# Patient Record
Sex: FEMALE | Race: BLACK OR AFRICAN AMERICAN | NOT HISPANIC OR LATINO | Employment: OTHER | URBAN - METROPOLITAN AREA
[De-identification: names, ages, dates, MRNs, and addresses within clinical notes are randomized per-mention and may not be internally consistent; named-entity substitution may affect disease eponyms.]

---

## 2017-01-11 ENCOUNTER — PATIENT OUTREACH (OUTPATIENT)
Dept: ADMINISTRATIVE | Facility: HOSPITAL | Age: 82
End: 2017-01-11

## 2017-01-11 NOTE — LETTER
January 11, 2017    Glo Dumont  [address] [facility] [address]             [facility]  [address]  [facility] [address]  Phone: [phone] Dear Ms. Dumont:    [facility] is committed to your overall health.  To help you get the most out of each of your visits, we will review your information to make sure you are up to date on all of your recommended tests and/or procedures.      Christina Recio MD has found that you may be due for    Zoster Vaccine     Colonoscopy     Influenza Vaccine         If you have had any of the above done at another facility, please bring the records or information with you so that your record at [facility] will be complete.    If you are currently taking medication, please bring it with you to your appointment for review.    We will be happy to assist you with scheduling any necessary appointments or you may contact the [facility] appointment desk at [phone] to schedule at your convenience.     Thank you for choosing [facility] for your healthcare needs,      JULEE Bueno  Care Coordination Department  [facility]

## 2017-01-16 RX ORDER — PRASUGREL HYDROCHLORIDE 10 MG/1
TABLET, COATED ORAL
Qty: 30 TABLET | Refills: 0 | Status: SHIPPED | OUTPATIENT
Start: 2017-01-16 | End: 2017-02-16 | Stop reason: SDUPTHER

## 2017-01-16 RX ORDER — METOPROLOL TARTRATE 50 MG/1
TABLET ORAL
Qty: 90 TABLET | Refills: 0 | Status: SHIPPED | OUTPATIENT
Start: 2017-01-16 | End: 2017-03-07 | Stop reason: SDUPTHER

## 2017-01-23 ENCOUNTER — OFFICE VISIT (OUTPATIENT)
Dept: INTERNAL MEDICINE | Facility: CLINIC | Age: 82
End: 2017-01-23
Payer: MEDICARE

## 2017-01-23 ENCOUNTER — HOSPITAL ENCOUNTER (OUTPATIENT)
Dept: RADIOLOGY | Facility: HOSPITAL | Age: 82
Discharge: HOME | End: 2017-01-23
Attending: INTERNAL MEDICINE
Payer: MEDICARE

## 2017-01-23 VITALS
WEIGHT: 124.56 LBS | DIASTOLIC BLOOD PRESSURE: 70 MMHG | BODY MASS INDEX: 23.52 KG/M2 | HEIGHT: 61 IN | HEART RATE: 64 BPM | OXYGEN SATURATION: 99 % | TEMPERATURE: 95 F | SYSTOLIC BLOOD PRESSURE: 158 MMHG

## 2017-01-23 DIAGNOSIS — E78.00 PURE HYPERCHOLESTEROLEMIA: ICD-10-CM

## 2017-01-23 DIAGNOSIS — I10 ESSENTIAL HYPERTENSION: Chronic | ICD-10-CM

## 2017-01-23 DIAGNOSIS — E55.9 VITAMIN D DEFICIENCY: ICD-10-CM

## 2017-01-23 DIAGNOSIS — F32.A ANXIETY AND DEPRESSION: ICD-10-CM

## 2017-01-23 DIAGNOSIS — I11.0 HYPERTENSIVE HEART DISEASE WITH HEART FAILURE: Primary | ICD-10-CM

## 2017-01-23 DIAGNOSIS — D57.3 HEMOGLOBIN A-S GENOTYPE: ICD-10-CM

## 2017-01-23 DIAGNOSIS — Z12.31 ENCOUNTER FOR SCREENING MAMMOGRAM FOR MALIGNANT NEOPLASM OF BREAST: ICD-10-CM

## 2017-01-23 DIAGNOSIS — D86.0 SARCOIDOSIS OF LUNG: ICD-10-CM

## 2017-01-23 DIAGNOSIS — Z00.00 ENCOUNTER FOR PREVENTIVE HEALTH EXAMINATION: ICD-10-CM

## 2017-01-23 DIAGNOSIS — F41.9 ANXIETY AND DEPRESSION: ICD-10-CM

## 2017-01-23 DIAGNOSIS — Z29.9 PREVENTIVE MEASURE: ICD-10-CM

## 2017-01-23 PROCEDURE — 3077F SYST BP >= 140 MM HG: CPT | Mod: S$GLB,,, | Performed by: INTERNAL MEDICINE

## 2017-01-23 PROCEDURE — G0008 ADMIN INFLUENZA VIRUS VAC: HCPCS | Mod: S$GLB,,, | Performed by: INTERNAL MEDICINE

## 2017-01-23 PROCEDURE — 77063 BREAST TOMOSYNTHESIS BI: CPT | Mod: 26,,, | Performed by: RADIOLOGY

## 2017-01-23 PROCEDURE — 77067 SCR MAMMO BI INCL CAD: CPT | Mod: 26,,, | Performed by: RADIOLOGY

## 2017-01-23 PROCEDURE — 99499 UNLISTED E&M SERVICE: CPT | Mod: S$GLB,,, | Performed by: INTERNAL MEDICINE

## 2017-01-23 PROCEDURE — 99397 PER PM REEVAL EST PAT 65+ YR: CPT | Mod: 25,S$GLB,, | Performed by: INTERNAL MEDICINE

## 2017-01-23 PROCEDURE — 90662 IIV NO PRSV INCREASED AG IM: CPT | Mod: S$GLB,,, | Performed by: INTERNAL MEDICINE

## 2017-01-23 PROCEDURE — 3078F DIAST BP <80 MM HG: CPT | Mod: S$GLB,,, | Performed by: INTERNAL MEDICINE

## 2017-01-23 PROCEDURE — 99999 PR PBB SHADOW E&M-EST. PATIENT-LVL III: CPT | Mod: PBBFAC,,, | Performed by: INTERNAL MEDICINE

## 2017-01-23 RX ORDER — AMLODIPINE BESYLATE 10 MG/1
10 TABLET ORAL DAILY
Qty: 30 TABLET | Refills: 11 | Status: SHIPPED | OUTPATIENT
Start: 2017-01-23 | End: 2018-02-12 | Stop reason: SDUPTHER

## 2017-01-23 RX ORDER — FLUOXETINE 10 MG/1
10 CAPSULE ORAL DAILY
Qty: 30 CAPSULE | Refills: 6 | Status: SHIPPED | OUTPATIENT
Start: 2017-01-23 | End: 2018-02-12 | Stop reason: SDUPTHER

## 2017-01-23 NOTE — MR AVS SNAPSHOT
[facility]  [address]  [place] [address]  Phone: [phone]  Fax: [phone]                  Glo Dumont   2017 9:20 AM   Office Visit    Description:  Female : 1935   Provider:  Christina Cardona MD   Department:  [facility] - Internal Medicine           Reason for Visit     Annual Exam           Diagnoses this Visit        Comments    Hypertensive heart disease with heart failure    -  Primary     Pure hypercholesterolemia         Sarcoidosis of lung         Vitamin D deficiency         Essential hypertension         Encounter for preventive health examination         Hemoglobin A-S genotype         Anxiety and depression                To Do List           Future Appointments        Provider Department Dept Phone    2017 8:00 AM Nik Bregman MD [facility] [phone]    3/7/2017 10:00 AM Christina Cardona MD [facility] [phone]    3/13/2017 9:10 AM PULMONARY LAB, [facility] [phone]    3/13/2017 10:00 AM Gordo Muir MD [facility] [phone]      Goals (5 Years of Data)     None      Follow-Up and Disposition     Return in about 6 weeks (around 3/6/2017).    Follow-up and Disposition History       These Medications        Disp Refills Start End    amlodipine (NORVASC) 10 MG tablet 30 tablet 11 2017     Take 1 tablet (10 mg total) by mouth once daily. - Oral    [facility] [place] [facility] [address] Ph #: [phone]       fluoxetine (PROZAC) 10 MG capsule 30 capsule 6 2017    Take 1 capsule (10 mg total) by mouth once daily. - Oral    [facility] Ph #: [phone]         [facility]     [facility] Nurse Care Line -  Assistance  Registered nurses in the [facility] Center provide clinical advisement, health education, appointment  booking, and other advisory services.  Call for this free service at [phone].             Medications           Message regarding Medications     Verify the changes and/or additions to your medication regime listed below are the same as discussed with your clinician today.  If any of these changes or additions are incorrect, please notify your healthcare provider.        START taking these NEW medications        Refills    fluoxetine (PROZAC) 10 MG capsule 6    Sig: Take 1 capsule (10 mg total) by mouth once daily.    Class: Normal    Route: Oral           Verify that the below list of medications is an accurate representation of the medications you are currently taking.  If none reported, the list may be blank. If incorrect, please contact your healthcare provider. Carry this list with you in case of emergency.           Current Medications     amlodipine (NORVASC) 10 MG tablet Take 1 tablet (10 mg total) by mouth once daily.    aspirin (ECOTRIN) 81 MG EC tablet Take 81 mg by mouth once daily.    CALCIUM CARBONATE (CALCIUM 600 ORAL) Take 1 tablet by mouth Daily.    cholecalciferol, vitamin D3, 1,000 unit capsule Take 2 capsules (2,000 Units total) by mouth once daily.    cloNIDine (CATAPRES) 0.1 MG tablet Take 1 tablet (0.1 mg total) by mouth every 12 (twelve) hours as needed (BP above 160/100). May cause drowsiness    dorzolamide-timolol 2-0.5% (COSOPT) 2-0.5 % ophthalmic solution Place 1 drop into both eyes 2 (two) times daily.    DUREZOL 0.05 % Drop ophthalmic solution     EFFIENT 10 mg Tab TAKE ONE TABLET BY MOUTH EVERY DAY    FOLIC ACID/MULTIVIT-MIN/LUTEIN (CENTRUM SILVER ORAL) Take by mouth.    hydrochlorothiazide (MICROZIDE) 12.5 mg capsule TAKE ONE CAPSULE BY MOUTH ONCE DAILY    iron-vitamin C 100-250 mg, ICAR-C, (ICAR-C) 100-250 mg Tab Take 1 tablet by mouth once daily.    ketorolac 0.5% (ACULAR) 0.5 % Drop     lisinopril (PRINIVIL,ZESTRIL) 40 MG tablet Take 1 tablet (40 mg total) by mouth once  "daily.    metoprolol tartrate (LOPRESSOR) 50 MG tablet TAKE ONE TABLET BY MOUTH IN THE MORNING AND 2 TABLETS BY MOUTH IN THEEVENING    simvastatin (ZOCOR) 40 MG tablet TAKE ONE TABLET BY MOUTH EVERY EVENING    fluoxetine (PROZAC) 10 MG capsule Take 1 capsule (10 mg total) by mouth once daily.           Clinical Reference Information           Vital Signs - Last Recorded  Most recent update: 1/23/2017  9:48 AM by Nicole Mendez MA    BP Pulse Temp Ht Wt SpO2    (!) 158/70 (BP Location: Right arm) 64 (!) 95.4 °F (35.2 °C) (Tympanic) 5' 1" (1.549 m) 56.5 kg (124 lb 9 oz) 99%    BMI                23.54 kg/m2          Blood Pressure          Most Recent Value    BP  (!)  158/70      Allergies as of 1/23/2017     Codeine      Immunizations Administered on Date of Encounter - 1/23/2017     Name Date Dose VIS Date Route    Influenza - High Dose 1/23/2017 0.5 mL 8/7/2015 Intramuscular      Orders Placed During Today's Visit      Normal Orders This Visit    Influenza - High Dose (65+) (PF) (IM)       [facility] Sign-Up     Activating your [facility] account is as easy as 1-2-3!     1) Visit [email or web address], select Sign Up Now, enter this activation code and your date of birth, then select Next.  [email or web address]  Expires: 3/9/2017 10:04 AM      2) Create a username and password to use when you visit [facility] in the future and select a security question in case you lose your password and select Next.    3) Enter your e-mail address and click Sign Up!    Additional Information  If you have questions, please e-mail [email or web address] or call [phone] to talk to our [facility] staff. Remember, [facility] is NOT to be used for urgent needs. For medical emergencies, dial 911.         "

## 2017-01-23 NOTE — PROGRESS NOTES
"Subjective:       Patient ID: Glo Dumont is a 81 y.o. female.    Chief Complaint: Annual Exam    HPI Comments: Here for f/u medical problems and preventive exam.  Labs pending.  No f/c/sw/cough.  Breathing well.  No cp/sob/palp.  BMs and urine normal.  Taking vit D.  Checks BP at home:  130-140s/ 70s mostly.  Doesn't sleep well, lots of worry at bedtime.  Lots of stressors.  Thinks BP high due to stress.    HM: 1/17 fluvax, 3/15 [id number], 2/14 [id number], 2/15 TDaP, 12/15 BMD rep 2y, 2/11 Cscope no repeat will be done, 1/17 MMG, Eye doc monthly.        Review of Systems   Constitutional: Negative for appetite change, chills, diaphoresis and fever.   HENT: Negative for congestion, ear pain, rhinorrhea, sinus pressure and sore throat.    Respiratory: Negative for cough, chest tightness and shortness of breath.    Cardiovascular: Negative for chest pain and palpitations.   Gastrointestinal: Negative for blood in stool, constipation, diarrhea, nausea and vomiting.   Genitourinary: Negative for dysuria, frequency, hematuria, menstrual problem, urgency and vaginal discharge.   Musculoskeletal: Negative for arthralgias.   Skin: Negative for rash.   Neurological: Negative for dizziness and headaches.   Psychiatric/Behavioral: Negative for sleep disturbance. The patient is not nervous/anxious.        Objective:     Visit Vitals    BP (!) 158/70 (BP Location: Right arm)    Pulse 64    Temp (!) 95.4 °F (35.2 °C) (Tympanic)    Ht 5' 1" (1.549 m)    Wt 56.5 kg (124 lb 9 oz)    SpO2 99%    BMI 23.54 kg/m2       Physical Exam   Constitutional: She is oriented to person, place, and time. She appears well-developed and well-nourished.   HENT:   Right Ear: External ear normal. Tympanic membrane is not injected.   Left Ear: External ear normal. Tympanic membrane is not injected.   Mouth/Throat: Oropharynx is clear and moist.   Eyes: Conjunctivae are normal.   Neck: Normal range of motion. Neck supple. No thyromegaly present. "   Cardiovascular: Normal rate, regular rhythm and intact distal pulses.  Exam reveals no gallop and no friction rub.    No murmur heard.  Pulmonary/Chest: Effort normal and breath sounds normal. She has no wheezes. She has no rales. Right breast exhibits no mass, no nipple discharge, no skin change and no tenderness. Left breast exhibits no mass, no nipple discharge, no skin change and no tenderness.   Abdominal: Soft. Bowel sounds are normal. She exhibits no mass. There is no tenderness.   Musculoskeletal: She exhibits no edema.   Lymphadenopathy:     She has no cervical adenopathy.        Right axillary: No lateral adenopathy present.        Left axillary: No lateral adenopathy present.  Neurological: She is alert and oriented to person, place, and time.   Skin: Skin is warm. No rash noted.   Psychiatric: She has a normal mood and affect.       Assessment:       1. Hypertensive heart disease with heart failure    2. Pure hypercholesterolemia    3. Sarcoidosis of lung    4. Vitamin D deficiency    5. Essential hypertension    6. Encounter for preventive health examination    7. Hemoglobin A-S genotype    8. Anxiety and depression        Plan:       Glo was seen today for annual exam.    Diagnoses and all orders for this visit:    Hypertensive heart disease with heart failure- clin stable.    Pure hypercholesterolemia- labs pending.    Sarcoidosis of lung- stable.    Vitamin D deficiency- lab pending.    Essential hypertension- monitor BPs for Card appt.    Encounter for preventive health examination- fluvax, then MA labs.      anx and depression- start prozac, recheck 6wk.    Hemoglobin A-S genotype- lab pending.

## 2017-01-26 ENCOUNTER — TELEPHONE (OUTPATIENT)
Dept: PEDIATRICS | Facility: CLINIC | Age: 82
End: 2017-01-26

## 2017-01-26 DIAGNOSIS — E83.52 HYPERCALCEMIA: Primary | ICD-10-CM

## 2017-01-26 NOTE — TELEPHONE ENCOUNTER
Please tell pt labs ok except calcium level is too high.  If taking calcium supplement needs to stop.  Schedule lab to recheck before appt 6wk.  SM

## 2017-01-30 NOTE — TELEPHONE ENCOUNTER
Informed pt labs ok except calcium is too high.  Pt stated that she does take a calcium supplement.  Instructed pt to stop and that her levels will be rechecked prior to her 6wk f/u.  Pt verbalized understanding./rpr

## 2017-02-15 DIAGNOSIS — I25.83 CORONARY ARTERY DISEASE DUE TO LIPID RICH PLAQUE: ICD-10-CM

## 2017-02-15 DIAGNOSIS — I25.10 CORONARY ARTERY DISEASE DUE TO LIPID RICH PLAQUE: ICD-10-CM

## 2017-02-15 DIAGNOSIS — I10 BENIGN ESSENTIAL HTN: Primary | ICD-10-CM

## 2017-02-16 ENCOUNTER — OFFICE VISIT (OUTPATIENT)
Dept: CARDIOLOGY | Facility: CLINIC | Age: 82
End: 2017-02-16
Payer: MEDICARE

## 2017-02-16 ENCOUNTER — CLINICAL SUPPORT (OUTPATIENT)
Dept: CARDIOLOGY | Facility: CLINIC | Age: 82
End: 2017-02-16
Payer: MEDICARE

## 2017-02-16 VITALS
HEIGHT: 61 IN | SYSTOLIC BLOOD PRESSURE: 136 MMHG | DIASTOLIC BLOOD PRESSURE: 60 MMHG | HEART RATE: 46 BPM | WEIGHT: 116 LBS | BODY MASS INDEX: 21.9 KG/M2

## 2017-02-16 DIAGNOSIS — I10 ESSENTIAL HYPERTENSION: Chronic | ICD-10-CM

## 2017-02-16 DIAGNOSIS — I25.10 CORONARY ARTERY DISEASE, OCCLUSIVE: Primary | Chronic | ICD-10-CM

## 2017-02-16 DIAGNOSIS — I50.32 CHRONIC DIASTOLIC HF (HEART FAILURE), NYHA CLASS 2: Chronic | ICD-10-CM

## 2017-02-16 DIAGNOSIS — I20.9 ANGINA, CLASS II: Chronic | ICD-10-CM

## 2017-02-16 DIAGNOSIS — I10 BENIGN ESSENTIAL HTN: ICD-10-CM

## 2017-02-16 DIAGNOSIS — I25.83 CORONARY ARTERY DISEASE DUE TO LIPID RICH PLAQUE: ICD-10-CM

## 2017-02-16 DIAGNOSIS — I25.10 CORONARY ARTERY DISEASE DUE TO LIPID RICH PLAQUE: ICD-10-CM

## 2017-02-16 DIAGNOSIS — Z98.61 HISTORY OF CORONARY ANGIOPLASTY: Chronic | ICD-10-CM

## 2017-02-16 PROCEDURE — 3075F SYST BP GE 130 - 139MM HG: CPT | Mod: S$GLB,,, | Performed by: NUCLEAR MEDICINE

## 2017-02-16 PROCEDURE — 1157F ADVNC CARE PLAN IN RCRD: CPT | Mod: S$GLB,,, | Performed by: NUCLEAR MEDICINE

## 2017-02-16 PROCEDURE — 93000 ELECTROCARDIOGRAM COMPLETE: CPT | Mod: S$GLB,,, | Performed by: INTERNAL MEDICINE

## 2017-02-16 PROCEDURE — 1160F RVW MEDS BY RX/DR IN RCRD: CPT | Mod: S$GLB,,, | Performed by: NUCLEAR MEDICINE

## 2017-02-16 PROCEDURE — 3078F DIAST BP <80 MM HG: CPT | Mod: S$GLB,,, | Performed by: NUCLEAR MEDICINE

## 2017-02-16 PROCEDURE — 99214 OFFICE O/P EST MOD 30 MIN: CPT | Mod: S$GLB,,, | Performed by: NUCLEAR MEDICINE

## 2017-02-16 PROCEDURE — 99499 UNLISTED E&M SERVICE: CPT | Mod: S$GLB,,, | Performed by: NUCLEAR MEDICINE

## 2017-02-16 PROCEDURE — 99999 PR PBB SHADOW E&M-EST. PATIENT-LVL III: CPT | Mod: PBBFAC,,, | Performed by: NUCLEAR MEDICINE

## 2017-02-16 PROCEDURE — 1159F MED LIST DOCD IN RCRD: CPT | Mod: S$GLB,,, | Performed by: NUCLEAR MEDICINE

## 2017-02-16 PROCEDURE — 1126F AMNT PAIN NOTED NONE PRSNT: CPT | Mod: S$GLB,,, | Performed by: NUCLEAR MEDICINE

## 2017-02-16 RX ORDER — PRASUGREL 10 MG/1
10 TABLET, FILM COATED ORAL DAILY
Qty: 30 TABLET | Refills: 6 | Status: SHIPPED | OUTPATIENT
Start: 2017-02-16 | End: 2017-11-01 | Stop reason: SDUPTHER

## 2017-02-16 NOTE — MR AVS SNAPSHOT
[facility]  [address] [place] [address]  Phone: [phone]  Fax: [phone]                  Glo Dumont   2017 8:00 AM   Office Visit    Description:  Female : 1935   Provider:  Nik Bergman MD   Department:  [facility] - Cardiology           Reason for Visit     Coronary Artery Disease     Hypertension     Congestive Heart Failure           Diagnoses this Visit        Comments    Coronary artery disease, occlusive    -  Primary     Angina, class II         History of coronary angioplasty         Essential hypertension         Chronic diastolic HF (heart failure), NYHA class 2                To Do List           Future Appointments        Provider Department Dept Phone    2017 9:55 AM LABORATORY, [facility] [phone]    3/7/2017 10:00 AM Christina Cardona MD [facility] [phone]    3/13/2017 9:10 AM PULMONARY LAB, [facility] [phone]    3/13/2017 10:00 AM Gordo Muir MD [facility] [phone]      Goals (5 Years of Data)     None      Follow-Up and Disposition     Return in about 6 months (around 2017).      [facility]     [facility] Nurse Care Line -  Assistance  Registered nurses in the [facility] Center provide clinical advisement, health education, appointment booking, and other advisory services.  Call for this free service at [phone].             Medications           Message regarding Medications     Verify the changes and/or additions to your medication regime listed below are the same as discussed with your clinician today.  If any of these changes or additions are incorrect, please notify your healthcare provider.             Verify that the below list of medications is an accurate representation of the medications you are currently taking.  If none reported, the list may be blank. If incorrect, please contact your healthcare  "provider. Carry this list with you in case of emergency.           Current Medications     amlodipine (NORVASC) 10 MG tablet Take 1 tablet (10 mg total) by mouth once daily.    aspirin (ECOTRIN) 81 MG EC tablet Take 81 mg by mouth once daily.    CALCIUM CARBONATE (CALCIUM 600 ORAL) Take 1 tablet by mouth Daily.    cholecalciferol, vitamin D3, 1,000 unit capsule Take 2 capsules (2,000 Units total) by mouth once daily.    cloNIDine (CATAPRES) 0.1 MG tablet Take 1 tablet (0.1 mg total) by mouth every 12 (twelve) hours as needed (BP above 160/100). May cause drowsiness    dorzolamide-timolol 2-0.5% (COSOPT) 2-0.5 % ophthalmic solution Place 1 drop into both eyes 2 (two) times daily.    DUREZOL 0.05 % Drop ophthalmic solution     fluoxetine (PROZAC) 10 MG capsule Take 1 capsule (10 mg total) by mouth once daily.    FOLIC ACID/MULTIVIT-MIN/LUTEIN (CENTRUM SILVER ORAL) Take by mouth.    hydrochlorothiazide (MICROZIDE) 12.5 mg capsule TAKE ONE CAPSULE BY MOUTH ONCE DAILY    iron-vitamin C 100-250 mg, ICAR-C, (ICAR-C) 100-250 mg Tab Take 1 tablet by mouth once daily.    ketorolac 0.5% (ACULAR) 0.5 % Drop     lisinopril (PRINIVIL,ZESTRIL) 40 MG tablet Take 1 tablet (40 mg total) by mouth once daily.    metoprolol tartrate (LOPRESSOR) 50 MG tablet TAKE ONE TABLET BY MOUTH IN THE MORNING AND 2 TABLETS BY MOUTH IN THEEVENING    prasugrel (EFFIENT) 10 mg Tab Take 1 tablet (10 mg total) by mouth once daily.    simvastatin (ZOCOR) 40 MG tablet TAKE ONE TABLET BY MOUTH EVERY EVENING           Clinical Reference Information           Your Vitals Were     BP Pulse Height Weight BMI    136/60 (BP Location: Left arm, Patient Position: Sitting, BP Method: Manual) 46 5' 1" (1.549 m) 52.6 kg (116 lb) 21.92 kg/m2      Blood Pressure          Most Recent Value    Right Arm BP - Sitting  140/70    Right Arm BP - Standing  140/70    BP  136/60      Allergies as of 2/16/2017     Codeine      Immunizations Administered on Date of Encounter - " 2/16/2017     None      [facility] Sign-Up     Activating your [facility] account is as easy as 1-2-3!     1) Visit [email or web address], select Sign Up Now, enter this activation code and your date of birth, then select Next.  [email or web address]  Expires: 3/9/2017 10:04 AM      2) Create a username and password to use when you visit [facility] in the future and select a security question in case you lose your password and select Next.    3) Enter your e-mail address and click Sign Up!    Additional Information  If you have questions, please e-mail [email or web address] or call [phone] to talk to our [facility] staff. Remember, [facility] is NOT to be used for urgent needs. For medical emergencies, dial 911.         Language Assistance Services     ATTENTION: Language assistance services are available, free of charge. Please call [phone].      ATENCIÓN: Si habla gigidaniel, tiene a paez disposición servicios gratuitos de asistencia lingüística. Llame al [phone].     [facility] Ý: N?u b?n nói Ti?ng Vi?t, có các d?ch v? h? tr? ngôn ng? mi?n phí dành cho b?n. G?i s? [phone].         [facility] complies with applicable Federal civil rights laws and does not discriminate on the basis of race, color, national origin, age, disability, or sex.

## 2017-02-16 NOTE — PROGRESS NOTES
Subjective:   Patient ID:  Glo Dumont is a 81 y.o. female who presents for follow-up of Coronary Artery Disease (6 month followup); Hypertension; and Congestive Heart Failure (HFPEF DD)      HPI 1- CHRONIC CAD- ANGINA FC 2- POST PCI- ON DAPT- EFFIENT    2- ESSENTIAL HTN WITH HFPEF, STAGE C, FC 2- DD  DOING WELL. NO RECURRENT ANGINA. NO RECENT HOSPITALIZATIONS OR ED VISITS FOR ACS OR ADHF  NO UNUSUAL SOOD. NO ORTHOPNEA OR PND  NO PALPITATIONS. NO EXERTIONAL DIZZINESS OR SYNCOPE  NO EDEMA. NO INTERMITTENT CLAUDICATION  NO FOCAL CNS SYMPTOMS OR SIGNS TO SUGGEST TIA OR STROKE  NO ABNORMAL BLEEDING - ON DAPT- EFFIENT  CARD MED WELL TOLERATED  ECG - TODAY- SB, OTHERWISE UNREMARKABLE  Review of Systems   Constitution: Negative for chills, fever, weakness, night sweats, weight gain and weight loss.   HENT: Negative for headaches and nosebleeds.    Eyes: Negative for blurred vision, double vision and visual disturbance.   Cardiovascular: Negative for chest pain, dyspnea on exertion, irregular heartbeat, leg swelling, orthopnea, palpitations, paroxysmal nocturnal dyspnea and syncope.   Respiratory: Negative for cough, hemoptysis and wheezing.    Endocrine: Negative for polydipsia and polyuria.   Hematologic/Lymphatic: Does not bruise/bleed easily.   Skin: Negative for rash.   Musculoskeletal: Negative for joint pain, joint swelling, muscle weakness and myalgias.   Gastrointestinal: Negative for abdominal pain, hematemesis, jaundice and melena.   Genitourinary: Negative for dysuria, hematuria and nocturia.   Neurological: Negative for dizziness, focal weakness and sensory change.   Psychiatric/Behavioral: Negative for depression. The patient does not have insomnia and is not nervous/anxious.      Family History   Problem Relation Age of Onset    Hypertension Mother     Heart failure Mother     Cancer Father      prostate    Cirrhosis Brother     Heart failure Brother     Cancer Daughter      Carcinoid     Past Medical  History   Diagnosis Date    Anemia     Angina pectoris     Anxiety     Arthritis      hip    Carotid artery occlusion     Carpal tunnel syndrome 06/23/2008     emg    Chronic diarrhea      work up in 2011 with EGD, CS and VCE    Coronary artery disease     Coronary artery disease     Diastolic dysfunction     Diverticulosis     Greater trochanteric bursitis 2/10/2015    Grief at loss of child 1/26/2016    H/O carotid endarterectomy 12/2/2013    Heart failure     Hypercholesteremia     Hypertension     Liver cyst 02/08/2013     ct abd    Macular degeneration     Primary open-angle glaucoma(365.11) 9/3/2013    Renal cyst 02/08/2013     ct abd    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 30 tablet 11    aspirin (ECOTRIN) 81 MG EC tablet Take 81 mg by mouth once daily.      CALCIUM CARBONATE (CALCIUM 600 ORAL) Take 1 tablet by mouth Daily.      cholecalciferol, vitamin D3, 1,000 unit capsule Take 2 capsules (2,000 Units total) by mouth once daily. 100 capsule 0    dorzolamide-timolol 2-0.5% (COSOPT) 2-0.5 % ophthalmic solution Place 1 drop into both eyes 2 (two) times daily. 10 mL 5    DUREZOL 0.05 % Drop ophthalmic solution       EFFIENT 10 mg Tab TAKE ONE TABLET BY MOUTH EVERY DAY 30 tablet 0    fluoxetine (PROZAC) 10 MG capsule Take 1 capsule (10 mg total) by mouth once daily. 30 capsule 6    FOLIC ACID/MULTIVIT-MIN/LUTEIN (CENTRUM SILVER ORAL) Take by mouth.      hydrochlorothiazide (MICROZIDE) 12.5 mg capsule TAKE ONE CAPSULE BY MOUTH ONCE DAILY 30 capsule 11    iron-vitamin C 100-250 mg, ICAR-C, (ICAR-C) 100-250 mg Tab Take 1 tablet by mouth once daily. 100 tablet 6    ketorolac 0.5% (ACULAR) 0.5 % Drop        lisinopril (PRINIVIL,ZESTRIL) 40 MG tablet Take 1 tablet (40 mg total) by mouth once daily. 30 tablet 11    metoprolol tartrate (LOPRESSOR) 50 MG tablet TAKE ONE TABLET BY MOUTH IN THE MORNING AND 2 TABLETS BY MOUTH IN THEEVENING 90 tablet 0    simvastatin (ZOCOR) 40 MG tablet TAKE ONE TABLET BY MOUTH EVERY EVENING 30 tablet 11    cloNIDine (CATAPRES) 0.1 MG tablet Take 1 tablet (0.1 mg total) by mouth every 12 (twelve) hours as needed (BP above 160/100). May cause drowsiness 10 tablet 0     Current Facility-Administered Medications on File Prior to Visit   Medication Dose Route Frequency Provider Last Rate Last Dose    aflibercept Soln 2 mg  2 mg Intravitreal 1 time in Clinic/HOD NAHUM Tamez MD         Review of patient's allergies indicates:   Allergen Reactions    Codeine Nausea And Vomiting       Objective:     Physical Exam   Constitutional: She is oriented to person, place, and time. She appears well-developed. No distress.   HENT:   Head: Normocephalic.   Eyes: Conjunctivae are normal. Pupils are equal, round, and reactive to light. No scleral icterus.   Neck: Normal range of motion. Neck supple. Normal carotid pulses, no hepatojugular reflux and no JVD present. Carotid bruit is not present. No edema present. No thyroid mass and no thyromegaly present.   Cardiovascular: Regular rhythm, S1 normal, S2 normal, normal heart sounds and intact distal pulses.  Bradycardia present.  PMI is not displaced.  Exam reveals no gallop and no friction rub.    No murmur heard.  Pulses:       Carotid pulses are 2+ on the right side, and 2+ on the left side.       Radial pulses are 2+ on the right side, and 2+ on the left side.        Femoral pulses are 2+ on the right side, and 2+ on the left side.       Popliteal pulses are 2+ on the right side, and 2+ on the left side.        Dorsalis pedis pulses are 2+ on the right side, and 2+ on the left side.        Posterior tibial pulses are 2+ on the right side, and 2+ on  the left side.   Pulmonary/Chest: Effort normal and breath sounds normal. She has no wheezes. She has no rales. She exhibits no tenderness.   Abdominal: Soft. Bowel sounds are normal. She exhibits no pulsatile midline mass and no mass. There is no hepatosplenomegaly. There is no tenderness.   Musculoskeletal: Normal range of motion. She exhibits no edema or tenderness.        Cervical back: Normal.        Thoracic back: Normal.        Lumbar back: Normal.   Lymphadenopathy:     She has no cervical adenopathy.     She has no axillary adenopathy.        Right: No supraclavicular adenopathy present.        Left: No supraclavicular adenopathy present.   Neurological: She is alert and oriented to person, place, and time. She has normal strength and normal reflexes. No sensory deficit. Gait normal.   Skin: Skin is warm. No rash noted. No cyanosis. No pallor. Nails show no clubbing.   Psychiatric: She has a normal mood and affect. Her speech is normal and behavior is normal. Cognition and memory are normal.       Assessment:     1. Coronary artery disease, occlusive    2. Angina, class II    3. History of coronary angioplasty    4. Essential hypertension    5. Chronic diastolic HF (heart failure), NYHA class 2      STABLE CAD/ ANGINA PATTERN  NO CLINICAL EVIDENCE OF ADHF. NO ARRHYTHMIAS  BP WELL CONTROLLED  CNS STATUS STABLE  CARD MED WELL TOLERATED  Plan:     Coronary artery disease, occlusive    Angina, class II    History of coronary angioplasty    Essential hypertension    Chronic diastolic HF (heart failure), NYHA class 2    1- CONTINUE PRESENT CARD MANAGEMENT    2- RETURN IN 6 MONTHS.

## 2017-02-21 ENCOUNTER — PATIENT OUTREACH (OUTPATIENT)
Dept: ADMINISTRATIVE | Facility: HOSPITAL | Age: 82
End: 2017-02-21

## 2017-02-21 NOTE — LETTER
February 21, 2017    Glo Dumont  [address] [facility] [address]             [facility]  [address]  [facility] [address]  Phone: [phone] Dear Mrs. Dumont:    [facility] is committed to your overall health.  To help you get the most out of each of your visits, we will review your information to make sure you are up to date on all of your recommended tests and/or procedures.      Christina Recio MD has found that you may be due for    Zoster Vaccine     Colonoscopy         If you have had any of the above done at another facility, please bring the records or information with you so that your record at [facility] will be complete.    If you are currently taking medication, please bring it with you to your appointment for review.    We will be happy to assist you with scheduling any necessary appointments or you may contact the [facility] appointment desk at [phone] to schedule at your convenience.     Thank you for choosing [facility] for your healthcare needs,        Bobbye, LPN  Care Coordination Department  [facility]

## 2017-02-28 ENCOUNTER — LAB VISIT (OUTPATIENT)
Dept: LAB | Facility: HOSPITAL | Age: 82
End: 2017-02-28
Attending: INTERNAL MEDICINE
Payer: MEDICARE

## 2017-02-28 DIAGNOSIS — E83.52 HYPERCALCEMIA: ICD-10-CM

## 2017-02-28 LAB
ANION GAP SERPL CALC-SCNC: 5 MMOL/L
BUN SERPL-MCNC: 20 MG/DL
CALCIUM SERPL-MCNC: 9.3 MG/DL
CHLORIDE SERPL-SCNC: 109 MMOL/L
CO2 SERPL-SCNC: 24 MMOL/L
CREAT SERPL-MCNC: 1.1 MG/DL
EST. GFR  (AFRICAN AMERICAN): 55.2 ML/MIN/1.73 M^2
EST. GFR  (NON AFRICAN AMERICAN): 47.9 ML/MIN/1.73 M^2
GLUCOSE SERPL-MCNC: 91 MG/DL
POTASSIUM SERPL-SCNC: 3.8 MMOL/L
PTH-INTACT SERPL-MCNC: 29 PG/ML
SODIUM SERPL-SCNC: 138 MMOL/L

## 2017-02-28 PROCEDURE — 83970 ASSAY OF PARATHORMONE: CPT

## 2017-02-28 PROCEDURE — 80048 BASIC METABOLIC PNL TOTAL CA: CPT

## 2017-02-28 PROCEDURE — 36415 COLL VENOUS BLD VENIPUNCTURE: CPT | Mod: PO

## 2017-03-07 ENCOUNTER — OFFICE VISIT (OUTPATIENT)
Dept: INTERNAL MEDICINE | Facility: CLINIC | Age: 82
End: 2017-03-07
Payer: MEDICARE

## 2017-03-07 VITALS
TEMPERATURE: 97 F | OXYGEN SATURATION: 98 % | HEART RATE: 64 BPM | DIASTOLIC BLOOD PRESSURE: 68 MMHG | SYSTOLIC BLOOD PRESSURE: 142 MMHG | BODY MASS INDEX: 24.31 KG/M2 | WEIGHT: 128.75 LBS | HEIGHT: 61 IN

## 2017-03-07 DIAGNOSIS — I10 ESSENTIAL HYPERTENSION: Primary | Chronic | ICD-10-CM

## 2017-03-07 DIAGNOSIS — F32.A ANXIETY AND DEPRESSION: ICD-10-CM

## 2017-03-07 DIAGNOSIS — F41.9 ANXIETY AND DEPRESSION: ICD-10-CM

## 2017-03-07 DIAGNOSIS — I25.10 CORONARY ARTERY DISEASE, OCCLUSIVE: Chronic | ICD-10-CM

## 2017-03-07 DIAGNOSIS — I70.0 ATHEROSCLEROSIS OF AORTA: ICD-10-CM

## 2017-03-07 PROCEDURE — 1157F ADVNC CARE PLAN IN RCRD: CPT | Mod: S$GLB,,, | Performed by: INTERNAL MEDICINE

## 2017-03-07 PROCEDURE — 99499 UNLISTED E&M SERVICE: CPT | Mod: S$GLB,,, | Performed by: INTERNAL MEDICINE

## 2017-03-07 PROCEDURE — 3077F SYST BP >= 140 MM HG: CPT | Mod: S$GLB,,, | Performed by: INTERNAL MEDICINE

## 2017-03-07 PROCEDURE — 1159F MED LIST DOCD IN RCRD: CPT | Mod: S$GLB,,, | Performed by: INTERNAL MEDICINE

## 2017-03-07 PROCEDURE — 1160F RVW MEDS BY RX/DR IN RCRD: CPT | Mod: S$GLB,,, | Performed by: INTERNAL MEDICINE

## 2017-03-07 PROCEDURE — 99213 OFFICE O/P EST LOW 20 MIN: CPT | Mod: S$GLB,,, | Performed by: INTERNAL MEDICINE

## 2017-03-07 PROCEDURE — 3078F DIAST BP <80 MM HG: CPT | Mod: S$GLB,,, | Performed by: INTERNAL MEDICINE

## 2017-03-07 PROCEDURE — 99999 PR PBB SHADOW E&M-EST. PATIENT-LVL III: CPT | Mod: PBBFAC,,, | Performed by: INTERNAL MEDICINE

## 2017-03-07 RX ORDER — BUDESONIDE 3 MG/1
9 CAPSULE, COATED PELLETS ORAL DAILY
COMMUNITY
End: 2022-10-31

## 2017-03-07 RX ORDER — DICYCLOMINE HYDROCHLORIDE 10 MG/1
10 CAPSULE ORAL
COMMUNITY
End: 2020-09-26

## 2017-03-07 RX ORDER — METOPROLOL TARTRATE 50 MG/1
TABLET ORAL
Qty: 90 TABLET | Refills: 0 | Status: SHIPPED | OUTPATIENT
Start: 2017-03-07 | End: 2017-04-25 | Stop reason: SDUPTHER

## 2017-03-07 NOTE — MR AVS SNAPSHOT
[facility]  [address] [facility] [facility] [place] [address]  Phone: [phone]  Fax: [phone]                  Glo Dumont   3/7/2017 10:00 AM   Office Visit    Description:  Female : 1935   Provider:  Christina Cardona MD   Department:  [facility]           Reason for Visit     Follow-up           Diagnoses this Visit        Comments    Essential hypertension    -  Primary     Coronary artery disease, occlusive         Atherosclerosis of aorta         Anxiety and depression                To Do List           Future Appointments        Provider Department Dept Phone    3/13/2017 9:10 AM PULMONARY LAB, [address] - Pulm Function [facility] [phone]    3/13/2017 10:00 AM MD Seamus Marcial - Pulmonary Services [phone]    2017 9:00 AM Christina Cardona MD [facility] [phone]      Goals (5 Years of Data)     None      Follow-Up and Disposition     Return in about 3 months (around 2017).    Follow-up and Disposition History      [facility] On [facility]     [facility] On Call Nurse Care Line -  Assistance  Registered nurses in the [facility] On Call Center provide clinical advisement, health education, appointment booking, and other advisory services.  Call for this free service at [phone].             Medications           Message regarding Medications     Verify the changes and/or additions to your medication regime listed below are the same as discussed with your clinician today.  If any of these changes or additions are incorrect, please notify your healthcare provider.             Verify that the below list of medications is an accurate representation of the medications you are currently taking.  If none reported, the list may be blank. If incorrect, please contact your healthcare provider. Carry this list with you in case of emergency.           Current Medications     amlodipine (NORVASC) 10 MG tablet Take 1 tablet (10 mg total)  "by mouth once daily.    aspirin (ECOTRIN) 81 MG EC tablet Take 81 mg by mouth once daily.    budesonide (ENTOCORT EC) 3 mg capsule Take 9 mg by mouth once daily.    CALCIUM CARBONATE (CALCIUM 600 ORAL) Take 1 tablet by mouth Daily.    cholecalciferol, vitamin D3, 1,000 unit capsule Take 2 capsules (2,000 Units total) by mouth once daily.    dicyclomine (BENTYL) 10 MG capsule Take 10 mg by mouth 4 (four) times daily before meals and nightly.    dorzolamide-timolol 2-0.5% (COSOPT) 2-0.5 % ophthalmic solution Place 1 drop into both eyes 2 (two) times daily.    DUREZOL 0.05 % Drop ophthalmic solution     FOLIC ACID/MULTIVIT-MIN/LUTEIN (CENTRUM SILVER ORAL) Take by mouth.    hydrochlorothiazide (MICROZIDE) 12.5 mg capsule TAKE ONE CAPSULE BY MOUTH ONCE DAILY    iron-vitamin C 100-250 mg, ICAR-C, (ICAR-C) 100-250 mg Tab Take 1 tablet by mouth once daily.    ketorolac 0.5% (ACULAR) 0.5 % Drop     lisinopril (PRINIVIL,ZESTRIL) 40 MG tablet Take 1 tablet (40 mg total) by mouth once daily.    metoprolol tartrate (LOPRESSOR) 50 MG tablet TAKE ONE TABLET BY MOUTH IN THE MORNING AND 2 TABLETS BY MOUTH IN THEEVENING    prasugrel (EFFIENT) 10 mg Tab Take 1 tablet (10 mg total) by mouth once daily.    simvastatin (ZOCOR) 40 MG tablet TAKE ONE TABLET BY MOUTH EVERY EVENING    cloNIDine (CATAPRES) 0.1 MG tablet Take 1 tablet (0.1 mg total) by mouth every 12 (twelve) hours as needed (BP above 160/100). May cause drowsiness    fluoxetine (PROZAC) 10 MG capsule Take 1 capsule (10 mg total) by mouth once daily.           Clinical Reference Information           Your Vitals Were     BP Pulse Temp Height Weight SpO2    142/68 (BP Location: Right arm) 64 96.8 °F (36 °C) (Tympanic) 5' 1" (1.549 m) 58.4 kg (128 lb 12 oz) 98%    BMI                24.33 kg/m2          Blood Pressure          Most Recent Value    BP  (!)  142/68      Allergies as of 3/7/2017     Codeine      Immunizations Administered on Date of Encounter - 3/7/2017     None    "   [facility] Sign-Up     Activating your [facility] account is as easy as 1-2-3!     1) Visit [email or web address], select Sign Up Now, enter this activation code and your date of birth, then select Next.  [email or web address]  Expires: 3/9/2017 10:04 AM      2) Create a username and password to use when you visit [facility] in the future and select a security question in case you lose your password and select Next.    3) Enter your e-mail address and click Sign Up!    Additional Information  If you have questions, please e-mail [email or web address] or call [phone] to talk to our [facility] staff. Remember, [facility] is NOT to be used for urgent needs. For medical emergencies, dial 911.         Language Assistance Services     ATTENTION: Language assistance services are available, free of charge. Please call [phone].      ATENCIÓN: Si habla jace, tiene a paez disposición servicios gratuitos de asistencia lingüística. Llame al [phone].     CHÚ Ý: N?u b?n nói Ti?ng Vi?t, có các d?ch v? h? tr? ngôn ng? mi?n phí dành cho b?n. G?i s? [phone].         [facility] complies with applicable Federal civil rights laws and does not discriminate on the basis of race, color, national origin, age, disability, or sex.

## 2017-03-07 NOTE — PROGRESS NOTES
"Subjective:       Patient ID: Glo Blank is a 81 y.o. female.    Chief Complaint: Follow-up    HPI Comments: Here for follow up of medical problems.  Much better on prozac.  Sleeping well and thinks dose is right.  Stopped Ca supplement.    Updated/ annual due 1/18:  HM: 1/17 fluvax, 3/15 [id number], 2/14 [id number], 2/15 TDaP, 12/15 BMD rep 2y, 2/11 Cscope no repeat will be done, 1/17 MMG, Eye doc monthly.        Review of Systems   Constitutional: Negative for chills, diaphoresis and fever.   Respiratory: Negative for cough and shortness of breath.    Cardiovascular: Negative for chest pain, palpitations and leg swelling.   Gastrointestinal: Negative for blood in stool, constipation, diarrhea, nausea and vomiting.   Genitourinary: Negative for dysuria, frequency and hematuria.   Psychiatric/Behavioral: The patient is not nervous/anxious.        Objective:   BP (!) 142/68 (BP Location: Right arm)  Pulse 64  Temp 96.8 °F (36 °C) (Tympanic)   Ht 5' 1" (1.549 m)  Wt 58.4 kg (128 lb 12 oz)  SpO2 98%  BMI 24.33 kg/m2    Physical Exam   Constitutional: She is oriented to person, place, and time. She appears well-developed.   HENT:   Mouth/Throat: Oropharynx is clear and moist.   Neck: Neck supple. Carotid bruit is not present. No thyroid mass present.   Cardiovascular: Normal rate, regular rhythm and intact distal pulses.  Exam reveals no gallop and no friction rub.    No murmur heard.  Pulmonary/Chest: Effort normal and breath sounds normal. She has no wheezes. She has no rales.   Abdominal: Soft. Bowel sounds are normal. She exhibits no mass. There is no hepatosplenomegaly. There is no tenderness.   Musculoskeletal: She exhibits no edema.   Lymphadenopathy:     She has no cervical adenopathy.   Neurological: She is alert and oriented to person, place, and time.   Psychiatric: She has a normal mood and affect.     Results for GLO BLANK (MRN [id number]) as of 3/7/2017 10:39   Ref. Range 1/23/2017 09:25 2/16/2017 " 08:04 2/28/2017 08:36   BUN, Bld Latest Ref Range: 8 - 23 mg/dL 36 (H)  20   Creatinine Latest Ref Range: 0.5 - 1.4 mg/dL 1.4  1.1   eGFR if non African American Latest Ref Range: >60 mL/min/1.73 m^2 35.8 (A)  47.9 (A)   eGFR if African American Latest Ref Range: >60 mL/min/1.73 m^2 41.2 (A)  55.2 (A)   Glucose Latest Ref Range: 70 - 110 mg/dL 96  91   Calcium Latest Ref Range: 8.7 - 10.5 mg/dL 10.6 (H)  9.3   Alkaline Phosphatase Latest Ref Range: 55 - 135 U/L 60     Total Protein Latest Ref Range: 6.0 - 8.4 g/dL 7.3     Albumin Latest Ref Range: 3.5 - 5.2 g/dL 3.8     Total Bilirubin Latest Ref Range: 0.1 - 1.0 mg/dL 0.3     AST Latest Ref Range: 10 - 40 U/L 16     ALT Latest Ref Range: 10 - 44 U/L 14     Triglycerides Latest Ref Range: 30 - 150 mg/dL 99     Cholesterol Latest Ref Range: 120 - 199 mg/dL 208 (H)     HDL Latest Ref Range: 40 - 75 mg/dL 56     LDL Cholesterol Latest Ref Range: 63.0 - 159.0 mg/dL 132.2     Total Cholesterol/HDL Ratio Latest Ref Range: 2.0 - 5.0  3.7     Vit D, 25-Hydroxy Latest Ref Range: 30 - 96 ng/mL 26 (L)     TSH Latest Ref Range: 0.400 - 4.000 uIU/mL 0.728     PTH Latest Ref Range: 9.0 - 77.0 pg/mL   29.0     Assessment:       1. Essential hypertension    2. Coronary artery disease, occlusive    3. Atherosclerosis of aorta    4. Anxiety and depression        Plan:       Glo was seen today for follow-up.    Diagnoses and all orders for this visit:    Essential hypertension- adeq control.    Coronary artery disease, occlusive- clin stable.    Atherosclerosis of aorta- on asa and zocor.    Anxiety and depression- doing well on rx, recheck 3 mo.    No hyperPTH, stay off Ca.

## 2017-03-14 ENCOUNTER — TELEPHONE (OUTPATIENT)
Dept: PULMONOLOGY | Facility: CLINIC | Age: 82
End: 2017-03-14

## 2017-03-14 NOTE — TELEPHONE ENCOUNTER
----- Message from Alyas Robison sent at 3/14/2017 11:28 AM CDT -----  Patient would like to reschedule her appointments that she missed on March 13, but they have to be morning appointments because she does not drive and her  goes to work in the afternoon.   Call her at [phone].                                            verma

## 2017-04-12 ENCOUNTER — TELEPHONE (OUTPATIENT)
Dept: PULMONOLOGY | Facility: CLINIC | Age: 82
End: 2017-04-12

## 2017-04-12 NOTE — TELEPHONE ENCOUNTER
----- Message from Jessica Bradley sent at 4/12/2017 10:09 AM CDT -----  Contact: pt  Pt needs to reschedule her appt on 4/13 because she already has an appt scheduled elsewhere for that day and she had somehow thought she was told her appt with Dr Muir was on 3/14 but we are closed on that day and she can only come for morning appts so there is nothing available to the appt desk within a reasonable time and she only want to see Sakina Muir.  Please call pt with possible appt options at [phone] or [phone]

## 2017-04-25 RX ORDER — METOPROLOL TARTRATE 50 MG/1
TABLET ORAL
Qty: 90 TABLET | Refills: 0 | Status: SHIPPED | OUTPATIENT
Start: 2017-04-25 | End: 2017-06-23 | Stop reason: SDUPTHER

## 2017-04-27 ENCOUNTER — OFFICE VISIT (OUTPATIENT)
Dept: PULMONOLOGY | Facility: CLINIC | Age: 82
End: 2017-04-27
Payer: MEDICARE

## 2017-04-27 ENCOUNTER — PROCEDURE VISIT (OUTPATIENT)
Dept: PULMONOLOGY | Facility: CLINIC | Age: 82
End: 2017-04-27
Payer: MEDICARE

## 2017-04-27 ENCOUNTER — LAB VISIT (OUTPATIENT)
Dept: LAB | Facility: HOSPITAL | Age: 82
End: 2017-04-27
Attending: INTERNAL MEDICINE
Payer: MEDICARE

## 2017-04-27 VITALS
WEIGHT: 129 LBS | HEIGHT: 61 IN | RESPIRATION RATE: 18 BRPM | SYSTOLIC BLOOD PRESSURE: 120 MMHG | HEART RATE: 57 BPM | BODY MASS INDEX: 24.35 KG/M2 | OXYGEN SATURATION: 96 % | DIASTOLIC BLOOD PRESSURE: 60 MMHG

## 2017-04-27 DIAGNOSIS — D86.0 SARCOIDOSIS OF LUNG: ICD-10-CM

## 2017-04-27 DIAGNOSIS — D86.0 SARCOIDOSIS OF LUNG: Primary | ICD-10-CM

## 2017-04-27 PROCEDURE — 99214 OFFICE O/P EST MOD 30 MIN: CPT | Mod: 25,S$GLB,, | Performed by: INTERNAL MEDICINE

## 2017-04-27 PROCEDURE — 94729 DIFFUSING CAPACITY: CPT | Mod: S$GLB,,, | Performed by: INTERNAL MEDICINE

## 2017-04-27 PROCEDURE — 36415 COLL VENOUS BLD VENIPUNCTURE: CPT

## 2017-04-27 PROCEDURE — 94726 PLETHYSMOGRAPHY LUNG VOLUMES: CPT | Mod: S$GLB,,, | Performed by: INTERNAL MEDICINE

## 2017-04-27 PROCEDURE — 3074F SYST BP LT 130 MM HG: CPT | Mod: S$GLB,,, | Performed by: INTERNAL MEDICINE

## 2017-04-27 PROCEDURE — 1159F MED LIST DOCD IN RCRD: CPT | Mod: S$GLB,,, | Performed by: INTERNAL MEDICINE

## 2017-04-27 PROCEDURE — 3078F DIAST BP <80 MM HG: CPT | Mod: S$GLB,,, | Performed by: INTERNAL MEDICINE

## 2017-04-27 PROCEDURE — 1126F AMNT PAIN NOTED NONE PRSNT: CPT | Mod: S$GLB,,, | Performed by: INTERNAL MEDICINE

## 2017-04-27 PROCEDURE — 99499 UNLISTED E&M SERVICE: CPT | Mod: S$GLB,,, | Performed by: INTERNAL MEDICINE

## 2017-04-27 PROCEDURE — 94060 EVALUATION OF WHEEZING: CPT | Mod: S$GLB,,, | Performed by: INTERNAL MEDICINE

## 2017-04-27 PROCEDURE — 1160F RVW MEDS BY RX/DR IN RCRD: CPT | Mod: S$GLB,,, | Performed by: INTERNAL MEDICINE

## 2017-04-27 PROCEDURE — 99999 PR PBB SHADOW E&M-EST. PATIENT-LVL III: CPT | Mod: PBBFAC,,, | Performed by: INTERNAL MEDICINE

## 2017-04-27 PROCEDURE — 82164 ANGIOTENSIN I ENZYME TEST: CPT

## 2017-04-27 RX ORDER — METHOTREXATE 2.5 MG/1
2.5 TABLET ORAL
COMMUNITY
Start: 2017-04-24 | End: 2018-04-10 | Stop reason: ALTCHOICE

## 2017-04-27 NOTE — PROGRESS NOTES
Subjective:      Patient ID: Glo Dumont is a 81 y.o. female.    Patient Active Problem List   Diagnosis    Sarcoidosis of lung    Thyroid nodule    Hypertensive heart disease with heart failure    Pure hypercholesterolemia    Retinal edema    Hemoglobin A-S genotype    Angina, class II    History of coronary angioplasty    Ptosis    Coronary artery disease, occlusive    Central vein occlusion of retina    Iron deficiency anemia    Vitamin D deficiency    Osteopenia    Essential hypertension    Chronic diastolic HF (heart failure), NYHA class 2    Atherosclerosis of aorta    Anxiety and depression     Problem list has been reviewed.    Chief Complaint: Sarcoidosis of lung    HPI: She is here today for follow up review of sarcoidosis.PFT reviewed with pateint who voiced understanding.  She denies any new onset pulmonary complaints. She reports a mild intermittent productive cough. She denies fevers, chills, rigors, hemoptysis, pain with breathing, wheezing.  A full  review of systems, past , family  and social histories was performed except as mentioned in the note above, these are non contributory to the main issues discussed today.    Previous Report Reviewed: office notes     The following portions of the patient's history were reviewed and updated as appropriate: She  has a past medical history of Anemia; Angina pectoris; Anxiety; Arthritis; Carotid artery occlusion; Carpal tunnel syndrome (06/23/2008); Chronic diarrhea; Coronary artery disease; Coronary artery disease; Diastolic dysfunction; Diverticulosis; Greater trochanteric bursitis (2/10/2015); Grief at loss of child (1/26/2016); H/O carotid endarterectomy (12/2/2013); Heart failure; Hypercholesteremia; Hypertension; Liver cyst (02/08/2013); Macular degeneration; Primary open-angle glaucoma (9/3/2013); Renal cyst (02/08/2013); 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  She  has a past surgical history that includes Total abdominal hysterectomy w/ bilateral salpingoophorectomy (1972); Cataract extraction (Bilateral); Coronary angioplasty with stent (11/19/2010); Carotid endarterectomy (Right, 2000s); and Joint replacement (Left, 11/03/2014).  Her family history includes Cancer in her daughter and father; Cirrhosis in her brother; Heart failure in her brother and mother; Hypertension in her mother.  She  reports that she has never smoked. She has never used smokeless tobacco. She reports that she does not drink alcohol or use illicit drugs.  She has a current medication list which includes the following prescription(s): amlodipine, aspirin, budesonide, calcium carbonate, cholecalciferol (vitamin d3), dicyclomine, dorzolamide-timolol 2-0.5%, durezol, folic acid/multivit-min/lutein, hydrochlorothiazide, iron-vitamin c 100-250 mg (icar-c), ketorolac 0.5%, lisinopril, methotrexate, metoprolol tartrate, prasugrel, simvastatin, clonidine, and fluoxetine, and the following Facility-Administered Medications: aflibercept.  She is allergic to codeine..    Review of Systems   Constitutional: Positive for fatigue. Negative for chills and appetite change.   HENT: Negative for postnasal drip, rhinorrhea and sinus pressure.    Eyes: Negative for itching.   Respiratory: Positive for cough. Negative for shortness of breath and dyspnea on extertion.    Cardiovascular: Negative for chest pain, palpitations and leg swelling.   Genitourinary: Negative for difficulty urinating.   Musculoskeletal: Negative for arthralgias and back pain.   Skin: Positive for rash.   Gastrointestinal: Negative for nausea, vomiting and acid reflux.   Neurological: Positive for weakness. Negative for dizziness and headaches.   Hematological: Negative for adenopathy. Does not bruise/bleed easily.   Psychiatric/Behavioral: The patient is not nervous/anxious.       Objective:     Vitals:    04/27/17 0850   BP:  "120/60   Pulse: (!) 57   Resp: 18   SpO2: 96%   Weight: 58.5 kg (129 lb)   Height: 5' 1" (1.549 m)   PainSc: 0-No pain     Body mass index is 24.37 kg/(m^2).    Physical Exam   Constitutional: She is oriented to person, place, and time. She appears well-developed and well-nourished.   HENT:   Head: Normocephalic and atraumatic.   Eyes: Conjunctivae are normal. Pupils are equal, round, and reactive to light.   Neck: Normal range of motion. Neck supple.   Cardiovascular: Normal rate and regular rhythm.    Pulmonary/Chest: Effort normal and breath sounds normal.   Abdominal: Soft. Bowel sounds are normal.   Musculoskeletal: Normal range of motion.   Neurological: She is alert and oriented to person, place, and time.   Skin: Skin is warm and dry.   Psychiatric: She has a normal mood and affect.   Nursing note and vitals reviewed.      Personal Diagnostic Review:     Pulmonary function tests: FEV1: 1.64  (123 % predicted), FVC:  2.30 (133 % predicted), FEV1/FVC:  71, TLC: 3.04 (73 % predicted), RV/TLVC: 24 (56 % predicted), DLCO: 7.7 (50 % predicted):  PFT: Normal airflow. Lung volumes mildly reduced. Diffusion capacity moderately reduced but corrects for alveolar volume. STABLE.    Assessment:     1. Sarcoidosis of lung      Outpatient Encounter Prescriptions as of 4/27/2017   Medication Sig Dispense Refill    amlodipine (NORVASC) 10 MG tablet Take 1 tablet (10 mg total) by mouth once daily. 30 tablet 11    aspirin (ECOTRIN) 81 MG EC tablet Take 81 mg by mouth once daily.      budesonide (ENTOCORT EC) 3 mg capsule Take 9 mg by mouth once daily.      CALCIUM CARBONATE (CALCIUM 600 ORAL) Take 1 tablet by mouth Daily.      cholecalciferol, vitamin D3, 1,000 unit capsule Take 2 capsules (2,000 Units total) by mouth once daily. 100 capsule 0    dicyclomine (BENTYL) 10 MG capsule Take 10 mg by mouth 4 (four) times daily before meals and nightly.      dorzolamide-timolol 2-0.5% (COSOPT) 2-0.5 % ophthalmic solution Place " 1 drop into both eyes 2 (two) times daily. 10 mL 5    DUREZOL 0.05 % Drop ophthalmic solution       FOLIC ACID/MULTIVIT-MIN/LUTEIN (CENTRUM SILVER ORAL) Take by mouth.      hydrochlorothiazide (MICROZIDE) 12.5 mg capsule TAKE ONE CAPSULE BY MOUTH ONCE DAILY 30 capsule 11    iron-vitamin C 100-250 mg, ICAR-C, (ICAR-C) 100-250 mg Tab Take 1 tablet by mouth once daily. 100 tablet 6    ketorolac 0.5% (ACULAR) 0.5 % Drop       lisinopril (PRINIVIL,ZESTRIL) 40 MG tablet Take 1 tablet (40 mg total) by mouth once daily. 30 tablet 11    methotrexate 2.5 MG Tab       metoprolol tartrate (LOPRESSOR) 50 MG tablet TAKE ONE TABLET BY MOUTH IN THE MORNING AND 2 TABLETS BY MOUTH IN THEEVENING 90 tablet 0    prasugrel (EFFIENT) 10 mg Tab Take 1 tablet (10 mg total) by mouth once daily. 30 tablet 6    simvastatin (ZOCOR) 40 MG tablet TAKE ONE TABLET BY MOUTH EVERY EVENING 30 tablet 11    cloNIDine (CATAPRES) 0.1 MG tablet Take 1 tablet (0.1 mg total) by mouth every 12 (twelve) hours as needed (BP above 160/100). May cause drowsiness 10 tablet 0    fluoxetine (PROZAC) 10 MG capsule Take 1 capsule (10 mg total) by mouth once daily. 30 capsule 6    [DISCONTINUED] metoprolol tartrate (LOPRESSOR) 50 MG tablet TAKE ONE TABLET BY MOUTH IN THE MORNING AND 2 TABLETS BY MOUTH IN THEEVENING 90 tablet 0     Facility-Administered Encounter Medications as of 4/27/2017   Medication Dose Route Frequency Provider Last Rate Last Dose    aflibercept Soln 2 mg  2 mg Intravitreal 1 time in Clinic/HOD NAHUM Tamez MD         Orders Placed This Encounter   Procedures    X-Ray Chest PA And Lateral     Standing Status:   Future     Standing Expiration Date:   4/27/2018     Order Specific Question:   May the Radiologist modify the order per protocol to meet the clinical needs of the patient?     Answer:   Yes    Angiotensin converting enzyme     Standing Status:   Future     Standing Expiration Date:   6/26/2018     Plan:     Discussed  diagnosis, its evaluation, treatment and usual course. All questions    Sarcoidosis of lung  PULMONARY SARCOIDOSIS ROS: no significant ongoing wheezing or shortness of breath, using bronchodilator MDI less than twice a week.   New concerns: NONE.   Exam: appears well, vitals normal, no respiratory distress, acyanotic, normal RR, chest clear, no wheezing, crepitations, rhonchi, normal symmetric air entry.   Assessment:  PULMONARY SARCOIDOSIS stable.   Plan: ACE LEVELS: Orders as documented in EMR. CXR in 6 months. Follow up in 6 months.           TIME SPENT WITH PATIENT: Time spent: 25 minutes in face to face  discussion concerning diagnosis, prognosis, review of lab and test results, benefits of treatment as well as management of disease, counseling of patient and coordination of care between various health  care providers . Greater than half the time spent was used for coordination of care and counseling of patient.     Return in about 6 months (around 10/27/2017) for Sarcoidosis.

## 2017-04-27 NOTE — PATIENT INSTRUCTIONS
Angiotensin Converting Enzyme (Blood)  Does this test have other names?  Serum angiotensin converting enzyme, SACE  What is this test?  This test measures how much angiotensin converting enzyme (ACE) is in your blood.  Your ACE levels may be higher if you have a condition called sarcoidosis. In sarcoidosis, small abnormal knots of immune cells called granulomas form in various parts of the body. The most common place is in the lungs. These knots of cells may cause health problems. Granulomas can also form in other conditions, so you may need other tests to figure out the exact cause.  It is normal to have some ACE in your body. Certain medicines called ACE inhibitors act on ACE to control blood pressure.  Why do I need this test?  You may need this test if you have symptoms of sarcoidosis. Symptoms may include:  · Cough, wheezing, or shortness of breath  · Fever  · Extreme tiredness  · Chest pain  · Unexplained weight loss and loss of appetite  · Sore or stiff joints  · Eyesight problems  · Dry mouth  · Headache  · Unusual skin changes, including sores, rashes, or hardened spots  What other tests might I have along with this test?  Your health care provider may also order these tests:  · Imaging tests such a CT scan to see if you have granulomas on your organs  · Chest X-ray  · Sputum culture, or sample of fluid from your lungs  · Biopsy of tissue samples  What do my test results mean?  Many things may affect your lab test results. These include the method each lab uses to do the test. Even if your test results are different from the normal value, you may not have a problem. To learn what the results mean for you, talk with your health care provider.  Results are given in nanomoles per milliliter per minute (nmol/mL/min). The normal range for ACE is less than 40 nmol/mL/min. Higher levels of ACE may mean that you have sarcoidosis. But you can have normal ACE levels and still have sarcoidosis.  Other conditions  that may cause higher levels of ACE include:  · Primary biliary cirrhosis  · Alcoholic liver disease  · Hyperparathyroidism  · Hyperthyroidism  · Diabetes  · Multiple myeloma  · Lung disease  · Amyloidosis  · Gaucher disease  · Leprosy  How is this test done?  The test requires a blood sample, which is drawn through a needle from a vein in your arm.  Does this test pose any risks?  Taking a blood sample with a needle carries risks that include bleeding, infection, bruising, or feeling dizzy. When the needle pricks your arm, you may feel a slight stinging sensation or pain. Afterward the site may be slightly sore.  What might affect my test results?  Being exposed to tuberculosis, using IV drugs, and working in a place with airborne particulates or chemicals such as beryllium can affect your results. Certain drugs can also affect your results.  How do I get ready for this test?  You don't need to prepare for this test. But be sure your health care provider knows about all medicines, herbs, vitamins, and supplements you are taking. This includes medicines that don't need a prescription and any illicit drugs you may use.         Date Last Reviewed: 5/24/2015 © 2000-2016 [employer]. [address] [place] [id number]. All rights reserved. This information is not intended as a substitute for professional medical care. Always follow your healthcare professional's instructions.

## 2017-04-28 LAB — ACE SERPL-CCNC: 5 U/L

## 2017-05-01 LAB
POST FEF 25 75: 0.93 L/S (ref 0.52–1.77)
POST FET 100: 12.5 S
POST FEV1 FVC: 70 %
POST FEV1: 1.63 L (ref 1.05–1.61)
POST FIF 50: 1.93 L/S
POST FVC: 2.33 L (ref 1.41–2.06)
POST PEF: 3.14 L/S (ref 2.64–4.46)
PRE DLCO: 7.74 ML/MMHG/MIN (ref 11.43–19.72)
PRE ERV: 0.99 L
PRE FEF 25 75: 0.98 L/S (ref 0.52–1.77)
PRE FET 100: 10.86 S
PRE FEV1 FVC: 71 %
PRE FEV1: 1.63 L (ref 1.05–1.61)
PRE FIF 50: 2.18 L/S
PRE FRC PL: 1.71 L (ref 2.03–2.98)
PRE FVC: 2.3 L (ref 1.41–2.06)
PRE KROGHS K: 2.37 1/MIN
PRE PEF: 3.75 L/S (ref 2.64–4.46)
PRE RV: 0.72 L (ref 1.42–2.12)
PRE SVC: 2.32 L
PRE TLC: 3.04 L (ref 3.76–4.52)
PREDICTED DLCO: 15.58 ML/MMHG/MIN (ref 11.43–19.72)
PREDICTED FEV1 FVC: 74.02 % (ref 69.12–78.92)
PREDICTED FEV1: 1.33 L (ref 1.05–1.61)
PREDICTED FRC N2: 2.5 L (ref 2.03–2.98)
PREDICTED FRC PL: 2.5 L (ref 2.03–2.98)
PREDICTED FVC: 1.73 L (ref 1.41–2.06)
PREDICTED RV: 1.77 L (ref 1.42–2.12)
PREDICTED SVC: 2.32 L
PREDICTED TLC: 4.14 L (ref 3.76–4.52)
PROVOCATION PROTOCOL: ABNORMAL

## 2017-05-11 ENCOUNTER — OFFICE VISIT (OUTPATIENT)
Dept: INTERNAL MEDICINE | Facility: CLINIC | Age: 82
End: 2017-05-11
Payer: MEDICARE

## 2017-05-11 VITALS
DIASTOLIC BLOOD PRESSURE: 78 MMHG | OXYGEN SATURATION: 99 % | HEIGHT: 61 IN | WEIGHT: 128 LBS | SYSTOLIC BLOOD PRESSURE: 126 MMHG | BODY MASS INDEX: 24.17 KG/M2 | HEART RATE: 63 BPM

## 2017-05-11 DIAGNOSIS — I20.9 ANGINA, CLASS II: Chronic | ICD-10-CM

## 2017-05-11 DIAGNOSIS — H20.9 UVEITIS: ICD-10-CM

## 2017-05-11 DIAGNOSIS — E55.9 VITAMIN D DEFICIENCY: ICD-10-CM

## 2017-05-11 DIAGNOSIS — M85.80 OSTEOPENIA, UNSPECIFIED LOCATION: ICD-10-CM

## 2017-05-11 DIAGNOSIS — I25.10 CORONARY ARTERY DISEASE, OCCLUSIVE: Chronic | ICD-10-CM

## 2017-05-11 DIAGNOSIS — N18.30 CKD (CHRONIC KIDNEY DISEASE) STAGE 3, GFR 30-59 ML/MIN: ICD-10-CM

## 2017-05-11 DIAGNOSIS — D50.9 IRON DEFICIENCY ANEMIA, UNSPECIFIED IRON DEFICIENCY ANEMIA TYPE: ICD-10-CM

## 2017-05-11 DIAGNOSIS — E04.1 THYROID NODULE: ICD-10-CM

## 2017-05-11 DIAGNOSIS — I10 ESSENTIAL HYPERTENSION: Chronic | ICD-10-CM

## 2017-05-11 DIAGNOSIS — D86.0 SARCOIDOSIS OF LUNG: ICD-10-CM

## 2017-05-11 DIAGNOSIS — K52.9 COLITIS: ICD-10-CM

## 2017-05-11 DIAGNOSIS — I70.0 ATHEROSCLEROSIS OF AORTA: ICD-10-CM

## 2017-05-11 DIAGNOSIS — I11.0 HYPERTENSIVE HEART DISEASE WITH HEART FAILURE: ICD-10-CM

## 2017-05-11 DIAGNOSIS — Z00.00 ENCOUNTER FOR PREVENTIVE HEALTH EXAMINATION: Primary | ICD-10-CM

## 2017-05-11 DIAGNOSIS — I50.32 CHRONIC DIASTOLIC HF (HEART FAILURE), NYHA CLASS 2: Chronic | ICD-10-CM

## 2017-05-11 PROCEDURE — 3078F DIAST BP <80 MM HG: CPT | Mod: S$GLB,,, | Performed by: PHYSICIAN ASSISTANT

## 2017-05-11 PROCEDURE — 99499 UNLISTED E&M SERVICE: CPT | Mod: S$GLB,,, | Performed by: PHYSICIAN ASSISTANT

## 2017-05-11 PROCEDURE — 99999 PR PBB SHADOW E&M-EST. PATIENT-LVL III: CPT | Mod: PBBFAC,,, | Performed by: PHYSICIAN ASSISTANT

## 2017-05-11 PROCEDURE — G0439 PPPS, SUBSEQ VISIT: HCPCS | Mod: S$GLB,,, | Performed by: PHYSICIAN ASSISTANT

## 2017-05-11 PROCEDURE — 3074F SYST BP LT 130 MM HG: CPT | Mod: S$GLB,,, | Performed by: PHYSICIAN ASSISTANT

## 2017-05-11 NOTE — PATIENT INSTRUCTIONS
Counseling and Referral of Other Preventative  (Italic type indicates deductible and co-insurance are waived)    Patient Name: Glo Dumont  Today's Date: 5/11/2017      SERVICE LIMITATIONS RECOMMENDATION    Vaccines    · Pneumococcal (once after 65)    · Influenza (annually)    · Hepatitis B (if medium/high risk)    · Prevnar 13      Hepatitis B medium/high risk factors:       - End-stage renal disease       - Hemophiliacs who received Factor VII or         IX concentrates       - Clients of institutions for the mentally             retarded       - Persons who live in the same house as          a HepB carrier       - Homosexual men       - Illicit injectable drug abusers     Pneumococcal:done 2/2014, 3/2015     Influenza:1/2017 Done, repeat in one year     Hepatitis B: n/a     Prevnar 13: follow PCP    Mammogram (biennial age 50-74)  Annually (age 40 or over)  Last done 1/23/2017, recommend to repeat every 1  years    Pap (up to age 70 and after 70 if unknown history or abnormal study last 10 years)    N/A     The USPSTF recommends against screening for cervical cancer in women older than age 65 years who have had adequate prior screening and are not otherwise at high risk for cervical cancer.      Colorectal cancer screening (to age 75)    · Fecal occult blood test (annual)  · Flexible sigmoidoscopy (5y)  · Screening colonoscopy (10y)  · Barium enema   Last done 2/2011, recommend to repeat every 5  years. Discuss with PCP and GI if further screening recommended.    Diabetes self-management training (no USPSTF recommendations)  Requires referral by treating physician for patient with diabetes or renal disease. 10 hours of initial DSMT sessions of no less than 30 minutes each in a continuous 12-month period. 2 hours of follow-up DSMT in subsequent years.  N/A    Bone mass measurements (age 65 & older, biennial)  Requires diagnosis related to osteoporosis or estrogen deficiency. Biennial benefit unless patient has  history of long-term glucocorticoid  Last done 12/2015, recommend to repeat every 2  years    Glaucoma screening (no USPSTF recommendation)  Diabetes mellitus, family history   , age 50 or over    American, age 65 or over Follow Dr. AGUSTÍN Hall    Medical nutrition therapy for diabetes or renal disease (no recommended schedule)  Requires referral by treating physician for patient with diabetes or renal disease or kidney transplant within the past 3 years.  Can be provided in same year as diabetes self-management training (DSMT), and CMS recommends medical nutrition therapy take place after DSMT. Up to 3 hours for initial year and 2 hours in subsequent years.  follow PCP    Cardiovascular screening blood tests (every 5 years)  · Fasting lipid panel  Order as a panel if possible   follow PCP and cardiology    Diabetes screening tests (at least every 3 years, Medicare covers annually or at 6-month intervals for prediabetic patients)  · Fasting blood sugar (FBS) or glucose tolerance test (GTT)  Patient must be diagnosed with one of the following:       - Hypertension       - Dyslipidemia       - Obesity (BMI 30kg/m2)       - Previous elevated impaired FBS or GTT       ... or any two of the following:       - Overweight (BMI 25 but <30)       - Family history of diabetes       - Age 65 or older       - History of gestational diabetes or birth of baby weighing more than 9 pounds   follow PCP    Abdominal aortic aneurysm screening (once)  · Sonogram   Limited to patients who meet one of the following criteria:       - Men who are 65-75 years old and have smoked more than 100 cigarette in their lifetime       - Anyone with a family history of abdominal aortic aneurysm       - Anyone recommended for screening by the USPSTF   follow PCP    HIV screening (annually for increased risk patients)  · HIV-1 and HIV-2 by EIA, or ISHAAN, rapid antibody test or oral mucosa transudate  Patients must be at  increased risk for HIV infection per USPSTF guidelines or pregnant. Tests covered annually for patient at increased risk or as requested by the patient. Pregnant patients may receive up to 3 tests during pregnancy.  Risks discussed, screening is not recommended    Smoking cessation counseling (up to 8 sessions per year)  Patients must be asymptomatic of tobacco-related conditions to receive as a preventative service.  Non-smoker    Subsequent annual wellness visit  At least 12 months since last AWV  Return in one year     The following information is provided to all patients.  This information is to help you find resources for any of the problems found today that may be affecting your health:                Living healthy guide: www.[facility].[place].[facility]      Understanding Diabetes: www.diabetes.org      Eating healthy: www.cdc.gov/healthyweight      CDC home safety checklist: www.cdc.gov/steadi/patient.html      Agency on Aging: www.goea.[place].[facility]      Alcoholics anonymous (AA): www.aa.org      Physical Activity: www.jcaob.nih.gov/yu2wrod      Tobacco use: www.quitwithusla.org

## 2017-05-11 NOTE — PROGRESS NOTES
"Glo Dumont presented for a  Medicare AWV and comprehensive Health Risk Assessment today. The following components were reviewed and updated:    · Medical history  · Family History  · Social history  · Allergies and Current Medications  · Health Risk Assessment  · Health Maintenance  · Care Team     ** See Completed Assessments for Annual Wellness Visit within the encounter summary.**       The following assessments were completed:  · Living Situation  · CAGE  · Depression Screening  · Timed Get Up and Go  · Whisper Test  · Cognitive Function Screening  · Nutrition Screening  · ADL Screening  · PAQ Screening    Vitals:    05/11/17 0817   BP: 126/78   Pulse: 63   SpO2: 99%   Weight: 58 kg (127 lb 15.6 oz)   Height: 5' 1" (1.549 m)     Body mass index is 24.18 kg/(m^2).  Physical Exam   Constitutional: She appears well-developed and well-nourished. No distress.   HENT:   Head: Normocephalic and atraumatic.   Eyes: Conjunctivae and EOM are normal. Right eye exhibits no discharge. Left eye exhibits no discharge.   Neck: Normal range of motion. Neck supple. No tracheal deviation present. No thyromegaly present.   Cardiovascular: Normal rate, regular rhythm and normal heart sounds.    No murmur heard.  Pulses:       Radial pulses are 2+ on the right side, and 2+ on the left side.   Pulmonary/Chest: Effort normal and breath sounds normal. No respiratory distress. She has no wheezes.   Abdominal: Soft. Bowel sounds are normal. She exhibits no distension. There is no tenderness. There is no rebound and no guarding.   Musculoskeletal: Normal range of motion. She exhibits no edema or tenderness.   Neurological: No cranial nerve deficit.   Grasp equal both hands, No tremors, or muscle fasciculations noted. Toes downgoing, Sensation intact to soft touch. Gait: No ataxia.    Skin: Skin is warm and dry. No rash noted. She is not diaphoretic. No erythema. No pallor.   Psychiatric: She has a normal mood and affect. Her behavior is " normal. Judgment and thought content normal.   Nursing note and vitals reviewed.        Diagnoses and health risks identified today and associated recommendations/orders:    1. Encounter for preventive health examination  Completed today    2. Sarcoidosis of lung  Stable. Following Dr. Muir. Pt denies any new onset pulmonary complaints.  Continue current treatment plan as previously prescribed with your pulmonologist. Appt 10/30/16    3. Essential hypertension  Stable and controlled. On Norvasc, Hctz, Lisinopril, Metoprolol. Continue current treatment plan as previously prescribed with your PCP    4. CKD (chronic kidney disease) stage 3, GFR 30-59 ml/min  This is a new problem that has been identified during today's visit. Please follow up with your PCP to discuss the next steps.    5. Hypertensive heart disease with heart failure  Stable. Pt denies chest pains, sob or palpations. Continue current treatment plan as previously prescribed with your PCP and cardiologist, Dr. Roblero. Appt 6/6/17    6. Coronary artery disease, occlusive  Stable. Continue current treatment plan as previously prescribed with your PCP and cardiologist, Dr. Roblero.    7. Angina, class II  Stable. Continue current treatment plan as previously prescribed with your PCP and cardiologist, Dr. Roblero.    8. Atherosclerosis of aorta  Chest x-ray 11/10/14. Documented atherosclerosis and tortuosity of the aorta.   Discussed need to continue control BP, lipids, glucose, diet/ exercise, avoid smoking to avoid further arterial wall buildup.    9. Chronic diastolic HF (heart failure), NYHA class 2  Stable. Continue current treatment plan as previously prescribed with cardiology.    10. Colitis  Stable. Follows Dr. Julio Galindo. Continue current treatment plan as previously prescribed with your outside GI.    11. Osteopenia, unspecified location  Dexa 12/21/15  Stable. On Vit D. Continue current treatment plan as previously prescribed with your  PCP.    12. Thyroid nodule  Followed Dr. Mcfadden in the past. Has not seen ENT recently for follow up. Last US thyroid 4/9/15.  Please follow with Dr. Mcfadden. Appt 6/6/17    13. Iron deficiency anemia, unspecified iron deficiency anemia type  Stable. Continue current treatment plan as previously prescribed with your PCP.    14. Vitamin D deficiency  This problem is currently not controlled.  On Vit D. Please continue follow PCP.    15. Uveitis  Stable. Continue current treatment plan as previously prescribed with your outside ophthalmologist, Dr. AGUSTÍN Ruiz.      Provided Glo with a 5-10 year written screening schedule and personal prevention plan. Recommendations were developed using the USPSTF age appropriate recommendations. Education, counseling, and referrals were provided as needed. After Visit Summary printed and given to patient which includes a list of additional screenings\tests needed.  Continue to follow with your PCP as scheduled or sooner if necessary.    Alexys Mckeon PA-C

## 2017-05-11 NOTE — MR AVS SNAPSHOT
[facility]  [address] [facility] [address] [place] [place] [address]  Phone: [phone]  Fax: [phone]                  Glo Dumont   2017 8:00 AM   Office Visit    Description:  Female : 1935   Provider:  Alexys Mckeon PA-C   Department:  [facility]           Reason for Visit     Health Risk Assessment           Diagnoses this Visit        Comments    Encounter for preventive health examination    -  Primary     Sarcoidosis of lung         Essential hypertension         CKD (chronic kidney disease) stage 3, GFR 30-59 ml/min         Hypertensive heart disease with heart failure         Coronary artery disease, occlusive         Angina, class II         Atherosclerosis of aorta         Chronic diastolic HF (heart failure), NYHA class 2         Colitis         Osteopenia, unspecified location         Thyroid nodule         Iron deficiency anemia, unspecified iron deficiency anemia type         Vitamin D deficiency         Uveitis                To Do List           Future Appointments        Provider Department Dept Phone    2017 9:00 AM Christina Cardona MD [facility] Internal Medicine [phone]    10/30/2017 8:00 AM [facility] XR1-DR [facility] [phone]    10/30/2017 8:20 AM Gordo Muir MD [facility] [phone]      Goals (5 Years of Data)     None      [facility]     [facility] On [facility] Nurse Care Line - 24/ Assistance  Unless otherwise directed by your provider, please contact [facility], our nurse care line that is available for 24/7 assistance.     Registered nurses in the [facility] Center provide: appointment scheduling, clinical advisement, health education, and other advisory services.  Call: [phone] (toll free)               Medications           Message regarding Medications     Verify the changes and/or additions to your medication regime listed below are the same as discussed with your clinician today.   If any of these changes or additions are incorrect, please notify your healthcare provider.        STOP taking these medications     CALCIUM CARBONATE (CALCIUM 600 ORAL) Take 1 tablet by mouth Daily.           Verify that the below list of medications is an accurate representation of the medications you are currently taking.  If none reported, the list may be blank. If incorrect, please contact your healthcare provider. Carry this list with you in case of emergency.           Current Medications     amlodipine (NORVASC) 10 MG tablet Take 1 tablet (10 mg total) by mouth once daily.    aspirin (ECOTRIN) 81 MG EC tablet Take 81 mg by mouth once daily.    budesonide (ENTOCORT EC) 3 mg capsule Take 9 mg by mouth once daily.    dorzolamide-timolol 2-0.5% (COSOPT) 2-0.5 % ophthalmic solution Place 1 drop into both eyes 2 (two) times daily.    fluoxetine (PROZAC) 10 MG capsule Take 1 capsule (10 mg total) by mouth once daily.    FOLIC ACID/MULTIVIT-MIN/LUTEIN (CENTRUM SILVER ORAL) Take by mouth.    hydrochlorothiazide (MICROZIDE) 12.5 mg capsule TAKE ONE CAPSULE BY MOUTH ONCE DAILY    iron-vitamin C 100-250 mg, ICAR-C, (ICAR-C) 100-250 mg Tab Take 1 tablet by mouth once daily.    lisinopril (PRINIVIL,ZESTRIL) 40 MG tablet Take 1 tablet (40 mg total) by mouth once daily.    methotrexate 2.5 MG Tab     metoprolol tartrate (LOPRESSOR) 50 MG tablet TAKE ONE TABLET BY MOUTH IN THE MORNING AND 2 TABLETS BY MOUTH IN THEEVENING    prasugrel (EFFIENT) 10 mg Tab Take 1 tablet (10 mg total) by mouth once daily.    simvastatin (ZOCOR) 40 MG tablet TAKE ONE TABLET BY MOUTH EVERY EVENING    cholecalciferol, vitamin D3, 1,000 unit capsule Take 2 capsules (2,000 Units total) by mouth once daily.    cloNIDine (CATAPRES) 0.1 MG tablet Take 1 tablet (0.1 mg total) by mouth every 12 (twelve) hours as needed (BP above 160/100). May cause drowsiness    dicyclomine (BENTYL) 10 MG capsule Take 10 mg by mouth 4 (four) times daily before meals and  "nightly.    DUREZOL 0.05 % Drop ophthalmic solution     ketorolac 0.5% (ACULAR) 0.5 % Drop            Clinical Reference Information           Your Vitals Were     BP Pulse Height Weight SpO2 BMI    126/78 63 5' 1" (1.549 m) 58 kg (127 lb 15.6 oz) 99% 24.18 kg/m2      Blood Pressure          Most Recent Value    BP  126/78      Allergies as of 5/11/2017     Codeine      Immunizations Administered on Date of Encounter - 5/11/2017     None      [facility] Sign-Up     Activating your [facility] account is as easy as 1-2-3!     1) Visit [email or web address], select Sign Up Now, enter this activation code and your date of birth, then select Next.  [email or web address]  Expires: 6/25/2017  8:52 AM      2) Create a username and password to use when you visit [facility] in the future and select a security question in case you lose your password and select Next.    3) Enter your e-mail address and click Sign Up!    Additional Information  If you have questions, please e-mail [email or web address] or call [phone] to talk to our [facility] staff. Remember, [facility] is NOT to be used for urgent needs. For medical emergencies, dial 911.         Instructions      Counseling and Referral of Other Preventative  (Italic type indicates deductible and co-insurance are waived)    Patient Name: Glo Dumont  Today's Date: 5/11/2017      SERVICE LIMITATIONS RECOMMENDATION    Vaccines    · Pneumococcal (once after 65)    · Influenza (annually)    · Hepatitis B (if medium/high risk)    · Prevnar 13      Hepatitis B medium/high risk factors:       - End-stage renal disease       - Hemophiliacs who received Factor VII or         IX concentrates       - Clients of institutions for the mentally             retarded       - Persons who live in the same house as          a HepB carrier       - Homosexual men       - Illicit injectable drug abusers     Pneumococcal:done 2/2014, 3/2015     Influenza:1/2017 Done, repeat in one year     Hepatitis B: " n/a     Prevnar 13: follow PCP    Mammogram (biennial age 50-74)  Annually (age 40 or over)  Last done 1/23/2017, recommend to repeat every 1  years    Pap (up to age 70 and after 70 if unknown history or abnormal study last 10 years)    N/A     The USPSTF recommends against screening for cervical cancer in women older than age 65 years who have had adequate prior screening and are not otherwise at high risk for cervical cancer.      Colorectal cancer screening (to age 75)    · Fecal occult blood test (annual)  · Flexible sigmoidoscopy (5y)  · Screening colonoscopy (10y)  · Barium enema   Last done 2/2011, recommend to repeat every 5  years. Discuss with PCP and GI if further screening recommended.    Diabetes self-management training (no USPSTF recommendations)  Requires referral by treating physician for patient with diabetes or renal disease. 10 hours of initial DSMT sessions of no less than 30 minutes each in a continuous 12-month period. 2 hours of follow-up DSMT in subsequent years.  N/A    Bone mass measurements (age 65 & older, biennial)  Requires diagnosis related to osteoporosis or estrogen deficiency. Biennial benefit unless patient has history of long-term glucocorticoid  Last done 12/2015, recommend to repeat every 2  years    Glaucoma screening (no USPSTF recommendation)  Diabetes mellitus, family history   , age 50 or over    American, age 65 or over Follow Dr. AGUSTÍN Hall    Medical nutrition therapy for diabetes or renal disease (no recommended schedule)  Requires referral by treating physician for patient with diabetes or renal disease or kidney transplant within the past 3 years.  Can be provided in same year as diabetes self-management training (DSMT), and CMS recommends medical nutrition therapy take place after DSMT. Up to 3 hours for initial year and 2 hours in subsequent years.  follow PCP    Cardiovascular screening blood tests (every 5 years)  · Fasting lipid  panel  Order as a panel if possible   follow PCP and cardiology    Diabetes screening tests (at least every 3 years, Medicare covers annually or at 6-month intervals for prediabetic patients)  · Fasting blood sugar (FBS) or glucose tolerance test (GTT)  Patient must be diagnosed with one of the following:       - Hypertension       - Dyslipidemia       - Obesity (BMI 30kg/m2)       - Previous elevated impaired FBS or GTT       ... or any two of the following:       - Overweight (BMI 25 but <30)       - Family history of diabetes       - Age 65 or older       - History of gestational diabetes or birth of baby weighing more than 9 pounds   follow PCP    Abdominal aortic aneurysm screening (once)  · Sonogram   Limited to patients who meet one of the following criteria:       - Men who are 65-75 years old and have smoked more than 100 cigarette in their lifetime       - Anyone with a family history of abdominal aortic aneurysm       - Anyone recommended for screening by the USPSTF   follow PCP    HIV screening (annually for increased risk patients)  · HIV-1 and HIV-2 by EIA, or ISHAAN, rapid antibody test or oral mucosa transudate  Patients must be at increased risk for HIV infection per USPSTF guidelines or pregnant. Tests covered annually for patient at increased risk or as requested by the patient. Pregnant patients may receive up to 3 tests during pregnancy.  Risks discussed, screening is not recommended    Smoking cessation counseling (up to 8 sessions per year)  Patients must be asymptomatic of tobacco-related conditions to receive as a preventative service.  Non-smoker    Subsequent annual wellness visit  At least 12 months since last AWV  Return in one year     The following information is provided to all patients.  This information is to help you find resources for any of the problems found today that may be affecting your health:                Living healthy guide: www.[facility].[place].gov      Understanding  Diabetes: www.diabetes.org      Eating healthy: www.cdc.gov/healthyweight      CDC home safety checklist: www.cdc.gov/steadi/patient.html      Agency on Aging: www.goea.[place].gov      Alcoholics anonymous (AA): www.aa.org      Physical Activity: www.jacob.nih.gov/eq4hbbb      Tobacco use: www.quitwithusla.org          Language Assistance Services     ATTENTION: Language assistance services are available, free of charge. Please call [phone].      ATENCIÓN: Si jackyla jace, tiene a paez disposición servicios gratuitos de asistencia lingüística. Llame al [phone].     CHÚ Ý: N?u b?n nói Ti?ng Vi?t, có các d?ch v? h? tr? ngôn ng? mi?n phí dành cho b?n. G?i s? [phone].         [facility] complies with applicable Federal civil rights laws and does not discriminate on the basis of race, color, national origin, age, disability, or sex.

## 2017-05-15 ENCOUNTER — TELEPHONE (OUTPATIENT)
Dept: INTERNAL MEDICINE | Facility: CLINIC | Age: 82
End: 2017-05-15

## 2017-05-15 NOTE — TELEPHONE ENCOUNTER
----- Message from Cramencita Junior sent at 5/15/2017  9:41 AM CDT -----  Contact: pt  Pt would like the nurse to give call concerning an assessment she had done so the nurse can talk to the Dr about,the write up...[phone]

## 2017-06-07 ENCOUNTER — PATIENT OUTREACH (OUTPATIENT)
Dept: ADMINISTRATIVE | Facility: HOSPITAL | Age: 82
End: 2017-06-07

## 2017-06-20 ENCOUNTER — TELEPHONE (OUTPATIENT)
Dept: OTOLARYNGOLOGY | Facility: CLINIC | Age: 82
End: 2017-06-20

## 2017-06-20 NOTE — TELEPHONE ENCOUNTER
----- Message from Maximiliano Bower sent at 6/20/2017  8:52 AM CDT -----  Contact: [phone]  Pt would like to consult with nurse to be fit in on 6/23 for ear wax build up. Please call back at [phone]. Thx. LJ

## 2017-06-20 NOTE — TELEPHONE ENCOUNTER
Phoned and spoke with patients daughter whom left the message for an appointment for the wax build up in patients ear. I scheduled the appointment for patient and patients daughter was happy with the call and the call ended well.

## 2017-06-23 ENCOUNTER — OFFICE VISIT (OUTPATIENT)
Dept: OTOLARYNGOLOGY | Facility: CLINIC | Age: 82
End: 2017-06-23
Payer: MEDICARE

## 2017-06-23 ENCOUNTER — OFFICE VISIT (OUTPATIENT)
Dept: CARDIOLOGY | Facility: CLINIC | Age: 82
End: 2017-06-23
Payer: MEDICARE

## 2017-06-23 VITALS
WEIGHT: 133 LBS | HEIGHT: 61 IN | HEART RATE: 60 BPM | DIASTOLIC BLOOD PRESSURE: 70 MMHG | BODY MASS INDEX: 25.11 KG/M2 | SYSTOLIC BLOOD PRESSURE: 166 MMHG

## 2017-06-23 VITALS
BODY MASS INDEX: 25.16 KG/M2 | TEMPERATURE: 98 F | DIASTOLIC BLOOD PRESSURE: 78 MMHG | SYSTOLIC BLOOD PRESSURE: 184 MMHG | HEART RATE: 59 BPM | WEIGHT: 133.19 LBS

## 2017-06-23 DIAGNOSIS — H66.91 ACUTE OTITIS MEDIA, RIGHT: Primary | ICD-10-CM

## 2017-06-23 DIAGNOSIS — Z98.61 HISTORY OF CORONARY ANGIOPLASTY: ICD-10-CM

## 2017-06-23 DIAGNOSIS — I11.0 HYPERTENSIVE HEART DISEASE WITH HEART FAILURE: ICD-10-CM

## 2017-06-23 DIAGNOSIS — I20.9 ANGINA, CLASS II: Chronic | ICD-10-CM

## 2017-06-23 DIAGNOSIS — I50.32 CHRONIC DIASTOLIC HF (HEART FAILURE), NYHA CLASS 2: Chronic | ICD-10-CM

## 2017-06-23 DIAGNOSIS — I25.10 CORONARY ARTERY DISEASE, OCCLUSIVE: Primary | Chronic | ICD-10-CM

## 2017-06-23 PROCEDURE — 99999 PR PBB SHADOW E&M-EST. PATIENT-LVL III: CPT | Mod: PBBFAC,,, | Performed by: OTOLARYNGOLOGY

## 2017-06-23 PROCEDURE — 1159F MED LIST DOCD IN RCRD: CPT | Mod: S$GLB,,, | Performed by: OTOLARYNGOLOGY

## 2017-06-23 PROCEDURE — 99499 UNLISTED E&M SERVICE: CPT | Mod: S$GLB,,, | Performed by: NUCLEAR MEDICINE

## 2017-06-23 PROCEDURE — 1126F AMNT PAIN NOTED NONE PRSNT: CPT | Mod: S$GLB,,, | Performed by: OTOLARYNGOLOGY

## 2017-06-23 PROCEDURE — 99213 OFFICE O/P EST LOW 20 MIN: CPT | Mod: S$GLB,,, | Performed by: OTOLARYNGOLOGY

## 2017-06-23 PROCEDURE — 99214 OFFICE O/P EST MOD 30 MIN: CPT | Mod: S$GLB,,, | Performed by: NUCLEAR MEDICINE

## 2017-06-23 PROCEDURE — 1126F AMNT PAIN NOTED NONE PRSNT: CPT | Mod: S$GLB,,, | Performed by: NUCLEAR MEDICINE

## 2017-06-23 PROCEDURE — 1159F MED LIST DOCD IN RCRD: CPT | Mod: S$GLB,,, | Performed by: NUCLEAR MEDICINE

## 2017-06-23 PROCEDURE — 99999 PR PBB SHADOW E&M-EST. PATIENT-LVL III: CPT | Mod: PBBFAC,,, | Performed by: NUCLEAR MEDICINE

## 2017-06-23 RX ORDER — FLUTICASONE PROPIONATE 50 MCG
2 SPRAY, SUSPENSION (ML) NASAL DAILY
Qty: 1 BOTTLE | Refills: 12 | Status: SHIPPED | OUTPATIENT
Start: 2017-06-23 | End: 2020-08-25

## 2017-06-23 RX ORDER — AMOXICILLIN 875 MG/1
875 TABLET, FILM COATED ORAL 2 TIMES DAILY
Qty: 20 TABLET | Refills: 0 | Status: SHIPPED | OUTPATIENT
Start: 2017-06-23 | End: 2017-07-03

## 2017-06-23 RX ORDER — METOPROLOL TARTRATE 50 MG/1
TABLET ORAL
Qty: 270 TABLET | Refills: 3 | Status: SHIPPED | OUTPATIENT
Start: 2017-06-23 | End: 2018-09-07 | Stop reason: SDUPTHER

## 2017-06-23 NOTE — PROGRESS NOTES
Subjective:   Patient: Glo Dumont [id number], :1935   Visit date:2017 12:41 PM    Chief Complaint:  Otalgia (ER at [facility] placed on oral abx unknown name )    HPI:  Glo is a 81 y.o. female who is here for right ear pain and hearing loss.  This started a few days ago.  She was in the hospital at [facility] for a kidney stone.  She was discharged on Cipro.  Pain has resolved but she still cannot hear out of the ear well.        Review of Systems:  -     Allergic/Immunologic: is allergic to codeine..  -     Constitutional: Current temp: 97.5 °F (36.4 °C) (Tympanic)    Her meds, allergies, medical, surgical, social & family histories were reviewed & updated:  -     She has a current medication list which includes the following prescription(s): amlodipine, aspirin, budesonide, cholecalciferol (vitamin d3), clonidine, dicyclomine, dorzolamide-timolol 2-0.5%, durezol, fluoxetine, folic acid/multivit-min/lutein, hydrochlorothiazide, iron-vitamin c 100-250 mg (icar-c), ketorolac 0.5%, lisinopril, methotrexate, metoprolol tartrate, prasugrel, simvastatin, amoxicillin, and fluticasone, and the following Facility-Administered Medications: aflibercept.  -     She  has a past medical history of Anemia; Angina pectoris; Anxiety; Arthritis; Carotid artery occlusion; Carpal tunnel syndrome (2008); Chronic diarrhea; CKD (chronic kidney disease) stage 3, GFR 30-59 ml/min (2017); Coronary artery disease; Coronary artery disease; Diastolic dysfunction; Diverticulosis; Greater trochanteric bursitis (2/10/2015); Grief at loss of child (2016); H/O carotid endarterectomy (2013); Heart failure; History of coronary angioplasty (3/11/2014); Hypercholesteremia; Hypertension; Liver cyst (2013); Macular degeneration; Primary open-angle glaucoma (9/3/2013); Renal cyst (2013); S/P prosthetic total arthroplasty of the hip (11/3/2014); Sarcoidosis; Sarcoidosis of lung; Sickle cell trait; and Uveitis.   -      She  does not have any pertinent problems on file.   -     She  has a past surgical history that includes Total abdominal hysterectomy w/ bilateral salpingoophorectomy (1972); Cataract extraction (Bilateral); Coronary angioplasty with stent (11/19/2010); Carotid endarterectomy (Right, 2000s); and Joint replacement (Left, 11/03/2014).  -     She  reports that she has never smoked. She has never used smokeless tobacco. She reports that she does not drink alcohol or use drugs.  -     Her family history includes Cancer in her daughter and father; Cirrhosis in her brother; Heart failure in her brother and mother; Hypertension in her mother.  -     She is allergic to codeine.    Objective:     Physical Exam:  Vitals:  BP (!) 184/78   Pulse (!) 59   Temp 97.5 °F (36.4 °C) (Tympanic)   Wt 60.4 kg (133 lb 2.5 oz)   BMI 25.16 kg/m²   Communication:  Able to communicate, no hoarseness.  Head & Face:  Normocephalic, atraumatic, no sinus tenderness.  Eyes:  Extraocular motions intact.  Ears:  Left EAC and TM wnl.  Right TM dull.  Tuning for lateralizes to the right.  Nose:  No masses/lesions of external nose, nasal mucosa, septum, and turbinates were within normal limits.  Mouth:  No mass/lesion of lips, teeth, gums, hard/soft palate, tongue, tonsils, or oropharynx.  Neck & Lymphatics:  No cervical lymphadenopathy, no neck mass/crepitus/ asymmetry, trachea is midline, no thyroid enlargement/tenderness/mass.  Neuro/Psych: Alert with normal mood and affect.   Respiration/Chest:  Symmetric expansion during respiration, normal respiratory effort.  Skin:  Warm and intact.    Assessment & Plan:   Glo was seen today for otalgia.    Diagnoses and all orders for this visit:    Acute otitis media, right    Other orders  -     fluticasone (FLONASE) 50 mcg/actuation nasal spray; 2 sprays by Each Nare route once daily.  -     amoxicillin (AMOXIL) 875 MG tablet; Take 1 tablet (875 mg total) by mouth 2 (two) times daily.      AOME-  flonase, amoxil.  F/u 1 month with audio.

## 2017-06-23 NOTE — PROGRESS NOTES
Subjective:   Patient ID:  Glo Dumont is a 81 y.o. female who presents for follow-up of Coronary Artery Disease and Congestive Heart Failure (HFPEF-DD)      HPI 1-CHRONIC CAD- ANGINA FC 2- POST PCI   2- HTN CVD WITH HFPEF, LVH AND DD.  RECENTLY HOSPITALIZATION AT [facility], BECAUSE OF KIDNEY STONE  NO CHANGES IN THE PATTERN OF ANGINA. NO UNUSUAL SOOD. NO ORTHOPNEA OR PND  NO PALPITATIONS. NO NEAR SYNCOPE OR SYNCOPE  NO EDEMA. NO INTERMITTENT CLAUDICATION  NO FOCAL CNS SYMPTOMS OR SIGNS TO SUGGEST TIA OR STROKE  CARD MED WELL TOLERATED    Review of Systems   Constitution: Negative for chills, fever, weakness, night sweats, weight gain and weight loss.   HENT: Negative for headaches and nosebleeds.    Eyes: Negative for blurred vision, double vision and visual disturbance.   Cardiovascular: Negative for chest pain, dyspnea on exertion, irregular heartbeat, leg swelling, orthopnea, palpitations, paroxysmal nocturnal dyspnea and syncope.   Respiratory: Negative for cough, hemoptysis and wheezing.    Endocrine: Negative for polydipsia and polyuria.   Hematologic/Lymphatic: Does not bruise/bleed easily.   Skin: Negative for rash.   Musculoskeletal: Negative for joint pain, joint swelling, muscle weakness and myalgias.   Gastrointestinal: Negative for abdominal pain, hematemesis, jaundice and melena.   Genitourinary: Negative for dysuria, hematuria and nocturia.   Neurological: Negative for dizziness, focal weakness and sensory change.   Psychiatric/Behavioral: Negative for depression. The patient does not have insomnia and is not nervous/anxious.      Family History   Problem Relation Age of Onset    Hypertension Mother     Heart failure Mother     Cancer Father      prostate    Cirrhosis Brother     Heart failure Brother     Cancer Daughter      Carcinoid     Past Medical History:   Diagnosis Date    Anemia     Angina pectoris     Anxiety     Arthritis     hip    Carotid artery occlusion     Carpal tunnel  syndrome 06/23/2008    emg    Chronic diarrhea     work up in 2011 with EGD, CS and VCE    CKD (chronic kidney disease) stage 3, GFR 30-59 ml/min 5/11/2017    Coronary artery disease     Coronary artery disease     Diastolic dysfunction     Diverticulosis     Greater trochanteric bursitis 2/10/2015    Grief at loss of child 1/26/2016    H/O carotid endarterectomy 12/2/2013    Heart failure     History of coronary angioplasty 3/11/2014    Hypercholesteremia     Hypertension     Liver cyst 02/08/2013    ct abd    Macular degeneration     Primary open-angle glaucoma 9/3/2013    Renal cyst 02/08/2013    ct abd    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 30 tablet 11    aspirin (ECOTRIN) 81 MG EC tablet Take 81 mg by mouth once daily.      budesonide (ENTOCORT EC) 3 mg capsule Take 9 mg by mouth once daily.      cholecalciferol, vitamin D3, 1,000 unit capsule Take 2 capsules (2,000 Units total) by mouth once daily. 100 capsule 0    cloNIDine (CATAPRES) 0.1 MG tablet Take 1 tablet (0.1 mg total) by mouth every 12 (twelve) hours as needed (BP above 160/100). May cause drowsiness 10 tablet 0    dicyclomine (BENTYL) 10 MG capsule Take 10 mg by mouth 4 (four) times daily before meals and nightly.      dorzolamide-timolol 2-0.5% (COSOPT) 2-0.5 % ophthalmic solution Place 1 drop into both eyes 2 (two) times daily. 10 mL 5    DUREZOL 0.05 % Drop ophthalmic solution       fluoxetine (PROZAC) 10 MG capsule Take 1 capsule (10 mg total) by mouth once daily. 30 capsule 6    FOLIC ACID/MULTIVIT-MIN/LUTEIN (CENTRUM SILVER ORAL) Take by mouth.      hydrochlorothiazide (MICROZIDE) 12.5 mg capsule TAKE ONE CAPSULE BY MOUTH ONCE DAILY 30 capsule  11    iron-vitamin C 100-250 mg, ICAR-C, (ICAR-C) 100-250 mg Tab Take 1 tablet by mouth once daily. 100 tablet 6    ketorolac 0.5% (ACULAR) 0.5 % Drop       lisinopril (PRINIVIL,ZESTRIL) 40 MG tablet Take 1 tablet (40 mg total) by mouth once daily. 30 tablet 11    methotrexate 2.5 MG Tab       prasugrel (EFFIENT) 10 mg Tab Take 1 tablet (10 mg total) by mouth once daily. 30 tablet 6    simvastatin (ZOCOR) 40 MG tablet TAKE ONE TABLET BY MOUTH EVERY EVENING 30 tablet 11    [DISCONTINUED] metoprolol tartrate (LOPRESSOR) 50 MG tablet TAKE ONE TABLET BY MOUTH IN THE MORNING AND 2 TABLETS BY MOUTH IN THEEVENING 90 tablet 0     Current Facility-Administered Medications on File Prior to Visit   Medication Dose Route Frequency Provider Last Rate Last Dose    aflibercept Soln 2 mg  2 mg Intravitreal 1 time in Clinic/HOD NAHUM Tamez MD         Review of patient's allergies indicates:   Allergen Reactions    Codeine Nausea And Vomiting       Objective:     Physical Exam   Constitutional: She is oriented to person, place, and time. She appears well-developed. No distress.   HENT:   Head: Normocephalic.   Eyes: Conjunctivae are normal. Pupils are equal, round, and reactive to light. No scleral icterus.   Neck: Normal range of motion. Neck supple. Normal carotid pulses, no hepatojugular reflux and no JVD present. Carotid bruit is not present. No edema present. No thyroid mass and no thyromegaly present.   Cardiovascular: Normal rate, regular rhythm, S1 normal, S2 normal, normal heart sounds and intact distal pulses.  PMI is not displaced.  Exam reveals no gallop and no friction rub.    No murmur heard.  Pulses:       Carotid pulses are 2+ on the right side, and 2+ on the left side.       Radial pulses are 2+ on the right side, and 2+ on the left side.        Femoral pulses are 2+ on the right side, and 2+ on the left side.       Popliteal pulses are 2+ on the right side, and 2+ on the left side.        Dorsalis  pedis pulses are 2+ on the right side, and 2+ on the left side.        Posterior tibial pulses are 2+ on the right side, and 2+ on the left side.   Pulmonary/Chest: Effort normal and breath sounds normal. She has no wheezes. She has no rales. She exhibits no tenderness.   Abdominal: Soft. Bowel sounds are normal. She exhibits no pulsatile midline mass and no mass. There is no hepatosplenomegaly. There is no tenderness.   Musculoskeletal: Normal range of motion. She exhibits no edema or tenderness.        Cervical back: Normal.        Thoracic back: Normal.        Lumbar back: Normal.   Lymphadenopathy:     She has no cervical adenopathy.     She has no axillary adenopathy.        Right: No supraclavicular adenopathy present.        Left: No supraclavicular adenopathy present.   Neurological: She is alert and oriented to person, place, and time. She has normal strength and normal reflexes. No sensory deficit. Gait normal.   Skin: Skin is warm. No rash noted. No cyanosis. No pallor. Nails show no clubbing.   Psychiatric: She has a normal mood and affect. Her speech is normal and behavior is normal. Cognition and memory are normal.       Assessment:     1. Coronary artery disease, occlusive    2. Angina, class II    3. History of coronary angioplasty    4. Hypertensive heart disease with heart failure    5. Chronic diastolic HF (heart failure), NYHA class 2      STABLE CV STATUS  STABLE CNS STATUS   WELL CONTROLLED BP  CARD MED WELL TOLERATED  Plan:     Coronary artery disease, occlusive    Angina, class II    History of coronary angioplasty    Hypertensive heart disease with heart failure    Chronic diastolic HF (heart failure), NYHA class 2    1- CONTINUE PRESENT CARD MANAGEMENT    2- RETURN IN 6 MONTHS.

## 2017-06-27 ENCOUNTER — LAB VISIT (OUTPATIENT)
Dept: LAB | Facility: HOSPITAL | Age: 82
End: 2017-06-27
Attending: INTERNAL MEDICINE
Payer: MEDICARE

## 2017-06-27 ENCOUNTER — OFFICE VISIT (OUTPATIENT)
Dept: INTERNAL MEDICINE | Facility: CLINIC | Age: 82
End: 2017-06-27
Payer: MEDICARE

## 2017-06-27 ENCOUNTER — TELEPHONE (OUTPATIENT)
Dept: INTERNAL MEDICINE | Facility: CLINIC | Age: 82
End: 2017-06-27

## 2017-06-27 VITALS
HEART RATE: 81 BPM | BODY MASS INDEX: 24.85 KG/M2 | HEIGHT: 61 IN | OXYGEN SATURATION: 98 % | TEMPERATURE: 96 F | SYSTOLIC BLOOD PRESSURE: 158 MMHG | WEIGHT: 131.63 LBS | DIASTOLIC BLOOD PRESSURE: 72 MMHG

## 2017-06-27 DIAGNOSIS — N39.0 URINARY TRACT INFECTION WITHOUT HEMATURIA, SITE UNSPECIFIED: Primary | ICD-10-CM

## 2017-06-27 DIAGNOSIS — I10 ESSENTIAL HYPERTENSION: Chronic | ICD-10-CM

## 2017-06-27 DIAGNOSIS — D50.9 IRON DEFICIENCY ANEMIA, UNSPECIFIED IRON DEFICIENCY ANEMIA TYPE: Primary | ICD-10-CM

## 2017-06-27 DIAGNOSIS — R19.7 DIARRHEA, UNSPECIFIED TYPE: ICD-10-CM

## 2017-06-27 DIAGNOSIS — A41.9 SEPSIS, DUE TO UNSPECIFIED ORGANISM: ICD-10-CM

## 2017-06-27 DIAGNOSIS — K52.9 COLITIS: ICD-10-CM

## 2017-06-27 DIAGNOSIS — N18.30 CKD (CHRONIC KIDNEY DISEASE) STAGE 3, GFR 30-59 ML/MIN: ICD-10-CM

## 2017-06-27 DIAGNOSIS — N39.0 URINARY TRACT INFECTION WITHOUT HEMATURIA, SITE UNSPECIFIED: ICD-10-CM

## 2017-06-27 DIAGNOSIS — I25.10 CORONARY ARTERY DISEASE, OCCLUSIVE: Chronic | ICD-10-CM

## 2017-06-27 LAB
ALBUMIN SERPL BCP-MCNC: 3.7 G/DL
ALP SERPL-CCNC: 68 U/L
ALT SERPL W/O P-5'-P-CCNC: 19 U/L
ANION GAP SERPL CALC-SCNC: 8 MMOL/L
AST SERPL-CCNC: 16 U/L
BASOPHILS # BLD AUTO: 0.05 K/UL
BASOPHILS NFR BLD: 0.7 %
BILIRUB SERPL-MCNC: 0.3 MG/DL
BUN SERPL-MCNC: 22 MG/DL
CALCIUM SERPL-MCNC: 9.6 MG/DL
CHLORIDE SERPL-SCNC: 115 MMOL/L
CO2 SERPL-SCNC: 16 MMOL/L
CREAT SERPL-MCNC: 1 MG/DL
DIFFERENTIAL METHOD: ABNORMAL
EOSINOPHIL # BLD AUTO: 0.1 K/UL
EOSINOPHIL NFR BLD: 0.8 %
ERYTHROCYTE [DISTWIDTH] IN BLOOD BY AUTOMATED COUNT: 18.1 %
EST. GFR  (AFRICAN AMERICAN): >60 ML/MIN/1.73 M^2
EST. GFR  (NON AFRICAN AMERICAN): 54 ML/MIN/1.73 M^2
GLUCOSE SERPL-MCNC: 97 MG/DL
HCT VFR BLD AUTO: 29.7 %
HGB BLD-MCNC: 9.8 G/DL
LYMPHOCYTES # BLD AUTO: 1 K/UL
LYMPHOCYTES NFR BLD: 12.8 %
MCH RBC QN AUTO: 29 PG
MCHC RBC AUTO-ENTMCNC: 33 %
MCV RBC AUTO: 88 FL
MONOCYTES # BLD AUTO: 0.6 K/UL
MONOCYTES NFR BLD: 7.5 %
NEUTROPHILS # BLD AUTO: 6 K/UL
NEUTROPHILS NFR BLD: 78.2 %
PLATELET # BLD AUTO: 414 K/UL
PMV BLD AUTO: 9 FL
POTASSIUM SERPL-SCNC: 4.1 MMOL/L
PROT SERPL-MCNC: 7.5 G/DL
RBC # BLD AUTO: 3.38 M/UL
SODIUM SERPL-SCNC: 139 MMOL/L
WBC # BLD AUTO: 7.64 K/UL

## 2017-06-27 PROCEDURE — 36415 COLL VENOUS BLD VENIPUNCTURE: CPT | Mod: PO

## 2017-06-27 PROCEDURE — 99214 OFFICE O/P EST MOD 30 MIN: CPT | Mod: S$GLB,,, | Performed by: PHYSICIAN ASSISTANT

## 2017-06-27 PROCEDURE — 80053 COMPREHEN METABOLIC PANEL: CPT | Mod: PO

## 2017-06-27 PROCEDURE — 99999 PR PBB SHADOW E&M-EST. PATIENT-LVL V: CPT | Mod: PBBFAC,,, | Performed by: PHYSICIAN ASSISTANT

## 2017-06-27 PROCEDURE — 1126F AMNT PAIN NOTED NONE PRSNT: CPT | Mod: S$GLB,,, | Performed by: PHYSICIAN ASSISTANT

## 2017-06-27 PROCEDURE — 1159F MED LIST DOCD IN RCRD: CPT | Mod: S$GLB,,, | Performed by: PHYSICIAN ASSISTANT

## 2017-06-27 PROCEDURE — 85025 COMPLETE CBC W/AUTO DIFF WBC: CPT | Mod: PO

## 2017-06-27 PROCEDURE — 99499 UNLISTED E&M SERVICE: CPT | Mod: S$GLB,,, | Performed by: PHYSICIAN ASSISTANT

## 2017-06-27 RX ORDER — CIPROFLOXACIN 500 MG/1
500 TABLET ORAL 2 TIMES DAILY
COMMUNITY
End: 2017-10-24

## 2017-06-27 RX ORDER — METRONIDAZOLE 500 MG/1
500 TABLET ORAL 3 TIMES DAILY
Status: CANCELLED | OUTPATIENT
Start: 2017-06-27

## 2017-06-27 NOTE — PROGRESS NOTES
Subjective:       Patient ID: Glo Dumont is a 81 y.o. female.    Chief Complaint: Hospital Follow Up    81 year old female presents to clinic with daughter for hospital f/u. PCP is Dr. Cardona. She reports going to [facility] ER 6/18/17 due to flank pain and was discharged after 2-3 days. She reports having blood work, abd CT, and urine studies and was treated with IV fluids, IV antibiotics, and pain medication for UTI with sepsis and ?kidney stones. She reports being discharged on Cipro and has completed that (did not take the last 2 tablets yesterday). She reports having intermittent R flank pain since hospital discharge but denies any current pain. She reports taking Advil with relief. She reports having a urology appt scheduled outside of [facility] 7/11/17. She reports onset of diarrhea 5-6 days ago with 5-6 episodes of diarrhea daily since onset. She reports no N/V, fever, chills, CP, SOB, exertional sxs, blood in stool, or other medical complaints. She reports having a GI doctor (Dr. Galindo at [facility]) and last saw one of his colleagues earlier this year. She reports having chronic diarrhea and was prescribed budesonide without improvement. She has a hx of colitis. Pt saw her cardiologist 4 days ago. Pt reports her BP is well-controlled at home.    Patient Active Problem List    History of coronary angioplasty    CKD (chronic kidney disease) stage 3, GFR 30-59 ml/min    Uveitis    Colitis    Anxiety and depression    Atherosclerosis of aorta    Essential hypertension    Chronic diastolic HF (heart failure), NYHA class 2    Osteopenia    Vitamin D deficiency    Iron deficiency anemia    Central vein occlusion of retina    Coronary artery disease, occlusive    Ptosis    Angina, class II    Hemoglobin A-S genotype    Hypertensive heart disease with heart failure    Pure hypercholesterolemia    Thyroid nodule    Sarcoidosis of lung            Review of Systems   Constitutional: Negative for chills and fever.    Respiratory: Negative for cough and shortness of breath.    Cardiovascular: Negative for chest pain, palpitations and leg swelling.   Gastrointestinal: Negative for nausea and vomiting.   Genitourinary: Positive for flank pain. Negative for difficulty urinating, dysuria, hematuria and urgency.   Skin: Negative for rash.   Neurological: Negative for dizziness, weakness, numbness and headaches.   Psychiatric/Behavioral: Negative for confusion.       Objective:      Physical Exam   Constitutional: She is oriented to person, place, and time. She appears well-developed and well-nourished. No distress.   HENT:   Head: Normocephalic and atraumatic.   Eyes: EOM are normal. No scleral icterus.   Neck: Neck supple.   Cardiovascular: Normal rate and regular rhythm.    Pulmonary/Chest: Effort normal and breath sounds normal. No respiratory distress. She has no decreased breath sounds. She has no wheezes. She has no rhonchi. She has no rales.   Abdominal: Soft. She exhibits no distension and no mass. There is no tenderness. There is CVA tenderness (minimal, R). There is no rigidity, no rebound and no guarding.   Musculoskeletal: Normal range of motion. She exhibits no edema.   Neurological: She is alert and oriented to person, place, and time. No cranial nerve deficit.   Skin: Skin is warm and dry. No rash noted.   Psychiatric: She has a normal mood and affect. Her speech is normal and behavior is normal. Thought content normal.       Assessment:       1. Urinary tract infection without hematuria, site unspecified    2. Sepsis, due to unspecified organism    3. Essential hypertension    4. CKD (chronic kidney disease) stage 3, GFR 30-59 ml/min    5. Diarrhea, unspecified type    6. Colitis    7. Coronary artery disease, occlusive        Plan:         Glo was seen today for hospital follow up.    Diagnoses and all orders for this visit:    Urinary tract infection, possible kidney stones  -     CBC auto differential; Future  -      Cancel: Urinalysis; Future  -     Urinalysis; Future  -     Urine culture; Future  F/u with urologist as recommended. Urology here at [facility] unable to see pt until August 2017. [facility] and [facility] contact info given to pt today if she desires to move urology appt sooner.    Sepsis, due to unspecified organism  -     CBC auto differential; Future  -     Urinalysis; Future  -     Urine culture; Future    Essential hypertension  -     Comprehensive metabolic panel; Future    CKD (chronic kidney disease) stage 3, GFR 30-59 ml/min  -     Comprehensive metabolic panel; Future    Diarrhea, unspecified type  -     CBC auto differential; Future  -     Comprehensive metabolic panel; Future  -     CULTURE, STOOL; Future  -     Giardia / Cryptosporidum, EIA; Future  -     Occult blood x 1, stool; Future  -     Stool Exam-Ova,Cysts,Parasites; Future  -     Clostridium difficile EIA; Future    Colitis  Recommend pt inform her GI physician (Dr. Galindo) of current GI sxs asap.    Coronary artery disease, occlusive  F/u with cardiologist as recommended.    Monitor sxs. Further recommendations to follow review of pending results. F/u with specialists as recommended. F/u with PCP for further management. ER if severe sxs occur.

## 2017-06-27 NOTE — TELEPHONE ENCOUNTER
----- Message from LYDIA Palacios sent at 6/27/2017  2:54 PM CDT -----  Blood results okay but more anemic that usual - likely from recent hospital stay. Recommend repeat CBC prior to upcoming PCP appt. Order placed.

## 2017-06-28 ENCOUNTER — TELEPHONE (OUTPATIENT)
Dept: INTERNAL MEDICINE | Facility: CLINIC | Age: 82
End: 2017-06-28

## 2017-06-28 ENCOUNTER — LAB VISIT (OUTPATIENT)
Dept: LAB | Facility: HOSPITAL | Age: 82
End: 2017-06-28
Attending: INTERNAL MEDICINE
Payer: MEDICARE

## 2017-06-28 DIAGNOSIS — R19.7 DIARRHEA, UNSPECIFIED TYPE: ICD-10-CM

## 2017-06-28 PROCEDURE — 82272 OCCULT BLD FECES 1-3 TESTS: CPT

## 2017-06-28 PROCEDURE — 87329 GIARDIA AG IA: CPT

## 2017-06-28 PROCEDURE — 87427 SHIGA-LIKE TOXIN AG IA: CPT | Mod: 59

## 2017-06-28 PROCEDURE — 87045 FECES CULTURE AEROBIC BACT: CPT

## 2017-06-28 PROCEDURE — 87209 SMEAR COMPLEX STAIN: CPT

## 2017-06-28 PROCEDURE — 87046 STOOL CULTR AEROBIC BACT EA: CPT

## 2017-06-28 NOTE — TELEPHONE ENCOUNTER
----- Message from LYDIA Palacios sent at 6/28/2017 10:27 AM CDT -----  Urine still shows infection. Was she able to schedule appt with urology sooner than July 11th?

## 2017-06-28 NOTE — TELEPHONE ENCOUNTER
Patient notified and states she her urology appointment  Is on 7/11/17 and that was the soonest they could see her

## 2017-06-28 NOTE — TELEPHONE ENCOUNTER
Please call urology here and see if they can work her in asap. She was recently discharged from the hospital with sepsis likely due to UTI, and urine still shows infection.

## 2017-06-29 LAB
CRYPTOSP AG STL QL IA: NEGATIVE
G LAMBLIA AG STL QL IA: NEGATIVE
OB PNL STL: POSITIVE

## 2017-06-30 LAB — O+P STL TRI STN: NORMAL

## 2017-07-03 LAB
BACTERIA STL CULT: NORMAL
BACTERIA STL CULT: NORMAL

## 2017-07-05 ENCOUNTER — TELEPHONE (OUTPATIENT)
Dept: INTERNAL MEDICINE | Facility: CLINIC | Age: 82
End: 2017-07-05

## 2017-07-05 NOTE — TELEPHONE ENCOUNTER
----- Message from LYDIA Palacios sent at 7/5/2017  8:05 AM CDT -----  Stool studies show blood in stool. Otherwise they are normal. Lab cancelled her C. diff test because it was a formed stool. If she is still having diarrhea, I would like for her to see her GI provider.

## 2017-07-05 NOTE — TELEPHONE ENCOUNTER
Patient notified and states ok not having diarrhea anymore. Patient also states she still has not been able to get in with the urologist, appt scheduled for 7/11/17,  Patient states her daughter called and was unable to get a sooner appt. Patient also states she will call herself to see if she could be seen sooner

## 2017-07-12 ENCOUNTER — LAB VISIT (OUTPATIENT)
Dept: LAB | Facility: HOSPITAL | Age: 82
End: 2017-07-12
Attending: INTERNAL MEDICINE
Payer: MEDICARE

## 2017-07-12 ENCOUNTER — OFFICE VISIT (OUTPATIENT)
Dept: INTERNAL MEDICINE | Facility: CLINIC | Age: 82
End: 2017-07-12
Payer: MEDICARE

## 2017-07-12 VITALS
WEIGHT: 131.81 LBS | OXYGEN SATURATION: 98 % | BODY MASS INDEX: 24.89 KG/M2 | TEMPERATURE: 97 F | HEART RATE: 60 BPM | SYSTOLIC BLOOD PRESSURE: 136 MMHG | DIASTOLIC BLOOD PRESSURE: 64 MMHG | HEIGHT: 61 IN

## 2017-07-12 DIAGNOSIS — D57.3 HEMOGLOBIN A-S GENOTYPE: ICD-10-CM

## 2017-07-12 DIAGNOSIS — I10 ESSENTIAL HYPERTENSION: Chronic | ICD-10-CM

## 2017-07-12 DIAGNOSIS — D50.9 IRON DEFICIENCY ANEMIA, UNSPECIFIED IRON DEFICIENCY ANEMIA TYPE: ICD-10-CM

## 2017-07-12 DIAGNOSIS — F32.A ANXIETY AND DEPRESSION: ICD-10-CM

## 2017-07-12 DIAGNOSIS — D86.0 SARCOIDOSIS OF LUNG: ICD-10-CM

## 2017-07-12 DIAGNOSIS — D64.9 ANEMIA, UNSPECIFIED: Primary | ICD-10-CM

## 2017-07-12 DIAGNOSIS — I25.10 CORONARY ARTERY DISEASE, OCCLUSIVE: Chronic | ICD-10-CM

## 2017-07-12 DIAGNOSIS — F41.9 ANXIETY AND DEPRESSION: ICD-10-CM

## 2017-07-12 LAB
BASOPHILS # BLD AUTO: 0.07 K/UL
BASOPHILS NFR BLD: 1 %
DIFFERENTIAL METHOD: ABNORMAL
EOSINOPHIL # BLD AUTO: 0.2 K/UL
EOSINOPHIL NFR BLD: 2.5 %
ERYTHROCYTE [DISTWIDTH] IN BLOOD BY AUTOMATED COUNT: 16.9 %
HCT VFR BLD AUTO: 28.4 %
HGB BLD-MCNC: 9.5 G/DL
LYMPHOCYTES # BLD AUTO: 1 K/UL
LYMPHOCYTES NFR BLD: 14 %
MCH RBC QN AUTO: 29.1 PG
MCHC RBC AUTO-ENTMCNC: 33.5 %
MCV RBC AUTO: 87 FL
MONOCYTES # BLD AUTO: 0.9 K/UL
MONOCYTES NFR BLD: 11.9 %
NEUTROPHILS # BLD AUTO: 5.1 K/UL
NEUTROPHILS NFR BLD: 70.9 %
PLATELET # BLD AUTO: 336 K/UL
PMV BLD AUTO: 9.9 FL
RBC # BLD AUTO: 3.26 M/UL
WBC # BLD AUTO: 7.2 K/UL

## 2017-07-12 PROCEDURE — 1159F MED LIST DOCD IN RCRD: CPT | Mod: S$GLB,,, | Performed by: INTERNAL MEDICINE

## 2017-07-12 PROCEDURE — 99213 OFFICE O/P EST LOW 20 MIN: CPT | Mod: S$GLB,,, | Performed by: INTERNAL MEDICINE

## 2017-07-12 PROCEDURE — 99999 PR PBB SHADOW E&M-EST. PATIENT-LVL III: CPT | Mod: PBBFAC,,, | Performed by: INTERNAL MEDICINE

## 2017-07-12 PROCEDURE — 1126F AMNT PAIN NOTED NONE PRSNT: CPT | Mod: S$GLB,,, | Performed by: INTERNAL MEDICINE

## 2017-07-12 PROCEDURE — 99499 UNLISTED E&M SERVICE: CPT | Mod: S$GLB,,, | Performed by: INTERNAL MEDICINE

## 2017-07-12 NOTE — PROGRESS NOTES
"Subjective:       Patient ID: Glo Dumont is a 81 y.o. female.    Chief Complaint: Follow-up    Here for follow up of medical problems.  Saw Urol Dr. Lou yesterday and all has resolved, has a couple of very small kidney stones, no more infx.  BMs normal now.  No cp/sob/palp.  No f/c/sw/n/v.  Breathing ok.  Stress doing ok, better with prozac and support group.    Updated/ annual due 1/18:  HM: 1/17 fluvax, 3/15 [id number], 2/14 [id number], 2/15 TDaP, 12/15 BMD rep 2y, 2/11 Cscope no repeat will be done, 1/17 MMG, Eye doc monthly.          Review of Systems   Constitutional: Negative for chills, diaphoresis and fever.   Respiratory: Negative for cough and shortness of breath.    Cardiovascular: Negative for chest pain, palpitations and leg swelling.   Gastrointestinal: Negative for blood in stool, constipation, diarrhea, nausea and vomiting.   Genitourinary: Negative for dysuria, frequency and hematuria.   Psychiatric/Behavioral: The patient is not nervous/anxious.        Objective:   /64 (BP Location: Right arm, Patient Position: Sitting, BP Method: Manual)   Pulse 60   Temp 96.5 °F (35.8 °C) (Tympanic)   Ht 5' 1" (1.549 m)   Wt 59.8 kg (131 lb 13.4 oz)   SpO2 98%   BMI 24.91 kg/m²     Physical Exam   Constitutional: She is oriented to person, place, and time. She appears well-developed.   HENT:   Mouth/Throat: Oropharynx is clear and moist.   Neck: Neck supple. Carotid bruit is not present. No thyroid mass present.   Cardiovascular: Normal rate, regular rhythm and intact distal pulses.  Exam reveals no gallop and no friction rub.    No murmur heard.  Pulmonary/Chest: Effort normal and breath sounds normal. She has no wheezes. She has no rales.   Abdominal: Soft. Bowel sounds are normal. She exhibits no mass. There is no hepatosplenomegaly. There is no tenderness.   Musculoskeletal: She exhibits no edema.   Lymphadenopathy:     She has no cervical adenopathy.   Neurological: She is alert and oriented " to person, place, and time.   Psychiatric: She has a normal mood and affect.     Results for GLO BLANK (MRN [id number]) as of 7/12/2017 13:37   Ref. Range 1/23/2015 15:51 1/29/2015 10:15 11/23/2015 07:30 1/22/2016 08:14 3/29/2016 11:38 1/23/2017 09:25 6/27/2017 10:44 7/12/2017 12:40   Hemoglobin Latest Ref Range: 12.0 - 16.0 g/dL 11.2 (L) 10.4 (L) 10.7 (L) 11.1 (L) 11.2 (L) 10.4 (L) 9.8 (L) 9.5 (L)   Hematocrit Latest Ref Range: 37.0 - 48.5 % 33.5 (L) 31.2 (L) 32.0 (L) 33.4 (L) 33.2 (L) 30.4 (L) 29.7 (L) 28.4 (L)     Assessment:       1. Anemia, unspecified    2. Essential hypertension    3. Sarcoidosis of lung    4. Hemoglobin A-S genotype    5. Anxiety and depression    6. Coronary artery disease, occlusive        Plan:       Glo was seen today for follow-up.    Diagnoses and all orders for this visit:    Anemia, unspecified, Hemoglobin A-S genotype- recheck Hct 3 mo, and r/o iron def.  -     CBC auto differential; Future  -     Ferritin; Future  -     Iron and TIBC; Future    Essential hypertension- stable on rx, cont.    Sarcoidosis of lung- stable.    Anxiety and depression- doing well on rx.    Coronary artery disease, occlusive- clin stable, watch Hct more closely.    RTC 3 mo.

## 2017-07-24 ENCOUNTER — OFFICE VISIT (OUTPATIENT)
Dept: OTOLARYNGOLOGY | Facility: CLINIC | Age: 82
End: 2017-07-24
Payer: MEDICARE

## 2017-07-24 ENCOUNTER — CLINICAL SUPPORT (OUTPATIENT)
Dept: AUDIOLOGY | Facility: CLINIC | Age: 82
End: 2017-07-24
Payer: MEDICARE

## 2017-07-24 VITALS
DIASTOLIC BLOOD PRESSURE: 74 MMHG | BODY MASS INDEX: 24.73 KG/M2 | WEIGHT: 131 LBS | TEMPERATURE: 97 F | SYSTOLIC BLOOD PRESSURE: 176 MMHG | HEART RATE: 52 BPM | HEIGHT: 61 IN | RESPIRATION RATE: 18 BRPM

## 2017-07-24 DIAGNOSIS — H66.91 ACUTE OTITIS MEDIA, RIGHT: Primary | ICD-10-CM

## 2017-07-24 DIAGNOSIS — H90.3 HEARING LOSS SENSORY, BILATERAL: Primary | ICD-10-CM

## 2017-07-24 PROCEDURE — 1126F AMNT PAIN NOTED NONE PRSNT: CPT | Mod: S$GLB,,, | Performed by: OTOLARYNGOLOGY

## 2017-07-24 PROCEDURE — 99999 PR PBB SHADOW E&M-EST. PATIENT-LVL IV: CPT | Mod: PBBFAC,,, | Performed by: OTOLARYNGOLOGY

## 2017-07-24 PROCEDURE — 92557 COMPREHENSIVE HEARING TEST: CPT | Mod: S$GLB,,, | Performed by: AUDIOLOGIST

## 2017-07-24 PROCEDURE — 92567 TYMPANOMETRY: CPT | Mod: S$GLB,,, | Performed by: AUDIOLOGIST

## 2017-07-24 PROCEDURE — 1159F MED LIST DOCD IN RCRD: CPT | Mod: S$GLB,,, | Performed by: OTOLARYNGOLOGY

## 2017-07-24 PROCEDURE — 99213 OFFICE O/P EST LOW 20 MIN: CPT | Mod: S$GLB,,, | Performed by: OTOLARYNGOLOGY

## 2017-07-24 NOTE — PROGRESS NOTES
Subjective:   Patient: Glo Dumont [id number], :1935   Visit date:2017 11:11 AM    Chief Complaint:  Follow-up (review audio)    HPI:  Glo is a 81 y.o. female who is here for follow-up. She was last here about 1 month ago with acute otitis media on the left.  I placed her on amoxicillin and fluticasone.  She reports that the pain and hearing loss of resolved.  She has no significant dizziness or vertigo.  Overall she is feeling very well.        Review of Systems:  -     Allergic/Immunologic: is allergic to codeine..  -     Constitutional: Current temp: 97 °F (36.1 °C) (Tympanic)    Her meds, allergies, medical, surgical, social & family histories were reviewed & updated:  -     She has a current medication list which includes the following prescription(s): amlodipine, aspirin, budesonide, cholecalciferol (vitamin d3), ciprofloxacin hcl, dicyclomine, dorzolamide-timolol 2-0.5%, durezol, fluticasone, folic acid/multivit-min/lutein, hydrochlorothiazide, iron-vitamin c 100-250 mg (icar-c), ketorolac 0.5%, lisinopril, methotrexate, metoprolol tartrate, prasugrel, simvastatin, clonidine, and fluoxetine, and the following Facility-Administered Medications: aflibercept.  -     She  has a past medical history of Anemia; Angina pectoris; Anxiety; Arthritis; Carotid artery occlusion; Carpal tunnel syndrome (2008); Chronic diarrhea; CKD (chronic kidney disease) stage 3, GFR 30-59 ml/min (2017); Coronary artery disease; Coronary artery disease; Diastolic dysfunction; Diverticulosis; Greater trochanteric bursitis (2/10/2015); Grief at loss of child (2016); H/O carotid endarterectomy (2013); Heart failure; History of coronary angioplasty (3/11/2014); Hypercholesteremia; Hypertension; Liver cyst (2013); Macular degeneration; Prim open angle glaucoma (9/3/2013); Renal cyst (2013); S/P prosthetic total arthroplasty of the hip (11/3/2014); Sarcoidosis; Sarcoidosis of lung; Sickle cell  "trait; and Uveitis.   -     She  does not have any pertinent problems on file.   -     She  has a past surgical history that includes Total abdominal hysterectomy w/ bilateral salpingoophorectomy (1972); Cataract extraction (Bilateral); Coronary angioplasty with stent (11/19/2010); Carotid endarterectomy (Right, 2000s); and Joint replacement (Left, 11/03/2014).  -     She  reports that she has never smoked. She has never used smokeless tobacco. She reports that she does not drink alcohol or use drugs.  -     Her family history includes Cancer in her daughter and father; Cirrhosis in her brother; Heart failure in her brother and mother; Hypertension in her mother.  -     She is allergic to codeine.    Objective:     Physical Exam:  Vitals:  BP (!) 176/74   Pulse (!) 52   Temp 97 °F (36.1 °C) (Tympanic)   Resp 18   Ht 5' 1" (1.549 m)   Wt 59.4 kg (131 lb)   BMI 24.75 kg/m²   Communication:  Able to communicate, no hoarseness.  Head & Face:  Normocephalic, atraumatic, no sinus tenderness.  Eyes:  Extraocular motions intact.  Ears:  Otoscopy of external auditory canals and tympanic membranes was normal, clinical speech reception thresholds grossly intact, no mass/lesion of auricle.  Nose:  No masses/lesions of external nose, nasal mucosa, septum, and turbinates were within normal limits.  Mouth:  No mass/lesion of lips, teeth, gums, hard/soft palate, tongue, tonsils, or oropharynx.  Neck & Lymphatics:  No cervical lymphadenopathy, no neck mass/crepitus/ asymmetry, trachea is midline, no thyroid enlargement/tenderness/mass.  Neuro/Psych: Alert with normal mood and affect.   Respiration/Chest:  Symmetric expansion during respiration, normal respiratory effort.  Skin:  Warm and intact.    Assessment & Plan:   Glo was seen today for follow-up.    Diagnoses and all orders for this visit:    Acute otitis media, right  Comments:  resolved        Audiogram demonstrates no air bone gap and normal tympanogram.  She has " symmetric mild sensorineural hearing loss bilaterally.  She can follow-up as needed.

## 2017-07-24 NOTE — PROGRESS NOTES
Glo Dumont was seen 07/24/2017 for a baseline audiological evaluation as part of her one month follow up with Dr. Krueger.  Patient reports slight hearing loss, but does not feel that her hearing interferes with her everyday life.  She denies tinnitus and dizziness.    Results reveal a mild-to-moderate sensorineural hearing loss 250-8000 Hz for the right ear, and  mild-to-moderate sensorineural hearing loss 250-8000 Hz for the left ear.   Speech Reception Thresholds were  10 dBHL for the right ear and 10 dBHL for the left ear.   Word recognition scores were good for the right ear and good for the left ear.   Tympanograms were Type A for the right ear and Type A for the left ear.    Patient was counseled on the above findings.    REC:  Annual audiogram

## 2017-08-25 ENCOUNTER — TELEPHONE (OUTPATIENT)
Dept: INTERNAL MEDICINE | Facility: CLINIC | Age: 82
End: 2017-08-25

## 2017-08-25 NOTE — TELEPHONE ENCOUNTER
----- Message from Niki Narvaez sent at 8/25/2017  1:23 PM CDT -----  Contact: [facility]  Called to check the status of request sent 8/24. Please call back at  [phone]. thanks

## 2017-09-27 RX ORDER — LISINOPRIL 40 MG/1
TABLET ORAL
Qty: 30 TABLET | Refills: 11 | Status: SHIPPED | OUTPATIENT
Start: 2017-09-27 | End: 2019-03-22 | Stop reason: SDUPTHER

## 2017-10-10 ENCOUNTER — LAB VISIT (OUTPATIENT)
Dept: LAB | Facility: HOSPITAL | Age: 82
End: 2017-10-10
Attending: INTERNAL MEDICINE
Payer: MEDICARE

## 2017-10-10 DIAGNOSIS — D64.9 ANEMIA: ICD-10-CM

## 2017-10-10 LAB
BASOPHILS # BLD AUTO: 0.06 K/UL
BASOPHILS NFR BLD: 1.2 %
DIFFERENTIAL METHOD: ABNORMAL
EOSINOPHIL # BLD AUTO: 0.2 K/UL
EOSINOPHIL NFR BLD: 3 %
ERYTHROCYTE [DISTWIDTH] IN BLOOD BY AUTOMATED COUNT: 16.9 %
FERRITIN SERPL-MCNC: 359 NG/ML
HCT VFR BLD AUTO: 32 %
HGB BLD-MCNC: 10.6 G/DL
IRON SERPL-MCNC: 85 UG/DL
LYMPHOCYTES # BLD AUTO: 1.1 K/UL
LYMPHOCYTES NFR BLD: 21.2 %
MCH RBC QN AUTO: 28.1 PG
MCHC RBC AUTO-ENTMCNC: 33.1 G/DL
MCV RBC AUTO: 85 FL
MONOCYTES # BLD AUTO: 0.3 K/UL
MONOCYTES NFR BLD: 6.1 %
NEUTROPHILS # BLD AUTO: 3.4 K/UL
NEUTROPHILS NFR BLD: 68.3 %
PLATELET # BLD AUTO: 294 K/UL
PMV BLD AUTO: 10.3 FL
RBC # BLD AUTO: 3.77 M/UL
SATURATED IRON: 26 %
TOTAL IRON BINDING CAPACITY: 327 UG/DL
TRANSFERRIN SERPL-MCNC: 221 MG/DL
WBC # BLD AUTO: 4.95 K/UL

## 2017-10-10 PROCEDURE — 85025 COMPLETE CBC W/AUTO DIFF WBC: CPT

## 2017-10-10 PROCEDURE — 36415 COLL VENOUS BLD VENIPUNCTURE: CPT | Mod: PO

## 2017-10-10 PROCEDURE — 83540 ASSAY OF IRON: CPT

## 2017-10-10 PROCEDURE — 82728 ASSAY OF FERRITIN: CPT

## 2017-10-24 ENCOUNTER — TELEPHONE (OUTPATIENT)
Dept: INTERNAL MEDICINE | Facility: CLINIC | Age: 82
End: 2017-10-24

## 2017-10-24 ENCOUNTER — OFFICE VISIT (OUTPATIENT)
Dept: INTERNAL MEDICINE | Facility: CLINIC | Age: 82
End: 2017-10-24
Payer: MEDICARE

## 2017-10-24 VITALS
BODY MASS INDEX: 24.97 KG/M2 | SYSTOLIC BLOOD PRESSURE: 140 MMHG | TEMPERATURE: 96 F | HEART RATE: 74 BPM | WEIGHT: 132.25 LBS | OXYGEN SATURATION: 97 % | DIASTOLIC BLOOD PRESSURE: 78 MMHG | HEIGHT: 61 IN

## 2017-10-24 DIAGNOSIS — D57.3 HEMOGLOBIN A-S GENOTYPE: ICD-10-CM

## 2017-10-24 DIAGNOSIS — I25.10 CORONARY ARTERY DISEASE, OCCLUSIVE: Chronic | ICD-10-CM

## 2017-10-24 DIAGNOSIS — I10 ESSENTIAL HYPERTENSION: Chronic | ICD-10-CM

## 2017-10-24 DIAGNOSIS — N18.30 CKD (CHRONIC KIDNEY DISEASE) STAGE 3, GFR 30-59 ML/MIN: ICD-10-CM

## 2017-10-24 DIAGNOSIS — D50.9 IRON DEFICIENCY ANEMIA, UNSPECIFIED IRON DEFICIENCY ANEMIA TYPE: Primary | ICD-10-CM

## 2017-10-24 PROCEDURE — 99213 OFFICE O/P EST LOW 20 MIN: CPT | Mod: S$GLB,,, | Performed by: PHYSICIAN ASSISTANT

## 2017-10-24 PROCEDURE — 99999 PR PBB SHADOW E&M-EST. PATIENT-LVL IV: CPT | Mod: PBBFAC,,, | Performed by: PHYSICIAN ASSISTANT

## 2017-10-24 PROCEDURE — 99499 UNLISTED E&M SERVICE: CPT | Mod: S$GLB,,, | Performed by: PHYSICIAN ASSISTANT

## 2017-10-24 NOTE — PROGRESS NOTES
"Subjective:       Patient ID: Glo Dumont is a 81 y.o. female.    Chief Complaint: Follow-up    81 year old female presents to clinic for 3 mo f/u from last PCP Dr. Cardona visit for anemia with prior labs. Lab results (as below) discussed with pt and her daughter, who is present with pt today. She reports feeling well and BP has been 120s-130s/60s-70s. She reports no abnormal bleeding, fever, chills, CP, SOB, rash, or other medical complaints. She reports she is taking daily iron supplement.    Patient Active Problem List:     Sarcoidosis of lung     Thyroid nodule     Hypertensive heart disease with heart failure     Pure hypercholesterolemia     Hemoglobin A-S genotype     Angina, class II     Ptosis     Coronary artery disease, occlusive     Central vein occlusion of retina     Iron deficiency anemia     Vitamin D deficiency     Osteopenia     Essential hypertension     Chronic diastolic HF (heart failure), NYHA class 2     Atherosclerosis of aorta     Anxiety and depression     CKD (chronic kidney disease) stage 3, GFR 30-59 ml/min     Uveitis     Colitis     History of coronary angioplasty      Review of Systems   Constitutional: Negative for chills and fever.   Respiratory: Negative for cough and shortness of breath.    Cardiovascular: Negative for chest pain, palpitations and leg swelling.   Gastrointestinal: Negative for blood in stool, nausea and vomiting.   Genitourinary: Negative for hematuria.   Skin: Negative for rash.   Neurological: Negative for dizziness, weakness, numbness and headaches.   Psychiatric/Behavioral: Negative for confusion.       Objective:       Vitals:    10/24/17 0855   BP: (!) 140/78   BP Location: Right arm   Patient Position: Sitting   BP Method: Small (Manual)   Pulse: 74   Temp: 96 °F (35.6 °C)   TempSrc: Tympanic   SpO2: 97%   Weight: 60 kg (132 lb 4.4 oz)   Height: 5' 1" (1.549 m)     Physical Exam   Constitutional: She is oriented to person, place, and time. She appears " well-developed and well-nourished. No distress.   HENT:   Head: Normocephalic and atraumatic.   Eyes: EOM are normal. No scleral icterus.   Neck: Neck supple.   Cardiovascular: Normal rate and regular rhythm.    Pulmonary/Chest: Effort normal and breath sounds normal. No respiratory distress. She has no decreased breath sounds. She has no wheezes. She has no rhonchi. She has no rales.   Musculoskeletal: Normal range of motion. She exhibits no edema.   Neurological: She is alert and oriented to person, place, and time. No cranial nerve deficit.   Skin: Skin is warm and dry. No rash noted.   Psychiatric: She has a normal mood and affect. Her speech is normal and behavior is normal. Thought content normal.       Component      Latest Ref Rng & Units 10/10/2017 7/12/2017 6/27/2017   WBC      3.90 - 12.70 K/uL 4.95 7.20 7.64   RBC      4.00 - 5.40 M/uL 3.77 (L) 3.26 (L) 3.38 (L)   Hemoglobin      12.0 - 16.0 g/dL 10.6 (L) 9.5 (L) 9.8 (L)   Hematocrit      37.0 - 48.5 % 32.0 (L) 28.4 (L) 29.7 (L)   MCV      82 - 98 fL 85 87 88   MCH      27.0 - 31.0 pg 28.1 29.1 29.0   MCHC      32.0 - 36.0 g/dL 33.1 33.5 33.0   RDW      11.5 - 14.5 % 16.9 (H) 16.9 (H) 18.1 (H)   Platelets      150 - 350 K/uL 294 336 414 (H)   MPV      9.2 - 12.9 fL 10.3 9.9 9.0 (L)   Gran #      1.8 - 7.7 K/uL 3.4 5.1 6.0   Lymph #      1.0 - 4.8 K/uL 1.1 1.0 1.0   Mono #      0.3 - 1.0 K/uL 0.3 0.9 0.6   Eos #      0.0 - 0.5 K/uL 0.2 0.2 0.1   Baso #      0.00 - 0.20 K/uL 0.06 0.07 0.05   Gran%      38.0 - 73.0 % 68.3 70.9 78.2 (H)   Lymph%      18.0 - 48.0 % 21.2 14.0 (L) 12.8 (L)   Mono%      4.0 - 15.0 % 6.1 11.9 7.5   Eosinophil%      0.0 - 8.0 % 3.0 2.5 0.8   Basophil%      0.0 - 1.9 % 1.2 1.0 0.7   Differential Method       Automated Automated Automated   Iron      30 - 160 ug/dL 85     Transferrin      200 - 375 mg/dL 221     TIBC      250 - 450 ug/dL 327     Saturated Iron      20 - 50 % 26     Ferritin      20.0 - 300.0 ng/mL 359 (H)        Assessment:       1. Iron deficiency anemia, unspecified iron deficiency anemia type    2. Hemoglobin A-S genotype    3. Essential hypertension    4. Coronary artery disease, occlusive    5. CKD (chronic kidney disease) stage 3, GFR 30-59 ml/min        Plan:         Glo was seen today for follow-up.    Diagnoses and all orders for this visit:    Iron deficiency anemia, Hemoglobin A-S genotype  Anemia improved. Continue iron supplement.    Essential hypertension  Controlled. Monitor BP and f/u with PCP and cardiology as recommended.    Coronary artery disease, occlusive  Pt currently asymptomatic. F/u with cardiology as recommended.    CKD (chronic kidney disease) stage 3, GFR 30-59 ml/min  F/u with PCP and nephrology as recommended. Avoid NSAIDs.    F/u with PCP in 3 months for health management. RTC sooner if needed.

## 2017-10-24 NOTE — TELEPHONE ENCOUNTER
Spoke to pharmacy.  They do not need a PA.  They need an authorization to refill.  Per med card dated 9/27/17, phoned in w/11 refills./rpr

## 2017-10-24 NOTE — TELEPHONE ENCOUNTER
----- Message from Tiffany Griffith sent at 10/24/2017  1:25 PM CDT -----  Contact: [facility]  Please call Pharmacy @ [phone] regarding prior authorization for lisinopril.

## 2017-11-01 RX ORDER — PRASUGREL HYDROCHLORIDE 10 MG/1
TABLET, COATED ORAL
Qty: 30 TABLET | Refills: 0 | Status: SHIPPED | OUTPATIENT
Start: 2017-11-01 | End: 2017-12-27 | Stop reason: SDUPTHER

## 2017-11-06 ENCOUNTER — OFFICE VISIT (OUTPATIENT)
Dept: PULMONOLOGY | Facility: CLINIC | Age: 82
End: 2017-11-06
Payer: MEDICARE

## 2017-11-06 ENCOUNTER — HOSPITAL ENCOUNTER (OUTPATIENT)
Dept: RADIOLOGY | Facility: HOSPITAL | Age: 82
Discharge: HOME | End: 2017-11-06
Attending: INTERNAL MEDICINE
Payer: MEDICARE

## 2017-11-06 VITALS
DIASTOLIC BLOOD PRESSURE: 82 MMHG | WEIGHT: 133.81 LBS | RESPIRATION RATE: 18 BRPM | SYSTOLIC BLOOD PRESSURE: 120 MMHG | HEART RATE: 64 BPM | OXYGEN SATURATION: 93 % | BODY MASS INDEX: 25.27 KG/M2 | HEIGHT: 61 IN

## 2017-11-06 DIAGNOSIS — D86.0 SARCOIDOSIS OF LUNG: ICD-10-CM

## 2017-11-06 DIAGNOSIS — D86.0 SARCOIDOSIS OF LUNG: Primary | Chronic | ICD-10-CM

## 2017-11-06 PROCEDURE — 99214 OFFICE O/P EST MOD 30 MIN: CPT | Mod: S$GLB,,, | Performed by: INTERNAL MEDICINE

## 2017-11-06 PROCEDURE — 71020 XR CHEST PA AND LATERAL: CPT | Mod: 26,,, | Performed by: RADIOLOGY

## 2017-11-06 PROCEDURE — 99999 PR PBB SHADOW E&M-EST. PATIENT-LVL III: CPT | Mod: PBBFAC,,, | Performed by: INTERNAL MEDICINE

## 2017-11-06 PROCEDURE — G0008 ADMIN INFLUENZA VIRUS VAC: HCPCS | Mod: S$GLB,,, | Performed by: INTERNAL MEDICINE

## 2017-11-06 PROCEDURE — 90662 IIV NO PRSV INCREASED AG IM: CPT | Mod: S$GLB,,, | Performed by: INTERNAL MEDICINE

## 2017-11-06 PROCEDURE — 71020 XR CHEST PA AND LATERAL: CPT | Mod: TC

## 2017-11-06 PROCEDURE — 99499 UNLISTED E&M SERVICE: CPT | Mod: S$GLB,,, | Performed by: INTERNAL MEDICINE

## 2017-11-06 NOTE — PATIENT INSTRUCTIONS
Pulmonary Sarcoidosis  Sarcoidosis is a disease that causes inflammation of the body tissues. This leads to small lumps called granulomas. The disease can affect any organ in the body. But it often starts in the lungs and lymph nodes.  What causes sarcoidosis?  It is not known what causes sarcoidosis. It results from a problem with the bodys immune system. The main job of the immune system is to help the body fight infection. People of any age, race, or gender can have it. But it happens most often in people between the ages of 20 and 40. It is also more common in women than in men and in people exposed to laron or moldy conditions.  How it affects the lungs  When you breathe in, you inhale air that is full of oxygen. The oxygen travels from the lungs to the blood. Then it is carried to the rest of the body. In some cases of pulmonary sarcoidosis, the lungs become scarred. This is called pulmonary fibrosis. This scarring can make it hard to take a full breath. The damage can also make it hard for oxygen to pass from the lungs into the blood.  Symptoms of pulmonary sarcoidosis  Most people have no symptoms at all. If symptoms do occur, they can include dry cough, tightness in the chest, and tiredness. Wheezing, shortness of breath, skin rashes, joint pain, kidney stones, vision problems, and loss of appetite can also occur. In some cases, pulmonary sarcoidosis stops getting worse. It may even go away. In other cases, the disease is long-lasting (chronic).  Treatment of pulmonary sarcoidosis  Many people dont need treatment. The disease may not cause symptoms. And it may go away on its own. But treatment can be done to help relieve symptoms. It can reduce inflammation and help prevent damage. Medicines are used to treat the disease. More than one may be used. They may be taken in pill form or by injection. Some common ones are listed below.  · Prednisone. This is an anti-inflammatory steroid (corticosteroid). It  helps prevent or reduce inflammation that can harm lungs.  · Methotrexate. This medicine is commonly the first one used so that you can cut back on the dose of prednisone. This helps lower the long-term side effects of the steroid.  · Azathioprine. This medicine suppresses the immune system. It may be used alone or with prednisone. It helps reduce lung inflammation.  · Cyclophosphamide. This is another type of medicine that suppresses the immune system. It may be used with prednisone. You may be given it as a single dose if you have problems with prednisone.  Other medicines that may be used include anti-TNF medicines such as inflizimab and etanercept. Antimalaria medicines such as hydroxychloroquine may help when skin, joints or brain are affected.  Note: These medicines can have serious side effects. Talk with your healthcare provider about them. Don't stop taking a medicine unless your provider says its OK. And tell your healthcare provider or pharmacist about all medicines you use. This includes over-the-counter medicines. It also includes herbs or supplements.  Date Last Reviewed: 9/1/2016 © 2000-2017 [employer]. [address] [place] [address], [place] [address]. All rights reserved. This information is not intended as a substitute for professional medical care. Always follow your healthcare professional's instructions.

## 2017-11-06 NOTE — PROGRESS NOTES
Subjective:      Patient ID: Glo Dumnot is a 82 y.o. female.    Patient Active Problem List   Diagnosis    Sarcoidosis of lung    Thyroid nodule    Hypertensive heart disease with heart failure    Pure hypercholesterolemia    Hemoglobin A-S genotype    Angina, class II    Ptosis    Coronary artery disease, occlusive    Central vein occlusion of retina    Iron deficiency anemia    Vitamin D deficiency    Osteopenia    Essential hypertension    Chronic diastolic HF (heart failure), NYHA class 2    Atherosclerosis of aorta    Anxiety and depression    CKD (chronic kidney disease) stage 3, GFR 30-59 ml/min    Uveitis    Colitis    History of coronary angioplasty     Problem list has been reviewed.    Chief Complaint: Sarcoidosis of lung    HPI: She is here today for follow up review of sarcoidosis.   CXR reviewed with pateint who voiced understanding.  She denies any new onset pulmonary complaints. She reports a mild intermittent productive cough. She denies fevers, chills, rigors, hemoptysis, pain with breathing, wheezing.  A full  review of systems, past , family  and social histories was performed except as mentioned in the note above, these are non contributory to the main issues discussed today.    Immunization status reviewed and updated.    Previous Report Reviewed: office notes     The following portions of the patient's history were reviewed and updated as appropriate: She  has a past medical history of Anemia; Angina pectoris; Anxiety; Arthritis; Carotid artery occlusion; Carpal tunnel syndrome (06/23/2008); Chronic diarrhea; CKD (chronic kidney disease) stage 3, GFR 30-59 ml/min (5/11/2017); Coronary artery disease; Coronary artery disease; Diastolic dysfunction; Diverticulosis; Greater trochanteric bursitis (2/10/2015); Grief at loss of child (1/26/2016); H/O carotid endarterectomy (12/2/2013); Heart failure; History of coronary angioplasty (3/11/2014); Hypercholesteremia;  Hypertension; Liver cyst (02/08/2013); Macular degeneration; Primary open-angle glaucoma(365.11) (9/3/2013); Renal cyst (02/08/2013); S/P prosthetic total arthroplasty of the hip (11/3/2014); Sarcoidosis; Sarcoidosis of lung; Sickle cell trait; and Uveitis.  She  has a past surgical history that includes Total abdominal hysterectomy w/ bilateral salpingoophorectomy (1972); Cataract extraction (Bilateral); Coronary angioplasty with stent (11/19/2010); Carotid endarterectomy (Right, 2000s); and Joint replacement (Left, 11/03/2014).  Her family history includes Cancer in her daughter and father; Cirrhosis in her brother; Heart failure in her brother and mother; Hypertension in her mother.  She  reports that she has never smoked. She has never used smokeless tobacco. She reports that she does not drink alcohol or use drugs.  She has a current medication list which includes the following prescription(s): amlodipine, aspirin, budesonide, cholecalciferol (vitamin d3), dicyclomine, durezol, effient, fluoxetine, fluticasone, folic acid/multivit-min/lutein, hydrochlorothiazide, iron-vitamin c 100-250 mg (icar-c), ketorolac 0.5%, lisinopril, methotrexate, metoprolol tartrate, and simvastatin, and the following Facility-Administered Medications: aflibercept.  She is allergic to codeine..    Review of Systems   Constitutional: Positive for fatigue. Negative for chills and appetite change.   HENT: Negative for postnasal drip, rhinorrhea and sinus pressure.    Eyes: Negative for itching.   Respiratory: Positive for cough. Negative for shortness of breath and dyspnea on extertion.    Cardiovascular: Negative for chest pain, palpitations and leg swelling.   Genitourinary: Negative for difficulty urinating.   Musculoskeletal: Negative for arthralgias and back pain.   Skin: Positive for rash.   Gastrointestinal: Negative for nausea, vomiting and acid reflux.   Neurological: Positive for weakness. Negative for dizziness and headaches.  "  Hematological: Negative for adenopathy. Does not bruise/bleed easily.   Psychiatric/Behavioral: The patient is not nervous/anxious.       Objective:     Vitals:    11/06/17 0834   BP: 120/82   Pulse: 64   Resp: 18   SpO2: (!) 93%   Weight: 60.7 kg (133 lb 13.1 oz)   Height: 5' 1" (1.549 m)     Body mass index is 25.28 kg/m².    Physical Exam   Constitutional: She is oriented to person, place, and time. She appears well-developed and well-nourished.   HENT:   Head: Normocephalic and atraumatic.   Eyes: Conjunctivae are normal. Pupils are equal, round, and reactive to light.   Neck: Normal range of motion. Neck supple.   Cardiovascular: Normal rate and regular rhythm.    Pulmonary/Chest: Effort normal and breath sounds normal.   Abdominal: Soft. Bowel sounds are normal.   Musculoskeletal: Normal range of motion.   Neurological: She is alert and oriented to person, place, and time.   Skin: Skin is warm and dry.   Psychiatric: She has a normal mood and affect.   Nursing note and vitals reviewed.      Personal Diagnostic Review:   CXR: 11/06/17: There are mild chronic interstitial opacities in the periphery of both lungs.  No acute consolidation, edema, or effusion.  Borderline cardiomegaly.  Mild tortuosity and atherosclerosis of the aorta.  Thoracic spondylosis.  Assessment:     1. Sarcoidosis of lung Stable     Outpatient Encounter Prescriptions as of 11/6/2017   Medication Sig Dispense Refill    amlodipine (NORVASC) 10 MG tablet Take 1 tablet (10 mg total) by mouth once daily. 30 tablet 11    aspirin (ECOTRIN) 81 MG EC tablet Take 81 mg by mouth once daily.      budesonide (ENTOCORT EC) 3 mg capsule Take 9 mg by mouth once daily.      cholecalciferol, vitamin D3, 1,000 unit capsule Take 2 capsules (2,000 Units total) by mouth once daily. 100 capsule 0    dicyclomine (BENTYL) 10 MG capsule Take 10 mg by mouth 4 (four) times daily before meals and nightly.      DUREZOL 0.05 % Drop ophthalmic solution       " EFFIENT 10 mg Tab TAKE ONE TABLET BY MOUTH ONCE DAILY 30 tablet 0    fluoxetine (PROZAC) 10 MG capsule Take 1 capsule (10 mg total) by mouth once daily. 30 capsule 6    fluticasone (FLONASE) 50 mcg/actuation nasal spray 2 sprays by Each Nare route once daily. 1 Bottle 12    FOLIC ACID/MULTIVIT-MIN/LUTEIN (CENTRUM SILVER ORAL) Take by mouth.      hydrochlorothiazide (MICROZIDE) 12.5 mg capsule TAKE ONE CAPSULE BY MOUTH ONCE DAILY 30 capsule 11    iron-vitamin C 100-250 mg, ICAR-C, (ICAR-C) 100-250 mg Tab Take 1 tablet by mouth once daily. 100 tablet 6    ketorolac 0.5% (ACULAR) 0.5 % Drop       lisinopril (PRINIVIL,ZESTRIL) 40 MG tablet TAKE ONE TABLET BY MOUTH ONCE DAILY 30 tablet 11    methotrexate 2.5 MG Tab Take 2.5 mg by mouth every 7 days.       metoprolol tartrate (LOPRESSOR) 50 MG tablet TAKE ONE TABLET BY MOUTH IN THE MORNING AND 2 TABLETS BY MOUTH IN THE EVENING 270 tablet 3    simvastatin (ZOCOR) 40 MG tablet TAKE ONE TABLET BY MOUTH EVERY EVENING 30 tablet 11    [DISCONTINUED] cloNIDine (CATAPRES) 0.1 MG tablet Take 1 tablet (0.1 mg total) by mouth every 12 (twelve) hours as needed (BP above 160/100). May cause drowsiness 10 tablet 0    [DISCONTINUED] dorzolamide-timolol 2-0.5% (COSOPT) 2-0.5 % ophthalmic solution Place 1 drop into both eyes 2 (two) times daily. 10 mL 5     Facility-Administered Encounter Medications as of 11/6/2017   Medication Dose Route Frequency Provider Last Rate Last Dose    aflibercept Soln 2 mg  2 mg Intravitreal 1 time in Clinic/HOD NAHUM Tamez MD         Orders Placed This Encounter   Procedures    Angiotensin converting enzyme     Standing Status:   Future     Standing Expiration Date:   1/5/2019    Complete PFT with bronchodilator     Standing Status:   Future     Standing Expiration Date:   12/17/2018     Plan:     Discussed diagnosis, its evaluation, treatment and usual course. All questions    Sarcoidosis of lung  PULMONARY SARCOIDOSIS ROS: no  significant ongoing wheezing or shortness of breath, using bronchodilator MDI less than twice a week.   New concerns: NONE.   Exam: appears well, vitals normal, no respiratory distress, acyanotic, normal RR, chest clear, no wheezing, crepitations, rhonchi, normal symmetric air entry.   Assessment:  PULMONARY SARCOIDOSIS stable.   Plan:  Orders as documented in EMR. ACE level, PFT  in 6 months. Follow up in 6 months.           TIME SPENT WITH PATIENT: Time spent: 25 minutes in face to face  discussion concerning diagnosis, prognosis, review of lab and test results, benefits of treatment as well as management of disease, counseling of patient and coordination of care between various health  care providers . Greater than half the time spent was used for coordination of care and counseling of patient.     Return in about 6 months (around 5/6/2018) for Sarcoidosis.

## 2017-11-06 NOTE — ASSESSMENT & PLAN NOTE
PULMONARY SARCOIDOSIS ROS: no significant ongoing wheezing or shortness of breath, using bronchodilator MDI less than twice a week.   New concerns: NONE.   Exam: appears well, vitals normal, no respiratory distress, acyanotic, normal RR, chest clear, no wheezing, crepitations, rhonchi, normal symmetric air entry.   Assessment:  PULMONARY SARCOIDOSIS stable.   Plan:  Orders as documented in EMR. ACE level, PFT  in 6 months. Follow up in 6 months.

## 2017-11-28 ENCOUNTER — OFFICE VISIT (OUTPATIENT)
Dept: INTERNAL MEDICINE | Facility: CLINIC | Age: 82
End: 2017-11-28
Payer: MEDICARE

## 2017-11-28 VITALS
BODY MASS INDEX: 24.55 KG/M2 | OXYGEN SATURATION: 97 % | HEIGHT: 61 IN | TEMPERATURE: 96 F | SYSTOLIC BLOOD PRESSURE: 142 MMHG | WEIGHT: 130.06 LBS | HEART RATE: 68 BPM | DIASTOLIC BLOOD PRESSURE: 62 MMHG

## 2017-11-28 DIAGNOSIS — I25.10 CORONARY ARTERY DISEASE, OCCLUSIVE: Chronic | ICD-10-CM

## 2017-11-28 DIAGNOSIS — I10 ESSENTIAL HYPERTENSION: Primary | Chronic | ICD-10-CM

## 2017-11-28 DIAGNOSIS — E78.00 PURE HYPERCHOLESTEROLEMIA: Chronic | ICD-10-CM

## 2017-11-28 PROCEDURE — 99999 PR PBB SHADOW E&M-EST. PATIENT-LVL IV: CPT | Mod: PBBFAC,,, | Performed by: PHYSICIAN ASSISTANT

## 2017-11-28 PROCEDURE — 99499 UNLISTED E&M SERVICE: CPT | Mod: S$GLB,,, | Performed by: PHYSICIAN ASSISTANT

## 2017-11-28 PROCEDURE — 99213 OFFICE O/P EST LOW 20 MIN: CPT | Mod: S$GLB,,, | Performed by: PHYSICIAN ASSISTANT

## 2017-11-28 RX ORDER — DORZOLAMIDE HYDROCHLORIDE AND TIMOLOL MALEATE 20; 5 MG/ML; MG/ML
SOLUTION/ DROPS OPHTHALMIC
COMMUNITY
Start: 2017-11-16 | End: 2020-10-28 | Stop reason: SDUPTHER

## 2017-11-28 NOTE — PROGRESS NOTES
Subjective:       Patient ID: Glo Dumont is a 82 y.o. female.    Chief Complaint: Hospital Follow Up    82 year old female presents to clinic with  for [facility] hospital f/u. She experienced presyncopal episode with bradycardia, chest pain, and hypertension one week ago - ambulance came and took her to [facility]. She reports episode lasted about 15 minutes, then sxs resolved. She reports overnight stay at [facility] and was told everything look fine. CXR was clear. Only medication change was metoprolol was changed from one tab QAM and 2 tabs QPM to half tab QAM and half tab QPM. Pt reports BP since hospital stay has been 122-140/60-70. She has not been able to monitor her heart rate but reports feeling fine since hosp discharge. She reports no recurrence of chest pain. She reports no fever, chills, known injury, palpitations, syncope, exertional sxs, or other medical complaints.    PCP: Dr. Cardona    Patient Active Problem List:     Sarcoidosis of lung     Thyroid nodule     Hypertensive heart disease with heart failure     Pure hypercholesterolemia     Hemoglobin A-S genotype     Angina, class II     Ptosis     Coronary artery disease, occlusive     Central vein occlusion of retina     Iron deficiency anemia     Vitamin D deficiency     Osteopenia     Essential hypertension     Chronic diastolic HF (heart failure), NYHA class 2     Atherosclerosis of aorta     Anxiety and depression     CKD (chronic kidney disease) stage 3, GFR 30-59 ml/min     Uveitis     Colitis     History of coronary angioplasty      Review of Systems   Constitutional: Negative for chills and fever.   Respiratory: Negative for cough and shortness of breath.    Cardiovascular: Negative for chest pain, palpitations and leg swelling.   Gastrointestinal: Negative for nausea and vomiting.   Skin: Negative for rash.   Neurological: Negative for syncope and headaches.       Objective:       Vitals:    11/28/17 0826   BP: (!) 142/62   BP Location: Right arm  "  Patient Position: Sitting   BP Method: Small (Manual)   Pulse: 68   Temp: (!) 95.6 °F (35.3 °C)   TempSrc: Tympanic   SpO2: 97%   Weight: 59 kg (130 lb 1.1 oz)   Height: 5' 1" (1.549 m)     Physical Exam   Constitutional: She is oriented to person, place, and time. She appears well-developed and well-nourished. No distress.   HENT:   Head: Normocephalic and atraumatic.   Eyes: EOM are normal. No scleral icterus.   Neck: Neck supple.   Cardiovascular: Normal rate and regular rhythm.    Pulmonary/Chest: Effort normal and breath sounds normal. No respiratory distress. She has no decreased breath sounds. She has no wheezes. She has no rhonchi. She has no rales.   Musculoskeletal: Normal range of motion. She exhibits no edema.   Neurological: She is alert and oriented to person, place, and time. No cranial nerve deficit.   Skin: Skin is warm and dry. No rash noted.   Psychiatric: She has a normal mood and affect. Her speech is normal and behavior is normal. Thought content normal.       Assessment:       1. Essential hypertension    2. Coronary artery disease, occlusive    3. Pure hypercholesterolemia        Plan:         Glo was seen today for hospital follow up.    Diagnoses and all orders for this visit:    Essential hypertension  Currently controlled. Continue current medication. Monitor BP closely.    Coronary artery disease, occlusive; Pure hypercholesterolemia  Pt taking statin, B-blocker, and aspirin. F/u with cardiologist for hospital f/u.    F/u with PCP as scheduled in 2 months for health management. RTC sooner if needed.    "

## 2017-12-01 DIAGNOSIS — I10 ESSENTIAL HYPERTENSION: ICD-10-CM

## 2017-12-02 RX ORDER — HYDROCHLOROTHIAZIDE 12.5 MG/1
CAPSULE ORAL
Qty: 30 CAPSULE | Refills: 11 | Status: SHIPPED | OUTPATIENT
Start: 2017-12-02 | End: 2019-04-08 | Stop reason: SDUPTHER

## 2017-12-05 ENCOUNTER — TELEPHONE (OUTPATIENT)
Dept: INTERNAL MEDICINE | Facility: CLINIC | Age: 82
End: 2017-12-05

## 2017-12-05 ENCOUNTER — HOSPITAL ENCOUNTER (OUTPATIENT)
Dept: RADIOLOGY | Facility: HOSPITAL | Age: 82
Discharge: HOME | End: 2017-12-05
Attending: PHYSICIAN ASSISTANT
Payer: MEDICARE

## 2017-12-05 ENCOUNTER — OFFICE VISIT (OUTPATIENT)
Dept: INTERNAL MEDICINE | Facility: CLINIC | Age: 82
End: 2017-12-05
Payer: MEDICARE

## 2017-12-05 VITALS
OXYGEN SATURATION: 95 % | HEIGHT: 61 IN | BODY MASS INDEX: 24.44 KG/M2 | TEMPERATURE: 96 F | SYSTOLIC BLOOD PRESSURE: 132 MMHG | WEIGHT: 129.44 LBS | HEART RATE: 62 BPM | DIASTOLIC BLOOD PRESSURE: 58 MMHG

## 2017-12-05 DIAGNOSIS — R10.9 ABDOMINAL PAIN, UNSPECIFIED ABDOMINAL LOCATION: Primary | ICD-10-CM

## 2017-12-05 DIAGNOSIS — N28.1 RENAL CYST: ICD-10-CM

## 2017-12-05 DIAGNOSIS — R10.9 ABDOMINAL PAIN, UNSPECIFIED ABDOMINAL LOCATION: ICD-10-CM

## 2017-12-05 DIAGNOSIS — N18.30 CKD (CHRONIC KIDNEY DISEASE) STAGE 3, GFR 30-59 ML/MIN: ICD-10-CM

## 2017-12-05 DIAGNOSIS — R13.10 DYSPHAGIA, UNSPECIFIED TYPE: ICD-10-CM

## 2017-12-05 DIAGNOSIS — K52.9 COLITIS: ICD-10-CM

## 2017-12-05 DIAGNOSIS — I10 ESSENTIAL HYPERTENSION: Chronic | ICD-10-CM

## 2017-12-05 DIAGNOSIS — I25.10 CORONARY ARTERY DISEASE, OCCLUSIVE: Chronic | ICD-10-CM

## 2017-12-05 DIAGNOSIS — R93.5 ABNORMAL ABDOMINAL X-RAY: Primary | ICD-10-CM

## 2017-12-05 PROCEDURE — 99999 PR PBB SHADOW E&M-EST. PATIENT-LVL V: CPT | Mod: PBBFAC,,, | Performed by: PHYSICIAN ASSISTANT

## 2017-12-05 PROCEDURE — 99214 OFFICE O/P EST MOD 30 MIN: CPT | Mod: S$GLB,,, | Performed by: PHYSICIAN ASSISTANT

## 2017-12-05 PROCEDURE — 99499 UNLISTED E&M SERVICE: CPT | Mod: S$GLB,,, | Performed by: PHYSICIAN ASSISTANT

## 2017-12-05 PROCEDURE — 74000 XR ABDOMEN AP 1 VIEW: CPT | Mod: TC,PO

## 2017-12-05 PROCEDURE — 74000 XR ABDOMEN AP 1 VIEW: CPT | Mod: 26,,, | Performed by: RADIOLOGY

## 2017-12-05 NOTE — PROGRESS NOTES
"Subjective:       Patient ID: Glo Dumont is a 82 y.o. female.    Chief Complaint: Abdominal Pain    82 year old female c/o constant epigastric tenderness X one week. She reports having these sxs in the past. She reports associated excess burping and flatulence. She feels as though food and liquid get stuck at epigastric region. She reports sxs worsen after eating and Tums has helped, but it is no longer helping as much now. She reports possible radiation of discomfort into back at times. She reports slight constipation recently with last BM yesterday. She had upper GI scope in 2011, which showed small hiatal hernia. She reports no N/V, CP, SOB, exertional sxs, diaphoresis, blood in stool, fever, chills, urinary sxs, known injury, diarrhea, or other medical complaints. She reports seeing her GI physician Dr. Galindo last week prior to onset of sxs and reports visit was "good" with a recommended f/u in 6 months.    PCP: Dr. Cardona    Patient Active Problem List:     Sarcoidosis of lung     Thyroid nodule     Hypertensive heart disease with heart failure     Pure hypercholesterolemia     Hemoglobin A-S genotype     Angina, class II     Ptosis     Coronary artery disease, occlusive     Central vein occlusion of retina     Iron deficiency anemia     Vitamin D deficiency     Osteopenia     Essential hypertension     Chronic diastolic HF (heart failure), NYHA class 2     Atherosclerosis of aorta     Anxiety and depression     CKD (chronic kidney disease) stage 3, GFR 30-59 ml/min     Uveitis     Colitis     History of coronary angioplasty      Review of Systems   Constitutional: Negative for chills and fever.   Respiratory: Negative for cough and shortness of breath.    Cardiovascular: Negative for chest pain, palpitations and leg swelling.   Gastrointestinal: Positive for abdominal pain and constipation. Negative for blood in stool, diarrhea, nausea and vomiting.   Genitourinary: Negative for difficulty urinating, " "dysuria, flank pain, frequency, hematuria and urgency.   Skin: Negative for rash.   Neurological: Negative for dizziness, weakness, numbness and headaches.   Psychiatric/Behavioral: Negative for confusion.       Objective:       Vitals:    12/05/17 0847   BP: (!) 132/58   BP Location: Right arm   Patient Position: Sitting   BP Method: Small (Manual)   Pulse: 62   Temp: (!) 95.8 °F (35.4 °C)   TempSrc: Tympanic   SpO2: 95%   Weight: 58.7 kg (129 lb 6.6 oz)   Height: 5' 1" (1.549 m)     Physical Exam   Constitutional: She is oriented to person, place, and time. She appears well-developed and well-nourished. No distress.   HENT:   Head: Normocephalic and atraumatic.   Mouth/Throat: Oropharynx is clear and moist.   Eyes: EOM are normal. No scleral icterus.   Neck: Neck supple.   Cardiovascular: Normal rate and regular rhythm.    Pulmonary/Chest: Effort normal and breath sounds normal. No respiratory distress. She has no decreased breath sounds. She has no wheezes. She has no rhonchi. She has no rales.   Abdominal: Soft. Bowel sounds are normal. She exhibits no distension and no mass. There is tenderness in the epigastric area. There is no rigidity, no rebound, no guarding and no CVA tenderness.   Musculoskeletal: Normal range of motion. She exhibits no edema.   Neurological: She is alert and oriented to person, place, and time. No cranial nerve deficit.   Skin: Skin is warm and dry. No rash noted.   Psychiatric: She has a normal mood and affect. Her speech is normal and behavior is normal. Thought content normal.       Assessment:       1. Abdominal pain, unspecified abdominal location    2. Dysphagia, unspecified type    3. Colitis    4. Essential hypertension    5. Coronary artery disease, occlusive    6. CKD (chronic kidney disease) stage 3, GFR 30-59 ml/min        Plan:         Glo was seen today for abdominal pain.    Diagnoses and all orders for this visit:    Abdominal pain, Dysphagia, Colitis  -     " Comprehensive metabolic panel; Future  -     Lipase; Future  -     X-Ray Abdomen AP 1 View; Future  -     CBC auto differential; Future  -     Ambulatory referral to Gastroenterology  Avoid GI irritants and monitor sxs. Recommend GI consult for sxs. ER if severe sxs occur.    Essential hypertension  Controlled. Monitor BP and f/u with PCP and cardiology as recommended.    Coronary artery disease, occlusive  F/u with cardiology as recommended.    CKD (chronic kidney disease) stage 3, GFR 30-59 ml/min  Avoid NSAIDs and f/u with PCP as recommended.    F/u with PCP as scheduled next month for health management. RTC sooner if needed.

## 2017-12-05 NOTE — TELEPHONE ENCOUNTER
----- Message from LYDIA Palacios sent at 12/5/2017 11:20 AM CST -----  Blood work stable. Pancreas enzyme still pending. Abdominal xrays show possible kidney stones/cysts. I recommend she see urology for evaluation, as further imaging may be needed. She saw urology in 2013 and it appears she did not f/u with them for U/S as was recommended at that time. F/u with GI as discussed concerning feeling as though food getting stuck.

## 2017-12-06 ENCOUNTER — OFFICE VISIT (OUTPATIENT)
Dept: GASTROENTEROLOGY | Facility: CLINIC | Age: 82
End: 2017-12-06
Payer: MEDICARE

## 2017-12-06 ENCOUNTER — TELEPHONE (OUTPATIENT)
Dept: INTERNAL MEDICINE | Facility: CLINIC | Age: 82
End: 2017-12-06

## 2017-12-06 ENCOUNTER — TELEPHONE (OUTPATIENT)
Dept: GASTROENTEROLOGY | Facility: CLINIC | Age: 82
End: 2017-12-06

## 2017-12-06 VITALS
WEIGHT: 128.75 LBS | HEIGHT: 61 IN | SYSTOLIC BLOOD PRESSURE: 140 MMHG | HEART RATE: 52 BPM | DIASTOLIC BLOOD PRESSURE: 60 MMHG | BODY MASS INDEX: 24.31 KG/M2

## 2017-12-06 DIAGNOSIS — Z79.01 CURRENT USE OF LONG TERM ANTICOAGULATION: ICD-10-CM

## 2017-12-06 DIAGNOSIS — R13.10 DYSPHAGIA, UNSPECIFIED TYPE: Primary | ICD-10-CM

## 2017-12-06 PROCEDURE — 99214 OFFICE O/P EST MOD 30 MIN: CPT | Mod: S$GLB,,, | Performed by: NURSE PRACTITIONER

## 2017-12-06 PROCEDURE — 99999 PR PBB SHADOW E&M-EST. PATIENT-LVL III: CPT | Mod: PBBFAC,,, | Performed by: NURSE PRACTITIONER

## 2017-12-06 NOTE — LETTER
December 7, 2017      LYDIA Palacios  [address] [place] [address]           [facility]  [address] [place] [address]  Phone: [phone]  Fax: [phone]          Patient: Glo Dumont   MR Number: [id number]   YOB: 1935   Date of Visit: 12/6/2017       Dear Manasa Cespedes:    Thank you for referring Glo Dumont to me for evaluation. Attached you will find relevant portions of my assessment and plan of care.    If you have questions, please do not hesitate to call me. I look forward to following Glo Dumont along with you.    Sincerely,    Alyson Galindo, [facility]    Enclosure  CC:  No Recipients    If you would like to receive this communication electronically, please contact [email or web address] or [phone] to request more information on [facility] [employer] access.    For providers and/or their staff who would like to refer a patient to [facility], please contact us through our one-stop-shop provider referral line, [facility] , at [phone].    If you feel you have received this communication in error or would no longer like to receive these types of communications, please e-mail [email or web address]

## 2017-12-06 NOTE — TELEPHONE ENCOUNTER
Informed pt, per Dr. Bergman, she can stop Effient 7 days prior to EGD on 12/22/17. Pt verbalized understanding.

## 2017-12-07 NOTE — PROGRESS NOTES
Clinic Follow Up:  [facility] Follow Up Note    Reason for Follow Up:  The primary encounter diagnosis was Dysphagia, unspecified type. A diagnosis of Current use of long term anticoagulation was also pertinent to this visit.    PCP: Christina Recio   [address] / [place] [address]    HPI:  This is a 82 y.o. female here for follow up of the above  She states that over the last few weeks, she has had progressively worsening difficulty swallowing.  She states that is it to solids only.  No issues with liquids or her medications.  She denies any known exacerbating or reliving factors for the dysphagia.  She denies any indigestion or heartburn associated.  No abdominal pain.  No melena or hematochezia.  No recent change in medications. No NSAIDs or ETOH   No previous episodes of dysphagia, although chart review shows a previous EGD in 2008 that required an esophageal dilation.   PMH includes cardiac stents in 2010 for which she is on daily anticoagulants.       Review of Systems   Constitutional: Negative for chills, fever, malaise/fatigue and weight loss.   Respiratory: Negative for cough.    Cardiovascular: Negative for chest pain.   Gastrointestinal:        Per HPI   Musculoskeletal: Negative for myalgias.   Skin: Negative for itching and rash.   Neurological: Negative for headaches.   Psychiatric/Behavioral: The patient is not nervous/anxious.        Medical History:  Past Medical History:   Diagnosis Date    Anemia     Angina pectoris     Anxiety     Arthritis     hip    Carotid artery occlusion     Carpal tunnel syndrome 06/23/2008    emg    Chronic diarrhea     work up in 2011 with EGD, CS and VCE    CKD (chronic kidney disease) stage 3, GFR 30-59 ml/min 5/11/2017    Coronary artery disease     Coronary artery disease     Diastolic dysfunction     Diverticulosis     Greater trochanteric bursitis 2/10/2015    Grief at loss of child 1/26/2016    H/O carotid endarterectomy  12/2/2013    Heart failure     History of coronary angioplasty 3/11/2014    Hypercholesteremia     Hypertension     Liver cyst 02/08/2013    ct abd    Macular degeneration     Primary open-angle glaucoma(365.11) 9/3/2013    Renal cyst 02/08/2013    ct abd    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    Sarcoidosis     Sarcoidosis of lung     Sickle cell trait     Uveitis        Surgical History:   Past Surgical History:   Procedure Laterality Date    CAROTID ENDARTERECTOMY Right 2000s    CATARACT EXTRACTION Bilateral     Dr. Liang Dennis    CORONARY ANGIOPLASTY WITH STENT PLACEMENT  11/19/2010    RCA-HALIE 2010    Lathia    JOINT REPLACEMENT Left 11/03/2014    Dr. Braun    TOTAL ABDOMINAL HYSTERECTOMY W/ BILATERAL SALPINGOOPHORECTOMY  1972       Family History:   Family History   Problem Relation Age of Onset    Hypertension Mother     Heart failure Mother     Cancer Father      prostate    Cirrhosis Brother     Heart failure Brother     Cancer Daughter      Carcinoid       Social History:   Social History   Substance Use Topics    Smoking status: Never Smoker    Smokeless tobacco: Never Used    Alcohol use No       Allergies: Reviewed    Home Medications:  Current Outpatient Prescriptions on File Prior to Visit   Medication Sig Dispense Refill    amlodipine (NORVASC) 10 MG tablet Take 1 tablet (10 mg total) by mouth once daily. 30 tablet 11    aspirin (ECOTRIN) 81 MG EC tablet Take 81 mg by mouth once daily.      budesonide (ENTOCORT EC) 3 mg capsule Take 9 mg by mouth once daily.      cholecalciferol, vitamin D3, 1,000 unit capsule Take 2 capsules (2,000 Units total) by mouth once daily. 100 capsule 0    dicyclomine (BENTYL) 10 MG capsule Take 10 mg by mouth 4 (four) times daily before meals and nightly.      dorzolamide-timolol 2-0.5% (COSOPT) 22.3-6.8 mg/mL ophthalmic solution       DUREZOL 0.05 % Drop ophthalmic solution       EFFIENT 10 mg Tab TAKE ONE TABLET BY  "MOUTH ONCE DAILY 30 tablet 0    fluticasone (FLONASE) 50 mcg/actuation nasal spray 2 sprays by Each Nare route once daily. 1 Bottle 12    FOLIC ACID/MULTIVIT-MIN/LUTEIN (CENTRUM SILVER ORAL) Take by mouth.      hydroCHLOROthiazide (MICROZIDE) 12.5 mg capsule TAKE ONE CAPSULE BY MOUTH ONCE DAILY 30 capsule 11    iron-vitamin C 100-250 mg, ICAR-C, (ICAR-C) 100-250 mg Tab Take 1 tablet by mouth once daily. 100 tablet 6    ketorolac 0.5% (ACULAR) 0.5 % Drop       lisinopril (PRINIVIL,ZESTRIL) 40 MG tablet TAKE ONE TABLET BY MOUTH ONCE DAILY 30 tablet 11    methotrexate 2.5 MG Tab Take 2.5 mg by mouth every 7 days.       metoprolol tartrate (LOPRESSOR) 50 MG tablet TAKE ONE TABLET BY MOUTH IN THE MORNING AND 2 TABLETS BY MOUTH IN THE EVENING (Patient taking differently: Take 25 mg by mouth 2 (two) times daily. TAKE ONE TABLET BY MOUTH IN THE MORNING AND 2 TABLETS BY MOUTH IN THE EVENING) 270 tablet 3    simvastatin (ZOCOR) 40 MG tablet TAKE ONE TABLET BY MOUTH EVERY EVENING 30 tablet 11    fluoxetine (PROZAC) 10 MG capsule Take 1 capsule (10 mg total) by mouth once daily. 30 capsule 6     Current Facility-Administered Medications on File Prior to Visit   Medication Dose Route Frequency Provider Last Rate Last Dose    aflibercept Soln 2 mg  2 mg Intravitreal 1 time in Clinic/HOD NAHUM Tamez MD           Physical Exam:  Vital Signs:  BP (!) 140/60   Pulse (!) 52   Ht 5' 1" (1.549 m)   Wt 58.4 kg (128 lb 12 oz)   BMI 24.33 kg/m²   Body mass index is 24.33 kg/m².  Physical Exam   Constitutional: She is oriented to person, place, and time. She appears well-developed and well-nourished.   HENT:   Head: Normocephalic.   Eyes: No scleral icterus.   Neck: Normal range of motion.   Cardiovascular: Normal rate and regular rhythm.    Pulmonary/Chest: Effort normal and breath sounds normal.   Abdominal: Soft. Bowel sounds are normal. She exhibits no distension. There is no tenderness.   Musculoskeletal: Normal " range of motion.   Neurological: She is alert and oriented to person, place, and time.   Skin: Skin is warm and dry.   Psychiatric: She has a normal mood and affect.   Vitals reviewed.      Labs: Pertinent labs reviewed.      Assessment:  1. Dysphagia, unspecified type    2. Current use of long term anticoagulation        Recommendations:  Dysphagia, unspecified type  - Plan for EGD to R/O esophageal stricute vs other etiologies  -     Case request GI: ESOPHAGOGASTRODUODENOSCOPY (EGD)    Current use of long term anticoagulation  - Will obtain OK from cardiology to hold Effient prior to procedure.         Return to Clinic:  6 weeks after procedure

## 2017-12-18 ENCOUNTER — TELEPHONE (OUTPATIENT)
Dept: RESEARCH | Facility: HOSPITAL | Age: 82
End: 2017-12-18

## 2017-12-18 ENCOUNTER — OFFICE VISIT (OUTPATIENT)
Dept: CARDIOLOGY | Facility: CLINIC | Age: 82
End: 2017-12-18
Payer: MEDICARE

## 2017-12-18 VITALS
DIASTOLIC BLOOD PRESSURE: 60 MMHG | HEIGHT: 61 IN | BODY MASS INDEX: 24.55 KG/M2 | SYSTOLIC BLOOD PRESSURE: 120 MMHG | WEIGHT: 130 LBS | HEART RATE: 60 BPM

## 2017-12-18 DIAGNOSIS — I25.10 CORONARY ARTERY DISEASE, OCCLUSIVE: Primary | Chronic | ICD-10-CM

## 2017-12-18 DIAGNOSIS — I20.9 ANGINA, CLASS II: Chronic | ICD-10-CM

## 2017-12-18 DIAGNOSIS — E78.00 PURE HYPERCHOLESTEROLEMIA: Chronic | ICD-10-CM

## 2017-12-18 DIAGNOSIS — I10 ESSENTIAL HYPERTENSION: Chronic | ICD-10-CM

## 2017-12-18 DIAGNOSIS — I50.32 CHRONIC DIASTOLIC HF (HEART FAILURE), NYHA CLASS 2: Chronic | ICD-10-CM

## 2017-12-18 PROCEDURE — 99214 OFFICE O/P EST MOD 30 MIN: CPT | Mod: S$GLB,,, | Performed by: NUCLEAR MEDICINE

## 2017-12-18 PROCEDURE — 99999 PR PBB SHADOW E&M-EST. PATIENT-LVL III: CPT | Mod: PBBFAC,,, | Performed by: NUCLEAR MEDICINE

## 2017-12-18 PROCEDURE — 99499 UNLISTED E&M SERVICE: CPT | Mod: S$GLB,,, | Performed by: NUCLEAR MEDICINE

## 2017-12-18 NOTE — PROGRESS NOTES
Subjective:   Patient ID:  Glo Dumont is a 82 y.o. female who presents for follow-up of Hospital Follow Up (chest pain, sob, bradycardia); Coronary Artery Disease; and Hypertension      HPI1- CHRONIC CAD- ANGINA FC 2- POST PCI- STENT- 2010   2- PAD- CAROTID ART DISEASE- POST RIGHT CEA -2000   3- ESSENTIAL HTN WITH LVH, HFPEF, STAGE B  ON NOV SHE WENT TO [facility] , FOR ATYPICAL CHEST PAIN,  RULE OUT FOR CAD- ACS  SINCE DC , NO RECURRENT CHEST DISCOMFORT OR ANGINA EQUIVALENT  NO PALPITATIONS. NO SYNCOPE  NO UNUSUAL SOOD. NO ORTHOPNEA OR PND  NO EDEMA. NO INTERMITTENT CLAUDICATION  NO FOCAL CNS SYMPTOMS OR SIGNS TO SUGGEST TIA OR STROKE  CARD MED - GOOD COMPLIANCE    Review of Systems   Constitution: Negative for chills, decreased appetite, fever, weakness, malaise/fatigue, weight gain and weight loss.   HENT: Negative for nosebleeds.    Eyes: Negative for blurred vision, double vision and visual disturbance.   Cardiovascular: Negative for chest pain, claudication, cyanosis, dyspnea on exertion, irregular heartbeat, leg swelling, near-syncope, orthopnea, palpitations, paroxysmal nocturnal dyspnea and syncope.   Respiratory: Negative for cough, hemoptysis, shortness of breath, sleep disturbances due to breathing, snoring and wheezing.    Endocrine: Negative for cold intolerance, heat intolerance, polydipsia and polyuria.   Hematologic/Lymphatic: Does not bruise/bleed easily.   Skin: Negative for flushing and rash.   Musculoskeletal: Negative for gout, joint pain, joint swelling, muscle weakness and myalgias.   Gastrointestinal: Negative for abdominal pain, anorexia, change in bowel habit, constipation, diarrhea, dysphagia, heartburn, hematemesis, hematochezia, jaundice, melena, nausea and vomiting.   Genitourinary: Negative for decreased libido, frequency, hematuria, nocturia and urgency.   Neurological: Negative for difficulty with concentration, excessive daytime sleepiness, dizziness, focal weakness, headaches,  light-headedness, numbness, seizures, tremors and vertigo.   Psychiatric/Behavioral: Negative for depression and memory loss. The patient is not nervous/anxious.    Allergic/Immunologic: Negative for environmental allergies and hives.     Family History   Problem Relation Age of Onset    Hypertension Mother     Heart failure Mother     Cancer Father      prostate    Cirrhosis Brother     Heart failure Brother     Cancer Daughter      Carcinoid     Past Medical History:   Diagnosis Date    Anemia     Angina pectoris     Anxiety     Arthritis     hip    Carotid artery occlusion     Carpal tunnel syndrome 06/23/2008    emg    Chronic diarrhea     work up in 2011 with EGD, CS and VCE    CKD (chronic kidney disease) stage 3, GFR 30-59 ml/min 5/11/2017    Coronary artery disease     Coronary artery disease     Diastolic dysfunction     Diverticulosis     Greater trochanteric bursitis 2/10/2015    Grief at loss of child 1/26/2016    H/O carotid endarterectomy 12/2/2013    Heart failure     History of coronary angioplasty 3/11/2014    Hypercholesteremia     Hypertension     Liver cyst 02/08/2013    ct abd    Macular degeneration     Primary open-angle glaucoma(365.11) 9/3/2013    Renal cyst 02/08/2013    ct abd    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 30 tablet 11    aspirin (ECOTRIN) 81 MG EC tablet Take 81 mg by mouth once daily.      budesonide (ENTOCORT EC) 3 mg capsule Take 9 mg by mouth once daily.      cholecalciferol, vitamin D3, 1,000 unit capsule Take 2 capsules (2,000 Units total) by mouth once daily. 100 capsule 0    dicyclomine (BENTYL) 10 MG capsule Take 10 mg by mouth 4 (four) times daily before meals and  nightly.      dorzolamide-timolol 2-0.5% (COSOPT) 22.3-6.8 mg/mL ophthalmic solution       DUREZOL 0.05 % Drop ophthalmic solution       EFFIENT 10 mg Tab TAKE ONE TABLET BY MOUTH ONCE DAILY 30 tablet 0    fluoxetine (PROZAC) 10 MG capsule Take 1 capsule (10 mg total) by mouth once daily. 30 capsule 6    fluticasone (FLONASE) 50 mcg/actuation nasal spray 2 sprays by Each Nare route once daily. 1 Bottle 12    FOLIC ACID/MULTIVIT-MIN/LUTEIN (CENTRUM SILVER ORAL) Take by mouth.      hydroCHLOROthiazide (MICROZIDE) 12.5 mg capsule TAKE ONE CAPSULE BY MOUTH ONCE DAILY 30 capsule 11    iron-vitamin C 100-250 mg, ICAR-C, (ICAR-C) 100-250 mg Tab Take 1 tablet by mouth once daily. 100 tablet 6    ketorolac 0.5% (ACULAR) 0.5 % Drop       lisinopril (PRINIVIL,ZESTRIL) 40 MG tablet TAKE ONE TABLET BY MOUTH ONCE DAILY 30 tablet 11    methotrexate 2.5 MG Tab Take 2.5 mg by mouth every 7 days.       metoprolol tartrate (LOPRESSOR) 50 MG tablet TAKE ONE TABLET BY MOUTH IN THE MORNING AND 2 TABLETS BY MOUTH IN THE EVENING (Patient taking differently: Take 25 mg by mouth 2 (two) times daily. TAKE ONE TABLET BY MOUTH IN THE MORNING AND 2 TABLETS BY MOUTH IN THE EVENING) 270 tablet 3    simvastatin (ZOCOR) 40 MG tablet TAKE ONE TABLET BY MOUTH EVERY EVENING 30 tablet 11     Current Facility-Administered Medications on File Prior to Visit   Medication Dose Route Frequency Provider Last Rate Last Dose    aflibercept Soln 2 mg  2 mg Intravitreal 1 time in Clinic/HOD NAHUM Tamez MD         Review of patient's allergies indicates:   Allergen Reactions    Codeine Nausea And Vomiting       Objective:     Physical Exam   Constitutional: She is oriented to person, place, and time. She appears well-developed. No distress.   HENT:   Head: Normocephalic.   Eyes: Conjunctivae are normal. Pupils are equal, round, and reactive to light. No scleral icterus.   Neck: Normal range of motion. Neck supple. Normal carotid pulses, no  hepatojugular reflux and no JVD present. Carotid bruit is not present. No edema present. No thyroid mass and no thyromegaly present.   Cardiovascular: Normal rate, regular rhythm, S1 normal, S2 normal, normal heart sounds and intact distal pulses.  PMI is not displaced.  Exam reveals no gallop and no friction rub.    No murmur heard.  Pulses:       Carotid pulses are 2+ on the right side, and 2+ on the left side.       Radial pulses are 2+ on the right side, and 2+ on the left side.        Femoral pulses are 2+ on the right side, and 2+ on the left side.       Popliteal pulses are 2+ on the right side, and 2+ on the left side.        Dorsalis pedis pulses are 2+ on the right side, and 2+ on the left side.        Posterior tibial pulses are 2+ on the right side, and 2+ on the left side.   Pulmonary/Chest: Effort normal and breath sounds normal. She has no wheezes. She has no rales. She exhibits no tenderness.   Abdominal: Soft. Bowel sounds are normal. She exhibits no pulsatile midline mass and no mass. There is no hepatosplenomegaly. There is no tenderness.   Musculoskeletal: Normal range of motion. She exhibits no edema or tenderness.        Cervical back: Normal.        Thoracic back: Normal.        Lumbar back: Normal.   Lymphadenopathy:     She has no cervical adenopathy.     She has no axillary adenopathy.        Right: No supraclavicular adenopathy present.        Left: No supraclavicular adenopathy present.   Neurological: She is alert and oriented to person, place, and time. She has normal strength and normal reflexes. No sensory deficit. Gait normal.   Skin: Skin is warm. No rash noted. No cyanosis. No pallor. Nails show no clubbing.   Psychiatric: She has a normal mood and affect. Her speech is normal and behavior is normal. Cognition and memory are normal.       Assessment:     1. Coronary artery disease, occlusive    2. Angina, class II    3. Essential hypertension    4. Chronic diastolic HF (heart  failure), NYHA class 2    5. Pure hypercholesterolemia      STABLE CAD= ANGINA PATTERN  NO EVIDENCE OF ACTIVE MYOCARDIAL ISCHEMIA. NO ARRHYTHMIAS- NO ACUTE HF  CNS STATUS STABLE  BP CONTROLLED  CARD MED WELL TOLERATED  Plan:     Coronary artery disease, occlusive    Angina, class II    Essential hypertension    Chronic diastolic HF (heart failure), NYHA class 2    Pure hypercholesterolemia    1- CONTINUE PRESENT CARD MANAGEMENT    2- RETURN IN 6 MONTHS.

## 2017-12-22 ENCOUNTER — HOSPITAL ENCOUNTER (OUTPATIENT)
Facility: HOSPITAL | Age: 82
Discharge: HOME | End: 2017-12-22
Attending: INTERNAL MEDICINE | Admitting: INTERNAL MEDICINE
Payer: MEDICARE

## 2017-12-22 ENCOUNTER — SURGERY (OUTPATIENT)
Age: 82
End: 2017-12-22

## 2017-12-22 ENCOUNTER — ANESTHESIA (OUTPATIENT)
Dept: ENDOSCOPY | Facility: HOSPITAL | Age: 82
End: 2017-12-22
Payer: MEDICARE

## 2017-12-22 ENCOUNTER — ANESTHESIA EVENT (OUTPATIENT)
Dept: ENDOSCOPY | Facility: HOSPITAL | Age: 82
End: 2017-12-22
Payer: MEDICARE

## 2017-12-22 VITALS
RESPIRATION RATE: 18 BRPM | DIASTOLIC BLOOD PRESSURE: 66 MMHG | HEART RATE: 52 BPM | HEIGHT: 61 IN | WEIGHT: 127 LBS | BODY MASS INDEX: 23.98 KG/M2 | TEMPERATURE: 98 F | OXYGEN SATURATION: 100 % | SYSTOLIC BLOOD PRESSURE: 153 MMHG

## 2017-12-22 DIAGNOSIS — R13.19 ESOPHAGEAL DYSPHAGIA: Primary | ICD-10-CM

## 2017-12-22 DIAGNOSIS — K44.9 HIATAL HERNIA: ICD-10-CM

## 2017-12-22 PROCEDURE — 43239 EGD BIOPSY SINGLE/MULTIPLE: CPT | Mod: ,,, | Performed by: INTERNAL MEDICINE

## 2017-12-22 PROCEDURE — 37000008 HC ANESTHESIA 1ST 15 MINUTES: Performed by: INTERNAL MEDICINE

## 2017-12-22 PROCEDURE — 43239 EGD BIOPSY SINGLE/MULTIPLE: CPT | Performed by: INTERNAL MEDICINE

## 2017-12-22 PROCEDURE — 63600175 PHARM REV CODE 636 W HCPCS: Performed by: NURSE ANESTHETIST, CERTIFIED REGISTERED

## 2017-12-22 PROCEDURE — 25000003 PHARM REV CODE 250: Performed by: NURSE ANESTHETIST, CERTIFIED REGISTERED

## 2017-12-22 PROCEDURE — 25000003 PHARM REV CODE 250: Performed by: INTERNAL MEDICINE

## 2017-12-22 PROCEDURE — 88305 TISSUE EXAM BY PATHOLOGIST: CPT | Performed by: PATHOLOGY

## 2017-12-22 PROCEDURE — 27201012 HC FORCEPS, HOT/COLD, DISP: Performed by: INTERNAL MEDICINE

## 2017-12-22 PROCEDURE — 88305 TISSUE EXAM BY PATHOLOGIST: CPT | Mod: 26,,, | Performed by: PATHOLOGY

## 2017-12-22 RX ORDER — PANTOPRAZOLE SODIUM 40 MG/1
40 TABLET, DELAYED RELEASE ORAL DAILY
Qty: 30 TABLET | Refills: 11 | Status: SHIPPED | OUTPATIENT
Start: 2017-12-22 | End: 2019-04-29

## 2017-12-22 RX ORDER — SODIUM CHLORIDE, SODIUM LACTATE, POTASSIUM CHLORIDE, CALCIUM CHLORIDE 600; 310; 30; 20 MG/100ML; MG/100ML; MG/100ML; MG/100ML
INJECTION, SOLUTION INTRAVENOUS CONTINUOUS
Status: DISCONTINUED | OUTPATIENT
Start: 2017-12-22 | End: 2017-12-22 | Stop reason: HOSPADM

## 2017-12-22 RX ORDER — PROPOFOL 10 MG/ML
VIAL (ML) INTRAVENOUS
Status: DISCONTINUED | OUTPATIENT
Start: 2017-12-22 | End: 2017-12-22

## 2017-12-22 RX ORDER — LIDOCAINE HYDROCHLORIDE 10 MG/ML
INJECTION INFILTRATION; PERINEURAL
Status: DISCONTINUED | OUTPATIENT
Start: 2017-12-22 | End: 2017-12-22

## 2017-12-22 RX ADMIN — PROPOFOL 50 MG: 10 INJECTION, EMULSION INTRAVENOUS at 10:12

## 2017-12-22 RX ADMIN — SODIUM CHLORIDE, POTASSIUM CHLORIDE, SODIUM LACTATE AND CALCIUM CHLORIDE: 600; 310; 30; 20 INJECTION, SOLUTION INTRAVENOUS at 10:12

## 2017-12-22 RX ADMIN — LIDOCAINE HYDROCHLORIDE 50 MG: 10 INJECTION, SOLUTION INFILTRATION; PERINEURAL at 10:12

## 2017-12-22 NOTE — TRANSFER OF CARE
"Anesthesia Transfer of Care Note    Patient: Glo Dumont    Procedure(s) Performed: Procedure(s) (LRB):  ESOPHAGOGASTRODUODENOSCOPY (EGD) (N/A)    Patient location: Other: GI PACU    Anesthesia Type: MAC    Transport from OR: Transported from OR on room air with adequate spontaneous ventilation    Post pain: adequate analgesia    Post assessment: no apparent anesthetic complications    Post vital signs: stable    Level of consciousness: awake    Nausea/Vomiting: no nausea/vomiting    Complications: none    Transfer of care protocol was followed      Last vitals:   Visit Vitals  BP (!) 151/71   Pulse (!) 58   Temp 36.8 °C (98.2 °F) (Oral)   Resp 18   Ht 5' 1" (1.549 m)   Wt 57.6 kg (127 lb)   SpO2 100%   Breastfeeding? No   BMI 24.00 kg/m²     "

## 2017-12-22 NOTE — ANESTHESIA PREPROCEDURE EVALUATION
12/22/2017  Glo Dumont is a 82 y.o., female.    Anesthesia Evaluation    I have reviewed the Patient Summary Reports.    I have reviewed the Nursing Notes.      Review of Systems  Anesthesia Hx:  No problems with previous Anesthesia Denies Hx of Anesthetic complications  History of prior surgery of interest to airway management or planning: Previous anesthesia: General, MAC Denies Family Hx of Anesthesia complications.   Denies Personal Hx of Anesthesia complications.   Social:  Non-Smoker, No Alcohol Use    Hematology/Oncology:     Oncology Normal    -- Anemia:   EENT/Dental:EENT/Dental Normal   Cardiovascular:   Hypertension CAD asymptomatic   Denies Angina. CHF    Pulmonary:  Pulmonary Normal    Renal/:   Chronic Renal Disease, CRI    Hepatic/GI:   Liver Disease,    Musculoskeletal:  Musculoskeletal Normal    Neurological:   Neuromuscular Disease,    Endocrine:  Endocrine Normal    Dermatological:  Skin Normal    Psych:   anxiety depression          Physical Exam  General:  Well nourished    Airway/Jaw/Neck:  Airway Findings: Mouth Opening: Normal Tongue: Normal  General Airway Assessment: Adult  Mallampati: II  TM Distance: Normal, at least 6 cm      Dental:  Dental Findings:    Chest/Lungs:  Chest/Lungs Findings: Clear to auscultation, Normal Respiratory Rate     Heart/Vascular:  Heart Findings: Rate: Normal  Rhythm: Regular Rhythm  Sounds: Normal        Mental Status:  Mental Status Findings:  Cooperative, Alert and Oriented         Anesthesia Plan  Type of Anesthesia, risks & benefits discussed:  Anesthesia Type:  MAC  Patient's Preference:   Intra-op Monitoring Plan: standard ASA monitors  Intra-op Monitoring Plan Comments:   Post Op Pain Control Plan:   Post Op Pain Control Plan Comments:   Induction:   IV  Beta Blocker:  Patient is on a Beta-Blocker and has received one dose within the past 24  hours (No further documentation required).       Informed Consent: Patient understands risks and agrees with Anesthesia plan.  Questions answered. Anesthesia consent signed with patient.  ASA Score: 3     Day of Surgery Review of History & Physical:            Ready For Surgery From Anesthesia Perspective.

## 2017-12-22 NOTE — DISCHARGE INSTRUCTIONS
If you develop fevers, severe abdominal pain, significant bleeding, nausea or vomiting, please contact us or come to our Emergency Department at [facility].  Our  contact number is [phone] available for emergencies or any question that you may have.      You may return to normal activities tomorrow.  Written discharge instructions were provided to you today and have them handy since you may not remember our conversation today.       I also recommend that you follow a high fiber diet and take calcium supplements daily as well as to continue your present medications.      If you take Aspirin, please resume taking it at your prior dose today. If we obtained biopsies or removed polyps during your procedure, we will contact you within 5 to 6 days with the results.      Thanks for trusting us with your healthcare needs.      Sincerely,     Bradly Ruiz M.D.        To rate your experience with Dr. Ruiz, please click on the link below     [email or web address]

## 2017-12-22 NOTE — DISCHARGE SUMMARY
Endoscopy Discharge Summary      Admit Date: 12/22/2017    Discharge Date and Time:  12/22/2017 10:55 AM    Attending Physician: Bradly Ruiz MD     Discharge Physician: Bradly Ruiz MD     Principal Admitting Diagnoses: Esophageal dysphagia         Discharge Diagnosis: The primary encounter diagnosis was Esophageal dysphagia. A diagnosis of Hiatal hernia was also pertinent to this visit.     Discharged Condition: Good    Indication for Admission: Esophageal dysphagia     Hospital Course: Patient was admitted for an inpatient procedure and tolerated the procedure well with no complications.    Significant Diagnostic Studies: EGD    Pathology (if any):  Specimen (12h ago through future)    Start     Ordered    12/22/17 1043  Specimen to Pathology - Surgery  Once     Comments:  1. Esophageal biopsy r/o eosinophilic esophagitis      12/22/17 1044          Disposition: Home.    Bleeding: None    No Implants         Follow Up/Patient Instructions:   Current Discharge Medication List      START taking these medications    Details   pantoprazole (PROTONIX) 40 MG tablet Take 1 tablet (40 mg total) by mouth once daily.  Qty: 30 tablet, Refills: 11         CONTINUE these medications which have NOT CHANGED    Details   amlodipine (NORVASC) 10 MG tablet Take 1 tablet (10 mg total) by mouth once daily.  Qty: 30 tablet, Refills: 11    Associated Diagnoses: Essential hypertension      aspirin (ECOTRIN) 81 MG EC tablet Take 81 mg by mouth once daily.      cholecalciferol, vitamin D3, 1,000 unit capsule Take 2 capsules (2,000 Units total) by mouth once daily.  Qty: 100 capsule, Refills: 0    Associated Diagnoses: Vitamin D deficiency      dicyclomine (BENTYL) 10 MG capsule Take 10 mg by mouth 4 (four) times daily before meals and nightly.      dorzolamide-timolol 2-0.5% (COSOPT) 22.3-6.8 mg/mL ophthalmic solution       DUREZOL 0.05 % Drop ophthalmic solution       FOLIC ACID/MULTIVIT-MIN/LUTEIN (CENTRUM SILVER ORAL) Take by  mouth.      hydroCHLOROthiazide (MICROZIDE) 12.5 mg capsule TAKE ONE CAPSULE BY MOUTH ONCE DAILY  Qty: 30 capsule, Refills: 11    Associated Diagnoses: Essential hypertension      iron-vitamin C 100-250 mg, ICAR-C, (ICAR-C) 100-250 mg Tab Take 1 tablet by mouth once daily.  Qty: 100 tablet, Refills: 6    Associated Diagnoses: Other iron deficiency anemia      ketorolac 0.5% (ACULAR) 0.5 % Drop       lisinopril (PRINIVIL,ZESTRIL) 40 MG tablet TAKE ONE TABLET BY MOUTH ONCE DAILY  Qty: 30 tablet, Refills: 11      methotrexate 2.5 MG Tab Take 2.5 mg by mouth every 7 days.       metoprolol tartrate (LOPRESSOR) 50 MG tablet TAKE ONE TABLET BY MOUTH IN THE MORNING AND 2 TABLETS BY MOUTH IN THE EVENING  Qty: 270 tablet, Refills: 3      simvastatin (ZOCOR) 40 MG tablet TAKE ONE TABLET BY MOUTH EVERY EVENING  Qty: 30 tablet, Refills: 11    Associated Diagnoses: Hyperlipidemia      budesonide (ENTOCORT EC) 3 mg capsule Take 9 mg by mouth once daily.      EFFIENT 10 mg Tab TAKE ONE TABLET BY MOUTH ONCE DAILY  Qty: 30 tablet, Refills: 0      fluoxetine (PROZAC) 10 MG capsule Take 1 capsule (10 mg total) by mouth once daily.  Qty: 30 capsule, Refills: 6    Associated Diagnoses: Anxiety and depression      fluticasone (FLONASE) 50 mcg/actuation nasal spray 2 sprays by Each Nare route once daily.  Qty: 1 Bottle, Refills: 12               Discharge Procedure Orders  Diet general     Activity as tolerated     Call MD for:  temperature >100.4     Call MD for:  persistent nausea and vomiting     Call MD for:  severe uncontrolled pain     Call MD for:  difficulty breathing, headache or visual disturbances     Call MD for:  redness, tenderness, or signs of infection (pain, swelling, redness, odor or green/yellow discharge around incision site)     Call MD for:  hives     Call MD for:  persistent dizziness or light-headedness     No dressing needed         Follow-up Information     Christina Recio MD.    Specialty:  Internal  Medicine  Why:  As needed  Contact information:  [address]  [place] [id number] [phone]

## 2017-12-22 NOTE — H&P (VIEW-ONLY)
Clinic Follow Up:  [facility] Follow Up Note    Reason for Follow Up:  The primary encounter diagnosis was Dysphagia, unspecified type. A diagnosis of Current use of long term anticoagulation was also pertinent to this visit.    PCP: Christina Recio   [address] / [place] [id number]    HPI:  This is a 82 y.o. female here for follow up of the above  She states that over the last few weeks, she has had progressively worsening difficulty swallowing.  She states that is it to solids only.  No issues with liquids or her medications.  She denies any known exacerbating or reliving factors for the dysphagia.  She denies any indigestion or heartburn associated.  No abdominal pain.  No melena or hematochezia.  No recent change in medications. No NSAIDs or ETOH   No previous episodes of dysphagia, although chart review shows a previous EGD in 2008 that required an esophageal dilation.   PMH includes cardiac stents in 2010 for which she is on daily anticoagulants.       Review of Systems   Constitutional: Negative for chills, fever, malaise/fatigue and weight loss.   Respiratory: Negative for cough.    Cardiovascular: Negative for chest pain.   Gastrointestinal:        Per HPI   Musculoskeletal: Negative for myalgias.   Skin: Negative for itching and rash.   Neurological: Negative for headaches.   Psychiatric/Behavioral: The patient is not nervous/anxious.        Medical History:  Past Medical History:   Diagnosis Date    Anemia     Angina pectoris     Anxiety     Arthritis     hip    Carotid artery occlusion     Carpal tunnel syndrome 06/23/2008    emg    Chronic diarrhea     work up in 2011 with EGD, CS and VCE    CKD (chronic kidney disease) stage 3, GFR 30-59 ml/min 5/11/2017    Coronary artery disease     Coronary artery disease     Diastolic dysfunction     Diverticulosis     Greater trochanteric bursitis 2/10/2015    Grief at loss of child 1/26/2016    H/O carotid endarterectomy  12/2/2013    Heart failure     History of coronary angioplasty 3/11/2014    Hypercholesteremia     Hypertension     Liver cyst 02/08/2013    ct abd    Macular degeneration     Primary open-angle glaucoma(365.11) 9/3/2013    Renal cyst 02/08/2013    ct abd    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    Sarcoidosis     Sarcoidosis of lung     Sickle cell trait     Uveitis        Surgical History:   Past Surgical History:   Procedure Laterality Date    CAROTID ENDARTERECTOMY Right 2000s    CATARACT EXTRACTION Bilateral     Dr. Liang Dennis    CORONARY ANGIOPLASTY WITH STENT PLACEMENT  11/19/2010    RCA-HALIE 2010    Lathia    JOINT REPLACEMENT Left 11/03/2014    Dr. Braun    TOTAL ABDOMINAL HYSTERECTOMY W/ BILATERAL SALPINGOOPHORECTOMY  1972       Family History:   Family History   Problem Relation Age of Onset    Hypertension Mother     Heart failure Mother     Cancer Father      prostate    Cirrhosis Brother     Heart failure Brother     Cancer Daughter      Carcinoid       Social History:   Social History   Substance Use Topics    Smoking status: Never Smoker    Smokeless tobacco: Never Used    Alcohol use No       Allergies: Reviewed    Home Medications:  Current Outpatient Prescriptions on File Prior to Visit   Medication Sig Dispense Refill    amlodipine (NORVASC) 10 MG tablet Take 1 tablet (10 mg total) by mouth once daily. 30 tablet 11    aspirin (ECOTRIN) 81 MG EC tablet Take 81 mg by mouth once daily.      budesonide (ENTOCORT EC) 3 mg capsule Take 9 mg by mouth once daily.      cholecalciferol, vitamin D3, 1,000 unit capsule Take 2 capsules (2,000 Units total) by mouth once daily. 100 capsule 0    dicyclomine (BENTYL) 10 MG capsule Take 10 mg by mouth 4 (four) times daily before meals and nightly.      dorzolamide-timolol 2-0.5% (COSOPT) 22.3-6.8 mg/mL ophthalmic solution       DUREZOL 0.05 % Drop ophthalmic solution       EFFIENT 10 mg Tab TAKE ONE TABLET BY  "MOUTH ONCE DAILY 30 tablet 0    fluticasone (FLONASE) 50 mcg/actuation nasal spray 2 sprays by Each Nare route once daily. 1 Bottle 12    FOLIC ACID/MULTIVIT-MIN/LUTEIN (CENTRUM SILVER ORAL) Take by mouth.      hydroCHLOROthiazide (MICROZIDE) 12.5 mg capsule TAKE ONE CAPSULE BY MOUTH ONCE DAILY 30 capsule 11    iron-vitamin C 100-250 mg, ICAR-C, (ICAR-C) 100-250 mg Tab Take 1 tablet by mouth once daily. 100 tablet 6    ketorolac 0.5% (ACULAR) 0.5 % Drop       lisinopril (PRINIVIL,ZESTRIL) 40 MG tablet TAKE ONE TABLET BY MOUTH ONCE DAILY 30 tablet 11    methotrexate 2.5 MG Tab Take 2.5 mg by mouth every 7 days.       metoprolol tartrate (LOPRESSOR) 50 MG tablet TAKE ONE TABLET BY MOUTH IN THE MORNING AND 2 TABLETS BY MOUTH IN THE EVENING (Patient taking differently: Take 25 mg by mouth 2 (two) times daily. TAKE ONE TABLET BY MOUTH IN THE MORNING AND 2 TABLETS BY MOUTH IN THE EVENING) 270 tablet 3    simvastatin (ZOCOR) 40 MG tablet TAKE ONE TABLET BY MOUTH EVERY EVENING 30 tablet 11    fluoxetine (PROZAC) 10 MG capsule Take 1 capsule (10 mg total) by mouth once daily. 30 capsule 6     Current Facility-Administered Medications on File Prior to Visit   Medication Dose Route Frequency Provider Last Rate Last Dose    aflibercept Soln 2 mg  2 mg Intravitreal 1 time in Clinic/HOD NAHUM Tamez MD           Physical Exam:  Vital Signs:  BP (!) 140/60   Pulse (!) 52   Ht 5' 1" (1.549 m)   Wt 58.4 kg (128 lb 12 oz)   BMI 24.33 kg/m²   Body mass index is 24.33 kg/m².  Physical Exam   Constitutional: She is oriented to person, place, and time. She appears well-developed and well-nourished.   HENT:   Head: Normocephalic.   Eyes: No scleral icterus.   Neck: Normal range of motion.   Cardiovascular: Normal rate and regular rhythm.    Pulmonary/Chest: Effort normal and breath sounds normal.   Abdominal: Soft. Bowel sounds are normal. She exhibits no distension. There is no tenderness.   Musculoskeletal: Normal " range of motion.   Neurological: She is alert and oriented to person, place, and time.   Skin: Skin is warm and dry.   Psychiatric: She has a normal mood and affect.   Vitals reviewed.      Labs: Pertinent labs reviewed.      Assessment:  1. Dysphagia, unspecified type    2. Current use of long term anticoagulation        Recommendations:  Dysphagia, unspecified type  - Plan for EGD to R/O esophageal stricute vs other etiologies  -     Case request GI: ESOPHAGOGASTRODUODENOSCOPY (EGD)    Current use of long term anticoagulation  - Will obtain OK from cardiology to hold Effient prior to procedure.         Return to Clinic:  6 weeks after procedure

## 2017-12-22 NOTE — INTERVAL H&P NOTE
The patient has been examined and the H&P has been reviewed:    I concur with the findings and no changes have occurred since H&P was written.    Anesthesia/Surgery risks, benefits and alternative options discussed and understood by patient/family.          Active Hospital Problems    Diagnosis  POA    *Esophageal dysphagia [R13.10]  Yes      Resolved Hospital Problems    Diagnosis Date Resolved POA   No resolved problems to display.

## 2017-12-22 NOTE — ANESTHESIA POSTPROCEDURE EVALUATION
"Anesthesia Post Evaluation    Patient: Glo Dumont    Procedure(s) Performed: Procedure(s) (LRB):  ESOPHAGOGASTRODUODENOSCOPY (EGD) (N/A)    Final Anesthesia Type: MAC  Patient location during evaluation: GI PACU  Patient participation: Yes- Able to Participate  Level of consciousness: awake and alert  Post-procedure vital signs: reviewed and stable  Pain management: adequate  Airway patency: patent  PONV status at discharge: No PONV  Anesthetic complications: no      Cardiovascular status: hemodynamically stable and blood pressure returned to baseline  Respiratory status: unassisted, spontaneous ventilation and room air  Hydration status: euvolemic  Follow-up not needed.        Visit Vitals  BP (!) 151/71   Pulse (!) 58   Temp 36.8 °C (98.2 °F) (Oral)   Resp 18   Ht 5' 1" (1.549 m)   Wt 57.6 kg (127 lb)   SpO2 100%   Breastfeeding? No   BMI 24.00 kg/m²       Pain/Rufus Score: No Data Recorded      "

## 2017-12-27 DIAGNOSIS — I10 ESSENTIAL HYPERTENSION: ICD-10-CM

## 2017-12-27 RX ORDER — HYDROCHLOROTHIAZIDE 12.5 MG/1
CAPSULE ORAL
Qty: 30 CAPSULE | Refills: 3 | Status: SHIPPED | OUTPATIENT
Start: 2017-12-27 | End: 2018-02-01 | Stop reason: SDUPTHER

## 2018-01-03 RX ORDER — PRASUGREL 10 MG/1
TABLET, FILM COATED ORAL
Qty: 30 TABLET | Refills: 0 | Status: SHIPPED | OUTPATIENT
Start: 2018-01-03 | End: 2018-02-07 | Stop reason: SDUPTHER

## 2018-01-31 NOTE — PROGRESS NOTES
"Subjective:       Patient ID: Glo Dumont is a 82 y.o. female.    Chief Complaint: Annual Exam    Here for f/u medical problems and preventive exam.  Checks BP at home and good range.  Active and breathing well with inhaler.  No f/c/sw/cough.  No cp/sob/palp.  BMs and urine normal.  Taking vit D.    HM: 10/17 fluvax, 3/15 [id number], 2/14 [id number], 2/15 TDaP, 12/15 BMD rep 2y, 2/11 Cscope no repeat will be done, 1/17 MMG no more, Eye doc monthly.          Review of Systems   Constitutional: Negative for appetite change, chills, diaphoresis and fever.   HENT: Negative for congestion, ear pain, rhinorrhea, sinus pressure and sore throat.    Respiratory: Negative for cough, chest tightness and shortness of breath.    Cardiovascular: Negative for chest pain and palpitations.   Gastrointestinal: Negative for blood in stool, constipation, diarrhea, nausea and vomiting.   Genitourinary: Negative for dysuria, frequency, hematuria, menstrual problem, urgency and vaginal discharge.   Musculoskeletal: Negative for arthralgias.   Skin: Negative for rash.   Neurological: Negative for dizziness and headaches.   Psychiatric/Behavioral: Negative for sleep disturbance. The patient is not nervous/anxious.        Objective:   /72   Pulse (!) 58   Temp 96.1 °F (35.6 °C) (Tympanic)   Ht 5' 1" (1.549 m)   Wt 59.2 kg (130 lb 8.2 oz)   SpO2 97%   BMI 24.66 kg/m²     Physical Exam   Constitutional: She is oriented to person, place, and time. She appears well-developed and well-nourished.   HENT:   Right Ear: External ear normal. Tympanic membrane is not injected.   Left Ear: External ear normal. Tympanic membrane is not injected.   Mouth/Throat: Oropharynx is clear and moist.   Eyes: Conjunctivae are normal.   Neck: Normal range of motion. Neck supple. No thyromegaly present.   Cardiovascular: Normal rate, regular rhythm and intact distal pulses.  Exam reveals no gallop and no friction rub.    No murmur heard.  Pulmonary/Chest: " Effort normal and breath sounds normal. She has no wheezes. She has no rales.   Abdominal: Soft. Bowel sounds are normal. She exhibits no mass. There is no tenderness.   Musculoskeletal: She exhibits no edema.   Lymphadenopathy:     She has no cervical adenopathy.   Neurological: She is alert and oriented to person, place, and time.   Skin: Skin is warm. No rash noted.   Psychiatric: She has a normal mood and affect.       Assessment:       1. Encounter for preventive health examination    2. Anxiety and depression    3. Atherosclerosis of aorta    4. CKD (chronic kidney disease) stage 3, GFR 30-59 ml/min    5. Coronary artery disease, occlusive    6. Essential hypertension    7. Osteopenia, unspecified location    8. Pure hypercholesterolemia    9. Sarcoidosis of lung    10. Vitamin D deficiency    11. Asymptomatic postmenopausal state        Plan:       Glo was seen today for annual exam.    Diagnoses and all orders for this visit:    Encounter for preventive health examination- lab with [facility].  -     DXA Bone Density Spine And Hip; Future  -     Lipid panel; Future  -     TSH; Future  -     Vitamin D; Future    Anxiety and depression- denies need for rx.    Atherosclerosis of aorta- on statin and asa/effient.    CKD (chronic kidney disease) stage 3, GFR 30-59 ml/min- cont to monitor.    Coronary artery disease, occlusive- clin stable.    Essential hypertension- adeq control at home.    Osteopenia, unspecified location- due for recheck.  -     DXA Bone Density Spine And Hip; Future    Pure hypercholesterolemia- check lab.  -     Lipid panel; Future  -     TSH; Future  -     Vitamin D; Future    Sarcoidosis of lung- doing well on meds.    Vitamin D deficiency- check lab.  -     Vitamin D; Future    RTC 6mo with BMD.

## 2018-02-01 ENCOUNTER — LAB VISIT (OUTPATIENT)
Dept: LAB | Facility: HOSPITAL | Age: 83
End: 2018-02-01
Attending: INTERNAL MEDICINE
Payer: MEDICARE

## 2018-02-01 ENCOUNTER — OFFICE VISIT (OUTPATIENT)
Dept: INTERNAL MEDICINE | Facility: CLINIC | Age: 83
End: 2018-02-01
Payer: MEDICARE

## 2018-02-01 VITALS
HEIGHT: 61 IN | OXYGEN SATURATION: 97 % | DIASTOLIC BLOOD PRESSURE: 72 MMHG | BODY MASS INDEX: 24.64 KG/M2 | WEIGHT: 130.5 LBS | SYSTOLIC BLOOD PRESSURE: 132 MMHG | TEMPERATURE: 96 F | HEART RATE: 58 BPM

## 2018-02-01 DIAGNOSIS — E55.9 VITAMIN D DEFICIENCY: ICD-10-CM

## 2018-02-01 DIAGNOSIS — F32.A ANXIETY AND DEPRESSION: ICD-10-CM

## 2018-02-01 DIAGNOSIS — D86.0 SARCOIDOSIS OF LUNG: ICD-10-CM

## 2018-02-01 DIAGNOSIS — I25.10 CORONARY ARTERY DISEASE, OCCLUSIVE: Chronic | ICD-10-CM

## 2018-02-01 DIAGNOSIS — I10 ESSENTIAL HYPERTENSION: Chronic | ICD-10-CM

## 2018-02-01 DIAGNOSIS — M85.80 OSTEOPENIA, UNSPECIFIED LOCATION: ICD-10-CM

## 2018-02-01 DIAGNOSIS — E78.00 PURE HYPERCHOLESTEROLEMIA: Chronic | ICD-10-CM

## 2018-02-01 DIAGNOSIS — I70.0 ATHEROSCLEROSIS OF AORTA: ICD-10-CM

## 2018-02-01 DIAGNOSIS — Z00.00 ENCOUNTER FOR PREVENTIVE HEALTH EXAMINATION: ICD-10-CM

## 2018-02-01 DIAGNOSIS — F41.9 ANXIETY AND DEPRESSION: ICD-10-CM

## 2018-02-01 DIAGNOSIS — N18.30 CKD (CHRONIC KIDNEY DISEASE) STAGE 3, GFR 30-59 ML/MIN: ICD-10-CM

## 2018-02-01 DIAGNOSIS — Z78.0 ASYMPTOMATIC POSTMENOPAUSAL STATE: ICD-10-CM

## 2018-02-01 DIAGNOSIS — Z00.00 ENCOUNTER FOR PREVENTIVE HEALTH EXAMINATION: Primary | ICD-10-CM

## 2018-02-01 LAB
25(OH)D3+25(OH)D2 SERPL-MCNC: 23 NG/ML
CHOLEST SERPL-MCNC: 211 MG/DL
CHOLEST/HDLC SERPL: 3.7 {RATIO}
HDLC SERPL-MCNC: 57 MG/DL
HDLC SERPL: 27 %
LDLC SERPL CALC-MCNC: 135.4 MG/DL
NONHDLC SERPL-MCNC: 154 MG/DL
TRIGL SERPL-MCNC: 93 MG/DL
TSH SERPL DL<=0.005 MIU/L-ACNC: 0.56 UIU/ML

## 2018-02-01 PROCEDURE — 80061 LIPID PANEL: CPT

## 2018-02-01 PROCEDURE — 99397 PER PM REEVAL EST PAT 65+ YR: CPT | Mod: S$GLB,,, | Performed by: INTERNAL MEDICINE

## 2018-02-01 PROCEDURE — 36415 COLL VENOUS BLD VENIPUNCTURE: CPT | Mod: PO

## 2018-02-01 PROCEDURE — 99499 UNLISTED E&M SERVICE: CPT | Mod: S$GLB,,, | Performed by: INTERNAL MEDICINE

## 2018-02-01 PROCEDURE — 99999 PR PBB SHADOW E&M-EST. PATIENT-LVL III: CPT | Mod: PBBFAC,,, | Performed by: INTERNAL MEDICINE

## 2018-02-01 PROCEDURE — 84443 ASSAY THYROID STIM HORMONE: CPT

## 2018-02-01 PROCEDURE — 82306 VITAMIN D 25 HYDROXY: CPT

## 2018-02-02 ENCOUNTER — TELEPHONE (OUTPATIENT)
Dept: INTERNAL MEDICINE | Facility: CLINIC | Age: 83
End: 2018-02-02

## 2018-02-02 NOTE — TELEPHONE ENCOUNTER
Informed pt labs ok except Vit D level is low.  Asked pt how many units of D she is presently taking.  Pt reports 1000 units daily.  Instructed pt to double that and take 2000 units of Vit D daily.  Pt verbalized understanding./rpr

## 2018-02-02 NOTE — TELEPHONE ENCOUNTER
----- Message from Christina Cardona MD sent at 2/2/2018  8:36 AM CST -----  Please inform pt labs are ok except vitamin D level is low- please ask what she is taking, if 1K of D3- please increase to 2K daily.  SM

## 2018-02-07 RX ORDER — PRASUGREL 10 MG/1
TABLET, FILM COATED ORAL
Qty: 30 TABLET | Refills: 5 | Status: SHIPPED | OUTPATIENT
Start: 2018-02-07 | End: 2018-10-11 | Stop reason: SDUPTHER

## 2018-02-12 DIAGNOSIS — I10 ESSENTIAL HYPERTENSION: Chronic | ICD-10-CM

## 2018-02-12 DIAGNOSIS — F32.A ANXIETY AND DEPRESSION: ICD-10-CM

## 2018-02-12 DIAGNOSIS — F41.9 ANXIETY AND DEPRESSION: ICD-10-CM

## 2018-02-12 RX ORDER — FLUOXETINE 10 MG/1
CAPSULE ORAL
Qty: 30 CAPSULE | Refills: 11 | Status: SHIPPED | OUTPATIENT
Start: 2018-02-12 | End: 2019-06-10 | Stop reason: SDUPTHER

## 2018-02-12 RX ORDER — AMLODIPINE BESYLATE 10 MG/1
TABLET ORAL
Qty: 30 TABLET | Refills: 11 | Status: SHIPPED | OUTPATIENT
Start: 2018-02-12 | End: 2018-05-07

## 2018-03-04 NOTE — ASSESSMENT & PLAN NOTE
PULMONARY SARCOIDOSIS ROS: no significant ongoing wheezing or shortness of breath, using bronchodilator MDI less than twice a week.   New concerns: NONE.   Exam: appears well, vitals normal, no respiratory distress, acyanotic, normal RR, chest clear, no wheezing, crepitations, rhonchi, normal symmetric air entry.   Assessment:  PULMONARY SARCOIDOSIS stable.   Plan: ACE LEVELS: Orders as documented in EMR. CXR in 6 months. Follow up in 6 months.     Normal rate, regular rhythm.  Heart sounds S1, S2.  No murmurs, rubs or gallops.

## 2018-03-29 ENCOUNTER — TELEPHONE (OUTPATIENT)
Dept: INTERNAL MEDICINE | Facility: CLINIC | Age: 83
End: 2018-03-29

## 2018-03-29 NOTE — TELEPHONE ENCOUNTER
----- Message from Jaye Gallego sent at 3/29/2018  9:51 AM CDT -----  [facility]   States she's calling rg wanting to have pt worked in to see Dr within a week for a hospital follow up and pt can be contacted for an appt and can be reached at [phone]/ was in the hospital for a lapcoly/ ( surgery)

## 2018-04-02 ENCOUNTER — TELEPHONE (OUTPATIENT)
Dept: INTERNAL MEDICINE | Facility: CLINIC | Age: 83
End: 2018-04-02

## 2018-04-03 NOTE — PROGRESS NOTES
"Subjective:      Patient ID: Glo Dumont is a 82 y.o. female.    Chief Complaint: Hospital Follow Up      HPI  Here for hospital f/u, discharged 3/30/18 from [facility], s/p laparoscopic cholecystectomy.  CT showed gallstones and thickened GB wall AND multiple splenic masses more numerous than on previous study.,"malignant process cannot be excluded".  Feeling well now.  No f/c/sw/n/v/d.  No abdominal pain.  No significant wt loss, up and down 5# in past few years.  No sweats.  BMs normal.  Appetite normal.      Updated/ annual due 2/19:  HM: 10/17 fluvax, 3/15 [id number], 2/14 [id number], 2/15 TDaP, 12/15 BMD rep 2y, 2/11 Cscope no repeat will be done, 1/17 MMG no more, Eye doc monthly.      Review of Systems   Constitutional: Negative for chills, diaphoresis and fever.   Respiratory: Negative for cough and shortness of breath.    Cardiovascular: Negative for chest pain, palpitations and leg swelling.   Gastrointestinal: Negative for blood in stool, constipation, diarrhea, nausea and vomiting.   Genitourinary: Negative for dysuria, frequency and hematuria.   Psychiatric/Behavioral: The patient is not nervous/anxious.          Objective:   /62   Pulse 104   Temp (!) 95.5 °F (35.3 °C) (Tympanic)   Ht 5' 1" (1.549 m)   Wt 58.4 kg (128 lb 12 oz)   SpO2 98%   BMI 24.33 kg/m²     Physical Exam   Constitutional: She is oriented to person, place, and time. She appears well-developed.   HENT:   Mouth/Throat: Oropharynx is clear and moist.   Neck: Neck supple. Carotid bruit is not present. No thyroid mass present.   Cardiovascular: Normal rate, regular rhythm and intact distal pulses.  Exam reveals no gallop and no friction rub.    No murmur heard.  Pulmonary/Chest: Effort normal and breath sounds normal. She has no wheezes. She has no rales.   Abdominal: Soft. Bowel sounds are normal. She exhibits no mass. There is no hepatosplenomegaly. There is no tenderness.   Musculoskeletal: She exhibits no edema. "   Lymphadenopathy:     She has no cervical adenopathy.   Neurological: She is alert and oriented to person, place, and time.   Psychiatric: She has a normal mood and affect.           Assessment:       1. Abdominal mass, unspecified abdominal location    2. Abnormal findings on diagnostic imaging of other abdominal regions, including retroperitoneum     3. Anxiety and depression    4. CKD (chronic kidney disease) stage 3, GFR 30-59 ml/min    5. Essential hypertension    6. Sarcoidosis of lung    7. History of laparoscopic cholecystectomy          Plan:     S/p lap bolivar- doing well.    Abnormal findings on diagnostic imaging of spleen and kidney-   -     NM PET CT Routine Skull to Mid Thigh; Future; Expected date: 04/04/2018  -     Ambulatory consult to Hematology / Oncology    Anxiety and depression- recently restarted prozac.    CKD (chronic kidney disease) stage 3, GFR 30-59 ml/min    Essential hypertension- adeq control.    Sarcoidosis of lung- breathing well.  Poss spleen masses?    RTC 2 mo.

## 2018-04-04 ENCOUNTER — TELEPHONE (OUTPATIENT)
Dept: HEMATOLOGY/ONCOLOGY | Facility: CLINIC | Age: 83
End: 2018-04-04

## 2018-04-04 ENCOUNTER — OFFICE VISIT (OUTPATIENT)
Dept: INTERNAL MEDICINE | Facility: CLINIC | Age: 83
End: 2018-04-04
Payer: MEDICARE

## 2018-04-04 VITALS
SYSTOLIC BLOOD PRESSURE: 128 MMHG | HEART RATE: 104 BPM | OXYGEN SATURATION: 98 % | DIASTOLIC BLOOD PRESSURE: 62 MMHG | WEIGHT: 128.75 LBS | TEMPERATURE: 96 F | HEIGHT: 61 IN | BODY MASS INDEX: 24.31 KG/M2

## 2018-04-04 DIAGNOSIS — R19.00 ABDOMINAL MASS, UNSPECIFIED ABDOMINAL LOCATION: Primary | ICD-10-CM

## 2018-04-04 DIAGNOSIS — D86.0 SARCOIDOSIS OF LUNG: ICD-10-CM

## 2018-04-04 DIAGNOSIS — R93.5 ABNORMAL FINDINGS ON DIAGNOSTIC IMAGING OF OTHER ABDOMINAL REGIONS, INCLUDING RETROPERITONEUM: ICD-10-CM

## 2018-04-04 DIAGNOSIS — I10 ESSENTIAL HYPERTENSION: Chronic | ICD-10-CM

## 2018-04-04 DIAGNOSIS — N18.30 CKD (CHRONIC KIDNEY DISEASE) STAGE 3, GFR 30-59 ML/MIN: ICD-10-CM

## 2018-04-04 DIAGNOSIS — F41.9 ANXIETY AND DEPRESSION: ICD-10-CM

## 2018-04-04 DIAGNOSIS — Z90.49 HISTORY OF LAPAROSCOPIC CHOLECYSTECTOMY: ICD-10-CM

## 2018-04-04 DIAGNOSIS — F32.A ANXIETY AND DEPRESSION: ICD-10-CM

## 2018-04-04 PROCEDURE — 99214 OFFICE O/P EST MOD 30 MIN: CPT | Mod: S$GLB,,, | Performed by: INTERNAL MEDICINE

## 2018-04-04 PROCEDURE — 99499 UNLISTED E&M SERVICE: CPT | Mod: S$PBB,,, | Performed by: INTERNAL MEDICINE

## 2018-04-04 PROCEDURE — 99999 PR PBB SHADOW E&M-EST. PATIENT-LVL V: CPT | Mod: PBBFAC,,, | Performed by: INTERNAL MEDICINE

## 2018-04-04 NOTE — TELEPHONE ENCOUNTER
----- Message from Antelmo Vernon sent at 4/4/2018  3:55 PM CDT -----  Contact: pt  Call pt at [phone] (home)   Regarding upcoming appt

## 2018-04-04 NOTE — PROGRESS NOTES
Transitional Care Note  Subjective:       Patient ID: Glo Dumont is a 82 y.o. female.  Chief Complaint: Hospital Follow Up    Family and/or Caretaker present at visit?  Yes.  Diagnostic tests reviewed/disposition: I have reviewed all completed as well as pending diagnostic tests at the time of discharge.  Disease/illness education:   Home health/community services discussion/referrals: Patient does not have home health established from hospital visit.  They do not need home health.  If needed, we will set up home health for the patient.   Establishment or re-establishment of referral orders for community resources: No other necessary community resources.   Discussion with other health care providers: Will obtain past CT abd reports to compare to ordered PET scan..   HPI  Review of Systems    Objective:      Physical Exam    Assessment:       1. Abdominal mass, unspecified abdominal location    2. Abnormal findings on diagnostic imaging of other abdominal regions, including retroperitoneum     3. Anxiety and depression    4. CKD (chronic kidney disease) stage 3, GFR 30-59 ml/min    5. Essential hypertension    6. Sarcoidosis of lung    7. History of laparoscopic cholecystectomy        Plan:         per other note.

## 2018-04-06 ENCOUNTER — TELEPHONE (OUTPATIENT)
Dept: RADIOLOGY | Facility: HOSPITAL | Age: 83
End: 2018-04-06

## 2018-04-09 ENCOUNTER — HOSPITAL ENCOUNTER (OUTPATIENT)
Dept: RADIOLOGY | Facility: HOSPITAL | Age: 83
Discharge: HOME | End: 2018-04-09
Attending: INTERNAL MEDICINE
Payer: MEDICARE

## 2018-04-09 DIAGNOSIS — R19.00 ABDOMINAL MASS, UNSPECIFIED ABDOMINAL LOCATION: ICD-10-CM

## 2018-04-09 DIAGNOSIS — R93.5 ABNORMAL FINDINGS ON DIAGNOSTIC IMAGING OF OTHER ABDOMINAL REGIONS, INCLUDING RETROPERITONEUM: ICD-10-CM

## 2018-04-09 PROCEDURE — 78815 PET IMAGE W/CT SKULL-THIGH: CPT | Mod: 26,PI,, | Performed by: RADIOLOGY

## 2018-04-09 PROCEDURE — A9552 F18 FDG: HCPCS | Mod: PO

## 2018-04-09 PROCEDURE — 78815 PET IMAGE W/CT SKULL-THIGH: CPT | Mod: TC,PO

## 2018-04-10 ENCOUNTER — INITIAL CONSULT (OUTPATIENT)
Dept: HEMATOLOGY/ONCOLOGY | Facility: CLINIC | Age: 83
End: 2018-04-10
Payer: MEDICARE

## 2018-04-10 VITALS
HEIGHT: 61 IN | SYSTOLIC BLOOD PRESSURE: 150 MMHG | DIASTOLIC BLOOD PRESSURE: 65 MMHG | HEART RATE: 76 BPM | OXYGEN SATURATION: 97 % | WEIGHT: 128.75 LBS | TEMPERATURE: 98 F | BODY MASS INDEX: 24.31 KG/M2

## 2018-04-10 DIAGNOSIS — R94.8 ABNORMAL PET SCAN OF MEDIASTINUM: ICD-10-CM

## 2018-04-10 DIAGNOSIS — D86.0 SARCOIDOSIS OF LUNG: Primary | ICD-10-CM

## 2018-04-10 PROCEDURE — 3077F SYST BP >= 140 MM HG: CPT | Mod: CPTII,S$GLB,, | Performed by: INTERNAL MEDICINE

## 2018-04-10 PROCEDURE — 99499 UNLISTED E&M SERVICE: CPT | Mod: S$PBB,,, | Performed by: INTERNAL MEDICINE

## 2018-04-10 PROCEDURE — 99999 PR PBB SHADOW E&M-EST. PATIENT-LVL III: CPT | Mod: PBBFAC,,, | Performed by: INTERNAL MEDICINE

## 2018-04-10 PROCEDURE — 99205 OFFICE O/P NEW HI 60 MIN: CPT | Mod: S$GLB,,, | Performed by: INTERNAL MEDICINE

## 2018-04-10 PROCEDURE — 3078F DIAST BP <80 MM HG: CPT | Mod: CPTII,S$GLB,, | Performed by: INTERNAL MEDICINE

## 2018-04-10 RX ORDER — CALCIUM CARBONATE 600 MG
TABLET ORAL
COMMUNITY
Start: 2012-05-08 | End: 2023-02-01

## 2018-04-10 NOTE — LETTER
April 10, 2018      Christina Cardona MD  [address]  [facility] [address]           [place] [facility]  [address] [place] [address]  Phone: [phone]  Fax: [phone]          Patient: Glo Dumont   MR Number: [id number]   YOB: 1935   Date of Visit: 4/10/2018       Dear Dr. Christina Cardona:    Thank you for referring Glo Dumont to me for evaluation. Attached you will find relevant portions of my assessment and plan of care.    If you have questions, please do not hesitate to call me. I look forward to following Glo Dumont along with you.    Sincerely,    Meir Luo MD    Enclosure  CC:  No Recipients    If you would like to receive this communication electronically, please contact [email or web address] or [phone] to request more information on [employer] access.    For providers and/or their staff who would like to refer a patient to [facility], please contact us through our one-stop-shop provider referral line, [facility], at [phone].    If you feel you have received this communication in error or would no longer like to receive these types of communications, please e-mail [email or web address]

## 2018-04-10 NOTE — PROGRESS NOTES
Subjective:       Patient ID: Glo Dumont is a 82 y.o. female.    Chief Complaint: Results    HPI 82-year-old female was found to have cholelithiasis ultrasound demonstrated enlarging nodules in spleen from previously noted.  I was asked see the patient after PET scan was done which showed abnormalities in her mediastinum as well as her spleen    Past Medical History:   Diagnosis Date    Anemia     Angina pectoris     Anxiety     Arthritis     hip    Carotid artery occlusion     Carpal tunnel syndrome 06/23/2008    emg    Chronic diarrhea     work up in 2011 with EGD, CS and VCE    CKD (chronic kidney disease) stage 3, GFR 30-59 ml/min 5/11/2017    Coronary artery disease     Coronary artery disease     Diastolic dysfunction     Diverticulosis     Greater trochanteric bursitis 2/10/2015    Grief at loss of child 1/26/2016    H/O carotid endarterectomy 12/2/2013    Heart failure     History of coronary angioplasty 3/11/2014    Hypercholesteremia     Hypertension     Liver cyst 02/08/2013    ct abd    Macular degeneration     Primary open-angle glaucoma(365.11) 9/3/2013    Renal cyst 02/08/2013    ct abd    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    Sarcoidosis     Sarcoidosis of lung     Sickle cell trait     Uveitis      Family History   Problem Relation Age of Onset    Hypertension Mother     Heart failure Mother     Cancer Father      prostate    Cirrhosis Brother     Heart failure Brother     Cancer Daughter      Carcinoid     Social History     Social History    Marital status:      Spouse name: N/A    Number of children: 5    Years of education: N/A     Occupational History    retired      Social History Main Topics    Smoking status: Never Smoker    Smokeless tobacco: Never Used    Alcohol use No    Drug use: No    Sexual activity: Yes     Partners: Male     Other Topics Concern    Not on file     Social History Narrative         Past  Surgical History:   Procedure Laterality Date    CAROTID ENDARTERECTOMY Right 2000s    CATARACT EXTRACTION Bilateral     Dr. Liang Dennis    CHOLECYSTECTOMY      laparoscopic, 3/18.    CORONARY ANGIOPLASTY WITH STENT PLACEMENT  11/19/2010    RCA-HALIE 2010    Lathia    JOINT REPLACEMENT Left 11/03/2014    Dr. Braun    TOTAL ABDOMINAL HYSTERECTOMY W/ BILATERAL SALPINGOOPHORECTOMY  1972       Labs:  Lab Results   Component Value Date    WBC 5.97 12/05/2017    HGB 10.4 (L) 12/05/2017    HCT 31.3 (L) 12/05/2017    MCV 87 12/05/2017     12/05/2017     BMP  Lab Results   Component Value Date     12/05/2017    K 4.8 12/05/2017     12/05/2017    CO2 24 12/05/2017    BUN 32 (H) 12/05/2017    CREATININE 1.4 12/05/2017    CALCIUM 10.3 12/05/2017    ANIONGAP 10 12/05/2017    ESTGFRAFRICA 41 (A) 12/05/2017    EGFRNONAA 36 (A) 12/05/2017     Lab Results   Component Value Date    ALT 12 12/05/2017    AST 17 12/05/2017    ALKPHOS 63 12/05/2017    BILITOT 0.4 12/05/2017       Lab Results   Component Value Date    IRON 85 10/10/2017    TIBC 327 10/10/2017    FERRITIN 359 (H) 10/10/2017     Lab Results   Component Value Date    CWVBPBXB31 936 03/12/2013     Lab Results   Component Value Date    FOLATE 10.3 03/12/2013     Lab Results   Component Value Date    TSH 0.557 02/01/2018         Review of Systems   Constitutional: Negative for appetite change, chills, diaphoresis, fatigue, fever and unexpected weight change.   HENT: Negative for congestion, dental problem, drooling, ear discharge, ear pain, facial swelling, hearing loss, mouth sores, nosebleeds, postnasal drip, rhinorrhea, sinus pressure, sneezing, sore throat, tinnitus, trouble swallowing and voice change.    Eyes: Negative for photophobia, pain, discharge, redness, itching and visual disturbance.   Respiratory: Negative for cough, choking, chest tightness, shortness of breath, wheezing and stridor.    Cardiovascular: Negative for chest pain,  palpitations and leg swelling.   Gastrointestinal: Negative for abdominal distention, abdominal pain, anal bleeding, blood in stool, constipation, diarrhea, nausea, rectal pain and vomiting.   Endocrine: Negative for cold intolerance, heat intolerance, polydipsia, polyphagia and polyuria.   Genitourinary: Negative for decreased urine volume, difficulty urinating, dyspareunia, dysuria, enuresis, flank pain, frequency, genital sores, hematuria, menstrual problem, pelvic pain, urgency, vaginal bleeding, vaginal discharge and vaginal pain.   Musculoskeletal: Negative for arthralgias, back pain, gait problem, joint swelling, myalgias, neck pain and neck stiffness.   Skin: Negative for color change, pallor and rash.   Allergic/Immunologic: Negative for environmental allergies, food allergies and immunocompromised state.   Neurological: Negative for dizziness, tremors, seizures, syncope, facial asymmetry, speech difficulty, weakness, light-headedness, numbness and headaches.   Hematological: Negative for adenopathy. Does not bruise/bleed easily.   Psychiatric/Behavioral: Negative for agitation, behavioral problems, confusion, decreased concentration, dysphoric mood, hallucinations, self-injury, sleep disturbance and suicidal ideas. The patient is not nervous/anxious and is not hyperactive.        Objective:      Physical Exam   Constitutional: She is oriented to person, place, and time. She appears well-developed and well-nourished. She appears distressed.   HENT:   Head: Normocephalic and atraumatic.   Right Ear: Tympanic membrane and external ear normal.   Left Ear: Tympanic membrane and external ear normal.   Nose: Nose normal. Right sinus exhibits no maxillary sinus tenderness and no frontal sinus tenderness. Left sinus exhibits no maxillary sinus tenderness and no frontal sinus tenderness.   Mouth/Throat: Oropharynx is clear and moist. No oropharyngeal exudate.   Eyes: Conjunctivae, EOM and lids are normal. Pupils are  equal, round, and reactive to light. Right eye exhibits no discharge. Left eye exhibits no discharge. Right conjunctiva is not injected. Right conjunctiva has no hemorrhage. Left conjunctiva is not injected. Left conjunctiva has no hemorrhage. No scleral icterus.   Neck: Normal range of motion. Neck supple. No JVD present. No tracheal deviation present. No thyromegaly present.   Cardiovascular: Normal rate, regular rhythm, normal heart sounds and intact distal pulses.    Pulmonary/Chest: Effort normal and breath sounds normal. No stridor. No respiratory distress. She exhibits no tenderness.   Abdominal: Soft. Bowel sounds are normal. She exhibits no distension and no mass. There is no splenomegaly or hepatomegaly. There is no tenderness. There is no rebound.   Musculoskeletal: Normal range of motion. She exhibits no edema or tenderness.   Lymphadenopathy:     She has no cervical adenopathy.     She has no axillary adenopathy.        Right: No supraclavicular adenopathy present.        Left: No supraclavicular adenopathy present.   Neurological: She is alert and oriented to person, place, and time. No cranial nerve deficit. Coordination normal.   Skin: Skin is dry. No rash noted. She is not diaphoretic. No erythema.   Psychiatric: She has a normal mood and affect. Her behavior is normal. Judgment and thought content normal.   Vitals reviewed.          Assessment:      1. Sarcoidosis of lung    2. Abnormal PET scan of mediastinum           Plan:   Reviewed PET scan demonstrated abnormal findings consistent with probable sarcoidosis she has never been biopsied or the diagnosis sarcoidosis she is scheduled to see pulmonary medicine next week I feel the pattern is consistent with but obviously the only way to make a diagnosis would be to do tissue confirmation I will ask her pulmonologist Dr. Muir see whether or not this is amenable by bronchoscopic evaluation or by EBUS to confirm

## 2018-05-07 ENCOUNTER — OFFICE VISIT (OUTPATIENT)
Dept: PULMONOLOGY | Facility: CLINIC | Age: 83
End: 2018-05-07
Payer: MEDICARE

## 2018-05-07 ENCOUNTER — PROCEDURE VISIT (OUTPATIENT)
Dept: PULMONOLOGY | Facility: CLINIC | Age: 83
End: 2018-05-07
Payer: MEDICARE

## 2018-05-07 ENCOUNTER — LAB VISIT (OUTPATIENT)
Dept: LAB | Facility: HOSPITAL | Age: 83
End: 2018-05-07
Attending: INTERNAL MEDICINE
Payer: MEDICARE

## 2018-05-07 VITALS
WEIGHT: 126 LBS | HEART RATE: 60 BPM | BODY MASS INDEX: 23.79 KG/M2 | SYSTOLIC BLOOD PRESSURE: 138 MMHG | DIASTOLIC BLOOD PRESSURE: 60 MMHG | OXYGEN SATURATION: 99 % | HEIGHT: 61 IN | RESPIRATION RATE: 18 BRPM

## 2018-05-07 DIAGNOSIS — D86.0 SARCOIDOSIS OF LUNG: Primary | Chronic | ICD-10-CM

## 2018-05-07 DIAGNOSIS — D86.0 SARCOIDOSIS OF LUNG: Chronic | ICD-10-CM

## 2018-05-07 PROCEDURE — 99999 PR PBB SHADOW E&M-EST. PATIENT-LVL III: CPT | Mod: PBBFAC,,, | Performed by: INTERNAL MEDICINE

## 2018-05-07 PROCEDURE — 94726 PLETHYSMOGRAPHY LUNG VOLUMES: CPT | Mod: S$GLB,,, | Performed by: INTERNAL MEDICINE

## 2018-05-07 PROCEDURE — 3078F DIAST BP <80 MM HG: CPT | Mod: CPTII,S$GLB,, | Performed by: INTERNAL MEDICINE

## 2018-05-07 PROCEDURE — 82164 ANGIOTENSIN I ENZYME TEST: CPT

## 2018-05-07 PROCEDURE — 99214 OFFICE O/P EST MOD 30 MIN: CPT | Mod: 25,S$GLB,, | Performed by: INTERNAL MEDICINE

## 2018-05-07 PROCEDURE — 99499 UNLISTED E&M SERVICE: CPT | Mod: S$PBB,,, | Performed by: INTERNAL MEDICINE

## 2018-05-07 PROCEDURE — 3075F SYST BP GE 130 - 139MM HG: CPT | Mod: CPTII,S$GLB,, | Performed by: INTERNAL MEDICINE

## 2018-05-07 PROCEDURE — 36415 COLL VENOUS BLD VENIPUNCTURE: CPT

## 2018-05-07 PROCEDURE — 94060 EVALUATION OF WHEEZING: CPT | Mod: S$GLB,,, | Performed by: INTERNAL MEDICINE

## 2018-05-07 PROCEDURE — 94729 DIFFUSING CAPACITY: CPT | Mod: S$GLB,,, | Performed by: INTERNAL MEDICINE

## 2018-05-07 NOTE — PROGRESS NOTES
Subjective:      Patient ID: Glo Dumont is a 82 y.o. female.    Patient Active Problem List   Diagnosis    Sarcoidosis of lung    Thyroid nodule    Hypertensive heart disease with heart failure    Pure hypercholesterolemia    Hemoglobin A-S genotype    Angina, class II    Ptosis    Coronary artery disease, occlusive    Central vein occlusion of retina    Iron deficiency anemia    Vitamin D deficiency    Osteopenia    Essential hypertension    Chronic diastolic HF (heart failure), NYHA class 2    Atherosclerosis of aorta    Anxiety and depression    CKD (chronic kidney disease) stage 3, GFR 30-59 ml/min    Uveitis    Colitis    History of coronary angioplasty    Esophageal dysphagia    Hiatal hernia     Problem list has been reviewed.    Chief Complaint: Sarcoidosis    HPI: She is here today for follow up review of PFT and sarcoidosis.   PFT reviewed with pateint who voiced understanding. She denies any new onset pulmonary complaints. She reports a mild intermittent productive cough. She denies fevers, chills, rigors, hemoptysis, pain with breathing, wheezing.  A full  review of systems, past , family  and social histories was performed except as mentioned in the note above, these are non contributory to the main issues discussed today.    Immunization status reviewed and updated.    Previous Report Reviewed: office notes     The following portions of the patient's history were reviewed and updated as appropriate: She  has a past medical history of Anemia; Angina pectoris; Anxiety; Arthritis; Carotid artery occlusion; Carpal tunnel syndrome (06/23/2008); Chronic diarrhea; CKD (chronic kidney disease) stage 3, GFR 30-59 ml/min (5/11/2017); Coronary artery disease; Coronary artery disease; Diastolic dysfunction; Diverticulosis; Greater trochanteric bursitis (2/10/2015); Grief at loss of child (1/26/2016); H/O carotid endarterectomy (12/2/2013); Heart failure; History of coronary angioplasty  (3/11/2014); Hypercholesteremia; Hypertension; Liver cyst (02/08/2013); Macular degeneration; Primary open-angle glaucoma(365.11) (9/3/2013); Renal cyst (02/08/2013); S/P prosthetic total arthroplasty of the hip (11/3/2014); Sarcoidosis; Sarcoidosis of lung; Sickle cell trait; and Uveitis.  She  has a past surgical history that includes Total abdominal hysterectomy w/ bilateral salpingoophorectomy (1972); Cataract extraction (Bilateral); Coronary angioplasty with stent (11/19/2010); Carotid endarterectomy (Right, 2000s); Joint replacement (Left, 11/03/2014); and Cholecystectomy.  Her family history includes Cancer in her daughter and father; Cirrhosis in her brother; Heart failure in her brother and mother; Hypertension in her mother.  She  reports that she has never smoked. She has never used smokeless tobacco. She reports that she does not drink alcohol or use drugs.  She has a current medication list which includes the following prescription(s): aspirin, budesonide, calcium carbonate, cholecalciferol (vitamin d3), dicyclomine, dorzolamide-timolol 2-0.5%, durezol, fluoxetine, fluticasone, folic acid/multivit-min/lutein, hydrochlorothiazide, iron-vitamin c 100-250 mg (icar-c), lisinopril, metoprolol tartrate, pantoprazole, prasugrel, and simvastatin.  She is allergic to codeine..    Review of Systems   Constitutional: Positive for fatigue. Negative for chills and appetite change.   HENT: Negative for postnasal drip, rhinorrhea and sinus pressure.    Eyes: Negative for itching.   Respiratory: Positive for cough. Negative for shortness of breath and dyspnea on extertion.    Cardiovascular: Negative for chest pain, palpitations and leg swelling.   Genitourinary: Negative for difficulty urinating.   Musculoskeletal: Negative for arthralgias and back pain.   Skin: Positive for rash.   Gastrointestinal: Negative for nausea, vomiting and acid reflux.   Neurological: Positive for weakness. Negative for dizziness and  "headaches.   Hematological: Negative for adenopathy. Does not bruise/bleed easily.   Psychiatric/Behavioral: The patient is not nervous/anxious.       Objective:     Vitals:    05/07/18 1004   BP: 138/60   Pulse: 60   Resp: 18   SpO2: 99%   Weight: 57.2 kg (126 lb)   Height: 5' 1" (1.549 m)     Body mass index is 23.81 kg/m².    Physical Exam   Constitutional: She is oriented to person, place, and time. She appears well-developed and well-nourished.   HENT:   Head: Normocephalic and atraumatic.   Eyes: Conjunctivae are normal. Pupils are equal, round, and reactive to light.   Neck: Normal range of motion. Neck supple.   Cardiovascular: Normal rate and regular rhythm.    Pulmonary/Chest: Effort normal and breath sounds normal.   Abdominal: Soft. Bowel sounds are normal.   Musculoskeletal: Normal range of motion.   Neurological: She is alert and oriented to person, place, and time.   Skin: Skin is warm and dry.   Psychiatric: She has a normal mood and affect.   Nursing note and vitals reviewed.      Personal Diagnostic Review:     Pulmonary function tests: FEV1: 1.76  (135 % predicted), FVC:  2.56 (150 % predicted), FEV1/FVC:  69, TLC: 3.76 (91 % predicted), RV/TLVC: 32 (74 % predicted), DLCO: 8.6 (56 % predicted)  Mild obstruction. Normal lung volume. Diffusion capacity is moderately reduced.     CXR: 11/06/17: There are mild chronic interstitial opacities in the periphery of both lungs.  No acute consolidation, edema, or effusion.  Borderline cardiomegaly.  Mild tortuosity and atherosclerosis of the aorta.  Thoracic spondylosis.      Assessment / plan       Discussed diagnosis, its evaluation, treatment and usual course. All questions    Problem List Items Addressed This Visit        Immunology/Multi System    Sarcoidosis of lung - Primary    Current Assessment & Plan     PULMONARY SARCOIDOSIS ROS: no significant ongoing wheezing or shortness of breath, using bronchodilator MDI less than twice a week.   New " concerns: NONE.   Exam: appears well, vitals normal, no respiratory distress, acyanotic, normal RR, chest clear, no wheezing, crepitations, rhonchi, normal symmetric air entry.   Assessment:  PULMONARY SARCOIDOSIS stable.   Plan:  Orders as documented in EMR. ACE level, PFT, CXR in 1 year.            Relevant Orders    Complete PFT with bronchodilator    X-Ray Chest PA And Lateral    Angiotensin converting enzyme          TIME SPENT WITH PATIENT: Time spent: 25 minutes in face to face  discussion concerning diagnosis, prognosis, review of lab and test results, benefits of treatment as well as management of disease, counseling of patient and coordination of care between various health  care providers . Greater than half the time spent was used for coordination of care and counseling of patient.     Follow-up in about 1 year (around 5/7/2019) for Sarcoidosis.

## 2018-05-07 NOTE — ASSESSMENT & PLAN NOTE
PULMONARY SARCOIDOSIS ROS: no significant ongoing wheezing or shortness of breath, using bronchodilator MDI less than twice a week.   New concerns: NONE.   Exam: appears well, vitals normal, no respiratory distress, acyanotic, normal RR, chest clear, no wheezing, crepitations, rhonchi, normal symmetric air entry.   Assessment:  PULMONARY SARCOIDOSIS stable.   Plan:  Orders as documented in EMR. ACE level, PFT, CXR in 1 year.

## 2018-05-07 NOTE — PATIENT INSTRUCTIONS
Lung Anatomy  Your lungs take air in to give your body oxygen, which the body needs to work. Your lungs, like all the tissues in your body, are made up of billions of tiny specialized cells. Old lung cells die and are replaced by new, identical lung cells. This natural process helps ensure healthy lungs.    Date Last Reviewed: 11/1/2016  © 0274-4231 [employer]. [address] [place] [address], [place] [address]. All rights reserved. This information is not intended as a substitute for professional medical care. Always follow your healthcare professional's instructions.

## 2018-05-08 LAB
ACE SERPL-CCNC: <5 U/L
POST FEF 25 75: 1.18 L/S (ref 0.48–1.73)
POST FET 100: 12.19 S
POST FEV1 FVC: 72 %
POST FEV1: 1.81 L (ref 1.03–1.58)
POST FIF 50: 1.2 L/S
POST FVC: 2.53 L (ref 1.37–2.02)
POST PEF: 3.94 L/S (ref 2.54–4.36)
PRE DLCO: 8.58 ML/MMHG/MIN (ref 11.1–19.39)
PRE ERV: 0.77 L
PRE FEF 25 75: 0.97 L/S (ref 0.48–1.73)
PRE FET 100: 11.78 S
PRE FEV1 FVC: 69 %
PRE FEV1: 1.76 L (ref 1.03–1.58)
PRE FIF 50: 2.04 L/S
PRE FRC PL: 1.86 L (ref 2.05–3)
PRE FVC: 2.56 L (ref 1.37–2.02)
PRE KROGHS K: 2.33 1/MIN
PRE PEF: 4.02 L/S (ref 2.54–4.36)
PRE RV: 1.19 L (ref 1.43–2.13)
PRE SVC: 2.57 L
PRE TLC: 3.76 L (ref 3.75–4.51)
PREDICTED DLCO: 15.24 ML/MMHG/MIN (ref 11.1–19.39)
PREDICTED FEV1 FVC: 73.81 % (ref 68.91–78.71)
PREDICTED FEV1: 1.3 L (ref 1.03–1.58)
PREDICTED FRC N2: 2.52 L (ref 2.05–3)
PREDICTED FRC PL: 2.52 L (ref 2.05–3)
PREDICTED FVC: 1.7 L (ref 1.37–2.02)
PREDICTED RV: 1.78 L (ref 1.43–2.13)
PREDICTED SVC: 2.29 L
PREDICTED TLC: 4.13 L (ref 3.75–4.51)
PROVOCATION PROTOCOL: ABNORMAL

## 2018-05-28 ENCOUNTER — TELEPHONE (OUTPATIENT)
Dept: INTERNAL MEDICINE | Facility: CLINIC | Age: 83
End: 2018-05-28

## 2018-05-28 DIAGNOSIS — N18.9 CHRONIC KIDNEY DISEASE, UNSPECIFIED CKD STAGE: ICD-10-CM

## 2018-05-28 NOTE — TELEPHONE ENCOUNTER
----- Message from Mario Ordonez sent at 5/28/2018  3:26 PM CDT -----  Contact: pt daughter  Caller is returning the nurse call.                                                                     [phone]

## 2018-06-04 ENCOUNTER — PATIENT OUTREACH (OUTPATIENT)
Dept: ADMINISTRATIVE | Facility: HOSPITAL | Age: 83
End: 2018-06-04

## 2018-06-06 NOTE — PROGRESS NOTES
"Subjective:      Patient ID: Glo Dumont is a 82 y.o. female.    Chief Complaint: Follow-up      HPI  Here for follow up of medical problems.  Taking D3 1K daily.  Energy good.  Splenic lesions assoc with sarcoid.  Breathing well, prn albuterol about 3x per week.BMs normal.  Had flare of colitis, now on slow taper of oral budesonide per Dr. Galindo.  No f/c/n/v.  BMs normal now, no abd pain.  Anxiety doing well on rx.    Updated/ annual due 2/19:  HM: 10/17 fluvax, 3/15 [id number], 2/14 [id number], 2/15 TDaP, 12/15 BMD rep 2y, 2/11 Cscope no repeat will be done, 1/17 MMG no more, Eye doc monthly, GI Dr. Galindo.     Review of Systems   Constitutional: Negative for chills, diaphoresis and fever.   Respiratory: Negative for cough and shortness of breath.    Cardiovascular: Negative for chest pain, palpitations and leg swelling.   Gastrointestinal: Negative for blood in stool, constipation, diarrhea, nausea and vomiting.   Genitourinary: Negative for dysuria, frequency and hematuria.   Psychiatric/Behavioral: The patient is not nervous/anxious.          Objective:   /60 (BP Location: Right arm, Patient Position: Sitting, BP Method: Medium (Manual))   Pulse 66   Temp 98.3 °F (36.8 °C) (Tympanic)   Ht 5' 1" (1.549 m)   Wt 58.7 kg (129 lb 6.6 oz)   SpO2 98%   BMI 24.45 kg/m²     Physical Exam   Constitutional: She is oriented to person, place, and time. She appears well-developed.   HENT:   Mouth/Throat: Oropharynx is clear and moist.   Neck: Neck supple. Carotid bruit is not present. No thyroid mass present.   Cardiovascular: Normal rate, regular rhythm and intact distal pulses.  Exam reveals no gallop and no friction rub.    No murmur heard.  Pulmonary/Chest: Effort normal and breath sounds normal. She has no wheezes. She has no rales.   Abdominal: Soft. Bowel sounds are normal. She exhibits no mass. There is no hepatosplenomegaly. There is no tenderness.   Musculoskeletal: She exhibits no edema. "   Lymphadenopathy:     She has no cervical adenopathy.   Neurological: She is alert and oriented to person, place, and time.   Psychiatric: She has a normal mood and affect.           Assessment:       1. Essential hypertension    2. Sarcoidosis of lung    3. Vitamin D deficiency    4. Colitis    5. Current chronic use of systemic steroids    6. Postsurgical malabsorption, not elsewhere classified     7. Anxiety and depression          Plan:     Essential hypertension- stable, cont rx.    Chronic steroid- r/o DM next visit 4mo.    Colitis- improving on steroid orally.    Anxiety doing well on rx.    Sarcoidosis of lung- doing well.    Vitamin D deficiency- increase to 2K daily, recheck 4mo.  -     cholecalciferol, vitamin D3, 2,000 unit Cap; Take 1 capsule (2,000 Units total) by mouth once daily.  Dispense: 100 capsule; Refill: 6  -     Vitamin D; Future

## 2018-06-08 ENCOUNTER — OFFICE VISIT (OUTPATIENT)
Dept: INTERNAL MEDICINE | Facility: CLINIC | Age: 83
End: 2018-06-08
Payer: MEDICARE

## 2018-06-08 VITALS
HEART RATE: 66 BPM | HEIGHT: 61 IN | TEMPERATURE: 98 F | OXYGEN SATURATION: 98 % | WEIGHT: 129.44 LBS | DIASTOLIC BLOOD PRESSURE: 60 MMHG | SYSTOLIC BLOOD PRESSURE: 132 MMHG | BODY MASS INDEX: 24.44 KG/M2

## 2018-06-08 DIAGNOSIS — F32.A ANXIETY AND DEPRESSION: ICD-10-CM

## 2018-06-08 DIAGNOSIS — F41.9 ANXIETY AND DEPRESSION: ICD-10-CM

## 2018-06-08 DIAGNOSIS — K91.2 POSTSURGICAL MALABSORPTION, NOT ELSEWHERE CLASSIFIED: ICD-10-CM

## 2018-06-08 DIAGNOSIS — E55.9 VITAMIN D DEFICIENCY: ICD-10-CM

## 2018-06-08 DIAGNOSIS — Z79.52 CURRENT CHRONIC USE OF SYSTEMIC STEROIDS: ICD-10-CM

## 2018-06-08 DIAGNOSIS — I10 ESSENTIAL HYPERTENSION: Primary | Chronic | ICD-10-CM

## 2018-06-08 DIAGNOSIS — K52.9 COLITIS: ICD-10-CM

## 2018-06-08 DIAGNOSIS — D86.0 SARCOIDOSIS OF LUNG: ICD-10-CM

## 2018-06-08 PROCEDURE — 99499 UNLISTED E&M SERVICE: CPT | Mod: S$PBB,,, | Performed by: INTERNAL MEDICINE

## 2018-06-08 PROCEDURE — 3078F DIAST BP <80 MM HG: CPT | Mod: CPTII,S$GLB,, | Performed by: INTERNAL MEDICINE

## 2018-06-08 PROCEDURE — 3075F SYST BP GE 130 - 139MM HG: CPT | Mod: CPTII,S$GLB,, | Performed by: INTERNAL MEDICINE

## 2018-06-08 PROCEDURE — 99999 PR PBB SHADOW E&M-EST. PATIENT-LVL III: CPT | Mod: PBBFAC,,, | Performed by: INTERNAL MEDICINE

## 2018-06-08 PROCEDURE — 99214 OFFICE O/P EST MOD 30 MIN: CPT | Mod: S$GLB,,, | Performed by: INTERNAL MEDICINE

## 2018-06-08 RX ORDER — ACETAMINOPHEN 500 MG
1 TABLET ORAL DAILY
Qty: 100 CAPSULE | Refills: 6 | Status: SHIPPED | OUTPATIENT
Start: 2018-06-08 | End: 2022-02-09

## 2018-06-18 ENCOUNTER — OFFICE VISIT (OUTPATIENT)
Dept: CARDIOLOGY | Facility: CLINIC | Age: 83
End: 2018-06-18
Payer: MEDICARE

## 2018-06-18 VITALS
HEART RATE: 48 BPM | DIASTOLIC BLOOD PRESSURE: 72 MMHG | WEIGHT: 184 LBS | BODY MASS INDEX: 25.76 KG/M2 | SYSTOLIC BLOOD PRESSURE: 138 MMHG | HEIGHT: 71 IN

## 2018-06-18 DIAGNOSIS — I10 ESSENTIAL HYPERTENSION: Chronic | ICD-10-CM

## 2018-06-18 DIAGNOSIS — I20.9 ANGINA, CLASS II: Chronic | ICD-10-CM

## 2018-06-18 DIAGNOSIS — I25.10 CORONARY ARTERY DISEASE, OCCLUSIVE: Primary | Chronic | ICD-10-CM

## 2018-06-18 DIAGNOSIS — Z98.61 HISTORY OF CORONARY ANGIOPLASTY: ICD-10-CM

## 2018-06-18 DIAGNOSIS — I50.32 CHRONIC DIASTOLIC HF (HEART FAILURE), NYHA CLASS 2: Chronic | ICD-10-CM

## 2018-06-18 PROCEDURE — 99999 PR PBB SHADOW E&M-EST. PATIENT-LVL III: CPT | Mod: PBBFAC,,, | Performed by: NUCLEAR MEDICINE

## 2018-06-18 PROCEDURE — 93000 ELECTROCARDIOGRAM COMPLETE: CPT | Mod: S$GLB,,, | Performed by: NUCLEAR MEDICINE

## 2018-06-18 PROCEDURE — 3075F SYST BP GE 130 - 139MM HG: CPT | Mod: CPTII,S$GLB,, | Performed by: NUCLEAR MEDICINE

## 2018-06-18 PROCEDURE — 99214 OFFICE O/P EST MOD 30 MIN: CPT | Mod: S$GLB,,, | Performed by: NUCLEAR MEDICINE

## 2018-06-18 PROCEDURE — 3078F DIAST BP <80 MM HG: CPT | Mod: CPTII,S$GLB,, | Performed by: NUCLEAR MEDICINE

## 2018-06-18 PROCEDURE — 99499 UNLISTED E&M SERVICE: CPT | Mod: S$PBB,,, | Performed by: NUCLEAR MEDICINE

## 2018-06-18 NOTE — PROGRESS NOTES
Subjective:   Patient ID:  Glo Dumont is a 82 y.o. female who presents for follow-up of Coronary Artery Disease (6 month followup) and Hypertension      HPI 1- CHRONIC CAD- ANGINA FC 2- POST PCI/HALIE- DAPT.  ASA AND EFFIENT   2- ESSENTIAL HTN WITH HFPEF, DD, STAGE B.  3- PAD- CAROTID ARTERY DISEASE- RCEA  DOING WELL. NO RECURRENT ANGINA. NO RECENT ED VISITS OR HOSPITALIZATIONS FOR ACS OR ADHF OR ARRHYTHMIAS  NO UNUSUAL SOOD. NO ORTHOPNEA OR PND.  NO EDEMA. NO CALVE TENDERNESS  NO NEAR SYNCOPE OR SYNCOPE  NO PALPITATIONS.   NO FOCAL CNS SYMPTOMS OR SIGNS TO SUGGEST TIA OR STROKE  ECG TODAY- SB 48 BMP. OTHERWISE UNREMARKABLE    Review of Systems   Constitution: Negative for chills, fever, weakness, night sweats, weight gain and weight loss.   HENT: Negative for nosebleeds.    Eyes: Negative for blurred vision, double vision and visual disturbance.   Cardiovascular: Negative for chest pain, dyspnea on exertion, irregular heartbeat, leg swelling, orthopnea, palpitations, paroxysmal nocturnal dyspnea and syncope.   Respiratory: Negative for cough, hemoptysis and wheezing.    Endocrine: Negative for polydipsia and polyuria.   Hematologic/Lymphatic: Does not bruise/bleed easily.   Skin: Negative for rash.   Musculoskeletal: Negative for joint pain, joint swelling, muscle weakness and myalgias.   Gastrointestinal: Negative for abdominal pain, hematemesis, jaundice and melena.   Genitourinary: Negative for dysuria, hematuria and nocturia.   Neurological: Negative for dizziness, focal weakness, headaches and sensory change.   Psychiatric/Behavioral: Negative for depression. The patient does not have insomnia and is not nervous/anxious.      Family History   Problem Relation Age of Onset    Hypertension Mother     Heart failure Mother     Cancer Father         prostate    Cirrhosis Brother     Heart failure Brother     Cancer Daughter         Carcinoid     Past Medical History:   Diagnosis Date    Anemia     Angina  pectoris     Anxiety     Arthritis     hip    Carotid artery occlusion     Carpal tunnel syndrome 06/23/2008    emg    Chronic diarrhea     work up in 2011 with EGD, CS and VCE    CKD (chronic kidney disease) stage 3, GFR 30-59 ml/min 5/11/2017    Coronary artery disease     Coronary artery disease     Diastolic dysfunction     Diverticulosis     Greater trochanteric bursitis 2/10/2015    Grief at loss of child 1/26/2016    H/O carotid endarterectomy 12/2/2013    Heart failure     History of coronary angioplasty 3/11/2014    Hypercholesteremia     Hypertension     Liver cyst 02/08/2013    ct abd    Macular degeneration     Primary open-angle glaucoma(365.11) 9/3/2013    Renal cyst 02/08/2013    ct abd    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    Sarcoidosis     Sarcoidosis of lung     Sickle cell trait     Uveitis      Current Outpatient Prescriptions on File Prior to Visit   Medication Sig Dispense Refill    aspirin (ECOTRIN) 81 MG EC tablet Take 81 mg by mouth once daily.      budesonide (ENTOCORT EC) 3 mg capsule Take 9 mg by mouth once daily.      calcium carbonate (OS-LYNN) 600 mg calcium (1,500 mg) Tab Take by mouth.      cholecalciferol, vitamin D3, 2,000 unit Cap Take 1 capsule (2,000 Units total) by mouth once daily. 100 capsule 6    dicyclomine (BENTYL) 10 MG capsule Take 10 mg by mouth 4 (four) times daily before meals and nightly.      dorzolamide-timolol 2-0.5% (COSOPT) 22.3-6.8 mg/mL ophthalmic solution       DUREZOL 0.05 % Drop ophthalmic solution       FLUoxetine (PROZAC) 10 MG capsule TAKE ONE CAPSULE BY MOUTH ONCE DAILY 30 capsule 11    fluticasone (FLONASE) 50 mcg/actuation nasal spray 2 sprays by Each Nare route once daily. 1 Bottle 12    FOLIC ACID/MULTIVIT-MIN/LUTEIN (CENTRUM SILVER ORAL) Take by mouth.      hydroCHLOROthiazide (MICROZIDE) 12.5 mg capsule TAKE ONE CAPSULE BY MOUTH ONCE DAILY 30 capsule 11    iron-vitamin C 100-250 mg, ICAR-C,  (ICAR-C) 100-250 mg Tab Take 1 tablet by mouth once daily. 100 tablet 6    lisinopril (PRINIVIL,ZESTRIL) 40 MG tablet TAKE ONE TABLET BY MOUTH ONCE DAILY 30 tablet 11    metoprolol tartrate (LOPRESSOR) 50 MG tablet TAKE ONE TABLET BY MOUTH IN THE MORNING AND 2 TABLETS BY MOUTH IN THE EVENING (Patient taking differently: Take 25 mg by mouth 2 (two) times daily. TAKE ONE TABLET BY MOUTH IN THE MORNING AND 2 TABLETS BY MOUTH IN THE EVENING) 270 tablet 3    pantoprazole (PROTONIX) 40 MG tablet Take 1 tablet (40 mg total) by mouth once daily. 30 tablet 11    prasugrel (EFFIENT) 10 mg Tab TAKE ONE TABLET BY MOUTH ONCE DAILY 30 tablet 5    simvastatin (ZOCOR) 40 MG tablet TAKE ONE TABLET BY MOUTH EVERY EVENING 30 tablet 11     No current facility-administered medications on file prior to visit.      Review of patient's allergies indicates:   Allergen Reactions    Codeine Nausea And Vomiting       Objective:     Physical Exam   Constitutional: She is oriented to person, place, and time. She appears well-developed. No distress.   HENT:   Head: Normocephalic.   Eyes: Conjunctivae are normal. Pupils are equal, round, and reactive to light. No scleral icterus.   Neck: Normal range of motion. Neck supple. Normal carotid pulses, no hepatojugular reflux and no JVD present. Carotid bruit is not present. No edema present. No thyroid mass and no thyromegaly present.   Cardiovascular: Normal rate, regular rhythm, S1 normal, S2 normal, normal heart sounds and intact distal pulses.  PMI is not displaced.  Exam reveals no gallop and no friction rub.    No murmur heard.  Pulses:       Carotid pulses are 2+ on the right side, and 2+ on the left side.       Radial pulses are 2+ on the right side, and 2+ on the left side.        Femoral pulses are 2+ on the right side, and 2+ on the left side.       Popliteal pulses are 2+ on the right side, and 2+ on the left side.        Dorsalis pedis pulses are 2+ on the right side, and 2+ on the  left side.        Posterior tibial pulses are 2+ on the right side, and 2+ on the left side.   Pulmonary/Chest: Effort normal and breath sounds normal. She has no wheezes. She has no rales. She exhibits no tenderness.   Abdominal: Soft. Bowel sounds are normal. She exhibits no pulsatile midline mass and no mass. There is no hepatosplenomegaly. There is no tenderness.   Musculoskeletal: Normal range of motion. She exhibits no edema or tenderness.        Cervical back: Normal.        Thoracic back: Normal.        Lumbar back: Normal.   Lymphadenopathy:     She has no cervical adenopathy.     She has no axillary adenopathy.        Right: No supraclavicular adenopathy present.        Left: No supraclavicular adenopathy present.   Neurological: She is alert and oriented to person, place, and time. She has normal strength and normal reflexes. No sensory deficit. Gait normal.   Skin: Skin is warm. No rash noted. No cyanosis. No pallor. Nails show no clubbing.   Psychiatric: She has a normal mood and affect. Her speech is normal and behavior is normal. Cognition and memory are normal.       Assessment:     1. Coronary artery disease, occlusive    2. Angina, class II    3. History of coronary angioplasty    4. Chronic diastolic HF (heart failure), NYHA class 2    5. Essential hypertension      STABLE CAD- NO ACTIVE MYOCARDIAL ISCHEMIA  NO ARRHYTHMIA. NO ADHF  BP WELL CONTROLLED  CNS STATUS STABLE  CARD MED WELL TOLERATED  Plan:     Coronary artery disease, occlusive    Angina, class II    History of coronary angioplasty    Chronic diastolic HF (heart failure), NYHA class 2    Essential hypertension    1- CONTINUE PRESENT CARD MANAGEMENT    2- RETURN IN 6 MONTHS.

## 2018-07-03 ENCOUNTER — OFFICE VISIT (OUTPATIENT)
Dept: OPHTHALMOLOGY | Facility: CLINIC | Age: 83
End: 2018-07-03
Payer: MEDICARE

## 2018-07-03 DIAGNOSIS — H40.049 STEROID RESPONDERS TO GLAUCOMA, UNSPECIFIED LATERALITY: ICD-10-CM

## 2018-07-03 DIAGNOSIS — Z98.41 CATARACT EXTRACTION STATUS, RIGHT: ICD-10-CM

## 2018-07-03 DIAGNOSIS — H35.353 CME (CYSTOID MACULAR EDEMA), BILATERAL: Primary | ICD-10-CM

## 2018-07-03 DIAGNOSIS — Z98.42 CATARACT EXTRACTION STATUS, LEFT: ICD-10-CM

## 2018-07-03 DIAGNOSIS — H20.13 CHRONIC IRITIS OF BOTH EYES: ICD-10-CM

## 2018-07-03 PROCEDURE — 99999 PR PBB SHADOW E&M-EST. PATIENT-LVL I: CPT | Mod: PBBFAC,,, | Performed by: OPHTHALMOLOGY

## 2018-07-03 PROCEDURE — 92004 COMPRE OPH EXAM NEW PT 1/>: CPT | Mod: S$GLB,,, | Performed by: OPHTHALMOLOGY

## 2018-07-03 NOTE — PROGRESS NOTES
HPI     Patient returns for an eye exam, patient is now seeing Dr. Markham for   retina, Dr. Green put patient on Cosopt bid ou approx. 2 years ago along   with Durezol once daily ou, patient states Dr. Green stated she does not   have Glaucoma he just wants her on both  Drops, Dr. Markham wants her off   Durezol completely patient sees her on Thursday 07/05/18.      Last seen 08/27/14 [facility]    NO DM  PCIOL OU MGM  SARCOIDOSIS  H\O UVEITIS  AVASTIN OS 11/29/13  EYLEA OS 7/31/14 7/2/14 3/6/14 2/6/14    Last edited by POLLO Tovar on 7/3/2018  1:06 PM. (History)            Assessment /Plan     For exam results, see Encounter Report.      ICD-10-CM ICD-9-CM    1. CME (cystoid macular edema), bilateral H35.353 362.53 OS>OD- followed BY Dr Snyder- continue treatment with her office    2. Cataract extraction status, left Z98.42 V45.61 Well   3. Cataract extraction status, right Z98.41 V45.61 Well   4. Chronic iritis of both eyes H20.13 364.10 Sarcoid-   Controlled and followed by Dr Snyder    5. 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

## 2018-07-27 ENCOUNTER — PATIENT OUTREACH (OUTPATIENT)
Dept: ADMINISTRATIVE | Facility: HOSPITAL | Age: 83
End: 2018-07-27

## 2018-08-07 DIAGNOSIS — E78.5 HYPERLIPIDEMIA: ICD-10-CM

## 2018-08-07 RX ORDER — SIMVASTATIN 40 MG/1
TABLET, FILM COATED ORAL
Qty: 30 TABLET | Refills: 0 | Status: SHIPPED | OUTPATIENT
Start: 2018-08-07 | End: 2020-05-13 | Stop reason: SDUPTHER

## 2018-09-07 DIAGNOSIS — N18.9 CHRONIC KIDNEY DISEASE, UNSPECIFIED CKD STAGE: ICD-10-CM

## 2018-09-07 RX ORDER — METOPROLOL TARTRATE 50 MG/1
TABLET ORAL
Qty: 270 TABLET | Refills: 0 | Status: SHIPPED | OUTPATIENT
Start: 2018-09-07 | End: 2019-03-19 | Stop reason: SDUPTHER

## 2018-10-11 RX ORDER — PRASUGREL 10 MG/1
10 TABLET, FILM COATED ORAL DAILY
Qty: 30 TABLET | Refills: 5 | Status: SHIPPED | OUTPATIENT
Start: 2018-10-11 | End: 2019-05-02 | Stop reason: SDUPTHER

## 2018-11-16 ENCOUNTER — IMMUNIZATION (OUTPATIENT)
Dept: PHARMACY | Facility: CLINIC | Age: 83
End: 2018-11-16
Payer: MEDICARE

## 2018-12-10 ENCOUNTER — TELEPHONE (OUTPATIENT)
Dept: CARDIOLOGY | Facility: CLINIC | Age: 83
End: 2018-12-10

## 2018-12-10 NOTE — TELEPHONE ENCOUNTER
----- Message from Colten Carrillo sent at 12/10/2018 10:35 AM CST -----  Contact: Glo [phone]  Pt wants to know if she can be fitted into the schedule for 12/12.

## 2018-12-10 NOTE — TELEPHONE ENCOUNTER
Returned call. Spoke with patient. Scheduled Patient and spouse for 01/02/2018 at 9 and 930, [facility] location.

## 2018-12-13 DIAGNOSIS — N18.9 CHRONIC KIDNEY DISEASE, UNSPECIFIED CKD STAGE: ICD-10-CM

## 2019-01-04 RX ORDER — AMLODIPINE BESYLATE 10 MG/1
TABLET ORAL
COMMUNITY
Start: 2018-12-26 | End: 2019-09-19 | Stop reason: SDUPTHER

## 2019-02-05 ENCOUNTER — HOSPITAL ENCOUNTER (OUTPATIENT)
Dept: RADIOLOGY | Facility: HOSPITAL | Age: 84
Discharge: HOME | End: 2019-02-05
Attending: PHYSICIAN ASSISTANT
Payer: MEDICARE

## 2019-02-05 ENCOUNTER — OFFICE VISIT (OUTPATIENT)
Dept: CARDIOLOGY | Facility: CLINIC | Age: 84
End: 2019-02-05
Payer: MEDICARE

## 2019-02-05 ENCOUNTER — CLINICAL SUPPORT (OUTPATIENT)
Dept: CARDIOLOGY | Facility: CLINIC | Age: 84
End: 2019-02-05
Payer: MEDICARE

## 2019-02-05 ENCOUNTER — TELEPHONE (OUTPATIENT)
Dept: INTERNAL MEDICINE | Facility: CLINIC | Age: 84
End: 2019-02-05

## 2019-02-05 ENCOUNTER — OFFICE VISIT (OUTPATIENT)
Dept: INTERNAL MEDICINE | Facility: CLINIC | Age: 84
End: 2019-02-05
Payer: MEDICARE

## 2019-02-05 VITALS
SYSTOLIC BLOOD PRESSURE: 138 MMHG | HEART RATE: 53 BPM | DIASTOLIC BLOOD PRESSURE: 74 MMHG | SYSTOLIC BLOOD PRESSURE: 162 MMHG | WEIGHT: 132 LBS | HEIGHT: 61 IN | HEART RATE: 50 BPM | HEIGHT: 61 IN | OXYGEN SATURATION: 97 % | DIASTOLIC BLOOD PRESSURE: 80 MMHG | TEMPERATURE: 97 F | WEIGHT: 132.69 LBS | BODY MASS INDEX: 24.92 KG/M2 | BODY MASS INDEX: 25.05 KG/M2

## 2019-02-05 DIAGNOSIS — I70.0 ATHEROSCLEROSIS OF AORTA: ICD-10-CM

## 2019-02-05 DIAGNOSIS — I10 ESSENTIAL HYPERTENSION: Chronic | ICD-10-CM

## 2019-02-05 DIAGNOSIS — I20.9 ANGINA, CLASS II: Chronic | ICD-10-CM

## 2019-02-05 DIAGNOSIS — D86.0 SARCOIDOSIS OF LUNG: ICD-10-CM

## 2019-02-05 DIAGNOSIS — R07.9 CHEST PAIN, UNSPECIFIED TYPE: Primary | ICD-10-CM

## 2019-02-05 DIAGNOSIS — R07.9 CHEST PAIN: ICD-10-CM

## 2019-02-05 DIAGNOSIS — D50.9 IRON DEFICIENCY ANEMIA, UNSPECIFIED IRON DEFICIENCY ANEMIA TYPE: ICD-10-CM

## 2019-02-05 DIAGNOSIS — R07.9 CHEST PAIN, UNSPECIFIED TYPE: ICD-10-CM

## 2019-02-05 DIAGNOSIS — I25.10 CORONARY ARTERY DISEASE, OCCLUSIVE: Primary | Chronic | ICD-10-CM

## 2019-02-05 DIAGNOSIS — I11.0 HYPERTENSIVE HEART DISEASE WITH HEART FAILURE: ICD-10-CM

## 2019-02-05 DIAGNOSIS — N18.30 CKD (CHRONIC KIDNEY DISEASE) STAGE 3, GFR 30-59 ML/MIN: ICD-10-CM

## 2019-02-05 DIAGNOSIS — M85.80 OSTEOPENIA, UNSPECIFIED LOCATION: ICD-10-CM

## 2019-02-05 DIAGNOSIS — K52.9 COLITIS: ICD-10-CM

## 2019-02-05 DIAGNOSIS — I50.32 CHRONIC DIASTOLIC HF (HEART FAILURE), NYHA CLASS 2: Chronic | ICD-10-CM

## 2019-02-05 DIAGNOSIS — I25.10 CORONARY ARTERY DISEASE, OCCLUSIVE: Chronic | ICD-10-CM

## 2019-02-05 PROCEDURE — 99214 PR OFFICE/OUTPT VISIT, EST, LEVL IV, 30-39 MIN: ICD-10-PCS | Mod: HCNC,S$GLB,, | Performed by: NUCLEAR MEDICINE

## 2019-02-05 PROCEDURE — 99499 RISK ADDL DX/OHS AUDIT: ICD-10-PCS | Mod: HCNC,S$GLB,, | Performed by: PHYSICIAN ASSISTANT

## 2019-02-05 PROCEDURE — 99999 PR PBB SHADOW E&M-EST. PATIENT-LVL IV: ICD-10-PCS | Mod: PBBFAC,HCNC,, | Performed by: PHYSICIAN ASSISTANT

## 2019-02-05 PROCEDURE — 1101F PR PT FALLS ASSESS DOC 0-1 FALLS W/OUT INJ PAST YR: ICD-10-PCS | Mod: HCNC,CPTII,S$GLB, | Performed by: PHYSICIAN ASSISTANT

## 2019-02-05 PROCEDURE — 99214 OFFICE O/P EST MOD 30 MIN: CPT | Mod: HCNC,S$GLB,, | Performed by: NUCLEAR MEDICINE

## 2019-02-05 PROCEDURE — 71046 X-RAY EXAM CHEST 2 VIEWS: CPT | Mod: 26,HCNC,, | Performed by: RADIOLOGY

## 2019-02-05 PROCEDURE — 71046 X-RAY EXAM CHEST 2 VIEWS: CPT | Mod: TC,HCNC

## 2019-02-05 PROCEDURE — 93000 EKG 12-LEAD: ICD-10-PCS | Mod: HCNC,S$GLB,, | Performed by: INTERNAL MEDICINE

## 2019-02-05 PROCEDURE — 1101F PR PT FALLS ASSESS DOC 0-1 FALLS W/OUT INJ PAST YR: ICD-10-PCS | Mod: HCNC,CPTII,S$GLB, | Performed by: NUCLEAR MEDICINE

## 2019-02-05 PROCEDURE — 3074F PR MOST RECENT SYSTOLIC BLOOD PRESSURE < 130 MM HG: ICD-10-PCS | Mod: HCNC,CPTII,S$GLB, | Performed by: NUCLEAR MEDICINE

## 2019-02-05 PROCEDURE — 99499 RISK ADDL DX/OHS AUDIT: ICD-10-PCS | Mod: HCNC,S$GLB,, | Performed by: NUCLEAR MEDICINE

## 2019-02-05 PROCEDURE — 99999 PR PBB SHADOW E&M-EST. PATIENT-LVL III: ICD-10-PCS | Mod: PBBFAC,HCNC,, | Performed by: NUCLEAR MEDICINE

## 2019-02-05 PROCEDURE — 93000 ELECTROCARDIOGRAM COMPLETE: CPT | Mod: HCNC,S$GLB,, | Performed by: INTERNAL MEDICINE

## 2019-02-05 PROCEDURE — 99499 UNLISTED E&M SERVICE: CPT | Mod: HCNC,S$GLB,, | Performed by: PHYSICIAN ASSISTANT

## 2019-02-05 PROCEDURE — 99999 PR PBB SHADOW E&M-EST. PATIENT-LVL III: CPT | Mod: PBBFAC,HCNC,, | Performed by: NUCLEAR MEDICINE

## 2019-02-05 PROCEDURE — 71046 XR CHEST PA AND LATERAL: ICD-10-PCS | Mod: 26,HCNC,, | Performed by: RADIOLOGY

## 2019-02-05 PROCEDURE — 1101F PT FALLS ASSESS-DOCD LE1/YR: CPT | Mod: HCNC,CPTII,S$GLB, | Performed by: NUCLEAR MEDICINE

## 2019-02-05 PROCEDURE — 3079F DIAST BP 80-89 MM HG: CPT | Mod: HCNC,CPTII,S$GLB, | Performed by: NUCLEAR MEDICINE

## 2019-02-05 PROCEDURE — 3075F SYST BP GE 130 - 139MM HG: CPT | Mod: HCNC,CPTII,S$GLB, | Performed by: PHYSICIAN ASSISTANT

## 2019-02-05 PROCEDURE — 99214 PR OFFICE/OUTPT VISIT, EST, LEVL IV, 30-39 MIN: ICD-10-PCS | Mod: HCNC,S$GLB,, | Performed by: PHYSICIAN ASSISTANT

## 2019-02-05 PROCEDURE — 99214 OFFICE O/P EST MOD 30 MIN: CPT | Mod: HCNC,S$GLB,, | Performed by: PHYSICIAN ASSISTANT

## 2019-02-05 PROCEDURE — 3075F PR MOST RECENT SYSTOLIC BLOOD PRESS GE 130-139MM HG: ICD-10-PCS | Mod: HCNC,CPTII,S$GLB, | Performed by: PHYSICIAN ASSISTANT

## 2019-02-05 PROCEDURE — 1101F PT FALLS ASSESS-DOCD LE1/YR: CPT | Mod: HCNC,CPTII,S$GLB, | Performed by: PHYSICIAN ASSISTANT

## 2019-02-05 PROCEDURE — 3078F DIAST BP <80 MM HG: CPT | Mod: HCNC,CPTII,S$GLB, | Performed by: PHYSICIAN ASSISTANT

## 2019-02-05 PROCEDURE — 3079F PR MOST RECENT DIASTOLIC BLOOD PRESSURE 80-89 MM HG: ICD-10-PCS | Mod: HCNC,CPTII,S$GLB, | Performed by: NUCLEAR MEDICINE

## 2019-02-05 PROCEDURE — 3074F SYST BP LT 130 MM HG: CPT | Mod: HCNC,CPTII,S$GLB, | Performed by: NUCLEAR MEDICINE

## 2019-02-05 PROCEDURE — 99999 PR PBB SHADOW E&M-EST. PATIENT-LVL IV: CPT | Mod: PBBFAC,HCNC,, | Performed by: PHYSICIAN ASSISTANT

## 2019-02-05 PROCEDURE — 3078F PR MOST RECENT DIASTOLIC BLOOD PRESSURE < 80 MM HG: ICD-10-PCS | Mod: HCNC,CPTII,S$GLB, | Performed by: PHYSICIAN ASSISTANT

## 2019-02-05 PROCEDURE — 99499 UNLISTED E&M SERVICE: CPT | Mod: HCNC,S$GLB,, | Performed by: NUCLEAR MEDICINE

## 2019-02-05 RX ORDER — NITROGLYCERIN 0.4 MG/1
0.4 TABLET SUBLINGUAL EVERY 5 MIN PRN
Qty: 25 TABLET | Refills: 4 | Status: SHIPPED | OUTPATIENT
Start: 2019-02-05 | End: 2020-05-22 | Stop reason: SDUPTHER

## 2019-02-05 NOTE — TELEPHONE ENCOUNTER
----- Message from Yunior Gage sent at 2/5/2019  2:33 PM CST -----  Contact: pt   Pt returning call, message left with daughter in reference to visit today.         [phone]

## 2019-02-05 NOTE — PROGRESS NOTES
"Subjective:       Patient ID: Glo Dumont is a 83 y.o. female.    Chief Complaint: Chest Pain    83 year old female c/o one episode of anterior midline "4/10 pressure" chest pain 2 night ago - radiated into L arm. She reports sxs began when she was lying in bed resting. She reports taking Tums and sxs resolved in about 15 minutes. She denies any current CP. She admits she missed her last cardiology f/u appt. She sees GI Dr. Galindo for colitis - says those sxs are stable and denies any current flare. She reports no exertional sxs, SOB, fever, chills, known injury, numbness/tingling, muscular weakness, diaphoresis, palpitations, edema, N/V, dizziness, or other medical complaints. Pt sees hem/onc and pulm for sarcoidosis.    PCP: Dr. Cardona    Patient Active Problem List:     Sarcoidosis of lung     Thyroid nodule     Hypertensive heart disease with heart failure     Pure hypercholesterolemia     Hemoglobin A-S genotype     Angina, class II     Ptosis     Coronary artery disease, occlusive     Central vein occlusion of retina     Iron deficiency anemia     Vitamin D deficiency     Osteopenia     Essential hypertension     Chronic diastolic HF (heart failure), NYHA class 2     Atherosclerosis of aorta     Anxiety and depression     CKD (chronic kidney disease) stage 3, GFR 30-59 ml/min     Uveitis     Colitis     History of coronary angioplasty     Esophageal dysphagia     Hiatal hernia      Review of Systems   Constitutional: Negative for chills and fever.   HENT: Negative for congestion and sore throat.    Respiratory: Negative for cough and shortness of breath.    Cardiovascular: Positive for chest pain (resolved). Negative for palpitations and leg swelling.   Gastrointestinal: Negative for abdominal pain, blood in stool, constipation, diarrhea, nausea and vomiting.   Genitourinary: Negative for difficulty urinating, dysuria, flank pain, frequency, hematuria and urgency.   Skin: Negative for rash.   Neurological: " "Negative for dizziness, weakness, numbness and headaches.   Psychiatric/Behavioral: Negative for confusion.       Objective:       Vitals:    02/05/19 0842   BP: 138/74  Comment: avg BP at home   BP Location: Right arm   Patient Position: Sitting   BP Method: Small (Manual)   Pulse: (!) 50   Temp: 96.5 °F (35.8 °C)   TempSrc: Tympanic   SpO2: 97%   Weight: 60.2 kg (132 lb 11.5 oz)   Height: 5' 1" (1.549 m)     Physical Exam   Constitutional: She is oriented to person, place, and time. She appears well-developed and well-nourished. No distress.   HENT:   Head: Normocephalic and atraumatic.   Eyes: EOM are normal. No scleral icterus.   Neck: Neck supple.   Cardiovascular: Normal rate and regular rhythm.   Pulmonary/Chest: Effort normal and breath sounds normal. No stridor. No respiratory distress. She has no decreased breath sounds. She has no wheezes. She has no rhonchi. She has no rales.   Abdominal: Soft. Bowel sounds are normal. She exhibits no distension and no mass. There is no tenderness. There is no rigidity, no rebound, no guarding and no CVA tenderness.   Musculoskeletal: Normal range of motion. She exhibits no edema.   Neurological: She is alert and oriented to person, place, and time. No cranial nerve deficit.   Skin: Skin is warm and dry. No rash noted.   Psychiatric: She has a normal mood and affect. Her speech is normal and behavior is normal. Thought content normal.       Component      Latest Ref Rng & Units 2/1/2018 12/5/2017   WBC      3.90 - 12.70 K/uL  5.97   RBC      4.00 - 5.40 M/uL  3.60 (L)   Hemoglobin      12.0 - 16.0 g/dL  10.4 (L)   Hematocrit      37.0 - 48.5 %  31.3 (L)   MCV      82 - 98 fL  87   MCH      27.0 - 31.0 pg  28.9   MCHC      32.0 - 36.0 g/dL  33.2   RDW      11.5 - 14.5 %  15.9 (H)   Platelets      150 - 350 K/uL  287   MPV      9.2 - 12.9 fL  9.8   Gran # (ANC)      1.8 - 7.7 K/uL  4.2   Lymph #      1.0 - 4.8 K/uL  1.3   Mono #      0.3 - 1.0 K/uL  0.3   Eos #      0.0 - " 0.5 K/uL  0.2   Baso #      0.00 - 0.20 K/uL  0.02   Gran%      38.0 - 73.0 %  69.9   Lymph%      18.0 - 48.0 %  20.9   Mono%      4.0 - 15.0 %  5.5   Eosinophil%      0.0 - 8.0 %  3.4   Basophil%      0.0 - 1.9 %  0.3   Differential Method        Automated   Sodium      136 - 145 mmol/L  143   Potassium      3.5 - 5.1 mmol/L  4.8   Chloride      95 - 110 mmol/L  109   CO2      23 - 29 mmol/L  24   Glucose      70 - 110 mg/dL  99   BUN, Bld      8 - 23 mg/dL  32 (H)   Creatinine      0.5 - 1.4 mg/dL  1.4   Calcium      8.7 - 10.5 mg/dL  10.3   Total Protein      6.0 - 8.4 g/dL  7.4   Albumin      3.5 - 5.2 g/dL  4.0   Total Bilirubin      0.1 - 1.0 mg/dL  0.4   Alkaline Phosphatase      55 - 135 U/L  63   AST      10 - 40 U/L  17   ALT      10 - 44 U/L  12   Anion Gap      8 - 16 mmol/L  10   eGFR if African American      >60 mL/min/1.73 m:2  41 (A)   eGFR if non African American      >60 mL/min/1.73 m:2  36 (A)   Cholesterol      120 - 199 mg/dL 211 (H)    Triglycerides      30 - 150 mg/dL 93    HDL      40 - 75 mg/dL 57    LDL Cholesterol      63.0 - 159.0 mg/dL 135.4    HDL/Chol Ratio      20.0 - 50.0 % 27.0    Total Cholesterol/HDL Ratio      2.0 - 5.0 3.7    Non-HDL Cholesterol      mg/dL 154    TSH      0.400 - 4.000 uIU/mL 0.557    Vit D, 25-Hydroxy      30 - 96 ng/mL 23 (L)      Assessment:       1. Chest pain, unspecified type    2. Coronary artery disease, occlusive    3. Essential hypertension    4. Hypertensive heart disease with heart failure    5. Atherosclerosis of aorta    6. CKD (chronic kidney disease) stage 3, GFR 30-59 ml/min    7. Sarcoidosis of lung    8. Colitis    9. Iron deficiency anemia, unspecified iron deficiency anemia type    10. Osteopenia, unspecified location    11. Chest pain        Plan:         Glo was seen today for chest pain.    Diagnoses and all orders for this visit:    Chest pain, unspecified type  -     EKG 12-lead; Future  -     X-Ray Chest PA And Lateral; Future  CXR  and EKG today with result review following. Pt denies any current CP. ER if sxs return.    Coronary artery disease, occlusive; Essential hypertension; Hypertensive heart disease with heart failure  -     EKG 12-lead; Future  -     X-Ray Chest PA And Lateral; Future  F/u with cardiology as recommended.    Atherosclerosis of aorta    CKD (chronic kidney disease) stage 3, GFR 30-59 ml/min    Sarcoidosis of lung  -     X-Ray Chest PA And Lateral; Future  F/u with pulm as recommended.    Colitis  Per GI.    Iron deficiency anemia, unspecified iron deficiency anemia type  Per hem/onc.    Osteopenia, unspecified location    Follow-up with your PCP and specialists as recommended for health management.

## 2019-02-05 NOTE — PROGRESS NOTES
Subjective:   Patient ID:  Glo Dumont is a 83 y.o. female who presents for follow-up of Coronary Artery Disease and Hypertension      HPI TWO DAYS AGO, WHILE RESTING , SHE EXPERIENCED AN EPISODE OF MILD CHEST TIGHTNESS, MID STERNAL , RADIATED TO LEFT ARM.  LASTING ABOUT 15 MINUTES, RELIEVED SPONTANEOUSLY. NO ASSOCIATED SYMPTOMS- SOB, PERSPIRATION, WEAKNESS. NAUSEA  NO RECURRENCE,  FIRST EPISODE DURING LAST 6 MONTHS.   SHE DID NOT USE SL NTG- WERE OLD  PRESENTLY  DOING WELL.  AND FOR LAST MONTH SHE HAS BEEN DOING WELL. NO CV SYMPTOMS  CARD MED GOOD COMPLIANCE  NO PALPITATIONS. NO NEAR SYNCOPE. NO SYNCOPE  NO UNUSUAL SOOD WITH ORDINARY DAILY ACTIVITIES. NO ORTHOPNEA OR PND  NO ABDOMINAL DISCOMFORT  NO EDEMA. NO CALVE TENDERNESS.   NO FOCAL CNS SYMPTOMS OR SIGNS TO SUGGEST TIA OR STROKE    Review of Systems   Constitution: Negative for chills, fever, weakness, night sweats, weight gain and weight loss.   HENT: Negative for nosebleeds.    Eyes: Negative for blurred vision, double vision and visual disturbance.   Cardiovascular: Negative for chest pain, dyspnea on exertion, irregular heartbeat, leg swelling, orthopnea, palpitations, paroxysmal nocturnal dyspnea and syncope.   Respiratory: Negative for cough, hemoptysis and wheezing.    Endocrine: Negative for polydipsia and polyuria.   Hematologic/Lymphatic: Does not bruise/bleed easily.   Skin: Negative for rash.   Musculoskeletal: Negative for joint pain, joint swelling, muscle weakness and myalgias.   Gastrointestinal: Negative for abdominal pain, hematemesis, jaundice and melena.   Genitourinary: Negative for dysuria, hematuria and nocturia.   Neurological: Negative for dizziness, focal weakness, headaches and sensory change.   Psychiatric/Behavioral: Negative for depression. The patient does not have insomnia and is not nervous/anxious.      Family History   Problem Relation Age of Onset    Hypertension Mother     Heart failure Mother     Cancer Father          prostate    Cirrhosis Brother     Heart failure Brother     Cancer Daughter         Carcinoid     Past Medical History:   Diagnosis Date    Anemia     Angina pectoris     Anxiety     Arthritis     hip    Carotid artery occlusion     Carpal tunnel syndrome 06/23/2008    emg    Chronic diarrhea     work up in 2011 with EGD, CS and VCE    CKD (chronic kidney disease) stage 3, GFR 30-59 ml/min 5/11/2017    Coronary artery disease     Coronary artery disease     Diastolic dysfunction     Diverticulosis     Greater trochanteric bursitis 2/10/2015    Grief at loss of child 1/26/2016    H/O carotid endarterectomy 12/2/2013    Heart failure     History of coronary angioplasty 3/11/2014    Hypercholesteremia     Hypertension     Liver cyst 02/08/2013    ct abd    Macular degeneration     Primary open-angle glaucoma(365.11) 9/3/2013    Renal cyst 02/08/2013    ct abd    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    Sarcoidosis     Sarcoidosis of lung     Sickle cell trait     Uveitis      Current Outpatient Medications on File Prior to Visit   Medication Sig Dispense Refill    amLODIPine (NORVASC) 10 MG tablet       aspirin (ECOTRIN) 81 MG EC tablet Take 81 mg by mouth once daily.      budesonide (ENTOCORT EC) 3 mg capsule Take 9 mg by mouth once daily.      calcium carbonate (OS-LYNN) 600 mg calcium (1,500 mg) Tab Take by mouth.      cholecalciferol, vitamin D3, 2,000 unit Cap Take 1 capsule (2,000 Units total) by mouth once daily. 100 capsule 6    dorzolamide-timolol 2-0.5% (COSOPT) 22.3-6.8 mg/mL ophthalmic solution       DUREZOL 0.05 % Drop ophthalmic solution       FLUoxetine (PROZAC) 10 MG capsule TAKE ONE CAPSULE BY MOUTH ONCE DAILY 30 capsule 11    fluticasone (FLONASE) 50 mcg/actuation nasal spray 2 sprays by Each Nare route once daily. 1 Bottle 12    FOLIC ACID/MULTIVIT-MIN/LUTEIN (CENTRUM SILVER ORAL) Take by mouth.      hydroCHLOROthiazide (MICROZIDE) 12.5 mg  capsule TAKE ONE CAPSULE BY MOUTH ONCE DAILY 30 capsule 11    iron-vitamin C 100-250 mg, ICAR-C, (ICAR-C) 100-250 mg Tab Take 1 tablet by mouth once daily. 100 tablet 6    lisinopril (PRINIVIL,ZESTRIL) 40 MG tablet TAKE ONE TABLET BY MOUTH ONCE DAILY 30 tablet 11    metoprolol tartrate (LOPRESSOR) 50 MG tablet TAKE ONE TABLET BY MOUTH IN THE MORNING AND 2 TABLETS BY MOUTH IN THEEVENING 270 tablet 0    prasugrel (EFFIENT) 10 mg Tab Take 1 tablet (10 mg total) by mouth once daily. 30 tablet 5    simvastatin (ZOCOR) 40 MG tablet TAKE ONE TABLET BY MOUTH EVERY EVENING 30 tablet 0    dicyclomine (BENTYL) 10 MG capsule Take 10 mg by mouth 4 (four) times daily before meals and nightly.      pantoprazole (PROTONIX) 40 MG tablet Take 1 tablet (40 mg total) by mouth once daily. 30 tablet 11     No current facility-administered medications on file prior to visit.      Review of patient's allergies indicates:   Allergen Reactions    Codeine Nausea And Vomiting       Objective:     Physical Exam   Constitutional: She is oriented to person, place, and time. She appears well-developed. No distress.   HENT:   Head: Normocephalic.   Eyes: Conjunctivae are normal. Pupils are equal, round, and reactive to light. No scleral icterus.   Neck: Normal range of motion. Neck supple. Normal carotid pulses, no hepatojugular reflux and no JVD present. Carotid bruit is not present. No edema present. No thyroid mass and no thyromegaly present.   Cardiovascular: Normal rate, regular rhythm, S1 normal, S2 normal, normal heart sounds and intact distal pulses. PMI is not displaced. Exam reveals no gallop and no friction rub.   No murmur heard.  Pulses:       Carotid pulses are 2+ on the right side, and 2+ on the left side.       Radial pulses are 2+ on the right side, and 2+ on the left side.        Femoral pulses are 2+ on the right side, and 2+ on the left side.       Popliteal pulses are 2+ on the right side, and 2+ on the left side.         Dorsalis pedis pulses are 2+ on the right side, and 2+ on the left side.        Posterior tibial pulses are 2+ on the right side, and 2+ on the left side.   Pulmonary/Chest: Effort normal and breath sounds normal. She has no wheezes. She has no rales. She exhibits no tenderness.   Abdominal: Soft. Bowel sounds are normal. She exhibits no pulsatile midline mass and no mass. There is no hepatosplenomegaly. There is no tenderness.   Musculoskeletal: Normal range of motion. She exhibits no edema or tenderness.        Cervical back: Normal.        Thoracic back: Normal.        Lumbar back: Normal.   Lymphadenopathy:     She has no cervical adenopathy.     She has no axillary adenopathy.        Right: No supraclavicular adenopathy present.        Left: No supraclavicular adenopathy present.   Neurological: She is alert and oriented to person, place, and time. She has normal strength and normal reflexes. No sensory deficit. Gait normal.   Skin: Skin is warm. No rash noted. No cyanosis. No pallor. Nails show no clubbing.   Psychiatric: She has a normal mood and affect. Her speech is normal and behavior is normal. Cognition and memory are normal.       Assessment:     1. Coronary artery disease, occlusive    2. Angina, class II    3. Chronic diastolic HF (heart failure), NYHA class 2    4. Essential hypertension      RECURRENT ANGINA, STABLE PATTERN.    ECG TODAY- MILD SB, 53,  NO ACUTE ST T CHANGES  BP WELL CONTROLLED  NO CLINICAL EVIDENCE OF ADHF. NO ARRHYTHMIAS  CNS STATUS STABLE  Plan:     Coronary artery disease, occlusive  -     nitroGLYCERIN (NITROSTAT) 0.4 MG SL tablet; Place 1 tablet (0.4 mg total) under the tongue every 5 (five) minutes as needed for Chest pain.  Dispense: 25 tablet; Refill: 4    Angina, class II  -     nitroGLYCERIN (NITROSTAT) 0.4 MG SL tablet; Place 1 tablet (0.4 mg total) under the tongue every 5 (five) minutes as needed for Chest pain.  Dispense: 25 tablet; Refill: 4    Chronic diastolic HF  (heart failure), NYHA class 2    Essential hypertension      1- REFILL NTG SL.  AND COUNSELING ABOUT USE.  AND CALL BACK IF SYMPTOMS REOCCUR    2- CONTINUE OTHER CARD MED    3- RETURN IN 4 MONTHS.

## 2019-03-01 NOTE — PROGRESS NOTES
REFERRING PROVIDER  Aaareferral Self  No address on file  Subjective:   Patient: Glo Dumont [id number], :1935   Visit date:3/4/2019 12:08 PM    Chief Complaint:  Cerumen Impaction and Follow-up (audio)    HPI:     Glo Dumont is a 83 y.o. female whom I am asked to see for evaluation of diminished hearing in the left ear for the past several weeks. There is not a prior history of cerumen impaction.    Her meds, allergies, medical, surgical, social & family histories were reviewed & updated:  -     She has a current medication list which includes the following prescription(s): amlodipine, aspirin, budesonide, calcium carbonate, cholecalciferol (vitamin d3), dicyclomine, dorzolamide-timolol 2-0.5%, durezol, fluoxetine, fluticasone, folic acid/multivit-min/lutein, hydrochlorothiazide, iron-vitamin c 100-250 mg (icar-c), lisinopril, metoprolol tartrate, nitroglycerin, prasugrel, simvastatin, and pantoprazole.  -     She  has a past medical history of Anemia, Angina pectoris, Anxiety, Arthritis, Carotid artery occlusion, Carpal tunnel syndrome (2008), Chronic diarrhea, CKD (chronic kidney disease) stage 3, GFR 30-59 ml/min (2017), Coronary artery disease, Coronary artery disease, Diastolic dysfunction, Diverticulosis, Greater trochanteric bursitis (2/10/2015), Grief at loss of child (2016), H/O carotid endarterectomy (2013), Heart failure, History of coronary angioplasty (3/11/2014), Hypercholesteremia, Hypertension, Liver cyst (2013), Macular degeneration, Primary open-angle glaucoma(365.11) (9/3/2013), Renal cyst (2013), 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   -     She does not have any pertinent problems on file.   -     She  has a past surgical history that includes Total abdominal hysterectomy w/ bilateral salpingoophorectomy (); Cataract extraction (Bilateral); Coronary angioplasty  with stent (11/19/2010); Carotid endarterectomy (Right, 2000s); Joint replacement (Left, 11/03/2014); and Cholecystectomy.  -     She  reports that  has never smoked. she has never used smokeless tobacco. She reports that she does not drink alcohol or use drugs.  -     Her family history includes Cancer in her daughter and father; Cirrhosis in her brother; Heart failure in her brother and mother; Hypertension in her mother.  -     She is allergic to codeine.      Review of Systems:  -     Allergic/Immunologic: is allergic to codeine..  -     Constitutional: Current temp: 97.6 °F (36.4 °C) (Tympanic)        Objective:     Physical Exam:  Vitals:  BP (!) 159/67   Pulse (!) 51   Temp 97.6 °F (36.4 °C) (Tympanic)   Wt 58.8 kg (129 lb 10.1 oz)   BMI 24.49 kg/m²   Communication:  Able to communicate, no hoarseness.  Head & Face:  Normocephalic, atraumatic, no sinus tenderness.  Eyes:  Extraocular motions intact.  Ears:  Otoscopy of external auditory canals reveals impaction of bilateral ear canals.  With the patient in the supine position, we used the operating microscope to examine both ears with the appropriate sized ear speculum.  A variety of sterile, micro-instruments were utilized to remove the cerumen atraumatically from the impacted ear(s).   After removal, the ears were reexamined-  Right Ear:  No mass/lesion of auricle. The external auditory canals is without erythema or discharge. Pneumatic otoscopy of the tympanic membrane revealed no perforation and good mobility, with no fluid in middle ear. Clinical speech reception thresholds grossly normal.  Left Ear:  No mass/lesion of auricle. The external auditory canals is without erythema or discharge. Pneumatic otoscopy of the tympanic membrane revealed no perforation and good mobility, with no fluid in middle ear. Clinical speech reception thresholds grossly normal  Nose:  No masses/lesions of external nose, nasal mucosa, septum, and turbinates were within  normal limits.  Mouth:  No mass/lesion of lips, teeth, gums, hard/soft palate, tongue, tonsils, or oropharynx.  Neck & Lymphatics:  No cervical lymphadenopathy, no neck mass/crepitus/ asymmetry, trachea is midline, no thyroid enlargement/tenderness/mass.  Neuro/Psych: Alert with normal mood and affect.   Respiration/Chest:  Symmetric expansion during respiration, normal respiratory effort.  Skin:  Warm and intact.    Assessment & Plan:       -     Cerumen Impaction - Glo has cerumen impaction.  Impaction was removed without difficulty and the patient tolerated this well. We discussed preventative measures and treatment options.  Q-tips must be avoided, instead the ears can be cleaned with OTC ear rinses (or a mixture of alcohol & vinegar in equal parts).   For hard wax, Glo may place mineral oil/baby oil in the ear with a cotton ball at night and remove in the shower.  This will assist in softening the wax and allow it to drain out on its own. If the cerumen impacts the ear canal and causes hearing loss or infection she needs to follow-up in the clinic for treatment and cleaning.    Nataly Meraz PA-C

## 2019-03-04 ENCOUNTER — CLINICAL SUPPORT (OUTPATIENT)
Dept: AUDIOLOGY | Facility: CLINIC | Age: 84
End: 2019-03-04
Payer: MEDICARE

## 2019-03-04 ENCOUNTER — OFFICE VISIT (OUTPATIENT)
Dept: OTOLARYNGOLOGY | Facility: CLINIC | Age: 84
End: 2019-03-04
Payer: MEDICARE

## 2019-03-04 VITALS
BODY MASS INDEX: 24.49 KG/M2 | DIASTOLIC BLOOD PRESSURE: 67 MMHG | WEIGHT: 129.63 LBS | HEART RATE: 51 BPM | SYSTOLIC BLOOD PRESSURE: 159 MMHG | TEMPERATURE: 98 F

## 2019-03-04 DIAGNOSIS — H90.3 SENSORINEURAL HEARING LOSS, BILATERAL: Primary | ICD-10-CM

## 2019-03-04 DIAGNOSIS — H93.13 TINNITUS, BILATERAL: ICD-10-CM

## 2019-03-04 DIAGNOSIS — H61.23 BILATERAL IMPACTED CERUMEN: ICD-10-CM

## 2019-03-04 DIAGNOSIS — H61.23 BILATERAL IMPACTED CERUMEN: Primary | ICD-10-CM

## 2019-03-04 PROCEDURE — 3078F PR MOST RECENT DIASTOLIC BLOOD PRESSURE < 80 MM HG: ICD-10-PCS | Mod: HCNC,CPTII,S$GLB, | Performed by: PHYSICIAN ASSISTANT

## 2019-03-04 PROCEDURE — 3077F PR MOST RECENT SYSTOLIC BLOOD PRESSURE >= 140 MM HG: ICD-10-PCS | Mod: HCNC,CPTII,S$GLB, | Performed by: PHYSICIAN ASSISTANT

## 2019-03-04 PROCEDURE — 99999 PR PBB SHADOW E&M-EST. PATIENT-LVL III: ICD-10-PCS | Mod: PBBFAC,HCNC,, | Performed by: PHYSICIAN ASSISTANT

## 2019-03-04 PROCEDURE — 99213 PR OFFICE/OUTPT VISIT, EST, LEVL III, 20-29 MIN: ICD-10-PCS | Mod: 25,HCNC,S$GLB, | Performed by: PHYSICIAN ASSISTANT

## 2019-03-04 PROCEDURE — G0268 PR REMOVAL OF IMPACTED WAX MD: ICD-10-PCS | Mod: HCNC,S$GLB,, | Performed by: PHYSICIAN ASSISTANT

## 2019-03-04 PROCEDURE — 92567 TYMPANOMETRY: CPT | Mod: HCNC,S$GLB,, | Performed by: AUDIOLOGIST-HEARING AID FITTER

## 2019-03-04 PROCEDURE — 92567 PR TYMPA2METRY: ICD-10-PCS | Mod: HCNC,S$GLB,, | Performed by: AUDIOLOGIST-HEARING AID FITTER

## 2019-03-04 PROCEDURE — 3077F SYST BP >= 140 MM HG: CPT | Mod: HCNC,CPTII,S$GLB, | Performed by: PHYSICIAN ASSISTANT

## 2019-03-04 PROCEDURE — 99999 PR PBB SHADOW E&M-EST. PATIENT-LVL III: CPT | Mod: PBBFAC,HCNC,, | Performed by: PHYSICIAN ASSISTANT

## 2019-03-04 PROCEDURE — 1101F PT FALLS ASSESS-DOCD LE1/YR: CPT | Mod: HCNC,CPTII,S$GLB, | Performed by: PHYSICIAN ASSISTANT

## 2019-03-04 PROCEDURE — 92557 COMPREHENSIVE HEARING TEST: CPT | Mod: HCNC,S$GLB,, | Performed by: AUDIOLOGIST-HEARING AID FITTER

## 2019-03-04 PROCEDURE — 99213 OFFICE O/P EST LOW 20 MIN: CPT | Mod: 25,HCNC,S$GLB, | Performed by: PHYSICIAN ASSISTANT

## 2019-03-04 PROCEDURE — 3078F DIAST BP <80 MM HG: CPT | Mod: HCNC,CPTII,S$GLB, | Performed by: PHYSICIAN ASSISTANT

## 2019-03-04 PROCEDURE — 92557 PR COMPREHENSIVE HEARING TEST: ICD-10-PCS | Mod: HCNC,S$GLB,, | Performed by: AUDIOLOGIST-HEARING AID FITTER

## 2019-03-04 PROCEDURE — 1101F PR PT FALLS ASSESS DOC 0-1 FALLS W/OUT INJ PAST YR: ICD-10-PCS | Mod: HCNC,CPTII,S$GLB, | Performed by: PHYSICIAN ASSISTANT

## 2019-03-04 PROCEDURE — G0268 REMOVAL OF IMPACTED WAX MD: HCPCS | Mod: HCNC,S$GLB,, | Performed by: PHYSICIAN ASSISTANT

## 2019-03-04 NOTE — PROGRESS NOTES
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  Patient has a long standing hx of normal sloping to moderate SNHL bilaterally. She was seen today for cerumen removal bilaterally. She reports tinnitus bilaterally that seemed worse in her left ear, until cerumen removal was completed.     Results reveal a normal to moderate sensorineural hearing loss 250-8000 Hz for the right ear, and a mild-to-severe sensorineural hearing loss 250-8000 Hz for the left ear.   Speech Reception Thresholds were  20 dBHL for the right ear and 25 dBHL for the left ear.   Word recognition scores were excellent for the right ear and excellent for the left ear.   Tympanograms were Type A, normal for the right ear and Type As, shallow for the left ear.    Patient was counseled on the above findings.    Recommendations:  1. Trial with a hearing aid for at least the left ear at this time. Patient was counseled regarding this and was provided a copy of her audiogram. She was also encouraged to contact [facility] regarding benefits.  2. Audiograms as needed.

## 2019-03-19 DIAGNOSIS — I48.21 PERMANENT ATRIAL FIBRILLATION: Primary | ICD-10-CM

## 2019-03-19 RX ORDER — METOPROLOL TARTRATE 50 MG/1
TABLET ORAL
Qty: 270 TABLET | Refills: 3 | Status: SHIPPED | OUTPATIENT
Start: 2019-03-19 | End: 2020-05-13

## 2019-03-19 NOTE — TELEPHONE ENCOUNTER
----- Message from Bella Dejesus sent at 3/19/2019 10:15 AM CDT -----  Contact: [phone]  Would like to consult with the nurse, patients would like to get a refill on her rx medication, please call back at [phone], thanks sj  .Type:  RX Refill Request    Who Called:  Ms alanis  Refill or New Rx:refill  RX Name and Strength:metoprolol  How is the patient currently taking it? (ex. 1XDay):3 time a day  Is this a 30 day or 90 day RX:30  Preferred Pharmacy with phone number:.  [facility] - [place], [place] - [address]  [address]  [address]  [facility] [address]  Phone: [phone] Fax: [phone]      Local or Mail Order: local  Ordering Provider:lindy pierre  Would the patient rather a call back or a response via [facility]? Call back  Best Call Back Number:[phone]  Additional Information: patients would like to get a refill as soon as possible

## 2019-03-22 RX ORDER — LISINOPRIL 40 MG/1
40 TABLET ORAL DAILY
Qty: 30 TABLET | Refills: 11 | Status: SHIPPED | OUTPATIENT
Start: 2019-03-22 | End: 2019-04-29

## 2019-04-08 DIAGNOSIS — I10 ESSENTIAL HYPERTENSION: ICD-10-CM

## 2019-04-08 RX ORDER — HYDROCHLOROTHIAZIDE 12.5 MG/1
12.5 CAPSULE ORAL DAILY
Qty: 90 CAPSULE | Refills: 3 | Status: SHIPPED | OUTPATIENT
Start: 2019-04-08 | End: 2019-04-29

## 2019-04-08 NOTE — TELEPHONE ENCOUNTER
----- Message from Anna Hoffman sent at 4/8/2019 11:03 AM CDT -----  Contact: pt  1. What is the name of the medication you are requesting? Hydrochlorothizide  2. What is the dose? 12.5  3. How do you take the medication? Orally, topically, etc? n/a  4. How often do you take this medication? n/a  5. Do you need a 30 day or 90 day supply? n/a  6. How many refills are you requesting? n/a  7. What is your preferred pharmacy and location of the pharmacy? [facility]  8. Who can we contact with further questions? The pt at [phone]

## 2019-04-29 ENCOUNTER — OFFICE VISIT (OUTPATIENT)
Dept: INTERNAL MEDICINE | Facility: CLINIC | Age: 84
End: 2019-04-29
Payer: MEDICARE

## 2019-04-29 VITALS
WEIGHT: 129.44 LBS | TEMPERATURE: 98 F | HEART RATE: 62 BPM | HEIGHT: 61 IN | SYSTOLIC BLOOD PRESSURE: 186 MMHG | OXYGEN SATURATION: 97 % | DIASTOLIC BLOOD PRESSURE: 78 MMHG | BODY MASS INDEX: 24.44 KG/M2

## 2019-04-29 DIAGNOSIS — T78.3XXA ANGIOEDEMA, INITIAL ENCOUNTER: Primary | ICD-10-CM

## 2019-04-29 DIAGNOSIS — K04.7 TOOTH INFECTION: ICD-10-CM

## 2019-04-29 DIAGNOSIS — I10 ESSENTIAL HYPERTENSION: ICD-10-CM

## 2019-04-29 PROCEDURE — 99214 PR OFFICE/OUTPT VISIT, EST, LEVL IV, 30-39 MIN: ICD-10-PCS | Mod: HCNC,25,S$GLB, | Performed by: PHYSICIAN ASSISTANT

## 2019-04-29 PROCEDURE — 96372 THER/PROPH/DIAG INJ SC/IM: CPT | Mod: HCNC,S$GLB,, | Performed by: PHYSICIAN ASSISTANT

## 2019-04-29 PROCEDURE — 99499 RISK ADDL DX/OHS AUDIT: ICD-10-PCS | Mod: HCNC,S$GLB,, | Performed by: PHYSICIAN ASSISTANT

## 2019-04-29 PROCEDURE — 99999 PR PBB SHADOW E&M-EST. PATIENT-LVL V: CPT | Mod: PBBFAC,HCNC,, | Performed by: PHYSICIAN ASSISTANT

## 2019-04-29 PROCEDURE — 3077F SYST BP >= 140 MM HG: CPT | Mod: HCNC,CPTII,S$GLB, | Performed by: PHYSICIAN ASSISTANT

## 2019-04-29 PROCEDURE — 99999 PR PBB SHADOW E&M-EST. PATIENT-LVL V: ICD-10-PCS | Mod: PBBFAC,HCNC,, | Performed by: PHYSICIAN ASSISTANT

## 2019-04-29 PROCEDURE — 1101F PT FALLS ASSESS-DOCD LE1/YR: CPT | Mod: HCNC,CPTII,S$GLB, | Performed by: PHYSICIAN ASSISTANT

## 2019-04-29 PROCEDURE — 3078F PR MOST RECENT DIASTOLIC BLOOD PRESSURE < 80 MM HG: ICD-10-PCS | Mod: HCNC,CPTII,S$GLB, | Performed by: PHYSICIAN ASSISTANT

## 2019-04-29 PROCEDURE — 1101F PR PT FALLS ASSESS DOC 0-1 FALLS W/OUT INJ PAST YR: ICD-10-PCS | Mod: HCNC,CPTII,S$GLB, | Performed by: PHYSICIAN ASSISTANT

## 2019-04-29 PROCEDURE — 96372 PR INJECTION,THERAP/PROPH/DIAG2ST, IM OR SUBCUT: ICD-10-PCS | Mod: HCNC,S$GLB,, | Performed by: PHYSICIAN ASSISTANT

## 2019-04-29 PROCEDURE — 3078F DIAST BP <80 MM HG: CPT | Mod: HCNC,CPTII,S$GLB, | Performed by: PHYSICIAN ASSISTANT

## 2019-04-29 PROCEDURE — 3077F PR MOST RECENT SYSTOLIC BLOOD PRESSURE >= 140 MM HG: ICD-10-PCS | Mod: HCNC,CPTII,S$GLB, | Performed by: PHYSICIAN ASSISTANT

## 2019-04-29 PROCEDURE — 99214 OFFICE O/P EST MOD 30 MIN: CPT | Mod: HCNC,25,S$GLB, | Performed by: PHYSICIAN ASSISTANT

## 2019-04-29 PROCEDURE — 99499 UNLISTED E&M SERVICE: CPT | Mod: HCNC,S$GLB,, | Performed by: PHYSICIAN ASSISTANT

## 2019-04-29 RX ORDER — HYDROCHLOROTHIAZIDE 12.5 MG/1
25 CAPSULE ORAL DAILY
Qty: 90 CAPSULE | Refills: 3
Start: 2019-04-29 | End: 2019-12-27

## 2019-04-29 RX ORDER — AMOXICILLIN AND CLAVULANATE POTASSIUM 875; 125 MG/1; MG/1
1 TABLET, FILM COATED ORAL 2 TIMES DAILY
Qty: 20 TABLET | Refills: 0 | Status: SHIPPED | OUTPATIENT
Start: 2019-04-29 | End: 2019-05-09

## 2019-04-29 RX ORDER — METHYLPREDNISOLONE ACETATE 40 MG/ML
60 INJECTION, SUSPENSION INTRA-ARTICULAR; INTRALESIONAL; INTRAMUSCULAR; SOFT TISSUE ONCE
Status: COMPLETED | OUTPATIENT
Start: 2019-04-29 | End: 2019-04-29

## 2019-04-29 RX ADMIN — METHYLPREDNISOLONE ACETATE 60 MG: 40 INJECTION, SUSPENSION INTRA-ARTICULAR; INTRALESIONAL; INTRAMUSCULAR; SOFT TISSUE at 09:04

## 2019-04-29 NOTE — PATIENT INSTRUCTIONS
Discharge Instructions: Taking Your Blood Pressure  Blood pressure is the force of blood as it moves from the heart through the blood vessels. You can take your own blood pressure reading using a digital monitor. Take readings as often as your healthcare provider instructs. Take your readings each time in the same way, using the same arm. Here are guidelines for taking your blood pressure.  The American Heart Association (AHA) recommends purchasing a blood pressure monitor that is validated and approved by the Association for the Advancement of Medical Instrumentation, the [place] Hypertension Society, and the International Protocol for the Validation of Automated BP Measuring Devices. If the blood pressure monitor is for a senior adult, a pregnant woman, or a child, make certain it is validated for use with such a population. For the most reliable readings, the AHA recommends an automatic, cuff-style, upper arm (bicep) monitor. The readings from finger and wrist monitors are not as reliable as the upper arm monitor.        Step 1. Relax    · Wait at least a half hour after smoking, eating, or exercising. Do not drink coffee, tea, soda, or other caffeinated beverages before checking your blood pressure.   · Sit comfortably at a table. Place the monitor near you.  · Rest for a few minutes before you begin.        Step 2. Wrap the cuff    · Place your arm on the table, palm up. Put your arm in a position that is level with your heart. Wrap the cuff around your upper arm, about an inch above your elbow. Its best to wrap the cuff on bare skin, not over clothing.  · Make sure your cuff fits. If it doesnt wrap around your upper arm, order a larger cuff. A cuff that is too large or too small can result in an inaccurate blood pressure reading.           Step 3. Inflate the cuff    · Pump the cuff until the scale reads 200. If you have a self-inflating cuff, push the button that starts the pump.  · The cuff will  tighten, then loosen.  · The numbers will change. When they stop changing, your blood pressure reading will appear.  · If you get a reading that is too high or too low for you, relax for a few minutes. Then do the test again.    Step 4. Write down the results  · Write down your blood pressure numbers. [place] the date and time. Keep your results in one place, such as a notebook.  · Remove the cuff from your arm. Turn off the machine.  · Take the readings with you to your medical appointments.  · If you start a new blood pressure medicine, or change a blood pressure medicine dose, note the day you started the new drug or dosage on your blood pressure recording sheet. This will help your healthcare provider monitor the effect of medication changes.     Date Last Reviewed: 4/27/2016  © 3363-6408 [employer]. [address] [place] [address]. All rights reserved. This information is not intended as a substitute for professional medical care. Always follow your healthcare professional's instructions.        Taking Your Blood Pressure  Blood pressure is the force of blood against the artery wall as it moves from the heart through the blood vessels. You can take your own blood pressure reading using a digital monitor. Take your readings the same each time, using the same arm. Take readings as often as your healthcare provider instructs.  About blood pressure monitors  Blood pressure monitors are designed for certain ages and cases. You can find monitors for older adults, for pregnant women, and for children. Make sure the one you choose is the right one for your age and situation.  The American Heart Association recommends an automatic cuff monitor that fits on your upper arm (bicep). The cuff should fit your arm size. A cuff thats too large or too small will not give an accurate reading. Measure around your upper arm to find your size.  Monitors that attach to your finger or wrist are not as accurate  as monitors for your upper arm.  Ask your healthcare provider for help in choosing a monitor. Bring your monitor to your next provider visit if you need help in using it the correct way.  The steps below are general instructions for using an automatic digital monitor.  Step 1. Relax    · Take your blood pressure at the same time every day, such as in the morning or evening, or at the time your healthcare provider recommends.  · Wait at least a half-hour after smoking, eating, or exercising. Don't drink coffee, tea, soda, or other caffeinated beverages before checking your blood pressure.  · Sit comfortably at a table with both feet on the floor. Do not cross your legs or feet. Place the monitor near you.  · Rest for a few minutes before you begin.  Step 2. Wrap the cuff    · Place your arm on the table, palm up. Your arm should be at the level of your heart. Wrap the cuff around your upper arm, just above your elbow. Its best done on bare skin, not over clothing. Most cuffs will indicate where the brachial artery (the blood vessel in the middle of the arm at the inner side of the elbow) should line up with the cuff. Look in your monitor's instruction booklet for an illustration. You can also bring your cuff to your healthcare provider and have them show you how to correctly place the cuff.  Step 3. Inflate the cuff    · Push the button that starts the pump.  · The cuff will tighten, then loosen.  · The numbers will change. When they stop changing, your blood pressure reading will appear.  · Take 2 or 3 readings one minute apart.  Step 4. Write down the results of each reading    · Write down your blood pressure numbers for each reading. Note the date and time. Keep your results in one place, such as a notebook. Even if your monitor has a built-in memory, keep a hard copy of the readings.  · Remove the cuff from your arm. Turn off the machine.  · Bring your blood pressure records with your healthcare providers at  each visit.  · If you start a new blood pressure medicine, note the day you started the new medicine. Also note the day if you change the dose of your medicine. This information goes on your blood pressure recording sheet. This will help your healthcare provider monitor how well the medicine changes are working.  · Ask your healthcare provider what numbers should prompt you to call him or her. Also ask what numbers should prompt you to get help right away.  Date Last Reviewed: 11/1/2016 © 2000-2017 [employer]. [address] [place] [address]. All rights reserved. This information is not intended as a substitute for professional medical care. Always follow your healthcare professional's instructions.        Angioedema  Angioedema (DR-[id number]-ad-eh-RUPA-muh) is a sudden appearance of swollen patches (edema) on the skin or mucous membranes. It most often involves the face, lips, mouth, tongue, back of throat, or vocal cords. It may also occur in other places, such as the arms or legs. A rash may also appear during the first 4 days of this illness.  There are different types of angioedema. Your symptoms will depend on what type of angioedema you have. Swelling and redness may be the main symptoms. Like allergic reactions, angioedema may include:  · Rash, hives, redness, welts, blisters  · Itching, burning, stinging, pain  · Dry, flaky, cracking, or scaly skin  · Swelling of the face, lips, tongue, or other parts of the body  More severe symptoms may include:  · Trouble swallowing, or feeling like your throat is closing  · Trouble breathing or wheezing  · Hoarse voice or trouble speaking  · Nausea, vomiting, diarrhea, or stomach cramps  · Feeling faint or lightheaded, rapid heart rate, or low blood pressure  Angioedema can be triggered by exposure to certain substances. Medical conditions involving the immune systems and certain infections may cause it. In rare cases, angioedema can be hereditary.  Sometimes the cause may be very clear. However, it is often hard to find a cause. The most common causes include:  · Foods, such as shrimp, shellfish, peanuts, milk products, gluten, and eggs; also colorings, flavorings, and additives  · Insect bites or stings, from bees, mosquitos, fleas, or ticks  · Medicines, such as ACE inhibitors, penicillin, sulfa drugs, amoxicillin, aspirin, and ibuprofen  · Latex, which may be in gloves, clothes, toys, balloons, and some kinds of tape. People who are allergic to latex may have problems with foods such as bananas, avocados, kiwi, papaya, or chestnuts.  · Stress  · Heat, cold, or sunlight  The most common cause of angioedema is a reaction to a class of medicines called ACE inhibitors. These are used to treat high blood pressure. ACE inhibitors include captopril, enalapril, and lisinopril. Angiodema can happen even after you have been taking the medicine for some time. Tell your doctor if you have angioedema symptoms and are taking any of these medicines. Angioedema may recur. It is important to watch for the earliest signs of this condition (see the list below). Contact your healthcare provider right away if swelling involves the face, mouth, or throat.  Home care  Rest quietly today. Avoid vigorous physical activity.  Medicines: The healthcare provider may prescribe medicines for itching, swelling, or pain. Follow the healthcare providers instructions when taking these medicines.  · Oral diphenhydramine is an antihistamine available without a prescription. Unless a prescription antihistamine was given, diphenhydramine may be used to reduce widespread itching. It may make you sleepy, so be careful using it when going to school, working, or driving. (Note: Do not use diphenhydramine if you have glaucoma or if you are a man who has trouble urinating due to an enlarged prostate.) Loratadine is an antihistamine that may cause less drowsiness.  · Do not use diphenhydramine cream  on your skin. Some people can have an allergic reaction to this.  · Calamine lotion or oatmeal baths sometimes help with itching.  · You may use acetaminophen or ibuprofen for pain, unless another pain medicine was prescribed.  · If you were told that your angioedema was caused by a medicine you are taking, you must stop taking it. Ask your healthcare provider for a different one. In the future, advise medical staff that you are allergic to this medicine.  · If medicine was prescribed, such as steroids or antihistamines, be sure you understand what the medicine is and how to take it.   General care  · Make sure you do not scratch areas of the body that had a reaction. This will help prevent infection.   · Stay away from air pollution, tobacco, and wood smoke. Also stay away from cold temperatures. These things can make allergy symptoms worse.  · Try to find out what cause your reaction. Make sure to remove the allergen. Future reactions may be worse.   · If you have a serious allergy, wear a medical alert bracelet that notes this allergy.  · If the healthcare provider prescribed an epinephrine auto injector kit, keep it with you at all times.   · Tell all care providers about your allergy. Ask them h ow to use any prescribed medicines.  · Keep a record of allergies and symptoms, and when they occurred. This will help your provider treat you over time.   Follow-up care  Follow up with your healthcare provider, or as advised. You may need to see an allergist. An allergist can help find the cause of an allergic reaction and give recommendations on how to prevent future reactions.  Call 911  Contact emergency services right away if any of these occur:  · Trouble breathing or swallowing, or wheezing  · Hoarse voice or trouble speaking, or drooling  · Chest pain or tightness  · Confusion, lightheadedness, or dizziness  · Extreme drowsiness or trouble awakening  · Fainting or loss of consciousness  · Rapid heart  rate  · Vomiting blood, or large amounts of blood in stool  · Seizure  · Nausea, vomiting, diarrhea, abdominal pain, or stomach cramps  When to seek medical attention  Call your healthcare provider right away if any of the following occur:  · Symptoms don't go away  · Symptoms come back  · Symptoms get worse or new symptoms develop  · Hives feel uncomfortable  · Fever of 100.4°F (38°C), or as directed by your healthcare professional  Date Last Reviewed: 5/1/2017 © 2000-2017 [employer]. [address] [place] [address], [place] [address]. All rights reserved. This information is not intended as a substitute for professional medical care. Always follow your healthcare professional's instructions.        Understanding Gingivitis  Gingivitis is a type of gum disease. It is an inflammation of the gums that causes redness and swelling. Its most often caused by infection from bacteria on the teeth. A severe infection can cause small painful sores on the gums, bad breath, and bleeding gums. If untreated, gingivitis can lead to periodontal disease. Over time, this can cause tooth loss.  What causes gingivitis?  Gingivitis is most often caused when plaque builds up on your teeth. Plaque is a sticky film made of bacteria and other substances that coats your teeth. Brushing and flossing helps remove plaque. If you dont brush and floss regularly, plaque can build up and lead to gingivitis.  You are more likely to have gingivitis if you:  · Dont brush or floss regularly  · Smoke or chew tobacco  · Are pregnant  · Have diabetes  · Use medicines such as birth control pills, steroids, drugs used to treat epilepsy, or cancer drugs  · Have family members who tend to get gingivitis  Signs and symptoms  You may have gingivitis if your gums have areas that:  · Are swollen  · Are bright red or dusky red  · Bleed easily  · Are sore  Treating gingivitis  · See your dentist. He or she may clean your teeth and remove buildup on your  teeth of plaque and tartar. Tartar is a mixture of plaque and minerals that forms into a hard substance.  · Use an antiseptic mouth rinse if your dentist tells you to.  · Take antibiotics and other medicines exactly as directed. Dont stop taking them when your symptoms go away.  · Make sure you brush at least twice a day, and use dental floss at least once a day. This will help remove plaque from your teeth.  · Eat a soft diet, if needed, to ease discomfort. Avoid food and beverages that may cause more discomfort in your gums. These include citrus juices such as orange juice and lemonade, and salty or spicy foods.  · Use acetaminophen or ibuprofen for pain or fever. If you have liver or kidney disease or ever had a stomach ulcer or gastrointestinaI bleeding, talk with your healthcare provider before using these medicines.  When to call the healthcare provider  Call your healthcare provider if you have:  · Pain that doesnt go away or gets worse  · Gums that have pulled away from your teeth  · A bad taste in your mouth that wont go away  · Teeth that become loose  · Inability to eat or drink because of mouth pain      Date Last Reviewed: 9/19/2015  © 6129-7543 [employer]. [address] [place] [address], [place] [address]. All rights reserved. This information is not intended as a substitute for professional medical care. Always follow your healthcare professional's instructions.        Dental Abscess    An abscess is a pocket of pus at the tip of a tooth root in your jaw bone. It is caused by an infection at the root of the tooth. It can cause pain and swelling of the gum, cheek, or jaw. Pain may spread from the tooth to your ear or the area of your jaw on the same side. If the abscess isnt treated, it appears as a bubble or swelling on the gum near the tooth. The pressure that builds in this swelling is the source of the pain. More serious infections cause your face to swell.  An abscess can be caused by a  "crack in the tooth, a cavity, a gum infection, or a combination of these. Once the pulp of the tooth is exposed, bacteria can spread down the roots to the tip. If the bacteria are not stopped, they can damage the bone and soft tissue, and an abscess can form.  Home care  Follow these guidelines when caring for yourself at home:  · Avoid hot and cold foods and drinks. Your tooth may be sensitive to changes in temperature. Dont chew on the side of the infected tooth.  · If your tooth is chipped or cracked, or if there is a large open cavity, put oil of cloves directly on the tooth to relieve pain. You can buy oil of cloves at [employer]. Some pharmacies carry an over-the-counter "toothache kit." This contains a paste that you can put on the exposed tooth to make it less sensitive.  · Put a cold pack on your jaw over the sore area to help reduce pain.  · You may use over-the-counter medicine to ease pain, unless another medicine was prescribed. If you have chronic liver or kidney disease, talk with your healthcare provider before using acetaminophen or ibuprofen. Also talk with your provider if youve had a stomach ulcer or GI bleeding.  · An antibiotic will be prescribed. Take it until finished, even if you are feeling better after a few days.  Follow-up care  Follow up with your dentist or an oral surgeon, or as advised. Once an infection occurs in a tooth, it will continue to be a problem until the infection is drained. This is done through surgery or a root canal. Or you may need to have your tooth pulled.  Call 911  Call 911 if any of these occur:  · Unusual drowsiness  · Headache or stiff neck  · Weakness or fainting  · Difficulty swallowing, breathing, or opening your mouth  · Swollen eyelids  When to seek medical advice  Call your healthcare provider right away if any of these occur:  · Your face becomes more swollen or red  · Pain gets worse or spreads to your neck  · Fever of 100.4º F (38.0º C) or higher, or " as directed by your healthcare provider  · Pus drains from the tooth  Date Last Reviewed: 10/1/2016  © 4776-7309 [employer]. [address] [place] [address], [place] [address]. All rights reserved. This information is not intended as a substitute for professional medical care. Always follow your healthcare professional's instructions.

## 2019-04-29 NOTE — PROGRESS NOTES
Subjective:      Patient ID: Glo Dmuont is a 83 y.o. female.    Chief Complaint: Facial Swelling    Woke up this morning with upper lip swelling. Denies any difficulty breathing. Reports a tooth ache that started this weekend as well. Upper left tooth. No drainage.   Is currently on lisinopril for HTN.   PCP is Dr. Cardenas.     Edema   This is a new problem. The current episode started today. The problem has been gradually worsening. Pertinent negatives include no abdominal pain, anorexia, arthralgias, change in bowel habit, chest pain, chills, congestion, coughing, diaphoresis, fatigue, fever, headaches, joint swelling, myalgias, nausea, neck pain, numbness, rash, sore throat, swollen glands, urinary symptoms, vertigo, visual change, vomiting or weakness. Associated symptoms comments: Tooth ache. She has tried nothing for the symptoms. The treatment provided no relief.       Review of Systems   Constitutional: Negative for activity change, appetite change, chills, diaphoresis, fatigue, fever and unexpected weight change.   HENT: Positive for dental problem and facial swelling. Negative for congestion, drooling, ear discharge, ear pain, hearing loss, mouth sores, nosebleeds, postnasal drip, rhinorrhea, sinus pressure, sinus pain, sneezing, sore throat, tinnitus, trouble swallowing and voice change.    Eyes: Negative.  Negative for visual disturbance.   Respiratory: Negative.  Negative for apnea, cough, choking, chest tightness, shortness of breath, wheezing and stridor.    Cardiovascular: Negative for chest pain, palpitations and leg swelling.   Gastrointestinal: Negative for abdominal distention, abdominal pain, anorexia, blood in stool, change in bowel habit, constipation, diarrhea, nausea and vomiting.   Endocrine: Negative for cold intolerance, heat intolerance, polydipsia and polyuria.   Genitourinary: Negative.  Negative for difficulty urinating and frequency.   Musculoskeletal: Negative for  "arthralgias, back pain, gait problem, joint swelling, myalgias and neck pain.   Skin: Negative for color change, pallor, rash and wound.   Neurological: Negative for dizziness, vertigo, tremors, weakness, light-headedness, numbness and headaches.   Hematological: Negative for adenopathy.   Psychiatric/Behavioral: Negative for behavioral problems, confusion, self-injury, sleep disturbance and suicidal ideas. The patient is not nervous/anxious.      Objective:   BP (!) 186/78   Pulse 62   Temp 97.6 °F (36.4 °C) (Tympanic)   Ht 5' 1" (1.549 m)   Wt 58.7 kg (129 lb 6.6 oz)   SpO2 97%   BMI 24.45 kg/m²     Physical Exam   Constitutional: She is oriented to person, place, and time. She appears well-developed and well-nourished. No distress.   HENT:   Head: Normocephalic and atraumatic.       Right Ear: Tympanic membrane, external ear and ear canal normal.   Left Ear: Tympanic membrane, external ear and ear canal normal.   Nose: Nose normal.   Mouth/Throat: Oropharynx is clear and moist and mucous membranes are normal. She does not have dentures. No oral lesions. No trismus in the jaw. Abnormal dentition. Dental abscesses and dental caries present. No uvula swelling or lacerations.       Eyes: Pupils are equal, round, and reactive to light. Conjunctivae and EOM are normal.   Neck: Normal range of motion. Neck supple.   Cardiovascular: Normal rate, regular rhythm and normal heart sounds. Exam reveals no gallop and no friction rub.   No murmur heard.  Pulmonary/Chest: Effort normal and breath sounds normal. No respiratory distress. She has no wheezes. She has no rales. She exhibits no tenderness.   Musculoskeletal: Normal range of motion.   Lymphadenopathy:     She has no cervical adenopathy.   Neurological: She is alert and oriented to person, place, and time.   Skin: Skin is warm and dry. No rash noted. She is not diaphoretic.   Psychiatric: She has a normal mood and affect. Her behavior is normal. Judgment and " thought content normal.   Vitals reviewed.      Assessment:     1. Angioedema, initial encounter    2. Essential hypertension    3. Tooth infection      Plan:   Angioedema, initial encounter  -     methylPREDNISolone acetate injection 60 mg    Essential hypertension  -     hydroCHLOROthiazide (MICROZIDE) 12.5 mg capsule; Take 2 capsules (25 mg total) by mouth once daily.  Dispense: 90 capsule; Refill: 3    Tooth infection  -     amoxicillin-clavulanate 875-125mg (AUGMENTIN) 875-125 mg per tablet; Take 1 tablet by mouth 2 (two) times daily. for 10 days  Dispense: 20 tablet; Refill: 0    -stop lisinopril  -increase hctz to 25mg daily. Advised to take a second pill today right when she gets home  -follow up with PCP this week    Follow up if symptoms worsen or fail to improve.

## 2019-05-03 RX ORDER — PRASUGREL 10 MG/1
10 TABLET, FILM COATED ORAL DAILY
Qty: 30 TABLET | Refills: 11 | Status: SHIPPED | OUTPATIENT
Start: 2019-05-03 | End: 2020-09-17 | Stop reason: SDUPTHER

## 2019-06-10 DIAGNOSIS — F41.9 ANXIETY AND DEPRESSION: ICD-10-CM

## 2019-06-10 DIAGNOSIS — F32.A ANXIETY AND DEPRESSION: ICD-10-CM

## 2019-06-10 RX ORDER — FLUOXETINE 10 MG/1
10 CAPSULE ORAL DAILY
Qty: 90 CAPSULE | Refills: 3 | Status: SHIPPED | OUTPATIENT
Start: 2019-06-10 | End: 2019-07-19

## 2019-06-21 ENCOUNTER — CLINICAL SUPPORT (OUTPATIENT)
Dept: PULMONOLOGY | Facility: CLINIC | Age: 84
End: 2019-06-21
Payer: MEDICARE

## 2019-06-21 ENCOUNTER — OFFICE VISIT (OUTPATIENT)
Dept: PULMONOLOGY | Facility: CLINIC | Age: 84
End: 2019-06-21
Payer: MEDICARE

## 2019-06-21 ENCOUNTER — HOSPITAL ENCOUNTER (OUTPATIENT)
Dept: RADIOLOGY | Facility: HOSPITAL | Age: 84
Discharge: HOME | End: 2019-06-21
Attending: INTERNAL MEDICINE
Payer: MEDICARE

## 2019-06-21 VITALS
RESPIRATION RATE: 18 BRPM | HEART RATE: 60 BPM | HEIGHT: 61 IN | DIASTOLIC BLOOD PRESSURE: 72 MMHG | OXYGEN SATURATION: 98 % | WEIGHT: 129 LBS | BODY MASS INDEX: 24.35 KG/M2 | SYSTOLIC BLOOD PRESSURE: 150 MMHG

## 2019-06-21 DIAGNOSIS — D86.0 SARCOIDOSIS OF LUNG: Chronic | ICD-10-CM

## 2019-06-21 DIAGNOSIS — D86.0 SARCOIDOSIS OF LUNG: Primary | Chronic | ICD-10-CM

## 2019-06-21 DIAGNOSIS — D86.0 SARCOIDOSIS OF LUNG: ICD-10-CM

## 2019-06-21 LAB
BRPFT: ABNORMAL
DLCO ADJ PRE: 7.8 ML/(MIN*MMHG) (ref 12.09–23.56)
DLCO SINGLE BREATH LLN: 12.09
DLCO SINGLE BREATH PRE REF: 43.7 %
DLCO SINGLE BREATH REF: 17.83
DLCOC SBVA LLN: 2.44
DLCOC SBVA PRE REF: 73.4 %
DLCOC SBVA REF: 4.01
DLCOC SINGLE BREATH LLN: 12.09
DLCOC SINGLE BREATH PRE REF: 43.7 %
DLCOC SINGLE BREATH REF: 17.83
DLCOVA LLN: 2.44
DLCOVA PRE REF: 73.4 %
DLCOVA PRE: 2.95 ML/(MIN*MMHG*L) (ref 2.44–5.59)
DLCOVA REF: 4.01
DLVAADJ PRE: 2.95 ML/(MIN*MMHG*L) (ref 2.44–5.59)
ERV LLN: 0.45
ERV PRE REF: 150.6 %
ERV REF: 0.45
FEF 25 75 CHG: 3.5 %
FEF 25 75 LLN: 0.4
FEF 25 75 POST REF: 71.3 %
FEF 25 75 PRE REF: 68.9 %
FEF 25 75 REF: 1.25
FET100 CHG: -18.4 %
FEV1 CHG: -9.1 %
FEV1 FVC CHG: 3.2 %
FEV1 FVC LLN: 63
FEV1 FVC POST REF: 90.8 %
FEV1 FVC PRE REF: 87.9 %
FEV1 FVC REF: 78
FEV1 LLN: 0.98
FEV1 POST REF: 105.6 %
FEV1 PRE REF: 116.1 %
FEV1 REF: 1.47
FRCPLETH LLN: 1.73
FRCPLETH PREREF: 54.3 %
FRCPLETH REF: 2.55
FVC CHG: -11.9 %
FVC LLN: 1.29
FVC POST REF: 115.1 %
FVC PRE REF: 130.6 %
FVC REF: 1.92
IVC PRE: 1.92 L (ref 1.29–2.55)
IVC SINGLE BREATH LLN: 1.29
IVC SINGLE BREATH PRE REF: 100.2 %
IVC SINGLE BREATH REF: 1.92
MVV LLN: 51
MVV PRE REF: 120.2 %
MVV REF: 60
PEF CHG: -1 %
PEF LLN: 1.52
PEF POST REF: 109.4 %
PEF PRE REF: 110.5 %
PEF REF: 3.35
POST FEF 25 75: 0.89 L/S (ref 0.4–2.09)
POST FET 100: 11.06 SEC
POST FEV1 FVC: 70.47 % (ref 63.12–92.19)
POST FEV1: 1.56 L (ref 0.98–1.97)
POST FVC: 2.21 L (ref 1.29–2.55)
POST PEF: 3.66 L/S (ref 1.52–5.17)
PRE DLCO: 7.8 ML/(MIN*MMHG) (ref 12.09–23.56)
PRE ERV: 0.68 L (ref 0.45–0.45)
PRE FEF 25 75: 0.86 L/S (ref 0.4–2.09)
PRE FET 100: 13.55 SEC
PRE FEV1 FVC: 68.29 % (ref 63.12–92.19)
PRE FEV1: 1.71 L (ref 0.98–1.97)
PRE FRC PL: 1.39 L
PRE FVC: 2.51 L (ref 1.29–2.55)
PRE MVV: 72 L/MIN (ref 50.91–68.89)
PRE PEF: 3.7 L/S (ref 1.52–5.17)
PRE RV: 0.58 L (ref 1.53–2.68)
PRE TLC: 3.09 L (ref 3.45–5.43)
RAW LLN: 3.06
RAW PRE REF: 95.7 %
RAW PRE: 2.93 CMH2O*S/L (ref 3.06–3.06)
RAW REF: 3.06
RV LLN: 1.53
RV PRE REF: 27.6 %
RV REF: 2.1
RVTLC LLN: 37
RVTLC PRE REF: 40.4 %
RVTLC PRE: 18.79 % (ref 36.91–56.09)
RVTLC REF: 47
TLC LLN: 3.45
TLC PRE REF: 69.5 %
TLC REF: 4.44
VA PRE: 2.65 L (ref 4.29–4.29)
VA SINGLE BREATH LLN: 4.29
VA SINGLE BREATH PRE REF: 61.7 %
VA SINGLE BREATH REF: 4.29
VC LLN: 1.29
VC PRE REF: 130.6 %
VC PRE: 2.51 L (ref 1.29–2.55)
VC REF: 1.92
VTGRAWPRE: 2.25 L

## 2019-06-21 PROCEDURE — 94729 PR C02/MEMBANE DIFFUSE CAPACITY: ICD-10-PCS | Mod: HCNC,S$GLB,, | Performed by: INTERNAL MEDICINE

## 2019-06-21 PROCEDURE — 94729 DIFFUSING CAPACITY: CPT | Mod: HCNC,S$GLB,, | Performed by: INTERNAL MEDICINE

## 2019-06-21 PROCEDURE — 94726 PULM FUNCT TST PLETHYSMOGRAP: ICD-10-PCS | Mod: HCNC,S$GLB,, | Performed by: INTERNAL MEDICINE

## 2019-06-21 PROCEDURE — 71046 X-RAY EXAM CHEST 2 VIEWS: CPT | Mod: 26,HCNC,, | Performed by: RADIOLOGY

## 2019-06-21 PROCEDURE — 3077F SYST BP >= 140 MM HG: CPT | Mod: HCNC,CPTII,S$GLB, | Performed by: INTERNAL MEDICINE

## 2019-06-21 PROCEDURE — 1101F PR PT FALLS ASSESS DOC 0-1 FALLS W/OUT INJ PAST YR: ICD-10-PCS | Mod: HCNC,CPTII,S$GLB, | Performed by: INTERNAL MEDICINE

## 2019-06-21 PROCEDURE — 94726 PLETHYSMOGRAPHY LUNG VOLUMES: CPT | Mod: HCNC,S$GLB,, | Performed by: INTERNAL MEDICINE

## 2019-06-21 PROCEDURE — 99999 PR PBB SHADOW E&M-EST. PATIENT-LVL III: CPT | Mod: PBBFAC,HCNC,, | Performed by: INTERNAL MEDICINE

## 2019-06-21 PROCEDURE — 71046 X-RAY EXAM CHEST 2 VIEWS: CPT | Mod: TC,HCNC

## 2019-06-21 PROCEDURE — 3078F PR MOST RECENT DIASTOLIC BLOOD PRESSURE < 80 MM HG: ICD-10-PCS | Mod: HCNC,CPTII,S$GLB, | Performed by: INTERNAL MEDICINE

## 2019-06-21 PROCEDURE — 1101F PT FALLS ASSESS-DOCD LE1/YR: CPT | Mod: HCNC,CPTII,S$GLB, | Performed by: INTERNAL MEDICINE

## 2019-06-21 PROCEDURE — 3078F DIAST BP <80 MM HG: CPT | Mod: HCNC,CPTII,S$GLB, | Performed by: INTERNAL MEDICINE

## 2019-06-21 PROCEDURE — 99999 PR PBB SHADOW E&M-EST. PATIENT-LVL III: ICD-10-PCS | Mod: PBBFAC,HCNC,, | Performed by: INTERNAL MEDICINE

## 2019-06-21 PROCEDURE — 94060 PR EVAL OF BRONCHOSPASM: ICD-10-PCS | Mod: HCNC,S$GLB,, | Performed by: INTERNAL MEDICINE

## 2019-06-21 PROCEDURE — 99214 OFFICE O/P EST MOD 30 MIN: CPT | Mod: 25,HCNC,S$GLB, | Performed by: INTERNAL MEDICINE

## 2019-06-21 PROCEDURE — 94060 EVALUATION OF WHEEZING: CPT | Mod: HCNC,S$GLB,, | Performed by: INTERNAL MEDICINE

## 2019-06-21 PROCEDURE — 3077F PR MOST RECENT SYSTOLIC BLOOD PRESSURE >= 140 MM HG: ICD-10-PCS | Mod: HCNC,CPTII,S$GLB, | Performed by: INTERNAL MEDICINE

## 2019-06-21 PROCEDURE — 71046 XR CHEST PA AND LATERAL: ICD-10-PCS | Mod: 26,HCNC,, | Performed by: RADIOLOGY

## 2019-06-21 PROCEDURE — 99214 PR OFFICE/OUTPT VISIT, EST, LEVL IV, 30-39 MIN: ICD-10-PCS | Mod: 25,HCNC,S$GLB, | Performed by: INTERNAL MEDICINE

## 2019-06-21 NOTE — PROGRESS NOTES
Subjective:      Patient ID: Glo Dumont is a 83 y.o. female.    Patient Active Problem List   Diagnosis    Sarcoidosis of lung    Thyroid nodule    Hypertensive heart disease with heart failure    Pure hypercholesterolemia    Hemoglobin A-S genotype    Angina, class II    Ptosis    Coronary artery disease, occlusive    Central vein occlusion of retina    Iron deficiency anemia    Vitamin D deficiency    Osteopenia    Essential hypertension    Chronic diastolic HF (heart failure), NYHA class 2    Atherosclerosis of aorta    Anxiety and depression    CKD (chronic kidney disease) stage 3, GFR 30-59 ml/min    Uveitis    Colitis    History of coronary angioplasty    Esophageal dysphagia    Hiatal hernia     Problem list has been reviewed.    Chief Complaint: Sarcoidosis of lung    HPI: She is here today for follow up review of PFT and CXR and sarcoidosis.     PFT and CXR reviewed with pateint who voiced understanding.   She denies any new onset pulmonary complaints.   She reports a mild intermittent productive cough.   She denies fevers, chills, rigors, hemoptysis, pain with breathing, wheezing.    A full  review of systems, past , family  and social histories was performed except as mentioned in the note above, these are non contributory to the main issues discussed today.    Immunization status reviewed and updated.      Previous Report Reviewed: office notes     The following portions of the patient's history were reviewed and updated as appropriate: She  has a past medical history of Anemia, Angina pectoris, Anxiety, Arthritis, Carotid artery occlusion, Carpal tunnel syndrome (06/23/2008), Chronic diarrhea, CKD (chronic kidney disease) stage 3, GFR 30-59 ml/min (5/11/2017), Coronary artery disease, Coronary artery disease, Diastolic dysfunction, Diverticulosis, Greater trochanteric bursitis (2/10/2015), Grief at loss of child (1/26/2016), H/O carotid endarterectomy (12/2/2013), Heart  failure, History of coronary angioplasty (3/11/2014), Hypercholesteremia, Hypertension, Liver cyst (02/08/2013), Macular degeneration, Primary open-angle glaucoma(365.11) (9/3/2013), Renal cyst (02/08/2013), 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  She  has a past surgical history that includes Total abdominal hysterectomy w/ bilateral salpingoophorectomy (1972); Cataract extraction (Bilateral); Coronary angioplasty with stent (11/19/2010); Carotid endarterectomy (Right, 2000s); Joint replacement (Left, 11/03/2014); and Cholecystectomy.  Her family history includes Cancer in her daughter and father; Cirrhosis in her brother; Heart failure in her brother and mother; Hypertension in her mother.  She  reports that she has never smoked. She has never used smokeless tobacco. She reports that she does not drink alcohol or use drugs.  She has a current medication list which includes the following prescription(s): amlodipine, aspirin, budesonide, calcium carbonate, cholecalciferol (vitamin d3), dicyclomine, dorzolamide-timolol 2-0.5%, durezol, fluoxetine, fluticasone propionate, folic acid/multivit-min/lutein, hydrochlorothiazide, iron-vitamin c 100-250 mg (icar-c), metoprolol tartrate, nitroglycerin, prasugrel, and simvastatin.  She is allergic to lisinopril and codeine..    Review of Systems   Constitutional: Positive for fatigue. Negative for chills and appetite change.   HENT: Negative for postnasal drip, rhinorrhea and sinus pressure.    Eyes: Negative for itching.   Respiratory: Positive for cough. Negative for shortness of breath and dyspnea on extertion.    Cardiovascular: Negative for chest pain, palpitations and leg swelling.   Genitourinary: Negative for difficulty urinating.   Musculoskeletal: Negative for arthralgias and back pain.   Skin: Positive for rash.   Gastrointestinal: Negative for nausea, vomiting and acid reflux.  "  Neurological: Positive for weakness. Negative for dizziness and headaches.   Hematological: Negative for adenopathy. Does not bruise/bleed easily.   Psychiatric/Behavioral: The patient is not nervous/anxious.       Objective:     Vitals:    06/21/19 0908   BP: (!) 150/72   Pulse: 60   Resp: 18   SpO2: 98%   Weight: 58.5 kg (128 lb 15.5 oz)   Height: 5' 1" (1.549 m)     Body mass index is 24.37 kg/m².    Physical Exam   Constitutional: She is oriented to person, place, and time. She appears well-developed and well-nourished.   HENT:   Head: Normocephalic and atraumatic.   Eyes: Pupils are equal, round, and reactive to light. Conjunctivae are normal.   Neck: Normal range of motion. Neck supple.   Cardiovascular: Normal rate and regular rhythm.   Pulmonary/Chest: Effort normal and breath sounds normal.   Abdominal: Soft. Bowel sounds are normal.   Musculoskeletal: Normal range of motion.   Neurological: She is alert and oriented to person, place, and time.   Skin: Skin is warm and dry.   Psychiatric: She has a normal mood and affect.   Nursing note and vitals reviewed.      Personal Diagnostic Review:     Pulmonary function tests: FEV1: 1.71  (116.1 % predicted), FVC:  2.51 (150 % predicted), FEV1/FVC:  68.29, TLC: 3.09 (69.5% predicted), RV/TLVC: 18.79 (40.4 % predicted), DLCO: 2.61 (43.7 % predicted)  Normal  spirometry. Moderate restriction. Diffusion capacity is moderately reduced.     CXR: 06/21/19: Slightly improved inspiratory result otherwise stable exam.  Heart size within upper limits normal in the aorta is tortuous with underlying atherosclerotic calcification.  Pulmonary vasculature normal in appearance allowing for slight rotation.  Redemonstration of a few scattered faintly visualized nodular opacities in the upper lobes and apices.  No consolidation or effusion.  Osteopenia and spondylosis.  Status post cholecystectomy.  Stable smooth prominence right paratracheal soft tissues and surgical clips noted " in the medial margin right lower neck.  Mild bilateral irregular apical pleural thickening.    Assessment / plan       Discussed diagnosis, its evaluation, treatment and usual course. All questions    Problem List Items Addressed This Visit        Immunology/Multi System    Sarcoidosis of lung - Primary    Current Assessment & Plan     PULMONARY SARCOIDOSIS ROS: no significant ongoing wheezing or shortness of breath, using bronchodilator MDI less than twice a week.   New concerns: NONE.   Exam: appears well, vitals normal, no respiratory distress, acyanotic, normal RR, chest clear, no wheezing, crepitations, rhonchi, normal symmetric air entry.   Assessment:  PULMONARY SARCOIDOSIS stable.   Plan:  Orders as documented in EMR. PFT, CXR in 1 year.            Relevant Orders    X-Ray Chest PA And Lateral    Complete PFT without bronchodilator - Clinic          TIME SPENT WITH PATIENT: Time spent: 25 minutes in face to face  discussion concerning diagnosis, prognosis, review of lab and test results, benefits of treatment as well as management of disease, counseling of patient and coordination of care between various health  care providers . Greater than half the time spent was used for coordination of care and counseling of patient.     Follow up in about 1 year (around 6/21/2020) for Sarcoidosis.

## 2019-06-21 NOTE — ASSESSMENT & PLAN NOTE
PULMONARY SARCOIDOSIS ROS: no significant ongoing wheezing or shortness of breath, using bronchodilator MDI less than twice a week.   New concerns: NONE.   Exam: appears well, vitals normal, no respiratory distress, acyanotic, normal RR, chest clear, no wheezing, crepitations, rhonchi, normal symmetric air entry.   Assessment:  PULMONARY SARCOIDOSIS stable.   Plan:  Orders as documented in EMR. PFT, CXR in 1 year.

## 2019-07-15 ENCOUNTER — TELEPHONE (OUTPATIENT)
Dept: FAMILY MEDICINE | Facility: CLINIC | Age: 84
End: 2019-07-15

## 2019-07-15 RX ORDER — LISINOPRIL 40 MG/1
TABLET ORAL
COMMUNITY
Start: 2019-06-27 | End: 2020-08-05

## 2019-07-15 NOTE — TELEPHONE ENCOUNTER
----- Message from Anna Hoffman sent at 7/15/2019 12:18 PM CDT -----  Contact: pt  Type:  Sooner Apoointment Request    Caller is requesting a sooner appointment.  Caller declined first available appointment listed below.  Caller will not accept being placed on the waitlist and is requesting a message be sent to doctor.  Name of Caller: The pt   When is the first available appointment? 08/13/2019  Symptoms: Follow up/refills  Would the patient rather a call back or a response via [facility]? Call back  Best Call Back Number: [phone]  Additional Information: n/a

## 2019-07-19 ENCOUNTER — LAB VISIT (OUTPATIENT)
Dept: LAB | Facility: HOSPITAL | Age: 84
End: 2019-07-19
Attending: INTERNAL MEDICINE
Payer: MEDICARE

## 2019-07-19 ENCOUNTER — OFFICE VISIT (OUTPATIENT)
Dept: FAMILY MEDICINE | Facility: CLINIC | Age: 84
End: 2019-07-19
Payer: MEDICARE

## 2019-07-19 VITALS
TEMPERATURE: 98 F | BODY MASS INDEX: 24.1 KG/M2 | WEIGHT: 127.63 LBS | HEIGHT: 61 IN | HEART RATE: 56 BPM | SYSTOLIC BLOOD PRESSURE: 138 MMHG | DIASTOLIC BLOOD PRESSURE: 60 MMHG | OXYGEN SATURATION: 98 %

## 2019-07-19 DIAGNOSIS — Z00.00 ENCOUNTER FOR PREVENTIVE HEALTH EXAMINATION: ICD-10-CM

## 2019-07-19 DIAGNOSIS — E78.00 PURE HYPERCHOLESTEROLEMIA: Chronic | ICD-10-CM

## 2019-07-19 DIAGNOSIS — N18.30 CKD (CHRONIC KIDNEY DISEASE) STAGE 3, GFR 30-59 ML/MIN: ICD-10-CM

## 2019-07-19 DIAGNOSIS — H34.8192 CENTRAL RETINAL VEIN OCCLUSION, UNSPECIFIED COMPLICATION STATUS, UNSPECIFIED LATERALITY: ICD-10-CM

## 2019-07-19 DIAGNOSIS — I10 ESSENTIAL HYPERTENSION: Chronic | ICD-10-CM

## 2019-07-19 DIAGNOSIS — F32.A DEPRESSION, UNSPECIFIED DEPRESSION TYPE: ICD-10-CM

## 2019-07-19 DIAGNOSIS — E55.9 VITAMIN D DEFICIENCY: ICD-10-CM

## 2019-07-19 DIAGNOSIS — Z78.0 ASYMPTOMATIC POSTMENOPAUSAL STATE: ICD-10-CM

## 2019-07-19 DIAGNOSIS — Z00.00 ENCOUNTER FOR PREVENTIVE HEALTH EXAMINATION: Primary | ICD-10-CM

## 2019-07-19 DIAGNOSIS — M85.80 OSTEOPENIA, UNSPECIFIED LOCATION: ICD-10-CM

## 2019-07-19 DIAGNOSIS — D86.0 SARCOIDOSIS OF LUNG: ICD-10-CM

## 2019-07-19 DIAGNOSIS — I25.10 CORONARY ARTERY DISEASE, OCCLUSIVE: Chronic | ICD-10-CM

## 2019-07-19 LAB
25(OH)D3+25(OH)D2 SERPL-MCNC: 26 NG/ML (ref 30–96)
ALBUMIN SERPL BCP-MCNC: 3.7 G/DL (ref 3.5–5.2)
ALP SERPL-CCNC: 72 U/L (ref 55–135)
ALT SERPL W/O P-5'-P-CCNC: 17 U/L (ref 10–44)
ANION GAP SERPL CALC-SCNC: 10 MMOL/L (ref 8–16)
AST SERPL-CCNC: 16 U/L (ref 10–40)
BASOPHILS # BLD AUTO: 0.02 K/UL (ref 0–0.2)
BASOPHILS NFR BLD: 0.3 % (ref 0–1.9)
BILIRUB SERPL-MCNC: 0.3 MG/DL (ref 0.1–1)
BUN SERPL-MCNC: 38 MG/DL (ref 8–23)
CALCIUM SERPL-MCNC: 10.1 MG/DL (ref 8.7–10.5)
CHLORIDE SERPL-SCNC: 108 MMOL/L (ref 95–110)
CHOLEST SERPL-MCNC: 185 MG/DL (ref 120–199)
CHOLEST/HDLC SERPL: 3 {RATIO} (ref 2–5)
CO2 SERPL-SCNC: 20 MMOL/L (ref 23–29)
CREAT SERPL-MCNC: 1.5 MG/DL (ref 0.5–1.4)
DIFFERENTIAL METHOD: ABNORMAL
EOSINOPHIL # BLD AUTO: 0 K/UL (ref 0–0.5)
EOSINOPHIL NFR BLD: 0.2 % (ref 0–8)
ERYTHROCYTE [DISTWIDTH] IN BLOOD BY AUTOMATED COUNT: 15.4 % (ref 11.5–14.5)
EST. GFR  (AFRICAN AMERICAN): 37.4 ML/MIN/1.73 M^2
EST. GFR  (NON AFRICAN AMERICAN): 32.4 ML/MIN/1.73 M^2
GLUCOSE SERPL-MCNC: 112 MG/DL (ref 70–110)
HCT VFR BLD AUTO: 33.1 % (ref 37–48.5)
HDLC SERPL-MCNC: 61 MG/DL (ref 40–75)
HDLC SERPL: 33 % (ref 20–50)
HGB BLD-MCNC: 10.9 G/DL (ref 12–16)
IMM GRANULOCYTES # BLD AUTO: 0.04 K/UL (ref 0–0.04)
IMM GRANULOCYTES NFR BLD AUTO: 0.6 % (ref 0–0.5)
LDLC SERPL CALC-MCNC: 104.8 MG/DL (ref 63–159)
LYMPHOCYTES # BLD AUTO: 0.7 K/UL (ref 1–4.8)
LYMPHOCYTES NFR BLD: 10.3 % (ref 18–48)
MCH RBC QN AUTO: 27.8 PG (ref 27–31)
MCHC RBC AUTO-ENTMCNC: 32.9 G/DL (ref 32–36)
MCV RBC AUTO: 84 FL (ref 82–98)
MONOCYTES # BLD AUTO: 0.4 K/UL (ref 0.3–1)
MONOCYTES NFR BLD: 5.7 % (ref 4–15)
NEUTROPHILS # BLD AUTO: 5.2 K/UL (ref 1.8–7.7)
NEUTROPHILS NFR BLD: 82.9 % (ref 38–73)
NONHDLC SERPL-MCNC: 124 MG/DL
NRBC BLD-RTO: 0 /100 WBC
PLATELET # BLD AUTO: 298 K/UL (ref 150–350)
PMV BLD AUTO: 10.7 FL (ref 9.2–12.9)
POTASSIUM SERPL-SCNC: 4.5 MMOL/L (ref 3.5–5.1)
PROT SERPL-MCNC: 7.3 G/DL (ref 6–8.4)
RBC # BLD AUTO: 3.92 M/UL (ref 4–5.4)
SODIUM SERPL-SCNC: 138 MMOL/L (ref 136–145)
T4 FREE SERPL-MCNC: 0.93 NG/DL (ref 0.71–1.51)
TRIGL SERPL-MCNC: 96 MG/DL (ref 30–150)
TSH SERPL DL<=0.005 MIU/L-ACNC: 0.25 UIU/ML (ref 0.4–4)
WBC # BLD AUTO: 6.29 K/UL (ref 3.9–12.7)

## 2019-07-19 PROCEDURE — 84439 ASSAY OF FREE THYROXINE: CPT | Mod: HCNC

## 2019-07-19 PROCEDURE — 82306 VITAMIN D 25 HYDROXY: CPT | Mod: HCNC

## 2019-07-19 PROCEDURE — 3075F PR MOST RECENT SYSTOLIC BLOOD PRESS GE 130-139MM HG: ICD-10-PCS | Mod: HCNC,CPTII,S$GLB, | Performed by: INTERNAL MEDICINE

## 2019-07-19 PROCEDURE — 80053 COMPREHEN METABOLIC PANEL: CPT | Mod: HCNC

## 2019-07-19 PROCEDURE — 84443 ASSAY THYROID STIM HORMONE: CPT | Mod: HCNC

## 2019-07-19 PROCEDURE — 3078F PR MOST RECENT DIASTOLIC BLOOD PRESSURE < 80 MM HG: ICD-10-PCS | Mod: HCNC,CPTII,S$GLB, | Performed by: INTERNAL MEDICINE

## 2019-07-19 PROCEDURE — 85025 COMPLETE CBC W/AUTO DIFF WBC: CPT | Mod: HCNC

## 2019-07-19 PROCEDURE — 99397 PER PM REEVAL EST PAT 65+ YR: CPT | Mod: HCNC,S$GLB,, | Performed by: INTERNAL MEDICINE

## 2019-07-19 PROCEDURE — 99999 PR PBB SHADOW E&M-EST. PATIENT-LVL III: CPT | Mod: PBBFAC,HCNC,, | Performed by: INTERNAL MEDICINE

## 2019-07-19 PROCEDURE — 3078F DIAST BP <80 MM HG: CPT | Mod: HCNC,CPTII,S$GLB, | Performed by: INTERNAL MEDICINE

## 2019-07-19 PROCEDURE — 36415 COLL VENOUS BLD VENIPUNCTURE: CPT | Mod: HCNC,PO

## 2019-07-19 PROCEDURE — 80061 LIPID PANEL: CPT | Mod: HCNC

## 2019-07-19 PROCEDURE — 99999 PR PBB SHADOW E&M-EST. PATIENT-LVL III: ICD-10-PCS | Mod: PBBFAC,HCNC,, | Performed by: INTERNAL MEDICINE

## 2019-07-19 PROCEDURE — 3075F SYST BP GE 130 - 139MM HG: CPT | Mod: HCNC,CPTII,S$GLB, | Performed by: INTERNAL MEDICINE

## 2019-07-19 PROCEDURE — 99397 PR PREVENTIVE VISIT,EST,65 & OVER: ICD-10-PCS | Mod: HCNC,S$GLB,, | Performed by: INTERNAL MEDICINE

## 2019-07-19 RX ORDER — CITALOPRAM 10 MG/1
10 TABLET ORAL NIGHTLY
Qty: 30 TABLET | Refills: 6 | Status: SHIPPED | OUTPATIENT
Start: 2019-07-19 | End: 2019-08-30

## 2019-07-19 NOTE — PROGRESS NOTES
"Subjective:      Patient ID: Glo Dumont is a 83 y.o. female.    Chief Complaint: Follow-up (meds refill) and Extremity Weakness (no energy, no apetite )      HPI  Here for f/u medical problems and preventive exam.  Breathing ok.  Colitis sx doing ok, f/w Dr. Galindo.  Low energy, feels sad and depressed.  Cares for ill .  Denies anxiety.  Sleeps ok.  Says no appetite, but only 2# wt loss in past year.  No f/c/sw/cough.  No cp/sob/palp.  No black or blood in stool.  Urine normal.  Denies any other problems.    HM: 10/17 fluvax, 3/15 [id number], 2/14 [id number], 2/15 TDaP, 12/15 BMD rep 2y, 2/11 Cscope no repeat will be done, 1/17 [facility] no more, Eye doc monthly, GI Dr. Galindo.     Review of Systems   Constitutional: Negative for appetite change, chills, diaphoresis and fever.   HENT: Negative for congestion, ear pain, rhinorrhea, sinus pressure and sore throat.    Respiratory: Negative for cough, chest tightness and shortness of breath.    Cardiovascular: Negative for chest pain and palpitations.   Gastrointestinal: Negative for blood in stool, constipation, diarrhea, nausea and vomiting.   Genitourinary: Negative for dysuria, frequency, hematuria, menstrual problem, urgency and vaginal discharge.   Musculoskeletal: Negative for arthralgias.   Skin: Negative for rash.   Neurological: Negative for dizziness and headaches.   Psychiatric/Behavioral: Negative for sleep disturbance. The patient is not nervous/anxious.          Objective:   /60 (BP Location: Left arm, Patient Position: Sitting, BP Method: Medium (Manual))   Pulse (!) 56   Temp 97.9 °F (36.6 °C) (Temporal)   Ht 5' 1" (1.549 m)   Wt 57.9 kg (127 lb 10.3 oz)   SpO2 98%   BMI 24.12 kg/m²     Physical Exam   Constitutional: She is oriented to person, place, and time. She appears well-developed and well-nourished.   HENT:   Right Ear: External ear normal. Tympanic membrane is not injected.   Left Ear: External ear normal. Tympanic membrane is not " injected.   Mouth/Throat: Oropharynx is clear and moist.   Eyes: Conjunctivae are normal.   Neck: Normal range of motion. Neck supple. No thyromegaly present.   Cardiovascular: Normal rate, regular rhythm and intact distal pulses. Exam reveals no gallop and no friction rub.   No murmur heard.  Pulmonary/Chest: Effort normal and breath sounds normal. She has no wheezes. She has no rales.   Abdominal: Soft. Bowel sounds are normal. She exhibits no mass. There is no tenderness.   Musculoskeletal: She exhibits no edema.   Lymphadenopathy:     She has no cervical adenopathy.   Neurological: She is alert and oriented to person, place, and time.   Skin: Skin is warm. No rash noted.   Psychiatric: She exhibits a depressed mood.           Assessment:       1. Encounter for preventive health examination    2. Essential hypertension    3. Depression, unspecified depression type    4. Central retinal vein occlusion, unspecified complication status, unspecified laterality    5. CKD (chronic kidney disease) stage 3, GFR 30-59 ml/min    6. Coronary artery disease, occlusive    7. Osteopenia, unspecified location    8. Pure hypercholesterolemia    9. Sarcoidosis of lung    10. Vitamin D deficiency    11. Asymptomatic postmenopausal state          Plan:     Encounter for preventive health examination- lab now with [place].  -     CBC auto differential; Future; Expected date: 07/19/2019  -     Comprehensive metabolic panel; Future; Expected date: 07/19/2019  -     Lipid panel; Future; Expected date: 07/19/2019  -     TSH; Future; Expected date: 07/19/2019  -     Vitamin D; Future    Essential hypertension- stable, cont rx.    Depression, unspecified depression type- start rx, RTC 6wk.  -     citalopram (CELEXA) 10 MG tablet; Take 1 tablet (10 mg total) by mouth every evening.  Dispense: 30 tablet; Refill: 6    Central retinal vein occlusion, unspecified complication status, unspecified laterality    CKD (chronic kidney disease) stage 3,  GFR 30-59 ml/min  -     CBC auto differential; Future; Expected date: 07/19/2019    Coronary artery disease, occlusive- clinically stable.    Osteopenia, unspecified location- recheck BMD.    Pure hypercholesterolemia- check lab.    Sarcoidosis of lung- doing well on meds.    Vitamin D deficiency  -     Vitamin D; Future

## 2019-07-22 ENCOUNTER — PATIENT MESSAGE (OUTPATIENT)
Dept: FAMILY MEDICINE | Facility: CLINIC | Age: 84
End: 2019-07-22

## 2019-07-22 DIAGNOSIS — R53.83 MALAISE AND FATIGUE: ICD-10-CM

## 2019-07-22 DIAGNOSIS — R79.9 ABNORMAL BLOOD CHEMISTRY: Primary | ICD-10-CM

## 2019-07-22 DIAGNOSIS — R53.81 MALAISE AND FATIGUE: ICD-10-CM

## 2019-08-14 ENCOUNTER — OFFICE VISIT (OUTPATIENT)
Dept: OPHTHALMOLOGY | Facility: CLINIC | Age: 84
End: 2019-08-14
Payer: MEDICARE

## 2019-08-14 DIAGNOSIS — H34.8120 CENTRAL RETINAL VEIN OCCLUSION WITH MACULAR EDEMA OF LEFT EYE: ICD-10-CM

## 2019-08-14 DIAGNOSIS — Z96.1 PSEUDOPHAKIA OF BOTH EYES: ICD-10-CM

## 2019-08-14 DIAGNOSIS — H35.353 CME (CYSTOID MACULAR EDEMA), BILATERAL: ICD-10-CM

## 2019-08-14 DIAGNOSIS — H40.049 STEROID RESPONDERS TO GLAUCOMA, UNSPECIFIED LATERALITY: Primary | ICD-10-CM

## 2019-08-14 DIAGNOSIS — H52.7 REFRACTION DISORDER: ICD-10-CM

## 2019-08-14 PROCEDURE — 92014 COMPRE OPH EXAM EST PT 1/>: CPT | Mod: HCNC,S$GLB,, | Performed by: OPHTHALMOLOGY

## 2019-08-14 PROCEDURE — 92015 DETERMINE REFRACTIVE STATE: CPT | Mod: HCNC,S$GLB,, | Performed by: OPHTHALMOLOGY

## 2019-08-14 PROCEDURE — 76514 ECHO EXAM OF EYE THICKNESS: CPT | Mod: HCNC,S$GLB,, | Performed by: OPHTHALMOLOGY

## 2019-08-14 PROCEDURE — 99999 PR PBB SHADOW E&M-EST. PATIENT-LVL I: CPT | Mod: PBBFAC,HCNC,, | Performed by: OPHTHALMOLOGY

## 2019-08-14 PROCEDURE — 92015 PR REFRACTION: ICD-10-PCS | Mod: HCNC,S$GLB,, | Performed by: OPHTHALMOLOGY

## 2019-08-14 PROCEDURE — 76514 PR  US, EYE, FOR CORNEAL THICKNESS: ICD-10-PCS | Mod: HCNC,S$GLB,, | Performed by: OPHTHALMOLOGY

## 2019-08-14 PROCEDURE — 92020 GONIOSCOPY: CPT | Mod: HCNC,S$GLB,, | Performed by: OPHTHALMOLOGY

## 2019-08-14 PROCEDURE — 92020 PR SPECIAL EYE EVAL,GONIOSCOPY: ICD-10-PCS | Mod: HCNC,S$GLB,, | Performed by: OPHTHALMOLOGY

## 2019-08-14 PROCEDURE — 92014 PR EYE EXAM, EST PATIENT,COMPREHESV: ICD-10-PCS | Mod: HCNC,S$GLB,, | Performed by: OPHTHALMOLOGY

## 2019-08-14 PROCEDURE — 99999 PR PBB SHADOW E&M-EST. PATIENT-LVL I: ICD-10-PCS | Mod: PBBFAC,HCNC,, | Performed by: OPHTHALMOLOGY

## 2019-08-14 NOTE — PROGRESS NOTES
HPI     The patient states  saw her and gave her an injection about a   month ago and told her to come see [facility] for a glasses RX. The patient   states her eyes are just as blurry as they always have been.    Now sees Dr Snyder in [place]   Last visit 2/17/18   Chronic Iritis OU  Ozurdex injection OD 7/14/2019 with Dr Snyder   Chronic CME OU   Steroid resp.Glaucoma       Last seen 08/27/14 [facility]  NO DM  PCIOL OU [facility]  SARCOIDOSIS  H\O UVEITIS  AVASTIN OS 11/29/13  EYLEA OS 7/31/14 7/2/14 3/6/14 2/6/14  Central vein occlusion of retina, left   Retinal edema   Uveitis    Cosopt BID OU    Last edited by Shaina Coffey on 8/14/2019  8:19 AM. (History)            Assessment /Plan     For exam results, see Encounter Report.      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 of rubeosis noted on GONIO inferiorly with PAS and ectropion uvea in that inferior area. Discussed with Dr Snyder who will see patient next week for additional testing. IOP elevation could be steroid response.   Will not start additional meds unitl after Dr Snyder  see's her    2. CME (cystoid macular edema), bilateral H35.353 362.53 S/o OSudex OU - followed with Claudio Brown    3. Central retinal vein occlusion with macular edema of left eye H34.8120 362.35      362.83    4. Pseudophakia of both eyes Z96.1 V43.1    5. Refraction disorder H52.7 367.9        RETURN TO CLINIC to resume care with Dr Snyder and can see me in 3-4 week with [facility] for IOP     Continue Cosopt BID OU

## 2019-08-14 NOTE — LETTER
[facility]  [address]  [place] [address]  Phone: [phone]  Fax: [phone]   August 14, 2019    Delfina Markham MD  [facility]  [place] [id number]    Patient: Glo Dumont   MR Number: [id number]   YOB: 1935   Date of Visit: 8/14/2019       Dear Dr. Markham:    Thank you for referring Glo Dumont to me for evaluation. Here is my assessment and plan of care:    Assessment:   /    Plan:       For exam results, see Encounter Report.      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 of rubeosis noted on GONIO inferiorly with PAS and ectropion uvea in that inferior area. Discussed with Dr Snyder who will see patient next week for additional testing. IOP elevation could be steroid response.   Will not start additional meds unitl after Dr Snyder  see's her    2. CME (cystoid macular edema), bilateral H35.353 362.53 S/o OSudex OU - followed with Dr. Snyder    3. Central retinal vein occlusion with macular edema of left eye H34.8120 362.35      362.83    4. Pseudophakia of both eyes Z96.1 V43.1    5. Refraction disorder H52.7 367.9        RETURN TO CLINIC to resume care with Dr Snyder and can see me in 3-4 week with [facility] for IOP     Continue Cosopt BID OU               Below you will find my full exam findings. If you have questions, please do not hesitate to call me. I look forward to following Ms. Glo Dumont along with you.    Sincerely,        Sergio Lozada MD       CC  No Recipients             Base Eye Exam     Visual Acuity (Snellen - Linear)       Right Left    Dist cc 20/100 -1 20/300    Correction:  Glasses          Tonometry (Applanation, 8:44 AM)       Right Left    Pressure 27 19          Pachymetry (8/14/2019)       Right Left    Thickness 443 407          Gonioscopy       Right Left    Temporal 4 Multiple PAS PAS    Nasal 4 multiple PAS PAS     Superior 4 4    Inferior multiple PAS PAS   rubiosis in angle OD inferiorly            Pupils       Pupils Dark Light Shape React APD    Right PERRL 4 3 Irregular 1 None    Left PERRL 4 3 Round 1 +2          Visual Fields       Right Left     Full Full          Extraocular Movement       Right Left     Full Full          Neuro/Psych     Oriented x3:  Yes    Mood/Affect:  Normal          Dilation     Both eyes:  2.5% Phenylephrine, 1% Mydriacyl @ 8:53 AM            Slit Lamp and Fundus Exam     External Exam       Right Left    External Normal Normal          Slit Lamp Exam       Right Left    Lids/Lashes Normal Normal    Conjunctiva/Sclera White and quiet White and quiet    Cornea 1+spk 1+spk    Anterior Chamber no cell no cell    Iris Inferior-temporal peak, ectropion uvea Round and reactive    Lens Posterior chamber intraocular lens Posterior chamber intraocular lens    Vitreous Ozurdex implant  Ozurdex implant           Fundus Exam       Right Left    Disc Question of slight Inferior-temporal thinning, Pink rim throughout Normal    C/D Ratio 0.2 0.25    Macula Fovea irregularity  2+ erm, Question of Cystoid macular edema    Vessels Normal Tortuous    Periphery Normal Normal    Poor view in left eye            Refraction     Wearing Rx       Sphere Cylinder Axis    Right -2.50 +0.50 063    Left -2.50 +0.50 005          Manifest Refraction       Sphere Cylinder Pinson Dist VA    Right -4.50 +0.50 015 20/60 +1    Left -4.25 +0.50 010 20/300          Final Rx       Sphere Cylinder Pinson Dist VA    Right -4.50 +0.50 015 20/60 +1    Left -4.25 +0.50 010 20/300

## 2019-08-23 ENCOUNTER — LAB VISIT (OUTPATIENT)
Dept: LAB | Facility: HOSPITAL | Age: 84
End: 2019-08-23
Attending: INTERNAL MEDICINE
Payer: MEDICARE

## 2019-08-23 DIAGNOSIS — R53.83 MALAISE AND FATIGUE: ICD-10-CM

## 2019-08-23 DIAGNOSIS — R79.9 ABNORMAL BLOOD CHEMISTRY: ICD-10-CM

## 2019-08-23 DIAGNOSIS — R53.81 MALAISE AND FATIGUE: ICD-10-CM

## 2019-08-23 LAB
ANION GAP SERPL CALC-SCNC: 9 MMOL/L (ref 8–16)
BUN SERPL-MCNC: 36 MG/DL (ref 8–23)
CALCIUM SERPL-MCNC: 9.6 MG/DL (ref 8.7–10.5)
CHLORIDE SERPL-SCNC: 110 MMOL/L (ref 95–110)
CO2 SERPL-SCNC: 20 MMOL/L (ref 23–29)
CREAT SERPL-MCNC: 1.4 MG/DL (ref 0.5–1.4)
EST. GFR  (AFRICAN AMERICAN): 40.6 ML/MIN/1.73 M^2
EST. GFR  (NON AFRICAN AMERICAN): 35.3 ML/MIN/1.73 M^2
GLUCOSE SERPL-MCNC: 121 MG/DL (ref 70–110)
POTASSIUM SERPL-SCNC: 4.4 MMOL/L (ref 3.5–5.1)
SODIUM SERPL-SCNC: 139 MMOL/L (ref 136–145)
T4 FREE SERPL-MCNC: 0.76 NG/DL (ref 0.71–1.51)
TSH SERPL DL<=0.005 MIU/L-ACNC: 0.32 UIU/ML (ref 0.4–4)

## 2019-08-23 PROCEDURE — 80048 BASIC METABOLIC PNL TOTAL CA: CPT | Mod: HCNC

## 2019-08-23 PROCEDURE — 84443 ASSAY THYROID STIM HORMONE: CPT | Mod: HCNC

## 2019-08-23 PROCEDURE — 84439 ASSAY OF FREE THYROXINE: CPT | Mod: HCNC

## 2019-08-23 PROCEDURE — 36415 COLL VENOUS BLD VENIPUNCTURE: CPT | Mod: HCNC,PO

## 2019-08-27 ENCOUNTER — PES CALL (OUTPATIENT)
Dept: ADMINISTRATIVE | Facility: CLINIC | Age: 84
End: 2019-08-27

## 2019-08-30 RX ORDER — CITALOPRAM 10 MG/1
TABLET ORAL
COMMUNITY
Start: 2019-08-23 | End: 2020-08-05 | Stop reason: SDUPTHER

## 2019-09-04 ENCOUNTER — OFFICE VISIT (OUTPATIENT)
Dept: OPHTHALMOLOGY | Facility: CLINIC | Age: 84
End: 2019-09-04
Payer: MEDICARE

## 2019-09-04 DIAGNOSIS — H20.13 CHRONIC IRITIS OF BOTH EYES: Primary | ICD-10-CM

## 2019-09-04 DIAGNOSIS — Z96.1 PSEUDOPHAKIA OF BOTH EYES: ICD-10-CM

## 2019-09-04 DIAGNOSIS — H40.049 STEROID RESPONDERS TO GLAUCOMA, UNSPECIFIED LATERALITY: ICD-10-CM

## 2019-09-04 DIAGNOSIS — H35.353 CME (CYSTOID MACULAR EDEMA), BILATERAL: ICD-10-CM

## 2019-09-04 PROCEDURE — 92012 INTRM OPH EXAM EST PATIENT: CPT | Mod: HCNC,S$GLB,, | Performed by: OPHTHALMOLOGY

## 2019-09-04 PROCEDURE — 99999 PR PBB SHADOW E&M-EST. PATIENT-LVL I: ICD-10-PCS | Mod: PBBFAC,HCNC,, | Performed by: OPHTHALMOLOGY

## 2019-09-04 PROCEDURE — 92012 PR EYE EXAM, EST PATIENT,INTERMED: ICD-10-PCS | Mod: HCNC,S$GLB,, | Performed by: OPHTHALMOLOGY

## 2019-09-04 PROCEDURE — 99999 PR PBB SHADOW E&M-EST. PATIENT-LVL I: CPT | Mod: PBBFAC,HCNC,, | Performed by: OPHTHALMOLOGY

## 2019-09-04 NOTE — PROGRESS NOTES
HPI     Glaucoma      Additional comments: IOP check              Comments     The patient states her eyes unchanged and she denies any ocular pain at   this time.  100% drop compliance- pt saw Dr Snyder since last visit     Now sees Dr Snyder in [place]       1. Steroid responder glaucoma  2. PCIOL OU [facility]  3. SARCOIDOSIS  H\O chronic UVEITIS OU  4. Central vein occlusion of retina, left   5. Retinal edema  6. CME OU   AVASTIN OS 11/29/13  EYLEA OS 7/31/14 7/2/14 3/6/14 2/6/14  Ozurdex injection OD 7/14/19 with Dr Snyder       Cosopt BID OU          Last edited by Sergio Lozada MD on 9/4/2019 11:00 AM. (History)            Assessment /Plan     For exam results, see Encounter Report.      ICD-10-CM ICD-9-CM    1. 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 CME (cystoid macular edema), bilateral H35.353 362.53 Followed by Dr Snyder    3. 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 Pseudophakia of both eyes Z96.1 V43.1          Cosopt BID OU   RETURN TO CLINIC 4 month IOP with me and RETURN TO CLINIC with Dr. Snyder       Disp MR and pt defers PAL- she takes glasses off for nva

## 2019-09-17 ENCOUNTER — PES CALL (OUTPATIENT)
Dept: ADMINISTRATIVE | Facility: CLINIC | Age: 84
End: 2019-09-17

## 2019-09-19 DIAGNOSIS — I10 ESSENTIAL HYPERTENSION: Primary | ICD-10-CM

## 2019-09-19 RX ORDER — AMLODIPINE BESYLATE 10 MG/1
10 TABLET ORAL DAILY
Qty: 90 TABLET | Refills: 3 | Status: SHIPPED | OUTPATIENT
Start: 2019-09-19 | End: 2020-09-17 | Stop reason: SDUPTHER

## 2019-10-03 NOTE — PLAN OF CARE
VSS. No GI bleeding noted. Discharge instructions provided to pt/spouse w/understanding verbalized.     Patient Name: Otilio Tse  Procedure Date: 7/4/2018 3:43 PM  MRN: [id number]  Account Number: [id number]  YOB: 1956  Admit Type: Inpatient  Age: 62  Gender: Male  Race: Patient Declined  Attending MD: Jake Garcia MD  Grafts or Implants: Grafts or Implants Not Applicable  Procedure:            ERCP  Indications:          Bile duct stone(s), Common bile duct stone(s),                        Suspected ascending cholangitis  Providers:            Jake Garcia MD  Referring MD:         Dilip Ambrose Do  Sedation:             Monitored Anesthesia Care  Procedure:       Pre-Anesthesia Assessment:       - Prior to the procedure, a History and Physical was performed, and       patient medications and allergies were reviewed. The patient is unable       to give consent secondary to the patient's altered mental status. The       risks and benefits of the procedure and the sedation options and risks       were discussed with the patient's spouse and daughter. All questions       were answered and informed consent was obtained. Patient identification       and proposed procedure were verified by the physician and the nurse in       the pre-procedure area in the procedure room. Mental Status Examination:       alert and oriented. Airway Examination: normal oropharyngeal airway and       neck mobility. Respiratory Examination: clear to auscultation. CV       Examination: normal. Prophylactic Antibiotics: The patient does not       require prophylactic antibiotics. Prior Anticoagulants: The patient has       taken no previous anticoagulant or antiplatelet agents. ASA Grade       Assessment: IV - A patient with severe systemic disease that is a       constant threat to life. After reviewing the risks and benefits, the       patient was deemed in satisfactory condition to undergo the procedure.       The anesthesia plan was to use monitored anesthesia care (MAC).       Immediately prior to  administration of medications, the patient was       re-assessed for adequacy to receive sedatives. The heart rate,       respiratory rate, oxygen saturations, blood pressure, adequacy of       pulmonary ventilation, and response to care were monitored throughout       the procedure. The physical status of the patient was re-assessed after       the procedure.       A History and Physical was performed prior to the procedure. Patient       medications and allergies were reviewed. The risks and benefits of the       procedure and sedation options were discussed. Questions were answered       and informed consent was obtained. Patient identification and proposed       procedure were verified. The patient was deemed in satisfactory       condition to undergo the procedure. The heart rate, respiratory rate,       oxygen saturations, blood pressure and response to sedation were       monitored throughout the procedure.       The endoscope was passed under direct vision. The Duodenoscope was       introduced through the mouth, and advanced to the duodenum without       successful cannulation. The physical status of the patient was       re-assessed after the procedure. The ERCP was technically difficult and       complex due to challenging cannulation because of abnormal anatomy. The       patient tolerated the procedure well.  Findings:       The major papilla was located entirely within a diverticulum. The major       papilla was congested. The major papilla was bulging.  Impression:       - The major papilla was located entirely within a diverticulum.       - The major papilla appeared congested.       - The major papilla appeared to be bulging.  Recommendation:       - Admit the patient to ICU for ongoing care.       - Refer to an interventional radiologist today.  Complications:        No immediate complications.  Estimated Blood Loss: Estimated blood loss: none.  Jake Garcia M.D.  Jake Garcia MD  7/4/2018  5:23:47 PM  This report has been signed electronically by the above.  Number of Addenda: 0       No Specimens removed during this procedure unless otherwise noted.

## 2019-11-05 ENCOUNTER — TELEPHONE (OUTPATIENT)
Dept: FAMILY MEDICINE | Facility: CLINIC | Age: 84
End: 2019-11-05

## 2019-11-05 NOTE — TELEPHONE ENCOUNTER
----- Message from Glenis Mejia sent at 11/5/2019  1:19 PM CST -----  Contact: self [phone]  States that she is calling to check status on the transportation forms that was dropped off about 3 weeks ago. Please call back at [phone]

## 2019-11-11 NOTE — PROGRESS NOTES
"Subjective:      Patient ID: Glo Dumont is a 84 y.o. female.    Chief Complaint: Follow-up      HPI  Here for follow up of medical problems.  BMs good lately, on entocort.  Bentyl once a day.  Depression much better on citalopram, sleeping well.  BPs 135/60 avg at home.  No cp/sob/palp.    No palp.  No wt loss, no sweats, no anxiety.    Updated/ annual due 7/20:  HM: 11/19 fluvax, 3/15 [id number], 2/14 [id number], 2/15 TDaP, 12/15 BMD rep 2y, 2/11 Cscope no repeat will be done, 1/17 MMG no more, Eye doc monthly, GI Dr. Galindo.     Review of Systems   Constitutional: Negative for chills, diaphoresis and fever.   Respiratory: Negative for cough and shortness of breath.    Cardiovascular: Negative for chest pain, palpitations and leg swelling.   Gastrointestinal: Negative for blood in stool, constipation, diarrhea, nausea and vomiting.   Genitourinary: Negative for dysuria, frequency and hematuria.   Psychiatric/Behavioral: The patient is not nervous/anxious.          Objective:   /60   Pulse 74   Temp 97.7 °F (36.5 °C) (Oral)   Ht 5' 1" (1.549 m)   Wt 62 kg (136 lb 11 oz)   SpO2 98%   BMI 25.83 kg/m²     Physical Exam   Constitutional: She is oriented to person, place, and time. She appears well-developed.   HENT:   Mouth/Throat: Oropharynx is clear and moist.   Neck: Neck supple. Carotid bruit is not present. No thyroid mass present.   Cardiovascular: Normal rate, regular rhythm and intact distal pulses. Exam reveals no gallop and no friction rub.   No murmur heard.  Pulmonary/Chest: Effort normal and breath sounds normal. She has no wheezes. She has no rales.   Abdominal: Soft. Bowel sounds are normal. She exhibits no mass. There is no hepatosplenomegaly. There is no tenderness.   Musculoskeletal: She exhibits no edema.   Lymphadenopathy:     She has no cervical adenopathy.   Neurological: She is alert and oriented to person, place, and time.   Psychiatric: She has a normal mood and affect. "           Assessment:       1. Essential hypertension    2. Coronary artery disease, occlusive    3. CKD (chronic kidney disease) stage 3, GFR 30-59 ml/min    4. Anxiety and depression    5. Other hyperlipidemia    6. Sarcoidosis of lung    7. Vitamin D deficiency    8. Thyroid nodule    9. Preventive measure    10. Asymptomatic postmenopausal state    11. Colitis          Plan:     Essential hypertension- adeq control at home, cont rx.    Coronary artery disease, occlusive- clinically stable.    CKD (chronic kidney disease) stage 3, GFR 30-59 ml/min- stable, cont to monitor.    Anxiety and depression- dong well, cont rx.    Other hyperlipidemia- recheck 6mo.    Sarcoidosis of lung    Vitamin D deficiency  -     Vitamin D; Future    Thyroid nodule- recheck u/s soon.  -     US Soft Tissue Head Neck Thyroid; Future; Expected date: 11/12/2019    Preventive measure- due in 6mo.  -     CBC auto differential; Future; Expected date: 11/12/2019  -     Comprehensive metabolic panel; Future; Expected date: 11/12/2019  -     Lipid panel; Future; Expected date: 11/12/2019  -     TSH; Future; Expected date: 11/12/2019  -     Vitamin D; Future  -     DXA Bone Density Spine And Hip; Future; Expected date: 11/12/2019    Asymptomatic postmenopausal state  -     DXA Bone Density Spine And Hip; Future; Expected date: 11/12/2019    CATS form completed.  Fluvax.  RTC 6mo.  Colitis under control on on meds.

## 2019-11-12 ENCOUNTER — OFFICE VISIT (OUTPATIENT)
Dept: FAMILY MEDICINE | Facility: CLINIC | Age: 84
End: 2019-11-12
Payer: MEDICARE

## 2019-11-12 VITALS
HEIGHT: 61 IN | TEMPERATURE: 98 F | DIASTOLIC BLOOD PRESSURE: 60 MMHG | WEIGHT: 136.69 LBS | OXYGEN SATURATION: 98 % | HEART RATE: 74 BPM | BODY MASS INDEX: 25.81 KG/M2 | SYSTOLIC BLOOD PRESSURE: 135 MMHG

## 2019-11-12 DIAGNOSIS — F41.9 ANXIETY AND DEPRESSION: ICD-10-CM

## 2019-11-12 DIAGNOSIS — I10 ESSENTIAL HYPERTENSION: Primary | Chronic | ICD-10-CM

## 2019-11-12 DIAGNOSIS — E78.49 OTHER HYPERLIPIDEMIA: ICD-10-CM

## 2019-11-12 DIAGNOSIS — Z29.9 PREVENTIVE MEASURE: ICD-10-CM

## 2019-11-12 DIAGNOSIS — I25.10 CORONARY ARTERY DISEASE, OCCLUSIVE: Chronic | ICD-10-CM

## 2019-11-12 DIAGNOSIS — E55.9 VITAMIN D DEFICIENCY: ICD-10-CM

## 2019-11-12 DIAGNOSIS — D86.0 SARCOIDOSIS OF LUNG: ICD-10-CM

## 2019-11-12 DIAGNOSIS — Z78.0 ASYMPTOMATIC POSTMENOPAUSAL STATE: ICD-10-CM

## 2019-11-12 DIAGNOSIS — K52.9 COLITIS: ICD-10-CM

## 2019-11-12 DIAGNOSIS — N18.30 CKD (CHRONIC KIDNEY DISEASE) STAGE 3, GFR 30-59 ML/MIN: ICD-10-CM

## 2019-11-12 DIAGNOSIS — F32.A ANXIETY AND DEPRESSION: ICD-10-CM

## 2019-11-12 DIAGNOSIS — E04.1 THYROID NODULE: ICD-10-CM

## 2019-11-12 PROCEDURE — G0008 ADMIN INFLUENZA VIRUS VAC: HCPCS | Mod: HCNC,S$GLB,, | Performed by: INTERNAL MEDICINE

## 2019-11-12 PROCEDURE — 99999 PR PBB SHADOW E&M-EST. PATIENT-LVL V: ICD-10-PCS | Mod: PBBFAC,HCNC,, | Performed by: INTERNAL MEDICINE

## 2019-11-12 PROCEDURE — 3075F PR MOST RECENT SYSTOLIC BLOOD PRESS GE 130-139MM HG: ICD-10-PCS | Mod: HCNC,CPTII,S$GLB, | Performed by: INTERNAL MEDICINE

## 2019-11-12 PROCEDURE — 99999 PR PBB SHADOW E&M-EST. PATIENT-LVL V: CPT | Mod: PBBFAC,HCNC,, | Performed by: INTERNAL MEDICINE

## 2019-11-12 PROCEDURE — 99214 PR OFFICE/OUTPT VISIT, EST, LEVL IV, 30-39 MIN: ICD-10-PCS | Mod: 25,HCNC,S$GLB, | Performed by: INTERNAL MEDICINE

## 2019-11-12 PROCEDURE — 3075F SYST BP GE 130 - 139MM HG: CPT | Mod: HCNC,CPTII,S$GLB, | Performed by: INTERNAL MEDICINE

## 2019-11-12 PROCEDURE — 90662 IIV NO PRSV INCREASED AG IM: CPT | Mod: HCNC,S$GLB,, | Performed by: INTERNAL MEDICINE

## 2019-11-12 PROCEDURE — 1101F PT FALLS ASSESS-DOCD LE1/YR: CPT | Mod: HCNC,CPTII,S$GLB, | Performed by: INTERNAL MEDICINE

## 2019-11-12 PROCEDURE — G0008 FLU VACCINE - HIGH DOSE (65+) PRESERVATIVE FREE IM: ICD-10-PCS | Mod: HCNC,S$GLB,, | Performed by: INTERNAL MEDICINE

## 2019-11-12 PROCEDURE — 1101F PR PT FALLS ASSESS DOC 0-1 FALLS W/OUT INJ PAST YR: ICD-10-PCS | Mod: HCNC,CPTII,S$GLB, | Performed by: INTERNAL MEDICINE

## 2019-11-12 PROCEDURE — 99214 OFFICE O/P EST MOD 30 MIN: CPT | Mod: 25,HCNC,S$GLB, | Performed by: INTERNAL MEDICINE

## 2019-11-12 PROCEDURE — 90662 FLU VACCINE - HIGH DOSE (65+) PRESERVATIVE FREE IM: ICD-10-PCS | Mod: HCNC,S$GLB,, | Performed by: INTERNAL MEDICINE

## 2019-11-12 PROCEDURE — 3078F DIAST BP <80 MM HG: CPT | Mod: HCNC,CPTII,S$GLB, | Performed by: INTERNAL MEDICINE

## 2019-11-12 PROCEDURE — 3078F PR MOST RECENT DIASTOLIC BLOOD PRESSURE < 80 MM HG: ICD-10-PCS | Mod: HCNC,CPTII,S$GLB, | Performed by: INTERNAL MEDICINE

## 2019-11-12 RX ORDER — FOLIC ACID 1 MG/1
TABLET ORAL
COMMUNITY
End: 2020-08-25

## 2019-11-12 RX ORDER — KETOROLAC TROMETHAMINE 5 MG/ML
SOLUTION OPHTHALMIC
Status: ON HOLD | COMMUNITY
Start: 2016-07-19 | End: 2022-03-20 | Stop reason: HOSPADM

## 2019-11-12 RX ORDER — FLUOXETINE 10 MG/1
CAPSULE ORAL
COMMUNITY
Start: 2019-09-12 | End: 2019-11-12

## 2019-12-09 ENCOUNTER — TELEPHONE (OUTPATIENT)
Dept: RADIOLOGY | Facility: HOSPITAL | Age: 84
End: 2019-12-09

## 2019-12-27 ENCOUNTER — NURSE TRIAGE (OUTPATIENT)
Dept: ADMINISTRATIVE | Facility: CLINIC | Age: 84
End: 2019-12-27

## 2019-12-27 ENCOUNTER — OFFICE VISIT (OUTPATIENT)
Dept: FAMILY MEDICINE | Facility: CLINIC | Age: 84
End: 2019-12-27
Payer: MEDICARE

## 2019-12-27 VITALS
BODY MASS INDEX: 25.48 KG/M2 | TEMPERATURE: 98 F | OXYGEN SATURATION: 98 % | HEIGHT: 61 IN | WEIGHT: 134.94 LBS | HEART RATE: 67 BPM

## 2019-12-27 DIAGNOSIS — M25.551 PAIN OF RIGHT HIP JOINT: Primary | ICD-10-CM

## 2019-12-27 DIAGNOSIS — R51.9 NONINTRACTABLE HEADACHE, UNSPECIFIED CHRONICITY PATTERN, UNSPECIFIED HEADACHE TYPE: ICD-10-CM

## 2019-12-27 DIAGNOSIS — I10 ESSENTIAL HYPERTENSION: ICD-10-CM

## 2019-12-27 PROCEDURE — 1125F PR PAIN SEVERITY QUANTIFIED, PAIN PRESENT: ICD-10-PCS | Mod: HCNC,S$GLB,, | Performed by: FAMILY MEDICINE

## 2019-12-27 PROCEDURE — 1125F AMNT PAIN NOTED PAIN PRSNT: CPT | Mod: HCNC,S$GLB,, | Performed by: FAMILY MEDICINE

## 2019-12-27 PROCEDURE — 1101F PT FALLS ASSESS-DOCD LE1/YR: CPT | Mod: HCNC,CPTII,S$GLB, | Performed by: FAMILY MEDICINE

## 2019-12-27 PROCEDURE — 1159F PR MEDICATION LIST DOCUMENTED IN MEDICAL RECORD: ICD-10-PCS | Mod: HCNC,S$GLB,, | Performed by: FAMILY MEDICINE

## 2019-12-27 PROCEDURE — 99214 OFFICE O/P EST MOD 30 MIN: CPT | Mod: HCNC,S$GLB,, | Performed by: FAMILY MEDICINE

## 2019-12-27 PROCEDURE — 99999 PR PBB SHADOW E&M-EST. PATIENT-LVL III: CPT | Mod: PBBFAC,HCNC,, | Performed by: FAMILY MEDICINE

## 2019-12-27 PROCEDURE — 99214 PR OFFICE/OUTPT VISIT, EST, LEVL IV, 30-39 MIN: ICD-10-PCS | Mod: HCNC,S$GLB,, | Performed by: FAMILY MEDICINE

## 2019-12-27 PROCEDURE — 99499 RISK ADDL DX/OHS AUDIT: ICD-10-PCS | Mod: HCNC,S$GLB,, | Performed by: FAMILY MEDICINE

## 2019-12-27 PROCEDURE — 1159F MED LIST DOCD IN RCRD: CPT | Mod: HCNC,S$GLB,, | Performed by: FAMILY MEDICINE

## 2019-12-27 PROCEDURE — 99999 PR PBB SHADOW E&M-EST. PATIENT-LVL III: ICD-10-PCS | Mod: PBBFAC,HCNC,, | Performed by: FAMILY MEDICINE

## 2019-12-27 PROCEDURE — 99499 UNLISTED E&M SERVICE: CPT | Mod: HCNC,S$GLB,, | Performed by: FAMILY MEDICINE

## 2019-12-27 PROCEDURE — 1101F PR PT FALLS ASSESS DOC 0-1 FALLS W/OUT INJ PAST YR: ICD-10-PCS | Mod: HCNC,CPTII,S$GLB, | Performed by: FAMILY MEDICINE

## 2019-12-27 RX ORDER — HYDROCHLOROTHIAZIDE 25 MG/1
25 TABLET ORAL DAILY
Qty: 30 TABLET | Refills: 2 | Status: SHIPPED | OUTPATIENT
Start: 2019-12-27 | End: 2020-09-17 | Stop reason: SDUPTHER

## 2019-12-27 NOTE — TELEPHONE ENCOUNTER
Glo has been having elevated /83 Monday, and 150/82 today. Mild HAYES Monday.  Taking all meds as prescribed.  Is caring for  with Parkinson's and states extra stress during the holidays.  Also some dietary changes during holiday eating (more sodium).  Discussed diet / sodium relationship to BP, and encouraged her to ask for dietary information when seeing provider.  Unable to book appt with Dr Cardona today, but appt made with Dr Cordoba at [facility] this afternoon.  Encouraged her to bring home cuff with her to each appt.  Message to PCP and Dr Cordoba.  Please contact caller directly with any additional care advice.     Reason for Disposition   [1] Systolic BP  >= 130 OR Diastolic >= 80 AND [2] taking BP medications    Additional Information   Negative: Difficult to awaken or acting confused (e.g., disoriented, slurred speech)   Negative: Severe difficulty breathing (e.g., struggling for each breath, speaks in single words)   Negative: [1] Weakness of the face, arm or leg on one side of the body AND [2] new onset   Negative: [1] Numbness (i.e., loss of sensation) of the face, arm or leg on one side of the body AND [2] new onset   Negative: [1] Chest pain lasts > 5 minutes AND [2] history of heart disease  (i.e., heart attack, bypass surgery, angina, angioplasty, CHF)   Negative: [1] Chest pain AND [2] took nitrogylcerin AND [3] pain was not relieved   Negative: Sounds like a life-threatening emergency to the triager   Negative: Symptom is main concern  (e.g., headache, chest pain)   Negative: Low blood pressure is main concern   Negative: [1] Systolic BP  >= 160 OR Diastolic >= 100 AND [2] cardiac or neurologic symptoms (e.g., chest pain, difficulty breathing, unsteady gait, blurred vision)   Negative: [1] Pregnant > 20 weeks AND [2] new hand or face swelling   Negative: [1] Pregnant > 20 weeks AND [2] BP Systolic BP  >= 140 OR Diastolic >= 90   Negative: [1] Systolic BP  >= 200 OR Diastolic  >= 120  AND [2] having NO cardiac or neurologic symptoms   Negative: [1] Postpartum < 6 weeks AND [2] BP Systolic BP  >= 140 OR Diastolic >= 90   Negative: [1] Systolic BP  >= 180 OR Diastolic >= 110 AND [2] missed most recent dose of blood pressure medication   Negative: Systolic BP  >= 180 OR Diastolic >= 110   Negative: Ran out of BP medications   Negative: Systolic BP  >= 160 OR Diastolic >= 100   Negative: [1] Taking BP medications AND [2] feels is having side effects (e.g., impotence, cough, dizzy upon standing)   Negative: [1] Systolic BP  >= 130 OR Diastolic >= 80 AND [2] pregnant    Protocols used: HIGH BLOOD PRESSURE-A-AH

## 2019-12-27 NOTE — PROGRESS NOTES
Subjective:      Patient ID: Glo Dumont is a 84 y.o. female.    Chief Complaint: Headache (steady headaches); Hip Pain (right hip); and Hypertension    HPI    patietn here today for HTN and headaches  Notes some elevated blood pressure readings the last few days  Worsened since she has been eating more sodium for the holidays  Brought in blood pressure machine today - accurate   Started having elevated readings 1 week ago - started with the headahces at the same time   Using tylenol for the headache and it helps   Norvasc 10, hydrochlorothiazide 12.5 mg   Was supposed to be on lisinopril but stopped because of swelling in throat/lip - was supposed to start hctz 25 but only take 12.5 - forgets to take the afternoon 12.5 so only taking 12.5 in the morning     Hip pain - started 1-2 weeks ago   Right hip   Much better today   Improved after taking tylenol for headache  Hx of hip replacement for OA in left hip   No hx of fall or injury to hip   No swelling, rash or erythema of hip   No numbness, tingling or radiation of pain to back or down leg   Sore type pain  Comes and goes     Past Medical History:   Diagnosis Date    Anemia     Angina pectoris     Anxiety     Anxiety and depression     Arthritis     hip    Carotid artery occlusion     Carpal tunnel syndrome 06/23/2008    emg    Chronic diarrhea     work up in 2011 with EGD, CS and VCE    CKD (chronic kidney disease) stage 3, GFR 30-59 ml/min 5/11/2017    Colitis     Coronary artery disease     Coronary artery disease     Diastolic dysfunction     Diverticulosis     Greater trochanteric bursitis 2/10/2015    Grief at loss of child 1/26/2016    H/O carotid endarterectomy 12/2/2013    Heart failure     History of coronary angioplasty 3/11/2014    Hypercholesteremia     Hypertension     Liver cyst 02/08/2013    ct abd    Macular degeneration     Primary open-angle glaucoma(365.11) 9/3/2013    Renal cyst 02/08/2013    ct abd    S/P  prosthetic total arthroplasty of the hip 11/3/2014    Sarcoidosis     Sarcoidosis of lung     Sickle cell trait     Uveitis        Past Surgical History:   Procedure Laterality Date    CAROTID ENDARTERECTOMY Right 2000s    CATARACT EXTRACTION Bilateral     Dr. Liang Dennis    CHOLECYSTECTOMY      laparoscopic, 3/18.    CORONARY ANGIOPLASTY WITH STENT PLACEMENT  11/19/2010    RCA-HALIE 2010    Lathia    JOINT REPLACEMENT Left 11/03/2014    Dr. Braun    TOTAL ABDOMINAL HYSTERECTOMY W/ BILATERAL SALPINGOOPHORECTOMY  1972       Family History   Problem Relation Age of Onset    Hypertension Mother     Heart failure Mother     Cancer Father         prostate    Cirrhosis Brother     Heart failure Brother     Cancer Daughter         Carcinoid       Social History     Socioeconomic History    Marital status:      Spouse name: Not on file    Number of children: 5    Years of education: Not on file    Highest education level: Not on file   Occupational History    Occupation: retired   Social Needs    Financial resource strain: Not on file    Food insecurity:     Worry: Not on file     Inability: Not on file    Transportation needs:     Medical: Not on file     Non-medical: Not on file   Tobacco Use    Smoking status: Never Smoker    Smokeless tobacco: Never Used   Substance and Sexual Activity    Alcohol use: No     Alcohol/week: 0.0 standard drinks    Drug use: No    Sexual activity: Yes     Partners: Male   Lifestyle    Physical activity:     Days per week: Not on file     Minutes per session: Not on file    Stress: Not on file   Relationships    Social connections:     Talks on phone: Not on file     Gets together: Not on file     Attends Jainism service: Not on file     Active member of club or organization: Not on file     Attends meetings of clubs or organizations: Not on file     Relationship status: Not on file   Other Topics Concern    Not on file   Social History Narrative            Health Maintenance Topics with due status: Not Due       Topic Last Completion Date    TETANUS VACCINE 02/03/2015    DEXA SCAN 12/21/2015    Lipid Panel 07/19/2019    Aspirin/Antiplatelet Therapy 12/27/2019       Medication List with Changes/Refills   New Medications    HYDROCHLOROTHIAZIDE (HYDRODIURIL) 25 MG TABLET    Take 1 tablet (25 mg total) by mouth once daily.   Current Medications    AMLODIPINE (NORVASC) 10 MG TABLET    Take 1 tablet (10 mg total) by mouth once daily.    ASPIRIN (ECOTRIN) 81 MG EC TABLET    Take 81 mg by mouth once daily.    BUDESONIDE (ENTOCORT EC) 3 MG CAPSULE    Take 9 mg by mouth once daily.    CALCIUM CARBONATE (OS-LYNN) 600 MG CALCIUM (1,500 MG) TAB    Take by mouth.    CHOLECALCIFEROL, VITAMIN D3, 2,000 UNIT CAP    Take 1 capsule (2,000 Units total) by mouth once daily.    CITALOPRAM (CELEXA) 10 MG TABLET        DICYCLOMINE (BENTYL) 10 MG CAPSULE    Take 10 mg by mouth 4 (four) times daily before meals and nightly.    DORZOLAMIDE-TIMOLOL 2-0.5% (COSOPT) 22.3-6.8 MG/ML OPHTHALMIC SOLUTION        DUREZOL 0.05 % DROP OPHTHALMIC SOLUTION        FLUTICASONE (FLONASE) 50 MCG/ACTUATION NASAL SPRAY    2 sprays by Each Nare route once daily.    FOLIC ACID (FOLVITE) 1 MG TABLET    1 tablet    FOLIC ACID/MULTIVIT-MIN/LUTEIN (CENTRUM SILVER ORAL)    Take by mouth.    IRON-VITAMIN C 100-250 MG, ICAR-C, (ICAR-C) 100-250 MG TAB    Take 1 tablet by mouth once daily.    KETOROLAC 0.5% (ACULAR) 0.5 % DROP        LISINOPRIL (PRINIVIL,ZESTRIL) 40 MG TABLET        METOPROLOL TARTRATE (LOPRESSOR) 50 MG TABLET    TAKE ONE TABLET BY MOUTH IN THE MORNING AND 2 TABLETS BY MOUTH IN THE EVENING    NITROGLYCERIN (NITROSTAT) 0.4 MG SL TABLET    Place 1 tablet (0.4 mg total) under the tongue every 5 (five) minutes as needed for Chest pain.    PRASUGREL (EFFIENT) 10 MG TAB    Take 1 tablet (10 mg total) by mouth once daily.    SIMVASTATIN (ZOCOR) 40 MG TABLET    TAKE ONE TABLET BY MOUTH EVERY EVENING    Discontinued Medications    HYDROCHLOROTHIAZIDE (MICROZIDE) 12.5 MG CAPSULE    Take 2 capsules (25 mg total) by mouth once daily.       Review of patient's allergies indicates:   Allergen Reactions    Lisinopril      angioedema    Codeine Nausea And Vomiting       Review of Systems   Constitutional: Negative for chills and fever.   HENT: Negative for congestion, ear pain and sore throat.    Eyes: Negative for blurred vision and pain.   Respiratory: Negative for cough and shortness of breath.    Cardiovascular: Negative for chest pain and leg swelling.   Gastrointestinal: Negative for abdominal pain, constipation, diarrhea and nausea.   Genitourinary: Negative for dysuria.   Musculoskeletal: Positive for joint pain. Negative for falls.   Skin: Negative for rash.   Neurological: Positive for headaches. Negative for dizziness.       Objective:     Vitals:    12/27/19 1414   Pulse: 67   Temp: 98 °F (36.7 °C)     Body mass index is 25.49 kg/m².    Physical Exam   Constitutional: She is oriented to person, place, and time. She appears well-developed and well-nourished. No distress.   HENT:   Head: Normocephalic and atraumatic.   Right Ear: External ear normal.   Left Ear: External ear normal.   Nose: Nose normal.   Mouth/Throat: Oropharynx is clear and moist.   Eyes: Pupils are equal, round, and reactive to light. Conjunctivae and EOM are normal.   Neck: No thyromegaly present.   Cardiovascular: Normal rate, regular rhythm, normal heart sounds and intact distal pulses.   No murmur heard.  Pulmonary/Chest: Effort normal and breath sounds normal. No respiratory distress. She has no wheezes. She has no rales. She exhibits no tenderness.   Abdominal: Soft. Bowel sounds are normal. She exhibits no distension. There is no tenderness.   Musculoskeletal: She exhibits no edema.        Right hip: She exhibits tenderness and crepitus. She exhibits normal range of motion, normal strength, no swelling, no deformity and no  laceration.        Left hip: Normal.        Legs:  Lymphadenopathy:     She has no cervical adenopathy.   Neurological: She is alert and oriented to person, place, and time. She displays normal reflexes. No cranial nerve deficit. Coordination normal.   Skin: Skin is warm and dry.   Psychiatric: She has a normal mood and affect.   Nursing note and vitals reviewed.      Assessment and Plan:     Pain of right hip joint  Advised tylenol arthritis, warm compresses and topical therapy like biofreeze/icey hot     Essential hypertension  Will increase hctz to 25 mg - will send new prescription to pharmacy  Will keep bp log at home  Decrease sodium in diet   Will monitor for headache resolution one bp decreases    Headache  Likely related to elevated blood pressure  Tylenol as needed, get bp under control     Other orders  -     hydroCHLOROthiazide (HYDRODIURIL) 25 MG tablet; Take 1 tablet (25 mg total) by mouth once daily.  Dispense: 30 tablet; Refill: 2        Follow up in about 17 days (around 1/13/2020).    @@

## 2019-12-27 NOTE — TELEPHONE ENCOUNTER
Glo has been having elevated /83 Monday, and 150/82 today. Mild HAYES Monday.  Taking all meds as prescribed.  Is caring for  with Parkinson's and states extra stress during the holidays.  Also some dietary changes during holiday eating (more sodium).  Discussed diet / sodium relationship to BP, and encouraged her to ask for dietary information when seeing provider.  Unable to book appt with Dr Cardona today, but appt made with Dr Cordoba at [facility] this afternoon.  Encouraged her to bring home cuff with her to each appt.  Message to PCP and Dr Cordoba.  Please contact caller directly with any additional care advice.

## 2020-01-06 ENCOUNTER — TELEPHONE (OUTPATIENT)
Dept: FAMILY MEDICINE | Facility: CLINIC | Age: 85
End: 2020-01-06

## 2020-01-06 NOTE — TELEPHONE ENCOUNTER
----- Message from Glenis Mejia sent at 1/6/2020  3:49 PM CST -----  Contact: self [phone]  Type:  Sooner Apoointment Request    Caller is requesting a sooner appointment.  Caller declined first available appointment listed below.  Caller will not accept being placed on the waitlist and is requesting a message be sent to doctor.  Name of Caller:Glo Dumont  When is the first available appointment?01/08/19 @ 3:40pm  Symptoms:ER fu/vomiting, headache, dehydration, high blood pressure  Would the patient rather a call back or a response via [facility]? Call back   Best Call Back Number:[phone]  Additional Information:

## 2020-01-07 ENCOUNTER — TELEPHONE (OUTPATIENT)
Dept: FAMILY MEDICINE | Facility: CLINIC | Age: 85
End: 2020-01-07

## 2020-01-07 NOTE — PROGRESS NOTES
"Subjective:      Patient ID: Glo Dumont is a 84 y.o. female.    Chief Complaint: Follow-up      HPI  Here for follow up of medical problems and f/u of 1/4/20 [facility] ER visit for HA, n/v, BP to 200/110 by EMS.  She was asymptomatic when arrived at ER.  BP in ER:  187/81, 182/82.  Hct 37, creat 1.42, liver enz normal, lipase 42, trop negative.  Now mild HA, relieved by tylenol.  BP at home 140/70.    Updated/ annual due 7/20:  HM: 11/19 fluvax, 3/15 [id number], 2/14 [id number], 2/15 TDaP, 12/15 BMD rep 2y, 2/11 Cscope no repeat will be done, 1/17 MMG no more, Eye doc monthly, GI Dr. Galindo.     Review of Systems   Constitutional: Negative for chills, diaphoresis and fever.   Respiratory: Negative for cough and shortness of breath.    Cardiovascular: Negative for chest pain, palpitations and leg swelling.   Gastrointestinal: Negative for blood in stool, constipation, diarrhea, nausea and vomiting.   Genitourinary: Negative for dysuria, frequency and hematuria.   Psychiatric/Behavioral: The patient is not nervous/anxious.          Objective:   /72 (BP Location: Left arm, Patient Position: Sitting, BP Method: Medium (Manual))   Pulse 82   Temp 98.4 °F (36.9 °C) (Temporal)   Ht 5' 1" (1.549 m)   Wt 60.3 kg (132 lb 15 oz)   SpO2 98%   BMI 25.12 kg/m²     Physical Exam   Constitutional: She is oriented to person, place, and time. She appears well-developed.   HENT:   Right Ear: External ear normal. Tympanic membrane is not injected.   Left Ear: External ear normal. Tympanic membrane is not injected.   Mouth/Throat: Oropharynx is clear and moist.   Eyes: Conjunctivae are normal.   Neck: Neck supple. Carotid bruit is not present. No thyroid mass and no thyromegaly present.   Cardiovascular: Normal rate, regular rhythm and intact distal pulses. Exam reveals no gallop and no friction rub.   No murmur heard.  Pulmonary/Chest: Effort normal and breath sounds normal. She has no wheezes. She has no rales.   Abdominal: Soft. " Bowel sounds are normal. She exhibits no mass. There is no hepatosplenomegaly. There is no tenderness.   Musculoskeletal: She exhibits no edema.   Lymphadenopathy:     She has no cervical adenopathy.   Neurological: She is alert and oriented to person, place, and time.   Psychiatric: She has a normal mood and affect.           Assessment:       1. Essential hypertension    2. CKD (chronic kidney disease) stage 3, GFR 30-59 ml/min    3. Sarcoidosis of lung    4. Coronary artery disease, occlusive    5. Colitis          Plan:     Essential hypertension, now stable and back to baseline.  Likely GI virus relate.  Make sure stays hydrated.  Monitor BPs and send to me in 1 week.    CKD (chronic kidney disease) stage 3, GFR 30-59 ml/min- stable.    Sarcoidosis of lung

## 2020-01-10 ENCOUNTER — OFFICE VISIT (OUTPATIENT)
Dept: FAMILY MEDICINE | Facility: CLINIC | Age: 85
End: 2020-01-10
Payer: MEDICARE

## 2020-01-10 VITALS
HEART RATE: 82 BPM | OXYGEN SATURATION: 98 % | WEIGHT: 132.94 LBS | BODY MASS INDEX: 25.1 KG/M2 | TEMPERATURE: 98 F | SYSTOLIC BLOOD PRESSURE: 136 MMHG | DIASTOLIC BLOOD PRESSURE: 72 MMHG | HEIGHT: 61 IN

## 2020-01-10 DIAGNOSIS — K52.9 COLITIS: ICD-10-CM

## 2020-01-10 DIAGNOSIS — N18.30 CKD (CHRONIC KIDNEY DISEASE) STAGE 3, GFR 30-59 ML/MIN: ICD-10-CM

## 2020-01-10 DIAGNOSIS — I10 ESSENTIAL HYPERTENSION: Primary | Chronic | ICD-10-CM

## 2020-01-10 DIAGNOSIS — I25.10 CORONARY ARTERY DISEASE, OCCLUSIVE: Chronic | ICD-10-CM

## 2020-01-10 DIAGNOSIS — D86.0 SARCOIDOSIS OF LUNG: ICD-10-CM

## 2020-01-10 PROCEDURE — 99499 UNLISTED E&M SERVICE: CPT | Mod: HCNC,S$GLB,, | Performed by: INTERNAL MEDICINE

## 2020-01-10 PROCEDURE — 99999 PR PBB SHADOW E&M-EST. PATIENT-LVL III: CPT | Mod: PBBFAC,HCNC,, | Performed by: INTERNAL MEDICINE

## 2020-01-10 PROCEDURE — 1159F MED LIST DOCD IN RCRD: CPT | Mod: HCNC,S$GLB,, | Performed by: INTERNAL MEDICINE

## 2020-01-10 PROCEDURE — 99214 OFFICE O/P EST MOD 30 MIN: CPT | Mod: HCNC,S$GLB,, | Performed by: INTERNAL MEDICINE

## 2020-01-10 PROCEDURE — 1101F PR PT FALLS ASSESS DOC 0-1 FALLS W/OUT INJ PAST YR: ICD-10-PCS | Mod: HCNC,CPTII,S$GLB, | Performed by: INTERNAL MEDICINE

## 2020-01-10 PROCEDURE — 1101F PT FALLS ASSESS-DOCD LE1/YR: CPT | Mod: HCNC,CPTII,S$GLB, | Performed by: INTERNAL MEDICINE

## 2020-01-10 PROCEDURE — 3078F PR MOST RECENT DIASTOLIC BLOOD PRESSURE < 80 MM HG: ICD-10-PCS | Mod: HCNC,CPTII,S$GLB, | Performed by: INTERNAL MEDICINE

## 2020-01-10 PROCEDURE — 99214 PR OFFICE/OUTPT VISIT, EST, LEVL IV, 30-39 MIN: ICD-10-PCS | Mod: HCNC,S$GLB,, | Performed by: INTERNAL MEDICINE

## 2020-01-10 PROCEDURE — 3075F SYST BP GE 130 - 139MM HG: CPT | Mod: HCNC,CPTII,S$GLB, | Performed by: INTERNAL MEDICINE

## 2020-01-10 PROCEDURE — 99999 PR PBB SHADOW E&M-EST. PATIENT-LVL III: ICD-10-PCS | Mod: PBBFAC,HCNC,, | Performed by: INTERNAL MEDICINE

## 2020-01-10 PROCEDURE — 99499 RISK ADDL DX/OHS AUDIT: ICD-10-PCS | Mod: HCNC,S$GLB,, | Performed by: INTERNAL MEDICINE

## 2020-01-10 PROCEDURE — 3075F PR MOST RECENT SYSTOLIC BLOOD PRESS GE 130-139MM HG: ICD-10-PCS | Mod: HCNC,CPTII,S$GLB, | Performed by: INTERNAL MEDICINE

## 2020-01-10 PROCEDURE — 1125F AMNT PAIN NOTED PAIN PRSNT: CPT | Mod: HCNC,S$GLB,, | Performed by: INTERNAL MEDICINE

## 2020-01-10 PROCEDURE — 3078F DIAST BP <80 MM HG: CPT | Mod: HCNC,CPTII,S$GLB, | Performed by: INTERNAL MEDICINE

## 2020-01-10 PROCEDURE — 1159F PR MEDICATION LIST DOCUMENTED IN MEDICAL RECORD: ICD-10-PCS | Mod: HCNC,S$GLB,, | Performed by: INTERNAL MEDICINE

## 2020-01-10 PROCEDURE — 1125F PR PAIN SEVERITY QUANTIFIED, PAIN PRESENT: ICD-10-PCS | Mod: HCNC,S$GLB,, | Performed by: INTERNAL MEDICINE

## 2020-01-10 RX ORDER — ONDANSETRON 4 MG/1
TABLET, FILM COATED ORAL
COMMUNITY
Start: 2020-01-06 | End: 2021-05-27

## 2020-01-10 RX ORDER — BRIMONIDINE TARTRATE 2 MG/ML
SOLUTION/ DROPS OPHTHALMIC
COMMUNITY
Start: 2019-12-05 | End: 2020-10-28 | Stop reason: SDUPTHER

## 2020-03-02 ENCOUNTER — TELEPHONE (OUTPATIENT)
Dept: RADIOLOGY | Facility: HOSPITAL | Age: 85
End: 2020-03-02

## 2020-04-29 ENCOUNTER — TELEPHONE (OUTPATIENT)
Dept: RADIOLOGY | Facility: HOSPITAL | Age: 85
End: 2020-04-29

## 2020-05-04 ENCOUNTER — TELEPHONE (OUTPATIENT)
Dept: RADIOLOGY | Facility: HOSPITAL | Age: 85
End: 2020-05-04

## 2020-05-07 ENCOUNTER — TELEPHONE (OUTPATIENT)
Dept: CARDIOLOGY | Facility: CLINIC | Age: 85
End: 2020-05-07

## 2020-05-07 DIAGNOSIS — R07.9 CHEST PAIN, UNSPECIFIED TYPE: Primary | ICD-10-CM

## 2020-05-07 NOTE — TELEPHONE ENCOUNTER
"I spoke with pts daughter. She is having intermittent cp. She says "not bad" but wanted to f/u with the dr to have it checked out.   I advised ER of cp worsens or continues.   "

## 2020-05-07 NOTE — TELEPHONE ENCOUNTER
----- Message from Yunior Gage sent at 5/7/2020 10:55 AM CDT -----  Contact: pt   Pt having off and on chest pain very slight over past week and is looking to get an appt for next week anyday but 5/13 preferably in morning.       ..[phone]

## 2020-05-13 ENCOUNTER — OFFICE VISIT (OUTPATIENT)
Dept: CARDIOLOGY | Facility: CLINIC | Age: 85
End: 2020-05-13
Payer: MEDICARE

## 2020-05-13 ENCOUNTER — HOSPITAL ENCOUNTER (OUTPATIENT)
Dept: CARDIOLOGY | Facility: HOSPITAL | Age: 85
Discharge: HOME | End: 2020-05-13
Attending: NUCLEAR MEDICINE
Payer: MEDICARE

## 2020-05-13 VITALS
DIASTOLIC BLOOD PRESSURE: 82 MMHG | WEIGHT: 135.13 LBS | OXYGEN SATURATION: 99 % | HEIGHT: 61 IN | SYSTOLIC BLOOD PRESSURE: 172 MMHG | HEART RATE: 67 BPM | BODY MASS INDEX: 25.51 KG/M2

## 2020-05-13 DIAGNOSIS — I50.32 CHRONIC DIASTOLIC HF (HEART FAILURE), NYHA CLASS 2: Chronic | ICD-10-CM

## 2020-05-13 DIAGNOSIS — Z98.61 HISTORY OF CORONARY ANGIOPLASTY: ICD-10-CM

## 2020-05-13 DIAGNOSIS — I20.9 ANGINA, CLASS II: Primary | Chronic | ICD-10-CM

## 2020-05-13 DIAGNOSIS — R07.9 CHEST PAIN, UNSPECIFIED TYPE: ICD-10-CM

## 2020-05-13 DIAGNOSIS — I25.10 CORONARY ARTERY DISEASE, OCCLUSIVE: Chronic | ICD-10-CM

## 2020-05-13 DIAGNOSIS — E78.00 PURE HYPERCHOLESTEROLEMIA: ICD-10-CM

## 2020-05-13 DIAGNOSIS — I11.0 HYPERTENSIVE HEART DISEASE WITH HEART FAILURE: ICD-10-CM

## 2020-05-13 DIAGNOSIS — R13.19 ESOPHAGEAL DYSPHAGIA: ICD-10-CM

## 2020-05-13 PROCEDURE — 99499 UNLISTED E&M SERVICE: CPT | Mod: HCNC,S$GLB,, | Performed by: NUCLEAR MEDICINE

## 2020-05-13 PROCEDURE — 99214 OFFICE O/P EST MOD 30 MIN: CPT | Mod: HCNC,S$GLB,, | Performed by: NUCLEAR MEDICINE

## 2020-05-13 PROCEDURE — 3077F SYST BP >= 140 MM HG: CPT | Mod: HCNC,CPTII,S$GLB, | Performed by: NUCLEAR MEDICINE

## 2020-05-13 PROCEDURE — 99999 PR PBB SHADOW E&M-EST. PATIENT-LVL III: ICD-10-PCS | Mod: PBBFAC,HCNC,, | Performed by: NUCLEAR MEDICINE

## 2020-05-13 PROCEDURE — 99214 PR OFFICE/OUTPT VISIT, EST, LEVL IV, 30-39 MIN: ICD-10-PCS | Mod: HCNC,S$GLB,, | Performed by: NUCLEAR MEDICINE

## 2020-05-13 PROCEDURE — 3079F DIAST BP 80-89 MM HG: CPT | Mod: HCNC,CPTII,S$GLB, | Performed by: NUCLEAR MEDICINE

## 2020-05-13 PROCEDURE — 99499 RISK ADDL DX/OHS AUDIT: ICD-10-PCS | Mod: HCNC,S$GLB,, | Performed by: NUCLEAR MEDICINE

## 2020-05-13 PROCEDURE — 93010 ELECTROCARDIOGRAM REPORT: CPT | Mod: HCNC,,, | Performed by: INTERNAL MEDICINE

## 2020-05-13 PROCEDURE — 1159F MED LIST DOCD IN RCRD: CPT | Mod: HCNC,S$GLB,, | Performed by: NUCLEAR MEDICINE

## 2020-05-13 PROCEDURE — 99999 PR PBB SHADOW E&M-EST. PATIENT-LVL III: CPT | Mod: PBBFAC,HCNC,, | Performed by: NUCLEAR MEDICINE

## 2020-05-13 PROCEDURE — 1101F PR PT FALLS ASSESS DOC 0-1 FALLS W/OUT INJ PAST YR: ICD-10-PCS | Mod: HCNC,CPTII,S$GLB, | Performed by: NUCLEAR MEDICINE

## 2020-05-13 PROCEDURE — 1101F PT FALLS ASSESS-DOCD LE1/YR: CPT | Mod: HCNC,CPTII,S$GLB, | Performed by: NUCLEAR MEDICINE

## 2020-05-13 PROCEDURE — 93005 ELECTROCARDIOGRAM TRACING: CPT | Mod: HCNC

## 2020-05-13 PROCEDURE — 3077F PR MOST RECENT SYSTOLIC BLOOD PRESSURE >= 140 MM HG: ICD-10-PCS | Mod: HCNC,CPTII,S$GLB, | Performed by: NUCLEAR MEDICINE

## 2020-05-13 PROCEDURE — 1159F PR MEDICATION LIST DOCUMENTED IN MEDICAL RECORD: ICD-10-PCS | Mod: HCNC,S$GLB,, | Performed by: NUCLEAR MEDICINE

## 2020-05-13 PROCEDURE — 3079F PR MOST RECENT DIASTOLIC BLOOD PRESSURE 80-89 MM HG: ICD-10-PCS | Mod: HCNC,CPTII,S$GLB, | Performed by: NUCLEAR MEDICINE

## 2020-05-13 PROCEDURE — 93010 EKG 12-LEAD: ICD-10-PCS | Mod: HCNC,,, | Performed by: INTERNAL MEDICINE

## 2020-05-13 RX ORDER — SIMVASTATIN 40 MG/1
40 TABLET, FILM COATED ORAL NIGHTLY
Qty: 90 TABLET | Refills: 3 | Status: SHIPPED | OUTPATIENT
Start: 2020-05-13 | End: 2020-10-01

## 2020-05-13 RX ORDER — METOPROLOL TARTRATE 50 MG/1
TABLET ORAL
Qty: 270 TABLET | Refills: 3
Start: 2020-05-13 | End: 2020-09-14 | Stop reason: SDUPTHER

## 2020-05-13 NOTE — PROGRESS NOTES
Subjective:   Patient ID:  Glo Dumont is a 84 y.o. female who presents for follow-up of Chest Pain; Coronary Artery Disease; Hypertension; and Congestive Heart Failure      HPI CARD PROBLEMS-  CHRONIC CAD- ANGINA FC 2, POST OLD PCI  2- ESSENTIAL HTN WITH HFpEF,    INCREASE EMOTIONAL STRESS AT HOME-  VERY ILL-  PARKINSON AND RECENT STROKE-  FOR LAST 2 WEEKS HAS NOTICED RECURRENT, INTERMITTENT, UPPER ANTERIOR CHEST DISCOMFORT- PRESSURE,  NO RADIATION, RELATED TO EXERTION. LASTING LESS THAN 5 MINUTES  NO ASSOCIATED SOB, WEAKNESS OR PALPITATIONS OR PERSPIRATION  RELIEVED BY REST.  NO USING NTG  NO REST DISCOMFORT OR NOCTURNAL DISCOMFORT  NO INCREASE SOOD. NO ORTHOPNEA OR PND  NO PALPITATIONS  NO NEAR SYNCOPE OR SYNCOPE  NO FOCAL CNS SYMPTOMS OR SIGNS TO SUGGEST TIA OR STROKE  HOME BP MONITORING -140/70-80  GOOD COMPLIANCE WITH CARD MED  ECG TODAY- SR, 62 BMP, NO ACUTE ST T CHANGES OR ARRHYTHMIAS    Review of Systems   Constitution: Negative for chills, fever, night sweats, weight gain and weight loss.   HENT: Negative for nosebleeds.    Eyes: Negative for blurred vision, double vision and visual disturbance.   Cardiovascular: Positive for chest pain. Negative for dyspnea on exertion, irregular heartbeat, leg swelling, orthopnea, palpitations, paroxysmal nocturnal dyspnea and syncope.   Respiratory: Negative for cough, hemoptysis and wheezing.    Endocrine: Negative for polydipsia and polyuria.   Hematologic/Lymphatic: Does not bruise/bleed easily.   Skin: Negative for rash.   Musculoskeletal: Negative for joint pain, joint swelling, muscle weakness and myalgias.   Gastrointestinal: Negative for abdominal pain, hematemesis, jaundice and melena.   Genitourinary: Negative for dysuria, hematuria and nocturia.   Neurological: Negative for dizziness, focal weakness, headaches, sensory change and weakness.   Psychiatric/Behavioral: Negative for depression. The patient is nervous/anxious. The patient does not  have insomnia.      Family History   Problem Relation Age of Onset    Hypertension Mother     Heart failure Mother     Cancer Father         prostate    Cirrhosis Brother     Heart failure Brother     Cancer Daughter         Carcinoid     Past Medical History:   Diagnosis Date    Anemia     Angina pectoris     Anxiety     Anxiety and depression     Arthritis     hip    Carotid artery occlusion     Carpal tunnel syndrome 06/23/2008    emg    Chronic diarrhea     work up in 2011 with EGD, CS and VCE    CKD (chronic kidney disease) stage 3, GFR 30-59 ml/min 5/11/2017    Colitis     Coronary artery disease     Coronary artery disease     Diastolic dysfunction     Diverticulosis     Greater trochanteric bursitis 2/10/2015    Grief at loss of child 1/26/2016    H/O carotid endarterectomy 12/2/2013    Heart failure     History of coronary angioplasty 3/11/2014    Hypercholesteremia     Hypertension     Liver cyst 02/08/2013    ct abd    Macular degeneration     Primary open-angle glaucoma(365.11) 9/3/2013    Renal cyst 02/08/2013    ct abd    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    Sarcoidosis     Sarcoidosis of lung     Sickle cell trait     Uveitis      Social History     Socioeconomic History    Marital status:      Spouse name: Not on file    Number of children: 5    Years of education: Not on file    Highest education level: Not on file   Occupational History    Occupation: retired   Social Needs    Financial resource strain: Not on file    Food insecurity:     Worry: Not on file     Inability: Not on file    Transportation needs:     Medical: Not on file     Non-medical: Not on file   Tobacco Use    Smoking status: Never Smoker    Smokeless tobacco: Never Used   Substance and Sexual Activity    Alcohol use: No     Alcohol/week: 0.0 standard drinks    Drug use: No    Sexual activity: Yes     Partners: Male   Lifestyle    Physical activity:      Days per week: Not on file     Minutes per session: Not on file    Stress: Not on file   Relationships    Social connections:     Talks on phone: Not on file     Gets together: Not on file     Attends Yazidi service: Not on file     Active member of club or organization: Not on file     Attends meetings of clubs or organizations: Not on file     Relationship status: Not on file   Other Topics Concern    Not on file   Social History Narrative         Current Outpatient Medications on File Prior to Visit   Medication Sig Dispense Refill    amLODIPine (NORVASC) 10 MG tablet Take 1 tablet (10 mg total) by mouth once daily. 90 tablet 3    aspirin (ECOTRIN) 81 MG EC tablet Take 81 mg by mouth once daily.      brimonidine 0.2% (ALPHAGAN) 0.2 % Drop       budesonide (ENTOCORT EC) 3 mg capsule Take 9 mg by mouth once daily.      calcium carbonate (OS-LYNN) 600 mg calcium (1,500 mg) Tab Take by mouth.      cholecalciferol, vitamin D3, 2,000 unit Cap Take 1 capsule (2,000 Units total) by mouth once daily. 100 capsule 6    citalopram (CELEXA) 10 MG tablet       dicyclomine (BENTYL) 10 MG capsule Take 10 mg by mouth 4 (four) times daily before meals and nightly.      dorzolamide-timolol 2-0.5% (COSOPT) 22.3-6.8 mg/mL ophthalmic solution       DUREZOL 0.05 % Drop ophthalmic solution       fluticasone (FLONASE) 50 mcg/actuation nasal spray 2 sprays by Each Nare route once daily. 1 Bottle 12    folic acid (FOLVITE) 1 MG tablet 1 tablet      FOLIC ACID/MULTIVIT-MIN/LUTEIN (CENTRUM SILVER ORAL) Take by mouth.      hydroCHLOROthiazide (HYDRODIURIL) 25 MG tablet Take 1 tablet (25 mg total) by mouth once daily. 30 tablet 2    iron-vitamin C 100-250 mg, ICAR-C, (ICAR-C) 100-250 mg Tab Take 1 tablet by mouth once daily. 100 tablet 6    ketorolac 0.5% (ACULAR) 0.5 % Drop       lisinopril (PRINIVIL,ZESTRIL) 40 MG tablet       nitroGLYCERIN (NITROSTAT) 0.4 MG SL tablet Place 1 tablet (0.4 mg total) under the  tongue every 5 (five) minutes as needed for Chest pain. 25 tablet 4    ondansetron (ZOFRAN) 4 MG tablet       prasugrel (EFFIENT) 10 mg Tab Take 1 tablet (10 mg total) by mouth once daily. 30 tablet 11    [DISCONTINUED] metoprolol tartrate (LOPRESSOR) 50 MG tablet TAKE ONE TABLET BY MOUTH IN THE MORNING AND 2 TABLETS BY MOUTH IN THE EVENING 270 tablet 3    [DISCONTINUED] simvastatin (ZOCOR) 40 MG tablet TAKE ONE TABLET BY MOUTH EVERY EVENING 30 tablet 0     No current facility-administered medications on file prior to visit.      Review of patient's allergies indicates:   Allergen Reactions    Lisinopril      angioedema    Codeine Nausea And Vomiting       Objective:     Physical Exam   Constitutional: She is oriented to person, place, and time. She appears well-developed. No distress.   HENT:   Head: Normocephalic.   Eyes: Pupils are equal, round, and reactive to light. Conjunctivae are normal. No scleral icterus.   Neck: Normal range of motion. Neck supple. Normal carotid pulses, no hepatojugular reflux and no JVD present. Carotid bruit is not present. No edema present. No thyroid mass and no thyromegaly present.   Cardiovascular: Normal rate, regular rhythm, S1 normal, S2 normal, normal heart sounds and intact distal pulses. PMI is not displaced. Exam reveals no gallop and no friction rub.   No murmur heard.  Pulses:       Carotid pulses are 2+ on the right side, and 2+ on the left side.       Radial pulses are 2+ on the right side, and 2+ on the left side.        Femoral pulses are 2+ on the right side, and 2+ on the left side.       Popliteal pulses are 2+ on the right side, and 2+ on the left side.        Dorsalis pedis pulses are 2+ on the right side, and 2+ on the left side.        Posterior tibial pulses are 2+ on the right side, and 2+ on the left side.   Pulmonary/Chest: Effort normal and breath sounds normal. She has no wheezes. She has no rales. She exhibits no tenderness.   Abdominal: Soft. Bowel  sounds are normal. She exhibits no pulsatile midline mass and no mass. There is no hepatosplenomegaly. There is no tenderness.   Musculoskeletal: Normal range of motion. She exhibits no edema or tenderness.        Cervical back: Normal.        Thoracic back: Normal.        Lumbar back: Normal.   Lymphadenopathy:     She has no cervical adenopathy.     She has no axillary adenopathy.        Right: No supraclavicular adenopathy present.        Left: No supraclavicular adenopathy present.   Neurological: She is alert and oriented to person, place, and time. She has normal strength and normal reflexes. No sensory deficit. Gait normal.   Skin: Skin is warm. No rash noted. No cyanosis. No pallor. Nails show no clubbing.   Psychiatric: She has a normal mood and affect. Her speech is normal and behavior is normal. Cognition and memory are normal.       Assessment:     1. Angina, class II    2. Coronary artery disease, occlusive    3. History of coronary angioplasty    4. Hypertensive heart disease with heart failure    5. Esophageal dysphagia    6. Chronic diastolic HF (heart failure), NYHA class 2    7. Hyperlipidemia        Plan:     Angina, class II  INCREASE FREQUENCY- EMOTIONAL STRESS-  NO ACUTE ST T CHANGES  OMT BBS.    Coronary artery disease, occlusive  NO CLINICAL EVIDENCE OF ACS    History of coronary angioplasty  ON DAPT- NO ABNORMAL BLEEDING      Hypertensive heart disease with heart failure  WELL CONTROLLED HOME BP  CNS STATUS STA    Chronic diastolic HF (heart failure), NYHA class 2  CLINICALLY WELL COMPENSATED    Hyperlipidemia  STATIN WELL TOLERATED  -     simvastatin (ZOCOR) 40 MG tablet; Take 1 tablet (40 mg total) by mouth every evening.  Dispense: 90 tablet; Refill: 3    Other orders  -     metoprolol tartrate (LOPRESSOR) 50 MG tablet; TAKE TWO TABLETS BY MOUTH IN THE MORNING AND 2 TABLETS BY MOUTH IN THE EVENING  Dispense: 270 tablet; Refill: 3    PHONE CALL IN ONE WEEK TO CHECK PROGRESS AFTER INCREASE  BBS.  RETURN IN 3 MONTHS

## 2020-05-20 ENCOUNTER — TELEPHONE (OUTPATIENT)
Dept: CARDIOLOGY | Facility: CLINIC | Age: 85
End: 2020-05-20

## 2020-05-20 NOTE — TELEPHONE ENCOUNTER
----- Message from Gina Rodriguez MA sent at 5/13/2020 10:03 AM CDT -----  Call the patient to follow up with dose change to Metoprolol.

## 2020-05-20 NOTE — TELEPHONE ENCOUNTER
The patient stated that she was doing well, but has not been checking her blood pressure. I notified the patient to contact us if she needs any help.   I notified Dr. Bergman.

## 2020-05-22 DIAGNOSIS — I25.10 CORONARY ARTERY DISEASE, OCCLUSIVE: Chronic | ICD-10-CM

## 2020-05-22 DIAGNOSIS — I20.9 ANGINA, CLASS II: Chronic | ICD-10-CM

## 2020-05-22 RX ORDER — NITROGLYCERIN 0.4 MG/1
0.4 TABLET SUBLINGUAL EVERY 5 MIN PRN
Qty: 25 TABLET | Refills: 4 | Status: SHIPPED | OUTPATIENT
Start: 2020-05-22 | End: 2021-02-10 | Stop reason: SDUPTHER

## 2020-05-22 NOTE — TELEPHONE ENCOUNTER
----- Message from Duke Moser sent at 5/22/2020  7:21 AM CDT -----  ..Type:  RX Refill Request    Who Called: Dali ( Pt daughter )  Refill or New Rx: refill   RX Name and Strength: Knightroglisurin  ( chest pain   How is the patient currently taking it? (ex. 1XDay): daily   Is this a 30 day or 90 day RX 30  Preferred Pharmacy with phone number: .  [facility] - [address] [address]  Phone: [phone] Fax: [phone]    Local or Mail Order: local   Ordering Provider:   Would the patient rather a call back or a response via [facility]?  Call back   Best Call Back Number:[phone]  Additional Information:  Refill

## 2020-05-27 ENCOUNTER — TELEPHONE (OUTPATIENT)
Dept: FAMILY MEDICINE | Facility: CLINIC | Age: 85
End: 2020-05-27

## 2020-05-27 NOTE — TELEPHONE ENCOUNTER
----- Message from Deepthi Chandra sent at 5/27/2020  9:04 AM CDT -----  Contact: Daughter  Type:  Sooner Apoointment Request    Caller is requesting a sooner appointment.  Caller declined first available appointment listed below.  Caller will not accept being placed on the waitlist and is requesting a message be sent to doctor.  Name of Caller: Jessica  When is the first available appointment?06/25/2020  Symptoms:wellness; spouse just passed  Would the patient rather a call back or a response via [facility]? Call back  Best Call Back Number:[phone]  Additional Information: Please call back. Thanks.

## 2020-06-03 DIAGNOSIS — D86.0 SARCOIDOSIS OF LUNG: Primary | ICD-10-CM

## 2020-06-08 ENCOUNTER — TELEPHONE (OUTPATIENT)
Dept: PULMONOLOGY | Facility: CLINIC | Age: 85
End: 2020-06-08

## 2020-06-12 ENCOUNTER — LAB VISIT (OUTPATIENT)
Dept: OTOLARYNGOLOGY | Facility: CLINIC | Age: 85
End: 2020-06-12
Payer: MEDICARE

## 2020-06-12 DIAGNOSIS — D86.0 SARCOIDOSIS OF LUNG: ICD-10-CM

## 2020-06-12 PROCEDURE — U0003 INFECTIOUS AGENT DETECTION BY NUCLEIC ACID (DNA OR RNA); SEVERE ACUTE RESPIRATORY SYNDROME CORONAVIRUS 2 (SARS-COV-2) (CORONAVIRUS DISEASE [COVID-19]), AMPLIFIED PROBE TECHNIQUE, MAKING USE OF HIGH THROUGHPUT TECHNOLOGIES AS DESCRIBED BY CMS-2020-01-R: HCPCS | Mod: HCNC

## 2020-06-13 LAB — SARS-COV-2 RNA RESP QL NAA+PROBE: NOT DETECTED

## 2020-06-15 ENCOUNTER — OFFICE VISIT (OUTPATIENT)
Dept: PULMONOLOGY | Facility: CLINIC | Age: 85
End: 2020-06-15
Payer: MEDICARE

## 2020-06-15 ENCOUNTER — CLINICAL SUPPORT (OUTPATIENT)
Dept: PULMONOLOGY | Facility: CLINIC | Age: 85
End: 2020-06-15
Payer: MEDICARE

## 2020-06-15 ENCOUNTER — HOSPITAL ENCOUNTER (OUTPATIENT)
Dept: RADIOLOGY | Facility: HOSPITAL | Age: 85
Discharge: HOME | End: 2020-06-15
Attending: INTERNAL MEDICINE
Payer: MEDICARE

## 2020-06-15 VITALS
WEIGHT: 139 LBS | HEIGHT: 61 IN | RESPIRATION RATE: 18 BRPM | TEMPERATURE: 99 F | SYSTOLIC BLOOD PRESSURE: 142 MMHG | BODY MASS INDEX: 26.24 KG/M2 | OXYGEN SATURATION: 99 % | DIASTOLIC BLOOD PRESSURE: 84 MMHG | HEART RATE: 52 BPM

## 2020-06-15 DIAGNOSIS — D86.0 SARCOIDOSIS OF LUNG: ICD-10-CM

## 2020-06-15 DIAGNOSIS — I70.0 ATHEROSCLEROSIS OF AORTA: ICD-10-CM

## 2020-06-15 DIAGNOSIS — D86.0 SARCOIDOSIS OF LUNG: Chronic | ICD-10-CM

## 2020-06-15 DIAGNOSIS — D57.3 HEMOGLOBIN A-S GENOTYPE: ICD-10-CM

## 2020-06-15 DIAGNOSIS — D86.0 SARCOIDOSIS OF LUNG: Primary | ICD-10-CM

## 2020-06-15 PROCEDURE — 1101F PR PT FALLS ASSESS DOC 0-1 FALLS W/OUT INJ PAST YR: ICD-10-PCS | Mod: HCNC,CPTII,S$GLB, | Performed by: INTERNAL MEDICINE

## 2020-06-15 PROCEDURE — 94729 DIFFUSING CAPACITY: CPT | Mod: HCNC,S$GLB,, | Performed by: INTERNAL MEDICINE

## 2020-06-15 PROCEDURE — 3077F PR MOST RECENT SYSTOLIC BLOOD PRESSURE >= 140 MM HG: ICD-10-PCS | Mod: HCNC,CPTII,S$GLB, | Performed by: INTERNAL MEDICINE

## 2020-06-15 PROCEDURE — 3079F DIAST BP 80-89 MM HG: CPT | Mod: HCNC,CPTII,S$GLB, | Performed by: INTERNAL MEDICINE

## 2020-06-15 PROCEDURE — 71046 XR CHEST PA AND LATERAL: ICD-10-PCS | Mod: 26,HCNC,, | Performed by: RADIOLOGY

## 2020-06-15 PROCEDURE — 3079F PR MOST RECENT DIASTOLIC BLOOD PRESSURE 80-89 MM HG: ICD-10-PCS | Mod: HCNC,CPTII,S$GLB, | Performed by: INTERNAL MEDICINE

## 2020-06-15 PROCEDURE — 1126F PR PAIN SEVERITY QUANTIFIED, NO PAIN PRESENT: ICD-10-PCS | Mod: HCNC,S$GLB,, | Performed by: INTERNAL MEDICINE

## 2020-06-15 PROCEDURE — 1101F PT FALLS ASSESS-DOCD LE1/YR: CPT | Mod: HCNC,CPTII,S$GLB, | Performed by: INTERNAL MEDICINE

## 2020-06-15 PROCEDURE — 1126F AMNT PAIN NOTED NONE PRSNT: CPT | Mod: HCNC,S$GLB,, | Performed by: INTERNAL MEDICINE

## 2020-06-15 PROCEDURE — 99213 OFFICE O/P EST LOW 20 MIN: CPT | Mod: 25,HCNC,S$GLB, | Performed by: INTERNAL MEDICINE

## 2020-06-15 PROCEDURE — 1159F PR MEDICATION LIST DOCUMENTED IN MEDICAL RECORD: ICD-10-PCS | Mod: HCNC,S$GLB,, | Performed by: INTERNAL MEDICINE

## 2020-06-15 PROCEDURE — 94726 PULM FUNCT TST PLETHYSMOGRAP: ICD-10-PCS | Mod: HCNC,S$GLB,, | Performed by: INTERNAL MEDICINE

## 2020-06-15 PROCEDURE — 99999 PR PBB SHADOW E&M-EST. PATIENT-LVL V: CPT | Mod: PBBFAC,HCNC,, | Performed by: INTERNAL MEDICINE

## 2020-06-15 PROCEDURE — 94010 BREATHING CAPACITY TEST: CPT | Mod: HCNC,S$GLB,, | Performed by: INTERNAL MEDICINE

## 2020-06-15 PROCEDURE — 99213 PR OFFICE/OUTPT VISIT, EST, LEVL III, 20-29 MIN: ICD-10-PCS | Mod: 25,HCNC,S$GLB, | Performed by: INTERNAL MEDICINE

## 2020-06-15 PROCEDURE — 99499 RISK ADDL DX/OHS AUDIT: ICD-10-PCS | Mod: HCNC,S$GLB,, | Performed by: INTERNAL MEDICINE

## 2020-06-15 PROCEDURE — 1159F MED LIST DOCD IN RCRD: CPT | Mod: HCNC,S$GLB,, | Performed by: INTERNAL MEDICINE

## 2020-06-15 PROCEDURE — 71046 X-RAY EXAM CHEST 2 VIEWS: CPT | Mod: 26,HCNC,, | Performed by: RADIOLOGY

## 2020-06-15 PROCEDURE — 94729 PR C02/MEMBANE DIFFUSE CAPACITY: ICD-10-PCS | Mod: HCNC,S$GLB,, | Performed by: INTERNAL MEDICINE

## 2020-06-15 PROCEDURE — 99499 UNLISTED E&M SERVICE: CPT | Mod: HCNC,S$GLB,, | Performed by: INTERNAL MEDICINE

## 2020-06-15 PROCEDURE — 94726 PLETHYSMOGRAPHY LUNG VOLUMES: CPT | Mod: HCNC,S$GLB,, | Performed by: INTERNAL MEDICINE

## 2020-06-15 PROCEDURE — 3077F SYST BP >= 140 MM HG: CPT | Mod: HCNC,CPTII,S$GLB, | Performed by: INTERNAL MEDICINE

## 2020-06-15 PROCEDURE — 99999 PR PBB SHADOW E&M-EST. PATIENT-LVL V: ICD-10-PCS | Mod: PBBFAC,HCNC,, | Performed by: INTERNAL MEDICINE

## 2020-06-15 PROCEDURE — 71046 X-RAY EXAM CHEST 2 VIEWS: CPT | Mod: TC,HCNC

## 2020-06-15 PROCEDURE — 94010 BREATHING CAPACITY TEST: ICD-10-PCS | Mod: HCNC,S$GLB,, | Performed by: INTERNAL MEDICINE

## 2020-06-15 RX ORDER — CLINDAMYCIN HYDROCHLORIDE 150 MG/1
CAPSULE ORAL
COMMUNITY
Start: 2020-05-20 | End: 2020-08-17

## 2020-06-15 RX ORDER — AMOXICILLIN 500 MG/1
CAPSULE ORAL
COMMUNITY
Start: 2020-05-14 | End: 2020-08-12

## 2020-06-15 RX ORDER — TRAMADOL HYDROCHLORIDE 50 MG/1
TABLET ORAL
COMMUNITY
Start: 2020-05-20 | End: 2020-08-17

## 2020-06-15 NOTE — PROGRESS NOTES
Subjective:      Patient ID: Glo Dumont is a 84 y.o. female.    Patient Active Problem List   Diagnosis    Sarcoidosis of lung    Thyroid nodule    Hypertensive heart disease with heart failure    Other hyperlipidemia    Hemoglobin A-S genotype    Angina, class II    Ptosis    Coronary artery disease, occlusive    Central vein occlusion of retina    Iron deficiency anemia    Vitamin D deficiency    Osteopenia    Essential hypertension    Chronic diastolic HF (heart failure), NYHA class 2    Atherosclerosis of aorta    Anxiety and depression    CKD (chronic kidney disease) stage 3, GFR 30-59 ml/min    Uveitis    Colitis    History of coronary angioplasty    Esophageal dysphagia    Hiatal hernia     Problem list has been reviewed.    Chief Complaint: Sarcoidosis    HPI: She is here today for follow up review of PFT and CXR and sarcoidosis.     PFT and CXR reviewed with pateint who voiced understanding.   She denies any new onset pulmonary complaints.   She denies coughing or wheezing.    She denies fevers, chills, rigors, hemoptysis, pain with breathing, wheezing.      A full  review of systems, past , family  and social histories was performed except as mentioned in the note above, these are non contributory to the main issues discussed today.    Immunization status reviewed and up to date.      Previous Report Reviewed: office notes     The following portions of the patient's history were reviewed and updated as appropriate: She  has a past medical history of Anemia, Angina pectoris, Anxiety, Anxiety and depression, Arthritis, Carotid artery occlusion, Carpal tunnel syndrome (06/23/2008), Chronic diarrhea, CKD (chronic kidney disease) stage 3, GFR 30-59 ml/min (5/11/2017), Colitis, Coronary artery disease, Coronary artery disease, Diastolic dysfunction, Diverticulosis, Greater trochanteric bursitis (2/10/2015), Grief at loss of child (1/26/2016), H/O carotid endarterectomy (12/2/2013),  Heart failure, History of coronary angioplasty (3/11/2014), Hypercholesteremia, Hypertension, Liver cyst (02/08/2013), Macular degeneration, Primary open-angle glaucoma(365.11) (9/3/2013), Renal cyst (02/08/2013), 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  She  has a past surgical history that includes Total abdominal hysterectomy w/ bilateral salpingoophorectomy (1972); Cataract extraction (Bilateral); Coronary angioplasty with stent (11/19/2010); Carotid endarterectomy (Right, 2000s); Joint replacement (Left, 11/03/2014); and Cholecystectomy.  Her family history includes Cancer in her daughter and father; Cirrhosis in her brother; Heart failure in her brother and mother; Hypertension in her mother.  She  reports that she has never smoked. She has never used smokeless tobacco. She reports that she does not drink alcohol or use drugs.  She has a current medication list which includes the following prescription(s): amlodipine, amoxicillin, aspirin, brimonidine 0.2%, budesonide, calcium carbonate, cholecalciferol (vitamin d3), citalopram, clindamycin, dicyclomine, dorzolamide-timolol 2-0.5%, durezol, fluticasone propionate, folic acid, folic acid/multivit-min/lutein, hydrochlorothiazide, iron-vitamin c 100-250 mg (icar-c), ketorolac 0.5%, lisinopril, metoprolol tartrate, nitroglycerin, ondansetron, prasugrel, simvastatin, and tramadol.  She is allergic to lisinopril and codeine..    Review of Systems   Constitutional: Positive for fatigue. Negative for chills and appetite change.   HENT: Negative for postnasal drip, rhinorrhea and sinus pressure.    Eyes: Negative for itching.   Respiratory: Positive for cough. Negative for shortness of breath and dyspnea on extertion.    Cardiovascular: Negative for chest pain, palpitations and leg swelling.   Genitourinary: Negative for difficulty urinating.   Musculoskeletal: Negative for arthralgias  "and back pain.   Skin: Positive for rash.   Gastrointestinal: Negative for nausea, vomiting and acid reflux.   Neurological: Positive for weakness. Negative for dizziness and headaches.   Hematological: Negative for adenopathy. Does not bruise/bleed easily.   Psychiatric/Behavioral: The patient is not nervous/anxious.       Objective:     Vitals:    06/15/20 1024   BP: (!) 142/84   Pulse: (!) 52   Resp: 18   Temp: 98.6 °F (37 °C)   TempSrc: Oral   SpO2: 99%   Weight: 63 kg (139 lb)   Height: 5' 1" (1.549 m)   PainSc: 0-No pain     Body mass index is 26.26 kg/m².    Physical Exam   Constitutional: She is oriented to person, place, and time. She appears well-developed and well-nourished.   HENT:   Head: Normocephalic and atraumatic.   Eyes: Pupils are equal, round, and reactive to light. Conjunctivae are normal.   Neck: Normal range of motion. Neck supple.   Cardiovascular: Normal rate and regular rhythm.   Pulmonary/Chest: Effort normal and breath sounds normal.   Abdominal: Soft. Bowel sounds are normal.   Musculoskeletal: Normal range of motion.   Neurological: She is alert and oriented to person, place, and time.   Skin: Skin is warm and dry.   Psychiatric: She has a normal mood and affect.   Nursing note and vitals reviewed.      Personal Diagnostic Review:     Pulmonary function tests:06/15/20:  FEV1: 1.59  (109.5 % predicted), FVC:  2.26 (119.0 % predicted), FEV1/FVC:70.52, T.67 (60.2% predicted), RV/TLVC: 2.59 (16.2 % predicted), DLCO: 2.66 (44.9 % predicted)  Normal  spirometry. Moderate restriction. Diffusion capacity is moderately reduced but corrects for alveolar volume.    CXR: 06/15/20: Heart size within upper limits normal in the aorta tortuous with underlying atherosclerotic calcification.  Stable pulmonary vasculature and mild smooth prominence paratracheal soft tissues as before.  Surgical clips soft tissues right neck base.  Status post cholecystectomy.  Coarsened reticular nodular infiltrates " within the lungs bilaterally slightly more pronounced on the upper lobes right greater than left.  No significant change from prior exam.  Generalized osteopenia and spondylosis.  Multilevel marginal spurring.  Mild accentuated kyphosis.    Assessment / plan       Discussed diagnosis, its evaluation, treatment and usual course. All questions    Problem List Items Addressed This Visit        Cardiac/Vascular    Atherosclerosis of aorta    Overview     Chest x-ray 11/10/14         Current Assessment & Plan     Stable and controlled.             Immunology/Multi System    Sarcoidosis of lung - Primary    Current Assessment & Plan     PULMONARY SARCOIDOSIS ROS: no significant ongoing wheezing or shortness of breath.  New concerns: NONE.   Exam: appears well, vitals normal, no respiratory distress, acyanotic, normal RR, chest clear, no wheezing, crepitations, rhonchi, normal symmetric air entry.   Assessment:  PULMONARY SARCOIDOSIS stable.   Plan:  PFT, CXR in 1 year.               Oncology    Hemoglobin A-S genotype    Current Assessment & Plan     Stable and controlled. Plan per PCP.               TIME SPENT WITH PATIENT: Time spent: 25 minutes in face to face  discussion concerning diagnosis, prognosis, review of lab and test results, benefits of treatment as well as management of disease, counseling of patient and coordination of care between various health  care providers . Greater than half the time spent was used for coordination of care and counseling of patient.     Return in 1 year. PFT and CXR in 1 year.

## 2020-06-15 NOTE — ASSESSMENT & PLAN NOTE
PULMONARY SARCOIDOSIS ROS: no significant ongoing wheezing or shortness of breath.  New concerns: NONE.   Exam: appears well, vitals normal, no respiratory distress, acyanotic, normal RR, chest clear, no wheezing, crepitations, rhonchi, normal symmetric air entry.   Assessment:  PULMONARY SARCOIDOSIS stable.   Plan:  PFT, CXR in 1 year.

## 2020-06-15 NOTE — PATIENT INSTRUCTIONS
Lung Anatomy  Your lungs take air in to give your body oxygen, which the body needs to work. Your lungs, like all the tissues in your body, are made up of billions of tiny specialized cells. Old lung cells die and are replaced by new, identical lung cells. This natural process helps ensure healthy lungs.    Date Last Reviewed: 11/1/2016  © 4596-8552 [employer] [address] [place] [address], [place] [address]. All rights reserved. This information is not intended as a substitute for professional medical care. Always follow your healthcare professional's instructions.

## 2020-06-18 ENCOUNTER — TELEPHONE (OUTPATIENT)
Dept: FAMILY MEDICINE | Facility: CLINIC | Age: 85
End: 2020-06-18

## 2020-06-18 NOTE — TELEPHONE ENCOUNTER
----- Message from Anna Hoffman sent at 6/18/2020  1:57 PM CDT -----  Regarding: appt  Contact: pt  Type:  Sooner Apoointment Request    Caller is requesting a sooner appointment.  Caller declined first available appointment listed below.  Caller will not accept being placed on the waitlist and is requesting a message be sent to doctor.  Name of Caller: pt  When is the first available appointment? 08/25/2020  Symptoms: follow up  Would the patient rather a call back or [facility]? Call back  Best Call Back Number: [phone]  Additional Information: n/a

## 2020-06-21 LAB
BRPFT: ABNORMAL
DLCO ADJ PRE: 7.94 ML/(MIN*MMHG) (ref 11.95–23.41)
DLCO SINGLE BREATH LLN: 11.95
DLCO SINGLE BREATH PRE REF: 44.9 %
DLCO SINGLE BREATH REF: 17.68
DLCOC SBVA LLN: 2.41
DLCOC SBVA PRE REF: 103.2 %
DLCOC SBVA REF: 3.98
DLCOC SINGLE BREATH LLN: 11.95
DLCOC SINGLE BREATH PRE REF: 44.9 %
DLCOC SINGLE BREATH REF: 17.68
DLCOVA LLN: 2.41
DLCOVA PRE REF: 103.2 %
DLCOVA PRE: 4.11 ML/(MIN*MMHG*L) (ref 2.41–5.55)
DLCOVA REF: 3.98
DLVAADJ PRE: 4.11 ML/(MIN*MMHG*L) (ref 2.41–5.55)
ERV LLN: -16449.56
ERV PRE REF: 93.9 %
ERV REF: 0.44
FEF 25 75 LLN: 0.39
FEF 25 75 PRE REF: 73.2 %
FEF 25 75 REF: 1.23
FEV1 FVC LLN: 63
FEV1 FVC PRE REF: 90.9 %
FEV1 FVC REF: 78
FEV1 LLN: 0.96
FEV1 PRE REF: 109.5 %
FEV1 REF: 1.46
FRCPLETH LLN: 1.73
FRCPLETH PREREF: 24.8 %
FRCPLETH REF: 2.55
FVC LLN: 1.27
FVC PRE REF: 119 %
FVC REF: 1.9
IVC PRE: 1.4 L (ref 1.27–2.53)
IVC SINGLE BREATH LLN: 1.27
IVC SINGLE BREATH PRE REF: 73.9 %
IVC SINGLE BREATH REF: 1.9
MVV LLN: 50
MVV PRE REF: 100.2 %
MVV REF: 59
PEF LLN: 1.42
PEF PRE REF: 121.1 %
PEF REF: 3.24
PRE DLCO: 7.94 ML/(MIN*MMHG) (ref 11.95–23.41)
PRE ERV: 0.41 L (ref -16449.56–16450.44)
PRE FEF 25 75: 0.9 L/S (ref 0.39–2.06)
PRE FET 100: 10.82 SEC
PRE FEV1 FVC: 70.52 % (ref 62.88–92.22)
PRE FEV1: 1.59 L (ref 0.96–1.95)
PRE FRC PL: 0.63 L
PRE FVC: 2.26 L (ref 1.27–2.53)
PRE MVV: 59 L/MIN (ref 50.06–67.73)
PRE PEF: 3.93 L/S (ref 1.42–5.07)
PRE RV: 0.2 L (ref 1.54–2.69)
PRE TLC: 2.67 L (ref 3.45–5.43)
RAW LLN: 3.06
RAW PRE REF: 119 %
RAW PRE: 3.64 CMH2O*S/L (ref 3.06–3.06)
RAW REF: 3.06
RV LLN: 1.54
RV PRE REF: 9.6 %
RV REF: 2.12
RVTLC LLN: 37
RVTLC PRE REF: 16.2 %
RVTLC PRE: 7.59 % (ref 37.25–56.43)
RVTLC REF: 47
TLC LLN: 3.45
TLC PRE REF: 60.2 %
TLC REF: 4.44
VA PRE: 1.93 L (ref 4.29–4.29)
VA SINGLE BREATH LLN: 4.29
VA SINGLE BREATH PRE REF: 45.1 %
VA SINGLE BREATH REF: 4.29
VC LLN: 1.27
VC PRE REF: 130.1 %
VC PRE: 2.47 L (ref 1.27–2.53)
VC REF: 1.9
VTGRAWPRE: 1.91 L

## 2020-08-04 NOTE — PROGRESS NOTES
"    Subjective:      Patient ID: Glo Dumont is a 84 y.o. female.    Chief Complaint: Annual Exam      HPI  Here for f/u medical problems and preventive exam, but pt went to [facility] ER 5d ago for presyncope and high BP.  Says all normal but told to f/u with Pulm for nodules.  No meds were changed or added.  Covid test negative.   passed away 2-3mo ago.  Lots worrying.  Taking citalopram for anxiety and has been working well.  Prior to his passing, was up with him "night and day."  Parkinson's.    Saw Pulm 3 weeks ago, to return 1y.  Has had no more weak spells.  No f/c/n/v.  No diarrhea.  No cp, short of breath, or palp.  No dysuria.      Annual due, but will delay:  HM: 11/19 fluvax, 3/15 [id number], 2/14 [id number], 2/15 TDaP, 12/15 BMD rep 2y, 2/11 Cscope no repeat will be done, 1/17 MMG no more, Eye doc monthly, GI Dr. Galindo.     Review of Systems   Constitutional: Negative for appetite change, chills, diaphoresis and fever.   HENT: Negative for congestion, ear pain, rhinorrhea, sinus pressure and sore throat.    Respiratory: Negative for cough, chest tightness and shortness of breath.    Cardiovascular: Negative for chest pain and palpitations.   Gastrointestinal: Negative for blood in stool, constipation, diarrhea, nausea and vomiting.   Genitourinary: Negative for dysuria, frequency, hematuria, menstrual problem, urgency and vaginal discharge.   Musculoskeletal: Negative for arthralgias.   Skin: Negative for rash.   Neurological: Negative for dizziness and headaches.   Psychiatric/Behavioral: Negative for sleep disturbance. The patient is not nervous/anxious.          Objective:   /78   Pulse 86   Temp 98.1 °F (36.7 °C)   Ht 5' 1" (1.549 m)   Wt 64.6 kg (142 lb 6.7 oz)   SpO2 98%   BMI 26.91 kg/m²     Physical Exam  Constitutional:       Appearance: She is well-developed.   HENT:      Right Ear: External ear normal. Tympanic membrane is not injected.      Left Ear: External ear normal. Tympanic " membrane is not injected.   Eyes:      Conjunctiva/sclera: Conjunctivae normal.   Neck:      Musculoskeletal: Normal range of motion and neck supple.      Thyroid: No thyromegaly.   Cardiovascular:      Rate and Rhythm: Normal rate and regular rhythm.      Heart sounds: No murmur. No friction rub. No gallop.    Pulmonary:      Effort: Pulmonary effort is normal.      Breath sounds: Normal breath sounds. No wheezing or rales.   Abdominal:      General: Bowel sounds are normal.      Palpations: Abdomen is soft. There is no mass.      Tenderness: There is no abdominal tenderness.   Lymphadenopathy:      Cervical: No cervical adenopathy.   Skin:     General: Skin is warm.      Findings: No rash.   Neurological:      Mental Status: She is alert and oriented to person, place, and time.             Assessment:       1. Postural dizziness with presyncope    2. Essential hypertension    3. CKD (chronic kidney disease) stage 3, GFR 30-59 ml/min    4. Anxiety and depression    5. Sarcoidosis of lung          Plan:     Postural dizziness with presyncope- vitals stable now, requested [facility] ER records for review.    Essential hypertension- good now.    CKD (chronic kidney disease) stage 3, GFR 30-59 ml/min    Anxiety and depression, uncontrolled- increase citalopram, xanax trial, RTC 2 weeks.  Call if any new sx.  -     citalopram (CELEXA) 20 MG tablet; Take 1 tablet (20 mg total) by mouth once daily.  Dispense: 30 tablet; Refill: 6  -     ALPRAZolam (XANAX) 0.25 MG tablet; Take 0.5-1 tablets (0.125-0.25 mg total) by mouth 3 (three) times daily as needed for Anxiety.  Dispense: 30 tablet; Refill: 0    Sarcoidosis of lung, most likely the pulm nodules from ER report are her baseline but will review.    Addendum 8/6/20:  Records incomplete but reviewed from [facility]:  UA neg, , , alk phos 135, creat 1.07, , trop neg, Hct 32, EKG normal.  CTA chest- neg for PE.  TNTC small nodules throughout lungs measuring up to 3mm.   Multiple enlarged hilar mediatinal lymph nodes up to 2.1cm.  Infectious and neoplastic processes must be considered.  Small pericardial effusion.    Will order PET scan for further evaluation of multiple lung lesions and elevated liver enzymes.

## 2020-08-05 ENCOUNTER — OFFICE VISIT (OUTPATIENT)
Dept: FAMILY MEDICINE | Facility: CLINIC | Age: 85
End: 2020-08-05
Payer: MEDICARE

## 2020-08-05 VITALS
HEART RATE: 86 BPM | TEMPERATURE: 98 F | WEIGHT: 142.44 LBS | SYSTOLIC BLOOD PRESSURE: 136 MMHG | BODY MASS INDEX: 26.89 KG/M2 | HEIGHT: 61 IN | OXYGEN SATURATION: 98 % | DIASTOLIC BLOOD PRESSURE: 78 MMHG

## 2020-08-05 DIAGNOSIS — R91.8 LUNG NODULES: ICD-10-CM

## 2020-08-05 DIAGNOSIS — R55 POSTURAL DIZZINESS WITH PRESYNCOPE: Primary | ICD-10-CM

## 2020-08-05 DIAGNOSIS — R74.8 ELEVATED LIVER ENZYMES: ICD-10-CM

## 2020-08-05 DIAGNOSIS — I10 ESSENTIAL HYPERTENSION: Chronic | ICD-10-CM

## 2020-08-05 DIAGNOSIS — R42 POSTURAL DIZZINESS WITH PRESYNCOPE: Primary | ICD-10-CM

## 2020-08-05 DIAGNOSIS — E04.1 THYROID NODULE: ICD-10-CM

## 2020-08-05 DIAGNOSIS — D86.0 SARCOIDOSIS OF LUNG: ICD-10-CM

## 2020-08-05 DIAGNOSIS — F41.9 ANXIETY AND DEPRESSION: ICD-10-CM

## 2020-08-05 DIAGNOSIS — N18.30 CKD (CHRONIC KIDNEY DISEASE) STAGE 3, GFR 30-59 ML/MIN: ICD-10-CM

## 2020-08-05 DIAGNOSIS — F32.A ANXIETY AND DEPRESSION: ICD-10-CM

## 2020-08-05 PROCEDURE — 3078F DIAST BP <80 MM HG: CPT | Mod: HCNC,CPTII,S$GLB, | Performed by: INTERNAL MEDICINE

## 2020-08-05 PROCEDURE — 1126F PR PAIN SEVERITY QUANTIFIED, NO PAIN PRESENT: ICD-10-PCS | Mod: HCNC,S$GLB,, | Performed by: INTERNAL MEDICINE

## 2020-08-05 PROCEDURE — 1126F AMNT PAIN NOTED NONE PRSNT: CPT | Mod: HCNC,S$GLB,, | Performed by: INTERNAL MEDICINE

## 2020-08-05 PROCEDURE — 99499 UNLISTED E&M SERVICE: CPT | Mod: HCNC,S$GLB,, | Performed by: INTERNAL MEDICINE

## 2020-08-05 PROCEDURE — 1101F PR PT FALLS ASSESS DOC 0-1 FALLS W/OUT INJ PAST YR: ICD-10-PCS | Mod: HCNC,CPTII,S$GLB, | Performed by: INTERNAL MEDICINE

## 2020-08-05 PROCEDURE — 1159F MED LIST DOCD IN RCRD: CPT | Mod: HCNC,S$GLB,, | Performed by: INTERNAL MEDICINE

## 2020-08-05 PROCEDURE — 3075F PR MOST RECENT SYSTOLIC BLOOD PRESS GE 130-139MM HG: ICD-10-PCS | Mod: HCNC,CPTII,S$GLB, | Performed by: INTERNAL MEDICINE

## 2020-08-05 PROCEDURE — 99999 PR PBB SHADOW E&M-EST. PATIENT-LVL IV: ICD-10-PCS | Mod: PBBFAC,HCNC,, | Performed by: INTERNAL MEDICINE

## 2020-08-05 PROCEDURE — 3075F SYST BP GE 130 - 139MM HG: CPT | Mod: HCNC,CPTII,S$GLB, | Performed by: INTERNAL MEDICINE

## 2020-08-05 PROCEDURE — 99214 PR OFFICE/OUTPT VISIT, EST, LEVL IV, 30-39 MIN: ICD-10-PCS | Mod: HCNC,S$GLB,, | Performed by: INTERNAL MEDICINE

## 2020-08-05 PROCEDURE — 99499 RISK ADDL DX/OHS AUDIT: ICD-10-PCS | Mod: HCNC,S$GLB,, | Performed by: INTERNAL MEDICINE

## 2020-08-05 PROCEDURE — 3078F PR MOST RECENT DIASTOLIC BLOOD PRESSURE < 80 MM HG: ICD-10-PCS | Mod: HCNC,CPTII,S$GLB, | Performed by: INTERNAL MEDICINE

## 2020-08-05 PROCEDURE — 99214 OFFICE O/P EST MOD 30 MIN: CPT | Mod: HCNC,S$GLB,, | Performed by: INTERNAL MEDICINE

## 2020-08-05 PROCEDURE — 1101F PT FALLS ASSESS-DOCD LE1/YR: CPT | Mod: HCNC,CPTII,S$GLB, | Performed by: INTERNAL MEDICINE

## 2020-08-05 PROCEDURE — 1159F PR MEDICATION LIST DOCUMENTED IN MEDICAL RECORD: ICD-10-PCS | Mod: HCNC,S$GLB,, | Performed by: INTERNAL MEDICINE

## 2020-08-05 PROCEDURE — 99999 PR PBB SHADOW E&M-EST. PATIENT-LVL IV: CPT | Mod: PBBFAC,HCNC,, | Performed by: INTERNAL MEDICINE

## 2020-08-05 RX ORDER — CITALOPRAM 20 MG/1
20 TABLET, FILM COATED ORAL DAILY
Qty: 30 TABLET | Refills: 6 | Status: SHIPPED | OUTPATIENT
Start: 2020-08-05 | End: 2020-09-26

## 2020-08-05 RX ORDER — ALPRAZOLAM 0.25 MG/1
.125-.25 TABLET ORAL 3 TIMES DAILY PRN
Qty: 30 TABLET | Refills: 0 | Status: SHIPPED | OUTPATIENT
Start: 2020-08-05 | End: 2022-06-28

## 2020-08-06 ENCOUNTER — TELEPHONE (OUTPATIENT)
Dept: FAMILY MEDICINE | Facility: CLINIC | Age: 85
End: 2020-08-06

## 2020-08-06 NOTE — TELEPHONE ENCOUNTER
Called pt and let her know results.  Made appt for recheck labs.  She will call appt desk to schedule pet scan.

## 2020-08-06 NOTE — TELEPHONE ENCOUNTER
Please inform pt I received records from [facility].  Spots on lungs and elevated liver enzymes.  Would like repeat lab work and PET scan scheduled please.  SM

## 2020-08-07 ENCOUNTER — TELEPHONE (OUTPATIENT)
Dept: INTERNAL MEDICINE | Facility: CLINIC | Age: 85
End: 2020-08-07

## 2020-08-07 NOTE — TELEPHONE ENCOUNTER
S/w pt's daughter.    Explained that pt has PET scan scheduled d/t findings of spots on her lungs and elevated liver enzymes.  Verbalized understanding.    Jessica asked that we update chart with her contact information d/t pt's spouse has passed away.  Chart updated./rpr

## 2020-08-07 NOTE — TELEPHONE ENCOUNTER
----- Message from Mario Ordonez sent at 8/7/2020  7:42 AM CDT -----  .Type:  Needs Medical Advice    Who Called: pt daughter   Symptoms (please be specific):   How long has patient had these symptoms:  Pharmacy name and phone #:  Would the patient rather a call back or a response via [facility]? Call   Best Call Back Number:  [phone]  Additional Information  Caller is requesting a call back from the nurse in regards to the pt PET scan please

## 2020-08-09 NOTE — PROGRESS NOTES
"Subjective:      Patient ID: Glo Dumont is a 84 y.o. female.    Chief Complaint: Follow-up      HPI  Pt here for follow up of medical problems.  To get PET today.  CTA at [facility] on 7/31/20 showed TNTC lung nodules, multiple enlarged hilar nodes.  Pt with sarcoid.  Also , , other labs pretty normal for her.  Citalopram and xanax made her very sleepy so she stopped both.    Annual due, but will delay:  HM: 11/19 fluvax, 3/15 [id number], 2/14 [id number], 2/15 TDaP, 12/15 BMD rep 2y, 2/11 Cscope no repeat will be done, 1/17 MMG no more, Eye doc monthly, GI Dr. Galindo.     Review of Systems   Constitutional: Negative for chills, diaphoresis and fever.   Respiratory: Negative for cough and shortness of breath.    Cardiovascular: Negative for chest pain, palpitations and leg swelling.   Gastrointestinal: Negative for blood in stool, constipation, diarrhea, nausea and vomiting.   Genitourinary: Negative for dysuria, frequency and hematuria.   Psychiatric/Behavioral: The patient is not nervous/anxious.          Objective:   BP (!) 152/78   Pulse 76   Temp 98.6 °F (37 °C) (Temporal)   Ht 5' 1" (1.549 m)   Wt 62.8 kg (138 lb 7.2 oz)   SpO2 95%   BMI 26.16 kg/m²     Physical Exam  Constitutional:       Appearance: She is well-developed.   Neck:      Musculoskeletal: Neck supple.      Thyroid: No thyroid mass.      Vascular: No carotid bruit.   Cardiovascular:      Rate and Rhythm: Normal rate and regular rhythm.      Heart sounds: No murmur. No friction rub. No gallop.    Pulmonary:      Effort: Pulmonary effort is normal.      Breath sounds: Normal breath sounds. No wheezing or rales.   Abdominal:      General: Bowel sounds are normal.      Palpations: Abdomen is soft. There is no mass.      Tenderness: There is no abdominal tenderness.   Lymphadenopathy:      Cervical: No cervical adenopathy.   Neurological:      Mental Status: She is alert and oriented to person, place, and time.             Assessment: "       1. Multiple lung nodules on CT    2. Elevated liver enzymes    3. Sarcoidosis of lung    4. Essential hypertension    5. Anxiety and depression          Plan:     Multiple lung nodules on CT, Elevated liver enzymes-PET scan today.    Sarcoidosis of lung    Essential hypertension- will monitor.    Anxiety and depression- restart 10mg citalopram.        Call Marti wu,  [phone].

## 2020-08-10 ENCOUNTER — TELEPHONE (OUTPATIENT)
Dept: RADIOLOGY | Facility: HOSPITAL | Age: 85
End: 2020-08-10

## 2020-08-11 ENCOUNTER — TELEPHONE (OUTPATIENT)
Dept: RADIOLOGY | Facility: HOSPITAL | Age: 85
End: 2020-08-11

## 2020-08-12 ENCOUNTER — OFFICE VISIT (OUTPATIENT)
Dept: FAMILY MEDICINE | Facility: CLINIC | Age: 85
End: 2020-08-12
Payer: MEDICARE

## 2020-08-12 ENCOUNTER — HOSPITAL ENCOUNTER (OUTPATIENT)
Dept: RADIOLOGY | Facility: HOSPITAL | Age: 85
Discharge: HOME | End: 2020-08-12
Attending: INTERNAL MEDICINE
Payer: MEDICARE

## 2020-08-12 VITALS
OXYGEN SATURATION: 95 % | BODY MASS INDEX: 26.14 KG/M2 | HEART RATE: 76 BPM | HEIGHT: 61 IN | DIASTOLIC BLOOD PRESSURE: 78 MMHG | TEMPERATURE: 99 F | WEIGHT: 138.44 LBS | SYSTOLIC BLOOD PRESSURE: 152 MMHG

## 2020-08-12 DIAGNOSIS — F41.9 ANXIETY AND DEPRESSION: ICD-10-CM

## 2020-08-12 DIAGNOSIS — D86.0 SARCOIDOSIS OF LUNG: ICD-10-CM

## 2020-08-12 DIAGNOSIS — R91.8 LUNG NODULES: ICD-10-CM

## 2020-08-12 DIAGNOSIS — R74.8 ELEVATED LIVER ENZYMES: ICD-10-CM

## 2020-08-12 DIAGNOSIS — I10 ESSENTIAL HYPERTENSION: Chronic | ICD-10-CM

## 2020-08-12 DIAGNOSIS — F32.A ANXIETY AND DEPRESSION: ICD-10-CM

## 2020-08-12 DIAGNOSIS — R91.8 MULTIPLE LUNG NODULES ON CT: Primary | ICD-10-CM

## 2020-08-12 PROCEDURE — 78815 PET IMAGE W/CT SKULL-THIGH: CPT | Mod: TC,HCNC

## 2020-08-12 PROCEDURE — 3077F PR MOST RECENT SYSTOLIC BLOOD PRESSURE >= 140 MM HG: ICD-10-PCS | Mod: HCNC,CPTII,S$GLB, | Performed by: INTERNAL MEDICINE

## 2020-08-12 PROCEDURE — A9552 F18 FDG: HCPCS | Mod: HCNC

## 2020-08-12 PROCEDURE — 1101F PT FALLS ASSESS-DOCD LE1/YR: CPT | Mod: HCNC,CPTII,S$GLB, | Performed by: INTERNAL MEDICINE

## 2020-08-12 PROCEDURE — 1159F PR MEDICATION LIST DOCUMENTED IN MEDICAL RECORD: ICD-10-PCS | Mod: HCNC,S$GLB,, | Performed by: INTERNAL MEDICINE

## 2020-08-12 PROCEDURE — 99214 OFFICE O/P EST MOD 30 MIN: CPT | Mod: HCNC,S$GLB,, | Performed by: INTERNAL MEDICINE

## 2020-08-12 PROCEDURE — 99999 PR PBB SHADOW E&M-EST. PATIENT-LVL V: ICD-10-PCS | Mod: PBBFAC,HCNC,, | Performed by: INTERNAL MEDICINE

## 2020-08-12 PROCEDURE — 78815 PET IMAGE W/CT SKULL-THIGH: CPT | Mod: 26,PS,HCNC, | Performed by: RADIOLOGY

## 2020-08-12 PROCEDURE — 99999 PR PBB SHADOW E&M-EST. PATIENT-LVL V: CPT | Mod: PBBFAC,HCNC,, | Performed by: INTERNAL MEDICINE

## 2020-08-12 PROCEDURE — 3078F PR MOST RECENT DIASTOLIC BLOOD PRESSURE < 80 MM HG: ICD-10-PCS | Mod: HCNC,CPTII,S$GLB, | Performed by: INTERNAL MEDICINE

## 2020-08-12 PROCEDURE — 1125F AMNT PAIN NOTED PAIN PRSNT: CPT | Mod: HCNC,S$GLB,, | Performed by: INTERNAL MEDICINE

## 2020-08-12 PROCEDURE — 3077F SYST BP >= 140 MM HG: CPT | Mod: HCNC,CPTII,S$GLB, | Performed by: INTERNAL MEDICINE

## 2020-08-12 PROCEDURE — 3078F DIAST BP <80 MM HG: CPT | Mod: HCNC,CPTII,S$GLB, | Performed by: INTERNAL MEDICINE

## 2020-08-12 PROCEDURE — 1101F PR PT FALLS ASSESS DOC 0-1 FALLS W/OUT INJ PAST YR: ICD-10-PCS | Mod: HCNC,CPTII,S$GLB, | Performed by: INTERNAL MEDICINE

## 2020-08-12 PROCEDURE — 99214 PR OFFICE/OUTPT VISIT, EST, LEVL IV, 30-39 MIN: ICD-10-PCS | Mod: HCNC,S$GLB,, | Performed by: INTERNAL MEDICINE

## 2020-08-12 PROCEDURE — 1125F PR PAIN SEVERITY QUANTIFIED, PAIN PRESENT: ICD-10-PCS | Mod: HCNC,S$GLB,, | Performed by: INTERNAL MEDICINE

## 2020-08-12 PROCEDURE — 1159F MED LIST DOCD IN RCRD: CPT | Mod: HCNC,S$GLB,, | Performed by: INTERNAL MEDICINE

## 2020-08-12 PROCEDURE — 78815 NM PET CT ROUTINE: ICD-10-PCS | Mod: 26,PS,HCNC, | Performed by: RADIOLOGY

## 2020-08-12 RX ORDER — AZITHROMYCIN 250 MG/1
TABLET, FILM COATED ORAL
COMMUNITY
Start: 2020-08-05 | End: 2020-08-12

## 2020-08-12 RX ORDER — HYDROCODONE BITARTRATE AND ACETAMINOPHEN 5; 325 MG/1; MG/1
TABLET ORAL
COMMUNITY
Start: 2020-08-05 | End: 2020-08-17

## 2020-08-13 ENCOUNTER — PATIENT MESSAGE (OUTPATIENT)
Dept: FAMILY MEDICINE | Facility: CLINIC | Age: 85
End: 2020-08-13

## 2020-08-13 ENCOUNTER — TELEPHONE (OUTPATIENT)
Dept: HEMATOLOGY/ONCOLOGY | Facility: CLINIC | Age: 85
End: 2020-08-13

## 2020-08-13 ENCOUNTER — TELEPHONE (OUTPATIENT)
Dept: FAMILY MEDICINE | Facility: CLINIC | Age: 85
End: 2020-08-13

## 2020-08-13 ENCOUNTER — OFFICE VISIT (OUTPATIENT)
Dept: CARDIOLOGY | Facility: CLINIC | Age: 85
End: 2020-08-13
Payer: MEDICARE

## 2020-08-13 VITALS
BODY MASS INDEX: 26.19 KG/M2 | WEIGHT: 138.69 LBS | DIASTOLIC BLOOD PRESSURE: 68 MMHG | HEART RATE: 86 BPM | SYSTOLIC BLOOD PRESSURE: 128 MMHG | OXYGEN SATURATION: 97 % | HEIGHT: 61 IN

## 2020-08-13 DIAGNOSIS — R94.2 ABNORMAL PET SCAN, LUNG: ICD-10-CM

## 2020-08-13 DIAGNOSIS — Z98.61 HISTORY OF CORONARY ANGIOPLASTY: ICD-10-CM

## 2020-08-13 DIAGNOSIS — I25.118 CORONARY ARTERY DISEASE OF NATIVE HEART WITH STABLE ANGINA PECTORIS, UNSPECIFIED VESSEL OR LESION TYPE: ICD-10-CM

## 2020-08-13 DIAGNOSIS — I10 ESSENTIAL HYPERTENSION: Chronic | ICD-10-CM

## 2020-08-13 DIAGNOSIS — I50.32 CHRONIC DIASTOLIC HF (HEART FAILURE), NYHA CLASS 2: Chronic | ICD-10-CM

## 2020-08-13 DIAGNOSIS — I20.9 ANGINA, CLASS II: Primary | Chronic | ICD-10-CM

## 2020-08-13 DIAGNOSIS — R91.8 MULTIPLE LUNG NODULES ON CT: Primary | ICD-10-CM

## 2020-08-13 PROCEDURE — 99999 PR PBB SHADOW E&M-EST. PATIENT-LVL V: CPT | Mod: PBBFAC,HCNC,, | Performed by: NUCLEAR MEDICINE

## 2020-08-13 PROCEDURE — 1159F PR MEDICATION LIST DOCUMENTED IN MEDICAL RECORD: ICD-10-PCS | Mod: HCNC,S$GLB,, | Performed by: NUCLEAR MEDICINE

## 2020-08-13 PROCEDURE — 1101F PT FALLS ASSESS-DOCD LE1/YR: CPT | Mod: HCNC,CPTII,S$GLB, | Performed by: NUCLEAR MEDICINE

## 2020-08-13 PROCEDURE — 3074F PR MOST RECENT SYSTOLIC BLOOD PRESSURE < 130 MM HG: ICD-10-PCS | Mod: HCNC,CPTII,S$GLB, | Performed by: NUCLEAR MEDICINE

## 2020-08-13 PROCEDURE — 3074F SYST BP LT 130 MM HG: CPT | Mod: HCNC,CPTII,S$GLB, | Performed by: NUCLEAR MEDICINE

## 2020-08-13 PROCEDURE — 3078F DIAST BP <80 MM HG: CPT | Mod: HCNC,CPTII,S$GLB, | Performed by: NUCLEAR MEDICINE

## 2020-08-13 PROCEDURE — 93010 EKG 12-LEAD: ICD-10-PCS | Mod: HCNC,S$GLB,, | Performed by: INTERNAL MEDICINE

## 2020-08-13 PROCEDURE — 93010 ELECTROCARDIOGRAM REPORT: CPT | Mod: HCNC,S$GLB,, | Performed by: INTERNAL MEDICINE

## 2020-08-13 PROCEDURE — 3078F PR MOST RECENT DIASTOLIC BLOOD PRESSURE < 80 MM HG: ICD-10-PCS | Mod: HCNC,CPTII,S$GLB, | Performed by: NUCLEAR MEDICINE

## 2020-08-13 PROCEDURE — 1159F MED LIST DOCD IN RCRD: CPT | Mod: HCNC,S$GLB,, | Performed by: NUCLEAR MEDICINE

## 2020-08-13 PROCEDURE — 99214 OFFICE O/P EST MOD 30 MIN: CPT | Mod: HCNC,S$GLB,, | Performed by: NUCLEAR MEDICINE

## 2020-08-13 PROCEDURE — 1101F PR PT FALLS ASSESS DOC 0-1 FALLS W/OUT INJ PAST YR: ICD-10-PCS | Mod: HCNC,CPTII,S$GLB, | Performed by: NUCLEAR MEDICINE

## 2020-08-13 PROCEDURE — 99999 PR PBB SHADOW E&M-EST. PATIENT-LVL V: ICD-10-PCS | Mod: PBBFAC,HCNC,, | Performed by: NUCLEAR MEDICINE

## 2020-08-13 PROCEDURE — 93005 ELECTROCARDIOGRAM TRACING: CPT | Mod: PBBFAC

## 2020-08-13 PROCEDURE — 99214 PR OFFICE/OUTPT VISIT, EST, LEVL IV, 30-39 MIN: ICD-10-PCS | Mod: HCNC,S$GLB,, | Performed by: NUCLEAR MEDICINE

## 2020-08-13 NOTE — TELEPHONE ENCOUNTER
PC with daughter Marti-  One lung spot larger, need to see specialist for advise.      Please schedule with [facility].  Call [phone] to tell daughter.  SM

## 2020-08-13 NOTE — TELEPHONE ENCOUNTER
Spoke with Jessica, pt dtr, over the phone about referral to medical oncology. Together we scheduled pt to see Dr. Lara 8/17/20 @ 4pm at the [place] location. Direct contact information given to call with any questions/concerns. Jessica agreeable to all information.

## 2020-08-13 NOTE — TELEPHONE ENCOUNTER
----- Message from Anna Hoffman sent at 8/13/2020  1:55 PM CDT -----  Contact: Jessica  Type:  Patient Returning Call    Who Called: pt  Who Left Message for Patient: unknown  Does the patient know what this is regarding?: no  Would the patient rather a call back or a response via [facility]? Call back  Best Call Back Number: [phone] or [phone]  Additional Information: n/a

## 2020-08-13 NOTE — TELEPHONE ENCOUNTER
----- Message from Rachael Bower sent at 8/13/2020  8:18 AM CDT -----  Contact: daughter/marti [phone]  Type:  Test Results    Who Called: aMrti Toribio Daughter  Name of Test (Lab/Mammo/Etc): PET Scan  Date of Test: 08-12-20  Ordering Provider: Truong  Where the test was performed: [place]  Would the patient rather a call back or a response via [facility]? Call back  Best Call Back Number: [phone]  Additional Information:    Thanks/[facility]

## 2020-08-13 NOTE — PROGRESS NOTES
Subjective:   Patient ID:  Glo Dumont is a 84 y.o. female who presents for follow-up of Coronary Artery Disease (PCI- ANGINA FC 2), Congestive Heart Failure (HFPEF,STAGE C), and Hypertension (ESSENTIAL)      HPI ABOUT ONE WEEK AGO SHE WENT TO ED , [facility] FOR NEAR SYNCOPE AND ELEVATED BP-  ON STANDING AT HOME, SHE FELT WEAK, NAUSEATED AND PROFUSE PERSPIRATION, NO VOMITING, NO ABDOMINAL PAIN. NO URINARY OR RECTAL INCONTENENCE  SHE RULE OUT FOR ACS AND CARD ARRHYTHMIAS  SINCE THEN SHE HAS BEEN DOING WELL. AND NO RECURRENCE OF SYMPTOMS  NO ANGINA OR EQUIVALENT  NO UNUSUAL SOOD. NO ORTHOPNEA OR PND  NO PALPITATIONS.   NO EDEMA, NO CALVE TENDERNESS  NO FOCAL CNS SYMPTOMS OR SIGNS TO SUGGEST TIA OR STROKE  CARD MED GOOD COMPLIANCE  ECG TODAY- SRM 67 BMP, WNL    Review of Systems   Constitution: Negative for chills, fever, night sweats, weight gain and weight loss.   HENT: Negative for nosebleeds.    Eyes: Negative for blurred vision, double vision and visual disturbance.   Cardiovascular: Negative for chest pain, dyspnea on exertion, irregular heartbeat, leg swelling, orthopnea, palpitations, paroxysmal nocturnal dyspnea and syncope.   Respiratory: Negative for cough, hemoptysis and wheezing.    Endocrine: Negative for polydipsia and polyuria.   Hematologic/Lymphatic: Does not bruise/bleed easily.   Skin: Negative for rash.   Musculoskeletal: Negative for joint pain, joint swelling, muscle weakness and myalgias.   Gastrointestinal: Negative for abdominal pain, hematemesis, jaundice and melena.   Genitourinary: Negative for dysuria, hematuria and nocturia.   Neurological: Negative for dizziness, focal weakness, headaches, sensory change and weakness.   Psychiatric/Behavioral: Negative for depression. The patient does not have insomnia and is not nervous/anxious.      Family History   Problem Relation Age of Onset    Hypertension Mother     Heart failure Mother     Cancer Father         prostate    Cirrhosis Brother      Heart failure Brother     Cancer Daughter         Carcinoid     Past Medical History:   Diagnosis Date    Anemia     Angina pectoris     Anxiety     Anxiety and depression     Arthritis     hip    Carotid artery occlusion     Carpal tunnel syndrome 06/23/2008    emg    Chronic diarrhea     work up in 2011 with EGD, CS and VCE    CKD (chronic kidney disease) stage 3, GFR 30-59 ml/min 5/11/2017    Colitis     Coronary artery disease     Coronary artery disease     Diastolic dysfunction     Diverticulosis     Greater trochanteric bursitis 2/10/2015    Grief at loss of child 1/26/2016    H/O carotid endarterectomy 12/2/2013    Heart failure     History of coronary angioplasty 3/11/2014    Hypercholesteremia     Hypertension     Liver cyst 02/08/2013    ct abd    Macular degeneration     Primary open-angle glaucoma(365.11) 9/3/2013    Renal cyst 02/08/2013    ct abd    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    Sarcoidosis     Sarcoidosis of lung     Sickle cell trait     Uveitis      Social History     Socioeconomic History    Marital status:      Spouse name: Not on file    Number of children: 5    Years of education: Not on file    Highest education level: Not on file   Occupational History    Occupation: retired   Social Needs    Financial resource strain: Not on file    Food insecurity     Worry: Not on file     Inability: Not on file    Transportation needs     Medical: Not on file     Non-medical: Not on file   Tobacco Use    Smoking status: Never Smoker    Smokeless tobacco: Never Used   Substance and Sexual Activity    Alcohol use: No     Alcohol/week: 0.0 standard drinks    Drug use: No    Sexual activity: Yes     Partners: Male   Lifestyle    Physical activity     Days per week: Not on file     Minutes per session: Not on file    Stress: Not on file   Relationships    Social connections     Talks on phone: Not on file     Gets together:  Not on file     Attends [employer] service: Not on file     Active member of club or organization: Not on file     Attends meetings of clubs or organizations: Not on file     Relationship status: Not on file   Other Topics Concern    Not on file   Social History Narrative         Current Outpatient Medications on File Prior to Visit   Medication Sig Dispense Refill    ALPRAZolam (XANAX) 0.25 MG tablet Take 0.5-1 tablets (0.125-0.25 mg total) by mouth 3 (three) times daily as needed for Anxiety. 30 tablet 0    amLODIPine (NORVASC) 10 MG tablet Take 1 tablet (10 mg total) by mouth once daily. 90 tablet 3    aspirin (ECOTRIN) 81 MG EC tablet Take 81 mg by mouth once daily.      budesonide (ENTOCORT EC) 3 mg capsule Take 9 mg by mouth once daily.      calcium carbonate (OS-LYNN) 600 mg calcium (1,500 mg) Tab Take by mouth.      cholecalciferol, vitamin D3, 2,000 unit Cap Take 1 capsule (2,000 Units total) by mouth once daily. 100 capsule 6    dicyclomine (BENTYL) 10 MG capsule Take 10 mg by mouth 4 (four) times daily before meals and nightly.      folic acid (FOLVITE) 1 MG tablet 1 tablet      hydroCHLOROthiazide (HYDRODIURIL) 25 MG tablet Take 1 tablet (25 mg total) by mouth once daily. 30 tablet 2    iron-vitamin C 100-250 mg, ICAR-C, (ICAR-C) 100-250 mg Tab Take 1 tablet by mouth once daily. 100 tablet 6    nitroGLYCERIN (NITROSTAT) 0.4 MG SL tablet Place 1 tablet (0.4 mg total) under the tongue every 5 (five) minutes as needed for Chest pain. 25 tablet 4    ondansetron (ZOFRAN) 4 MG tablet       prasugrel (EFFIENT) 10 mg Tab Take 1 tablet (10 mg total) by mouth once daily. 30 tablet 11    simvastatin (ZOCOR) 40 MG tablet Take 1 tablet (40 mg total) by mouth every evening. 90 tablet 3    brimonidine 0.2% (ALPHAGAN) 0.2 % Drop       citalopram (CELEXA) 20 MG tablet Take 1 tablet (20 mg total) by mouth once daily. (Patient not taking: Reported on 8/13/2020) 30 tablet 6    clindamycin (CLEOCIN)  150 MG capsule       dorzolamide-timolol 2-0.5% (COSOPT) 22.3-6.8 mg/mL ophthalmic solution       DUREZOL 0.05 % Drop ophthalmic solution       fluticasone (FLONASE) 50 mcg/actuation nasal spray 2 sprays by Each Nare route once daily. 1 Bottle 12    FOLIC ACID/MULTIVIT-MIN/LUTEIN (CENTRUM SILVER ORAL) Take by mouth.      HYDROcodone-acetaminophen (NORCO) 5-325 mg per tablet       ketorolac 0.5% (ACULAR) 0.5 % Drop       metoprolol tartrate (LOPRESSOR) 50 MG tablet TAKE TWO TABLETS BY MOUTH IN THE MORNING AND 2 TABLETS BY MOUTH IN THE EVENING 270 tablet 3    traMADoL (ULTRAM) 50 mg tablet        No current facility-administered medications on file prior to visit.      Review of patient's allergies indicates:   Allergen Reactions    Lisinopril      angioedema    Codeine Nausea And Vomiting       Objective:     Physical Exam   Constitutional: She is oriented to person, place, and time. She appears well-developed. No distress.   HENT:   Head: Normocephalic.   Eyes: Pupils are equal, round, and reactive to light. Conjunctivae are normal. No scleral icterus.   Neck: Normal range of motion. Neck supple. Normal carotid pulses, no hepatojugular reflux and no JVD present. Carotid bruit is not present. No edema present. No thyroid mass and no thyromegaly present.   Cardiovascular: Normal rate, regular rhythm, S1 normal, S2 normal, normal heart sounds and intact distal pulses. PMI is not displaced. Exam reveals no gallop and no friction rub.   No murmur heard.  Pulses:       Carotid pulses are 2+ on the right side and 2+ on the left side.       Radial pulses are 2+ on the right side and 2+ on the left side.        Femoral pulses are 2+ on the right side and 2+ on the left side.       Popliteal pulses are 2+ on the right side and 2+ on the left side.        Dorsalis pedis pulses are 2+ on the right side and 2+ on the left side.        Posterior tibial pulses are 2+ on the right side and 2+ on the left side.    Pulmonary/Chest: Effort normal and breath sounds normal. She has no wheezes. She has no rales. She exhibits no tenderness.   Abdominal: Soft. Bowel sounds are normal. She exhibits no pulsatile midline mass and no mass. There is no hepatosplenomegaly. There is no abdominal tenderness.   Musculoskeletal: Normal range of motion.         General: No tenderness or edema.      Cervical back: Normal.      Thoracic back: Normal.      Lumbar back: Normal.   Lymphadenopathy:     She has no cervical adenopathy.     She has no axillary adenopathy.        Right: No supraclavicular adenopathy present.        Left: No supraclavicular adenopathy present.   Neurological: She is alert and oriented to person, place, and time. She has normal strength and normal reflexes. No sensory deficit. Gait normal.   Skin: Skin is warm. No rash noted. No cyanosis. No pallor. Nails show no clubbing.   Psychiatric: She has a normal mood and affect. Her speech is normal and behavior is normal. Cognition and memory are normal.       Assessment:     1. Angina, class II    2. CAD (coronary artery disease)    3. History of coronary angioplasty    4. Chronic diastolic HF (heart failure), NYHA class 2    5. Essential hypertension        Plan:     Angina, class II  STABLE PATTERN  -     SCHEDULED EKG 12-LEAD (to Muse); Future    CAD (coronary artery disease)  NO CLINICAL EVIDENCE OF ACTIVE MYOCARDIAL ISCHEMIA  -     SCHEDULED EKG 12-LEAD (to Muse); Future    History of coronary angioplasty  ON DAPT- NO ABNORMAL BLEEDING    Chronic diastolic HF (heart failure), NYHA class 2  CLINICALLY WELL COMPENSATED  -     SCHEDULED EKG 12-LEAD (to Muse); Future    Essential hypertension  WELL CONTROLLED  CNS STATUS STABLE  -     SCHEDULED EKG 12-LEAD (to Muse); Future    CONTINUE PRESENT CARD MANAGEMENT  RETURN IN 4 MONTHS

## 2020-08-14 NOTE — TELEPHONE ENCOUNTER
----- Message from Lyle Jc sent at 8/13/2020  3:09 PM CDT -----  .Type:  Patient Returning Call    Who Called Jessica Dumont  Who Left Message for Patient:Amirah  Does the patient know what this is regarding?:no  Would the patient rather a call back or a response via [facility]? Call back  Best Call Back Number:[phone]  Additional Information:

## 2020-08-16 NOTE — PROGRESS NOTES
Subjective:       Patient ID: Glo Dumont is a 84 y.o. female.    Chief Complaint: Hemoglobin A-S genotype [D57.3]  HPI: We have an opportunity to see Ms. Glo Dumont in Hematology Oncology clinic at [facility] on 08/16/2020.  Ms. Glo Dumont is a 84 y.o. woman with pulmonary sarcoidosis, sickle cell trait presents for evaluation of lung lesions and anemia.    Oncology History    No history exists.     Past Medical History:   Diagnosis Date    Anemia     Angina pectoris     Anxiety     Anxiety and depression     Arthritis     hip    Carotid artery occlusion     Carpal tunnel syndrome 06/23/2008    emg    Chronic diarrhea     work up in 2011 with EGD, CS and VCE    CKD (chronic kidney disease) stage 3, GFR 30-59 ml/min 5/11/2017    Colitis     Coronary artery disease     Coronary artery disease     Diastolic dysfunction     Diverticulosis     Greater trochanteric bursitis 2/10/2015    Grief at loss of child 1/26/2016    H/O carotid endarterectomy 12/2/2013    Heart failure     History of coronary angioplasty 3/11/2014    Hypercholesteremia     Hypertension     Liver cyst 02/08/2013    ct abd    Macular degeneration     Primary open-angle glaucoma(365.11) 9/3/2013    Renal cyst 02/08/2013    ct abd    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    Sarcoidosis     Sarcoidosis of lung     Sickle cell trait     Uveitis      Family History   Problem Relation Age of Onset    Hypertension Mother     Heart failure Mother     Cancer Father         prostate    Cirrhosis Brother     Heart failure Brother     Cancer Daughter         Carcinoid     Social History     Socioeconomic History    Marital status:      Spouse name: Not on file    Number of children: 5    Years of education: Not on file    Highest education level: Not on file   Occupational History    Occupation: retired   Social Needs    Financial resource strain: Not on file    Food  insecurity     Worry: Not on file     Inability: Not on file    Transportation needs     Medical: Not on file     Non-medical: Not on file   Tobacco Use    Smoking status: Never Smoker    Smokeless tobacco: Never Used   Substance and Sexual Activity    Alcohol use: No     Alcohol/week: 0.0 standard drinks    Drug use: No    Sexual activity: Yes     Partners: Male   Lifestyle    Physical activity     Days per week: Not on file     Minutes per session: Not on file    Stress: Not on file   Relationships    Social connections     Talks on phone: Not on file     Gets together: Not on file     Attends Christian service: Not on file     Active member of club or organization: Not on file     Attends meetings of clubs or organizations: Not on file     Relationship status: Not on file   Other Topics Concern    Not on file   Social History Narrative         Past Surgical History:   Procedure Laterality Date    CAROTID ENDARTERECTOMY Right 2000s    CATARACT EXTRACTION Bilateral     Dr. Liang Dennis    CHOLECYSTECTOMY      laparoscopic, 3/18.    CORONARY ANGIOPLASTY WITH STENT PLACEMENT  11/19/2010    RCA-HALIE 2010    Lathia    JOINT REPLACEMENT Left 11/03/2014    Dr. Braun    TOTAL ABDOMINAL HYSTERECTOMY W/ BILATERAL SALPINGOOPHORECTOMY  1972     Current Outpatient Medications   Medication Sig Dispense Refill    ALPRAZolam (XANAX) 0.25 MG tablet Take 0.5-1 tablets (0.125-0.25 mg total) by mouth 3 (three) times daily as needed for Anxiety. 30 tablet 0    amLODIPine (NORVASC) 10 MG tablet Take 1 tablet (10 mg total) by mouth once daily. 90 tablet 3    aspirin (ECOTRIN) 81 MG EC tablet Take 81 mg by mouth once daily.      brimonidine 0.2% (ALPHAGAN) 0.2 % Drop       budesonide (ENTOCORT EC) 3 mg capsule Take 9 mg by mouth once daily.      calcium carbonate (OS-LYNN) 600 mg calcium (1,500 mg) Tab Take by mouth.      cholecalciferol, vitamin D3, 2,000 unit Cap Take 1 capsule (2,000 Units total) by mouth  once daily. 100 capsule 6    citalopram (CELEXA) 20 MG tablet Take 1 tablet (20 mg total) by mouth once daily. (Patient not taking: Reported on 8/13/2020) 30 tablet 6    clindamycin (CLEOCIN) 150 MG capsule       dicyclomine (BENTYL) 10 MG capsule Take 10 mg by mouth 4 (four) times daily before meals and nightly.      dorzolamide-timolol 2-0.5% (COSOPT) 22.3-6.8 mg/mL ophthalmic solution       DUREZOL 0.05 % Drop ophthalmic solution       fluticasone (FLONASE) 50 mcg/actuation nasal spray 2 sprays by Each Nare route once daily. 1 Bottle 12    folic acid (FOLVITE) 1 MG tablet 1 tablet      FOLIC ACID/MULTIVIT-MIN/LUTEIN (CENTRUM SILVER ORAL) Take by mouth.      hydroCHLOROthiazide (HYDRODIURIL) 25 MG tablet Take 1 tablet (25 mg total) by mouth once daily. 30 tablet 2    HYDROcodone-acetaminophen (NORCO) 5-325 mg per tablet       iron-vitamin C 100-250 mg, ICAR-C, (ICAR-C) 100-250 mg Tab Take 1 tablet by mouth once daily. 100 tablet 6    ketorolac 0.5% (ACULAR) 0.5 % Drop       metoprolol tartrate (LOPRESSOR) 50 MG tablet TAKE TWO TABLETS BY MOUTH IN THE MORNING AND 2 TABLETS BY MOUTH IN THE EVENING 270 tablet 3    nitroGLYCERIN (NITROSTAT) 0.4 MG SL tablet Place 1 tablet (0.4 mg total) under the tongue every 5 (five) minutes as needed for Chest pain. 25 tablet 4    ondansetron (ZOFRAN) 4 MG tablet       prasugrel (EFFIENT) 10 mg Tab Take 1 tablet (10 mg total) by mouth once daily. 30 tablet 11    simvastatin (ZOCOR) 40 MG tablet Take 1 tablet (40 mg total) by mouth every evening. 90 tablet 3    traMADoL (ULTRAM) 50 mg tablet        No current facility-administered medications for this visit.        Labs:  Lab Results   Component Value Date    WBC 6.29 07/19/2019    HGB 10.9 (L) 07/19/2019    HCT 33.1 (L) 07/19/2019    MCV 84 07/19/2019     07/19/2019     BMP  Lab Results   Component Value Date     08/23/2019    K 4.4 08/23/2019     08/23/2019    CO2 20 (L) 08/23/2019    BUN 36 (H)  08/23/2019    CREATININE 1.4 08/23/2019    CALCIUM 9.6 08/23/2019    ANIONGAP 9 08/23/2019    ESTGFRAFRICA 40.6 (A) 08/23/2019    EGFRNONAA 35.3 (A) 08/23/2019     Lab Results   Component Value Date    ALT 17 07/19/2019    AST 16 07/19/2019    ALKPHOS 72 07/19/2019    BILITOT 0.3 07/19/2019       Lab Results   Component Value Date    IRON 85 10/10/2017    TIBC 327 10/10/2017    FERRITIN 359 (H) 10/10/2017     Lab Results   Component Value Date    EJTBIUGT01 936 03/12/2013     Lab Results   Component Value Date    FOLATE 10.3 03/12/2013     Lab Results   Component Value Date    TSH 0.322 (L) 08/23/2019       I have reviewed the radiology reports and examined the scan/xray images.    Review of Systems   Constitutional: Negative.    HENT: Negative.    Eyes: Negative.    Respiratory: Positive for shortness of breath.    Cardiovascular: Negative.    Gastrointestinal: Positive for nausea.   Endocrine: Negative.    Genitourinary: Negative.    Musculoskeletal: Negative.    Skin: Negative.    Allergic/Immunologic: Negative.    Neurological: Negative.    Hematological: Negative.    Psychiatric/Behavioral: Negative.      ECOG SCORE    0 - Fully active-able to carry on all pre-disease performance without restriction            Objective:     Vitals:    08/17/20 1417   BP: (!) 140/65   Pulse: 61   Resp: 16   Temp: 98.2 °F (36.8 °C)   Body mass index is 26.24 kg/m².  Physical Exam  Vitals signs and nursing note reviewed.   Constitutional:       Appearance: She is well-developed.   HENT:      Head: Normocephalic and atraumatic.   Eyes:      Conjunctiva/sclera: Conjunctivae normal.   Neck:      Musculoskeletal: Normal range of motion and neck supple.   Cardiovascular:      Rate and Rhythm: Normal rate and regular rhythm.   Pulmonary:      Effort: Pulmonary effort is normal.      Breath sounds: Normal breath sounds.   Abdominal:      General: Bowel sounds are normal.      Palpations: Abdomen is soft.   Musculoskeletal: Normal range  of motion.   Skin:     General: Skin is warm and dry.   Neurological:      Mental Status: She is alert and oriented to person, place, and time.   Psychiatric:         Behavior: Behavior normal.         Thought Content: Thought content normal.         Judgment: Judgment normal.       PET CT Impression:     1. Biapical hypermetabolic pleuroparenchymal lung opacities with increase in size of nodular density in the right apex.  Malignancy is not excluded and tissue sampling should be considered.  2. Mediastinal, bilateral hilar, and right supraclavicular lymphadenopathy mostly stable or slightly improved compared to prior.  Lymphadenopathy in left hilum and subcarinal space conversely appears slightly enlarged and/or more FDG avid.  Patient with reported history of sarcoidosis.  3. Resolution of prior FDG avid lobular soft tissue mass in the anterior abdominal wall.  4. New FDG uptake in the left erector spinae musculature without discrete mass visualized.  Suspect muscle strain or other nonspecific infectious/inflammatory process.    Assessment:      1. Hemoglobin A-S genotype    2. Other iron deficiency anemia    3. Sarcoidosis of lung    4. Abnormal PET scan, lung    5. Nutritional anemia           Plan:     Hemoglobin A-S genotype    Other iron deficiency anemia    Sarcoidosis of lung  Refer to see Pulmonology for EBUS, and if sarcoid, start treatment  -     Ambulatory referral/consult to Pulmonology; Future; Expected date: 08/24/2020    Abnormal PET scan, lung    -     Ambulatory referral/consult to Pulmonology; Future; Expected date: 08/24/2020    Nutritional anemia  -     CBC auto differential; Future; Expected date: 08/17/2020  -     Comprehensive metabolic panel; Future; Expected date: 08/17/2020  -     Iron and TIBC; Future; Expected date: 08/17/2020  -     Ferritin; Future; Expected date: 08/17/2020  -     Folate; Future; Expected date: 08/17/2020  -     Vitamin B12; Future; Expected date: 08/17/2020  -      TSH; Future; Expected date: 08/17/2020  -     C-Reactive Protein; Future; Expected date: 08/17/2020  -     Lactate Dehydrogenase; Future; Expected date: 08/17/2020  -     Protein electrophoresis, serum; Future; Expected date: 08/17/2020  -     Immunofixation Electrophoresis; Future; Expected date: 08/17/2020

## 2020-08-17 ENCOUNTER — TELEPHONE (OUTPATIENT)
Dept: PULMONOLOGY | Facility: CLINIC | Age: 85
End: 2020-08-17

## 2020-08-17 ENCOUNTER — OFFICE VISIT (OUTPATIENT)
Dept: HEMATOLOGY/ONCOLOGY | Facility: CLINIC | Age: 85
End: 2020-08-17
Payer: MEDICARE

## 2020-08-17 ENCOUNTER — TELEPHONE (OUTPATIENT)
Dept: HEMATOLOGY/ONCOLOGY | Facility: CLINIC | Age: 85
End: 2020-08-17

## 2020-08-17 ENCOUNTER — OFFICE VISIT (OUTPATIENT)
Dept: HOME HEALTH SERVICES | Facility: CLINIC | Age: 85
End: 2020-08-17
Payer: MEDICARE

## 2020-08-17 VITALS
HEART RATE: 50 BPM | TEMPERATURE: 98 F | HEIGHT: 61 IN | SYSTOLIC BLOOD PRESSURE: 129 MMHG | WEIGHT: 142 LBS | DIASTOLIC BLOOD PRESSURE: 72 MMHG | BODY MASS INDEX: 26.81 KG/M2 | OXYGEN SATURATION: 98 %

## 2020-08-17 VITALS
OXYGEN SATURATION: 98 % | RESPIRATION RATE: 16 BRPM | TEMPERATURE: 98 F | HEART RATE: 61 BPM | BODY MASS INDEX: 26.22 KG/M2 | HEIGHT: 61 IN | DIASTOLIC BLOOD PRESSURE: 65 MMHG | WEIGHT: 138.88 LBS | SYSTOLIC BLOOD PRESSURE: 140 MMHG

## 2020-08-17 DIAGNOSIS — M85.80 OSTEOPENIA, UNSPECIFIED LOCATION: ICD-10-CM

## 2020-08-17 DIAGNOSIS — D86.0 SARCOIDOSIS OF LUNG: ICD-10-CM

## 2020-08-17 DIAGNOSIS — D53.9 NUTRITIONAL ANEMIA: ICD-10-CM

## 2020-08-17 DIAGNOSIS — D57.3 HEMOGLOBIN A-S GENOTYPE: ICD-10-CM

## 2020-08-17 DIAGNOSIS — R94.2 ABNORMAL PET SCAN, LUNG: ICD-10-CM

## 2020-08-17 DIAGNOSIS — I25.10 CORONARY ARTERY DISEASE, OCCLUSIVE: Chronic | ICD-10-CM

## 2020-08-17 DIAGNOSIS — Z86.69 HISTORY OF CENTRAL RETINAL ARTERY OCCLUSION: ICD-10-CM

## 2020-08-17 DIAGNOSIS — E04.1 THYROID NODULE: ICD-10-CM

## 2020-08-17 DIAGNOSIS — E78.49 OTHER HYPERLIPIDEMIA: ICD-10-CM

## 2020-08-17 DIAGNOSIS — F41.9 ANXIETY AND DEPRESSION: ICD-10-CM

## 2020-08-17 DIAGNOSIS — I70.0 ATHEROSCLEROSIS OF AORTA: ICD-10-CM

## 2020-08-17 DIAGNOSIS — Z00.00 ENCOUNTER FOR PREVENTIVE HEALTH EXAMINATION: Primary | ICD-10-CM

## 2020-08-17 DIAGNOSIS — N18.30 CKD (CHRONIC KIDNEY DISEASE) STAGE 3, GFR 30-59 ML/MIN: ICD-10-CM

## 2020-08-17 DIAGNOSIS — Z74.09 OTHER REDUCED MOBILITY: ICD-10-CM

## 2020-08-17 DIAGNOSIS — R00.1 BRADYCARDIA: ICD-10-CM

## 2020-08-17 DIAGNOSIS — K44.9 HIATAL HERNIA: ICD-10-CM

## 2020-08-17 DIAGNOSIS — D50.8 OTHER IRON DEFICIENCY ANEMIA: ICD-10-CM

## 2020-08-17 DIAGNOSIS — Z98.61 HISTORY OF CORONARY ANGIOPLASTY: ICD-10-CM

## 2020-08-17 DIAGNOSIS — F32.A ANXIETY AND DEPRESSION: ICD-10-CM

## 2020-08-17 DIAGNOSIS — I11.0 HYPERTENSIVE HEART DISEASE WITH HEART FAILURE: ICD-10-CM

## 2020-08-17 DIAGNOSIS — D57.3 HEMOGLOBIN A-S GENOTYPE: Primary | ICD-10-CM

## 2020-08-17 DIAGNOSIS — E55.9 VITAMIN D DEFICIENCY: ICD-10-CM

## 2020-08-17 DIAGNOSIS — R26.9 ABNORMALITY OF GAIT AND MOBILITY: ICD-10-CM

## 2020-08-17 DIAGNOSIS — I50.32 CHRONIC DIASTOLIC HF (HEART FAILURE), NYHA CLASS 2: Chronic | ICD-10-CM

## 2020-08-17 DIAGNOSIS — I10 ESSENTIAL HYPERTENSION: Chronic | ICD-10-CM

## 2020-08-17 PROBLEM — K52.9 COLITIS: Status: RESOLVED | Noted: 2017-05-11 | Resolved: 2020-08-17

## 2020-08-17 PROBLEM — R13.19 ESOPHAGEAL DYSPHAGIA: Status: RESOLVED | Noted: 2017-12-22 | Resolved: 2020-08-17

## 2020-08-17 PROCEDURE — 99214 PR OFFICE/OUTPT VISIT, EST, LEVL IV, 30-39 MIN: ICD-10-PCS | Mod: HCNC,S$GLB,, | Performed by: INTERNAL MEDICINE

## 2020-08-17 PROCEDURE — 99999 PR PBB SHADOW E&M-EST. PATIENT-LVL V: ICD-10-PCS | Mod: PBBFAC,HCNC,, | Performed by: INTERNAL MEDICINE

## 2020-08-17 PROCEDURE — 1126F AMNT PAIN NOTED NONE PRSNT: CPT | Mod: HCNC,S$GLB,, | Performed by: INTERNAL MEDICINE

## 2020-08-17 PROCEDURE — 3078F PR MOST RECENT DIASTOLIC BLOOD PRESSURE < 80 MM HG: ICD-10-PCS | Mod: CPTII,S$GLB,, | Performed by: NURSE PRACTITIONER

## 2020-08-17 PROCEDURE — 3078F PR MOST RECENT DIASTOLIC BLOOD PRESSURE < 80 MM HG: ICD-10-PCS | Mod: HCNC,CPTII,S$GLB, | Performed by: INTERNAL MEDICINE

## 2020-08-17 PROCEDURE — 3074F PR MOST RECENT SYSTOLIC BLOOD PRESSURE < 130 MM HG: ICD-10-PCS | Mod: HCNC,CPTII,S$GLB, | Performed by: INTERNAL MEDICINE

## 2020-08-17 PROCEDURE — 99999 PR PBB SHADOW E&M-EST. PATIENT-LVL V: CPT | Mod: PBBFAC,HCNC,, | Performed by: INTERNAL MEDICINE

## 2020-08-17 PROCEDURE — 3074F SYST BP LT 130 MM HG: CPT | Mod: CPTII,S$GLB,, | Performed by: NURSE PRACTITIONER

## 2020-08-17 PROCEDURE — 3078F DIAST BP <80 MM HG: CPT | Mod: HCNC,CPTII,S$GLB, | Performed by: INTERNAL MEDICINE

## 2020-08-17 PROCEDURE — 99499 RISK ADDL DX/OHS AUDIT: ICD-10-PCS | Mod: HCNC,S$GLB,, | Performed by: INTERNAL MEDICINE

## 2020-08-17 PROCEDURE — 1101F PT FALLS ASSESS-DOCD LE1/YR: CPT | Mod: HCNC,CPTII,S$GLB, | Performed by: INTERNAL MEDICINE

## 2020-08-17 PROCEDURE — 3074F SYST BP LT 130 MM HG: CPT | Mod: HCNC,CPTII,S$GLB, | Performed by: INTERNAL MEDICINE

## 2020-08-17 PROCEDURE — 1159F MED LIST DOCD IN RCRD: CPT | Mod: HCNC,S$GLB,, | Performed by: INTERNAL MEDICINE

## 2020-08-17 PROCEDURE — 3078F DIAST BP <80 MM HG: CPT | Mod: CPTII,S$GLB,, | Performed by: NURSE PRACTITIONER

## 2020-08-17 PROCEDURE — 1159F PR MEDICATION LIST DOCUMENTED IN MEDICAL RECORD: ICD-10-PCS | Mod: HCNC,S$GLB,, | Performed by: INTERNAL MEDICINE

## 2020-08-17 PROCEDURE — 99499 UNLISTED E&M SERVICE: CPT | Mod: HCNC,S$GLB,, | Performed by: INTERNAL MEDICINE

## 2020-08-17 PROCEDURE — G0439 PPPS, SUBSEQ VISIT: HCPCS | Mod: S$GLB,,, | Performed by: NURSE PRACTITIONER

## 2020-08-17 PROCEDURE — G0439 PR MEDICARE ANNUAL WELLNESS SUBSEQUENT VISIT: ICD-10-PCS | Mod: S$GLB,,, | Performed by: NURSE PRACTITIONER

## 2020-08-17 PROCEDURE — 1101F PR PT FALLS ASSESS DOC 0-1 FALLS W/OUT INJ PAST YR: ICD-10-PCS | Mod: HCNC,CPTII,S$GLB, | Performed by: INTERNAL MEDICINE

## 2020-08-17 PROCEDURE — 1126F PR PAIN SEVERITY QUANTIFIED, NO PAIN PRESENT: ICD-10-PCS | Mod: HCNC,S$GLB,, | Performed by: INTERNAL MEDICINE

## 2020-08-17 PROCEDURE — 99214 OFFICE O/P EST MOD 30 MIN: CPT | Mod: HCNC,S$GLB,, | Performed by: INTERNAL MEDICINE

## 2020-08-17 PROCEDURE — 3074F PR MOST RECENT SYSTOLIC BLOOD PRESSURE < 130 MM HG: ICD-10-PCS | Mod: CPTII,S$GLB,, | Performed by: NURSE PRACTITIONER

## 2020-08-17 NOTE — TELEPHONE ENCOUNTER
Spoke with pt's daughter, Jessica, over the phone regarding her new pt appt today 8/17 with Dr. Lara. Daughter states someone called her to come in earlier than 4pm. I spoke with Amber STRANGE LPN who states that the patient can come earlier, even now if they'd like. I relayed the information to pt's daughter, Jessica. Jessica states she is on her way from work to pick pt up and bring her to see Dr. Lara @ the [facility]. Jessica asked if she could accompany her mother for the appt, I told her yes she can have one support person over 19y/o.  Jessica voiced understanding of all information. She knows to call my direct number with any questions/concerns.

## 2020-08-17 NOTE — PATIENT INSTRUCTIONS
Counseling and Referral of Other Preventative  (Italic type indicates deductible and co-insurance are waived)    Patient Name: Glo Dumont  Today's Date: 8/17/2020    Health Maintenance       Date Due Completion Date    Shingles Vaccine (1 of 2) 10/25/1987 ---    Lipid Panel 07/19/2020 7/19/2019    Influenza Vaccine (1) 09/01/2020 11/12/2019    DEXA SCAN 12/21/2020 12/21/2015    Override on 12/14/2010: Done (Recheck 5 years)    Aspirin/Antiplatelet Therapy 08/17/2021 8/17/2020    TETANUS VACCINE 02/03/2025 2/3/2015        No orders of the defined types were placed in this encounter.    The following information is provided to all patients.  This information is to help you find resources for any of the problems found today that may be affecting your health:                Living healthy guide: www.[facility].[place].gov      Understanding Diabetes: www.diabetes.org      Eating healthy: www.cdc.gov/healthyweight      CDC home safety checklist: www.cdc.gov/steadi/patient.html      Agency on Aging: www.goea.[place].[facility]      Alcoholics anonymous (AA): www.aa.org      Physical Activity: www.jacob.nih.gov/hd0gosv      Tobacco use: www.quitwithusla.org

## 2020-08-17 NOTE — Clinical Note
Medicare AWV completed. Health maintenance: Shingles vaccine due. Patient to get this at the pharmacy. Lipid panel and DEXA scan ordered previously. Patient instructed to call to schedule these tests and follow up with PCP to review results. Instructed to see PCP annually for wellness visit.     2. Bradycardia  This is a new problem that has been identified during today's visit. HR 48-50. Sent her cardiologist Dr. Bergman a message regarding heart rate and also about near syncopal episode she had 2 weeks ago. May be related to low heart rate vs. Vasovagal maneuver. Patient is on lopressor. Consider decreasing dose if needed. Follow up with cardiologist.

## 2020-08-17 NOTE — LETTER
August 17, 2020      Christina Cardona MD  [address] [place] [address]           [facility] Hematology Oncology  [address] [facility]  [place] [address]  Phone: [phone]  Fax: [phone]          Patient: Glo Dumont   MR Number: [id number]   YOB: 1935   Date of Visit: 8/17/2020       Dear Dr. Christina Cardona:    Thank you for referring Glo Dumont to me for evaluation. Attached you will find relevant portions of my assessment and plan of care.    If you have questions, please do not hesitate to call me. I look forward to following Glo Dumont along with you.    Sincerely,    Coy Lara MD    Enclosure  CC:  No Recipients    If you would like to receive this communication electronically, please contact [email or web address] or [phone] to request more information on [employer] Link access.    For providers and/or their staff who would like to refer a patient to [facility], please contact us through our one-stop-shop provider referral line, [facility], at [phone].    If you feel you have received this communication in error or would no longer like to receive these types of communications, please e-mail [email or web address]

## 2020-08-17 NOTE — TELEPHONE ENCOUNTER
----- Message from Laurie Frye MA sent at 8/17/2020  2:51 PM CDT -----  Per Dr. Lara, pt needs to be scheduled ASAP for sarcordosis of lung. Please let me know when you can get this pt in.

## 2020-08-18 ENCOUNTER — TELEPHONE (OUTPATIENT)
Dept: HEMATOLOGY/ONCOLOGY | Facility: CLINIC | Age: 85
End: 2020-08-18

## 2020-08-18 ENCOUNTER — DOCUMENTATION ONLY (OUTPATIENT)
Dept: HEMATOLOGY/ONCOLOGY | Facility: CLINIC | Age: 85
End: 2020-08-18

## 2020-08-18 NOTE — TELEPHONE ENCOUNTER
Pt's dtr, Jessica called to ask if a pulmonary appt had been made for her mother. I told her that an appt to see Dr. Muir had been made for next Tuesday, August 25 @ 0940am at the [facility] location. She is agreeable to that and plans to have pt attend appt. She has my contact information to call.

## 2020-08-25 ENCOUNTER — OFFICE VISIT (OUTPATIENT)
Dept: PULMONOLOGY | Facility: CLINIC | Age: 85
End: 2020-08-25
Payer: MEDICARE

## 2020-08-25 ENCOUNTER — LAB VISIT (OUTPATIENT)
Dept: LAB | Facility: HOSPITAL | Age: 85
End: 2020-08-25
Attending: INTERNAL MEDICINE
Payer: MEDICARE

## 2020-08-25 VITALS
OXYGEN SATURATION: 97 % | SYSTOLIC BLOOD PRESSURE: 148 MMHG | WEIGHT: 137.88 LBS | DIASTOLIC BLOOD PRESSURE: 72 MMHG | HEIGHT: 61 IN | BODY MASS INDEX: 26.03 KG/M2 | RESPIRATION RATE: 24 BRPM | HEART RATE: 54 BPM

## 2020-08-25 DIAGNOSIS — D86.0 SARCOIDOSIS OF LUNG: Chronic | ICD-10-CM

## 2020-08-25 DIAGNOSIS — R91.1 LUNG NODULE: Primary | ICD-10-CM

## 2020-08-25 DIAGNOSIS — D53.9 NUTRITIONAL ANEMIA: ICD-10-CM

## 2020-08-25 LAB
ALBUMIN SERPL BCP-MCNC: 3.4 G/DL (ref 3.5–5.2)
ALP SERPL-CCNC: 75 U/L (ref 55–135)
ALT SERPL W/O P-5'-P-CCNC: 12 U/L (ref 10–44)
ANION GAP SERPL CALC-SCNC: 8 MMOL/L (ref 8–16)
AST SERPL-CCNC: 18 U/L (ref 10–40)
BASOPHILS # BLD AUTO: 0.03 K/UL (ref 0–0.2)
BASOPHILS NFR BLD: 0.7 % (ref 0–1.9)
BILIRUB SERPL-MCNC: 0.4 MG/DL (ref 0.1–1)
BUN SERPL-MCNC: 22 MG/DL (ref 8–23)
CALCIUM SERPL-MCNC: 10.4 MG/DL (ref 8.7–10.5)
CHLORIDE SERPL-SCNC: 111 MMOL/L (ref 95–110)
CO2 SERPL-SCNC: 18 MMOL/L (ref 23–29)
CREAT SERPL-MCNC: 1.3 MG/DL (ref 0.5–1.4)
CRP SERPL-MCNC: 3.5 MG/L (ref 0–8.2)
DIFFERENTIAL METHOD: ABNORMAL
EOSINOPHIL # BLD AUTO: 0.1 K/UL (ref 0–0.5)
EOSINOPHIL NFR BLD: 2.3 % (ref 0–8)
ERYTHROCYTE [DISTWIDTH] IN BLOOD BY AUTOMATED COUNT: 14.6 % (ref 11.5–14.5)
EST. GFR  (AFRICAN AMERICAN): 44.1 ML/MIN/1.73 M^2
EST. GFR  (NON AFRICAN AMERICAN): 38.3 ML/MIN/1.73 M^2
FERRITIN SERPL-MCNC: 150 NG/ML (ref 20–300)
FOLATE SERPL-MCNC: 18.5 NG/ML (ref 4–24)
GLUCOSE SERPL-MCNC: 121 MG/DL (ref 70–110)
HCT VFR BLD AUTO: 29.6 % (ref 37–48.5)
HGB BLD-MCNC: 9.8 G/DL (ref 12–16)
IMM GRANULOCYTES # BLD AUTO: 0.03 K/UL (ref 0–0.04)
IMM GRANULOCYTES NFR BLD AUTO: 0.7 % (ref 0–0.5)
IRON SERPL-MCNC: 65 UG/DL (ref 30–160)
LDH SERPL L TO P-CCNC: 110 U/L (ref 110–260)
LYMPHOCYTES # BLD AUTO: 0.7 K/UL (ref 1–4.8)
LYMPHOCYTES NFR BLD: 15 % (ref 18–48)
MCH RBC QN AUTO: 27.3 PG (ref 27–31)
MCHC RBC AUTO-ENTMCNC: 33.1 G/DL (ref 32–36)
MCV RBC AUTO: 83 FL (ref 82–98)
MONOCYTES # BLD AUTO: 0.5 K/UL (ref 0.3–1)
MONOCYTES NFR BLD: 12 % (ref 4–15)
NEUTROPHILS # BLD AUTO: 3.1 K/UL (ref 1.8–7.7)
NEUTROPHILS NFR BLD: 69.3 % (ref 38–73)
NRBC BLD-RTO: 0 /100 WBC
PLATELET # BLD AUTO: 274 K/UL (ref 150–350)
PMV BLD AUTO: 10 FL (ref 9.2–12.9)
POTASSIUM SERPL-SCNC: 3.6 MMOL/L (ref 3.5–5.1)
PROT SERPL-MCNC: 6.6 G/DL (ref 6–8.4)
RBC # BLD AUTO: 3.59 M/UL (ref 4–5.4)
SATURATED IRON: 22 % (ref 20–50)
SODIUM SERPL-SCNC: 137 MMOL/L (ref 136–145)
TOTAL IRON BINDING CAPACITY: 295 UG/DL (ref 250–450)
TRANSFERRIN SERPL-MCNC: 199 MG/DL (ref 200–375)
TSH SERPL DL<=0.005 MIU/L-ACNC: 0.96 UIU/ML (ref 0.4–4)
VIT B12 SERPL-MCNC: 1567 PG/ML (ref 210–950)
WBC # BLD AUTO: 4.4 K/UL (ref 3.9–12.7)

## 2020-08-25 PROCEDURE — 84165 PROTEIN E-PHORESIS SERUM: CPT | Mod: 26,HCNC,, | Performed by: PATHOLOGY

## 2020-08-25 PROCEDURE — 82728 ASSAY OF FERRITIN: CPT | Mod: HCNC

## 2020-08-25 PROCEDURE — 84165 PROTEIN E-PHORESIS SERUM: CPT | Mod: HCNC

## 2020-08-25 PROCEDURE — 99499 RISK ADDL DX/OHS AUDIT: ICD-10-PCS | Mod: HCNC,S$GLB,, | Performed by: INTERNAL MEDICINE

## 2020-08-25 PROCEDURE — 1159F PR MEDICATION LIST DOCUMENTED IN MEDICAL RECORD: ICD-10-PCS | Mod: HCNC,S$GLB,, | Performed by: INTERNAL MEDICINE

## 2020-08-25 PROCEDURE — 36415 COLL VENOUS BLD VENIPUNCTURE: CPT | Mod: HCNC

## 2020-08-25 PROCEDURE — 85025 COMPLETE CBC W/AUTO DIFF WBC: CPT | Mod: HCNC

## 2020-08-25 PROCEDURE — 99213 OFFICE O/P EST LOW 20 MIN: CPT | Mod: HCNC,S$GLB,, | Performed by: INTERNAL MEDICINE

## 2020-08-25 PROCEDURE — 1101F PT FALLS ASSESS-DOCD LE1/YR: CPT | Mod: HCNC,CPTII,S$GLB, | Performed by: INTERNAL MEDICINE

## 2020-08-25 PROCEDURE — 99999 PR PBB SHADOW E&M-EST. PATIENT-LVL IV: ICD-10-PCS | Mod: PBBFAC,HCNC,, | Performed by: INTERNAL MEDICINE

## 2020-08-25 PROCEDURE — 86334 IMMUNOFIX E-PHORESIS SERUM: CPT | Mod: HCNC

## 2020-08-25 PROCEDURE — 83540 ASSAY OF IRON: CPT | Mod: HCNC

## 2020-08-25 PROCEDURE — 3078F PR MOST RECENT DIASTOLIC BLOOD PRESSURE < 80 MM HG: ICD-10-PCS | Mod: HCNC,CPTII,S$GLB, | Performed by: INTERNAL MEDICINE

## 2020-08-25 PROCEDURE — 3078F DIAST BP <80 MM HG: CPT | Mod: HCNC,CPTII,S$GLB, | Performed by: INTERNAL MEDICINE

## 2020-08-25 PROCEDURE — 82607 VITAMIN B-12: CPT | Mod: HCNC

## 2020-08-25 PROCEDURE — 80053 COMPREHEN METABOLIC PANEL: CPT | Mod: HCNC

## 2020-08-25 PROCEDURE — 99213 PR OFFICE/OUTPT VISIT, EST, LEVL III, 20-29 MIN: ICD-10-PCS | Mod: HCNC,S$GLB,, | Performed by: INTERNAL MEDICINE

## 2020-08-25 PROCEDURE — 84165 PATHOLOGIST INTERPRETATION SPE: ICD-10-PCS | Mod: 26,HCNC,, | Performed by: PATHOLOGY

## 2020-08-25 PROCEDURE — 86334 PATHOLOGIST INTERPRETATION IFE: ICD-10-PCS | Mod: 26,HCNC,, | Performed by: PATHOLOGY

## 2020-08-25 PROCEDURE — 3077F SYST BP >= 140 MM HG: CPT | Mod: HCNC,CPTII,S$GLB, | Performed by: INTERNAL MEDICINE

## 2020-08-25 PROCEDURE — 1101F PR PT FALLS ASSESS DOC 0-1 FALLS W/OUT INJ PAST YR: ICD-10-PCS | Mod: HCNC,CPTII,S$GLB, | Performed by: INTERNAL MEDICINE

## 2020-08-25 PROCEDURE — 1159F MED LIST DOCD IN RCRD: CPT | Mod: HCNC,S$GLB,, | Performed by: INTERNAL MEDICINE

## 2020-08-25 PROCEDURE — 86140 C-REACTIVE PROTEIN: CPT | Mod: HCNC

## 2020-08-25 PROCEDURE — 83615 LACTATE (LD) (LDH) ENZYME: CPT | Mod: HCNC

## 2020-08-25 PROCEDURE — 84443 ASSAY THYROID STIM HORMONE: CPT | Mod: HCNC

## 2020-08-25 PROCEDURE — 86334 IMMUNOFIX E-PHORESIS SERUM: CPT | Mod: 26,HCNC,, | Performed by: PATHOLOGY

## 2020-08-25 PROCEDURE — 3077F PR MOST RECENT SYSTOLIC BLOOD PRESSURE >= 140 MM HG: ICD-10-PCS | Mod: HCNC,CPTII,S$GLB, | Performed by: INTERNAL MEDICINE

## 2020-08-25 PROCEDURE — 99499 UNLISTED E&M SERVICE: CPT | Mod: HCNC,S$GLB,, | Performed by: INTERNAL MEDICINE

## 2020-08-25 PROCEDURE — 99999 PR PBB SHADOW E&M-EST. PATIENT-LVL IV: CPT | Mod: PBBFAC,HCNC,, | Performed by: INTERNAL MEDICINE

## 2020-08-25 PROCEDURE — 82746 ASSAY OF FOLIC ACID SERUM: CPT | Mod: HCNC

## 2020-08-25 NOTE — ASSESSMENT & PLAN NOTE
PULMONARY SARCOIDOSIS ROS: no significant ongoing wheezing or shortness of breath.  New concerns: NONE.   Exam: appears well, vitals normal, no respiratory distress, acyanotic, normal RR, chest clear, no wheezing, crepitations, rhonchi, normal symmetric air entry.   Assessment:  PULMONARY SARCOIDOSIS stable.   Plan:  PFT, CXR AS SCHEDULED. ACE LEVEL TODAY.

## 2020-08-25 NOTE — PATIENT INSTRUCTIONS
Lung Anatomy  Your lungs take air in to give your body oxygen, which the body needs to work. Your lungs, like all the tissues in your body, are made up of billions of tiny specialized cells. Old lung cells die and are replaced by new, identical lung cells. This natural process helps ensure healthy lungs.    Date Last Reviewed: 11/1/2016  © 8875-6017 [employer]. [address] [place] [address], [place] [address]. All rights reserved. This information is not intended as a substitute for professional medical care. Always follow your healthcare professional's instructions.

## 2020-08-25 NOTE — H&P (VIEW-ONLY)
Subjective:      Patient ID: Glo Dumont is a 84 y.o. female.    Patient Active Problem List   Diagnosis    Sarcoidosis of lung    Thyroid nodule    Hypertensive heart disease with heart failure    Other hyperlipidemia    Hemoglobin A-S genotype    Ptosis    Coronary artery disease, occlusive    History of central retinal artery occlusion    Iron deficiency anemia    Vitamin D deficiency    Osteopenia    Essential hypertension    Chronic diastolic HF (heart failure), NYHA class 2    Atherosclerosis of aorta    Anxiety and depression    CKD (chronic kidney disease) stage 3, GFR 30-59 ml/min    Uveitis    History of coronary angioplasty    Hiatal hernia    Bradycardia    Lung nodule     Problem list has been reviewed.    Chief Complaint: Sarcoidosis    HPI: She is here today for follow up review of PET scan     She has biopsy proven SARCOIDOSIS.  Treated with predniSONE in the past. Currently off prednisone.    PET scan reviewed with pateint who voiced understanding.     FDG AVID RUL  nodule needs further investigation - CT guided biopsy.    She reports a mild non productive cough, back pains, fatigue, loss of appetite.  She denies wheezing, fevers, chills, rigors, hemoptysis, pain with breathing.      Daughter: Jessica: ( [phone]      A full  review of systems, past , family  and social histories was performed except as mentioned in the note above, these are non contributory to the main issues discussed today.    Immunization status reviewed and up to date.      Previous Report Reviewed: office notes     The following portions of the patient's history were reviewed and updated as appropriate: She  has a past medical history of Anemia, Angina pectoris, Anxiety, Anxiety and depression, Arthritis, Carotid artery occlusion, Carpal tunnel syndrome (06/23/2008), Chronic diarrhea, CKD (chronic kidney disease) stage 3, GFR 30-59 ml/min (5/11/2017), Colitis, Coronary artery disease, Coronary  artery disease, Diastolic dysfunction, Diverticulosis, Greater trochanteric bursitis (2/10/2015), Grief at loss of child (1/26/2016), H/O carotid endarterectomy (12/2/2013), Heart failure, History of coronary angioplasty (3/11/2014), Hypercholesteremia, Hypertension, Liver cyst (02/08/2013), Macular degeneration, Primary open-angle glaucoma(365.11) (9/3/2013), Renal cyst (02/08/2013), 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  She  has a past surgical history that includes Total abdominal hysterectomy w/ bilateral salpingoophorectomy (1972); Cataract extraction (Bilateral); Coronary angioplasty with stent (11/19/2010); Carotid endarterectomy (Right, 2000s); Joint replacement (Left, 11/03/2014); and Cholecystectomy.  Her family history includes Cancer in her daughter and father; Cirrhosis in her brother; Heart failure in her brother and mother; Hypertension in her mother.  She  reports that she has never smoked. She has never used smokeless tobacco. She reports that she does not drink alcohol or use drugs.  She has a current medication list which includes the following prescription(s): alprazolam, amlodipine, aspirin, brimonidine 0.2%, budesonide, calcium carbonate, cholecalciferol (vitamin d3), citalopram, dicyclomine, dorzolamide-timolol 2-0.5%, durezol, folic acid/multivit-min/lutein, hydrochlorothiazide, iron-vitamin c 100-250 mg (icar-c), ketorolac 0.5%, metoprolol tartrate, nitroglycerin, ondansetron, prasugrel, and simvastatin.  She is allergic to lisinopril and codeine..    Review of Systems   Constitutional: Positive for fatigue. Negative for chills and appetite change.   HENT: Negative for postnasal drip, rhinorrhea and sinus pressure.    Eyes: Negative for itching.   Respiratory: Positive for cough. Negative for shortness of breath and dyspnea on extertion.    Cardiovascular: Negative for chest pain, palpitations and leg  "swelling.   Genitourinary: Negative for difficulty urinating.   Musculoskeletal: Negative for arthralgias and back pain.   Skin: Positive for rash.   Gastrointestinal: Negative for nausea, vomiting and acid reflux.   Neurological: Positive for weakness. Negative for dizziness and headaches.   Hematological: Negative for adenopathy. Does not bruise/bleed easily.   Psychiatric/Behavioral: The patient is not nervous/anxious.       Objective:     Vitals:    08/25/20 0952   BP: (!) 148/72   Pulse: (!) 54   Resp: (!) 24   SpO2: 97%   Weight: 62.5 kg (137 lb 14.4 oz)   Height: 5' 1" (1.549 m)     Body mass index is 26.06 kg/m².    Physical Exam   Constitutional: She is oriented to person, place, and time. She appears well-developed and well-nourished.   HENT:   Head: Normocephalic and atraumatic.   Eyes: Pupils are equal, round, and reactive to light. Conjunctivae are normal.   Neck: Normal range of motion. Neck supple.   Cardiovascular: Normal rate and regular rhythm.   Pulmonary/Chest: Effort normal and breath sounds normal.   Abdominal: Soft. Bowel sounds are normal.   Musculoskeletal: Normal range of motion.   Neurological: She is alert and oriented to person, place, and time.   Skin: Skin is warm and dry.   Psychiatric: She has a normal mood and affect.   Nursing note and vitals reviewed.      Personal Diagnostic Review:     NM PET CT ROUTINE:  08/12/20:   1. Biapical hypermetabolic pleuroparenchymal lung opacities with increase in size of nodular density in the right apex.  Malignancy is not excluded and tissue sampling should be considered.  2. Mediastinal, bilateral hilar, and right supraclavicular lymphadenopathy mostly stable or slightly improved compared to prior.  Lymphadenopathy in left hilum and subcarinal space conversely appears slightly enlarged and/or more FDG avid.  Patient with reported history of sarcoidosis.  3. Resolution of prior FDG avid lobular soft tissue mass in the anterior abdominal wall.  4. " New FDG uptake in the left erector spinae musculature without discrete mass visualized.  Suspect muscle strain or other nonspecific infectious/inflammatory process.    Pulmonary function tests:06/15/20:  FEV1: 1.59  (109.5 % predicted), FVC:  2.26 (119.0 % predicted), FEV1/FVC:70.52, T.67 (60.2% predicted), RV/TLVC: 2.59 (16.2 % predicted), DLCO: 2.66 (44.9 % predicted)  Normal  spirometry. Moderate restriction. Diffusion capacity is moderately reduced but corrects for alveolar volume.    CXR: 06/15/20: Heart size within upper limits normal in the aorta tortuous with underlying atherosclerotic calcification.  Stable pulmonary vasculature and mild smooth prominence paratracheal soft tissues as before.  Surgical clips soft tissues right neck base.  Status post cholecystectomy.  Coarsened reticular nodular infiltrates within the lungs bilaterally slightly more pronounced on the upper lobes right greater than left.  No significant change from prior exam.  Generalized osteopenia and spondylosis.  Multilevel marginal spurring.  Mild accentuated kyphosis.    Assessment / plan       Discussed diagnosis, its evaluation, treatment and usual course. All questions    Problem List Items Addressed This Visit        Pulmonary    Lung nodule - Primary    Current Assessment & Plan     SUSPECT SARCOIDOSIS: RULE OUT NEOPLASIA    Biapical hypermetabolic pleuroparenchymal lung opacities with increase in size of nodular density in the right apex.    CT GUIDED LUNG BIOPSY             Relevant Orders    CT Biopsy Lung       Immunology/Multi System    Sarcoidosis of lung    Current Assessment & Plan     PULMONARY SARCOIDOSIS ROS: no significant ongoing wheezing or shortness of breath.  New concerns: NONE.   Exam: appears well, vitals normal, no respiratory distress, acyanotic, normal RR, chest clear, no wheezing, crepitations, rhonchi, normal symmetric air entry.   Assessment:  PULMONARY SARCOIDOSIS stable.   Plan:  PFT, CXR AS  SCHEDULED. ACE LEVEL TODAY.             Relevant Orders    Angiotensin Converting Enzyme          TIME SPENT WITH PATIENT: Time spent: 25 minutes in face to face  discussion concerning diagnosis, prognosis, review of lab and test results, benefits of treatment as well as management of disease, counseling of patient and coordination of care between various health  care providers . Greater than half the time spent was used for coordination of care and counseling of patient.     Return in 1 month

## 2020-08-25 NOTE — ASSESSMENT & PLAN NOTE
SUSPECT SARCOIDOSIS: RULE OUT NEOPLASIA    Biapical hypermetabolic pleuroparenchymal lung opacities with increase in size of nodular density in the right apex.    CT GUIDED LUNG BIOPSY

## 2020-08-25 NOTE — H&P (VIEW-ONLY)
Subjective:      Patient ID: Glo Dumont is a 84 y.o. female.    Patient Active Problem List   Diagnosis    Sarcoidosis of lung    Thyroid nodule    Hypertensive heart disease with heart failure    Other hyperlipidemia    Hemoglobin A-S genotype    Ptosis    Coronary artery disease, occlusive    History of central retinal artery occlusion    Iron deficiency anemia    Vitamin D deficiency    Osteopenia    Essential hypertension    Chronic diastolic HF (heart failure), NYHA class 2    Atherosclerosis of aorta    Anxiety and depression    CKD (chronic kidney disease) stage 3, GFR 30-59 ml/min    Uveitis    History of coronary angioplasty    Hiatal hernia    Bradycardia    Lung nodule     Problem list has been reviewed.    Chief Complaint: Sarcoidosis    HPI: She is here today for follow up review of PET scan     She has biopsy proven SARCOIDOSIS.  Treated with predniSONE in the past. Currently off prednisone.    PET scan reviewed with pateint who voiced understanding.     FDG AVID RUL  nodule needs further investigation - CT guided biopsy.    She reports a mild non productive cough, back pains, fatigue, loss of appetite.  She denies wheezing, fevers, chills, rigors, hemoptysis, pain with breathing.      Daughter: Jessica: ( [phone]      A full  review of systems, past , family  and social histories was performed except as mentioned in the note above, these are non contributory to the main issues discussed today.    Immunization status reviewed and up to date.      Previous Report Reviewed: office notes     The following portions of the patient's history were reviewed and updated as appropriate: She  has a past medical history of Anemia, Angina pectoris, Anxiety, Anxiety and depression, Arthritis, Carotid artery occlusion, Carpal tunnel syndrome (06/23/2008), Chronic diarrhea, CKD (chronic kidney disease) stage 3, GFR 30-59 ml/min (5/11/2017), Colitis, Coronary artery disease, Coronary  artery disease, Diastolic dysfunction, Diverticulosis, Greater trochanteric bursitis (2/10/2015), Grief at loss of child (1/26/2016), H/O carotid endarterectomy (12/2/2013), Heart failure, History of coronary angioplasty (3/11/2014), Hypercholesteremia, Hypertension, Liver cyst (02/08/2013), Macular degeneration, Primary open-angle glaucoma(365.11) (9/3/2013), Renal cyst (02/08/2013), 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  She  has a past surgical history that includes Total abdominal hysterectomy w/ bilateral salpingoophorectomy (1972); Cataract extraction (Bilateral); Coronary angioplasty with stent (11/19/2010); Carotid endarterectomy (Right, 2000s); Joint replacement (Left, 11/03/2014); and Cholecystectomy.  Her family history includes Cancer in her daughter and father; Cirrhosis in her brother; Heart failure in her brother and mother; Hypertension in her mother.  She  reports that she has never smoked. She has never used smokeless tobacco. She reports that she does not drink alcohol or use drugs.  She has a current medication list which includes the following prescription(s): alprazolam, amlodipine, aspirin, brimonidine 0.2%, budesonide, calcium carbonate, cholecalciferol (vitamin d3), citalopram, dicyclomine, dorzolamide-timolol 2-0.5%, durezol, folic acid/multivit-min/lutein, hydrochlorothiazide, iron-vitamin c 100-250 mg (icar-c), ketorolac 0.5%, metoprolol tartrate, nitroglycerin, ondansetron, prasugrel, and simvastatin.  She is allergic to lisinopril and codeine..    Review of Systems   Constitutional: Positive for fatigue. Negative for chills and appetite change.   HENT: Negative for postnasal drip, rhinorrhea and sinus pressure.    Eyes: Negative for itching.   Respiratory: Positive for cough. Negative for shortness of breath and dyspnea on extertion.    Cardiovascular: Negative for chest pain, palpitations and leg  "swelling.   Genitourinary: Negative for difficulty urinating.   Musculoskeletal: Negative for arthralgias and back pain.   Skin: Positive for rash.   Gastrointestinal: Negative for nausea, vomiting and acid reflux.   Neurological: Positive for weakness. Negative for dizziness and headaches.   Hematological: Negative for adenopathy. Does not bruise/bleed easily.   Psychiatric/Behavioral: The patient is not nervous/anxious.       Objective:     Vitals:    08/25/20 0952   BP: (!) 148/72   Pulse: (!) 54   Resp: (!) 24   SpO2: 97%   Weight: 62.5 kg (137 lb 14.4 oz)   Height: 5' 1" (1.549 m)     Body mass index is 26.06 kg/m².    Physical Exam   Constitutional: She is oriented to person, place, and time. She appears well-developed and well-nourished.   HENT:   Head: Normocephalic and atraumatic.   Eyes: Pupils are equal, round, and reactive to light. Conjunctivae are normal.   Neck: Normal range of motion. Neck supple.   Cardiovascular: Normal rate and regular rhythm.   Pulmonary/Chest: Effort normal and breath sounds normal.   Abdominal: Soft. Bowel sounds are normal.   Musculoskeletal: Normal range of motion.   Neurological: She is alert and oriented to person, place, and time.   Skin: Skin is warm and dry.   Psychiatric: She has a normal mood and affect.   Nursing note and vitals reviewed.      Personal Diagnostic Review:     NM PET CT ROUTINE:  08/12/20:   1. Biapical hypermetabolic pleuroparenchymal lung opacities with increase in size of nodular density in the right apex.  Malignancy is not excluded and tissue sampling should be considered.  2. Mediastinal, bilateral hilar, and right supraclavicular lymphadenopathy mostly stable or slightly improved compared to prior.  Lymphadenopathy in left hilum and subcarinal space conversely appears slightly enlarged and/or more FDG avid.  Patient with reported history of sarcoidosis.  3. Resolution of prior FDG avid lobular soft tissue mass in the anterior abdominal wall.  4. " New FDG uptake in the left erector spinae musculature without discrete mass visualized.  Suspect muscle strain or other nonspecific infectious/inflammatory process.    Pulmonary function tests:06/15/20:  FEV1: 1.59  (109.5 % predicted), FVC:  2.26 (119.0 % predicted), FEV1/FVC:70.52, T.67 (60.2% predicted), RV/TLVC: 2.59 (16.2 % predicted), DLCO: 2.66 (44.9 % predicted)  Normal  spirometry. Moderate restriction. Diffusion capacity is moderately reduced but corrects for alveolar volume.    CXR: 06/15/20: Heart size within upper limits normal in the aorta tortuous with underlying atherosclerotic calcification.  Stable pulmonary vasculature and mild smooth prominence paratracheal soft tissues as before.  Surgical clips soft tissues right neck base.  Status post cholecystectomy.  Coarsened reticular nodular infiltrates within the lungs bilaterally slightly more pronounced on the upper lobes right greater than left.  No significant change from prior exam.  Generalized osteopenia and spondylosis.  Multilevel marginal spurring.  Mild accentuated kyphosis.    Assessment / plan       Discussed diagnosis, its evaluation, treatment and usual course. All questions    Problem List Items Addressed This Visit        Pulmonary    Lung nodule - Primary    Current Assessment & Plan     SUSPECT SARCOIDOSIS: RULE OUT NEOPLASIA    Biapical hypermetabolic pleuroparenchymal lung opacities with increase in size of nodular density in the right apex.    CT GUIDED LUNG BIOPSY             Relevant Orders    CT Biopsy Lung       Immunology/Multi System    Sarcoidosis of lung    Current Assessment & Plan     PULMONARY SARCOIDOSIS ROS: no significant ongoing wheezing or shortness of breath.  New concerns: NONE.   Exam: appears well, vitals normal, no respiratory distress, acyanotic, normal RR, chest clear, no wheezing, crepitations, rhonchi, normal symmetric air entry.   Assessment:  PULMONARY SARCOIDOSIS stable.   Plan:  PFT, CXR AS  SCHEDULED. ACE LEVEL TODAY.             Relevant Orders    Angiotensin Converting Enzyme          TIME SPENT WITH PATIENT: Time spent: 25 minutes in face to face  discussion concerning diagnosis, prognosis, review of lab and test results, benefits of treatment as well as management of disease, counseling of patient and coordination of care between various health  care providers . Greater than half the time spent was used for coordination of care and counseling of patient.     Return in 1 month

## 2020-08-25 NOTE — PROGRESS NOTES
Subjective:      Patient ID: Glo Dumont is a 84 y.o. female.    Patient Active Problem List   Diagnosis    Sarcoidosis of lung    Thyroid nodule    Hypertensive heart disease with heart failure    Other hyperlipidemia    Hemoglobin A-S genotype    Ptosis    Coronary artery disease, occlusive    History of central retinal artery occlusion    Iron deficiency anemia    Vitamin D deficiency    Osteopenia    Essential hypertension    Chronic diastolic HF (heart failure), NYHA class 2    Atherosclerosis of aorta    Anxiety and depression    CKD (chronic kidney disease) stage 3, GFR 30-59 ml/min    Uveitis    History of coronary angioplasty    Hiatal hernia    Bradycardia    Lung nodule     Problem list has been reviewed.    Chief Complaint: Sarcoidosis    HPI: She is here today for follow up review of PET scan     She has biopsy proven SARCOIDOSIS.  Treated with predniSONE in the past. Currently off prednisone.    PET scan reviewed with pateint who voiced understanding.     FDG AVID RUL  nodule needs further investigation - CT guided biopsy.    She reports a mild non productive cough, back pains, fatigue, loss of appetite.  She denies wheezing, fevers, chills, rigors, hemoptysis, pain with breathing.      Daughter: Jessica: ( [phone]      A full  review of systems, past , family  and social histories was performed except as mentioned in the note above, these are non contributory to the main issues discussed today.    Immunization status reviewed and up to date.      Previous Report Reviewed: office notes     The following portions of the patient's history were reviewed and updated as appropriate: She  has a past medical history of Anemia, Angina pectoris, Anxiety, Anxiety and depression, Arthritis, Carotid artery occlusion, Carpal tunnel syndrome (06/23/2008), Chronic diarrhea, CKD (chronic kidney disease) stage 3, GFR 30-59 ml/min (5/11/2017), Colitis, Coronary artery disease, Coronary  artery disease, Diastolic dysfunction, Diverticulosis, Greater trochanteric bursitis (2/10/2015), Grief at loss of child (1/26/2016), H/O carotid endarterectomy (12/2/2013), Heart failure, History of coronary angioplasty (3/11/2014), Hypercholesteremia, Hypertension, Liver cyst (02/08/2013), Macular degeneration, Primary open-angle glaucoma(365.11) (9/3/2013), Renal cyst (02/08/2013), 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  She  has a past surgical history that includes Total abdominal hysterectomy w/ bilateral salpingoophorectomy (1972); Cataract extraction (Bilateral); Coronary angioplasty with stent (11/19/2010); Carotid endarterectomy (Right, 2000s); Joint replacement (Left, 11/03/2014); and Cholecystectomy.  Her family history includes Cancer in her daughter and father; Cirrhosis in her brother; Heart failure in her brother and mother; Hypertension in her mother.  She  reports that she has never smoked. She has never used smokeless tobacco. She reports that she does not drink alcohol or use drugs.  She has a current medication list which includes the following prescription(s): alprazolam, amlodipine, aspirin, brimonidine 0.2%, budesonide, calcium carbonate, cholecalciferol (vitamin d3), citalopram, dicyclomine, dorzolamide-timolol 2-0.5%, durezol, folic acid/multivit-min/lutein, hydrochlorothiazide, iron-vitamin c 100-250 mg (icar-c), ketorolac 0.5%, metoprolol tartrate, nitroglycerin, ondansetron, prasugrel, and simvastatin.  She is allergic to lisinopril and codeine..    Review of Systems   Constitutional: Positive for fatigue. Negative for chills and appetite change.   HENT: Negative for postnasal drip, rhinorrhea and sinus pressure.    Eyes: Negative for itching.   Respiratory: Positive for cough. Negative for shortness of breath and dyspnea on extertion.    Cardiovascular: Negative for chest pain, palpitations and leg  "swelling.   Genitourinary: Negative for difficulty urinating.   Musculoskeletal: Negative for arthralgias and back pain.   Skin: Positive for rash.   Gastrointestinal: Negative for nausea, vomiting and acid reflux.   Neurological: Positive for weakness. Negative for dizziness and headaches.   Hematological: Negative for adenopathy. Does not bruise/bleed easily.   Psychiatric/Behavioral: The patient is not nervous/anxious.       Objective:     Vitals:    08/25/20 0952   BP: (!) 148/72   Pulse: (!) 54   Resp: (!) 24   SpO2: 97%   Weight: 62.5 kg (137 lb 14.4 oz)   Height: 5' 1" (1.549 m)     Body mass index is 26.06 kg/m².    Physical Exam   Constitutional: She is oriented to person, place, and time. She appears well-developed and well-nourished.   HENT:   Head: Normocephalic and atraumatic.   Eyes: Pupils are equal, round, and reactive to light. Conjunctivae are normal.   Neck: Normal range of motion. Neck supple.   Cardiovascular: Normal rate and regular rhythm.   Pulmonary/Chest: Effort normal and breath sounds normal.   Abdominal: Soft. Bowel sounds are normal.   Musculoskeletal: Normal range of motion.   Neurological: She is alert and oriented to person, place, and time.   Skin: Skin is warm and dry.   Psychiatric: She has a normal mood and affect.   Nursing note and vitals reviewed.      Personal Diagnostic Review:     NM PET CT ROUTINE:  08/12/20:   1. Biapical hypermetabolic pleuroparenchymal lung opacities with increase in size of nodular density in the right apex.  Malignancy is not excluded and tissue sampling should be considered.  2. Mediastinal, bilateral hilar, and right supraclavicular lymphadenopathy mostly stable or slightly improved compared to prior.  Lymphadenopathy in left hilum and subcarinal space conversely appears slightly enlarged and/or more FDG avid.  Patient with reported history of sarcoidosis.  3. Resolution of prior FDG avid lobular soft tissue mass in the anterior abdominal wall.  4. " New FDG uptake in the left erector spinae musculature without discrete mass visualized.  Suspect muscle strain or other nonspecific infectious/inflammatory process.    Pulmonary function tests:06/15/20:  FEV1: 1.59  (109.5 % predicted), FVC:  2.26 (119.0 % predicted), FEV1/FVC:70.52, T.67 (60.2% predicted), RV/TLVC: 2.59 (16.2 % predicted), DLCO: 2.66 (44.9 % predicted)  Normal  spirometry. Moderate restriction. Diffusion capacity is moderately reduced but corrects for alveolar volume.    CXR: 06/15/20: Heart size within upper limits normal in the aorta tortuous with underlying atherosclerotic calcification.  Stable pulmonary vasculature and mild smooth prominence paratracheal soft tissues as before.  Surgical clips soft tissues right neck base.  Status post cholecystectomy.  Coarsened reticular nodular infiltrates within the lungs bilaterally slightly more pronounced on the upper lobes right greater than left.  No significant change from prior exam.  Generalized osteopenia and spondylosis.  Multilevel marginal spurring.  Mild accentuated kyphosis.    Assessment / plan       Discussed diagnosis, its evaluation, treatment and usual course. All questions    Problem List Items Addressed This Visit        Pulmonary    Lung nodule - Primary    Current Assessment & Plan     SUSPECT SARCOIDOSIS: RULE OUT NEOPLASIA    Biapical hypermetabolic pleuroparenchymal lung opacities with increase in size of nodular density in the right apex.    CT GUIDED LUNG BIOPSY             Relevant Orders    CT Biopsy Lung       Immunology/Multi System    Sarcoidosis of lung    Current Assessment & Plan     PULMONARY SARCOIDOSIS ROS: no significant ongoing wheezing or shortness of breath.  New concerns: NONE.   Exam: appears well, vitals normal, no respiratory distress, acyanotic, normal RR, chest clear, no wheezing, crepitations, rhonchi, normal symmetric air entry.   Assessment:  PULMONARY SARCOIDOSIS stable.   Plan:  PFT, CXR AS  SCHEDULED. ACE LEVEL TODAY.             Relevant Orders    Angiotensin Converting Enzyme          TIME SPENT WITH PATIENT: Time spent: 25 minutes in face to face  discussion concerning diagnosis, prognosis, review of lab and test results, benefits of treatment as well as management of disease, counseling of patient and coordination of care between various health  care providers . Greater than half the time spent was used for coordination of care and counseling of patient.     Return in 1 month

## 2020-08-26 LAB
ALBUMIN SERPL ELPH-MCNC: 3.52 G/DL (ref 3.35–5.55)
ALPHA1 GLOB SERPL ELPH-MCNC: 0.28 G/DL (ref 0.17–0.41)
ALPHA2 GLOB SERPL ELPH-MCNC: 0.92 G/DL (ref 0.43–0.99)
B-GLOBULIN SERPL ELPH-MCNC: 0.66 G/DL (ref 0.5–1.1)
GAMMA GLOB SERPL ELPH-MCNC: 1.02 G/DL (ref 0.67–1.58)
INTERPRETATION SERPL IFE-IMP: NORMAL
PATHOLOGIST INTERPRETATION IFE: NORMAL
PATHOLOGIST INTERPRETATION SPE: NORMAL
PROT SERPL-MCNC: 6.4 G/DL (ref 6–8.4)

## 2020-08-28 ENCOUNTER — TELEPHONE (OUTPATIENT)
Dept: FAMILY MEDICINE | Facility: CLINIC | Age: 85
End: 2020-08-28

## 2020-08-28 NOTE — TELEPHONE ENCOUNTER
Pt daughter states pt got papers served stating pt needed to go to court for an issue court is saying they need a letter excusing patient from physicically appearing in court due to medical issues. Also, daughter is stating pt needs to be tested for dementia

## 2020-08-28 NOTE — LETTER
August 31, 2020    Glo Dumont  [address] [place] [address]             [facility]  [address] [address]  Phone: [phone]  Fax: [phone] To whom it may concern:    Ms. Dumont is unable to be in court physically, due to multiple medical problems and concerns for coronavirus exposure.    Sincerely,      Christina Cardona MD

## 2020-08-31 ENCOUNTER — TELEPHONE (OUTPATIENT)
Dept: CARDIOLOGY | Facility: CLINIC | Age: 85
End: 2020-08-31

## 2020-08-31 ENCOUNTER — TELEPHONE (OUTPATIENT)
Dept: PULMONOLOGY | Facility: CLINIC | Age: 85
End: 2020-08-31

## 2020-08-31 NOTE — TELEPHONE ENCOUNTER
I SAW HER RECENTLY 8/13/20,  HER CV STATUS IS STABLE  THERE ARE NO CV CONTRAINDICATIONS OF DENTAL INTERVENTION AND LOCAL ANESTHESIA  SHE CAN HOLD EFFIENT AND ASA,  7 DAYS PRIOR TO PROCEDURE AND RESTART AS SOON HEMOSTASIS PERMIT

## 2020-08-31 NOTE — TELEPHONE ENCOUNTER
Daughter informed of normal results and CT biopsy date and time  Instructed to fast after midnight, be prepared to stay 4 hour after procedure

## 2020-08-31 NOTE — TELEPHONE ENCOUNTER
----- Message from Alyson Finney sent at 8/31/2020  4:27 PM CDT -----  Patient daughter ( Marti ) called in regards to speak to an nurse about her blood work , please call back at [phone].        Thanks,  Alyson Finney

## 2020-08-31 NOTE — TELEPHONE ENCOUNTER
----- Message from Tiffany Griffith sent at 8/31/2020  2:21 PM CDT -----  Please call pt @ [phone] regarding a letter for dentist work

## 2020-08-31 NOTE — TELEPHONE ENCOUNTER
The patient called because she needs pre-op clearance for her tooth extraction on 9-24.    Dr. Packer at [facility]  P: [phone]  F: [phone]

## 2020-09-01 ENCOUNTER — TELEPHONE (OUTPATIENT)
Dept: PULMONOLOGY | Facility: CLINIC | Age: 85
End: 2020-09-01

## 2020-09-02 ENCOUNTER — OFFICE VISIT (OUTPATIENT)
Dept: FAMILY MEDICINE | Facility: CLINIC | Age: 85
End: 2020-09-02
Payer: MEDICARE

## 2020-09-02 VITALS
SYSTOLIC BLOOD PRESSURE: 158 MMHG | HEART RATE: 75 BPM | OXYGEN SATURATION: 97 % | WEIGHT: 133.63 LBS | DIASTOLIC BLOOD PRESSURE: 70 MMHG | BODY MASS INDEX: 25.23 KG/M2 | HEIGHT: 61 IN | TEMPERATURE: 99 F

## 2020-09-02 DIAGNOSIS — D86.0 SARCOIDOSIS OF LUNG: ICD-10-CM

## 2020-09-02 DIAGNOSIS — F32.A ANXIETY AND DEPRESSION: ICD-10-CM

## 2020-09-02 DIAGNOSIS — R94.2 ABNORMAL PET SCAN, LUNG: ICD-10-CM

## 2020-09-02 DIAGNOSIS — R41.3 MEMORY LOSS: Primary | ICD-10-CM

## 2020-09-02 DIAGNOSIS — F41.9 ANXIETY AND DEPRESSION: ICD-10-CM

## 2020-09-02 DIAGNOSIS — I10 ESSENTIAL HYPERTENSION: Chronic | ICD-10-CM

## 2020-09-02 PROCEDURE — 99999 PR PBB SHADOW E&M-EST. PATIENT-LVL IV: ICD-10-PCS | Mod: PBBFAC,HCNC,, | Performed by: INTERNAL MEDICINE

## 2020-09-02 PROCEDURE — 3078F DIAST BP <80 MM HG: CPT | Mod: HCNC,CPTII,S$GLB, | Performed by: INTERNAL MEDICINE

## 2020-09-02 PROCEDURE — 3077F PR MOST RECENT SYSTOLIC BLOOD PRESSURE >= 140 MM HG: ICD-10-PCS | Mod: HCNC,CPTII,S$GLB, | Performed by: INTERNAL MEDICINE

## 2020-09-02 PROCEDURE — 99214 OFFICE O/P EST MOD 30 MIN: CPT | Mod: HCNC,S$GLB,, | Performed by: INTERNAL MEDICINE

## 2020-09-02 PROCEDURE — 1126F AMNT PAIN NOTED NONE PRSNT: CPT | Mod: HCNC,S$GLB,, | Performed by: INTERNAL MEDICINE

## 2020-09-02 PROCEDURE — 1159F MED LIST DOCD IN RCRD: CPT | Mod: HCNC,S$GLB,, | Performed by: INTERNAL MEDICINE

## 2020-09-02 PROCEDURE — 3077F SYST BP >= 140 MM HG: CPT | Mod: HCNC,CPTII,S$GLB, | Performed by: INTERNAL MEDICINE

## 2020-09-02 PROCEDURE — 1159F PR MEDICATION LIST DOCUMENTED IN MEDICAL RECORD: ICD-10-PCS | Mod: HCNC,S$GLB,, | Performed by: INTERNAL MEDICINE

## 2020-09-02 PROCEDURE — 1101F PR PT FALLS ASSESS DOC 0-1 FALLS W/OUT INJ PAST YR: ICD-10-PCS | Mod: HCNC,CPTII,S$GLB, | Performed by: INTERNAL MEDICINE

## 2020-09-02 PROCEDURE — 99999 PR PBB SHADOW E&M-EST. PATIENT-LVL IV: CPT | Mod: PBBFAC,HCNC,, | Performed by: INTERNAL MEDICINE

## 2020-09-02 PROCEDURE — 99214 PR OFFICE/OUTPT VISIT, EST, LEVL IV, 30-39 MIN: ICD-10-PCS | Mod: HCNC,S$GLB,, | Performed by: INTERNAL MEDICINE

## 2020-09-02 PROCEDURE — 1126F PR PAIN SEVERITY QUANTIFIED, NO PAIN PRESENT: ICD-10-PCS | Mod: HCNC,S$GLB,, | Performed by: INTERNAL MEDICINE

## 2020-09-02 PROCEDURE — 1101F PT FALLS ASSESS-DOCD LE1/YR: CPT | Mod: HCNC,CPTII,S$GLB, | Performed by: INTERNAL MEDICINE

## 2020-09-02 PROCEDURE — 3078F PR MOST RECENT DIASTOLIC BLOOD PRESSURE < 80 MM HG: ICD-10-PCS | Mod: HCNC,CPTII,S$GLB, | Performed by: INTERNAL MEDICINE

## 2020-09-02 NOTE — PROGRESS NOTES
"Subjective:      Patient ID: Glo Dumont is a 84 y.o. female.    Chief Complaint: Memory Loss      HPI  Pt here for follow up of medical problems and saw Dr. Galindo for diarrhea, and was told kidney fn was down and high Ca.  Changed diet and no more diarrhea.  Forgets things, like what she's gone to fridge for.  No dangerous situations, or getting lost.  Family sent me private note, to discreetly check her memory.  No f/c/sw/cough.  To have lung nodule bx soon.    Annual due, but will delay:  HM: 11/19 fluvax, 3/15 [id number], 2/14 [id number], 2/15 TDaP, 12/15 BMD rep 2y, 2/11 Cscope no repeat will be done, 1/17 MMG no more, Eye doc monthly, GI Dr. Galindo.     Review of Systems   Constitutional: Negative for chills, diaphoresis and fever.   Respiratory: Negative for cough and shortness of breath.    Cardiovascular: Negative for chest pain, palpitations and leg swelling.   Gastrointestinal: Negative for blood in stool, constipation, diarrhea, nausea and vomiting.   Genitourinary: Negative for dysuria, frequency and hematuria.   Psychiatric/Behavioral: The patient is not nervous/anxious.          Objective:   BP (!) 158/70   Pulse 75   Temp 98.6 °F (37 °C) (Temporal)   Ht 5' 1" (1.549 m)   Wt 60.6 kg (133 lb 9.6 oz)   SpO2 97%   BMI 25.24 kg/m²     Physical Exam  Constitutional:       Appearance: She is well-developed.   Neck:      Musculoskeletal: Neck supple.      Thyroid: No thyroid mass.      Vascular: No carotid bruit.   Cardiovascular:      Rate and Rhythm: Normal rate and regular rhythm.      Heart sounds: No murmur. No friction rub. No gallop.    Pulmonary:      Effort: Pulmonary effort is normal.      Breath sounds: Normal breath sounds. No wheezing or rales.   Abdominal:      General: Bowel sounds are normal.      Palpations: Abdomen is soft. There is no mass.      Tenderness: There is no abdominal tenderness.   Lymphadenopathy:      Cervical: No cervical adenopathy.   Neurological:      Mental Status: " She is alert and oriented to person, place, and time.     MMSE 22.5/30.  Missed attn and calculation, drawing clock, [facility].        Assessment:       1. Memory loss    2. Essential hypertension    3. Sarcoidosis of lung    4. Abnormal PET scan, lung    5. Anxiety and depression          Plan:     Memory loss- will not start aricept due to recent diarrheal illness.  Await fax from [facility] Dr. Galindo, regarding abnormal labs.    Essential hypertension- will monitor, high today, but usually better controlled.    Sarcoidosis of lung, Abnormal PET scan, lung- bx soon.    Anxiety and depression- cont rx.    RTC as scheduled.

## 2020-09-08 ENCOUNTER — TELEPHONE (OUTPATIENT)
Dept: RADIOLOGY | Facility: HOSPITAL | Age: 85
End: 2020-09-08

## 2020-09-08 DIAGNOSIS — R91.1 LUNG NODULE: Primary | ICD-10-CM

## 2020-09-08 DIAGNOSIS — D50.8 OTHER IRON DEFICIENCY ANEMIA: ICD-10-CM

## 2020-09-08 DIAGNOSIS — I10 ESSENTIAL HYPERTENSION: Chronic | ICD-10-CM

## 2020-09-08 DIAGNOSIS — I25.10 CORONARY ARTERY DISEASE, OCCLUSIVE: Chronic | ICD-10-CM

## 2020-09-08 DIAGNOSIS — D86.0 SARCOIDOSIS OF LUNG: ICD-10-CM

## 2020-09-08 NOTE — TELEPHONE ENCOUNTER
Pre-procedure phone call made at this time. Informed pt to be NPO after midnight, show up to [facility] on [address] at 8:00am, either have a ride with them or have a phone number for the person driving them home so that we can get in contact with them to keep them updated. Answered all questions that the pt had and pt verbalized understanding of all discussed.

## 2020-09-09 ENCOUNTER — HOSPITAL ENCOUNTER (OUTPATIENT)
Dept: RADIOLOGY | Facility: HOSPITAL | Age: 85
Discharge: HOME | End: 2020-09-09
Attending: INTERNAL MEDICINE
Payer: MEDICARE

## 2020-09-09 DIAGNOSIS — R91.1 LUNG NODULE: ICD-10-CM

## 2020-09-09 LAB — SARS-COV-2 RDRP RESP QL NAA+PROBE: NEGATIVE

## 2020-09-09 PROCEDURE — U0002 COVID-19 LAB TEST NON-CDC: HCPCS | Mod: HCNC

## 2020-09-09 NOTE — PLAN OF CARE
Pt r/s per Dr. Bledsoe due to continuing to take Effient.  Pre Procedure phone call yesterday, pt confirmed that she had stopped taking Effient. However today upon further assessment pt stated that she has, in fact, been taking effient.  She stated that she took it yesterday.  Pt educated on risks of taking blood thinners and doing lung biopsy.  Pt and friend at bedside verbalized understanding of instructions.  All instructions written down for pt, including npo p mn x sips of water with medications,, must have ride.  Also wrote down which medications were bp meds and to stop taking effient and asprin 7 days prior to procedure.

## 2020-09-14 DIAGNOSIS — I50.32 CHRONIC DIASTOLIC HF (HEART FAILURE), NYHA CLASS 2: Primary | Chronic | ICD-10-CM

## 2020-09-14 DIAGNOSIS — I10 ESSENTIAL HYPERTENSION: Chronic | ICD-10-CM

## 2020-09-14 DIAGNOSIS — I25.10 CORONARY ARTERY DISEASE, OCCLUSIVE: Chronic | ICD-10-CM

## 2020-09-14 RX ORDER — METOPROLOL TARTRATE 50 MG/1
TABLET ORAL
Qty: 270 TABLET | Refills: 3
Start: 2020-09-14 | End: 2020-09-17 | Stop reason: SDUPTHER

## 2020-09-14 NOTE — TELEPHONE ENCOUNTER
----- Message from Adolph Al sent at 9/14/2020 10:48 AM CDT -----  Regarding: Refill  Type:  RX Refill Request    Who Called: Glo  Refill or New Rx:Refill  RX Name and Strength:metoprolol tartrate (LOPRESSOR) 50 MG tablet  How is the patient currently taking it? (ex. 1XDay):3xday  Is this a 30 day or 90 day RX:90  Preferred Pharmacy with phone number:  [facility] - [address] [address]  Phone: [phone] Fax: [phone]  Local or Mail Order:Local  Ordering Provider:Dr. Bergman  Would the patient rather a call back or a response via [facility]? Call back  Best Call Back Number:[phone] (home)   Additional Information: na

## 2020-09-15 ENCOUNTER — OFFICE VISIT (OUTPATIENT)
Dept: OPHTHALMOLOGY | Facility: CLINIC | Age: 85
End: 2020-09-15
Payer: MEDICARE

## 2020-09-15 DIAGNOSIS — H35.033 HYPERTENSIVE RETINOPATHY OF BOTH EYES: Primary | ICD-10-CM

## 2020-09-15 DIAGNOSIS — H40.1131 PRIMARY OPEN ANGLE GLAUCOMA (POAG) OF BOTH EYES, MILD STAGE: ICD-10-CM

## 2020-09-15 DIAGNOSIS — H34.8130 CENTRAL RETINAL VEIN OCCLUSION WITH MACULAR EDEMA OF BOTH EYES: ICD-10-CM

## 2020-09-15 PROCEDURE — 92134 POSTERIOR SEGMENT OCT RETINA (OCULAR COHERENCE TOMOGRAPHY)-BOTH EYES: ICD-10-PCS | Mod: HCNC,S$GLB,, | Performed by: OPHTHALMOLOGY

## 2020-09-15 PROCEDURE — 92014 COMPRE OPH EXAM EST PT 1/>: CPT | Mod: HCNC,S$GLB,, | Performed by: OPHTHALMOLOGY

## 2020-09-15 PROCEDURE — 99999 PR PBB SHADOW E&M-EST. PATIENT-LVL I: CPT | Mod: PBBFAC,HCNC,, | Performed by: OPHTHALMOLOGY

## 2020-09-15 PROCEDURE — 92014 PR EYE EXAM, EST PATIENT,COMPREHESV: ICD-10-PCS | Mod: HCNC,S$GLB,, | Performed by: OPHTHALMOLOGY

## 2020-09-15 PROCEDURE — 99999 PR PBB SHADOW E&M-EST. PATIENT-LVL I: ICD-10-PCS | Mod: PBBFAC,HCNC,, | Performed by: OPHTHALMOLOGY

## 2020-09-15 PROCEDURE — 92134 CPTRZ OPH DX IMG PST SGM RTA: CPT | Mod: HCNC,S$GLB,, | Performed by: OPHTHALMOLOGY

## 2020-09-15 NOTE — PROGRESS NOTES
===============================  Glo Dumont,  9/15/2020 today   84 y.o. female   Last visit [facility]: :8/27/2014   Last visit eye dept. Visit date not found  [facility]:  Corrected distance visual acuity was 20/100 -1 in the right eye and 20/400 in the left eye.  Tonometry     Tonometry (Applanation, 9:14 AM)       Right Left    Pressure 14 13               Not recorded         Not recorded         Not recorded        Chief Complaint   Patient presents with    Blurred Vision     Ophthalmic Medications     Ophthalmic - Anti-inflammatory, Glucocorticoids Start End     DUREZOL 0.05 % Drop ophthalmic solution    7/18/2016     Class: Historical Med    Ophthalmic-Intraocular Press. Reducing, Alina. Alpha Adrenergic Agonists Start End     brimonidine 0.2% (ALPHAGAN) 0.2 % Drop    12/5/2019     Class: Historical Med    Ophthalmic - Anti-inflammatory, NSAIDs Start End     ketorolac 0.5% (ACULAR) 0.5 % Drop    7/19/2016     Class: Historical Med        ________________  9/15/2020 today  HPI     She has been following with Dr. Markham who was treating with ozurdex OU.    Last one was OS 3-4 weeks ago.  States OU va blurry   And no improvement with injections.  She is lost to follow up with Dr. Lozada and states she was not aware she had glaucoma.  She has been using drops however.    1. Steroid responder glaucoma  2. PCIOL OU MGM  3. SARCOIDOSIS  H\O chronic UVEITIS OU  4. Central vein occlusion of retina, left   5. Retinal edema  6. CME OU   H/o Avastin and Eylea OS  Has been getting Ozurdex OU with Dr. Markham    Cosopt BID OU  Brimonidine BID OU  Durezol BID OU      Last edited by NAHUM Tamez MD on 9/15/2020  9:20 AM. (History)      Problem List Items Addressed This Visit     None      Visit Diagnoses     Hypertensive retinopathy of both eyes    -  Primary    Relevant Orders    Posterior Segment OCT Retina-Both eyes (Completed)    Central retinal vein occlusion with macular edema of both eyes        Relevant Orders     Prior authorization Order    Posterior Segment OCT Retina-Both eyes (Completed)    Prior authorization Order    Primary open angle glaucoma (POAG) of both eyes, mild stage        Relevant Orders    Posterior Segment OCT Optic Nerve- Both eyes (Completed)        Last visit w me 20 40  40   Then treating os crvo w avgf    since then injections ou   inlc ozurdex   bvca od 20 50  ozurdex ou  alternating 6 weeks ou  +htn NOdiabets  Seemingly plateau of benefit from ozurdex  OU central ME  Iritis vmtx erm rvo cme ou  Recommend eylea OS next available  Then eylea od   Recommend repeat ozurdex at 12 weeks ou  She last had OD ozurdex May 14  OS ozurdex 4 weeks ago  Recommend rtc for eylea OU next available  Will continue to alternate Eylea and ozurdex, ozurdex q 12 weeks  Discontinue durezol  Continue bimatoprost and dorzolamide-timolol bid ou  Lost to follow up with Dr. Lozada for coag and iritis, will schedule that in 2 months  Today iop ok  rnfl nl, gcl normal    .      ===========================

## 2020-09-17 DIAGNOSIS — I10 ESSENTIAL HYPERTENSION: ICD-10-CM

## 2020-09-17 DIAGNOSIS — I25.10 CORONARY ARTERY DISEASE, OCCLUSIVE: Chronic | ICD-10-CM

## 2020-09-17 RX ORDER — PRASUGREL 10 MG/1
10 TABLET, FILM COATED ORAL DAILY
Qty: 30 TABLET | Refills: 11 | Status: SHIPPED | OUTPATIENT
Start: 2020-09-17 | End: 2022-12-09 | Stop reason: SDUPTHER

## 2020-09-17 RX ORDER — METOPROLOL TARTRATE 50 MG/1
TABLET ORAL
Qty: 270 TABLET | Refills: 3 | Status: SHIPPED | OUTPATIENT
Start: 2020-09-17 | End: 2021-06-01

## 2020-09-17 RX ORDER — AMLODIPINE BESYLATE 10 MG/1
10 TABLET ORAL DAILY
Qty: 90 TABLET | Refills: 3 | Status: SHIPPED | OUTPATIENT
Start: 2020-09-17 | End: 2020-09-26

## 2020-09-17 RX ORDER — HYDROCHLOROTHIAZIDE 25 MG/1
25 TABLET ORAL DAILY
Qty: 90 TABLET | Refills: 3 | Status: SHIPPED | OUTPATIENT
Start: 2020-09-17 | End: 2020-09-26

## 2020-09-18 NOTE — PROGRESS NOTES
"Subjective:      Patient ID: Glo Dumont is a 84 y.o. female.    Chief Complaint: Diabetes      HPI  Here for f/u medical problems and preventive exam.  Diarrhea lately, another flare, taking Entocort 9mg qAM.  Anxiety doing well on citalopram.  Memory loss the same.  No complaints.  No f/c/sw/cough.  Energy ok.  No cp/sob/palp.  BMs loose, brown, 3 per day.  Urine normal.  Legs seem weak in general, in past couple of weeka.    HM: 9/20 today fluvax, 3/15 [id number], 2/14 [id number], 2/15 TDaP, 12/15 BMD rep 2y, 2/11 Cscope no repeat will be done, 1/17 MMG no more, Eye doc monthly, GI Dr. Galindo.     Review of Systems   Constitutional: Negative for appetite change, chills, diaphoresis and fever.   HENT: Negative for congestion, ear pain, rhinorrhea, sinus pressure and sore throat.    Respiratory: Negative for cough, chest tightness and shortness of breath.    Cardiovascular: Negative for chest pain and palpitations.   Gastrointestinal: Negative for blood in stool, constipation, diarrhea, nausea and vomiting.   Genitourinary: Negative for dysuria, frequency, hematuria, menstrual problem, urgency and vaginal discharge.   Musculoskeletal: Negative for arthralgias.   Skin: Negative for rash.   Neurological: Negative for dizziness and headaches.   Psychiatric/Behavioral: Negative for sleep disturbance. The patient is not nervous/anxious.          Objective:   BP (!) 140/66   Pulse 81   Temp 98.2 °F (36.8 °C) (Temporal)   Ht 5' 1" (1.549 m)   Wt 63.8 kg (140 lb 10.5 oz)   SpO2 98%   BMI 26.58 kg/m²     Physical Exam  Constitutional:       Appearance: She is well-developed.   HENT:      Right Ear: External ear normal. Tympanic membrane is not injected.      Left Ear: External ear normal. Tympanic membrane is not injected.   Eyes:      Conjunctiva/sclera: Conjunctivae normal.   Neck:      Musculoskeletal: Normal range of motion and neck supple.      Thyroid: No thyromegaly.   Cardiovascular:      Rate and Rhythm: Normal " rate and regular rhythm.      Heart sounds: No murmur. No friction rub. No gallop.    Pulmonary:      Effort: Pulmonary effort is normal.      Breath sounds: Normal breath sounds. No wheezing or rales.   Abdominal:      General: Bowel sounds are normal.      Palpations: Abdomen is soft. There is no mass.      Tenderness: There is no abdominal tenderness.   Musculoskeletal:         General: Swelling (trace bilat pedal.) present.   Lymphadenopathy:      Cervical: No cervical adenopathy.   Skin:     General: Skin is warm.      Findings: No rash.   Neurological:      Mental Status: She is alert and oriented to person, place, and time.      Motor: Motor function is intact.      Deep Tendon Reflexes:      Reflex Scores:       Patellar reflexes are 2+ on the right side and 2+ on the left side.     Comments: Bilat LE 5/5 strength.       Results for BALDEMAR BLANK (MRN [id number]) as of 9/26/2020 09:43   Ref. Range 7/19/2019 14:50 8/23/2019 15:35   WBC Latest Ref Range: 3.90 - 12.70 K/uL 6.29    RBC Latest Ref Range: 4.00 - 5.40 M/uL 3.92 (L)    Hemoglobin Latest Ref Range: 12.0 - 16.0 g/dL 10.9 (L)    Hematocrit Latest Ref Range: 37.0 - 48.5 % 33.1 (L)    MCV Latest Ref Range: 82 - 98 fL 84    MCH Latest Ref Range: 27.0 - 31.0 pg 27.8    MCHC Latest Ref Range: 32.0 - 36.0 g/dL 32.9    RDW Latest Ref Range: 11.5 - 14.5 % 15.4 (H)    Platelets Latest Ref Range: 150 - 350 K/uL 298    MPV Latest Ref Range: 9.2 - 12.9 fL 10.7    Gran% Latest Ref Range: 38.0 - 73.0 % 82.9 (H)    Gran # (ANC) Latest Ref Range: 1.8 - 7.7 K/uL 5.2    Lymph% Latest Ref Range: 18.0 - 48.0 % 10.3 (L)    Lymph # Latest Ref Range: 1.0 - 4.8 K/uL 0.7 (L)    Mono% Latest Ref Range: 4.0 - 15.0 % 5.7    Mono # Latest Ref Range: 0.3 - 1.0 K/uL 0.4    Eosinophil% Latest Ref Range: 0.0 - 8.0 % 0.2    Eos # Latest Ref Range: 0.0 - 0.5 K/uL 0.0    Basophil% Latest Ref Range: 0.0 - 1.9 % 0.3    Baso # Latest Ref Range: 0.00 - 0.20 K/uL 0.02    nRBC Latest Ref Range:  0 /100 WBC 0    Differential Method Unknown Automated    Immature Grans (Abs) Latest Ref Range: 0.00 - 0.04 K/uL 0.04    Immature Granulocytes Latest Ref Range: 0.0 - 0.5 % 0.6 (H)    Sodium Latest Ref Range: 136 - 145 mmol/L 138 139   Potassium Latest Ref Range: 3.5 - 5.1 mmol/L 4.5 4.4   Chloride Latest Ref Range: 95 - 110 mmol/L 108 110   CO2 Latest Ref Range: 23 - 29 mmol/L 20 (L) 20 (L)   Anion Gap Latest Ref Range: 8 - 16 mmol/L 10 9   BUN, Bld Latest Ref Range: 8 - 23 mg/dL 38 (H) 36 (H)   Creatinine Latest Ref Range: 0.5 - 1.4 mg/dL 1.5 (H) 1.4   eGFR if non African American Latest Ref Range: >60 mL/min/1.73 m^2 32.4 (A) 35.3 (A)   eGFR if African American Latest Ref Range: >60 mL/min/1.73 m^2 37.4 (A) 40.6 (A)   Glucose Latest Ref Range: 70 - 110 mg/dL 112 (H) 121 (H)   Calcium Latest Ref Range: 8.7 - 10.5 mg/dL 10.1 9.6   Alkaline Phosphatase Latest Ref Range: 55 - 135 U/L 72    PROTEIN TOTAL Latest Ref Range: 6.0 - 8.4 g/dL 7.3    Albumin Latest Ref Range: 3.5 - 5.2 g/dL 3.7    BILIRUBIN TOTAL Latest Ref Range: 0.1 - 1.0 mg/dL 0.3    AST Latest Ref Range: 10 - 40 U/L 16    ALT Latest Ref Range: 10 - 44 U/L 17    Triglycerides Latest Ref Range: 30 - 150 mg/dL 96    Cholesterol Latest Ref Range: 120 - 199 mg/dL 185    HDL Latest Ref Range: 40 - 75 mg/dL 61    Hdl/Cholesterol Ratio Latest Ref Range: 20.0 - 50.0 % 33.0    LDL Cholesterol External Latest Ref Range: 63.0 - 159.0 mg/dL 104.8    Non-HDL Cholesterol Latest Units: mg/dL 124    Total Cholesterol/HDL Ratio Latest Ref Range: 2.0 - 5.0  3.0    Vit D, 25-Hydroxy Latest Ref Range: 30 - 96 ng/mL 26 (L)    TSH Latest Ref Range: 0.400 - 4.000 uIU/mL 0.251 (L) 0.322 (L)   Free T4 Latest Ref Range: 0.71 - 1.51 ng/dL 0.93 0.76         Assessment:       1. Essential hypertension    2. CKD (chronic kidney disease) stage 3, GFR 30-59 ml/min    3. Coronary artery disease, occlusive    4. Anxiety and depression    5. Other hyperlipidemia    6. Osteopenia,  unspecified location    7. Sarcoidosis of lung    8. Vitamin D deficiency    9. Encounter for preventive health examination    10. Lung nodule    11. Memory loss    12. Weakness of lower extremity, unspecified laterality          Plan:     Essential hypertension- adeq control, cont meds.  -     hydroCHLOROthiazide (HYDRODIURIL) 25 MG tablet; Take 1 tablet (25 mg total) by mouth once daily.  Dispense: 90 tablet; Refill: 3  -     amLODIPine (NORVASC) 10 MG tablet; Take 1 tablet (10 mg total) by mouth once daily.  Dispense: 90 tablet; Refill: 3    CKD (chronic kidney disease) stage 3, GFR 30-59 ml/min- stable.    Coronary artery disease, occlusive- clin stable, cont meds.    Anxiety and depression- doing well, cont rx.  -     citalopram (CELEXA) 20 MG tablet; Take 1 tablet (20 mg total) by mouth once daily.  Dispense: 30 tablet; Refill: 6    Other hyperlipidemia    Osteopenia, unspecified location    Sarcoidosis of lung    Vitamin D deficiency    Encounter for preventive health examination- fluvax.    Lung nodule- Bx result pending.    Memory loss- no rx for now, due to diarrhea.    Weakness of lower extremity, unspecified laterality  -     Ambulatory referral/consult to Home Health; Future; Expected date: 10/03/2020    Other orders  -     dicyclomine (BENTYL) 10 MG capsule; Take 1 capsule (10 mg total) by mouth 4 (four) times daily before meals and nightly.  Dispense:    -     Influenza - Quadrivalent (Adjuvanted)

## 2020-09-22 ENCOUNTER — TELEPHONE (OUTPATIENT)
Dept: RADIOLOGY | Facility: HOSPITAL | Age: 85
End: 2020-09-22

## 2020-09-22 NOTE — TELEPHONE ENCOUNTER
Interventional Radiology:  Called patient and left voicemail with instructions and our call back number for procedure tomorrow.  I called patient daughter, she will be patient transportation for tomorrow, I instructed to arrive at [facility] for 0730, patient will need a Covid screen.  Nothing to eat/drink after midnight tonight.  Patient has been off ASA since the 15th, denies any other blood thinner use.  Answered all questions

## 2020-09-23 ENCOUNTER — HOSPITAL ENCOUNTER (OUTPATIENT)
Dept: RADIOLOGY | Facility: HOSPITAL | Age: 85
Discharge: HOME | End: 2020-09-23
Attending: INTERNAL MEDICINE
Payer: MEDICARE

## 2020-09-23 ENCOUNTER — HOSPITAL ENCOUNTER (OUTPATIENT)
Dept: RADIOLOGY | Facility: HOSPITAL | Age: 85
Discharge: HOME | End: 2020-09-23
Attending: PHYSICIAN ASSISTANT
Payer: MEDICARE

## 2020-09-23 VITALS
RESPIRATION RATE: 16 BRPM | HEIGHT: 61 IN | SYSTOLIC BLOOD PRESSURE: 151 MMHG | BODY MASS INDEX: 26.24 KG/M2 | DIASTOLIC BLOOD PRESSURE: 68 MMHG | OXYGEN SATURATION: 94 % | WEIGHT: 139 LBS | HEART RATE: 61 BPM

## 2020-09-23 LAB — SARS-COV-2 RDRP RESP QL NAA+PROBE: NEGATIVE

## 2020-09-23 PROCEDURE — 71045 X-RAY EXAM CHEST 1 VIEW: CPT | Mod: TC,HCNC

## 2020-09-23 PROCEDURE — 88305 TISSUE EXAM BY PATHOLOGIST: CPT | Mod: HCNC | Performed by: PATHOLOGY

## 2020-09-23 PROCEDURE — U0002 COVID-19 LAB TEST NON-CDC: HCPCS | Mod: HCNC

## 2020-09-23 PROCEDURE — 88312 SPECIAL STAINS GROUP 1: CPT | Mod: 26,HCNC,, | Performed by: PATHOLOGY

## 2020-09-23 PROCEDURE — 88305 TISSUE EXAM BY PATHOLOGIST: CPT | Mod: 26,HCNC,, | Performed by: PATHOLOGY

## 2020-09-23 PROCEDURE — 88312 PR  SPECIAL STAINS,GROUP I: ICD-10-PCS | Mod: 26,HCNC,, | Performed by: PATHOLOGY

## 2020-09-23 PROCEDURE — 88305 TISSUE EXAM BY PATHOLOGIST: ICD-10-PCS | Mod: 26,HCNC,, | Performed by: PATHOLOGY

## 2020-09-23 PROCEDURE — 88312 SPECIAL STAINS GROUP 1: CPT | Mod: HCNC | Performed by: PATHOLOGY

## 2020-09-23 PROCEDURE — 63600175 PHARM REV CODE 636 W HCPCS: Mod: HCNC | Performed by: RADIOLOGY

## 2020-09-23 PROCEDURE — 77012 CT SCAN FOR NEEDLE BIOPSY: CPT | Mod: TC,HCNC

## 2020-09-23 RX ORDER — FENTANYL CITRATE 50 UG/ML
INJECTION, SOLUTION INTRAMUSCULAR; INTRAVENOUS CODE/TRAUMA/SEDATION MEDICATION
Status: COMPLETED | OUTPATIENT
Start: 2020-09-23 | End: 2020-09-23

## 2020-09-23 RX ORDER — MIDAZOLAM HYDROCHLORIDE 1 MG/ML
INJECTION INTRAMUSCULAR; INTRAVENOUS CODE/TRAUMA/SEDATION MEDICATION
Status: COMPLETED | OUTPATIENT
Start: 2020-09-23 | End: 2020-09-23

## 2020-09-23 RX ADMIN — FENTANYL CITRATE 25 MCG: 50 INJECTION, SOLUTION INTRAMUSCULAR; INTRAVENOUS at 09:09

## 2020-09-23 RX ADMIN — MIDAZOLAM HYDROCHLORIDE 0.5 MG: 1 INJECTION, SOLUTION INTRAMUSCULAR; INTRAVENOUS at 09:09

## 2020-09-23 NOTE — SEDATION DOCUMENTATION
Procedure complete, pt tolerated well.  Vss.  Band aid placed to R upper back procedure site, site WDL.  Pt transferred to stretcher supine with hob elevated and transported to radiology recovery.  Will continue to monitor.  Pt awake, alert and denied sob or pain at this time.

## 2020-09-23 NOTE — DISCHARGE SUMMARY
Pre Op Diagnosis: lung nodule     Post Op Diagnosis: same     Procedure:  Lung biopsy     Procedure performed by: Dee PUCKETT, Fernando ALVAREZ     Written Informed Consent Obtained: Yes     Specimen Removed:  yes     Estimated Blood Loss:  minimal     Findings: Local anesthesia and moderate sedation were used.     The patient tolerated the procedure well and there were no complications.      Sterile technique was performed in the R posterior upper thorax, lidocaine was used as a local anesthetic.  Multiple samples taken from RUL.  Pt tolerated the procedure well without immediate complications.  Please see radiologist report for details. F/u with PCP and/or ordering physician.

## 2020-09-23 NOTE — PLAN OF CARE
Ok to discharge to home per Dr. Price.  Pt d/c home in stable condition via wheelchair with ride.  Verbalized understanding of d/c instructions, handout given.  Pt voiced no complaints at this time, denied sob or pain.

## 2020-09-23 NOTE — SEDATION DOCUMENTATION
Pt in ct on table prone with bilateral arms above head, cm in place, vss.  Pt and daughter verbalized understanding of procedure.

## 2020-09-23 NOTE — DISCHARGE INSTRUCTIONS
CT-Guided Lung Biopsy  CT-guided lung biopsy is a procedure to collect small tissue samples from an abnormal area in your lung. During the procedure, an imaging method called CT (computed tomography) is used to show live pictures of your lung. Then a thin needle is used to remove the tissue samples. The samples are tested in a lab for cancer and other problems.     For the biopsy, a thin needle is used to remove samples of tissue from an abnormal area in the lung.   Getting ready for your procedure  Follow any instructions from your healthcare provider.  Tell your provider about any medicines you are taking. It is important for your provider to know if you are taking any blood-thinning medicines or have a bleeding disorder.  You may need to stop taking all or some medicine before the procedure. This includes:  · All prescription medicines  · Blood-thinning medicines (anticoagulants)  · Over-the-counter medicines such as aspirin or ibuprofen  · Street drugs  · Herbs, vitamins, and other supplements  Also tell your provider if you:  · Are pregnant or think you may be pregnant  · Are breastfeeding  · Are allergic to or have intolerances to any medicines  · Have a chronic cough, new cough, or other illness  · Use oxygen therapy at home  · Smoke or drink alcohol on a regular basis  Follow any directions youre given for not eating or drinking before the procedure.  The day of your procedure  The procedure takes about 60 minutes. The entire procedure (including time to prepare and recover) takes several hours. Youll likely go home the same day.  Before the procedure begins:  · An IV (intravenous) line may be put into a vein in your hand or arm. This line supplies fluids and medicines.  · To keep you free of pain during the procedure, you may be given anesthesia. Depending on the type of anesthesia used, you may be awake, drowsy, or in a deep sleep for the procedure.  During the procedure:  · Youll lie on a CT scan  table. You may be on your back, side, or stomach. Pictures of your lung are then taken using the CT scanner. This helps your provider find the best place to position the needle in your lungs.  · A estela is made on your skin where the needle will be inserted (biopsy site). The site is injected with numbing medicine.  · Using the CT pictures as a guide, the needle is passed through the numbed skin between your ribs and into your lung. Samples of tissue are then removed from the abnormal area in your lung. The samples are sent to a lab to be checked for problems.  · When the procedure is complete, the needle is removed. Pressure is applied to the biopsy site to help stop any bleeding. The site is then bandaged.  After the procedure:  · Youll be taken to a room to rest until the anesthesia wears off.  · A chest X-ray may be done. This is to make sure there was no damage to your lungs or the area where the needle was placed.  · When its time for you to go home, have an adult family member or friend ready to drive you.  Recovering at home  You may cough up a small amount of blood shortly after the procedure. Later, you may also have some soreness around the biopsy site. Once at home, follow any instructions youre given. Be sure to:  · Take all medicines as directed.  · Care for the biopsy site as instructed.  · Check for signs of infection at the biopsy site (see below).  · Do not bathe or shower until your provider says it's OK. If you wish, you may wash with a sponge or washcloth.  · Avoid heavy lifting and other strenuous activities as directed.  · If you plan any air travel, ask your provider when you can do so. You may be told not to fly for a few weeks. This is because pressure changes may affect your lungs.  When to call your provider  Call 911 or your local emergency number if you have sudden, severe difficulty breathing. This could be a life-threatening emergency.  Call your healthcare provider for less severe  symptoms that are still concerning. If you are unable to speak with your provider, go to the emergency room if you have any of the following symptoms:  · Fever of 100.4°F (38°C) or higher, or as directed by your provider  · Sudden chest pain, shortness of breath, or fainting  · Coughing up increasing amounts of blood  · Signs of infection at the biopsy site, such as increased redness or swelling, warmth, more pain, bleeding, or bad-smelling drainage   Follow-up  The provider who ordered your test will discuss the biopsy results with you during a follow-up visit. Results are usually ready within 1 to 2 weeks. If more tests or treatments are needed, your provider will discuss these with you.  Risks and possible complications  All procedures have some risk. Possible risks of this procedure include:  · An air leak in your lung (pneumothorax). This may require a stay in the hospital and treatment to re-inflate the lung.  · Bleeding into or around the lung  · Infection in the skin or lung  · Injury to other structures in the chest  · Risks of anesthesia. This will be discussed with you before the procedure.   Date Last Reviewed: 6/11/2015  © 5861-4651 [facility]. [address] [place] [address]. All rights reserved. This information is not intended as a substitute for professional medical care. Always follow your healthcare professional's instructions.        Recovery After Procedural Sedation (Adult)   You have been given medicine by vein to make you sleep during your surgery. This may have included both a pain medicine and sleeping medicine. Most of the effects have worn off. But you may still have some drowsiness for the next 6 to 8 hours.  Home care  Follow these guidelines when you get home:  · For the next 8 hours, you should be watched by a responsible adult. This person should make sure your condition is not getting worse.  · Don't drink any alcohol for the next 24 hours.  · Don't drive,  operate dangerous machinery, or make important business or personal decisions during the next 24 hours.  · To prevent injury or falls, use caution when standing and walking for at least 24 hours after your procedure.  Note: Your healthcare provider may tell you not to take any medicine by mouth for pain or sleep in the next 4 hours. These medicines may react with the medicines you were given in the hospital. This could cause a much stronger response than usual.  Follow-up care  Follow up with your healthcare provider if you are not alert and back to your usual level of activity within 12 hours.  When to seek medical advice  Call your healthcare provider right away if any of these occur:  · Drowsiness gets worse  · Weakness or dizziness gets worse  · Repeated vomiting  · You can't be awakened  · Fever  · New rash  [employer] last reviewed this educational content on 9/1/2019  © 5319-1914 [employer]. [address] [place] [address], [place] [address]. All rights reserved. This information is not intended as a substitute for professional medical care. Always follow your healthcare professional's instructions.

## 2020-09-26 ENCOUNTER — OFFICE VISIT (OUTPATIENT)
Dept: FAMILY MEDICINE | Facility: CLINIC | Age: 85
End: 2020-09-26
Payer: MEDICARE

## 2020-09-26 VITALS
HEIGHT: 61 IN | OXYGEN SATURATION: 98 % | DIASTOLIC BLOOD PRESSURE: 66 MMHG | HEART RATE: 81 BPM | BODY MASS INDEX: 26.55 KG/M2 | WEIGHT: 140.63 LBS | TEMPERATURE: 98 F | SYSTOLIC BLOOD PRESSURE: 140 MMHG

## 2020-09-26 DIAGNOSIS — E55.9 VITAMIN D DEFICIENCY: ICD-10-CM

## 2020-09-26 DIAGNOSIS — E78.49 OTHER HYPERLIPIDEMIA: ICD-10-CM

## 2020-09-26 DIAGNOSIS — I25.10 CORONARY ARTERY DISEASE, OCCLUSIVE: Chronic | ICD-10-CM

## 2020-09-26 DIAGNOSIS — D86.0 SARCOIDOSIS OF LUNG: ICD-10-CM

## 2020-09-26 DIAGNOSIS — M85.80 OSTEOPENIA, UNSPECIFIED LOCATION: ICD-10-CM

## 2020-09-26 DIAGNOSIS — I10 ESSENTIAL HYPERTENSION: Primary | Chronic | ICD-10-CM

## 2020-09-26 DIAGNOSIS — Z00.00 ENCOUNTER FOR PREVENTIVE HEALTH EXAMINATION: ICD-10-CM

## 2020-09-26 DIAGNOSIS — F32.A ANXIETY AND DEPRESSION: ICD-10-CM

## 2020-09-26 DIAGNOSIS — R29.898 WEAKNESS OF LOWER EXTREMITY, UNSPECIFIED LATERALITY: ICD-10-CM

## 2020-09-26 DIAGNOSIS — R41.3 MEMORY LOSS: ICD-10-CM

## 2020-09-26 DIAGNOSIS — R91.1 LUNG NODULE: ICD-10-CM

## 2020-09-26 DIAGNOSIS — N18.30 CKD (CHRONIC KIDNEY DISEASE) STAGE 3, GFR 30-59 ML/MIN: ICD-10-CM

## 2020-09-26 DIAGNOSIS — F41.9 ANXIETY AND DEPRESSION: ICD-10-CM

## 2020-09-26 PROCEDURE — G0008 ADMIN INFLUENZA VIRUS VAC: HCPCS | Mod: HCNC,S$GLB,, | Performed by: INTERNAL MEDICINE

## 2020-09-26 PROCEDURE — 99999 PR PBB SHADOW E&M-EST. PATIENT-LVL V: CPT | Mod: PBBFAC,HCNC,, | Performed by: INTERNAL MEDICINE

## 2020-09-26 PROCEDURE — 99999 PR PBB SHADOW E&M-EST. PATIENT-LVL V: ICD-10-PCS | Mod: PBBFAC,HCNC,, | Performed by: INTERNAL MEDICINE

## 2020-09-26 PROCEDURE — 99499 RISK ADDL DX/OHS AUDIT: ICD-10-PCS | Mod: HCNC,S$GLB,, | Performed by: INTERNAL MEDICINE

## 2020-09-26 PROCEDURE — 1159F MED LIST DOCD IN RCRD: CPT | Mod: HCNC,S$GLB,, | Performed by: INTERNAL MEDICINE

## 2020-09-26 PROCEDURE — 99214 OFFICE O/P EST MOD 30 MIN: CPT | Mod: 25,HCNC,S$GLB, | Performed by: INTERNAL MEDICINE

## 2020-09-26 PROCEDURE — 99499 UNLISTED E&M SERVICE: CPT | Mod: HCNC,S$GLB,, | Performed by: INTERNAL MEDICINE

## 2020-09-26 PROCEDURE — 3077F PR MOST RECENT SYSTOLIC BLOOD PRESSURE >= 140 MM HG: ICD-10-PCS | Mod: HCNC,CPTII,S$GLB, | Performed by: INTERNAL MEDICINE

## 2020-09-26 PROCEDURE — 90694 VACC AIIV4 NO PRSRV 0.5ML IM: CPT | Mod: HCNC,S$GLB,, | Performed by: INTERNAL MEDICINE

## 2020-09-26 PROCEDURE — 3078F DIAST BP <80 MM HG: CPT | Mod: HCNC,CPTII,S$GLB, | Performed by: INTERNAL MEDICINE

## 2020-09-26 PROCEDURE — 99214 PR OFFICE/OUTPT VISIT, EST, LEVL IV, 30-39 MIN: ICD-10-PCS | Mod: 25,HCNC,S$GLB, | Performed by: INTERNAL MEDICINE

## 2020-09-26 PROCEDURE — 1126F AMNT PAIN NOTED NONE PRSNT: CPT | Mod: HCNC,S$GLB,, | Performed by: INTERNAL MEDICINE

## 2020-09-26 PROCEDURE — 1101F PR PT FALLS ASSESS DOC 0-1 FALLS W/OUT INJ PAST YR: ICD-10-PCS | Mod: HCNC,CPTII,S$GLB, | Performed by: INTERNAL MEDICINE

## 2020-09-26 PROCEDURE — 3077F SYST BP >= 140 MM HG: CPT | Mod: HCNC,CPTII,S$GLB, | Performed by: INTERNAL MEDICINE

## 2020-09-26 PROCEDURE — 3078F PR MOST RECENT DIASTOLIC BLOOD PRESSURE < 80 MM HG: ICD-10-PCS | Mod: HCNC,CPTII,S$GLB, | Performed by: INTERNAL MEDICINE

## 2020-09-26 PROCEDURE — 1126F PR PAIN SEVERITY QUANTIFIED, NO PAIN PRESENT: ICD-10-PCS | Mod: HCNC,S$GLB,, | Performed by: INTERNAL MEDICINE

## 2020-09-26 PROCEDURE — 1101F PT FALLS ASSESS-DOCD LE1/YR: CPT | Mod: HCNC,CPTII,S$GLB, | Performed by: INTERNAL MEDICINE

## 2020-09-26 PROCEDURE — 1159F PR MEDICATION LIST DOCUMENTED IN MEDICAL RECORD: ICD-10-PCS | Mod: HCNC,S$GLB,, | Performed by: INTERNAL MEDICINE

## 2020-09-26 PROCEDURE — G0008 PR ADMIN INFLUENZA VIRUS VAC: ICD-10-PCS | Mod: HCNC,S$GLB,, | Performed by: INTERNAL MEDICINE

## 2020-09-26 PROCEDURE — 90694 FLU VACCINE - QUADRIVALENT - ADJUVANTED: ICD-10-PCS | Mod: HCNC,S$GLB,, | Performed by: INTERNAL MEDICINE

## 2020-09-26 RX ORDER — AMLODIPINE BESYLATE 10 MG/1
10 TABLET ORAL DAILY
Qty: 90 TABLET | Refills: 3
Start: 2020-09-26 | End: 2021-10-04

## 2020-09-26 RX ORDER — HYDROCODONE BITARTRATE AND ACETAMINOPHEN 5; 325 MG/1; MG/1
TABLET ORAL
COMMUNITY
Start: 2020-09-24 | End: 2021-05-27

## 2020-09-26 RX ORDER — HYDROCHLOROTHIAZIDE 25 MG/1
25 TABLET ORAL DAILY
Qty: 90 TABLET | Refills: 3
Start: 2020-09-26 | End: 2021-01-08 | Stop reason: DRUGHIGH

## 2020-09-26 RX ORDER — DICYCLOMINE HYDROCHLORIDE 10 MG/1
10 CAPSULE ORAL
Start: 2020-09-26 | End: 2021-05-27

## 2020-09-26 RX ORDER — CITALOPRAM 20 MG/1
20 TABLET, FILM COATED ORAL DAILY
Qty: 30 TABLET | Refills: 6
Start: 2020-09-26 | End: 2023-05-15

## 2020-09-29 ENCOUNTER — PATIENT MESSAGE (OUTPATIENT)
Dept: OTHER | Facility: OTHER | Age: 85
End: 2020-09-29

## 2020-09-29 ENCOUNTER — TELEPHONE (OUTPATIENT)
Dept: FAMILY MEDICINE | Facility: CLINIC | Age: 85
End: 2020-09-29

## 2020-09-29 NOTE — TELEPHONE ENCOUNTER
----- Message from Humaira Llanes sent at 9/29/2020  2:38 PM CDT -----  Ran carreno calling calling regarding home health she stated it was supposed to be set up but it was not please give her a call to discuss. [phone]        Thanks   Humaira Llanes

## 2020-09-29 NOTE — TELEPHONE ENCOUNTER
pts daughter stated that she has not heard anything from home health about setting her mom up.      Please advise

## 2020-09-29 NOTE — TELEPHONE ENCOUNTER
Called krystian from  and krystian stated they did not get the referral until today for some reason and they will call pt and set up and date.     Pts daughter notified and understood.       Dr Cardona notified verbally.

## 2020-09-30 ENCOUNTER — PROCEDURE VISIT (OUTPATIENT)
Dept: OPHTHALMOLOGY | Facility: CLINIC | Age: 85
End: 2020-09-30
Payer: MEDICARE

## 2020-09-30 DIAGNOSIS — H34.8130 CENTRAL RETINAL VEIN OCCLUSION WITH MACULAR EDEMA OF BOTH EYES: Primary | ICD-10-CM

## 2020-09-30 LAB
COMMENT: NORMAL
FINAL PATHOLOGIC DIAGNOSIS: NORMAL
GROSS: NORMAL

## 2020-09-30 PROCEDURE — G0180 MD CERTIFICATION HHA PATIENT: HCPCS | Mod: ,,, | Performed by: INTERNAL MEDICINE

## 2020-09-30 PROCEDURE — 99499 NO LOS: ICD-10-PCS | Mod: HCNC,S$GLB,, | Performed by: OPHTHALMOLOGY

## 2020-09-30 PROCEDURE — 67028 PR INJECT INTRAVITREAL PHARMCOLOGIC: ICD-10-PCS | Mod: 50,HCNC,S$GLB, | Performed by: OPHTHALMOLOGY

## 2020-09-30 PROCEDURE — G0180 PR HOME HEALTH MD CERTIFICATION: ICD-10-PCS | Mod: ,,, | Performed by: INTERNAL MEDICINE

## 2020-09-30 PROCEDURE — 67028 INJECTION EYE DRUG: CPT | Mod: 50,HCNC,S$GLB, | Performed by: OPHTHALMOLOGY

## 2020-09-30 PROCEDURE — 99499 UNLISTED E&M SERVICE: CPT | Mod: HCNC,S$GLB,, | Performed by: OPHTHALMOLOGY

## 2020-09-30 NOTE — PROGRESS NOTES
===============================  Glo Dumont,  9/30/2020 today   84 y.o. female   Last visit [facility]: :9/15/2020   Last visit eye dept. 9/15/2020  [facility]:  Corrected distance visual acuity was 20/200 in the right eye and 20/400 in the left eye.   Not recorded         Not recorded         Not recorded         Not recorded        Chief Complaint   Patient presents with    hypertensive retinopathy of both eyes     eylea ou     Ophthalmic Medications     Ophthalmic - Anti-inflammatory, Glucocorticoids Start End     DUREZOL 0.05 % Drop ophthalmic solution    7/18/2016     Class: Historical Med    Ophthalmic-Intraocular Press. Reducing, Alina. Alpha Adrenergic Agonists Start End     brimonidine 0.2% (ALPHAGAN) 0.2 % Drop    12/5/2019     Class: Historical Med    Ophthalmic - Anti-inflammatory, NSAIDs Start End     ketorolac 0.5% (ACULAR) 0.5 % Drop    7/19/2016     Class: Historical Med        ________________  9/30/2020 today  HPI     hypertensive retinopathy of both eyes      Additional comments: eylea ou              Comments     1. Steroid responder glaucoma  2. PCIOL OU MGM  3. SARCOIDOSIS  H\O chronic UVEITIS OU  4. Central vein occlusion of retina, left   5. Retinal edema  6. CME OU   H/o Avastin and Eylea OS  Has been getting Ozurdex OU with Dr. Markham    Cosopt BID OU  Brimonidine BID OU  Durezol BID OU            Last edited by Jessica Jensen on 9/30/2020  1:43 PM. 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 - Primary    Relevant Medications    aflibercept Soln 2 mg    aflibercept Soln 2 mg    Other Relevant Orders    Prior authorization Order    Posterior Segment OCT Retina-Both eyes    Prior authorization Order        eylea ou  Today and 1 mo then add ozurdex ou    prev ouxdex tx ou   Sarcoid assoc iritis and rvo   Iritis vmtx erm rvo cme ou  eylea ou  ..    Injection Procedure Note:    9/30/2020  Diagnosis  :  Me ou  Today:   Eylea (afibercept) 2 mg/0.05 ml Intravitreal Injection , OU   Follow up: 1 m eylea ou    Instructed to call 24/7 for any worsening of vision. Check Both eyes daily. Gave patient my home phone number.  Risks, benefits, and alternatives to treatment discussed in detail with the patient.  The patient voiced understanding and wished to proceed with the procedure.     Patient Identified and Time Out complete  Subconjunctival bleb - xylocaine with epi 2%   and Betadine.  Inject at Eylea (afibercept) 2 mg/0.05 ml Intravitreal Injection , OU 6:00 @ 3.5-4mm posterior to limbus  1 stop: na   Post Operative Dx: Same  Complications: None  Follow up as above.    .      ===========================

## 2020-10-01 DIAGNOSIS — E78.49 OTHER HYPERLIPIDEMIA: Primary | ICD-10-CM

## 2020-10-01 RX ORDER — PRAVASTATIN SODIUM 40 MG/1
40 TABLET ORAL DAILY
Qty: 90 TABLET | Refills: 3 | Status: SHIPPED | OUTPATIENT
Start: 2020-10-01 | End: 2021-10-02

## 2020-10-05 ENCOUNTER — TELEPHONE (OUTPATIENT)
Dept: PULMONOLOGY | Facility: CLINIC | Age: 85
End: 2020-10-05

## 2020-10-05 NOTE — TELEPHONE ENCOUNTER
CT guided lung biopsy ( Right lung mass) results:    Results reviewed with patient who voiced understanding.    Results: No fungal organism; GMS stain with appropriate control is negative. No mycobacteria; AFB stain with  appropriate control is negative. Findings may suggestive of sarcoidosis or previous chronic inflammation ,  please correlate with clinical findings (normal ACE level cannot rule out sarcoidosis).    Patient reports no current respiratory symptoms. She reports  she is feeling fine.     ACE level: 49     PLAN:  Follow up as previously scheduled for sarcoidosis.

## 2020-10-08 ENCOUNTER — EXTERNAL HOME HEALTH (OUTPATIENT)
Dept: HOME HEALTH SERVICES | Facility: HOSPITAL | Age: 85
End: 2020-10-08
Payer: MEDICARE

## 2020-10-28 ENCOUNTER — PROCEDURE VISIT (OUTPATIENT)
Dept: OPHTHALMOLOGY | Facility: CLINIC | Age: 85
End: 2020-10-28
Payer: MEDICARE

## 2020-10-28 DIAGNOSIS — H34.8130 CENTRAL RETINAL VEIN OCCLUSION WITH MACULAR EDEMA OF BOTH EYES: Primary | ICD-10-CM

## 2020-10-28 PROCEDURE — 99499 UNLISTED E&M SERVICE: CPT | Mod: HCNC,S$GLB,, | Performed by: OPHTHALMOLOGY

## 2020-10-28 PROCEDURE — 92134 CPTRZ OPH DX IMG PST SGM RTA: CPT | Mod: HCNC,S$GLB,, | Performed by: OPHTHALMOLOGY

## 2020-10-28 PROCEDURE — 92134 POSTERIOR SEGMENT OCT RETINA (OCULAR COHERENCE TOMOGRAPHY)-BOTH EYES: ICD-10-PCS | Mod: HCNC,S$GLB,, | Performed by: OPHTHALMOLOGY

## 2020-10-28 PROCEDURE — 99499 NO LOS: ICD-10-PCS | Mod: HCNC,S$GLB,, | Performed by: OPHTHALMOLOGY

## 2020-10-28 RX ORDER — BRIMONIDINE TARTRATE 2 MG/ML
1 SOLUTION/ DROPS OPHTHALMIC 2 TIMES DAILY
Qty: 10 ML | Refills: 4 | Status: SHIPPED | OUTPATIENT
Start: 2020-10-28 | End: 2021-11-09 | Stop reason: ALTCHOICE

## 2020-10-28 RX ORDER — DORZOLAMIDE HYDROCHLORIDE AND TIMOLOL MALEATE 20; 5 MG/ML; MG/ML
1 SOLUTION/ DROPS OPHTHALMIC 2 TIMES DAILY
Qty: 10 ML | Refills: 4 | Status: SHIPPED | OUTPATIENT
Start: 2020-10-28 | End: 2020-12-03 | Stop reason: SDUPTHER

## 2020-10-28 NOTE — PROGRESS NOTES
===============================  Glo Dumont,  10/28/2020 today   85 y.o. female   Last visit [facility]: :9/30/2020   Last visit eye dept. 9/30/2020  [facility]:  Corrected distance visual acuity was 20/60 in the right eye and 20/400 in the left eye.  Tonometry     Tonometry (Applanation, 3:11 PM)       Right Left    Pressure 12 11               Not recorded         Not recorded         Not recorded        Chief Complaint   Patient presents with    CRVO w/ME     pt here for eylea OU. pt states her eyes have been itchy, no pain.      Ophthalmic Medications     Ophthalmic - Anti-inflammatory, Glucocorticoids Start End     DUREZOL 0.05 % Drop ophthalmic solution    7/18/2016     Class: Historical Med    Ophthalmic-Intraocular Press. Reducing, Alina. Alpha Adrenergic Agonists Start End     brimonidine 0.2% (ALPHAGAN) 0.2 % Drop    10/28/2020     Sig: Place 1 drop into both eyes 2 (two) times a day.    Route: Both Eyes     brimonidine 0.2% (ALPHAGAN) 0.2 % Drop (Discontinued)    12/5/2019 10/28/2020    Class: Historical Med    Reason for Discontinue: Reorder    Ophthalmic - Anti-inflammatory, NSAIDs Start End     ketorolac 0.5% (ACULAR) 0.5 % Drop    7/19/2016     Class: Historical Med        ________________  10/28/2020 today  HPI     CRVO w/ME      Additional comments: pt here for eylea OU. pt states her eyes have been   itchy, no pain.               Comments       1. Steroid responder glaucoma  2. PCIOL OU MGM  3. SARCOIDOSIS  H\O chronic UVEITIS OU  4. Central vein occlusion of retina, left   5. Retinal edema  6. CME OU   H/o Avastin and Eylea OS  Has been getting Ozurdex OU with Dr. Shahrzad Mo OU 9/30/20    Cosopt BID OU  Brimonidine BID OU  Durezol BID OU          Last edited by Wang Cho on 10/28/2020  3:11 PM. 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 - Primary    Relevant Medications    aflibercept Soln 2  mg (Start on 10/28/2020  4:45 PM)    aflibercept Soln 2 mg (Start on 10/28/2020  4:45 PM)    Other Relevant Orders    Prior authorization Order    Posterior Segment OCT Retina-Both eyes (Completed)        Worse me    dec salt!   ..    Injection Procedure Note:    10/28/2020  Diagnosis :  Ou dme- worse  Today:   Eylea (afibercept) 2 mg/0.05 ml Intravitreal Injection , OU   Follow up: rtc  1mo - do      Instructed to call 24/7 for any worsening of vision. Check Both eyes daily. Gave patient my home phone number.  Risks, benefits, and alternatives to treatment discussed in detail with the patient.  The patient voiced understanding and wished to proceed with the procedure.     Patient Identified and Time Out complete  Subconjunctival bleb - xylocaine with epi 2%   and Betadine.  Inject at Eylea (afibercept) 2 mg/0.05 ml Intravitreal Injection , OU 6:00 @ 3.5-4mm posterior to limbus  1 stop: na   Post Operative Dx: Same  Complications: None  Follow up as above.    .      ===========================

## 2020-11-11 ENCOUNTER — OFFICE VISIT (OUTPATIENT)
Dept: OPHTHALMOLOGY | Facility: CLINIC | Age: 85
End: 2020-11-11
Payer: MEDICARE

## 2020-11-11 DIAGNOSIS — Z96.1 PSEUDOPHAKIA OF BOTH EYES: ICD-10-CM

## 2020-11-11 DIAGNOSIS — H35.033 HYPERTENSIVE RETINOPATHY OF BOTH EYES: ICD-10-CM

## 2020-11-11 DIAGNOSIS — H34.8130 CENTRAL RETINAL VEIN OCCLUSION WITH MACULAR EDEMA OF BOTH EYES: ICD-10-CM

## 2020-11-11 DIAGNOSIS — H02.403 PTOSIS OF BOTH EYELIDS: ICD-10-CM

## 2020-11-11 DIAGNOSIS — H20.13 CHRONIC IRITIS OF BOTH EYES: ICD-10-CM

## 2020-11-11 DIAGNOSIS — H35.353 CME (CYSTOID MACULAR EDEMA), BILATERAL: ICD-10-CM

## 2020-11-11 DIAGNOSIS — H40.1131 PRIMARY OPEN ANGLE GLAUCOMA (POAG) OF BOTH EYES, MILD STAGE: Primary | ICD-10-CM

## 2020-11-11 PROCEDURE — 92014 PR EYE EXAM, EST PATIENT,COMPREHESV: ICD-10-PCS | Mod: HCNC,S$GLB,, | Performed by: OPHTHALMOLOGY

## 2020-11-11 PROCEDURE — 99999 PR PBB SHADOW E&M-EST. PATIENT-LVL III: CPT | Mod: PBBFAC,HCNC,, | Performed by: OPHTHALMOLOGY

## 2020-11-11 PROCEDURE — 92014 COMPRE OPH EXAM EST PT 1/>: CPT | Mod: HCNC,S$GLB,, | Performed by: OPHTHALMOLOGY

## 2020-11-11 PROCEDURE — 99999 PR PBB SHADOW E&M-EST. PATIENT-LVL III: ICD-10-PCS | Mod: PBBFAC,HCNC,, | Performed by: OPHTHALMOLOGY

## 2020-11-11 NOTE — PROGRESS NOTES
HPI     Glaucoma     Comments: IOP check              Comments     1. Steroid responder glaucoma  2. PCIOL OU MGM  3. SARCOIDOSIS  H\O chronic UVEITIS OU  4. Central vein occlusion of retina, left   5. Retinal edema  6. CME OU   H/o Avastin and Eylea OS  Has been getting Ozurdex OU with Dr. Shahrzad Mo OU 9/30/20      Cosopt BID OU  Brimonidine BID OU          Last edited by Celsa Spencer, Patient Care Assistant on 11/11/2020  1:36   PM. (History)            Assessment /Plan     For exam results, see Encounter Report.      ICD-10-CM ICD-9-CM    1. Primary open angle glaucoma (POAG) of both eyes, mild stage  H40.1131 365.11 iop controlled - however thin CCT - would not use latano due to CME: follow for now     365.71    2. Chronic iritis of both eyes  H20.13 364.10 Not active   3. Central retinal vein occlusion with macular edema of both eyes  H34.8130 362.35 Followed by [facility]     362.83    4. Hypertensive retinopathy of both eyes  H35.033 362.11 as above   5. CME (cystoid macular edema), bilateral  H35.353 362.53 Followed by [facility] - improved   6. Pseudophakia of both eyes  Z96.1 V43.1    7. Ptosis of both eyelids  H02.403 374.30 does not want surgery         Cosopt BID OU  Brimonidine BID OU   Return to clinic 2 months, consider Slt OD if iop still elevated

## 2020-12-03 ENCOUNTER — PROCEDURE VISIT (OUTPATIENT)
Dept: OPHTHALMOLOGY | Facility: CLINIC | Age: 85
End: 2020-12-03
Payer: MEDICARE

## 2020-12-03 DIAGNOSIS — H35.353 CME (CYSTOID MACULAR EDEMA), BILATERAL: Primary | ICD-10-CM

## 2020-12-03 DIAGNOSIS — H34.8130 CENTRAL RETINAL VEIN OCCLUSION WITH MACULAR EDEMA OF BOTH EYES: ICD-10-CM

## 2020-12-03 PROCEDURE — 99499 NO LOS: ICD-10-PCS | Mod: HCNC,S$GLB,, | Performed by: OPHTHALMOLOGY

## 2020-12-03 PROCEDURE — 92134 POSTERIOR SEGMENT OCT RETINA (OCULAR COHERENCE TOMOGRAPHY)-BOTH EYES: ICD-10-PCS | Mod: HCNC,S$GLB,, | Performed by: OPHTHALMOLOGY

## 2020-12-03 PROCEDURE — 67028 INJECTION EYE DRUG: CPT | Mod: 50,HCNC,S$GLB, | Performed by: OPHTHALMOLOGY

## 2020-12-03 PROCEDURE — 67028 PR INJECT INTRAVITREAL PHARMCOLOGIC: ICD-10-PCS | Mod: 50,HCNC,S$GLB, | Performed by: OPHTHALMOLOGY

## 2020-12-03 PROCEDURE — 99499 UNLISTED E&M SERVICE: CPT | Mod: HCNC,S$GLB,, | Performed by: OPHTHALMOLOGY

## 2020-12-03 PROCEDURE — 92134 CPTRZ OPH DX IMG PST SGM RTA: CPT | Mod: HCNC,S$GLB,, | Performed by: OPHTHALMOLOGY

## 2020-12-03 RX ORDER — DORZOLAMIDE HYDROCHLORIDE AND TIMOLOL MALEATE 20; 5 MG/ML; MG/ML
1 SOLUTION/ DROPS OPHTHALMIC 2 TIMES DAILY
Qty: 10 ML | Refills: 4 | Status: SHIPPED | OUTPATIENT
Start: 2020-12-03 | End: 2021-11-09 | Stop reason: SDUPTHER

## 2020-12-03 NOTE — PROGRESS NOTES
===============================  Glo Dumont,  12/3/2020 today   85 y.o. female   Last visit [facility]: :10/28/2020   Last visit eye dept. 11/11/2020  [facility]  Corrected distance visual acuity was 20/50 in the right eye and 20/100 in the left eye.  Tonometry     Tonometry (Applanation, 8:13 AM)       Right Left    Pressure 14 13               Not recorded         Not recorded         Not recorded        Chief Complaint   Patient presents with    cme     eylea ou pt states va is no better     Ophthalmic Medications     Ophthalmic - Anti-inflammatory, Glucocorticoids Start End     DUREZOL 0.05 % Drop ophthalmic solution    7/18/2016     Class: Historical Med    Ophthalmic-Intraocular Press. Reducing, Alina. Alpha Adrenergic Agonists Start End     brimonidine 0.2% (ALPHAGAN) 0.2 % Drop    10/28/2020     Sig: Place 1 drop into both eyes 2 (two) times a day.    Route: Both Eyes    Ophthalmic - Anti-inflammatory, NSAIDs Start End     ketorolac 0.5% (ACULAR) 0.5 % Drop    7/19/2016     Class: Historical Med        ________________  12/3/2020 today  HPI     cme      Additional comments: eylea ou pt states va is no better              Comments     1. Steroid responder glaucoma  2. PCIOL OU MGM  3. SARCOIDOSIS  H\O chronic UVEITIS OU  4. Central vein occlusion of retina, left   5. Retinal edema  6. CME OU   H/o Avastin and Eylea OS  Had been getting Ozurdex OU with Dr. Shahrzad Mo OU 9-30-20 // 10- 28- 2020    Cosopt BID OU  Brimonidine BID OU          Last edited by STEVEN Greenberg on 12/3/2020  8:11 AM. 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      Other Visit Diagnoses     CME (cystoid macular edema), bilateral    -  Primary    Relevant Medications    aflibercept Soln 2 mg (Completed) (Start on 12/3/2020  8:45 AM)    aflibercept Soln 2 mg (Completed) (Start on 12/3/2020  8:45 AM)    Other Relevant  Orders    Posterior Segment OCT Retina-Both eyes (Completed)    Prior authorization Order        Ou non diabetic cme   worse last visti better today  Oct stabel od  Os much better   eval dr martinez last vist iop ok at 20  And 15  Avoid pga  Post ozurdex may 14  w dr sheridan       cosnder ozurdex  od stable os better   Prev ozuredx w dr arvin martinez following coaag       ..    Injection Procedure Note:    12/3/2020  Diagnosis :  Ou nondiabetic cme  Today:   Eylea (afibercept) 2 mg/0.05 ml Intravitreal Injection , OU   Follow up: rtc 1 mo      Instructed to call 24/7 for any worsening of vision. Check Both eyes daily. Gave patient my home phone number.  Risks, benefits, and alternatives to treatment discussed in detail with the patient.  The patient voiced understanding and wished to proceed with the procedure.     Patient Identified and Time Out complete  Subconjunctival bleb - xylocaine with epi 2%   and Betadine.  Inject at Eylea (afibercept) 2 mg/0.05 ml Intravitreal Injection , OU 6:00 @ 3.5-4mm posterior to limbus  1 stop: no   Post Operative Dx: Same  Complications: None  Follow up as above.    .      ===========================

## 2020-12-11 ENCOUNTER — PATIENT MESSAGE (OUTPATIENT)
Dept: OTHER | Facility: OTHER | Age: 85
End: 2020-12-11

## 2020-12-15 NOTE — PROGRESS NOTES
"Subjective:      Patient ID: Glo Dumont is a 85 y.o. female.    Chief Complaint: No chief complaint on file.      HPI  Here for follow up of medical problems and hospital follow up for partial SBO.  Had diarrhea and dehydration 12/11/20- stayed in hospital 2 nights, Small bowel follow through negative.  Treated with IVF, and IV abics for UTI also.  BMs got back to normal until today 3 loose stool, attributes to eating grapes.  Uses imodium prn, took 2 doses today.  Initial creat was 2.03, d/c was back to baseline at 1.3.      Updated/ annual due 9/21:  HM: 9/20 fluvax, 3/15 [id number], 2/14 asezlf59, 2/15 TDaP, 12/15 BMD rep 2y, 2/11 Cscope no repeat will be done, 1/17 MMG no more, Eye doc monthly, GI Dr. Galindo.     Review of Systems   Constitutional: Negative for chills, diaphoresis and fever.   Respiratory: Negative for cough and shortness of breath.    Cardiovascular: Negative for chest pain, palpitations and leg swelling.   Gastrointestinal: Negative for blood in stool, constipation, diarrhea, nausea and vomiting.   Genitourinary: Negative for dysuria, frequency and hematuria.   Psychiatric/Behavioral: The patient is not nervous/anxious.          Objective:   BP (!) 142/72   Pulse 73   Temp 99 °F (37.2 °C) (Temporal)   Ht 5' 1" (1.549 m)   Wt 63.5 kg (139 lb 15.9 oz)   SpO2 98%   BMI 26.45 kg/m²     Physical Exam  Constitutional:       Appearance: She is well-developed.   Neck:      Musculoskeletal: Neck supple.      Thyroid: No thyroid mass.      Vascular: No carotid bruit.   Cardiovascular:      Rate and Rhythm: Normal rate and regular rhythm.      Heart sounds: No murmur. No friction rub. No gallop.    Pulmonary:      Effort: Pulmonary effort is normal.      Breath sounds: Normal breath sounds. No wheezing or rales.   Abdominal:      General: Bowel sounds are normal.      Palpations: Abdomen is soft. There is no mass.      Tenderness: There is no abdominal tenderness.   Lymphadenopathy:      Cervical: " No cervical adenopathy.   Neurological:      Mental Status: She is alert and oriented to person, place, and time.             Assessment:       1. Diarrhea, unspecified type    2. Partial small bowel obstruction    3. Essential hypertension    4. Stage 3a chronic kidney disease    5. Chronic diastolic HF (heart failure), NYHA class 2    6. Sarcoidosis of lung    7. Coronary artery disease, occlusive    8. Anxiety and depression          Plan:     Diarrhea, unspecified type- cont imodium prn.    Partial small bowel obstruction, resolved, and pt back to baseline renal function.    Essential hypertension- stable, cont rx.    Stage 3a chronic kidney disease    Chronic diastolic HF (heart failure), NYHA class 2, Coronary artery disease, occlusive- clin stable on meds.    Sarcoidosis of lung- stable on meds.    Anxiety and depression- doing well, cont citalopram.    RTC 3mo.

## 2020-12-21 ENCOUNTER — OFFICE VISIT (OUTPATIENT)
Dept: FAMILY MEDICINE | Facility: CLINIC | Age: 85
End: 2020-12-21
Payer: MEDICARE

## 2020-12-21 VITALS
BODY MASS INDEX: 26.43 KG/M2 | SYSTOLIC BLOOD PRESSURE: 142 MMHG | DIASTOLIC BLOOD PRESSURE: 72 MMHG | TEMPERATURE: 99 F | OXYGEN SATURATION: 98 % | HEART RATE: 73 BPM | HEIGHT: 61 IN | WEIGHT: 140 LBS

## 2020-12-21 DIAGNOSIS — R19.7 DIARRHEA, UNSPECIFIED TYPE: Primary | ICD-10-CM

## 2020-12-21 DIAGNOSIS — N18.31 STAGE 3A CHRONIC KIDNEY DISEASE: ICD-10-CM

## 2020-12-21 DIAGNOSIS — I10 ESSENTIAL HYPERTENSION: Chronic | ICD-10-CM

## 2020-12-21 DIAGNOSIS — F32.A ANXIETY AND DEPRESSION: ICD-10-CM

## 2020-12-21 DIAGNOSIS — I25.10 CORONARY ARTERY DISEASE, OCCLUSIVE: Chronic | ICD-10-CM

## 2020-12-21 DIAGNOSIS — D86.0 SARCOIDOSIS OF LUNG: ICD-10-CM

## 2020-12-21 DIAGNOSIS — K56.600 PARTIAL SMALL BOWEL OBSTRUCTION: ICD-10-CM

## 2020-12-21 DIAGNOSIS — F41.9 ANXIETY AND DEPRESSION: ICD-10-CM

## 2020-12-21 DIAGNOSIS — I50.32 CHRONIC DIASTOLIC HF (HEART FAILURE), NYHA CLASS 2: Chronic | ICD-10-CM

## 2020-12-21 PROCEDURE — 1126F AMNT PAIN NOTED NONE PRSNT: CPT | Mod: HCNC,S$GLB,, | Performed by: INTERNAL MEDICINE

## 2020-12-21 PROCEDURE — 99214 OFFICE O/P EST MOD 30 MIN: CPT | Mod: HCNC,S$GLB,, | Performed by: INTERNAL MEDICINE

## 2020-12-21 PROCEDURE — 3078F PR MOST RECENT DIASTOLIC BLOOD PRESSURE < 80 MM HG: ICD-10-PCS | Mod: HCNC,CPTII,S$GLB, | Performed by: INTERNAL MEDICINE

## 2020-12-21 PROCEDURE — 3288F PR FALLS RISK ASSESSMENT DOCUMENTED: ICD-10-PCS | Mod: HCNC,CPTII,S$GLB, | Performed by: INTERNAL MEDICINE

## 2020-12-21 PROCEDURE — 99999 PR PBB SHADOW E&M-EST. PATIENT-LVL V: CPT | Mod: PBBFAC,HCNC,, | Performed by: INTERNAL MEDICINE

## 2020-12-21 PROCEDURE — 99499 UNLISTED E&M SERVICE: CPT | Mod: S$GLB,,, | Performed by: INTERNAL MEDICINE

## 2020-12-21 PROCEDURE — 1101F PT FALLS ASSESS-DOCD LE1/YR: CPT | Mod: HCNC,CPTII,S$GLB, | Performed by: INTERNAL MEDICINE

## 2020-12-21 PROCEDURE — 99499 RISK ADDL DX/OHS AUDIT: ICD-10-PCS | Mod: S$GLB,,, | Performed by: INTERNAL MEDICINE

## 2020-12-21 PROCEDURE — 99214 PR OFFICE/OUTPT VISIT, EST, LEVL IV, 30-39 MIN: ICD-10-PCS | Mod: HCNC,S$GLB,, | Performed by: INTERNAL MEDICINE

## 2020-12-21 PROCEDURE — 1159F MED LIST DOCD IN RCRD: CPT | Mod: HCNC,S$GLB,, | Performed by: INTERNAL MEDICINE

## 2020-12-21 PROCEDURE — 1159F PR MEDICATION LIST DOCUMENTED IN MEDICAL RECORD: ICD-10-PCS | Mod: HCNC,S$GLB,, | Performed by: INTERNAL MEDICINE

## 2020-12-21 PROCEDURE — 3077F PR MOST RECENT SYSTOLIC BLOOD PRESSURE >= 140 MM HG: ICD-10-PCS | Mod: HCNC,CPTII,S$GLB, | Performed by: INTERNAL MEDICINE

## 2020-12-21 PROCEDURE — 3077F SYST BP >= 140 MM HG: CPT | Mod: HCNC,CPTII,S$GLB, | Performed by: INTERNAL MEDICINE

## 2020-12-21 PROCEDURE — 1101F PR PT FALLS ASSESS DOC 0-1 FALLS W/OUT INJ PAST YR: ICD-10-PCS | Mod: HCNC,CPTII,S$GLB, | Performed by: INTERNAL MEDICINE

## 2020-12-21 PROCEDURE — 1126F PR PAIN SEVERITY QUANTIFIED, NO PAIN PRESENT: ICD-10-PCS | Mod: HCNC,S$GLB,, | Performed by: INTERNAL MEDICINE

## 2020-12-21 PROCEDURE — 3288F FALL RISK ASSESSMENT DOCD: CPT | Mod: HCNC,CPTII,S$GLB, | Performed by: INTERNAL MEDICINE

## 2020-12-21 PROCEDURE — 3078F DIAST BP <80 MM HG: CPT | Mod: HCNC,CPTII,S$GLB, | Performed by: INTERNAL MEDICINE

## 2020-12-21 PROCEDURE — 99999 PR PBB SHADOW E&M-EST. PATIENT-LVL V: ICD-10-PCS | Mod: PBBFAC,HCNC,, | Performed by: INTERNAL MEDICINE

## 2020-12-21 RX ORDER — LOPERAMIDE HYDROCHLORIDE 2 MG/1
2 CAPSULE ORAL 4 TIMES DAILY PRN
COMMUNITY
End: 2021-05-27

## 2020-12-31 ENCOUNTER — PROCEDURE VISIT (OUTPATIENT)
Dept: OPHTHALMOLOGY | Facility: CLINIC | Age: 85
End: 2020-12-31
Payer: MEDICARE

## 2020-12-31 ENCOUNTER — TELEPHONE (OUTPATIENT)
Dept: FAMILY MEDICINE | Facility: CLINIC | Age: 85
End: 2020-12-31

## 2020-12-31 DIAGNOSIS — H34.8130 CENTRAL RETINAL VEIN OCCLUSION WITH MACULAR EDEMA OF BOTH EYES: Primary | ICD-10-CM

## 2020-12-31 PROCEDURE — 99499 UNLISTED E&M SERVICE: CPT | Mod: HCNC,S$GLB,, | Performed by: OPHTHALMOLOGY

## 2020-12-31 PROCEDURE — 92134 POSTERIOR SEGMENT OCT RETINA (OCULAR COHERENCE TOMOGRAPHY)-BOTH EYES: ICD-10-PCS | Mod: HCNC,S$GLB,, | Performed by: OPHTHALMOLOGY

## 2020-12-31 PROCEDURE — 67028 PR INJECT INTRAVITREAL PHARMCOLOGIC: ICD-10-PCS | Mod: 50,HCNC,S$GLB, | Performed by: OPHTHALMOLOGY

## 2020-12-31 PROCEDURE — 92134 CPTRZ OPH DX IMG PST SGM RTA: CPT | Mod: HCNC,S$GLB,, | Performed by: OPHTHALMOLOGY

## 2020-12-31 PROCEDURE — 99499 NO LOS: ICD-10-PCS | Mod: HCNC,S$GLB,, | Performed by: OPHTHALMOLOGY

## 2020-12-31 PROCEDURE — 67028 INJECTION EYE DRUG: CPT | Mod: 50,HCNC,S$GLB, | Performed by: OPHTHALMOLOGY

## 2020-12-31 NOTE — TELEPHONE ENCOUNTER
No answer at # listed.  Unable to s/w [place] re pt d/t not being listed as having access to medical info./rpr

## 2020-12-31 NOTE — PROGRESS NOTES
===============================  Glo Dumont,  12/31/2020 today   85 y.o. female   Last visit [facility]: :12/3/2020   Last visit eye dept. 12/3/2020  [facility]:  Visual acuity was not recorded.  Tonometry     Tonometry (Applanation, 9:19 AM)       Right Left    Pressure 14 12               Not recorded         Not recorded         Not recorded        Chief Complaint   Patient presents with    CME     EYLEA OU     Ophthalmic Medications     Ophthalmic - Anti-inflammatory, Glucocorticoids Start End     DUREZOL 0.05 % Drop ophthalmic solution    7/18/2016     Class: Historical Med    Ophthalmic-Intraocular Press. Reducing, Alina. Alpha Adrenergic Agonists Start End     brimonidine 0.2% (ALPHAGAN) 0.2 % Drop    10/28/2020     Sig: Place 1 drop into both eyes 2 (two) times a day.    Route: Both Eyes    Ophthalmic - Anti-inflammatory, NSAIDs Start End     ketorolac 0.5% (ACULAR) 0.5 % Drop    7/19/2016     Class: Historical Med        ________________  12/31/2020 today  HPI     CME      Additional comments: EYLEA OU              Comments     1. Steroid responder glaucoma  2. PCIOL OU MGM  3. SARCOIDOSIS  H\O chronic UVEITIS OU  4. Central vein occlusion of retina, left   5. Retinal edema  6. CME OU   H/o Avastin and Eylea OS  Had been getting Ozurdex OU with Dr. Shahrzad Kenneya OU 9-30-20 // 10- 28- 2020./ 12/3/20    Cosopt BID OU  Brimonidine BID OU          Last edited by Jessica Jensen on 12/31/2020  9:06 AM. 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 - Primary    Relevant Medications    aflibercept Soln 2 mg (Completed)    aflibercept Soln 2 mg (Completed)    Other Relevant Orders    Prior authorization Order    Posterior Segment OCT Retina-Both eyes (Completed)        Ou cme  recnt hopistalication for dehrdartion    Has mild [retibial edema today   od pct worse os beter    rec eyel ou  nad medcial eval    ..    Injection Procedure Note:    12/31/2020  Diagnosis :  Ou cme   Today:   Eylea (afibercept) 2 mg/0.05 ml Intravitreal Injection , OU   Follow up: rtc  1 mo  - med jourdan barajas      Instructed to call 24/7 for any worsening of vision. Check Both eyes daily. Gave patient my home phone number.  Risks, benefits, and alternatives to treatment discussed in detail with the patient.  The patient voiced understanding and wished to proceed with the procedure.     Patient Identified and Time Out complete  Subconjunctival bleb - xylocaine with epi 2%   and Betadine.  Inject at Eylea (afibercept) 2 mg/0.05 ml Intravitreal Injection , OU 6:00 @ 3.5-4mm posterior to limbus  1 stop: no   Post Operative Dx: Same  Complications: None  Follow up as above.      .      ===============================

## 2020-12-31 NOTE — TELEPHONE ENCOUNTER
----- Message from Melissa Verdin sent at 12/31/2020 11:14 AM CST -----  Regarding: Patient Call Back  Contact: Friend of the pt- [place]/ Patient  Type: Patient Call Back    Who called: Friend of the abhinav Blair/ Patient     What is the request in detail: Pt is requesting a call back in regards to her medication.     Can the clinic reply by [facility]?    Would the patient rather a call back or a response via [facility]? Call back     Best call back number: [phone]

## 2021-01-08 ENCOUNTER — TELEPHONE (OUTPATIENT)
Dept: FAMILY MEDICINE | Facility: CLINIC | Age: 86
End: 2021-01-08

## 2021-01-20 ENCOUNTER — TELEPHONE (OUTPATIENT)
Dept: FAMILY MEDICINE | Facility: CLINIC | Age: 86
End: 2021-01-20

## 2021-02-01 ENCOUNTER — LAB VISIT (OUTPATIENT)
Dept: LAB | Facility: HOSPITAL | Age: 86
End: 2021-02-01
Payer: MEDICARE

## 2021-02-01 ENCOUNTER — OFFICE VISIT (OUTPATIENT)
Dept: FAMILY MEDICINE | Facility: CLINIC | Age: 86
End: 2021-02-01
Payer: MEDICARE

## 2021-02-01 VITALS
SYSTOLIC BLOOD PRESSURE: 122 MMHG | BODY MASS INDEX: 25.23 KG/M2 | HEIGHT: 61 IN | TEMPERATURE: 97 F | DIASTOLIC BLOOD PRESSURE: 58 MMHG | HEART RATE: 68 BPM | WEIGHT: 133.63 LBS

## 2021-02-01 DIAGNOSIS — R51.9 NONINTRACTABLE HEADACHE, UNSPECIFIED CHRONICITY PATTERN, UNSPECIFIED HEADACHE TYPE: ICD-10-CM

## 2021-02-01 DIAGNOSIS — R11.2 NAUSEA AND VOMITING, INTRACTABILITY OF VOMITING NOT SPECIFIED, UNSPECIFIED VOMITING TYPE: ICD-10-CM

## 2021-02-01 DIAGNOSIS — R11.2 NAUSEA AND VOMITING, INTRACTABILITY OF VOMITING NOT SPECIFIED, UNSPECIFIED VOMITING TYPE: Primary | ICD-10-CM

## 2021-02-01 LAB
ERYTHROCYTE [DISTWIDTH] IN BLOOD BY AUTOMATED COUNT: 15.2 % (ref 11.5–14.5)
HCT VFR BLD AUTO: 29.9 % (ref 37–48.5)
HGB BLD-MCNC: 9.9 G/DL (ref 12–16)
MCH RBC QN AUTO: 28.1 PG (ref 27–31)
MCHC RBC AUTO-ENTMCNC: 33.1 G/DL (ref 32–36)
MCV RBC AUTO: 85 FL (ref 82–98)
PLATELET # BLD AUTO: 320 K/UL (ref 150–350)
PMV BLD AUTO: 10.3 FL (ref 9.2–12.9)
RBC # BLD AUTO: 3.52 M/UL (ref 4–5.4)
WBC # BLD AUTO: 6.35 K/UL (ref 3.9–12.7)

## 2021-02-01 PROCEDURE — 83690 ASSAY OF LIPASE: CPT

## 2021-02-01 PROCEDURE — 99214 PR OFFICE/OUTPT VISIT, EST, LEVL IV, 30-39 MIN: ICD-10-PCS | Mod: S$GLB,,, | Performed by: FAMILY MEDICINE

## 2021-02-01 PROCEDURE — 1101F PR PT FALLS ASSESS DOC 0-1 FALLS W/OUT INJ PAST YR: ICD-10-PCS | Mod: CPTII,S$GLB,, | Performed by: FAMILY MEDICINE

## 2021-02-01 PROCEDURE — 3288F PR FALLS RISK ASSESSMENT DOCUMENTED: ICD-10-PCS | Mod: CPTII,S$GLB,, | Performed by: FAMILY MEDICINE

## 2021-02-01 PROCEDURE — 3074F PR MOST RECENT SYSTOLIC BLOOD PRESSURE < 130 MM HG: ICD-10-PCS | Mod: CPTII,S$GLB,, | Performed by: FAMILY MEDICINE

## 2021-02-01 PROCEDURE — 1159F PR MEDICATION LIST DOCUMENTED IN MEDICAL RECORD: ICD-10-PCS | Mod: S$GLB,,, | Performed by: FAMILY MEDICINE

## 2021-02-01 PROCEDURE — 1126F PR PAIN SEVERITY QUANTIFIED, NO PAIN PRESENT: ICD-10-PCS | Mod: S$GLB,,, | Performed by: FAMILY MEDICINE

## 2021-02-01 PROCEDURE — 80053 COMPREHEN METABOLIC PANEL: CPT

## 2021-02-01 PROCEDURE — 3078F DIAST BP <80 MM HG: CPT | Mod: CPTII,S$GLB,, | Performed by: FAMILY MEDICINE

## 2021-02-01 PROCEDURE — 1101F PT FALLS ASSESS-DOCD LE1/YR: CPT | Mod: CPTII,S$GLB,, | Performed by: FAMILY MEDICINE

## 2021-02-01 PROCEDURE — 99214 OFFICE O/P EST MOD 30 MIN: CPT | Mod: S$GLB,,, | Performed by: FAMILY MEDICINE

## 2021-02-01 PROCEDURE — 85027 COMPLETE CBC AUTOMATED: CPT

## 2021-02-01 PROCEDURE — 1159F MED LIST DOCD IN RCRD: CPT | Mod: S$GLB,,, | Performed by: FAMILY MEDICINE

## 2021-02-01 PROCEDURE — U0003 INFECTIOUS AGENT DETECTION BY NUCLEIC ACID (DNA OR RNA); SEVERE ACUTE RESPIRATORY SYNDROME CORONAVIRUS 2 (SARS-COV-2) (CORONAVIRUS DISEASE [COVID-19]), AMPLIFIED PROBE TECHNIQUE, MAKING USE OF HIGH THROUGHPUT TECHNOLOGIES AS DESCRIBED BY CMS-2020-01-R: HCPCS

## 2021-02-01 PROCEDURE — 1126F AMNT PAIN NOTED NONE PRSNT: CPT | Mod: S$GLB,,, | Performed by: FAMILY MEDICINE

## 2021-02-01 PROCEDURE — 3288F FALL RISK ASSESSMENT DOCD: CPT | Mod: CPTII,S$GLB,, | Performed by: FAMILY MEDICINE

## 2021-02-01 PROCEDURE — 3074F SYST BP LT 130 MM HG: CPT | Mod: CPTII,S$GLB,, | Performed by: FAMILY MEDICINE

## 2021-02-01 PROCEDURE — 36415 COLL VENOUS BLD VENIPUNCTURE: CPT | Mod: PO

## 2021-02-01 PROCEDURE — 3078F PR MOST RECENT DIASTOLIC BLOOD PRESSURE < 80 MM HG: ICD-10-PCS | Mod: CPTII,S$GLB,, | Performed by: FAMILY MEDICINE

## 2021-02-01 PROCEDURE — 99999 PR PBB SHADOW E&M-EST. PATIENT-LVL IV: CPT | Mod: PBBFAC,,, | Performed by: FAMILY MEDICINE

## 2021-02-01 PROCEDURE — 99999 PR PBB SHADOW E&M-EST. PATIENT-LVL IV: ICD-10-PCS | Mod: PBBFAC,,, | Performed by: FAMILY MEDICINE

## 2021-02-01 RX ORDER — ONDANSETRON 4 MG/1
4 TABLET, ORALLY DISINTEGRATING ORAL EVERY 6 HOURS PRN
Qty: 30 TABLET | Refills: 0 | Status: SHIPPED | OUTPATIENT
Start: 2021-02-01 | End: 2021-05-27

## 2021-02-01 RX ORDER — PANTOPRAZOLE SODIUM 40 MG/1
40 TABLET, DELAYED RELEASE ORAL DAILY
Qty: 30 TABLET | Refills: 0 | Status: SHIPPED | OUTPATIENT
Start: 2021-02-01 | End: 2023-04-05

## 2021-02-02 LAB
ALBUMIN SERPL BCP-MCNC: 3.8 G/DL (ref 3.5–5.2)
ALP SERPL-CCNC: 61 U/L (ref 55–135)
ALT SERPL W/O P-5'-P-CCNC: 18 U/L (ref 10–44)
ANION GAP SERPL CALC-SCNC: 8 MMOL/L (ref 8–16)
AST SERPL-CCNC: 16 U/L (ref 10–40)
BILIRUB SERPL-MCNC: 0.3 MG/DL (ref 0.1–1)
BUN SERPL-MCNC: 37 MG/DL (ref 8–23)
CALCIUM SERPL-MCNC: 10.2 MG/DL (ref 8.7–10.5)
CHLORIDE SERPL-SCNC: 107 MMOL/L (ref 95–110)
CO2 SERPL-SCNC: 21 MMOL/L (ref 23–29)
CREAT SERPL-MCNC: 1.7 MG/DL (ref 0.5–1.4)
EST. GFR  (AFRICAN AMERICAN): 31.7 ML/MIN/1.73 M^2
EST. GFR  (NON AFRICAN AMERICAN): 27.5 ML/MIN/1.73 M^2
GLUCOSE SERPL-MCNC: 112 MG/DL (ref 70–110)
LIPASE SERPL-CCNC: 28 U/L (ref 4–60)
POTASSIUM SERPL-SCNC: 3.9 MMOL/L (ref 3.5–5.1)
PROT SERPL-MCNC: 7.2 G/DL (ref 6–8.4)
SARS-COV-2 RNA RESP QL NAA+PROBE: NOT DETECTED
SODIUM SERPL-SCNC: 136 MMOL/L (ref 136–145)

## 2021-02-02 RX ORDER — SULFAMETHOXAZOLE AND TRIMETHOPRIM 800; 160 MG/1; MG/1
1 TABLET ORAL DAILY
Qty: 10 TABLET | Refills: 0 | Status: SHIPPED | OUTPATIENT
Start: 2021-02-02 | End: 2021-02-12

## 2021-02-03 ENCOUNTER — TELEPHONE (OUTPATIENT)
Dept: FAMILY MEDICINE | Facility: CLINIC | Age: 86
End: 2021-02-03

## 2021-02-05 ENCOUNTER — TELEPHONE (OUTPATIENT)
Dept: FAMILY MEDICINE | Facility: CLINIC | Age: 86
End: 2021-02-05

## 2021-02-10 ENCOUNTER — OFFICE VISIT (OUTPATIENT)
Dept: TRANSPLANT | Facility: CLINIC | Age: 86
End: 2021-02-10
Payer: MEDICARE

## 2021-02-10 VITALS
WEIGHT: 133.19 LBS | BODY MASS INDEX: 25.16 KG/M2 | OXYGEN SATURATION: 98 % | DIASTOLIC BLOOD PRESSURE: 55 MMHG | SYSTOLIC BLOOD PRESSURE: 120 MMHG | HEART RATE: 67 BPM

## 2021-02-10 DIAGNOSIS — I25.10 CORONARY ARTERY DISEASE, OCCLUSIVE: Chronic | ICD-10-CM

## 2021-02-10 DIAGNOSIS — I11.0 HYPERTENSIVE HEART DISEASE WITH HEART FAILURE: ICD-10-CM

## 2021-02-10 DIAGNOSIS — I50.32 CHRONIC DIASTOLIC HF (HEART FAILURE), NYHA CLASS 2: Primary | Chronic | ICD-10-CM

## 2021-02-10 DIAGNOSIS — I10 ESSENTIAL HYPERTENSION: Chronic | ICD-10-CM

## 2021-02-10 DIAGNOSIS — Z98.61 HISTORY OF CORONARY ANGIOPLASTY: ICD-10-CM

## 2021-02-10 DIAGNOSIS — I20.9 ANGINA, CLASS II: Chronic | ICD-10-CM

## 2021-02-10 PROCEDURE — 99214 OFFICE O/P EST MOD 30 MIN: CPT | Mod: S$GLB,,, | Performed by: NUCLEAR MEDICINE

## 2021-02-10 PROCEDURE — 1159F PR MEDICATION LIST DOCUMENTED IN MEDICAL RECORD: ICD-10-PCS | Mod: S$GLB,,, | Performed by: NUCLEAR MEDICINE

## 2021-02-10 PROCEDURE — 99999 PR PBB SHADOW E&M-EST. PATIENT-LVL V: ICD-10-PCS | Mod: PBBFAC,,, | Performed by: NUCLEAR MEDICINE

## 2021-02-10 PROCEDURE — 3078F DIAST BP <80 MM HG: CPT | Mod: CPTII,S$GLB,, | Performed by: NUCLEAR MEDICINE

## 2021-02-10 PROCEDURE — 99999 PR PBB SHADOW E&M-EST. PATIENT-LVL V: CPT | Mod: PBBFAC,,, | Performed by: NUCLEAR MEDICINE

## 2021-02-10 PROCEDURE — 3074F SYST BP LT 130 MM HG: CPT | Mod: CPTII,S$GLB,, | Performed by: NUCLEAR MEDICINE

## 2021-02-10 PROCEDURE — 3078F PR MOST RECENT DIASTOLIC BLOOD PRESSURE < 80 MM HG: ICD-10-PCS | Mod: CPTII,S$GLB,, | Performed by: NUCLEAR MEDICINE

## 2021-02-10 PROCEDURE — 1159F MED LIST DOCD IN RCRD: CPT | Mod: S$GLB,,, | Performed by: NUCLEAR MEDICINE

## 2021-02-10 PROCEDURE — 99214 PR OFFICE/OUTPT VISIT, EST, LEVL IV, 30-39 MIN: ICD-10-PCS | Mod: S$GLB,,, | Performed by: NUCLEAR MEDICINE

## 2021-02-10 PROCEDURE — 3074F PR MOST RECENT SYSTOLIC BLOOD PRESSURE < 130 MM HG: ICD-10-PCS | Mod: CPTII,S$GLB,, | Performed by: NUCLEAR MEDICINE

## 2021-02-10 RX ORDER — NITROGLYCERIN 0.4 MG/1
0.4 TABLET SUBLINGUAL EVERY 5 MIN PRN
Qty: 25 TABLET | Refills: 4 | Status: SHIPPED | OUTPATIENT
Start: 2021-02-10 | End: 2022-03-08 | Stop reason: SDUPTHER

## 2021-02-18 ENCOUNTER — TELEPHONE (OUTPATIENT)
Dept: FAMILY MEDICINE | Facility: CLINIC | Age: 86
End: 2021-02-18

## 2021-03-02 ENCOUNTER — PROCEDURE VISIT (OUTPATIENT)
Dept: OPHTHALMOLOGY | Facility: CLINIC | Age: 86
End: 2021-03-02
Payer: MEDICARE

## 2021-03-02 DIAGNOSIS — H35.353 CME (CYSTOID MACULAR EDEMA), BILATERAL: Primary | ICD-10-CM

## 2021-03-02 PROCEDURE — 67028 PR INJECT INTRAVITREAL PHARMCOLOGIC: ICD-10-PCS | Mod: 50,S$GLB,, | Performed by: OPHTHALMOLOGY

## 2021-03-02 PROCEDURE — 92134 CPTRZ OPH DX IMG PST SGM RTA: CPT | Mod: S$GLB,,, | Performed by: OPHTHALMOLOGY

## 2021-03-02 PROCEDURE — 99499 UNLISTED E&M SERVICE: CPT | Mod: S$GLB,,, | Performed by: OPHTHALMOLOGY

## 2021-03-02 PROCEDURE — 92134 POSTERIOR SEGMENT OCT RETINA (OCULAR COHERENCE TOMOGRAPHY)-BOTH EYES: ICD-10-PCS | Mod: S$GLB,,, | Performed by: OPHTHALMOLOGY

## 2021-03-02 PROCEDURE — 67028 INJECTION EYE DRUG: CPT | Mod: 50,S$GLB,, | Performed by: OPHTHALMOLOGY

## 2021-03-02 PROCEDURE — 99499 NO LOS: ICD-10-PCS | Mod: S$GLB,,, | Performed by: OPHTHALMOLOGY

## 2021-03-02 RX ORDER — PREDNISOLONE ACETATE 10 MG/ML
1 SUSPENSION/ DROPS OPHTHALMIC 4 TIMES DAILY
Qty: 10 ML | Refills: 1 | Status: SHIPPED | OUTPATIENT
Start: 2021-03-02 | End: 2021-05-27

## 2021-03-02 RX ORDER — KETOROLAC TROMETHAMINE 5 MG/ML
1 SOLUTION OPHTHALMIC 4 TIMES DAILY
Qty: 10 ML | Refills: 1 | Status: SHIPPED | OUTPATIENT
Start: 2021-03-02 | End: 2021-05-27

## 2021-04-14 ENCOUNTER — PROCEDURE VISIT (OUTPATIENT)
Dept: OPHTHALMOLOGY | Facility: CLINIC | Age: 86
End: 2021-04-14
Payer: MEDICARE

## 2021-04-14 DIAGNOSIS — H35.353 CME (CYSTOID MACULAR EDEMA), BILATERAL: Primary | ICD-10-CM

## 2021-04-14 DIAGNOSIS — H34.8130 CENTRAL RETINAL VEIN OCCLUSION WITH MACULAR EDEMA OF BOTH EYES: ICD-10-CM

## 2021-04-14 PROCEDURE — 92134 POSTERIOR SEGMENT OCT RETINA (OCULAR COHERENCE TOMOGRAPHY)-BOTH EYES: ICD-10-PCS | Mod: S$GLB,,, | Performed by: OPHTHALMOLOGY

## 2021-04-14 PROCEDURE — 67028 PR INJECT INTRAVITREAL PHARMCOLOGIC: ICD-10-PCS | Mod: 50,S$GLB,, | Performed by: OPHTHALMOLOGY

## 2021-04-14 PROCEDURE — 99499 NO LOS: ICD-10-PCS | Mod: S$GLB,,, | Performed by: OPHTHALMOLOGY

## 2021-04-14 PROCEDURE — 92134 CPTRZ OPH DX IMG PST SGM RTA: CPT | Mod: S$GLB,,, | Performed by: OPHTHALMOLOGY

## 2021-04-14 PROCEDURE — 67028 INJECTION EYE DRUG: CPT | Mod: 50,S$GLB,, | Performed by: OPHTHALMOLOGY

## 2021-04-14 PROCEDURE — 99499 UNLISTED E&M SERVICE: CPT | Mod: S$GLB,,, | Performed by: OPHTHALMOLOGY

## 2021-04-14 RX ORDER — IBUPROFEN 800 MG/1
TABLET ORAL
COMMUNITY
Start: 2021-03-05 | End: 2021-06-22

## 2021-04-20 ENCOUNTER — OFFICE VISIT (OUTPATIENT)
Dept: FAMILY MEDICINE | Facility: CLINIC | Age: 86
End: 2021-04-20
Payer: MEDICARE

## 2021-04-20 VITALS
HEIGHT: 61 IN | BODY MASS INDEX: 24.1 KG/M2 | TEMPERATURE: 99 F | WEIGHT: 127.63 LBS | OXYGEN SATURATION: 97 % | DIASTOLIC BLOOD PRESSURE: 65 MMHG | HEART RATE: 90 BPM | SYSTOLIC BLOOD PRESSURE: 135 MMHG

## 2021-04-20 DIAGNOSIS — F41.9 ANXIETY AND DEPRESSION: ICD-10-CM

## 2021-04-20 DIAGNOSIS — N18.31 STAGE 3A CHRONIC KIDNEY DISEASE: ICD-10-CM

## 2021-04-20 DIAGNOSIS — I50.32 CHRONIC DIASTOLIC HF (HEART FAILURE), NYHA CLASS 2: Chronic | ICD-10-CM

## 2021-04-20 DIAGNOSIS — F32.A ANXIETY AND DEPRESSION: ICD-10-CM

## 2021-04-20 DIAGNOSIS — Z29.9 PREVENTIVE MEASURE: ICD-10-CM

## 2021-04-20 DIAGNOSIS — I10 ESSENTIAL HYPERTENSION: Primary | Chronic | ICD-10-CM

## 2021-04-20 DIAGNOSIS — E55.9 VITAMIN D DEFICIENCY: ICD-10-CM

## 2021-04-20 DIAGNOSIS — E78.49 OTHER HYPERLIPIDEMIA: ICD-10-CM

## 2021-04-20 PROCEDURE — 1159F MED LIST DOCD IN RCRD: CPT | Mod: S$GLB,,, | Performed by: INTERNAL MEDICINE

## 2021-04-20 PROCEDURE — 99999 PR PBB SHADOW E&M-EST. PATIENT-LVL V: ICD-10-PCS | Mod: PBBFAC,,, | Performed by: INTERNAL MEDICINE

## 2021-04-20 PROCEDURE — 99999 PR PBB SHADOW E&M-EST. PATIENT-LVL V: CPT | Mod: PBBFAC,,, | Performed by: INTERNAL MEDICINE

## 2021-04-20 PROCEDURE — 1126F PR PAIN SEVERITY QUANTIFIED, NO PAIN PRESENT: ICD-10-PCS | Mod: S$GLB,,, | Performed by: INTERNAL MEDICINE

## 2021-04-20 PROCEDURE — 3078F PR MOST RECENT DIASTOLIC BLOOD PRESSURE < 80 MM HG: ICD-10-PCS | Mod: CPTII,S$GLB,, | Performed by: INTERNAL MEDICINE

## 2021-04-20 PROCEDURE — 99214 PR OFFICE/OUTPT VISIT, EST, LEVL IV, 30-39 MIN: ICD-10-PCS | Mod: S$GLB,,, | Performed by: INTERNAL MEDICINE

## 2021-04-20 PROCEDURE — 1126F AMNT PAIN NOTED NONE PRSNT: CPT | Mod: S$GLB,,, | Performed by: INTERNAL MEDICINE

## 2021-04-20 PROCEDURE — 1159F PR MEDICATION LIST DOCUMENTED IN MEDICAL RECORD: ICD-10-PCS | Mod: S$GLB,,, | Performed by: INTERNAL MEDICINE

## 2021-04-20 PROCEDURE — 3075F PR MOST RECENT SYSTOLIC BLOOD PRESS GE 130-139MM HG: ICD-10-PCS | Mod: CPTII,S$GLB,, | Performed by: INTERNAL MEDICINE

## 2021-04-20 PROCEDURE — 1101F PR PT FALLS ASSESS DOC 0-1 FALLS W/OUT INJ PAST YR: ICD-10-PCS | Mod: CPTII,S$GLB,, | Performed by: INTERNAL MEDICINE

## 2021-04-20 PROCEDURE — 99499 RISK ADDL DX/OHS AUDIT: ICD-10-PCS | Mod: S$GLB,,, | Performed by: INTERNAL MEDICINE

## 2021-04-20 PROCEDURE — 99499 UNLISTED E&M SERVICE: CPT | Mod: S$GLB,,, | Performed by: INTERNAL MEDICINE

## 2021-04-20 PROCEDURE — 99214 OFFICE O/P EST MOD 30 MIN: CPT | Mod: S$GLB,,, | Performed by: INTERNAL MEDICINE

## 2021-04-20 PROCEDURE — 3288F PR FALLS RISK ASSESSMENT DOCUMENTED: ICD-10-PCS | Mod: CPTII,S$GLB,, | Performed by: INTERNAL MEDICINE

## 2021-04-20 PROCEDURE — 1101F PT FALLS ASSESS-DOCD LE1/YR: CPT | Mod: CPTII,S$GLB,, | Performed by: INTERNAL MEDICINE

## 2021-04-20 PROCEDURE — 3075F SYST BP GE 130 - 139MM HG: CPT | Mod: CPTII,S$GLB,, | Performed by: INTERNAL MEDICINE

## 2021-04-20 PROCEDURE — 3078F DIAST BP <80 MM HG: CPT | Mod: CPTII,S$GLB,, | Performed by: INTERNAL MEDICINE

## 2021-04-20 PROCEDURE — 3288F FALL RISK ASSESSMENT DOCD: CPT | Mod: CPTII,S$GLB,, | Performed by: INTERNAL MEDICINE

## 2021-05-05 ENCOUNTER — PES CALL (OUTPATIENT)
Dept: ADMINISTRATIVE | Facility: CLINIC | Age: 86
End: 2021-05-05

## 2021-05-24 DIAGNOSIS — I25.10 CORONARY ARTERY DISEASE, OCCLUSIVE: Primary | ICD-10-CM

## 2021-05-24 DIAGNOSIS — I50.32 CHRONIC DIASTOLIC HF (HEART FAILURE), NYHA CLASS 2: ICD-10-CM

## 2021-05-27 ENCOUNTER — TELEPHONE (OUTPATIENT)
Dept: PRIMARY CARE CLINIC | Facility: CLINIC | Age: 86
End: 2021-05-27

## 2021-05-27 ENCOUNTER — TELEPHONE (OUTPATIENT)
Dept: CARDIOLOGY | Facility: CLINIC | Age: 86
End: 2021-05-27

## 2021-05-27 ENCOUNTER — HOSPITAL ENCOUNTER (EMERGENCY)
Facility: HOSPITAL | Age: 86
Discharge: HOME | End: 2021-05-27
Attending: EMERGENCY MEDICINE
Payer: MEDICARE

## 2021-05-27 ENCOUNTER — PROCEDURE VISIT (OUTPATIENT)
Dept: OPHTHALMOLOGY | Facility: CLINIC | Age: 86
End: 2021-05-27
Payer: MEDICARE

## 2021-05-27 ENCOUNTER — HOSPITAL ENCOUNTER (OUTPATIENT)
Dept: CARDIOLOGY | Facility: HOSPITAL | Age: 86
Discharge: HOME | End: 2021-05-27
Attending: NUCLEAR MEDICINE
Payer: MEDICARE

## 2021-05-27 VITALS
BODY MASS INDEX: 25.23 KG/M2 | TEMPERATURE: 99 F | DIASTOLIC BLOOD PRESSURE: 98 MMHG | OXYGEN SATURATION: 98 % | SYSTOLIC BLOOD PRESSURE: 168 MMHG | HEIGHT: 61 IN | HEART RATE: 60 BPM | RESPIRATION RATE: 14 BRPM | WEIGHT: 133.63 LBS

## 2021-05-27 VITALS
BODY MASS INDEX: 23.98 KG/M2 | HEIGHT: 61 IN | DIASTOLIC BLOOD PRESSURE: 65 MMHG | SYSTOLIC BLOOD PRESSURE: 135 MMHG | HEART RATE: 67 BPM | WEIGHT: 127 LBS

## 2021-05-27 DIAGNOSIS — R06.02 SOB (SHORTNESS OF BREATH): Primary | ICD-10-CM

## 2021-05-27 DIAGNOSIS — H34.8130 CENTRAL RETINAL VEIN OCCLUSION WITH MACULAR EDEMA OF BOTH EYES: ICD-10-CM

## 2021-05-27 DIAGNOSIS — I50.32 CHRONIC DIASTOLIC HF (HEART FAILURE), NYHA CLASS 2: Primary | ICD-10-CM

## 2021-05-27 DIAGNOSIS — I50.32 CHRONIC DIASTOLIC HF (HEART FAILURE), NYHA CLASS 2: ICD-10-CM

## 2021-05-27 DIAGNOSIS — E87.6 HYPOKALEMIA: ICD-10-CM

## 2021-05-27 DIAGNOSIS — I10 ESSENTIAL HYPERTENSION: Primary | ICD-10-CM

## 2021-05-27 LAB
ALBUMIN SERPL BCP-MCNC: 3.4 G/DL (ref 3.5–5.2)
ALP SERPL-CCNC: 53 U/L (ref 55–135)
ALT SERPL W/O P-5'-P-CCNC: 13 U/L (ref 10–44)
ANION GAP SERPL CALC-SCNC: 11 MMOL/L (ref 8–16)
ANION GAP SERPL CALC-SCNC: 12 MMOL/L (ref 8–16)
AST SERPL-CCNC: 14 U/L (ref 10–40)
BASOPHILS # BLD AUTO: 0.03 K/UL (ref 0–0.2)
BASOPHILS NFR BLD: 0.3 % (ref 0–1.9)
BILIRUB SERPL-MCNC: 0.6 MG/DL (ref 0.1–1)
BUN SERPL-MCNC: 25 MG/DL (ref 8–23)
BUN SERPL-MCNC: 26 MG/DL (ref 8–23)
CALCIUM SERPL-MCNC: 8.6 MG/DL (ref 8.7–10.5)
CALCIUM SERPL-MCNC: 8.9 MG/DL (ref 8.7–10.5)
CHLORIDE SERPL-SCNC: 103 MMOL/L (ref 95–110)
CHLORIDE SERPL-SCNC: 106 MMOL/L (ref 95–110)
CO2 SERPL-SCNC: 22 MMOL/L (ref 23–29)
CO2 SERPL-SCNC: 24 MMOL/L (ref 23–29)
CREAT SERPL-MCNC: 1.2 MG/DL (ref 0.5–1.4)
CREAT SERPL-MCNC: 1.3 MG/DL (ref 0.5–1.4)
DIFFERENTIAL METHOD: ABNORMAL
EOSINOPHIL # BLD AUTO: 0 K/UL (ref 0–0.5)
EOSINOPHIL NFR BLD: 0.4 % (ref 0–8)
ERYTHROCYTE [DISTWIDTH] IN BLOOD BY AUTOMATED COUNT: 14.6 % (ref 11.5–14.5)
EST. GFR  (AFRICAN AMERICAN): 44 ML/MIN/1.73 M^2
EST. GFR  (AFRICAN AMERICAN): 48 ML/MIN/1.73 M^2
EST. GFR  (NON AFRICAN AMERICAN): 38 ML/MIN/1.73 M^2
EST. GFR  (NON AFRICAN AMERICAN): 42 ML/MIN/1.73 M^2
GLUCOSE SERPL-MCNC: 117 MG/DL (ref 70–110)
GLUCOSE SERPL-MCNC: 98 MG/DL (ref 70–110)
HCT VFR BLD AUTO: 25.8 % (ref 37–48.5)
HGB BLD-MCNC: 8.7 G/DL (ref 12–16)
IMM GRANULOCYTES # BLD AUTO: 0.04 K/UL (ref 0–0.04)
IMM GRANULOCYTES NFR BLD AUTO: 0.4 % (ref 0–0.5)
LYMPHOCYTES # BLD AUTO: 0.8 K/UL (ref 1–4.8)
LYMPHOCYTES NFR BLD: 8.1 % (ref 18–48)
MCH RBC QN AUTO: 28 PG (ref 27–31)
MCHC RBC AUTO-ENTMCNC: 33.7 G/DL (ref 32–36)
MCV RBC AUTO: 83 FL (ref 82–98)
MONOCYTES # BLD AUTO: 0.9 K/UL (ref 0.3–1)
MONOCYTES NFR BLD: 9.1 % (ref 4–15)
NEUTROPHILS # BLD AUTO: 7.9 K/UL (ref 1.8–7.7)
NEUTROPHILS NFR BLD: 81.7 % (ref 38–73)
NRBC BLD-RTO: 0 /100 WBC
PLATELET # BLD AUTO: 260 K/UL (ref 150–450)
PMV BLD AUTO: 10.3 FL (ref 9.2–12.9)
POTASSIUM SERPL-SCNC: 2.6 MMOL/L (ref 3.5–5.1)
POTASSIUM SERPL-SCNC: 2.9 MMOL/L (ref 3.5–5.1)
PROT SERPL-MCNC: 6.6 G/DL (ref 6–8.4)
RBC # BLD AUTO: 3.11 M/UL (ref 4–5.4)
SODIUM SERPL-SCNC: 138 MMOL/L (ref 136–145)
SODIUM SERPL-SCNC: 140 MMOL/L (ref 136–145)
WBC # BLD AUTO: 9.72 K/UL (ref 3.9–12.7)

## 2021-05-27 PROCEDURE — 85025 COMPLETE CBC W/AUTO DIFF WBC: CPT | Performed by: EMERGENCY MEDICINE

## 2021-05-27 PROCEDURE — 92134 CPTRZ OPH DX IMG PST SGM RTA: CPT | Mod: S$GLB,,, | Performed by: OPHTHALMOLOGY

## 2021-05-27 PROCEDURE — 93010 EKG 12-LEAD: ICD-10-PCS | Mod: ,,, | Performed by: INTERNAL MEDICINE

## 2021-05-27 PROCEDURE — 63600175 PHARM REV CODE 636 W HCPCS: Performed by: EMERGENCY MEDICINE

## 2021-05-27 PROCEDURE — 25000003 PHARM REV CODE 250: Performed by: EMERGENCY MEDICINE

## 2021-05-27 PROCEDURE — 93010 ELECTROCARDIOGRAM REPORT: CPT | Mod: ,,, | Performed by: INTERNAL MEDICINE

## 2021-05-27 PROCEDURE — 99284 EMERGENCY DEPT VISIT MOD MDM: CPT | Mod: 25

## 2021-05-27 PROCEDURE — 80048 BASIC METABOLIC PNL TOTAL CA: CPT | Mod: 91 | Performed by: EMERGENCY MEDICINE

## 2021-05-27 PROCEDURE — 92012 INTRM OPH EXAM EST PATIENT: CPT | Mod: S$GLB,,, | Performed by: OPHTHALMOLOGY

## 2021-05-27 PROCEDURE — 80053 COMPREHEN METABOLIC PANEL: CPT | Performed by: EMERGENCY MEDICINE

## 2021-05-27 PROCEDURE — 36415 COLL VENOUS BLD VENIPUNCTURE: CPT | Performed by: EMERGENCY MEDICINE

## 2021-05-27 PROCEDURE — 93306 TTE W/DOPPLER COMPLETE: CPT

## 2021-05-27 PROCEDURE — 96365 THER/PROPH/DIAG IV INF INIT: CPT | Mod: 59

## 2021-05-27 PROCEDURE — 93306 ECHO (CUPID ONLY): ICD-10-PCS | Mod: 26,,, | Performed by: INTERNAL MEDICINE

## 2021-05-27 PROCEDURE — 92012 PR EYE EXAM, EST PATIENT,INTERMED: ICD-10-PCS | Mod: S$GLB,,, | Performed by: OPHTHALMOLOGY

## 2021-05-27 PROCEDURE — 92134 POSTERIOR SEGMENT OCT RETINA (OCULAR COHERENCE TOMOGRAPHY)-BOTH EYES: ICD-10-PCS | Mod: S$GLB,,, | Performed by: OPHTHALMOLOGY

## 2021-05-27 PROCEDURE — 93306 TTE W/DOPPLER COMPLETE: CPT | Mod: 26,,, | Performed by: INTERNAL MEDICINE

## 2021-05-27 PROCEDURE — 93005 ELECTROCARDIOGRAM TRACING: CPT

## 2021-05-27 RX ORDER — POTASSIUM CHLORIDE 7.45 MG/ML
10 INJECTION INTRAVENOUS
Status: COMPLETED | OUTPATIENT
Start: 2021-05-27 | End: 2021-05-27

## 2021-05-27 RX ORDER — POTASSIUM CHLORIDE 20 MEQ/1
40 TABLET, EXTENDED RELEASE ORAL
Status: COMPLETED | OUTPATIENT
Start: 2021-05-27 | End: 2021-05-27

## 2021-05-27 RX ORDER — CLONIDINE HYDROCHLORIDE 0.2 MG/1
0.2 TABLET ORAL
Status: COMPLETED | OUTPATIENT
Start: 2021-05-27 | End: 2021-05-27

## 2021-05-27 RX ORDER — POTASSIUM CHLORIDE 20 MEQ/1
20 TABLET, EXTENDED RELEASE ORAL DAILY
Qty: 10 TABLET | Refills: 0 | Status: SHIPPED | OUTPATIENT
Start: 2021-05-27 | End: 2021-06-01

## 2021-05-27 RX ORDER — SODIUM CHLORIDE AND POTASSIUM CHLORIDE 150; 900 MG/100ML; MG/100ML
INJECTION, SOLUTION INTRAVENOUS
Status: DISCONTINUED | OUTPATIENT
Start: 2021-05-27 | End: 2021-05-27

## 2021-05-27 RX ADMIN — CLONIDINE HYDROCHLORIDE 0.2 MG: 0.2 TABLET ORAL at 09:05

## 2021-05-27 RX ADMIN — POTASSIUM CHLORIDE 10 MEQ: 7.46 INJECTION, SOLUTION INTRAVENOUS at 08:05

## 2021-05-27 RX ADMIN — POTASSIUM CHLORIDE 40 MEQ: 1500 TABLET, EXTENDED RELEASE ORAL at 07:05

## 2021-05-28 LAB
ASCENDING AORTA: 3.21 CM
AV INDEX (PROSTH): 0.6
AV MEAN GRADIENT: 9 MMHG
AV PEAK GRADIENT: 19 MMHG
AV VALVE AREA: 1.69 CM2
AV VELOCITY RATIO: 0.61
BSA FOR ECHO PROCEDURE: 1.57 M2
CV ECHO LV RWT: 0.62 CM
DOP CALC AO PEAK VEL: 2.16 M/S
DOP CALC AO VTI: 41.59 CM
DOP CALC LVOT AREA: 2.8 CM2
DOP CALC LVOT DIAMETER: 1.89 CM
DOP CALC LVOT PEAK VEL: 1.31 M/S
DOP CALC LVOT STROKE VOLUME: 70.44 CM3
DOP CALC RVOT PEAK VEL: 0.59 M/S
DOP CALC RVOT VTI: 11.77 CM
DOP CALCLVOT PEAK VEL VTI: 25.12 CM
E WAVE DECELERATION TIME: 317.82 MSEC
E/A RATIO: 0.66
E/E' RATIO: 15 M/S
ECHO LV POSTERIOR WALL: 1.16 CM (ref 0.6–1.1)
EJECTION FRACTION: 60 %
FRACTIONAL SHORTENING: 39 % (ref 28–44)
INTERVENTRICULAR SEPTUM: 1.32 CM (ref 0.6–1.1)
IVRT: 88.49 MSEC
LA MAJOR: 5.03 CM
LA MINOR: 4.98 CM
LA WIDTH: 3.31 CM
LEFT ATRIUM SIZE: 3.88 CM
LEFT ATRIUM VOLUME INDEX: 35 ML/M2
LEFT ATRIUM VOLUME: 54.64 CM3
LEFT INTERNAL DIMENSION IN SYSTOLE: 2.27 CM (ref 2.1–4)
LEFT VENTRICLE DIASTOLIC VOLUME INDEX: 38.46 ML/M2
LEFT VENTRICLE DIASTOLIC VOLUME: 59.99 ML
LEFT VENTRICLE MASS INDEX: 101 G/M2
LEFT VENTRICLE SYSTOLIC VOLUME INDEX: 11.2 ML/M2
LEFT VENTRICLE SYSTOLIC VOLUME: 17.52 ML
LEFT VENTRICULAR INTERNAL DIMENSION IN DIASTOLE: 3.75 CM (ref 3.5–6)
LEFT VENTRICULAR MASS: 157.91 G
LV LATERAL E/E' RATIO: 15 M/S
LV SEPTAL E/E' RATIO: 15 M/S
MV A" WAVE DURATION": 10.28 MSEC
MV MEAN GRADIENT: 1 MMHG
MV PEAK A VEL: 1.37 M/S
MV PEAK E VEL: 0.9 M/S
MV PEAK GRADIENT: 11 MMHG
MV STENOSIS PRESSURE HALF TIME: 92.17 MS
MV VALVE AREA P 1/2 METHOD: 2.39 CM2
PISA TR MAX VEL: 2.93 M/S
PULM VEIN S/D RATIO: 2.03
PV MEAN GRADIENT: 1 MMHG
PV PEAK D VEL: 0.36 M/S
PV PEAK S VEL: 0.73 M/S
PV PEAK VELOCITY: 0.86 CM/S
RA MAJOR: 4.76 CM
RA PRESSURE: 3 MMHG
RA WIDTH: 3.4 CM
RIGHT VENTRICULAR END-DIASTOLIC DIMENSION: 2.93 CM
SINUS: 2.51 CM
STJ: 2.15 CM
TDI LATERAL: 0.06 M/S
TDI SEPTAL: 0.06 M/S
TDI: 0.06 M/S
TR MAX PG: 34 MMHG
TV REST PULMONARY ARTERY PRESSURE: 37 MMHG

## 2021-06-01 ENCOUNTER — OFFICE VISIT (OUTPATIENT)
Dept: TRANSPLANT | Facility: CLINIC | Age: 86
End: 2021-06-01
Payer: MEDICARE

## 2021-06-01 ENCOUNTER — HOSPITAL ENCOUNTER (OUTPATIENT)
Dept: CARDIOLOGY | Facility: HOSPITAL | Age: 86
Discharge: HOME | End: 2021-06-01
Attending: NUCLEAR MEDICINE
Payer: MEDICARE

## 2021-06-01 ENCOUNTER — TELEPHONE (OUTPATIENT)
Dept: TRANSPLANT | Facility: CLINIC | Age: 86
End: 2021-06-01

## 2021-06-01 VITALS
HEART RATE: 57 BPM | WEIGHT: 116.38 LBS | SYSTOLIC BLOOD PRESSURE: 195 MMHG | DIASTOLIC BLOOD PRESSURE: 85 MMHG | HEIGHT: 61 IN | BODY MASS INDEX: 21.97 KG/M2 | OXYGEN SATURATION: 98 % | RESPIRATION RATE: 19 BRPM

## 2021-06-01 DIAGNOSIS — I10 ESSENTIAL HYPERTENSION: Chronic | ICD-10-CM

## 2021-06-01 DIAGNOSIS — I25.10 CORONARY ARTERY DISEASE, OCCLUSIVE: ICD-10-CM

## 2021-06-01 DIAGNOSIS — I50.30 DIASTOLIC HEART FAILURE, NYHA CLASS 3: Primary | ICD-10-CM

## 2021-06-01 DIAGNOSIS — I25.10 CORONARY ARTERY DISEASE, OCCLUSIVE: Chronic | ICD-10-CM

## 2021-06-01 PROCEDURE — 3077F SYST BP >= 140 MM HG: CPT | Mod: CPTII,S$GLB,, | Performed by: NUCLEAR MEDICINE

## 2021-06-01 PROCEDURE — 3077F PR MOST RECENT SYSTOLIC BLOOD PRESSURE >= 140 MM HG: ICD-10-PCS | Mod: CPTII,S$GLB,, | Performed by: NUCLEAR MEDICINE

## 2021-06-01 PROCEDURE — 93010 ELECTROCARDIOGRAM REPORT: CPT | Mod: ,,, | Performed by: INTERNAL MEDICINE

## 2021-06-01 PROCEDURE — 99499 RISK ADDL DX/OHS AUDIT: ICD-10-PCS | Mod: S$GLB,,, | Performed by: NUCLEAR MEDICINE

## 2021-06-01 PROCEDURE — 3079F DIAST BP 80-89 MM HG: CPT | Mod: CPTII,S$GLB,, | Performed by: NUCLEAR MEDICINE

## 2021-06-01 PROCEDURE — 1159F MED LIST DOCD IN RCRD: CPT | Mod: S$GLB,,, | Performed by: NUCLEAR MEDICINE

## 2021-06-01 PROCEDURE — 93005 ELECTROCARDIOGRAM TRACING: CPT

## 2021-06-01 PROCEDURE — 99999 PR PBB SHADOW E&M-EST. PATIENT-LVL V: ICD-10-PCS | Mod: PBBFAC,,, | Performed by: NUCLEAR MEDICINE

## 2021-06-01 PROCEDURE — 3079F PR MOST RECENT DIASTOLIC BLOOD PRESSURE 80-89 MM HG: ICD-10-PCS | Mod: CPTII,S$GLB,, | Performed by: NUCLEAR MEDICINE

## 2021-06-01 PROCEDURE — 99215 OFFICE O/P EST HI 40 MIN: CPT | Mod: S$GLB,,, | Performed by: NUCLEAR MEDICINE

## 2021-06-01 PROCEDURE — 93010 EKG 12-LEAD: ICD-10-PCS | Mod: ,,, | Performed by: INTERNAL MEDICINE

## 2021-06-01 PROCEDURE — 99215 PR OFFICE/OUTPT VISIT, EST, LEVL V, 40-54 MIN: ICD-10-PCS | Mod: S$GLB,,, | Performed by: NUCLEAR MEDICINE

## 2021-06-01 PROCEDURE — 1159F PR MEDICATION LIST DOCUMENTED IN MEDICAL RECORD: ICD-10-PCS | Mod: S$GLB,,, | Performed by: NUCLEAR MEDICINE

## 2021-06-01 PROCEDURE — 99999 PR PBB SHADOW E&M-EST. PATIENT-LVL V: CPT | Mod: PBBFAC,,, | Performed by: NUCLEAR MEDICINE

## 2021-06-01 PROCEDURE — 99499 UNLISTED E&M SERVICE: CPT | Mod: S$GLB,,, | Performed by: NUCLEAR MEDICINE

## 2021-06-01 RX ORDER — SPIRONOLACTONE 25 MG/1
25 TABLET ORAL DAILY
Qty: 30 TABLET | Refills: 11 | Status: SHIPPED | OUTPATIENT
Start: 2021-06-01 | End: 2021-06-01 | Stop reason: SDUPTHER

## 2021-06-01 RX ORDER — METOPROLOL TARTRATE 50 MG/1
TABLET ORAL
Qty: 270 TABLET | Refills: 3
Start: 2021-06-01 | End: 2021-06-22

## 2021-06-01 RX ORDER — SPIRONOLACTONE 25 MG/1
25 TABLET ORAL DAILY
Qty: 30 TABLET | Refills: 11 | Status: SHIPPED | OUTPATIENT
Start: 2021-06-01 | End: 2021-06-08 | Stop reason: DRUGHIGH

## 2021-06-08 ENCOUNTER — TELEPHONE (OUTPATIENT)
Dept: TRANSPLANT | Facility: CLINIC | Age: 86
End: 2021-06-08

## 2021-06-08 ENCOUNTER — LAB VISIT (OUTPATIENT)
Dept: LAB | Facility: HOSPITAL | Age: 86
End: 2021-06-08
Attending: NUCLEAR MEDICINE
Payer: MEDICARE

## 2021-06-08 DIAGNOSIS — I50.30 DIASTOLIC HEART FAILURE, NYHA CLASS 3: ICD-10-CM

## 2021-06-08 DIAGNOSIS — I50.32 CHRONIC DIASTOLIC HF (HEART FAILURE), NYHA CLASS 2: Primary | ICD-10-CM

## 2021-06-08 LAB
ANION GAP SERPL CALC-SCNC: 11 MMOL/L (ref 8–16)
BUN SERPL-MCNC: 18 MG/DL (ref 8–23)
CALCIUM SERPL-MCNC: 9.7 MG/DL (ref 8.7–10.5)
CHLORIDE SERPL-SCNC: 109 MMOL/L (ref 95–110)
CO2 SERPL-SCNC: 24 MMOL/L (ref 23–29)
CREAT SERPL-MCNC: 1.3 MG/DL (ref 0.5–1.4)
EST. GFR  (AFRICAN AMERICAN): 44 ML/MIN/1.73 M^2
EST. GFR  (NON AFRICAN AMERICAN): 38 ML/MIN/1.73 M^2
GLUCOSE SERPL-MCNC: 95 MG/DL (ref 70–110)
POTASSIUM SERPL-SCNC: 3.3 MMOL/L (ref 3.5–5.1)
SODIUM SERPL-SCNC: 144 MMOL/L (ref 136–145)

## 2021-06-08 PROCEDURE — 36415 COLL VENOUS BLD VENIPUNCTURE: CPT | Performed by: NUCLEAR MEDICINE

## 2021-06-08 PROCEDURE — 80048 BASIC METABOLIC PNL TOTAL CA: CPT | Performed by: NUCLEAR MEDICINE

## 2021-06-08 RX ORDER — SPIRONOLACTONE 50 MG/1
50 TABLET, FILM COATED ORAL DAILY
COMMUNITY
End: 2021-06-15

## 2021-06-15 ENCOUNTER — LAB VISIT (OUTPATIENT)
Dept: LAB | Facility: HOSPITAL | Age: 86
End: 2021-06-15
Attending: NUCLEAR MEDICINE
Payer: MEDICARE

## 2021-06-15 ENCOUNTER — TELEPHONE (OUTPATIENT)
Dept: TRANSPLANT | Facility: CLINIC | Age: 86
End: 2021-06-15

## 2021-06-15 ENCOUNTER — OFFICE VISIT (OUTPATIENT)
Dept: TRANSPLANT | Facility: CLINIC | Age: 86
End: 2021-06-15
Payer: MEDICARE

## 2021-06-15 VITALS
OXYGEN SATURATION: 100 % | BODY MASS INDEX: 23.68 KG/M2 | WEIGHT: 125.44 LBS | HEIGHT: 61 IN | SYSTOLIC BLOOD PRESSURE: 180 MMHG | DIASTOLIC BLOOD PRESSURE: 70 MMHG | HEART RATE: 64 BPM

## 2021-06-15 DIAGNOSIS — I25.10 CORONARY ARTERY DISEASE, OCCLUSIVE: Chronic | ICD-10-CM

## 2021-06-15 DIAGNOSIS — I10 ESSENTIAL HYPERTENSION: Primary | Chronic | ICD-10-CM

## 2021-06-15 DIAGNOSIS — I50.30 DIASTOLIC HEART FAILURE, NYHA CLASS 3: ICD-10-CM

## 2021-06-15 DIAGNOSIS — I50.32 CHRONIC DIASTOLIC HF (HEART FAILURE), NYHA CLASS 2: Primary | ICD-10-CM

## 2021-06-15 DIAGNOSIS — I50.32 CHRONIC DIASTOLIC HF (HEART FAILURE), NYHA CLASS 2: ICD-10-CM

## 2021-06-15 LAB
ANION GAP SERPL CALC-SCNC: 11 MMOL/L (ref 8–16)
BUN SERPL-MCNC: 17 MG/DL (ref 8–23)
CALCIUM SERPL-MCNC: 8.9 MG/DL (ref 8.7–10.5)
CHLORIDE SERPL-SCNC: 109 MMOL/L (ref 95–110)
CO2 SERPL-SCNC: 23 MMOL/L (ref 23–29)
CREAT SERPL-MCNC: 1.1 MG/DL (ref 0.5–1.4)
EST. GFR  (AFRICAN AMERICAN): 54 ML/MIN/1.73 M^2
EST. GFR  (NON AFRICAN AMERICAN): 47 ML/MIN/1.73 M^2
GLUCOSE SERPL-MCNC: 94 MG/DL (ref 70–110)
POTASSIUM SERPL-SCNC: 3.7 MMOL/L (ref 3.5–5.1)
SODIUM SERPL-SCNC: 143 MMOL/L (ref 136–145)

## 2021-06-15 PROCEDURE — 99214 PR OFFICE/OUTPT VISIT, EST, LEVL IV, 30-39 MIN: ICD-10-PCS | Mod: S$GLB,,, | Performed by: NUCLEAR MEDICINE

## 2021-06-15 PROCEDURE — 36415 COLL VENOUS BLD VENIPUNCTURE: CPT | Performed by: NUCLEAR MEDICINE

## 2021-06-15 PROCEDURE — 3078F DIAST BP <80 MM HG: CPT | Mod: CPTII,S$GLB,, | Performed by: NUCLEAR MEDICINE

## 2021-06-15 PROCEDURE — 80048 BASIC METABOLIC PNL TOTAL CA: CPT | Performed by: NUCLEAR MEDICINE

## 2021-06-15 PROCEDURE — 99214 OFFICE O/P EST MOD 30 MIN: CPT | Mod: S$GLB,,, | Performed by: NUCLEAR MEDICINE

## 2021-06-15 PROCEDURE — 3077F SYST BP >= 140 MM HG: CPT | Mod: CPTII,S$GLB,, | Performed by: NUCLEAR MEDICINE

## 2021-06-15 PROCEDURE — 1159F MED LIST DOCD IN RCRD: CPT | Mod: S$GLB,,, | Performed by: NUCLEAR MEDICINE

## 2021-06-15 PROCEDURE — 99999 PR PBB SHADOW E&M-EST. PATIENT-LVL V: CPT | Mod: PBBFAC,,, | Performed by: NUCLEAR MEDICINE

## 2021-06-15 PROCEDURE — 1159F PR MEDICATION LIST DOCUMENTED IN MEDICAL RECORD: ICD-10-PCS | Mod: S$GLB,,, | Performed by: NUCLEAR MEDICINE

## 2021-06-15 PROCEDURE — 3078F PR MOST RECENT DIASTOLIC BLOOD PRESSURE < 80 MM HG: ICD-10-PCS | Mod: CPTII,S$GLB,, | Performed by: NUCLEAR MEDICINE

## 2021-06-15 PROCEDURE — 99999 PR PBB SHADOW E&M-EST. PATIENT-LVL V: ICD-10-PCS | Mod: PBBFAC,,, | Performed by: NUCLEAR MEDICINE

## 2021-06-15 PROCEDURE — 3077F PR MOST RECENT SYSTOLIC BLOOD PRESSURE >= 140 MM HG: ICD-10-PCS | Mod: CPTII,S$GLB,, | Performed by: NUCLEAR MEDICINE

## 2021-06-15 RX ORDER — SPIRONOLACTONE 25 MG/1
25 TABLET ORAL 2 TIMES DAILY
Qty: 270 TABLET | Refills: 3 | Status: SHIPPED | OUTPATIENT
Start: 2021-06-15 | End: 2021-06-25

## 2021-06-15 RX ORDER — SPIRONOLACTONE 25 MG/1
25 TABLET ORAL 2 TIMES DAILY
COMMUNITY
End: 2021-06-15 | Stop reason: SDUPTHER

## 2021-06-22 ENCOUNTER — TELEPHONE (OUTPATIENT)
Dept: TRANSPLANT | Facility: CLINIC | Age: 86
End: 2021-06-22

## 2021-06-22 ENCOUNTER — LAB VISIT (OUTPATIENT)
Dept: LAB | Facility: HOSPITAL | Age: 86
End: 2021-06-22
Attending: NUCLEAR MEDICINE
Payer: MEDICARE

## 2021-06-22 DIAGNOSIS — I50.32 CHRONIC DIASTOLIC HF (HEART FAILURE), NYHA CLASS 2: ICD-10-CM

## 2021-06-22 DIAGNOSIS — I50.32 CHRONIC DIASTOLIC HF (HEART FAILURE), NYHA CLASS 2: Primary | ICD-10-CM

## 2021-06-22 LAB
ANION GAP SERPL CALC-SCNC: 12 MMOL/L (ref 8–16)
BUN SERPL-MCNC: 16 MG/DL (ref 8–23)
CALCIUM SERPL-MCNC: 8.7 MG/DL (ref 8.7–10.5)
CHLORIDE SERPL-SCNC: 112 MMOL/L (ref 95–110)
CO2 SERPL-SCNC: 20 MMOL/L (ref 23–29)
CREAT SERPL-MCNC: 1.4 MG/DL (ref 0.5–1.4)
EST. GFR  (AFRICAN AMERICAN): 40 ML/MIN/1.73 M^2
EST. GFR  (NON AFRICAN AMERICAN): 35 ML/MIN/1.73 M^2
GLUCOSE SERPL-MCNC: 87 MG/DL (ref 70–110)
POTASSIUM SERPL-SCNC: 3 MMOL/L (ref 3.5–5.1)
SODIUM SERPL-SCNC: 144 MMOL/L (ref 136–145)

## 2021-06-22 PROCEDURE — 36415 COLL VENOUS BLD VENIPUNCTURE: CPT | Performed by: NUCLEAR MEDICINE

## 2021-06-22 PROCEDURE — 80048 BASIC METABOLIC PNL TOTAL CA: CPT | Performed by: NUCLEAR MEDICINE

## 2021-06-22 RX ORDER — METOPROLOL TARTRATE 50 MG/1
50 TABLET ORAL 2 TIMES DAILY
COMMUNITY
End: 2021-08-16

## 2021-06-25 ENCOUNTER — LAB VISIT (OUTPATIENT)
Dept: LAB | Facility: HOSPITAL | Age: 86
End: 2021-06-25
Attending: NUCLEAR MEDICINE
Payer: MEDICARE

## 2021-06-25 ENCOUNTER — TELEPHONE (OUTPATIENT)
Dept: TRANSPLANT | Facility: CLINIC | Age: 86
End: 2021-06-25

## 2021-06-25 DIAGNOSIS — I50.32 CHRONIC DIASTOLIC HF (HEART FAILURE), NYHA CLASS 2: ICD-10-CM

## 2021-06-25 DIAGNOSIS — I50.32 CHRONIC DIASTOLIC HF (HEART FAILURE), NYHA CLASS 2: Primary | ICD-10-CM

## 2021-06-25 LAB
ANION GAP SERPL CALC-SCNC: 12 MMOL/L (ref 8–16)
BUN SERPL-MCNC: 19 MG/DL (ref 8–23)
CALCIUM SERPL-MCNC: 9.2 MG/DL (ref 8.7–10.5)
CHLORIDE SERPL-SCNC: 107 MMOL/L (ref 95–110)
CO2 SERPL-SCNC: 22 MMOL/L (ref 23–29)
CREAT SERPL-MCNC: 1.4 MG/DL (ref 0.5–1.4)
EST. GFR  (AFRICAN AMERICAN): 40 ML/MIN/1.73 M^2
EST. GFR  (NON AFRICAN AMERICAN): 35 ML/MIN/1.73 M^2
GLUCOSE SERPL-MCNC: 92 MG/DL (ref 70–110)
POTASSIUM SERPL-SCNC: 2.8 MMOL/L (ref 3.5–5.1)
SODIUM SERPL-SCNC: 141 MMOL/L (ref 136–145)

## 2021-06-25 PROCEDURE — 36415 COLL VENOUS BLD VENIPUNCTURE: CPT | Performed by: NUCLEAR MEDICINE

## 2021-06-25 PROCEDURE — 80048 BASIC METABOLIC PNL TOTAL CA: CPT | Performed by: NUCLEAR MEDICINE

## 2021-06-25 RX ORDER — SPIRONOLACTONE 25 MG/1
25 TABLET ORAL 2 TIMES DAILY
COMMUNITY
End: 2021-07-13

## 2021-06-29 ENCOUNTER — TELEPHONE (OUTPATIENT)
Dept: TRANSPLANT | Facility: CLINIC | Age: 86
End: 2021-06-29

## 2021-06-29 ENCOUNTER — LAB VISIT (OUTPATIENT)
Dept: LAB | Facility: HOSPITAL | Age: 86
End: 2021-06-29
Attending: NUCLEAR MEDICINE
Payer: MEDICARE

## 2021-06-29 DIAGNOSIS — I50.32 CHRONIC DIASTOLIC HF (HEART FAILURE), NYHA CLASS 2: Primary | ICD-10-CM

## 2021-06-29 DIAGNOSIS — I50.32 CHRONIC DIASTOLIC HF (HEART FAILURE), NYHA CLASS 2: ICD-10-CM

## 2021-06-29 LAB
ANION GAP SERPL CALC-SCNC: 12 MMOL/L (ref 8–16)
BUN SERPL-MCNC: 21 MG/DL (ref 8–23)
CALCIUM SERPL-MCNC: 9.7 MG/DL (ref 8.7–10.5)
CHLORIDE SERPL-SCNC: 108 MMOL/L (ref 95–110)
CO2 SERPL-SCNC: 21 MMOL/L (ref 23–29)
CREAT SERPL-MCNC: 1.5 MG/DL (ref 0.5–1.4)
EST. GFR  (AFRICAN AMERICAN): 37 ML/MIN/1.73 M^2
EST. GFR  (NON AFRICAN AMERICAN): 32 ML/MIN/1.73 M^2
GLUCOSE SERPL-MCNC: 95 MG/DL (ref 70–110)
POTASSIUM SERPL-SCNC: 3.2 MMOL/L (ref 3.5–5.1)
SODIUM SERPL-SCNC: 141 MMOL/L (ref 136–145)

## 2021-06-29 PROCEDURE — 80048 BASIC METABOLIC PNL TOTAL CA: CPT | Performed by: NUCLEAR MEDICINE

## 2021-06-29 PROCEDURE — 36415 COLL VENOUS BLD VENIPUNCTURE: CPT | Performed by: NUCLEAR MEDICINE

## 2021-07-01 ENCOUNTER — PATIENT MESSAGE (OUTPATIENT)
Dept: ADMINISTRATIVE | Facility: OTHER | Age: 86
End: 2021-07-01

## 2021-07-07 ENCOUNTER — PROCEDURE VISIT (OUTPATIENT)
Dept: OPHTHALMOLOGY | Facility: CLINIC | Age: 86
End: 2021-07-07
Payer: MEDICARE

## 2021-07-07 DIAGNOSIS — H35.353 CME (CYSTOID MACULAR EDEMA), BILATERAL: ICD-10-CM

## 2021-07-07 DIAGNOSIS — H34.8130 CENTRAL RETINAL VEIN OCCLUSION WITH MACULAR EDEMA OF BOTH EYES: Primary | ICD-10-CM

## 2021-07-07 PROCEDURE — 92134 POSTERIOR SEGMENT OCT RETINA (OCULAR COHERENCE TOMOGRAPHY)-BOTH EYES: ICD-10-PCS | Mod: S$GLB,,, | Performed by: OPHTHALMOLOGY

## 2021-07-07 PROCEDURE — 99499 NO LOS: ICD-10-PCS | Mod: S$GLB,,, | Performed by: OPHTHALMOLOGY

## 2021-07-07 PROCEDURE — 67028 PR INJECT INTRAVITREAL PHARMCOLOGIC: ICD-10-PCS | Mod: 50,S$GLB,, | Performed by: OPHTHALMOLOGY

## 2021-07-07 PROCEDURE — 92134 CPTRZ OPH DX IMG PST SGM RTA: CPT | Mod: S$GLB,,, | Performed by: OPHTHALMOLOGY

## 2021-07-07 PROCEDURE — 99499 UNLISTED E&M SERVICE: CPT | Mod: S$GLB,,, | Performed by: OPHTHALMOLOGY

## 2021-07-07 PROCEDURE — 67028 INJECTION EYE DRUG: CPT | Mod: 50,S$GLB,, | Performed by: OPHTHALMOLOGY

## 2021-07-13 ENCOUNTER — LAB VISIT (OUTPATIENT)
Dept: LAB | Facility: HOSPITAL | Age: 86
End: 2021-07-13
Attending: NUCLEAR MEDICINE
Payer: MEDICARE

## 2021-07-13 ENCOUNTER — TELEPHONE (OUTPATIENT)
Dept: CARDIOLOGY | Facility: CLINIC | Age: 86
End: 2021-07-13

## 2021-07-13 DIAGNOSIS — I50.30 DIASTOLIC HEART FAILURE, NYHA CLASS 3: Primary | ICD-10-CM

## 2021-07-13 DIAGNOSIS — I50.32 CHRONIC DIASTOLIC HF (HEART FAILURE), NYHA CLASS 2: ICD-10-CM

## 2021-07-13 LAB
ANION GAP SERPL CALC-SCNC: 10 MMOL/L (ref 8–16)
BUN SERPL-MCNC: 15 MG/DL (ref 8–23)
CALCIUM SERPL-MCNC: 9.1 MG/DL (ref 8.7–10.5)
CHLORIDE SERPL-SCNC: 110 MMOL/L (ref 95–110)
CO2 SERPL-SCNC: 22 MMOL/L (ref 23–29)
CREAT SERPL-MCNC: 1.4 MG/DL (ref 0.5–1.4)
EST. GFR  (AFRICAN AMERICAN): 40 ML/MIN/1.73 M^2
EST. GFR  (NON AFRICAN AMERICAN): 35 ML/MIN/1.73 M^2
GLUCOSE SERPL-MCNC: 96 MG/DL (ref 70–110)
POTASSIUM SERPL-SCNC: 3 MMOL/L (ref 3.5–5.1)
SODIUM SERPL-SCNC: 142 MMOL/L (ref 136–145)

## 2021-07-13 PROCEDURE — 80048 BASIC METABOLIC PNL TOTAL CA: CPT | Performed by: NUCLEAR MEDICINE

## 2021-07-13 PROCEDURE — 36415 COLL VENOUS BLD VENIPUNCTURE: CPT | Performed by: NUCLEAR MEDICINE

## 2021-07-13 RX ORDER — SPIRONOLACTONE 25 MG/1
75 TABLET ORAL 2 TIMES DAILY
COMMUNITY
End: 2021-11-01

## 2021-07-13 RX ORDER — SPIRONOLACTONE 50 MG/1
75 TABLET, FILM COATED ORAL 2 TIMES DAILY
COMMUNITY
End: 2021-07-13

## 2021-07-22 ENCOUNTER — LAB VISIT (OUTPATIENT)
Dept: LAB | Facility: HOSPITAL | Age: 86
End: 2021-07-22
Payer: MEDICARE

## 2021-07-22 DIAGNOSIS — I50.30 DIASTOLIC HEART FAILURE, NYHA CLASS 3: ICD-10-CM

## 2021-07-22 LAB
ANION GAP SERPL CALC-SCNC: 11 MMOL/L (ref 8–16)
BUN SERPL-MCNC: 18 MG/DL (ref 8–23)
CALCIUM SERPL-MCNC: 10.2 MG/DL (ref 8.7–10.5)
CHLORIDE SERPL-SCNC: 107 MMOL/L (ref 95–110)
CO2 SERPL-SCNC: 23 MMOL/L (ref 23–29)
CREAT SERPL-MCNC: 1.5 MG/DL (ref 0.5–1.4)
EST. GFR  (AFRICAN AMERICAN): 36.9 ML/MIN/1.73 M^2
EST. GFR  (NON AFRICAN AMERICAN): 32 ML/MIN/1.73 M^2
GLUCOSE SERPL-MCNC: 95 MG/DL (ref 70–110)
POTASSIUM SERPL-SCNC: 3.1 MMOL/L (ref 3.5–5.1)
SODIUM SERPL-SCNC: 141 MMOL/L (ref 136–145)

## 2021-07-22 PROCEDURE — 36415 COLL VENOUS BLD VENIPUNCTURE: CPT | Performed by: NURSE PRACTITIONER

## 2021-07-22 PROCEDURE — 80048 BASIC METABOLIC PNL TOTAL CA: CPT | Performed by: NURSE PRACTITIONER

## 2021-07-23 ENCOUNTER — TELEPHONE (OUTPATIENT)
Dept: TRANSPLANT | Facility: CLINIC | Age: 86
End: 2021-07-23

## 2021-07-23 DIAGNOSIS — I50.32 CHRONIC DIASTOLIC HF (HEART FAILURE), NYHA CLASS 2: Primary | ICD-10-CM

## 2021-07-27 ENCOUNTER — OFFICE VISIT (OUTPATIENT)
Dept: TRANSPLANT | Facility: CLINIC | Age: 86
End: 2021-07-27
Payer: MEDICARE

## 2021-07-27 VITALS
DIASTOLIC BLOOD PRESSURE: 70 MMHG | OXYGEN SATURATION: 99 % | HEIGHT: 61 IN | WEIGHT: 118.63 LBS | SYSTOLIC BLOOD PRESSURE: 165 MMHG | HEART RATE: 60 BPM | BODY MASS INDEX: 22.4 KG/M2

## 2021-07-27 DIAGNOSIS — Z98.61 HISTORY OF CORONARY ANGIOPLASTY: ICD-10-CM

## 2021-07-27 DIAGNOSIS — I25.10 CORONARY ARTERY DISEASE, OCCLUSIVE: Chronic | ICD-10-CM

## 2021-07-27 DIAGNOSIS — I10 ESSENTIAL HYPERTENSION: Primary | Chronic | ICD-10-CM

## 2021-07-27 DIAGNOSIS — I50.30 DIASTOLIC HEART FAILURE, NYHA CLASS 3: ICD-10-CM

## 2021-07-27 DIAGNOSIS — I11.0 HYPERTENSIVE HEART DISEASE WITH HEART FAILURE: ICD-10-CM

## 2021-07-27 PROCEDURE — 3078F PR MOST RECENT DIASTOLIC BLOOD PRESSURE < 80 MM HG: ICD-10-PCS | Mod: CPTII,S$GLB,, | Performed by: NUCLEAR MEDICINE

## 2021-07-27 PROCEDURE — 3077F PR MOST RECENT SYSTOLIC BLOOD PRESSURE >= 140 MM HG: ICD-10-PCS | Mod: CPTII,S$GLB,, | Performed by: NUCLEAR MEDICINE

## 2021-07-27 PROCEDURE — 1159F PR MEDICATION LIST DOCUMENTED IN MEDICAL RECORD: ICD-10-PCS | Mod: CPTII,S$GLB,, | Performed by: NUCLEAR MEDICINE

## 2021-07-27 PROCEDURE — 99214 PR OFFICE/OUTPT VISIT, EST, LEVL IV, 30-39 MIN: ICD-10-PCS | Mod: S$GLB,,, | Performed by: NUCLEAR MEDICINE

## 2021-07-27 PROCEDURE — 99999 PR PBB SHADOW E&M-EST. PATIENT-LVL V: CPT | Mod: PBBFAC,,, | Performed by: NUCLEAR MEDICINE

## 2021-07-27 PROCEDURE — 3077F SYST BP >= 140 MM HG: CPT | Mod: CPTII,S$GLB,, | Performed by: NUCLEAR MEDICINE

## 2021-07-27 PROCEDURE — 1160F PR REVIEW ALL MEDS BY PRESCRIBER/CLIN PHARMACIST DOCUMENTED: ICD-10-PCS | Mod: CPTII,S$GLB,, | Performed by: NUCLEAR MEDICINE

## 2021-07-27 PROCEDURE — 3078F DIAST BP <80 MM HG: CPT | Mod: CPTII,S$GLB,, | Performed by: NUCLEAR MEDICINE

## 2021-07-27 PROCEDURE — 99999 PR PBB SHADOW E&M-EST. PATIENT-LVL V: ICD-10-PCS | Mod: PBBFAC,,, | Performed by: NUCLEAR MEDICINE

## 2021-07-27 PROCEDURE — 1159F MED LIST DOCD IN RCRD: CPT | Mod: CPTII,S$GLB,, | Performed by: NUCLEAR MEDICINE

## 2021-07-27 PROCEDURE — 99214 OFFICE O/P EST MOD 30 MIN: CPT | Mod: S$GLB,,, | Performed by: NUCLEAR MEDICINE

## 2021-07-27 PROCEDURE — 1160F RVW MEDS BY RX/DR IN RCRD: CPT | Mod: CPTII,S$GLB,, | Performed by: NUCLEAR MEDICINE

## 2021-08-04 ENCOUNTER — PROCEDURE VISIT (OUTPATIENT)
Dept: OPHTHALMOLOGY | Facility: CLINIC | Age: 86
End: 2021-08-04
Payer: MEDICARE

## 2021-08-04 DIAGNOSIS — H34.8130 CENTRAL RETINAL VEIN OCCLUSION WITH MACULAR EDEMA OF BOTH EYES: Primary | ICD-10-CM

## 2021-08-04 PROCEDURE — 67028 INJECTION EYE DRUG: CPT | Mod: 50,S$GLB,, | Performed by: OPHTHALMOLOGY

## 2021-08-04 PROCEDURE — 67028 PR INJECT INTRAVITREAL PHARMCOLOGIC: ICD-10-PCS | Mod: 50,S$GLB,, | Performed by: OPHTHALMOLOGY

## 2021-08-04 PROCEDURE — 99499 NO LOS: ICD-10-PCS | Mod: S$GLB,,, | Performed by: OPHTHALMOLOGY

## 2021-08-04 PROCEDURE — 99499 UNLISTED E&M SERVICE: CPT | Mod: S$GLB,,, | Performed by: OPHTHALMOLOGY

## 2021-08-04 PROCEDURE — 92134 POSTERIOR SEGMENT OCT RETINA (OCULAR COHERENCE TOMOGRAPHY)-BOTH EYES: ICD-10-PCS | Mod: S$GLB,,, | Performed by: OPHTHALMOLOGY

## 2021-08-04 PROCEDURE — 92134 CPTRZ OPH DX IMG PST SGM RTA: CPT | Mod: S$GLB,,, | Performed by: OPHTHALMOLOGY

## 2021-08-04 RX ORDER — HYDROCHLOROTHIAZIDE 12.5 MG/1
CAPSULE ORAL
COMMUNITY
End: 2021-10-04

## 2021-08-04 RX ORDER — AFLIBERCEPT 40 MG/ML
INJECTION, SOLUTION INTRAVITREAL
COMMUNITY
Start: 2021-03-02 | End: 2021-08-04

## 2021-08-04 RX ORDER — FLUOXETINE 10 MG/1
CAPSULE ORAL
COMMUNITY
End: 2022-10-31

## 2021-08-04 RX ORDER — DEXAMETHASONE 0.7 MG/1
IMPLANT INTRAVITREAL
COMMUNITY
Start: 2021-04-14 | End: 2021-08-04

## 2021-08-05 ENCOUNTER — TELEPHONE (OUTPATIENT)
Dept: FAMILY MEDICINE | Facility: CLINIC | Age: 86
End: 2021-08-05

## 2021-08-12 ENCOUNTER — OFFICE VISIT (OUTPATIENT)
Dept: PODIATRY | Facility: CLINIC | Age: 86
End: 2021-08-12
Payer: MEDICARE

## 2021-08-12 ENCOUNTER — PES CALL (OUTPATIENT)
Dept: ADMINISTRATIVE | Facility: CLINIC | Age: 86
End: 2021-08-12

## 2021-08-12 ENCOUNTER — HOSPITAL ENCOUNTER (OUTPATIENT)
Dept: RADIOLOGY | Facility: HOSPITAL | Age: 86
Discharge: HOME | End: 2021-08-12
Attending: PODIATRIST
Payer: MEDICARE

## 2021-08-12 VITALS — WEIGHT: 113.75 LBS | HEIGHT: 61 IN | BODY MASS INDEX: 21.48 KG/M2

## 2021-08-12 DIAGNOSIS — M79.672 PAIN IN LEFT FOOT: ICD-10-CM

## 2021-08-12 DIAGNOSIS — G57.92 PERIPHERAL NEURITIS OF LEFT FOOT: ICD-10-CM

## 2021-08-12 DIAGNOSIS — M79.672 PAIN IN LEFT FOOT: Primary | ICD-10-CM

## 2021-08-12 PROCEDURE — 73630 X-RAY EXAM OF FOOT: CPT | Mod: 26,LT,, | Performed by: RADIOLOGY

## 2021-08-12 PROCEDURE — 99204 OFFICE O/P NEW MOD 45 MIN: CPT | Mod: S$GLB,,, | Performed by: PODIATRIST

## 2021-08-12 PROCEDURE — 1125F AMNT PAIN NOTED PAIN PRSNT: CPT | Mod: CPTII,S$GLB,, | Performed by: PODIATRIST

## 2021-08-12 PROCEDURE — 99204 PR OFFICE/OUTPT VISIT, NEW, LEVL IV, 45-59 MIN: ICD-10-PCS | Mod: S$GLB,,, | Performed by: PODIATRIST

## 2021-08-12 PROCEDURE — 1159F MED LIST DOCD IN RCRD: CPT | Mod: CPTII,S$GLB,, | Performed by: PODIATRIST

## 2021-08-12 PROCEDURE — 99999 PR PBB SHADOW E&M-EST. PATIENT-LVL III: ICD-10-PCS | Mod: PBBFAC,,, | Performed by: PODIATRIST

## 2021-08-12 PROCEDURE — 3288F FALL RISK ASSESSMENT DOCD: CPT | Mod: CPTII,S$GLB,, | Performed by: PODIATRIST

## 2021-08-12 PROCEDURE — 1159F PR MEDICATION LIST DOCUMENTED IN MEDICAL RECORD: ICD-10-PCS | Mod: CPTII,S$GLB,, | Performed by: PODIATRIST

## 2021-08-12 PROCEDURE — 3288F PR FALLS RISK ASSESSMENT DOCUMENTED: ICD-10-PCS | Mod: CPTII,S$GLB,, | Performed by: PODIATRIST

## 2021-08-12 PROCEDURE — 73630 XR FOOT COMPLETE 3 VIEW LEFT: ICD-10-PCS | Mod: 26,LT,, | Performed by: RADIOLOGY

## 2021-08-12 PROCEDURE — 1101F PR PT FALLS ASSESS DOC 0-1 FALLS W/OUT INJ PAST YR: ICD-10-PCS | Mod: CPTII,S$GLB,, | Performed by: PODIATRIST

## 2021-08-12 PROCEDURE — 99999 PR PBB SHADOW E&M-EST. PATIENT-LVL III: CPT | Mod: PBBFAC,,, | Performed by: PODIATRIST

## 2021-08-12 PROCEDURE — 1101F PT FALLS ASSESS-DOCD LE1/YR: CPT | Mod: CPTII,S$GLB,, | Performed by: PODIATRIST

## 2021-08-12 PROCEDURE — 1160F PR REVIEW ALL MEDS BY PRESCRIBER/CLIN PHARMACIST DOCUMENTED: ICD-10-PCS | Mod: CPTII,S$GLB,, | Performed by: PODIATRIST

## 2021-08-12 PROCEDURE — 1125F PR PAIN SEVERITY QUANTIFIED, PAIN PRESENT: ICD-10-PCS | Mod: CPTII,S$GLB,, | Performed by: PODIATRIST

## 2021-08-12 PROCEDURE — 73630 X-RAY EXAM OF FOOT: CPT | Mod: TC,LT

## 2021-08-12 PROCEDURE — 1160F RVW MEDS BY RX/DR IN RCRD: CPT | Mod: CPTII,S$GLB,, | Performed by: PODIATRIST

## 2021-08-12 RX ORDER — GABAPENTIN 100 MG/1
200 CAPSULE ORAL NIGHTLY
Qty: 60 CAPSULE | Refills: 0 | Status: SHIPPED | OUTPATIENT
Start: 2021-08-12 | End: 2021-09-20

## 2021-08-27 ENCOUNTER — LAB VISIT (OUTPATIENT)
Dept: LAB | Facility: HOSPITAL | Age: 86
End: 2021-08-27
Attending: NUCLEAR MEDICINE
Payer: MEDICARE

## 2021-08-27 ENCOUNTER — TELEPHONE (OUTPATIENT)
Dept: TRANSPLANT | Facility: CLINIC | Age: 86
End: 2021-08-27

## 2021-08-27 DIAGNOSIS — I10 ESSENTIAL HYPERTENSION: Chronic | ICD-10-CM

## 2021-08-27 DIAGNOSIS — I50.30 DIASTOLIC HEART FAILURE, NYHA CLASS 3: ICD-10-CM

## 2021-08-27 LAB
ANION GAP SERPL CALC-SCNC: 10 MMOL/L (ref 8–16)
BUN SERPL-MCNC: 25 MG/DL (ref 8–23)
CALCIUM SERPL-MCNC: 9.9 MG/DL (ref 8.7–10.5)
CHLORIDE SERPL-SCNC: 108 MMOL/L (ref 95–110)
CO2 SERPL-SCNC: 21 MMOL/L (ref 23–29)
CREAT SERPL-MCNC: 1.5 MG/DL (ref 0.5–1.4)
EST. GFR  (AFRICAN AMERICAN): 36.9 ML/MIN/1.73 M^2
EST. GFR  (NON AFRICAN AMERICAN): 32 ML/MIN/1.73 M^2
GLUCOSE SERPL-MCNC: 110 MG/DL (ref 70–110)
POTASSIUM SERPL-SCNC: 3.6 MMOL/L (ref 3.5–5.1)
SODIUM SERPL-SCNC: 139 MMOL/L (ref 136–145)

## 2021-08-27 PROCEDURE — 36415 COLL VENOUS BLD VENIPUNCTURE: CPT | Performed by: NUCLEAR MEDICINE

## 2021-08-27 PROCEDURE — 80048 BASIC METABOLIC PNL TOTAL CA: CPT | Performed by: NUCLEAR MEDICINE

## 2021-09-01 ENCOUNTER — TELEPHONE (OUTPATIENT)
Dept: CARDIOLOGY | Facility: CLINIC | Age: 86
End: 2021-09-01

## 2021-09-09 ENCOUNTER — OFFICE VISIT (OUTPATIENT)
Dept: OPHTHALMOLOGY | Facility: CLINIC | Age: 86
End: 2021-09-09
Payer: MEDICARE

## 2021-09-09 DIAGNOSIS — H34.8130 CENTRAL RETINAL VEIN OCCLUSION WITH MACULAR EDEMA OF BOTH EYES: Primary | ICD-10-CM

## 2021-09-09 DIAGNOSIS — H35.353 CME (CYSTOID MACULAR EDEMA), BILATERAL: ICD-10-CM

## 2021-09-09 PROCEDURE — 99999 PR PBB SHADOW E&M-EST. PATIENT-LVL III: ICD-10-PCS | Mod: PBBFAC,,, | Performed by: OPHTHALMOLOGY

## 2021-09-09 PROCEDURE — 99499 UNLISTED E&M SERVICE: CPT | Mod: S$GLB,,, | Performed by: OPHTHALMOLOGY

## 2021-09-09 PROCEDURE — 92134 CPTRZ OPH DX IMG PST SGM RTA: CPT | Mod: S$GLB,,, | Performed by: OPHTHALMOLOGY

## 2021-09-09 PROCEDURE — 67028 PR INJECT INTRAVITREAL PHARMCOLOGIC: ICD-10-PCS | Mod: 50,S$GLB,, | Performed by: OPHTHALMOLOGY

## 2021-09-09 PROCEDURE — 1160F PR REVIEW ALL MEDS BY PRESCRIBER/CLIN PHARMACIST DOCUMENTED: ICD-10-PCS | Mod: CPTII,S$GLB,, | Performed by: OPHTHALMOLOGY

## 2021-09-09 PROCEDURE — 1159F MED LIST DOCD IN RCRD: CPT | Mod: CPTII,S$GLB,, | Performed by: OPHTHALMOLOGY

## 2021-09-09 PROCEDURE — 99499 NO LOS: ICD-10-PCS | Mod: S$GLB,,, | Performed by: OPHTHALMOLOGY

## 2021-09-09 PROCEDURE — 1126F PR PAIN SEVERITY QUANTIFIED, NO PAIN PRESENT: ICD-10-PCS | Mod: CPTII,S$GLB,, | Performed by: OPHTHALMOLOGY

## 2021-09-09 PROCEDURE — 92134 POSTERIOR SEGMENT OCT RETINA (OCULAR COHERENCE TOMOGRAPHY)-BOTH EYES: ICD-10-PCS | Mod: S$GLB,,, | Performed by: OPHTHALMOLOGY

## 2021-09-09 PROCEDURE — 1159F PR MEDICATION LIST DOCUMENTED IN MEDICAL RECORD: ICD-10-PCS | Mod: CPTII,S$GLB,, | Performed by: OPHTHALMOLOGY

## 2021-09-09 PROCEDURE — 1126F AMNT PAIN NOTED NONE PRSNT: CPT | Mod: CPTII,S$GLB,, | Performed by: OPHTHALMOLOGY

## 2021-09-09 PROCEDURE — 1160F RVW MEDS BY RX/DR IN RCRD: CPT | Mod: CPTII,S$GLB,, | Performed by: OPHTHALMOLOGY

## 2021-09-09 PROCEDURE — 99999 PR PBB SHADOW E&M-EST. PATIENT-LVL III: CPT | Mod: PBBFAC,,, | Performed by: OPHTHALMOLOGY

## 2021-09-09 PROCEDURE — 67028 INJECTION EYE DRUG: CPT | Mod: 50,S$GLB,, | Performed by: OPHTHALMOLOGY

## 2021-09-15 ENCOUNTER — LAB VISIT (OUTPATIENT)
Dept: LAB | Facility: HOSPITAL | Age: 86
End: 2021-09-15
Attending: INTERNAL MEDICINE
Payer: MEDICARE

## 2021-09-15 DIAGNOSIS — E55.9 VITAMIN D DEFICIENCY: ICD-10-CM

## 2021-09-15 DIAGNOSIS — E78.49 OTHER HYPERLIPIDEMIA: ICD-10-CM

## 2021-09-15 DIAGNOSIS — Z29.9 PREVENTIVE MEASURE: ICD-10-CM

## 2021-09-15 LAB
25(OH)D3+25(OH)D2 SERPL-MCNC: 32 NG/ML (ref 30–96)
ALBUMIN SERPL BCP-MCNC: 3.6 G/DL (ref 3.5–5.2)
ALP SERPL-CCNC: 46 U/L (ref 55–135)
ALT SERPL W/O P-5'-P-CCNC: 11 U/L (ref 10–44)
ANION GAP SERPL CALC-SCNC: 8 MMOL/L (ref 8–16)
AST SERPL-CCNC: 14 U/L (ref 10–40)
BASOPHILS # BLD AUTO: 0.04 K/UL (ref 0–0.2)
BASOPHILS NFR BLD: 0.9 % (ref 0–1.9)
BILIRUB SERPL-MCNC: 0.4 MG/DL (ref 0.1–1)
BUN SERPL-MCNC: 24 MG/DL (ref 8–23)
CALCIUM SERPL-MCNC: 10.5 MG/DL (ref 8.7–10.5)
CHLORIDE SERPL-SCNC: 106 MMOL/L (ref 95–110)
CHOLEST SERPL-MCNC: 193 MG/DL (ref 120–199)
CHOLEST/HDLC SERPL: 3.4 {RATIO} (ref 2–5)
CO2 SERPL-SCNC: 24 MMOL/L (ref 23–29)
CREAT SERPL-MCNC: 1.7 MG/DL (ref 0.5–1.4)
DIFFERENTIAL METHOD: ABNORMAL
EOSINOPHIL # BLD AUTO: 0.1 K/UL (ref 0–0.5)
EOSINOPHIL NFR BLD: 3.1 % (ref 0–8)
ERYTHROCYTE [DISTWIDTH] IN BLOOD BY AUTOMATED COUNT: 16.2 % (ref 11.5–14.5)
EST. GFR  (AFRICAN AMERICAN): 31.7 ML/MIN/1.73 M^2
EST. GFR  (NON AFRICAN AMERICAN): 27.5 ML/MIN/1.73 M^2
GLUCOSE SERPL-MCNC: 85 MG/DL (ref 70–110)
HCT VFR BLD AUTO: 30.3 % (ref 37–48.5)
HDLC SERPL-MCNC: 56 MG/DL (ref 40–75)
HDLC SERPL: 29 % (ref 20–50)
HGB BLD-MCNC: 9.9 G/DL (ref 12–16)
IMM GRANULOCYTES # BLD AUTO: 0.04 K/UL (ref 0–0.04)
IMM GRANULOCYTES NFR BLD AUTO: 0.9 % (ref 0–0.5)
LDLC SERPL CALC-MCNC: 113.2 MG/DL (ref 63–159)
LYMPHOCYTES # BLD AUTO: 1 K/UL (ref 1–4.8)
LYMPHOCYTES NFR BLD: 21.5 % (ref 18–48)
MCH RBC QN AUTO: 27.7 PG (ref 27–31)
MCHC RBC AUTO-ENTMCNC: 32.7 G/DL (ref 32–36)
MCV RBC AUTO: 85 FL (ref 82–98)
MONOCYTES # BLD AUTO: 0.6 K/UL (ref 0.3–1)
MONOCYTES NFR BLD: 13.6 % (ref 4–15)
NEUTROPHILS # BLD AUTO: 2.7 K/UL (ref 1.8–7.7)
NEUTROPHILS NFR BLD: 60 % (ref 38–73)
NONHDLC SERPL-MCNC: 137 MG/DL
NRBC BLD-RTO: 0 /100 WBC
PLATELET # BLD AUTO: 276 K/UL (ref 150–450)
PMV BLD AUTO: 10.5 FL (ref 9.2–12.9)
POTASSIUM SERPL-SCNC: 3.6 MMOL/L (ref 3.5–5.1)
PROT SERPL-MCNC: 7 G/DL (ref 6–8.4)
RBC # BLD AUTO: 3.57 M/UL (ref 4–5.4)
SODIUM SERPL-SCNC: 138 MMOL/L (ref 136–145)
TRIGL SERPL-MCNC: 119 MG/DL (ref 30–150)
TSH SERPL DL<=0.005 MIU/L-ACNC: 1.92 UIU/ML (ref 0.4–4)
WBC # BLD AUTO: 4.56 K/UL (ref 3.9–12.7)

## 2021-09-15 PROCEDURE — 36415 COLL VENOUS BLD VENIPUNCTURE: CPT | Mod: HCNC,PO | Performed by: INTERNAL MEDICINE

## 2021-09-15 PROCEDURE — 84443 ASSAY THYROID STIM HORMONE: CPT | Mod: HCNC | Performed by: INTERNAL MEDICINE

## 2021-09-15 PROCEDURE — 80053 COMPREHEN METABOLIC PANEL: CPT | Mod: HCNC | Performed by: INTERNAL MEDICINE

## 2021-09-15 PROCEDURE — 85025 COMPLETE CBC W/AUTO DIFF WBC: CPT | Mod: HCNC | Performed by: INTERNAL MEDICINE

## 2021-09-15 PROCEDURE — 80061 LIPID PANEL: CPT | Mod: HCNC | Performed by: INTERNAL MEDICINE

## 2021-09-15 PROCEDURE — 82306 VITAMIN D 25 HYDROXY: CPT | Mod: HCNC | Performed by: INTERNAL MEDICINE

## 2021-09-20 DIAGNOSIS — G57.92 PERIPHERAL NEURITIS OF LEFT FOOT: Primary | ICD-10-CM

## 2021-09-20 DIAGNOSIS — M79.672 PAIN IN LEFT FOOT: ICD-10-CM

## 2021-09-20 RX ORDER — GABAPENTIN 300 MG/1
300 CAPSULE ORAL DAILY
Qty: 30 CAPSULE | Refills: 2 | Status: SHIPPED | OUTPATIENT
Start: 2021-09-20 | End: 2021-12-29 | Stop reason: SDUPTHER

## 2021-09-21 ENCOUNTER — OFFICE VISIT (OUTPATIENT)
Dept: FAMILY MEDICINE | Facility: CLINIC | Age: 86
End: 2021-09-21
Payer: MEDICARE

## 2021-09-21 VITALS
SYSTOLIC BLOOD PRESSURE: 135 MMHG | BODY MASS INDEX: 20.85 KG/M2 | DIASTOLIC BLOOD PRESSURE: 65 MMHG | TEMPERATURE: 97 F | HEART RATE: 60 BPM | HEIGHT: 61 IN | WEIGHT: 110.44 LBS | OXYGEN SATURATION: 97 %

## 2021-09-21 DIAGNOSIS — D86.0 SARCOIDOSIS OF LUNG: ICD-10-CM

## 2021-09-21 DIAGNOSIS — Z00.00 ENCOUNTER FOR PREVENTIVE HEALTH EXAMINATION: ICD-10-CM

## 2021-09-21 DIAGNOSIS — F32.A ANXIETY AND DEPRESSION: ICD-10-CM

## 2021-09-21 DIAGNOSIS — F41.9 ANXIETY AND DEPRESSION: ICD-10-CM

## 2021-09-21 DIAGNOSIS — R63.4 WEIGHT LOSS: ICD-10-CM

## 2021-09-21 DIAGNOSIS — Z78.0 ASYMPTOMATIC POSTMENOPAUSAL STATE: ICD-10-CM

## 2021-09-21 DIAGNOSIS — N18.32 STAGE 3B CHRONIC KIDNEY DISEASE: ICD-10-CM

## 2021-09-21 DIAGNOSIS — E55.9 VITAMIN D DEFICIENCY: ICD-10-CM

## 2021-09-21 DIAGNOSIS — I10 ESSENTIAL HYPERTENSION: Primary | Chronic | ICD-10-CM

## 2021-09-21 DIAGNOSIS — M85.80 OSTEOPENIA, UNSPECIFIED LOCATION: ICD-10-CM

## 2021-09-21 DIAGNOSIS — I50.32 CHRONIC DIASTOLIC HF (HEART FAILURE), NYHA CLASS 2: ICD-10-CM

## 2021-09-21 DIAGNOSIS — E78.49 OTHER HYPERLIPIDEMIA: ICD-10-CM

## 2021-09-21 PROCEDURE — 1101F PT FALLS ASSESS-DOCD LE1/YR: CPT | Mod: HCNC,CPTII,S$GLB, | Performed by: INTERNAL MEDICINE

## 2021-09-21 PROCEDURE — 1159F MED LIST DOCD IN RCRD: CPT | Mod: HCNC,CPTII,S$GLB, | Performed by: INTERNAL MEDICINE

## 2021-09-21 PROCEDURE — 99999 PR PBB SHADOW E&M-EST. PATIENT-LVL V: ICD-10-PCS | Mod: PBBFAC,HCNC,, | Performed by: INTERNAL MEDICINE

## 2021-09-21 PROCEDURE — 3078F DIAST BP <80 MM HG: CPT | Mod: HCNC,CPTII,S$GLB, | Performed by: INTERNAL MEDICINE

## 2021-09-21 PROCEDURE — 3288F FALL RISK ASSESSMENT DOCD: CPT | Mod: HCNC,CPTII,S$GLB, | Performed by: INTERNAL MEDICINE

## 2021-09-21 PROCEDURE — 99999 PR PBB SHADOW E&M-EST. PATIENT-LVL V: CPT | Mod: PBBFAC,HCNC,, | Performed by: INTERNAL MEDICINE

## 2021-09-21 PROCEDURE — 3075F PR MOST RECENT SYSTOLIC BLOOD PRESS GE 130-139MM HG: ICD-10-PCS | Mod: HCNC,CPTII,S$GLB, | Performed by: INTERNAL MEDICINE

## 2021-09-21 PROCEDURE — 1159F PR MEDICATION LIST DOCUMENTED IN MEDICAL RECORD: ICD-10-PCS | Mod: HCNC,CPTII,S$GLB, | Performed by: INTERNAL MEDICINE

## 2021-09-21 PROCEDURE — 1101F PR PT FALLS ASSESS DOC 0-1 FALLS W/OUT INJ PAST YR: ICD-10-PCS | Mod: HCNC,CPTII,S$GLB, | Performed by: INTERNAL MEDICINE

## 2021-09-21 PROCEDURE — 1160F RVW MEDS BY RX/DR IN RCRD: CPT | Mod: HCNC,CPTII,S$GLB, | Performed by: INTERNAL MEDICINE

## 2021-09-21 PROCEDURE — 3288F PR FALLS RISK ASSESSMENT DOCUMENTED: ICD-10-PCS | Mod: HCNC,CPTII,S$GLB, | Performed by: INTERNAL MEDICINE

## 2021-09-21 PROCEDURE — 99214 OFFICE O/P EST MOD 30 MIN: CPT | Mod: HCNC,S$GLB,, | Performed by: INTERNAL MEDICINE

## 2021-09-21 PROCEDURE — 3075F SYST BP GE 130 - 139MM HG: CPT | Mod: HCNC,CPTII,S$GLB, | Performed by: INTERNAL MEDICINE

## 2021-09-21 PROCEDURE — 1126F AMNT PAIN NOTED NONE PRSNT: CPT | Mod: HCNC,CPTII,S$GLB, | Performed by: INTERNAL MEDICINE

## 2021-09-21 PROCEDURE — 3078F PR MOST RECENT DIASTOLIC BLOOD PRESSURE < 80 MM HG: ICD-10-PCS | Mod: HCNC,CPTII,S$GLB, | Performed by: INTERNAL MEDICINE

## 2021-09-21 PROCEDURE — 1160F PR REVIEW ALL MEDS BY PRESCRIBER/CLIN PHARMACIST DOCUMENTED: ICD-10-PCS | Mod: HCNC,CPTII,S$GLB, | Performed by: INTERNAL MEDICINE

## 2021-09-21 PROCEDURE — 1126F PR PAIN SEVERITY QUANTIFIED, NO PAIN PRESENT: ICD-10-PCS | Mod: HCNC,CPTII,S$GLB, | Performed by: INTERNAL MEDICINE

## 2021-09-21 PROCEDURE — 99214 PR OFFICE/OUTPT VISIT, EST, LEVL IV, 30-39 MIN: ICD-10-PCS | Mod: HCNC,S$GLB,, | Performed by: INTERNAL MEDICINE

## 2021-09-21 RX ORDER — CYPROHEPTADINE HYDROCHLORIDE 4 MG/1
4 TABLET ORAL 3 TIMES DAILY PRN
Qty: 90 TABLET | Refills: 4 | Status: SHIPPED | OUTPATIENT
Start: 2021-09-21 | End: 2021-12-29 | Stop reason: SDUPTHER

## 2021-09-27 ENCOUNTER — TELEPHONE (OUTPATIENT)
Dept: TRANSPLANT | Facility: CLINIC | Age: 86
End: 2021-09-27

## 2021-09-27 DIAGNOSIS — I50.30 DIASTOLIC HEART FAILURE, NYHA CLASS 3: Primary | ICD-10-CM

## 2021-10-04 ENCOUNTER — TELEPHONE (OUTPATIENT)
Dept: TRANSPLANT | Facility: CLINIC | Age: 86
End: 2021-10-04

## 2021-10-04 DIAGNOSIS — I10 ESSENTIAL HYPERTENSION: Chronic | ICD-10-CM

## 2021-10-04 DIAGNOSIS — I50.30 DIASTOLIC HEART FAILURE, NYHA CLASS 3: Primary | ICD-10-CM

## 2021-10-04 RX ORDER — HYDROCHLOROTHIAZIDE 12.5 MG/1
12.5 CAPSULE ORAL 2 TIMES DAILY
Qty: 180 CAPSULE | Refills: 3 | Status: SHIPPED | OUTPATIENT
Start: 2021-10-04 | End: 2022-02-08

## 2021-10-04 RX ORDER — AMLODIPINE BESYLATE 10 MG/1
5 TABLET ORAL NIGHTLY
COMMUNITY
End: 2021-10-05

## 2021-10-04 RX ORDER — HYDROCHLOROTHIAZIDE 12.5 MG/1
12.5 CAPSULE ORAL 2 TIMES DAILY
COMMUNITY
End: 2021-10-04 | Stop reason: SDUPTHER

## 2021-10-05 DIAGNOSIS — I50.30 DIASTOLIC HEART FAILURE, NYHA CLASS 3: Primary | ICD-10-CM

## 2021-10-05 RX ORDER — AMLODIPINE BESYLATE 5 MG/1
5 TABLET ORAL DAILY
Qty: 90 TABLET | Refills: 3 | Status: ON HOLD | OUTPATIENT
Start: 2021-10-05 | End: 2022-03-04 | Stop reason: HOSPADM

## 2021-10-05 RX ORDER — AMLODIPINE BESYLATE 5 MG/1
5 TABLET ORAL DAILY
COMMUNITY
End: 2021-10-05 | Stop reason: SDUPTHER

## 2021-10-06 ENCOUNTER — PROCEDURE VISIT (OUTPATIENT)
Dept: OPHTHALMOLOGY | Facility: CLINIC | Age: 86
End: 2021-10-06
Payer: MEDICARE

## 2021-10-06 DIAGNOSIS — R06.02 SOB (SHORTNESS OF BREATH): ICD-10-CM

## 2021-10-06 DIAGNOSIS — H40.1131 PRIMARY OPEN ANGLE GLAUCOMA (POAG) OF BOTH EYES, MILD STAGE: ICD-10-CM

## 2021-10-06 DIAGNOSIS — H34.8130 CENTRAL RETINAL VEIN OCCLUSION WITH MACULAR EDEMA OF BOTH EYES: Primary | ICD-10-CM

## 2021-10-06 PROCEDURE — 92134 POSTERIOR SEGMENT OCT RETINA (OCULAR COHERENCE TOMOGRAPHY)-BOTH EYES: ICD-10-PCS | Mod: HCNC,S$GLB,, | Performed by: OPHTHALMOLOGY

## 2021-10-06 PROCEDURE — 99499 NO LOS: ICD-10-PCS | Mod: HCNC,S$GLB,, | Performed by: OPHTHALMOLOGY

## 2021-10-06 PROCEDURE — 99499 UNLISTED E&M SERVICE: CPT | Mod: HCNC,S$GLB,, | Performed by: OPHTHALMOLOGY

## 2021-10-06 PROCEDURE — 92134 CPTRZ OPH DX IMG PST SGM RTA: CPT | Mod: HCNC,S$GLB,, | Performed by: OPHTHALMOLOGY

## 2021-10-08 ENCOUNTER — TELEPHONE (OUTPATIENT)
Dept: TRANSPLANT | Facility: CLINIC | Age: 86
End: 2021-10-08

## 2021-10-15 ENCOUNTER — TELEPHONE (OUTPATIENT)
Dept: FAMILY MEDICINE | Facility: CLINIC | Age: 86
End: 2021-10-15

## 2021-10-25 ENCOUNTER — PES CALL (OUTPATIENT)
Dept: ADMINISTRATIVE | Facility: CLINIC | Age: 86
End: 2021-10-25
Payer: MEDICARE

## 2021-10-27 ENCOUNTER — PATIENT MESSAGE (OUTPATIENT)
Dept: FAMILY MEDICINE | Facility: CLINIC | Age: 86
End: 2021-10-27
Payer: MEDICARE

## 2021-10-27 ENCOUNTER — OFFICE VISIT (OUTPATIENT)
Dept: TRANSPLANT | Facility: CLINIC | Age: 86
End: 2021-10-27
Payer: MEDICARE

## 2021-10-27 ENCOUNTER — LAB VISIT (OUTPATIENT)
Dept: LAB | Facility: HOSPITAL | Age: 86
End: 2021-10-27
Attending: NUCLEAR MEDICINE
Payer: MEDICARE

## 2021-10-27 VITALS
BODY MASS INDEX: 22.81 KG/M2 | SYSTOLIC BLOOD PRESSURE: 160 MMHG | OXYGEN SATURATION: 97 % | WEIGHT: 120.81 LBS | HEIGHT: 61 IN | HEART RATE: 69 BPM | DIASTOLIC BLOOD PRESSURE: 75 MMHG

## 2021-10-27 DIAGNOSIS — Z98.61 HISTORY OF CORONARY ANGIOPLASTY: ICD-10-CM

## 2021-10-27 DIAGNOSIS — I50.30 DIASTOLIC HEART FAILURE, NYHA CLASS 3: Primary | ICD-10-CM

## 2021-10-27 DIAGNOSIS — I50.30 DIASTOLIC HEART FAILURE, NYHA CLASS 3: ICD-10-CM

## 2021-10-27 DIAGNOSIS — I25.10 CORONARY ARTERY DISEASE, OCCLUSIVE: Chronic | ICD-10-CM

## 2021-10-27 DIAGNOSIS — I10 ESSENTIAL HYPERTENSION: Chronic | ICD-10-CM

## 2021-10-27 LAB
ANION GAP SERPL CALC-SCNC: 7 MMOL/L (ref 8–16)
BUN SERPL-MCNC: 45 MG/DL (ref 8–23)
CALCIUM SERPL-MCNC: 10.5 MG/DL (ref 8.7–10.5)
CHLORIDE SERPL-SCNC: 111 MMOL/L (ref 95–110)
CO2 SERPL-SCNC: 18 MMOL/L (ref 23–29)
CREAT SERPL-MCNC: 2.6 MG/DL (ref 0.5–1.4)
EST. GFR  (AFRICAN AMERICAN): 18.8 ML/MIN/1.73 M^2
EST. GFR  (NON AFRICAN AMERICAN): 16.3 ML/MIN/1.73 M^2
GLUCOSE SERPL-MCNC: 89 MG/DL (ref 70–110)
POTASSIUM SERPL-SCNC: 4.7 MMOL/L (ref 3.5–5.1)
SODIUM SERPL-SCNC: 136 MMOL/L (ref 136–145)

## 2021-10-27 PROCEDURE — 1160F PR REVIEW ALL MEDS BY PRESCRIBER/CLIN PHARMACIST DOCUMENTED: ICD-10-PCS | Mod: HCNC,CPTII,S$GLB, | Performed by: NUCLEAR MEDICINE

## 2021-10-27 PROCEDURE — 99214 OFFICE O/P EST MOD 30 MIN: CPT | Mod: HCNC,S$GLB,, | Performed by: NUCLEAR MEDICINE

## 2021-10-27 PROCEDURE — 3078F DIAST BP <80 MM HG: CPT | Mod: HCNC,CPTII,S$GLB, | Performed by: NUCLEAR MEDICINE

## 2021-10-27 PROCEDURE — 1159F PR MEDICATION LIST DOCUMENTED IN MEDICAL RECORD: ICD-10-PCS | Mod: HCNC,CPTII,S$GLB, | Performed by: NUCLEAR MEDICINE

## 2021-10-27 PROCEDURE — 99999 PR PBB SHADOW E&M-EST. PATIENT-LVL V: ICD-10-PCS | Mod: PBBFAC,HCNC,, | Performed by: NUCLEAR MEDICINE

## 2021-10-27 PROCEDURE — 80048 BASIC METABOLIC PNL TOTAL CA: CPT | Mod: HCNC | Performed by: NUCLEAR MEDICINE

## 2021-10-27 PROCEDURE — 1159F MED LIST DOCD IN RCRD: CPT | Mod: HCNC,CPTII,S$GLB, | Performed by: NUCLEAR MEDICINE

## 2021-10-27 PROCEDURE — 3078F PR MOST RECENT DIASTOLIC BLOOD PRESSURE < 80 MM HG: ICD-10-PCS | Mod: HCNC,CPTII,S$GLB, | Performed by: NUCLEAR MEDICINE

## 2021-10-27 PROCEDURE — 36415 COLL VENOUS BLD VENIPUNCTURE: CPT | Mod: HCNC | Performed by: NUCLEAR MEDICINE

## 2021-10-27 PROCEDURE — 99999 PR PBB SHADOW E&M-EST. PATIENT-LVL V: CPT | Mod: PBBFAC,HCNC,, | Performed by: NUCLEAR MEDICINE

## 2021-10-27 PROCEDURE — 3077F PR MOST RECENT SYSTOLIC BLOOD PRESSURE >= 140 MM HG: ICD-10-PCS | Mod: HCNC,CPTII,S$GLB, | Performed by: NUCLEAR MEDICINE

## 2021-10-27 PROCEDURE — 1160F RVW MEDS BY RX/DR IN RCRD: CPT | Mod: HCNC,CPTII,S$GLB, | Performed by: NUCLEAR MEDICINE

## 2021-10-27 PROCEDURE — 3077F SYST BP >= 140 MM HG: CPT | Mod: HCNC,CPTII,S$GLB, | Performed by: NUCLEAR MEDICINE

## 2021-10-27 PROCEDURE — 99214 PR OFFICE/OUTPT VISIT, EST, LEVL IV, 30-39 MIN: ICD-10-PCS | Mod: HCNC,S$GLB,, | Performed by: NUCLEAR MEDICINE

## 2021-10-28 ENCOUNTER — TELEPHONE (OUTPATIENT)
Dept: TRANSPLANT | Facility: CLINIC | Age: 86
End: 2021-10-28
Payer: MEDICARE

## 2021-10-28 DIAGNOSIS — N18.9 CHRONIC KIDNEY DISEASE, UNSPECIFIED CKD STAGE: ICD-10-CM

## 2021-10-28 DIAGNOSIS — I11.0 HYPERTENSIVE HEART DISEASE WITH HEART FAILURE: Primary | ICD-10-CM

## 2021-11-01 ENCOUNTER — LAB VISIT (OUTPATIENT)
Dept: LAB | Facility: HOSPITAL | Age: 86
End: 2021-11-01
Attending: NUCLEAR MEDICINE
Payer: MEDICARE

## 2021-11-01 ENCOUNTER — TELEPHONE (OUTPATIENT)
Dept: TRANSPLANT | Facility: CLINIC | Age: 86
End: 2021-11-01
Payer: MEDICAID

## 2021-11-01 DIAGNOSIS — I11.0 HYPERTENSIVE HEART DISEASE WITH HEART FAILURE: ICD-10-CM

## 2021-11-01 DIAGNOSIS — I50.30 DIASTOLIC HEART FAILURE, NYHA CLASS 3: Primary | ICD-10-CM

## 2021-11-01 LAB
ANION GAP SERPL CALC-SCNC: 10 MMOL/L (ref 8–16)
BUN SERPL-MCNC: 41 MG/DL (ref 8–23)
CALCIUM SERPL-MCNC: 10.6 MG/DL (ref 8.7–10.5)
CHLORIDE SERPL-SCNC: 112 MMOL/L (ref 95–110)
CO2 SERPL-SCNC: 17 MMOL/L (ref 23–29)
CREAT SERPL-MCNC: 2.2 MG/DL (ref 0.5–1.4)
EST. GFR  (AFRICAN AMERICAN): 23 ML/MIN/1.73 M^2
EST. GFR  (NON AFRICAN AMERICAN): 20 ML/MIN/1.73 M^2
GLUCOSE SERPL-MCNC: 102 MG/DL (ref 70–110)
POTASSIUM SERPL-SCNC: 4.4 MMOL/L (ref 3.5–5.1)
SODIUM SERPL-SCNC: 139 MMOL/L (ref 136–145)

## 2021-11-01 PROCEDURE — 80048 BASIC METABOLIC PNL TOTAL CA: CPT | Mod: HCNC | Performed by: NUCLEAR MEDICINE

## 2021-11-01 PROCEDURE — 36415 COLL VENOUS BLD VENIPUNCTURE: CPT | Mod: HCNC | Performed by: NUCLEAR MEDICINE

## 2021-11-01 RX ORDER — SPIRONOLACTONE 25 MG/1
12.5 TABLET ORAL DAILY
COMMUNITY
End: 2021-12-14

## 2021-11-08 ENCOUNTER — TELEPHONE (OUTPATIENT)
Dept: CARDIOLOGY | Facility: CLINIC | Age: 86
End: 2021-11-08
Payer: MEDICAID

## 2021-11-08 ENCOUNTER — LAB VISIT (OUTPATIENT)
Dept: LAB | Facility: HOSPITAL | Age: 86
End: 2021-11-08
Attending: NUCLEAR MEDICINE
Payer: MEDICARE

## 2021-11-08 ENCOUNTER — PATIENT OUTREACH (OUTPATIENT)
Dept: ADMINISTRATIVE | Facility: OTHER | Age: 86
End: 2021-11-08
Payer: MEDICAID

## 2021-11-08 DIAGNOSIS — I50.30 DIASTOLIC HEART FAILURE, NYHA CLASS 3: Primary | ICD-10-CM

## 2021-11-08 DIAGNOSIS — I50.30 DIASTOLIC HEART FAILURE, NYHA CLASS 3: ICD-10-CM

## 2021-11-08 PROBLEM — I35.0 AORTIC STENOSIS: Status: ACTIVE | Noted: 2021-11-08

## 2021-11-08 LAB
ANION GAP SERPL CALC-SCNC: 10 MMOL/L (ref 8–16)
BUN SERPL-MCNC: 34 MG/DL (ref 8–23)
CALCIUM SERPL-MCNC: 10.3 MG/DL (ref 8.7–10.5)
CHLORIDE SERPL-SCNC: 111 MMOL/L (ref 95–110)
CO2 SERPL-SCNC: 17 MMOL/L (ref 23–29)
CREAT SERPL-MCNC: 1.8 MG/DL (ref 0.5–1.4)
EST. GFR  (AFRICAN AMERICAN): 29 ML/MIN/1.73 M^2
EST. GFR  (NON AFRICAN AMERICAN): 25 ML/MIN/1.73 M^2
GLUCOSE SERPL-MCNC: 145 MG/DL (ref 70–110)
POTASSIUM SERPL-SCNC: 3.9 MMOL/L (ref 3.5–5.1)
SODIUM SERPL-SCNC: 138 MMOL/L (ref 136–145)

## 2021-11-08 PROCEDURE — 80048 BASIC METABOLIC PNL TOTAL CA: CPT | Mod: HCNC | Performed by: NUCLEAR MEDICINE

## 2021-11-08 PROCEDURE — 36415 COLL VENOUS BLD VENIPUNCTURE: CPT | Mod: HCNC | Performed by: NUCLEAR MEDICINE

## 2021-11-09 ENCOUNTER — PROCEDURE VISIT (OUTPATIENT)
Dept: OPHTHALMOLOGY | Facility: CLINIC | Age: 86
End: 2021-11-09
Payer: MEDICARE

## 2021-11-09 ENCOUNTER — OFFICE VISIT (OUTPATIENT)
Dept: OPHTHALMOLOGY | Facility: CLINIC | Age: 86
End: 2021-11-09
Payer: MEDICARE

## 2021-11-09 DIAGNOSIS — R06.02 SOB (SHORTNESS OF BREATH): ICD-10-CM

## 2021-11-09 DIAGNOSIS — Z96.1 PSEUDOPHAKIA OF BOTH EYES: ICD-10-CM

## 2021-11-09 DIAGNOSIS — H40.1131 PRIMARY OPEN ANGLE GLAUCOMA (POAG) OF BOTH EYES, MILD STAGE: ICD-10-CM

## 2021-11-09 DIAGNOSIS — H40.1131 PRIMARY OPEN ANGLE GLAUCOMA (POAG) OF BOTH EYES, MILD STAGE: Primary | ICD-10-CM

## 2021-11-09 DIAGNOSIS — H34.8130 CENTRAL RETINAL VEIN OCCLUSION WITH MACULAR EDEMA OF BOTH EYES: Primary | ICD-10-CM

## 2021-11-09 DIAGNOSIS — H20.13 CHRONIC IRITIS OF BOTH EYES: ICD-10-CM

## 2021-11-09 DIAGNOSIS — H35.033 HYPERTENSIVE RETINOPATHY OF BOTH EYES: ICD-10-CM

## 2021-11-09 DIAGNOSIS — H34.8130 CENTRAL RETINAL VEIN OCCLUSION WITH MACULAR EDEMA OF BOTH EYES: ICD-10-CM

## 2021-11-09 PROCEDURE — 99213 PR OFFICE/OUTPT VISIT, EST, LEVL III, 20-29 MIN: ICD-10-PCS | Mod: 24,S$PBB,, | Performed by: OPHTHALMOLOGY

## 2021-11-09 PROCEDURE — 99999 PR PBB SHADOW E&M-EST. PATIENT-LVL III: CPT | Mod: PBBFAC,,, | Performed by: OPHTHALMOLOGY

## 2021-11-09 PROCEDURE — 99213 OFFICE O/P EST LOW 20 MIN: CPT | Mod: 24,S$PBB,, | Performed by: OPHTHALMOLOGY

## 2021-11-09 PROCEDURE — 99213 OFFICE O/P EST LOW 20 MIN: CPT | Mod: PBBFAC | Performed by: OPHTHALMOLOGY

## 2021-11-09 PROCEDURE — 92134 CPTRZ OPH DX IMG PST SGM RTA: CPT | Mod: PBBFAC | Performed by: OPHTHALMOLOGY

## 2021-11-09 PROCEDURE — 99214 OFFICE O/P EST MOD 30 MIN: CPT | Mod: S$PBB,,, | Performed by: OPHTHALMOLOGY

## 2021-11-09 PROCEDURE — 99214 PR OFFICE/OUTPT VISIT, EST, LEVL IV, 30-39 MIN: ICD-10-PCS | Mod: S$PBB,,, | Performed by: OPHTHALMOLOGY

## 2021-11-09 PROCEDURE — 99999 PR PBB SHADOW E&M-EST. PATIENT-LVL III: ICD-10-PCS | Mod: PBBFAC,,, | Performed by: OPHTHALMOLOGY

## 2021-11-09 PROCEDURE — 92134 POSTERIOR SEGMENT OCT RETINA (OCULAR COHERENCE TOMOGRAPHY)-BOTH EYES: ICD-10-PCS | Mod: 26,S$PBB,, | Performed by: OPHTHALMOLOGY

## 2021-11-09 RX ORDER — DORZOLAMIDE HYDROCHLORIDE AND TIMOLOL MALEATE 20; 5 MG/ML; MG/ML
1 SOLUTION/ DROPS OPHTHALMIC 2 TIMES DAILY
Qty: 10 ML | Refills: 6 | Status: SHIPPED | OUTPATIENT
Start: 2021-11-09 | End: 2022-10-04 | Stop reason: SDUPTHER

## 2021-11-09 RX ORDER — DORZOLAMIDE HYDROCHLORIDE AND TIMOLOL MALEATE 20; 5 MG/ML; MG/ML
1 SOLUTION/ DROPS OPHTHALMIC 2 TIMES DAILY
Qty: 10 ML | Refills: 4 | Status: SHIPPED | OUTPATIENT
Start: 2021-11-09 | End: 2021-11-09 | Stop reason: SDUPTHER

## 2021-11-11 ENCOUNTER — TELEPHONE (OUTPATIENT)
Dept: FAMILY MEDICINE | Facility: CLINIC | Age: 86
End: 2021-11-11
Payer: MEDICAID

## 2021-11-12 ENCOUNTER — TELEPHONE (OUTPATIENT)
Dept: OPHTHALMOLOGY | Facility: CLINIC | Age: 86
End: 2021-11-12
Payer: MEDICAID

## 2021-12-13 ENCOUNTER — HOSPITAL ENCOUNTER (OUTPATIENT)
Dept: RADIOLOGY | Facility: HOSPITAL | Age: 86
Discharge: HOME | End: 2021-12-13
Attending: INTERNAL MEDICINE
Payer: MEDICARE

## 2021-12-13 ENCOUNTER — OFFICE VISIT (OUTPATIENT)
Dept: FAMILY MEDICINE | Facility: CLINIC | Age: 86
End: 2021-12-13
Payer: MEDICARE

## 2021-12-13 ENCOUNTER — TELEPHONE (OUTPATIENT)
Dept: FAMILY MEDICINE | Facility: CLINIC | Age: 86
End: 2021-12-13

## 2021-12-13 VITALS
HEART RATE: 80 BPM | BODY MASS INDEX: 22.07 KG/M2 | TEMPERATURE: 98 F | HEIGHT: 61 IN | WEIGHT: 116.88 LBS | SYSTOLIC BLOOD PRESSURE: 138 MMHG | RESPIRATION RATE: 18 BRPM | DIASTOLIC BLOOD PRESSURE: 66 MMHG | OXYGEN SATURATION: 98 %

## 2021-12-13 DIAGNOSIS — D86.0 SARCOIDOSIS OF LUNG: ICD-10-CM

## 2021-12-13 DIAGNOSIS — R29.898 WEAKNESS OF BOTH ARMS: ICD-10-CM

## 2021-12-13 DIAGNOSIS — R53.1 WEAKNESS: Primary | ICD-10-CM

## 2021-12-13 DIAGNOSIS — I10 ESSENTIAL HYPERTENSION: ICD-10-CM

## 2021-12-13 DIAGNOSIS — R63.4 WEIGHT LOSS: ICD-10-CM

## 2021-12-13 DIAGNOSIS — N18.4 CKD (CHRONIC KIDNEY DISEASE) STAGE 4, GFR 15-29 ML/MIN: ICD-10-CM

## 2021-12-13 DIAGNOSIS — R53.1 WEAKNESS: ICD-10-CM

## 2021-12-13 DIAGNOSIS — E43 UNSPECIFIED SEVERE PROTEIN-CALORIE MALNUTRITION: ICD-10-CM

## 2021-12-13 PROCEDURE — 99499 RISK ADDL DX/OHS AUDIT: ICD-10-PCS | Mod: HCNC,S$GLB,, | Performed by: INTERNAL MEDICINE

## 2021-12-13 PROCEDURE — 99999 PR PBB SHADOW E&M-EST. PATIENT-LVL V: CPT | Mod: PBBFAC,,, | Performed by: INTERNAL MEDICINE

## 2021-12-13 PROCEDURE — 99215 OFFICE O/P EST HI 40 MIN: CPT | Mod: PBBFAC,PO,25 | Performed by: INTERNAL MEDICINE

## 2021-12-13 PROCEDURE — 99214 OFFICE O/P EST MOD 30 MIN: CPT | Mod: S$GLB,,, | Performed by: INTERNAL MEDICINE

## 2021-12-13 PROCEDURE — 99214 PR OFFICE/OUTPT VISIT, EST, LEVL IV, 30-39 MIN: ICD-10-PCS | Mod: S$GLB,,, | Performed by: INTERNAL MEDICINE

## 2021-12-13 PROCEDURE — 99499 UNLISTED E&M SERVICE: CPT | Mod: HCNC,S$GLB,, | Performed by: INTERNAL MEDICINE

## 2021-12-13 PROCEDURE — G0008 ADMIN INFLUENZA VIRUS VAC: HCPCS | Mod: PBBFAC,PO

## 2021-12-13 PROCEDURE — 71046 X-RAY EXAM CHEST 2 VIEWS: CPT | Mod: 26,,, | Performed by: RADIOLOGY

## 2021-12-13 PROCEDURE — 71046 X-RAY EXAM CHEST 2 VIEWS: CPT | Mod: TC,FY,PO

## 2021-12-13 PROCEDURE — 90694 VACC AIIV4 NO PRSRV 0.5ML IM: CPT | Mod: PBBFAC,PO

## 2021-12-13 PROCEDURE — 99999 PR PBB SHADOW E&M-EST. PATIENT-LVL V: ICD-10-PCS | Mod: PBBFAC,,, | Performed by: INTERNAL MEDICINE

## 2021-12-13 PROCEDURE — 71046 XR CHEST PA AND LATERAL: ICD-10-PCS | Mod: 26,,, | Performed by: RADIOLOGY

## 2021-12-14 ENCOUNTER — PATIENT MESSAGE (OUTPATIENT)
Dept: FAMILY MEDICINE | Facility: CLINIC | Age: 86
End: 2021-12-14
Payer: MEDICARE

## 2021-12-14 ENCOUNTER — OFFICE VISIT (OUTPATIENT)
Dept: TRANSPLANT | Facility: CLINIC | Age: 86
End: 2021-12-14
Payer: MEDICARE

## 2021-12-14 VITALS
HEART RATE: 66 BPM | DIASTOLIC BLOOD PRESSURE: 65 MMHG | OXYGEN SATURATION: 96 % | WEIGHT: 117.75 LBS | HEIGHT: 61 IN | BODY MASS INDEX: 22.23 KG/M2 | SYSTOLIC BLOOD PRESSURE: 135 MMHG

## 2021-12-14 DIAGNOSIS — I25.10 CORONARY ARTERY DISEASE, OCCLUSIVE: Chronic | ICD-10-CM

## 2021-12-14 DIAGNOSIS — I50.30 DIASTOLIC HEART FAILURE, NYHA CLASS 3: Primary | ICD-10-CM

## 2021-12-14 DIAGNOSIS — E83.52 HYPERCALCEMIA: Primary | ICD-10-CM

## 2021-12-14 DIAGNOSIS — I10 ESSENTIAL HYPERTENSION: Chronic | ICD-10-CM

## 2021-12-14 PROCEDURE — 99999 PR PBB SHADOW E&M-EST. PATIENT-LVL V: ICD-10-PCS | Mod: PBBFAC,HCNC,, | Performed by: NUCLEAR MEDICINE

## 2021-12-14 PROCEDURE — 99214 OFFICE O/P EST MOD 30 MIN: CPT | Mod: HCNC,S$GLB,, | Performed by: NUCLEAR MEDICINE

## 2021-12-14 PROCEDURE — 99215 OFFICE O/P EST HI 40 MIN: CPT | Mod: PBBFAC | Performed by: NUCLEAR MEDICINE

## 2021-12-14 PROCEDURE — 99999 PR PBB SHADOW E&M-EST. PATIENT-LVL V: CPT | Mod: PBBFAC,HCNC,, | Performed by: NUCLEAR MEDICINE

## 2021-12-14 PROCEDURE — 99214 PR OFFICE/OUTPT VISIT, EST, LEVL IV, 30-39 MIN: ICD-10-PCS | Mod: HCNC,S$GLB,, | Performed by: NUCLEAR MEDICINE

## 2021-12-14 RX ORDER — CEPHALEXIN 250 MG/1
250 CAPSULE ORAL EVERY 12 HOURS
Qty: 14 CAPSULE | Refills: 0 | Status: SHIPPED | OUTPATIENT
Start: 2021-12-14 | End: 2022-01-12

## 2021-12-15 ENCOUNTER — TELEPHONE (OUTPATIENT)
Dept: FAMILY MEDICINE | Facility: CLINIC | Age: 86
End: 2021-12-15
Payer: MEDICARE

## 2021-12-15 ENCOUNTER — PATIENT MESSAGE (OUTPATIENT)
Dept: FAMILY MEDICINE | Facility: CLINIC | Age: 86
End: 2021-12-15
Payer: MEDICARE

## 2021-12-15 DIAGNOSIS — B37.0 ORAL CANDIDA: Primary | ICD-10-CM

## 2021-12-15 DIAGNOSIS — R29.818 OTHER SYMPTOMS AND SIGNS INVOLVING THE NERVOUS SYSTEM: ICD-10-CM

## 2021-12-15 RX ORDER — NYSTATIN 100000 [USP'U]/ML
4 SUSPENSION ORAL 4 TIMES DAILY
Qty: 160 ML | Refills: 1 | Status: SHIPPED | OUTPATIENT
Start: 2021-12-15 | End: 2021-12-25

## 2021-12-28 ENCOUNTER — TELEPHONE (OUTPATIENT)
Dept: FAMILY MEDICINE | Facility: CLINIC | Age: 86
End: 2021-12-28
Payer: MEDICARE

## 2021-12-28 ENCOUNTER — HOSPITAL ENCOUNTER (OUTPATIENT)
Dept: RADIOLOGY | Facility: HOSPITAL | Age: 86
Discharge: HOME | End: 2021-12-28
Attending: INTERNAL MEDICINE
Payer: MEDICARE

## 2021-12-28 ENCOUNTER — PATIENT MESSAGE (OUTPATIENT)
Dept: FAMILY MEDICINE | Facility: CLINIC | Age: 86
End: 2021-12-28
Payer: MEDICARE

## 2021-12-28 DIAGNOSIS — R29.818 OTHER SYMPTOMS AND SIGNS INVOLVING THE NERVOUS SYSTEM: ICD-10-CM

## 2021-12-28 PROCEDURE — 70450 CT HEAD/BRAIN W/O DYE: CPT | Mod: TC

## 2021-12-29 ENCOUNTER — HOSPITAL ENCOUNTER (OUTPATIENT)
Dept: RADIOLOGY | Facility: HOSPITAL | Age: 86
Discharge: HOME | End: 2021-12-29
Attending: INTERNAL MEDICINE
Payer: MEDICARE

## 2021-12-29 ENCOUNTER — PATIENT MESSAGE (OUTPATIENT)
Dept: FAMILY MEDICINE | Facility: CLINIC | Age: 86
End: 2021-12-29

## 2021-12-29 ENCOUNTER — OFFICE VISIT (OUTPATIENT)
Dept: FAMILY MEDICINE | Facility: CLINIC | Age: 86
End: 2021-12-29
Payer: MEDICARE

## 2021-12-29 VITALS
WEIGHT: 115 LBS | HEIGHT: 61 IN | BODY MASS INDEX: 21.71 KG/M2 | SYSTOLIC BLOOD PRESSURE: 142 MMHG | OXYGEN SATURATION: 95 % | HEART RATE: 74 BPM | TEMPERATURE: 98 F | DIASTOLIC BLOOD PRESSURE: 72 MMHG

## 2021-12-29 DIAGNOSIS — R19.7 DIARRHEA, UNSPECIFIED TYPE: ICD-10-CM

## 2021-12-29 DIAGNOSIS — R63.4 WEIGHT LOSS: ICD-10-CM

## 2021-12-29 DIAGNOSIS — R41.0 CONFUSION: Primary | ICD-10-CM

## 2021-12-29 DIAGNOSIS — M79.672 PAIN IN LEFT FOOT: ICD-10-CM

## 2021-12-29 DIAGNOSIS — D86.0 SARCOIDOSIS OF LUNG: ICD-10-CM

## 2021-12-29 DIAGNOSIS — R41.0 CONFUSION: ICD-10-CM

## 2021-12-29 DIAGNOSIS — I10 ESSENTIAL HYPERTENSION: ICD-10-CM

## 2021-12-29 DIAGNOSIS — N18.4 CKD (CHRONIC KIDNEY DISEASE) STAGE 4, GFR 15-29 ML/MIN: ICD-10-CM

## 2021-12-29 DIAGNOSIS — R53.1 WEAKNESS: ICD-10-CM

## 2021-12-29 DIAGNOSIS — G57.92 PERIPHERAL NEURITIS OF LEFT FOOT: ICD-10-CM

## 2021-12-29 PROCEDURE — 99499 RISK ADDL DX/OHS AUDIT: ICD-10-PCS | Mod: HCNC,S$GLB,, | Performed by: INTERNAL MEDICINE

## 2021-12-29 PROCEDURE — 99214 OFFICE O/P EST MOD 30 MIN: CPT | Mod: S$GLB,,, | Performed by: INTERNAL MEDICINE

## 2021-12-29 PROCEDURE — 99214 PR OFFICE/OUTPT VISIT, EST, LEVL IV, 30-39 MIN: ICD-10-PCS | Mod: S$GLB,,, | Performed by: INTERNAL MEDICINE

## 2021-12-29 PROCEDURE — 71046 X-RAY EXAM CHEST 2 VIEWS: CPT | Mod: 26,,, | Performed by: RADIOLOGY

## 2021-12-29 PROCEDURE — 99499 UNLISTED E&M SERVICE: CPT | Mod: HCNC,S$GLB,, | Performed by: INTERNAL MEDICINE

## 2021-12-29 PROCEDURE — 74018 RADEX ABDOMEN 1 VIEW: CPT | Mod: 26,,, | Performed by: RADIOLOGY

## 2021-12-29 PROCEDURE — 74018 RADEX ABDOMEN 1 VIEW: CPT | Mod: TC

## 2021-12-29 PROCEDURE — 99999 PR PBB SHADOW E&M-EST. PATIENT-LVL IV: ICD-10-PCS | Mod: PBBFAC,,, | Performed by: INTERNAL MEDICINE

## 2021-12-29 PROCEDURE — 71046 XR CHEST PA AND LATERAL: ICD-10-PCS | Mod: 26,,, | Performed by: RADIOLOGY

## 2021-12-29 PROCEDURE — 74018 XR ABDOMEN AP 1 VIEW: ICD-10-PCS | Mod: 26,,, | Performed by: RADIOLOGY

## 2021-12-29 PROCEDURE — 99999 PR PBB SHADOW E&M-EST. PATIENT-LVL IV: CPT | Mod: PBBFAC,,, | Performed by: INTERNAL MEDICINE

## 2021-12-29 PROCEDURE — 71046 X-RAY EXAM CHEST 2 VIEWS: CPT | Mod: TC

## 2021-12-29 PROCEDURE — 99214 OFFICE O/P EST MOD 30 MIN: CPT | Mod: PBBFAC,PO,25 | Performed by: INTERNAL MEDICINE

## 2021-12-29 RX ORDER — CYPROHEPTADINE HYDROCHLORIDE 4 MG/1
4 TABLET ORAL 3 TIMES DAILY PRN
Qty: 90 TABLET | Refills: 11 | Status: SHIPPED | OUTPATIENT
Start: 2021-12-29 | End: 2022-03-30 | Stop reason: SDUPTHER

## 2021-12-29 RX ORDER — GABAPENTIN 300 MG/1
300 CAPSULE ORAL DAILY
Qty: 30 CAPSULE | Refills: 11 | Status: SHIPPED | OUTPATIENT
Start: 2021-12-29 | End: 2022-12-30 | Stop reason: SDUPTHER

## 2021-12-30 ENCOUNTER — PATIENT MESSAGE (OUTPATIENT)
Dept: FAMILY MEDICINE | Facility: CLINIC | Age: 86
End: 2021-12-30
Payer: MEDICARE

## 2022-01-03 ENCOUNTER — TELEPHONE (OUTPATIENT)
Dept: FAMILY MEDICINE | Facility: CLINIC | Age: 87
End: 2022-01-03
Payer: MEDICARE

## 2022-01-03 NOTE — TELEPHONE ENCOUNTER
Pt. Daughter was checking on results of urinalysis. I checked and no results were available yet. Daughter notified me pt. Had fall on new years. They called ems and they checked her out. Vitals were good but they suspected it orthostatic hypotension since she was mildly dehydrated. They wanted to make sure you were aware of that. They are encouraging fluids.       ----- Message from Jennifer Somers sent at 1/3/2022  1:36 PM CST -----  Contact: [phone]  Patient is returning a phone call.  Who left a message for the patient: Dr Cardona  Does patient know what this is regarding:  yes  Would you like a call back, or a response through your [facility] portal?:   Phone  comments:

## 2022-01-03 NOTE — TELEPHONE ENCOUNTER
Attempted to call, no answer unable to leave vm.       ----- Message from Kellie Mota sent at 1/3/2022  8:00 AM CST -----  Contact: daughter(Jessica)-[phone]  Daughter is calling regarding mother passing out on New Year Day and EMS had to be call out. Please her back at [phone]. Thanks/ar

## 2022-01-05 ENCOUNTER — TELEPHONE (OUTPATIENT)
Dept: INTERNAL MEDICINE | Facility: CLINIC | Age: 87
End: 2022-01-05
Payer: MEDICARE

## 2022-01-05 NOTE — TELEPHONE ENCOUNTER
----- Message from Margaret Mosqueda sent at 1/5/2022 12:31 PM CST -----  Contact: Daughter, Jessica, [phone]  Calling again to inquire about the urine that was dropped on 12/29/2021. Please call her. Thanks.

## 2022-01-06 ENCOUNTER — TELEPHONE (OUTPATIENT)
Dept: INTERNAL MEDICINE | Facility: CLINIC | Age: 87
End: 2022-01-06
Payer: MEDICARE

## 2022-01-06 DIAGNOSIS — R41.0 CONFUSION: Primary | ICD-10-CM

## 2022-01-06 NOTE — TELEPHONE ENCOUNTER
----- Message from Elizabeth Munoz sent at 1/6/2022  7:10 AM CST -----  Pt's daughter is returning nurse call. Pt can be reached at [phone]

## 2022-01-06 NOTE — TELEPHONE ENCOUNTER
----- Message from Rosemary Grullon sent at 1/6/2022  9:55 AM CST -----  Contact: [phone] Daughter  of Patient  Patient is returning a phone call.  Who left a message for the patient: Paula Wiggins MA   Does patient know what this is regarding:    Would you like a call back, or a response through your [facility] portal?: call   Comments:

## 2022-01-06 NOTE — TELEPHONE ENCOUNTER
"Dr condon you re order the pt urine lab they dropped it off at the [place] but it did not get processed. Also pt daughter wanted to make sure you got the message about the pt fall      " Pt. Daughter was checking on results of urinalysis. I checked and no results were available yet. Daughter notified me pt. Had fall on new years. They called ems and they checked her out. Vitals were good but they suspected it orthostatic hypotension since she was mildly dehydrated. They wanted to make sure you were aware of that. They are encouraging fluids.     Please advise.   "

## 2022-01-10 ENCOUNTER — LAB VISIT (OUTPATIENT)
Dept: LAB | Facility: HOSPITAL | Age: 87
End: 2022-01-10
Payer: MEDICARE

## 2022-01-10 DIAGNOSIS — E83.52 HYPERCALCEMIA: ICD-10-CM

## 2022-01-10 DIAGNOSIS — R41.0 CONFUSION: ICD-10-CM

## 2022-01-10 LAB
BACTERIA #/AREA URNS HPF: ABNORMAL /HPF
BILIRUB UR QL STRIP: NEGATIVE
CLARITY UR: ABNORMAL
COLOR UR: YELLOW
GLUCOSE UR QL STRIP: NEGATIVE
HGB UR QL STRIP: NEGATIVE
KETONES UR QL STRIP: NEGATIVE
LEUKOCYTE ESTERASE UR QL STRIP: ABNORMAL
MICROSCOPIC COMMENT: ABNORMAL
NITRITE UR QL STRIP: NEGATIVE
PH UR STRIP: 6 [PH] (ref 5–8)
PROT UR QL STRIP: ABNORMAL
PTH-INTACT SERPL-MCNC: 34.9 PG/ML (ref 9–77)
SP GR UR STRIP: 1.01 (ref 1–1.03)
URN SPEC COLLECT METH UR: ABNORMAL
WBC #/AREA URNS HPF: 10 /HPF (ref 0–5)

## 2022-01-10 PROCEDURE — 87086 URINE CULTURE/COLONY COUNT: CPT | Performed by: INTERNAL MEDICINE

## 2022-01-10 PROCEDURE — 81000 URINALYSIS NONAUTO W/SCOPE: CPT | Performed by: INTERNAL MEDICINE

## 2022-01-10 PROCEDURE — 83970 ASSAY OF PARATHORMONE: CPT | Performed by: INTERNAL MEDICINE

## 2022-01-10 PROCEDURE — 36415 COLL VENOUS BLD VENIPUNCTURE: CPT | Performed by: INTERNAL MEDICINE

## 2022-01-12 ENCOUNTER — PATIENT MESSAGE (OUTPATIENT)
Dept: FAMILY MEDICINE | Facility: CLINIC | Age: 87
End: 2022-01-12
Payer: MEDICARE

## 2022-01-12 ENCOUNTER — TELEPHONE (OUTPATIENT)
Dept: FAMILY MEDICINE | Facility: CLINIC | Age: 87
End: 2022-01-12
Payer: MEDICARE

## 2022-01-12 DIAGNOSIS — N30.90 CYSTITIS: Primary | ICD-10-CM

## 2022-01-12 LAB
BACTERIA UR CULT: NORMAL
BACTERIA UR CULT: NORMAL

## 2022-01-12 RX ORDER — DOXYCYCLINE 100 MG/1
100 CAPSULE ORAL 2 TIMES DAILY
Qty: 14 CAPSULE | Refills: 0 | Status: SHIPPED | OUTPATIENT
Start: 2022-01-12 | End: 2022-01-19

## 2022-01-12 NOTE — TELEPHONE ENCOUNTER
Returned pt call no answer lm for a return call    Calcium level is better now.  Urine shows mild infection, but no organism grew out of the culture.  Is she having any burning?  How is she doing overall right now?  SM

## 2022-01-12 NOTE — TELEPHONE ENCOUNTER
----- Message from Tifafny Griffith sent at 1/12/2022  8:07 AM CST -----  Type:  Test Results    Who Called: daughter Jessica  Name of Test (Lab/Mammo/Etc): Labs  Date of Test: 1/10  Ordering Provider: Dr Recio  Where the test was performed: [facility]  Would the patient rather a call back or a response via [facility]? Call back  Best Call Back Number: [phone]  Additional Information: na

## 2022-01-12 NOTE — TELEPHONE ENCOUNTER
Calcium level is better now.  Urine shows mild infection, but no organism grew out of the culture.  Is she having any burning?  How is she doing overall right now?  SM

## 2022-01-12 NOTE — TELEPHONE ENCOUNTER
S/w pt daughter she states pt is not having any burning but the pt states it is hard for her to urinate like it does not want to come out. She is feeling a little better but still not well. She is drinking water and Gatorade but pt still has some confusion and daughter wants to know if you think it could be dehydration or from the fall on new years debbie,     Please advise on what the next step would be.

## 2022-01-19 ENCOUNTER — PATIENT MESSAGE (OUTPATIENT)
Dept: FAMILY MEDICINE | Facility: CLINIC | Age: 87
End: 2022-01-19
Payer: MEDICARE

## 2022-01-30 ENCOUNTER — PATIENT MESSAGE (OUTPATIENT)
Dept: FAMILY MEDICINE | Facility: CLINIC | Age: 87
End: 2022-01-30
Payer: MEDICARE

## 2022-01-31 NOTE — TELEPHONE ENCOUNTER
That's ok.  Please tell the family that should go to ER if so weak she feels like she's going to pass out.  SM

## 2022-02-01 ENCOUNTER — TELEPHONE (OUTPATIENT)
Dept: FAMILY MEDICINE | Facility: CLINIC | Age: 87
End: 2022-02-01
Payer: MEDICARE

## 2022-02-01 ENCOUNTER — TELEPHONE (OUTPATIENT)
Dept: TRANSPLANT | Facility: CLINIC | Age: 87
End: 2022-02-01
Payer: MEDICARE

## 2022-02-01 ENCOUNTER — HOSPITAL ENCOUNTER (EMERGENCY)
Facility: HOSPITAL | Age: 87
Discharge: HOME | End: 2022-02-01
Attending: EMERGENCY MEDICINE
Payer: MEDICARE

## 2022-02-01 ENCOUNTER — PATIENT MESSAGE (OUTPATIENT)
Dept: FAMILY MEDICINE | Facility: CLINIC | Age: 87
End: 2022-02-01
Payer: MEDICARE

## 2022-02-01 VITALS
OXYGEN SATURATION: 98 % | HEART RATE: 70 BPM | SYSTOLIC BLOOD PRESSURE: 152 MMHG | RESPIRATION RATE: 19 BRPM | HEIGHT: 61 IN | WEIGHT: 109.81 LBS | BODY MASS INDEX: 20.73 KG/M2 | DIASTOLIC BLOOD PRESSURE: 72 MMHG | TEMPERATURE: 99 F

## 2022-02-01 DIAGNOSIS — I50.30 DIASTOLIC HEART FAILURE, NYHA CLASS 3: ICD-10-CM

## 2022-02-01 DIAGNOSIS — E87.6 DIURETIC-INDUCED HYPOKALEMIA: Primary | ICD-10-CM

## 2022-02-01 DIAGNOSIS — M85.80 OSTEOPENIA, UNSPECIFIED LOCATION: ICD-10-CM

## 2022-02-01 DIAGNOSIS — E87.6 HYPOKALEMIA: Primary | ICD-10-CM

## 2022-02-01 DIAGNOSIS — I10 CHRONIC HYPERTENSION: ICD-10-CM

## 2022-02-01 DIAGNOSIS — R55 SYNCOPE: ICD-10-CM

## 2022-02-01 DIAGNOSIS — N18.4 CKD (CHRONIC KIDNEY DISEASE) STAGE 4, GFR 15-29 ML/MIN: ICD-10-CM

## 2022-02-01 DIAGNOSIS — T50.2X5A DIURETIC-INDUCED HYPOKALEMIA: Primary | ICD-10-CM

## 2022-02-01 LAB
ALBUMIN SERPL BCP-MCNC: 3.5 G/DL (ref 3.5–5.2)
ALP SERPL-CCNC: 194 U/L (ref 55–135)
ALT SERPL W/O P-5'-P-CCNC: 35 U/L (ref 10–44)
ANION GAP SERPL CALC-SCNC: 12 MMOL/L (ref 8–16)
ANION GAP SERPL CALC-SCNC: 12 MMOL/L (ref 8–16)
ANION GAP SERPL CALC-SCNC: 15 MMOL/L (ref 8–16)
AST SERPL-CCNC: 25 U/L (ref 10–40)
BASOPHILS # BLD AUTO: 0.03 K/UL (ref 0–0.2)
BASOPHILS NFR BLD: 0.6 % (ref 0–1.9)
BILIRUB SERPL-MCNC: 0.4 MG/DL (ref 0.1–1)
BILIRUB UR QL STRIP: NEGATIVE
BUN SERPL-MCNC: 29 MG/DL (ref 8–23)
BUN SERPL-MCNC: 30 MG/DL (ref 8–23)
BUN SERPL-MCNC: 30 MG/DL (ref 8–23)
CALCIUM SERPL-MCNC: 9.1 MG/DL (ref 8.7–10.5)
CALCIUM SERPL-MCNC: 9.2 MG/DL (ref 8.7–10.5)
CALCIUM SERPL-MCNC: 9.5 MG/DL (ref 8.7–10.5)
CHLORIDE SERPL-SCNC: 106 MMOL/L (ref 95–110)
CHLORIDE SERPL-SCNC: 106 MMOL/L (ref 95–110)
CHLORIDE SERPL-SCNC: 107 MMOL/L (ref 95–110)
CK SERPL-CCNC: 83 U/L (ref 20–180)
CLARITY UR: CLEAR
CO2 SERPL-SCNC: 21 MMOL/L (ref 23–29)
CO2 SERPL-SCNC: 24 MMOL/L (ref 23–29)
CO2 SERPL-SCNC: 25 MMOL/L (ref 23–29)
COLOR UR: YELLOW
CREAT SERPL-MCNC: 1.3 MG/DL (ref 0.5–1.4)
CREAT SERPL-MCNC: 1.4 MG/DL (ref 0.5–1.4)
CREAT SERPL-MCNC: 1.4 MG/DL (ref 0.5–1.4)
CTP QC/QA: YES
CTP QC/QA: YES
DIFFERENTIAL METHOD: ABNORMAL
EOSINOPHIL # BLD AUTO: 0.3 K/UL (ref 0–0.5)
EOSINOPHIL NFR BLD: 5.8 % (ref 0–8)
ERYTHROCYTE [DISTWIDTH] IN BLOOD BY AUTOMATED COUNT: 14.5 % (ref 11.5–14.5)
EST. GFR  (AFRICAN AMERICAN): 40 ML/MIN/1.73 M^2
EST. GFR  (AFRICAN AMERICAN): 40 ML/MIN/1.73 M^2
EST. GFR  (AFRICAN AMERICAN): 44 ML/MIN/1.73 M^2
EST. GFR  (NON AFRICAN AMERICAN): 35 ML/MIN/1.73 M^2
EST. GFR  (NON AFRICAN AMERICAN): 35 ML/MIN/1.73 M^2
EST. GFR  (NON AFRICAN AMERICAN): 38 ML/MIN/1.73 M^2
GLUCOSE SERPL-MCNC: 100 MG/DL (ref 70–110)
GLUCOSE SERPL-MCNC: 102 MG/DL (ref 70–110)
GLUCOSE SERPL-MCNC: 96 MG/DL (ref 70–110)
GLUCOSE UR QL STRIP: NEGATIVE
HCT VFR BLD AUTO: 25.9 % (ref 37–48.5)
HGB BLD-MCNC: 8.5 G/DL (ref 12–16)
HGB UR QL STRIP: NEGATIVE
IMM GRANULOCYTES # BLD AUTO: 0.02 K/UL (ref 0–0.04)
IMM GRANULOCYTES NFR BLD AUTO: 0.4 % (ref 0–0.5)
KETONES UR QL STRIP: NEGATIVE
LEUKOCYTE ESTERASE UR QL STRIP: ABNORMAL
LYMPHOCYTES # BLD AUTO: 0.8 K/UL (ref 1–4.8)
LYMPHOCYTES NFR BLD: 14.6 % (ref 18–48)
MAGNESIUM SERPL-MCNC: 1.7 MG/DL (ref 1.6–2.6)
MCH RBC QN AUTO: 27.6 PG (ref 27–31)
MCHC RBC AUTO-ENTMCNC: 32.8 G/DL (ref 32–36)
MCV RBC AUTO: 84 FL (ref 82–98)
MICROSCOPIC COMMENT: NORMAL
MONOCYTES # BLD AUTO: 0.6 K/UL (ref 0.3–1)
MONOCYTES NFR BLD: 10.8 % (ref 4–15)
NEUTROPHILS # BLD AUTO: 3.6 K/UL (ref 1.8–7.7)
NEUTROPHILS NFR BLD: 67.8 % (ref 38–73)
NITRITE UR QL STRIP: NEGATIVE
NRBC BLD-RTO: 0 /100 WBC
PH UR STRIP: 6 [PH] (ref 5–8)
PLATELET # BLD AUTO: 308 K/UL (ref 150–450)
PMV BLD AUTO: 10.3 FL (ref 9.2–12.9)
POC MOLECULAR INFLUENZA A AGN: NEGATIVE
POC MOLECULAR INFLUENZA B AGN: NEGATIVE
POTASSIUM SERPL-SCNC: 2.1 MMOL/L (ref 3.5–5.1)
POTASSIUM SERPL-SCNC: 2.6 MMOL/L (ref 3.5–5.1)
POTASSIUM SERPL-SCNC: 2.7 MMOL/L (ref 3.5–5.1)
PROT SERPL-MCNC: 7.1 G/DL (ref 6–8.4)
PROT UR QL STRIP: NEGATIVE
RBC # BLD AUTO: 3.08 M/UL (ref 4–5.4)
SARS-COV-2 RDRP RESP QL NAA+PROBE: NEGATIVE
SODIUM SERPL-SCNC: 142 MMOL/L (ref 136–145)
SODIUM SERPL-SCNC: 142 MMOL/L (ref 136–145)
SODIUM SERPL-SCNC: 144 MMOL/L (ref 136–145)
SP GR UR STRIP: 1.01 (ref 1–1.03)
TROPONIN I SERPL DL<=0.01 NG/ML-MCNC: 0.05 NG/ML (ref 0–0.03)
TROPONIN I SERPL DL<=0.01 NG/ML-MCNC: 0.06 NG/ML (ref 0–0.03)
URN SPEC COLLECT METH UR: ABNORMAL
UROBILINOGEN UR STRIP-ACNC: NEGATIVE EU/DL
WBC # BLD AUTO: 5.36 K/UL (ref 3.9–12.7)
WBC #/AREA URNS HPF: 3 /HPF (ref 0–5)

## 2022-02-01 PROCEDURE — 93010 EKG 12-LEAD: ICD-10-PCS | Mod: HCNC,,, | Performed by: INTERNAL MEDICINE

## 2022-02-01 PROCEDURE — 82550 ASSAY OF CK (CPK): CPT | Mod: HCNC | Performed by: NURSE PRACTITIONER

## 2022-02-01 PROCEDURE — 84484 ASSAY OF TROPONIN QUANT: CPT | Mod: HCNC | Performed by: EMERGENCY MEDICINE

## 2022-02-01 PROCEDURE — 83735 ASSAY OF MAGNESIUM: CPT | Mod: HCNC | Performed by: NURSE PRACTITIONER

## 2022-02-01 PROCEDURE — 93010 ELECTROCARDIOGRAM REPORT: CPT | Mod: HCNC,,, | Performed by: INTERNAL MEDICINE

## 2022-02-01 PROCEDURE — 93005 ELECTROCARDIOGRAM TRACING: CPT | Mod: HCNC

## 2022-02-01 PROCEDURE — 85025 COMPLETE CBC W/AUTO DIFF WBC: CPT | Mod: HCNC | Performed by: NURSE PRACTITIONER

## 2022-02-01 PROCEDURE — 80048 BASIC METABOLIC PNL TOTAL CA: CPT | Mod: 91,HCNC | Performed by: EMERGENCY MEDICINE

## 2022-02-01 PROCEDURE — 84484 ASSAY OF TROPONIN QUANT: CPT | Mod: 91,HCNC | Performed by: NURSE PRACTITIONER

## 2022-02-01 PROCEDURE — 80053 COMPREHEN METABOLIC PANEL: CPT | Mod: HCNC | Performed by: NURSE PRACTITIONER

## 2022-02-01 PROCEDURE — 99285 EMERGENCY DEPT VISIT HI MDM: CPT | Mod: 25,HCNC

## 2022-02-01 PROCEDURE — 25000003 PHARM REV CODE 250: Mod: HCNC | Performed by: EMERGENCY MEDICINE

## 2022-02-01 PROCEDURE — U0002 COVID-19 LAB TEST NON-CDC: HCPCS | Mod: HCNC | Performed by: EMERGENCY MEDICINE

## 2022-02-01 PROCEDURE — 81000 URINALYSIS NONAUTO W/SCOPE: CPT | Mod: HCNC | Performed by: NURSE PRACTITIONER

## 2022-02-01 RX ORDER — POTASSIUM CHLORIDE 20 MEQ/1
40 TABLET, EXTENDED RELEASE ORAL
Status: COMPLETED | OUTPATIENT
Start: 2022-02-01 | End: 2022-02-01

## 2022-02-01 RX ORDER — POTASSIUM CHLORIDE 20 MEQ/1
20 TABLET, EXTENDED RELEASE ORAL DAILY
Qty: 7 TABLET | Refills: 0 | Status: SHIPPED | OUTPATIENT
Start: 2022-02-01 | End: 2022-02-09 | Stop reason: SDUPTHER

## 2022-02-01 RX ADMIN — POTASSIUM CHLORIDE 40 MEQ: 1500 TABLET, EXTENDED RELEASE ORAL at 02:02

## 2022-02-01 NOTE — ED NOTES
Blood collected and sent to lab. IV attempt failed x 2. Will wait for further orders before attempting another time. Family at bedside. Provided pt with extra blanket per request. Call bell in reach.

## 2022-02-01 NOTE — TELEPHONE ENCOUNTER
"I have never seen this patient in clinic therefore I am not very familiar with her clinical status (was previously seeing Dr. Bergman).  I agree with her PCP that if she is "blacking out" at home she should go to the ED for further workup and evaluation of what is causing this.   "

## 2022-02-01 NOTE — ED NOTES
PO medication tolerated well. Pt on cardiac monitor. Call bell in reach. HOB adjusted for comfort. Family at bedside.

## 2022-02-01 NOTE — ED NOTES
Pt assisted to restroom via w/c for urine sample. Returned to room and bed without incident. Call bell in reach. Daughter at bedside.

## 2022-02-01 NOTE — TELEPHONE ENCOUNTER
I spoke with Jessica.   Says pt had a black out on NYE and again lat Thursday.   PCP recommended taking pt to Er for eval but she says before she did this she wanted to find out from us first.     says a long time ago this happened and pt had to get a stent. She was wondering if this was the same thing.   Also says is nauseated in the mornings. Unrelated to when she takes her medications and can linger throughout the day.     Please advise.

## 2022-02-01 NOTE — FIRST PROVIDER EVALUATION
Medical screening exam completed.  I have conducted a focused provider triage encounter, findings are as follows:    86 year old female here with sig other. Reports weakness, not eating, and occasional syncopal episodes over the past week. Denies fever. Denies chest pain or SOB.

## 2022-02-01 NOTE — TELEPHONE ENCOUNTER
----- Message from Kellie Mota sent at 2/1/2022  6:38 AM CST -----  Contact: DAUGHTER(Darrius)-[phone]  Daughter is calling regarding patient passing out and being nausea when she get up in the morning. Please call her back at [phone]. Thanks/ar

## 2022-02-01 NOTE — ED PROVIDER NOTES
Encounter Date: 2/1/2022       History     Chief Complaint   Patient presents with    Fatigue     Pt daughter reports x 1 month weakness, decreased appetite, with previous syncopal episode.      HPI     2/1/2022, 12:13 PM.    History is provided by: daughter and patient.      Glo Dumont is a 86 y.o. female presenting to the Emergency Department for generalized weakness and loss of appetite x 1 month.  Reports recently treated for UTI by PCP at beginning of January and appeared to be improving but now feeling worse again.    Sxs have been worsening and are moderate in severity. Pt describes the sx as weakness. No mitigating or exacerbating factors reported. Associated sx include syncope, loss of appetite. Pt denies fever, chills, headache, slurred speech, unilateral weakness, abdominal pain, chest pain or shortness of breath. No further complaints at this time.       PCP: Christina Recio MD    Review of patient's allergies indicates:   Allergen Reactions    Lisinopril      angioedema    Codeine Nausea And Vomiting     Past Medical History:   Diagnosis Date    Anemia     Angina pectoris     Anxiety     Anxiety and depression     Arthritis     hip    Carotid artery occlusion     Carpal tunnel syndrome 06/23/2008    emg    Chronic diarrhea     work up in 2011 with EGD, CS and VCE    CKD (chronic kidney disease) stage 3, GFR 30-59 ml/min 5/11/2017    Colitis     Coronary artery disease     Coronary artery disease     Diastolic dysfunction     Diverticulosis     Glaucoma     Greater trochanteric bursitis 2/10/2015    Grief at loss of child 1/26/2016    H/O carotid endarterectomy 12/2/2013    Heart failure     History of coronary angioplasty 3/11/2014    Hypercholesteremia     Hypertension     Liver cyst 02/08/2013    ct abd    Macular degeneration     Primary open-angle glaucoma(365.11) 9/3/2013    Renal cyst 02/08/2013    ct abd    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     Sarcoidosis     Sarcoidosis of lung     Sickle cell trait     Uveitis      Past Surgical History:   Procedure Laterality Date    CAROTID ENDARTERECTOMY Right 2000s    CATARACT EXTRACTION Bilateral     Dr. Liang Dennis    CHOLECYSTECTOMY      laparoscopic, 3/18.    CORONARY ANGIOPLASTY WITH STENT PLACEMENT  11/19/2010    RCA-HALIE 2010    Lathia    JOINT REPLACEMENT Left 11/03/2014    Dr. Barun    TOTAL ABDOMINAL HYSTERECTOMY W/ BILATERAL SALPINGOOPHORECTOMY  1972     Family History   Problem Relation Age of Onset    Hypertension Mother     Heart failure Mother     Cancer Father         prostate    Cirrhosis Brother     Heart failure Brother     Cancer Daughter         Carcinoid     Social History     Tobacco Use    Smoking status: Never Smoker    Smokeless tobacco: Never Used   Substance Use Topics    Alcohol use: No     Alcohol/week: 0.0 standard drinks    Drug use: No     Review of Systems   Constitutional: Positive for activity change and appetite change.   HENT: Negative.    Respiratory: Negative.    Cardiovascular: Negative.    Gastrointestinal: Negative.    Endocrine: Negative.    Genitourinary: Negative.    Musculoskeletal: Negative.    Skin: Negative.    Neurological: Positive for weakness.   Hematological: Negative.    Psychiatric/Behavioral: Negative.        Physical Exam     Initial Vitals [02/01/22 1137]   BP Pulse Resp Temp SpO2   (!) 160/75 68 18 99.1 °F (37.3 °C) 98 %      MAP       --         Physical Exam    Nursing Notes and Vital Signs Reviewed.  Constitutional:  Well developed, well nourished.  Awake & alert.  She is in no apparent distress  Head:  Atraumatic.  Normocephalic.    Eyes:  PERRL.  EOMI.  Conjunctivae are not pale. No scleral icterus.  ENT:  Mucous membranes are moist and intact.  Oropharynx is clear and symmetric.    Neck:  Supple.  No JVD.  No lymphadenopathy.  Cardiovascular:  Regular rate.  Regular rhythm.  No murmurs, rubs, or gallops.  Distal pulses are 2+  and symmetric.  Pulmonary/Chest:  No evidence of respiratory distress.  Clear to auscultation bilaterally.  No wheezing, rales or rhonchi.  Abdominal:  Soft and non-distended.  There is no tenderness.  No rebound, guarding, or rigidity.  No organomegaly.  Good bowel sounds.    Musculoskeletal:  No edema.   No cyanosis.  No clubbing.  Moves all four extremities.   No calf tenderness.  Skin:  Skin is warm and dry.      Neurological:  Alert, awake, and appropriate.  Normal speech.  No acute focal neurological deficits are appreciated.  Psychiatric:  Good eye contact.  Appropriate in content/context. Normal affect.    ED Course   Procedures  Labs Reviewed   CBC W/ AUTO DIFFERENTIAL - Abnormal; Notable for the following components:       Result Value    RBC 3.08 (*)     Hemoglobin 8.5 (*)     Hematocrit 25.9 (*)     Lymph # 0.8 (*)     Lymph % 14.6 (*)     All other components within normal limits   COMPREHENSIVE METABOLIC PANEL - Abnormal; Notable for the following components:    Potassium 2.6 (*)     BUN 30 (*)     Alkaline Phosphatase 194 (*)     eGFR if  40 (*)     eGFR if non  35 (*)     All other components within normal limits    Narrative:      K result(s) called and verbal readback obtained from RN ED BEN   TRAMONTE by JD8 02/01/2022 14:08   TROPONIN I - Abnormal; Notable for the following components:    Troponin I 0.059 (*)     All other components within normal limits   URINALYSIS, REFLEX TO URINE CULTURE - Abnormal; Notable for the following components:    Leukocytes, UA 1+ (*)     All other components within normal limits    Narrative:     Specimen Source->Urine   BASIC METABOLIC PANEL - Abnormal; Notable for the following components:    Potassium 2.1 (*)     CO2 21 (*)     BUN 30 (*)     eGFR if  40 (*)     eGFR if non  35 (*)     All other components within normal limits    Narrative:     K   result(s) called and verbal readback obtained  from RN MARIO ELIZONDO by JD8 02/01/2022 16:43   TROPONIN I - Abnormal; Notable for the following components:    Troponin I 0.052 (*)     All other components within normal limits   BASIC METABOLIC PANEL - Abnormal; Notable for the following components:    Potassium 2.7 (*)     BUN 29 (*)     eGFR if  44 (*)     eGFR if non  38 (*)     All other components within normal limits    Narrative:      POTASSIUM  critical result(s) called and verbal readback obtained   from DR CONTI by JD8 02/01/2022 17:46   CK   URINALYSIS MICROSCOPIC    Narrative:     Specimen Source->Urine   MAGNESIUM   MAGNESIUM   SARS-COV-2 RDRP GENE    Narrative:     This test utilizes isothermal nucleic acid amplification   technology to detect the SARS-CoV-2 RdRp nucleic acid segment.   The analytical sensitivity (limit of detection) is 125 genome   equivalents/mL.   A POSITIVE result implies infection with the SARS-CoV-2 virus;   the patient is presumed to be contagious.     A NEGATIVE result means that SARS-CoV-2 nucleic acids are not   present above the limit of detection. A NEGATIVE result should be   treated as presumptive. It does not rule out the possibility of   COVID-19 and should not be the sole basis for treatment decisions.   If COVID-19 is strongly suspected based on clinical and exposure   history, re-testing using an alternate molecular assay should be   considered.   This test is only for use under the Food and Drug   Administration s Emergency Use Authorization (EUA).   Commercial kits are provided by [employer].   Performance characteristics of the EUA have been independently   verified by [facility] Department of   Pathology and Laboratory Medicine.   _________________________________________________________________   The authorized Fact Sheet for Healthcare Providers and the authorized Fact   Sheet for Patients of the ID NOW COVID-19 are available on the FDA   website:      https://www.fda.gov/media/498985/download  https://www.fda.gov/media/411872/download         POCT INFLUENZA A/B MOLECULAR     Results for orders placed or performed during the hospital encounter of 02/01/22   CBC auto differential   Result Value Ref Range    WBC 5.36 3.90 - 12.70 K/uL    RBC 3.08 (L) 4.00 - 5.40 M/uL    Hemoglobin 8.5 (L) 12.0 - 16.0 g/dL    Hematocrit 25.9 (L) 37.0 - 48.5 %    MCV 84 82 - 98 fL    MCH 27.6 27.0 - 31.0 pg    MCHC 32.8 32.0 - 36.0 g/dL    RDW 14.5 11.5 - 14.5 %    Platelets 308 150 - 450 K/uL    MPV 10.3 9.2 - 12.9 fL    Immature Granulocytes 0.4 0.0 - 0.5 %    Gran # (ANC) 3.6 1.8 - 7.7 K/uL    Immature Grans (Abs) 0.02 0.00 - 0.04 K/uL    Lymph # 0.8 (L) 1.0 - 4.8 K/uL    Mono # 0.6 0.3 - 1.0 K/uL    Eos # 0.3 0.0 - 0.5 K/uL    Baso # 0.03 0.00 - 0.20 K/uL    nRBC 0 0 /100 WBC    Gran % 67.8 38.0 - 73.0 %    Lymph % 14.6 (L) 18.0 - 48.0 %    Mono % 10.8 4.0 - 15.0 %    Eosinophil % 5.8 0.0 - 8.0 %    Basophil % 0.6 0.0 - 1.9 %    Differential Method Automated    Comprehensive metabolic panel   Result Value Ref Range    Sodium 142 136 - 145 mmol/L    Potassium 2.6 (LL) 3.5 - 5.1 mmol/L    Chloride 106 95 - 110 mmol/L    CO2 24 23 - 29 mmol/L    Glucose 102 70 - 110 mg/dL    BUN 30 (H) 8 - 23 mg/dL    Creatinine 1.4 0.5 - 1.4 mg/dL    Calcium 9.2 8.7 - 10.5 mg/dL    Total Protein 7.1 6.0 - 8.4 g/dL    Albumin 3.5 3.5 - 5.2 g/dL    Total Bilirubin 0.4 0.1 - 1.0 mg/dL    Alkaline Phosphatase 194 (H) 55 - 135 U/L    AST 25 10 - 40 U/L    ALT 35 10 - 44 U/L    Anion Gap 12 8 - 16 mmol/L    eGFR if African American 40 (A) >60 mL/min/1.73 m^2    eGFR if non African American 35 (A) >60 mL/min/1.73 m^2   CPK   Result Value Ref Range    CPK 83 20 - 180 U/L   Troponin I   Result Value Ref Range    Troponin I 0.059 (H) 0.000 - 0.026 ng/mL   Urinalysis, Reflex to Urine Culture Urine, Clean Catch    Specimen: Urine   Result Value Ref Range    Specimen UA Urine, Clean Catch     Color, UA  Yellow Yellow, Straw, Terrie    Appearance, UA Clear Clear    pH, UA 6.0 5.0 - 8.0    Specific Gravity, UA 1.015 1.005 - 1.030    Protein, UA Negative Negative    Glucose, UA Negative Negative    Ketones, UA Negative Negative    Bilirubin (UA) Negative Negative    Occult Blood UA Negative Negative    Nitrite, UA Negative Negative    Urobilinogen, UA Negative <2.0 EU/dL    Leukocytes, UA 1+ (A) Negative   Urinalysis Microscopic   Result Value Ref Range    WBC, UA 3 0 - 5 /hpf    Microscopic Comment SEE COMMENT    Magnesium   Result Value Ref Range    Magnesium 1.7 1.6 - 2.6 mg/dL   Basic metabolic panel   Result Value Ref Range    Sodium 142 136 - 145 mmol/L    Potassium 2.1 (LL) 3.5 - 5.1 mmol/L    Chloride 106 95 - 110 mmol/L    CO2 21 (L) 23 - 29 mmol/L    Glucose 96 70 - 110 mg/dL    BUN 30 (H) 8 - 23 mg/dL    Creatinine 1.4 0.5 - 1.4 mg/dL    Calcium 9.5 8.7 - 10.5 mg/dL    Anion Gap 15 8 - 16 mmol/L    eGFR if African American 40 (A) >60 mL/min/1.73 m^2    eGFR if non African American 35 (A) >60 mL/min/1.73 m^2   Troponin I   Result Value Ref Range    Troponin I 0.052 (H) 0.000 - 0.026 ng/mL   Basic metabolic panel   Result Value Ref Range    Sodium 144 136 - 145 mmol/L    Potassium 2.7 (LL) 3.5 - 5.1 mmol/L    Chloride 107 95 - 110 mmol/L    CO2 25 23 - 29 mmol/L    Glucose 100 70 - 110 mg/dL    BUN 29 (H) 8 - 23 mg/dL    Creatinine 1.3 0.5 - 1.4 mg/dL    Calcium 9.1 8.7 - 10.5 mg/dL    Anion Gap 12 8 - 16 mmol/L    eGFR if African American 44 (A) >60 mL/min/1.73 m^2    eGFR if non African American 38 (A) >60 mL/min/1.73 m^2   POCT COVID-19 Rapid Screening   Result Value Ref Range    POC Rapid COVID Negative Negative     Acceptable Yes    POCT Influenza A/B Molecular   Result Value Ref Range    POC Molecular Influenza A Ag Negative Negative, Not Reported    POC Molecular Influenza B Ag Negative Negative, Not Reported     Acceptable Yes      *Note: Due to a large number of results  and/or encounters for the requested time period, some results have not been displayed. A complete set of results can be found in Results Review.       EKG Readings: (Independently Interpreted)   Initial Reading: No STEMI. Rhythm: Normal Sinus Rhythm. Heart Rate: 70. Ectopy: No Ectopy. Conduction: Normal. ST Segments: Normal ST Segments. T Waves: Normal. Axis: Normal. Other Impression: LVH   11:58     ECG Results          EKG 12-lead (Final result)  Result time 02/01/22 13:35:28    Final result by Interface, Lab In [facility] (02/01/22 13:35:28)                 Narrative:    Test Reason : R55,    Vent. Rate : 069 BPM     Atrial Rate : 069 BPM     P-R Int : 184 ms          QRS Dur : 096 ms      QT Int : 398 ms       P-R-T Axes : 094 -23 002 degrees     QTc Int : 426 ms    Normal sinus rhythm  Moderate voltage criteria for LVH, may be normal variant ( R in aVL ,   [place] product )  Nonspecific ST and T wave abnormality  Abnormal ECG  When compared with ECG of 01-JUN-2021 11:00,  Nonspecific T wave abnormality, worse in Inferior leads  Nonspecific T wave abnormality, worse in Anterior-lateral leads  Confirmed by LUNA MARROQUIN MD ([id number]) on 2/1/2022 1:35:21 PM    Referred By: DURGA JONES           Confirmed By:LUNA MARROQUIN MD                            Imaging Results          CT Head Without Contrast (Final result)  Result time 02/01/22 14:34:22    Final result by Jessa Garcia MD (02/01/22 14:34:22)                 Impression:      No acute intracranial abnormality.    Stable small right frontal encephalomalacia.  Stable bilateral basal ganglia lacunar type infarction.    Left maxillary and sphenoid sinus disease, likely acute sphenoid sinusitis.      Electronically signed by: Jessa Garcia  Date:    02/01/2022  Time:    14:34             Narrative:    EXAMINATION:  CT HEAD WITHOUT CONTRAST    CLINICAL HISTORY:  Syncope, recurrent;    TECHNIQUE:  Low dose axial CT images obtained throughout the head without  intravenous contrast. Sagittal and coronal reconstructions were performed.  All CT scans at this facility are performed using dose optimization techniques including the following: automated exposure control; adjustment of the mA and/or kV; use of iterative reconstruction technique.    COMPARISON:  CT head without contrast 12/28/2021    FINDINGS:  Intracranial compartment:    Ventricles and sulci are normal in size for age without evidence of hydrocephalus. No extra-axial blood or fluid collections.    There is stable encephalomalacia within a small region of the right frontal lobe.  There is hypoattenuation involving the bilateral basal ganglia, compatible with lacunar type infarction, stable.  The brain parenchyma appears normal. No parenchymal mass, hemorrhage, edema or major vascular distribution infarct.    Skull/extracranial contents (limited evaluation): No fracture. There is near complete opacification of the left maxillary sinus with opacities involving the sphenoid sinuses suggesting acute sinusitis.  Remaining paranasal sinuses are essentially clear.  Mastoid air cells are clear.                               X-Ray Chest 1 View (Final result)  Result time 02/01/22 12:15:18    Final result by Conrad Harrison MD (02/01/22 12:15:18)                 Impression:      Coarsening of bronchovascular markings/COPD.  No acute cardiopulmonary disease.  Chest x-ray similar to 12/29/2021.      Electronically signed by: Conrad Harrison MD  Date:    02/01/2022  Time:    12:15             Narrative:    EXAMINATION:  XR CHEST 1 VIEW    TECHNIQUE:  Single frontal view of the chest was performed.    COMPARISON:  12/29/2021    FINDINGS:  Coarsening of bronchovascular markings/COPD.  Heart size normal.  Atherosclerotic tortuous thoracic aorta.                                 Medications   potassium chloride SA CR tablet 40 mEq (40 mEq Oral Given 2/1/22 1457)          6:02 PM - Re-evaluation: The patient is resting comfortably and  is in no acute distress, family at bedside, potassium low but asymptomatic and no ECG changes, daughter will hold hctz and will start on po supplementation, troponin has remained flat, no ECG changes and chest pain, will need repeat potassium level in 2 days. She states that her symptoms have improved after treatment within ER. Discussed test results, shared treatment plan, specific conditions for return, and importance of follow up with patient and family.  She understands and agrees with the plan as discussed. Answered  her questions at this time. She has remained hemodynamically stable throughout the ED course and is appropriate for discharge home.                    Clinical Impression:   Final diagnoses:  [R55] Syncope  [E87.6, T50.2X5A] Diuretic-induced hypokalemia (Primary)  [I10] Chronic hypertension          ED Disposition Condition    Discharge Stable        ED Prescriptions     Medication Sig Dispense Start Date End Date Auth. Provider    potassium chloride SA (K-DUR,KLOR-CON) 20 MEQ tablet Take 1 tablet (20 mEq total) by mouth once daily. for 7 days 7 tablet 2/1/2022 2/8/2022 Genesis Ovalle MD        Follow-up Information     Follow up With Specialties Details Why Contact Info    Christina Cardona MD Internal Medicine Schedule an appointment as soon as possible for a visit in 2 days Return to the Emergency Room, If symptoms worsen [address] [place] [address]  [phone]             Genesis Ovalle MD  02/01/22 2121

## 2022-02-02 ENCOUNTER — PATIENT MESSAGE (OUTPATIENT)
Dept: FAMILY MEDICINE | Facility: CLINIC | Age: 87
End: 2022-02-02
Payer: MEDICARE

## 2022-02-02 RX ORDER — AMOXICILLIN 875 MG/1
875 TABLET, FILM COATED ORAL 2 TIMES DAILY
Qty: 20 TABLET | Refills: 0 | Status: SHIPPED | OUTPATIENT
Start: 2022-02-02 | End: 2022-02-12

## 2022-02-02 RX ORDER — PREDNISONE 20 MG/1
TABLET ORAL
Qty: 6 TABLET | Refills: 0 | Status: SHIPPED | OUTPATIENT
Start: 2022-02-02 | End: 2022-02-09

## 2022-02-02 NOTE — PROGRESS NOTES
Subjective:      Patient ID: Glo Dumont is a 86 y.o. female.    Chief Complaint: Follow-up      HPI  Here for follow up of medical problems and f/u of 2/1/22 ER visit for:      6:02 PM - Re-evaluation: The patient is resting comfortably and is in no acute distress, family at bedside, potassium low but asymptomatic and no ECG changes, daughter will hold hctz and will start on po supplementation, troponin has remained flat, no ECG changes and chest pain, will need repeat potassium level in 2 days. She states that her symptoms have improved after treatment within ER. Discussed test results, shared treatment plan, specific conditions for return, and importance of follow up with patient and family.  She understands and agrees with the plan as discussed. Answered  her questions at this time. She has remained hemodynamically stable throughout the ED course and is appropriate for discharge home.                   Clinical Impression:   Final diagnoses:  [R55] Syncope  [E87.6, T50.2X5A] Diuretic-induced hypokalemia (Primary)  [I10] Chronic hypertension     --------------------------------------------------------------------------------------------------------  CXR stable, EKG stable, CT head no acute changes, sphenoid sinusitis.    Feeling much better after steroid course/amoxil still taking.  Appetite is much better.  Energy is much better.  Has gained 8# in 5wk.  Taking KCl daily.  No f/c/sw/cough.  No cp/sob/palp.  BMs normal.        Updated/ annual due 9/22:  HM: 12/21 fluvax, 2/21 covid vaccines/booster, 3/15 [id number], 2/14 [id number], 2/15 TDaP, 10/21 BMD rep 2y, 2/11 Cscope no repeat will be done, 1/17 MMG no more, Eye doc monthly, GI Dr. Galindo.     Review of Systems   Constitutional: Negative for chills, diaphoresis and fever.   Respiratory: Negative for cough and shortness of breath.    Cardiovascular: Negative for chest pain, palpitations and leg swelling.   Gastrointestinal: Negative for blood in stool,  "constipation, diarrhea, nausea and vomiting.   Genitourinary: Negative for dysuria, frequency and hematuria.   Psychiatric/Behavioral: The patient is not nervous/anxious.          Objective:   BP (!) 167/76   Pulse 67   Temp 97 °F (36.1 °C)   Ht 5' 1" (1.549 m)   Wt 55.4 kg (122 lb 2.2 oz)   SpO2 98%   BMI 23.08 kg/m²     Physical Exam  Constitutional:       Appearance: She is well-developed.   HENT:      Mouth/Throat:      Mouth: Oropharynx is clear and moist.   Neck:      Thyroid: No thyroid mass.      Vascular: No carotid bruit.   Cardiovascular:      Rate and Rhythm: Normal rate and regular rhythm.      Pulses: Intact distal pulses.      Heart sounds: No murmur heard.  No friction rub. No gallop.    Pulmonary:      Effort: Pulmonary effort is normal.      Breath sounds: Normal breath sounds. No wheezing or rales.   Abdominal:      General: Bowel sounds are normal.      Palpations: Abdomen is soft. There is no hepatosplenomegaly or mass.      Tenderness: There is no abdominal tenderness.   Musculoskeletal:         General: No edema.      Cervical back: Neck supple.   Lymphadenopathy:      Cervical: No cervical adenopathy.   Neurological:      Mental Status: She is alert and oriented to person, place, and time.   Psychiatric:         Mood and Affect: Mood and affect normal.             Assessment:       1. Hypokalemia    2. Acute sphenoidal sinusitis, recurrence not specified    3. Weakness    4. CKD (chronic kidney disease) stage 4, GFR 15-29 ml/min    5. Essential hypertension    6. Coronary artery disease, occlusive    7. Sarcoidosis of lung          Plan:     Hypokalemia- cont 20mEq daily, check lab now.  -     Basic Metabolic Panel; Future; Expected date: 02/09/2022  -     potassium chloride SA (K-DUR,KLOR-CON) 20 MEQ tablet; Take 1 tablet (20 mEq total) by mouth once daily.  Dispense: 90 tablet; Refill: 3    Acute sphenoidal sinusitis, recurrence not specified- complete amoxil course.    Weakness, " improving.    CKD (chronic kidney disease) stage 4, GFR 15-29 ml/min    Essential hypertension- cards just changed BB, monitor and send BPs.    Coronary artery disease, occlusive    Sarcoidosis of lung    RTC  3mo.

## 2022-02-03 ENCOUNTER — LAB VISIT (OUTPATIENT)
Dept: LAB | Facility: HOSPITAL | Age: 87
End: 2022-02-03
Attending: INTERNAL MEDICINE
Payer: MEDICARE

## 2022-02-03 DIAGNOSIS — E87.6 HYPOKALEMIA: ICD-10-CM

## 2022-02-03 LAB
ANION GAP SERPL CALC-SCNC: 13 MMOL/L (ref 8–16)
BUN SERPL-MCNC: 29 MG/DL (ref 8–23)
CALCIUM SERPL-MCNC: 9.8 MG/DL (ref 8.7–10.5)
CHLORIDE SERPL-SCNC: 103 MMOL/L (ref 95–110)
CO2 SERPL-SCNC: 21 MMOL/L (ref 23–29)
CREAT SERPL-MCNC: 1.7 MG/DL (ref 0.5–1.4)
EST. GFR  (AFRICAN AMERICAN): 31.5 ML/MIN/1.73 M^2
EST. GFR  (NON AFRICAN AMERICAN): 27.3 ML/MIN/1.73 M^2
GLUCOSE SERPL-MCNC: 207 MG/DL (ref 70–110)
POTASSIUM SERPL-SCNC: 3.3 MMOL/L (ref 3.5–5.1)
SODIUM SERPL-SCNC: 137 MMOL/L (ref 136–145)

## 2022-02-03 PROCEDURE — 80048 BASIC METABOLIC PNL TOTAL CA: CPT | Mod: HCNC | Performed by: INTERNAL MEDICINE

## 2022-02-03 PROCEDURE — 36415 COLL VENOUS BLD VENIPUNCTURE: CPT | Mod: HCNC,PO | Performed by: INTERNAL MEDICINE

## 2022-02-07 ENCOUNTER — PATIENT OUTREACH (OUTPATIENT)
Dept: ADMINISTRATIVE | Facility: OTHER | Age: 87
End: 2022-02-07
Payer: MEDICARE

## 2022-02-07 NOTE — PROGRESS NOTES
Health Maintenance Due   Topic Date Due    Shingles Vaccine (1 of 2) Never done     Updates were requested from care everywhere.  Chart was reviewed for overdue Proactive [facility] Encounters (RAFY) topics (CRS, Breast Cancer Screening, Eye exam)  Health Maintenance has been updated.  LINKS immunization registry triggered.  Immunizations were reconciled.

## 2022-02-08 ENCOUNTER — OFFICE VISIT (OUTPATIENT)
Dept: OPHTHALMOLOGY | Facility: CLINIC | Age: 87
End: 2022-02-08
Payer: MEDICARE

## 2022-02-08 ENCOUNTER — OFFICE VISIT (OUTPATIENT)
Dept: CARDIOLOGY | Facility: CLINIC | Age: 87
End: 2022-02-08
Payer: MEDICARE

## 2022-02-08 VITALS
OXYGEN SATURATION: 98 % | HEART RATE: 63 BPM | BODY MASS INDEX: 23.33 KG/M2 | DIASTOLIC BLOOD PRESSURE: 68 MMHG | SYSTOLIC BLOOD PRESSURE: 142 MMHG | WEIGHT: 123.44 LBS

## 2022-02-08 DIAGNOSIS — Z96.1 PSEUDOPHAKIA OF BOTH EYES: ICD-10-CM

## 2022-02-08 DIAGNOSIS — H40.1131 PRIMARY OPEN ANGLE GLAUCOMA (POAG) OF BOTH EYES, MILD STAGE: Primary | ICD-10-CM

## 2022-02-08 DIAGNOSIS — Z98.61 HISTORY OF CORONARY ANGIOPLASTY: ICD-10-CM

## 2022-02-08 DIAGNOSIS — H34.8130 CENTRAL RETINAL VEIN OCCLUSION WITH MACULAR EDEMA OF BOTH EYES: Primary | ICD-10-CM

## 2022-02-08 DIAGNOSIS — I35.0 AORTIC VALVE STENOSIS, ETIOLOGY OF CARDIAC VALVE DISEASE UNSPECIFIED: ICD-10-CM

## 2022-02-08 DIAGNOSIS — H40.1131 PRIMARY OPEN ANGLE GLAUCOMA (POAG) OF BOTH EYES, MILD STAGE: ICD-10-CM

## 2022-02-08 DIAGNOSIS — H35.033 HYPERTENSIVE RETINOPATHY OF BOTH EYES: ICD-10-CM

## 2022-02-08 DIAGNOSIS — H20.13 CHRONIC IRITIS OF BOTH EYES: ICD-10-CM

## 2022-02-08 DIAGNOSIS — D50.8 OTHER IRON DEFICIENCY ANEMIA: ICD-10-CM

## 2022-02-08 DIAGNOSIS — I25.10 CORONARY ARTERY DISEASE, UNSPECIFIED VESSEL OR LESION TYPE, UNSPECIFIED WHETHER ANGINA PRESENT, UNSPECIFIED WHETHER NATIVE OR TRANSPLANTED HEART: Primary | ICD-10-CM

## 2022-02-08 DIAGNOSIS — H35.353 CME (CYSTOID MACULAR EDEMA), BILATERAL: ICD-10-CM

## 2022-02-08 DIAGNOSIS — H34.8130 CENTRAL RETINAL VEIN OCCLUSION WITH MACULAR EDEMA OF BOTH EYES: ICD-10-CM

## 2022-02-08 DIAGNOSIS — N18.4 CKD (CHRONIC KIDNEY DISEASE) STAGE 4, GFR 15-29 ML/MIN: ICD-10-CM

## 2022-02-08 DIAGNOSIS — H02.403 PTOSIS OF BOTH EYELIDS: ICD-10-CM

## 2022-02-08 DIAGNOSIS — E78.49 OTHER HYPERLIPIDEMIA: ICD-10-CM

## 2022-02-08 DIAGNOSIS — I50.30 DIASTOLIC HEART FAILURE, NYHA CLASS 3: ICD-10-CM

## 2022-02-08 DIAGNOSIS — I10 ESSENTIAL HYPERTENSION: ICD-10-CM

## 2022-02-08 DIAGNOSIS — R06.02 SOB (SHORTNESS OF BREATH): ICD-10-CM

## 2022-02-08 PROCEDURE — 99214 PR OFFICE/OUTPT VISIT, EST, LEVL IV, 30-39 MIN: ICD-10-PCS | Mod: HCNC,S$GLB,, | Performed by: STUDENT IN AN ORGANIZED HEALTH CARE EDUCATION/TRAINING PROGRAM

## 2022-02-08 PROCEDURE — 1159F MED LIST DOCD IN RCRD: CPT | Mod: HCNC,CPTII,S$GLB, | Performed by: STUDENT IN AN ORGANIZED HEALTH CARE EDUCATION/TRAINING PROGRAM

## 2022-02-08 PROCEDURE — 1159F PR MEDICATION LIST DOCUMENTED IN MEDICAL RECORD: ICD-10-PCS | Mod: HCNC,CPTII,S$GLB, | Performed by: STUDENT IN AN ORGANIZED HEALTH CARE EDUCATION/TRAINING PROGRAM

## 2022-02-08 PROCEDURE — 1126F PR PAIN SEVERITY QUANTIFIED, NO PAIN PRESENT: ICD-10-PCS | Mod: CPTII,S$GLB,, | Performed by: OPHTHALMOLOGY

## 2022-02-08 PROCEDURE — 3078F DIAST BP <80 MM HG: CPT | Mod: HCNC,CPTII,S$GLB, | Performed by: STUDENT IN AN ORGANIZED HEALTH CARE EDUCATION/TRAINING PROGRAM

## 2022-02-08 PROCEDURE — 99214 OFFICE O/P EST MOD 30 MIN: CPT | Mod: HCNC,S$GLB,, | Performed by: STUDENT IN AN ORGANIZED HEALTH CARE EDUCATION/TRAINING PROGRAM

## 2022-02-08 PROCEDURE — 3077F SYST BP >= 140 MM HG: CPT | Mod: HCNC,CPTII,S$GLB, | Performed by: STUDENT IN AN ORGANIZED HEALTH CARE EDUCATION/TRAINING PROGRAM

## 2022-02-08 PROCEDURE — 1126F AMNT PAIN NOTED NONE PRSNT: CPT | Mod: CPTII,S$GLB,, | Performed by: OPHTHALMOLOGY

## 2022-02-08 PROCEDURE — 99999 PR PBB SHADOW E&M-EST. PATIENT-LVL III: CPT | Mod: PBBFAC,,, | Performed by: OPHTHALMOLOGY

## 2022-02-08 PROCEDURE — 99999 PR PBB SHADOW E&M-EST. PATIENT-LVL IV: CPT | Mod: PBBFAC,HCNC,, | Performed by: STUDENT IN AN ORGANIZED HEALTH CARE EDUCATION/TRAINING PROGRAM

## 2022-02-08 PROCEDURE — 3078F PR MOST RECENT DIASTOLIC BLOOD PRESSURE < 80 MM HG: ICD-10-PCS | Mod: HCNC,CPTII,S$GLB, | Performed by: STUDENT IN AN ORGANIZED HEALTH CARE EDUCATION/TRAINING PROGRAM

## 2022-02-08 PROCEDURE — 99999 PR PBB SHADOW E&M-EST. PATIENT-LVL III: ICD-10-PCS | Mod: PBBFAC,,, | Performed by: OPHTHALMOLOGY

## 2022-02-08 PROCEDURE — 3288F PR FALLS RISK ASSESSMENT DOCUMENTED: ICD-10-PCS | Mod: HCNC,CPTII,S$GLB, | Performed by: STUDENT IN AN ORGANIZED HEALTH CARE EDUCATION/TRAINING PROGRAM

## 2022-02-08 PROCEDURE — 2023F PR DILATED RETINAL EXAM W/O EVID OF RETINOPATHY: ICD-10-PCS | Mod: CPTII,S$GLB,, | Performed by: OPHTHALMOLOGY

## 2022-02-08 PROCEDURE — 2023F DILAT RTA XM W/O RTNOPTHY: CPT | Mod: CPTII,S$GLB,, | Performed by: OPHTHALMOLOGY

## 2022-02-08 PROCEDURE — 99499 RISK ADDL DX/OHS AUDIT: ICD-10-PCS | Mod: S$GLB,,, | Performed by: STUDENT IN AN ORGANIZED HEALTH CARE EDUCATION/TRAINING PROGRAM

## 2022-02-08 PROCEDURE — 92134 POSTERIOR SEGMENT OCT RETINA (OCULAR COHERENCE TOMOGRAPHY)-BOTH EYES: ICD-10-PCS | Mod: S$GLB,,, | Performed by: OPHTHALMOLOGY

## 2022-02-08 PROCEDURE — 1101F PT FALLS ASSESS-DOCD LE1/YR: CPT | Mod: HCNC,CPTII,S$GLB, | Performed by: STUDENT IN AN ORGANIZED HEALTH CARE EDUCATION/TRAINING PROGRAM

## 2022-02-08 PROCEDURE — 99499 UNLISTED E&M SERVICE: CPT | Mod: S$GLB,,, | Performed by: STUDENT IN AN ORGANIZED HEALTH CARE EDUCATION/TRAINING PROGRAM

## 2022-02-08 PROCEDURE — 1160F RVW MEDS BY RX/DR IN RCRD: CPT | Mod: CPTII,S$GLB,, | Performed by: OPHTHALMOLOGY

## 2022-02-08 PROCEDURE — 1159F MED LIST DOCD IN RCRD: CPT | Mod: CPTII,S$GLB,, | Performed by: OPHTHALMOLOGY

## 2022-02-08 PROCEDURE — 99213 PR OFFICE/OUTPT VISIT, EST, LEVL III, 20-29 MIN: ICD-10-PCS | Mod: S$GLB,,, | Performed by: OPHTHALMOLOGY

## 2022-02-08 PROCEDURE — 99213 OFFICE O/P EST LOW 20 MIN: CPT | Mod: S$GLB,,, | Performed by: OPHTHALMOLOGY

## 2022-02-08 PROCEDURE — 3288F FALL RISK ASSESSMENT DOCD: CPT | Mod: HCNC,CPTII,S$GLB, | Performed by: STUDENT IN AN ORGANIZED HEALTH CARE EDUCATION/TRAINING PROGRAM

## 2022-02-08 PROCEDURE — 92134 CPTRZ OPH DX IMG PST SGM RTA: CPT | Mod: S$GLB,,, | Performed by: OPHTHALMOLOGY

## 2022-02-08 PROCEDURE — 3077F PR MOST RECENT SYSTOLIC BLOOD PRESSURE >= 140 MM HG: ICD-10-PCS | Mod: HCNC,CPTII,S$GLB, | Performed by: STUDENT IN AN ORGANIZED HEALTH CARE EDUCATION/TRAINING PROGRAM

## 2022-02-08 PROCEDURE — 99999 PR PBB SHADOW E&M-EST. PATIENT-LVL IV: ICD-10-PCS | Mod: PBBFAC,HCNC,, | Performed by: STUDENT IN AN ORGANIZED HEALTH CARE EDUCATION/TRAINING PROGRAM

## 2022-02-08 PROCEDURE — 1159F PR MEDICATION LIST DOCUMENTED IN MEDICAL RECORD: ICD-10-PCS | Mod: CPTII,S$GLB,, | Performed by: OPHTHALMOLOGY

## 2022-02-08 PROCEDURE — 99213 OFFICE O/P EST LOW 20 MIN: CPT | Mod: PBBFAC | Performed by: OPHTHALMOLOGY

## 2022-02-08 PROCEDURE — 1101F PR PT FALLS ASSESS DOC 0-1 FALLS W/OUT INJ PAST YR: ICD-10-PCS | Mod: HCNC,CPTII,S$GLB, | Performed by: STUDENT IN AN ORGANIZED HEALTH CARE EDUCATION/TRAINING PROGRAM

## 2022-02-08 PROCEDURE — 1160F PR REVIEW ALL MEDS BY PRESCRIBER/CLIN PHARMACIST DOCUMENTED: ICD-10-PCS | Mod: CPTII,S$GLB,, | Performed by: OPHTHALMOLOGY

## 2022-02-08 RX ORDER — NYSTATIN 100000 [USP'U]/ML
SUSPENSION ORAL
COMMUNITY
Start: 2022-01-29 | End: 2023-03-18 | Stop reason: ALTCHOICE

## 2022-02-08 RX ORDER — CARVEDILOL 12.5 MG/1
12.5 TABLET ORAL 2 TIMES DAILY WITH MEALS
Qty: 60 TABLET | Refills: 11 | Status: ON HOLD | OUTPATIENT
Start: 2022-02-08 | End: 2022-03-04 | Stop reason: HOSPADM

## 2022-02-08 NOTE — PROGRESS NOTES
Section of Cardiology                  Cardiac Clinic Note    Chief Complaint/Reason for consultation:  Establish care      HPI:   Glo Dumont is a 86 y.o. female with h/o TIA, heart failure preserved EF, CAD s/p PCI, hypertension, HLD, CKD who comes in for follow-up.    12/14/21  Seen Dr. Bergman in Heart failure Clinic.   FEELING BETTER, MORE ENERGY- LESS FATIGUE OR TIREDNESS  PATTERN OF SOOD IMPROVED- ORDINARY DAILY ACTIVITIES WELL TOLERATED  NO DYSPNEA AT REST. NO ORTHOPNEA OR PND  NO CHEST DISCOMFORT- ANGINAL OR PLEURITIC  NO EXACERBATION OF PALPITATIONS  NO FOCAL CNS SYMPTOMS OR SIGNS TO SUGGEST TIA OR STROKE  NO ABDOMINAL DISCOMFORT  NO EDEMA. NO CALVE TENDERNESS  RECENT LAB RESULTS - BMP AND BNP WERE REVIEWED AND DISCUSSED WITH PT.- RENAL FUNCTION AND ELECTROLYTES ARE STABLE.  BNP 73  CARD MED GOOD COMPLIANCE, NO SIDE EFFECTS    2/8/22  Recently seen in the ED to 2/1/22 with loss of appetite, weakness.  HCTZ was held.  Potassium found to be low at 2.1, given supplementation.  Has not been taking HCTZ. Will recheck labs tomorrow with Dr. Cardona.   CTH showed possible sinus infection, has been on steroids and antibiotics. Appetite improved, no weakness.  Ambulates intermittently with walker, but needed most days.  Denies h/o CVA or DM.   Has not been able to tolerate medications that may cause dehydration like hydrochlorothiazide, spironolactone, lisinopril.    Denies chest pain, orthopnea, SOB, dizziness, LH.      EKG 2/1/2022 normal sinus rhythm, nonspecific ST T-wave changes, minimal LVH,  ms, 69 bpm,  ms    ECHO  5/27/21  · Concentric hypertrophy and normal systolic function.  · Mild left atrial enlargement.  · With normal right ventricular systolic function.  · The estimated ejection fraction is 60%.  · Grade I left ventricular diastolic dysfunction.  · Normal central venous pressure (3 mmHg).  · The estimated PA systolic pressure is 37 mmHg.  · Trivial posterior  pericardial effusion.  · There is mild aortic valve stenosis.  · Aortic valve area is 1.69 cm2; peak velocity is 2.16 m/s; mean gradient is 9 mmHg.  · Mild to moderate tricuspid regurgitation.        STRESS TEST    [facility]      ROS: All 10 systems reviewed. Please refer to the HPI for pertinent positives. All other systems negative.     Past Medical History  Past Medical History:   Diagnosis Date    Anemia     Angina pectoris     Anxiety     Anxiety and depression     Arthritis     hip    Carotid artery occlusion     Carpal tunnel syndrome 06/23/2008    emg    Chronic diarrhea     work up in 2011 with EGD, CS and VCE    CKD (chronic kidney disease) stage 3, GFR 30-59 ml/min 5/11/2017    Colitis     Coronary artery disease     Coronary artery disease     Diastolic dysfunction     Diverticulosis     Glaucoma     Greater trochanteric bursitis 2/10/2015    Grief at loss of child 1/26/2016    H/O carotid endarterectomy 12/2/2013    Heart failure     History of coronary angioplasty 3/11/2014    Hypercholesteremia     Hypertension     Liver cyst 02/08/2013    ct abd    Macular degeneration     Primary open-angle glaucoma(365.11) 9/3/2013    Renal cyst 02/08/2013    ct abd    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    Sarcoidosis     Sarcoidosis of lung     Sickle cell trait     Uveitis        Surgical History  Past Surgical History:   Procedure Laterality Date    CAROTID ENDARTERECTOMY Right 2000s    CATARACT EXTRACTION Bilateral     Dr. Liang Dennis    CHOLECYSTECTOMY      laparoscopic, 3/18.    CORONARY ANGIOPLASTY WITH STENT PLACEMENT  11/19/2010    RCA-HALIE 2010    Lathia    JOINT REPLACEMENT Left 11/03/2014    Dr. Braun    TOTAL ABDOMINAL HYSTERECTOMY W/ BILATERAL SALPINGOOPHORECTOMY  1972          Allergies:   Review of patient's allergies indicates:   Allergen Reactions    Lisinopril      angioedema    Codeine Nausea And Vomiting       Social History:  Social History      Socioeconomic History    Marital status:     Number of children: 5   Occupational History    Occupation: retired   Tobacco Use    Smoking status: Never Smoker    Smokeless tobacco: Never Used   Substance and Sexual Activity    Alcohol use: No     Alcohol/week: 0.0 standard drinks    Drug use: No    Sexual activity: Yes     Partners: Male   Social History Narrative           Family History:  family history includes Cancer in her daughter and father; Cirrhosis in her brother; Heart failure in her brother and mother; Hypertension in her mother.    Home Medications:  Current Outpatient Medications on File Prior to Visit   Medication Sig Dispense Refill    ALPRAZolam (XANAX) 0.25 MG tablet Take 0.5-1 tablets (0.125-0.25 mg total) by mouth 3 (three) times daily as needed for Anxiety. 30 tablet 0    amLODIPine (NORVASC) 5 MG tablet Take 1 tablet (5 mg total) by mouth once daily. 90 tablet 3    amoxicillin (AMOXIL) 875 MG tablet Take 1 tablet (875 mg total) by mouth 2 (two) times daily. for 10 days 20 tablet 0    aspirin (ECOTRIN) 81 MG EC tablet Take 81 mg by mouth once daily.      budesonide (ENTOCORT EC) 3 mg capsule Take 9 mg by mouth once daily.      calcium carbonate (OS-LYNN) 600 mg calcium (1,500 mg) Tab Take by mouth.      cholecalciferol, vitamin D3, 2,000 unit Cap Take 1 capsule (2,000 Units total) by mouth once daily. 100 capsule 6    citalopram (CELEXA) 20 MG tablet Take 1 tablet (20 mg total) by mouth once daily. 30 tablet 6    cyproheptadine (PERIACTIN) 4 mg tablet Take 1 tablet (4 mg total) by mouth 3 (three) times daily as needed. 90 tablet 11    dorzolamide-timolol 2-0.5% (COSOPT) 22.3-6.8 mg/mL ophthalmic solution Place 1 drop into both eyes 2 (two) times daily. 10 mL 6    EYLEA 2 mg/0.05 mL Soln       FLUoxetine 10 MG capsule 1 capsule in the morning      FOLIC ACID/MULTIVIT-MIN/LUTEIN (CENTRUM SILVER ORAL) Take by mouth.      iron-vitamin C 100-250 mg, ICAR-C,  (ICAR-C) 100-250 mg Tab Take 1 tablet by mouth once daily. 100 tablet 6    ketorolac 0.5% (ACULAR) 0.5 % Drop       nitroGLYCERIN (NITROSTAT) 0.4 MG SL tablet Place 1 tablet (0.4 mg total) under the tongue every 5 (five) minutes as needed for Chest pain. 25 tablet 4    OZURDEX 0.7 mg Impl intravitreal implant       potassium chloride SA (K-DUR,KLOR-CON) 20 MEQ tablet Take 1 tablet (20 mEq total) by mouth once daily. for 7 days 7 tablet 0    prasugreL (EFFIENT) 10 mg Tab Take 1 tablet (10 mg total) by mouth once daily. 30 tablet 11    pravastatin (PRAVACHOL) 40 MG tablet TAKE 1 TABLET BY MOUTH DAILY 90 tablet 2    predniSONE (DELTASONE) 20 MG tablet 1 po daily x 4d, then half po daily. 6 tablet 0    [DISCONTINUED] metoprolol tartrate (LOPRESSOR) 50 MG tablet TAKE 2 TABLETS BY MOUTH EVERY MORNING AND TAKE 2 TABLETS BY MOUTH EVERY EVENING 270 tablet 2    gabapentin (NEURONTIN) 300 MG capsule Take 1 capsule (300 mg total) by mouth once daily. 30 capsule 11    nystatin (MYCOSTATIN) 100,000 unit/mL suspension Take by mouth.      pantoprazole (PROTONIX) 40 MG tablet Take 1 tablet (40 mg total) by mouth once daily. 30 tablet 0    [DISCONTINUED] hydroCHLOROthiazide (MICROZIDE) 12.5 mg capsule Take 1 capsule (12.5 mg total) by mouth 2 (two) times daily. Take afternoon dose between 3 and 4pm. (Patient not taking: Reported on 2/8/2022) 180 capsule 3     No current facility-administered medications on file prior to visit.       Physical exam:  BP (!) 142/68 (BP Location: Right arm, Patient Position: Sitting)   Pulse 63   Wt 56 kg (123 lb 7.3 oz)   SpO2 98%   BMI 23.33 kg/m²         General: Pt is a 86 y.o. year old female who is AAOx3, in NAD, is pleasant, well nourished, looks stated age  HEENT: PERRL, EOMI, Oral mucosa pink & moist  CVS  No abnormal cardiac pulsations noted on inspection. JVP not raised. The apical impulse is normal on palpation, and is located in the left 5th intercostal space in the mid -  clavicular line. No palpable thrills or abnormal pulsations noted. RR, S1 - S2 heard, no murmurs, rubs or gallops appreciated.   PUL : CTA B/L. No wheezes/crackles heard   ABD : BS +, soft. No tenderness elicited   LE : No C/C/E. Distal Pulses palpable B/L         LABS:    Chemistry:   Lab Results   Component Value Date     02/03/2022    K 3.3 (L) 02/03/2022     02/03/2022    CO2 21 (L) 02/03/2022    BUN 29 (H) 02/03/2022    CREATININE 1.7 (H) 02/03/2022    CALCIUM 9.8 02/03/2022     Cardiac Markers:   Lab Results   Component Value Date    CKMB Unable to Calculate 11/19/2010    TROPONINI 0.052 (H) 02/01/2022    TROPONINI <0.04 11/19/2010     Cardiac Markers (Last 3):   Lab Results   Component Value Date    CKMB Unable to Calculate 11/19/2010    CKMB 3.3 11/19/2010    CKMB Unable to Calculate 11/19/2010    TROPONINI 0.052 (H) 02/01/2022    TROPONINI 0.059 (H) 02/01/2022    TROPONINI <0.006 11/07/2014    TROPONINI <0.04 11/19/2010    TROPONINI <0.04 11/19/2010    TROPONINI <0.04 11/19/2010     CBC:   Lab Results   Component Value Date    WBC 5.36 02/01/2022    HGB 8.5 (L) 02/01/2022    HCT 25.9 (L) 02/01/2022    MCV 84 02/01/2022     02/01/2022     Lipids:   Lab Results   Component Value Date    CHOL 193 09/15/2021    TRIG 119 09/15/2021    HDL 56 09/15/2021     Coagulation:   Lab Results   Component Value Date    INR 1.0 09/09/2020    APTT 23.7 09/09/2020           Assessment        1. Coronary artery disease, unspecified vessel or lesion type, unspecified whether angina present, unspecified whether native or transplanted heart    2. Other hyperlipidemia    3. Diastolic heart failure, NYHA class 3    4. History of coronary angioplasty    5. Essential hypertension    6. Aortic valve stenosis, etiology of cardiac valve disease unspecified    7. CKD (chronic kidney disease) stage 4, GFR 15-29 ml/min    8. Other iron deficiency anemia         Plan:    Diastolic heart failure, NYHA class  3  Compensated  Continue beta-blocker, amlodipine     Essential hypertension  Elevated  Stopped hydrochlorothiazide  Switch metoprolol to Coreg 12.5 mg b.i.d.     Coronary artery disease, occlusive  Stable, no chest pain  Continue aspirin and Effient    HLD   as of September 2021  Lipid panel in 3 months  Continue pravastatin    CKD  Stable    Aortic Stenosis  Mild  Repeat echocardiogram in 1-2 years or symptoms change     Iron deficiency anemia  Stable  Currently not on therapy      This note was prepared using voice recognition system and is likely to have sound alike errors that may have been overlooked even after proofreading.     I have reviewed all pertinent chart information.  Plans and recommendations have been formulated under my direct supervision. All questions answered and patient voiced understanding.   If symptoms persist go to the ED.    RTC in 3 months        Kristel Tim MD  Cardiology

## 2022-02-08 NOTE — PROGRESS NOTES
"===============================  Date today is 2/8/2022  Glo Dumont is a 86 y.o. female  Last visit [facility]: :11/9/2021   Last visit eye dept. 11/9/2021    Corrected distance visual acuity was 20/400 in the right eye and CF at 3' in the left eye.  Tonometry     Tonometry (Applanation, 9:23 AM)       Right Left    Pressure 15 10          Tonometry #2 (Applanation, 12:43 PM)       Right Left    Pressure 11 11              Not recorded       Not recorded       Not recorded       Chief Complaint   Patient presents with    CRVO     3 MONTH CHECK UP       Problem List Items Addressed This Visit        Eye/Vision problems    Central retinal vein occlusion with macular edema of both eyes - Primary    Relevant Orders    Posterior Segment OCT Retina-Both eyes (Completed)    Prior authorization Order       Other    Ptosis      Other Visit Diagnoses     Hypertensive retinopathy of both eyes        CME (cystoid macular edema), bilateral        Relevant Orders    Posterior Segment OCT Retina-Both eyes (Completed)    Prior authorization Order    Chronic iritis of both eyes        Primary open angle glaucoma (POAG) of both eyes, mild stage        SOB (shortness of breath)        Pseudophakia of both eyes              ________________  2/8/2022 today    " I hope I get shots today"  seeing  worse  Retinal edema OU  MOCT ok OD, much worse OS    Will need to resume Ozurdex  PCIOL OU  Iris traction at 7 o'clock and atrophy  Post YAG OS  Hazy vitreous  C/d 0.4 OD  C/d 0.3 OS  IOP 11 OU    RTC 2 weeks for Ozurdex OU  Instructed to call 24/7 for any worsening of vision or symptoms. Check OU daily.   Gave my office and cell phone number.    "

## 2022-02-08 NOTE — PROGRESS NOTES
HPI     Glaucoma     Comments: Patient reports for 3 month IOP check. Using gtts as advised.   Denies pain or irritation. Va stable. Saw JCC this morning for CRVO,   seeing him soon for Ozurdex injections.               Comments     1. Steroid responder glaucoma   2. PCIOL OU MGM   3. SARCOIDOSIS   H\O chronic UVEITIS OU   4. Central vein occlusion of retina, left   5. Retinal edema     H/o Avastin and Eylea OS   Had been getting Ozurdex OU with Dr. Shahrzad Tamez Eylea OU 9-30-20 // 10- 28- 2020   Ozurdex ou 4/14/2021      Cosopt BID OU           Last edited by Sergio Lozada MD on 2/8/2022 10:01 AM. (History)            Assessment /Plan     For exam results, see Encounter Report.      ICD-10-CM ICD-9-CM    1. Primary open angle glaucoma (POAG) of both eyes, mild stage  H40.1131 365.11 Doing well - intraocular pressure is within acceptable range relative to target pressure with no evidence of progression.   Continue current treatment.  Reviewed importance of continued compliance with treatment and follow up.      365.71    2. Central retinal vein occlusion with macular edema of both eyes  H34.8130 362.35 Continue monitoring with [facility], scheduled for Ozurdex OU in 2 weeks.     362.83    3. Chronic iritis of both eyes  H20.13 364.10    4. Pseudophakia of both eyes  Z96.1 V43.1        Return to clinic as scheduled with [facility] for Ozurdex  Return to clinic with me in 2 months for IOP check        Cosopt BID OU

## 2022-02-09 ENCOUNTER — OFFICE VISIT (OUTPATIENT)
Dept: FAMILY MEDICINE | Facility: CLINIC | Age: 87
End: 2022-02-09
Payer: MEDICARE

## 2022-02-09 VITALS
BODY MASS INDEX: 23.06 KG/M2 | DIASTOLIC BLOOD PRESSURE: 76 MMHG | HEART RATE: 67 BPM | SYSTOLIC BLOOD PRESSURE: 167 MMHG | HEIGHT: 61 IN | OXYGEN SATURATION: 98 % | WEIGHT: 122.13 LBS | TEMPERATURE: 97 F

## 2022-02-09 DIAGNOSIS — I10 ESSENTIAL HYPERTENSION: Chronic | ICD-10-CM

## 2022-02-09 DIAGNOSIS — N18.4 CKD (CHRONIC KIDNEY DISEASE) STAGE 4, GFR 15-29 ML/MIN: ICD-10-CM

## 2022-02-09 DIAGNOSIS — I25.10 CORONARY ARTERY DISEASE, OCCLUSIVE: Chronic | ICD-10-CM

## 2022-02-09 DIAGNOSIS — J01.30 ACUTE SPHENOIDAL SINUSITIS, RECURRENCE NOT SPECIFIED: ICD-10-CM

## 2022-02-09 DIAGNOSIS — D86.0 SARCOIDOSIS OF LUNG: ICD-10-CM

## 2022-02-09 DIAGNOSIS — R53.1 WEAKNESS: ICD-10-CM

## 2022-02-09 DIAGNOSIS — E87.6 HYPOKALEMIA: Primary | ICD-10-CM

## 2022-02-09 PROCEDURE — 3078F PR MOST RECENT DIASTOLIC BLOOD PRESSURE < 80 MM HG: ICD-10-PCS | Mod: HCNC,CPTII,S$GLB, | Performed by: INTERNAL MEDICINE

## 2022-02-09 PROCEDURE — 99499 UNLISTED E&M SERVICE: CPT | Mod: HCNC,S$GLB,, | Performed by: INTERNAL MEDICINE

## 2022-02-09 PROCEDURE — 3077F PR MOST RECENT SYSTOLIC BLOOD PRESSURE >= 140 MM HG: ICD-10-PCS | Mod: HCNC,CPTII,S$GLB, | Performed by: INTERNAL MEDICINE

## 2022-02-09 PROCEDURE — 99214 OFFICE O/P EST MOD 30 MIN: CPT | Mod: HCNC,S$GLB,, | Performed by: INTERNAL MEDICINE

## 2022-02-09 PROCEDURE — 99999 PR PBB SHADOW E&M-EST. PATIENT-LVL IV: ICD-10-PCS | Mod: PBBFAC,HCNC,, | Performed by: INTERNAL MEDICINE

## 2022-02-09 PROCEDURE — 1159F PR MEDICATION LIST DOCUMENTED IN MEDICAL RECORD: ICD-10-PCS | Mod: HCNC,CPTII,S$GLB, | Performed by: INTERNAL MEDICINE

## 2022-02-09 PROCEDURE — 1126F AMNT PAIN NOTED NONE PRSNT: CPT | Mod: HCNC,CPTII,S$GLB, | Performed by: INTERNAL MEDICINE

## 2022-02-09 PROCEDURE — 1100F PTFALLS ASSESS-DOCD GE2>/YR: CPT | Mod: HCNC,CPTII,S$GLB, | Performed by: INTERNAL MEDICINE

## 2022-02-09 PROCEDURE — 1159F MED LIST DOCD IN RCRD: CPT | Mod: HCNC,CPTII,S$GLB, | Performed by: INTERNAL MEDICINE

## 2022-02-09 PROCEDURE — 99214 PR OFFICE/OUTPT VISIT, EST, LEVL IV, 30-39 MIN: ICD-10-PCS | Mod: HCNC,S$GLB,, | Performed by: INTERNAL MEDICINE

## 2022-02-09 PROCEDURE — 1126F PR PAIN SEVERITY QUANTIFIED, NO PAIN PRESENT: ICD-10-PCS | Mod: HCNC,CPTII,S$GLB, | Performed by: INTERNAL MEDICINE

## 2022-02-09 PROCEDURE — 3288F PR FALLS RISK ASSESSMENT DOCUMENTED: ICD-10-PCS | Mod: HCNC,CPTII,S$GLB, | Performed by: INTERNAL MEDICINE

## 2022-02-09 PROCEDURE — 3077F SYST BP >= 140 MM HG: CPT | Mod: HCNC,CPTII,S$GLB, | Performed by: INTERNAL MEDICINE

## 2022-02-09 PROCEDURE — 99999 PR PBB SHADOW E&M-EST. PATIENT-LVL IV: CPT | Mod: PBBFAC,HCNC,, | Performed by: INTERNAL MEDICINE

## 2022-02-09 PROCEDURE — 99499 RISK ADDL DX/OHS AUDIT: ICD-10-PCS | Mod: HCNC,S$GLB,, | Performed by: INTERNAL MEDICINE

## 2022-02-09 PROCEDURE — 3078F DIAST BP <80 MM HG: CPT | Mod: HCNC,CPTII,S$GLB, | Performed by: INTERNAL MEDICINE

## 2022-02-09 PROCEDURE — 1100F PR PT FALLS ASSESS DOC 2+ FALLS/FALL W/INJURY/YR: ICD-10-PCS | Mod: HCNC,CPTII,S$GLB, | Performed by: INTERNAL MEDICINE

## 2022-02-09 PROCEDURE — 3288F FALL RISK ASSESSMENT DOCD: CPT | Mod: HCNC,CPTII,S$GLB, | Performed by: INTERNAL MEDICINE

## 2022-02-09 RX ORDER — POTASSIUM CHLORIDE 20 MEQ/1
20 TABLET, EXTENDED RELEASE ORAL DAILY
Qty: 90 TABLET | Refills: 3 | Status: ON HOLD | OUTPATIENT
Start: 2022-02-09 | End: 2022-03-04 | Stop reason: HOSPADM

## 2022-02-10 ENCOUNTER — PATIENT MESSAGE (OUTPATIENT)
Dept: OPHTHALMOLOGY | Facility: CLINIC | Age: 87
End: 2022-02-10
Payer: MEDICARE

## 2022-02-11 ENCOUNTER — PATIENT MESSAGE (OUTPATIENT)
Dept: FAMILY MEDICINE | Facility: CLINIC | Age: 87
End: 2022-02-11
Payer: MEDICARE

## 2022-02-11 RX ORDER — PREDNISONE 20 MG/1
TABLET ORAL
Qty: 8 TABLET | Refills: 0 | Status: ON HOLD | OUTPATIENT
Start: 2022-02-11 | End: 2022-03-04

## 2022-02-14 ENCOUNTER — LAB VISIT (OUTPATIENT)
Dept: LAB | Facility: HOSPITAL | Age: 87
End: 2022-02-14
Attending: INTERNAL MEDICINE
Payer: MEDICARE

## 2022-02-14 DIAGNOSIS — E87.6 HYPOKALEMIA: ICD-10-CM

## 2022-02-14 LAB
ANION GAP SERPL CALC-SCNC: 9 MMOL/L (ref 8–16)
BUN SERPL-MCNC: 18 MG/DL (ref 8–23)
CALCIUM SERPL-MCNC: 8.5 MG/DL (ref 8.7–10.5)
CHLORIDE SERPL-SCNC: 109 MMOL/L (ref 95–110)
CO2 SERPL-SCNC: 19 MMOL/L (ref 23–29)
CREAT SERPL-MCNC: 1.4 MG/DL (ref 0.5–1.4)
EST. GFR  (AFRICAN AMERICAN): 39.8 ML/MIN/1.73 M^2
EST. GFR  (NON AFRICAN AMERICAN): 34.5 ML/MIN/1.73 M^2
GLUCOSE SERPL-MCNC: 118 MG/DL (ref 70–110)
POTASSIUM SERPL-SCNC: 5.2 MMOL/L (ref 3.5–5.1)
SODIUM SERPL-SCNC: 137 MMOL/L (ref 136–145)

## 2022-02-14 PROCEDURE — 36415 COLL VENOUS BLD VENIPUNCTURE: CPT | Mod: HCNC,PO | Performed by: INTERNAL MEDICINE

## 2022-02-14 PROCEDURE — 80048 BASIC METABOLIC PNL TOTAL CA: CPT | Mod: HCNC | Performed by: INTERNAL MEDICINE

## 2022-02-15 ENCOUNTER — PATIENT MESSAGE (OUTPATIENT)
Dept: FAMILY MEDICINE | Facility: CLINIC | Age: 87
End: 2022-02-15
Payer: MEDICARE

## 2022-02-15 NOTE — PROGRESS NOTES
===============================  Date today is 2/16/2022  Glo Dumont is a 86 y.o. female  Last visit [facility]: :2/8/2022   Last visit eye dept. 2/8/2022    Corrected distance visual acuity was 20/400 in the right eye and CF at 3' in the left eye.  Tonometry     Tonometry (Applanation, 8:00 AM)       Right Left    Pressure 14 12              Not recorded       Not recorded       Not recorded       Chief Complaint   Patient presents with    crvo me     Ozurdex ou       Problem List Items Addressed This Visit        Eye/Vision problems    Central retinal vein occlusion with macular edema of both eyes - Primary    Relevant Medications    dexAMETHasone (OZURDEX) intravitreal implant (Completed) (Start on 2/16/2022 10:15 AM)    dexAMETHasone (OZURDEX) intravitreal implant (Completed) (Start on 2/16/2022 10:15 AM)    Other Relevant Orders    Posterior Segment OCT Retina-Both eyes (Completed)    Prior authorization Order      Other Visit Diagnoses     Hypertensive retinopathy of both eyes        SOB (shortness of breath)        Pseudophakia of both eyes              ________________  2/16/2022 today    OU CME  Ozurdex OU today  Return in a month to eval for possible Eylea over Ozurdex    .  2/16/2022  Diagnosis :  OU DME  Today:   Ozurdex 0.7 mg , OU   Follow up: eval for Eylea OU in 4 weeks  Instructed to call 24/7 for any worsening of vision. Check Both eyes daily. Gave patient my home phone number.  Risks, benefits, and alternatives to treatment discussed in detail with the patient.  The patient voiced understanding and wished to proceed with the procedure    Ozurdex intravitreal implant Procedure Note:     Patient Identified and Time Out complete  Subconjunctival bleb - xylocaine with epi 2%   and Betadine.  Inject OU at 1mm parallel to limbus  Post Operative Dx: Same  Complications: None  Follow up as above.      ==============================='

## 2022-02-16 ENCOUNTER — PROCEDURE VISIT (OUTPATIENT)
Dept: OPHTHALMOLOGY | Facility: CLINIC | Age: 87
End: 2022-02-16
Payer: MEDICARE

## 2022-02-16 ENCOUNTER — PATIENT MESSAGE (OUTPATIENT)
Dept: CARDIOLOGY | Facility: CLINIC | Age: 87
End: 2022-02-16
Payer: MEDICARE

## 2022-02-16 DIAGNOSIS — H34.8130 CENTRAL RETINAL VEIN OCCLUSION WITH MACULAR EDEMA OF BOTH EYES: Primary | ICD-10-CM

## 2022-02-16 DIAGNOSIS — Z96.1 PSEUDOPHAKIA OF BOTH EYES: ICD-10-CM

## 2022-02-16 DIAGNOSIS — R06.02 SOB (SHORTNESS OF BREATH): ICD-10-CM

## 2022-02-16 DIAGNOSIS — H35.033 HYPERTENSIVE RETINOPATHY OF BOTH EYES: ICD-10-CM

## 2022-02-16 PROCEDURE — 92134 CPTRZ OPH DX IMG PST SGM RTA: CPT | Mod: HCNC,S$GLB,, | Performed by: OPHTHALMOLOGY

## 2022-02-16 PROCEDURE — 99499 NO LOS: ICD-10-PCS | Mod: HCNC,S$GLB,, | Performed by: OPHTHALMOLOGY

## 2022-02-16 PROCEDURE — 67028 INJECTION EYE DRUG: CPT | Mod: 50,HCNC,S$GLB, | Performed by: OPHTHALMOLOGY

## 2022-02-16 PROCEDURE — 92134 POSTERIOR SEGMENT OCT RETINA (OCULAR COHERENCE TOMOGRAPHY)-BOTH EYES: ICD-10-PCS | Mod: HCNC,S$GLB,, | Performed by: OPHTHALMOLOGY

## 2022-02-16 PROCEDURE — 99499 UNLISTED E&M SERVICE: CPT | Mod: HCNC,S$GLB,, | Performed by: OPHTHALMOLOGY

## 2022-02-16 PROCEDURE — 67028 PR INJECT INTRAVITREAL PHARMCOLOGIC: ICD-10-PCS | Mod: 50,HCNC,S$GLB, | Performed by: OPHTHALMOLOGY

## 2022-02-22 ENCOUNTER — TELEPHONE (OUTPATIENT)
Dept: CARDIOLOGY | Facility: CLINIC | Age: 87
End: 2022-02-22
Payer: MEDICARE

## 2022-02-22 NOTE — TELEPHONE ENCOUNTER
I attempted to contact the patient multiple times. I have rescheduled the patient's appointment to be at the same time and location, but with a different Doctor. Nothing further at this time.

## 2022-03-02 ENCOUNTER — HOSPITAL ENCOUNTER (OUTPATIENT)
Facility: HOSPITAL | Age: 87
Discharge: HOME HEALTH | End: 2022-03-04
Attending: SPECIALIST | Admitting: FAMILY MEDICINE
Payer: MEDICARE

## 2022-03-02 ENCOUNTER — PATIENT MESSAGE (OUTPATIENT)
Dept: FAMILY MEDICINE | Facility: CLINIC | Age: 87
End: 2022-03-02
Payer: MEDICARE
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
## 2022-03-02 DIAGNOSIS — E87.6 HYPOKALEMIA: ICD-10-CM

## 2022-03-02 DIAGNOSIS — E87.5 HYPERKALEMIA: ICD-10-CM

## 2022-03-02 DIAGNOSIS — R55 SYNCOPE: ICD-10-CM

## 2022-03-02 DIAGNOSIS — R00.1 BRADYCARDIA: ICD-10-CM

## 2022-03-02 DIAGNOSIS — N18.4 CKD (CHRONIC KIDNEY DISEASE) STAGE 4, GFR 15-29 ML/MIN: Primary | ICD-10-CM

## 2022-03-02 DIAGNOSIS — R41.3 MEMORY LOSS: ICD-10-CM

## 2022-03-02 PROBLEM — E87.20 METABOLIC ACIDOSIS: Status: ACTIVE | Noted: 2022-03-02

## 2022-03-02 LAB
ALBUMIN SERPL BCP-MCNC: 3.5 G/DL (ref 3.5–5.2)
ALLENS TEST: ABNORMAL
ALP SERPL-CCNC: 59 U/L (ref 55–135)
ALT SERPL W/O P-5'-P-CCNC: 17 U/L (ref 10–44)
ANION GAP SERPL CALC-SCNC: 13 MMOL/L (ref 8–16)
ANION GAP SERPL CALC-SCNC: 8 MMOL/L (ref 8–16)
AST SERPL-CCNC: 22 U/L (ref 10–40)
BASOPHILS # BLD AUTO: 0.06 K/UL (ref 0–0.2)
BASOPHILS NFR BLD: 0.8 % (ref 0–1.9)
BILIRUB SERPL-MCNC: 0.3 MG/DL (ref 0.1–1)
BUN SERPL-MCNC: 32 MG/DL (ref 8–23)
BUN SERPL-MCNC: 34 MG/DL (ref 8–23)
CALCIUM SERPL-MCNC: 8.9 MG/DL (ref 8.7–10.5)
CALCIUM SERPL-MCNC: 9.5 MG/DL (ref 8.7–10.5)
CHLORIDE SERPL-SCNC: 110 MMOL/L (ref 95–110)
CHLORIDE SERPL-SCNC: 110 MMOL/L (ref 95–110)
CK SERPL-CCNC: 17 U/L (ref 20–180)
CO2 SERPL-SCNC: 10 MMOL/L (ref 23–29)
CO2 SERPL-SCNC: 15 MMOL/L (ref 23–29)
CREAT SERPL-MCNC: 1.4 MG/DL (ref 0.5–1.4)
CREAT SERPL-MCNC: 1.6 MG/DL (ref 0.5–1.4)
DIFFERENTIAL METHOD: ABNORMAL
EOSINOPHIL # BLD AUTO: 0.2 K/UL (ref 0–0.5)
EOSINOPHIL NFR BLD: 3.1 % (ref 0–8)
ERYTHROCYTE [DISTWIDTH] IN BLOOD BY AUTOMATED COUNT: 17.2 % (ref 11.5–14.5)
EST. GFR  (AFRICAN AMERICAN): 34 ML/MIN/1.73 M^2
EST. GFR  (AFRICAN AMERICAN): 40 ML/MIN/1.73 M^2
EST. GFR  (NON AFRICAN AMERICAN): 29 ML/MIN/1.73 M^2
EST. GFR  (NON AFRICAN AMERICAN): 35 ML/MIN/1.73 M^2
GLUCOSE SERPL-MCNC: 71 MG/DL (ref 70–110)
GLUCOSE SERPL-MCNC: 88 MG/DL (ref 70–110)
HCO3 UR-SCNC: 17.5 MMOL/L (ref 24–28)
HCT VFR BLD AUTO: 31.2 % (ref 37–48.5)
HGB BLD-MCNC: 9.7 G/DL (ref 12–16)
IMM GRANULOCYTES # BLD AUTO: 0.13 K/UL (ref 0–0.04)
IMM GRANULOCYTES NFR BLD AUTO: 1.8 % (ref 0–0.5)
LYMPHOCYTES # BLD AUTO: 1.3 K/UL (ref 1–4.8)
LYMPHOCYTES NFR BLD: 18.1 % (ref 18–48)
MAGNESIUM SERPL-MCNC: 1.9 MG/DL (ref 1.6–2.6)
MCH RBC QN AUTO: 27.8 PG (ref 27–31)
MCHC RBC AUTO-ENTMCNC: 31.1 G/DL (ref 32–36)
MCV RBC AUTO: 89 FL (ref 82–98)
MONOCYTES # BLD AUTO: 0.7 K/UL (ref 0.3–1)
MONOCYTES NFR BLD: 9.2 % (ref 4–15)
NEUTROPHILS # BLD AUTO: 4.8 K/UL (ref 1.8–7.7)
NEUTROPHILS NFR BLD: 67 % (ref 38–73)
NRBC BLD-RTO: 0 /100 WBC
PCO2 BLDA: 20.8 MMHG (ref 35–45)
PH SMN: 7.53 [PH] (ref 7.35–7.45)
PLATELET # BLD AUTO: 278 K/UL (ref 150–450)
PMV BLD AUTO: 11.1 FL (ref 9.2–12.9)
PO2 BLDA: 133 MMHG (ref 40–60)
POC BE: -5 MMOL/L
POC SATURATED O2: 99 % (ref 95–100)
POCT GLUCOSE: 174 MG/DL (ref 70–110)
POCT GLUCOSE: 89 MG/DL (ref 70–110)
POTASSIUM SERPL-SCNC: 6.6 MMOL/L (ref 3.5–5.1)
POTASSIUM SERPL-SCNC: 7.5 MMOL/L (ref 3.5–5.1)
PROT SERPL-MCNC: 6.7 G/DL (ref 6–8.4)
RBC # BLD AUTO: 3.49 M/UL (ref 4–5.4)
SAMPLE: ABNORMAL
SARS-COV-2 RDRP RESP QL NAA+PROBE: NEGATIVE
SITE: ABNORMAL
SODIUM SERPL-SCNC: 133 MMOL/L (ref 136–145)
SODIUM SERPL-SCNC: 133 MMOL/L (ref 136–145)
TROPONIN I SERPL DL<=0.01 NG/ML-MCNC: 0.01 NG/ML (ref 0–0.03)
TROPONIN I SERPL DL<=0.01 NG/ML-MCNC: <0.006 NG/ML (ref 0–0.03)
WBC # BLD AUTO: 7.2 K/UL (ref 3.9–12.7)

## 2022-03-02 PROCEDURE — 84132 ASSAY OF SERUM POTASSIUM: CPT | Mod: HCNC

## 2022-03-02 PROCEDURE — 93005 ELECTROCARDIOGRAM TRACING: CPT | Mod: HCNC

## 2022-03-02 PROCEDURE — 25000003 PHARM REV CODE 250: Mod: HCNC | Performed by: NURSE PRACTITIONER

## 2022-03-02 PROCEDURE — 36415 COLL VENOUS BLD VENIPUNCTURE: CPT | Mod: HCNC | Performed by: NURSE PRACTITIONER

## 2022-03-02 PROCEDURE — 84295 ASSAY OF SERUM SODIUM: CPT | Mod: HCNC,91

## 2022-03-02 PROCEDURE — 84484 ASSAY OF TROPONIN QUANT: CPT | Mod: 91,HCNC | Performed by: NURSE PRACTITIONER

## 2022-03-02 PROCEDURE — 96366 THER/PROPH/DIAG IV INF ADDON: CPT

## 2022-03-02 PROCEDURE — 96375 TX/PRO/DX INJ NEW DRUG ADDON: CPT | Mod: HCNC

## 2022-03-02 PROCEDURE — 96365 THER/PROPH/DIAG IV INF INIT: CPT

## 2022-03-02 PROCEDURE — 85014 HEMATOCRIT: CPT | Mod: HCNC

## 2022-03-02 PROCEDURE — 93010 ELECTROCARDIOGRAM REPORT: CPT | Mod: HCNC,,, | Performed by: INTERNAL MEDICINE

## 2022-03-02 PROCEDURE — 99900035 HC TECH TIME PER 15 MIN (STAT): Mod: HCNC

## 2022-03-02 PROCEDURE — 85025 COMPLETE CBC W/AUTO DIFF WBC: CPT | Mod: HCNC | Performed by: SPECIALIST

## 2022-03-02 PROCEDURE — 82550 ASSAY OF CK (CPK): CPT | Mod: HCNC | Performed by: SPECIALIST

## 2022-03-02 PROCEDURE — 63600175 PHARM REV CODE 636 W HCPCS: Mod: HCNC | Performed by: SPECIALIST

## 2022-03-02 PROCEDURE — 80053 COMPREHEN METABOLIC PANEL: CPT | Mod: HCNC | Performed by: SPECIALIST

## 2022-03-02 PROCEDURE — 80048 BASIC METABOLIC PNL TOTAL CA: CPT | Mod: HCNC | Performed by: NURSE PRACTITIONER

## 2022-03-02 PROCEDURE — 93010 EKG 12-LEAD: ICD-10-PCS | Mod: HCNC,,, | Performed by: INTERNAL MEDICINE

## 2022-03-02 PROCEDURE — 82330 ASSAY OF CALCIUM: CPT | Mod: HCNC

## 2022-03-02 PROCEDURE — 25000003 PHARM REV CODE 250: Mod: HCNC | Performed by: SPECIALIST

## 2022-03-02 PROCEDURE — 84484 ASSAY OF TROPONIN QUANT: CPT | Mod: HCNC | Performed by: SPECIALIST

## 2022-03-02 PROCEDURE — G0378 HOSPITAL OBSERVATION PER HR: HCPCS | Mod: HCNC

## 2022-03-02 PROCEDURE — 83735 ASSAY OF MAGNESIUM: CPT | Mod: HCNC | Performed by: SPECIALIST

## 2022-03-02 PROCEDURE — 99285 EMERGENCY DEPT VISIT HI MDM: CPT | Mod: 25,HCNC

## 2022-03-02 PROCEDURE — 82803 BLOOD GASES ANY COMBINATION: CPT | Mod: HCNC

## 2022-03-02 PROCEDURE — 82962 GLUCOSE BLOOD TEST: CPT | Mod: HCNC

## 2022-03-02 PROCEDURE — U0002 COVID-19 LAB TEST NON-CDC: HCPCS | Mod: HCNC | Performed by: SPECIALIST

## 2022-03-02 RX ORDER — HYDROCODONE BITARTRATE AND ACETAMINOPHEN 5; 325 MG/1; MG/1
1 TABLET ORAL EVERY 6 HOURS PRN
Status: DISCONTINUED | OUTPATIENT
Start: 2022-03-02 | End: 2022-03-04 | Stop reason: HOSPADM

## 2022-03-02 RX ORDER — PRAVASTATIN SODIUM 20 MG/1
40 TABLET ORAL NIGHTLY
Status: DISCONTINUED | OUTPATIENT
Start: 2022-03-02 | End: 2022-03-04 | Stop reason: HOSPADM

## 2022-03-02 RX ORDER — SODIUM CHLORIDE 0.9 % (FLUSH) 0.9 %
10 SYRINGE (ML) INJECTION
Status: DISCONTINUED | OUTPATIENT
Start: 2022-03-02 | End: 2022-03-04 | Stop reason: HOSPADM

## 2022-03-02 RX ORDER — SPIRONOLACTONE 25 MG/1
25 TABLET ORAL 3 TIMES DAILY
Status: ON HOLD | COMMUNITY
Start: 2022-02-19 | End: 2022-03-04 | Stop reason: HOSPADM

## 2022-03-02 RX ORDER — HYDRALAZINE HYDROCHLORIDE 20 MG/ML
10 INJECTION INTRAMUSCULAR; INTRAVENOUS EVERY 8 HOURS PRN
Status: DISCONTINUED | OUTPATIENT
Start: 2022-03-02 | End: 2022-03-04 | Stop reason: HOSPADM

## 2022-03-02 RX ORDER — ACETAMINOPHEN 325 MG/1
650 TABLET ORAL EVERY 4 HOURS PRN
Status: DISCONTINUED | OUTPATIENT
Start: 2022-03-02 | End: 2022-03-04 | Stop reason: HOSPADM

## 2022-03-02 RX ORDER — ASPIRIN 81 MG/1
81 TABLET ORAL DAILY
Status: DISCONTINUED | OUTPATIENT
Start: 2022-03-03 | End: 2022-03-04 | Stop reason: HOSPADM

## 2022-03-02 RX ORDER — CARVEDILOL 12.5 MG/1
12.5 TABLET ORAL 2 TIMES DAILY
Status: DISCONTINUED | OUTPATIENT
Start: 2022-03-02 | End: 2022-03-03

## 2022-03-02 RX ORDER — HYDRALAZINE HYDROCHLORIDE 20 MG/ML
10 INJECTION INTRAMUSCULAR; INTRAVENOUS
Status: COMPLETED | OUTPATIENT
Start: 2022-03-02 | End: 2022-03-02

## 2022-03-02 RX ORDER — ONDANSETRON 2 MG/ML
4 INJECTION INTRAMUSCULAR; INTRAVENOUS EVERY 8 HOURS PRN
Status: DISCONTINUED | OUTPATIENT
Start: 2022-03-02 | End: 2022-03-04 | Stop reason: HOSPADM

## 2022-03-02 RX ORDER — ALPRAZOLAM 0.25 MG/1
0.25 TABLET ORAL 2 TIMES DAILY PRN
Status: DISCONTINUED | OUTPATIENT
Start: 2022-03-02 | End: 2022-03-04 | Stop reason: HOSPADM

## 2022-03-02 RX ORDER — CITALOPRAM 20 MG/1
20 TABLET, FILM COATED ORAL DAILY
Status: DISCONTINUED | OUTPATIENT
Start: 2022-03-03 | End: 2022-03-04 | Stop reason: HOSPADM

## 2022-03-02 RX ORDER — PANTOPRAZOLE SODIUM 40 MG/1
40 TABLET, DELAYED RELEASE ORAL DAILY
Status: DISCONTINUED | OUTPATIENT
Start: 2022-03-03 | End: 2022-03-04 | Stop reason: HOSPADM

## 2022-03-02 RX ADMIN — HYDRALAZINE HYDROCHLORIDE 10 MG: 20 INJECTION, SOLUTION INTRAMUSCULAR; INTRAVENOUS at 01:03

## 2022-03-02 RX ADMIN — CARVEDILOL 12.5 MG: 12.5 TABLET, FILM COATED ORAL at 08:03

## 2022-03-02 RX ADMIN — CALCIUM GLUCONATE 1 G: 98 INJECTION, SOLUTION INTRAVENOUS at 01:03

## 2022-03-02 RX ADMIN — SODIUM ZIRCONIUM CYCLOSILICATE 10 G: 5 POWDER, FOR SUSPENSION ORAL at 08:03

## 2022-03-02 RX ADMIN — HYDROCODONE BITARTRATE AND ACETAMINOPHEN 1 TABLET: 5; 325 TABLET ORAL at 08:03

## 2022-03-02 RX ADMIN — ALPRAZOLAM 0.25 MG: 0.25 TABLET ORAL at 08:03

## 2022-03-02 RX ADMIN — DEXTROSE 250 ML: 10 SOLUTION INTRAVENOUS at 01:03

## 2022-03-02 RX ADMIN — PRAVASTATIN SODIUM 40 MG: 20 TABLET ORAL at 08:03

## 2022-03-02 RX ADMIN — INSULIN HUMAN 5 UNITS: 100 INJECTION, SOLUTION PARENTERAL at 01:03

## 2022-03-02 RX ADMIN — SODIUM BICARBONATE: 84 INJECTION, SOLUTION INTRAVENOUS at 06:03

## 2022-03-02 NOTE — HPI
Glo Dumont is a 86 y.o. female patient with a PMHx of CKD, CAD, Diastolic CHF, Anxiety, Anemia, Hypercholesteremia, and HTN who presents to the Emergency Department for evaluation of syncope which onset gradually this morning. Associated symptoms include dizziness and weakness. Patient denies head injury but states that she fell onto her right arm and elbow this morning. Symptoms are constant and moderate in severity. No mitigating or exacerbating factors reported. Associated sxs include dizziness and weakness. Patient denies any fever, SOB, chills, CP, diarrhea, nausea, and all other symptoms at this time. No further complaints or concerns at this time. Pt denies smoking and use of ETOH.  Pt reports medication compliance but did not take medications today.  Pt is a DNR and Dawna Toribio (daughter) at [phone] is the surrogate decision maker. ER work up showed: ER work up showed: H/H 9.7/31.2, Na 133, K 7.5, CO2 15, BUN 34, Creatinine 1.6, and CPK 17. Xray of elbow and R shoulder unremarkable. Chest xray showed no acute abnormality.  CT of head showed age-related atrophy.  Small chronic right frontal lobe encephalomalacia.  Chronic left maxillary sinusitis.  No acute findings. Hospital Medicine contacted for admission with patient placed in Observation for further evaluation.

## 2022-03-02 NOTE — ASSESSMENT & PLAN NOTE
K 7.5-   -Insulin, Calcium, and D50 given for K shift   -repeat BMP every 4 hours per protocol

## 2022-03-02 NOTE — ED PROVIDER NOTES
SCRIBE #1 NOTE: I, Karime Spence, am scribing for, and in the presence of, Caitlin Castillo MD. I have scribed the entire note.       History     Chief Complaint   Patient presents with    Loss of Consciousness     Pt had syncopal episode this AM. Denies hitting head, + blood thinners. Pain to bilateral arms.     Review of patient's allergies indicates:   Allergen Reactions    Lisinopril      angioedema    Codeine Nausea And Vomiting         History of Present Illness     HPI    3/2/2022, 2:51 PM  History obtained from the patient      History of Present Illness: Glo Dumont is a 86 y.o. female patient with a PMHx of CKD, CAD, and HTN who presents to the Emergency Department for evaluation of syncope which onset gradually this morning. Patient denies head injury but states that she fell onto her right arm and elbow this morning. Symptoms are constant and moderate in severity. No mitigating or exacerbating factors reported. Associated sxs include dizziness and weakness. Patient denies any fever, SOB, chills, CP, D/V and all other sxs at this time. No further complaints or concerns at this time.       Arrival mode: Personal vehicle      PCP: Christina Recio MD        Past Medical History:  Past Medical History:   Diagnosis Date    Anemia     Angina pectoris     Anxiety     Anxiety and depression     Arthritis     hip    Carotid artery occlusion     Carpal tunnel syndrome 06/23/2008    emg    Chronic diarrhea     work up in 2011 with EGD, CS and VCE    CKD (chronic kidney disease) stage 3, GFR 30-59 ml/min 5/11/2017    Colitis     Coronary artery disease     Coronary artery disease     Diastolic dysfunction     Diverticulosis     Glaucoma     Greater trochanteric bursitis 2/10/2015    Grief at loss of child 1/26/2016    H/O carotid endarterectomy 12/2/2013    Heart failure     History of coronary angioplasty 3/11/2014    Hypercholesteremia     Hypertension     Liver cyst 02/08/2013     ct abd    Macular degeneration     Primary open-angle glaucoma(365.11) 9/3/2013    Renal cyst 02/08/2013    ct abd    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    Sarcoidosis     Sarcoidosis of lung     Sickle cell trait     Uveitis        Past Surgical History:  Past Surgical History:   Procedure Laterality Date    CAROTID ENDARTERECTOMY Right 2000s    CATARACT EXTRACTION Bilateral     Dr. Liang Dennis    CHOLECYSTECTOMY      laparoscopic, 3/18.    CORONARY ANGIOPLASTY WITH STENT PLACEMENT  11/19/2010    RCA-HALIE 2010    Lathia    JOINT REPLACEMENT Left 11/03/2014    Dr. Braun    TOTAL ABDOMINAL HYSTERECTOMY W/ BILATERAL SALPINGOOPHORECTOMY  1972         Family History:  Family History   Problem Relation Age of Onset    Hypertension Mother     Heart failure Mother     Cancer Father         prostate    Cirrhosis Brother     Heart failure Brother     Cancer Daughter         Carcinoid       Social History:  Social History     Tobacco Use    Smoking status: Never Smoker    Smokeless tobacco: Never Used   Substance and Sexual Activity    Alcohol use: No     Alcohol/week: 0.0 standard drinks    Drug use: No    Sexual activity: Yes     Partners: Male        Review of Systems     Review of Systems   Constitutional: Negative for chills and fever.   HENT: Negative for congestion and rhinorrhea.    Respiratory: Negative for cough, chest tightness and shortness of breath.    Gastrointestinal: Negative for diarrhea and vomiting.   Musculoskeletal: Positive for myalgias (R shoulder and elbow).   Neurological: Positive for dizziness, syncope and weakness.        Physical Exam     Initial Vitals [03/02/22 1044]   BP Pulse Resp Temp SpO2   (!) 147/73 (!) 48 16 98.6 °F (37 °C) 100 %      MAP       --          Physical Exam  Nursing Notes and Vital Signs Reviewed.  Constitutional: Patient is in mild distress. Well-developed and well-nourished.  Head: Atraumatic. Normocephalic.  Eyes: PERRL. EOM  intact. Conjunctivae are not pale. No scleral icterus.  ENT: Mucous membranes are moist. Oropharynx is clear and symmetric.    Neck: Supple. Full ROM. No lymphadenopathy.  Cardiovascular: Bradycardic. No murmurs, rubs, or gallops. Distal pulses are 2+ and symmetric.  Pulmonary/Chest: No respiratory distress. Clear to auscultation bilaterally. No wheezing or rales.  Abdominal: Soft and non-distended.  There is no tenderness.  No rebound, guarding, or rigidity. Good bowel sounds.  Musculoskeletal: Moves all extremities. No obvious deformities. No edema. Tenderness to right elbow.  Skin: Warm and dry.  Neurological: NVI distally. Alert, awake, and appropriate. Normal speech.  No acute focal neurological deficits are appreciated.       ED Course   Procedures  ED Vital Signs:  Vitals:    03/02/22 1044 03/02/22 1301 03/02/22 1304 03/02/22 1311   BP: (!) 147/73 (!) 191/83 (!) 191/83    Pulse: (!) 48  (!) 53 (!) 53   Resp: 16  (!) 25 (!) 29   Temp: 98.6 °F (37 °C)      TempSrc: Oral      SpO2: 100%  100% 100%   Weight:        03/02/22 1320 03/02/22 1330 03/02/22 1430   BP:  137/66 138/71   Pulse:  (!) 56 62   Resp:  20 (!) 21   Temp:      TempSrc:      SpO2:  100% 100%   Weight: 54.4 kg (120 lb)         Abnormal Lab Results:  Labs Reviewed   CBC W/ AUTO DIFFERENTIAL - Abnormal; Notable for the following components:       Result Value    RBC 3.49 (*)     Hemoglobin 9.7 (*)     Hematocrit 31.2 (*)     MCHC 31.1 (*)     RDW 17.2 (*)     Immature Granulocytes 1.8 (*)     Immature Grans (Abs) 0.13 (*)     All other components within normal limits   COMPREHENSIVE METABOLIC PANEL - Abnormal; Notable for the following components:    Sodium 133 (*)     Potassium 7.5 (*)     CO2 15 (*)     BUN 34 (*)     Creatinine 1.6 (*)     eGFR if  34 (*)     eGFR if non  29 (*)     All other components within normal limits    Narrative:     K  result(s) called and verbal readback obtained from Brenda Sen RN  ED    Verified result with VBG K+. Release results per ANIBAL Sen   ED by JD8 03/02/2022 13:28   CK - Abnormal; Notable for the following components:    CPK 17 (*)     All other components within normal limits   ISTAT PROCEDURE - Abnormal; Notable for the following components:    POC PH 7.533 (*)     POC PCO2 20.8 (*)     POC PO2 133 (*)     POC HCO3 17.5 (*)     All other components within normal limits   POCT GLUCOSE - Abnormal; Notable for the following components:    POCT Glucose 174 (*)     All other components within normal limits   MAGNESIUM   TROPONIN I   SARS-COV-2 RNA AMPLIFICATION, QUAL   POCT GLUCOSE   POCT GLUCOSE MONITORING CONTINUOUS        All Lab Results:  Results for orders placed or performed during the hospital encounter of 03/02/22   CBC Auto Differential   Result Value Ref Range    WBC 7.20 3.90 - 12.70 K/uL    RBC 3.49 (L) 4.00 - 5.40 M/uL    Hemoglobin 9.7 (L) 12.0 - 16.0 g/dL    Hematocrit 31.2 (L) 37.0 - 48.5 %    MCV 89 82 - 98 fL    MCH 27.8 27.0 - 31.0 pg    MCHC 31.1 (L) 32.0 - 36.0 g/dL    RDW 17.2 (H) 11.5 - 14.5 %    Platelets 278 150 - 450 K/uL    MPV 11.1 9.2 - 12.9 fL    Immature Granulocytes 1.8 (H) 0.0 - 0.5 %    Gran # (ANC) 4.8 1.8 - 7.7 K/uL    Immature Grans (Abs) 0.13 (H) 0.00 - 0.04 K/uL    Lymph # 1.3 1.0 - 4.8 K/uL    Mono # 0.7 0.3 - 1.0 K/uL    Eos # 0.2 0.0 - 0.5 K/uL    Baso # 0.06 0.00 - 0.20 K/uL    nRBC 0 0 /100 WBC    Gran % 67.0 38.0 - 73.0 %    Lymph % 18.1 18.0 - 48.0 %    Mono % 9.2 4.0 - 15.0 %    Eosinophil % 3.1 0.0 - 8.0 %    Basophil % 0.8 0.0 - 1.9 %    Differential Method Automated    Comprehensive Metabolic Panel   Result Value Ref Range    Sodium 133 (L) 136 - 145 mmol/L    Potassium 7.5 (HH) 3.5 - 5.1 mmol/L    Chloride 110 95 - 110 mmol/L    CO2 15 (L) 23 - 29 mmol/L    Glucose 88 70 - 110 mg/dL    BUN 34 (H) 8 - 23 mg/dL    Creatinine 1.6 (H) 0.5 - 1.4 mg/dL    Calcium 8.9 8.7 - 10.5 mg/dL    Total Protein 6.7 6.0 - 8.4 g/dL    Albumin 3.5  3.5 - 5.2 g/dL    Total Bilirubin 0.3 0.1 - 1.0 mg/dL    Alkaline Phosphatase 59 55 - 135 U/L    AST 22 10 - 40 U/L    ALT 17 10 - 44 U/L    Anion Gap 8 8 - 16 mmol/L    eGFR if African American 34 (A) >60 mL/min/1.73 m^2    eGFR if non African American 29 (A) >60 mL/min/1.73 m^2   Magnesium   Result Value Ref Range    Magnesium 1.9 1.6 - 2.6 mg/dL   Troponin I   Result Value Ref Range    Troponin I 0.006 0.000 - 0.026 ng/mL   CK   Result Value Ref Range    CPK 17 (L) 20 - 180 U/L   POCT glucose   Result Value Ref Range    POCT Glucose 89 70 - 110 mg/dL   ISTAT PROCEDURE   Result Value Ref Range    POC PH 7.533 (H) 7.35 - 7.45    POC PCO2 20.8 (L) 35 - 45 mmHg    POC PO2 133 (HH) 40 - 60 mmHg    POC HCO3 17.5 (L) 24 - 28 mmol/L    POC BE -5 -2 to 2 mmol/L    POC SATURATED O2 99 95 - 100 %    Sample VENOUS     Site Other     Allens Test N/A    POCT glucose   Result Value Ref Range    POCT Glucose 174 (H) 70 - 110 mg/dL     *Note: Due to a large number of results and/or encounters for the requested time period, some results have not been displayed. A complete set of results can be found in Results Review.       Imaging Results:  Imaging Results          CT Head Without Contrast (Final result)  Result time 03/02/22 12:39:53    Final result by Miguel Marlow MD (03/02/22 12:39:53)                 Impression:      Age-related atrophy.  Small chronic right frontal lobe encephalomalacia.  Chronic left maxillary sinusitis.  No acute findings.      Electronically signed by: Miguel Marlow MD  Date:    03/02/2022  Time:    12:39             Narrative:    EXAMINATION:  CT HEAD WITHOUT CONTRAST    CLINICAL HISTORY:  Syncope with subsequent fall and head trauma.  Headache.  Syncope, recurrent;    TECHNIQUE:  Standard noncontrast CT of the brain.    All CT scans at this facility are performed  using dose modulation techniques as appropriate to performed exam including the following:  automated exposure control;  adjustment of mA and/or kV according to the patients size (this includes techniques or standardized protocols for targeted exams where dose is matched to indication/reason for exam: i.e. extremities or head);  iterative reconstruction technique.    COMPARISON:  02/01/2022 CT scan of the brain.    FINDINGS:  The ventricles are mild-to-moderately enlarged consistent with volume loss.  No acute edema, hemorrhage or mass effect is present.    Small chronic area of right frontal lobe encephalomalacia, similar to the previous study.    No new findings.    Chronic left maxillary sinusitis.                               X-Ray Chest 1 View (Final result)  Result time 03/02/22 12:35:13    Final result by Jessa Garcia MD (03/02/22 12:35:13)                 Impression:      Stable radiographic appearance of the chest without acute cardiopulmonary abnormality.      Electronically signed by: Jessa Garcia  Date:    03/02/2022  Time:    12:35             Narrative:    EXAMINATION:  XR CHEST 1 VIEW    CLINICAL HISTORY:  syncope;    TECHNIQUE:  Single frontal view of the chest was performed.    COMPARISON:  Chest radiograph 02/01/2022    FINDINGS:  ECG monitoring leads overlie the chest.  There is interstitial coarsening, similar to the prior exam, suggestive of COPD.No significant pleural fluid or pneumothorax.    The cardiac silhouette is normal in size.  There is atherosclerotic calcification of the aorta.  The hilar and mediastinal contours are unremarkable.    Bones are intact.                               X-Ray Shoulder Complete 2 View Right (Final result)  Result time 03/02/22 12:41:54    Final result by Jessa Garcia MD (03/02/22 12:41:54)                 Impression:      No acute osseous abnormality.  Degenerative change of the acromioclavicular joint.      Electronically signed by: Jessa Garcia  Date:    03/02/2022  Time:    12:41             Narrative:    EXAMINATION:  XR SHOULDER COMPLETE 2 OR  MORE VIEWS RIGHT    CLINICAL HISTORY:  Unspecified fall, initial encounter    TECHNIQUE:  Two or three views of the right shoulder were performed.    COMPARISON:  Right shoulder radiograph 03/31/2009    FINDINGS:  No evidence of fracture or dislocation.  There is joint space narrowing of the acromioclavicular joint.  No significant degenerative change of the glenohumeral joint.                               X-Ray Elbow Complete Left (Final result)  Result time 03/02/22 12:45:20    Final result by Jessa Garcia MD (03/02/22 12:45:20)                 Impression:      No acute osseous abnormality seen.      Electronically signed by: Jessa Garcia  Date:    03/02/2022  Time:    12:45             Narrative:    EXAMINATION:  XR ELBOW COMPLETE 3 VIEW LEFT    CLINICAL HISTORY:  . Unspecified fall, initial encounter    TECHNIQUE:  AP, lateral, and oblique views of the left elbow were performed.    COMPARISON:  None    FINDINGS:  No acute fracture or dislocation seen.  No elbow joint effusion.  No soft tissue edema or radiopaque retained foreign body.  Vascular calcification is noted.                                 The EKG was ordered, reviewed, and independently interpreted by the ED provider.  Interpretation time: 12:46:54  Rate: 53 BPM  Rhythm: sinus bradycardia  Interpretation:  Non-specific ST chnages. No STEMI.             The Emergency Provider reviewed the vital signs and test results, which are outlined above.     ED Discussion     2:31 PM: Discussed case with Melissa Somers NP (Hospital Medicine). Dr. Velasco agrees with current care and management of pt and accepts admission.   Admitting Service: Hospital Medicine  Admitting Physician: Dr. Velasco  Admit to: Obs Tele    3:04 PM: Re-evaluated pt. I have discussed test results, shared treatment plan, and the need for admission with patient and family at bedside. Pt and family express understanding at this time and agree with all information. All questions  answered. Pt and family have no further questions or concerns at this time. Pt is ready for admit.         Medical Decision Making:   Clinical Tests:   Lab Tests: Ordered and Reviewed  Radiological Study: Ordered and Reviewed  Medical Tests: Ordered and Reviewed           ED Medication(s):  Medications   hydrALAZINE injection 10 mg (10 mg Intravenous Given 3/2/22 1301)   calcium gluconate 1g in dextrose 5% 100mL (ready to mix system) (0 g Intravenous Stopped 3/2/22 1350)   dextrose 10% bolus 250 mL (0 g Intravenous Stopped 3/2/22 1418)   insulin regular injection 5 Units (5 Units Intravenous Given 3/2/22 1346)       New Prescriptions    No medications on file               Scribe Attestation:   Scribe #1: I performed the above scribed service and the documentation accurately describes the services I performed. I attest to the accuracy of the note.     Attending:   Physician Attestation Statement for Scribe #1: I, Caitlin Castillo MD, personally performed the services described in this documentation, as scribed by Karime Spence, in my presence, and it is both accurate and complete.           Clinical Impression       ICD-10-CM ICD-9-CM   1. Syncope  R55 780.2   2. Fall  W19.XXXA E888.9   3. Fall  W19.XXXA E888.9       Disposition:   Disposition: Placed in Observation  Condition: Fair       Caitlin Castillo MD  03/04/22 [id number]

## 2022-03-02 NOTE — SUBJECTIVE & OBJECTIVE
Past Medical History:   Diagnosis Date    Anemia     Angina pectoris     Anxiety     Anxiety and depression     Arthritis     hip    Carotid artery occlusion     Carpal tunnel syndrome 06/23/2008    emg    Chronic diarrhea     work up in 2011 with EGD, CS and VCE    CKD (chronic kidney disease) stage 3, GFR 30-59 ml/min 5/11/2017    Colitis     Coronary artery disease     Coronary artery disease     Diastolic dysfunction     Diverticulosis     Glaucoma     Greater trochanteric bursitis 2/10/2015    Grief at loss of child 1/26/2016    H/O carotid endarterectomy 12/2/2013    Heart failure     History of coronary angioplasty 3/11/2014    Hypercholesteremia     Hypertension     Liver cyst 02/08/2013    ct abd    Macular degeneration     Primary open-angle glaucoma(365.11) 9/3/2013    Renal cyst 02/08/2013    ct abd    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    Sarcoidosis     Sarcoidosis of lung     Sickle cell trait     Uveitis        Past Surgical History:   Procedure Laterality Date    CAROTID ENDARTERECTOMY Right 2000s    CATARACT EXTRACTION Bilateral     Dr. Liang Dennis    CHOLECYSTECTOMY      laparoscopic, 3/18.    CORONARY ANGIOPLASTY WITH STENT PLACEMENT  11/19/2010    RCA-HALIE 2010    Lathia    JOINT REPLACEMENT Left 11/03/2014    Dr. Braun    TOTAL ABDOMINAL HYSTERECTOMY W/ BILATERAL SALPINGOOPHORECTOMY  1972       Review of patient's allergies indicates:   Allergen Reactions    Lisinopril      angioedema    Codeine Nausea And Vomiting       No current facility-administered medications on file prior to encounter.     Current Outpatient Medications on File Prior to Encounter   Medication Sig    ALPRAZolam (XANAX) 0.25 MG tablet Take 0.5-1 tablets (0.125-0.25 mg total) by mouth 3 (three) times daily as needed for Anxiety.    amLODIPine (NORVASC) 5 MG tablet Take 1 tablet (5 mg total) by mouth once daily.    aspirin (ECOTRIN) 81 MG EC tablet Take 81 mg by mouth once daily.    budesonide (ENTOCORT  EC) 3 mg capsule Take 9 mg by mouth once daily.    calcium carbonate (OS-LYNN) 600 mg calcium (1,500 mg) Tab Take by mouth.    carvediloL (COREG) 12.5 MG tablet Take 1 tablet (12.5 mg total) by mouth 2 (two) times daily with meals.    citalopram (CELEXA) 20 MG tablet Take 1 tablet (20 mg total) by mouth once daily.    cyproheptadine (PERIACTIN) 4 mg tablet Take 1 tablet (4 mg total) by mouth 3 (three) times daily as needed.    dorzolamide-timolol 2-0.5% (COSOPT) 22.3-6.8 mg/mL ophthalmic solution Place 1 drop into both eyes 2 (two) times daily.    EYLEA 2 mg/0.05 mL Soln     FLUoxetine 10 MG capsule 1 capsule in the morning    FOLIC ACID/MULTIVIT-MIN/LUTEIN (CENTRUM SILVER ORAL) Take by mouth.    gabapentin (NEURONTIN) 300 MG capsule Take 1 capsule (300 mg total) by mouth once daily.    iron-vitamin C 100-250 mg, ICAR-C, (ICAR-C) 100-250 mg Tab Take 1 tablet by mouth once daily.    ketorolac 0.5% (ACULAR) 0.5 % Drop     nitroGLYCERIN (NITROSTAT) 0.4 MG SL tablet Place 1 tablet (0.4 mg total) under the tongue every 5 (five) minutes as needed for Chest pain.    nystatin (MYCOSTATIN) 100,000 unit/mL suspension Take by mouth.    OZURDEX 0.7 mg Impl intravitreal implant     pantoprazole (PROTONIX) 40 MG tablet Take 1 tablet (40 mg total) by mouth once daily.    potassium chloride SA (K-DUR,KLOR-CON) 20 MEQ tablet Take 1 tablet (20 mEq total) by mouth once daily.    prasugreL (EFFIENT) 10 mg Tab Take 1 tablet (10 mg total) by mouth once daily.    pravastatin (PRAVACHOL) 40 MG tablet TAKE 1 TABLET BY MOUTH DAILY    predniSONE (DELTASONE) 20 MG tablet 1 po daily x 5d, then half tab po daily.    spironolactone (ALDACTONE) 25 MG tablet Take 25 mg by mouth 3 (three) times daily.     Family History       Problem Relation (Age of Onset)    Cancer Father, Daughter    Cirrhosis Brother    Heart failure Mother, Brother    Hypertension Mother          Tobacco Use    Smoking status: Never Smoker    Smokeless tobacco: Never Used    Substance and Sexual Activity    Alcohol use: No     Alcohol/week: 0.0 standard drinks    Drug use: No    Sexual activity: Yes     Partners: Male     Review of Systems   Constitutional:  Positive for activity change. Negative for chills, fatigue and fever.   HENT:  Negative for postnasal drip, sore throat and trouble swallowing.    Respiratory:  Negative for cough, chest tightness and shortness of breath.    Cardiovascular:  Negative for chest pain, palpitations and leg swelling.   Gastrointestinal:  Negative for abdominal distention, abdominal pain, nausea and vomiting.   Genitourinary:  Negative for difficulty urinating, dysuria, frequency and urgency.   Musculoskeletal:  Positive for arthralgias. Negative for myalgias.   Skin:  Negative for color change and wound.   Neurological:  Positive for dizziness, syncope and weakness. Negative for headaches.   Psychiatric/Behavioral:  Negative for agitation and confusion. The patient is not nervous/anxious.    Objective:     Vital Signs (Most Recent):  Temp: 97.5 °F (36.4 °C) (03/02/22 1547)  Pulse: (!) 48 (03/02/22 1547)  Resp: (!) 22 (03/02/22 1547)  BP: (!) 125/58 (03/02/22 1547)  SpO2: 100 % (03/02/22 1547)   Vital Signs (24h Range):  Temp:  [97.5 °F (36.4 °C)-98.6 °F (37 °C)] 97.5 °F (36.4 °C)  Pulse:  [48-62] 48  Resp:  [16-29] 22  SpO2:  [100 %] 100 %  BP: (125-191)/(58-83) 125/58     Weight: 54.4 kg (120 lb)  Body mass index is 22.67 kg/m².    Physical Exam  HENT:      Head: Normocephalic.      Nose: Nose normal.   Cardiovascular:      Rate and Rhythm: Bradycardia present.      Pulses: Normal pulses.      Heart sounds: Normal heart sounds.   Pulmonary:      Effort: Tachypnea and accessory muscle usage present.   Abdominal:      General: Bowel sounds are normal. There is distension (mild).      Palpations: Abdomen is soft.   Genitourinary:     Comments: Deferred   Musculoskeletal:         General: Tenderness (R elbow in R shoulder) present.      Cervical back:  Normal range of motion and neck supple.   Skin:     General: Skin is warm and dry.   Neurological:      Mental Status: She is alert and oriented to person, place, and time.   Psychiatric:         Mood and Affect: Mood normal.         Behavior: Behavior normal.           Significant Labs: All pertinent labs within the past 24 hours have been reviewed.  CBC:   Recent Labs   Lab 03/02/22  1242   WBC 7.20   HGB 9.7*   HCT 31.2*        CMP:   Recent Labs   Lab 03/02/22  1242   *   K 7.5*      CO2 15*   GLU 88   BUN 34*   CREATININE 1.6*   CALCIUM 8.9   PROT 6.7   ALBUMIN 3.5   BILITOT 0.3   ALKPHOS 59   AST 22   ALT 17   ANIONGAP 8   EGFRNONAA 29*     Troponin:   Recent Labs   Lab 03/02/22  1242   TROPONINI 0.006       Significant Imaging: I have reviewed all pertinent imaging results/findings within the past 24 hours.    Imaging Results              CT Head Without Contrast (Final result)  Result time 03/02/22 12:39:53      Final result by Miguel Marlow MD (03/02/22 12:39:53)                   Impression:      Age-related atrophy.  Small chronic right frontal lobe encephalomalacia.  Chronic left maxillary sinusitis.  No acute findings.      Electronically signed by: Miguel Marlow MD  Date:    03/02/2022  Time:    12:39               Narrative:    EXAMINATION:  CT HEAD WITHOUT CONTRAST    CLINICAL HISTORY:  Syncope with subsequent fall and head trauma.  Headache.  Syncope, recurrent;    TECHNIQUE:  Standard noncontrast CT of the brain.    All CT scans at this facility are performed  using dose modulation techniques as appropriate to performed exam including the following:  automated exposure control; adjustment of mA and/or kV according to the patients size (this includes techniques or standardized protocols for targeted exams where dose is matched to indication/reason for exam: i.e. extremities or head);  iterative reconstruction technique.    COMPARISON:  02/01/2022 CT scan of the  brain.    FINDINGS:  The ventricles are mild-to-moderately enlarged consistent with volume loss.  No acute edema, hemorrhage or mass effect is present.    Small chronic area of right frontal lobe encephalomalacia, similar to the previous study.    No new findings.    Chronic left maxillary sinusitis.                                       X-Ray Chest 1 View (Final result)  Result time 03/02/22 12:35:13      Final result by Jessa Garcia MD (03/02/22 12:35:13)                   Impression:      Stable radiographic appearance of the chest without acute cardiopulmonary abnormality.      Electronically signed by: Jessa Garcia  Date:    03/02/2022  Time:    12:35               Narrative:    EXAMINATION:  XR CHEST 1 VIEW    CLINICAL HISTORY:  syncope;    TECHNIQUE:  Single frontal view of the chest was performed.    COMPARISON:  Chest radiograph 02/01/2022    FINDINGS:  ECG monitoring leads overlie the chest.  There is interstitial coarsening, similar to the prior exam, suggestive of COPD.No significant pleural fluid or pneumothorax.    The cardiac silhouette is normal in size.  There is atherosclerotic calcification of the aorta.  The hilar and mediastinal contours are unremarkable.    Bones are intact.                                       X-Ray Shoulder Complete 2 View Right (Final result)  Result time 03/02/22 12:41:54      Final result by Jessa Garcia MD (03/02/22 12:41:54)                   Impression:      No acute osseous abnormality.  Degenerative change of the acromioclavicular joint.      Electronically signed by: Jessa Garcia  Date:    03/02/2022  Time:    12:41               Narrative:    EXAMINATION:  XR SHOULDER COMPLETE 2 OR MORE VIEWS RIGHT    CLINICAL HISTORY:  Unspecified fall, initial encounter    TECHNIQUE:  Two or three views of the right shoulder were performed.    COMPARISON:  Right shoulder radiograph 03/31/2009    FINDINGS:  No evidence of fracture or dislocation.  There  is joint space narrowing of the acromioclavicular joint.  No significant degenerative change of the glenohumeral joint.                                       X-Ray Elbow Complete Left (Final result)  Result time 03/02/22 12:45:20      Final result by Jessa Garcia MD (03/02/22 12:45:20)                   Impression:      No acute osseous abnormality seen.      Electronically signed by: Jessa Garcia  Date:    03/02/2022  Time:    12:45               Narrative:    EXAMINATION:  XR ELBOW COMPLETE 3 VIEW LEFT    CLINICAL HISTORY:  . Unspecified fall, initial encounter    TECHNIQUE:  AP, lateral, and oblique views of the left elbow were performed.    COMPARISON:  None    FINDINGS:  No acute fracture or dislocation seen.  No elbow joint effusion.  No soft tissue edema or radiopaque retained foreign body.  Vascular calcification is noted.

## 2022-03-02 NOTE — ASSESSMENT & PLAN NOTE
-BP stable and soft   -Coreg continued   -Norvasc held   -will resume home medications when appropriate

## 2022-03-02 NOTE — ASSESSMENT & PLAN NOTE
-appears controlled  - previous Echo in 5/2021-Concentric hypertrophy and normal systolic function.  · Mild left atrial enlargement.  · With normal right ventricular systolic function.  · The estimated ejection fraction is 60%.  Grade I left ventricular diastolic dysfunction.  -repeat Echo pending   -initial troponin negative

## 2022-03-02 NOTE — H&P
[facility] [place] [facility]  History & Physical    Patient Name: Glo Dumont  MRN: [id number]  Patient Class: OP- Observation  Admission Date: 3/2/2022  Attending Physician: David Velasco MD   Primary Care Provider: Christina Recio MD         Patient information was obtained from patient and ER records.     Subjective:     Principal Problem: Hyperkalemia     Chief Complaint:   Chief Complaint   Patient presents with    Loss of Consciousness     Pt had syncopal episode this AM. Denies hitting head, + blood thinners. Pain to bilateral arms.        HPI: Glo Dumont is a 86 y.o. female patient with a PMHx of CKD, CAD, Diastolic CHF, Anxiety, Anemia, Hypercholesteremia, and HTN who presents to the Emergency Department for evaluation of syncope which onset gradually this morning. Associated symptoms include dizziness and weakness. Patient denies head injury but states that she fell onto her right arm and elbow this morning. Symptoms are constant and moderate in severity. No mitigating or exacerbating factors reported. Associated sxs include dizziness and weakness. Patient denies any fever, SOB, chills, CP, diarrhea, nausea, and all other symptoms at this time. No further complaints or concerns at this time. Pt denies smoking and use of ETOH.  Pt reports medication compliance but did not take medications today.  Pt is a DNR and Dawna Toribio (daughter) at [phone] is the surrogate decision maker. ER work up showed: ER work up showed: H/H 9.7/31.2, Na 133, K 7.5, CO2 15, BUN 34, Creatinine 1.6, and CPK 17. Xray of elbow and R shoulder unremarkable. Chest xray showed no acute abnormality.  CT of head showed age-related atrophy.  Small chronic right frontal lobe encephalomalacia.  Chronic left maxillary sinusitis.  No acute findings. Hospital Medicine contacted for admission with patient placed in Observation for further evaluation.       Past Medical History:   Diagnosis Date    Anemia     Angina  pectoris     Anxiety     Anxiety and depression     Arthritis     hip    Carotid artery occlusion     Carpal tunnel syndrome 06/23/2008    emg    Chronic diarrhea     work up in 2011 with EGD, CS and VCE    CKD (chronic kidney disease) stage 3, GFR 30-59 ml/min 5/11/2017    Colitis     Coronary artery disease     Coronary artery disease     Diastolic dysfunction     Diverticulosis     Glaucoma     Greater trochanteric bursitis 2/10/2015    Grief at loss of child 1/26/2016    H/O carotid endarterectomy 12/2/2013    Heart failure     History of coronary angioplasty 3/11/2014    Hypercholesteremia     Hypertension     Liver cyst 02/08/2013    ct abd    Macular degeneration     Primary open-angle glaucoma(365.11) 9/3/2013    Renal cyst 02/08/2013    ct abd    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    Sarcoidosis     Sarcoidosis of lung     Sickle cell trait     Uveitis        Past Surgical History:   Procedure Laterality Date    CAROTID ENDARTERECTOMY Right 2000s    CATARACT EXTRACTION Bilateral     Dr. Liang Dennis    CHOLECYSTECTOMY      laparoscopic, 3/18.    CORONARY ANGIOPLASTY WITH STENT PLACEMENT  11/19/2010    RCA-HALIE 2010    Lathia    JOINT REPLACEMENT Left 11/03/2014    Dr. Braun    TOTAL ABDOMINAL HYSTERECTOMY W/ BILATERAL SALPINGOOPHORECTOMY  1972       Review of patient's allergies indicates:   Allergen Reactions    Lisinopril      angioedema    Codeine Nausea And Vomiting       No current facility-administered medications on file prior to encounter.     Current Outpatient Medications on File Prior to Encounter   Medication Sig    ALPRAZolam (XANAX) 0.25 MG tablet Take 0.5-1 tablets (0.125-0.25 mg total) by mouth 3 (three) times daily as needed for Anxiety.    amLODIPine (NORVASC) 5 MG tablet Take 1 tablet (5 mg total) by mouth once daily.    aspirin (ECOTRIN) 81 MG EC tablet Take 81 mg by mouth once daily.    budesonide (ENTOCORT EC) 3 mg capsule  Take 9 mg by mouth once daily.    calcium carbonate (OS-LYNN) 600 mg calcium (1,500 mg) Tab Take by mouth.    carvediloL (COREG) 12.5 MG tablet Take 1 tablet (12.5 mg total) by mouth 2 (two) times daily with meals.    citalopram (CELEXA) 20 MG tablet Take 1 tablet (20 mg total) by mouth once daily.    cyproheptadine (PERIACTIN) 4 mg tablet Take 1 tablet (4 mg total) by mouth 3 (three) times daily as needed.    dorzolamide-timolol 2-0.5% (COSOPT) 22.3-6.8 mg/mL ophthalmic solution Place 1 drop into both eyes 2 (two) times daily.    EYLEA 2 mg/0.05 mL Soln     FLUoxetine 10 MG capsule 1 capsule in the morning    FOLIC ACID/MULTIVIT-MIN/LUTEIN (CENTRUM SILVER ORAL) Take by mouth.    gabapentin (NEURONTIN) 300 MG capsule Take 1 capsule (300 mg total) by mouth once daily.    iron-vitamin C 100-250 mg, ICAR-C, (ICAR-C) 100-250 mg Tab Take 1 tablet by mouth once daily.    ketorolac 0.5% (ACULAR) 0.5 % Drop     nitroGLYCERIN (NITROSTAT) 0.4 MG SL tablet Place 1 tablet (0.4 mg total) under the tongue every 5 (five) minutes as needed for Chest pain.    nystatin (MYCOSTATIN) 100,000 unit/mL suspension Take by mouth.    OZURDEX 0.7 mg Impl intravitreal implant     pantoprazole (PROTONIX) 40 MG tablet Take 1 tablet (40 mg total) by mouth once daily.    potassium chloride SA (K-DUR,KLOR-CON) 20 MEQ tablet Take 1 tablet (20 mEq total) by mouth once daily.    prasugreL (EFFIENT) 10 mg Tab Take 1 tablet (10 mg total) by mouth once daily.    pravastatin (PRAVACHOL) 40 MG tablet TAKE 1 TABLET BY MOUTH DAILY    predniSONE (DELTASONE) 20 MG tablet 1 po daily x 5d, then half tab po daily.    spironolactone (ALDACTONE) 25 MG tablet Take 25 mg by mouth 3 (three) times daily.     Family History       Problem Relation (Age of Onset)    Cancer Father, Daughter    Cirrhosis Brother    Heart failure Mother, Brother    Hypertension Mother          Tobacco Use    Smoking status: Never Smoker    Smokeless tobacco: Never Used    Substance and Sexual Activity    Alcohol use: No     Alcohol/week: 0.0 standard drinks    Drug use: No    Sexual activity: Yes     Partners: Male     Review of Systems   Constitutional:  Positive for activity change. Negative for chills, fatigue and fever.   HENT:  Negative for postnasal drip, sore throat and trouble swallowing.    Respiratory:  Negative for cough, chest tightness and shortness of breath.    Cardiovascular:  Negative for chest pain, palpitations and leg swelling.   Gastrointestinal:  Negative for abdominal distention, abdominal pain, nausea and vomiting.   Genitourinary:  Negative for difficulty urinating, dysuria, frequency and urgency.   Musculoskeletal:  Positive for arthralgias. Negative for myalgias.   Skin:  Negative for color change and wound.   Neurological:  Positive for dizziness, syncope and weakness. Negative for headaches.   Psychiatric/Behavioral:  Negative for agitation and confusion. The patient is not nervous/anxious.    Objective:     Vital Signs (Most Recent):  Temp: 97.5 °F (36.4 °C) (03/02/22 1547)  Pulse: (!) 48 (03/02/22 1547)  Resp: (!) 22 (03/02/22 1547)  BP: (!) 125/58 (03/02/22 1547)  SpO2: 100 % (03/02/22 1547)   Vital Signs (24h Range):  Temp:  [97.5 °F (36.4 °C)-98.6 °F (37 °C)] 97.5 °F (36.4 °C)  Pulse:  [48-62] 48  Resp:  [16-29] 22  SpO2:  [100 %] 100 %  BP: (125-191)/(58-83) 125/58     Weight: 54.4 kg (120 lb)  Body mass index is 22.67 kg/m².    Physical Exam  HENT:      Head: Normocephalic.      Nose: Nose normal.   Cardiovascular:      Rate and Rhythm: Bradycardia present.      Pulses: Normal pulses.      Heart sounds: Normal heart sounds.   Pulmonary:      Effort: Tachypnea and accessory muscle usage present.   Abdominal:      General: Bowel sounds are normal. There is distension (mild).      Palpations: Abdomen is soft.   Genitourinary:     Comments: Deferred   Musculoskeletal:         General: Tenderness (R elbow in R shoulder) present.      Cervical back:  Normal range of motion and neck supple.   Skin:     General: Skin is warm and dry.   Neurological:      Mental Status: She is alert and oriented to person, place, and time.   Psychiatric:         Mood and Affect: Mood normal.         Behavior: Behavior normal.           Significant Labs: All pertinent labs within the past 24 hours have been reviewed.  CBC:   Recent Labs   Lab 03/02/22  1242   WBC 7.20   HGB 9.7*   HCT 31.2*        CMP:   Recent Labs   Lab 03/02/22  1242   *   K 7.5*      CO2 15*   GLU 88   BUN 34*   CREATININE 1.6*   CALCIUM 8.9   PROT 6.7   ALBUMIN 3.5   BILITOT 0.3   ALKPHOS 59   AST 22   ALT 17   ANIONGAP 8   EGFRNONAA 29*     Troponin:   Recent Labs   Lab 03/02/22  1242   TROPONINI 0.006       Significant Imaging: I have reviewed all pertinent imaging results/findings within the past 24 hours.    Imaging Results              CT Head Without Contrast (Final result)  Result time 03/02/22 12:39:53      Final result by Miguel Marlow MD (03/02/22 12:39:53)                   Impression:      Age-related atrophy.  Small chronic right frontal lobe encephalomalacia.  Chronic left maxillary sinusitis.  No acute findings.      Electronically signed by: Miguel Marlow MD  Date:    03/02/2022  Time:    12:39               Narrative:    EXAMINATION:  CT HEAD WITHOUT CONTRAST    CLINICAL HISTORY:  Syncope with subsequent fall and head trauma.  Headache.  Syncope, recurrent;    TECHNIQUE:  Standard noncontrast CT of the brain.    All CT scans at this facility are performed  using dose modulation techniques as appropriate to performed exam including the following:  automated exposure control; adjustment of mA and/or kV according to the patients size (this includes techniques or standardized protocols for targeted exams where dose is matched to indication/reason for exam: i.e. extremities or head);  iterative reconstruction technique.    COMPARISON:  02/01/2022 CT scan of the  brain.    FINDINGS:  The ventricles are mild-to-moderately enlarged consistent with volume loss.  No acute edema, hemorrhage or mass effect is present.    Small chronic area of right frontal lobe encephalomalacia, similar to the previous study.    No new findings.    Chronic left maxillary sinusitis.                                       X-Ray Chest 1 View (Final result)  Result time 03/02/22 12:35:13      Final result by Jessa Garcia MD (03/02/22 12:35:13)                   Impression:      Stable radiographic appearance of the chest without acute cardiopulmonary abnormality.      Electronically signed by: Jessa Garcia  Date:    03/02/2022  Time:    12:35               Narrative:    EXAMINATION:  XR CHEST 1 VIEW    CLINICAL HISTORY:  syncope;    TECHNIQUE:  Single frontal view of the chest was performed.    COMPARISON:  Chest radiograph 02/01/2022    FINDINGS:  ECG monitoring leads overlie the chest.  There is interstitial coarsening, similar to the prior exam, suggestive of COPD.No significant pleural fluid or pneumothorax.    The cardiac silhouette is normal in size.  There is atherosclerotic calcification of the aorta.  The hilar and mediastinal contours are unremarkable.    Bones are intact.                                       X-Ray Shoulder Complete 2 View Right (Final result)  Result time 03/02/22 12:41:54      Final result by Jessa Garcia MD (03/02/22 12:41:54)                   Impression:      No acute osseous abnormality.  Degenerative change of the acromioclavicular joint.      Electronically signed by: Jessa Garcia  Date:    03/02/2022  Time:    12:41               Narrative:    EXAMINATION:  XR SHOULDER COMPLETE 2 OR MORE VIEWS RIGHT    CLINICAL HISTORY:  Unspecified fall, initial encounter    TECHNIQUE:  Two or three views of the right shoulder were performed.    COMPARISON:  Right shoulder radiograph 03/31/2009    FINDINGS:  No evidence of fracture or dislocation.  There  is joint space narrowing of the acromioclavicular joint.  No significant degenerative change of the glenohumeral joint.                                       X-Ray Elbow Complete Left (Final result)  Result time 03/02/22 12:45:20      Final result by Jessa Garcia MD (03/02/22 12:45:20)                   Impression:      No acute osseous abnormality seen.      Electronically signed by: Jessa Garcia  Date:    03/02/2022  Time:    12:45               Narrative:    EXAMINATION:  XR ELBOW COMPLETE 3 VIEW LEFT    CLINICAL HISTORY:  . Unspecified fall, initial encounter    TECHNIQUE:  AP, lateral, and oblique views of the left elbow were performed.    COMPARISON:  None    FINDINGS:  No acute fracture or dislocation seen.  No elbow joint effusion.  No soft tissue edema or radiopaque retained foreign body.  Vascular calcification is noted.                                       Assessment/Plan:     Metabolic acidosis  -CO2-15   -Sodium Bicarb gtt   -repeat CMP per K protocol       Hyperkalemia  K 7.5-   -Insulin, Calcium, and D50 given for K shift   -repeat BMP every 4 hours per protocol        Bradycardia  -Lopressor held   -Cardiology consulted   -will monitor       CKD (chronic kidney disease) stage 4, GFR 15-29 ml/min  -Cr 1.6  -will monitor   -repeat BMP in am     Anxiety and depression  Celexa  -Xanax      Diastolic heart failure, NYHA class 3  -appears controlled  - previous Echo in 5/2021-Concentric hypertrophy and normal systolic function.  · Mild left atrial enlargement.  · With normal right ventricular systolic function.  · The estimated ejection fraction is 60%.  Grade I left ventricular diastolic dysfunction.  -repeat Echo pending   -initial troponin negative       Essential hypertension  -BP stable and soft   -Coreg continued   -Norvasc held   -will resume home medications when appropriate      Syncope  -Syncope with concerns for decompensation  - Rule out vasovagal event,orthostasis or  cardiac/neuro event    -CT of head showed age-related atrophy.  Small chronic right frontal lobe encephalomalacia.  Chronic left maxillary sinusitis.  No acute findings.   Telemetry monitoring  -othostatic VS every shift  - neuro checks every 4 hours  -EKG in AM  - serial troponin levels- initial troponin negative   - 2 D ECHO- repeat pending             VTE Risk Mitigation (From admission, onward)    Sequential Compression Device             Melissa Somers NP  Department of Hospital Medicine   [facility] - Med Surg

## 2022-03-03 LAB
ALLENS TEST: ABNORMAL
ANION GAP SERPL CALC-SCNC: 10 MMOL/L (ref 8–16)
ANION GAP SERPL CALC-SCNC: 11 MMOL/L (ref 8–16)
ANION GAP SERPL CALC-SCNC: 7 MMOL/L (ref 8–16)
AORTIC ROOT ANNULUS: 2.78 CM
ASCENDING AORTA: 3 CM
AV INDEX (PROSTH): 0.73
AV MEAN GRADIENT: 6 MMHG
AV PEAK GRADIENT: 11 MMHG
AV REGURGITATION PRESSURE HALF TIME: 913 MS
AV VALVE AREA: 1.97 CM2
AV VELOCITY RATIO: 0.71
BASOPHILS # BLD AUTO: 0.05 K/UL (ref 0–0.2)
BASOPHILS NFR BLD: 0.8 % (ref 0–1.9)
BNP SERPL-MCNC: 102 PG/ML (ref 0–99)
BSA FOR ECHO PROCEDURE: 1.49 M2
BUN SERPL-MCNC: 28 MG/DL (ref 8–23)
BUN SERPL-MCNC: 31 MG/DL (ref 8–23)
BUN SERPL-MCNC: 31 MG/DL (ref 8–23)
CALCIUM SERPL-MCNC: 8.3 MG/DL (ref 8.7–10.5)
CALCIUM SERPL-MCNC: 8.7 MG/DL (ref 8.7–10.5)
CALCIUM SERPL-MCNC: 9 MG/DL (ref 8.7–10.5)
CHLORIDE SERPL-SCNC: 100 MMOL/L (ref 95–110)
CHLORIDE SERPL-SCNC: 103 MMOL/L (ref 95–110)
CHLORIDE SERPL-SCNC: 104 MMOL/L (ref 95–110)
CO2 SERPL-SCNC: 20 MMOL/L (ref 23–29)
CO2 SERPL-SCNC: 22 MMOL/L (ref 23–29)
CO2 SERPL-SCNC: 22 MMOL/L (ref 23–29)
CREAT SERPL-MCNC: 1.3 MG/DL (ref 0.5–1.4)
CV ECHO LV RWT: 0.66 CM
DIFFERENTIAL METHOD: ABNORMAL
DOP CALC AO PEAK VEL: 1.67 M/S
DOP CALC AO VTI: 35.9 CM
DOP CALC LVOT AREA: 2.7 CM2
DOP CALC LVOT DIAMETER: 1.86 CM
DOP CALC LVOT PEAK VEL: 1.19 M/S
DOP CALC LVOT STROKE VOLUME: 70.88 CM3
DOP CALC RVOT PEAK VEL: 0.74 M/S
DOP CALC RVOT VTI: 16.3 CM
DOP CALCLVOT PEAK VEL VTI: 26.1 CM
E WAVE DECELERATION TIME: 361.56 MSEC
E/A RATIO: 0.52
E/E' RATIO: 14.89 M/S
ECHO EF ESTIMATED: 68 %
ECHO LV POSTERIOR WALL: 1.25 CM (ref 0.6–1.1)
EJECTION FRACTION: 75 %
EOSINOPHIL # BLD AUTO: 0.3 K/UL (ref 0–0.5)
EOSINOPHIL NFR BLD: 5.1 % (ref 0–8)
ERYTHROCYTE [DISTWIDTH] IN BLOOD BY AUTOMATED COUNT: 16.3 % (ref 11.5–14.5)
EST. GFR  (AFRICAN AMERICAN): 44 ML/MIN/1.73 M^2
EST. GFR  (NON AFRICAN AMERICAN): 38 ML/MIN/1.73 M^2
FRACTIONAL SHORTENING: 37 % (ref 28–44)
GLUCOSE SERPL-MCNC: 114 MG/DL (ref 70–110)
GLUCOSE SERPL-MCNC: 309 MG/DL (ref 70–110)
GLUCOSE SERPL-MCNC: 57 MG/DL (ref 70–110)
GLUCOSE SERPL-MCNC: 95 MG/DL (ref 70–110)
HCO3 UR-SCNC: 13.7 MMOL/L (ref 24–28)
HCT VFR BLD AUTO: 29.6 % (ref 37–48.5)
HCT VFR BLD CALC: <15 %PCV (ref 36–54)
HGB BLD-MCNC: 9.8 G/DL (ref 12–16)
IMM GRANULOCYTES # BLD AUTO: 0.11 K/UL (ref 0–0.04)
IMM GRANULOCYTES NFR BLD AUTO: 1.8 % (ref 0–0.5)
INTERVENTRICULAR SEPTUM: 1.55 CM (ref 0.6–1.1)
IVC DIAMETER: 1.2 CM
IVRT: 124.57 MSEC
LA MAJOR: 5.66 CM
LA MINOR: 5.13 CM
LA WIDTH: 3.7 CM
LEFT ATRIUM SIZE: 3.31 CM
LEFT ATRIUM VOLUME INDEX: 37.9 ML/M2
LEFT ATRIUM VOLUME: 56.03 CM3
LEFT INTERNAL DIMENSION IN SYSTOLE: 2.37 CM (ref 2.1–4)
LEFT VENTRICLE DIASTOLIC VOLUME INDEX: 41.68 ML/M2
LEFT VENTRICLE DIASTOLIC VOLUME: 61.68 ML
LEFT VENTRICLE MASS INDEX: 131 G/M2
LEFT VENTRICLE SYSTOLIC VOLUME INDEX: 13.2 ML/M2
LEFT VENTRICLE SYSTOLIC VOLUME: 19.5 ML
LEFT VENTRICULAR INTERNAL DIMENSION IN DIASTOLE: 3.79 CM (ref 3.5–6)
LEFT VENTRICULAR MASS: 193.42 G
LV LATERAL E/E' RATIO: 11.17 M/S
LV SEPTAL E/E' RATIO: 22.33 M/S
LVOT MG: 3.46 MMHG
LVOT MV: 0.88 CM/S
LYMPHOCYTES # BLD AUTO: 1.1 K/UL (ref 1–4.8)
LYMPHOCYTES NFR BLD: 17.5 % (ref 18–48)
MCH RBC QN AUTO: 28.5 PG (ref 27–31)
MCHC RBC AUTO-ENTMCNC: 33.1 G/DL (ref 32–36)
MCV RBC AUTO: 86 FL (ref 82–98)
MONOCYTES # BLD AUTO: 0.7 K/UL (ref 0.3–1)
MONOCYTES NFR BLD: 10.9 % (ref 4–15)
MV PEAK A VEL: 1.29 M/S
MV PEAK E VEL: 0.67 M/S
MV STENOSIS PRESSURE HALF TIME: 104.85 MS
MV VALVE AREA P 1/2 METHOD: 2.1 CM2
NEUTROPHILS # BLD AUTO: 3.9 K/UL (ref 1.8–7.7)
NEUTROPHILS NFR BLD: 63.9 % (ref 38–73)
NRBC BLD-RTO: 0 /100 WBC
PCO2 BLDA: 22.9 MMHG (ref 35–45)
PH SMN: 7.38 [PH] (ref 7.35–7.45)
PISA AR MAX VEL: 2.3 M/S
PISA MRMAX VEL: 4.43 M/S
PISA TR MAX VEL: 2.81 M/S
PLATELET # BLD AUTO: 273 K/UL (ref 150–450)
PMV BLD AUTO: 10 FL (ref 9.2–12.9)
PO2 BLDA: 30 MMHG (ref 40–60)
POC BE: -11 MMOL/L
POC IONIZED CALCIUM: 0.97 MMOL/L (ref 1.06–1.42)
POC SATURATED O2: 59 % (ref 95–100)
POTASSIUM BLD-SCNC: 6.6 MMOL/L (ref 3.5–5.1)
POTASSIUM SERPL-SCNC: 4.8 MMOL/L (ref 3.5–5.1)
POTASSIUM SERPL-SCNC: 5 MMOL/L (ref 3.5–5.1)
POTASSIUM SERPL-SCNC: 6.4 MMOL/L (ref 3.5–5.1)
PV MEAN GRADIENT: 1.44 MMHG
RA MAJOR: 3.43 CM
RA PRESSURE: 3 MMHG
RBC # BLD AUTO: 3.44 M/UL (ref 4–5.4)
SAMPLE: ABNORMAL
SITE: ABNORMAL
SODIUM BLD-SCNC: 143 MMOL/L (ref 136–145)
SODIUM SERPL-SCNC: 132 MMOL/L (ref 136–145)
SODIUM SERPL-SCNC: 133 MMOL/L (ref 136–145)
SODIUM SERPL-SCNC: 134 MMOL/L (ref 136–145)
TDI LATERAL: 0.06 M/S
TDI SEPTAL: 0.03 M/S
TDI: 0.05 M/S
TR MAX PG: 32 MMHG
TR MEAN GRADIENT: 26 MMHG
TRICUSPID ANNULAR PLANE SYSTOLIC EXCURSION: 1.6 CM
TROPONIN I SERPL DL<=0.01 NG/ML-MCNC: 0.01 NG/ML (ref 0–0.03)
TV REST PULMONARY ARTERY PRESSURE: 35 MMHG
WBC # BLD AUTO: 6.05 K/UL (ref 3.9–12.7)

## 2022-03-03 PROCEDURE — 96376 TX/PRO/DX INJ SAME DRUG ADON: CPT

## 2022-03-03 PROCEDURE — 94761 N-INVAS EAR/PLS OXIMETRY MLT: CPT | Mod: HCNC

## 2022-03-03 PROCEDURE — 83880 ASSAY OF NATRIURETIC PEPTIDE: CPT | Mod: HCNC | Performed by: INTERNAL MEDICINE

## 2022-03-03 PROCEDURE — G0378 HOSPITAL OBSERVATION PER HR: HCPCS | Mod: HCNC

## 2022-03-03 PROCEDURE — 36415 COLL VENOUS BLD VENIPUNCTURE: CPT | Mod: HCNC | Performed by: NURSE PRACTITIONER

## 2022-03-03 PROCEDURE — 99215 PR OFFICE/OUTPT VISIT, EST, LEVL V, 40-54 MIN: ICD-10-PCS | Mod: HCNC,25,, | Performed by: INTERNAL MEDICINE

## 2022-03-03 PROCEDURE — 93010 ELECTROCARDIOGRAM REPORT: CPT | Mod: HCNC,,, | Performed by: INTERNAL MEDICINE

## 2022-03-03 PROCEDURE — 97162 PT EVAL MOD COMPLEX 30 MIN: CPT | Mod: HCNC

## 2022-03-03 PROCEDURE — 96366 THER/PROPH/DIAG IV INF ADDON: CPT

## 2022-03-03 PROCEDURE — 93005 ELECTROCARDIOGRAM TRACING: CPT | Mod: HCNC

## 2022-03-03 PROCEDURE — 99215 OFFICE O/P EST HI 40 MIN: CPT | Mod: HCNC,25,, | Performed by: INTERNAL MEDICINE

## 2022-03-03 PROCEDURE — 97530 THERAPEUTIC ACTIVITIES: CPT | Mod: HCNC

## 2022-03-03 PROCEDURE — 84484 ASSAY OF TROPONIN QUANT: CPT | Mod: HCNC | Performed by: NURSE PRACTITIONER

## 2022-03-03 PROCEDURE — 93010 EKG 12-LEAD: ICD-10-PCS | Mod: HCNC,,, | Performed by: INTERNAL MEDICINE

## 2022-03-03 PROCEDURE — 25000003 PHARM REV CODE 250: Mod: HCNC | Performed by: INTERNAL MEDICINE

## 2022-03-03 PROCEDURE — 97165 OT EVAL LOW COMPLEX 30 MIN: CPT | Mod: HCNC

## 2022-03-03 PROCEDURE — 63600175 PHARM REV CODE 636 W HCPCS: Mod: HCNC | Performed by: NURSE PRACTITIONER

## 2022-03-03 PROCEDURE — 80048 BASIC METABOLIC PNL TOTAL CA: CPT | Mod: 91,HCNC | Performed by: NURSE PRACTITIONER

## 2022-03-03 PROCEDURE — 25000003 PHARM REV CODE 250: Mod: HCNC | Performed by: NURSE PRACTITIONER

## 2022-03-03 PROCEDURE — 85025 COMPLETE CBC W/AUTO DIFF WBC: CPT | Mod: HCNC | Performed by: NURSE PRACTITIONER

## 2022-03-03 RX ORDER — PRASUGREL 10 MG/1
10 TABLET, FILM COATED ORAL DAILY
Status: DISCONTINUED | OUTPATIENT
Start: 2022-03-03 | End: 2022-03-03

## 2022-03-03 RX ORDER — HYDRALAZINE HYDROCHLORIDE 25 MG/1
25 TABLET, FILM COATED ORAL EVERY 8 HOURS
Status: DISCONTINUED | OUTPATIENT
Start: 2022-03-03 | End: 2022-03-04 | Stop reason: HOSPADM

## 2022-03-03 RX ORDER — AMLODIPINE BESYLATE 5 MG/1
5 TABLET ORAL DAILY
Status: DISCONTINUED | OUTPATIENT
Start: 2022-03-03 | End: 2022-03-03

## 2022-03-03 RX ADMIN — CITALOPRAM HYDROBROMIDE 20 MG: 20 TABLET ORAL at 09:03

## 2022-03-03 RX ADMIN — SODIUM BICARBONATE: 84 INJECTION, SOLUTION INTRAVENOUS at 11:03

## 2022-03-03 RX ADMIN — PRAVASTATIN SODIUM 40 MG: 20 TABLET ORAL at 09:03

## 2022-03-03 RX ADMIN — HYDRALAZINE HYDROCHLORIDE 25 MG: 25 TABLET, FILM COATED ORAL at 02:03

## 2022-03-03 RX ADMIN — PANTOPRAZOLE SODIUM 40 MG: 40 TABLET, DELAYED RELEASE ORAL at 09:03

## 2022-03-03 RX ADMIN — SODIUM ZIRCONIUM CYCLOSILICATE 10 G: 5 POWDER, FOR SUSPENSION ORAL at 09:03

## 2022-03-03 RX ADMIN — HYDRALAZINE HYDROCHLORIDE 25 MG: 25 TABLET, FILM COATED ORAL at 09:03

## 2022-03-03 RX ADMIN — HYDRALAZINE HYDROCHLORIDE 10 MG: 20 INJECTION INTRAMUSCULAR; INTRAVENOUS at 09:03

## 2022-03-03 RX ADMIN — ASPIRIN 81 MG: 81 TABLET, COATED ORAL at 09:03

## 2022-03-03 NOTE — PT/OT/SLP PROGRESS
Occupational Therapy      Patient Name:  Glo Dumont   MRN:  [id number]    EVAL INITIATED VIA CHART REVIEW X 2 ATTEMPT . PT RECEIVING NURSING CARE BOTH TIME. WILL COMPLETE EVAL AT LATER TIME  Imani Lanza OT  3/3/2022   [phone]

## 2022-03-03 NOTE — ASSESSMENT & PLAN NOTE
-Lopressor held   -Cardiology consulted   -will monitor   3/3/22 HR improved off of BB. Continue to monitor. Cardiology following

## 2022-03-03 NOTE — PLAN OF CARE
SEE EVAL FOR DETAILS. PT DISPLAYED  SLIGHT DEFICITS WITH FUNCTIONAL MOBILITY/ TRANSFER, SLIGHT DEFICITS WITH ADL'S SKILLS AND DECREASE R UE ROM AND B UE STRENGTH/ENDURANCE . RECOMMEND: O.T. HOME HEALTH

## 2022-03-03 NOTE — ASSESSMENT & PLAN NOTE
-Likely in setting of IVVD/hyperkalemia  -No complaints of chest pain or SOB  -Troponin x 3 negative  -Echo pending

## 2022-03-03 NOTE — CONSULTS
"[facility]  Cardiology  Consult Note    Patient Name: Glo Dumont  MRN: [id number]  Admission Date: 3/2/2022  Hospital Length of Stay: 0 days  Code Status: DNR   Attending Provider: David Velasco MD   Consulting Provider: Justyna Roman PA-C  Primary Care Physician: Christina Recio MD  Principal Problem:<principal problem not specified>    Patient information was obtained from patient, past medical records and ER records.     Inpatient consult to Cardiology  Consult performed by: Justyna Roman PA-C  Consult ordered by: Melissa Somers NP        Subjective:     Chief Complaint:  Syncope    HPI:   Ms. Dumont is an 86 year old female patient whose current medical conditions include CAD, CKD, chronic diastolic CHF, anemia, HTN, and hypercholesterolemia who presented to [facility] ED yesterday s/p syncopal event. Daughter reported her mother walked into the kitchen and then complained of feeling weak. Shortly thereafter, she passed out. Her daughter reported her eyes were open during the episode but patient remained unresponsive for a "few minutes". Associated symptoms included dizziness and weakness. Patient denied any associated fever, chills, gerardo chest pain, SOB, or palpitations. No head injury but patient did complain of right arm and right elbow pain. Initial workup revealed anemia (H/H 9.7/31.2), hyperkalemia (K 7.5), and creatinine of 1.6 and patient was subsequently admitted for further evaluation and treatment. Cardiology consulted to assist with management. Patient seen and examined today, resting in bed. States she feels well, nearly back to baseline. Remains chest pain free. No SOB. She admits she does not drink very much at home. She has been intolerant of diuretic therapy in the past due to dehydration. Chart reviewed. HR remains in the 50's, BB discontinued. Will assess response. K 5.0 this AM. Echo pending. Troponin x 3 negative.          Past Medical History:   Diagnosis Date    Anemia     " Angina pectoris     Anxiety     Anxiety and depression     Arthritis     hip    Carotid artery occlusion     Carpal tunnel syndrome 06/23/2008    emg    Chronic diarrhea     work up in 2011 with EGD, CS and VCE    CKD (chronic kidney disease) stage 3, GFR 30-59 ml/min 5/11/2017    Colitis     Coronary artery disease     Coronary artery disease     Diastolic dysfunction     Diverticulosis     Glaucoma     Greater trochanteric bursitis 2/10/2015    Grief at loss of child 1/26/2016    H/O carotid endarterectomy 12/2/2013    Heart failure     History of coronary angioplasty 3/11/2014    Hypercholesteremia     Hypertension     Liver cyst 02/08/2013    ct abd    Macular degeneration     Primary open-angle glaucoma(365.11) 9/3/2013    Renal cyst 02/08/2013    ct abd    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    Sarcoidosis     Sarcoidosis of lung     Sickle cell trait     Uveitis        Past Surgical History:   Procedure Laterality Date    CAROTID ENDARTERECTOMY Right 2000s    CATARACT EXTRACTION Bilateral     Dr. Liang Dennis    CHOLECYSTECTOMY      laparoscopic, 3/18.    CORONARY ANGIOPLASTY WITH STENT PLACEMENT  11/19/2010    RCA-HALIE 2010    Lathia    JOINT REPLACEMENT Left 11/03/2014    Dr. Braun    TOTAL ABDOMINAL HYSTERECTOMY W/ BILATERAL SALPINGOOPHORECTOMY  1972       Review of patient's allergies indicates:   Allergen Reactions    Lisinopril      angioedema    Codeine Nausea And Vomiting       No current facility-administered medications on file prior to encounter.     Current Outpatient Medications on File Prior to Encounter   Medication Sig    ALPRAZolam (XANAX) 0.25 MG tablet Take 0.5-1 tablets (0.125-0.25 mg total) by mouth 3 (three) times daily as needed for Anxiety.    amLODIPine (NORVASC) 5 MG tablet Take 1 tablet (5 mg total) by mouth once daily.    aspirin (ECOTRIN) 81 MG EC tablet Take 81 mg by mouth once daily.    budesonide (ENTOCORT EC) 3 mg  capsule Take 9 mg by mouth once daily.    calcium carbonate (OS-LYNN) 600 mg calcium (1,500 mg) Tab Take by mouth.    carvediloL (COREG) 12.5 MG tablet Take 1 tablet (12.5 mg total) by mouth 2 (two) times daily with meals.    citalopram (CELEXA) 20 MG tablet Take 1 tablet (20 mg total) by mouth once daily.    cyproheptadine (PERIACTIN) 4 mg tablet Take 1 tablet (4 mg total) by mouth 3 (three) times daily as needed.    dorzolamide-timolol 2-0.5% (COSOPT) 22.3-6.8 mg/mL ophthalmic solution Place 1 drop into both eyes 2 (two) times daily.    EYLEA 2 mg/0.05 mL Soln     FLUoxetine 10 MG capsule 1 capsule in the morning    FOLIC ACID/MULTIVIT-MIN/LUTEIN (CENTRUM SILVER ORAL) Take by mouth.    gabapentin (NEURONTIN) 300 MG capsule Take 1 capsule (300 mg total) by mouth once daily.    iron-vitamin C 100-250 mg, ICAR-C, (ICAR-C) 100-250 mg Tab Take 1 tablet by mouth once daily.    ketorolac 0.5% (ACULAR) 0.5 % Drop     nitroGLYCERIN (NITROSTAT) 0.4 MG SL tablet Place 1 tablet (0.4 mg total) under the tongue every 5 (five) minutes as needed for Chest pain.    nystatin (MYCOSTATIN) 100,000 unit/mL suspension Take by mouth.    OZURDEX 0.7 mg Impl intravitreal implant     pantoprazole (PROTONIX) 40 MG tablet Take 1 tablet (40 mg total) by mouth once daily.    potassium chloride SA (K-DUR,KLOR-CON) 20 MEQ tablet Take 1 tablet (20 mEq total) by mouth once daily.    prasugreL (EFFIENT) 10 mg Tab Take 1 tablet (10 mg total) by mouth once daily.    pravastatin (PRAVACHOL) 40 MG tablet TAKE 1 TABLET BY MOUTH DAILY    predniSONE (DELTASONE) 20 MG tablet 1 po daily x 5d, then half tab po daily.    spironolactone (ALDACTONE) 25 MG tablet Take 25 mg by mouth 3 (three) times daily.     Family History       Problem Relation (Age of Onset)    Cancer Father, Daughter    Cirrhosis Brother    Heart failure Mother, Brother    Hypertension Mother          Tobacco Use    Smoking status: Never Smoker    Smokeless tobacco:  Never Used   Substance and Sexual Activity    Alcohol use: No     Alcohol/week: 0.0 standard drinks    Drug use: No    Sexual activity: Yes     Partners: Male     Review of Systems   Constitutional: Positive for malaise/fatigue.   HENT: Negative.     Eyes: Negative.    Cardiovascular:  Positive for syncope.   Respiratory: Negative.     Endocrine: Negative.    Hematologic/Lymphatic: Negative.    Skin: Negative.    Musculoskeletal:  Positive for arthritis and joint pain.   Gastrointestinal: Negative.    Neurological:  Positive for dizziness, light-headedness and weakness.   Psychiatric/Behavioral: Negative.     Allergic/Immunologic: Negative.    Objective:     Vital Signs (Most Recent):  Temp: 97.9 °F (36.6 °C) (03/03/22 0720)  Pulse: (!) 54 (03/03/22 0720)  Resp: 17 (03/03/22 0720)  BP: (!) 185/75 (03/03/22 0720)  SpO2: 97 % (03/03/22 0720)   Vital Signs (24h Range):  Temp:  [97.4 °F (36.3 °C)-98.6 °F (37 °C)] 97.9 °F (36.6 °C)  Pulse:  [48-62] 54  Resp:  [16-29] 17  SpO2:  [97 %-100 %] 97 %  BP: (125-191)/(58-83) 185/75     Weight: 51.5 kg (113 lb 8.6 oz)  Body mass index is 21.45 kg/m².    SpO2: 97 %  O2 Device (Oxygen Therapy): room air      Intake/Output Summary (Last 24 hours) at 3/3/2022 0900  Last data filed at 3/3/2022 0800  Gross per 24 hour   Intake 180 ml   Output --   Net 180 ml       Lines/Drains/Airways       Peripheral Intravenous Line  Duration                  Peripheral IV - Single Lumen 03/02/22 1131 20 G Right Hand <1 day                    Physical Exam  Vitals and nursing note reviewed.   Constitutional:       General: She is not in acute distress.     Appearance: Normal appearance. She is well-developed. She is not diaphoretic.   HENT:      Head: Normocephalic and atraumatic.   Eyes:      General:         Right eye: No discharge.         Left eye: No discharge.      Pupils: Pupils are equal, round, and reactive to light.   Neck:      Thyroid: No thyromegaly.      Vascular: No JVD.       Trachea: No tracheal deviation.   Cardiovascular:      Rate and Rhythm: Regular rhythm. Bradycardia present.      Heart sounds: Normal heart sounds, S1 normal and S2 normal. No murmur heard.  Pulmonary:      Effort: Pulmonary effort is normal. No respiratory distress.      Breath sounds: Normal breath sounds. No wheezing or rales.   Abdominal:      General: There is no distension.      Palpations: Abdomen is soft.      Tenderness: There is no rebound.   Musculoskeletal:      Cervical back: Neck supple.      Right lower leg: No edema.      Left lower leg: No edema.   Skin:     General: Skin is warm and dry.      Findings: No erythema.   Neurological:      General: No focal deficit present.      Mental Status: She is alert and oriented to person, place, and time.   Psychiatric:         Mood and Affect: Mood normal.         Behavior: Behavior normal.         Thought Content: Thought content normal.       Significant Labs: CMP   Recent Labs   Lab 03/02/22  1242 03/02/22  1759 03/03/22  0610   * 133* 133*   K 7.5* 6.6* 6.4*    110 104   CO2 15* 10* 22*   GLU 88 71 95   BUN 34* 32* 31*   CREATININE 1.6* 1.4 1.3   CALCIUM 8.9 9.5 8.7   PROT 6.7  --   --    ALBUMIN 3.5  --   --    BILITOT 0.3  --   --    ALKPHOS 59  --   --    AST 22  --   --    ALT 17  --   --    ANIONGAP 8 13 7*   ESTGFRAFRICA 34* 40* 44*   EGFRNONAA 29* 35* 38*   , CBC   Recent Labs   Lab 03/02/22  1242 03/02/22  1311 03/03/22  0610   WBC 7.20  --  6.05   HGB 9.7*  --  9.8*   HCT 31.2*   < > 29.6*     --  273    < > = values in this interval not displayed.   , Troponin   Recent Labs   Lab 03/02/22  1242 03/02/22  1756 03/03/22  0610   TROPONINI 0.006 <0.006 0.012   , and All pertinent lab results from the last 24 hours have been reviewed.    Significant Imaging: Echocardiogram: Transthoracic echo (TTE) complete (Cupid Only):   Results for orders placed or performed during the hospital encounter of 05/27/21   Echo   Result Value Ref  "Range    Ascending aorta 3.21 cm    STJ 2.15 cm    AV mean gradient 9 mmHg    Ao peak matthew 2.16 m/s    Ao VTI 41.59 cm    IVRT 88.49 msec    IVS 1.32 (A) 0.6 - 1.1 cm    LA size 3.88 cm    Left Atrium Major Axis 5.03 cm    Left Atrium Minor Axis 4.98 cm    LVIDd 3.75 3.5 - 6.0 cm    LVIDs 2.27 2.1 - 4.0 cm    LVOT diameter 1.89 cm    LVOT peak VTI 25.12 cm    Posterior Wall 1.16 (A) 0.6 - 1.1 cm    MV Peak A Matthew 1.37 m/s    E wave deceleration time 317.82 msec    MV Peak E Matthew 0.90 m/s    PV Peak D Matthew 0.36 m/s    PV Peak S Matthew 0.73 m/s    RA Major Axis 4.76 cm    RA Width 3.40 cm    RVDD 2.93 cm    Sinus 2.51 cm    TR Max Matthew 2.93 m/s    TDI LATERAL 0.06 m/s    TDI SEPTAL 0.06 m/s    LA WIDTH 3.31 cm    PV PEAK VELOCITY 0.86 cm/s    MV stenosis pressure 1/2 time 92.17 ms    LV Diastolic Volume 59.99 mL    LV Systolic Volume 17.52 mL    LVOT peak matthew 1.31 m/s    RVOT peak VTI 11.77 cm    RVOT peak matthew 0.59 m/s    MV "A" wave duration 10.28 msec    MV mean gradient 1 mmHg    MV peak gradient 11 mmHg    PV mean gradient 1 mmHg    LV LATERAL E/E' RATIO 15.00 m/s    LV SEPTAL E/E' RATIO 15.00 m/s    FS 39 %    LA volume 54.64 cm3    LV mass 157.91 g    Left Ventricle Relative Wall Thickness 0.62 cm    AV valve area 1.69 cm2    AV Velocity Ratio 0.61     AV index (prosthetic) 0.60     MV valve area p 1/2 method 2.39 cm2    E/A ratio 0.66     Mean e' 0.06 m/s    Pulm vein S/D ratio 2.03     LVOT area 2.8 cm2    LVOT stroke volume 70.44 cm3    AV peak gradient 19 mmHg    E/E' ratio 15.00 m/s    Triscuspid Valve Regurgitation Peak Gradient 34 mmHg    BSA 1.57 m2    LV Systolic Volume Index 11.2 mL/m2    LV Diastolic Volume Index 38.46 mL/m2    LA Volume Index 35.0 mL/m2    LV Mass Index 101 g/m2    Right Atrial Pressure (from IVC) 3 mmHg    EF 60 %    TV rest pulmonary artery pressure 37 mmHg    Narrative    · Concentric hypertrophy and normal systolic function.  · Mild left atrial enlargement.  · With normal right " ventricular systolic function.  · The estimated ejection fraction is 60%.  · Grade I left ventricular diastolic dysfunction.  · Normal central venous pressure (3 mmHg).  · The estimated PA systolic pressure is 37 mmHg.  · Trivial posterior pericardial effusion.  · There is mild aortic valve stenosis.  · Aortic valve area is 1.69 cm2; peak velocity is 2.16 m/s; mean gradient   is 9 mmHg.  · Mild to moderate tricuspid regurgitation.      , EKG: Reviewed, and X-Ray: CXR: X-Ray Chest 1 View (CXR):   Results for orders placed or performed during the hospital encounter of 03/02/22   X-Ray Chest 1 View    Narrative    EXAMINATION:  XR CHEST 1 VIEW    CLINICAL HISTORY:  syncope;    TECHNIQUE:  Single frontal view of the chest was performed.    COMPARISON:  Chest radiograph 02/01/2022    FINDINGS:  ECG monitoring leads overlie the chest.  There is interstitial coarsening, similar to the prior exam, suggestive of COPD.No significant pleural fluid or pneumothorax.    The cardiac silhouette is normal in size.  There is atherosclerotic calcification of the aorta.  The hilar and mediastinal contours are unremarkable.    Bones are intact.      Impression    Stable radiographic appearance of the chest without acute cardiopulmonary abnormality.      Electronically signed by: Jessa Garcia  Date:    03/02/2022  Time:    12:35    and X-Ray Chest PA and Lateral (CXR): No results found for this visit on 03/02/22.    Assessment and Plan:   Patient who presents s/p syncope, likely in setting of hyperkalemia and IVVD. Clinically improved. HR on low side, BB held. Assess response. Check echo. Troponin x 3 negative.     Hyperkalemia  -Mgmt as per primary team  -Contributing factor to bradycardia    Bradycardia  -Likely in part due to electrolyte derangement/hyperkalemia, K > 7 upon admission  -BB held  -Monitor HR trend      CKD (chronic kidney disease) stage 4, GFR 15-29 ml/min  -Stable, monitor  -Mgmt as per primary team      Diastolic  heart failure, NYHA class 3  -Clinically compensated  -Echo pending      Essential hypertension  -Continue home meds as tolerated  -BB on hold given bradycardia    Syncope  -Likely in setting of IVVD/hyperkalemia  -No complaints of chest pain or SOB  -Troponin x 3 negative  -Echo pending        VTE Risk Mitigation (From admission, onward)    None          Thank you for your consult. I will follow-up with patient. Please contact us if you have any additional questions.    Justyna Roman PA-C  Cardiology   [facility]

## 2022-03-03 NOTE — PT/OT/SLP EVAL
Physical Therapy Evaluation    Patient Name:  Glo Dumont   MRN:  [id number]    Recommendations:     Discharge Recommendations:  home health PT   Discharge Equipment Recommendations: none   Barriers to discharge: None    Assessment:     Glo Dumont is a 86 y.o. female admitted with a medical diagnosis of Syncope.  She presents with the following impairments/functional limitations:  weakness, impaired endurance, decreased lower extremity function, gait instability, impaired balance, decreased safety awareness.    Rehab Prognosis: Good; patient would benefit from acute skilled PT services to address these deficits and reach maximum level of function.    Recent Surgery: * No surgery found *     Plan:     During this hospitalization, patient to be seen 3 x/week to address the identified rehab impairments via gait training, therapeutic activities, therapeutic exercises and progress toward the following goals:    · Plan of Care Expires:  03/17/22    Subjective     Chief Complaint:   Patient/Family Comments/goals:   Pain/Comfort:  · Pain Rating 1: 0/10    Patients cultural, spiritual, Oriental orthodox conflicts given the current situation:      Living Environment:  PT LIVES WITH DAUGHTER 1 STORY HOUSE WITH RAMP, AMB INDEP HOUSEHOLD DISTANCES, DOES NOT DRIVE, INDEP WITH ADL'S  Prior to admission, patients level of function was ROBERT.  Equipment used at home: bath bench, rollator, grab bar.  DME owned (not currently used): none.  Upon discharge, patient will have assistance from FAMILY.    Objective:     Communicated with NURSE TENORIO prior to session.  Patient found supine with telemetry, peripheral IV, bed alarm  upon PT entry to room.    General Precautions: Standard, fall, MILD VISION IMPAIRMENT  Orthopedic Precautions:N/A   Braces: N/A  Respiratory Status: Room air    Exams:  · Cognitive Exam:  Patient is oriented to Person, Place, Time and Situation  · Postural Exam:  Patient presented with the following abnormalities:     · -       Rounded shoulders  · Sensation:    · -       Intact  · RLE ROM: WFL  · RLE Strength: GROSSLY 3+/5  · LLE ROM: WFL  · LLE Strength: GROSSLY 3+/5    Functional Mobility:  · Bed Mobility:     · Rolling Left:  stand by assistance  · Scooting: stand by assistance  · Supine to Sit: stand by assistance  · Transfers:     · Sit to Stand:  contact guard assistance with rolling walker  · Bed to Chair: contact guard assistance with  rolling walker  using  Step Transfer  · Toilet Transfer: contact guard assistance with  rolling walker  using  Step Transfer  · Gait: PT ' WITH RW AND CGA, VERY SLOW PACE, CUES FOR UPRIGHT POSTIURE, QUICK TO FATIGUE  · Balance: FAIR-    Therapeutic Activities and Exercises:   PT EDUCATED IN ROLE OF P.T. AND POC, PT PERFORMED TOILETING IN BATHROOM WITH SBA, NO ASSIST FOR CLEANING, PT EDUCATED IN RW USE AND SAFETY DURING TF'S AND GAIT, PT ENCOURAGED TO INCREASE TIME OOB IN CHAIR    AM-PAC 6 CLICK MOBILITY  Total Score:18     Patient left up in chair with all lines intact, call button in reach and NURSE notified.    GOALS:   Multidisciplinary Problems     Physical Therapy Goals        Problem: Physical Therapy Goal    Goal Priority Disciplines Outcome Goal Variances Interventions   Physical Therapy Goal     PT, PT/OT      Description: LTG'S TO BE MET IN 14 DAYS (3-17-22)  1. PT WILL BE ROBERT WITH BED MOBILITY  2. PT WILL REQUIRE SPV FOR TF'S  3. PT WILL ' WITH RW AND SPV                   History:     Past Medical History:   Diagnosis Date    Anemia     Angina pectoris     Anxiety     Anxiety and depression     Arthritis     hip    Carotid artery occlusion     Carpal tunnel syndrome 06/23/2008    emg    Chronic diarrhea     work up in 2011 with EGD, CS and VCE    CKD (chronic kidney disease) stage 3, GFR 30-59 ml/min 5/11/2017    Colitis     Coronary artery disease     Coronary artery disease     Diastolic dysfunction     Diverticulosis     Glaucoma     Greater  trochanteric bursitis 2/10/2015    Grief at loss of child 1/26/2016    H/O carotid endarterectomy 12/2/2013    Heart failure     History of coronary angioplasty 3/11/2014    Hypercholesteremia     Hypertension     Liver cyst 02/08/2013    ct abd    Macular degeneration     Primary open-angle glaucoma(365.11) 9/3/2013    Renal cyst 02/08/2013    ct abd    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    Sarcoidosis     Sarcoidosis of lung     Sickle cell trait     Uveitis        Past Surgical History:   Procedure Laterality Date    CAROTID ENDARTERECTOMY Right 2000s    CATARACT EXTRACTION Bilateral     Dr. Liang Dennis    CHOLECYSTECTOMY      laparoscopic, 3/18.    CORONARY ANGIOPLASTY WITH STENT PLACEMENT  11/19/2010    RCA-HALIE 2010    Lathia    JOINT REPLACEMENT Left 11/03/2014    Dr. Braun    TOTAL ABDOMINAL HYSTERECTOMY W/ BILATERAL SALPINGOOPHORECTOMY  1972       Time Tracking:     PT Received On: 03/03/22  PT Start Time: 0730     PT Stop Time: 0753  PT Total Time (min): 23 min     Billable Minutes: Evaluation 15 and Therapeutic Activity 8    03/03/2022

## 2022-03-03 NOTE — PLAN OF CARE
P.T. EVAL COMPLETE, PT CURRENTLY REQUIRES SBA FOR BED MOBILITY, CGA FOR TF'S AND GAIT, P.T. RECOMMENDS HHPT

## 2022-03-03 NOTE — PLAN OF CARE
[facility]  Initial Discharge Assessment       Primary Care Provider: Christina Recio MD    Admission Diagnosis: Syncope [R55]  Fall [W19.XXXA]    Admission Date: 3/2/2022  Expected Discharge Date:     Discharge Barriers Identified: None    Payor: [employer] MANAGED MEDICARE / Plan: [employer] MEDICARE HMO / Product Type: Capitation /     Extended Emergency Contact Information  Primary Emergency Contact: Jessica Dumont  Mobile Phone: [phone]  Relation: Daughter   needed? No    Discharge Plan A: [facility] - [place], [place] - [address]  [address]  [place] [address]  Phone: [phone] Fax: [phone]      Initial Assessment (most recent)     Adult Discharge Assessment - 03/03/22 1523        Discharge Assessment    Assessment Type Discharge Planning Assessment     Confirmed/corrected address, phone number and insurance Yes     Confirmed Demographics Correct on Facesheet     Source of Information patient     When was your last doctors appointment? 03/09/22     Communicated CANDACE with patient/caregiver Date not available/Unable to determine     Reason For Admission syncope     Lives With child(ally), adult     Facility Arrived From: home     Do you expect to return to your current living situation? Yes     Do you have help at home or someone to help you manage your care at home? Yes     Who are your caregiver(s) and their phone number(s)? Jessica Dumont, daughter     Prior to hospitilization cognitive status: Alert/Oriented     Current cognitive status: Alert/Oriented     Walking or Climbing Stairs Difficulty none     Dressing/Bathing Difficulty none     Home Accessibility wheelchair accessible     Home Layout Able to live on 1st floor     Equipment Currently Used at Home bath bench;rollator;grab bar     Readmission within 30 days? No     Patient currently being followed by outpatient case management? No     Do you currently have  service(s) that help you manage your care at home? No     Do you take prescription medications? Yes     Do you have prescription coverage? Yes     Do you have any problems affording any of your prescribed medications? No     Is the patient taking medications as prescribed? yes     Who is going to help you get home at discharge? daughter, Jessica     How do you get to doctors appointments? family or friend will provide     Are you on dialysis? No     Do you take coumadin? No     Discharge Plan A Home Health     DME Needed Upon Discharge  none     Discharge Plan discussed with: Patient     Discharge Barriers Identified None        Relationship/Environment    Name(s) of Who Lives With Patient Jessica Dumont, jazmin               Anticipated DC dispo: home health   Prior Level of Function: Independent with ADLs, lives with daughter   PCP: Christina Cardona MD    Comments:  CM met with patient at bedside to introduce role and discuss discharge planning. Patient lives with her daughter who will also be help at home and can provide transport. Discussed discharging with home health per PT recs. Patient agrees to [facility] home health. Referral sent. CM will continue following to assist with other needs.

## 2022-03-03 NOTE — NURSING
Patient awake in bed. AAO. VSS. NAD. No complaints of pain. Bed in low, locked position with call light within reach.

## 2022-03-03 NOTE — ASSESSMENT & PLAN NOTE
-Likely in part due to electrolyte derangement/hyperkalemia, K > 7 upon admission  -BB held  -Monitor HR trend

## 2022-03-03 NOTE — SUBJECTIVE & OBJECTIVE
Past Medical History:   Diagnosis Date    Anemia     Angina pectoris     Anxiety     Anxiety and depression     Arthritis     hip    Carotid artery occlusion     Carpal tunnel syndrome 06/23/2008    emg    Chronic diarrhea     work up in 2011 with EGD, CS and VCE    CKD (chronic kidney disease) stage 3, GFR 30-59 ml/min 5/11/2017    Colitis     Coronary artery disease     Coronary artery disease     Diastolic dysfunction     Diverticulosis     Glaucoma     Greater trochanteric bursitis 2/10/2015    Grief at loss of child 1/26/2016    H/O carotid endarterectomy 12/2/2013    Heart failure     History of coronary angioplasty 3/11/2014    Hypercholesteremia     Hypertension     Liver cyst 02/08/2013    ct abd    Macular degeneration     Primary open-angle glaucoma(365.11) 9/3/2013    Renal cyst 02/08/2013    ct abd    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    Sarcoidosis     Sarcoidosis of lung     Sickle cell trait     Uveitis        Past Surgical History:   Procedure Laterality Date    CAROTID ENDARTERECTOMY Right 2000s    CATARACT EXTRACTION Bilateral     Dr. Liang Dennis    CHOLECYSTECTOMY      laparoscopic, 3/18.    CORONARY ANGIOPLASTY WITH STENT PLACEMENT  11/19/2010    RCA-HALIE 2010    Lathia    JOINT REPLACEMENT Left 11/03/2014    Dr. Braun    TOTAL ABDOMINAL HYSTERECTOMY W/ BILATERAL SALPINGOOPHORECTOMY  1972       Review of patient's allergies indicates:   Allergen Reactions    Lisinopril      angioedema    Codeine Nausea And Vomiting       No current facility-administered medications on file prior to encounter.     Current Outpatient Medications on File Prior to Encounter   Medication Sig    ALPRAZolam (XANAX) 0.25 MG tablet Take 0.5-1 tablets (0.125-0.25 mg total) by mouth 3 (three) times daily as needed for Anxiety.    amLODIPine (NORVASC) 5 MG tablet Take 1 tablet (5 mg total) by mouth once daily.    aspirin (ECOTRIN) 81 MG EC tablet Take 81 mg by mouth once daily.    budesonide (ENTOCORT  EC) 3 mg capsule Take 9 mg by mouth once daily.    calcium carbonate (OS-LYNN) 600 mg calcium (1,500 mg) Tab Take by mouth.    carvediloL (COREG) 12.5 MG tablet Take 1 tablet (12.5 mg total) by mouth 2 (two) times daily with meals.    citalopram (CELEXA) 20 MG tablet Take 1 tablet (20 mg total) by mouth once daily.    cyproheptadine (PERIACTIN) 4 mg tablet Take 1 tablet (4 mg total) by mouth 3 (three) times daily as needed.    dorzolamide-timolol 2-0.5% (COSOPT) 22.3-6.8 mg/mL ophthalmic solution Place 1 drop into both eyes 2 (two) times daily.    EYLEA 2 mg/0.05 mL Soln     FLUoxetine 10 MG capsule 1 capsule in the morning    FOLIC ACID/MULTIVIT-MIN/LUTEIN (CENTRUM SILVER ORAL) Take by mouth.    gabapentin (NEURONTIN) 300 MG capsule Take 1 capsule (300 mg total) by mouth once daily.    iron-vitamin C 100-250 mg, ICAR-C, (ICAR-C) 100-250 mg Tab Take 1 tablet by mouth once daily.    ketorolac 0.5% (ACULAR) 0.5 % Drop     nitroGLYCERIN (NITROSTAT) 0.4 MG SL tablet Place 1 tablet (0.4 mg total) under the tongue every 5 (five) minutes as needed for Chest pain.    nystatin (MYCOSTATIN) 100,000 unit/mL suspension Take by mouth.    OZURDEX 0.7 mg Impl intravitreal implant     pantoprazole (PROTONIX) 40 MG tablet Take 1 tablet (40 mg total) by mouth once daily.    potassium chloride SA (K-DUR,KLOR-CON) 20 MEQ tablet Take 1 tablet (20 mEq total) by mouth once daily.    prasugreL (EFFIENT) 10 mg Tab Take 1 tablet (10 mg total) by mouth once daily.    pravastatin (PRAVACHOL) 40 MG tablet TAKE 1 TABLET BY MOUTH DAILY    predniSONE (DELTASONE) 20 MG tablet 1 po daily x 5d, then half tab po daily.    spironolactone (ALDACTONE) 25 MG tablet Take 25 mg by mouth 3 (three) times daily.     Family History       Problem Relation (Age of Onset)    Cancer Father, Daughter    Cirrhosis Brother    Heart failure Mother, Brother    Hypertension Mother          Tobacco Use    Smoking status: Never Smoker    Smokeless tobacco: Never Used    Substance and Sexual Activity    Alcohol use: No     Alcohol/week: 0.0 standard drinks    Drug use: No    Sexual activity: Yes     Partners: Male     Review of Systems   Constitutional: Positive for malaise/fatigue.   HENT: Negative.     Eyes: Negative.    Cardiovascular:  Positive for syncope.   Respiratory: Negative.     Endocrine: Negative.    Hematologic/Lymphatic: Negative.    Skin: Negative.    Musculoskeletal:  Positive for arthritis and joint pain.   Gastrointestinal: Negative.    Neurological:  Positive for dizziness, light-headedness and weakness.   Psychiatric/Behavioral: Negative.     Allergic/Immunologic: Negative.    Objective:     Vital Signs (Most Recent):  Temp: 97.9 °F (36.6 °C) (03/03/22 0720)  Pulse: (!) 54 (03/03/22 0720)  Resp: 17 (03/03/22 0720)  BP: (!) 185/75 (03/03/22 0720)  SpO2: 97 % (03/03/22 0720)   Vital Signs (24h Range):  Temp:  [97.4 °F (36.3 °C)-98.6 °F (37 °C)] 97.9 °F (36.6 °C)  Pulse:  [48-62] 54  Resp:  [16-29] 17  SpO2:  [97 %-100 %] 97 %  BP: (125-191)/(58-83) 185/75     Weight: 51.5 kg (113 lb 8.6 oz)  Body mass index is 21.45 kg/m².    SpO2: 97 %  O2 Device (Oxygen Therapy): room air      Intake/Output Summary (Last 24 hours) at 3/3/2022 0900  Last data filed at 3/3/2022 0800  Gross per 24 hour   Intake 180 ml   Output --   Net 180 ml       Lines/Drains/Airways       Peripheral Intravenous Line  Duration                  Peripheral IV - Single Lumen 03/02/22 1131 20 G Right Hand <1 day                    Physical Exam  Vitals and nursing note reviewed.   Constitutional:       General: She is not in acute distress.     Appearance: Normal appearance. She is well-developed. She is not diaphoretic.   HENT:      Head: Normocephalic and atraumatic.   Eyes:      General:         Right eye: No discharge.         Left eye: No discharge.      Pupils: Pupils are equal, round, and reactive to light.   Neck:      Thyroid: No thyromegaly.      Vascular: No JVD.      Trachea: No  tracheal deviation.   Cardiovascular:      Rate and Rhythm: Regular rhythm. Bradycardia present.      Heart sounds: Normal heart sounds, S1 normal and S2 normal. No murmur heard.  Pulmonary:      Effort: Pulmonary effort is normal. No respiratory distress.      Breath sounds: Normal breath sounds. No wheezing or rales.   Abdominal:      General: There is no distension.      Palpations: Abdomen is soft.      Tenderness: There is no rebound.   Musculoskeletal:      Cervical back: Neck supple.      Right lower leg: No edema.      Left lower leg: No edema.   Skin:     General: Skin is warm and dry.      Findings: No erythema.   Neurological:      General: No focal deficit present.      Mental Status: She is alert and oriented to person, place, and time.   Psychiatric:         Mood and Affect: Mood normal.         Behavior: Behavior normal.         Thought Content: Thought content normal.       Significant Labs: CMP   Recent Labs   Lab 03/02/22  1242 03/02/22  1759 03/03/22  0610   * 133* 133*   K 7.5* 6.6* 6.4*    110 104   CO2 15* 10* 22*   GLU 88 71 95   BUN 34* 32* 31*   CREATININE 1.6* 1.4 1.3   CALCIUM 8.9 9.5 8.7   PROT 6.7  --   --    ALBUMIN 3.5  --   --    BILITOT 0.3  --   --    ALKPHOS 59  --   --    AST 22  --   --    ALT 17  --   --    ANIONGAP 8 13 7*   ESTGFRAFRICA 34* 40* 44*   EGFRNONAA 29* 35* 38*   , CBC   Recent Labs   Lab 03/02/22  1242 03/02/22  1311 03/03/22  0610   WBC 7.20  --  6.05   HGB 9.7*  --  9.8*   HCT 31.2*   < > 29.6*     --  273    < > = values in this interval not displayed.   , Troponin   Recent Labs   Lab 03/02/22  1242 03/02/22  1756 03/03/22  0610   TROPONINI 0.006 <0.006 0.012   , and All pertinent lab results from the last 24 hours have been reviewed.    Significant Imaging: Echocardiogram: Transthoracic echo (TTE) complete (Cupid Only):   Results for orders placed or performed during the hospital encounter of 05/27/21   Echo   Result Value Ref Range     "Ascending aorta 3.21 cm    STJ 2.15 cm    AV mean gradient 9 mmHg    Ao peak matthew 2.16 m/s    Ao VTI 41.59 cm    IVRT 88.49 msec    IVS 1.32 (A) 0.6 - 1.1 cm    LA size 3.88 cm    Left Atrium Major Axis 5.03 cm    Left Atrium Minor Axis 4.98 cm    LVIDd 3.75 3.5 - 6.0 cm    LVIDs 2.27 2.1 - 4.0 cm    LVOT diameter 1.89 cm    LVOT peak VTI 25.12 cm    Posterior Wall 1.16 (A) 0.6 - 1.1 cm    MV Peak A Matthew 1.37 m/s    E wave deceleration time 317.82 msec    MV Peak E Matthew 0.90 m/s    PV Peak D Matthew 0.36 m/s    PV Peak S Matthew 0.73 m/s    RA Major Axis 4.76 cm    RA Width 3.40 cm    RVDD 2.93 cm    Sinus 2.51 cm    TR Max Matthew 2.93 m/s    TDI LATERAL 0.06 m/s    TDI SEPTAL 0.06 m/s    LA WIDTH 3.31 cm    PV PEAK VELOCITY 0.86 cm/s    MV stenosis pressure 1/2 time 92.17 ms    LV Diastolic Volume 59.99 mL    LV Systolic Volume 17.52 mL    LVOT peak matthew 1.31 m/s    RVOT peak VTI 11.77 cm    RVOT peak matthew 0.59 m/s    MV "A" wave duration 10.28 msec    MV mean gradient 1 mmHg    MV peak gradient 11 mmHg    PV mean gradient 1 mmHg    LV LATERAL E/E' RATIO 15.00 m/s    LV SEPTAL E/E' RATIO 15.00 m/s    FS 39 %    LA volume 54.64 cm3    LV mass 157.91 g    Left Ventricle Relative Wall Thickness 0.62 cm    AV valve area 1.69 cm2    AV Velocity Ratio 0.61     AV index (prosthetic) 0.60     MV valve area p 1/2 method 2.39 cm2    E/A ratio 0.66     Mean e' 0.06 m/s    Pulm vein S/D ratio 2.03     LVOT area 2.8 cm2    LVOT stroke volume 70.44 cm3    AV peak gradient 19 mmHg    E/E' ratio 15.00 m/s    Triscuspid Valve Regurgitation Peak Gradient 34 mmHg    BSA 1.57 m2    LV Systolic Volume Index 11.2 mL/m2    LV Diastolic Volume Index 38.46 mL/m2    LA Volume Index 35.0 mL/m2    LV Mass Index 101 g/m2    Right Atrial Pressure (from IVC) 3 mmHg    EF 60 %    TV rest pulmonary artery pressure 37 mmHg    Narrative    · Concentric hypertrophy and normal systolic function.  · Mild left atrial enlargement.  · With normal right ventricular " systolic function.  · The estimated ejection fraction is 60%.  · Grade I left ventricular diastolic dysfunction.  · Normal central venous pressure (3 mmHg).  · The estimated PA systolic pressure is 37 mmHg.  · Trivial posterior pericardial effusion.  · There is mild aortic valve stenosis.  · Aortic valve area is 1.69 cm2; peak velocity is 2.16 m/s; mean gradient   is 9 mmHg.  · Mild to moderate tricuspid regurgitation.      , EKG: Reviewed, and X-Ray: CXR: X-Ray Chest 1 View (CXR):   Results for orders placed or performed during the hospital encounter of 03/02/22   X-Ray Chest 1 View    Narrative    EXAMINATION:  XR CHEST 1 VIEW    CLINICAL HISTORY:  syncope;    TECHNIQUE:  Single frontal view of the chest was performed.    COMPARISON:  Chest radiograph 02/01/2022    FINDINGS:  ECG monitoring leads overlie the chest.  There is interstitial coarsening, similar to the prior exam, suggestive of COPD.No significant pleural fluid or pneumothorax.    The cardiac silhouette is normal in size.  There is atherosclerotic calcification of the aorta.  The hilar and mediastinal contours are unremarkable.    Bones are intact.      Impression    Stable radiographic appearance of the chest without acute cardiopulmonary abnormality.      Electronically signed by: Jessa Garcia  Date:    03/02/2022  Time:    12:35    and X-Ray Chest PA and Lateral (CXR): No results found for this visit on 03/02/22.

## 2022-03-03 NOTE — ASSESSMENT & PLAN NOTE
K 7.5-   -Insulin, Calcium, and D50 given for K shift   -repeat BMP every 4 hours per protocol    3/3/22 K+ improved to 5.0 this morning after lokelma. The case was discussed with Cardiology who recommended continued monitoring today and if lytes remain good D/C home tomorrow.

## 2022-03-03 NOTE — PLAN OF CARE
Bicarb infusing @ 75mL/hr. AAO, neurochecks Q4hrs. Orientation improving. Heart monitor 8678. Requires assist x1 with ADL's. Remains free from incident/injury. Call light in reach.

## 2022-03-03 NOTE — ASSESSMENT & PLAN NOTE
-Syncope with concerns for decompensation  - Rule out vasovagal event,orthostasis or cardiac/neuro event    Telemetry monitoring  -othostatic VS every shift  - neuro checks every 4 hours  -EKG in AM  - serial troponin levels- initial troponin negative   - 2 D ECHO- repeat pending   3/3/22 Troponin negative times 3. Carotis US negative. ECHO is pending. Orthostatic BP negative. HR improved, BB D/C'd. The case was discussed with Cardiology who recommended continued monitoring today and if lytes remain good D/C home tomorrow.

## 2022-03-03 NOTE — HOSPITAL COURSE
3/3/22 No acute events overnight. K+ improved to 5.0 this morning after lokelma. Troponin negative times 3. Carotis US negative. ECHO is pending. Orthostatic BP negative. HR improved, BB D/C'd. The case was discussed with Cardiology who recommended continued monitoring today and if lytes remain good D/C home tomorrow.     03/04/2022    NAEON. Labs and Vitals reviewed.   Patient was placed into observation for the evaluation of syncope. Patient was noted to be bradycardic with KEEGAN, Creat 1.6, and metabolic acidotic on arrival. Betablocker and amlodipine were stopped-BP treated with hydralazine with good results. ECHO with EF 75%, mild AS , mild mitral stenosis, carotid ultrasound, and orthostatics were negative. Potassium was 7.5 on arrival and has trended down with lokelma to 4.9 this morning. Patient was taken off of spironolactone in December but has continued to take potassium supplementation which is likely cause of hyperkalemia. Patient was treated with bicarb drip with correction of met acidosis. Patient is doing well without complaints and is stable for discharge. Patient to follow up with [facility], cardiology, outpatient as scheduled and advised to schedule a f/u with PCP Dulce next week. Stopped amlodipine, BB, spironolactone, and potassium. Continue effient. Rx sent for hydralazine for BP management.

## 2022-03-03 NOTE — SUBJECTIVE & OBJECTIVE
Interval History: No acute events overnight. K+ improved to 5.0 this morning after lokelma. Troponin negative times 3. Carotis US negative. ECHO is pending. Orthostatic BP negative. HR improved, BB D/C'd. The case was discussed with Cardiology who recommended continued monitoring today and if lytes remain good D/C home tomorrow.     Review of Systems   Constitutional:  Positive for activity change. Negative for appetite change, chills, diaphoresis, fatigue, fever and unexpected weight change.   HENT:  Negative for congestion, drooling, facial swelling, postnasal drip, rhinorrhea, sinus pressure, sneezing, sore throat and trouble swallowing.    Eyes:  Negative for discharge, redness, itching and visual disturbance.   Respiratory:  Negative for apnea, cough, choking, chest tightness, shortness of breath, wheezing and stridor.    Cardiovascular:  Negative for chest pain, palpitations and leg swelling.   Gastrointestinal:  Negative for abdominal distention, abdominal pain, anal bleeding, blood in stool, constipation, diarrhea, nausea and vomiting.   Genitourinary:  Negative for decreased urine volume, difficulty urinating, dysuria, frequency, hematuria, pelvic pain, urgency, vaginal bleeding and vaginal discharge.   Musculoskeletal:  Positive for arthralgias. Negative for back pain, gait problem, joint swelling, myalgias, neck pain and neck stiffness.   Skin:  Negative for color change, pallor, rash and wound.   Neurological:  Positive for dizziness, syncope and weakness. Negative for seizures, facial asymmetry, speech difficulty, light-headedness, numbness and headaches.   Psychiatric/Behavioral:  Negative for agitation, confusion, hallucinations and suicidal ideas. The patient is not nervous/anxious.    All other systems reviewed and are negative.  Objective:     Vital Signs (Most Recent):  Temp: 98 °F (36.7 °C) (03/03/22 1128)  Pulse: 61 (03/03/22 1128)  Resp: 18 (03/03/22 1128)  BP: (!) 141/67 (03/03/22 1129)  SpO2:  99 % (03/03/22 1128)   Vital Signs (24h Range):  Temp:  [97.4 °F (36.3 °C)-98 °F (36.7 °C)] 98 °F (36.7 °C)  Pulse:  [48-62] 61  Resp:  [17-29] 18  SpO2:  [97 %-100 %] 99 %  BP: (125-191)/(58-83) 141/67     Weight: 51.3 kg (113 lb)  Body mass index is 21.35 kg/m².    Intake/Output Summary (Last 24 hours) at 3/3/2022 1243  Last data filed at 3/3/2022 1000  Gross per 24 hour   Intake 360 ml   Output --   Net 360 ml      Physical Exam  Vitals and nursing note reviewed.   Constitutional:       Appearance: She is well-developed.   HENT:      Head: Normocephalic and atraumatic.      Nose: Nose normal.   Eyes:      Conjunctiva/sclera: Conjunctivae normal.      Pupils: Pupils are equal, round, and reactive to light.   Cardiovascular:      Rate and Rhythm: Regular rhythm. Bradycardia present.      Pulses: Normal pulses.      Heart sounds: Normal heart sounds. No murmur heard.  Pulmonary:      Effort: Tachypnea and accessory muscle usage present. No respiratory distress.      Breath sounds: Normal breath sounds.   Abdominal:      General: Bowel sounds are normal. There is distension (mild).      Palpations: Abdomen is soft.      Tenderness: There is no abdominal tenderness.   Genitourinary:     Comments: Deferred   Musculoskeletal:         General: Tenderness (R elbow in R shoulder) present. No deformity. Normal range of motion.      Cervical back: Normal range of motion and neck supple.   Skin:     General: Skin is warm and dry.      Findings: No erythema.   Neurological:      Mental Status: She is alert and oriented to person, place, and time.      Deep Tendon Reflexes: Reflexes are normal and symmetric.   Psychiatric:         Mood and Affect: Mood normal.         Behavior: Behavior normal.       Significant Labs: All pertinent labs within the past 24 hours have been reviewed.    Significant Imaging:   Imaging Results              CT Head Without Contrast (Final result)  Result time 03/02/22 12:39:53      Final result by  Miguel Marlow MD (03/02/22 12:39:53)                   Impression:      Age-related atrophy.  Small chronic right frontal lobe encephalomalacia.  Chronic left maxillary sinusitis.  No acute findings.      Electronically signed by: Miguel Marlow MD  Date:    03/02/2022  Time:    12:39               Narrative:    EXAMINATION:  CT HEAD WITHOUT CONTRAST    CLINICAL HISTORY:  Syncope with subsequent fall and head trauma.  Headache.  Syncope, recurrent;    TECHNIQUE:  Standard noncontrast CT of the brain.    All CT scans at this facility are performed  using dose modulation techniques as appropriate to performed exam including the following:  automated exposure control; adjustment of mA and/or kV according to the patients size (this includes techniques or standardized protocols for targeted exams where dose is matched to indication/reason for exam: i.e. extremities or head);  iterative reconstruction technique.    COMPARISON:  02/01/2022 CT scan of the brain.    FINDINGS:  The ventricles are mild-to-moderately enlarged consistent with volume loss.  No acute edema, hemorrhage or mass effect is present.    Small chronic area of right frontal lobe encephalomalacia, similar to the previous study.    No new findings.    Chronic left maxillary sinusitis.                                       X-Ray Chest 1 View (Final result)  Result time 03/02/22 12:35:13      Final result by Jessa Garcia MD (03/02/22 12:35:13)                   Impression:      Stable radiographic appearance of the chest without acute cardiopulmonary abnormality.      Electronically signed by: Jessa Garcia  Date:    03/02/2022  Time:    12:35               Narrative:    EXAMINATION:  XR CHEST 1 VIEW    CLINICAL HISTORY:  syncope;    TECHNIQUE:  Single frontal view of the chest was performed.    COMPARISON:  Chest radiograph 02/01/2022    FINDINGS:  ECG monitoring leads overlie the chest.  There is interstitial coarsening, similar to  the prior exam, suggestive of COPD.No significant pleural fluid or pneumothorax.    The cardiac silhouette is normal in size.  There is atherosclerotic calcification of the aorta.  The hilar and mediastinal contours are unremarkable.    Bones are intact.                                       X-Ray Shoulder Complete 2 View Right (Final result)  Result time 03/02/22 12:41:54      Final result by Jessa Garcia MD (03/02/22 12:41:54)                   Impression:      No acute osseous abnormality.  Degenerative change of the acromioclavicular joint.      Electronically signed by: Jessa Garcia  Date:    03/02/2022  Time:    12:41               Narrative:    EXAMINATION:  XR SHOULDER COMPLETE 2 OR MORE VIEWS RIGHT    CLINICAL HISTORY:  Unspecified fall, initial encounter    TECHNIQUE:  Two or three views of the right shoulder were performed.    COMPARISON:  Right shoulder radiograph 03/31/2009    FINDINGS:  No evidence of fracture or dislocation.  There is joint space narrowing of the acromioclavicular joint.  No significant degenerative change of the glenohumeral joint.                                       X-Ray Elbow Complete Left (Final result)  Result time 03/02/22 12:45:20      Final result by Jessa Garcia MD (03/02/22 12:45:20)                   Impression:      No acute osseous abnormality seen.      Electronically signed by: Jessa Garcia  Date:    03/02/2022  Time:    12:45               Narrative:    EXAMINATION:  XR ELBOW COMPLETE 3 VIEW LEFT    CLINICAL HISTORY:  . Unspecified fall, initial encounter    TECHNIQUE:  AP, lateral, and oblique views of the left elbow were performed.    COMPARISON:  None    FINDINGS:  No acute fracture or dislocation seen.  No elbow joint effusion.  No soft tissue edema or radiopaque retained foreign body.  Vascular calcification is noted.

## 2022-03-03 NOTE — HPI
"Ms. Dumont is an 86 year old female patient whose current medical conditions include CAD, CKD, chronic diastolic CHF, anemia, HTN, and hypercholesterolemia who presented to [facility] ED yesterday s/p syncopal event. Daughter reported her mother walked into the kitchen and then complained of feeling weak. Shortly thereafter, she passed out. Her daughter reported her eyes were open during the episode but patient remained unresponsive for a "few minutes". Associated symptoms included dizziness and weakness. Patient denied any associated fever, chills, gerardo chest pain, SOB, or palpitations. No head injury but patient did complain of right arm and right elbow pain. Initial workup revealed anemia (H/H 9.7/31.2), hyperkalemia (K 7.5), and creatinine of 1.6 and patient was subsequently admitted for further evaluation and treatment. Cardiology consulted to assist with management. Patient seen and examined today, resting in bed. States she feels well, nearly back to baseline. Remains chest pain free. No SOB. She admits she does not drink very much at home. She has been intolerant of diuretic therapy in the past due to dehydration. Chart reviewed. HR remains in the 50's, BB discontinued. Will assess response. K 5.0 this AM. Echo pending. Troponin x 3 negative.      "

## 2022-03-03 NOTE — PROGRESS NOTES
[facility] Medicine  Progress Note    Patient Name: Glo Dumont  MRN: [id number]  Patient Class: OP- Observation   Admission Date: 3/2/2022  Length of Stay: 0 days  Attending Physician: David Velasco MD  Primary Care Provider: Christina Recio MD        Subjective:     Principal Problem:Syncope        HPI:  Glo Dumont is a 86 y.o. female patient with a PMHx of CKD, CAD, Diastolic CHF, Anxiety, Anemia, Hypercholesteremia, and HTN who presents to the Emergency Department for evaluation of syncope which onset gradually this morning. Associated symptoms include dizziness and weakness. Patient denies head injury but states that she fell onto her right arm and elbow this morning. Symptoms are constant and moderate in severity. No mitigating or exacerbating factors reported. Associated sxs include dizziness and weakness. Patient denies any fever, SOB, chills, CP, diarrhea, nausea, and all other symptoms at this time. No further complaints or concerns at this time. Pt denies smoking and use of ETOH.  Pt reports medication compliance but did not take medications today.  Pt is a DNR and Dawna Toribio (daughter) at [phone] is the surrogate decision maker. ER work up showed: ER work up showed: H/H 9.7/31.2, Na 133, K 7.5, CO2 15, BUN 34, Creatinine 1.6, and CPK 17. Xray of elbow and R shoulder unremarkable. Chest xray showed no acute abnormality.  CT of head showed age-related atrophy.  Small chronic right frontal lobe encephalomalacia.  Chronic left maxillary sinusitis.  No acute findings. Hospital Medicine contacted for admission with patient placed in Observation for further evaluation.       Overview/Hospital Course:  3/3/22 No acute events overnight. K+ improved to 5.0 this morning after lokelma. Troponin negative times 3. Carotis US negative. ECHO is pending. Orthostatic BP negative. HR improved, BB D/C'd. The case was discussed with Cardiology who recommended continued monitoring today and if  lytes remain good D/C home tomorrow.       Interval History: No acute events overnight. K+ improved to 5.0 this morning after lokelma. Troponin negative times 3. Carotis US negative. ECHO is pending. Orthostatic BP negative. HR improved, BB D/C'd. The case was discussed with Cardiology who recommended continued monitoring today and if lytes remain good D/C home tomorrow.     Review of Systems   Constitutional:  Positive for activity change. Negative for appetite change, chills, diaphoresis, fatigue, fever and unexpected weight change.   HENT:  Negative for congestion, drooling, facial swelling, postnasal drip, rhinorrhea, sinus pressure, sneezing, sore throat and trouble swallowing.    Eyes:  Negative for discharge, redness, itching and visual disturbance.   Respiratory:  Negative for apnea, cough, choking, chest tightness, shortness of breath, wheezing and stridor.    Cardiovascular:  Negative for chest pain, palpitations and leg swelling.   Gastrointestinal:  Negative for abdominal distention, abdominal pain, anal bleeding, blood in stool, constipation, diarrhea, nausea and vomiting.   Genitourinary:  Negative for decreased urine volume, difficulty urinating, dysuria, frequency, hematuria, pelvic pain, urgency, vaginal bleeding and vaginal discharge.   Musculoskeletal:  Positive for arthralgias. Negative for back pain, gait problem, joint swelling, myalgias, neck pain and neck stiffness.   Skin:  Negative for color change, pallor, rash and wound.   Neurological:  Positive for dizziness, syncope and weakness. Negative for seizures, facial asymmetry, speech difficulty, light-headedness, numbness and headaches.   Psychiatric/Behavioral:  Negative for agitation, confusion, hallucinations and suicidal ideas. The patient is not nervous/anxious.    All other systems reviewed and are negative.  Objective:     Vital Signs (Most Recent):  Temp: 98 °F (36.7 °C) (03/03/22 1128)  Pulse: 61 (03/03/22 1128)  Resp: 18 (03/03/22  1128)  BP: (!) 141/67 (03/03/22 1129)  SpO2: 99 % (03/03/22 1128)   Vital Signs (24h Range):  Temp:  [97.4 °F (36.3 °C)-98 °F (36.7 °C)] 98 °F (36.7 °C)  Pulse:  [48-62] 61  Resp:  [17-29] 18  SpO2:  [97 %-100 %] 99 %  BP: (125-191)/(58-83) 141/67     Weight: 51.3 kg (113 lb)  Body mass index is 21.35 kg/m².    Intake/Output Summary (Last 24 hours) at 3/3/2022 1243  Last data filed at 3/3/2022 1000  Gross per 24 hour   Intake 360 ml   Output --   Net 360 ml      Physical Exam  Vitals and nursing note reviewed.   Constitutional:       Appearance: She is well-developed.   HENT:      Head: Normocephalic and atraumatic.      Nose: Nose normal.   Eyes:      Conjunctiva/sclera: Conjunctivae normal.      Pupils: Pupils are equal, round, and reactive to light.   Cardiovascular:      Rate and Rhythm: Regular rhythm. Bradycardia present.      Pulses: Normal pulses.      Heart sounds: Normal heart sounds. No murmur heard.  Pulmonary:      Effort: Tachypnea and accessory muscle usage present. No respiratory distress.      Breath sounds: Normal breath sounds.   Abdominal:      General: Bowel sounds are normal. There is distension (mild).      Palpations: Abdomen is soft.      Tenderness: There is no abdominal tenderness.   Genitourinary:     Comments: Deferred   Musculoskeletal:         General: Tenderness (R elbow in R shoulder) present. No deformity. Normal range of motion.      Cervical back: Normal range of motion and neck supple.   Skin:     General: Skin is warm and dry.      Findings: No erythema.   Neurological:      Mental Status: She is alert and oriented to person, place, and time.      Deep Tendon Reflexes: Reflexes are normal and symmetric.   Psychiatric:         Mood and Affect: Mood normal.         Behavior: Behavior normal.       Significant Labs: All pertinent labs within the past 24 hours have been reviewed.    Significant Imaging:   Imaging Results              CT Head Without Contrast (Final result)  Result  time 03/02/22 12:39:53      Final result by Miguel Marlow MD (03/02/22 12:39:53)                   Impression:      Age-related atrophy.  Small chronic right frontal lobe encephalomalacia.  Chronic left maxillary sinusitis.  No acute findings.      Electronically signed by: Miguel Marlow MD  Date:    03/02/2022  Time:    12:39               Narrative:    EXAMINATION:  CT HEAD WITHOUT CONTRAST    CLINICAL HISTORY:  Syncope with subsequent fall and head trauma.  Headache.  Syncope, recurrent;    TECHNIQUE:  Standard noncontrast CT of the brain.    All CT scans at this facility are performed  using dose modulation techniques as appropriate to performed exam including the following:  automated exposure control; adjustment of mA and/or kV according to the patients size (this includes techniques or standardized protocols for targeted exams where dose is matched to indication/reason for exam: i.e. extremities or head);  iterative reconstruction technique.    COMPARISON:  02/01/2022 CT scan of the brain.    FINDINGS:  The ventricles are mild-to-moderately enlarged consistent with volume loss.  No acute edema, hemorrhage or mass effect is present.    Small chronic area of right frontal lobe encephalomalacia, similar to the previous study.    No new findings.    Chronic left maxillary sinusitis.                                       X-Ray Chest 1 View (Final result)  Result time 03/02/22 12:35:13      Final result by Jessa Garcia MD (03/02/22 12:35:13)                   Impression:      Stable radiographic appearance of the chest without acute cardiopulmonary abnormality.      Electronically signed by: Jessa Garcia  Date:    03/02/2022  Time:    12:35               Narrative:    EXAMINATION:  XR CHEST 1 VIEW    CLINICAL HISTORY:  syncope;    TECHNIQUE:  Single frontal view of the chest was performed.    COMPARISON:  Chest radiograph 02/01/2022    FINDINGS:  ECG monitoring leads overlie the chest.   There is interstitial coarsening, similar to the prior exam, suggestive of COPD.No significant pleural fluid or pneumothorax.    The cardiac silhouette is normal in size.  There is atherosclerotic calcification of the aorta.  The hilar and mediastinal contours are unremarkable.    Bones are intact.                                       X-Ray Shoulder Complete 2 View Right (Final result)  Result time 03/02/22 12:41:54      Final result by Jessa Garcia MD (03/02/22 12:41:54)                   Impression:      No acute osseous abnormality.  Degenerative change of the acromioclavicular joint.      Electronically signed by: Jessa Garcia  Date:    03/02/2022  Time:    12:41               Narrative:    EXAMINATION:  XR SHOULDER COMPLETE 2 OR MORE VIEWS RIGHT    CLINICAL HISTORY:  Unspecified fall, initial encounter    TECHNIQUE:  Two or three views of the right shoulder were performed.    COMPARISON:  Right shoulder radiograph 03/31/2009    FINDINGS:  No evidence of fracture or dislocation.  There is joint space narrowing of the acromioclavicular joint.  No significant degenerative change of the glenohumeral joint.                                       X-Ray Elbow Complete Left (Final result)  Result time 03/02/22 12:45:20      Final result by Jessa Garcia MD (03/02/22 12:45:20)                   Impression:      No acute osseous abnormality seen.      Electronically signed by: Jessa Garcia  Date:    03/02/2022  Time:    12:45               Narrative:    EXAMINATION:  XR ELBOW COMPLETE 3 VIEW LEFT    CLINICAL HISTORY:  . Unspecified fall, initial encounter    TECHNIQUE:  AP, lateral, and oblique views of the left elbow were performed.    COMPARISON:  None    FINDINGS:  No acute fracture or dislocation seen.  No elbow joint effusion.  No soft tissue edema or radiopaque retained foreign body.  Vascular calcification is noted.                                          Assessment/Plan:      *  Syncope  -Syncope with concerns for decompensation  - Rule out vasovagal event,orthostasis or cardiac/neuro event    Telemetry monitoring  -othostatic VS every shift  - neuro checks every 4 hours  -EKG in AM  - serial troponin levels- initial troponin negative   - 2 D ECHO- repeat pending   3/3/22 Troponin negative times 3. Carotis US negative. ECHO is pending. Orthostatic BP negative. HR improved, BB D/C'd. The case was discussed with Cardiology who recommended continued monitoring today and if lytes remain good D/C home tomorrow.           Metabolic acidosis  -CO2-15   -Sodium Bicarb gtt   -repeat CMP per K protocol   3/3/22 Improved       Hyperkalemia  K 7.5-   -Insulin, Calcium, and D50 given for K shift   -repeat BMP every 4 hours per protocol    3/3/22 K+ improved to 5.0 this morning after lokelma. The case was discussed with Cardiology who recommended continued monitoring today and if lytes remain good D/C home tomorrow.       Bradycardia  -Lopressor held   -Cardiology consulted   -will monitor   3/3/22 HR improved off of BB. Continue to monitor. Cardiology following     CKD (chronic kidney disease) stage 4, GFR 15-29 ml/min  -Cr 1.6  -will monitor   -repeat BMP in am     Anxiety and depression  Celexa  -Xanax      Diastolic heart failure, NYHA class 3  -appears controlled  - previous Echo in 5/2021-Concentric hypertrophy and normal systolic function.  · Mild left atrial enlargement.  · With normal right ventricular systolic function.  · The estimated ejection fraction is 60%.  Grade I left ventricular diastolic dysfunction.  -repeat Echo pending   -initial troponin negative       Essential hypertension  -BP stable and soft   -Coreg continued   -Norvasc held   -will resume home medications when appropriate        VTE Risk Mitigation (From admission, onward)    None          Discharge Planning   CANDACE:      Code Status: DNR   Is the patient medically ready for discharge?:     Reason for patient still in hospital  (select all that apply): Patient trending condition, Laboratory test, Treatment, Consult recommendations and Pending disposition                     Sergio Saavedra NP  Department of Hospital Medicine   '[facility] Surg

## 2022-03-04 ENCOUNTER — TELEPHONE (OUTPATIENT)
Dept: CARDIOLOGY | Facility: HOSPITAL | Age: 87
End: 2022-03-04

## 2022-03-04 ENCOUNTER — TELEPHONE (OUTPATIENT)
Dept: CARDIOLOGY | Facility: CLINIC | Age: 87
End: 2022-03-04
Payer: MEDICARE

## 2022-03-04 VITALS
HEART RATE: 67 BPM | OXYGEN SATURATION: 97 % | TEMPERATURE: 98 F | SYSTOLIC BLOOD PRESSURE: 157 MMHG | WEIGHT: 115.31 LBS | BODY MASS INDEX: 21.77 KG/M2 | HEIGHT: 61 IN | DIASTOLIC BLOOD PRESSURE: 71 MMHG | RESPIRATION RATE: 20 BRPM

## 2022-03-04 DIAGNOSIS — R00.1 BRADYCARDIA: Primary | ICD-10-CM

## 2022-03-04 PROBLEM — F41.9 ANXIETY AND DEPRESSION: Status: RESOLVED | Noted: 2017-03-07 | Resolved: 2022-03-04

## 2022-03-04 PROBLEM — F32.A ANXIETY AND DEPRESSION: Status: RESOLVED | Noted: 2017-03-07 | Resolved: 2022-03-04

## 2022-03-04 PROBLEM — E87.20 METABOLIC ACIDOSIS: Status: RESOLVED | Noted: 2022-03-02 | Resolved: 2022-03-04

## 2022-03-04 PROBLEM — E87.5 HYPERKALEMIA: Status: RESOLVED | Noted: 2022-03-02 | Resolved: 2022-03-04

## 2022-03-04 LAB
ALBUMIN SERPL BCP-MCNC: 3 G/DL (ref 3.5–5.2)
ALP SERPL-CCNC: 58 U/L (ref 55–135)
ALT SERPL W/O P-5'-P-CCNC: 16 U/L (ref 10–44)
ANION GAP SERPL CALC-SCNC: 8 MMOL/L (ref 8–16)
AST SERPL-CCNC: 12 U/L (ref 10–40)
BASOPHILS # BLD AUTO: 0.03 K/UL (ref 0–0.2)
BASOPHILS NFR BLD: 0.6 % (ref 0–1.9)
BILIRUB SERPL-MCNC: 0.4 MG/DL (ref 0.1–1)
BUN SERPL-MCNC: 27 MG/DL (ref 8–23)
CALCIUM SERPL-MCNC: 8.5 MG/DL (ref 8.7–10.5)
CHLORIDE SERPL-SCNC: 100 MMOL/L (ref 95–110)
CO2 SERPL-SCNC: 26 MMOL/L (ref 23–29)
CREAT SERPL-MCNC: 1.3 MG/DL (ref 0.5–1.4)
DIFFERENTIAL METHOD: ABNORMAL
EOSINOPHIL # BLD AUTO: 0.2 K/UL (ref 0–0.5)
EOSINOPHIL NFR BLD: 3 % (ref 0–8)
ERYTHROCYTE [DISTWIDTH] IN BLOOD BY AUTOMATED COUNT: 15.9 % (ref 11.5–14.5)
EST. GFR  (AFRICAN AMERICAN): 44 ML/MIN/1.73 M^2
EST. GFR  (NON AFRICAN AMERICAN): 38 ML/MIN/1.73 M^2
GLUCOSE SERPL-MCNC: 103 MG/DL (ref 70–110)
HCT VFR BLD AUTO: 28.1 % (ref 37–48.5)
HGB BLD-MCNC: 9.3 G/DL (ref 12–16)
IMM GRANULOCYTES # BLD AUTO: 0.07 K/UL (ref 0–0.04)
IMM GRANULOCYTES NFR BLD AUTO: 1.4 % (ref 0–0.5)
LYMPHOCYTES # BLD AUTO: 0.9 K/UL (ref 1–4.8)
LYMPHOCYTES NFR BLD: 17.2 % (ref 18–48)
MAGNESIUM SERPL-MCNC: 1.5 MG/DL (ref 1.6–2.6)
MCH RBC QN AUTO: 28.3 PG (ref 27–31)
MCHC RBC AUTO-ENTMCNC: 33.1 G/DL (ref 32–36)
MCV RBC AUTO: 85 FL (ref 82–98)
MONOCYTES # BLD AUTO: 0.6 K/UL (ref 0.3–1)
MONOCYTES NFR BLD: 12.5 % (ref 4–15)
NEUTROPHILS # BLD AUTO: 3.3 K/UL (ref 1.8–7.7)
NEUTROPHILS NFR BLD: 65.3 % (ref 38–73)
NRBC BLD-RTO: 0 /100 WBC
PHOSPHATE SERPL-MCNC: 4 MG/DL (ref 2.7–4.5)
PLATELET # BLD AUTO: 277 K/UL (ref 150–450)
PMV BLD AUTO: 10.8 FL (ref 9.2–12.9)
POTASSIUM SERPL-SCNC: 4.9 MMOL/L (ref 3.5–5.1)
PROT SERPL-MCNC: 5.6 G/DL (ref 6–8.4)
RBC # BLD AUTO: 3.29 M/UL (ref 4–5.4)
SODIUM SERPL-SCNC: 134 MMOL/L (ref 136–145)
WBC # BLD AUTO: 5.05 K/UL (ref 3.9–12.7)

## 2022-03-04 PROCEDURE — G0378 HOSPITAL OBSERVATION PER HR: HCPCS | Mod: HCNC

## 2022-03-04 PROCEDURE — 36415 COLL VENOUS BLD VENIPUNCTURE: CPT | Mod: HCNC | Performed by: NURSE PRACTITIONER

## 2022-03-04 PROCEDURE — 25000003 PHARM REV CODE 250: Mod: HCNC | Performed by: INTERNAL MEDICINE

## 2022-03-04 PROCEDURE — 99215 OFFICE O/P EST HI 40 MIN: CPT | Mod: HCNC,,, | Performed by: INTERNAL MEDICINE

## 2022-03-04 PROCEDURE — 25000003 PHARM REV CODE 250: Mod: HCNC | Performed by: NURSE PRACTITIONER

## 2022-03-04 PROCEDURE — 97110 THERAPEUTIC EXERCISES: CPT | Mod: HCNC

## 2022-03-04 PROCEDURE — 80053 COMPREHEN METABOLIC PANEL: CPT | Mod: HCNC | Performed by: NURSE PRACTITIONER

## 2022-03-04 PROCEDURE — 84100 ASSAY OF PHOSPHORUS: CPT | Mod: HCNC | Performed by: NURSE PRACTITIONER

## 2022-03-04 PROCEDURE — 97530 THERAPEUTIC ACTIVITIES: CPT | Mod: HCNC

## 2022-03-04 PROCEDURE — 85025 COMPLETE CBC W/AUTO DIFF WBC: CPT | Mod: HCNC | Performed by: NURSE PRACTITIONER

## 2022-03-04 PROCEDURE — 99215 PR OFFICE/OUTPT VISIT, EST, LEVL V, 40-54 MIN: ICD-10-PCS | Mod: HCNC,,, | Performed by: INTERNAL MEDICINE

## 2022-03-04 PROCEDURE — 96366 THER/PROPH/DIAG IV INF ADDON: CPT

## 2022-03-04 PROCEDURE — 83735 ASSAY OF MAGNESIUM: CPT | Mod: HCNC | Performed by: NURSE PRACTITIONER

## 2022-03-04 RX ORDER — LANOLIN ALCOHOL/MO/W.PET/CERES
400 CREAM (GRAM) TOPICAL ONCE
Status: COMPLETED | OUTPATIENT
Start: 2022-03-04 | End: 2022-03-04

## 2022-03-04 RX ORDER — HYDRALAZINE HYDROCHLORIDE 25 MG/1
25 TABLET, FILM COATED ORAL EVERY 8 HOURS
Qty: 90 TABLET | Refills: 0 | Status: SHIPPED | OUTPATIENT
Start: 2022-03-04 | End: 2022-03-29 | Stop reason: SDUPTHER

## 2022-03-04 RX ADMIN — MAGNESIUM OXIDE TAB 400 MG (241.3 MG ELEMENTAL MG) 400 MG: 400 (241.3 MG) TAB at 11:03

## 2022-03-04 RX ADMIN — CITALOPRAM HYDROBROMIDE 20 MG: 20 TABLET ORAL at 10:03

## 2022-03-04 RX ADMIN — PANTOPRAZOLE SODIUM 40 MG: 40 TABLET, DELAYED RELEASE ORAL at 10:03

## 2022-03-04 RX ADMIN — ASPIRIN 81 MG: 81 TABLET, COATED ORAL at 10:03

## 2022-03-04 RX ADMIN — HYDRALAZINE HYDROCHLORIDE 25 MG: 25 TABLET, FILM COATED ORAL at 05:03

## 2022-03-04 RX ADMIN — SODIUM BICARBONATE: 84 INJECTION, SOLUTION INTRAVENOUS at 05:03

## 2022-03-04 NOTE — NURSING
Pt being discharged Home in stable condition. IV removed, catheter intact, pt tolerated well. Tele monitor removed, given to US. Discharge instructions given to pt, pt verbalized understanding. Discharge prescriptions delivered to bedside by pharmacy.

## 2022-03-04 NOTE — ASSESSMENT & PLAN NOTE
-BP stable and soft   -Coreg continued   -Norvasc held   -will resume home medications when appropriate    03/04/2022  Hydralazine TID at home  Stop BB and norvasc for now- outpt f/u with PCP and cardiology as scheduled  Continue effient  03/04/2022  Continue hydralazine at home  Stop BB and norvasc  Discussed making sure aldactone and potassium are also discontinued from medications with daughter

## 2022-03-04 NOTE — SUBJECTIVE & OBJECTIVE
Review of Systems   Constitutional: Negative.   HENT: Negative.     Eyes: Negative.    Cardiovascular: Negative.    Respiratory: Negative.     Endocrine: Negative.    Hematologic/Lymphatic: Negative.    Skin: Negative.    Musculoskeletal: Negative.    Gastrointestinal: Negative.    Genitourinary: Negative.    Neurological: Negative.    Psychiatric/Behavioral: Negative.     Allergic/Immunologic: Negative.    Objective:     Vital Signs (Most Recent):  Temp: 98.1 °F (36.7 °C) (03/04/22 1118)  Pulse: 67 (03/04/22 1118)  Resp: 20 (03/04/22 1118)  BP: (!) 157/71 (03/04/22 1118)  SpO2: 97 % (03/04/22 1118)   Vital Signs (24h Range):  Temp:  [98.1 °F (36.7 °C)-99 °F (37.2 °C)] 98.1 °F (36.7 °C)  Pulse:  [62-79] 67  Resp:  [16-20] 20  SpO2:  [95 %-99 %] 97 %  BP: (111-157)/(58-71) 157/71     Weight: 52.3 kg (115 lb 4.8 oz)  Body mass index is 21.79 kg/m².     SpO2: 97 %  O2 Device (Oxygen Therapy): room air      Intake/Output Summary (Last 24 hours) at 3/4/2022 1440  Last data filed at 3/4/2022 1200  Gross per 24 hour   Intake 2971.28 ml   Output --   Net 2971.28 ml       Lines/Drains/Airways       None                   Physical Exam  Vitals and nursing note reviewed.   Constitutional:       General: She is not in acute distress.     Appearance: Normal appearance. She is well-developed. She is not diaphoretic.   HENT:      Head: Normocephalic and atraumatic.   Eyes:      General:         Right eye: No discharge.         Left eye: No discharge.      Pupils: Pupils are equal, round, and reactive to light.   Neck:      Thyroid: No thyromegaly.      Vascular: No JVD.      Trachea: No tracheal deviation.   Cardiovascular:      Rate and Rhythm: Normal rate and regular rhythm.      Heart sounds: Normal heart sounds, S1 normal and S2 normal. No murmur heard.  Pulmonary:      Effort: Pulmonary effort is normal. No respiratory distress.      Breath sounds: Normal breath sounds. No wheezing or rales.   Abdominal:      General: There  is no distension.      Tenderness: There is no rebound.   Musculoskeletal:      Cervical back: Neck supple.      Right lower leg: No edema.      Left lower leg: No edema.   Skin:     General: Skin is warm and dry.      Findings: No erythema.   Neurological:      Mental Status: She is alert and oriented to person, place, and time.   Psychiatric:         Mood and Affect: Mood normal.         Behavior: Behavior normal.         Thought Content: Thought content normal.       Significant Labs: CMP   Recent Labs   Lab 03/03/22  0854 03/03/22  1205 03/04/22  0547   * 134* 134*   K 5.0 4.8 4.9    103 100   CO2 22* 20* 26   * 114* 103   BUN 28* 31* 27*   CREATININE 1.3 1.3 1.3   CALCIUM 8.3* 9.0 8.5*   PROT  --   --  5.6*   ALBUMIN  --   --  3.0*   BILITOT  --   --  0.4   ALKPHOS  --   --  58   AST  --   --  12   ALT  --   --  16   ANIONGAP 10 11 8   ESTGFRAFRICA 44* 44* 44*   EGFRNONAA 38* 38* 38*   , CBC   Recent Labs   Lab 03/03/22  0610 03/04/22  0547   WBC 6.05 5.05   HGB 9.8* 9.3*   HCT 29.6* 28.1*    277   , Troponin   Recent Labs   Lab 03/02/22  1756 03/03/22  0610   TROPONINI <0.006 0.012   , and All pertinent lab results from the last 24 hours have been reviewed.    Significant Imaging: Echocardiogram: Transthoracic echo (TTE) complete (Cupid Only):   Results for orders placed or performed during the hospital encounter of 03/02/22   Echo   Result Value Ref Range    BSA 1.49 m2    TDI SEPTAL 0.03 m/s    LV LATERAL E/E' RATIO 11.17 m/s    LV SEPTAL E/E' RATIO 22.33 m/s    LA WIDTH 3.70 cm    IVC diameter 1.2 cm    Left Ventricular Outflow Tract Mean Velocity 0.1664271852 cm/s    Left Ventricular Outflow Tract Mean Gradient 3.46 mmHg    TV mean gradient 26 mmHg    TDI LATERAL 0.06 m/s    LVIDd 3.79 3.5 - 6.0 cm    IVS 1.55 (A) 0.6 - 1.1 cm    Posterior Wall 1.25 (A) 0.6 - 1.1 cm    Ao root annulus 2.78 cm    LVIDs 2.37 2.1 - 4.0 cm    FS 37 28 - 44 %    LA volume 56.03 cm3    Ascending aorta  3.00 cm    LV mass 193.42 g    LA size 3.31 cm    TAPSE 1.60 cm    Left Ventricle Relative Wall Thickness 0.66 cm    AV regurgitation pressure 1/2 time 913.17481819368592 ms    AV mean gradient 6 mmHg    AV valve area 1.97 cm2    AV Velocity Ratio 0.71     AV index (prosthetic) 0.73     MV valve area p 1/2 method 2.10 cm2    E/A ratio 0.52     Mean e' 0.05 m/s    E wave deceleration time [id number] msec    IVRT 124.305072524972216 msec    LVOT diameter 1.86 cm    LVOT area 2.7 cm2    LVOT peak matthew 1.19 m/s    LVOT peak VTI 26.10 cm    Ao peak matthew 1.67 m/s    Ao VTI 35.9 cm    RVOT peak matthew 0.74 m/s    RVOT peak VTI 16.3 cm    Mr max matthew 4.43 m/s    LVOT stroke volume 70.88 cm3    AV peak gradient 11 mmHg    PV mean gradient 1.44 mmHg    E/E' ratio 14.89 m/s    MV Peak E Matthew 0.67 m/s    AR Max Matthew 2.30 m/s    TR Max Matthew 2.81 m/s    MV stenosis pressure 1/2 time 104.278603270509230 ms    MV Peak A Matthew 1.29 m/s    LV Systolic Volume 19.50 mL    LV Systolic Volume Index 13.2 mL/m2    LV Diastolic Volume 61.68 mL    LV Diastolic Volume Index 41.68 mL/m2    LA Volume Index 37.9 mL/m2    LV Mass Index 131 g/m2    Echo EF Estimated 68 %    RA Major Axis 3.43 cm    Left Atrium Minor Axis 5.13 cm    Left Atrium Major Axis 5.66 cm    Triscuspid Valve Regurgitation Peak Gradient 32 mmHg    Right Atrial Pressure (from IVC) 3 mmHg    EF 75 %    TV rest pulmonary artery pressure 35 mmHg    Narrative    · The left ventricle is normal in size with moderate concentric   hypertrophy and hyperdynamic systolic function.  · Mild left atrial enlargement.  · The estimated ejection fraction is 75%.  · Normal right ventricular size with low normal right ventricular systolic   function.  · There is mild aortic valve stenosis.  · Aortic valve area is 1.97 cm2; peak velocity is 1.67 m/s; mean gradient   is 6 mmHg.  · Mild mitral regurgitation.  · There is mild mitral stenosis.  · Mild tricuspid regurgitation.  · Normal central venous  pressure (3 mmHg).  · The estimated PA systolic pressure is 35 mmHg.      , EKG: Reviewed, and X-Ray: CXR: X-Ray Chest 1 View (CXR):   Results for orders placed or performed during the hospital encounter of 03/02/22   X-Ray Chest 1 View    Narrative    EXAMINATION:  XR CHEST 1 VIEW    CLINICAL HISTORY:  syncope;    TECHNIQUE:  Single frontal view of the chest was performed.    COMPARISON:  Chest radiograph 02/01/2022    FINDINGS:  ECG monitoring leads overlie the chest.  There is interstitial coarsening, similar to the prior exam, suggestive of COPD.No significant pleural fluid or pneumothorax.    The cardiac silhouette is normal in size.  There is atherosclerotic calcification of the aorta.  The hilar and mediastinal contours are unremarkable.    Bones are intact.      Impression    Stable radiographic appearance of the chest without acute cardiopulmonary abnormality.      Electronically signed by: Jessa Garcia  Date:    03/02/2022  Time:    12:35    and X-Ray Chest PA and Lateral (CXR): No results found for this visit on 03/02/22.

## 2022-03-04 NOTE — PT/OT/SLP PROGRESS
"Physical Therapy      Patient Name:  Glo Dumont   MRN:  [id number]    10:30 a.m.  Patient refused PT session at this time stating she had "already got up and walked" and did not want to do so again.   Will follow up in next scheduled PT session.         "

## 2022-03-04 NOTE — ASSESSMENT & PLAN NOTE
-Likely in part due to electrolyte derangement/hyperkalemia, K > 7 upon admission  -BB held  -Monitor HR trend    3/4/22  -Resolved, HR stable   -Hold BB upon discharge  -OP Holter monitor

## 2022-03-04 NOTE — PT/OT/SLP EVAL
Occupational Therapy   Evaluation    Name: Glo Dumont  MRN: [id number]  Admitting Diagnosis:  Syncope  Recent Surgery: * No surgery found *      Recommendations:     Discharge Recommendations: home health OT  Discharge Equipment Recommendations:  none  Barriers to discharge:  None    Assessment:     Glo Dumont is a 86 y.o. female with a medical diagnosis of Syncope.  She presents with DEBILITY AND GENERALIZED WEAKNESS. Performance deficits affecting function: weakness, impaired balance, impaired endurance, impaired self care skills, impaired functional mobilty, gait instability, decreased upper extremity function.      Rehab Prognosis: Fair; patient would benefit from acute skilled OT services to address these deficits and reach maximum level of function.       Plan:     Patient to be seen   to address the above listed problems via self-care/home management, therapeutic activities, therapeutic exercises  · Plan of Care Expires: 03/17/22  · Plan of Care Reviewed with: patient    Subjective     Chief Complaint:   DEBILITY AND GENERALIZED WEAKMESS  Patient/Family Comments/goals:     Occupational Profile:  Living Environment: LIVES WITH DAUGHTER IN 1 STORY HOUSE WITH A FEW STEPS TO ENTER  Previous level of function: (I) WITH ADL'S AND FUNCTIONAL MOBILITY  Roles and Routines: OCCUPATIONAL MTHERAPY  Equipment Used at Home:   (PT HAVE BUT DO USE   , BSC SC)  Assistance upon Discharge:     Pain/Comfort:  · Pain Rating 1: 4/10 (DURING ROM OF L UE)  · Location - Side 1: Left  · Location - Orientation 1: upper  · Location 1: arm    Patients cultural, spiritual, Scientologist conflicts given the current situation:      Objective:     Communicated with: NURSE AND Epic CHART REVIEW  prior to session.  Patient found HOB elevated with telemetry, peripheral IV, bed alarm upon OT entry to room.    General Precautions: Standard, fall   Orthopedic Precautions:N/A   Braces: N/A  Respiratory Status: Room air    Occupational  Performance:    Bed Mobility:    · Patient completed Rolling/Turning to Left with  contact guard assistance  · Patient completed Scooting/Bridging with stand by assistance  · Patient completed Supine to Sit with contact guard assistance    Functional Mobility/Transfers:  · Patient completed Sit <> Stand Transfer with contact guard assistance  with  hand-held assist   · Patient completed Bed <> Chair Transfer using Step Transfer technique with contact guard assistance with hand-held assist  · Functional Mobility:     Activities of Daily Living:  · Upper Body Dressing: minimum assistance .  · Lower Body Dressing: contact guard assistance CARMINE/DOFF SOCKS    Cognitive/Visual Perceptual:  Cognitive/Psychosocial Skills:     -       Oriented to: Person, Place, Time and Situation   -       Follows Commands/attention:Follows two-step commands  -       Communication: clear/fluent  -       Memory: No Deficits noted  -       Safety awareness/insight to disability: impaired     Physical Exam:  Upper Extremity Range of Motion:     -       Right Upper Extremity: AAROM APPROX: 160 DEGRES SHOULDER FLEXION  -       Left Upper Extremity: WFL  Upper Extremity Strength:    -       Right Upper Extremity: MMT:  3-/5 GROSSLY  -       Left Upper Extremity: MMT: 4/5 GROSSLY   Strength:    -       Right Upper Extremity: MMT: 3/5 GROSSLY  -       Left Upper Extremity: MMT: 4/5 GROSSLY    AMPAC 6 Click ADL:  AMPAC Total Score:      Treatment & Education:  O.T. EVAL COMPLETED. PT EDUCATED ON O.T. POC. PT DISPLAYED DEFICITS WITH ADL'S SKILLS , DECREASE B UE STRENGTH/ ENDURANCE DECREASE FUNCTIONAL MOBILITY/ TRANSFERS. PT WILL CONTINUE TO BENEFIT FROM SKILLED O.T. RECOMMEND HOME HEALTH O.T.   Patient left supine with all lines intact and call button in reach    GOALS:   Multidisciplinary Problems     Occupational Therapy Goals        Problem: Occupational Therapy Goal    Goal Priority Disciplines Outcome Interventions   Occupational Therapy  Goal     OT, PT/OT     Description: OT GOALS TO BE MET BY 3-17-22  PT (I) WITH LE DRESSING  (I) WITH TOILET T/F'S  PT WILL TOLERATE 1 SET X 10 REPS  R UE A/AA AND L UE AROM EXERCISE   (I) WITH UE DRESSING                     History:     Past Medical History:   Diagnosis Date    Anemia     Angina pectoris     Anxiety     Anxiety and depression     Arthritis     hip    Carotid artery occlusion     Carpal tunnel syndrome 06/23/2008    emg    Chronic diarrhea     work up in 2011 with EGD, CS and VCE    CKD (chronic kidney disease) stage 3, GFR 30-59 ml/min 5/11/2017    Colitis     Coronary artery disease     Coronary artery disease     Diastolic dysfunction     Diverticulosis     Glaucoma     Greater trochanteric bursitis 2/10/2015    Grief at loss of child 1/26/2016    H/O carotid endarterectomy 12/2/2013    Heart failure     History of coronary angioplasty 3/11/2014    Hypercholesteremia     Hypertension     Liver cyst 02/08/2013    ct abd    Macular degeneration     Primary open-angle glaucoma(365.11) 9/3/2013    Renal cyst 02/08/2013    ct abd    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    Sarcoidosis     Sarcoidosis of lung     Sickle cell trait     Uveitis        Past Surgical History:   Procedure Laterality Date    CAROTID ENDARTERECTOMY Right 2000s    CATARACT EXTRACTION Bilateral     Dr. Liang Dennis    CHOLECYSTECTOMY      laparoscopic, 3/18.    CORONARY ANGIOPLASTY WITH STENT PLACEMENT  11/19/2010    RCA-HALIE 2010    Lathia    JOINT REPLACEMENT Left 11/03/2014    Dr. Braun    TOTAL ABDOMINAL HYSTERECTOMY W/ BILATERAL SALPINGOOPHORECTOMY  1972       Time Tracking:     OT Date of Treatment: 03/03/22  OT Start Time: 1635  OT Stop Time: 1650  OT Total Time (min): 15 min    Billable Minutes:Evaluation 15 MINUTES    3/3/2022

## 2022-03-04 NOTE — PLAN OF CARE
Pt remains free from falls/injuries this shift. Safety precautions maintained. Telemetry monitoring per orders. No s/s of acute distress noted. Will continue to monitor. Chart check completed.

## 2022-03-04 NOTE — PT/OT/SLP PROGRESS
Occupational Therapy      Patient Name:  Glo Dumont   MRN:  [id number]  S: PT AGREEABLE TO THERAPY SESSION  O: PT SEEN IN ROOM SITTING IN BED SIDE CHAIR AND NURSE RENATO PRESENT. PT REPORTED WANTED TO GO WALKING.  PT MOD(I)  SIT<>STAND BED SIDE CHAIR AND  WITH CARMINE ROBE WITH FAIR+ STANDING BALANCE . PT REFUSED TO AMBULATE WITH ROLLING WALKER. PT WITH NO C/O PAIN. PT AMBULATED 200 FEET WITH MOD (I)  AT SLOW PACE HOLDING ON SIDE RAILING INCONSISTENTLY. PT  PERFORMED 1 SET X 10 REPS B UE ROM EXERCISE MOVING RIGHT UE ONLY IN PAIN FREE RANGES. PT COMFORTABLE WITH R UE MOVEMENT.  A: PT CONTINUE IMPROVE WITH FUNCTIONAL MOBILITY AS WELL AS B UE STRENGTH/ENDURANCE AND R UE ROM.   P: CONTINUE WITH POC.  Imani Lanza, OT  3/4/2022   [id number]

## 2022-03-04 NOTE — PROGRESS NOTES
"[facility]  Cardiology  Progress Note    Patient Name: Glo Dumont  MRN: [id number]  Admission Date: 3/2/2022  Hospital Length of Stay: 0 days  Code Status: DNR   Attending Physician: No att. providers found   Primary Care Physician: Christina Recio MD  Expected Discharge Date: 3/4/2022  Principal Problem:Syncope    Subjective:   HPI:  Ms. Dumont is an 86 year old female patient whose current medical conditions include CAD, CKD, chronic diastolic CHF, anemia, HTN, and hypercholesterolemia who presented to [facility] ED yesterday s/p syncopal event. Daughter reported her mother walked into the kitchen and then complained of feeling weak. Shortly thereafter, she passed out. Her daughter reported her eyes were open during the episode but patient remained unresponsive for a "few minutes". Associated symptoms included dizziness and weakness. Patient denied any associated fever, chills, gerardo chest pain, SOB, or palpitations. No head injury but patient did complain of right arm and right elbow pain. Initial workup revealed anemia (H/H 9.7/31.2), hyperkalemia (K 7.5), and creatinine of 1.6 and patient was subsequently admitted for further evaluation and treatment. Cardiology consulted to assist with management. Patient seen and examined today, resting in bed. States she feels well, nearly back to baseline. Remains chest pain free. No SOB. She admits she does not drink very much at home. She has been intolerant of diuretic therapy in the past due to dehydration. Chart reviewed. HR remains in the 50's, BB discontinued. Will assess response. K 5.0 this AM. Echo pending. Troponin x 3 negative.    Hospital Course:   3/4/22-Patient seen and examined today, sitting up in bedside chair. Feeling much better. Denies weakness. HR stable, SR in 70's. Labs reviewed. K stable. Mg low, being repleted. Echo reviewed, EF 75%, mild AS , mild mitral stenosis.         Review of Systems   Constitutional: Negative.   HENT: Negative.   "   Eyes: Negative.    Cardiovascular: Negative.    Respiratory: Negative.     Endocrine: Negative.    Hematologic/Lymphatic: Negative.    Skin: Negative.    Musculoskeletal: Negative.    Gastrointestinal: Negative.    Genitourinary: Negative.    Neurological: Negative.    Psychiatric/Behavioral: Negative.     Allergic/Immunologic: Negative.    Objective:     Vital Signs (Most Recent):  Temp: 98.1 °F (36.7 °C) (03/04/22 1118)  Pulse: 67 (03/04/22 1118)  Resp: 20 (03/04/22 1118)  BP: (!) 157/71 (03/04/22 1118)  SpO2: 97 % (03/04/22 1118)   Vital Signs (24h Range):  Temp:  [98.1 °F (36.7 °C)-99 °F (37.2 °C)] 98.1 °F (36.7 °C)  Pulse:  [62-79] 67  Resp:  [16-20] 20  SpO2:  [95 %-99 %] 97 %  BP: (111-157)/(58-71) 157/71     Weight: 52.3 kg (115 lb 4.8 oz)  Body mass index is 21.79 kg/m².     SpO2: 97 %  O2 Device (Oxygen Therapy): room air      Intake/Output Summary (Last 24 hours) at 3/4/2022 1440  Last data filed at 3/4/2022 1200  Gross per 24 hour   Intake 2971.28 ml   Output --   Net 2971.28 ml       Lines/Drains/Airways       None                   Physical Exam  Vitals and nursing note reviewed.   Constitutional:       General: She is not in acute distress.     Appearance: Normal appearance. She is well-developed. She is not diaphoretic.   HENT:      Head: Normocephalic and atraumatic.   Eyes:      General:         Right eye: No discharge.         Left eye: No discharge.      Pupils: Pupils are equal, round, and reactive to light.   Neck:      Thyroid: No thyromegaly.      Vascular: No JVD.      Trachea: No tracheal deviation.   Cardiovascular:      Rate and Rhythm: Normal rate and regular rhythm.      Heart sounds: Normal heart sounds, S1 normal and S2 normal. No murmur heard.  Pulmonary:      Effort: Pulmonary effort is normal. No respiratory distress.      Breath sounds: Normal breath sounds. No wheezing or rales.   Abdominal:      General: There is no distension.      Tenderness: There is no rebound.    Musculoskeletal:      Cervical back: Neck supple.      Right lower leg: No edema.      Left lower leg: No edema.   Skin:     General: Skin is warm and dry.      Findings: No erythema.   Neurological:      Mental Status: She is alert and oriented to person, place, and time.   Psychiatric:         Mood and Affect: Mood normal.         Behavior: Behavior normal.         Thought Content: Thought content normal.       Significant Labs: CMP   Recent Labs   Lab 03/03/22  0854 03/03/22  1205 03/04/22  0547   * 134* 134*   K 5.0 4.8 4.9    103 100   CO2 22* 20* 26   * 114* 103   BUN 28* 31* 27*   CREATININE 1.3 1.3 1.3   CALCIUM 8.3* 9.0 8.5*   PROT  --   --  5.6*   ALBUMIN  --   --  3.0*   BILITOT  --   --  0.4   ALKPHOS  --   --  58   AST  --   --  12   ALT  --   --  16   ANIONGAP 10 11 8   ESTGFRAFRICA 44* 44* 44*   EGFRNONAA 38* 38* 38*   , CBC   Recent Labs   Lab 03/03/22  0610 03/04/22  0547   WBC 6.05 5.05   HGB 9.8* 9.3*   HCT 29.6* 28.1*    277   , Troponin   Recent Labs   Lab 03/02/22  1756 03/03/22  0610   TROPONINI <0.006 0.012   , and All pertinent lab results from the last 24 hours have been reviewed.    Significant Imaging: Echocardiogram: Transthoracic echo (TTE) complete (Cupid Only):   Results for orders placed or performed during the hospital encounter of 03/02/22   Echo   Result Value Ref Range    BSA 1.49 m2    TDI SEPTAL 0.03 m/s    LV LATERAL E/E' RATIO 11.17 m/s    LV SEPTAL E/E' RATIO 22.33 m/s    LA WIDTH 3.70 cm    IVC diameter 1.2 cm    Left Ventricular Outflow Tract Mean Velocity 0.1194933806 cm/s    Left Ventricular Outflow Tract Mean Gradient 3.46 mmHg    TV mean gradient 26 mmHg    TDI LATERAL 0.06 m/s    LVIDd 3.79 3.5 - 6.0 cm    IVS 1.55 (A) 0.6 - 1.1 cm    Posterior Wall 1.25 (A) 0.6 - 1.1 cm    Ao root annulus 2.78 cm    LVIDs 2.37 2.1 - 4.0 cm    FS 37 28 - 44 %    LA volume 56.03 cm3    Ascending aorta 3.00 cm    LV mass 193.42 g    LA size 3.31 cm    TAPSE  1.60 cm    Left Ventricle Relative Wall Thickness 0.66 cm    AV regurgitation pressure 1/2 time 913.99917052028590 ms    AV mean gradient 6 mmHg    AV valve area 1.97 cm2    AV Velocity Ratio 0.71     AV index (prosthetic) 0.73     MV valve area p 1/2 method 2.10 cm2    E/A ratio 0.52     Mean e' 0.05 m/s    E wave deceleration time 361.546735925923290 msec    IVRT 124.616424053548673 msec    LVOT diameter 1.86 cm    LVOT area 2.7 cm2    LVOT peak matthew 1.19 m/s    LVOT peak VTI 26.10 cm    Ao peak matthew 1.67 m/s    Ao VTI 35.9 cm    RVOT peak matthew 0.74 m/s    RVOT peak VTI 16.3 cm    Mr max matthew 4.43 m/s    LVOT stroke volume 70.88 cm3    AV peak gradient 11 mmHg    PV mean gradient 1.44 mmHg    E/E' ratio 14.89 m/s    MV Peak E Matthew 0.67 m/s    AR Max Matthew 2.30 m/s    TR Max Matthew 2.81 m/s    MV stenosis pressure 1/2 time 104.649206879040764 ms    MV Peak A Matthew 1.29 m/s    LV Systolic Volume 19.50 mL    LV Systolic Volume Index 13.2 mL/m2    LV Diastolic Volume 61.68 mL    LV Diastolic Volume Index 41.68 mL/m2    LA Volume Index 37.9 mL/m2    LV Mass Index 131 g/m2    Echo EF Estimated 68 %    RA Major Axis 3.43 cm    Left Atrium Minor Axis 5.13 cm    Left Atrium Major Axis 5.66 cm    Triscuspid Valve Regurgitation Peak Gradient 32 mmHg    Right Atrial Pressure (from IVC) 3 mmHg    EF 75 %    TV rest pulmonary artery pressure 35 mmHg    Narrative    · The left ventricle is normal in size with moderate concentric   hypertrophy and hyperdynamic systolic function.  · Mild left atrial enlargement.  · The estimated ejection fraction is 75%.  · Normal right ventricular size with low normal right ventricular systolic   function.  · There is mild aortic valve stenosis.  · Aortic valve area is 1.97 cm2; peak velocity is 1.67 m/s; mean gradient   is 6 mmHg.  · Mild mitral regurgitation.  · There is mild mitral stenosis.  · Mild tricuspid regurgitation.  · Normal central venous pressure (3 mmHg).  · The estimated PA systolic pressure  is 35 mmHg.      , EKG: Reviewed, and X-Ray: CXR: X-Ray Chest 1 View (CXR):   Results for orders placed or performed during the hospital encounter of 03/02/22   X-Ray Chest 1 View    Narrative    EXAMINATION:  XR CHEST 1 VIEW    CLINICAL HISTORY:  syncope;    TECHNIQUE:  Single frontal view of the chest was performed.    COMPARISON:  Chest radiograph 02/01/2022    FINDINGS:  ECG monitoring leads overlie the chest.  There is interstitial coarsening, similar to the prior exam, suggestive of COPD.No significant pleural fluid or pneumothorax.    The cardiac silhouette is normal in size.  There is atherosclerotic calcification of the aorta.  The hilar and mediastinal contours are unremarkable.    Bones are intact.      Impression    Stable radiographic appearance of the chest without acute cardiopulmonary abnormality.      Electronically signed by: Jessa Garcia  Date:    03/02/2022  Time:    12:35    and X-Ray Chest PA and Lateral (CXR): No results found for this visit on 03/02/22.    Assessment and Plan:   Patient who presents with bradycardia, in part due to hyperkalemia and home Coreg. K normalized. BB held and HR stable. Ok to d/c home with OP Holter.     Bradycardia  -Likely in part due to electrolyte derangement/hyperkalemia, K > 7 upon admission  -BB held  -Monitor HR trend    3/4/22  -Resolved, HR stable   -Hold BB upon discharge  -OP Holter monitor    CKD (chronic kidney disease) stage 4, GFR 15-29 ml/min  -Stable, monitor  -Mgmt as per primary team      Diastolic heart failure, NYHA class 3  -Clinically compensated  -Echo pending    3/4/22  -Echo showed normal EF, mild AS, mild mitral stenosis    Essential hypertension  -Continue home meds as tolerated  -BB on hold given bradycardia        VTE Risk Mitigation (From admission, onward)    None          JARET SalasC  Cardiology  [facility]

## 2022-03-04 NOTE — ASSESSMENT & PLAN NOTE
-Clinically compensated  -Echo pending    3/4/22  -Echo showed normal EF, mild AS, mild mitral stenosis

## 2022-03-04 NOTE — PLAN OF CARE
[facility]  Discharge Final Note    Primary Care Provider: Christina Recio MD    Expected Discharge Date: 3/4/2022    Final Discharge Note (most recent)     Final Note - 03/04/22 1226        Final Note    Assessment Type Final Discharge Note     Anticipated Discharge Disposition Home-Health Care [facility]     Hospital Resources/Appts/Education Provided Provided patient/caregiver with written discharge plan information;Appointments scheduled and added to AVS        Post-Acute Status    Discharge Delays None known at this time                 Important Message from Medicare             Contact Info     Christina Recio MD   Specialty: Internal Medicine   Relationship: PCP - General    [address] [facility] [address]   Phone: [phone]       Next Steps: Follow up in 1 week(s)    Stan Lui MD   Specialty: Interventional Cardiology, Cardiology    [address]  [place] [address]   Phone: [phone]       Next Steps: Follow up on 3/15/2022    Instructions: As Scheduled        Patient to dc home with [facility]. Fu appt scheduled and added to AVS. No other cm needs.

## 2022-03-04 NOTE — PROGRESS NOTES
Subjective:       Patient ID: Glo Dumont is a 86 y.o. female.    Chief Complaint   Patient presents with    Follow-up       HPI    Glo Dumont is here today for a hospital follow-up.  The patient is brought to the clinic by her family member.  I have reviewed the patient's medical history in detail and updated the computerized patient record.    Facility: [facility]  DOA:  3/2/2022  DOD:  3/4/2022    Hospital note as below:        HPI:   Glo Dumont is a 86 y.o. female patient with a PMHx of CKD, CAD, Diastolic CHF, Anxiety, Anemia, Hypercholesteremia, and HTN who presents to the Emergency Department for evaluation of syncope which onset gradually this morning. Associated symptoms include dizziness and weakness. Patient denies head injury but states that she fell onto her right arm and elbow this morning. Symptoms are constant and moderate in severity. No mitigating or exacerbating factors reported. Associated sxs include dizziness and weakness. Patient denies any fever, SOB, chills, CP, diarrhea, nausea, and all other symptoms at this time. No further complaints or concerns at this time. Pt denies smoking and use of ETOH.  Pt reports medication compliance but did not take medications today.  Pt is a DNR and Dawna Toribio (daughter) at [phone] is the surrogate decision maker. ER work up showed: ER work up showed: H/H 9.7/31.2, Na 133, K 7.5, CO2 15, BUN 34, Creatinine 1.6, and CPK 17. Xray of elbow and R shoulder unremarkable. Chest xray showed no acute abnormality.  CT of head showed age-related atrophy.  Small chronic right frontal lobe encephalomalacia.  Chronic left maxillary sinusitis.  No acute findings. Hospital Medicine contacted for admission with patient placed in Observation for further evaluation.       Hospital Course:   3/3/22 No acute events overnight. K+ improved to 5.0 this morning after lokelma. Troponin negative times 3. Carotis US negative. ECHO is pending. Orthostatic BP negative.  HR improved, BB D/C'd. The case was discussed with Cardiology who recommended continued monitoring today and if lytes remain good D/C home tomorrow.      03/04/2022   NAEON. Labs and Vitals reviewed.   Patient was placed into observation for the evaluation of syncope. Patient was noted to be bradycardic with KEEGAN, Creat 1.6, and metabolic acidotic on arrival. Betablocker and amlodipine were stopped-BP treated with hydralazine with good results. ECHO with EF 75%, mild AS , mild mitral stenosis, carotid ultrasound, and orthostatics were negative. Potassium was 7.5 on arrival and has trended down with lokelma to 4.9 this morning. Patient was taken off of spironolactone in December but has continued to take potassium supplementation which is likely cause of hyperkalemia. Patient was treated with bicarb drip with correction of met acidosis. Patient is doing well without complaints and is stable for discharge. Patient to follow up with Sania, cardiology, outpatient as scheduled and advised to schedule a f/u with PCP Dulce next week. Stopped amlodipine, BB, spironolactone, and potassium. Continue effient. Rx sent for hydralazine for BP management.          Review of Systems   Constitutional: Positive for activity change (decreased some) and fatigue. Negative for appetite change, chills, diaphoresis and fever.   HENT: Negative.    Eyes: Negative for photophobia, pain and redness.   Respiratory: Negative.    Cardiovascular: Negative.    Gastrointestinal: Negative.    Genitourinary: Negative for dysuria, flank pain, frequency and hematuria.   Musculoskeletal: Positive for arthralgias and gait problem. Negative for joint swelling.   Neurological: Positive for syncope and weakness. Negative for tremors, light-headedness and headaches.   Psychiatric/Behavioral: Negative for sleep disturbance.          Review of patient's allergies indicates:   Allergen Reactions    Lisinopril      angioedema    Codeine Nausea And Vomiting          Patient Active Problem List   Diagnosis    Sarcoidosis of lung    Thyroid nodule    Hypertensive heart disease with heart failure    Other hyperlipidemia    Hemoglobin A-S genotype    Ptosis    Coronary artery disease, occlusive    History of central retinal artery occlusion    Iron deficiency anemia    Vitamin D deficiency    Osteopenia    Essential hypertension    Diastolic heart failure, NYHA class 3    Atherosclerosis of aorta    CKD (chronic kidney disease) stage 4, GFR 15-29 ml/min    Uveitis    History of coronary angioplasty    Hiatal hernia    Lung nodule    Memory loss    Central retinal vein occlusion with macular edema of both eyes    Aortic stenosis         No current facility-administered medications for this visit.    Current Outpatient Medications:     hydrALAZINE (APRESOLINE) 25 MG tablet, Take 1 tablet (25 mg total) by mouth every 8 (eight) hours., Disp: 90 tablet, Rfl: 0    Facility-Administered Medications Ordered in Other Visits:     acetaminophen tablet 650 mg, 650 mg, Oral, Q4H PRN, Melissa Somers NP    ALPRAZolam tablet 0.25 mg, 0.25 mg, Oral, BID PRN, Melissa Somers NP, 0.25 mg at 03/02/22 2041    aspirin EC tablet 81 mg, 81 mg, Oral, Daily, Melissa Somers NP, 81 mg at 03/04/22 1010    citalopram tablet 20 mg, 20 mg, Oral, Daily, Melissa Somers NP, 20 mg at 03/04/22 1010    hydrALAZINE injection 10 mg, 10 mg, Intravenous, Q8H PRN, Melissa Somers NP, 10 mg at 03/03/22 0949    hydrALAZINE tablet 25 mg, 25 mg, Oral, Q8H, Kingston Hernandez MD, 25 mg at 03/04/22 0518    HYDROcodone-acetaminophen 5-325 mg per tablet 1 tablet, 1 tablet, Oral, Q6H PRN, Fabby Heck NP, 1 tablet at 03/02/22 2041    ondansetron injection 4 mg, 4 mg, Intravenous, Q8H PRN, Melissa Somers NP    pantoprazole EC tablet 40 mg, 40 mg, Oral, Daily, Melissa Somers NP, 40 mg at 03/04/22 1010    pravastatin tablet 40 mg, 40 mg, Oral, QHS, Melissa Somers NP, 40 mg at 03/03/22 2139    sodium chloride  0.9% flush 10 mL, 10 mL, Intravenous, PRN, Melissa Somers NP      Past Medical History:   Diagnosis Date    Anemia     Angina pectoris     Anxiety     Anxiety and depression     Arthritis     hip    Carotid artery occlusion     Carpal tunnel syndrome 06/23/2008    emg    Chronic diarrhea     work up in 2011 with EGD, CS and VCE    CKD (chronic kidney disease) stage 3, GFR 30-59 ml/min 5/11/2017    Colitis     Coronary artery disease     Coronary artery disease     Diastolic dysfunction     Diverticulosis     Glaucoma     Greater trochanteric bursitis 2/10/2015    Grief at loss of child 1/26/2016    H/O carotid endarterectomy 12/2/2013    Heart failure     History of coronary angioplasty 3/11/2014    Hypercholesteremia     Hypertension     Liver cyst 02/08/2013    ct abd    Macular degeneration     Primary open-angle glaucoma(365.11) 9/3/2013    Renal cyst 02/08/2013    ct abd    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    Sarcoidosis     Sarcoidosis of lung     Sickle cell trait     Uveitis          Past Surgical History:   Procedure Laterality Date    CAROTID ENDARTERECTOMY Right 2000s    CATARACT EXTRACTION Bilateral     Dr. Liang Dennis    CHOLECYSTECTOMY      laparoscopic, 3/18.    CORONARY ANGIOPLASTY WITH STENT PLACEMENT  11/19/2010    RCA-HALIE 2010    Lathia    JOINT REPLACEMENT Left 11/03/2014    Dr. Braun    TOTAL ABDOMINAL HYSTERECTOMY W/ BILATERAL SALPINGOOPHORECTOMY  1972         Family History   Problem Relation Age of Onset    Hypertension Mother     Heart failure Mother     Cancer Father         prostate    Cirrhosis Brother     Heart failure Brother     Cancer Daughter         Carcinoid         Social History     Tobacco Use    Smoking status: Never Smoker    Smokeless tobacco: Never Used   Substance Use Topics    Alcohol use: No     Alcohol/week: 0.0 standard drinks    Drug use: No         Objective:     Vitals:    03/08/22 1421   BP: (!)  "166/76   BP Location: Right arm   Patient Position: Sitting   BP Method: Medium (Manual)   Pulse: 75   Temp: 97.6 °F (36.4 °C)   SpO2: 98%   Weight: 49.3 kg (108 lb 11 oz)   Height: 5' 1" (1.549 m)         Physical Exam  Constitutional:       General: She is not in acute distress.  HENT:      Head: Normocephalic and atraumatic.   Cardiovascular:      Rate and Rhythm: Normal rate and regular rhythm.   Pulmonary:      Effort: Pulmonary effort is normal.      Breath sounds: Normal breath sounds.   Musculoskeletal:         General: Normal range of motion.      Right lower leg: No edema.      Left lower leg: No edema.   Neurological:      Mental Status: She is alert and oriented to person, place, and time.      Motor: Weakness present.      Gait: Gait abnormal.   Psychiatric:         Mood and Affect: Mood normal.         Behavior: Behavior normal.         Thought Content: Thought content normal.         Judgment: Judgment normal.           Diagnosis     1. Hospital discharge follow-up    2. Syncope, unspecified syncope type    3. Coronary artery disease, occlusive    4. Angina, class II    5. Hyperkalemia    6. KEEGAN (acute kidney injury)    7. Essential hypertension        Assessment/ Plan     1. Hospital discharge follow-up  - Comprehensive Metabolic Panel; Future    2. Syncope, unspecified syncope type    3. Coronary artery disease, occlusive --- stable, med ref.  - nitroGLYCERIN (NITROSTAT) 0.4 MG SL tablet; Place 1 tablet (0.4 mg total) under the tongue every 5 (five) minutes as needed for Chest pain.  Dispense: 25 tablet; Refill: 0    4. Angina, class II --- # 3  - nitroGLYCERIN (NITROSTAT) 0.4 MG SL tablet; Place 1 tablet (0.4 mg total) under the tongue every 5 (five) minutes as needed for Chest pain.  Dispense: 25 tablet; Refill: 0    5. Hyperkalemia  - Comprehensive Metabolic Panel; Future    6. KEEGAN (acute kidney injury)  - Comprehensive Metabolic Panel; Future    7. Essential hypertension --- uncontrolled today " in office, home monitoring.  Will see Card on 3/15 for f/u.       FOLLOW-UP:  Lab pending.  Return prn.      Patient Care Team:  Christina Cardona MD as PCP - General (Internal Medicine)  Gordo Muir MD as Consulting Physician (Pulmonary Disease)  Nik Bergman MD (Inactive) as Consulting Physician (Cardiology)  Franky Bell Jr., MD (Inactive) (Rheumatology)  Julio Galindo MD as Consulting Physician (Gastroenterology)  Glenis Mcfadden MD as Consulting Physician (Otolaryngology)  Ayaz Estrada MD as Consulting Physician (Hematology and Oncology)  Yomi Guy LPN as Care Coordinator      Sudeep Wick NICOLE  [facility] Future Appointments   Date Time Provider Department Center   3/15/2022  9:20 AM Stan Lui MD [facility] CARDIO [facility]   3/16/2022  7:45 AM NAHUM Tamez MD HG OPHTHAL [facility]   4/12/2022  9:15 AM Sergio Lozada MD HG OPHTHAL [facility]   5/4/2022  7:50 AM LABORATORY, H[facility] LAB [facility]   5/10/2022  9:00 AM Kristel Tim MD [facility] CARDIO [facility]   5/16/2022  2:20 PM Christina Cardona MD [facility]

## 2022-03-04 NOTE — ASSESSMENT & PLAN NOTE
-Cr 1.6  -will monitor   -repeat BMP in am     03/04/2022  Kidney function at baseline  Mag 1.5 will replete  Stop potassium supplements

## 2022-03-04 NOTE — ASSESSMENT & PLAN NOTE
-Lopressor held   -Cardiology consulted   -will monitor   3/3/22 HR improved off of BB. Continue to monitor. Cardiology following     03/04/2022  HR 76 and NSR today  ECHO good  Patient to f/u outpatient with Cards as scheduled  Continue hydralazine  Stopped norvasc and amlodipine

## 2022-03-04 NOTE — HOSPITAL COURSE
3/4/22-Patient seen and examined today, sitting up in bedside chair. Feeling much better. Denies weakness. HR stable, SR in 70's. Labs reviewed. K stable. Mg low, being repleted. Echo reviewed, EF 75%, mild AS , mild mitral stenosis.

## 2022-03-04 NOTE — DISCHARGE SUMMARY
[facility] Medicine  Discharge Summary      Patient Name: Glo Dumont  MRN: [id number]  Patient Class: OP- Observation  Admission Date: 3/2/2022  Hospital Length of Stay: 0 days  Discharge Date and Time:  03/04/2022 2:57 PM  Attending Physician: No att. providers found   Discharging Provider: Jia Galeano NP  Primary Care Provider: Christina Recio MD      HPI:   Glo Dumont is a 86 y.o. female patient with a PMHx of CKD, CAD, Diastolic CHF, Anxiety, Anemia, Hypercholesteremia, and HTN who presents to the Emergency Department for evaluation of syncope which onset gradually this morning. Associated symptoms include dizziness and weakness. Patient denies head injury but states that she fell onto her right arm and elbow this morning. Symptoms are constant and moderate in severity. No mitigating or exacerbating factors reported. Associated sxs include dizziness and weakness. Patient denies any fever, SOB, chills, CP, diarrhea, nausea, and all other symptoms at this time. No further complaints or concerns at this time. Pt denies smoking and use of ETOH.  Pt reports medication compliance but did not take medications today.  Pt is a DNR and Dawna Toribio (daughter) at [phone] is the surrogate decision maker. ER work up showed: ER work up showed: H/H 9.7/31.2, Na 133, K 7.5, CO2 15, BUN 34, Creatinine 1.6, and CPK 17. Xray of elbow and R shoulder unremarkable. Chest xray showed no acute abnormality.  CT of head showed age-related atrophy.  Small chronic right frontal lobe encephalomalacia.  Chronic left maxillary sinusitis.  No acute findings. Hospital Medicine contacted for admission with patient placed in Observation for further evaluation.       * No surgery found *      Hospital Course:   3/3/22 No acute events overnight. K+ improved to 5.0 this morning after lokelma. Troponin negative times 3. Carotis US negative. ECHO is pending. Orthostatic BP negative. HR improved, BB D/C'd. The case  was discussed with Cardiology who recommended continued monitoring today and if lytes remain good D/C home tomorrow.     03/04/2022    NAEON. Labs and Vitals reviewed.   Patient was placed into observation for the evaluation of syncope. Patient was noted to be bradycardic with KEEGAN, Creat 1.6, and metabolic acidotic on arrival. Betablocker and amlodipine were stopped-BP treated with hydralazine with good results. ECHO with EF 75%, mild AS , mild mitral stenosis, carotid ultrasound, and orthostatics were negative. Potassium was 7.5 on arrival and has trended down with lokelma to 4.9 this morning. Patient was taken off of spironolactone in December but has continued to take potassium supplementation which is likely cause of hyperkalemia. Patient was treated with bicarb drip with correction of met acidosis. Patient is doing well without complaints and is stable for discharge. Patient to follow up with Sania, cardiology, outpatient as scheduled and advised to schedule a f/u with PCP Dulce next week. Stopped amlodipine, BB, spironolactone, and potassium. Continue effient. Rx sent for hydralazine for BP management.       Goals of Care Treatment Preferences:  Code Status: DNR      Consults:   Consults (From admission, onward)        Status Ordering Provider     Inpatient consult to Cardiology  Once        Provider:  Kingston Hernandez MD    Completed ZAK FISHER          Bradycardia  -Lopressor held   -Cardiology consulted   -will monitor   3/3/22 HR improved off of BB. Continue to monitor. Cardiology following     03/04/2022  HR 76 and NSR today  ECHO good  Patient to f/u outpatient with Cards as scheduled  Continue hydralazine  Stopped norvasc and amlodipine    CKD (chronic kidney disease) stage 4, GFR 15-29 ml/min  -Cr 1.6  -will monitor   -repeat BMP in am     03/04/2022  Kidney function at baseline  Mag 1.5 will replete  Stop potassium supplements    Diastolic heart failure, NYHA class 3  -appears controlled  -  previous Echo in 5/2021-Concentric hypertrophy and normal systolic function.  · Mild left atrial enlargement.  · With normal right ventricular systolic function.  · The estimated ejection fraction is 60%.  Grade I left ventricular diastolic dysfunction.  -repeat Echo pending   -initial troponin negative   03/04/2022    Latest ECHO performed and demonstrates- Results for orders placed during the hospital encounter of 03/02/22    Echo    Interpretation Summary  · The left ventricle is normal in size with moderate concentric hypertrophy and hyperdynamic systolic function.  · Mild left atrial enlargement.  · The estimated ejection fraction is 75%.  · Normal right ventricular size with low normal right ventricular systolic function.  · There is mild aortic valve stenosis.  · Aortic valve area is 1.97 cm2; peak velocity is 1.67 m/s; mean gradient is 6 mmHg.  · Mild mitral regurgitation.  · There is mild mitral stenosis.  · Mild tricuspid regurgitation.  · Normal central venous pressure (3 mmHg).  · The estimated PA systolic pressure is 35 mmHg.    Troponin trended flat  Outpatient f/u with cards  Continue effient  Stop norvasc and BB  Hydralazine for HTN        Essential hypertension  -BP stable and soft   -Coreg continued   -Norvasc held   -will resume home medications when appropriate    03/04/2022  Hydralazine TID at home  Stop BB and norvasc for now- outpt f/u with PCP and cardiology as scheduled  Continue effient  03/04/2022  Continue hydralazine at home  Stop BB and norvasc  Discussed making sure aldactone and potassium are also discontinued from medications with daughter        Final Active Diagnoses:    Diagnosis Date Noted POA    Bradycardia [R00.1] 08/17/2020 Yes    CKD (chronic kidney disease) stage 4, GFR 15-29 ml/min [N18.4] 05/11/2017 Yes    Diastolic heart failure, NYHA class 3 [I50.30] 02/01/2016 Yes    Essential hypertension [I10] 02/01/2016 Yes     Chronic      Problems Resolved During this  Admission:    Diagnosis Date Noted Date Resolved POA    PRINCIPAL PROBLEM:  Syncope [R55] 12/30/2013 03/04/2022 Yes    Hyperkalemia [E87.5] 03/02/2022 03/04/2022 Yes    Metabolic acidosis [E87.2] 03/02/2022 03/04/2022 Yes    Anxiety and depression [F41.9, F32.A] 03/07/2017 03/04/2022 Yes       Discharged Condition: good    Disposition: Home or Self Care    Follow Up:   Follow-up Information     Christina Recio MD Follow up in 1 week(s).    Specialty: Internal Medicine  Contact information:  [address]  [place] [address]  [phone]             Stan Lui MD Follow up on 3/15/2022.    Specialties: Interventional Cardiology, Cardiology  Why: As Scheduled  Contact information:  [address]  [place] [address]  [phone]                       Patient Instructions:      Ambulatory referral/consult to [facility]   Standing Status: Future   Referral Priority: Routine Referral Type: Consultation   Referral Reason: Specialty Services Required   Number of Visits Requested: 1     Ambulatory referral/consult to Home Health   Standing Status: Future   Referral Priority: Routine Referral Type: Home Health   Referral Reason: Specialty Services Required   Requested Specialty: Home Health Services   Number of Visits Requested: 1       Significant Diagnostic Studies: Labs:   CMP   Recent Labs   Lab 03/03/22  0854 03/03/22  1205 03/04/22  0547   * 134* 134*   K 5.0 4.8 4.9    103 100   CO2 22* 20* 26   * 114* 103   BUN 28* 31* 27*   CREATININE 1.3 1.3 1.3   CALCIUM 8.3* 9.0 8.5*   PROT  --   --  5.6*   ALBUMIN  --   --  3.0*   BILITOT  --   --  0.4   ALKPHOS  --   --  58   AST  --   --  12   ALT  --   --  16   ANIONGAP 10 11 8   ESTGFRAFRICA 44* 44* 44*   EGFRNONAA 38* 38* 38*   , CBC   Recent Labs   Lab 03/03/22  0610 03/04/22  0547   WBC 6.05 5.05   HGB 9.8* 9.3*   HCT 29.6* 28.1*    277    and All labs within the past 24 hours have  been reviewed    Pending Diagnostic Studies:     None         Medications:  Reconciled Home Medications:      Medication List      START taking these medications    hydrALAZINE 25 MG tablet  Commonly known as: APRESOLINE  Take 1 tablet (25 mg total) by mouth every 8 (eight) hours.        CONTINUE taking these medications    ALPRAZolam 0.25 MG tablet  Commonly known as: XANAX  Take 0.5-1 tablets (0.125-0.25 mg total) by mouth 3 (three) times daily as needed for Anxiety.     aspirin 81 MG EC tablet  Commonly known as: ECOTRIN  Take 81 mg by mouth once daily.     budesonide 3 mg capsule  Commonly known as: ENTOCORT EC  Take 9 mg by mouth once daily.     calcium carbonate 600 mg calcium (1,500 mg) Tab  Commonly known as: OS-LYNN  Take by mouth.     CENTRUM SILVER ORAL  Take by mouth.     citalopram 20 MG tablet  Commonly known as: CeleXA  Take 1 tablet (20 mg total) by mouth once daily.     cyproheptadine 4 mg tablet  Commonly known as: PERIACTIN  Take 1 tablet (4 mg total) by mouth 3 (three) times daily as needed.     dorzolamide-timolol 2-0.5% 22.3-6.8 mg/mL ophthalmic solution  Commonly known as: COSOPT  Place 1 drop into both eyes 2 (two) times daily.     EYLEA 2 mg/0.05 mL Soln  Generic drug: aflibercept     FLUoxetine 10 MG capsule  1 capsule in the morning     gabapentin 300 MG capsule  Commonly known as: NEURONTIN  Take 1 capsule (300 mg total) by mouth once daily.     iron-vitamin C 100-250 mg (ICAR-C) 100-250 mg Tab  Commonly known as: ICAR-C  Take 1 tablet by mouth once daily.     ketorolac 0.5% 0.5 % Drop  Commonly known as: ACULAR     nitroGLYCERIN 0.4 MG SL tablet  Commonly known as: NITROSTAT  Place 1 tablet (0.4 mg total) under the tongue every 5 (five) minutes as needed for Chest pain.     nystatin 100,000 unit/mL suspension  Commonly known as: MYCOSTATIN  Take by mouth.     OZURDEX 0.7 mg Impl intravitreal implant  Generic drug: dexAMETHasone     pantoprazole 40 MG tablet  Commonly known as:  PROTONIX  Take 1 tablet (40 mg total) by mouth once daily.     prasugreL 10 mg Tab  Commonly known as: EFFIENT  Take 1 tablet (10 mg total) by mouth once daily.     pravastatin 40 MG tablet  Commonly known as: PRAVACHOL  TAKE 1 TABLET BY MOUTH DAILY        STOP taking these medications    amLODIPine 5 MG tablet  Commonly known as: NORVASC     carvediloL 12.5 MG tablet  Commonly known as: COREG     potassium chloride SA 20 MEQ tablet  Commonly known as: K-DUR,KLOR-CON     spironolactone 25 MG tablet  Commonly known as: ALDACTONE            Indwelling Lines/Drains at time of discharge:   Lines/Drains/Airways     None                 Time spent on the discharge of patient: >35 minutes         Jia Galeano NP  Department of Hospital Medicine  [facility]

## 2022-03-04 NOTE — ASSESSMENT & PLAN NOTE
-appears controlled  - previous Echo in 5/2021-Concentric hypertrophy and normal systolic function.  · Mild left atrial enlargement.  · With normal right ventricular systolic function.  · The estimated ejection fraction is 60%.  Grade I left ventricular diastolic dysfunction.  -repeat Echo pending   -initial troponin negative   03/04/2022    Latest ECHO performed and demonstrates- Results for orders placed during the hospital encounter of 03/02/22    Echo    Interpretation Summary  · The left ventricle is normal in size with moderate concentric hypertrophy and hyperdynamic systolic function.  · Mild left atrial enlargement.  · The estimated ejection fraction is 75%.  · Normal right ventricular size with low normal right ventricular systolic function.  · There is mild aortic valve stenosis.  · Aortic valve area is 1.97 cm2; peak velocity is 1.67 m/s; mean gradient is 6 mmHg.  · Mild mitral regurgitation.  · There is mild mitral stenosis.  · Mild tricuspid regurgitation.  · Normal central venous pressure (3 mmHg).  · The estimated PA systolic pressure is 35 mmHg.    Troponin trended flat  Outpatient f/u with cards  Continue effient  Stop norvasc and BB  Hydralazine for HTN

## 2022-03-04 NOTE — TELEPHONE ENCOUNTER
Left message with patient regarding scheduling appt.     Please arrange hospital f/u with Dr. Tim. Needs 48 hour holter applied same day.     Thanks

## 2022-03-08 ENCOUNTER — LAB VISIT (OUTPATIENT)
Dept: LAB | Facility: HOSPITAL | Age: 87
End: 2022-03-08
Attending: REGISTERED NURSE
Payer: MEDICARE

## 2022-03-08 ENCOUNTER — OFFICE VISIT (OUTPATIENT)
Dept: FAMILY MEDICINE | Facility: CLINIC | Age: 87
End: 2022-03-08
Payer: MEDICARE

## 2022-03-08 VITALS
SYSTOLIC BLOOD PRESSURE: 166 MMHG | HEART RATE: 75 BPM | BODY MASS INDEX: 20.52 KG/M2 | TEMPERATURE: 98 F | DIASTOLIC BLOOD PRESSURE: 76 MMHG | WEIGHT: 108.69 LBS | HEIGHT: 61 IN | OXYGEN SATURATION: 98 %

## 2022-03-08 DIAGNOSIS — I20.9 ANGINA, CLASS II: Chronic | ICD-10-CM

## 2022-03-08 DIAGNOSIS — N17.9 AKI (ACUTE KIDNEY INJURY): ICD-10-CM

## 2022-03-08 DIAGNOSIS — I25.10 CORONARY ARTERY DISEASE, OCCLUSIVE: Chronic | ICD-10-CM

## 2022-03-08 DIAGNOSIS — I10 ESSENTIAL HYPERTENSION: Chronic | ICD-10-CM

## 2022-03-08 DIAGNOSIS — R55 SYNCOPE, UNSPECIFIED SYNCOPE TYPE: ICD-10-CM

## 2022-03-08 DIAGNOSIS — E87.5 HYPERKALEMIA: ICD-10-CM

## 2022-03-08 DIAGNOSIS — Z09 HOSPITAL DISCHARGE FOLLOW-UP: Primary | ICD-10-CM

## 2022-03-08 DIAGNOSIS — Z09 HOSPITAL DISCHARGE FOLLOW-UP: ICD-10-CM

## 2022-03-08 LAB
ALBUMIN SERPL BCP-MCNC: 3.6 G/DL (ref 3.5–5.2)
ALP SERPL-CCNC: 64 U/L (ref 55–135)
ALT SERPL W/O P-5'-P-CCNC: 12 U/L (ref 10–44)
ANION GAP SERPL CALC-SCNC: 12 MMOL/L (ref 8–16)
AST SERPL-CCNC: 13 U/L (ref 10–40)
BILIRUB SERPL-MCNC: 0.4 MG/DL (ref 0.1–1)
BUN SERPL-MCNC: 24 MG/DL (ref 8–23)
CALCIUM SERPL-MCNC: 10.4 MG/DL (ref 8.7–10.5)
CHLORIDE SERPL-SCNC: 106 MMOL/L (ref 95–110)
CO2 SERPL-SCNC: 20 MMOL/L (ref 23–29)
CREAT SERPL-MCNC: 1.7 MG/DL (ref 0.5–1.4)
EST. GFR  (AFRICAN AMERICAN): 31.5 ML/MIN/1.73 M^2
EST. GFR  (NON AFRICAN AMERICAN): 27.3 ML/MIN/1.73 M^2
GLUCOSE SERPL-MCNC: 103 MG/DL (ref 70–110)
POTASSIUM SERPL-SCNC: 4.5 MMOL/L (ref 3.5–5.1)
PROT SERPL-MCNC: 6.8 G/DL (ref 6–8.4)
SODIUM SERPL-SCNC: 138 MMOL/L (ref 136–145)

## 2022-03-08 PROCEDURE — 3078F DIAST BP <80 MM HG: CPT | Mod: CPTII,S$GLB,, | Performed by: REGISTERED NURSE

## 2022-03-08 PROCEDURE — 99214 OFFICE O/P EST MOD 30 MIN: CPT | Mod: S$GLB,,, | Performed by: REGISTERED NURSE

## 2022-03-08 PROCEDURE — 99999 PR PBB SHADOW E&M-EST. PATIENT-LVL IV: ICD-10-PCS | Mod: PBBFAC,,, | Performed by: REGISTERED NURSE

## 2022-03-08 PROCEDURE — 99214 PR OFFICE/OUTPT VISIT, EST, LEVL IV, 30-39 MIN: ICD-10-PCS | Mod: S$GLB,,, | Performed by: REGISTERED NURSE

## 2022-03-08 PROCEDURE — 80053 COMPREHEN METABOLIC PANEL: CPT | Performed by: REGISTERED NURSE

## 2022-03-08 PROCEDURE — 3288F FALL RISK ASSESSMENT DOCD: CPT | Mod: CPTII,S$GLB,, | Performed by: REGISTERED NURSE

## 2022-03-08 PROCEDURE — 1126F AMNT PAIN NOTED NONE PRSNT: CPT | Mod: CPTII,S$GLB,, | Performed by: REGISTERED NURSE

## 2022-03-08 PROCEDURE — 3078F PR MOST RECENT DIASTOLIC BLOOD PRESSURE < 80 MM HG: ICD-10-PCS | Mod: CPTII,S$GLB,, | Performed by: REGISTERED NURSE

## 2022-03-08 PROCEDURE — 1126F PR PAIN SEVERITY QUANTIFIED, NO PAIN PRESENT: ICD-10-PCS | Mod: CPTII,S$GLB,, | Performed by: REGISTERED NURSE

## 2022-03-08 PROCEDURE — 3077F SYST BP >= 140 MM HG: CPT | Mod: CPTII,S$GLB,, | Performed by: REGISTERED NURSE

## 2022-03-08 PROCEDURE — 3077F PR MOST RECENT SYSTOLIC BLOOD PRESSURE >= 140 MM HG: ICD-10-PCS | Mod: CPTII,S$GLB,, | Performed by: REGISTERED NURSE

## 2022-03-08 PROCEDURE — 99499 RISK ADDL DX/OHS AUDIT: ICD-10-PCS | Mod: HCNC,S$GLB,, | Performed by: REGISTERED NURSE

## 2022-03-08 PROCEDURE — 3288F PR FALLS RISK ASSESSMENT DOCUMENTED: ICD-10-PCS | Mod: CPTII,S$GLB,, | Performed by: REGISTERED NURSE

## 2022-03-08 PROCEDURE — 99999 PR PBB SHADOW E&M-EST. PATIENT-LVL IV: CPT | Mod: PBBFAC,,, | Performed by: REGISTERED NURSE

## 2022-03-08 PROCEDURE — 36415 COLL VENOUS BLD VENIPUNCTURE: CPT | Mod: HCNC,PO | Performed by: REGISTERED NURSE

## 2022-03-08 PROCEDURE — 1157F ADVNC CARE PLAN IN RCRD: CPT | Mod: CPTII,S$GLB,, | Performed by: REGISTERED NURSE

## 2022-03-08 PROCEDURE — 1100F PTFALLS ASSESS-DOCD GE2>/YR: CPT | Mod: CPTII,S$GLB,, | Performed by: REGISTERED NURSE

## 2022-03-08 PROCEDURE — 1100F PR PT FALLS ASSESS DOC 2+ FALLS/FALL W/INJURY/YR: ICD-10-PCS | Mod: CPTII,S$GLB,, | Performed by: REGISTERED NURSE

## 2022-03-08 PROCEDURE — 99499 UNLISTED E&M SERVICE: CPT | Mod: HCNC,S$GLB,, | Performed by: REGISTERED NURSE

## 2022-03-08 PROCEDURE — 1157F PR ADVANCE CARE PLAN OR EQUIV PRESENT IN MEDICAL RECORD: ICD-10-PCS | Mod: CPTII,S$GLB,, | Performed by: REGISTERED NURSE

## 2022-03-08 RX ORDER — NITROGLYCERIN 0.4 MG/1
0.4 TABLET SUBLINGUAL EVERY 5 MIN PRN
Qty: 25 TABLET | Refills: 0 | Status: SHIPPED | OUTPATIENT
Start: 2022-03-08 | End: 2022-12-30 | Stop reason: SDUPTHER

## 2022-03-09 ENCOUNTER — PES CALL (OUTPATIENT)
Dept: ADMINISTRATIVE | Facility: CLINIC | Age: 87
End: 2022-03-09
Payer: MEDICARE

## 2022-03-10 ENCOUNTER — PES CALL (OUTPATIENT)
Dept: ADMINISTRATIVE | Facility: CLINIC | Age: 87
End: 2022-03-10
Payer: MEDICARE

## 2022-03-11 ENCOUNTER — PES CALL (OUTPATIENT)
Dept: ADMINISTRATIVE | Facility: CLINIC | Age: 87
End: 2022-03-11
Payer: MEDICARE

## 2022-03-15 ENCOUNTER — OFFICE VISIT (OUTPATIENT)
Dept: CARDIOLOGY | Facility: CLINIC | Age: 87
End: 2022-03-15
Payer: MEDICARE

## 2022-03-15 VITALS
OXYGEN SATURATION: 99 % | SYSTOLIC BLOOD PRESSURE: 138 MMHG | DIASTOLIC BLOOD PRESSURE: 74 MMHG | HEART RATE: 76 BPM | WEIGHT: 109.38 LBS | BODY MASS INDEX: 20.66 KG/M2

## 2022-03-15 DIAGNOSIS — I25.10 CORONARY ARTERY DISEASE, OCCLUSIVE: ICD-10-CM

## 2022-03-15 DIAGNOSIS — I50.30 DIASTOLIC HEART FAILURE, NYHA CLASS 3: ICD-10-CM

## 2022-03-15 DIAGNOSIS — I11.0 HYPERTENSIVE HEART DISEASE WITH HEART FAILURE: ICD-10-CM

## 2022-03-15 DIAGNOSIS — D57.3 HEMOGLOBIN A-S GENOTYPE: ICD-10-CM

## 2022-03-15 DIAGNOSIS — Z98.61 HISTORY OF CORONARY ANGIOPLASTY: ICD-10-CM

## 2022-03-15 DIAGNOSIS — Z95.5 H/O HEART ARTERY STENT: ICD-10-CM

## 2022-03-15 DIAGNOSIS — D50.8 OTHER IRON DEFICIENCY ANEMIA: ICD-10-CM

## 2022-03-15 DIAGNOSIS — R00.1 BRADYCARDIA: ICD-10-CM

## 2022-03-15 DIAGNOSIS — I70.0 ATHEROSCLEROSIS OF AORTA: ICD-10-CM

## 2022-03-15 DIAGNOSIS — I35.0 AORTIC VALVE STENOSIS, ETIOLOGY OF CARDIAC VALVE DISEASE UNSPECIFIED: ICD-10-CM

## 2022-03-15 DIAGNOSIS — I25.10 CORONARY ARTERY DISEASE INVOLVING NATIVE CORONARY ARTERY, UNSPECIFIED WHETHER ANGINA PRESENT, UNSPECIFIED WHETHER NATIVE OR TRANSPLANTED HEART: Primary | ICD-10-CM

## 2022-03-15 DIAGNOSIS — D86.0 SARCOIDOSIS OF LUNG: ICD-10-CM

## 2022-03-15 PROCEDURE — 3075F SYST BP GE 130 - 139MM HG: CPT | Mod: CPTII,S$GLB,, | Performed by: INTERNAL MEDICINE

## 2022-03-15 PROCEDURE — 99214 PR OFFICE/OUTPT VISIT, EST, LEVL IV, 30-39 MIN: ICD-10-PCS | Mod: S$GLB,,, | Performed by: INTERNAL MEDICINE

## 2022-03-15 PROCEDURE — 1101F PT FALLS ASSESS-DOCD LE1/YR: CPT | Mod: CPTII,S$GLB,, | Performed by: INTERNAL MEDICINE

## 2022-03-15 PROCEDURE — 1126F AMNT PAIN NOTED NONE PRSNT: CPT | Mod: CPTII,S$GLB,, | Performed by: INTERNAL MEDICINE

## 2022-03-15 PROCEDURE — 1159F MED LIST DOCD IN RCRD: CPT | Mod: CPTII,S$GLB,, | Performed by: INTERNAL MEDICINE

## 2022-03-15 PROCEDURE — 3078F PR MOST RECENT DIASTOLIC BLOOD PRESSURE < 80 MM HG: ICD-10-PCS | Mod: CPTII,S$GLB,, | Performed by: INTERNAL MEDICINE

## 2022-03-15 PROCEDURE — 1101F PR PT FALLS ASSESS DOC 0-1 FALLS W/OUT INJ PAST YR: ICD-10-PCS | Mod: CPTII,S$GLB,, | Performed by: INTERNAL MEDICINE

## 2022-03-15 PROCEDURE — 99214 OFFICE O/P EST MOD 30 MIN: CPT | Mod: S$GLB,,, | Performed by: INTERNAL MEDICINE

## 2022-03-15 PROCEDURE — 1159F PR MEDICATION LIST DOCUMENTED IN MEDICAL RECORD: ICD-10-PCS | Mod: CPTII,S$GLB,, | Performed by: INTERNAL MEDICINE

## 2022-03-15 PROCEDURE — 3288F FALL RISK ASSESSMENT DOCD: CPT | Mod: CPTII,S$GLB,, | Performed by: INTERNAL MEDICINE

## 2022-03-15 PROCEDURE — 3288F PR FALLS RISK ASSESSMENT DOCUMENTED: ICD-10-PCS | Mod: CPTII,S$GLB,, | Performed by: INTERNAL MEDICINE

## 2022-03-15 PROCEDURE — 99499 UNLISTED E&M SERVICE: CPT | Mod: HCNC,S$GLB,, | Performed by: INTERNAL MEDICINE

## 2022-03-15 PROCEDURE — 99499 RISK ADDL DX/OHS AUDIT: ICD-10-PCS | Mod: HCNC,S$GLB,, | Performed by: INTERNAL MEDICINE

## 2022-03-15 PROCEDURE — 99999 PR PBB SHADOW E&M-EST. PATIENT-LVL IV: CPT | Mod: PBBFAC,,, | Performed by: INTERNAL MEDICINE

## 2022-03-15 PROCEDURE — 1126F PR PAIN SEVERITY QUANTIFIED, NO PAIN PRESENT: ICD-10-PCS | Mod: CPTII,S$GLB,, | Performed by: INTERNAL MEDICINE

## 2022-03-15 PROCEDURE — 3075F PR MOST RECENT SYSTOLIC BLOOD PRESS GE 130-139MM HG: ICD-10-PCS | Mod: CPTII,S$GLB,, | Performed by: INTERNAL MEDICINE

## 2022-03-15 PROCEDURE — 3078F DIAST BP <80 MM HG: CPT | Mod: CPTII,S$GLB,, | Performed by: INTERNAL MEDICINE

## 2022-03-15 PROCEDURE — 99999 PR PBB SHADOW E&M-EST. PATIENT-LVL IV: ICD-10-PCS | Mod: PBBFAC,,, | Performed by: INTERNAL MEDICINE

## 2022-03-15 NOTE — PROGRESS NOTES
Subjective:   Patient ID:  Glo Dumont is a 86 y.o. female who presents for evaluation of No chief complaint on file.      HPI   85 yo male seen Dr STRANGE and Dr Tim before   exrtensive pmhx as below   Comes in for evaluation of chest pain, h/o CAD s/p PCI  , unknown   States she has been having more chest pains lately described as retrosternal tightness, 5/10, more with strenuous acitivity but can happen at rest as well , no nausea or vomiting   No syncope, no dizziness,   Compliant with meds     She had a syncope, two weeks ago, work up revealed hyperkalemia, patient was taking kcl at home, had k of 7 on admission   Reversed with lokelma   Tre on admission 55 bpm however, not very significant   Scheduled for HM 48 hours end of the month       Past Medical History:   Diagnosis Date    Anemia     Angina pectoris     Anxiety     Anxiety and depression     Arthritis     hip    Carotid artery occlusion     Carpal tunnel syndrome 06/23/2008    emg    Chronic diarrhea     work up in 2011 with EGD, CS and VCE    CKD (chronic kidney disease) stage 3, GFR 30-59 ml/min 5/11/2017    Colitis     Coronary artery disease     Coronary artery disease     Diastolic dysfunction     Diverticulosis     Glaucoma     Greater trochanteric bursitis 2/10/2015    Grief at loss of child 1/26/2016    H/O carotid endarterectomy 12/2/2013    Heart failure     History of coronary angioplasty 3/11/2014    Hypercholesteremia     Hypertension     Liver cyst 02/08/2013    ct abd    Macular degeneration     Primary open-angle glaucoma(365.11) 9/3/2013    Renal cyst 02/08/2013    ct abd    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    Sarcoidosis     Sarcoidosis of lung     Sickle cell trait     Uveitis        Past Surgical History:   Procedure Laterality Date    CAROTID ENDARTERECTOMY Right 2000s    CATARACT EXTRACTION Bilateral     Dr. Liang Dennis    CHOLECYSTECTOMY      laparoscopic, 3/18.     CORONARY ANGIOPLASTY WITH STENT PLACEMENT  11/19/2010    RCA-HALIE 2010    Lathia    JOINT REPLACEMENT Left 11/03/2014    Dr. Braun    TOTAL ABDOMINAL HYSTERECTOMY W/ BILATERAL SALPINGOOPHORECTOMY  1972       Social History     Tobacco Use    Smoking status: Never Smoker    Smokeless tobacco: Never Used   Substance Use Topics    Alcohol use: No     Alcohol/week: 0.0 standard drinks    Drug use: No       Family History   Problem Relation Age of Onset    Hypertension Mother     Heart failure Mother     Cancer Father         prostate    Cirrhosis Brother     Heart failure Brother     Cancer Daughter         Carcinoid       Review of Systems   Constitutional: Negative for fever and malaise/fatigue.   HENT: Negative for sore throat.    Eyes: Negative for blurred vision.   Cardiovascular: Positive for chest pain. Negative for claudication, cyanosis, dyspnea on exertion, irregular heartbeat, leg swelling, near-syncope, orthopnea, palpitations, paroxysmal nocturnal dyspnea and syncope.   Respiratory: Negative for cough and hemoptysis.    Hematologic/Lymphatic: Negative for bleeding problem.   Skin: Negative for rash.   Musculoskeletal: Negative for falls.   Gastrointestinal: Negative for abdominal pain.   Genitourinary: Negative.    Neurological: Negative.    Psychiatric/Behavioral: Negative for altered mental status and substance abuse.       Current Outpatient Medications on File Prior to Visit   Medication Sig    aspirin (ECOTRIN) 81 MG EC tablet Take 81 mg by mouth once daily.    cyproheptadine (PERIACTIN) 4 mg tablet Take 1 tablet (4 mg total) by mouth 3 (three) times daily as needed.    gabapentin (NEURONTIN) 300 MG capsule Take 1 capsule (300 mg total) by mouth once daily.    hydrALAZINE (APRESOLINE) 25 MG tablet Take 1 tablet (25 mg total) by mouth every 8 (eight) hours.    prasugreL (EFFIENT) 10 mg Tab Take 1 tablet (10 mg total) by mouth once daily.    pravastatin (PRAVACHOL) 40 MG tablet TAKE 1  TABLET BY MOUTH DAILY    ALPRAZolam (XANAX) 0.25 MG tablet Take 0.5-1 tablets (0.125-0.25 mg total) by mouth 3 (three) times daily as needed for Anxiety. (Patient not taking: Reported on 3/15/2022)    budesonide (ENTOCORT EC) 3 mg capsule Take 9 mg by mouth once daily.    calcium carbonate (OS-LYNN) 600 mg calcium (1,500 mg) Tab Take by mouth.    citalopram (CELEXA) 20 MG tablet Take 1 tablet (20 mg total) by mouth once daily.    dorzolamide-timolol 2-0.5% (COSOPT) 22.3-6.8 mg/mL ophthalmic solution Place 1 drop into both eyes 2 (two) times daily.    EYLEA 2 mg/0.05 mL Soln     FLUoxetine 10 MG capsule 1 capsule in the morning    FOLIC ACID/MULTIVIT-MIN/LUTEIN (CENTRUM SILVER ORAL) Take by mouth.    iron-vitamin C 100-250 mg, ICAR-C, (ICAR-C) 100-250 mg Tab Take 1 tablet by mouth once daily.    ketorolac 0.5% (ACULAR) 0.5 % Drop     nitroGLYCERIN (NITROSTAT) 0.4 MG SL tablet Place 1 tablet (0.4 mg total) under the tongue every 5 (five) minutes as needed for Chest pain.    nystatin (MYCOSTATIN) 100,000 unit/mL suspension Take by mouth.    OZURDEX 0.7 mg Impl intravitreal implant     pantoprazole (PROTONIX) 40 MG tablet Take 1 tablet (40 mg total) by mouth once daily.    [DISCONTINUED] amLODIPine (NORVASC) 5 MG tablet Take 1 tablet (5 mg total) by mouth once daily.    [DISCONTINUED] carvediloL (COREG) 12.5 MG tablet Take 1 tablet (12.5 mg total) by mouth 2 (two) times daily with meals.     No current facility-administered medications on file prior to visit.       Objective:   Objective:  Wt Readings from Last 3 Encounters:   03/15/22 49.6 kg (109 lb 5.6 oz)   03/08/22 49.3 kg (108 lb 11 oz)   03/04/22 52.3 kg (115 lb 4.8 oz)     Temp Readings from Last 3 Encounters:   03/08/22 97.6 °F (36.4 °C)   03/04/22 98.1 °F (36.7 °C) (Oral)   02/09/22 97 °F (36.1 °C)     BP Readings from Last 3 Encounters:   03/15/22 138/74   03/08/22 (!) 166/76   03/04/22 (!) 157/71     Pulse Readings from Last 3 Encounters:    03/15/22 76   03/08/22 75   03/04/22 67       Physical Exam  Vitals reviewed.   Constitutional:       Appearance: She is well-developed.   HENT:      Head: Normocephalic and atraumatic.   Eyes:      General: No scleral icterus.     Conjunctiva/sclera: Conjunctivae normal.   Cardiovascular:      Rate and Rhythm: Normal rate and regular rhythm.      Pulses: Intact distal pulses.      Heart sounds: Normal heart sounds. No murmur heard.     Comments: NO MURMUR TO SUGGEST AS  Pulmonary:      Effort: No respiratory distress.      Breath sounds: No wheezing or rales.   Chest:      Chest wall: No tenderness.   Abdominal:      General: Bowel sounds are normal. There is no distension.      Palpations: Abdomen is soft.      Tenderness: There is no guarding.   Musculoskeletal:         General: Normal range of motion.      Cervical back: Normal range of motion and neck supple.   Skin:     General: Skin is warm.   Neurological:      Mental Status: She is alert and oriented to person, place, and time.         Lab Results   Component Value Date    CHOL 193 09/15/2021    CHOL 153 05/27/2021    CHOL 185 07/19/2019     Lab Results   Component Value Date    HDL 56 09/15/2021    HDL 68 05/27/2021    HDL 61 07/19/2019     Lab Results   Component Value Date    LDLCALC 113.2 09/15/2021    LDLCALC 69.6 05/27/2021    LDLCALC 104.8 07/19/2019     Lab Results   Component Value Date    TRIG 119 09/15/2021    TRIG 77 05/27/2021    TRIG 96 07/19/2019     Lab Results   Component Value Date    CHOLHDL 29.0 09/15/2021    CHOLHDL 44.4 05/27/2021    CHOLHDL 33.0 07/19/2019       Chemistry        Component Value Date/Time     03/08/2022 1555    K 4.5 03/08/2022 1555     03/08/2022 1555    CO2 20 (L) 03/08/2022 1555    BUN 24 (H) 03/08/2022 1555    CREATININE 1.7 (H) 03/08/2022 1555     03/08/2022 1555        Component Value Date/Time    CALCIUM 10.4 03/08/2022 1555    ALKPHOS 64 03/08/2022 1555    AST 13 03/08/2022 1555    ALT 12  03/08/2022 1555    BILITOT 0.4 03/08/2022 1555    ESTGFRAFRICA 31.5 (A) 03/08/2022 1555    EGFRNONAA 27.3 (A) 03/08/2022 1555          Lab Results   Component Value Date    TSH 1.230 12/13/2021     Lab Results   Component Value Date    INR 1.0 09/09/2020    INR 1.0 12/22/2010    INR 1.0 11/19/2010     Lab Results   Component Value Date    WBC 5.05 03/04/2022    HGB 9.3 (L) 03/04/2022    HCT 28.1 (L) 03/04/2022    MCV 85 03/04/2022     03/04/2022     BNP  @LABRCNTIP(BNP,BNPTRIAGEBLO)@  CrCl cannot be calculated (Patient's most recent lab result is older than the maximum 7 days allowed.).     Imaging:  ======  Results for orders placed during the hospital encounter of 03/02/22    Echo    Interpretation Summary  · The left ventricle is normal in size with moderate concentric hypertrophy and hyperdynamic systolic function.  · Mild left atrial enlargement.  · The estimated ejection fraction is 75%.  · Normal right ventricular size with low normal right ventricular systolic function.  · There is mild aortic valve stenosis.  · Aortic valve area is 1.97 cm2; peak velocity is 1.67 m/s; mean gradient is 6 mmHg.  · Mild mitral regurgitation.  · There is mild mitral stenosis.  · Mild tricuspid regurgitation.  · Normal central venous pressure (3 mmHg).  · The estimated PA systolic pressure is 35 mmHg.    No results found for this or any previous visit.    Results for orders placed during the hospital encounter of 03/02/22    US Carotid Bilateral    Narrative  EXAMINATION:  US CAROTID BILATERAL    CLINICAL HISTORY:  syncope;    COMPARISON:  None    FINDINGS:  Sonographic evaluation of the carotid systems was performed.    There is some calcified plaque in the carotid bulbs bilaterally and in the proximal internal carotid arteries.    The peak systolic velocity in the right internal carotid artery was approximately 67 cm/sec.    The peak systolic velocity in the left internal carotid artery was [id number]  cm/sec.    Antegrade flow noted in both vertebral arteries.    Stenosis percentage validated velocity measurements with angiographic measurements, velocity criteria are extrapolated from diameter data as defined by the Society of Radiologists in Ultrasound Consensus Conference Radiology 2003; 229;340-346.    Impression  No sonographic evidence of a significant stenosis is identified in either carotid system.  Calcified plaque in both carotid bulbs and proximal internal carotid arteries.      Electronically signed by: Keith East  Date:    03/02/2022  Time:    19:19    Results for orders placed during the hospital encounter of 12/29/21    X-Ray Chest PA And Lateral    Narrative  EXAMINATION:  XR CHEST PA AND LATERAL    CLINICAL HISTORY:  Disorientation, unspecified    TECHNIQUE:  PA and lateral views of the chest were performed.    COMPARISON:  Chest radiographs dating back to September 23, 2020    FINDINGS:  Unchanged mild pleuroparenchymal scarring within the lung apices.  Otherwise, lungs are clear without acute opacity.  No pneumothorax or pleural effusion.  Heart size is normal.  Mild tortuosity of the thoracic aorta.  Atherosclerotic calcifications noted within the thoracic aorta.  No acute osseous abnormality.  Surgical clips present within the upper abdomen.    Impression  As above.      Electronically signed by: Candelario Walsh  Date:    12/29/2021  Time:    10:29    No results found for this or any previous visit.    No valid procedures specified.    Diagnostic Results:  ECG: Reviewed  EKG 2/1/2022 normal sinus rhythm, nonspecific ST T-wave changes, minimal LVH,  ms, 69 bpm,  ms  The ASCVD Risk score ([place] RILEY Jr., et al., 2013) failed to calculate for the following reasons:    The 2013 ASCVD risk score is only valid for ages 40 to 79    Assessment and Plan:   Coronary artery disease involving native coronary artery, unspecified whether angina present, unspecified whether native or transplanted  heart  -     Nuclear Stress - Cardiology Interpreted; Future    H/O heart artery stent  -     Nuclear Stress - Cardiology Interpreted; Future    Aortic valve stenosis, etiology of cardiac valve disease unspecified    Atherosclerosis of aorta    Coronary artery disease, occlusive    Diastolic heart failure, NYHA class 3    Hypertensive heart disease with heart failure    Bradycardia    History of coronary angioplasty    Sarcoidosis of lung    Hemoglobin A-S genotype    Other iron deficiency anemia      Reviewed all tests and above medical conditions with patient in detail and formulated treatment plan.  Risk factor modification discussed.   Cardiac low salt diet discussed.  Maintaining healthy weight and weight loss goals were discussed in clinic.  cw current meds   Discussed possible LHC should stress test evidence of coronary ischemia.  Scheduled for  48 hours end of the month   Follow up in 6 months

## 2022-03-16 ENCOUNTER — PROCEDURE VISIT (OUTPATIENT)
Dept: OPHTHALMOLOGY | Facility: CLINIC | Age: 87
End: 2022-03-16
Payer: MEDICARE

## 2022-03-16 DIAGNOSIS — H35.353 CME (CYSTOID MACULAR EDEMA), BILATERAL: ICD-10-CM

## 2022-03-16 DIAGNOSIS — Z96.1 PSEUDOPHAKIA OF BOTH EYES: ICD-10-CM

## 2022-03-16 DIAGNOSIS — H34.8130 CENTRAL RETINAL VEIN OCCLUSION WITH MACULAR EDEMA OF BOTH EYES: Primary | ICD-10-CM

## 2022-03-16 DIAGNOSIS — H40.1131 PRIMARY OPEN ANGLE GLAUCOMA (POAG) OF BOTH EYES, MILD STAGE: ICD-10-CM

## 2022-03-16 DIAGNOSIS — R06.02 SOB (SHORTNESS OF BREATH): ICD-10-CM

## 2022-03-16 DIAGNOSIS — H35.033 HYPERTENSIVE RETINOPATHY OF BOTH EYES: ICD-10-CM

## 2022-03-16 PROCEDURE — 67028 INJECTION EYE DRUG: CPT | Mod: 50,S$GLB,, | Performed by: OPHTHALMOLOGY

## 2022-03-16 PROCEDURE — 92134 POSTERIOR SEGMENT OCT RETINA (OCULAR COHERENCE TOMOGRAPHY)-BOTH EYES: ICD-10-PCS | Mod: S$GLB,,, | Performed by: OPHTHALMOLOGY

## 2022-03-16 PROCEDURE — 99499 UNLISTED E&M SERVICE: CPT | Mod: S$GLB,,, | Performed by: OPHTHALMOLOGY

## 2022-03-16 PROCEDURE — 67028 PR INJECT INTRAVITREAL PHARMCOLOGIC: ICD-10-PCS | Mod: 50,S$GLB,, | Performed by: OPHTHALMOLOGY

## 2022-03-16 PROCEDURE — 99499 NO LOS: ICD-10-PCS | Mod: S$GLB,,, | Performed by: OPHTHALMOLOGY

## 2022-03-16 PROCEDURE — 92134 CPTRZ OPH DX IMG PST SGM RTA: CPT | Mod: S$GLB,,, | Performed by: OPHTHALMOLOGY

## 2022-03-16 NOTE — PROGRESS NOTES
===============================  Date today is 3/16/2022  Glo Dumont is a 86 y.o. female  Last visit [facility]: :2/16/2022   Last visit eye dept. 2/16/2022    Corrected distance visual acuity was 20/400 in the right eye and CF at 3' in the left eye.  Tonometry     Tonometry (Applanation, 8:06 AM)       Right Left    Pressure 11 09              Not recorded       Not recorded       Not recorded       Chief Complaint   Patient presents with    crvo dme     Ozurdex ou at last visit / today iop ck and eval for eylea ou     HPI     crvo dme      Additional comments: Ozurdex ou at last visit / today iop ck and eval   for eylea ou              Comments     1. Steroid responder glaucoma   2. PCIOL OU MGM   3. SARCOIDOSIS   H\O chronic UVEITIS OU   4. Central vein occlusion of retina, left   5. Retinal edema     H/o Avastin and Eylea OS   Had been getting Ozurdex OU with Dr. Shahrzad Tamez Eylea OU last 9/9/2021  Ozurdex ou last 2/16/2021      Cosopt BID OU          Last edited by STEVEN Greenberg on 3/16/2022  7:45 AM. 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 - Primary    Relevant Medications    aflibercept Soln 2 mg (Completed)    aflibercept Soln 2 mg (Completed)    Other Relevant Orders    Posterior Segment OCT Retina-Both eyes (Completed)    Prior authorization Order      Other Visit Diagnoses     CME (cystoid macular edema), bilateral        Relevant Medications    aflibercept Soln 2 mg (Completed)    aflibercept Soln 2 mg (Completed)    Other Relevant Orders    Posterior Segment OCT Retina-Both eyes (Completed)    Prior authorization Order    SOB (shortness of breath)        Hypertensive retinopathy of both eyes        Pseudophakia of both eyes        Primary open angle glaucoma (POAG) of both eyes, mild stage              ________________  3/16/2022 today        .  ..worse dme ou    symptomatic        Injection Procedure Note:    3/16/2022  Diagnosis :  Ou dme  Today:   Eylea (afibercept) 2 mg/0.05 ml Intravitreal Injection , OU   Follow up: rct 1mo eval eylea OU      Instructed to call 24/7 for any worsening of vision. Check Both eyes daily. Gave patient my home phone number.  Risks, benefits, and alternatives to treatment discussed in detail with the patient.  The patient voiced understanding and wished to proceed with the procedure.     Patient Identified and Time Out complete  Subconjunctival bleb - xylocaine with epi 2%   and Betadine.  Inject at Eylea (afibercept) 2 mg/0.05 ml Intravitreal Injection , OU 6:00 @ 3.5-4mm posterior to limbus  1 stop: no   Post Operative Dx: Same  Complications: None  Follow up as above.      ===============================

## 2022-03-17 ENCOUNTER — OUTPATIENT CASE MANAGEMENT (OUTPATIENT)
Dept: ADMINISTRATIVE | Facility: OTHER | Age: 87
End: 2022-03-17
Payer: MEDICARE

## 2022-03-17 NOTE — PROGRESS NOTES
Outpatient Care Management  Initial Patient Assessment    Patient: Glo Dumont  MRN: [id number]  Date of Service: 03/17/2022  Completed by: Esperanza Bess RN  Referral Date: 02/01/2022  Program: OPCM High Risk    Reason for Visit   Patient presents with    OPCM Chart Review     3/17/22, 3/18/22    OPCM RN First Assessment Attempt     3/17/22    OPCM Enrollment Call     3/18/22    Initial Assessment     3/18/22    Nursing Assessment     3/18/22    Plan Of Care     3/18/22    Other Misc     3/18/22 Med Rec    OPCM Welcome Letter     3/18/22   .3/17/22  Attempt assessment with patient for outpatient case management. No answer at [phone]. Left voicemail message at [phone] requesting call back. RN OPCM 1st assessment attempt.  Esperanza Bess RN    3/18/22 Reported by Jessica Dumont (cargiver/daughter) with Mrs.Annie Dumont's permission.    Brief Summary:  Glo Dumont was referred by Dr. Shaina Ovalle at [facility] ED for Heart Failure and Osteopenia. Patient qualifies for program based on high risk score of 81.3. Active problem list, medical, surgical and social history reviewed. Active comorbidities include CKD st 3, greater trochanter bursitis, S/P total hip replacement, carpal tunnel syndrome, anemia, anxiety, chronic diarrhea, CAD, glauxoma, heart failure, HTN, & macular degeneration. Areas of need identified by patient include freaquent UTI's and fall prevention.     · Mrs. Dumont lives with her daughter (Jessica) and her son in law.  ·  is a retired pediatric nurse.  · Jessica reports 4 falls within the last 12 months with at least one with injury.  ·  has an cane and a walker, but is doesn't always use it when ambulating in the home. Jessica states that her mother will start out using the walker/cane to get from one room to the other, but will walk away from it once she is in the room that she is going to be in for a while.  · Jessica states that Mrs. Dumont is  "receiving home health service with physical therapy at this time per [facility].   · Jessica states that her mother gets UTI's frequently, but usually recovers quickly after a round of antibiotics.  ·  is not routinely followed by a urologist or nephrologist.  · NO ACP documents are on file in EMR.   · Jessica (daughter) requests assistance with insurance information review, ACP resources, obtaining DME, community resources (senior centers/activity centers) and assistance with SNAP application.    Complex care plan created with patient/caregiver input. By next encounter, patient agrees to review educational materials after they are received via [employer] and to participate in follow call with OPCM RN case manager.     CM ACTION PLAN:  · Follow up with patient/caregiver in about 12 days per the caregiver request  · Send printed education "Urinary Tract Infections in Adults", "Preventing Falls", "Preventing Falls in the Older Adult", and "Beginner Standing, Balance Exercises"   via [employer] to  for review  · Refer to  for assistance with insurance information review, ACP resources, obtaining DME, community resources (senior centers/activity centers) and assistance with SNAP application.  · Send message to SW regarding referral  · Send message to PCP regarding enrollment in the High Risk OPCM Program    Assessment Documentation     OPCM Initial Assessment    Involvement of Care  Do I have permission to speak with other family members about your care?: Yes (Comment: Jessica Dumont Daughter)  Assessment completed by: Children (Comment: Jessica Dumont Daughter)  Identified Areas of Need  Advanced Care Planning: Yes (Comment: refer to SW for further discussion;mailed educational information regarding ACP)  Housing: no  Medication Adherence: No  *Active medication list was reviewed and reconciled with patient and/or caregiver:   Nutrition: yes (Comment: requesting assistance with possible SNAP " benefits)  Lab Adherence: no  Depression: No (Comment: PHQ 2 score = 2)  Cognitive/Behavioral Health: no  Communication: no  Health Literacy: no  Fall risk?: Yes  Equipment/Supplies/Services: yes        Problem List and History     Problems Addressed This Visit    Depression: Not identified by patient as current problem  Heart Failure: Not identified by patient as current problem  Hypertension: Not identified by patient as current problem  Anemia: Not identified by patient as current problem  Chronic Kidney Disease: Identified as current problem  Hyperlipidemia: Not identified by patient as current problem  Heart Disease: Not identified by patient as current problem       Reviewed medical and social history with patient and/or caregiver. A complex care plan was discussed and completed today, with input from patient and/or caregiver.    Patient Instructions     Instructions were provided via the [employer] patient [employer] and are available for the patient to view on the patient portal, if active.    Next steps:   · Discuss educational material during next encounter with RN case manager    Follow up in about 12 days (around 3/29/2022).    Todays OPCM Self-Management Care Plan was developed with the patients/caregivers input and was based on identified barriers from todays assessment.  Goals were written today with the patient/caregiver and the patient has agreed to work towards these goals to improve his/her overall well-being. Patient verbalized understanding of the care plan, goals, and all of today's instructions. Encouraged patient/caregiver to communicate with his/her physician and health care team about health conditions and the treatment plan.  Provided my contact information today and encouraged patient/caregiver to call me with any questions as needed

## 2022-03-17 NOTE — LETTER
March 18, 2022           Dear ,     Welcome to [facility]s Complex Care Management Program.  It was a pleasure talking with you and your duagher today.  My name is Carolyn Bess, and I look forward to being your Care Manager.  My goal is to help you function at the healthiest and highest level possible.  You can contact me directly at [phone].    As an [facility] patient with [employer], some of the services we may be able to provide include:      Development of an individualized care plan with a Registered Nurse    Connection with a    Connection with available resources and services     Coordinate communication among your care team members    Provide coaching and education    Help you understand your doctors treatment plan   Help you obtain information about your insurance coverage.     All services provided by [facility]s Complex Care Managers and other care team members are coordinated with and communicated to your primary care team.    As part of your enrollment, you will be receiving education materials and more information about these services in your [facility] account, by phone or through the mail.  If you do not wish to participate or receive information, please contact our office at [phone].      Sincerely,        Carolyn Bess RN  [facility]   Out-patient RN Complex Care Manager

## 2022-03-19 ENCOUNTER — HOSPITAL ENCOUNTER (OUTPATIENT)
Facility: HOSPITAL | Age: 87
Discharge: HOME | End: 2022-03-20
Attending: EMERGENCY MEDICINE | Admitting: INTERNAL MEDICINE
Payer: MEDICARE

## 2022-03-19 DIAGNOSIS — R07.9 CHEST PAIN: Primary | ICD-10-CM

## 2022-03-19 DIAGNOSIS — I20.0 UNSTABLE ANGINA: ICD-10-CM

## 2022-03-19 DIAGNOSIS — I50.30 DIASTOLIC HEART FAILURE, NYHA CLASS 3: ICD-10-CM

## 2022-03-19 DIAGNOSIS — I10 HYPERTENSION, UNSPECIFIED TYPE: ICD-10-CM

## 2022-03-19 LAB
ALBUMIN SERPL BCP-MCNC: 3.4 G/DL (ref 3.5–5.2)
ALP SERPL-CCNC: 61 U/L (ref 55–135)
ALT SERPL W/O P-5'-P-CCNC: 11 U/L (ref 10–44)
ANION GAP SERPL CALC-SCNC: 7 MMOL/L (ref 8–16)
AST SERPL-CCNC: 15 U/L (ref 10–40)
BASOPHILS # BLD AUTO: 0.02 K/UL (ref 0–0.2)
BASOPHILS NFR BLD: 0.6 % (ref 0–1.9)
BILIRUB SERPL-MCNC: 0.3 MG/DL (ref 0.1–1)
BNP SERPL-MCNC: 306 PG/ML (ref 0–99)
BUN SERPL-MCNC: 23 MG/DL (ref 8–23)
CALCIUM SERPL-MCNC: 9.9 MG/DL (ref 8.7–10.5)
CHLORIDE SERPL-SCNC: 115 MMOL/L (ref 95–110)
CO2 SERPL-SCNC: 18 MMOL/L (ref 23–29)
CREAT SERPL-MCNC: 1.5 MG/DL (ref 0.5–1.4)
DIFFERENTIAL METHOD: ABNORMAL
EOSINOPHIL # BLD AUTO: 0.1 K/UL (ref 0–0.5)
EOSINOPHIL NFR BLD: 3.9 % (ref 0–8)
ERYTHROCYTE [DISTWIDTH] IN BLOOD BY AUTOMATED COUNT: 15.2 % (ref 11.5–14.5)
EST. GFR  (AFRICAN AMERICAN): 37 ML/MIN/1.73 M^2
EST. GFR  (NON AFRICAN AMERICAN): 32 ML/MIN/1.73 M^2
GLUCOSE SERPL-MCNC: 94 MG/DL (ref 70–110)
HCT VFR BLD AUTO: 27.5 % (ref 37–48.5)
HGB BLD-MCNC: 9.1 G/DL (ref 12–16)
IMM GRANULOCYTES # BLD AUTO: 0.02 K/UL (ref 0–0.04)
IMM GRANULOCYTES NFR BLD AUTO: 0.6 % (ref 0–0.5)
LIPASE SERPL-CCNC: 41 U/L (ref 4–60)
LYMPHOCYTES # BLD AUTO: 0.9 K/UL (ref 1–4.8)
LYMPHOCYTES NFR BLD: 29 % (ref 18–48)
MCH RBC QN AUTO: 28.3 PG (ref 27–31)
MCHC RBC AUTO-ENTMCNC: 33.1 G/DL (ref 32–36)
MCV RBC AUTO: 86 FL (ref 82–98)
MONOCYTES # BLD AUTO: 0.6 K/UL (ref 0.3–1)
MONOCYTES NFR BLD: 18.7 % (ref 4–15)
NEUTROPHILS # BLD AUTO: 1.5 K/UL (ref 1.8–7.7)
NEUTROPHILS NFR BLD: 47.2 % (ref 38–73)
NRBC BLD-RTO: 0 /100 WBC
PHOSPHATE SERPL-MCNC: 3.6 MG/DL (ref 2.7–4.5)
PLATELET # BLD AUTO: 224 K/UL (ref 150–450)
PMV BLD AUTO: 10.4 FL (ref 9.2–12.9)
POTASSIUM SERPL-SCNC: 3.6 MMOL/L (ref 3.5–5.1)
PROT SERPL-MCNC: 6.6 G/DL (ref 6–8.4)
RBC # BLD AUTO: 3.21 M/UL (ref 4–5.4)
SARS-COV-2 RDRP RESP QL NAA+PROBE: NEGATIVE
SODIUM SERPL-SCNC: 140 MMOL/L (ref 136–145)
TROPONIN I SERPL DL<=0.01 NG/ML-MCNC: 0.01 NG/ML (ref 0–0.03)
TROPONIN I SERPL DL<=0.01 NG/ML-MCNC: <0.006 NG/ML (ref 0–0.03)
TROPONIN I SERPL DL<=0.01 NG/ML-MCNC: <0.006 NG/ML (ref 0–0.03)
WBC # BLD AUTO: 3.1 K/UL (ref 3.9–12.7)

## 2022-03-19 PROCEDURE — 96376 TX/PRO/DX INJ SAME DRUG ADON: CPT

## 2022-03-19 PROCEDURE — 25000003 PHARM REV CODE 250: Performed by: EMERGENCY MEDICINE

## 2022-03-19 PROCEDURE — 96374 THER/PROPH/DIAG INJ IV PUSH: CPT

## 2022-03-19 PROCEDURE — U0002 COVID-19 LAB TEST NON-CDC: HCPCS | Performed by: FAMILY MEDICINE

## 2022-03-19 PROCEDURE — 84484 ASSAY OF TROPONIN QUANT: CPT | Performed by: EMERGENCY MEDICINE

## 2022-03-19 PROCEDURE — 83690 ASSAY OF LIPASE: CPT | Performed by: EMERGENCY MEDICINE

## 2022-03-19 PROCEDURE — 63600175 PHARM REV CODE 636 W HCPCS: Performed by: FAMILY MEDICINE

## 2022-03-19 PROCEDURE — 96375 TX/PRO/DX INJ NEW DRUG ADDON: CPT

## 2022-03-19 PROCEDURE — 93005 ELECTROCARDIOGRAM TRACING: CPT

## 2022-03-19 PROCEDURE — 94761 N-INVAS EAR/PLS OXIMETRY MLT: CPT

## 2022-03-19 PROCEDURE — G0378 HOSPITAL OBSERVATION PER HR: HCPCS

## 2022-03-19 PROCEDURE — 83880 ASSAY OF NATRIURETIC PEPTIDE: CPT | Performed by: EMERGENCY MEDICINE

## 2022-03-19 PROCEDURE — 93010 ELECTROCARDIOGRAM REPORT: CPT | Mod: 76,,, | Performed by: INTERNAL MEDICINE

## 2022-03-19 PROCEDURE — 25000003 PHARM REV CODE 250: Performed by: FAMILY MEDICINE

## 2022-03-19 PROCEDURE — 85025 COMPLETE CBC W/AUTO DIFF WBC: CPT | Performed by: EMERGENCY MEDICINE

## 2022-03-19 PROCEDURE — 80053 COMPREHEN METABOLIC PANEL: CPT | Performed by: EMERGENCY MEDICINE

## 2022-03-19 PROCEDURE — 99285 EMERGENCY DEPT VISIT HI MDM: CPT | Mod: 25,CS

## 2022-03-19 PROCEDURE — 93010 EKG 12-LEAD: ICD-10-PCS | Mod: ,,, | Performed by: INTERNAL MEDICINE

## 2022-03-19 PROCEDURE — 84484 ASSAY OF TROPONIN QUANT: CPT | Mod: 91 | Performed by: NURSE PRACTITIONER

## 2022-03-19 PROCEDURE — 63600175 PHARM REV CODE 636 W HCPCS: Performed by: NURSE PRACTITIONER

## 2022-03-19 PROCEDURE — 25000003 PHARM REV CODE 250: Performed by: NURSE PRACTITIONER

## 2022-03-19 PROCEDURE — 84100 ASSAY OF PHOSPHORUS: CPT | Performed by: EMERGENCY MEDICINE

## 2022-03-19 PROCEDURE — 36415 COLL VENOUS BLD VENIPUNCTURE: CPT | Performed by: NURSE PRACTITIONER

## 2022-03-19 RX ORDER — PRASUGREL 10 MG/1
10 TABLET, FILM COATED ORAL DAILY
Status: DISCONTINUED | OUTPATIENT
Start: 2022-03-20 | End: 2022-03-20 | Stop reason: HOSPADM

## 2022-03-19 RX ORDER — METOPROLOL TARTRATE 1 MG/ML
5 INJECTION, SOLUTION INTRAVENOUS
Status: COMPLETED | OUTPATIENT
Start: 2022-03-19 | End: 2022-03-19

## 2022-03-19 RX ORDER — SODIUM CHLORIDE 0.9 % (FLUSH) 0.9 %
10 SYRINGE (ML) INJECTION
Status: DISCONTINUED | OUTPATIENT
Start: 2022-03-19 | End: 2022-03-20 | Stop reason: HOSPADM

## 2022-03-19 RX ORDER — HYDRALAZINE HYDROCHLORIDE 25 MG/1
25 TABLET, FILM COATED ORAL EVERY 8 HOURS
Status: DISCONTINUED | OUTPATIENT
Start: 2022-03-19 | End: 2022-03-20

## 2022-03-19 RX ORDER — ASPIRIN 325 MG
325 TABLET ORAL
Status: COMPLETED | OUTPATIENT
Start: 2022-03-19 | End: 2022-03-19

## 2022-03-19 RX ORDER — ONDANSETRON 2 MG/ML
4 INJECTION INTRAMUSCULAR; INTRAVENOUS EVERY 8 HOURS PRN
Status: DISCONTINUED | OUTPATIENT
Start: 2022-03-19 | End: 2022-03-20 | Stop reason: HOSPADM

## 2022-03-19 RX ORDER — CARVEDILOL 6.25 MG/1
12.5 TABLET ORAL 2 TIMES DAILY WITH MEALS
Status: DISCONTINUED | OUTPATIENT
Start: 2022-03-19 | End: 2022-03-20

## 2022-03-19 RX ORDER — HYDRALAZINE HYDROCHLORIDE 20 MG/ML
10 INJECTION INTRAMUSCULAR; INTRAVENOUS
Status: COMPLETED | OUTPATIENT
Start: 2022-03-19 | End: 2022-03-19

## 2022-03-19 RX ORDER — HYDRALAZINE HYDROCHLORIDE 20 MG/ML
10 INJECTION INTRAMUSCULAR; INTRAVENOUS EVERY 8 HOURS PRN
Status: DISCONTINUED | OUTPATIENT
Start: 2022-03-19 | End: 2022-03-20 | Stop reason: HOSPADM

## 2022-03-19 RX ORDER — HYDROCODONE BITARTRATE AND ACETAMINOPHEN 5; 325 MG/1; MG/1
1 TABLET ORAL EVERY 6 HOURS PRN
Status: DISCONTINUED | OUTPATIENT
Start: 2022-03-19 | End: 2022-03-20 | Stop reason: HOSPADM

## 2022-03-19 RX ORDER — ALPRAZOLAM 0.25 MG/1
0.25 TABLET ORAL 2 TIMES DAILY PRN
Status: DISCONTINUED | OUTPATIENT
Start: 2022-03-19 | End: 2022-03-20 | Stop reason: HOSPADM

## 2022-03-19 RX ORDER — PANTOPRAZOLE SODIUM 40 MG/1
40 TABLET, DELAYED RELEASE ORAL DAILY
Status: DISCONTINUED | OUTPATIENT
Start: 2022-03-20 | End: 2022-03-20 | Stop reason: HOSPADM

## 2022-03-19 RX ORDER — PRAVASTATIN SODIUM 20 MG/1
40 TABLET ORAL NIGHTLY
Status: DISCONTINUED | OUTPATIENT
Start: 2022-03-19 | End: 2022-03-20 | Stop reason: HOSPADM

## 2022-03-19 RX ORDER — ACETAMINOPHEN 500 MG
1000 TABLET ORAL
Status: COMPLETED | OUTPATIENT
Start: 2022-03-19 | End: 2022-03-19

## 2022-03-19 RX ORDER — ASPIRIN 81 MG/1
81 TABLET ORAL DAILY
Status: DISCONTINUED | OUTPATIENT
Start: 2022-03-20 | End: 2022-03-20 | Stop reason: HOSPADM

## 2022-03-19 RX ORDER — CITALOPRAM 10 MG/1
20 TABLET ORAL DAILY
Status: DISCONTINUED | OUTPATIENT
Start: 2022-03-20 | End: 2022-03-20 | Stop reason: HOSPADM

## 2022-03-19 RX ORDER — ACETAMINOPHEN 325 MG/1
650 TABLET ORAL EVERY 4 HOURS PRN
Status: DISCONTINUED | OUTPATIENT
Start: 2022-03-19 | End: 2022-03-20 | Stop reason: HOSPADM

## 2022-03-19 RX ADMIN — ASPIRIN 325 MG ORAL TABLET 325 MG: 325 PILL ORAL at 03:03

## 2022-03-19 RX ADMIN — PRAVASTATIN SODIUM 40 MG: 20 TABLET ORAL at 08:03

## 2022-03-19 RX ADMIN — HYDRALAZINE HYDROCHLORIDE 10 MG: 20 INJECTION, SOLUTION INTRAMUSCULAR; INTRAVENOUS at 05:03

## 2022-03-19 RX ADMIN — CARVEDILOL 12.5 MG: 6.25 TABLET, FILM COATED ORAL at 08:03

## 2022-03-19 RX ADMIN — ACETAMINOPHEN 1000 MG: 500 TABLET ORAL at 05:03

## 2022-03-19 RX ADMIN — METOPROLOL TARTRATE 5 MG: 5 INJECTION, SOLUTION INTRAVENOUS at 03:03

## 2022-03-19 RX ADMIN — ACETAMINOPHEN 650 MG: 325 TABLET ORAL at 10:03

## 2022-03-19 RX ADMIN — HYDRALAZINE HYDROCHLORIDE 10 MG: 20 INJECTION, SOLUTION INTRAMUSCULAR; INTRAVENOUS at 11:03

## 2022-03-19 RX ADMIN — HYDRALAZINE HYDROCHLORIDE 25 MG: 25 TABLET, FILM COATED ORAL at 09:03

## 2022-03-19 NOTE — H&P
[facility]  [facility] Medicine  History & Physical    Patient Name: Glo Dumont  MRN: [id number]  Patient Class: OP- Observation  Admission Date: 3/19/2022  Attending Physician: Sharonda Huertas MD   Primary Care Provider: Christina Recio MD         Patient information was obtained from patient and ER records.     Subjective:     Principal Problem:Coronary artery disease, occlusive    Chief Complaint:   Chief Complaint   Patient presents with    Chest Pain     Chest wall pain started this morning, c/o headache, 2 nitro tablets taken PTA        HPI: Glo Dumont is a 86 y.o. female patient with a PMHx of CKD, CAD, Diastolic CHF, Anxiety, Anemia, Hypercholesteremia, and HTN who presents to the Emergency Department for evaluation of CP (raiates to L arm) which onset gradually today. The CP is more in the midsternum and eipghastric region.  Associated sxs include headache. Patient denies any fever, N/V/D, diaphoresis, abd pain, and all other sxs at this time. Pt took 2 nitro, with some relief. Pt had a mild headache which worsened after the nitro was given. Pt has 1 stent placed in her heart. Pt saw Dr. Joseph on the 15th and is scheduled to have her stress test done this Friday. Pt is a DNR and Dawna Toribio (daughter) at [phone] is the surrogate decision maker. Found in the ED to have bp of 201/101. H/H 9.1/27.5, CO2 18, Creatinine 1.5, . Chest xray showed no acute abnormality. Patient received 20 mg of IV hydralazine and 5 mg of IV metoprolol in the ED. Hospital Medicine contacted for admission with patient placed in Observation for chest pain.                   Past Medical History:   Diagnosis Date    Anemia     Angina pectoris     Anxiety     Anxiety and depression     Arthritis     hip    Carotid artery occlusion     Carpal tunnel syndrome 06/23/2008    emg    Chronic diarrhea     work up in 2011 with EGD, CS and VCE    CKD (chronic kidney disease) stage 3, GFR 30-59  ml/min 5/11/2017    Colitis     Coronary artery disease     Coronary artery disease     Diastolic dysfunction     Diverticulosis     Glaucoma     Greater trochanteric bursitis 2/10/2015    Grief at loss of child 1/26/2016    H/O carotid endarterectomy 12/2/2013    Heart failure     History of coronary angioplasty 3/11/2014    Hypercholesteremia     Hypertension     Liver cyst 02/08/2013    ct abd    Macular degeneration     Primary open-angle glaucoma(365.11) 9/3/2013    Renal cyst 02/08/2013    ct abd    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    Sarcoidosis     Sarcoidosis of lung     Sickle cell trait     Uveitis        Past Surgical History:   Procedure Laterality Date    CAROTID ENDARTERECTOMY Right 2000s    CATARACT EXTRACTION Bilateral     Dr. Liang Dennis    CHOLECYSTECTOMY      laparoscopic, 3/18.    CORONARY ANGIOPLASTY WITH STENT PLACEMENT  11/19/2010    RCA-HALIE 2010    Lathia    JOINT REPLACEMENT Left 11/03/2014    Dr. Braun    TOTAL ABDOMINAL HYSTERECTOMY W/ BILATERAL SALPINGOOPHORECTOMY  1972       Review of patient's allergies indicates:   Allergen Reactions    Lisinopril      angioedema    Codeine Nausea And Vomiting       No current facility-administered medications on file prior to encounter.     Current Outpatient Medications on File Prior to Encounter   Medication Sig    ALPRAZolam (XANAX) 0.25 MG tablet Take 0.5-1 tablets (0.125-0.25 mg total) by mouth 3 (three) times daily as needed for Anxiety. (Patient not taking: Reported on 3/18/2022)    aspirin (ECOTRIN) 81 MG EC tablet Take 81 mg by mouth once daily.    budesonide (ENTOCORT EC) 3 mg capsule Take 9 mg by mouth once daily.    calcium carbonate (OS-LYNN) 600 mg calcium (1,500 mg) Tab Take by mouth.    citalopram (CELEXA) 20 MG tablet Take 1 tablet (20 mg total) by mouth once daily. (Patient not taking: Reported on 3/18/2022)    cyproheptadine (PERIACTIN) 4 mg tablet Take 1 tablet (4 mg total)  by mouth 3 (three) times daily as needed.    dorzolamide-timolol 2-0.5% (COSOPT) 22.3-6.8 mg/mL ophthalmic solution Place 1 drop into both eyes 2 (two) times daily.    EYLEA 2 mg/0.05 mL Soln     FLUoxetine 10 MG capsule 1 capsule in the morning    FOLIC ACID/MULTIVIT-MIN/LUTEIN (CENTRUM SILVER ORAL) Take by mouth.    gabapentin (NEURONTIN) 300 MG capsule Take 1 capsule (300 mg total) by mouth once daily.    hydrALAZINE (APRESOLINE) 25 MG tablet Take 1 tablet (25 mg total) by mouth every 8 (eight) hours.    iron-vitamin C 100-250 mg, ICAR-C, (ICAR-C) 100-250 mg Tab Take 1 tablet by mouth once daily. (Patient not taking: Reported on 3/18/2022)    ketorolac 0.5% (ACULAR) 0.5 % Drop     nitroGLYCERIN (NITROSTAT) 0.4 MG SL tablet Place 1 tablet (0.4 mg total) under the tongue every 5 (five) minutes as needed for Chest pain. (Patient not taking: Reported on 3/18/2022)    nystatin (MYCOSTATIN) 100,000 unit/mL suspension Take by mouth.    OZURDEX 0.7 mg Impl intravitreal implant     pantoprazole (PROTONIX) 40 MG tablet Take 1 tablet (40 mg total) by mouth once daily.    prasugreL (EFFIENT) 10 mg Tab Take 1 tablet (10 mg total) by mouth once daily.    pravastatin (PRAVACHOL) 40 MG tablet TAKE 1 TABLET BY MOUTH DAILY    [DISCONTINUED] amLODIPine (NORVASC) 5 MG tablet Take 1 tablet (5 mg total) by mouth once daily.    [DISCONTINUED] carvediloL (COREG) 12.5 MG tablet Take 1 tablet (12.5 mg total) by mouth 2 (two) times daily with meals.     Family History       Problem Relation (Age of Onset)    Cancer Father, Daughter    Cirrhosis Brother    Heart failure Mother, Brother    Hypertension Mother          Tobacco Use    Smoking status: Never Smoker    Smokeless tobacco: Never Used   Substance and Sexual Activity    Alcohol use: No     Alcohol/week: 0.0 standard drinks    Drug use: No    Sexual activity: Yes     Partners: Male     Review of Systems   Constitutional: Negative.    HENT: Negative.     Eyes:  Negative.    Respiratory: Negative.     Cardiovascular:  Positive for chest pain.   Gastrointestinal: Negative.    Endocrine: Negative.    Genitourinary: Negative.    Musculoskeletal: Negative.    Skin: Negative.    Allergic/Immunologic: Negative.    Neurological:  Positive for headaches.   Hematological: Negative.    Psychiatric/Behavioral: Negative.     Objective:     Vital Signs (Most Recent):  Temp: 98 °F (36.7 °C) (03/19/22 1553)  Pulse: 79 (03/19/22 1752)  Resp: 20 (03/19/22 1553)  BP: (!) 187/97 (03/19/22 1736)  SpO2: 99 % (03/19/22 1553)   Vital Signs (24h Range):  Temp:  [98 °F (36.7 °C)] 98 °F (36.7 °C)  Pulse:  [70-97] 79  Resp:  [20-30] 20  SpO2:  [96 %-99 %] 99 %  BP: (169-214)/() 187/97     Weight:  (unable to stand)  There is no height or weight on file to calculate BMI.    Physical Exam  Vitals and nursing note reviewed.   Constitutional:       Appearance: She is well-developed.   HENT:      Head: Normocephalic and atraumatic.   Eyes:      Conjunctiva/sclera: Conjunctivae normal.      Pupils: Pupils are equal, round, and reactive to light.   Cardiovascular:      Rate and Rhythm: Normal rate and regular rhythm.      Heart sounds: Normal heart sounds.   Pulmonary:      Effort: Pulmonary effort is normal.      Breath sounds: Normal breath sounds.   Abdominal:      General: Bowel sounds are normal.      Palpations: Abdomen is soft.   Musculoskeletal:         General: Normal range of motion.      Cervical back: Normal range of motion and neck supple.   Skin:     General: Skin is warm and dry.   Neurological:      Mental Status: She is alert and oriented to person, place, and time.      Deep Tendon Reflexes: Reflexes are normal and symmetric.   Psychiatric:         Behavior: Behavior normal.         Thought Content: Thought content normal.         Judgment: Judgment normal.         CRANIAL NERVES     CN III, IV, VI   Pupils are equal, round, and reactive to light.     Significant Labs: All pertinent  labs within the past 24 hours have been reviewed.  BMP:   Recent Labs   Lab 03/19/22  1546   GLU 94      K 3.6   *   CO2 18*   BUN 23   CREATININE 1.5*   CALCIUM 9.9     CBC:   Recent Labs   Lab 03/19/22  1546   WBC 3.10*   HGB 9.1*   HCT 27.5*        CMP:   Recent Labs   Lab 03/19/22  1546      K 3.6   *   CO2 18*   GLU 94   BUN 23   CREATININE 1.5*   CALCIUM 9.9   PROT 6.6   ALBUMIN 3.4*   BILITOT 0.3   ALKPHOS 61   AST 15   ALT 11   ANIONGAP 7*   EGFRNONAA 32*     Cardiac Markers:   Recent Labs   Lab 03/19/22  1546   *     Troponin:   Recent Labs   Lab 03/19/22  1546   TROPONINI 0.015       Significant Imaging:   Imaging Results              X-Ray Chest AP Portable (Final result)  Result time 03/19/22 16:50:49      Final result by Theo Kelsey MD (03/19/22 16:50:49)                   Impression:      Tortuous aorta.  Senescent changes.  No definite acute process seen      Electronically signed by: Low Venegas  Date:    03/19/2022  Time:    16:50               Narrative:    EXAMINATION:  XR CHEST AP PORTABLE    CLINICAL HISTORY:  Chest Pain;    TECHNIQUE:  Single frontal view of the chest was performed.    COMPARISON:  None    FINDINGS:  Right upper quadrant surgical clips.  Tortuous aorta.  Postsurgical changes right cervical region.  Mild apical pleural reaction.  Lungs are clear, with normal appearance of pulmonary vasculature and no pleural effusion or pneumothorax.    The cardiac silhouette is normal in size. The hilar and mediastinal contours are unremarkable.    Bones are intact.                                       Assessment/Plan:     * Coronary artery disease, occlusive  Hx of CAD presenting with chest pain will r/o ACS   Troponin negative   Serial troponin's   Recent Echo reviewed, EF 75%, mild AS , mild mitral stenosis.   Cardiology consulted          CKD (chronic kidney disease) stage 4, GFR 15-29 ml/min  -Cr 1.5  -will monitor   -repeat BMP in am        Diastolic heart failure, NYHA class 3  Appears compensated         Essential hypertension  Bp uncontrolled   Resume hydralazine and BB   Monitor       Other hyperlipidemia  Continue statin         VTE Risk Mitigation (From admission, onward)         Ordered     IP VTE HIGH RISK PATIENT  Once         03/19/22 1802     Place sequential compression device  Until discontinued         03/19/22 1802                   Aye Bower NP  Department of Hospital Medicine   '[place] - Emergency Dept.

## 2022-03-19 NOTE — ED PROVIDER NOTES
SCRIBE #1 NOTE: I, Ana Garner, am scribing for, and in the presence of, Kwabena Hollis Do, MD. I have scribed the HPI, ROS, PEx.    SCRIBE #2 NOTE: I, Tucker Benjamin, am scribing for, and in the presence of,  Sharonda Huertas MD. I have scribed the remaining portions of the note not scribed by Scribe #1.      History     Chief Complaint   Patient presents with    Chest Pain     Chest wall pain started this morning, c/o headache, 2 nitro tablets taken PTA     Review of patient's allergies indicates:   Allergen Reactions    Lisinopril      angioedema    Codeine Nausea And Vomiting         History of Present Illness     HPI    3/19/2022, 3:53 PM  History obtained from the patient      History of Present Illness: Glo Dumont is a 86 y.o. female patient with a PMHx of CKD, colitis, HTN, who presents to the Emergency Department for evaluation of CP (raiates to L arm) which onset gradually today. The CP is more in the midsternum and eipghastric region. Symptoms are constant and moderate in severity. No mitigating or exacerbating factors reported. Associated sxs include headache. Patient denies any fever, N/V/D, diaphoresis, abd pain, and all other sxs at this time. Pt took 2 nitro, with some relief. Pt had a mild headache which worsened after the nitro was given. Pt has 1 stent placed in her heart. Pt saw Dr. Joseph on the 15th and is scheduled to have her stress test done this Friday because when he saw her she had some chest tightness prior to her routine visit. No further complaints or concerns at this time.       Arrival mode: Personal vehicle     PCP: Christina Recio MD        Past Medical History:  Past Medical History:   Diagnosis Date    Anemia     Angina pectoris     Anxiety     Anxiety and depression     Arthritis     hip    Carotid artery occlusion     Carpal tunnel syndrome 06/23/2008    emg    Chronic diarrhea     work up in 2011 with EGD, CS and VCE    CKD (chronic kidney disease) stage 3,  GFR 30-59 ml/min 5/11/2017    Colitis     Coronary artery disease     Coronary artery disease     Diastolic dysfunction     Diverticulosis     Glaucoma     Greater trochanteric bursitis 2/10/2015    Grief at loss of child 1/26/2016    H/O carotid endarterectomy 12/2/2013    Heart failure     History of coronary angioplasty 3/11/2014    Hypercholesteremia     Hypertension     Liver cyst 02/08/2013    ct abd    Macular degeneration     Primary open-angle glaucoma(365.11) 9/3/2013    Renal cyst 02/08/2013    ct abd    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    Sarcoidosis     Sarcoidosis of lung     Sickle cell trait     Uveitis        Past Surgical History:  Past Surgical History:   Procedure Laterality Date    CAROTID ENDARTERECTOMY Right 2000s    CATARACT EXTRACTION Bilateral     Dr. Liang Dennis    CHOLECYSTECTOMY      laparoscopic, 3/18.    CORONARY ANGIOPLASTY WITH STENT PLACEMENT  11/19/2010    RCA-HALIE 2010    Lathia    JOINT REPLACEMENT Left 11/03/2014    Dr. Braun    TOTAL ABDOMINAL HYSTERECTOMY W/ BILATERAL SALPINGOOPHORECTOMY  1972         Family History:  Family History   Problem Relation Age of Onset    Hypertension Mother     Heart failure Mother     Cancer Father         prostate    Cirrhosis Brother     Heart failure Brother     Cancer Daughter         Carcinoid       Social History:  Social History     Tobacco Use    Smoking status: Never Smoker    Smokeless tobacco: Never Used   Substance and Sexual Activity    Alcohol use: No     Alcohol/week: 0.0 standard drinks    Drug use: No    Sexual activity: Yes     Partners: Male        Review of Systems     Review of Systems   Constitutional: Negative for diaphoresis and fever.   HENT: Negative for sore throat.    Respiratory: Negative for shortness of breath.    Cardiovascular: Positive for chest pain.   Gastrointestinal: Negative for abdominal pain, diarrhea, nausea and vomiting.   Genitourinary:  Negative for dysuria.   Musculoskeletal: Negative for back pain.   Skin: Negative for rash.   Neurological: Positive for headaches. Negative for weakness.   Hematological: Does not bruise/bleed easily.   All other systems reviewed and are negative.       Physical Exam     Initial Vitals [03/19/22 1503]   BP Pulse Resp Temp SpO2   (!) 169/92 97 20 98 °F (36.7 °C) 96 %      MAP       --          Physical Exam  Nursing Notes and Vital Signs Reviewed.  Constitutional: Patient is in no acute distress. Well-developed and well-nourished.  Head: Atraumatic. Normocephalic.  Eyes: PERRL. EOM intact. Conjunctivae are not pale. No scleral icterus.  ENT: Mucous membranes are moist. Oropharynx is clear and symmetric.    Neck: Supple. Full ROM. No lymphadenopathy.  Cardiovascular: Regular rate. Regular rhythm. No murmurs, rubs, or gallops. Distal pulses are 2+ and symmetric.  Pulmonary/Chest: No respiratory distress. Clear to auscultation bilaterally. No wheezing or rales. No tenderness to chest wall.  Abdominal: Soft and non-distended.  There is no tenderness.  No rebound, guarding, or rigidity. Good bowel sounds.  Genitourinary: No CVA tenderness  Musculoskeletal: Moves all extremities. No obvious deformities. No edema. No calf tenderness.  Skin: Warm and dry. No Zoster noted.  Neurological:  Alert, awake, and appropriate.  Normal speech.  No acute focal neurological deficits are appreciated.  Psychiatric: Normal affect. Good eye contact. Appropriate in content.     ED Course   Procedures  ED Vital Signs:  Vitals:    03/19/22 1752 03/19/22 1855 03/19/22 1932 03/19/22 2049   BP:  (!) 173/86 (!) 193/102    Pulse: 79 89 88 87   Resp:  20 20 20   Temp:  98 °F (36.7 °C) 98.1 °F (36.7 °C)    TempSrc:  Oral     SpO2:  100% 98% 99%   Weight:  49.4 kg (109 lb) 51.8 kg (114 lb 3.2 oz)    Height:        03/19/22 2050 03/20/22 0032 03/20/22 0443 03/20/22 0744   BP:  (!) 145/75 (!) 171/87 (!) 196/93   Pulse:  81 74 79   Resp:  20 20 18  "  Temp:  98.2 °F (36.8 °C) 98.6 °F (37 °C) 98.4 °F (36.9 °C)   TempSrc:  Oral Oral Oral   SpO2: 95% 98% 100% 99%   Weight:  51.8 kg (114 lb 3.2 oz)     Height:        03/20/22 0900 03/20/22 1013 03/20/22 1044 03/20/22 1100   BP:   (!) 183/90    Pulse: 76 80 76 76   Resp:  18 18    Temp:   97 °F (36.1 °C)    TempSrc:   Oral    SpO2: 99% 99% 98%    Weight:   51.8 kg (114 lb 3.2 oz)    Height:   5' 1" (1.549 m)     03/20/22 1108 03/20/22 1300 03/20/22 1310   BP:   126/70   Pulse: 75 70 71   Resp: 18  18   Temp: 97.7 °F (36.5 °C)  97.1 °F (36.2 °C)   TempSrc: Oral  Oral   SpO2: 98%  98%   Weight:      Height:          Abnormal Lab Results:  Labs Reviewed   CBC W/ AUTO DIFFERENTIAL - Abnormal; Notable for the following components:       Result Value    WBC 3.10 (*)     RBC 3.21 (*)     Hemoglobin 9.1 (*)     Hematocrit 27.5 (*)     RDW 15.2 (*)     Immature Granulocytes 0.6 (*)     Gran # (ANC) 1.5 (*)     Lymph # 0.9 (*)     Mono % 18.7 (*)     All other components within normal limits   COMPREHENSIVE METABOLIC PANEL - Abnormal; Notable for the following components:    Chloride 115 (*)     CO2 18 (*)     Creatinine 1.5 (*)     Albumin 3.4 (*)     Anion Gap 7 (*)     eGFR if  37 (*)     eGFR if non  32 (*)     All other components within normal limits   B-TYPE NATRIURETIC PEPTIDE - Abnormal; Notable for the following components:     (*)     All other components within normal limits   TROPONIN I   LIPASE   TROPONIN I   PHOSPHORUS   SARS-COV-2 RNA AMPLIFICATION, QUAL   LIPASE   POCT GLUCOSE MONITORING CONTINUOUS        All Lab Results:  Results for orders placed or performed during the hospital encounter of 03/19/22   CBC auto differential   Result Value Ref Range    WBC 3.10 (L) 3.90 - 12.70 K/uL    RBC 3.21 (L) 4.00 - 5.40 M/uL    Hemoglobin 9.1 (L) 12.0 - 16.0 g/dL    Hematocrit 27.5 (L) 37.0 - 48.5 %    MCV 86 82 - 98 fL    MCH 28.3 27.0 - 31.0 pg    MCHC 33.1 32.0 - 36.0 g/dL    " RDW 15.2 (H) 11.5 - 14.5 %    Platelets 224 150 - 450 K/uL    MPV 10.4 9.2 - 12.9 fL    Immature Granulocytes 0.6 (H) 0.0 - 0.5 %    Gran # (ANC) 1.5 (L) 1.8 - 7.7 K/uL    Immature Grans (Abs) 0.02 0.00 - 0.04 K/uL    Lymph # 0.9 (L) 1.0 - 4.8 K/uL    Mono # 0.6 0.3 - 1.0 K/uL    Eos # 0.1 0.0 - 0.5 K/uL    Baso # 0.02 0.00 - 0.20 K/uL    nRBC 0 0 /100 WBC    Gran % 47.2 38.0 - 73.0 %    Lymph % 29.0 18.0 - 48.0 %    Mono % 18.7 (H) 4.0 - 15.0 %    Eosinophil % 3.9 0.0 - 8.0 %    Basophil % 0.6 0.0 - 1.9 %    Differential Method Automated    Comprehensive metabolic panel   Result Value Ref Range    Sodium 140 136 - 145 mmol/L    Potassium 3.6 3.5 - 5.1 mmol/L    Chloride 115 (H) 95 - 110 mmol/L    CO2 18 (L) 23 - 29 mmol/L    Glucose 94 70 - 110 mg/dL    BUN 23 8 - 23 mg/dL    Creatinine 1.5 (H) 0.5 - 1.4 mg/dL    Calcium 9.9 8.7 - 10.5 mg/dL    Total Protein 6.6 6.0 - 8.4 g/dL    Albumin 3.4 (L) 3.5 - 5.2 g/dL    Total Bilirubin 0.3 0.1 - 1.0 mg/dL    Alkaline Phosphatase 61 55 - 135 U/L    AST 15 10 - 40 U/L    ALT 11 10 - 44 U/L    Anion Gap 7 (L) 8 - 16 mmol/L    eGFR if African American 37 (A) >60 mL/min/1.73 m^2    eGFR if non African American 32 (A) >60 mL/min/1.73 m^2   Troponin I #1   Result Value Ref Range    Troponin I 0.015 0.000 - 0.026 ng/mL   BNP   Result Value Ref Range     (H) 0 - 99 pg/mL   Troponin I #2   Result Value Ref Range    Troponin I <0.006 0.000 - 0.026 ng/mL   Phosphorus   Result Value Ref Range    Phosphorus 3.6 2.7 - 4.5 mg/dL   COVID-19 Rapid Screening   Result Value Ref Range    SARS-CoV-2 RNA, Amplification, Qual Negative Negative   Troponin I   Result Value Ref Range    Troponin I <0.006 0.000 - 0.026 ng/mL   Lipase   Result Value Ref Range    Lipase 41 4 - 60 U/L   Troponin I   Result Value Ref Range    Troponin I <0.006 0.000 - 0.026 ng/mL     *Note: Due to a large number of results and/or encounters for the requested time period, some results have not been displayed. A  complete set of results can be found in Results Review.       Imaging Results:  Imaging Results          X-Ray Chest AP Portable (Final result)  Result time 03/19/22 16:50:49    Final result by Theo Kelsey MD (03/19/22 16:50:49)                 Impression:      Tortuous aorta.  Senescent changes.  No definite acute process seen      Electronically signed by: Low Venegas  Date:    03/19/2022  Time:    16:50             Narrative:    EXAMINATION:  XR CHEST AP PORTABLE    CLINICAL HISTORY:  Chest Pain;    TECHNIQUE:  Single frontal view of the chest was performed.    COMPARISON:  None    FINDINGS:  Right upper quadrant surgical clips.  Tortuous aorta.  Postsurgical changes right cervical region.  Mild apical pleural reaction.  Lungs are clear, with normal appearance of pulmonary vasculature and no pleural effusion or pneumothorax.    The cardiac silhouette is normal in size. The hilar and mediastinal contours are unremarkable.    Bones are intact.                                 The EKG was ordered, reviewed, and independently interpreted by the ED provider.  Interpretation time: 16:13  Rate: 73 BPM  Rhythm: normal sinus rhythm  Interpretation: No acute ST changes. No STEMI.             The Emergency Provider reviewed the vital signs and test results, which are outlined above.     ED Discussion     4:00 PM: Dr. Colon transfers care of patient to Dr. Huertas pending lab and imaging results.    5:19 PM: Discussed pt's case with Dr. Tong (Interventional [facility]) who recommends admit to .    5:53 PM: Discussed case with SRIKANTH Gomes (Hospital Medicine). Dr. Sparks agrees with current care and management of pt and accepts admission.   Admitting Service: Hospital Medicine  Admitting Physician: Dr. Sparks  Admit to: Obs tele    5:53 PM: Re-evaluated pt. I have discussed test results, shared treatment plan, and the need for admission with patient and family at bedside. Pt and family express understanding at  this time and agree with all information. All questions answered. Pt and family have no further questions or concerns at this time. Pt is ready for admit.                 Medical Decision Making:   Clinical Tests:   Lab Tests: Ordered and Reviewed  Radiological Study: Ordered and Reviewed  Medical Tests: Ordered and Reviewed           ED Medication(s):  Medications   aspirin EC tablet 81 mg (81 mg Oral Given 3/20/22 0815)   citalopram tablet 20 mg (20 mg Oral Given 3/20/22 0815)   pantoprazole EC tablet 40 mg (40 mg Oral Given 3/20/22 0815)   pravastatin tablet 40 mg (40 mg Oral Given 3/19/22 2043)   acetaminophen tablet 650 mg (650 mg Oral Given 3/19/22 2252)   ALPRAZolam tablet 0.25 mg (has no administration in time range)   hydrALAZINE injection 10 mg (10 mg Intravenous Not Given 3/20/22 0458)   HYDROcodone-acetaminophen 5-325 mg per tablet 1 tablet (has no administration in time range)   ondansetron injection 4 mg (has no administration in time range)   sodium chloride 0.9% flush 10 mL (has no administration in time range)   prasugreL tablet 10 mg (10 mg Oral Given 3/20/22 0816)   sodium chloride 0.9% flush 10 mL (has no administration in time range)   amLODIPine tablet 5 mg (5 mg Oral Given 3/20/22 0815)   carvediloL tablet 25 mg (25 mg Oral Given 3/20/22 0815)   hydrALAZINE tablet 25 mg (25 mg Oral Given 3/20/22 1426)   cloNIDine tablet 0.1 mg (0.1 mg Oral Given 3/20/22 1137)   loperamide capsule 2 mg (2 mg Oral Given 3/20/22 1138)   aspirin tablet 325 mg (325 mg Oral Given 3/19/22 1545)   metoprolol injection 5 mg (5 mg Intravenous Given 3/19/22 1546)   hydrALAZINE injection 10 mg (10 mg Intravenous Given 3/19/22 1713)   acetaminophen tablet 1,000 mg (1,000 mg Oral Given 3/19/22 1736)   hydrALAZINE injection 10 mg (10 mg Intravenous Given 3/19/22 1736)       Current Discharge Medication List      START taking these medications    Details   cloNIDine (CATAPRES) 0.1 MG tablet Take 1 tablet (0.1 mg total) by  mouth 2 (two) times daily.  Qty: 60 tablet, Refills: 11    Comments: .              Follow-up Information     Christina Recio MD Follow up in 3 day(s).    Specialty: Internal Medicine  Why: for hospital follow-up  Contact information:  [address] [place] [address]  [phone]             Stan Lui MD Follow up in 5 day(s).    Specialties: Interventional Cardiology, Cardiology  Why: for hospital follow-up of chest pain and uncontrolled Bp  Contact information:  [address]  [place] [address]  [phone]                             Scribe Attestation:   Scribe #1: I performed the above scribed service and the documentation accurately describes the services I performed. I attest to the accuracy of the note.     Attending:   Physician Attestation Statement for Scribe #1: I, Kwabena Hollis Do, MD, personally performed the services described in this documentation, as scribed by Ana Garner, in my presence, and it is both accurate and complete.       Scribe Attestation:   Scribe #2: I performed the above scribed service and the documentation accurately describes the services I performed. I attest to the accuracy of the note.    Attending Attestation:           Physician Attestation for Scribe:    Physician Attestation Statement for Scribe #2: I, Sharonda Huertas MD, reviewed documentation, as scribed by Tucker Benjamin in my presence, and it is both accurate and complete. I also acknowledge and confirm the content of the note done by Scribe #1.           Clinical Impression       ICD-10-CM ICD-9-CM   1. Chest pain  R07.9 786.50   2. Unstable angina  I20.0 411.1   3. Hypertension, unspecified type  I10 401.9   4. Diastolic heart failure, NYHA class 3  I50.30 428.30       Disposition:   Disposition: Placed in Observation  Condition: Fair       Sharonda Huertas MD  03/20/22 4450

## 2022-03-19 NOTE — SUBJECTIVE & OBJECTIVE
Past Medical History:   Diagnosis Date    Anemia     Angina pectoris     Anxiety     Anxiety and depression     Arthritis     hip    Carotid artery occlusion     Carpal tunnel syndrome 06/23/2008    emg    Chronic diarrhea     work up in 2011 with EGD, CS and VCE    CKD (chronic kidney disease) stage 3, GFR 30-59 ml/min 5/11/2017    Colitis     Coronary artery disease     Coronary artery disease     Diastolic dysfunction     Diverticulosis     Glaucoma     Greater trochanteric bursitis 2/10/2015    Grief at loss of child 1/26/2016    H/O carotid endarterectomy 12/2/2013    Heart failure     History of coronary angioplasty 3/11/2014    Hypercholesteremia     Hypertension     Liver cyst 02/08/2013    ct abd    Macular degeneration     Primary open-angle glaucoma(365.11) 9/3/2013    Renal cyst 02/08/2013    ct abd    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    Sarcoidosis     Sarcoidosis of lung     Sickle cell trait     Uveitis        Past Surgical History:   Procedure Laterality Date    CAROTID ENDARTERECTOMY Right 2000s    CATARACT EXTRACTION Bilateral     Dr. Liang Dennis    CHOLECYSTECTOMY      laparoscopic, 3/18.    CORONARY ANGIOPLASTY WITH STENT PLACEMENT  11/19/2010    RCA-HALIE 2010    Lathia    JOINT REPLACEMENT Left 11/03/2014    Dr. Braun    TOTAL ABDOMINAL HYSTERECTOMY W/ BILATERAL SALPINGOOPHORECTOMY  1972       Review of patient's allergies indicates:   Allergen Reactions    Lisinopril      angioedema    Codeine Nausea And Vomiting       No current facility-administered medications on file prior to encounter.     Current Outpatient Medications on File Prior to Encounter   Medication Sig    ALPRAZolam (XANAX) 0.25 MG tablet Take 0.5-1 tablets (0.125-0.25 mg total) by mouth 3 (three) times daily as needed for Anxiety. (Patient not taking: Reported on 3/18/2022)    aspirin (ECOTRIN) 81 MG EC tablet Take 81 mg by mouth once daily.    budesonide (ENTOCORT EC) 3 mg capsule Take 9 mg by mouth once  daily.    calcium carbonate (OS-LYNN) 600 mg calcium (1,500 mg) Tab Take by mouth.    citalopram (CELEXA) 20 MG tablet Take 1 tablet (20 mg total) by mouth once daily. (Patient not taking: Reported on 3/18/2022)    cyproheptadine (PERIACTIN) 4 mg tablet Take 1 tablet (4 mg total) by mouth 3 (three) times daily as needed.    dorzolamide-timolol 2-0.5% (COSOPT) 22.3-6.8 mg/mL ophthalmic solution Place 1 drop into both eyes 2 (two) times daily.    EYLEA 2 mg/0.05 mL Soln     FLUoxetine 10 MG capsule 1 capsule in the morning    FOLIC ACID/MULTIVIT-MIN/LUTEIN (CENTRUM SILVER ORAL) Take by mouth.    gabapentin (NEURONTIN) 300 MG capsule Take 1 capsule (300 mg total) by mouth once daily.    hydrALAZINE (APRESOLINE) 25 MG tablet Take 1 tablet (25 mg total) by mouth every 8 (eight) hours.    iron-vitamin C 100-250 mg, ICAR-C, (ICAR-C) 100-250 mg Tab Take 1 tablet by mouth once daily. (Patient not taking: Reported on 3/18/2022)    ketorolac 0.5% (ACULAR) 0.5 % Drop     nitroGLYCERIN (NITROSTAT) 0.4 MG SL tablet Place 1 tablet (0.4 mg total) under the tongue every 5 (five) minutes as needed for Chest pain. (Patient not taking: Reported on 3/18/2022)    nystatin (MYCOSTATIN) 100,000 unit/mL suspension Take by mouth.    OZURDEX 0.7 mg Impl intravitreal implant     pantoprazole (PROTONIX) 40 MG tablet Take 1 tablet (40 mg total) by mouth once daily.    prasugreL (EFFIENT) 10 mg Tab Take 1 tablet (10 mg total) by mouth once daily.    pravastatin (PRAVACHOL) 40 MG tablet TAKE 1 TABLET BY MOUTH DAILY    [DISCONTINUED] amLODIPine (NORVASC) 5 MG tablet Take 1 tablet (5 mg total) by mouth once daily.    [DISCONTINUED] carvediloL (COREG) 12.5 MG tablet Take 1 tablet (12.5 mg total) by mouth 2 (two) times daily with meals.     Family History       Problem Relation (Age of Onset)    Cancer Father, Daughter    Cirrhosis Brother    Heart failure Mother, Brother    Hypertension Mother          Tobacco Use    Smoking status: Never Smoker     Smokeless tobacco: Never Used   Substance and Sexual Activity    Alcohol use: No     Alcohol/week: 0.0 standard drinks    Drug use: No    Sexual activity: Yes     Partners: Male     Review of Systems   Constitutional: Negative.    HENT: Negative.     Eyes: Negative.    Respiratory: Negative.     Cardiovascular:  Positive for chest pain.   Gastrointestinal: Negative.    Endocrine: Negative.    Genitourinary: Negative.    Musculoskeletal: Negative.    Skin: Negative.    Allergic/Immunologic: Negative.    Neurological:  Positive for headaches.   Hematological: Negative.    Psychiatric/Behavioral: Negative.     Objective:     Vital Signs (Most Recent):  Temp: 98 °F (36.7 °C) (03/19/22 1553)  Pulse: 79 (03/19/22 1752)  Resp: 20 (03/19/22 1553)  BP: (!) 187/97 (03/19/22 1736)  SpO2: 99 % (03/19/22 1553)   Vital Signs (24h Range):  Temp:  [98 °F (36.7 °C)] 98 °F (36.7 °C)  Pulse:  [70-97] 79  Resp:  [20-30] 20  SpO2:  [96 %-99 %] 99 %  BP: (169-214)/() 187/97     Weight:  (unable to stand)  There is no height or weight on file to calculate BMI.    Physical Exam  Vitals and nursing note reviewed.   Constitutional:       Appearance: She is well-developed.   HENT:      Head: Normocephalic and atraumatic.   Eyes:      Conjunctiva/sclera: Conjunctivae normal.      Pupils: Pupils are equal, round, and reactive to light.   Cardiovascular:      Rate and Rhythm: Normal rate and regular rhythm.      Heart sounds: Normal heart sounds.   Pulmonary:      Effort: Pulmonary effort is normal.      Breath sounds: Normal breath sounds.   Abdominal:      General: Bowel sounds are normal.      Palpations: Abdomen is soft.   Musculoskeletal:         General: Normal range of motion.      Cervical back: Normal range of motion and neck supple.   Skin:     General: Skin is warm and dry.   Neurological:      Mental Status: She is alert and oriented to person, place, and time.      Deep Tendon Reflexes: Reflexes are normal and symmetric.    Psychiatric:         Behavior: Behavior normal.         Thought Content: Thought content normal.         Judgment: Judgment normal.         CRANIAL NERVES     CN III, IV, VI   Pupils are equal, round, and reactive to light.     Significant Labs: All pertinent labs within the past 24 hours have been reviewed.  BMP:   Recent Labs   Lab 03/19/22  1546   GLU 94      K 3.6   *   CO2 18*   BUN 23   CREATININE 1.5*   CALCIUM 9.9     CBC:   Recent Labs   Lab 03/19/22  1546   WBC 3.10*   HGB 9.1*   HCT 27.5*        CMP:   Recent Labs   Lab 03/19/22  1546      K 3.6   *   CO2 18*   GLU 94   BUN 23   CREATININE 1.5*   CALCIUM 9.9   PROT 6.6   ALBUMIN 3.4*   BILITOT 0.3   ALKPHOS 61   AST 15   ALT 11   ANIONGAP 7*   EGFRNONAA 32*     Cardiac Markers:   Recent Labs   Lab 03/19/22  1546   *     Troponin:   Recent Labs   Lab 03/19/22  1546   TROPONINI 0.015       Significant Imaging:   Imaging Results              X-Ray Chest AP Portable (Final result)  Result time 03/19/22 16:50:49      Final result by Theo Kelsey MD (03/19/22 16:50:49)                   Impression:      Tortuous aorta.  Senescent changes.  No definite acute process seen      Electronically signed by: Low Venegas  Date:    03/19/2022  Time:    16:50               Narrative:    EXAMINATION:  XR CHEST AP PORTABLE    CLINICAL HISTORY:  Chest Pain;    TECHNIQUE:  Single frontal view of the chest was performed.    COMPARISON:  None    FINDINGS:  Right upper quadrant surgical clips.  Tortuous aorta.  Postsurgical changes right cervical region.  Mild apical pleural reaction.  Lungs are clear, with normal appearance of pulmonary vasculature and no pleural effusion or pneumothorax.    The cardiac silhouette is normal in size. The hilar and mediastinal contours are unremarkable.    Bones are intact.

## 2022-03-19 NOTE — ASSESSMENT & PLAN NOTE
Hx of CAD presenting with chest pain will r/o ACS   Troponin negative   Serial troponin's   Recent Echo reviewed, EF 75%, mild AS , mild mitral stenosis.   Cardiology consulted

## 2022-03-19 NOTE — HPI
Glo Dumont is a 86 y.o. female patient with a PMHx of CKD, CAD, Diastolic CHF, Anxiety, Anemia, Hypercholesteremia, and HTN who presents to the Emergency Department for evaluation of CP (raiates to L arm) which onset gradually today. The [facility].  Associated sxs include headache. Patient denies any fever, N/V/D, diaphoresis, abd pain, and all other sxs at this time. Pt took 2 nitro, with some relief. Pt had a mild headache which worsened after the nitro was given. Pt has 1 stent placed in her heart. Pt saw Dr. Joseph on the 15th and is scheduled to have her stress test done this Friday. Pt is a DNR and Dawna Toribio (daughter) at [phone] is the surrogate decision maker. Found in the ED to have bp of 201/101. H/H 9.1/27.5, CO2 18, Creatinine 1.5, . Chest xray showed no acute abnormality. Patient received 20 mg of IV hydralazine and 5 mg of IV metoprolol in the ED. Hospital Medicine contacted for admission with patient placed in Observation for chest pain.

## 2022-03-20 VITALS
TEMPERATURE: 97 F | OXYGEN SATURATION: 98 % | DIASTOLIC BLOOD PRESSURE: 70 MMHG | RESPIRATION RATE: 18 BRPM | HEIGHT: 61 IN | HEART RATE: 71 BPM | WEIGHT: 114.19 LBS | BODY MASS INDEX: 21.56 KG/M2 | SYSTOLIC BLOOD PRESSURE: 126 MMHG

## 2022-03-20 LAB — TROPONIN I SERPL DL<=0.01 NG/ML-MCNC: <0.006 NG/ML (ref 0–0.03)

## 2022-03-20 PROCEDURE — 25000003 PHARM REV CODE 250: Performed by: NURSE PRACTITIONER

## 2022-03-20 PROCEDURE — 84484 ASSAY OF TROPONIN QUANT: CPT | Performed by: NURSE PRACTITIONER

## 2022-03-20 PROCEDURE — 94761 N-INVAS EAR/PLS OXIMETRY MLT: CPT

## 2022-03-20 PROCEDURE — 36415 COLL VENOUS BLD VENIPUNCTURE: CPT | Performed by: NURSE PRACTITIONER

## 2022-03-20 PROCEDURE — G0378 HOSPITAL OBSERVATION PER HR: HCPCS

## 2022-03-20 PROCEDURE — 25000003 PHARM REV CODE 250: Performed by: INTERNAL MEDICINE

## 2022-03-20 RX ORDER — HYDRALAZINE HYDROCHLORIDE 25 MG/1
25 TABLET, FILM COATED ORAL EVERY 8 HOURS
Status: DISCONTINUED | OUTPATIENT
Start: 2022-03-20 | End: 2022-03-20 | Stop reason: HOSPADM

## 2022-03-20 RX ORDER — CARVEDILOL 12.5 MG/1
25 TABLET ORAL 2 TIMES DAILY WITH MEALS
Qty: 60 TABLET | Refills: 0
Start: 2022-03-20 | End: 2022-05-12 | Stop reason: SDUPTHER

## 2022-03-20 RX ORDER — AMLODIPINE BESYLATE 5 MG/1
5 TABLET ORAL DAILY
Qty: 30 TABLET | Refills: 0 | Status: SHIPPED | OUTPATIENT
Start: 2022-03-20 | End: 2023-02-08

## 2022-03-20 RX ORDER — CARVEDILOL 6.25 MG/1
25 TABLET ORAL 2 TIMES DAILY WITH MEALS
Status: DISCONTINUED | OUTPATIENT
Start: 2022-03-20 | End: 2022-03-20 | Stop reason: HOSPADM

## 2022-03-20 RX ORDER — CARVEDILOL 12.5 MG/1
25 TABLET ORAL 2 TIMES DAILY WITH MEALS
Qty: 120 TABLET | Refills: 11
Start: 2022-03-20 | End: 2022-03-20 | Stop reason: SDUPTHER

## 2022-03-20 RX ORDER — HYDRALAZINE HYDROCHLORIDE 20 MG/ML
10 INJECTION INTRAMUSCULAR; INTRAVENOUS EVERY 6 HOURS PRN
Status: DISCONTINUED | OUTPATIENT
Start: 2022-03-20 | End: 2022-03-20

## 2022-03-20 RX ORDER — AMLODIPINE BESYLATE 5 MG/1
5 TABLET ORAL DAILY
Qty: 90 TABLET | Refills: 3 | Status: SHIPPED | OUTPATIENT
Start: 2022-03-20 | End: 2022-03-20 | Stop reason: SDUPTHER

## 2022-03-20 RX ORDER — LOPERAMIDE HYDROCHLORIDE 2 MG/1
2 CAPSULE ORAL 4 TIMES DAILY PRN
Status: DISCONTINUED | OUTPATIENT
Start: 2022-03-20 | End: 2022-03-20

## 2022-03-20 RX ORDER — AMLODIPINE BESYLATE 5 MG/1
5 TABLET ORAL DAILY
Status: DISCONTINUED | OUTPATIENT
Start: 2022-03-20 | End: 2022-03-20 | Stop reason: HOSPADM

## 2022-03-20 RX ORDER — CLONIDINE HYDROCHLORIDE 0.1 MG/1
0.1 TABLET ORAL 2 TIMES DAILY
Qty: 60 TABLET | Refills: 11 | Status: SHIPPED | OUTPATIENT
Start: 2022-03-20 | End: 2022-03-29 | Stop reason: SDUPTHER

## 2022-03-20 RX ORDER — CLONIDINE HYDROCHLORIDE 0.1 MG/1
0.1 TABLET ORAL 2 TIMES DAILY
Status: DISCONTINUED | OUTPATIENT
Start: 2022-03-20 | End: 2022-03-20 | Stop reason: HOSPADM

## 2022-03-20 RX ORDER — LOPERAMIDE HYDROCHLORIDE 2 MG/1
2 CAPSULE ORAL 4 TIMES DAILY PRN
Status: DISCONTINUED | OUTPATIENT
Start: 2022-03-20 | End: 2022-03-20 | Stop reason: HOSPADM

## 2022-03-20 RX ADMIN — HYDRALAZINE HYDROCHLORIDE 25 MG: 25 TABLET, FILM COATED ORAL at 02:03

## 2022-03-20 RX ADMIN — LOPERAMIDE HYDROCHLORIDE 2 MG: 2 CAPSULE ORAL at 11:03

## 2022-03-20 RX ADMIN — ASPIRIN 81 MG: 81 TABLET, COATED ORAL at 08:03

## 2022-03-20 RX ADMIN — CLONIDINE HYDROCHLORIDE 0.1 MG: 0.1 TABLET ORAL at 11:03

## 2022-03-20 RX ADMIN — PRASUGREL HYDROCHLORIDE 10 MG: 10 TABLET, COATED ORAL at 08:03

## 2022-03-20 RX ADMIN — PANTOPRAZOLE SODIUM 40 MG: 40 TABLET, DELAYED RELEASE ORAL at 08:03

## 2022-03-20 RX ADMIN — AMLODIPINE BESYLATE 5 MG: 5 TABLET ORAL at 08:03

## 2022-03-20 RX ADMIN — CARVEDILOL 25 MG: 6.25 TABLET, FILM COATED ORAL at 08:03

## 2022-03-20 RX ADMIN — HYDRALAZINE HYDROCHLORIDE 25 MG: 25 TABLET, FILM COATED ORAL at 05:03

## 2022-03-20 RX ADMIN — CITALOPRAM HYDROBROMIDE 20 MG: 10 TABLET ORAL at 08:03

## 2022-03-20 RX ADMIN — HYDRALAZINE HYDROCHLORIDE 25 MG: 25 TABLET, FILM COATED ORAL at 08:03

## 2022-03-20 NOTE — PLAN OF CARE
[facility]  Discharge Final Note    Primary Care Provider: Christina Recio MD    Expected Discharge Date: 3/20/2022    Final Discharge Note (most recent)       Final Note - 03/20/22 1023          Final Note    Assessment Type Final Discharge Note (P)      Anticipated Discharge Disposition Home or Self Care (P)         Post-Acute Status    Discharge Delays None known at this time (P)                      Important Message from Medicare             Contact Info       Christina Recio MD   Specialty: Internal Medicine   Relationship: PCP - General    [address] [facility]  [place] [address]   Phone: [phone]       Next Steps: Follow up in 3 day(s)    Instructions: for hospital follow-up    Stan Lui MD   Specialty: Interventional Cardiology, Cardiology    [address] [facility]  [place] [address]   Phone: [phone]       Next Steps: Follow up in 5 day(s)    Instructions: for hospital follow-up of chest pain and uncontrolled Bp            Sandy Castro LMSW 3/20/2022 10:24 AM

## 2022-03-20 NOTE — DISCHARGE SUMMARY
[facility]  Hospital Medicine  Discharge Summary      Patient Name: Glo Dumont  MRN: [id number]  Patient Class: OP- Observation  Admission Date: 3/19/2022  Hospital Length of Stay: 0 days  Discharge Date and Time:  03/20/2022 3:12 PM  Attending Physician: Ehsan Sparks MD   Discharging Provider: Aye Bower NP  Primary Care Provider: Christina Recio MD      HPI:   Glo Dumont is a 86 y.o. female patient with a PMHx of CKD, CAD, Diastolic CHF, Anxiety, Anemia, Hypercholesteremia, and HTN who presents to the Emergency Department for evaluation of CP (raiates to L arm) which onset gradually today. The CP is more in the midsternum and eihastric region.  Associated sxs include headache. Patient denies any fever, N/V/D, diaphoresis, abd pain, and all other sxs at this time. Pt took 2 nitro, with some relief. Pt had a mild headache which worsened after the nitro was given. Pt has 1 stent placed in her heart. Pt saw Dr. Joseph on the 15th and is scheduled to have her stress test done this Friday. Pt is a DNR and Dawna Toribio (daughter) at [phone] is the surrogate decision maker. Found in the ED to have bp of 201/101. H/H 9.1/27.5, CO2 18, Creatinine 1.5, . Chest xray showed no acute abnormality. Patient received 20 mg of IV hydralazine and 5 mg of IV metoprolol in the ED. Hospital Medicine contacted for admission with patient placed in Observation for chest pain.                   * No surgery found *      Hospital Course:   85 y/o admitted for chest pain and uncontrolled HTN. Serial troponin's negative. Recent Echo reviewed, EF 75%, mild AS , mild mitral stenosis. Home bp meds restarted and clonidine added as daughter reports pt has not been taking her antihypertensives. Blood pressure improved. Pt scheduled for stress test on Friday. Patient seen and examined on the date of discharge and found stable for discharge. Home meds reconciled. Patient to follow-up with PCP and  cardiology outpatient.        Goals of Care Treatment Preferences:  Code Status: Full Code      Consults:     No new Assessment & Plan notes have been filed under this hospital service since the last note was generated.  Service: Hospital Medicine    Final Active Diagnoses:    Diagnosis Date Noted POA    PRINCIPAL PROBLEM:  Coronary artery disease, occlusive [I25.10] 05/19/2014 Yes     Chronic    CKD (chronic kidney disease) stage 4, GFR 15-29 ml/min [N18.4] 05/11/2017 Yes    Essential hypertension [I10] 02/01/2016 Yes     Chronic    Diastolic heart failure, NYHA class 3 [I50.30] 02/01/2016 Yes    Other hyperlipidemia [E78.49] 07/08/2013 Yes      Problems Resolved During this Admission:       Discharged Condition: stable    Disposition: Home or Self Care    Follow Up:   Follow-up Information     Christina Recio MD Follow up in 3 day(s).    Specialty: Internal Medicine  Why: for hospital follow-up  Contact information:  [address] [place] [address]  [phone]             Stan Lui MD Follow up in 5 day(s).    Specialties: Interventional Cardiology, Cardiology  Why: for hospital follow-up of chest pain and uncontrolled Bp  Contact information:  [address] [facility]  [place] [address]  [phone]                       Patient Instructions:      Notify your health care provider if you experience any of the following:  severe uncontrolled pain     Notify your health care provider if you experience any of the following:  difficulty breathing or increased cough     Notify your health care provider if you experience any of the following:  severe persistent headache     Notify your health care provider if you experience any of the following:  persistent dizziness, light-headedness, or visual disturbances     Notify your health care provider if you experience any of the following:  increased confusion or weakness     Activity as tolerated       Significant Diagnostic Studies: Labs:   BMP:    Recent Labs   Lab 03/19/22  1546   GLU 94      K 3.6   *   CO2 18*   BUN 23   CREATININE 1.5*   CALCIUM 9.9   , CMP   Recent Labs   Lab 03/19/22  1546      K 3.6   *   CO2 18*   GLU 94   BUN 23   CREATININE 1.5*   CALCIUM 9.9   PROT 6.6   ALBUMIN 3.4*   BILITOT 0.3   ALKPHOS 61   AST 15   ALT 11   ANIONGAP 7*   ESTGFRAFRICA 37*   EGFRNONAA 32*   , CBC   Recent Labs   Lab 03/19/22  1546   WBC 3.10*   HGB 9.1*   HCT 27.5*       and Troponin   Recent Labs   Lab 03/19/22  1855 03/19/22  2020 03/20/22  0010   TROPONINI <0.006 <0.006 <0.006       Pending Diagnostic Studies:     None         Medications:  Reconciled Home Medications:      Medication List      START taking these medications    cloNIDine 0.1 MG tablet  Commonly known as: CATAPRES  Take 1 tablet (0.1 mg total) by mouth 2 (two) times daily.        CHANGE how you take these medications    carvediloL 12.5 MG tablet  Commonly known as: COREG  Take 2 tablets (25 mg total) by mouth 2 (two) times daily with meals.  What changed: how much to take        CONTINUE taking these medications    ALPRAZolam 0.25 MG tablet  Commonly known as: XANAX  Take 0.5-1 tablets (0.125-0.25 mg total) by mouth 3 (three) times daily as needed for Anxiety.     amLODIPine 5 MG tablet  Commonly known as: NORVASC  Take 1 tablet (5 mg total) by mouth once daily.     aspirin 81 MG EC tablet  Commonly known as: ECOTRIN  Take 81 mg by mouth once daily.     budesonide 3 mg capsule  Commonly known as: ENTOCORT EC  Take 9 mg by mouth once daily.     calcium carbonate 600 mg calcium (1,500 mg) Tab  Commonly known as: OS-LYNN  Take by mouth.     CENTRUM SILVER ORAL  Take by mouth.     cyproheptadine 4 mg tablet  Commonly known as: PERIACTIN  Take 1 tablet (4 mg total) by mouth 3 (three) times daily as needed.     dorzolamide-timolol 2-0.5% 22.3-6.8 mg/mL ophthalmic solution  Commonly known as: COSOPT  Place 1 drop into both eyes 2 (two) times daily.     EYLEA 2 mg/0.05  mL Soln  Generic drug: aflibercept     FLUoxetine 10 MG capsule  1 capsule in the morning     gabapentin 300 MG capsule  Commonly known as: NEURONTIN  Take 1 capsule (300 mg total) by mouth once daily.     hydrALAZINE 25 MG tablet  Commonly known as: APRESOLINE  Take 1 tablet (25 mg total) by mouth every 8 (eight) hours.     iron-vitamin C 100-250 mg (ICAR-C) 100-250 mg Tab  Commonly known as: ICAR-C  Take 1 tablet by mouth once daily.     nitroGLYCERIN 0.4 MG SL tablet  Commonly known as: NITROSTAT  Place 1 tablet (0.4 mg total) under the tongue every 5 (five) minutes as needed for Chest pain.     nystatin 100,000 unit/mL suspension  Commonly known as: MYCOSTATIN  Take by mouth.     OZURDEX 0.7 mg Impl intravitreal implant  Generic drug: dexAMETHasone     pantoprazole 40 MG tablet  Commonly known as: PROTONIX  Take 1 tablet (40 mg total) by mouth once daily.     prasugreL 10 mg Tab  Commonly known as: EFFIENT  Take 1 tablet (10 mg total) by mouth once daily.     pravastatin 40 MG tablet  Commonly known as: PRAVACHOL  TAKE 1 TABLET BY MOUTH DAILY        STOP taking these medications    citalopram 20 MG tablet  Commonly known as: CeleXA     ketorolac 0.5% 0.5 % Drop  Commonly known as: ACULAR            Indwelling Lines/Drains at time of discharge:   Lines/Drains/Airways     None                 Time spent on the discharge of patient: 45 minutes         Aye Bower NP  Department of Hospital Medicine  [facility]

## 2022-03-20 NOTE — PLAN OF CARE
Pt oriented x. 4 VSS. Speech clear . Nad noted  Pt remained afebrile throughout this shift.   All meds administered per order given   Hydralazine prn for  Elevated BP  Pt remained free of falls this shift.   Pt had no c/o chest pain this shift.  C/o headache. Tylenol given per order  Diarrhea x3 episodes.   Plan of care reviewed. Patient verbalizes understanding.   Pt moving/turning independently.   Patient NSR on monitor. #[id number]   Bed low, side rails up x 2, wheels locked, call light in reach.   Patient instructed to call for assistance.   Hourly rounding completed.

## 2022-03-20 NOTE — HOSPITAL COURSE
87 y/o admitted for chest pain and uncontrolled HTN. Serial troponin's negative. Recent Echo reviewed, EF 75%, mild AS , mild mitral stenosis. Home bp meds restarted and clonidine added as daughter reports pt has not been taking her antihypertensives. Blood pressure improved. Pt scheduled for stress test on Friday. Patient seen and examined on the date of discharge and found stable for discharge. Home meds reconciled. Patient to follow-up with PCP and cardiology outpatient.

## 2022-03-20 NOTE — PLAN OF CARE
Pt. Discharged home.  Left floor via wheelchair, nurse, and daughter.  No complaints voiced.  IV and tele monitor dcd.

## 2022-03-20 NOTE — NURSING
Recvd patint via stretcher from ER. ATHAD. Nad noted . Room air , Verbal able to make needs known, Denies CP at this time . Pt is hypertensive. P.o meds do will give and recheck BP,Luther.  Ambulatorty w/o assisstance,CB near instructed to call for assisstance. Tele monitor placed.      Pt c/o bad headache tylenol given BP Rechecked  contiue elevated will given prn hydralazine

## 2022-03-22 ENCOUNTER — PATIENT MESSAGE (OUTPATIENT)
Dept: FAMILY MEDICINE | Facility: CLINIC | Age: 87
End: 2022-03-22
Payer: MEDICARE

## 2022-03-22 ENCOUNTER — PATIENT MESSAGE (OUTPATIENT)
Dept: CARDIOLOGY | Facility: CLINIC | Age: 87
End: 2022-03-22
Payer: MEDICARE

## 2022-03-22 DIAGNOSIS — R53.1 WEAKNESS: Primary | ICD-10-CM

## 2022-03-23 ENCOUNTER — EXTERNAL HOME HEALTH (OUTPATIENT)
Dept: HOME HEALTH SERVICES | Facility: HOSPITAL | Age: 87
End: 2022-03-23
Payer: MEDICARE

## 2022-03-25 ENCOUNTER — HOSPITAL ENCOUNTER (OUTPATIENT)
Dept: CARDIOLOGY | Facility: HOSPITAL | Age: 87
Discharge: HOME | End: 2022-03-25
Attending: INTERNAL MEDICINE
Payer: MEDICARE

## 2022-03-25 ENCOUNTER — HOSPITAL ENCOUNTER (OUTPATIENT)
Dept: RADIOLOGY | Facility: HOSPITAL | Age: 87
Discharge: HOME | End: 2022-03-25
Attending: INTERNAL MEDICINE
Payer: MEDICARE

## 2022-03-25 ENCOUNTER — TELEPHONE (OUTPATIENT)
Dept: CARDIOLOGY | Facility: CLINIC | Age: 87
End: 2022-03-25
Payer: MEDICARE

## 2022-03-25 DIAGNOSIS — I25.10 CORONARY ARTERY DISEASE INVOLVING NATIVE CORONARY ARTERY, UNSPECIFIED WHETHER ANGINA PRESENT, UNSPECIFIED WHETHER NATIVE OR TRANSPLANTED HEART: ICD-10-CM

## 2022-03-25 DIAGNOSIS — Z95.5 H/O HEART ARTERY STENT: ICD-10-CM

## 2022-03-25 LAB
CV STRESS BASE HR: 59 BPM
DIASTOLIC BLOOD PRESSURE: 74 MMHG
NUC REST EJECTION FRACTION: 74
NUC STRESS EJECTION FRACTION: 82 %
OHS CV CPX 85 PERCENT MAX PREDICTED HEART RATE MALE: 112
OHS CV CPX MAX PREDICTED HEART RATE: 132
OHS CV CPX PATIENT IS FEMALE: 1
OHS CV CPX PATIENT IS MALE: 0
OHS CV CPX PEAK DIASTOLIC BLOOD PRESSURE: 70 MMHG
OHS CV CPX PEAK HEAR RATE: 85 BPM
OHS CV CPX PEAK RATE PRESSURE PRODUCT: NORMAL
OHS CV CPX PEAK SYSTOLIC BLOOD PRESSURE: 131 MMHG
OHS CV CPX PERCENT MAX PREDICTED HEART RATE ACHIEVED: 64
OHS CV CPX RATE PRESSURE PRODUCT PRESENTING: 8496
STRESS ECHO POST EXERCISE DUR MIN: 1 MINUTES
STRESS ECHO POST EXERCISE DUR SEC: 9 SECONDS
SYSTOLIC BLOOD PRESSURE: 144 MMHG

## 2022-03-25 PROCEDURE — 93018 STRESS TEST WITH MYOCARDIAL PERFUSION (CUPID ONLY): ICD-10-PCS | Mod: ,,, | Performed by: INTERNAL MEDICINE

## 2022-03-25 PROCEDURE — A9502 TC99M TETROFOSMIN: HCPCS

## 2022-03-25 PROCEDURE — 93016 STRESS TEST WITH MYOCARDIAL PERFUSION (CUPID ONLY): ICD-10-PCS | Mod: ,,, | Performed by: INTERNAL MEDICINE

## 2022-03-25 PROCEDURE — 93016 CV STRESS TEST SUPVJ ONLY: CPT | Mod: ,,, | Performed by: INTERNAL MEDICINE

## 2022-03-25 PROCEDURE — 93018 CV STRESS TEST I&R ONLY: CPT | Mod: ,,, | Performed by: INTERNAL MEDICINE

## 2022-03-25 PROCEDURE — 78452 HT MUSCLE IMAGE SPECT MULT: CPT | Mod: 26,,, | Performed by: INTERNAL MEDICINE

## 2022-03-25 PROCEDURE — 78452 STRESS TEST WITH MYOCARDIAL PERFUSION (CUPID ONLY): ICD-10-PCS | Mod: 26,,, | Performed by: INTERNAL MEDICINE

## 2022-03-25 PROCEDURE — 93017 CV STRESS TEST TRACING ONLY: CPT

## 2022-03-25 PROCEDURE — 63600175 PHARM REV CODE 636 W HCPCS: Performed by: INTERNAL MEDICINE

## 2022-03-25 RX ORDER — REGADENOSON 0.08 MG/ML
0.4 INJECTION, SOLUTION INTRAVENOUS ONCE
Status: COMPLETED | OUTPATIENT
Start: 2022-03-25 | End: 2022-03-25

## 2022-03-25 RX ORDER — AMINOPHYLLINE 25 MG/ML
75 INJECTION, SOLUTION INTRAVENOUS ONCE
Status: COMPLETED | OUTPATIENT
Start: 2022-03-25 | End: 2022-03-25

## 2022-03-25 RX ADMIN — REGADENOSON 0.4 MG: 0.08 INJECTION, SOLUTION INTRAVENOUS at 10:03

## 2022-03-25 RX ADMIN — AMINOPHYLLINE 75 MG: 25 INJECTION, SOLUTION INTRAVENOUS at 10:03

## 2022-03-25 NOTE — TELEPHONE ENCOUNTER
Patient contacted and LVM that she may have to go to  another location if she would like another visit.was advised that she can return the call on Monday to get another appointment scheduled.    ----- Message from Mami Bennett sent at 3/24/2022  3:27 PM CDT -----  Contact: Jessica-daughter  Calling in regards getting her apt rescheduled on 03/30/2022. Please call [phone]

## 2022-03-29 ENCOUNTER — OUTPATIENT CASE MANAGEMENT (OUTPATIENT)
Dept: ADMINISTRATIVE | Facility: OTHER | Age: 87
End: 2022-03-29
Payer: MEDICARE

## 2022-03-29 ENCOUNTER — OFFICE VISIT (OUTPATIENT)
Dept: CARDIOLOGY | Facility: CLINIC | Age: 87
End: 2022-03-29
Payer: MEDICARE

## 2022-03-29 ENCOUNTER — OUTPATIENT CASE MANAGEMENT (OUTPATIENT)
Dept: ADMINISTRATIVE | Facility: OTHER | Age: 87
End: 2022-03-29

## 2022-03-29 VITALS
DIASTOLIC BLOOD PRESSURE: 62 MMHG | BODY MASS INDEX: 19.99 KG/M2 | OXYGEN SATURATION: 97 % | HEART RATE: 80 BPM | SYSTOLIC BLOOD PRESSURE: 124 MMHG | WEIGHT: 105.81 LBS

## 2022-03-29 DIAGNOSIS — I10 ESSENTIAL HYPERTENSION: ICD-10-CM

## 2022-03-29 DIAGNOSIS — I25.10 CORONARY ARTERY DISEASE, OCCLUSIVE: ICD-10-CM

## 2022-03-29 DIAGNOSIS — Z95.5 H/O HEART ARTERY STENT: ICD-10-CM

## 2022-03-29 DIAGNOSIS — I35.0 AORTIC VALVE STENOSIS, ETIOLOGY OF CARDIAC VALVE DISEASE UNSPECIFIED: ICD-10-CM

## 2022-03-29 DIAGNOSIS — I11.0 HYPERTENSIVE HEART DISEASE WITH HEART FAILURE: Primary | ICD-10-CM

## 2022-03-29 DIAGNOSIS — N18.4 CKD (CHRONIC KIDNEY DISEASE) STAGE 4, GFR 15-29 ML/MIN: ICD-10-CM

## 2022-03-29 DIAGNOSIS — D86.0 SARCOIDOSIS OF LUNG: ICD-10-CM

## 2022-03-29 DIAGNOSIS — I50.30 DIASTOLIC HEART FAILURE, NYHA CLASS 3: ICD-10-CM

## 2022-03-29 DIAGNOSIS — R00.1 BRADYCARDIA: ICD-10-CM

## 2022-03-29 DIAGNOSIS — Z98.61 HISTORY OF CORONARY ANGIOPLASTY: ICD-10-CM

## 2022-03-29 PROCEDURE — 99499 RISK ADDL DX/OHS AUDIT: ICD-10-PCS | Mod: HCNC,S$GLB,, | Performed by: INTERNAL MEDICINE

## 2022-03-29 PROCEDURE — 3078F DIAST BP <80 MM HG: CPT | Mod: CPTII,S$GLB,, | Performed by: INTERNAL MEDICINE

## 2022-03-29 PROCEDURE — 1157F PR ADVANCE CARE PLAN OR EQUIV PRESENT IN MEDICAL RECORD: ICD-10-PCS | Mod: CPTII,S$GLB,, | Performed by: INTERNAL MEDICINE

## 2022-03-29 PROCEDURE — 1157F ADVNC CARE PLAN IN RCRD: CPT | Mod: CPTII,S$GLB,, | Performed by: INTERNAL MEDICINE

## 2022-03-29 PROCEDURE — 3074F PR MOST RECENT SYSTOLIC BLOOD PRESSURE < 130 MM HG: ICD-10-PCS | Mod: CPTII,S$GLB,, | Performed by: INTERNAL MEDICINE

## 2022-03-29 PROCEDURE — 3078F PR MOST RECENT DIASTOLIC BLOOD PRESSURE < 80 MM HG: ICD-10-PCS | Mod: CPTII,S$GLB,, | Performed by: INTERNAL MEDICINE

## 2022-03-29 PROCEDURE — 99214 PR OFFICE/OUTPT VISIT, EST, LEVL IV, 30-39 MIN: ICD-10-PCS | Mod: S$GLB,,, | Performed by: INTERNAL MEDICINE

## 2022-03-29 PROCEDURE — 99214 OFFICE O/P EST MOD 30 MIN: CPT | Mod: S$GLB,,, | Performed by: INTERNAL MEDICINE

## 2022-03-29 PROCEDURE — 99999 PR PBB SHADOW E&M-EST. PATIENT-LVL III: CPT | Mod: PBBFAC,,, | Performed by: INTERNAL MEDICINE

## 2022-03-29 PROCEDURE — 99499 UNLISTED E&M SERVICE: CPT | Mod: HCNC,S$GLB,, | Performed by: INTERNAL MEDICINE

## 2022-03-29 PROCEDURE — 99999 PR PBB SHADOW E&M-EST. PATIENT-LVL III: ICD-10-PCS | Mod: PBBFAC,,, | Performed by: INTERNAL MEDICINE

## 2022-03-29 PROCEDURE — 3074F SYST BP LT 130 MM HG: CPT | Mod: CPTII,S$GLB,, | Performed by: INTERNAL MEDICINE

## 2022-03-29 RX ORDER — HYDRALAZINE HYDROCHLORIDE 25 MG/1
25 TABLET, FILM COATED ORAL EVERY 8 HOURS
Qty: 270 TABLET | Refills: 3 | Status: ON HOLD | OUTPATIENT
Start: 2022-03-29 | End: 2023-03-22 | Stop reason: HOSPADM

## 2022-03-29 RX ORDER — CLONIDINE HYDROCHLORIDE 0.1 MG/1
0.1 TABLET ORAL 2 TIMES DAILY
Qty: 180 TABLET | Refills: 3 | Status: ON HOLD | OUTPATIENT
Start: 2022-03-29 | End: 2023-03-22 | Stop reason: HOSPADM

## 2022-03-29 NOTE — LETTER
April 11, 2022    Glo OSUNA Tim  [address] [facility] [address]             [facility] [address] [place] [address] Dear Mrs. Dumont:    My name is Carolyn Bess and I work with [facility] Outpatient Case Management Department. I have been unsuccessful at reaching you to follow-up to see how you have been doing. Please call me back at your earliest convenience to discuss your health care needs. I can be reached at [phone] from 8:00AM to 4:30 PM on Monday thru Friday.    [facility] is a program offered through [facility] where a nurse is available 24/7 to answer questions or provide medical advice, their number is [phone].     If you have any questions or concerns, please don't hesitate to call.    Sincerely,        Carolyn Bess RN

## 2022-03-29 NOTE — PROGRESS NOTES
Subjective:   Patient ID:  Glo Dumont is a 86 y.o. female who presents for evaluation of No chief complaint on file.      HPI   3.29.2022  Patient was recently discharged from the hospital with hypertensive emergency.  She is here for short post discharge follow-up.  Her hypertensive urgency was due to noncompliance.  Patient daughter states that her mother stop taking all of her medications all at once.  She had a normal nuclear stress test while in the hospital.  Her troponin was negative.  BNP was mildly elevated.  She is euvolemic today.  Denies any dyspnea, any leg swelling or orthopnea.  States she is taking all of her medications.  They brought the bottles with them to clinic today.  Denies any more chest pain or syncope or presyncope.    3.15.2022  85 yo male seen Dr STRANGE and Dr Tim before   exrtensive pmhx as below   Comes in for evaluation of chest pain, h/o CAD s/p PCI  , unknown   States she has been having more chest pains lately described as retrosternal tightness, 5/10, more with strenuous acitivity but can happen at rest as well , no nausea or vomiting   No syncope, no dizziness,   Compliant with meds     She had a syncope, two weeks ago, work up revealed hyperkalemia, patient was taking kcl at home, had k of 7 on admission   Reversed with lokelma   Tre on admission 55 bpm however, not very significant   Scheduled for HM 48 hours end of the month       Past Medical History:   Diagnosis Date    Anemia     Angina pectoris     Anxiety     Anxiety and depression     Arthritis     hip    Carotid artery occlusion     Carpal tunnel syndrome 06/23/2008    emg    Chronic diarrhea     work up in 2011 with EGD, CS and VCE    CKD (chronic kidney disease) stage 3, GFR 30-59 ml/min 5/11/2017    Colitis     Coronary artery disease     Coronary artery disease     Diastolic dysfunction     Diverticulosis     Glaucoma     Greater trochanteric bursitis 2/10/2015    Grief at loss of child  1/26/2016    H/O carotid endarterectomy 12/2/2013    Heart failure     History of coronary angioplasty 3/11/2014    Hypercholesteremia     Hypertension     Liver cyst 02/08/2013    ct abd    Macular degeneration     Primary open-angle glaucoma(365.11) 9/3/2013    Renal cyst 02/08/2013    ct abd    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    Sarcoidosis     Sarcoidosis of lung     Sickle cell trait     Uveitis        Past Surgical History:   Procedure Laterality Date    CAROTID ENDARTERECTOMY Right 2000s    CATARACT EXTRACTION Bilateral     Dr. Liang Dennis    CHOLECYSTECTOMY      laparoscopic, 3/18.    CORONARY ANGIOPLASTY WITH STENT PLACEMENT  11/19/2010    RCA-HALIE 2010    Lathia    JOINT REPLACEMENT Left 11/03/2014    Dr. Braun    TOTAL ABDOMINAL HYSTERECTOMY W/ BILATERAL SALPINGOOPHORECTOMY  1972       Social History     Tobacco Use    Smoking status: Never Smoker    Smokeless tobacco: Never Used   Substance Use Topics    Alcohol use: No     Alcohol/week: 0.0 standard drinks    Drug use: No       Family History   Problem Relation Age of Onset    Hypertension Mother     Heart failure Mother     Cancer Father         prostate    Cirrhosis Brother     Heart failure Brother     Cancer Daughter         Carcinoid       Review of Systems   Constitutional: Negative for fever and malaise/fatigue.   HENT: Negative for sore throat.    Eyes: Negative for blurred vision.   Cardiovascular: Negative for claudication, cyanosis, dyspnea on exertion, irregular heartbeat, leg swelling, near-syncope, orthopnea, palpitations, paroxysmal nocturnal dyspnea and syncope.   Respiratory: Negative for cough and hemoptysis.    Hematologic/Lymphatic: Negative for bleeding problem.   Skin: Negative for rash.   Musculoskeletal: Negative for falls.   Gastrointestinal: Negative for abdominal pain.   Genitourinary: Negative.    Neurological: Negative.    Psychiatric/Behavioral: Negative for altered  mental status and substance abuse.       Current Outpatient Medications on File Prior to Visit   Medication Sig    amLODIPine (NORVASC) 5 MG tablet Take 1 tablet (5 mg total) by mouth once daily.    aspirin (ECOTRIN) 81 MG EC tablet Take 81 mg by mouth once daily.    budesonide (ENTOCORT EC) 3 mg capsule Take 9 mg by mouth once daily.    calcium carbonate (OS-LYNN) 600 mg calcium (1,500 mg) Tab Take by mouth.    carvediloL (COREG) 12.5 MG tablet Take 2 tablets (25 mg total) by mouth 2 (two) times daily with meals.    cyproheptadine (PERIACTIN) 4 mg tablet Take 1 tablet (4 mg total) by mouth 3 (three) times daily as needed.    dorzolamide-timolol 2-0.5% (COSOPT) 22.3-6.8 mg/mL ophthalmic solution Place 1 drop into both eyes 2 (two) times daily.    EYLEA 2 mg/0.05 mL Soln     FLUoxetine 10 MG capsule 1 capsule in the morning    FOLIC ACID/MULTIVIT-MIN/LUTEIN (CENTRUM SILVER ORAL) Take by mouth.    nystatin (MYCOSTATIN) 100,000 unit/mL suspension Take by mouth.    OZURDEX 0.7 mg Impl intravitreal implant     prasugreL (EFFIENT) 10 mg Tab Take 1 tablet (10 mg total) by mouth once daily.    pravastatin (PRAVACHOL) 40 MG tablet TAKE 1 TABLET BY MOUTH DAILY    [DISCONTINUED] cloNIDine (CATAPRES) 0.1 MG tablet Take 1 tablet (0.1 mg total) by mouth 2 (two) times daily.    [DISCONTINUED] hydrALAZINE (APRESOLINE) 25 MG tablet Take 1 tablet (25 mg total) by mouth every 8 (eight) hours.    ALPRAZolam (XANAX) 0.25 MG tablet Take 0.5-1 tablets (0.125-0.25 mg total) by mouth 3 (three) times daily as needed for Anxiety. (Patient not taking: No sig reported)    gabapentin (NEURONTIN) 300 MG capsule Take 1 capsule (300 mg total) by mouth once daily.    iron-vitamin C 100-250 mg, ICAR-C, (ICAR-C) 100-250 mg Tab Take 1 tablet by mouth once daily. (Patient not taking: No sig reported)    nitroGLYCERIN (NITROSTAT) 0.4 MG SL tablet Place 1 tablet (0.4 mg total) under the tongue every 5 (five) minutes as needed for Chest  pain. (Patient not taking: No sig reported)    pantoprazole (PROTONIX) 40 MG tablet Take 1 tablet (40 mg total) by mouth once daily.    [DISCONTINUED] citalopram (CELEXA) 20 MG tablet Take 1 tablet (20 mg total) by mouth once daily. (Patient not taking: Reported on 3/18/2022)     No current facility-administered medications on file prior to visit.       Objective:   Objective:  Wt Readings from Last 3 Encounters:   03/29/22 48 kg (105 lb 13.1 oz)   03/20/22 51.8 kg (114 lb 3.2 oz)   03/15/22 49.6 kg (109 lb 5.6 oz)     Temp Readings from Last 3 Encounters:   03/20/22 97.1 °F (36.2 °C) (Oral)   03/08/22 97.6 °F (36.4 °C)   03/04/22 98.1 °F (36.7 °C) (Oral)     BP Readings from Last 3 Encounters:   03/29/22 124/62   03/20/22 126/70   03/15/22 138/74     Pulse Readings from Last 3 Encounters:   03/29/22 80   03/20/22 71   03/15/22 76       Physical Exam  Vitals reviewed.   Constitutional:       Appearance: She is well-developed.   HENT:      Head: Normocephalic and atraumatic.   Eyes:      General: No scleral icterus.     Conjunctiva/sclera: Conjunctivae normal.   Cardiovascular:      Rate and Rhythm: Normal rate and regular rhythm.      Pulses: Intact distal pulses.      Heart sounds: Normal heart sounds. No murmur heard.     Comments: NO MURMUR TO SUGGEST AS  Pulmonary:      Effort: No respiratory distress.      Breath sounds: No wheezing or rales.   Chest:      Chest wall: No tenderness.   Abdominal:      General: Bowel sounds are normal. There is no distension.      Palpations: Abdomen is soft.      Tenderness: There is no guarding.   Musculoskeletal:         General: Normal range of motion.      Cervical back: Normal range of motion and neck supple.   Skin:     General: Skin is warm.   Neurological:      Mental Status: She is alert and oriented to person, place, and time.         Lab Results   Component Value Date    CHOL 193 09/15/2021    CHOL 153 05/27/2021    CHOL 185 07/19/2019     Lab Results   Component  Value Date    HDL 56 09/15/2021    HDL 68 05/27/2021    HDL 61 07/19/2019     Lab Results   Component Value Date    LDLCALC 113.2 09/15/2021    LDLCALC 69.6 05/27/2021    LDLCALC 104.8 07/19/2019     Lab Results   Component Value Date    TRIG 119 09/15/2021    TRIG 77 05/27/2021    TRIG 96 07/19/2019     Lab Results   Component Value Date    CHOLHDL 29.0 09/15/2021    CHOLHDL 44.4 05/27/2021    CHOLHDL 33.0 07/19/2019       Chemistry        Component Value Date/Time     03/19/2022 1546    K 3.6 03/19/2022 1546     (H) 03/19/2022 1546    CO2 18 (L) 03/19/2022 1546    BUN 23 03/19/2022 1546    CREATININE 1.5 (H) 03/19/2022 1546    GLU 94 03/19/2022 1546        Component Value Date/Time    CALCIUM 9.9 03/19/2022 1546    ALKPHOS 61 03/19/2022 1546    AST 15 03/19/2022 1546    ALT 11 03/19/2022 1546    BILITOT 0.3 03/19/2022 1546    ESTGFRAFRICA 37 (A) 03/19/2022 1546    EGFRNONAA 32 (A) 03/19/2022 1546          Lab Results   Component Value Date    TSH 1.230 12/13/2021     Lab Results   Component Value Date    INR 1.0 09/09/2020    INR 1.0 12/22/2010    INR 1.0 11/19/2010     Lab Results   Component Value Date    WBC 3.10 (L) 03/19/2022    HGB 9.1 (L) 03/19/2022    HCT 27.5 (L) 03/19/2022    MCV 86 03/19/2022     03/19/2022     BNP  @LABRCNTIP(BNP,BNPTRIAGEBLO)@  CrCl cannot be calculated (Patient's most recent lab result is older than the maximum 7 days allowed.).     Imaging:  ======  Results for orders placed during the hospital encounter of 03/02/22    Echo    Interpretation Summary  · The left ventricle is normal in size with moderate concentric hypertrophy and hyperdynamic systolic function.  · Mild left atrial enlargement.  · The estimated ejection fraction is 75%.  · Normal right ventricular size with low normal right ventricular systolic function.  · There is mild aortic valve stenosis.  · Aortic valve area is 1.97 cm2; peak velocity is 1.67 m/s; mean gradient is 6 mmHg.  · Mild mitral  regurgitation.  · There is mild mitral stenosis.  · Mild tricuspid regurgitation.  · Normal central venous pressure (3 mmHg).  · The estimated PA systolic pressure is 35 mmHg.    No results found for this or any previous visit.    Results for orders placed during the hospital encounter of 03/02/22    US Carotid Bilateral    Narrative  EXAMINATION:  US CAROTID BILATERAL    CLINICAL HISTORY:  syncope;    COMPARISON:  None    FINDINGS:  Sonographic evaluation of the carotid systems was performed.    There is some calcified plaque in the carotid bulbs bilaterally and in the proximal internal carotid arteries.    The peak systolic velocity in the right internal carotid artery was approximately 67 cm/sec.    The peak systolic velocity in the left internal carotid artery was [id number] cm/sec.    Antegrade flow noted in both vertebral arteries.    Stenosis percentage validated velocity measurements with angiographic measurements, velocity criteria are extrapolated from diameter data as defined by the Society of Radiologists in Ultrasound Consensus Conference Radiology 2003; 229;340-346.    Impression  No sonographic evidence of a significant stenosis is identified in either carotid system.  Calcified plaque in both carotid bulbs and proximal internal carotid arteries.      Electronically signed by: Keith East  Date:    03/02/2022  Time:    19:19    Results for orders placed during the hospital encounter of 12/29/21    X-Ray Chest PA And Lateral    Narrative  EXAMINATION:  XR CHEST PA AND LATERAL    CLINICAL HISTORY:  Disorientation, unspecified    TECHNIQUE:  PA and lateral views of the chest were performed.    COMPARISON:  Chest radiographs dating back to September 23, 2020    FINDINGS:  Unchanged mild pleuroparenchymal scarring within the lung apices.  Otherwise, lungs are clear without acute opacity.  No pneumothorax or pleural effusion.  Heart size is normal.  Mild tortuosity of the thoracic aorta.   Atherosclerotic calcifications noted within the thoracic aorta.  No acute osseous abnormality.  Surgical clips present within the upper abdomen.    Impression  As above.      Electronically signed by: Candelario Walsh  Date:    12/29/2021  Time:    10:29    No results found for this or any previous visit.    No valid procedures specified.    Diagnostic Results:  ECG: Reviewed  EKG 2/1/2022 normal sinus rhythm, nonspecific ST T-wave changes, minimal LVH,  ms, 69 bpm,  ms  The ASCVD Risk score (Yonis ROSEMARY Jr., et al., 2013) failed to calculate for the following reasons:    The 2013 ASCVD risk score is only valid for ages 40 to 79    Assessment and Plan:   Hypertensive heart disease with heart failure  -     cloNIDine (CATAPRES) 0.1 MG tablet; Take 1 tablet (0.1 mg total) by mouth 2 (two) times daily.  Dispense: 180 tablet; Refill: 3  -     hydrALAZINE (APRESOLINE) 25 MG tablet; Take 1 tablet (25 mg total) by mouth every 8 (eight) hours.  Dispense: 270 tablet; Refill: 3    H/O heart artery stent    Aortic valve stenosis, etiology of cardiac valve disease unspecified    Diastolic heart failure, NYHA class 3    Coronary artery disease, occlusive    Bradycardia    History of coronary angioplasty    Sarcoidosis of lung    Essential hypertension    CKD (chronic kidney disease) stage 4, GFR 15-29 ml/min    Recent normal nuclear stress test.  Continue with clonidine, hydralazine, amlodipine, carvedilol.  Stressed importance of medication compliance.  Refilled some of her medications.  Reviewed all tests and above medical conditions with patient in detail and formulated treatment plan.  Risk factor modification discussed.   Cardiac low salt diet discussed.  Maintaining healthy weight and weight loss goals were discussed in clinic.  cw current meds   Follow up in 6 months

## 2022-03-29 NOTE — PROGRESS NOTES
"Subjective:      Patient ID: Glo Dumont is a 86 y.o. female.    Chief Complaint: Follow-up      HPI  Here for follow up of medical problems.  3/19/22 went to ER, observation for CP:  Hospital Course:   85 y/o admitted for chest pain and uncontrolled HTN. Serial troponin's negative. Recent Echo reviewed, EF 75%, mild AS , mild mitral stenosis. Home bp meds restarted and clonidine added as daughter reports pt has not been taking her antihypertensives. Blood pressure improved. Pt scheduled for stress test on Friday. Patient seen and examined on the date of discharge and found stable for discharge. Home meds reconciled. Patient to follow-up with PCP and cardiology outpatient.      -----------------------------------------------------------------------------------------  Down 19# in past 6 weeks.  Periactin had helped, but ran out.  Denies anxiety/depression.  No f/c/sw/cough.  Has PT via HH.  No cp/sob/palp.  BMs ok.        Updated/ annual due 9/22:  HM: 12/21 fluvax, 2/21 covid vaccines/booster, 3/15 [id number], 2/14 [id number], 2/15 TDaP, 10/21 BMD rep 2y, 2/11 Cscope no repeat will be done, 1/17 MMG no more, Eye doc monthly, GI Dr. Galindo.     Review of Systems   Constitutional: Negative for chills, diaphoresis and fever.   Respiratory: Negative for cough and shortness of breath.    Cardiovascular: Negative for chest pain, palpitations and leg swelling.   Gastrointestinal: Negative for blood in stool, constipation, diarrhea, nausea and vomiting.   Genitourinary: Negative for dysuria, frequency and hematuria.   Psychiatric/Behavioral: The patient is not nervous/anxious.          Objective:   /77   Pulse 73   Temp 97.5 °F (36.4 °C) (Temporal)   Ht 5' 1" (1.549 m)   Wt 46.8 kg (103 lb 2.8 oz)   BMI 19.49 kg/m²     Physical Exam       Latest Reference Range & Units 03/19/22 15:46 03/19/22 18:55 03/19/22 20:20 03/20/22 00:10   WBC 3.90 - 12.70 K/uL 3.10 (L)      RBC 4.00 - 5.40 M/uL 3.21 (L)      Hemoglobin 12.0 " - 16.0 g/dL 9.1 (L)      Hematocrit 37.0 - 48.5 % 27.5 (L)      MCV 82 - 98 fL 86      MCH 27.0 - 31.0 pg 28.3      MCHC 32.0 - 36.0 g/dL 33.1      RDW 11.5 - 14.5 % 15.2 (H)      Platelets 150 - 450 K/uL 224      MPV 9.2 - 12.9 fL 10.4      Gran % 38.0 - 73.0 % 47.2      Lymph % 18.0 - 48.0 % 29.0      Mono % 4.0 - 15.0 % 18.7 (H)      Eosinophil % 0.0 - 8.0 % 3.9      Basophil % 0.0 - 1.9 % 0.6      Immature Granulocytes 0.0 - 0.5 % 0.6 (H)      Gran # (ANC) 1.8 - 7.7 K/uL 1.5 (L)      Lymph # 1.0 - 4.8 K/uL 0.9 (L)      Mono # 0.3 - 1.0 K/uL 0.6      Eos # 0.0 - 0.5 K/uL 0.1      Baso # 0.00 - 0.20 K/uL 0.02      Immature Grans (Abs) 0.00 - 0.04 K/uL 0.02 [1]      NRBC 0 /100 WBC 0      Differential Method  Automated      Sodium 136 - 145 mmol/L 140      Potassium 3.5 - 5.1 mmol/L 3.6      Chloride 95 - 110 mmol/L 115 (H)      CO2 23 - 29 mmol/L 18 (L)      Anion Gap 8 - 16 mmol/L 7 (L)      BUN 8 - 23 mg/dL 23      Creatinine 0.5 - 1.4 mg/dL 1.5 (H)      EGFR if non African American >60 mL/min/1.73 m^2 32 ! [2]      EGFR if African American >60 mL/min/1.73 m^2 37 !      Glucose 70 - 110 mg/dL 94      Calcium 8.7 - 10.5 mg/dL 9.9      Phosphorus 2.7 - 4.5 mg/dL 3.6      Alkaline Phosphatase 55 - 135 U/L 61      PROTEIN TOTAL 6.0 - 8.4 g/dL 6.6      Albumin 3.5 - 5.2 g/dL 3.4 (L)      BILIRUBIN TOTAL 0.1 - 1.0 mg/dL 0.3 [3]      AST 10 - 40 U/L 15      ALT 10 - 44 U/L 11      Lipase 4 - 60 U/L 41      BNP 0 - 99 pg/mL 306 (H) [4]      Troponin I 0.000 - 0.026 ng/mL 0.015 [5] <0.006 [6] <0.006 [7] <0.006 [8]       Assessment:       1. Essential hypertension    2. Coronary artery disease, occlusive    3. CKD (chronic kidney disease) stage 4, GFR 15-29 ml/min    4. Weight loss    5. Sarcoidosis of lung          Plan:     Essential hypertension    Coronary artery disease, occlusive    CKD (chronic kidney disease) stage 4, GFR 15-29 ml/min    Weight loss  -     cyproheptadine (PERIACTIN) 4 mg tablet; Take 1 tablet (4  mg total) by mouth 3 (three) times daily as needed.  Dispense: 90 tablet; Refill: 11    Sarcoidosis of lung

## 2022-03-29 NOTE — PROGRESS NOTES
1st Attempt to complete Social Work Assessment for Outpatient Care Management; left message requesting a return call.  LCSW will reattempt at a later date.      Received call back from pts daughter Jessica. Informed Jessica, need pts permission to speak to her re: care/needs. Jessica states pt is sleeping currently and has a doctor appt this afternoon. LCSW suggests tomorrow may be a better time to talk. Jessica states she'll call this SW back when pt wakes up.

## 2022-03-30 ENCOUNTER — OFFICE VISIT (OUTPATIENT)
Dept: FAMILY MEDICINE | Facility: CLINIC | Age: 87
End: 2022-03-30
Payer: MEDICARE

## 2022-03-30 VITALS
WEIGHT: 103.19 LBS | DIASTOLIC BLOOD PRESSURE: 77 MMHG | SYSTOLIC BLOOD PRESSURE: 119 MMHG | HEIGHT: 61 IN | HEART RATE: 73 BPM | TEMPERATURE: 98 F | BODY MASS INDEX: 19.48 KG/M2

## 2022-03-30 DIAGNOSIS — R63.4 WEIGHT LOSS: ICD-10-CM

## 2022-03-30 DIAGNOSIS — I25.10 CORONARY ARTERY DISEASE, OCCLUSIVE: Chronic | ICD-10-CM

## 2022-03-30 DIAGNOSIS — I10 ESSENTIAL HYPERTENSION: Primary | Chronic | ICD-10-CM

## 2022-03-30 DIAGNOSIS — D86.0 SARCOIDOSIS OF LUNG: ICD-10-CM

## 2022-03-30 DIAGNOSIS — N18.4 CKD (CHRONIC KIDNEY DISEASE) STAGE 4, GFR 15-29 ML/MIN: ICD-10-CM

## 2022-03-30 PROCEDURE — 1159F MED LIST DOCD IN RCRD: CPT | Mod: CPTII,S$GLB,, | Performed by: INTERNAL MEDICINE

## 2022-03-30 PROCEDURE — 99999 PR PBB SHADOW E&M-EST. PATIENT-LVL IV: CPT | Mod: PBBFAC,,, | Performed by: INTERNAL MEDICINE

## 2022-03-30 PROCEDURE — 99499 RISK ADDL DX/OHS AUDIT: ICD-10-PCS | Mod: HCNC,S$GLB,, | Performed by: INTERNAL MEDICINE

## 2022-03-30 PROCEDURE — 1126F PR PAIN SEVERITY QUANTIFIED, NO PAIN PRESENT: ICD-10-PCS | Mod: CPTII,S$GLB,, | Performed by: INTERNAL MEDICINE

## 2022-03-30 PROCEDURE — 1100F PTFALLS ASSESS-DOCD GE2>/YR: CPT | Mod: CPTII,S$GLB,, | Performed by: INTERNAL MEDICINE

## 2022-03-30 PROCEDURE — 99214 OFFICE O/P EST MOD 30 MIN: CPT | Mod: S$GLB,,, | Performed by: INTERNAL MEDICINE

## 2022-03-30 PROCEDURE — 99214 PR OFFICE/OUTPT VISIT, EST, LEVL IV, 30-39 MIN: ICD-10-PCS | Mod: S$GLB,,, | Performed by: INTERNAL MEDICINE

## 2022-03-30 PROCEDURE — 3074F PR MOST RECENT SYSTOLIC BLOOD PRESSURE < 130 MM HG: ICD-10-PCS | Mod: CPTII,S$GLB,, | Performed by: INTERNAL MEDICINE

## 2022-03-30 PROCEDURE — 3078F DIAST BP <80 MM HG: CPT | Mod: CPTII,S$GLB,, | Performed by: INTERNAL MEDICINE

## 2022-03-30 PROCEDURE — 3074F SYST BP LT 130 MM HG: CPT | Mod: CPTII,S$GLB,, | Performed by: INTERNAL MEDICINE

## 2022-03-30 PROCEDURE — 1100F PR PT FALLS ASSESS DOC 2+ FALLS/FALL W/INJURY/YR: ICD-10-PCS | Mod: CPTII,S$GLB,, | Performed by: INTERNAL MEDICINE

## 2022-03-30 PROCEDURE — 1126F AMNT PAIN NOTED NONE PRSNT: CPT | Mod: CPTII,S$GLB,, | Performed by: INTERNAL MEDICINE

## 2022-03-30 PROCEDURE — 3288F PR FALLS RISK ASSESSMENT DOCUMENTED: ICD-10-PCS | Mod: CPTII,S$GLB,, | Performed by: INTERNAL MEDICINE

## 2022-03-30 PROCEDURE — 3288F FALL RISK ASSESSMENT DOCD: CPT | Mod: CPTII,S$GLB,, | Performed by: INTERNAL MEDICINE

## 2022-03-30 PROCEDURE — 3078F PR MOST RECENT DIASTOLIC BLOOD PRESSURE < 80 MM HG: ICD-10-PCS | Mod: CPTII,S$GLB,, | Performed by: INTERNAL MEDICINE

## 2022-03-30 PROCEDURE — 99499 UNLISTED E&M SERVICE: CPT | Mod: HCNC,S$GLB,, | Performed by: INTERNAL MEDICINE

## 2022-03-30 PROCEDURE — 1159F PR MEDICATION LIST DOCUMENTED IN MEDICAL RECORD: ICD-10-PCS | Mod: CPTII,S$GLB,, | Performed by: INTERNAL MEDICINE

## 2022-03-30 PROCEDURE — 99999 PR PBB SHADOW E&M-EST. PATIENT-LVL IV: ICD-10-PCS | Mod: PBBFAC,,, | Performed by: INTERNAL MEDICINE

## 2022-03-30 PROCEDURE — 1157F PR ADVANCE CARE PLAN OR EQUIV PRESENT IN MEDICAL RECORD: ICD-10-PCS | Mod: CPTII,S$GLB,, | Performed by: INTERNAL MEDICINE

## 2022-03-30 PROCEDURE — 1157F ADVNC CARE PLAN IN RCRD: CPT | Mod: CPTII,S$GLB,, | Performed by: INTERNAL MEDICINE

## 2022-03-30 RX ORDER — CYPROHEPTADINE HYDROCHLORIDE 4 MG/1
4 TABLET ORAL 3 TIMES DAILY PRN
Qty: 90 TABLET | Refills: 11 | Status: SHIPPED | OUTPATIENT
Start: 2022-03-30 | End: 2022-10-31

## 2022-04-04 ENCOUNTER — PATIENT MESSAGE (OUTPATIENT)
Dept: FAMILY MEDICINE | Facility: CLINIC | Age: 87
End: 2022-04-04
Payer: MEDICARE

## 2022-04-04 ENCOUNTER — PATIENT MESSAGE (OUTPATIENT)
Dept: HOME HEALTH SERVICES | Facility: HOSPITAL | Age: 87
End: 2022-04-04
Payer: MEDICARE

## 2022-04-04 ENCOUNTER — PATIENT MESSAGE (OUTPATIENT)
Dept: ADMINISTRATIVE | Facility: OTHER | Age: 87
End: 2022-04-04
Payer: MEDICARE

## 2022-04-04 ENCOUNTER — LAB VISIT (OUTPATIENT)
Dept: LAB | Facility: HOSPITAL | Age: 87
End: 2022-04-04
Payer: MEDICARE

## 2022-04-04 DIAGNOSIS — R19.7 DIARRHEA, UNSPECIFIED TYPE: Primary | ICD-10-CM

## 2022-04-04 DIAGNOSIS — R53.1 WEAKNESS: ICD-10-CM

## 2022-04-04 LAB
AMORPH CRY URNS QL MICRO: ABNORMAL
BACTERIA #/AREA URNS HPF: ABNORMAL /HPF
BILIRUB UR QL STRIP: NEGATIVE
CAOX CRY URNS QL MICRO: ABNORMAL
CLARITY UR: ABNORMAL
COLOR UR: YELLOW
GLUCOSE UR QL STRIP: NEGATIVE
HGB UR QL STRIP: NEGATIVE
KETONES UR QL STRIP: NEGATIVE
LEUKOCYTE ESTERASE UR QL STRIP: ABNORMAL
MICROSCOPIC COMMENT: ABNORMAL
NITRITE UR QL STRIP: POSITIVE
PH UR STRIP: 6 [PH] (ref 5–8)
PROT UR QL STRIP: NEGATIVE
RBC #/AREA URNS HPF: 8 /HPF (ref 0–4)
SP GR UR STRIP: 1.01 (ref 1–1.03)
SQUAMOUS #/AREA URNS HPF: 2 /HPF
URN SPEC COLLECT METH UR: ABNORMAL
WBC #/AREA URNS HPF: 2 /HPF (ref 0–5)

## 2022-04-04 PROCEDURE — 81000 URINALYSIS NONAUTO W/SCOPE: CPT | Performed by: INTERNAL MEDICINE

## 2022-04-04 RX ORDER — DOXYCYCLINE 100 MG/1
100 CAPSULE ORAL 2 TIMES DAILY
Qty: 14 CAPSULE | Refills: 0 | Status: SHIPPED | OUTPATIENT
Start: 2022-04-04 | End: 2022-04-11

## 2022-04-04 NOTE — TELEPHONE ENCOUNTER
Please tell daughter I think we need a Gastro consult if having ongoing diarrhea.  Please schedule.  SM

## 2022-04-04 NOTE — PROGRESS NOTES
2nd Attempt to complete SW follow-up for Outpatient Care Management. LCSW sent a message to the patient portal with contact information requesting a return call.  OPCM RN notified.

## 2022-04-05 NOTE — PROGRESS NOTES
Outpatient Care Management   - High Risk Patient Assessment    Patient: Glo Dumont  MRN:  [id number]  Date of Service:  4/5/2022  Completed by:  Amelia Frias LCSW  Referral Date: 02/01/2022  Program:     Reason for Visit   Patient presents with    OPCM Chart Review     3/29/22    OPCM SW First Assessment Attempt     3/29/22    OPCM SW Second Assessment Attempt     4/4/22 message to portal    Social Work Assessment - High Risk     4/5/22    Plan Of Care     4/5/22       Brief Summary:  received a referral from OPCM RN Carolyn Sheppard for the following patient identified psycho-social needs:SNAP, available resources (senior centers, waivers etc), [employer] benefits, Advance Care Planning (ACP), BSC and TTB. MPOA on file is not witnessed. Daughter wants to complete again. OPCM RN mailed ACP. Daughter is interested in respite care only; not senior centers or waiver. Care plan was created in collaboration with patient/caregiver input.     Patient Summary     [facility] Social Work Assessment (High Risk)    Involvement of Care  Do I have permission to speak with other family members about your care?: Yes (Comment: daughter-Jessica)  Assessment completed by: Children  Cognitive  Which of the following can you state?: Name, Date of birth  Cognitive barriers?: Yes (Comment: memory loss)  General  Marital status:   Current employment status: Retired and not working  Support  Level of Caregiver support: Member independent and does not need caregiver assistance (Comment: Pt lives in her home; daughter and son in-law live with her. Pt ambulates with a cane or walker as needed. She's indep with ADL's, daughter assists with IADL's.)  Support system: Children, Grandchildren, Friends  Is the caregiver reporting burnout?: No  Support Barriers?: Yes (Comment: daughter is interested in caregiver respite from [employer]; requests [facility]. Daughter requests BSC and TTB)  Advanced Care Planning  Do  you have any of the following?: Medical power of   If yes, do we have a copy?: Yes (Comment: current MPOA on file is not witnessed)  If no, would you like Advance Directive resources?: Yes  Advance Care Planning resources provided?: Yes  Is Advance Care Planning an area of need?: Yes  Financial  Current medical coverage: Medicare Advantage, Medicaid (Comment: [employer])  Have you applied for government assistance programs?: Yes (Comment: Medicaid [employer])  Are you unable to pay any of the following?: Food (Comment: daughter wants to apply for SNAP. She declines commodities and food bank resources)  Gross monthly income: 1268  Other assets: home  Financial Support Barriers?: Yes  Safety  Safety barriers?: No   History  Do you or your spouse currently or formerly serve in the ?: No  Disaster Plan  Established evacuation plan?: Yes  [place] residence: [facility]  Evacuation location: family would provide  Mental Health Status  Emotional status: Telephonic/Unable to assess  Have you recenetly lost a loved one?: Yes (Comment: spouse 2019, granddaughter 2021)  Psychiatric diagnosis: depression  Current mental health treatment: Yes (Comment: Prozaelin Xanax)  Would you like mental health resources?: No  Current symptoms: Sleep disturbances, Memory problems (Comment: decrease in appetite due to medical)  Mental/Behavioral/Environmental risk: None  Mental Health Barriers?: No  Addictive Behaviors  Current alcohol consumption?: No  Current substance abuse?: No  Gambling habits?: No  Was the PHQ depression screening completed?: No  Was the NANCY-7 completed?: No         Complex Care Plan     Care plan was discussed and completed today with input from patient and/or caregiver.    Patient Instructions     Follow up in about 2 weeks (around 4/12/2022) for call with JOSE.    [facility] OPCM Self-Management Care Plan was developed with the patients/caregivers input and was based on identified barriers from todays assessment.   Goals were written today with the patient/caregiver and the patient has agreed to work towards these goals to improve his/her overall well-being. Patient verbalized understanding of the care plan, goals, and all of today's instructions. Encouraged patient/caregiver to communicate with his/her physician and health care team about health conditions and the treatment plan.  Provided my contact information today and encouraged patient/caregiver to call me with any questions as needed.

## 2022-04-06 NOTE — PROGRESS NOTES
"5/3/22  SW closed case on 4/29/22 D/T inability to make contact with patient/caregiver. RN case manager closing case/dis enrolling from [facility] today D/T inability to make contact with patient/caregiver for follow up. Message sent to Dr. Cardona regarding case closure and dis enrollment from [facility] program D/T  inability to make contact with patient/caregiver for F/U.  Carolyn Bess RN    4/25/22 1238  Attempt to follow up with patient/caregiver for outpatient case management. Left voice mail message requesting call back. RN OPCM 4th follow up attempt.  Carolyn Bess RN    4/22/22 1450  Attempt to follow up with patient/caregiver for outpatient case management. Left voice mail message requesting call back. RN OPCM 3rd follow up attempt.  Carolyn Bess RN    4/11/22 1303  Attempt to follow up with patient/caregiver for outpatient case management. Voice mailbox full, unable to leave a message. Left voice mail message requesting call back. RN OPCM 2nd follow up attempt. " Unable to reach patient" letter sent.  Carolyn Bess RN    4/6/22  1407  Attempt to follow up with patient/caregiver for outpatient case management. Left voice mail message requesting call back. RN OPCM 1st follow up attempt.  Carolyn Bess RN  "

## 2022-04-11 ENCOUNTER — PATIENT OUTREACH (OUTPATIENT)
Dept: ADMINISTRATIVE | Facility: OTHER | Age: 87
End: 2022-04-11
Payer: MEDICARE

## 2022-04-11 NOTE — PROGRESS NOTES
Health Maintenance Due   Topic Date Due    Shingles Vaccine (1 of 2) Never done    Pneumococcal Vaccines (Age 65+) (3 - PPSV23 or PCV20) 02/21/2019     Updates were requested from care everywhere.  Chart was reviewed for overdue Proactive [facility] Encounters (RAFY) topics (CRS, Breast Cancer Screening, Eye exam)  Health Maintenance has been updated.  LINKS immunization registry triggered.  Immunizations were reconciled.

## 2022-04-12 ENCOUNTER — PATIENT MESSAGE (OUTPATIENT)
Dept: FAMILY MEDICINE | Facility: CLINIC | Age: 87
End: 2022-04-12
Payer: MEDICARE

## 2022-04-12 ENCOUNTER — OFFICE VISIT (OUTPATIENT)
Dept: OPHTHALMOLOGY | Facility: CLINIC | Age: 87
End: 2022-04-12
Payer: MEDICARE

## 2022-04-12 DIAGNOSIS — H35.353 CME (CYSTOID MACULAR EDEMA), BILATERAL: ICD-10-CM

## 2022-04-12 DIAGNOSIS — Z96.1 PSEUDOPHAKIA OF BOTH EYES: ICD-10-CM

## 2022-04-12 DIAGNOSIS — H34.8130 CENTRAL RETINAL VEIN OCCLUSION WITH MACULAR EDEMA OF BOTH EYES: ICD-10-CM

## 2022-04-12 DIAGNOSIS — H40.1131 PRIMARY OPEN ANGLE GLAUCOMA (POAG) OF BOTH EYES, MILD STAGE: Primary | ICD-10-CM

## 2022-04-12 PROCEDURE — 92133 CPTRZD OPH DX IMG PST SGM ON: CPT | Mod: S$GLB,,, | Performed by: OPHTHALMOLOGY

## 2022-04-12 PROCEDURE — 1159F PR MEDICATION LIST DOCUMENTED IN MEDICAL RECORD: ICD-10-PCS | Mod: CPTII,S$GLB,, | Performed by: OPHTHALMOLOGY

## 2022-04-12 PROCEDURE — 1157F PR ADVANCE CARE PLAN OR EQUIV PRESENT IN MEDICAL RECORD: ICD-10-PCS | Mod: CPTII,S$GLB,, | Performed by: OPHTHALMOLOGY

## 2022-04-12 PROCEDURE — 92133 POSTERIOR SEGMENT OCT OPTIC NERVE(OCULAR COHERENCE TOMOGRAPHY) - OU - BOTH EYES: ICD-10-PCS | Mod: S$GLB,,, | Performed by: OPHTHALMOLOGY

## 2022-04-12 PROCEDURE — 99999 PR PBB SHADOW E&M-EST. PATIENT-LVL III: ICD-10-PCS | Mod: PBBFAC,,, | Performed by: OPHTHALMOLOGY

## 2022-04-12 PROCEDURE — 1159F MED LIST DOCD IN RCRD: CPT | Mod: CPTII,S$GLB,, | Performed by: OPHTHALMOLOGY

## 2022-04-12 PROCEDURE — 99213 PR OFFICE/OUTPT VISIT, EST, LEVL III, 20-29 MIN: ICD-10-PCS | Mod: S$GLB,,, | Performed by: OPHTHALMOLOGY

## 2022-04-12 PROCEDURE — 99213 OFFICE O/P EST LOW 20 MIN: CPT | Mod: S$GLB,,, | Performed by: OPHTHALMOLOGY

## 2022-04-12 PROCEDURE — 1157F ADVNC CARE PLAN IN RCRD: CPT | Mod: CPTII,S$GLB,, | Performed by: OPHTHALMOLOGY

## 2022-04-12 PROCEDURE — 99999 PR PBB SHADOW E&M-EST. PATIENT-LVL III: CPT | Mod: PBBFAC,,, | Performed by: OPHTHALMOLOGY

## 2022-04-12 NOTE — PROGRESS NOTES
HPI     Glaucoma     Comments: IOP check               Comments     Patient states that she is using and tolerating Cosopt OU BID as directed   - denies pain/ discomfort oU - va seems a little more clear per pt      1. Steroid responder glaucoma   2. PCIOL OU MGM   3. SARCOIDOSIS   H\O chronic UVEITIS OU   4. Central vein occlusion of retina, left   5. Retinal edema     H/o Avastin and Eylea OS   Had been getting Ozurdex OU with Dr. Shahrzad Tamez Eylea OU last 9/9/2021, 3/16/22  Ozurdex ou last 2/16/2021      Cosopt BID OU            Last edited by Monserrat Gabriel MA on 4/12/2022  9:28 AM. (History)            Assessment /Plan     For exam results, see Encounter Report.      ICD-10-CM ICD-9-CM    1. Primary open angle glaucoma (POAG) of both eyes, mild stage  H40.1131 365.11 Posterior Segment OCT Optic Nerve- Both eyes- performed today, normal findings OU. gcls unreliable due to CME.     Doing well - intraocular pressure is within acceptable range relative to target pressure with no evidence of progression.   Continue current treatment.  Reviewed importance of continued compliance with treatment and follow up.        365.71    2. Pseudophakia of both eyes  Z96.1 V43.1 Stable, monitor   3. Central retinal vein occlusion with macular edema of both eyes  H34.8130 362.35 [facility]     362.83    4. CME (cystoid macular edema), bilateral  H35.353 362.53 See above       [facility] as scheduled   Return to clinic with me PRN        Cosopt BID OU

## 2022-04-14 ENCOUNTER — OUTPATIENT CASE MANAGEMENT (OUTPATIENT)
Dept: ADMINISTRATIVE | Facility: OTHER | Age: 87
End: 2022-04-14
Payer: MEDICARE

## 2022-04-18 ENCOUNTER — PATIENT MESSAGE (OUTPATIENT)
Dept: ADMINISTRATIVE | Facility: OTHER | Age: 87
End: 2022-04-18
Payer: MEDICARE

## 2022-04-21 ENCOUNTER — PATIENT MESSAGE (OUTPATIENT)
Dept: CARDIOLOGY | Facility: CLINIC | Age: 87
End: 2022-04-21
Payer: MEDICARE

## 2022-04-21 DIAGNOSIS — I50.30 DIASTOLIC HEART FAILURE, NYHA CLASS 3: Primary | ICD-10-CM

## 2022-04-21 NOTE — PROGRESS NOTES
===============================  Date today is 4/25/2022  Glo Dumont is a 86 y.o. female  Last visit [facility]: :3/16/2022   Last visit eye dept. 4/12/2022    Corrected distance visual acuity was 20/70 in the right eye and CF at 3' in the left eye.  Tonometry     Tonometry (Applanation, 1:24 PM)       Right Left    Pressure 14 12              Not recorded       Not recorded       Not recorded       Chief Complaint   Patient presents with    dme     Eylea ou       Problem List Items Addressed This Visit        Eye/Vision problems    Central retinal vein occlusion with macular edema of both eyes - Primary    Relevant Medications    aflibercept Soln 2 mg (Completed)    aflibercept Soln 2 mg (Completed)    Other Relevant Orders    Posterior Segment OCT Retina-Both eyes (Completed)    Prior authorization Order      Other Visit Diagnoses     CME (cystoid macular edema), bilateral        Relevant Orders    Posterior Segment OCT Retina-Both eyes (Completed)    SOB (shortness of breath)        Pseudophakia of both eyes        Primary open angle glaucoma (POAG) of both eyes, mild stage              ________________  4/25/2022 today    trial off injections- became Worse, now better again continue eylea     Trial off worse at 6 months    Had ozurdex iniotally worse now very good on ozurdex an eylea     last ozurdex ou 2/7/22   plan ozurdex again at 10 weeks    txx eylea over that   rtc 2 weeks for ozurdex OU  Then ok to go 6-8 weeks for Eylea OU  Plan 10-12 weeks Ozurdex OU, with Eylea OU at [facility]      Injection Procedure Note:    4/25/2022  Diagnosis :  Ou  crvo   Today:   Eylea (afibercept) 2 mg/0.05 ml Intravitreal Injection , OU   Follow up: rtc 2 weeks for Ozurdex OU    Instructed to call 24/7 for any worsening of vision. Check Both eyes daily. Gave patient my home phone number.  Risks, benefits, and alternatives to treatment discussed in detail with the patient.  The patient voiced understanding and wished to  proceed with the procedure.     Patient Identified and Time Out complete  Subconjunctival bleb - xylocaine with epi 2%   and Betadine.  Inject at Eylea (afibercept) 2 mg/0.05 ml Intravitreal Injection , OU 6:00 @ 3.5-4mm posterior to limbus  1 stop: no   Post Operative Dx: Same  Complications: None  Follow up as above.  .      ===============================

## 2022-04-22 ENCOUNTER — TELEPHONE (OUTPATIENT)
Dept: CARDIOLOGY | Facility: HOSPITAL | Age: 87
End: 2022-04-22
Payer: MEDICARE

## 2022-04-22 ENCOUNTER — PATIENT MESSAGE (OUTPATIENT)
Dept: FAMILY MEDICINE | Facility: CLINIC | Age: 87
End: 2022-04-22
Payer: MEDICARE

## 2022-04-22 RX ORDER — FUROSEMIDE 40 MG/1
40 TABLET ORAL DAILY
Qty: 30 TABLET | Refills: 11 | Status: SHIPPED | OUTPATIENT
Start: 2022-04-22 | End: 2022-10-10

## 2022-04-22 NOTE — TELEPHONE ENCOUNTER
Attempted to contact pt without success x 2 call goes directly to [employer]. LVM for pt to call back at my direct extension.    ----- Message from Jing Mojica sent at 4/22/2022 10:17 AM CDT -----  Regarding: rtn call  Contact: pt  Jessica Toribio returning call [phone]

## 2022-04-22 NOTE — TELEPHONE ENCOUNTER
Attempted to return pt call without success x 1. LVM for pt. To call back.      ----- Message from Tushar Guzman sent at 4/22/2022 10:00 AM CDT -----  Contact: Jessica/ daughter  Type:  Patient Returning Call    Who Called: Jessica   Who Left Message for Patient: nurse   Does the patient know what this is regarding?: pt   Would the patient rather a call back or a response via [facility]? Call back   Best Call Back Number: [phone]   Additional Information:

## 2022-04-25 ENCOUNTER — PROCEDURE VISIT (OUTPATIENT)
Dept: OPHTHALMOLOGY | Facility: CLINIC | Age: 87
End: 2022-04-25
Payer: MEDICARE

## 2022-04-25 DIAGNOSIS — H34.8130 CENTRAL RETINAL VEIN OCCLUSION WITH MACULAR EDEMA OF BOTH EYES: Primary | ICD-10-CM

## 2022-04-25 DIAGNOSIS — Z96.1 PSEUDOPHAKIA OF BOTH EYES: ICD-10-CM

## 2022-04-25 DIAGNOSIS — R06.02 SOB (SHORTNESS OF BREATH): ICD-10-CM

## 2022-04-25 DIAGNOSIS — H40.1131 PRIMARY OPEN ANGLE GLAUCOMA (POAG) OF BOTH EYES, MILD STAGE: ICD-10-CM

## 2022-04-25 DIAGNOSIS — H35.353 CME (CYSTOID MACULAR EDEMA), BILATERAL: ICD-10-CM

## 2022-04-25 PROCEDURE — 67028 INJECTION EYE DRUG: CPT | Mod: 50,S$GLB,, | Performed by: OPHTHALMOLOGY

## 2022-04-25 PROCEDURE — 99499 UNLISTED E&M SERVICE: CPT | Mod: S$GLB,,, | Performed by: OPHTHALMOLOGY

## 2022-04-25 PROCEDURE — 92134 CPTRZ OPH DX IMG PST SGM RTA: CPT | Mod: S$GLB,,, | Performed by: OPHTHALMOLOGY

## 2022-04-25 PROCEDURE — 99499 NO LOS: ICD-10-PCS | Mod: S$GLB,,, | Performed by: OPHTHALMOLOGY

## 2022-04-25 PROCEDURE — 92134 POSTERIOR SEGMENT OCT RETINA (OCULAR COHERENCE TOMOGRAPHY)-BOTH EYES: ICD-10-PCS | Mod: S$GLB,,, | Performed by: OPHTHALMOLOGY

## 2022-04-25 PROCEDURE — 67028 PR INJECT INTRAVITREAL PHARMCOLOGIC: ICD-10-PCS | Mod: 50,S$GLB,, | Performed by: OPHTHALMOLOGY

## 2022-04-26 ENCOUNTER — PATIENT MESSAGE (OUTPATIENT)
Dept: CARDIOLOGY | Facility: CLINIC | Age: 87
End: 2022-04-26
Payer: MEDICARE

## 2022-04-29 NOTE — PROGRESS NOTES
3rd Attempt to complete SW follow-up for Outpatient Care Management;  left message requesting a return call.  LCSW will close case and notified OPCM RN.

## 2022-04-29 NOTE — TELEPHONE ENCOUNTER
Yes, the labs show worsened kidney function.  Is she drinking?  Is she vomiting or diarrhea?    4/7/22 creat 2.21, BUN/creat 23, eGFR 36.    SM

## 2022-05-01 ENCOUNTER — PATIENT MESSAGE (OUTPATIENT)
Dept: FAMILY MEDICINE | Facility: CLINIC | Age: 87
End: 2022-05-01
Payer: MEDICARE

## 2022-05-01 DIAGNOSIS — N18.4 CKD (CHRONIC KIDNEY DISEASE) STAGE 4, GFR 15-29 ML/MIN: ICD-10-CM

## 2022-05-01 DIAGNOSIS — R19.7 DIARRHEA, UNSPECIFIED TYPE: Primary | ICD-10-CM

## 2022-05-03 DIAGNOSIS — I11.0 HYPERTENSIVE HEART DISEASE WITH HEART FAILURE: Primary | ICD-10-CM

## 2022-05-04 ENCOUNTER — LAB VISIT (OUTPATIENT)
Dept: LAB | Facility: HOSPITAL | Age: 87
End: 2022-05-04
Attending: STUDENT IN AN ORGANIZED HEALTH CARE EDUCATION/TRAINING PROGRAM
Payer: MEDICARE

## 2022-05-04 DIAGNOSIS — N18.4 CKD (CHRONIC KIDNEY DISEASE) STAGE 4, GFR 15-29 ML/MIN: ICD-10-CM

## 2022-05-04 DIAGNOSIS — E78.49 OTHER HYPERLIPIDEMIA: ICD-10-CM

## 2022-05-04 DIAGNOSIS — I25.10 CORONARY ARTERY DISEASE, UNSPECIFIED VESSEL OR LESION TYPE, UNSPECIFIED WHETHER ANGINA PRESENT, UNSPECIFIED WHETHER NATIVE OR TRANSPLANTED HEART: ICD-10-CM

## 2022-05-04 LAB
ANION GAP SERPL CALC-SCNC: 11 MMOL/L (ref 8–16)
BUN SERPL-MCNC: 38 MG/DL (ref 8–23)
CALCIUM SERPL-MCNC: 8 MG/DL (ref 8.7–10.5)
CHLORIDE SERPL-SCNC: 108 MMOL/L (ref 95–110)
CHOLEST SERPL-MCNC: 160 MG/DL (ref 120–199)
CHOLEST/HDLC SERPL: 2 {RATIO} (ref 2–5)
CO2 SERPL-SCNC: 21 MMOL/L (ref 23–29)
CREAT SERPL-MCNC: 1.7 MG/DL (ref 0.5–1.4)
EST. GFR  (AFRICAN AMERICAN): 31.5 ML/MIN/1.73 M^2
EST. GFR  (NON AFRICAN AMERICAN): 27.3 ML/MIN/1.73 M^2
GLUCOSE SERPL-MCNC: 87 MG/DL (ref 70–110)
HDLC SERPL-MCNC: 81 MG/DL (ref 40–75)
HDLC SERPL: 50.6 % (ref 20–50)
LDLC SERPL CALC-MCNC: 63 MG/DL (ref 63–159)
NONHDLC SERPL-MCNC: 79 MG/DL
POTASSIUM SERPL-SCNC: 2.8 MMOL/L (ref 3.5–5.1)
SODIUM SERPL-SCNC: 140 MMOL/L (ref 136–145)
TRIGL SERPL-MCNC: 80 MG/DL (ref 30–150)

## 2022-05-04 PROCEDURE — 36415 COLL VENOUS BLD VENIPUNCTURE: CPT | Performed by: STUDENT IN AN ORGANIZED HEALTH CARE EDUCATION/TRAINING PROGRAM

## 2022-05-04 PROCEDURE — 80061 LIPID PANEL: CPT | Performed by: STUDENT IN AN ORGANIZED HEALTH CARE EDUCATION/TRAINING PROGRAM

## 2022-05-04 PROCEDURE — 80048 BASIC METABOLIC PNL TOTAL CA: CPT | Performed by: INTERNAL MEDICINE

## 2022-05-05 ENCOUNTER — PATIENT MESSAGE (OUTPATIENT)
Dept: FAMILY MEDICINE | Facility: CLINIC | Age: 87
End: 2022-05-05
Payer: MEDICARE

## 2022-05-05 DIAGNOSIS — E87.6 HYPOKALEMIA: Primary | ICD-10-CM

## 2022-05-07 ENCOUNTER — LAB VISIT (OUTPATIENT)
Dept: LAB | Facility: HOSPITAL | Age: 87
End: 2022-05-07
Attending: INTERNAL MEDICINE
Payer: MEDICARE

## 2022-05-07 DIAGNOSIS — E87.6 HYPOKALEMIA: ICD-10-CM

## 2022-05-07 LAB
ANION GAP SERPL CALC-SCNC: 11 MMOL/L (ref 8–16)
BUN SERPL-MCNC: 33 MG/DL (ref 8–23)
CALCIUM SERPL-MCNC: 8.6 MG/DL (ref 8.7–10.5)
CHLORIDE SERPL-SCNC: 109 MMOL/L (ref 95–110)
CO2 SERPL-SCNC: 20 MMOL/L (ref 23–29)
CREAT SERPL-MCNC: 1.7 MG/DL (ref 0.5–1.4)
EST. GFR  (AFRICAN AMERICAN): 31.5 ML/MIN/1.73 M^2
EST. GFR  (NON AFRICAN AMERICAN): 27.3 ML/MIN/1.73 M^2
GLUCOSE SERPL-MCNC: 112 MG/DL (ref 70–110)
MAGNESIUM SERPL-MCNC: 1.3 MG/DL (ref 1.6–2.6)
POTASSIUM SERPL-SCNC: 4.2 MMOL/L (ref 3.5–5.1)
SODIUM SERPL-SCNC: 140 MMOL/L (ref 136–145)

## 2022-05-07 PROCEDURE — 80048 BASIC METABOLIC PNL TOTAL CA: CPT | Performed by: INTERNAL MEDICINE

## 2022-05-07 PROCEDURE — 83735 ASSAY OF MAGNESIUM: CPT | Performed by: INTERNAL MEDICINE

## 2022-05-07 PROCEDURE — 36415 COLL VENOUS BLD VENIPUNCTURE: CPT | Performed by: INTERNAL MEDICINE

## 2022-05-09 ENCOUNTER — TELEPHONE (OUTPATIENT)
Dept: FAMILY MEDICINE | Facility: CLINIC | Age: 87
End: 2022-05-09
Payer: MEDICARE

## 2022-05-09 ENCOUNTER — PATIENT MESSAGE (OUTPATIENT)
Dept: CARDIOLOGY | Facility: CLINIC | Age: 87
End: 2022-05-09
Payer: MEDICARE

## 2022-05-09 NOTE — TELEPHONE ENCOUNTER
----- Message from Alessandra Munoz sent at 5/9/2022  3:25 PM CDT -----  Contact: Naomy/daughter  Naomy would like a call back at [phone], Regards to a miss call about labs results.    Thanks  Td

## 2022-05-12 ENCOUNTER — LAB VISIT (OUTPATIENT)
Dept: LAB | Facility: HOSPITAL | Age: 87
End: 2022-05-12
Attending: INTERNAL MEDICINE
Payer: MEDICARE

## 2022-05-12 ENCOUNTER — PATIENT MESSAGE (OUTPATIENT)
Dept: CARDIOLOGY | Facility: CLINIC | Age: 87
End: 2022-05-12

## 2022-05-12 ENCOUNTER — TELEPHONE (OUTPATIENT)
Dept: OPHTHALMOLOGY | Facility: CLINIC | Age: 87
End: 2022-05-12
Payer: MEDICARE

## 2022-05-12 DIAGNOSIS — I11.0 HYPERTENSIVE HEART DISEASE WITH HEART FAILURE: Primary | ICD-10-CM

## 2022-05-12 DIAGNOSIS — E87.6 HYPOKALEMIA: ICD-10-CM

## 2022-05-12 LAB
ANION GAP SERPL CALC-SCNC: 12 MMOL/L (ref 8–16)
BUN SERPL-MCNC: 45 MG/DL (ref 8–23)
CALCIUM SERPL-MCNC: 9 MG/DL (ref 8.7–10.5)
CHLORIDE SERPL-SCNC: 107 MMOL/L (ref 95–110)
CO2 SERPL-SCNC: 20 MMOL/L (ref 23–29)
CREAT SERPL-MCNC: 2.1 MG/DL (ref 0.5–1.4)
EST. GFR  (AFRICAN AMERICAN): 24.4 ML/MIN/1.73 M^2
EST. GFR  (NON AFRICAN AMERICAN): 21.1 ML/MIN/1.73 M^2
GLUCOSE SERPL-MCNC: 191 MG/DL (ref 70–110)
POTASSIUM SERPL-SCNC: 4.4 MMOL/L (ref 3.5–5.1)
SODIUM SERPL-SCNC: 139 MMOL/L (ref 136–145)

## 2022-05-12 PROCEDURE — 36415 COLL VENOUS BLD VENIPUNCTURE: CPT | Mod: PO | Performed by: INTERNAL MEDICINE

## 2022-05-12 PROCEDURE — 80048 BASIC METABOLIC PNL TOTAL CA: CPT | Performed by: INTERNAL MEDICINE

## 2022-05-12 RX ORDER — CARVEDILOL 12.5 MG/1
25 TABLET ORAL 2 TIMES DAILY WITH MEALS
Qty: 60 TABLET | Refills: 11
Start: 2022-05-12 | End: 2022-05-30

## 2022-05-13 ENCOUNTER — PATIENT MESSAGE (OUTPATIENT)
Dept: FAMILY MEDICINE | Facility: CLINIC | Age: 87
End: 2022-05-13
Payer: MEDICARE

## 2022-05-23 ENCOUNTER — PATIENT MESSAGE (OUTPATIENT)
Dept: CARDIOLOGY | Facility: CLINIC | Age: 87
End: 2022-05-23
Payer: MEDICARE

## 2022-05-25 ENCOUNTER — PROCEDURE VISIT (OUTPATIENT)
Dept: OPHTHALMOLOGY | Facility: CLINIC | Age: 87
End: 2022-05-25
Payer: MEDICARE

## 2022-05-25 DIAGNOSIS — H35.353 CME (CYSTOID MACULAR EDEMA), BILATERAL: ICD-10-CM

## 2022-05-25 DIAGNOSIS — H34.8130 CENTRAL RETINAL VEIN OCCLUSION WITH MACULAR EDEMA OF BOTH EYES: Primary | ICD-10-CM

## 2022-05-25 PROCEDURE — 92134 POSTERIOR SEGMENT OCT RETINA (OCULAR COHERENCE TOMOGRAPHY)-BOTH EYES: ICD-10-PCS | Mod: S$GLB,,, | Performed by: OPHTHALMOLOGY

## 2022-05-25 PROCEDURE — 92134 CPTRZ OPH DX IMG PST SGM RTA: CPT | Mod: S$GLB,,, | Performed by: OPHTHALMOLOGY

## 2022-05-25 PROCEDURE — 67028 INJECTION EYE DRUG: CPT | Mod: 50,S$GLB,, | Performed by: OPHTHALMOLOGY

## 2022-05-25 PROCEDURE — 99499 NO LOS: ICD-10-PCS | Mod: S$GLB,,, | Performed by: OPHTHALMOLOGY

## 2022-05-25 PROCEDURE — 99499 UNLISTED E&M SERVICE: CPT | Mod: S$GLB,,, | Performed by: OPHTHALMOLOGY

## 2022-05-25 PROCEDURE — 67028 PR INJECT INTRAVITREAL PHARMCOLOGIC: ICD-10-PCS | Mod: 50,S$GLB,, | Performed by: OPHTHALMOLOGY

## 2022-05-25 NOTE — PROGRESS NOTES
===============================  Date today is 5/25/2022  Glo Dumont is a 86 y.o. female  Last visit [facility]: :4/25/2022   Last visit eye dept. 4/25/2022    Corrected distance visual acuity was 20/60 in the right eye and 20/400 in the left eye.  Tonometry     Tonometry (Applanation, 10:12 AM)       Right Left    Pressure 17 15              Not recorded       Not recorded       Not recorded       Chief Complaint   Patient presents with    crvo     Ozurdex ou     HPI     crvo      Additional comments: Ozurdex ou              Comments     1. Steroid responder glaucoma   2. PCIOL OU MGM   3. SARCOIDOSIS   H\O chronic UVEITIS OU   4. Central vein occlusion of retina, left   5. Retinal edema     H/o Avastin and Eylea OS   Had been getting Ozurdex OU with Dr. Shahrzad Tamez Eylea OU last 4/25/22  Ozurdex ou last 2/16/2022      Cosopt BID OU          Last edited by STEVEN Greenberg on 5/25/2022 10:11 AM. 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 - Primary    Relevant Medications    dexAMETHasone (OZURDEX) intravitreal implant (Completed) (Start on 5/25/2022  1:00 PM)    dexAMETHasone (OZURDEX) intravitreal implant (Completed) (Start on 5/25/2022  1:00 PM)    Other Relevant Orders    Prior authorization Order    Posterior Segment OCT Retina-Both eyes (Completed)      Other Visit Diagnoses     CME (cystoid macular edema), bilateral        Relevant Medications    dexAMETHasone (OZURDEX) intravitreal implant (Completed) (Start on 5/25/2022  1:00 PM)    dexAMETHasone (OZURDEX) intravitreal implant (Completed) (Start on 5/25/2022  1:00 PM)    Other Relevant Orders    Prior authorization Order    Posterior Segment OCT Retina-Both eyes (Completed)          ________________  5/25/2022 today    OU CRVO/ME  OCT positive for CME  Continue with Ozurdex OU today    .    5/25/2022  Diagnosis :  OU CRVO/ME  Today:   Ozurdex  0.7 mg , OU   Follow up: jourdan JONES in 6 weeks  Instructed to call 24/7 for any worsening of vision. Check Both eyes daily. Gave patient my home phone number.  Risks, benefits, and alternatives to treatment discussed in detail with the patient.  The patient voiced understanding and wished to proceed with the procedure    Ozurdex intravitreal implant Procedure Note:     Patient Identified and Time Out complete  Subconjunctival bleb - xylocaine with epi 2%   and Betadine.  Inject OU at 1mm parallel to limbus  Post Operative Dx: Same  Complications: None  Follow up as above.    ===============================

## 2022-05-30 ENCOUNTER — OFFICE VISIT (OUTPATIENT)
Dept: CARDIOLOGY | Facility: CLINIC | Age: 87
End: 2022-05-30
Payer: MEDICARE

## 2022-05-30 VITALS
WEIGHT: 108.94 LBS | OXYGEN SATURATION: 99 % | HEART RATE: 71 BPM | BODY MASS INDEX: 20.58 KG/M2 | SYSTOLIC BLOOD PRESSURE: 120 MMHG | DIASTOLIC BLOOD PRESSURE: 64 MMHG

## 2022-05-30 DIAGNOSIS — H02.409 PTOSIS OF EYELID, UNSPECIFIED LATERALITY: ICD-10-CM

## 2022-05-30 DIAGNOSIS — I50.30 DIASTOLIC HEART FAILURE, NYHA CLASS 3: ICD-10-CM

## 2022-05-30 DIAGNOSIS — I11.0 HYPERTENSIVE HEART DISEASE WITH HEART FAILURE: ICD-10-CM

## 2022-05-30 DIAGNOSIS — I10 ESSENTIAL HYPERTENSION: ICD-10-CM

## 2022-05-30 DIAGNOSIS — I25.10 CORONARY ARTERY DISEASE, OCCLUSIVE: Primary | ICD-10-CM

## 2022-05-30 DIAGNOSIS — I70.0 ATHEROSCLEROSIS OF AORTA: ICD-10-CM

## 2022-05-30 DIAGNOSIS — E78.49 OTHER HYPERLIPIDEMIA: ICD-10-CM

## 2022-05-30 PROCEDURE — 3078F DIAST BP <80 MM HG: CPT | Mod: CPTII,S$GLB,, | Performed by: INTERNAL MEDICINE

## 2022-05-30 PROCEDURE — 99214 PR OFFICE/OUTPT VISIT, EST, LEVL IV, 30-39 MIN: ICD-10-PCS | Mod: S$GLB,,, | Performed by: INTERNAL MEDICINE

## 2022-05-30 PROCEDURE — 99499 RISK ADDL DX/OHS AUDIT: ICD-10-PCS | Mod: S$GLB,,, | Performed by: INTERNAL MEDICINE

## 2022-05-30 PROCEDURE — 1157F ADVNC CARE PLAN IN RCRD: CPT | Mod: CPTII,S$GLB,, | Performed by: INTERNAL MEDICINE

## 2022-05-30 PROCEDURE — 3074F SYST BP LT 130 MM HG: CPT | Mod: CPTII,S$GLB,, | Performed by: INTERNAL MEDICINE

## 2022-05-30 PROCEDURE — 99499 UNLISTED E&M SERVICE: CPT | Mod: S$GLB,,, | Performed by: INTERNAL MEDICINE

## 2022-05-30 PROCEDURE — 99214 OFFICE O/P EST MOD 30 MIN: CPT | Mod: S$GLB,,, | Performed by: INTERNAL MEDICINE

## 2022-05-30 PROCEDURE — 99999 PR PBB SHADOW E&M-EST. PATIENT-LVL III: CPT | Mod: PBBFAC,,, | Performed by: INTERNAL MEDICINE

## 2022-05-30 PROCEDURE — 99999 PR PBB SHADOW E&M-EST. PATIENT-LVL III: ICD-10-PCS | Mod: PBBFAC,,, | Performed by: INTERNAL MEDICINE

## 2022-05-30 PROCEDURE — 1159F MED LIST DOCD IN RCRD: CPT | Mod: CPTII,S$GLB,, | Performed by: INTERNAL MEDICINE

## 2022-05-30 PROCEDURE — 3078F PR MOST RECENT DIASTOLIC BLOOD PRESSURE < 80 MM HG: ICD-10-PCS | Mod: CPTII,S$GLB,, | Performed by: INTERNAL MEDICINE

## 2022-05-30 PROCEDURE — 3074F PR MOST RECENT SYSTOLIC BLOOD PRESSURE < 130 MM HG: ICD-10-PCS | Mod: CPTII,S$GLB,, | Performed by: INTERNAL MEDICINE

## 2022-05-30 PROCEDURE — 1157F PR ADVANCE CARE PLAN OR EQUIV PRESENT IN MEDICAL RECORD: ICD-10-PCS | Mod: CPTII,S$GLB,, | Performed by: INTERNAL MEDICINE

## 2022-05-30 PROCEDURE — 1159F PR MEDICATION LIST DOCUMENTED IN MEDICAL RECORD: ICD-10-PCS | Mod: CPTII,S$GLB,, | Performed by: INTERNAL MEDICINE

## 2022-05-30 RX ORDER — CARVEDILOL 12.5 MG/1
12.5 TABLET ORAL 2 TIMES DAILY WITH MEALS
Qty: 60 TABLET | Refills: 11 | Status: ON HOLD | OUTPATIENT
Start: 2022-05-30 | End: 2023-03-22 | Stop reason: HOSPADM

## 2022-05-30 NOTE — PROGRESS NOTES
Subjective:   Patient ID:  Glo Dumont is a 86 y.o. female who presents for evaluation of No chief complaint on file.      HPI     5.30.2022  Doing well, no complaints   Questions about the carvedilol dose  No chest pain, no dyspnea, no palpitations, no leg swelling, orthopnea or pnd  No cns symptoms,   No syncope or presyncope  No palpitations       3.29.2022  Patient was recently discharged from the hospital with hypertensive emergency.  She is here for short post discharge follow-up.  Her hypertensive urgency was due to noncompliance.  Patient daughter states that her mother stop taking all of her medications all at once.  She had a normal nuclear stress test while in the hospital.  Her troponin was negative.  BNP was mildly elevated.  She is euvolemic today.  Denies any dyspnea, any leg swelling or orthopnea.  States she is taking all of her medications.  They brought the bottles with them to clinic today.  Denies any more chest pain or syncope or presyncope.    3.15.2022  85 yo male seen Dr STRANGE and Dr Tim before   exrtensive pmhx as below   Comes in for evaluation of chest pain, h/o CAD s/p PCI  , unknown   States she has been having more chest pains lately described as retrosternal tightness, 5/10, more with strenuous acitivity but can happen at rest as well , no nausea or vomiting   No syncope, no dizziness,   Compliant with meds     She had a syncope, two weeks ago, work up revealed hyperkalemia, patient was taking kcl at home, had k of 7 on admission   Reversed with lokelma   Tre on admission 55 bpm however, not very significant   Scheduled for HM 48 hours end of the month       Past Medical History:   Diagnosis Date    Anemia     Angina pectoris     Anxiety     Anxiety and depression     Arthritis     hip    Carotid artery occlusion     Carpal tunnel syndrome 06/23/2008    emg    Chronic diarrhea     work up in 2011 with EGD, CS and VCE    CKD (chronic kidney disease) stage 3, GFR  30-59 ml/min 5/11/2017    Colitis     Coronary artery disease     Coronary artery disease     Diastolic dysfunction     Diverticulosis     Glaucoma     Greater trochanteric bursitis 2/10/2015    Grief at loss of child 1/26/2016    H/O carotid endarterectomy 12/2/2013    Heart failure     History of coronary angioplasty 3/11/2014    Hypercholesteremia     Hypertension     Liver cyst 02/08/2013    ct abd    Macular degeneration     Primary open-angle glaucoma(365.11) 9/3/2013    Renal cyst 02/08/2013    ct abd    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    Sarcoidosis     Sarcoidosis of lung     Sickle cell trait     Uveitis        Past Surgical History:   Procedure Laterality Date    CAROTID ENDARTERECTOMY Right 2000s    CATARACT EXTRACTION Bilateral     Dr. Liang Dennis    CHOLECYSTECTOMY      laparoscopic, 3/18.    CORONARY ANGIOPLASTY WITH STENT PLACEMENT  11/19/2010    RCA-HALIE 2010    Lathia    JOINT REPLACEMENT Left 11/03/2014    Dr. Braun    TOTAL ABDOMINAL HYSTERECTOMY W/ BILATERAL SALPINGOOPHORECTOMY  1972       Social History     Tobacco Use    Smoking status: Never Smoker    Smokeless tobacco: Never Used   Substance Use Topics    Alcohol use: No     Alcohol/week: 0.0 standard drinks    Drug use: No       Family History   Problem Relation Age of Onset    Hypertension Mother     Heart failure Mother     Cancer Father         prostate    Cirrhosis Brother     Heart failure Brother     Cancer Daughter         Carcinoid       Review of Systems   Constitutional: Negative for fever and malaise/fatigue.   HENT: Negative for sore throat.    Eyes: Negative for blurred vision.   Cardiovascular: Negative for claudication, cyanosis, dyspnea on exertion, irregular heartbeat, leg swelling, near-syncope, orthopnea, palpitations, paroxysmal nocturnal dyspnea and syncope.   Respiratory: Negative for cough and hemoptysis.    Hematologic/Lymphatic: Negative for bleeding  problem.   Skin: Negative for rash.   Musculoskeletal: Negative for falls.   Gastrointestinal: Negative for abdominal pain.   Genitourinary: Negative.    Neurological: Negative.    Psychiatric/Behavioral: Negative for altered mental status and substance abuse.       Current Outpatient Medications on File Prior to Visit   Medication Sig    ALPRAZolam (XANAX) 0.25 MG tablet Take 0.5-1 tablets (0.125-0.25 mg total) by mouth 3 (three) times daily as needed for Anxiety.    amLODIPine (NORVASC) 5 MG tablet Take 1 tablet (5 mg total) by mouth once daily.    aspirin (ECOTRIN) 81 MG EC tablet Take 81 mg by mouth once daily.    budesonide (ENTOCORT EC) 3 mg capsule Take 9 mg by mouth once daily.    calcium carbonate (OS-LYNN) 600 mg calcium (1,500 mg) Tab Take by mouth.    FLUoxetine 10 MG capsule 1 capsule in the morning    furosemide (LASIX) 40 MG tablet Take 1 tablet (40 mg total) by mouth once daily.    hydrALAZINE (APRESOLINE) 25 MG tablet Take 1 tablet (25 mg total) by mouth every 8 (eight) hours.    iron-vitamin C 100-250 mg, ICAR-C, (ICAR-C) 100-250 mg Tab Take 1 tablet by mouth once daily.    nitroGLYCERIN (NITROSTAT) 0.4 MG SL tablet Place 1 tablet (0.4 mg total) under the tongue every 5 (five) minutes as needed for Chest pain.    prasugreL (EFFIENT) 10 mg Tab Take 1 tablet (10 mg total) by mouth once daily.    pravastatin (PRAVACHOL) 40 MG tablet TAKE 1 TABLET BY MOUTH DAILY    [DISCONTINUED] carvediloL (COREG) 12.5 MG tablet Take 2 tablets (25 mg total) by mouth 2 (two) times daily with meals.    cloNIDine (CATAPRES) 0.1 MG tablet Take 1 tablet (0.1 mg total) by mouth 2 (two) times daily.    cyproheptadine (PERIACTIN) 4 mg tablet Take 1 tablet (4 mg total) by mouth 3 (three) times daily as needed.    dorzolamide-timolol 2-0.5% (COSOPT) 22.3-6.8 mg/mL ophthalmic solution Place 1 drop into both eyes 2 (two) times daily.    EYLEA 2 mg/0.05 mL Soln     FOLIC ACID/MULTIVIT-MIN/LUTEIN (CENTRUM SILVER  ORAL) Take by mouth.    gabapentin (NEURONTIN) 300 MG capsule Take 1 capsule (300 mg total) by mouth once daily.    nystatin (MYCOSTATIN) 100,000 unit/mL suspension Take by mouth.    OZURDEX 0.7 mg Impl intravitreal implant     pantoprazole (PROTONIX) 40 MG tablet Take 1 tablet (40 mg total) by mouth once daily.    [DISCONTINUED] citalopram (CELEXA) 20 MG tablet Take 1 tablet (20 mg total) by mouth once daily. (Patient not taking: Reported on 3/18/2022)     No current facility-administered medications on file prior to visit.       Objective:   Objective:  Wt Readings from Last 3 Encounters:   05/30/22 49.4 kg (108 lb 14.5 oz)   03/29/22 48 kg (105 lb 13.1 oz)   03/20/22 51.8 kg (114 lb 3.2 oz)     Temp Readings from Last 3 Encounters:   03/20/22 97.1 °F (36.2 °C) (Oral)   03/08/22 97.6 °F (36.4 °C)   03/04/22 98.1 °F (36.7 °C) (Oral)     BP Readings from Last 3 Encounters:   05/30/22 120/64   03/29/22 124/62   03/20/22 126/70     Pulse Readings from Last 3 Encounters:   05/30/22 71   03/29/22 80   03/20/22 71       Physical Exam  Vitals reviewed.   Constitutional:       Appearance: She is well-developed.   HENT:      Head: Normocephalic and atraumatic.   Eyes:      General: No scleral icterus.     Conjunctiva/sclera: Conjunctivae normal.   Cardiovascular:      Rate and Rhythm: Normal rate and regular rhythm.      Pulses: Intact distal pulses.      Heart sounds: Normal heart sounds. No murmur heard.     Comments: NO MURMUR TO SUGGEST AS  Pulmonary:      Effort: No respiratory distress.      Breath sounds: No wheezing or rales.   Chest:      Chest wall: No tenderness.   Abdominal:      General: Bowel sounds are normal. There is no distension.      Palpations: Abdomen is soft.      Tenderness: There is no guarding.   Musculoskeletal:         General: Normal range of motion.      Cervical back: Normal range of motion and neck supple.   Skin:     General: Skin is warm.   Neurological:      Mental Status: She is  alert and oriented to person, place, and time.         Lab Results   Component Value Date    CHOL 160 05/04/2022    CHOL 193 09/15/2021    CHOL 153 05/27/2021     Lab Results   Component Value Date    HDL 81 (H) 05/04/2022    HDL 56 09/15/2021    HDL 68 05/27/2021     Lab Results   Component Value Date    LDLCALC 63.0 05/04/2022    LDLCALC 113.2 09/15/2021    LDLCALC 69.6 05/27/2021     Lab Results   Component Value Date    TRIG 80 05/04/2022    TRIG 119 09/15/2021    TRIG 77 05/27/2021     Lab Results   Component Value Date    CHOLHDL 50.6 (H) 05/04/2022    CHOLHDL 29.0 09/15/2021    CHOLHDL 44.4 05/27/2021       Chemistry        Component Value Date/Time     05/12/2022 0909    K 4.4 05/12/2022 0909     05/12/2022 0909    CO2 20 (L) 05/12/2022 0909    BUN 45 (H) 05/12/2022 0909    CREATININE 2.1 (H) 05/12/2022 0909     (H) 05/12/2022 0909        Component Value Date/Time    CALCIUM 9.0 05/12/2022 0909    ALKPHOS 61 03/19/2022 1546    AST 15 03/19/2022 1546    ALT 11 03/19/2022 1546    BILITOT 0.3 03/19/2022 1546    ESTGFRAFRICA 24.4 (A) 05/12/2022 0909    EGFRNONAA 21.1 (A) 05/12/2022 0909          Lab Results   Component Value Date    TSH 1.230 12/13/2021     Lab Results   Component Value Date    INR 1.0 09/09/2020    INR 1.0 12/22/2010    INR 1.0 11/19/2010     Lab Results   Component Value Date    WBC 3.10 (L) 03/19/2022    HGB 9.1 (L) 03/19/2022    HCT 27.5 (L) 03/19/2022    MCV 86 03/19/2022     03/19/2022     BNP  @LABRCNTIP(BNP,BNPTRIAGEBLO)@  CrCl cannot be calculated (Patient's most recent lab result is older than the maximum 7 days allowed.).     Imaging:  ======  Results for orders placed during the hospital encounter of 03/02/22    Echo    Interpretation Summary  · The left ventricle is normal in size with moderate concentric hypertrophy and hyperdynamic systolic function.  · Mild left atrial enlargement.  · The estimated ejection fraction is 75%.  · Normal right ventricular  size with low normal right ventricular systolic function.  · There is mild aortic valve stenosis.  · Aortic valve area is 1.97 cm2; peak velocity is 1.67 m/s; mean gradient is 6 mmHg.  · Mild mitral regurgitation.  · There is mild mitral stenosis.  · Mild tricuspid regurgitation.  · Normal central venous pressure (3 mmHg).  · The estimated PA systolic pressure is 35 mmHg.    No results found for this or any previous visit.    Results for orders placed during the hospital encounter of 03/02/22    US Carotid Bilateral    Narrative  EXAMINATION:  US CAROTID BILATERAL    CLINICAL HISTORY:  syncope;    COMPARISON:  None    FINDINGS:  Sonographic evaluation of the carotid systems was performed.    There is some calcified plaque in the carotid bulbs bilaterally and in the proximal internal carotid arteries.    The peak systolic velocity in the right internal carotid artery was approximately 67 cm/sec.    The peak systolic velocity in the left internal carotid artery was [id number] cm/sec.    Antegrade flow noted in both vertebral arteries.    Stenosis percentage validated velocity measurements with angiographic measurements, velocity criteria are extrapolated from diameter data as defined by the Society of Radiologists in Ultrasound Consensus Conference Radiology 2003; 229;340-346.    Impression  No sonographic evidence of a significant stenosis is identified in either carotid system.  Calcified plaque in both carotid bulbs and proximal internal carotid arteries.      Electronically signed by: Keith East  Date:    03/02/2022  Time:    19:19    Results for orders placed during the hospital encounter of 12/29/21    X-Ray Chest PA And Lateral    Narrative  EXAMINATION:  XR CHEST PA AND LATERAL    CLINICAL HISTORY:  Disorientation, unspecified    TECHNIQUE:  PA and lateral views of the chest were performed.    COMPARISON:  Chest radiographs dating back to September 23, 2020    FINDINGS:  Unchanged mild pleuroparenchymal  scarring within the lung apices.  Otherwise, lungs are clear without acute opacity.  No pneumothorax or pleural effusion.  Heart size is normal.  Mild tortuosity of the thoracic aorta.  Atherosclerotic calcifications noted within the thoracic aorta.  No acute osseous abnormality.  Surgical clips present within the upper abdomen.    Impression  As above.      Electronically signed by: Candelario Walsh  Date:    12/29/2021  Time:    10:29    No results found for this or any previous visit.    No valid procedures specified.    Diagnostic Results:  ECG: Reviewed  EKG 2/1/2022 normal sinus rhythm, nonspecific ST T-wave changes, minimal LVH,  ms, 69 bpm,  ms  The ASCVD Risk score (Yonis RILEY Jr., et al., 2013) failed to calculate for the following reasons:    The 2013 ASCVD risk score is only valid for ages 40 to 79    Assessment and Plan:   Coronary artery disease, occlusive    Hypertensive heart disease with heart failure  -     carvediloL (COREG) 12.5 MG tablet; Take 1 tablet (12.5 mg total) by mouth 2 (two) times daily with meals.  Dispense: 60 tablet; Refill: 11    Other hyperlipidemia    Diastolic heart failure, NYHA class 3    Essential hypertension    Atherosclerosis of aorta    Ptosis of eyelid, unspecified laterality           Recent normal nuclear stress test.  Continue with clonidine, hydralazine, amlodipine, carvedilol.  Dc clonidine in am , states feels sleepy with it   Stressed importance of medication compliance.  Refilled some of her medications.  Reviewed all tests and above medical conditions with patient in detail and formulated treatment plan.  Risk factor modification discussed.   Cardiac low salt diet discussed.  Maintaining healthy weight and weight loss goals were discussed in clinic.  cw current meds   Follow up in 6 months

## 2022-06-05 ENCOUNTER — PATIENT MESSAGE (OUTPATIENT)
Dept: FAMILY MEDICINE | Facility: CLINIC | Age: 87
End: 2022-06-05
Payer: MEDICARE

## 2022-06-05 DIAGNOSIS — R53.1 WEAKNESS: Primary | ICD-10-CM

## 2022-06-14 NOTE — PROGRESS NOTES
"Subjective:      Patient ID: Glo Dumont is a 86 y.o. female.    Chief Complaint: Follow-up (Rtc 3 months)      HPI  Here for follow up of medical problems.  Taking periactin, and "eating up a storm", and also taking creon TID with meals.  Up 19# in 3mo.  BMs well formed, no loose.  Drinks plenty fluids.  Urine normal.  Not taking any depression rx.  No f/c/sw/cough.  Breathing well.  Never saw Nephro.    Updated/ annual due 9/22:  HM: 12/21 fluvax, 2/21 covid vaccines/booster, 3/15 [id number], 2/14 [id number], 2/15 TDaP, 10/21 BMD rep 2y, 2/11 Cscope no repeat will be done, 1/17 MMG no more, Eye doc monthly, GI Dr. Galindo.     Review of Systems   Constitutional: Negative for chills, diaphoresis and fever.   Respiratory: Negative for cough and shortness of breath.    Cardiovascular: Negative for chest pain, palpitations and leg swelling.   Gastrointestinal: Negative for blood in stool, constipation, diarrhea, nausea and vomiting.   Genitourinary: Negative for dysuria, frequency and hematuria.   Psychiatric/Behavioral: The patient is not nervous/anxious.          Objective:   /86 (BP Location: Right arm, Patient Position: Sitting, BP Method: Small (Manual))   Pulse 75   Temp 96.1 °F (35.6 °C)   Ht 5' 1" (1.549 m)   Wt 55.7 kg (122 lb 14.5 oz)   BMI 23.22 kg/m²     Physical Exam  Constitutional:       Appearance: She is well-developed.   Neck:      Thyroid: No thyroid mass.      Vascular: No carotid bruit.   Cardiovascular:      Rate and Rhythm: Normal rate and regular rhythm.      Heart sounds: No murmur heard.    No friction rub. No gallop.   Pulmonary:      Effort: Pulmonary effort is normal. No respiratory distress (BASELINE BREATHING IS DEEP, almost seems distress/agonal but normal for her.).      Breath sounds: Normal breath sounds. No wheezing or rales.   Abdominal:      General: Bowel sounds are normal.      Palpations: Abdomen is soft. There is no mass.      Tenderness: There is no abdominal " tenderness.   Musculoskeletal:      Cervical back: Neck supple.   Lymphadenopathy:      Cervical: No cervical adenopathy.   Neurological:      Mental Status: She is alert and oriented to person, place, and time.        Latest Reference Range & Units 05/12/22 09:09   Sodium 136 - 145 mmol/L 139   Potassium 3.5 - 5.1 mmol/L 4.4   Chloride 95 - 110 mmol/L 107   CO2 23 - 29 mmol/L 20 (L)   Anion Gap 8 - 16 mmol/L 12   BUN 8 - 23 mg/dL 45 (H)   Creatinine 0.5 - 1.4 mg/dL 2.1 (H)   eGFR if non African American >60 mL/min/1.73 m^2 21.1 !   eGFR if African American >60 mL/min/1.73 m^2 24.4 !   Glucose 70 - 110 mg/dL 191 (H)   Calcium 8.7 - 10.5 mg/dL 9.0         Assessment:       1. Weight loss    2. Sarcoidosis of lung    3. Hypertensive heart disease with heart failure    4. Essential hypertension    5. CKD (chronic kidney disease) stage 4, GFR 15-29 ml/min    6. 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    Sarcoidosis of lung, not symptomatic now.    Hypertensive heart disease with heart failure- doing well, cont meds per Cards.    Essential hypertension- stable, cont rx.    CKD (chronic kidney disease) stage 4, GFR 15-29 ml/min-hasn't seen Nephro, will reconsult.  Recheck lab now as pt much better hydrated, may have improved.    UC on entocort, doing well, Gastro Dr. Galindo.

## 2022-06-20 ENCOUNTER — PATIENT MESSAGE (OUTPATIENT)
Dept: CARDIOLOGY | Facility: CLINIC | Age: 87
End: 2022-06-20
Payer: MEDICARE

## 2022-06-28 ENCOUNTER — LAB VISIT (OUTPATIENT)
Dept: LAB | Facility: HOSPITAL | Age: 87
End: 2022-06-28
Attending: INTERNAL MEDICINE
Payer: MEDICARE

## 2022-06-28 ENCOUNTER — OFFICE VISIT (OUTPATIENT)
Dept: FAMILY MEDICINE | Facility: CLINIC | Age: 87
End: 2022-06-28
Payer: MEDICARE

## 2022-06-28 VITALS
HEART RATE: 75 BPM | SYSTOLIC BLOOD PRESSURE: 130 MMHG | TEMPERATURE: 96 F | DIASTOLIC BLOOD PRESSURE: 86 MMHG | BODY MASS INDEX: 23.21 KG/M2 | HEIGHT: 61 IN | WEIGHT: 122.94 LBS

## 2022-06-28 DIAGNOSIS — D86.0 SARCOIDOSIS OF LUNG: ICD-10-CM

## 2022-06-28 DIAGNOSIS — K51.00 ULCERATIVE PANCOLITIS WITHOUT COMPLICATION: ICD-10-CM

## 2022-06-28 DIAGNOSIS — N18.4 CKD (CHRONIC KIDNEY DISEASE) STAGE 4, GFR 15-29 ML/MIN: ICD-10-CM

## 2022-06-28 DIAGNOSIS — I11.0 HYPERTENSIVE HEART DISEASE WITH HEART FAILURE: ICD-10-CM

## 2022-06-28 DIAGNOSIS — R63.4 WEIGHT LOSS: Primary | ICD-10-CM

## 2022-06-28 DIAGNOSIS — I10 ESSENTIAL HYPERTENSION: Chronic | ICD-10-CM

## 2022-06-28 PROBLEM — K51.90 ULCERATIVE COLITIS: Status: ACTIVE | Noted: 2017-05-11

## 2022-06-28 LAB
ANION GAP SERPL CALC-SCNC: 10 MMOL/L (ref 8–16)
BUN SERPL-MCNC: 40 MG/DL (ref 8–23)
CALCIUM SERPL-MCNC: 9.6 MG/DL (ref 8.7–10.5)
CHLORIDE SERPL-SCNC: 107 MMOL/L (ref 95–110)
CO2 SERPL-SCNC: 23 MMOL/L (ref 23–29)
CREAT SERPL-MCNC: 1.6 MG/DL (ref 0.5–1.4)
EST. GFR  (AFRICAN AMERICAN): 33.9 ML/MIN/1.73 M^2
EST. GFR  (NON AFRICAN AMERICAN): 29.4 ML/MIN/1.73 M^2
GLUCOSE SERPL-MCNC: 86 MG/DL (ref 70–110)
POTASSIUM SERPL-SCNC: 4.4 MMOL/L (ref 3.5–5.1)
SODIUM SERPL-SCNC: 140 MMOL/L (ref 136–145)

## 2022-06-28 PROCEDURE — 99214 OFFICE O/P EST MOD 30 MIN: CPT | Mod: S$GLB,,, | Performed by: INTERNAL MEDICINE

## 2022-06-28 PROCEDURE — 1157F ADVNC CARE PLAN IN RCRD: CPT | Mod: CPTII,S$GLB,, | Performed by: INTERNAL MEDICINE

## 2022-06-28 PROCEDURE — 3288F FALL RISK ASSESSMENT DOCD: CPT | Mod: CPTII,S$GLB,, | Performed by: INTERNAL MEDICINE

## 2022-06-28 PROCEDURE — 1101F PR PT FALLS ASSESS DOC 0-1 FALLS W/OUT INJ PAST YR: ICD-10-PCS | Mod: CPTII,S$GLB,, | Performed by: INTERNAL MEDICINE

## 2022-06-28 PROCEDURE — 3075F SYST BP GE 130 - 139MM HG: CPT | Mod: CPTII,S$GLB,, | Performed by: INTERNAL MEDICINE

## 2022-06-28 PROCEDURE — 99999 PR PBB SHADOW E&M-EST. PATIENT-LVL IV: ICD-10-PCS | Mod: PBBFAC,,, | Performed by: INTERNAL MEDICINE

## 2022-06-28 PROCEDURE — 3079F DIAST BP 80-89 MM HG: CPT | Mod: CPTII,S$GLB,, | Performed by: INTERNAL MEDICINE

## 2022-06-28 PROCEDURE — 1101F PT FALLS ASSESS-DOCD LE1/YR: CPT | Mod: CPTII,S$GLB,, | Performed by: INTERNAL MEDICINE

## 2022-06-28 PROCEDURE — 1159F MED LIST DOCD IN RCRD: CPT | Mod: CPTII,S$GLB,, | Performed by: INTERNAL MEDICINE

## 2022-06-28 PROCEDURE — 1157F PR ADVANCE CARE PLAN OR EQUIV PRESENT IN MEDICAL RECORD: ICD-10-PCS | Mod: CPTII,S$GLB,, | Performed by: INTERNAL MEDICINE

## 2022-06-28 PROCEDURE — 3075F PR MOST RECENT SYSTOLIC BLOOD PRESS GE 130-139MM HG: ICD-10-PCS | Mod: CPTII,S$GLB,, | Performed by: INTERNAL MEDICINE

## 2022-06-28 PROCEDURE — 36415 COLL VENOUS BLD VENIPUNCTURE: CPT | Mod: PO | Performed by: INTERNAL MEDICINE

## 2022-06-28 PROCEDURE — 99999 PR PBB SHADOW E&M-EST. PATIENT-LVL IV: CPT | Mod: PBBFAC,,, | Performed by: INTERNAL MEDICINE

## 2022-06-28 PROCEDURE — 3288F PR FALLS RISK ASSESSMENT DOCUMENTED: ICD-10-PCS | Mod: CPTII,S$GLB,, | Performed by: INTERNAL MEDICINE

## 2022-06-28 PROCEDURE — 80048 BASIC METABOLIC PNL TOTAL CA: CPT | Performed by: INTERNAL MEDICINE

## 2022-06-28 PROCEDURE — 3079F PR MOST RECENT DIASTOLIC BLOOD PRESSURE 80-89 MM HG: ICD-10-PCS | Mod: CPTII,S$GLB,, | Performed by: INTERNAL MEDICINE

## 2022-06-28 PROCEDURE — 99214 PR OFFICE/OUTPT VISIT, EST, LEVL IV, 30-39 MIN: ICD-10-PCS | Mod: S$GLB,,, | Performed by: INTERNAL MEDICINE

## 2022-06-28 PROCEDURE — 1159F PR MEDICATION LIST DOCUMENTED IN MEDICAL RECORD: ICD-10-PCS | Mod: CPTII,S$GLB,, | Performed by: INTERNAL MEDICINE

## 2022-06-29 ENCOUNTER — PATIENT MESSAGE (OUTPATIENT)
Dept: FAMILY MEDICINE | Facility: CLINIC | Age: 87
End: 2022-06-29
Payer: MEDICARE

## 2022-06-30 RX ORDER — PANCRELIPASE 24000; 76000; 120000 [USP'U]/1; [USP'U]/1; [USP'U]/1
1 CAPSULE, DELAYED RELEASE PELLETS ORAL
Qty: 90 CAPSULE | Refills: 11
Start: 2022-06-30 | End: 2023-07-04 | Stop reason: SDUPTHER

## 2022-07-12 ENCOUNTER — TELEPHONE (OUTPATIENT)
Dept: FAMILY MEDICINE | Facility: CLINIC | Age: 87
End: 2022-07-12
Payer: MEDICARE

## 2022-07-13 ENCOUNTER — PATIENT MESSAGE (OUTPATIENT)
Dept: FAMILY MEDICINE | Facility: CLINIC | Age: 87
End: 2022-07-13
Payer: MEDICARE

## 2022-07-18 ENCOUNTER — PATIENT MESSAGE (OUTPATIENT)
Dept: FAMILY MEDICINE | Facility: CLINIC | Age: 87
End: 2022-07-18
Payer: MEDICARE

## 2022-07-19 ENCOUNTER — LAB VISIT (OUTPATIENT)
Dept: LAB | Facility: HOSPITAL | Age: 87
End: 2022-07-19
Attending: INTERNAL MEDICINE
Payer: MEDICARE

## 2022-07-19 ENCOUNTER — PATIENT MESSAGE (OUTPATIENT)
Dept: NEPHROLOGY | Facility: CLINIC | Age: 87
End: 2022-07-19
Payer: MEDICARE

## 2022-07-19 DIAGNOSIS — I11.0 HYPERTENSIVE HEART DISEASE WITH HEART FAILURE: ICD-10-CM

## 2022-07-19 LAB
ALBUMIN SERPL BCP-MCNC: 3.5 G/DL (ref 3.5–5.2)
ALP SERPL-CCNC: 66 U/L (ref 55–135)
ALT SERPL W/O P-5'-P-CCNC: 22 U/L (ref 10–44)
ANION GAP SERPL CALC-SCNC: 10 MMOL/L (ref 8–16)
AST SERPL-CCNC: 18 U/L (ref 10–40)
BILIRUB SERPL-MCNC: 0.3 MG/DL (ref 0.1–1)
BUN SERPL-MCNC: 33 MG/DL (ref 8–23)
CALCIUM SERPL-MCNC: 9.4 MG/DL (ref 8.7–10.5)
CHLORIDE SERPL-SCNC: 109 MMOL/L (ref 95–110)
CO2 SERPL-SCNC: 21 MMOL/L (ref 23–29)
CREAT SERPL-MCNC: 1.6 MG/DL (ref 0.5–1.4)
EST. GFR  (AFRICAN AMERICAN): 33.9 ML/MIN/1.73 M^2
EST. GFR  (NON AFRICAN AMERICAN): 29.4 ML/MIN/1.73 M^2
GLUCOSE SERPL-MCNC: 84 MG/DL (ref 70–110)
POTASSIUM SERPL-SCNC: 4.1 MMOL/L (ref 3.5–5.1)
PROT SERPL-MCNC: 6.7 G/DL (ref 6–8.4)
SODIUM SERPL-SCNC: 140 MMOL/L (ref 136–145)

## 2022-07-19 PROCEDURE — 36415 COLL VENOUS BLD VENIPUNCTURE: CPT | Performed by: INTERNAL MEDICINE

## 2022-07-19 PROCEDURE — 80053 COMPREHEN METABOLIC PANEL: CPT | Performed by: INTERNAL MEDICINE

## 2022-08-02 ENCOUNTER — OFFICE VISIT (OUTPATIENT)
Dept: NEPHROLOGY | Facility: CLINIC | Age: 87
End: 2022-08-02
Payer: MEDICARE

## 2022-08-02 VITALS
SYSTOLIC BLOOD PRESSURE: 158 MMHG | HEART RATE: 82 BPM | WEIGHT: 129.44 LBS | BODY MASS INDEX: 24.44 KG/M2 | DIASTOLIC BLOOD PRESSURE: 80 MMHG | HEIGHT: 61 IN

## 2022-08-02 DIAGNOSIS — I12.9 PARENCHYMAL RENAL HYPERTENSION, STAGE 1 THROUGH STAGE 4 OR UNSPECIFIED CHRONIC KIDNEY DISEASE: ICD-10-CM

## 2022-08-02 DIAGNOSIS — N18.32 STAGE 3B CHRONIC KIDNEY DISEASE: Primary | ICD-10-CM

## 2022-08-02 PROCEDURE — 99499 RISK ADDL DX/OHS AUDIT: ICD-10-PCS | Mod: HCNC,S$GLB,, | Performed by: INTERNAL MEDICINE

## 2022-08-02 PROCEDURE — 1157F ADVNC CARE PLAN IN RCRD: CPT | Mod: CPTII,S$GLB,, | Performed by: INTERNAL MEDICINE

## 2022-08-02 PROCEDURE — 1157F PR ADVANCE CARE PLAN OR EQUIV PRESENT IN MEDICAL RECORD: ICD-10-PCS | Mod: CPTII,S$GLB,, | Performed by: INTERNAL MEDICINE

## 2022-08-02 PROCEDURE — 1160F PR REVIEW ALL MEDS BY PRESCRIBER/CLIN PHARMACIST DOCUMENTED: ICD-10-PCS | Mod: CPTII,S$GLB,, | Performed by: INTERNAL MEDICINE

## 2022-08-02 PROCEDURE — 1159F PR MEDICATION LIST DOCUMENTED IN MEDICAL RECORD: ICD-10-PCS | Mod: CPTII,S$GLB,, | Performed by: INTERNAL MEDICINE

## 2022-08-02 PROCEDURE — 3079F PR MOST RECENT DIASTOLIC BLOOD PRESSURE 80-89 MM HG: ICD-10-PCS | Mod: CPTII,S$GLB,, | Performed by: INTERNAL MEDICINE

## 2022-08-02 PROCEDURE — 1159F MED LIST DOCD IN RCRD: CPT | Mod: CPTII,S$GLB,, | Performed by: INTERNAL MEDICINE

## 2022-08-02 PROCEDURE — 3288F PR FALLS RISK ASSESSMENT DOCUMENTED: ICD-10-PCS | Mod: CPTII,S$GLB,, | Performed by: INTERNAL MEDICINE

## 2022-08-02 PROCEDURE — 1126F PR PAIN SEVERITY QUANTIFIED, NO PAIN PRESENT: ICD-10-PCS | Mod: CPTII,S$GLB,, | Performed by: INTERNAL MEDICINE

## 2022-08-02 PROCEDURE — 99204 OFFICE O/P NEW MOD 45 MIN: CPT | Mod: S$GLB,,, | Performed by: INTERNAL MEDICINE

## 2022-08-02 PROCEDURE — 3077F SYST BP >= 140 MM HG: CPT | Mod: CPTII,S$GLB,, | Performed by: INTERNAL MEDICINE

## 2022-08-02 PROCEDURE — 99999 PR PBB SHADOW E&M-EST. PATIENT-LVL IV: CPT | Mod: PBBFAC,,, | Performed by: INTERNAL MEDICINE

## 2022-08-02 PROCEDURE — 1126F AMNT PAIN NOTED NONE PRSNT: CPT | Mod: CPTII,S$GLB,, | Performed by: INTERNAL MEDICINE

## 2022-08-02 PROCEDURE — 1160F RVW MEDS BY RX/DR IN RCRD: CPT | Mod: CPTII,S$GLB,, | Performed by: INTERNAL MEDICINE

## 2022-08-02 PROCEDURE — 1101F PT FALLS ASSESS-DOCD LE1/YR: CPT | Mod: CPTII,S$GLB,, | Performed by: INTERNAL MEDICINE

## 2022-08-02 PROCEDURE — 3079F DIAST BP 80-89 MM HG: CPT | Mod: CPTII,S$GLB,, | Performed by: INTERNAL MEDICINE

## 2022-08-02 PROCEDURE — 99999 PR PBB SHADOW E&M-EST. PATIENT-LVL IV: ICD-10-PCS | Mod: PBBFAC,,, | Performed by: INTERNAL MEDICINE

## 2022-08-02 PROCEDURE — 99499 UNLISTED E&M SERVICE: CPT | Mod: HCNC,S$GLB,, | Performed by: INTERNAL MEDICINE

## 2022-08-02 PROCEDURE — 3077F PR MOST RECENT SYSTOLIC BLOOD PRESSURE >= 140 MM HG: ICD-10-PCS | Mod: CPTII,S$GLB,, | Performed by: INTERNAL MEDICINE

## 2022-08-02 PROCEDURE — 1101F PR PT FALLS ASSESS DOC 0-1 FALLS W/OUT INJ PAST YR: ICD-10-PCS | Mod: CPTII,S$GLB,, | Performed by: INTERNAL MEDICINE

## 2022-08-02 PROCEDURE — 99204 PR OFFICE/OUTPT VISIT, NEW, LEVL IV, 45-59 MIN: ICD-10-PCS | Mod: S$GLB,,, | Performed by: INTERNAL MEDICINE

## 2022-08-02 PROCEDURE — 3288F FALL RISK ASSESSMENT DOCD: CPT | Mod: CPTII,S$GLB,, | Performed by: INTERNAL MEDICINE

## 2022-08-02 NOTE — PROGRESS NOTES
Glo Dumont is a 86 y.o. female    HPI:    She was noted to have elevated creatinine on routine laboratory studies.  Nephrology has been consulted for evaluation.  In the clinic today she is accompanied by her daughter.  She has no complaints or issues.  Laboratory studies and medications were reviewed.  All Nephrology related questions were answered to their satisfaction.  She relates that she is unable to take ACE-inhibitor secondary to severe swelling.  She does not relate taking a lot of nonsteroidal anti-inflammatory agents.  She states her appetite is good.    PAST MEDICAL HISTORY:  She  has a past medical history of Anemia, Angina pectoris, Anxiety, Anxiety and depression, Arthritis, Carotid artery occlusion, Carpal tunnel syndrome (06/23/2008), Chronic diarrhea, CKD (chronic kidney disease) stage 3, GFR 30-59 ml/min (5/11/2017), Colitis, Coronary artery disease, Coronary artery disease, Diastolic dysfunction, Diverticulosis, Glaucoma, Greater trochanteric bursitis (2/10/2015), Grief at loss of child (1/26/2016), H/O carotid endarterectomy (12/2/2013), Heart failure, History of coronary angioplasty (3/11/2014), Hypercholesteremia, Hypertension, Liver cyst (02/08/2013), Macular degeneration, Primary open-angle glaucoma(365.11) (9/3/2013), Renal cyst (02/08/2013), 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    PAST SURGICAL HISTORY:  She  has a past surgical history that includes Total abdominal hysterectomy w/ bilateral salpingoophorectomy (1972); Cataract extraction (Bilateral); Coronary angioplasty with stent (11/19/2010); Carotid endarterectomy (Right, 2000s); Joint replacement (Left, 11/03/2014); and Cholecystectomy.    SOCIAL HISTORY:  She  reports that she has never smoked. She has never used smokeless tobacco. She reports that she does not drink alcohol and does not use drugs.      FAMILY MEDICAL HISTORY:  Her family  history includes Cancer in her daughter and father; Cirrhosis in her brother; Heart failure in her brother and mother; Hypertension in her mother.    Review of patient's allergies indicates:   Allergen Reactions    Lisinopril      angioedema    Codeine Nausea And Vomiting           Prior to Admission medications    Medication Sig Start Date End Date Taking? Authorizing Provider   amLODIPine (NORVASC) 5 MG tablet Take 1 tablet (5 mg total) by mouth once daily. 3/20/22  Yes Aye Bower NP   aspirin (ECOTRIN) 81 MG EC tablet Take 81 mg by mouth once daily.   Yes Historical Provider   budesonide (ENTOCORT EC) 3 mg capsule Take 9 mg by mouth once daily.   Yes Historical Provider   calcium carbonate (OS-LYNN) 600 mg calcium (1,500 mg) Tab Take by mouth. 5/8/12  Yes Historical Provider   carvediloL (COREG) 12.5 MG tablet Take 1 tablet (12.5 mg total) by mouth 2 (two) times daily with meals. 5/30/22 5/30/23 Yes Stan Lui MD   cloNIDine (CATAPRES) 0.1 MG tablet Take 1 tablet (0.1 mg total) by mouth 2 (two) times daily. 3/29/22 3/29/23 Yes Stan Lui MD   cyproheptadine (PERIACTIN) 4 mg tablet Take 1 tablet (4 mg total) by mouth 3 (three) times daily as needed. 3/30/22  Yes Christina Cardona MD   dorzolamide-timolol 2-0.5% (COSOPT) 22.3-6.8 mg/mL ophthalmic solution Place 1 drop into both eyes 2 (two) times daily. 11/9/21  Yes Sergio Lozada MD   EYLEA 2 mg/0.05 mL Soln  9/9/21  Yes Historical Provider   FLUoxetine 10 MG capsule 1 capsule in the morning   Yes Historical Provider   FOLIC ACID/MULTIVIT-MIN/LUTEIN (CENTRUM SILVER ORAL) Take by mouth.   Yes Historical Provider   furosemide (LASIX) 40 MG tablet Take 1 tablet (40 mg total) by mouth once daily. 4/22/22 4/22/23 Yes Stan Lui MD   gabapentin (NEURONTIN) 300 MG capsule Take 1 capsule (300 mg total) by mouth once daily. 12/29/21 8/2/22 Yes Christina Cardona MD   hydrALAZINE (APRESOLINE) 25 MG tablet Take 1 tablet (25 mg total) by  mouth every 8 (eight) hours. 3/29/22 3/29/23 Yes Stan Lui MD   iron-vitamin C 100-250 mg, ICAR-C, (ICAR-C) 100-250 mg Tab Take 1 tablet by mouth once daily. 4/1/16  Yes Christina Cardona MD   lipase-protease-amylase 24,000-76,000-120,000 units (CREON) 24,000-76,000 -120,000 unit capsule Take 1 capsule by mouth 3 (three) times daily with meals. 6/30/22 6/30/23 Yes Christina Cardona MD   nitroGLYCERIN (NITROSTAT) 0.4 MG SL tablet Place 1 tablet (0.4 mg total) under the tongue every 5 (five) minutes as needed for Chest pain. 3/8/22  Yes Sudeep Wick NP   nystatin (MYCOSTATIN) 100,000 unit/mL suspension Take by mouth. 1/29/22  Yes Historical Provider   OZURDEX 0.7 mg Impl intravitreal implant  8/4/21  Yes Historical Provider   prasugreL (EFFIENT) 10 mg Tab Take 1 tablet (10 mg total) by mouth once daily. 9/17/20  Yes Nik Bergman MD   pravastatin (PRAVACHOL) 40 MG tablet TAKE 1 TABLET BY MOUTH DAILY 10/2/21  Yes Nik Bergman MD   pantoprazole (PROTONIX) 40 MG tablet Take 1 tablet (40 mg total) by mouth once daily. 2/1/21 2/1/22  Sharonda Cordoba MD   citalopram (CELEXA) 20 MG tablet Take 1 tablet (20 mg total) by mouth once daily.  Patient not taking: Reported on 3/18/2022 9/26/20 3/20/22  Christina Cardona MD      Sodium   Date Value Ref Range Status   07/19/2022 140 136 - 145 mmol/L Final   06/28/2022 140 136 - 145 mmol/L Final   05/12/2022 139 136 - 145 mmol/L Final     Potassium   Date Value Ref Range Status   07/19/2022 4.1 3.5 - 5.1 mmol/L Final   06/28/2022 4.4 3.5 - 5.1 mmol/L Final   05/12/2022 4.4 3.5 - 5.1 mmol/L Final     Chloride   Date Value Ref Range Status   07/19/2022 109 95 - 110 mmol/L Final   06/28/2022 107 95 - 110 mmol/L Final   05/12/2022 107 95 - 110 mmol/L Final     CO2   Date Value Ref Range Status   07/19/2022 21 (L) 23 - 29 mmol/L Final   06/28/2022 23 23 - 29 mmol/L Final   05/12/2022 20 (L) 23 - 29 mmol/L Final     Glucose   Date Value Ref Range Status    07/19/2022 84 70 - 110 mg/dL Final   06/28/2022 86 70 - 110 mg/dL Final   05/12/2022 191 (H) 70 - 110 mg/dL Final     BUN   Date Value Ref Range Status   07/19/2022 33 (H) 8 - 23 mg/dL Final   06/28/2022 40 (H) 8 - 23 mg/dL Final   05/12/2022 45 (H) 8 - 23 mg/dL Final     Creatinine   Date Value Ref Range Status   07/19/2022 1.6 (H) 0.5 - 1.4 mg/dL Final   06/28/2022 1.6 (H) 0.5 - 1.4 mg/dL Final   05/12/2022 2.1 (H) 0.5 - 1.4 mg/dL Final     Calcium   Date Value Ref Range Status   07/19/2022 9.4 8.7 - 10.5 mg/dL Final   06/28/2022 9.6 8.7 - 10.5 mg/dL Final   05/12/2022 9.0 8.7 - 10.5 mg/dL Final     Total Protein   Date Value Ref Range Status   07/19/2022 6.7 6.0 - 8.4 g/dL Final   03/19/2022 6.6 6.0 - 8.4 g/dL Final   03/08/2022 6.8 6.0 - 8.4 g/dL Final     Albumin   Date Value Ref Range Status   07/19/2022 3.5 3.5 - 5.2 g/dL Final   03/19/2022 3.4 (L) 3.5 - 5.2 g/dL Final   03/08/2022 3.6 3.5 - 5.2 g/dL Final     Total Bilirubin   Date Value Ref Range Status   07/19/2022 0.3 0.1 - 1.0 mg/dL Final     Comment:     For infants and newborns, interpretation of results should be based  on gestational age, weight and in agreement with clinical  observations.    Premature Infant recommended reference ranges:  Up to 24 hours.............<8.0 mg/dL  Up to 48 hours............<12.0 mg/dL  3-5 days..................<15.0 mg/dL  6-29 days.................<15.0 mg/dL     03/19/2022 0.3 0.1 - 1.0 mg/dL Final     Comment:     For infants and newborns, interpretation of results should be based  on gestational age, weight and in agreement with clinical  observations.    Premature Infant recommended reference ranges:  Up to 24 hours.............<8.0 mg/dL  Up to 48 hours............<12.0 mg/dL  3-5 days..................<15.0 mg/dL  6-29 days.................<15.0 mg/dL     03/08/2022 0.4 0.1 - 1.0 mg/dL Final     Comment:     For infants and newborns, interpretation of results should be based  on gestational age, weight and in  agreement with clinical  observations.    Premature Infant recommended reference ranges:  Up to 24 hours.............<8.0 mg/dL  Up to 48 hours............<12.0 mg/dL  3-5 days..................<15.0 mg/dL  6-29 days.................<15.0 mg/dL       Alkaline Phosphatase   Date Value Ref Range Status   07/19/2022 66 55 - 135 U/L Final   03/19/2022 61 55 - 135 U/L Final   03/08/2022 64 55 - 135 U/L Final     AST   Date Value Ref Range Status   07/19/2022 18 10 - 40 U/L Final   03/19/2022 15 10 - 40 U/L Final   03/08/2022 13 10 - 40 U/L Final     ALT   Date Value Ref Range Status   07/19/2022 22 10 - 44 U/L Final   03/19/2022 11 10 - 44 U/L Final   03/08/2022 12 10 - 44 U/L Final     Anion Gap   Date Value Ref Range Status   07/19/2022 10 8 - 16 mmol/L Final   06/28/2022 10 8 - 16 mmol/L Final   05/12/2022 12 8 - 16 mmol/L Final     eGFR if    Date Value Ref Range Status   07/19/2022 33.9 (A) >60 mL/min/1.73 m^2 Final   06/28/2022 33.9 (A) >60 mL/min/1.73 m^2 Final   05/12/2022 24.4 (A) >60 mL/min/1.73 m^2 Final     eGFR if non    Date Value Ref Range Status   07/19/2022 29.4 (A) >60 mL/min/1.73 m^2 Final     Comment:     Calculation used to obtain the estimated glomerular filtration  rate (eGFR) is the CKD-EPI equation.      06/28/2022 29.4 (A) >60 mL/min/1.73 m^2 Final     Comment:     Calculation used to obtain the estimated glomerular filtration  rate (eGFR) is the CKD-EPI equation.      05/12/2022 21.1 (A) >60 mL/min/1.73 m^2 Final     Comment:     Calculation used to obtain the estimated glomerular filtration  rate (eGFR) is the CKD-EPI equation.        RBC, UA   Date Value Ref Range Status   04/04/2022 8 (H) 0 - 4 /hpf Final   12/13/2021 1 0 - 4 /hpf Final   02/01/2021 2 0 - 4 /hpf Final     WBC, UA   Date Value Ref Range Status   04/04/2022 2 0 - 5 /hpf Final   02/01/2022 3 0 - 5 /hpf Final   01/10/2022 10 (H) 0 - 5 /hpf Final     Bacteria   Date Value Ref Range Status  "  04/04/2022 Many (A) None-Occ /hpf Final   01/10/2022 Few (A) None-Occ /hpf Final   12/13/2021 Rare None-Occ /hpf Final     Hyaline Casts, UA   Date Value Ref Range Status   02/01/2021 13 (A) 0-1/lpf /lpf Final   03/29/2016 1 0-1/lpf /lpf Final     Microscopic Comment   Date Value Ref Range Status   04/04/2022 SEE COMMENT  Final     Comment:     Other formed elements not mentioned in the report are not   present in the microscopic examination.      02/01/2022 SEE COMMENT  Final     Comment:     Other formed elements not mentioned in the report are not   present in the microscopic examination.      01/10/2022 SEE COMMENT  Final     Comment:     Other formed elements not mentioned in the report are not   present in the microscopic examination.        Protein, Urine Random   Date Value Ref Range Status   07/19/2022 9 0 - 15 mg/dL Final     Creatinine, Urine   Date Value Ref Range Status   07/19/2022 86.0 15.0 - 325.0 mg/dL Final   02/06/2014 124.0 15.0 - 325.0 mg/dL Final     Comment:     The random urine reference ranges provided were established   for 24 hour urine collections.  No reference ranges exist for  random urine specimens.  Correlate clinically.     Prot/Creat Ratio, Urine   Date Value Ref Range Status   07/19/2022 0.10 0.00 - 0.20 Final         REVIEW OF SYSTEMS:  Patient has no fever, fatigue, visual changes, chest pain, edema, cough, dyspnea, nausea, vomiting, constipation, diarrhea, arthralgias, pruritis, dizziness, weakness, depression, confusion.        PHYSICAL EXAM:   height is 5' 1" (1.549 m) and weight is 58.7 kg (129 lb 6.6 oz). Her blood pressure is 158/80 (abnormal) and her pulse is 82.   Gen: WDWN female in no apparent distress  Psych: Normal mood and affect  Skin: No rashes or ulcers  Eyes: Normal conjunctiva and lids, PERRLA  ENT: Normal hearing with no oropharyngeal lesions  Neck: No JVD  Chest: Clear with no rales, rhonchi, wheezing with normal effort  CV: Regular with no murmurs, gallops " or rubs  Abd: Soft, nontender, no distension, positive bowel sounds  Ext: No cyanosis, clubbing or edema          IMPRESSION AND RECOMMENDATIONS:    1. CKD stage IIIB:  Creatinine has shown some slight intrinsic fluctuation ranging between 1.4 and 1.6 for the past year.  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  Her urinalysis is bland without evidence of proteinuria.  She is not on a RAAS inhibitor secondary to angioedema.    2. Hypertension:  Blood pressure is adequately controlled on current regimen.  It was slightly elevated in the clinic today.  However on review of other clinic notes her blood pressure is much better controlled.

## 2022-08-02 NOTE — PROGRESS NOTES
===============================  Date today is 8/3/2022  Glo Dumont is a 86 y.o. female  Last visit [facility]: :5/25/2022   Last visit eye dept. 5/25/2022    Corrected distance visual acuity was 20/70 in the right eye and 20/400 in the left eye.  Tonometry     Tonometry (Applanation, 8:05 AM)       Right Left    Pressure 14 14              Not recorded       Not recorded       Not recorded       Chief Complaint   Patient presents with    CRVO     EVAL EYLEA OU       Problem List Items Addressed This Visit        Eye/Vision problems    Central retinal vein occlusion with macular edema of both eyes - Primary    Relevant Medications    aflibercept Soln 2 mg (Completed)    aflibercept Soln 2 mg (Completed)    Other Relevant Orders    Posterior Segment OCT Retina-Both eyes (Completed)    Prior authorization Order      Other Visit Diagnoses     SOB (shortness of breath)        Pseudophakia of both eyes              ________________  8/3/2022 today      CRVO with ME  Post Ozurdex OU 5/25/22  OCT ok OD, OS still shows edema-may be at plateau  Will eval in 1 month    May be near plateau ou but for different reasons   od almost resolved me  Os no improvement with with prev tx     Injection Procedure Note:    8/3/2022  Diagnosis :  Ou eylea (over ozurdex) crvo  Today:   Eylea (afibercept) 2 mg/0.05 ml Intravitreal Injection , OU   Follow up: rtc 6 weeks- eval  May follow       Instructed to call 24/7 for any worsening of vision. Check Both eyes daily. Gave patient my home phone number.  Risks, benefits, and alternatives to treatment discussed in detail with the patient.  The patient voiced understanding and wished to proceed with the procedure.     Patient Identified and Time Out complete  Subconjunctival bleb - xylocaine with epi 2%   and Betadine.  Inject at Eylea (afibercept) 2 mg/0.05 ml Intravitreal Injection , OU 6:00 @ 3.5-4mm posterior to limbus  1 stop: no   Post Operative Dx: Same  Complications: None  Follow up as  above.    .      ===============================

## 2022-08-03 ENCOUNTER — PROCEDURE VISIT (OUTPATIENT)
Dept: OPHTHALMOLOGY | Facility: CLINIC | Age: 87
End: 2022-08-03
Payer: MEDICARE

## 2022-08-03 DIAGNOSIS — R06.02 SOB (SHORTNESS OF BREATH): ICD-10-CM

## 2022-08-03 DIAGNOSIS — H34.8130 CENTRAL RETINAL VEIN OCCLUSION WITH MACULAR EDEMA OF BOTH EYES: Primary | ICD-10-CM

## 2022-08-03 DIAGNOSIS — Z96.1 PSEUDOPHAKIA OF BOTH EYES: ICD-10-CM

## 2022-08-03 PROCEDURE — 67028 PR INJECT INTRAVITREAL PHARMCOLOGIC: ICD-10-PCS | Mod: 50,S$GLB,, | Performed by: OPHTHALMOLOGY

## 2022-08-03 PROCEDURE — 92134 POSTERIOR SEGMENT OCT RETINA (OCULAR COHERENCE TOMOGRAPHY)-BOTH EYES: ICD-10-PCS | Mod: S$GLB,,, | Performed by: OPHTHALMOLOGY

## 2022-08-03 PROCEDURE — 92134 CPTRZ OPH DX IMG PST SGM RTA: CPT | Mod: S$GLB,,, | Performed by: OPHTHALMOLOGY

## 2022-08-03 PROCEDURE — 99499 UNLISTED E&M SERVICE: CPT | Mod: S$GLB,,, | Performed by: OPHTHALMOLOGY

## 2022-08-03 PROCEDURE — 99499 NO LOS: ICD-10-PCS | Mod: S$GLB,,, | Performed by: OPHTHALMOLOGY

## 2022-08-03 PROCEDURE — 67028 INJECTION EYE DRUG: CPT | Mod: 50,S$GLB,, | Performed by: OPHTHALMOLOGY

## 2022-08-17 ENCOUNTER — TELEPHONE (OUTPATIENT)
Dept: OPHTHALMOLOGY | Facility: CLINIC | Age: 87
End: 2022-08-17
Payer: MEDICARE

## 2022-08-17 NOTE — TELEPHONE ENCOUNTER
Spoke with Ms. Glo and she said that her eye is fine and not hurting, so She does not need to come in. kf

## 2022-08-18 ENCOUNTER — PES CALL (OUTPATIENT)
Dept: ADMINISTRATIVE | Facility: CLINIC | Age: 87
End: 2022-08-18
Payer: MEDICARE

## 2022-08-19 ENCOUNTER — OFFICE VISIT (OUTPATIENT)
Dept: OPHTHALMOLOGY | Facility: CLINIC | Age: 87
End: 2022-08-19
Payer: MEDICARE

## 2022-08-19 DIAGNOSIS — Z96.1 PSEUDOPHAKIA OF BOTH EYES: ICD-10-CM

## 2022-08-19 DIAGNOSIS — H34.8130 CENTRAL RETINAL VEIN OCCLUSION WITH MACULAR EDEMA OF BOTH EYES: Primary | ICD-10-CM

## 2022-08-19 DIAGNOSIS — H10.13 ALLERGIC CONJUNCTIVITIS OF BOTH EYES: ICD-10-CM

## 2022-08-19 DIAGNOSIS — H20.13 CHRONIC IRITIS OF BOTH EYES: ICD-10-CM

## 2022-08-19 PROCEDURE — 1160F RVW MEDS BY RX/DR IN RCRD: CPT | Mod: CPTII,S$GLB,, | Performed by: OPHTHALMOLOGY

## 2022-08-19 PROCEDURE — 92012 INTRM OPH EXAM EST PATIENT: CPT | Mod: S$GLB,,, | Performed by: OPHTHALMOLOGY

## 2022-08-19 PROCEDURE — 1157F ADVNC CARE PLAN IN RCRD: CPT | Mod: CPTII,S$GLB,, | Performed by: OPHTHALMOLOGY

## 2022-08-19 PROCEDURE — 1160F PR REVIEW ALL MEDS BY PRESCRIBER/CLIN PHARMACIST DOCUMENTED: ICD-10-PCS | Mod: CPTII,S$GLB,, | Performed by: OPHTHALMOLOGY

## 2022-08-19 PROCEDURE — 1157F PR ADVANCE CARE PLAN OR EQUIV PRESENT IN MEDICAL RECORD: ICD-10-PCS | Mod: CPTII,S$GLB,, | Performed by: OPHTHALMOLOGY

## 2022-08-19 PROCEDURE — 99999 PR PBB SHADOW E&M-EST. PATIENT-LVL III: ICD-10-PCS | Mod: PBBFAC,,, | Performed by: OPHTHALMOLOGY

## 2022-08-19 PROCEDURE — 1126F AMNT PAIN NOTED NONE PRSNT: CPT | Mod: CPTII,S$GLB,, | Performed by: OPHTHALMOLOGY

## 2022-08-19 PROCEDURE — 92012 PR EYE EXAM, EST PATIENT,INTERMED: ICD-10-PCS | Mod: S$GLB,,, | Performed by: OPHTHALMOLOGY

## 2022-08-19 PROCEDURE — 1126F PR PAIN SEVERITY QUANTIFIED, NO PAIN PRESENT: ICD-10-PCS | Mod: CPTII,S$GLB,, | Performed by: OPHTHALMOLOGY

## 2022-08-19 PROCEDURE — 1159F MED LIST DOCD IN RCRD: CPT | Mod: CPTII,S$GLB,, | Performed by: OPHTHALMOLOGY

## 2022-08-19 PROCEDURE — 1159F PR MEDICATION LIST DOCUMENTED IN MEDICAL RECORD: ICD-10-PCS | Mod: CPTII,S$GLB,, | Performed by: OPHTHALMOLOGY

## 2022-08-19 PROCEDURE — 99999 PR PBB SHADOW E&M-EST. PATIENT-LVL III: CPT | Mod: PBBFAC,,, | Performed by: OPHTHALMOLOGY

## 2022-08-19 RX ORDER — NEOMYCIN SULFATE, POLYMYXIN B SULFATE, AND DEXAMETHASONE 3.5; 10000; 1 MG/G; [USP'U]/G; MG/G
OINTMENT OPHTHALMIC 4 TIMES DAILY
Qty: 3.5 G | Refills: 0 | Status: SHIPPED | OUTPATIENT
Start: 2022-08-19 | End: 2022-08-26

## 2022-08-19 NOTE — PROGRESS NOTES
===============================  Date today is 8/19/2022  Glo Dumont is a 86 y.o. female  Last visit [facility]: :8/3/2022   Last visit eye dept. 8/3/2022    Corrected distance visual acuity was 20/60 in the right eye and CF at 3' in the left eye.  Not recorded       Not recorded       Not recorded       Not recorded       Chief Complaint   Patient presents with    Eye Pain     Pt states after her last injections she had 1 night of severe pain os that was relieved with aleve. It hasnt hurt any more since then but it feels funny.     HPI     Eye Pain      Additional comments: Pt states after her last injections she had 1 night   of severe pain os that was relieved with aleve. It hasnt hurt any more   since then but it feels funny.              Comments     1. Steroid responder glaucoma   2. PCIOL OU MGM   3. SARCOIDOSIS   H\O chronic UVEITIS OU   4. Central vein occlusion of retina, left   5. Retinal edema     H/o Avastin and Eylea OS   Had been getting Ozurdex OU with Dr. Shahrzad Tamez Eylea OU last 4/25/22, 8/3/22  Ozurdex ou last 2/16/2022, 5/25/22          Last edited by STEVEN Greenberg on 8/19/2022  7:35 AM. 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 - Primary      Other Visit Diagnoses     Pseudophakia of both eyes        Chronic iritis of both eyes        Allergic conjunctivitis of both eyes              ________________  8/19/2022 today    C/o eye pain post injection  No cell/flare  White conj  Recommend Maxitrol ointment QID for 7 days  If not better, call    RTC as scheduled  Instructed to call 24/7 for any worsening of vision or symptoms. Check OU daily.   Gave my office and cell phone number.      .      ===============================

## 2022-08-29 ENCOUNTER — PATIENT MESSAGE (OUTPATIENT)
Dept: CARDIOLOGY | Facility: CLINIC | Age: 87
End: 2022-08-29
Payer: MEDICARE

## 2022-08-29 DIAGNOSIS — E78.49 OTHER HYPERLIPIDEMIA: ICD-10-CM

## 2022-08-30 RX ORDER — PRAVASTATIN SODIUM 40 MG/1
40 TABLET ORAL DAILY
Qty: 90 TABLET | Refills: 2 | Status: SHIPPED | OUTPATIENT
Start: 2022-08-30 | End: 2023-06-09

## 2022-09-07 ENCOUNTER — PROCEDURE VISIT (OUTPATIENT)
Dept: OPHTHALMOLOGY | Facility: CLINIC | Age: 87
End: 2022-09-07
Payer: MEDICARE

## 2022-09-07 DIAGNOSIS — H34.8130 CENTRAL RETINAL VEIN OCCLUSION WITH MACULAR EDEMA OF BOTH EYES: Primary | ICD-10-CM

## 2022-09-07 PROCEDURE — 99499 NO LOS: ICD-10-PCS | Mod: S$GLB,,, | Performed by: OPHTHALMOLOGY

## 2022-09-07 PROCEDURE — 99499 UNLISTED E&M SERVICE: CPT | Mod: S$GLB,,, | Performed by: OPHTHALMOLOGY

## 2022-09-07 PROCEDURE — 92134 CPTRZ OPH DX IMG PST SGM RTA: CPT | Mod: S$GLB,,, | Performed by: OPHTHALMOLOGY

## 2022-09-07 PROCEDURE — 92134 POSTERIOR SEGMENT OCT RETINA (OCULAR COHERENCE TOMOGRAPHY)-BOTH EYES: ICD-10-PCS | Mod: S$GLB,,, | Performed by: OPHTHALMOLOGY

## 2022-09-07 NOTE — PROGRESS NOTES
===============================  Date today is 9/7/2022  Glo Dumont is a 86 y.o. female  Last visit [facility]: :8/19/2022   Last visit eye dept. 8/19/2022    Corrected distance visual acuity was 20/70 in the right eye and 20/400 in the left eye.  Tonometry       Tonometry (Applanation, 8:48 AM)         Right Left    Pressure 18                   Not recorded       Not recorded       Not recorded       Chief Complaint   Patient presents with    CRVO/ME     EVAL OZURDEX OU     HPI     CRVO/ME     Additional comments: EVAL OZURDEX OU           Comments    1. Steroid responder glaucoma   2. PCIOL OU MGM   3. SARCOIDOSIS   H\O chronic UVEITIS OU   4. Central vein occlusion of retina, left   5. Retinal edema     H/o Avastin and Eylea OS   Had been getting Ozurdex OU with Dr. Shahrzad Tamez Eylea OU last 4/25/22, 8/3/22  Ozurdex ou last 2/16/2022, 5/25/22          Last edited by Jessica Jensen on 9/7/2022  8:28 AM.      Problem List Items Addressed This Visit          Eye/Vision problems    Central retinal vein occlusion with macular edema of both eyes - Primary    Relevant Orders    Posterior Segment OCT Retina-Both eyes (Completed)    Prior authorization Order     Instructed to call 24/7 for any worsening of vision, visual distortion or pain.  Check OU independently daily.    Gave my office and personal cell phone number.  ________________  9/7/2022 today    CRVO OU   OCT stable today  No injection today    RTC 2 months eval Ozurdex OU  Instructed to call 24/7 for any worsening of vision or symptoms. Check OU daily.   Gave my office and cell phone number.                =============================

## 2022-10-10 ENCOUNTER — OFFICE VISIT (OUTPATIENT)
Dept: CARDIOLOGY | Facility: CLINIC | Age: 87
End: 2022-10-10
Payer: MEDICARE

## 2022-10-10 VITALS
HEIGHT: 61 IN | SYSTOLIC BLOOD PRESSURE: 160 MMHG | RESPIRATION RATE: 18 BRPM | BODY MASS INDEX: 26.89 KG/M2 | OXYGEN SATURATION: 97 % | WEIGHT: 142.44 LBS | HEART RATE: 89 BPM | DIASTOLIC BLOOD PRESSURE: 84 MMHG

## 2022-10-10 DIAGNOSIS — I25.10 CORONARY ARTERY DISEASE, OCCLUSIVE: Chronic | ICD-10-CM

## 2022-10-10 DIAGNOSIS — N18.4 CKD (CHRONIC KIDNEY DISEASE) STAGE 4, GFR 15-29 ML/MIN: ICD-10-CM

## 2022-10-10 DIAGNOSIS — Z98.61 HISTORY OF CORONARY ANGIOPLASTY: ICD-10-CM

## 2022-10-10 DIAGNOSIS — I35.0 AORTIC VALVE STENOSIS, ETIOLOGY OF CARDIAC VALVE DISEASE UNSPECIFIED: ICD-10-CM

## 2022-10-10 DIAGNOSIS — I50.30 DIASTOLIC HEART FAILURE, NYHA CLASS 3: ICD-10-CM

## 2022-10-10 DIAGNOSIS — E78.49 OTHER HYPERLIPIDEMIA: Primary | ICD-10-CM

## 2022-10-10 DIAGNOSIS — I70.0 ATHEROSCLEROSIS OF AORTA: ICD-10-CM

## 2022-10-10 PROCEDURE — 99214 PR OFFICE/OUTPT VISIT, EST, LEVL IV, 30-39 MIN: ICD-10-PCS | Mod: S$GLB,,, | Performed by: INTERNAL MEDICINE

## 2022-10-10 PROCEDURE — 1126F AMNT PAIN NOTED NONE PRSNT: CPT | Mod: CPTII,S$GLB,, | Performed by: INTERNAL MEDICINE

## 2022-10-10 PROCEDURE — 3077F PR MOST RECENT SYSTOLIC BLOOD PRESSURE >= 140 MM HG: ICD-10-PCS | Mod: CPTII,S$GLB,, | Performed by: INTERNAL MEDICINE

## 2022-10-10 PROCEDURE — 3079F PR MOST RECENT DIASTOLIC BLOOD PRESSURE 80-89 MM HG: ICD-10-PCS | Mod: CPTII,S$GLB,, | Performed by: INTERNAL MEDICINE

## 2022-10-10 PROCEDURE — 3079F DIAST BP 80-89 MM HG: CPT | Mod: CPTII,S$GLB,, | Performed by: INTERNAL MEDICINE

## 2022-10-10 PROCEDURE — 3077F SYST BP >= 140 MM HG: CPT | Mod: CPTII,S$GLB,, | Performed by: INTERNAL MEDICINE

## 2022-10-10 PROCEDURE — 99214 OFFICE O/P EST MOD 30 MIN: CPT | Mod: S$GLB,,, | Performed by: INTERNAL MEDICINE

## 2022-10-10 PROCEDURE — 99499 UNLISTED E&M SERVICE: CPT | Mod: HCNC,S$GLB,, | Performed by: INTERNAL MEDICINE

## 2022-10-10 PROCEDURE — 1101F PR PT FALLS ASSESS DOC 0-1 FALLS W/OUT INJ PAST YR: ICD-10-PCS | Mod: CPTII,S$GLB,, | Performed by: INTERNAL MEDICINE

## 2022-10-10 PROCEDURE — 1159F MED LIST DOCD IN RCRD: CPT | Mod: CPTII,S$GLB,, | Performed by: INTERNAL MEDICINE

## 2022-10-10 PROCEDURE — 99999 PR PBB SHADOW E&M-EST. PATIENT-LVL V: CPT | Mod: PBBFAC,,, | Performed by: INTERNAL MEDICINE

## 2022-10-10 PROCEDURE — 1101F PT FALLS ASSESS-DOCD LE1/YR: CPT | Mod: CPTII,S$GLB,, | Performed by: INTERNAL MEDICINE

## 2022-10-10 PROCEDURE — 1157F PR ADVANCE CARE PLAN OR EQUIV PRESENT IN MEDICAL RECORD: ICD-10-PCS | Mod: CPTII,S$GLB,, | Performed by: INTERNAL MEDICINE

## 2022-10-10 PROCEDURE — 1159F PR MEDICATION LIST DOCUMENTED IN MEDICAL RECORD: ICD-10-PCS | Mod: CPTII,S$GLB,, | Performed by: INTERNAL MEDICINE

## 2022-10-10 PROCEDURE — 99999 PR PBB SHADOW E&M-EST. PATIENT-LVL V: ICD-10-PCS | Mod: PBBFAC,,, | Performed by: INTERNAL MEDICINE

## 2022-10-10 PROCEDURE — 1126F PR PAIN SEVERITY QUANTIFIED, NO PAIN PRESENT: ICD-10-PCS | Mod: CPTII,S$GLB,, | Performed by: INTERNAL MEDICINE

## 2022-10-10 PROCEDURE — 3288F FALL RISK ASSESSMENT DOCD: CPT | Mod: CPTII,S$GLB,, | Performed by: INTERNAL MEDICINE

## 2022-10-10 PROCEDURE — 1157F ADVNC CARE PLAN IN RCRD: CPT | Mod: CPTII,S$GLB,, | Performed by: INTERNAL MEDICINE

## 2022-10-10 PROCEDURE — 3288F PR FALLS RISK ASSESSMENT DOCUMENTED: ICD-10-PCS | Mod: CPTII,S$GLB,, | Performed by: INTERNAL MEDICINE

## 2022-10-10 RX ORDER — FUROSEMIDE 20 MG/1
20 TABLET ORAL DAILY
Qty: 30 TABLET | Refills: 11 | Status: SHIPPED | OUTPATIENT
Start: 2022-10-10 | End: 2023-01-12

## 2022-10-10 NOTE — PROGRESS NOTES
Subjective:   Patient ID:  Glo Dumont is a 86 y.o. female who presents for evaluation of 6 month follow up      HPI   10.10.2022  Comes in for six-month follow-up.  Denies any chest pain, CP, syncope or presyncope.    She does complain of dyspnea on exertion NYHA 1-2.  Bilateral lower extremity swelling.    Daughter states that patient had stopped taking the Lasix cause once the leg swelling resolved she felt the shoes were loose.    She was taking 40 mg daily of Lasix.    Blood pressure mildly elevated today.    5.30.2022  Doing well, no complaints   Questions about the carvedilol dose  No chest pain, no dyspnea, no palpitations, no leg swelling, orthopnea or pnd  No cns symptoms,   No syncope or presyncope  No palpitations       3.29.2022  Patient was recently discharged from the hospital with hypertensive emergency.  She is here for short post discharge follow-up.  Her hypertensive urgency was due to noncompliance.  Patient daughter states that her mother stop taking all of her medications all at once.  She had a normal nuclear stress test while in the hospital.  Her troponin was negative.  BNP was mildly elevated.  She is euvolemic today.  Denies any dyspnea, any leg swelling or orthopnea.  States she is taking all of her medications.  They brought the bottles with them to clinic today.  Denies any more chest pain or syncope or presyncope.    3.15.2022  85 yo male seen Dr STRANGE and Dr Tim before   exrtensive pmhx as below   Comes in for evaluation of chest pain, h/o CAD s/p PCI  , unknown   States she has been having more chest pains lately described as retrosternal tightness, 5/10, more with strenuous acitivity but can happen at rest as well , no nausea or vomiting   No syncope, no dizziness,   Compliant with meds     She had a syncope, two weeks ago, work up revealed hyperkalemia, patient was taking kcl at home, had k of 7 on admission   Reversed with lokelma   Tre on admission 55 bpm however, not  very significant   Scheduled for HM 48 hours end of the month       Past Medical History:   Diagnosis Date    Anemia     Angina pectoris     Anxiety     Anxiety and depression     Arthritis     hip    Carotid artery occlusion     Carpal tunnel syndrome 06/23/2008    emg    Chronic diarrhea     work up in 2011 with EGD, CS and VCE    CKD (chronic kidney disease) stage 3, GFR 30-59 ml/min 5/11/2017    Colitis     Coronary artery disease     Coronary artery disease     Diastolic dysfunction     Diverticulosis     Glaucoma     Greater trochanteric bursitis 2/10/2015    Grief at loss of child 1/26/2016    H/O carotid endarterectomy 12/2/2013    Heart failure     History of coronary angioplasty 3/11/2014    Hypercholesteremia     Hypertension     Liver cyst 02/08/2013    ct abd    Macular degeneration     Primary open-angle glaucoma(365.11) 9/3/2013    Renal cyst 02/08/2013    ct abd    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    Sarcoidosis     Sarcoidosis of lung     Sickle cell trait     Uveitis        Past Surgical History:   Procedure Laterality Date    CAROTID ENDARTERECTOMY Right 2000s    CATARACT EXTRACTION Bilateral     Dr. Liang Dennis    CHOLECYSTECTOMY      laparoscopic, 3/18.    CORONARY ANGIOPLASTY WITH STENT PLACEMENT  11/19/2010    RCA-HALIE 2010    Lathia    JOINT REPLACEMENT Left 11/03/2014    Dr. Braun    TOTAL ABDOMINAL HYSTERECTOMY W/ BILATERAL SALPINGOOPHORECTOMY  1972       Social History     Tobacco Use    Smoking status: Never    Smokeless tobacco: Never   Substance Use Topics    Alcohol use: No     Alcohol/week: 0.0 standard drinks    Drug use: No       Family History   Problem Relation Age of Onset    Hypertension Mother     Heart failure Mother     Cancer Father         prostate    Cirrhosis Brother     Heart failure Brother     Cancer Daughter         Carcinoid       Review of Systems   Constitutional: Negative for fever and malaise/fatigue.   HENT:  Negative for sore throat.     Eyes:  Negative for blurred vision.   Cardiovascular:  Positive for dyspnea on exertion and leg swelling. Negative for claudication, cyanosis, irregular heartbeat, near-syncope, orthopnea, palpitations, paroxysmal nocturnal dyspnea and syncope.   Respiratory:  Negative for cough and hemoptysis.    Hematologic/Lymphatic: Negative for bleeding problem.   Skin:  Negative for rash.   Musculoskeletal:  Negative for falls.   Gastrointestinal:  Negative for abdominal pain.   Genitourinary: Negative.    Neurological: Negative.    Psychiatric/Behavioral:  Negative for altered mental status and substance abuse.      Current Outpatient Medications on File Prior to Visit   Medication Sig    amLODIPine (NORVASC) 5 MG tablet Take 1 tablet (5 mg total) by mouth once daily.    aspirin (ECOTRIN) 81 MG EC tablet Take 81 mg by mouth once daily.    budesonide (ENTOCORT EC) 3 mg capsule Take 9 mg by mouth once daily.    calcium carbonate (OS-LYNN) 600 mg calcium (1,500 mg) Tab Take by mouth.    carvediloL (COREG) 12.5 MG tablet Take 1 tablet (12.5 mg total) by mouth 2 (two) times daily with meals.    cloNIDine (CATAPRES) 0.1 MG tablet Take 1 tablet (0.1 mg total) by mouth 2 (two) times daily.    cyproheptadine (PERIACTIN) 4 mg tablet Take 1 tablet (4 mg total) by mouth 3 (three) times daily as needed.    dorzolamide-timolol 2-0.5% (COSOPT) 22.3-6.8 mg/mL ophthalmic solution Place 1 drop into both eyes 2 (two) times daily.    EYLEA 2 mg/0.05 mL Soln     FLUoxetine 10 MG capsule 1 capsule in the morning    FOLIC ACID/MULTIVIT-MIN/LUTEIN (CENTRUM SILVER ORAL) Take by mouth.    hydrALAZINE (APRESOLINE) 25 MG tablet Take 1 tablet (25 mg total) by mouth every 8 (eight) hours.    iron-vitamin C 100-250 mg, ICAR-C, (ICAR-C) 100-250 mg Tab Take 1 tablet by mouth once daily.    lipase-protease-amylase 24,000-76,000-120,000 units (CREON) 24,000-76,000 -120,000 unit capsule Take 1 capsule by mouth 3 (three) times daily with meals.    nitroGLYCERIN  (NITROSTAT) 0.4 MG SL tablet Place 1 tablet (0.4 mg total) under the tongue every 5 (five) minutes as needed for Chest pain.    nystatin (MYCOSTATIN) 100,000 unit/mL suspension Take by mouth.    OZURDEX 0.7 mg Impl intravitreal implant     prasugreL (EFFIENT) 10 mg Tab Take 1 tablet (10 mg total) by mouth once daily.    pravastatin (PRAVACHOL) 40 MG tablet Take 1 tablet (40 mg total) by mouth once daily.    [DISCONTINUED] furosemide (LASIX) 40 MG tablet Take 1 tablet (40 mg total) by mouth once daily.    gabapentin (NEURONTIN) 300 MG capsule Take 1 capsule (300 mg total) by mouth once daily.    pantoprazole (PROTONIX) 40 MG tablet Take 1 tablet (40 mg total) by mouth once daily.    [DISCONTINUED] citalopram (CELEXA) 20 MG tablet Take 1 tablet (20 mg total) by mouth once daily. (Patient not taking: Reported on 3/18/2022)     No current facility-administered medications on file prior to visit.       Objective:   Objective:  Wt Readings from Last 3 Encounters:   10/10/22 64.6 kg (142 lb 6.7 oz)   08/02/22 58.7 kg (129 lb 6.6 oz)   06/28/22 55.7 kg (122 lb 14.5 oz)     Temp Readings from Last 3 Encounters:   06/28/22 96.1 °F (35.6 °C)   03/20/22 97.1 °F (36.2 °C) (Oral)   03/08/22 97.6 °F (36.4 °C)     BP Readings from Last 3 Encounters:   10/10/22 (!) 160/84   08/02/22 (!) 158/80   06/28/22 130/86     Pulse Readings from Last 3 Encounters:   10/10/22 89   08/02/22 82   06/28/22 75       Physical Exam  Vitals reviewed.   Constitutional:       Appearance: She is well-developed.   HENT:      Head: Normocephalic and atraumatic.   Eyes:      General: No scleral icterus.     Conjunctiva/sclera: Conjunctivae normal.   Cardiovascular:      Rate and Rhythm: Normal rate and regular rhythm.      Pulses: Intact distal pulses.      Heart sounds: Normal heart sounds. No murmur heard.     Comments: NO MURMUR TO SUGGEST AS  Pulmonary:      Effort: No respiratory distress.      Breath sounds: No wheezing or rales.   Chest:      Chest  wall: No tenderness.   Abdominal:      General: Bowel sounds are normal. There is no distension.      Palpations: Abdomen is soft.      Tenderness: There is no guarding.   Musculoskeletal:         General: Swelling present. Normal range of motion.      Cervical back: Normal range of motion and neck supple.   Skin:     General: Skin is warm.   Neurological:      Mental Status: She is alert and oriented to person, place, and time.       Lab Results   Component Value Date    CHOL 160 05/04/2022    CHOL 193 09/15/2021    CHOL 153 05/27/2021     Lab Results   Component Value Date    HDL 81 (H) 05/04/2022    HDL 56 09/15/2021    HDL 68 05/27/2021     Lab Results   Component Value Date    LDLCALC 63.0 05/04/2022    LDLCALC 113.2 09/15/2021    LDLCALC 69.6 05/27/2021     Lab Results   Component Value Date    TRIG 80 05/04/2022    TRIG 119 09/15/2021    TRIG 77 05/27/2021     Lab Results   Component Value Date    CHOLHDL 50.6 (H) 05/04/2022    CHOLHDL 29.0 09/15/2021    CHOLHDL 44.4 05/27/2021       Chemistry        Component Value Date/Time     07/19/2022 0835    K 4.1 07/19/2022 0835     07/19/2022 0835    CO2 21 (L) 07/19/2022 0835    BUN 33 (H) 07/19/2022 0835    CREATININE 1.6 (H) 07/19/2022 0835    GLU 84 07/19/2022 0835        Component Value Date/Time    CALCIUM 9.4 07/19/2022 0835    ALKPHOS 66 07/19/2022 0835    AST 18 07/19/2022 0835    ALT 22 07/19/2022 0835    BILITOT 0.3 07/19/2022 0835    ESTGFRAFRICA 33.9 (A) 07/19/2022 0835    EGFRNONAA 29.4 (A) 07/19/2022 0835          Lab Results   Component Value Date    TSH 1.230 12/13/2021     Lab Results   Component Value Date    INR 1.0 09/09/2020    INR 1.0 12/22/2010    INR 1.0 11/19/2010     Lab Results   Component Value Date    WBC 3.10 (L) 03/19/2022    HGB 9.1 (L) 03/19/2022    HCT 27.5 (L) 03/19/2022    MCV 86 03/19/2022     03/19/2022     BNP  @LABRCNTIP(BNP,BNPTRIAGEBLO)@  CrCl cannot be calculated (Patient's most recent lab result is older  than the maximum 7 days allowed.).     Imaging:  ======  Results for orders placed during the hospital encounter of 03/02/22    Echo    Interpretation Summary  · The left ventricle is normal in size with moderate concentric hypertrophy and hyperdynamic systolic function.  · Mild left atrial enlargement.  · The estimated ejection fraction is 75%.  · Normal right ventricular size with low normal right ventricular systolic function.  · There is mild aortic valve stenosis.  · Aortic valve area is 1.97 cm2; peak velocity is 1.67 m/s; mean gradient is 6 mmHg.  · Mild mitral regurgitation.  · There is mild mitral stenosis.  · Mild tricuspid regurgitation.  · Normal central venous pressure (3 mmHg).  · The estimated PA systolic pressure is 35 mmHg.    No results found for this or any previous visit.    Results for orders placed during the hospital encounter of 03/02/22    US Carotid Bilateral    Narrative  EXAMINATION:  US CAROTID BILATERAL    CLINICAL HISTORY:  syncope;    COMPARISON:  None    FINDINGS:  Sonographic evaluation of the carotid systems was performed.    There is some calcified plaque in the carotid bulbs bilaterally and in the proximal internal carotid arteries.    The peak systolic velocity in the right internal carotid artery was approximately 67 cm/sec.    The peak systolic velocity in the left internal carotid artery was [id number] cm/sec.    Antegrade flow noted in both vertebral arteries.    Stenosis percentage validated velocity measurements with angiographic measurements, velocity criteria are extrapolated from diameter data as defined by the Society of Radiologists in Ultrasound Consensus Conference Radiology 2003; 229;340-346.    Impression  No sonographic evidence of a significant stenosis is identified in either carotid system.  Calcified plaque in both carotid bulbs and proximal internal carotid arteries.      Electronically signed by: Keith  East  Date:    03/02/2022  Time:    19:19    Results for orders placed during the hospital encounter of 12/29/21    X-Ray Chest PA And Lateral    Narrative  EXAMINATION:  XR CHEST PA AND LATERAL    CLINICAL HISTORY:  Disorientation, unspecified    TECHNIQUE:  PA and lateral views of the chest were performed.    COMPARISON:  Chest radiographs dating back to September 23, 2020    FINDINGS:  Unchanged mild pleuroparenchymal scarring within the lung apices.  Otherwise, lungs are clear without acute opacity.  No pneumothorax or pleural effusion.  Heart size is normal.  Mild tortuosity of the thoracic aorta.  Atherosclerotic calcifications noted within the thoracic aorta.  No acute osseous abnormality.  Surgical clips present within the upper abdomen.    Impression  As above.      Electronically signed by: Candelario Walsh  Date:    12/29/2021  Time:    10:29    No results found for this or any previous visit.    No valid procedures specified.    Diagnostic Results:  ECG: Reviewed  EKG 2/1/2022 normal sinus rhythm, nonspecific ST T-wave changes, minimal LVH,  ms, 69 bpm,  ms  The ASCVD Risk score (Celia DK, et al., 2019) failed to calculate for the following reasons:    The 2019 ASCVD risk score is only valid for ages 40 to 79    Assessment and Plan:   Other hyperlipidemia    Diastolic heart failure, NYHA class 2  -     furosemide (LASIX) 20 MG tablet; Take 1 tablet (20 mg total) by mouth once daily.  Dispense: 30 tablet; Refill: 11    Coronary artery disease, occlusive    Atherosclerosis of aorta    Aortic valve stenosis, etiology of cardiac valve disease unspecified    CKD (chronic kidney disease) stage 4, GFR 15-29 ml/min    History of coronary angioplasty         Recent normal nuclear stress test.2022  Continue with clonidine, hydralazine, amlodipine, carvedilol.  We have stopped clonidine in a.m. in the past due to associated sleepiness.  Only taking at night.  Stressed importance of medication  compliance.  Resume Lasix at lower dose.  Been completely off Lasix  Blood pressure mild goal.  Reviewed all tests and above medical conditions with patient in detail and formulated treatment plan.  Risk factor modification discussed.   Cardiac low salt diet discussed.  Maintaining healthy weight and weight loss goals were discussed in clinic.    Follow up in 6 months

## 2022-10-11 ENCOUNTER — PATIENT MESSAGE (OUTPATIENT)
Dept: OPHTHALMOLOGY | Facility: CLINIC | Age: 87
End: 2022-10-11
Payer: MEDICARE

## 2022-10-11 DIAGNOSIS — H40.1131 PRIMARY OPEN ANGLE GLAUCOMA (POAG) OF BOTH EYES, MILD STAGE: ICD-10-CM

## 2022-10-11 RX ORDER — DORZOLAMIDE HYDROCHLORIDE AND TIMOLOL MALEATE 20; 5 MG/ML; MG/ML
1 SOLUTION/ DROPS OPHTHALMIC 2 TIMES DAILY
Qty: 10 ML | Refills: 6 | Status: SHIPPED | OUTPATIENT
Start: 2022-10-11 | End: 2023-03-08 | Stop reason: SDUPTHER

## 2022-10-31 ENCOUNTER — OFFICE VISIT (OUTPATIENT)
Dept: PRIMARY CARE CLINIC | Facility: CLINIC | Age: 87
End: 2022-10-31
Payer: MEDICARE

## 2022-10-31 VITALS
HEIGHT: 59 IN | OXYGEN SATURATION: 99 % | DIASTOLIC BLOOD PRESSURE: 68 MMHG | HEART RATE: 67 BPM | SYSTOLIC BLOOD PRESSURE: 133 MMHG | BODY MASS INDEX: 28.83 KG/M2 | WEIGHT: 143 LBS

## 2022-10-31 DIAGNOSIS — I25.10 CORONARY ARTERY DISEASE, OCCLUSIVE: Chronic | ICD-10-CM

## 2022-10-31 DIAGNOSIS — D50.0 IRON DEFICIENCY ANEMIA DUE TO CHRONIC BLOOD LOSS: ICD-10-CM

## 2022-10-31 DIAGNOSIS — I35.0 AORTIC VALVE STENOSIS, ETIOLOGY OF CARDIAC VALVE DISEASE UNSPECIFIED: ICD-10-CM

## 2022-10-31 DIAGNOSIS — I70.0 ATHEROSCLEROSIS OF AORTA: ICD-10-CM

## 2022-10-31 DIAGNOSIS — K51.00 ULCERATIVE PANCOLITIS WITHOUT COMPLICATION: ICD-10-CM

## 2022-10-31 DIAGNOSIS — E78.49 OTHER HYPERLIPIDEMIA: ICD-10-CM

## 2022-10-31 DIAGNOSIS — M85.80 OSTEOPENIA, UNSPECIFIED LOCATION: ICD-10-CM

## 2022-10-31 DIAGNOSIS — N18.4 CKD (CHRONIC KIDNEY DISEASE) STAGE 4, GFR 15-29 ML/MIN: ICD-10-CM

## 2022-10-31 DIAGNOSIS — Z00.00 WELLNESS EXAMINATION: ICD-10-CM

## 2022-10-31 DIAGNOSIS — I50.30 DIASTOLIC HEART FAILURE, NYHA CLASS 3: ICD-10-CM

## 2022-10-31 DIAGNOSIS — Z00.00 ENCOUNTER FOR PREVENTIVE HEALTH EXAMINATION: ICD-10-CM

## 2022-10-31 DIAGNOSIS — I10 ESSENTIAL HYPERTENSION: Primary | Chronic | ICD-10-CM

## 2022-10-31 DIAGNOSIS — D86.0 SARCOIDOSIS OF LUNG: ICD-10-CM

## 2022-10-31 PROCEDURE — 99214 PR OFFICE/OUTPT VISIT, EST, LEVL IV, 30-39 MIN: ICD-10-PCS | Mod: 25,S$GLB,, | Performed by: INTERNAL MEDICINE

## 2022-10-31 PROCEDURE — 99999 PR PBB SHADOW E&M-EST. PATIENT-LVL II: ICD-10-PCS | Mod: PBBFAC,,, | Performed by: INTERNAL MEDICINE

## 2022-10-31 PROCEDURE — 3075F SYST BP GE 130 - 139MM HG: CPT | Mod: CPTII,S$GLB,, | Performed by: INTERNAL MEDICINE

## 2022-10-31 PROCEDURE — 3075F PR MOST RECENT SYSTOLIC BLOOD PRESS GE 130-139MM HG: ICD-10-PCS | Mod: CPTII,S$GLB,, | Performed by: INTERNAL MEDICINE

## 2022-10-31 PROCEDURE — 90677 PCV20 VACCINE IM: CPT | Mod: S$GLB,,, | Performed by: INTERNAL MEDICINE

## 2022-10-31 PROCEDURE — 1157F ADVNC CARE PLAN IN RCRD: CPT | Mod: CPTII,S$GLB,, | Performed by: INTERNAL MEDICINE

## 2022-10-31 PROCEDURE — 99214 OFFICE O/P EST MOD 30 MIN: CPT | Mod: 25,S$GLB,, | Performed by: INTERNAL MEDICINE

## 2022-10-31 PROCEDURE — G0009 PNEUMOCOCCAL CONJUGATE VACCINE 20-VALENT: ICD-10-PCS | Mod: S$GLB,,, | Performed by: INTERNAL MEDICINE

## 2022-10-31 PROCEDURE — G0009 ADMIN PNEUMOCOCCAL VACCINE: HCPCS | Mod: S$GLB,,, | Performed by: INTERNAL MEDICINE

## 2022-10-31 PROCEDURE — 3078F DIAST BP <80 MM HG: CPT | Mod: CPTII,S$GLB,, | Performed by: INTERNAL MEDICINE

## 2022-10-31 PROCEDURE — 99999 PR PBB SHADOW E&M-EST. PATIENT-LVL II: CPT | Mod: PBBFAC,,, | Performed by: INTERNAL MEDICINE

## 2022-10-31 PROCEDURE — 90694 VACC AIIV4 NO PRSRV 0.5ML IM: CPT | Mod: S$GLB,,, | Performed by: INTERNAL MEDICINE

## 2022-10-31 PROCEDURE — G0008 ADMIN INFLUENZA VIRUS VAC: HCPCS | Mod: S$GLB,,, | Performed by: INTERNAL MEDICINE

## 2022-10-31 PROCEDURE — 99499 UNLISTED E&M SERVICE: CPT | Mod: HCNC,S$GLB,, | Performed by: INTERNAL MEDICINE

## 2022-10-31 PROCEDURE — 3078F PR MOST RECENT DIASTOLIC BLOOD PRESSURE < 80 MM HG: ICD-10-PCS | Mod: CPTII,S$GLB,, | Performed by: INTERNAL MEDICINE

## 2022-10-31 PROCEDURE — 90677 PNEUMOCOCCAL CONJUGATE VACCINE 20-VALENT: ICD-10-PCS | Mod: S$GLB,,, | Performed by: INTERNAL MEDICINE

## 2022-10-31 PROCEDURE — 90694 FLU VACCINE - QUADRIVALENT - ADJUVANTED: ICD-10-PCS | Mod: S$GLB,,, | Performed by: INTERNAL MEDICINE

## 2022-10-31 PROCEDURE — 1157F PR ADVANCE CARE PLAN OR EQUIV PRESENT IN MEDICAL RECORD: ICD-10-PCS | Mod: CPTII,S$GLB,, | Performed by: INTERNAL MEDICINE

## 2022-10-31 PROCEDURE — G0008 FLU VACCINE - QUADRIVALENT - ADJUVANTED: ICD-10-PCS | Mod: S$GLB,,, | Performed by: INTERNAL MEDICINE

## 2022-10-31 NOTE — PROGRESS NOTES
"Subjective:      Patient ID: Glo Dmuont is a 87 y.o. female.    Chief Complaint: No chief complaint on file.      HPI  Here for f/u medical problems and preventive exam.  No f/c/sw/cough.  No cp/sob/palp.  BMs and urine normal.  BP at home 133/68.  Denies stress.  Up 20# on periactin.  Dr. Donaldson stopped budesonide last week.  BMs good.  Drinks lot of water.    HM: 10/22 fluvax, 2/21 covid vaccines/booster, 3/15 [id number], 2/14 [id number], 10/22 today [id number], 2/15 TDaP, 10/21 BMD rep 2y, 2/11 Cscope no repeat will be done, 1/17 MMG no more, Eye doc monthly, GI Dr. Donaldson.     Review of Systems   Constitutional:  Negative for appetite change, chills, diaphoresis and fever.   HENT:  Negative for congestion, ear pain, rhinorrhea, sinus pressure and sore throat.    Respiratory:  Negative for cough, chest tightness and shortness of breath.    Cardiovascular:  Negative for chest pain and palpitations.   Gastrointestinal:  Negative for blood in stool, constipation, diarrhea, nausea and vomiting.   Genitourinary:  Negative for dysuria, frequency, hematuria, menstrual problem, urgency and vaginal discharge.   Musculoskeletal:  Negative for arthralgias.   Skin:  Negative for rash.   Neurological:  Negative for dizziness and headaches.   Psychiatric/Behavioral:  Negative for sleep disturbance. The patient is not nervous/anxious.        Objective:   /68   Pulse 67   Ht 4' 10.5" (1.486 m)   Wt 64.9 kg (143 lb)   SpO2 99%   BMI 29.38 kg/m²     Physical Exam  Constitutional:       Appearance: She is well-developed.   HENT:      Right Ear: External ear normal. Tympanic membrane is not injected.      Left Ear: External ear normal. Tympanic membrane is not injected.   Eyes:      Conjunctiva/sclera: Conjunctivae normal.   Neck:      Thyroid: No thyromegaly.   Cardiovascular:      Rate and Rhythm: Normal rate and regular rhythm.      Heart sounds: No murmur heard.    No friction rub. No gallop.   Pulmonary:      Effort: " Pulmonary effort is normal.      Breath sounds: Normal breath sounds. No wheezing or rales.   Abdominal:      General: Bowel sounds are normal.      Palpations: Abdomen is soft. There is no mass.      Tenderness: There is no abdominal tenderness.   Musculoskeletal:      Cervical back: Normal range of motion and neck supple.   Lymphadenopathy:      Cervical: No cervical adenopathy.   Skin:     General: Skin is warm.      Findings: No rash.   Neurological:      Mental Status: She is alert and oriented to person, place, and time.        Latest Reference Range & Units 07/19/22 08:35   Sodium 136 - 145 mmol/L 140   Potassium 3.5 - 5.1 mmol/L 4.1   Chloride 95 - 110 mmol/L 109   CO2 23 - 29 mmol/L 21 (L)   Anion Gap 8 - 16 mmol/L 10   BUN 8 - 23 mg/dL 33 (H)   Creatinine 0.5 - 1.4 mg/dL 1.6 (H)   eGFR if non African American >60 mL/min/1.73 m^2 29.4 !   eGFR if African American >60 mL/min/1.73 m^2 33.9 !   Glucose 70 - 110 mg/dL 84   Calcium 8.7 - 10.5 mg/dL 9.4   Alkaline Phosphatase 55 - 135 U/L 66   PROTEIN TOTAL 6.0 - 8.4 g/dL 6.7   Albumin 3.5 - 5.2 g/dL 3.5   BILIRUBIN TOTAL 0.1 - 1.0 mg/dL 0.3   AST 10 - 40 U/L 18   ALT 10 - 44 U/L 22      Latest Reference Range & Units 05/04/22 07:15   Cholesterol 120 - 199 mg/dL 160   HDL 40 - 75 mg/dL 81 (H)   HDL/Cholesterol Ratio 20.0 - 50.0 % 50.6 (H)   LDL Cholesterol External 63.0 - 159.0 mg/dL 63.0   Non-HDL Cholesterol mg/dL 79   Total Cholesterol/HDL Ratio 2.0 - 5.0  2.0   Triglycerides 30 - 150 mg/dL 80      Latest Reference Range & Units 03/19/22 15:46   WBC 3.90 - 12.70 K/uL 3.10 (L)   RBC 4.00 - 5.40 M/uL 3.21 (L)   Hemoglobin 12.0 - 16.0 g/dL 9.1 (L)   Hematocrit 37.0 - 48.5 % 27.5 (L)   MCV 82 - 98 fL 86   MCH 27.0 - 31.0 pg 28.3   MCHC 32.0 - 36.0 g/dL 33.1   RDW 11.5 - 14.5 % 15.2 (H)   Platelets 150 - 450 K/uL 224       Assessment:       1. Essential hypertension    2. CKD (chronic kidney disease) stage 4, GFR 15-29 ml/min    3. Diastolic heart failure, NYHA  class 3    4. Atherosclerosis of aorta    5. Aortic valve stenosis, etiology of cardiac valve disease unspecified    6. Coronary artery disease, occlusive    7. Iron deficiency anemia due to chronic blood loss    8. Other hyperlipidemia    9. Osteopenia, unspecified location    10. Sarcoidosis of lung    11. Ulcerative pancolitis without complication    12. Encounter for preventive health examination          Plan:     Essential hypertension- stable at home, cont to monitor.    CKD (chronic kidney disease) stage 4, GFR 15-29 ml/min- recent Nephro eval.  Will follow if she does not have f/u with him.  RTC 3mo.    Diastolic heart failure, NYHA class 3, Atherosclerosis of aorta, Aortic valve stenosis, etiology of cardiac valve disease unspecified, Coronary artery disease, occlusive    Iron deficiency anemia due to chronic blood loss and CKD- will follow.    Other hyperlipidemia- stable on meds, cont.    Osteopenia, unspecified location    Sarcoidosis of lung- doing well now.    Ulcerative pancolitis without complication, doing well, off steroid now.    Encounter for preventive health examination- fluvax, [id number] now.  -     Pneumococcal Conjugate Vaccine (20 Valent) (IM)

## 2022-11-08 ENCOUNTER — PROCEDURE VISIT (OUTPATIENT)
Dept: OPHTHALMOLOGY | Facility: CLINIC | Age: 87
End: 2022-11-08
Payer: MEDICARE

## 2022-11-08 DIAGNOSIS — H40.1131 PRIMARY OPEN ANGLE GLAUCOMA (POAG) OF BOTH EYES, MILD STAGE: ICD-10-CM

## 2022-11-08 DIAGNOSIS — Z96.1 PSEUDOPHAKIA OF BOTH EYES: ICD-10-CM

## 2022-11-08 DIAGNOSIS — H34.8130 CENTRAL RETINAL VEIN OCCLUSION WITH MACULAR EDEMA OF BOTH EYES: Primary | ICD-10-CM

## 2022-11-08 PROCEDURE — 92134 CPTRZ OPH DX IMG PST SGM RTA: CPT | Mod: S$GLB,,, | Performed by: OPHTHALMOLOGY

## 2022-11-08 PROCEDURE — 99499 UNLISTED E&M SERVICE: CPT | Mod: S$GLB,,, | Performed by: OPHTHALMOLOGY

## 2022-11-08 PROCEDURE — 92134 POSTERIOR SEGMENT OCT RETINA (OCULAR COHERENCE TOMOGRAPHY)-BOTH EYES: ICD-10-PCS | Mod: S$GLB,,, | Performed by: OPHTHALMOLOGY

## 2022-11-08 PROCEDURE — 67028 INJECTION EYE DRUG: CPT | Mod: 50,S$GLB,, | Performed by: OPHTHALMOLOGY

## 2022-11-08 PROCEDURE — 99499 NO LOS: ICD-10-PCS | Mod: S$GLB,,, | Performed by: OPHTHALMOLOGY

## 2022-11-08 PROCEDURE — 67028 PR INJECT INTRAVITREAL PHARMCOLOGIC: ICD-10-PCS | Mod: 50,S$GLB,, | Performed by: OPHTHALMOLOGY

## 2022-11-08 NOTE — PROGRESS NOTES
===============================  Date today is 11/8/2022  Glo Dumont is a 87 y.o. female  Last visit [facility]: :9/7/2022   Last visit eye dept. 9/7/2022    Corrected distance visual acuity was 20/400 in the right eye and 20/400 in the left eye.  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  Check OU independently daily.    Gave my office and personal cell phone number.  ________________  11/8/2022 today  Glo Dumont    OU CRVO  OCT much worse today, last Ozurdex 8/3/22    11/8/2022  Diagnosis :  OU CRVO w/ME  Today:   Ozurdex 0.7 mg , OU   Follow up: jourdan Mo OU in 6  weeks  Instructed to call 24/7 for any worsening of vision. Check Both eyes daily. Gave patient my home phone number.  Risks, benefits, and alternatives to treatment discussed in detail with the patient.  The patient voiced understanding and wished to proceed with the procedure    Ozurdex intravitreal implant Procedure Note:     Patient Identified and Time Out complete  Subconjunctival bleb - xylocaine with epi 2%   and Betadine.  Inject OU at 1mm parallel to limbus  Post Operative Dx: Same  Complications: None  Follow up as above.    =============================

## 2022-12-09 ENCOUNTER — PATIENT MESSAGE (OUTPATIENT)
Dept: CARDIOLOGY | Facility: CLINIC | Age: 87
End: 2022-12-09
Payer: MEDICARE

## 2022-12-09 DIAGNOSIS — I10 ESSENTIAL HYPERTENSION: ICD-10-CM

## 2022-12-09 RX ORDER — PRASUGREL 10 MG/1
10 TABLET, FILM COATED ORAL DAILY
Qty: 30 TABLET | Refills: 11 | Status: SHIPPED | OUTPATIENT
Start: 2022-12-09 | End: 2022-12-09

## 2022-12-20 ENCOUNTER — PATIENT MESSAGE (OUTPATIENT)
Dept: CARDIOLOGY | Facility: CLINIC | Age: 87
End: 2022-12-20
Payer: MEDICARE

## 2022-12-21 ENCOUNTER — TELEPHONE (OUTPATIENT)
Dept: PRIMARY CARE CLINIC | Facility: CLINIC | Age: 87
End: 2022-12-21
Payer: MEDICARE

## 2022-12-21 ENCOUNTER — TELEPHONE (OUTPATIENT)
Dept: INTERNAL MEDICINE | Facility: CLINIC | Age: 87
End: 2022-12-21
Payer: MEDICARE

## 2022-12-21 ENCOUNTER — PROCEDURE VISIT (OUTPATIENT)
Dept: OPHTHALMOLOGY | Facility: CLINIC | Age: 87
End: 2022-12-21
Payer: MEDICARE

## 2022-12-21 DIAGNOSIS — I11.0 HYPERTENSIVE HEART DISEASE WITH HEART FAILURE: Primary | ICD-10-CM

## 2022-12-21 DIAGNOSIS — H34.8130 CENTRAL RETINAL VEIN OCCLUSION WITH MACULAR EDEMA OF BOTH EYES: Primary | ICD-10-CM

## 2022-12-21 PROCEDURE — 92134 CPTRZ OPH DX IMG PST SGM RTA: CPT | Mod: HCNC,S$GLB,, | Performed by: OPHTHALMOLOGY

## 2022-12-21 PROCEDURE — 99499 NO LOS: ICD-10-PCS | Mod: HCNC,S$GLB,, | Performed by: OPHTHALMOLOGY

## 2022-12-21 PROCEDURE — 85025 COMPLETE CBC W/AUTO DIFF WBC: CPT | Mod: HCNC | Performed by: INTERNAL MEDICINE

## 2022-12-21 PROCEDURE — 80053 COMPREHEN METABOLIC PANEL: CPT | Mod: HCNC | Performed by: INTERNAL MEDICINE

## 2022-12-21 PROCEDURE — 99499 UNLISTED E&M SERVICE: CPT | Mod: HCNC,S$GLB,, | Performed by: OPHTHALMOLOGY

## 2022-12-21 PROCEDURE — 67028 INJECTION EYE DRUG: CPT | Mod: 50,HCNC,S$GLB, | Performed by: OPHTHALMOLOGY

## 2022-12-21 PROCEDURE — 92134 POSTERIOR SEGMENT OCT RETINA (OCULAR COHERENCE TOMOGRAPHY)-BOTH EYES: ICD-10-PCS | Mod: HCNC,S$GLB,, | Performed by: OPHTHALMOLOGY

## 2022-12-21 PROCEDURE — 83880 ASSAY OF NATRIURETIC PEPTIDE: CPT | Mod: HCNC | Performed by: INTERNAL MEDICINE

## 2022-12-21 PROCEDURE — 67028 PR INJECT INTRAVITREAL PHARMCOLOGIC: ICD-10-PCS | Mod: 50,HCNC,S$GLB, | Performed by: OPHTHALMOLOGY

## 2022-12-21 NOTE — TELEPHONE ENCOUNTER
----- Message from Stone Mejia MA sent at 2022 10:03 AM CST -----  Contact: Friend, [place], [phone]  Just spoke with pt friend, [place], who is pt point of contact at this time [phone]. Pt had a visit with Dr. Tamez and per friend, his office is supposed to be sending information about her visit today. [place] said pt isn't doing well and retaining lots of fluid and that it's affecting pt's vision and her breathing. Pt is currently on Lasix but can't be seen until next week due to the fact that pt's daughters  is this coming Friday. Ms Blair would like you to give her a call or either send a prescription in for pt.    Thanks  SJ  ----- Message -----  From: Margaret Mosqueda  Sent: 2022   9:39 AM CST  To: [facility] Staff    Calling to speak with the nurse regarding the eye doctor told her the fluid she is retaining is affecting her eye sight. Needs to be seen. Please advise. Thanks.

## 2022-12-21 NOTE — PROGRESS NOTES
===============================  Date today is 12/21/2022  Glo Dumont is a 87 y.o. female  Last visit [facility]: :11/8/2022   Last visit eye dept. 11/8/2022    Corrected distance visual acuity was 20/200 in the right eye and 20/400 in the left eye.  Tonometry       Tonometry (Applanation, 8:59 AM)         Right Left    Pressure 16                   Not recorded       Not recorded       Not recorded       Chief Complaint   Patient presents with    CRVO/ME     EVAL EYLEA OU     HPI     CRVO/ME     Additional comments: EVAL EYLEA OU           Comments    1. Steroid responder glaucoma   2. PCIOL OU MGM   3. SARCOIDOSIS   H\O chronic UVEITIS OU   4. Central vein occlusion of retina, left   5. Retinal edema     H/o Avastin and Eylea OS   Had been getting Ozurdex OU with Dr. Shahrzad Tamez Eylea OU last 4/25/22, 8/3/22  Ozurdex ou last 2/16/2022, 5/25/22 11/8/22      Cosopt BID OU          Last edited by Jessica Jensen on 12/21/2022  8:48 AM.      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  Check OU independently daily.    Gave my office and personal cell phone number.  ________________  12/21/2022 today  Glo Dumont  :    Better but + cme  Prev good (and better than this) response to oz+ey  Evaluate volume status SOB   Pretibia edema   Rec eylea over ox today   Rtc 4-5 weeks    ..    Injection Procedure Note:    12/21/2022  Diagnosis :  CRVO OU  Today:   Eylea (afibercept) 2 mg/0.05 ml Intravitreal Injection , OU   Follow up: rtc 4-5 weeks for Ozurdex OU    Instructed to call 24/7 for any worsening of vision. Check Both  eyes daily. Gave patient my home phone number.  Risks, benefits, and alternatives to treatment discussed in detail with the patient.  The patient voiced understanding and wished to proceed with the procedure.     Patient Identified and Time Out complete  Subconjunctival bleb - xylocaine with epi 2%   and Betadine.  Inject at Eylea (afibercept) 2 mg/0.05 ml Intravitreal Injection , OU 6:00 @ 3.5-4mm posterior to limbus  1 stop: no   Post Operative Dx: Same  Complications: None  Follow up as above.    =============================

## 2022-12-22 ENCOUNTER — PATIENT MESSAGE (OUTPATIENT)
Dept: PRIMARY CARE CLINIC | Facility: CLINIC | Age: 87
End: 2022-12-22
Payer: MEDICARE

## 2023-01-05 ENCOUNTER — PATIENT MESSAGE (OUTPATIENT)
Dept: CARDIOLOGY | Facility: CLINIC | Age: 88
End: 2023-01-05
Payer: MEDICARE

## 2023-01-12 ENCOUNTER — OFFICE VISIT (OUTPATIENT)
Dept: CARDIOLOGY | Facility: CLINIC | Age: 88
End: 2023-01-12
Payer: MEDICARE

## 2023-01-12 VITALS
OXYGEN SATURATION: 98 % | DIASTOLIC BLOOD PRESSURE: 84 MMHG | SYSTOLIC BLOOD PRESSURE: 142 MMHG | HEART RATE: 67 BPM | BODY MASS INDEX: 29.16 KG/M2 | WEIGHT: 144.63 LBS | HEIGHT: 59 IN

## 2023-01-12 DIAGNOSIS — D86.0 SARCOIDOSIS OF LUNG: ICD-10-CM

## 2023-01-12 DIAGNOSIS — I25.10 CORONARY ARTERY DISEASE, OCCLUSIVE: Chronic | ICD-10-CM

## 2023-01-12 DIAGNOSIS — H02.409 PTOSIS OF EYELID, UNSPECIFIED LATERALITY: ICD-10-CM

## 2023-01-12 DIAGNOSIS — I70.0 ATHEROSCLEROSIS OF AORTA: ICD-10-CM

## 2023-01-12 DIAGNOSIS — E78.49 OTHER HYPERLIPIDEMIA: ICD-10-CM

## 2023-01-12 DIAGNOSIS — I50.30 DIASTOLIC HEART FAILURE, NYHA CLASS 3: ICD-10-CM

## 2023-01-12 DIAGNOSIS — I50.33 ACUTE ON CHRONIC DIASTOLIC HEART FAILURE: Primary | ICD-10-CM

## 2023-01-12 DIAGNOSIS — N18.4 CKD (CHRONIC KIDNEY DISEASE) STAGE 4, GFR 15-29 ML/MIN: ICD-10-CM

## 2023-01-12 DIAGNOSIS — I10 ESSENTIAL HYPERTENSION: Chronic | ICD-10-CM

## 2023-01-12 DIAGNOSIS — Z95.5 H/O HEART ARTERY STENT: ICD-10-CM

## 2023-01-12 PROCEDURE — 3288F PR FALLS RISK ASSESSMENT DOCUMENTED: ICD-10-PCS | Mod: HCNC,CPTII,S$GLB, | Performed by: INTERNAL MEDICINE

## 2023-01-12 PROCEDURE — 3288F FALL RISK ASSESSMENT DOCD: CPT | Mod: HCNC,CPTII,S$GLB, | Performed by: INTERNAL MEDICINE

## 2023-01-12 PROCEDURE — 1101F PT FALLS ASSESS-DOCD LE1/YR: CPT | Mod: HCNC,CPTII,S$GLB, | Performed by: INTERNAL MEDICINE

## 2023-01-12 PROCEDURE — 3079F PR MOST RECENT DIASTOLIC BLOOD PRESSURE 80-89 MM HG: ICD-10-PCS | Mod: HCNC,CPTII,S$GLB, | Performed by: INTERNAL MEDICINE

## 2023-01-12 PROCEDURE — 1159F PR MEDICATION LIST DOCUMENTED IN MEDICAL RECORD: ICD-10-PCS | Mod: HCNC,CPTII,S$GLB, | Performed by: INTERNAL MEDICINE

## 2023-01-12 PROCEDURE — 99215 PR OFFICE/OUTPT VISIT, EST, LEVL V, 40-54 MIN: ICD-10-PCS | Mod: HCNC,S$GLB,, | Performed by: INTERNAL MEDICINE

## 2023-01-12 PROCEDURE — 1157F PR ADVANCE CARE PLAN OR EQUIV PRESENT IN MEDICAL RECORD: ICD-10-PCS | Mod: HCNC,CPTII,S$GLB, | Performed by: INTERNAL MEDICINE

## 2023-01-12 PROCEDURE — 99999 PR PBB SHADOW E&M-EST. PATIENT-LVL III: ICD-10-PCS | Mod: PBBFAC,HCNC,, | Performed by: INTERNAL MEDICINE

## 2023-01-12 PROCEDURE — 3077F PR MOST RECENT SYSTOLIC BLOOD PRESSURE >= 140 MM HG: ICD-10-PCS | Mod: HCNC,CPTII,S$GLB, | Performed by: INTERNAL MEDICINE

## 2023-01-12 PROCEDURE — 1126F AMNT PAIN NOTED NONE PRSNT: CPT | Mod: HCNC,CPTII,S$GLB, | Performed by: INTERNAL MEDICINE

## 2023-01-12 PROCEDURE — 99215 OFFICE O/P EST HI 40 MIN: CPT | Mod: HCNC,S$GLB,, | Performed by: INTERNAL MEDICINE

## 2023-01-12 PROCEDURE — 1157F ADVNC CARE PLAN IN RCRD: CPT | Mod: HCNC,CPTII,S$GLB, | Performed by: INTERNAL MEDICINE

## 2023-01-12 PROCEDURE — 3077F SYST BP >= 140 MM HG: CPT | Mod: HCNC,CPTII,S$GLB, | Performed by: INTERNAL MEDICINE

## 2023-01-12 PROCEDURE — 3079F DIAST BP 80-89 MM HG: CPT | Mod: HCNC,CPTII,S$GLB, | Performed by: INTERNAL MEDICINE

## 2023-01-12 PROCEDURE — 1101F PR PT FALLS ASSESS DOC 0-1 FALLS W/OUT INJ PAST YR: ICD-10-PCS | Mod: HCNC,CPTII,S$GLB, | Performed by: INTERNAL MEDICINE

## 2023-01-12 PROCEDURE — 1126F PR PAIN SEVERITY QUANTIFIED, NO PAIN PRESENT: ICD-10-PCS | Mod: HCNC,CPTII,S$GLB, | Performed by: INTERNAL MEDICINE

## 2023-01-12 PROCEDURE — 99999 PR PBB SHADOW E&M-EST. PATIENT-LVL III: CPT | Mod: PBBFAC,HCNC,, | Performed by: INTERNAL MEDICINE

## 2023-01-12 PROCEDURE — 1159F MED LIST DOCD IN RCRD: CPT | Mod: HCNC,CPTII,S$GLB, | Performed by: INTERNAL MEDICINE

## 2023-01-12 RX ORDER — FUROSEMIDE 20 MG/1
TABLET ORAL
Qty: 120 TABLET | Refills: 3 | Status: SHIPPED | OUTPATIENT
Start: 2023-01-12 | End: 2023-05-03 | Stop reason: SDUPTHER

## 2023-01-12 NOTE — PROGRESS NOTES
Subjective:   Patient ID:  Glo Dumont is a 87 y.o. female who presents for evaluation of No chief complaint on file.        HPI   1.12.2023  She comes in for a follow up   Increased leg swelling and hansen, started zeny thanksgiving and the holidays s   She denies any orthopnea, pnd, chest pain, palpitations, syncope or presyncope   No other complains   She is only taking lasix 20 mg once a day   Decreased in the past due to intolerance to the 40 mg daily     10.10.2022  Comes in for six-month follow-up.  Denies any chest pain, CP, syncope or presyncope.    She does complain of dyspnea on exertion NYHA 1-2.  Bilateral lower extremity swelling.    Daughter states that patient had stopped taking the Lasix cause once the leg swelling resolved she felt the shoes were loose.    She was taking 40 mg daily of Lasix.    Blood pressure mildly elevated today.    5.30.2022  Doing well, no complaints   Questions about the carvedilol dose  No chest pain, no dyspnea, no palpitations, no leg swelling, orthopnea or pnd  No cns symptoms,   No syncope or presyncope  No palpitations       3.29.2022  Patient was recently discharged from the hospital with hypertensive emergency.  She is here for short post discharge follow-up.  Her hypertensive urgency was due to noncompliance.  Patient daughter states that her mother stop taking all of her medications all at once.  She had a normal nuclear stress test while in the hospital.  Her troponin was negative.  BNP was mildly elevated.  She is euvolemic today.  Denies any dyspnea, any leg swelling or orthopnea.  States she is taking all of her medications.  They brought the bottles with them to clinic today.  Denies any more chest pain or syncope or presyncope.    3.15.2022  85 yo male seen Dr STRANGE and Dr Tim before   exrtensive pmhx as below   Comes in for evaluation of chest pain, h/o CAD s/p PCI  , unknown   States she has been having more chest pains lately described as retrosternal  tightness, 5/10, more with strenuous acitivity but can happen at rest as well , no nausea or vomiting   No syncope, no dizziness,   Compliant with meds     She had a syncope, two weeks ago, work up revealed hyperkalemia, patient was taking kcl at home, had k of 7 on admission   Reversed with lokelma   Tre on admission 55 bpm however, not very significant   Scheduled for HM 48 hours end of the month       Past Medical History:   Diagnosis Date    Anemia     Angina pectoris     Anxiety     Anxiety and depression     Arthritis     hip    Carotid artery occlusion     Carpal tunnel syndrome 06/23/2008    emg    Chronic diarrhea     work up in 2011 with EGD, CS and VCE    CKD (chronic kidney disease) stage 3, GFR 30-59 ml/min 5/11/2017    Colitis     Coronary artery disease     Coronary artery disease     Diastolic dysfunction     Diverticulosis     Glaucoma     Greater trochanteric bursitis 2/10/2015    Grief at loss of child 1/26/2016    H/O carotid endarterectomy 12/2/2013    Heart failure     History of coronary angioplasty 3/11/2014    Hypercholesteremia     Hypertension     Liver cyst 02/08/2013    ct abd    Macular degeneration     Primary open-angle glaucoma(365.11) 9/3/2013    Renal cyst 02/08/2013    ct abd    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    Sarcoidosis     Sarcoidosis of lung     Sickle cell trait     Uveitis        Past Surgical History:   Procedure Laterality Date    CAROTID ENDARTERECTOMY Right 2000s    CATARACT EXTRACTION Bilateral     Dr. Liang Dennis    CHOLECYSTECTOMY      laparoscopic, 3/18.    CORONARY ANGIOPLASTY WITH STENT PLACEMENT  11/19/2010    RCA-HALIE 2010    Lathia    JOINT REPLACEMENT Left 11/03/2014    Dr. Braun    TOTAL ABDOMINAL HYSTERECTOMY W/ BILATERAL SALPINGOOPHORECTOMY  1972       Social History     Tobacco Use    Smoking status: Never    Smokeless tobacco: Never   Substance Use Topics    Alcohol use: No     Alcohol/week: 0.0 standard drinks    Drug use: No        Family History   Problem Relation Age of Onset    Hypertension Mother     Heart failure Mother     Cancer Father         prostate    Cirrhosis Brother     Heart failure Brother     Cancer Daughter         Carcinoid       Review of Systems   Constitutional: Negative for fever and malaise/fatigue.   HENT:  Negative for sore throat.    Eyes:  Negative for blurred vision.   Cardiovascular:  Positive for dyspnea on exertion and leg swelling. Negative for claudication, cyanosis, irregular heartbeat, near-syncope, orthopnea, palpitations, paroxysmal nocturnal dyspnea and syncope.   Respiratory:  Negative for cough and hemoptysis.    Hematologic/Lymphatic: Negative for bleeding problem.   Skin:  Negative for rash.   Musculoskeletal:  Negative for falls.   Gastrointestinal:  Negative for abdominal pain.   Genitourinary: Negative.    Neurological: Negative.    Psychiatric/Behavioral:  Negative for altered mental status and substance abuse.      Current Outpatient Medications on File Prior to Visit   Medication Sig    amLODIPine (NORVASC) 5 MG tablet Take 1 tablet (5 mg total) by mouth once daily.    aspirin (ECOTRIN) 81 MG EC tablet Take 81 mg by mouth once daily.    calcium carbonate (OS-LYNN) 600 mg calcium (1,500 mg) Tab Take by mouth.    carvediloL (COREG) 12.5 MG tablet Take 1 tablet (12.5 mg total) by mouth 2 (two) times daily with meals.    cloNIDine (CATAPRES) 0.1 MG tablet Take 1 tablet (0.1 mg total) by mouth 2 (two) times daily.    dorzolamide-timolol 2-0.5% (COSOPT) 22.3-6.8 mg/mL ophthalmic solution Place 1 drop into both eyes 2 (two) times daily.    EYLEA 2 mg/0.05 mL Soln     FOLIC ACID/MULTIVIT-MIN/LUTEIN (CENTRUM SILVER ORAL) Take by mouth.    gabapentin (NEURONTIN) 300 MG capsule Take 1 capsule (300 mg total) by mouth once daily.    hydrALAZINE (APRESOLINE) 25 MG tablet Take 1 tablet (25 mg total) by mouth every 8 (eight) hours.    iron-vitamin C 100-250 mg, ICAR-C, (ICAR-C) 100-250 mg Tab Take 1 tablet  by mouth once daily.    lipase-protease-amylase 24,000-76,000-120,000 units (CREON) 24,000-76,000 -120,000 unit capsule Take 1 capsule by mouth 3 (three) times daily with meals.    nitroGLYCERIN (NITROSTAT) 0.4 MG SL tablet Place 1 tablet (0.4 mg total) under the tongue every 5 (five) minutes as needed for Chest pain.    nystatin (MYCOSTATIN) 100,000 unit/mL suspension Take by mouth.    OZURDEX 0.7 mg Impl intravitreal implant     pravastatin (PRAVACHOL) 40 MG tablet Take 1 tablet (40 mg total) by mouth once daily.    [DISCONTINUED] furosemide (LASIX) 20 MG tablet Take 1 tablet (20 mg total) by mouth once daily.    pantoprazole (PROTONIX) 40 MG tablet Take 1 tablet (40 mg total) by mouth once daily.    [DISCONTINUED] citalopram (CELEXA) 20 MG tablet Take 1 tablet (20 mg total) by mouth once daily. (Patient not taking: Reported on 3/18/2022)     No current facility-administered medications on file prior to visit.       Objective:   Objective:  Wt Readings from Last 3 Encounters:   01/12/23 65.6 kg (144 lb 10 oz)   10/31/22 64.9 kg (143 lb)   10/31/22 64.9 kg (143 lb)     Temp Readings from Last 3 Encounters:   10/31/22 98.5 °F (36.9 °C) (Oral)   06/28/22 96.1 °F (35.6 °C)   03/20/22 97.1 °F (36.2 °C) (Oral)     BP Readings from Last 3 Encounters:   01/12/23 (!) 142/84   10/31/22 133/68   10/31/22 (!) 177/74     Pulse Readings from Last 3 Encounters:   01/12/23 67   10/31/22 67   10/31/22 67       Physical Exam  Vitals reviewed.   Constitutional:       Appearance: She is well-developed.   HENT:      Head: Normocephalic and atraumatic.   Eyes:      General: No scleral icterus.     Conjunctiva/sclera: Conjunctivae normal.   Cardiovascular:      Rate and Rhythm: Normal rate and regular rhythm.      Pulses: Intact distal pulses.      Heart sounds: Normal heart sounds. No murmur heard.     Comments: NO MURMUR TO SUGGEST AS  Pulmonary:      Effort: No respiratory distress.      Breath sounds: No wheezing or rales.   Chest:       Chest wall: No tenderness.   Abdominal:      General: Bowel sounds are normal. There is no distension.      Palpations: Abdomen is soft.      Tenderness: There is no guarding.   Musculoskeletal:         General: Swelling present. Normal range of motion.      Cervical back: Normal range of motion and neck supple.   Skin:     General: Skin is warm.   Neurological:      Mental Status: She is alert and oriented to person, place, and time.       Lab Results   Component Value Date    CHOL 160 05/04/2022    CHOL 193 09/15/2021    CHOL 153 05/27/2021     Lab Results   Component Value Date    HDL 81 (H) 05/04/2022    HDL 56 09/15/2021    HDL 68 05/27/2021     Lab Results   Component Value Date    LDLCALC 63.0 05/04/2022    LDLCALC 113.2 09/15/2021    LDLCALC 69.6 05/27/2021     Lab Results   Component Value Date    TRIG 80 05/04/2022    TRIG 119 09/15/2021    TRIG 77 05/27/2021     Lab Results   Component Value Date    CHOLHDL 50.6 (H) 05/04/2022    CHOLHDL 29.0 09/15/2021    CHOLHDL 44.4 05/27/2021       Chemistry        Component Value Date/Time     12/21/2022 1350    K 5.0 12/21/2022 1350     12/21/2022 1350    CO2 20 (L) 12/21/2022 1350    BUN 27 (H) 12/21/2022 1350    CREATININE 1.9 (H) 12/21/2022 1350    GLU 57 (L) 12/21/2022 1350        Component Value Date/Time    CALCIUM 9.7 12/21/2022 1350    ALKPHOS 91 12/21/2022 1350    AST 24 12/21/2022 1350    ALT 14 12/21/2022 1350    BILITOT 0.4 12/21/2022 1350    ESTGFRAFRICA 33.9 (A) 07/19/2022 0835    EGFRNONAA 29.4 (A) 07/19/2022 0835          Lab Results   Component Value Date    TSH 1.230 12/13/2021     Lab Results   Component Value Date    INR 1.0 09/09/2020    INR 1.0 12/22/2010    INR 1.0 11/19/2010     Lab Results   Component Value Date    WBC 5.81 12/21/2022    HGB 10.4 (L) 12/21/2022    HCT 32.2 (L) 12/21/2022    MCV 87 12/21/2022     12/21/2022     BNP  @LABRCNTIP(BNP,BNPTRIAGEBLO)@  CrCl cannot be calculated (Patient's most recent lab  result is older than the maximum 7 days allowed.).     Imaging:  ======  Results for orders placed during the hospital encounter of 03/02/22    Echo    Interpretation Summary  · The left ventricle is normal in size with moderate concentric hypertrophy and hyperdynamic systolic function.  · Mild left atrial enlargement.  · The estimated ejection fraction is 75%.  · Normal right ventricular size with low normal right ventricular systolic function.  · There is mild aortic valve stenosis.  · Aortic valve area is 1.97 cm2; peak velocity is 1.67 m/s; mean gradient is 6 mmHg.  · Mild mitral regurgitation.  · There is mild mitral stenosis.  · Mild tricuspid regurgitation.  · Normal central venous pressure (3 mmHg).  · The estimated PA systolic pressure is 35 mmHg.    No results found for this or any previous visit.    Results for orders placed during the hospital encounter of 03/02/22    US Carotid Bilateral    Narrative  EXAMINATION:  US CAROTID BILATERAL    CLINICAL HISTORY:  syncope;    COMPARISON:  None    FINDINGS:  Sonographic evaluation of the carotid systems was performed.    There is some calcified plaque in the carotid bulbs bilaterally and in the proximal internal carotid arteries.    The peak systolic velocity in the right internal carotid artery was approximately 67 cm/sec.    The peak systolic velocity in the left internal carotid artery was [id number] cm/sec.    Antegrade flow noted in both vertebral arteries.    Stenosis percentage validated velocity measurements with angiographic measurements, velocity criteria are extrapolated from diameter data as defined by the Society of Radiologists in Ultrasound Consensus Conference Radiology 2003; 229;340-346.    Impression  No sonographic evidence of a significant stenosis is identified in either carotid system.  Calcified plaque in both carotid bulbs and proximal internal carotid arteries.      Electronically signed by: Keith  East  Date:    03/02/2022  Time:    19:19    Results for orders placed during the hospital encounter of 12/29/21    X-Ray Chest PA And Lateral    Narrative  EXAMINATION:  XR CHEST PA AND LATERAL    CLINICAL HISTORY:  Disorientation, unspecified    TECHNIQUE:  PA and lateral views of the chest were performed.    COMPARISON:  Chest radiographs dating back to September 23, 2020    FINDINGS:  Unchanged mild pleuroparenchymal scarring within the lung apices.  Otherwise, lungs are clear without acute opacity.  No pneumothorax or pleural effusion.  Heart size is normal.  Mild tortuosity of the thoracic aorta.  Atherosclerotic calcifications noted within the thoracic aorta.  No acute osseous abnormality.  Surgical clips present within the upper abdomen.    Impression  As above.      Electronically signed by: Candelario Walsh  Date:    12/29/2021  Time:    10:29    No results found for this or any previous visit.    No valid procedures specified.    Diagnostic Results:  ECG: Reviewed  EKG 2/1/2022 normal sinus rhythm, nonspecific ST T-wave changes, minimal LVH,  ms, 69 bpm,  ms  The ASCVD Risk score (Celia DK, et al., 2019) failed to calculate for the following reasons:    The 2019 ASCVD risk score is only valid for ages 40 to 79    Assessment and Plan:   Acute on chronic diastolic heart failure    Diastolic heart failure, NYHA class 2  -     furosemide (LASIX) 20 MG tablet; Take 1 tablet (20 mg total) by mouth once daily. May also take 2 tablets (40 mg total) daily as needed (for leg swelling).  Dispense: 120 tablet; Refill: 3    Ptosis of eyelid, unspecified laterality    CKD (chronic kidney disease) stage 4, GFR 15-29 ml/min    Coronary artery disease, occlusive    Atherosclerosis of aorta    Other hyperlipidemia    Essential hypertension    Sarcoidosis of lung    H/O heart artery stent           Increase lasix to 40 mg daily prn and make it 40 mg daily for the next 5 days   Discussed 2 grams salt diet    Recent normal nuclear stress test.2022  Continue with clonidine, hydralazine, amlodipine, carvedilol.  We have stopped clonidine in a.m. in the past due to associated sleepiness.  Only taking at night.  Stressed importance of medication compliance.  Blood pressure mild goal.  Reviewed all tests and above medical conditions with patient in detail and formulated treatment plan.  Risk factor modification discussed.   Cardiac low salt diet discussed.  Maintaining healthy weight and weight loss goals were discussed in clinic.  ASA 81 Mg, dc'ed effient given her age      Follow up in 6 months

## 2023-01-19 NOTE — PROGRESS NOTES
"Subjective:      Patient ID: Glo Dumont is a 87 y.o. female.    Chief Complaint: Follow-up (Three month follow up. Pt has been cramping in her hands and feet for about two months. B/P has been elevated since yesterday.)      HPI  Here for follow up of medical problems.  Lowest weight had been 103, up about 40#.  Has kept weight on with periactin.  Had 2 eye injections yesterday, Dr. Tamez.  HAYES started yesterday even.  BPs have been around 150/70 at home since the eye injections.  BMs normal, on budesonide.  No f/c/sw/cough.  No cp/sob/palp.  53yo daughter recently , feels low but doesn't think needs rx or counseling now.  No falling at home, feels stable.  Calf muscles and feet cramping at hs.      Updated/ annual due 10/23:  HM: 10/22 fluvax,  covid vaccines/booster, 3/15 [id number],  [id number], 10/22 [id number], 2/15 TDaP, 10/21 BMD rep 2y,  Cscope no repeat will be done,  MMG no more, Eye doc monthly, GI Dr. Galindo, Cards Dr. Lui, Nephro Dr. Villareal.     Review of Systems   Constitutional:  Negative for chills, diaphoresis and fever.   Respiratory:  Negative for cough and shortness of breath.    Cardiovascular:  Negative for chest pain, palpitations and leg swelling.   Gastrointestinal:  Negative for blood in stool, constipation, diarrhea, nausea and vomiting.   Genitourinary:  Negative for dysuria, frequency and hematuria.   Psychiatric/Behavioral:  The patient is not nervous/anxious.        Objective:   BP (!) 152/68 (BP Location: Right arm)   Pulse (!) 59   Temp 98.1 °F (36.7 °C) (Oral)   Ht 4' 10" (1.473 m)   Wt 66.1 kg (145 lb 12.8 oz)   SpO2 99%   BMI 30.47 kg/m²     Physical Exam  Constitutional:       Appearance: She is well-developed.   Neck:      Thyroid: No thyroid mass.      Vascular: No carotid bruit.   Cardiovascular:      Rate and Rhythm: Normal rate and regular rhythm.      Heart sounds: No murmur heard.    No friction rub. No gallop.   Pulmonary:      Effort: " Pulmonary effort is normal.      Breath sounds: Normal breath sounds. No wheezing or rales.   Abdominal:      General: Bowel sounds are normal.      Palpations: Abdomen is soft. There is no mass.      Tenderness: There is no abdominal tenderness.   Musculoskeletal:      Cervical back: Neck supple.   Lymphadenopathy:      Cervical: No cervical adenopathy.   Neurological:      Mental Status: She is alert and oriented to person, place, and time.           Assessment:       1. Essential hypertension    2. Sarcoidosis of lung    3. Ulcerative pancolitis without complication    4. Weight loss    5. Aortic valve stenosis, etiology of cardiac valve disease unspecified    6. CKD (chronic kidney disease) stage 4, GFR 15-29 ml/min    7. Atherosclerosis of aorta    8. Hemoglobin A-S genotype    9. Iron deficiency anemia, unspecified iron deficiency anemia type    10. Unspecified severe protein-calorie malnutrition    11. Nocturnal leg cramps          Plan:     Essential hypertension- monitor BPs, RTC2wk.    Sarcoidosis of lung- doing well now, no rx/inhaler.    Ulcerative pancolitis without complication- cont meds, per Gastro.    Weight loss, Unspecified severe protein-calorie malnutrition- cont to follow q3mo.    Aortic valve stenosis, mild, f/w Cards.    CKD (chronic kidney disease) stage 4, GFR 15-29 ml/min  -     Basic Metabolic Panel; Future; Expected date: 02/01/2023    Atherosclerosis of aorta- cont prava.    Hemoglobin A-S genotype, Iron deficiency anemia, unspecified iron deficiency anemia type- cont to monitor, recheck 3mo.  -     CBC Auto Differential; Future; Expected date: 02/01/2023    Nocturnal leg cramps- start 2 Tums at hs.  R/o other elyte def.  -     Basic Metabolic Panel; Future; Expected date: 02/01/2023  -     Magnesium; Future; Expected date: 02/01/2023    Counseling if wants.  PT if wants.

## 2023-01-26 NOTE — PROGRESS NOTES
===============================  Date today is 1/31/2023  Glo Dumont is a 87 y.o. female  Last visit [facility]: :12/21/2022   Last visit eye dept. 12/21/2022    Corrected distance visual acuity was 20/100 in the right eye and 20/400 in the left eye.  Tonometry       Tonometry (Applanation, 8:06 AM)         Right Left    Pressure 19 20                  Not recorded       Not recorded       Not recorded       Chief Complaint   Patient presents with    CRVO/ME     OZURDEX OU       Problem List Items Addressed This Visit          Eye/Vision problems    Central retinal vein occlusion with macular edema of both eyes - Primary    Relevant Medications    dexAMETHasone (OZURDEX) intravitreal implant (Completed)    dexAMETHasone (OZURDEX) intravitreal implant (Completed)    Other Relevant Orders    Posterior Segment OCT Retina-Both eyes (Completed)    Prior authorization Order     Instructed to call 24/7 for any worsening of vision, visual distortion or pain.  Check OU independently daily.    Gave my office and personal cell phone number.  ________________  1/31/2023 today  Glo Dumont    OU CRVO    Great respnse to oz/ ou      Great response to eylea and ozurdex     1/31/2023  Diagnosis :  od crvo   Today:   Ozurdex 0.7 mg , OD   Follow up: Eylea OU in 6 weeks  Instructed to call 24/7 for any worsening of vision. Check Both eyes daily. Gave patient my home phone number.  Risks, benefits, and alternatives to treatment discussed in detail with the patient.  The patient voiced understanding and wished to proceed with the procedure    Ozurdex intravitreal implant Procedure Note:   y capsule intact?  Patient Identified and Time Out complete  Subconjunctival bleb - xylocaine with epi 2%   and Betadine.  Inject OU at 1mm parallel to limbus  Post Operative Dx: Same  Complications: None  Follow up as above.    =============================

## 2023-01-30 PROBLEM — Z00.00 WELLNESS EXAMINATION: Status: RESOLVED | Noted: 2022-10-31 | Resolved: 2023-01-30

## 2023-01-31 ENCOUNTER — PROCEDURE VISIT (OUTPATIENT)
Dept: OPHTHALMOLOGY | Facility: CLINIC | Age: 88
End: 2023-01-31
Payer: MEDICARE

## 2023-01-31 DIAGNOSIS — H34.8130 CENTRAL RETINAL VEIN OCCLUSION WITH MACULAR EDEMA OF BOTH EYES: Primary | ICD-10-CM

## 2023-01-31 PROCEDURE — 99499 UNLISTED E&M SERVICE: CPT | Mod: HCNC,S$GLB,, | Performed by: OPHTHALMOLOGY

## 2023-01-31 PROCEDURE — 67028 INJECTION EYE DRUG: CPT | Mod: 50,HCNC,S$GLB, | Performed by: OPHTHALMOLOGY

## 2023-01-31 PROCEDURE — 67028 PR INJECT INTRAVITREAL PHARMCOLOGIC: ICD-10-PCS | Mod: 50,HCNC,S$GLB, | Performed by: OPHTHALMOLOGY

## 2023-01-31 PROCEDURE — 99499 NO LOS: ICD-10-PCS | Mod: HCNC,S$GLB,, | Performed by: OPHTHALMOLOGY

## 2023-01-31 PROCEDURE — 92134 CPTRZ OPH DX IMG PST SGM RTA: CPT | Mod: HCNC,S$GLB,, | Performed by: OPHTHALMOLOGY

## 2023-01-31 PROCEDURE — 92134 POSTERIOR SEGMENT OCT RETINA (OCULAR COHERENCE TOMOGRAPHY)-BOTH EYES: ICD-10-PCS | Mod: HCNC,S$GLB,, | Performed by: OPHTHALMOLOGY

## 2023-02-01 ENCOUNTER — OFFICE VISIT (OUTPATIENT)
Dept: PRIMARY CARE CLINIC | Facility: CLINIC | Age: 88
End: 2023-02-01
Payer: MEDICARE

## 2023-02-01 VITALS
HEIGHT: 58 IN | TEMPERATURE: 98 F | DIASTOLIC BLOOD PRESSURE: 68 MMHG | HEART RATE: 59 BPM | BODY MASS INDEX: 30.61 KG/M2 | WEIGHT: 145.81 LBS | SYSTOLIC BLOOD PRESSURE: 152 MMHG | OXYGEN SATURATION: 99 %

## 2023-02-01 DIAGNOSIS — N18.4 CKD (CHRONIC KIDNEY DISEASE) STAGE 4, GFR 15-29 ML/MIN: ICD-10-CM

## 2023-02-01 DIAGNOSIS — D50.9 IRON DEFICIENCY ANEMIA, UNSPECIFIED IRON DEFICIENCY ANEMIA TYPE: ICD-10-CM

## 2023-02-01 DIAGNOSIS — K51.00 ULCERATIVE PANCOLITIS WITHOUT COMPLICATION: ICD-10-CM

## 2023-02-01 DIAGNOSIS — R63.4 WEIGHT LOSS: ICD-10-CM

## 2023-02-01 DIAGNOSIS — I35.0 AORTIC VALVE STENOSIS, ETIOLOGY OF CARDIAC VALVE DISEASE UNSPECIFIED: ICD-10-CM

## 2023-02-01 DIAGNOSIS — G47.62 NOCTURNAL LEG CRAMPS: ICD-10-CM

## 2023-02-01 DIAGNOSIS — D57.3 HEMOGLOBIN A-S GENOTYPE: ICD-10-CM

## 2023-02-01 DIAGNOSIS — D86.0 SARCOIDOSIS OF LUNG: ICD-10-CM

## 2023-02-01 DIAGNOSIS — I70.0 ATHEROSCLEROSIS OF AORTA: ICD-10-CM

## 2023-02-01 DIAGNOSIS — I10 ESSENTIAL HYPERTENSION: Primary | Chronic | ICD-10-CM

## 2023-02-01 DIAGNOSIS — E43 UNSPECIFIED SEVERE PROTEIN-CALORIE MALNUTRITION: ICD-10-CM

## 2023-02-01 LAB
ANION GAP SERPL CALC-SCNC: 8 MMOL/L (ref 8–16)
BASOPHILS # BLD AUTO: 0.06 K/UL (ref 0–0.2)
BASOPHILS NFR BLD: 1.2 % (ref 0–1.9)
BUN SERPL-MCNC: 35 MG/DL (ref 8–23)
CALCIUM SERPL-MCNC: 9.7 MG/DL (ref 8.7–10.5)
CHLORIDE SERPL-SCNC: 110 MMOL/L (ref 95–110)
CO2 SERPL-SCNC: 19 MMOL/L (ref 23–29)
CREAT SERPL-MCNC: 1.7 MG/DL (ref 0.5–1.4)
DIFFERENTIAL METHOD: ABNORMAL
EOSINOPHIL # BLD AUTO: 0.2 K/UL (ref 0–0.5)
EOSINOPHIL NFR BLD: 4.2 % (ref 0–8)
ERYTHROCYTE [DISTWIDTH] IN BLOOD BY AUTOMATED COUNT: 15.2 % (ref 11.5–14.5)
EST. GFR  (NO RACE VARIABLE): 29.2 ML/MIN/1.73 M^2
GLUCOSE SERPL-MCNC: 105 MG/DL (ref 70–110)
HCT VFR BLD AUTO: 34.3 % (ref 37–48.5)
HGB BLD-MCNC: 10.9 G/DL (ref 12–16)
IMM GRANULOCYTES # BLD AUTO: 0.09 K/UL (ref 0–0.04)
IMM GRANULOCYTES NFR BLD AUTO: 1.7 % (ref 0–0.5)
LYMPHOCYTES # BLD AUTO: 0.8 K/UL (ref 1–4.8)
LYMPHOCYTES NFR BLD: 15.6 % (ref 18–48)
MAGNESIUM SERPL-MCNC: 1.9 MG/DL (ref 1.6–2.6)
MCH RBC QN AUTO: 27.2 PG (ref 27–31)
MCHC RBC AUTO-ENTMCNC: 31.8 G/DL (ref 32–36)
MCV RBC AUTO: 86 FL (ref 82–98)
MONOCYTES # BLD AUTO: 0.6 K/UL (ref 0.3–1)
MONOCYTES NFR BLD: 11.2 % (ref 4–15)
NEUTROPHILS # BLD AUTO: 3.4 K/UL (ref 1.8–7.7)
NEUTROPHILS NFR BLD: 66.1 % (ref 38–73)
NRBC BLD-RTO: 0 /100 WBC
PLATELET # BLD AUTO: 218 K/UL (ref 150–450)
PMV BLD AUTO: 11.7 FL (ref 9.2–12.9)
POTASSIUM SERPL-SCNC: 4.6 MMOL/L (ref 3.5–5.1)
RBC # BLD AUTO: 4.01 M/UL (ref 4–5.4)
SODIUM SERPL-SCNC: 137 MMOL/L (ref 136–145)
WBC # BLD AUTO: 5.19 K/UL (ref 3.9–12.7)

## 2023-02-01 PROCEDURE — 3078F PR MOST RECENT DIASTOLIC BLOOD PRESSURE < 80 MM HG: ICD-10-PCS | Mod: HCNC,CPTII,S$GLB, | Performed by: INTERNAL MEDICINE

## 2023-02-01 PROCEDURE — 1157F ADVNC CARE PLAN IN RCRD: CPT | Mod: HCNC,CPTII,S$GLB, | Performed by: INTERNAL MEDICINE

## 2023-02-01 PROCEDURE — 3077F PR MOST RECENT SYSTOLIC BLOOD PRESSURE >= 140 MM HG: ICD-10-PCS | Mod: HCNC,CPTII,S$GLB, | Performed by: INTERNAL MEDICINE

## 2023-02-01 PROCEDURE — 3288F FALL RISK ASSESSMENT DOCD: CPT | Mod: HCNC,CPTII,S$GLB, | Performed by: INTERNAL MEDICINE

## 2023-02-01 PROCEDURE — 3078F DIAST BP <80 MM HG: CPT | Mod: HCNC,CPTII,S$GLB, | Performed by: INTERNAL MEDICINE

## 2023-02-01 PROCEDURE — 99214 OFFICE O/P EST MOD 30 MIN: CPT | Mod: HCNC,S$GLB,, | Performed by: INTERNAL MEDICINE

## 2023-02-01 PROCEDURE — 99999 PR PBB SHADOW E&M-EST. PATIENT-LVL IV: CPT | Mod: PBBFAC,HCNC,, | Performed by: INTERNAL MEDICINE

## 2023-02-01 PROCEDURE — 1157F PR ADVANCE CARE PLAN OR EQUIV PRESENT IN MEDICAL RECORD: ICD-10-PCS | Mod: HCNC,CPTII,S$GLB, | Performed by: INTERNAL MEDICINE

## 2023-02-01 PROCEDURE — 83735 ASSAY OF MAGNESIUM: CPT | Mod: HCNC | Performed by: INTERNAL MEDICINE

## 2023-02-01 PROCEDURE — 3288F PR FALLS RISK ASSESSMENT DOCUMENTED: ICD-10-PCS | Mod: HCNC,CPTII,S$GLB, | Performed by: INTERNAL MEDICINE

## 2023-02-01 PROCEDURE — 85025 COMPLETE CBC W/AUTO DIFF WBC: CPT | Mod: HCNC | Performed by: INTERNAL MEDICINE

## 2023-02-01 PROCEDURE — 1159F PR MEDICATION LIST DOCUMENTED IN MEDICAL RECORD: ICD-10-PCS | Mod: HCNC,CPTII,S$GLB, | Performed by: INTERNAL MEDICINE

## 2023-02-01 PROCEDURE — 1159F MED LIST DOCD IN RCRD: CPT | Mod: HCNC,CPTII,S$GLB, | Performed by: INTERNAL MEDICINE

## 2023-02-01 PROCEDURE — 99214 PR OFFICE/OUTPT VISIT, EST, LEVL IV, 30-39 MIN: ICD-10-PCS | Mod: HCNC,S$GLB,, | Performed by: INTERNAL MEDICINE

## 2023-02-01 PROCEDURE — 3077F SYST BP >= 140 MM HG: CPT | Mod: HCNC,CPTII,S$GLB, | Performed by: INTERNAL MEDICINE

## 2023-02-01 PROCEDURE — 1101F PR PT FALLS ASSESS DOC 0-1 FALLS W/OUT INJ PAST YR: ICD-10-PCS | Mod: HCNC,CPTII,S$GLB, | Performed by: INTERNAL MEDICINE

## 2023-02-01 PROCEDURE — 1101F PT FALLS ASSESS-DOCD LE1/YR: CPT | Mod: HCNC,CPTII,S$GLB, | Performed by: INTERNAL MEDICINE

## 2023-02-01 PROCEDURE — 99999 PR PBB SHADOW E&M-EST. PATIENT-LVL IV: ICD-10-PCS | Mod: PBBFAC,HCNC,, | Performed by: INTERNAL MEDICINE

## 2023-02-01 PROCEDURE — 80048 BASIC METABOLIC PNL TOTAL CA: CPT | Mod: HCNC | Performed by: INTERNAL MEDICINE

## 2023-02-02 ENCOUNTER — PATIENT MESSAGE (OUTPATIENT)
Dept: PRIMARY CARE CLINIC | Facility: CLINIC | Age: 88
End: 2023-02-02
Payer: MEDICARE

## 2023-02-08 ENCOUNTER — PATIENT MESSAGE (OUTPATIENT)
Dept: PRIMARY CARE CLINIC | Facility: CLINIC | Age: 88
End: 2023-02-08
Payer: MEDICARE

## 2023-02-08 DIAGNOSIS — I50.30 DIASTOLIC HEART FAILURE, NYHA CLASS 3: ICD-10-CM

## 2023-02-08 RX ORDER — AMLODIPINE BESYLATE 5 MG/1
5 TABLET ORAL 2 TIMES DAILY
Qty: 60 TABLET | Refills: 11 | Status: ON HOLD | OUTPATIENT
Start: 2023-02-08 | End: 2023-03-22 | Stop reason: HOSPADM

## 2023-02-09 DIAGNOSIS — Z00.00 ENCOUNTER FOR MEDICARE ANNUAL WELLNESS EXAM: ICD-10-CM

## 2023-03-01 ENCOUNTER — PATIENT MESSAGE (OUTPATIENT)
Dept: OPHTHALMOLOGY | Facility: CLINIC | Age: 88
End: 2023-03-01
Payer: MEDICARE

## 2023-03-08 ENCOUNTER — PROCEDURE VISIT (OUTPATIENT)
Dept: OPHTHALMOLOGY | Facility: CLINIC | Age: 88
End: 2023-03-08
Payer: MEDICARE

## 2023-03-08 DIAGNOSIS — H34.8130 CENTRAL RETINAL VEIN OCCLUSION WITH MACULAR EDEMA OF BOTH EYES: Primary | ICD-10-CM

## 2023-03-08 DIAGNOSIS — H40.1131 PRIMARY OPEN ANGLE GLAUCOMA (POAG) OF BOTH EYES, MILD STAGE: ICD-10-CM

## 2023-03-08 PROCEDURE — 92134 CPTRZ OPH DX IMG PST SGM RTA: CPT | Mod: HCNC,S$GLB,, | Performed by: OPHTHALMOLOGY

## 2023-03-08 PROCEDURE — 99499 UNLISTED E&M SERVICE: CPT | Mod: HCNC,S$GLB,, | Performed by: OPHTHALMOLOGY

## 2023-03-08 PROCEDURE — 92134 POSTERIOR SEGMENT OCT RETINA (OCULAR COHERENCE TOMOGRAPHY)-BOTH EYES: ICD-10-PCS | Mod: HCNC,S$GLB,, | Performed by: OPHTHALMOLOGY

## 2023-03-08 PROCEDURE — 99499 NO LOS: ICD-10-PCS | Mod: HCNC,S$GLB,, | Performed by: OPHTHALMOLOGY

## 2023-03-08 RX ORDER — BUDESONIDE 3 MG/1
3 CAPSULE, COATED PELLETS ORAL 2 TIMES DAILY
COMMUNITY
Start: 2023-01-04 | End: 2023-09-13 | Stop reason: SDUPTHER

## 2023-03-08 RX ORDER — DORZOLAMIDE HYDROCHLORIDE AND TIMOLOL MALEATE 20; 5 MG/ML; MG/ML
1 SOLUTION/ DROPS OPHTHALMIC 2 TIMES DAILY
Qty: 10 ML | Refills: 6 | Status: SHIPPED | OUTPATIENT
Start: 2023-03-08 | End: 2024-03-13

## 2023-03-08 RX ORDER — INFLUENZA A VIRUS A/VICTORIA/2570/2019 IVR-215 (H1N1) ANTIGEN (FORMALDEHYDE INACTIVATED), INFLUENZA A VIRUS A/DARWIN/6/2021 IVR-227 (H3N2) ANTIGEN (FORMALDEHYDE INACTIVATED), INFLUENZA B VIRUS B/AUSTRIA/1359417/2021 BVR-26 ANTIGEN (FORMALDEHYDE INACTIVATED), INFLUENZA B VIRUS B/PHUKET/3073/2013 BVR-1B ANTIGEN (FORMALDEHYDE INACTIVATED) 15; 15; 15; 15 UG/.5ML; UG/.5ML; UG/.5ML; UG/.5ML
INJECTION, SUSPENSION INTRAMUSCULAR
COMMUNITY
Start: 2022-10-31 | End: 2023-03-18 | Stop reason: ALTCHOICE

## 2023-03-08 NOTE — PROGRESS NOTES
===============================  Date today is 3/8/2023  Glo Dumont is a 87 y.o. female  Last visit [facility]: :1/31/2023   Last visit eye dept. 1/31/2023    Corrected distance visual acuity was 20/400 in the right eye and 20/400 in the left eye.  Tonometry       Tonometry (Applanation, 2:50 PM)         Right Left    Pressure 14 14                  Not recorded       Not recorded       Not recorded       Chief Complaint   Patient presents with    CRVO/ME     EYLEA OU     HPI     CRVO/ME     Additional comments: EYLEA OU           Comments    1. Steroid responder glaucoma   2. PCIOL OU MGM   3. SARCOIDOSIS   H\O chronic UVEITIS OU   4. Central vein occlusion of retina, left   5. Retinal edema     H/o Avastin and Eylea OS   Had been getting Ozurdex OU with Dr. Shahrzad Tamez Eylea OU last 4/25/22, 8/3/22  Ozurdex ou last 2/16/2022, 5/25/22 11/8/22  OZURDEX OD 1/31/23      Cosopt BID OU          Last edited by Jessica Jensen on 3/8/2023  2:41 PM.      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  Check OU independently daily.    Gave my office and personal cell phone number.  ________________  3/8/2023 today  Glo Dumont    Today zero me still 20/400  Was 20/70 in september  with zero me   Followed, came back huge me and 20/400   Today zero me .. poor vision   Poor vsion now ou  Iris atrophy od  known  Pciol ou near vie win   Refill cosopt drops     Eliminating cme has not improved her vision  Ou crvo today   Rtc at week 10 likely  repeat ozurdex    RTC 10 weeks from last Ozurdex OU for repeat Ozurdex  Instructed to call 24/7 for any worsening of vision or symptoms. Check OU daily.   Gave my office and cell phone number.    =============================  :

## 2023-03-16 ENCOUNTER — HOSPITAL ENCOUNTER (EMERGENCY)
Facility: HOSPITAL | Age: 88
Discharge: HOME | End: 2023-03-16
Attending: EMERGENCY MEDICINE
Payer: MEDICARE

## 2023-03-16 VITALS
TEMPERATURE: 99 F | WEIGHT: 156.81 LBS | SYSTOLIC BLOOD PRESSURE: 158 MMHG | OXYGEN SATURATION: 97 % | RESPIRATION RATE: 22 BRPM | DIASTOLIC BLOOD PRESSURE: 62 MMHG | BODY MASS INDEX: 32.92 KG/M2 | HEIGHT: 58 IN | HEART RATE: 48 BPM

## 2023-03-16 DIAGNOSIS — I50.9 ACUTE CONGESTIVE HEART FAILURE, UNSPECIFIED HEART FAILURE TYPE: Primary | ICD-10-CM

## 2023-03-16 DIAGNOSIS — R06.00 DYSPNEA: ICD-10-CM

## 2023-03-16 LAB
ALBUMIN SERPL BCP-MCNC: 3.7 G/DL (ref 3.5–5.2)
ALP SERPL-CCNC: 61 U/L (ref 55–135)
ALT SERPL W/O P-5'-P-CCNC: 17 U/L (ref 10–44)
ANION GAP SERPL CALC-SCNC: 12 MMOL/L (ref 8–16)
AST SERPL-CCNC: 16 U/L (ref 10–40)
BACTERIA #/AREA URNS HPF: ABNORMAL /HPF
BASOPHILS # BLD AUTO: 0.05 K/UL (ref 0–0.2)
BASOPHILS NFR BLD: 0.7 % (ref 0–1.9)
BILIRUB SERPL-MCNC: 0.2 MG/DL (ref 0.1–1)
BILIRUB UR QL STRIP: NEGATIVE
BNP SERPL-MCNC: 938 PG/ML (ref 0–99)
BUN SERPL-MCNC: 35 MG/DL (ref 8–23)
CALCIUM SERPL-MCNC: 9.4 MG/DL (ref 8.7–10.5)
CHLORIDE SERPL-SCNC: 114 MMOL/L (ref 95–110)
CLARITY UR: CLEAR
CO2 SERPL-SCNC: 18 MMOL/L (ref 23–29)
COLOR UR: YELLOW
CREAT SERPL-MCNC: 1.8 MG/DL (ref 0.5–1.4)
DIFFERENTIAL METHOD: ABNORMAL
EOSINOPHIL # BLD AUTO: 0.2 K/UL (ref 0–0.5)
EOSINOPHIL NFR BLD: 2.6 % (ref 0–8)
ERYTHROCYTE [DISTWIDTH] IN BLOOD BY AUTOMATED COUNT: 15.8 % (ref 11.5–14.5)
EST. GFR  (NO RACE VARIABLE): 27 ML/MIN/1.73 M^2
GLUCOSE SERPL-MCNC: 114 MG/DL (ref 70–110)
GLUCOSE UR QL STRIP: NEGATIVE
HCT VFR BLD AUTO: 29.5 % (ref 37–48.5)
HCV AB SERPL QL IA: NEGATIVE
HEP C VIRUS HOLD SPECIMEN: NORMAL
HGB BLD-MCNC: 9.7 G/DL (ref 12–16)
HGB UR QL STRIP: NEGATIVE
HIV 1+2 AB+HIV1 P24 AG SERPL QL IA: NEGATIVE
HYALINE CASTS #/AREA URNS LPF: 8 /LPF
IMM GRANULOCYTES # BLD AUTO: 0.07 K/UL (ref 0–0.04)
IMM GRANULOCYTES NFR BLD AUTO: 1 % (ref 0–0.5)
KETONES UR QL STRIP: NEGATIVE
LEUKOCYTE ESTERASE UR QL STRIP: ABNORMAL
LYMPHOCYTES # BLD AUTO: 1.1 K/UL (ref 1–4.8)
LYMPHOCYTES NFR BLD: 16.3 % (ref 18–48)
MCH RBC QN AUTO: 27.2 PG (ref 27–31)
MCHC RBC AUTO-ENTMCNC: 32.9 G/DL (ref 32–36)
MCV RBC AUTO: 83 FL (ref 82–98)
MICROSCOPIC COMMENT: ABNORMAL
MONOCYTES # BLD AUTO: 0.8 K/UL (ref 0.3–1)
MONOCYTES NFR BLD: 11.2 % (ref 4–15)
NEUTROPHILS # BLD AUTO: 4.7 K/UL (ref 1.8–7.7)
NEUTROPHILS NFR BLD: 68.2 % (ref 38–73)
NITRITE UR QL STRIP: NEGATIVE
NRBC BLD-RTO: 0 /100 WBC
PH UR STRIP: 6 [PH] (ref 5–8)
PLATELET # BLD AUTO: 259 K/UL (ref 150–450)
PMV BLD AUTO: 9.8 FL (ref 9.2–12.9)
POTASSIUM SERPL-SCNC: 3.5 MMOL/L (ref 3.5–5.1)
PROT SERPL-MCNC: 7.4 G/DL (ref 6–8.4)
PROT UR QL STRIP: NEGATIVE
RBC # BLD AUTO: 3.57 M/UL (ref 4–5.4)
RBC #/AREA URNS HPF: 1 /HPF (ref 0–4)
SODIUM SERPL-SCNC: 144 MMOL/L (ref 136–145)
SP GR UR STRIP: 1.01 (ref 1–1.03)
TROPONIN I SERPL DL<=0.01 NG/ML-MCNC: 0.02 NG/ML (ref 0–0.03)
URN SPEC COLLECT METH UR: ABNORMAL
UROBILINOGEN UR STRIP-ACNC: NEGATIVE EU/DL
WBC # BLD AUTO: 6.94 K/UL (ref 3.9–12.7)
WBC #/AREA URNS HPF: 14 /HPF (ref 0–5)

## 2023-03-16 PROCEDURE — 93010 ELECTROCARDIOGRAM REPORT: CPT | Mod: HCNC,,, | Performed by: INTERNAL MEDICINE

## 2023-03-16 PROCEDURE — 83880 ASSAY OF NATRIURETIC PEPTIDE: CPT | Mod: HCNC | Performed by: NURSE PRACTITIONER

## 2023-03-16 PROCEDURE — 96374 THER/PROPH/DIAG INJ IV PUSH: CPT | Mod: HCNC

## 2023-03-16 PROCEDURE — 93005 ELECTROCARDIOGRAM TRACING: CPT | Mod: HCNC

## 2023-03-16 PROCEDURE — 84484 ASSAY OF TROPONIN QUANT: CPT | Mod: HCNC | Performed by: NURSE PRACTITIONER

## 2023-03-16 PROCEDURE — 99284 EMERGENCY DEPT VISIT MOD MDM: CPT | Mod: 25,HCNC

## 2023-03-16 PROCEDURE — 86803 HEPATITIS C AB TEST: CPT | Mod: HCNC | Performed by: NURSE PRACTITIONER

## 2023-03-16 PROCEDURE — 93010 EKG 12-LEAD: ICD-10-PCS | Mod: HCNC,,, | Performed by: INTERNAL MEDICINE

## 2023-03-16 PROCEDURE — 63600175 PHARM REV CODE 636 W HCPCS: Mod: HCNC | Performed by: EMERGENCY MEDICINE

## 2023-03-16 PROCEDURE — 25000003 PHARM REV CODE 250: Mod: HCNC | Performed by: EMERGENCY MEDICINE

## 2023-03-16 PROCEDURE — 87389 HIV-1 AG W/HIV-1&-2 AB AG IA: CPT | Mod: HCNC | Performed by: NURSE PRACTITIONER

## 2023-03-16 PROCEDURE — 80053 COMPREHEN METABOLIC PANEL: CPT | Mod: HCNC | Performed by: NURSE PRACTITIONER

## 2023-03-16 PROCEDURE — 87086 URINE CULTURE/COLONY COUNT: CPT | Mod: HCNC | Performed by: NURSE PRACTITIONER

## 2023-03-16 PROCEDURE — 85025 COMPLETE CBC W/AUTO DIFF WBC: CPT | Mod: HCNC | Performed by: NURSE PRACTITIONER

## 2023-03-16 PROCEDURE — 81000 URINALYSIS NONAUTO W/SCOPE: CPT | Mod: HCNC | Performed by: NURSE PRACTITIONER

## 2023-03-16 RX ORDER — FUROSEMIDE 10 MG/ML
60 INJECTION INTRAMUSCULAR; INTRAVENOUS
Status: COMPLETED | OUTPATIENT
Start: 2023-03-16 | End: 2023-03-16

## 2023-03-16 RX ADMIN — NITROGLYCERIN 1 INCH: 20 OINTMENT TOPICAL at 09:03

## 2023-03-16 RX ADMIN — FUROSEMIDE 60 MG: 10 INJECTION, SOLUTION INTRAMUSCULAR; INTRAVENOUS at 09:03

## 2023-03-16 NOTE — FIRST PROVIDER EVALUATION
Medical screening examination initiated.  I have conducted a focused provider triage encounter, findings are as follows:    Brief history of present illness:  Patient complains of dyspnea and lower leg swelling    There were no vitals filed for this visit.    Pertinent physical exam:  Periorbital edema    Brief workup plan:  Labs x-ray    Preliminary workup initiated; this workup will be continued and followed by the physician or advanced practice provider that is assigned to the patient when roomed.

## 2023-03-17 ENCOUNTER — PATIENT MESSAGE (OUTPATIENT)
Dept: CARDIOLOGY | Facility: CLINIC | Age: 88
End: 2023-03-17
Payer: MEDICARE

## 2023-03-17 NOTE — ED PROVIDER NOTES
SCRIBE #1 NOTE: I, Mela Alcantara, am scribing for, and in the presence of, Lion Alejo Jr., MD. I have scribed the entire note.       History     Chief Complaint   Patient presents with    Shortness of Breath     SOB onset last night, used nitro at home - helped. BLE swelling all week.      Review of patient's allergies indicates:   Allergen Reactions    Lisinopril      angioedema    Codeine Nausea And Vomiting         History of Present Illness     HPI    3/16/2023, 8:39 PM  History obtained from the patient      History of Present Illness: Glo Dumont is a 87 y.o. female patient with a PMHx of HTN, CAD, CKD, and CHF who presents to the Emergency Department for evaluation of  SOB which onset last night. Symptoms are constant and moderate in severity. No mitigating or exacerbating factors reported. Associated sxs include bilateral lower extremity swelling, back pain, and bilateral upper extremity pain. Patient denies any fever, chills, N/V/D, CP, and all other sxs at this time. Prior Tx includes nitro at home but with no relief. No further complaints or concerns at this time.       Arrival mode: Personal vehicle    PCP: Christina Recio MD        Past Medical History:  Past Medical History:   Diagnosis Date    Anemia     Angina pectoris     Anxiety     Anxiety and depression     Arthritis     hip    Carotid artery occlusion     Carpal tunnel syndrome 06/23/2008    emg    Chronic diarrhea     work up in 2011 with EGD, CS and VCE    CKD (chronic kidney disease) stage 3, GFR 30-59 ml/min 5/11/2017    Colitis     Coronary artery disease     Coronary artery disease     Diastolic dysfunction     Diverticulosis     Glaucoma     Greater trochanteric bursitis 2/10/2015    Grief at loss of child 1/26/2016    H/O carotid endarterectomy 12/2/2013    Heart failure     History of coronary angioplasty 3/11/2014    Hypercholesteremia     Hypertension     Liver cyst 02/08/2013    ct abd    Macular degeneration     Primary  open-angle glaucoma(365.11) 9/3/2013    Renal cyst 02/08/2013    ct abd    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    Sarcoidosis     Sarcoidosis of lung     Sickle cell trait     Uveitis        Past Surgical History:  Past Surgical History:   Procedure Laterality Date    CAROTID ENDARTERECTOMY Right 2000s    CATARACT EXTRACTION Bilateral     Dr. Liang Dennis    CHOLECYSTECTOMY      laparoscopic, 3/18.    CORONARY ANGIOPLASTY WITH STENT PLACEMENT  11/19/2010    RCA-HALIE 2010    Lathia    JOINT REPLACEMENT Left 11/03/2014    Dr. Braun    TOTAL ABDOMINAL HYSTERECTOMY W/ BILATERAL SALPINGOOPHORECTOMY  1972         Family History:  Family History   Problem Relation Age of Onset    Hypertension Mother     Heart failure Mother     Cancer Father         prostate    Cirrhosis Brother     Heart failure Brother     Cancer Daughter         Carcinoid       Social History:  Social History     Tobacco Use    Smoking status: Never    Smokeless tobacco: Never   Substance and Sexual Activity    Alcohol use: No     Alcohol/week: 0.0 standard drinks    Drug use: No    Sexual activity: Yes     Partners: Male        Review of Systems     Review of Systems   Constitutional:  Negative for chills and fever.   HENT:  Negative for congestion and sore throat.    Respiratory:  Positive for shortness of breath.    Cardiovascular:  Positive for leg swelling (bilateral). Negative for chest pain.   Gastrointestinal:  Negative for diarrhea, nausea and vomiting.   Genitourinary:  Negative for dysuria.   Musculoskeletal:  Positive for back pain and myalgias (bilateral upper extremities).   Skin:  Negative for rash.   Neurological:  Negative for dizziness and weakness.   Hematological:  Does not bruise/bleed easily.      Physical Exam     Initial Vitals [03/16/23 1617]   BP Pulse Resp Temp SpO2   (!) 206/86 (!) 49 16 98.8 °F (37.1 °C) 96 %      MAP       --          Physical Exam  Nursing Notes and Vital Signs Reviewed.  Constitutional:  "Patient is in no acute distress. Well-developed and well-nourished.  Head: Atraumatic. Normocephalic.  Eyes:  EOM intact.  No scleral icterus.  ENT: Mucous membranes are moist.  Nares clear   Neck:  Full ROM. No JVD.  Cardiovascular: Regular rate. Regular rhythm No murmurs, rubs, or gallops. Distal pulses are 2+ and symmetric  Pulmonary/Chest: No respiratory distress. Clear to auscultation bilaterally. No wheezing or rales.  Equal chest wall rise bilaterally  Abdominal: Soft and non-distended.  There is no tenderness.  No rebound, guarding, or rigidity. Good bowel sounds.  Genitourinary: No CVA tenderness.  No suprapubic tenderness  Musculoskeletal: Moves all extremities. No obvious deformities.  5 x 5 strength in all extremities   Skin: Warm and dry.  Neurological:  Alert, awake, and appropriate.  Normal speech.  No acute focal neurological deficits are appreciated.  Two through 12 intact bilaterally.  Psychiatric: Normal affect. Good eye contact. Appropriate in content.      ED Course   Procedures  ED Vital Signs:  Vitals:    03/16/23 1617 03/16/23 1945 03/16/23 2104 03/16/23 2122   BP: (!) 206/86 (!) 185/65 (!) 179/74    Pulse: (!) 49 (!) 54 (!) 45    Resp: 16 16     Temp: 98.8 °F (37.1 °C) 99.1 °F (37.3 °C)     TempSrc: Oral Oral     SpO2: 96% 99% 97%    Weight:    71.1 kg (156 lb 12.8 oz)   Height: 4' 10" (1.473 m)       03/16/23 2137 03/16/23 2201   BP: (!) 154/67    Pulse: (!) 45 (!) 48   Resp:  (!) 27   Temp:     TempSrc:     SpO2: 95% 97%   Weight:     Height:         Abnormal Lab Results:  Labs Reviewed   B-TYPE NATRIURETIC PEPTIDE - Abnormal; Notable for the following components:       Result Value     (*)     All other components within normal limits    Narrative:     Release to patient->Immediate   CBC W/ AUTO DIFFERENTIAL - Abnormal; Notable for the following components:    RBC 3.57 (*)     Hemoglobin 9.7 (*)     Hematocrit 29.5 (*)     RDW 15.8 (*)     Immature Granulocytes 1.0 (*)     Immature " Grans (Abs) 0.07 (*)     Lymph % 16.3 (*)     All other components within normal limits    Narrative:     Release to patient->Immediate   COMPREHENSIVE METABOLIC PANEL - Abnormal; Notable for the following components:    Chloride 114 (*)     CO2 18 (*)     Glucose 114 (*)     BUN 35 (*)     Creatinine 1.8 (*)     eGFR 27 (*)     All other components within normal limits    Narrative:     Release to patient->Immediate   URINALYSIS, REFLEX TO URINE CULTURE - Abnormal; Notable for the following components:    Leukocytes, UA 2+ (*)     All other components within normal limits    Narrative:     Specimen Source->Urine   URINALYSIS MICROSCOPIC - Abnormal; Notable for the following components:    WBC, UA 14 (*)     Hyaline Casts, UA 8 (*)     All other components within normal limits    Narrative:     Specimen Source->Urine   CULTURE, URINE   TROPONIN I    Narrative:     Release to patient->Immediate   HIV 1 / 2 ANTIBODY    Narrative:     Release to patient->Immediate   HEPATITIS C ANTIBODY    Narrative:     Release to patient->Immediate   HEP C VIRUS HOLD SPECIMEN    Narrative:     Release to patient->Immediate        All Lab Results:  Results for orders placed or performed during the hospital encounter of 03/16/23   Brain natriuretic peptide   Result Value Ref Range     (H) 0 - 99 pg/mL   CBC auto differential   Result Value Ref Range    WBC 6.94 3.90 - 12.70 K/uL    RBC 3.57 (L) 4.00 - 5.40 M/uL    Hemoglobin 9.7 (L) 12.0 - 16.0 g/dL    Hematocrit 29.5 (L) 37.0 - 48.5 %    MCV 83 82 - 98 fL    MCH 27.2 27.0 - 31.0 pg    MCHC 32.9 32.0 - 36.0 g/dL    RDW 15.8 (H) 11.5 - 14.5 %    Platelets 259 150 - 450 K/uL    MPV 9.8 9.2 - 12.9 fL    Immature Granulocytes 1.0 (H) 0.0 - 0.5 %    Gran # (ANC) 4.7 1.8 - 7.7 K/uL    Immature Grans (Abs) 0.07 (H) 0.00 - 0.04 K/uL    Lymph # 1.1 1.0 - 4.8 K/uL    Mono # 0.8 0.3 - 1.0 K/uL    Eos # 0.2 0.0 - 0.5 K/uL    Baso # 0.05 0.00 - 0.20 K/uL    nRBC 0 0 /100 WBC    Gran % 68.2  38.0 - 73.0 %    Lymph % 16.3 (L) 18.0 - 48.0 %    Mono % 11.2 4.0 - 15.0 %    Eosinophil % 2.6 0.0 - 8.0 %    Basophil % 0.7 0.0 - 1.9 %    Differential Method Automated    Comprehensive metabolic panel   Result Value Ref Range    Sodium 144 136 - 145 mmol/L    Potassium 3.5 3.5 - 5.1 mmol/L    Chloride 114 (H) 95 - 110 mmol/L    CO2 18 (L) 23 - 29 mmol/L    Glucose 114 (H) 70 - 110 mg/dL    BUN 35 (H) 8 - 23 mg/dL    Creatinine 1.8 (H) 0.5 - 1.4 mg/dL    Calcium 9.4 8.7 - 10.5 mg/dL    Total Protein 7.4 6.0 - 8.4 g/dL    Albumin 3.7 3.5 - 5.2 g/dL    Total Bilirubin 0.2 0.1 - 1.0 mg/dL    Alkaline Phosphatase 61 55 - 135 U/L    AST 16 10 - 40 U/L    ALT 17 10 - 44 U/L    Anion Gap 12 8 - 16 mmol/L    eGFR 27 (A) >60 mL/min/1.73 m^2   Troponin I   Result Value Ref Range    Troponin I 0.023 0.000 - 0.026 ng/mL   Urinalysis, Reflex to Urine Culture Urine, Clean Catch    Specimen: Urine   Result Value Ref Range    Specimen UA Urine, Clean Catch     Color, UA Yellow Yellow, Straw, Terrie    Appearance, UA Clear Clear    pH, UA 6.0 5.0 - 8.0    Specific Gravity, UA 1.015 1.005 - 1.030    Protein, UA Negative Negative    Glucose, UA Negative Negative    Ketones, UA Negative Negative    Bilirubin (UA) Negative Negative    Occult Blood UA Negative Negative    Nitrite, UA Negative Negative    Urobilinogen, UA Negative <2.0 EU/dL    Leukocytes, UA 2+ (A) Negative   HIV 1/2 Ag/Ab (4th Gen)   Result Value Ref Range    HIV 1/2 Ag/Ab Negative Negative   Hepatitis C Antibody   Result Value Ref Range    Hepatitis C Ab Negative Negative   HCV Virus Hold Specimen   Result Value Ref Range    HEP C Virus Hold Specimen Hold for HCV sendout    Urinalysis Microscopic   Result Value Ref Range    RBC, UA 1 0 - 4 /hpf    WBC, UA 14 (H) 0 - 5 /hpf    Bacteria Rare None-Occ /hpf    Hyaline Casts, UA 8 (A) 0-1/lpf /lpf    Microscopic Comment SEE COMMENT      *Note: Due to a large number of results and/or encounters for the requested time  period, some results have not been displayed. A complete set of results can be found in Results Review.        Imaging Results:  Imaging Results              X-Ray Chest 1 View (Final result)  Result time 03/16/23 17:29:44      Final result by Low Venegas MD (03/16/23 17:29:44)                   Impression:      Cardiomegaly with central pulmonary vascular crowding.  Recommend correlation to low-grade CHF      Electronically signed by: Low Venegas  Date:    03/16/2023  Time:    17:29               Narrative:    EXAMINATION:  XR CHEST 1 VIEW    CLINICAL HISTORY:  Dyspnea, unspecified    TECHNIQUE:  Single frontal view of the chest was performed.    COMPARISON:  None    FINDINGS:  Mild perihilar central pulmonary vascular crowding.  No consolidation, pleural effusion or pneumothorax.    Cardiomegaly.    Bones are intact.                                       The EKG was ordered, reviewed, and independently interpreted by the ED provider.  Interpretation time: 16:22  Rate: 47 BPM  Rhythm: sinus bradycardia with 1st degree AV block  Interpretation: Minimal voltage criteria for LVH, may be normal variant (R in a VL). Borderline abnormal ECG. No STEMI.           The Emergency Provider reviewed the vital signs and test results, which are outlined above.     ED Discussion       9:58 PM: Reassessed pt at this time. Discussed with pt all pertinent ED information and results. Discussed pt dx and plan of tx. Gave pt all f/u and return to the ED instructions. All questions and concerns were addressed at this time. Pt expresses understanding of information and instructions, and is comfortable with plan to discharge. Pt is stable for discharge.    I discussed with patient and/or family/caretaker that evaluation in the ED does not suggest any emergent or life threatening medical conditions requiring immediate intervention beyond what was provided in the ED, and I believe patient is safe for discharge.  Regardless, an  unremarkable evaluation in the ED does not preclude the development or presence of a serious of life threatening condition. As such, patient was instructed to return immediately for any worsening or change in current symptoms.        Medical Decision Making:   History:   Old Medical Records: I decided to obtain old medical records.  Old Records Summarized: records from clinic visits.       <> Summary of Records: I reviewed stress test from 03/15/2022.  EF is 74%.  No evidence of cardiac ischemia.  Initial Assessment:   Patient was initially evaluated by mid-level in triage with the assessment undertaken.  Patient has history congestive heart failure in his compliant with her medications.  She is on Lasix 20 mg orally twice daily.  Patient complaining of orthopnea PND with pedal edema.  She denies any chest pain.  No fevers chills or chest pain.  Patient has no respiratory distress.  Vital signs show elevated blood pressure but normal sats.  She is not tachypneic.  She is no respiratory distress.  Differential Diagnosis:   CHF versus pneumonia versus COVID versus influenza versus NSTEMI  Clinical Tests:   Lab Tests: Ordered and Reviewed  Radiological Study: Ordered and Reviewed  Medical Tests: Ordered and Reviewed  ED Management:  I have evaluated the patient's history physical examination reviewed and independently interpreted all lab studies.  Patient has a BNP of 938 with a creatinine of 1.8.  Troponin is 0.023 and normal.  White count is normal.  There is no shift.  Hemoglobin is 9.7.  I have reviewed these with her prior labs as well and she is at baseline.  EKG shows sinus bradycardia with first-degree block.  She has LVH.  No STEMI.  This was an independent interpretation by myself.  I have reviewed her chest x-ray and reviewed the radiological read.  There is pulmonary vascular crowding.  Patient is feeling much better after IV Lasix and nitro.  Patient is requesting discharge home though she is been offered  admission if needed.  She states she will do fine at home.  We will start Lasix 40 mg twice daily as opposed to her normal 20 mg twice daily for the next 3 days and have her follow up with her doctor on Monday.  She is to return if her symptoms worsen any way or for any problems questions or concerns.  Patient seems reliable verbalized agreement understanding with all instructions.         ED Medication(s):  Medications   nitroGLYCERIN 2% TD oint ointment 1 inch (1 inch Topical (Top) Given 3/16/23 2105)   furosemide injection 60 mg (60 mg Intravenous Given 3/16/23 2101)       New Prescriptions    No medications on file        Follow-up Information       Christina Recio MD In 2 days.    Specialty: Internal Medicine  Contact information:  [address]  [place] [id number] [phone]                                 Scribe Attestation:   Scribe #1: I performed the above scribed service and the documentation accurately describes the services I performed. I attest to the accuracy of the note.     Attending:   Physician Attestation Statement for Scribe #1: I, Lion Alejo Jr., MD, personally performed the services described in this documentation, as scribed by Mela Alcantara, in my presence, and it is both accurate and complete.           Clinical Impression       ICD-10-CM ICD-9-CM   1. Acute congestive heart failure, unspecified heart failure type  I50.9 428.0   2. Dyspnea  R06.00 786.09       Disposition:   Disposition: Discharged  Condition: Stable      Lion Alejo Jr., MD  03/16/23 1072

## 2023-03-17 NOTE — DISCHARGE INSTRUCTIONS
Continue all your home medications.  Increase her Lasix dose to 40 mg in the morning and early afternoon for the next 3 days.  This will help get the excess fluid off.  After 3 days return to her normal dose.  If her symptoms worsen any way or for any problems questions or concerns, return immediately.  Follow up with her doctor on Monday for re-evaluation.

## 2023-03-17 NOTE — ED NOTES
Provider notified of elevated BP and headache and request for medication for headache. No new orders received.

## 2023-03-18 ENCOUNTER — HOSPITAL ENCOUNTER (INPATIENT)
Facility: HOSPITAL | Age: 88
LOS: 2 days | Discharge: HOME HEALTH | DRG: 242 | End: 2023-03-23
Attending: EMERGENCY MEDICINE | Admitting: STUDENT IN AN ORGANIZED HEALTH CARE EDUCATION/TRAINING PROGRAM
Payer: MEDICARE

## 2023-03-18 DIAGNOSIS — R53.1 GENERAL WEAKNESS: ICD-10-CM

## 2023-03-18 DIAGNOSIS — I49.9 ARRHYTHMIA: ICD-10-CM

## 2023-03-18 DIAGNOSIS — I50.9 ACUTE ON CHRONIC CONGESTIVE HEART FAILURE, UNSPECIFIED HEART FAILURE TYPE: Primary | ICD-10-CM

## 2023-03-18 DIAGNOSIS — I50.9 HEART FAILURE: ICD-10-CM

## 2023-03-18 DIAGNOSIS — I50.30 DIASTOLIC HEART FAILURE, NYHA CLASS 3: ICD-10-CM

## 2023-03-18 DIAGNOSIS — I49.5 SA NODE DYSFUNCTION: ICD-10-CM

## 2023-03-18 DIAGNOSIS — R00.1 BRADYCARDIA: ICD-10-CM

## 2023-03-18 DIAGNOSIS — I21.4 NSTEMI (NON-ST ELEVATED MYOCARDIAL INFARCTION): ICD-10-CM

## 2023-03-18 DIAGNOSIS — I11.0 HYPERTENSIVE HEART DISEASE WITH HEART FAILURE: ICD-10-CM

## 2023-03-18 DIAGNOSIS — R06.02 SHORTNESS OF BREATH: ICD-10-CM

## 2023-03-18 DIAGNOSIS — R07.9 CHEST PAIN AT REST: ICD-10-CM

## 2023-03-18 DIAGNOSIS — N18.30 STAGE 3 CHRONIC KIDNEY DISEASE, UNSPECIFIED WHETHER STAGE 3A OR 3B CKD: ICD-10-CM

## 2023-03-18 LAB
ALBUMIN SERPL BCP-MCNC: 2.9 G/DL (ref 3.5–5.2)
ALBUMIN SERPL BCP-MCNC: 3.8 G/DL (ref 3.5–5.2)
ALP SERPL-CCNC: 51 U/L (ref 55–135)
ALP SERPL-CCNC: 65 U/L (ref 55–135)
ALT SERPL W/O P-5'-P-CCNC: 11 U/L (ref 10–44)
ALT SERPL W/O P-5'-P-CCNC: 15 U/L (ref 10–44)
ANION GAP SERPL CALC-SCNC: 12 MMOL/L (ref 8–16)
ANION GAP SERPL CALC-SCNC: 14 MMOL/L (ref 8–16)
AST SERPL-CCNC: 15 U/L (ref 10–40)
AST SERPL-CCNC: 8 U/L (ref 10–40)
BACTERIA UR CULT: NORMAL
BACTERIA UR CULT: NORMAL
BASOPHILS # BLD AUTO: 0.05 K/UL (ref 0–0.2)
BASOPHILS NFR BLD: 0.6 % (ref 0–1.9)
BILIRUB SERPL-MCNC: 0.2 MG/DL (ref 0.1–1)
BILIRUB SERPL-MCNC: 0.3 MG/DL (ref 0.1–1)
BNP SERPL-MCNC: 349 PG/ML (ref 0–99)
BUN SERPL-MCNC: 35 MG/DL (ref 8–23)
BUN SERPL-MCNC: 40 MG/DL (ref 8–23)
CALCIUM SERPL-MCNC: 7.6 MG/DL (ref 8.7–10.5)
CALCIUM SERPL-MCNC: 9.3 MG/DL (ref 8.7–10.5)
CHLORIDE SERPL-SCNC: 108 MMOL/L (ref 95–110)
CHLORIDE SERPL-SCNC: 116 MMOL/L (ref 95–110)
CO2 SERPL-SCNC: 16 MMOL/L (ref 23–29)
CO2 SERPL-SCNC: 21 MMOL/L (ref 23–29)
CREAT SERPL-MCNC: 1.6 MG/DL (ref 0.5–1.4)
CREAT SERPL-MCNC: 2.1 MG/DL (ref 0.5–1.4)
DIFFERENTIAL METHOD: ABNORMAL
EOSINOPHIL # BLD AUTO: 0.2 K/UL (ref 0–0.5)
EOSINOPHIL NFR BLD: 2 % (ref 0–8)
ERYTHROCYTE [DISTWIDTH] IN BLOOD BY AUTOMATED COUNT: 15.9 % (ref 11.5–14.5)
EST. GFR  (NO RACE VARIABLE): 23 ML/MIN/1.73 M^2
EST. GFR  (NO RACE VARIABLE): 31 ML/MIN/1.73 M^2
GLUCOSE SERPL-MCNC: 127 MG/DL (ref 70–110)
GLUCOSE SERPL-MCNC: 153 MG/DL (ref 70–110)
HCT VFR BLD AUTO: 30.1 % (ref 37–48.5)
HGB BLD-MCNC: 10.1 G/DL (ref 12–16)
IMM GRANULOCYTES # BLD AUTO: 0.07 K/UL (ref 0–0.04)
IMM GRANULOCYTES NFR BLD AUTO: 0.8 % (ref 0–0.5)
LYMPHOCYTES # BLD AUTO: 0.7 K/UL (ref 1–4.8)
LYMPHOCYTES NFR BLD: 8.3 % (ref 18–48)
MAGNESIUM SERPL-MCNC: 1.5 MG/DL (ref 1.6–2.6)
MCH RBC QN AUTO: 27.5 PG (ref 27–31)
MCHC RBC AUTO-ENTMCNC: 33.6 G/DL (ref 32–36)
MCV RBC AUTO: 82 FL (ref 82–98)
MONOCYTES # BLD AUTO: 0.7 K/UL (ref 0.3–1)
MONOCYTES NFR BLD: 7.4 % (ref 4–15)
NEUTROPHILS # BLD AUTO: 7.2 K/UL (ref 1.8–7.7)
NEUTROPHILS NFR BLD: 80.9 % (ref 38–73)
NRBC BLD-RTO: 0 /100 WBC
PHOSPHATE SERPL-MCNC: 4 MG/DL (ref 2.7–4.5)
PLATELET # BLD AUTO: 294 K/UL (ref 150–450)
PMV BLD AUTO: 10 FL (ref 9.2–12.9)
POTASSIUM SERPL-SCNC: 2.8 MMOL/L (ref 3.5–5.1)
POTASSIUM SERPL-SCNC: 3.3 MMOL/L (ref 3.5–5.1)
PROT SERPL-MCNC: 5.7 G/DL (ref 6–8.4)
PROT SERPL-MCNC: 7.8 G/DL (ref 6–8.4)
RBC # BLD AUTO: 3.67 M/UL (ref 4–5.4)
SODIUM SERPL-SCNC: 143 MMOL/L (ref 136–145)
SODIUM SERPL-SCNC: 144 MMOL/L (ref 136–145)
TROPONIN I SERPL DL<=0.01 NG/ML-MCNC: 0.03 NG/ML (ref 0–0.03)
WBC # BLD AUTO: 8.93 K/UL (ref 3.9–12.7)

## 2023-03-18 PROCEDURE — 96366 THER/PROPH/DIAG IV INF ADDON: CPT

## 2023-03-18 PROCEDURE — 83880 ASSAY OF NATRIURETIC PEPTIDE: CPT | Mod: HCNC | Performed by: PHYSICIAN ASSISTANT

## 2023-03-18 PROCEDURE — 25000003 PHARM REV CODE 250: Mod: HCNC | Performed by: EMERGENCY MEDICINE

## 2023-03-18 PROCEDURE — 93010 ELECTROCARDIOGRAM REPORT: CPT | Mod: HCNC,,, | Performed by: INTERNAL MEDICINE

## 2023-03-18 PROCEDURE — 96375 TX/PRO/DX INJ NEW DRUG ADDON: CPT | Mod: HCNC

## 2023-03-18 PROCEDURE — 63600175 PHARM REV CODE 636 W HCPCS: Mod: HCNC | Performed by: NURSE PRACTITIONER

## 2023-03-18 PROCEDURE — 80053 COMPREHEN METABOLIC PANEL: CPT | Mod: 91,HCNC | Performed by: NURSE PRACTITIONER

## 2023-03-18 PROCEDURE — 84484 ASSAY OF TROPONIN QUANT: CPT | Mod: HCNC | Performed by: PHYSICIAN ASSISTANT

## 2023-03-18 PROCEDURE — 99291 CRITICAL CARE FIRST HOUR: CPT | Mod: HCNC

## 2023-03-18 PROCEDURE — G0378 HOSPITAL OBSERVATION PER HR: HCPCS | Mod: HCNC

## 2023-03-18 PROCEDURE — 83735 ASSAY OF MAGNESIUM: CPT | Mod: HCNC | Performed by: NURSE PRACTITIONER

## 2023-03-18 PROCEDURE — 63600175 PHARM REV CODE 636 W HCPCS: Mod: HCNC | Performed by: EMERGENCY MEDICINE

## 2023-03-18 PROCEDURE — 93010 EKG 12-LEAD: ICD-10-PCS | Mod: HCNC,,, | Performed by: INTERNAL MEDICINE

## 2023-03-18 PROCEDURE — 85025 COMPLETE CBC W/AUTO DIFF WBC: CPT | Mod: HCNC | Performed by: PHYSICIAN ASSISTANT

## 2023-03-18 PROCEDURE — 80053 COMPREHEN METABOLIC PANEL: CPT | Mod: HCNC | Performed by: PHYSICIAN ASSISTANT

## 2023-03-18 PROCEDURE — 93005 ELECTROCARDIOGRAM TRACING: CPT | Mod: HCNC

## 2023-03-18 PROCEDURE — 25000003 PHARM REV CODE 250: Mod: HCNC | Performed by: NURSE PRACTITIONER

## 2023-03-18 PROCEDURE — 96374 THER/PROPH/DIAG INJ IV PUSH: CPT | Mod: HCNC

## 2023-03-18 PROCEDURE — 84100 ASSAY OF PHOSPHORUS: CPT | Mod: HCNC | Performed by: NURSE PRACTITIONER

## 2023-03-18 PROCEDURE — 96365 THER/PROPH/DIAG IV INF INIT: CPT

## 2023-03-18 RX ORDER — PROMETHAZINE HYDROCHLORIDE 25 MG/1
25 TABLET ORAL EVERY 6 HOURS PRN
Status: DISCONTINUED | OUTPATIENT
Start: 2023-03-18 | End: 2023-03-23 | Stop reason: HOSPADM

## 2023-03-18 RX ORDER — POTASSIUM CHLORIDE 20 MEQ/1
40 TABLET, EXTENDED RELEASE ORAL ONCE
Status: COMPLETED | OUTPATIENT
Start: 2023-03-18 | End: 2023-03-18

## 2023-03-18 RX ORDER — ONDANSETRON 2 MG/ML
4 INJECTION INTRAMUSCULAR; INTRAVENOUS EVERY 8 HOURS PRN
Status: DISCONTINUED | OUTPATIENT
Start: 2023-03-18 | End: 2023-03-23 | Stop reason: HOSPADM

## 2023-03-18 RX ORDER — HYDRALAZINE HYDROCHLORIDE 20 MG/ML
10 INJECTION INTRAMUSCULAR; INTRAVENOUS EVERY 8 HOURS PRN
Status: DISCONTINUED | OUTPATIENT
Start: 2023-03-18 | End: 2023-03-23 | Stop reason: HOSPADM

## 2023-03-18 RX ORDER — ASPIRIN 325 MG
325 TABLET ORAL
Status: COMPLETED | OUTPATIENT
Start: 2023-03-18 | End: 2023-03-18

## 2023-03-18 RX ORDER — ACETAMINOPHEN 325 MG/1
650 TABLET ORAL EVERY 4 HOURS PRN
Status: DISCONTINUED | OUTPATIENT
Start: 2023-03-18 | End: 2023-03-23 | Stop reason: HOSPADM

## 2023-03-18 RX ORDER — SODIUM CHLORIDE 0.9 % (FLUSH) 0.9 %
10 SYRINGE (ML) INJECTION
Status: DISCONTINUED | OUTPATIENT
Start: 2023-03-18 | End: 2023-03-23 | Stop reason: HOSPADM

## 2023-03-18 RX ORDER — TALC
6 POWDER (GRAM) TOPICAL NIGHTLY PRN
Status: DISCONTINUED | OUTPATIENT
Start: 2023-03-18 | End: 2023-03-23 | Stop reason: HOSPADM

## 2023-03-18 RX ORDER — MAGNESIUM SULFATE HEPTAHYDRATE 40 MG/ML
2 INJECTION, SOLUTION INTRAVENOUS ONCE
Status: COMPLETED | OUTPATIENT
Start: 2023-03-18 | End: 2023-03-19

## 2023-03-18 RX ORDER — FUROSEMIDE 10 MG/ML
60 INJECTION INTRAMUSCULAR; INTRAVENOUS
Status: COMPLETED | OUTPATIENT
Start: 2023-03-18 | End: 2023-03-18

## 2023-03-18 RX ADMIN — MAGNESIUM SULFATE HEPTAHYDRATE 2 G: 40 INJECTION, SOLUTION INTRAVENOUS at 10:03

## 2023-03-18 RX ADMIN — FUROSEMIDE 60 MG: 10 INJECTION, SOLUTION INTRAMUSCULAR; INTRAVENOUS at 07:03

## 2023-03-18 RX ADMIN — ASPIRIN 325 MG ORAL TABLET 325 MG: 325 PILL ORAL at 07:03

## 2023-03-18 RX ADMIN — POTASSIUM CHLORIDE 40 MEQ: 1500 TABLET, EXTENDED RELEASE ORAL at 10:03

## 2023-03-18 NOTE — FIRST PROVIDER EVALUATION
Medical screening examination initiated.  I have conducted a focused provider triage encounter, findings are as follows:    Brief history of present illness:  86 yo female reports to ED c/o shortness of breath. States seen here for same Thursday. Symptoms getting gradually a little worse    Vitals:    03/18/23 1750   BP: (!) 178/76   BP Location: Right arm   Patient Position: Sitting   Pulse: (!) 44   Resp: 20   Temp: 98 °F (36.7 °C)   TempSrc: Oral   SpO2: 98%       Pertinent physical exam:  In wheelchair. Bradycardic. Mild swelling around eyes. No severe swelling to lower extremities.     Brief workup plan:  labs, cxr, ekg, cardiac workup,    Preliminary workup initiated; this workup will be continued and followed by the physician or advanced practice provider that is assigned to the patient when roomed.

## 2023-03-18 NOTE — Clinical Note
The generator was inserted into pocket in and was sutured into the pocket in the the left subclavian.

## 2023-03-18 NOTE — ED PROVIDER NOTES
SCRIBE #1 NOTE: I, Arturo Renner, am scribing for, and in the presence of, Ramya Mullen DO. I have scribed the entire note.       History     Chief Complaint   Patient presents with    Shortness of Breath     Short of breath getting worse, hx of CHF, was seen in ER 2 days ago for same complaint.     Review of patient's allergies indicates:   Allergen Reactions    Lisinopril      angioedema    Codeine Nausea And Vomiting         History of Present Illness     HPI    3/18/2023, 6:07 PM  History obtained from the patient      History of Present Illness: Glo Dumont is a 87 y.o. female patient with a PMHx of anemia, CKD, CHF, CAD, HTN, and anxiety who presents to the Emergency Department for evaluation of SOB which onset gradually since 03/15/2023. The pt noted she visited the ED on 03/16/2023 for SOB and was given IV Lasix in the ED and Lasix 40 mg bid for 3 days. The pt reports today her SOB has worsened. The pt complains of some mild chest discomfort.  Symptoms are constant and severe in severity.  She states that lying flat, exertion, and talking make her symptoms worse and rest makes them better. Patient denies any fever, chills, cough, congestion, N/V/D, dizziness, headache, and all other sxs at this time. No further complaints or concerns at this time.     Previous Medical Charts Reviewed: 03/16/2023 - ED Chart for Acute CHF Exacerbation; TTE showed EF 75% March 2022.     Arrival mode: Personal Transportation     PCP: Christina Recio MD        Past Medical History:  Past Medical History:   Diagnosis Date    Anemia     Angina pectoris     Anxiety     Anxiety and depression     Arthritis     hip    Carotid artery occlusion     Carpal tunnel syndrome 06/23/2008    emg    Chronic diarrhea     work up in 2011 with EGD, CS and VCE    CKD (chronic kidney disease) stage 3, GFR 30-59 ml/min 5/11/2017    Colitis     Coronary artery disease     Coronary artery disease     Diastolic dysfunction     Diverticulosis      Glaucoma     Greater trochanteric bursitis 2/10/2015    Grief at loss of child 1/26/2016    H/O carotid endarterectomy 12/2/2013    Heart failure     History of coronary angioplasty 3/11/2014    Hypercholesteremia     Hypertension     Liver cyst 02/08/2013    ct abd    Macular degeneration     Primary open-angle glaucoma(365.11) 9/3/2013    Renal cyst 02/08/2013    ct abd    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    Sarcoidosis     Sarcoidosis of lung     Sickle cell trait     Uveitis        Past Surgical History:  Past Surgical History:   Procedure Laterality Date    CAROTID ENDARTERECTOMY Right 2000s    CATARACT EXTRACTION Bilateral     Dr. Liang Dennis    CHOLECYSTECTOMY      laparoscopic, 3/18.    CORONARY ANGIOPLASTY WITH STENT PLACEMENT  11/19/2010    RCA-HALIE 2010    Lathia    JOINT REPLACEMENT Left 11/03/2014    Dr. Braun    TOTAL ABDOMINAL HYSTERECTOMY W/ BILATERAL SALPINGOOPHORECTOMY  1972         Family History:  Family History   Problem Relation Age of Onset    Hypertension Mother     Heart failure Mother     Cancer Father         prostate    Cirrhosis Brother     Heart failure Brother     Cancer Daughter         Carcinoid       Social History:  Social History     Tobacco Use    Smoking status: Never    Smokeless tobacco: Never   Substance and Sexual Activity    Alcohol use: No     Alcohol/week: 0.0 standard drinks    Drug use: No    Sexual activity: Yes     Partners: Male        Review of Systems     Review of Systems   Constitutional:  Negative for chills and fever.   HENT:  Negative for congestion.    Respiratory:  Positive for shortness of breath. Negative for cough.    Cardiovascular:  Positive for chest pain, palpitations and leg swelling.   Gastrointestinal:  Negative for diarrhea, nausea and vomiting.   Neurological:  Negative for headaches.      Physical Exam     Initial Vitals [03/18/23 1750]   BP Pulse Resp Temp SpO2   (!) 178/76 (!) 44 20 98 °F (36.7 °C) 98 %      MAP        --          Physical Exam  Nursing Notes and Vital Signs Reviewed.  Constitutional: Patient is in no acute distress. Well-developed and well-nourished.  Head: Atraumatic. Normocephalic.  Eyes: EOM intact. Conjunctivae are not pale. No scleral icterus.  ENT: Mucous membranes are moist.   Neck: +JVD. Supple. Full ROM.   Cardiovascular: Bradycardic. Regular rhythm. Distal pulses are intact.  Pulmonary/Chest: Tachypneic. Diminished.  Abdominal: Soft and non-distended.  There is no tenderness.  No rebound, guarding, or rigidity.   Musculoskeletal: Moves all extremities. No obvious deformities. +1 pedal edema.   Skin: Warm and dry.  Neurological:  Alert, awake, and appropriate.  Normal speech.  No acute focal neurological deficits are appreciated.  Psychiatric: Normal affect. Good eye contact. Appropriate in content.     ED Course   Critical Care    Date/Time: 3/18/2023 7:17 PM  Performed by: Ramya Mullen DO  Authorized by: Ramya Mullen DO   Direct patient critical care time: 19 minutes  Additional history critical care time: 5 minutes  Ordering / reviewing critical care time: 5 minutes  Documentation critical care time: 4 minutes  Consulting other physicians critical care time: 3 minutes  Total critical care time (exclusive of procedural time) : 36 minutes  Critical care time was exclusive of separately billable procedures and treating other patients and teaching time.  Critical care was necessary to treat or prevent imminent or life-threatening deterioration of the following conditions: cardiac failure (CHF Exacerbation).  Critical care was time spent personally by me on the following activities: development of treatment plan with patient or surrogate, discussions with consultants, evaluation of patient's response to treatment, examination of patient, obtaining history from patient or surrogate, ordering and performing treatments and interventions, pulse oximetry, ordering and review of laboratory studies,  ordering and review of radiographic studies, re-evaluation of patient's condition and review of old charts.      ED Vital Signs:  Vitals:    03/18/23 1750 03/18/23 1821   BP: (!) 178/76    Pulse: (!) 44 (!) 43   Resp: 20    Temp: 98 °F (36.7 °C)    TempSrc: Oral    SpO2: 98%        Abnormal Lab Results:  Labs Reviewed   B-TYPE NATRIURETIC PEPTIDE - Abnormal; Notable for the following components:       Result Value     (*)     All other components within normal limits   CBC W/ AUTO DIFFERENTIAL - Abnormal; Notable for the following components:    RBC 3.67 (*)     Hemoglobin 10.1 (*)     Hematocrit 30.1 (*)     RDW 15.9 (*)     Immature Granulocytes 0.8 (*)     Immature Grans (Abs) 0.07 (*)     Lymph # 0.7 (*)     Gran % 80.9 (*)     Lymph % 8.3 (*)     All other components within normal limits   COMPREHENSIVE METABOLIC PANEL - Abnormal; Notable for the following components:    Potassium 3.3 (*)     CO2 21 (*)     Glucose 153 (*)     BUN 40 (*)     Creatinine 2.1 (*)     eGFR 23 (*)     All other components within normal limits   TROPONIN I - Abnormal; Notable for the following components:    Troponin I 0.030 (*)     All other components within normal limits        All Lab Results:  Results for orders placed or performed during the hospital encounter of 03/18/23   Brain natriuretic peptide   Result Value Ref Range     (H) 0 - 99 pg/mL   CBC auto differential   Result Value Ref Range    WBC 8.93 3.90 - 12.70 K/uL    RBC 3.67 (L) 4.00 - 5.40 M/uL    Hemoglobin 10.1 (L) 12.0 - 16.0 g/dL    Hematocrit 30.1 (L) 37.0 - 48.5 %    MCV 82 82 - 98 fL    MCH 27.5 27.0 - 31.0 pg    MCHC 33.6 32.0 - 36.0 g/dL    RDW 15.9 (H) 11.5 - 14.5 %    Platelets 294 150 - 450 K/uL    MPV 10.0 9.2 - 12.9 fL    Immature Granulocytes 0.8 (H) 0.0 - 0.5 %    Gran # (ANC) 7.2 1.8 - 7.7 K/uL    Immature Grans (Abs) 0.07 (H) 0.00 - 0.04 K/uL    Lymph # 0.7 (L) 1.0 - 4.8 K/uL    Mono # 0.7 0.3 - 1.0 K/uL    Eos # 0.2 0.0 - 0.5 K/uL     Baso # 0.05 0.00 - 0.20 K/uL    nRBC 0 0 /100 WBC    Gran % 80.9 (H) 38.0 - 73.0 %    Lymph % 8.3 (L) 18.0 - 48.0 %    Mono % 7.4 4.0 - 15.0 %    Eosinophil % 2.0 0.0 - 8.0 %    Basophil % 0.6 0.0 - 1.9 %    Differential Method Automated    Comprehensive metabolic panel   Result Value Ref Range    Sodium 143 136 - 145 mmol/L    Potassium 3.3 (L) 3.5 - 5.1 mmol/L    Chloride 108 95 - 110 mmol/L    CO2 21 (L) 23 - 29 mmol/L    Glucose 153 (H) 70 - 110 mg/dL    BUN 40 (H) 8 - 23 mg/dL    Creatinine 2.1 (H) 0.5 - 1.4 mg/dL    Calcium 9.3 8.7 - 10.5 mg/dL    Total Protein 7.8 6.0 - 8.4 g/dL    Albumin 3.8 3.5 - 5.2 g/dL    Total Bilirubin 0.3 0.1 - 1.0 mg/dL    Alkaline Phosphatase 65 55 - 135 U/L    AST 15 10 - 40 U/L    ALT 15 10 - 44 U/L    Anion Gap 14 8 - 16 mmol/L    eGFR 23 (A) >60 mL/min/1.73 m^2   Troponin I   Result Value Ref Range    Troponin I 0.030 (H) 0.000 - 0.026 ng/mL     *Note: Due to a large number of results and/or encounters for the requested time period, some results have not been displayed. A complete set of results can be found in Results Review.         Imaging Results:  Imaging Results              X-Ray Chest AP Portable (Final result)  Result time 03/18/23 18:23:19      Final result by Paolo Gordillo MD (03/18/23 18:23:19)                   Impression:      CHF.      Electronically signed by: Paolo Gordillo  Date:    03/18/2023  Time:    18:23               Narrative:    EXAMINATION:  XR CHEST AP PORTABLE    CLINICAL HISTORY:  Shortness of breath    FINDINGS:  Comparison is made to March 16, 2023.    Cardiomegaly.  Pulmonary vascular congestion.  No consolidation.  No significant pleural effusion.  No pneumothorax.                                     Imaging independently interpreted by the ED Provider, who agrees with the radiology findings.     The EKG was ordered, reviewed, and independently interpreted by the ED provider.  Interpretation time: 18:08:00  Rate: 46 BPM  Rhythm: sinus  bradycardia with 1st degree AV block  Interpretation: No acute ST or T wave abnormalities. No STEMI.           The Emergency Provider reviewed the vital signs and test results, which are outlined above.     ED Discussion       7:13 PM: Discussed case with Dr. Rendon ([facility]). Dr. Rendon agrees with current care and management of pt and accepts admission.   Admitting Service: [facility]  Admitting Physician: Dr. Rendon  Admit to: [facility]    7:15 PM: Re-evaluated pt. I have discussed test results, shared treatment plan, and the need for admission with patient and family at bedside. Pt and family express understanding at this time and agree with all information. All questions answered. Pt and family have no further questions or concerns at this time. Pt is ready for admit.      ED Course as of 03/1935   Sat Mar 18, 2023   1832 EKG shows sinus bradycardia at a rate of 46 with a first-degree AV block the MA interval of 274.  This is similar to previous EKG 2 days ago but appears to be new when compared to EKG in 2022.  No ST or T-wave changes.  Normal axis.  Normal QRS and QT intervals.   [NF]   1859 87-year-old female with multiple comorbidities presents with CHF exacerbation.  She is tachypneic with JVD and pedal edema.  Chest x-ray shows pulmonary edema.  She was seen here on Thursday given IV diuresis and instructed to increase her Lasix to 40 mg b.i.d..  Patient returns with worsening symptoms.  EKG shows sinus bradycardia with a first-degree AV block and troponin is elevated at 0.030.  BNP only slightly elevated at 349.  Chest x-ray shows CHF.  CMP shows CKD BUN 40 creatinine 2.1.  CBC shows baseline normocytic anemia likely of chronic disease.  IV Lasix 60 mg initiated with p.o. aspirin.  [NF]      ED Course User Index  [NF] Ramya Mullen,      Medical Decision Making:   History:   I obtained history from: someone other than patient.       <> Summary of History: Daughter reports  symptoms started Wednesday evening.  They were instructed to take 40 mg of Lasix p.o. b.i.d. at home for 3 days after discharge.  Leg swelling and worsening shortness of breath since ER evaluation on Thursday.  IV Lasix in the ER on Thursday did not help symptoms.  Cardiologist is Dr. Lisa.  Old Records Summarized: records from previous admission(s).       <> Summary of Records: Seen and evaluated in the emergency department on Thursday.  Given IV Lasix.  Troponin normal.  TTE in march 2022 showed EF 75%  Initial Assessment:   87-year-old female with JVD, mild tachypnea, and pedal edema.  Bradycardia.  Differential Diagnosis:   CHF, pneumonia, NSTEMI, worsening renal failure, pneumothorax, ACS  Clinical Tests:   Lab Tests: Reviewed and Ordered  Radiological Study: Ordered and Reviewed  Medical Tests: Reviewed and Ordered  ED Management:  87-year-old female with multiple comorbidities presents with CHF exacerbation.  She is tachypneic with JVD and pedal edema.  Chest x-ray shows pulmonary edema.  She was seen here on Thursday given IV diuresis and instructed to increase her Lasix to 40 mg b.i.d..  Patient returns with worsening symptoms.  EKG shows sinus bradycardia with a first-degree AV block and troponin is elevated at 0.030.  BNP only slightly elevated at 349.  Chest x-ray shows CHF.  CMP shows CKD BUN 40 creatinine 2.1.  CBC shows baseline normocytic anemia likely of chronic disease.  IV Lasix 60 mg initiated with p.o. aspirin.     Other:   I have discussed this case with another health care provider.       <> Summary of the Discussion: Dr. Rendon with Hospital Medicine recommends admission to med/tele         ED Medication(s):  Medications   furosemide injection 60 mg (60 mg Intravenous Given 3/18/23 1912)   aspirin tablet 325 mg (325 mg Oral Given 3/18/23 1914)       New Prescriptions    No medications on file               Scribe Attestation:   Scribe #1: I performed the above scribed service and the  documentation accurately describes the services I performed. I attest to the accuracy of the note.     Attending:   Physician Attestation Statement for Scribe #1: I, Ramya Mullen DO, personally performed the services described in this documentation, as scribed by Arturo Renner, in my presence, and it is both accurate and complete.           Clinical Impression       ICD-10-CM ICD-9-CM   1. Acute on chronic congestive heart failure, unspecified heart failure type  I50.9 428.0   2. Shortness of breath  R06.02 786.05   3. NSTEMI (non-ST elevated myocardial infarction)  I21.4 410.70   4. Stage 3 chronic kidney disease, unspecified whether stage 3a or 3b CKD  N18.30 585.3       Disposition:   Disposition: Placed in Observation  Condition: Serious       Ramya Mullen DO  03/1935

## 2023-03-18 NOTE — Clinical Note
The lead was inserted under fluorscopic guidance and thresholds were tested. The lead was inserted in the bundle of His.

## 2023-03-19 PROBLEM — E87.6 HYPOKALEMIA: Status: ACTIVE | Noted: 2023-03-19

## 2023-03-19 PROBLEM — E83.42 HYPOMAGNESEMIA: Status: ACTIVE | Noted: 2023-03-19

## 2023-03-19 PROBLEM — E66.9 OBESITY WITH SERIOUS COMORBIDITY: Status: ACTIVE | Noted: 2023-03-19

## 2023-03-19 PROBLEM — I50.9 ACUTE ON CHRONIC CONGESTIVE HEART FAILURE: Status: ACTIVE | Noted: 2023-03-19

## 2023-03-19 LAB
ANION GAP SERPL CALC-SCNC: 13 MMOL/L (ref 8–16)
BUN SERPL-MCNC: 38 MG/DL (ref 8–23)
CALCIUM SERPL-MCNC: 9.7 MG/DL (ref 8.7–10.5)
CHLORIDE SERPL-SCNC: 109 MMOL/L (ref 95–110)
CO2 SERPL-SCNC: 20 MMOL/L (ref 23–29)
CREAT SERPL-MCNC: 1.8 MG/DL (ref 0.5–1.4)
EST. GFR  (NO RACE VARIABLE): 27 ML/MIN/1.73 M^2
GLUCOSE SERPL-MCNC: 101 MG/DL (ref 70–110)
MAGNESIUM SERPL-MCNC: 2.5 MG/DL (ref 1.6–2.6)
POTASSIUM SERPL-SCNC: 3.5 MMOL/L (ref 3.5–5.1)
SODIUM SERPL-SCNC: 142 MMOL/L (ref 136–145)

## 2023-03-19 PROCEDURE — 96376 TX/PRO/DX INJ SAME DRUG ADON: CPT

## 2023-03-19 PROCEDURE — 25000003 PHARM REV CODE 250: Mod: HCNC | Performed by: INTERNAL MEDICINE

## 2023-03-19 PROCEDURE — 63600175 PHARM REV CODE 636 W HCPCS: Mod: HCNC | Performed by: INTERNAL MEDICINE

## 2023-03-19 PROCEDURE — 36415 COLL VENOUS BLD VENIPUNCTURE: CPT | Mod: HCNC | Performed by: NURSE PRACTITIONER

## 2023-03-19 PROCEDURE — 80048 BASIC METABOLIC PNL TOTAL CA: CPT | Mod: HCNC | Performed by: NURSE PRACTITIONER

## 2023-03-19 PROCEDURE — G0378 HOSPITAL OBSERVATION PER HR: HCPCS | Mod: HCNC

## 2023-03-19 PROCEDURE — 83735 ASSAY OF MAGNESIUM: CPT | Mod: HCNC | Performed by: NURSE PRACTITIONER

## 2023-03-19 PROCEDURE — 25000003 PHARM REV CODE 250: Mod: HCNC | Performed by: NURSE PRACTITIONER

## 2023-03-19 RX ORDER — HYDRALAZINE HYDROCHLORIDE 25 MG/1
25 TABLET, FILM COATED ORAL EVERY 8 HOURS
Status: DISCONTINUED | OUTPATIENT
Start: 2023-03-19 | End: 2023-03-21

## 2023-03-19 RX ORDER — DORZOLAMIDE HYDROCHLORIDE AND TIMOLOL MALEATE 20; 5 MG/ML; MG/ML
1 SOLUTION/ DROPS OPHTHALMIC 2 TIMES DAILY
Status: DISCONTINUED | OUTPATIENT
Start: 2023-03-19 | End: 2023-03-23 | Stop reason: HOSPADM

## 2023-03-19 RX ORDER — AMLODIPINE BESYLATE 5 MG/1
5 TABLET ORAL 2 TIMES DAILY
Status: DISCONTINUED | OUTPATIENT
Start: 2023-03-19 | End: 2023-03-22

## 2023-03-19 RX ORDER — CARVEDILOL 12.5 MG/1
12.5 TABLET ORAL 2 TIMES DAILY WITH MEALS
Status: DISCONTINUED | OUTPATIENT
Start: 2023-03-19 | End: 2023-03-19

## 2023-03-19 RX ORDER — CLONIDINE HYDROCHLORIDE 0.1 MG/1
0.1 TABLET ORAL 2 TIMES DAILY
Status: DISCONTINUED | OUTPATIENT
Start: 2023-03-19 | End: 2023-03-19

## 2023-03-19 RX ORDER — POTASSIUM CHLORIDE 20 MEQ/1
40 TABLET, EXTENDED RELEASE ORAL ONCE
Status: COMPLETED | OUTPATIENT
Start: 2023-03-19 | End: 2023-03-19

## 2023-03-19 RX ORDER — PANTOPRAZOLE SODIUM 40 MG/1
40 TABLET, DELAYED RELEASE ORAL DAILY
Status: DISCONTINUED | OUTPATIENT
Start: 2023-03-19 | End: 2023-03-23 | Stop reason: HOSPADM

## 2023-03-19 RX ORDER — CLONIDINE HYDROCHLORIDE 0.1 MG/1
0.1 TABLET ORAL 3 TIMES DAILY
Status: DISCONTINUED | OUTPATIENT
Start: 2023-03-19 | End: 2023-03-22

## 2023-03-19 RX ORDER — FUROSEMIDE 10 MG/ML
40 INJECTION INTRAMUSCULAR; INTRAVENOUS
Status: DISCONTINUED | OUTPATIENT
Start: 2023-03-19 | End: 2023-03-21

## 2023-03-19 RX ADMIN — DORZOLAMIDE HYDROCHLORIDE AND TIMOLOL MALEATE 1 DROP: 22.3; 6.8 SOLUTION/ DROPS OPHTHALMIC at 09:03

## 2023-03-19 RX ADMIN — HYDRALAZINE HYDROCHLORIDE 25 MG: 25 TABLET, FILM COATED ORAL at 01:03

## 2023-03-19 RX ADMIN — AMLODIPINE BESYLATE 5 MG: 5 TABLET ORAL at 09:03

## 2023-03-19 RX ADMIN — FUROSEMIDE 40 MG: 10 INJECTION, SOLUTION INTRAMUSCULAR; INTRAVENOUS at 09:03

## 2023-03-19 RX ADMIN — HYDRALAZINE HYDROCHLORIDE 25 MG: 25 TABLET, FILM COATED ORAL at 09:03

## 2023-03-19 RX ADMIN — CLONIDINE HYDROCHLORIDE 0.1 MG: 0.1 TABLET ORAL at 06:03

## 2023-03-19 RX ADMIN — PANCRELIPASE 1 CAPSULE: 24000; 76000; 120000 CAPSULE, DELAYED RELEASE PELLETS ORAL at 09:03

## 2023-03-19 RX ADMIN — CLONIDINE HYDROCHLORIDE 0.1 MG: 0.1 TABLET ORAL at 09:03

## 2023-03-19 RX ADMIN — POTASSIUM CHLORIDE 40 MEQ: 1500 TABLET, EXTENDED RELEASE ORAL at 09:03

## 2023-03-19 RX ADMIN — PANTOPRAZOLE SODIUM 40 MG: 40 TABLET, DELAYED RELEASE ORAL at 09:03

## 2023-03-19 RX ADMIN — PANCRELIPASE 1 CAPSULE: 24000; 76000; 120000 CAPSULE, DELAYED RELEASE PELLETS ORAL at 12:03

## 2023-03-19 RX ADMIN — HYDRALAZINE HYDROCHLORIDE 25 MG: 25 TABLET, FILM COATED ORAL at 05:03

## 2023-03-19 NOTE — ASSESSMENT & PLAN NOTE
Body mass index is 32.3 kg/m². Morbid obesity complicates all aspects of disease management from diagnostic modalities to treatment. Weight loss encouraged and health benefits explained to patient

## 2023-03-19 NOTE — ASSESSMENT & PLAN NOTE
Patient has hypokalemia which is currently currently being treated. Last electrolytes reviewed-   Recent Labs   Lab 03/18/23  1813 03/18/23 2009 03/19/23  0518   K 3.3* 2.8* 3.5   -Will replace potassium and monitor electrolytes closely

## 2023-03-19 NOTE — ASSESSMENT & PLAN NOTE
Patient is chronically on statin.will not continue for now. Last Lipid Panel:   Lab Results   Component Value Date    CHOL 160 05/04/2022    HDL 81 (H) 05/04/2022    LDLCALC 63.0 05/04/2022    TRIG 80 05/04/2022    CHOLHDL 50.6 (H) 05/04/2022     Plan:  -Hold statin 2/2 KEEGAN, Continue home medication when KEEGAN improves  -low fat/low calorie diet

## 2023-03-19 NOTE — ASSESSMENT & PLAN NOTE
Patient is chronically on statin.will not continue for now. Last Lipid Panel:   Lab Results   Component Value Date    CHOL 160 05/04/2022    HDL 81 (H) 05/04/2022    LDLCALC 63.0 05/04/2022    TRIG 80 05/04/2022    CHOLHDL 50.6 (H) 05/04/2022     -Hold statin 2/2 KEEGAN for now, restart when appropriate

## 2023-03-19 NOTE — ASSESSMENT & PLAN NOTE
Creatinine appears more elevated (2.1) than near baseline value of 1.7.  Plan:  -Avoid nephrotoxins  -Monitor renal function  -Renally dose medications  -IVFs prn

## 2023-03-19 NOTE — H&P
[facility] Medicine  History & Physical    Patient Name: Glo Dumont  MRN: [id number]  Patient Class: OP- Observation  Admission Date: 3/18/2023  Attending Physician: Homero Rendon MD   Primary Care Provider: Christina Recio MD         Patient information was obtained from patient, past medical records and ER records.     Subjective:     Principal Problem:Acute on chronic congestive heart failure    Chief Complaint:   Chief Complaint   Patient presents with    Shortness of Breath     Short of breath getting worse, hx of CHF, was seen in ER 2 days ago for same complaint.        HPI: Glo Dumont is a 87 y.o. female with a PMH  has a past medical history of Anemia, Angina pectoris, Anxiety, Anxiety and depression, Arthritis, Carotid artery occlusion, Carpal tunnel syndrome (06/23/2008), Chronic diarrhea, CKD (chronic kidney disease) stage 3, GFR 30-59 ml/min (5/11/2017), Colitis, Coronary artery disease, Coronary artery disease, Diastolic dysfunction, Diverticulosis, Glaucoma, Greater trochanteric bursitis (2/10/2015), Grief at loss of child (1/26/2016), H/O carotid endarterectomy (12/2/2013), Heart failure, History of coronary angioplasty (3/11/2014), Hypercholesteremia, Hypertension, Liver cyst (02/08/2013), Macular degeneration, Primary open-angle glaucoma(365.11) (9/3/2013), Renal cyst (02/08/2013), 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  Presented to ER for evaluation of worsening shortness of [place] over the last few days.  Patient recently visited the ED on 03/16/2023 for similar complaint and was given IV Lasix and discharged home with instructions to double up on Lasix for the next 3 days.  Patient reports that she took 40 mg versus 20 mg Lasix b.i.d. for the next 30 days without any significant improvement of her shortness of breath.  However, patient does report drinking more fluid than  she has been putting out.  Patient reports chest pressure which feels like an acute CHF exacerbation as she had in the past.  Reports symptoms worsen laying flat, with exertion and is improved with rest and sitting upright denies any use home oxygen or sleep machine at night.  Denies any other associated symptoms such as fever, aches, chills, sweats, nausea, vomiting, and compliant, dizziness, visual disturbances, palpitations, abdominal pain, or any other symptoms at this time.      ER workup revealed potassium of 3.3, BUN/creatinine of 40/2.1 with EGFR 23 baseline creatinine of 1.7.  CBG of 153 mg/dL, BNP of 349, troponin 0.030, UA positive for leukocyte esterase and wbc's.  Chest x-ray correlate for CHF.  EKG reveals sinus for the with first-degree AV block with ventricular rate of 46 beats per minute in the QT/QTC of 478/14.  Most recent echocardiogram revealed TTE showing EF 75% March 2022.  Is followed by Dr. Lui and was last evaluated by on 01/12/2023.  Meds given in ED were 60 mg Lasix as well as 325 mg aspirin.  Patient is in agreement with treatment plan to be admitted to hospital under observation status for diuresing of acute CHF exacerbation.    PCP: Christina Recio            Past Medical History:   Diagnosis Date    Anemia     Angina pectoris     Anxiety     Anxiety and depression     Arthritis     hip    Carotid artery occlusion     Carpal tunnel syndrome 06/23/2008    emg    Chronic diarrhea     work up in 2011 with EGD, CS and VCE    CKD (chronic kidney disease) stage 3, GFR 30-59 ml/min 5/11/2017    Colitis     Coronary artery disease     Coronary artery disease     Diastolic dysfunction     Diverticulosis     Glaucoma     Greater trochanteric bursitis 2/10/2015    Grief at loss of child 1/26/2016    H/O carotid endarterectomy 12/2/2013    Heart failure     History of coronary angioplasty 3/11/2014    Hypercholesteremia     Hypertension     Liver cyst 02/08/2013    ct  abd    Macular degeneration     Primary open-angle glaucoma(365.11) 9/3/2013    Renal cyst 02/08/2013    ct abd    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    Sarcoidosis     Sarcoidosis of lung     Sickle cell trait     Uveitis        Past Surgical History:   Procedure Laterality Date    CAROTID ENDARTERECTOMY Right 2000s    CATARACT EXTRACTION Bilateral     Dr. Liang Dennis    CHOLECYSTECTOMY      laparoscopic, 3/18.    CORONARY ANGIOPLASTY WITH STENT PLACEMENT  11/19/2010    RCA-HALIE 2010    Lathia    JOINT REPLACEMENT Left 11/03/2014    Dr. Braun    TOTAL ABDOMINAL HYSTERECTOMY W/ BILATERAL SALPINGOOPHORECTOMY  1972       Review of patient's allergies indicates:   Allergen Reactions    Lisinopril      angioedema    Codeine Nausea And Vomiting       No current facility-administered medications on file prior to encounter.     Current Outpatient Medications on File Prior to Encounter   Medication Sig    aflibercept 2 mg/0.05 mL Soln 2 mg by Intravitreal route every 28 days.    amLODIPine (NORVASC) 5 MG tablet Take 1 tablet (5 mg total) by mouth 2 (two) times daily.    aspirin (ECOTRIN) 81 MG EC tablet Take 81 mg by mouth once daily.    budesonide (ENTOCORT EC) 3 mg capsule Take 3 mg by mouth 2 (two) times a day.    carvediloL (COREG) 12.5 MG tablet Take 1 tablet (12.5 mg total) by mouth 2 (two) times daily with meals.    cloNIDine (CATAPRES) 0.1 MG tablet Take 1 tablet (0.1 mg total) by mouth 2 (two) times daily.    dorzolamide-timolol 2-0.5% (COSOPT) 22.3-6.8 mg/mL ophthalmic solution Place 1 drop into both eyes 2 (two) times daily.    FOLIC ACID/MULTIVIT-MIN/LUTEIN (CENTRUM SILVER ORAL) Take 1 tablet by mouth once daily.    furosemide (LASIX) 20 MG tablet Take 1 tablet (20 mg total) by mouth once daily. May also take 2 tablets (40 mg total) daily as needed (for leg swelling).    hydrALAZINE (APRESOLINE) 25 MG tablet Take 1 tablet (25 mg total) by mouth every 8 (eight) hours.     iron-vitamin C 100-250 mg, ICAR-C, (ICAR-C) 100-250 mg Tab Take 1 tablet by mouth once daily.    lipase-protease-amylase 24,000-76,000-120,000 units (CREON) 24,000-76,000 -120,000 unit capsule Take 1 capsule by mouth 3 (three) times daily with meals.    nitroGLYCERIN (NITROSTAT) 0.4 MG SL tablet Place 1 tablet (0.4 mg total) under the tongue every 5 (five) minutes as needed for Chest pain.    pravastatin (PRAVACHOL) 40 MG tablet Take 1 tablet (40 mg total) by mouth once daily.    dexAMETHasone (OZURDEX) 0.7 mg Impl intravitreal implant 0.7 mg by Intravitreal route once.    gabapentin (NEURONTIN) 300 MG capsule Take 1 capsule (300 mg total) by mouth once daily.    pantoprazole (PROTONIX) 40 MG tablet Take 1 tablet (40 mg total) by mouth once daily.    [DISCONTINUED] citalopram (CELEXA) 20 MG tablet Take 1 tablet (20 mg total) by mouth once daily. (Patient not taking: Reported on 3/18/2022)     Family History       Problem Relation (Age of Onset)    Cancer Father, Daughter    Cirrhosis Brother    Heart failure Mother, Brother    Hypertension Mother          Tobacco Use    Smoking status: Never    Smokeless tobacco: Never   Substance and Sexual Activity    Alcohol use: No     Alcohol/week: 0.0 standard drinks    Drug use: No    Sexual activity: Yes     Partners: Male     Review of Systems   Respiratory:  Positive for chest tightness and shortness of breath.    Cardiovascular:  Negative for chest pain, palpitations and leg swelling.   All other systems reviewed and are negative.  Objective:     Vital Signs (Most Recent):  Temp: 98.5 °F (36.9 °C) (03/18/23 2101)  Pulse: (!) 46 (03/18/23 2101)  Resp: 18 (03/18/23 2101)  BP: (!) 162/73 (03/18/23 2101)  SpO2: 95 % (03/18/23 2101)   Vital Signs (24h Range):  Temp:  [98 °F (36.7 °C)-98.5 °F (36.9 °C)] 98.5 °F (36.9 °C)  Pulse:  [42-46] 46  Resp:  [18-20] 18  SpO2:  [95 %-98 %] 95 %  BP: (154-178)/(70-76) 162/73     Weight: 70.1 kg (154 lb 8.7 oz)  Body mass  index is 32.3 kg/m².    Physical Exam  Vitals and nursing note reviewed.   Constitutional:       General: She is awake. She is not in acute distress.     Appearance: Normal appearance. She is well-developed and well-groomed. She is not ill-appearing, toxic-appearing or diaphoretic.   HENT:      Head: Normocephalic and atraumatic.      Mouth/Throat:      Lips: Pink.      Mouth: Mucous membranes are moist.      Pharynx: Oropharynx is clear. Uvula midline.   Eyes:      Extraocular Movements: Extraocular movements intact.      Conjunctiva/sclera: Conjunctivae normal.      Pupils: Pupils are equal, round, and reactive to light.   Cardiovascular:      Rate and Rhythm: Normal rate and regular rhythm.      Heart sounds: Normal heart sounds. No murmur heard.  Pulmonary:      Effort: Pulmonary effort is normal.      Breath sounds: Normal breath sounds. No wheezing, rhonchi or rales.      Comments: 98% on RA  Abdominal:      General: Bowel sounds are normal.      Palpations: Abdomen is soft.      Tenderness: There is no abdominal tenderness.   Musculoskeletal:      Cervical back: Normal range of motion and neck supple.      Comments: 5/5 strength throughout   Skin:     General: Skin is warm and dry.      Capillary Refill: Capillary refill takes less than 2 seconds.   Neurological:      General: No focal deficit present.      Mental Status: She is alert and oriented to person, place, and time. Mental status is at baseline.      GCS: GCS eye subscore is 4. GCS verbal subscore is 5. GCS motor subscore is 6.      Cranial Nerves: Cranial nerves 2-12 are intact.      Sensory: Sensation is intact.      Motor: Motor function is intact.   Psychiatric:         Mood and Affect: Mood normal.         Speech: Speech normal.         Behavior: Behavior normal. Behavior is cooperative.         CRANIAL NERVES     CN III, IV, VI   Pupils are equal, round, and reactive to light.     LABS:  Recent Results (from the past 24 hour(s))   Brain  natriuretic peptide    Collection Time: 03/18/23  6:13 PM   Result Value Ref Range     (H) 0 - 99 pg/mL   CBC auto differential    Collection Time: 03/18/23  6:13 PM   Result Value Ref Range    WBC 8.93 3.90 - 12.70 K/uL    RBC 3.67 (L) 4.00 - 5.40 M/uL    Hemoglobin 10.1 (L) 12.0 - 16.0 g/dL    Hematocrit 30.1 (L) 37.0 - 48.5 %    MCV 82 82 - 98 fL    MCH 27.5 27.0 - 31.0 pg    MCHC 33.6 32.0 - 36.0 g/dL    RDW 15.9 (H) 11.5 - 14.5 %    Platelets 294 150 - 450 K/uL    MPV 10.0 9.2 - 12.9 fL    Immature Granulocytes 0.8 (H) 0.0 - 0.5 %    Gran # (ANC) 7.2 1.8 - 7.7 K/uL    Immature Grans (Abs) 0.07 (H) 0.00 - 0.04 K/uL    Lymph # 0.7 (L) 1.0 - 4.8 K/uL    Mono # 0.7 0.3 - 1.0 K/uL    Eos # 0.2 0.0 - 0.5 K/uL    Baso # 0.05 0.00 - 0.20 K/uL    nRBC 0 0 /100 WBC    Gran % 80.9 (H) 38.0 - 73.0 %    Lymph % 8.3 (L) 18.0 - 48.0 %    Mono % 7.4 4.0 - 15.0 %    Eosinophil % 2.0 0.0 - 8.0 %    Basophil % 0.6 0.0 - 1.9 %    Differential Method Automated    Comprehensive metabolic panel    Collection Time: 03/18/23  6:13 PM   Result Value Ref Range    Sodium 143 136 - 145 mmol/L    Potassium 3.3 (L) 3.5 - 5.1 mmol/L    Chloride 108 95 - 110 mmol/L    CO2 21 (L) 23 - 29 mmol/L    Glucose 153 (H) 70 - 110 mg/dL    BUN 40 (H) 8 - 23 mg/dL    Creatinine 2.1 (H) 0.5 - 1.4 mg/dL    Calcium 9.3 8.7 - 10.5 mg/dL    Total Protein 7.8 6.0 - 8.4 g/dL    Albumin 3.8 3.5 - 5.2 g/dL    Total Bilirubin 0.3 0.1 - 1.0 mg/dL    Alkaline Phosphatase 65 55 - 135 U/L    AST 15 10 - 40 U/L    ALT 15 10 - 44 U/L    Anion Gap 14 8 - 16 mmol/L    eGFR 23 (A) >60 mL/min/1.73 m^2   Troponin I    Collection Time: 03/18/23  6:13 PM   Result Value Ref Range    Troponin I 0.030 (H) 0.000 - 0.026 ng/mL   Comprehensive metabolic panel - if not done in ED    Collection Time: 03/18/23  8:09 PM   Result Value Ref Range    Sodium 144 136 - 145 mmol/L    Potassium 2.8 (L) 3.5 - 5.1 mmol/L    Chloride 116 (H) 95 - 110 mmol/L    CO2 16 (L) 23 - 29 mmol/L     Glucose 127 (H) 70 - 110 mg/dL    BUN 35 (H) 8 - 23 mg/dL    Creatinine 1.6 (H) 0.5 - 1.4 mg/dL    Calcium 7.6 (L) 8.7 - 10.5 mg/dL    Total Protein 5.7 (L) 6.0 - 8.4 g/dL    Albumin 2.9 (L) 3.5 - 5.2 g/dL    Total Bilirubin 0.2 0.1 - 1.0 mg/dL    Alkaline Phosphatase 51 (L) 55 - 135 U/L    AST 8 (L) 10 - 40 U/L    ALT 11 10 - 44 U/L    Anion Gap 12 8 - 16 mmol/L    eGFR 31 (A) >60 mL/min/1.73 m^2   Magnesium - if not done in ED    Collection Time: 03/18/23  8:09 PM   Result Value Ref Range    Magnesium 1.5 (L) 1.6 - 2.6 mg/dL   Phosphorus - if not done in ED    Collection Time: 03/18/23  8:09 PM   Result Value Ref Range    Phosphorus 4.0 2.7 - 4.5 mg/dL       RADIOLOGY  X-Ray Chest 1 View    Result Date: 3/16/2023  EXAMINATION: XR CHEST 1 VIEW CLINICAL HISTORY: Dyspnea, unspecified TECHNIQUE: Single frontal view of the chest was performed. COMPARISON: None FINDINGS: Mild perihilar central pulmonary vascular crowding.  No consolidation, pleural effusion or pneumothorax. Cardiomegaly. Bones are intact.     Cardiomegaly with central pulmonary vascular crowding.  Recommend correlation to low-grade CHF Electronically signed by: Low Venegas Date:    03/16/2023 Time:    17:29    X-Ray Chest AP Portable    Result Date: 3/18/2023  EXAMINATION: XR CHEST AP PORTABLE CLINICAL HISTORY: Shortness of breath FINDINGS: Comparison is made to March 16, 2023. Cardiomegaly.  Pulmonary vascular congestion.  No consolidation.  No significant pleural effusion.  No pneumothorax.     CHF. Electronically signed by: Paolo Gordillo Date:    03/18/2023 Time:    18:23    Posterior Segment OCT Retina-Both eyes    Result Date: 3/8/2023  Right Eye Quality was good. Scan locations included subfoveal, juxtafoveal, extrafoveal, nasal, temporal, superior, inferior. Progression has been stable. Findings include normal foveal contour, cystoid macular edema. Left Eye Quality was good. Scan locations included subfoveal, juxtafoveal, extrafoveal, nasal,  temporal, superior, inferior. Progression has been stable. Findings include normal foveal contour, cystoid macular edema. Notes See progress note.       EKG    MICROBIOLOGY    MDM     Amount and/or Complexity of Data Reviewed  Clinical lab tests: reviewed  Tests in the radiology section of CPT®: reviewed  Tests in the medicine section of CPT®: reviewed  Discussion of test results with the performing providers: yes  Decide to obtain previous medical records or to obtain history from someone other than the patient: yes  Review and summarize past medical records: yes  Discuss the patient with other providers: yes  Independent visualization of images, tracings, or specimens: yes          Assessment/Plan:     * Acute on chronic congestive heart failure  Patient is identified as having unspecified heart failure that is Acute on chronic. CHF is currently uncontrolled due to Continued edema of extremities and Dyspnea not returned to baseline after 60mg doses of IV diuretic. Latest ECHO performed and demonstrates- Results for orders placed during the hospital encounter of 03/02/22    Echo    Interpretation Summary  · The left ventricle is normal in size with moderate concentric hypertrophy and hyperdynamic systolic function.  · Mild left atrial enlargement.  · The estimated ejection fraction is 75%.  · Normal right ventricular size with low normal right ventricular systolic function.  · There is mild aortic valve stenosis.  · Aortic valve area is 1.97 cm2; peak velocity is 1.67 m/s; mean gradient is 6 mmHg.  · Mild mitral regurgitation.  · There is mild mitral stenosis.  · Mild tricuspid regurgitation.  · Normal central venous pressure (3 mmHg).  · The estimated PA systolic pressure is 35 mmHg.  . Continue Beta Blocker and Furosemide and monitor clinical status closely. Monitor on telemetry. Patient is on CHF pathway.  Monitor strict Is&Os and daily weights.  Place on fluid restriction of 1.5 L. Continue to stress to patient  importance of self efficacy and  on diet for CHF. Last BNP reviewed- and noted below   Recent Labs   Lab 03/18/23 1813   *   .      Hypokalemia  Patient has hypokalemia which is currently currently being treated. Last electrolytes reviewed- Recent Labs   Lab 03/18/23 1813 03/18/23 2009   K 3.3* 2.8*   . Will replace potassium and monitor electrolytes closely.         CKD (chronic kidney disease) stage 4, GFR 15-29 ml/min  Creatinine appears more elevated (2.1) than near baseline value of 1.7.  Plan:  -Avoid nephrotoxins  -Monitor renal function  -Renally dose medications  -IVFs prn        Other hyperlipidemia  Patient is chronically on statin.will not continue for now. Last Lipid Panel:   Lab Results   Component Value Date    CHOL 160 05/04/2022    HDL 81 (H) 05/04/2022    LDLCALC 63.0 05/04/2022    TRIG 80 05/04/2022    CHOLHDL 50.6 (H) 05/04/2022     Plan:  -Hold statin 2/2 KEEGAN, Continue home medication when KEEGAN improves  -low fat/low calorie diet        Essential hypertension  Currently normotensive. BP usually well controlled per patient with home medications.  Plan:  -Optimize pain control   -Continue home medications (norvasc, catapres, coreg, hydralazine), titrate as needed   -hold lasix 2/2 KEEGAN  -Monitor BP  -Low salt/cardiac diet when not NPO  -IV hydralazine prn for SBP>160 or DBP>90           Bradycardia  Chronic.  Stable.  Compliant with home medication Coreg.  EKG revealed sinus Tre with first-degree AV block.  Ventricular rate of 46 beats per minute with QT/QTC of 478/418.  Plan:   -continue home medication (coreg), titrate as needed      Central retinal vein occlusion with macular edema of both eyes  Chronic.  Stable.  Compliant with home medication.  Plan:  -continue home medication (cosopt)      Obesity with serious comorbidity  Body mass index is 32.3 kg/m². Morbid obesity complicates all aspects of disease management from diagnostic modalities to treatment. Weight loss  encouraged and health benefits explained to patient.           VTE Risk Mitigation (From admission, onward)         Ordered     IP VTE HIGH RISK PATIENT  Once         03/18/23 1953     Place sequential compression device  Until discontinued         03/18/23 1953              //Core Measures   -DVT proph: SCDs,   -Code status full  -Surrogate: none    Dmitri Rendon  [facility]     Components of this note were documented using a voice recognition system and are subject to errors not corrected at the time the document was proof read. Please contact the author for any clarifications.       Dmitri Rendon NP  Department of Hospital Medicine  [facility] - Telemetry ([facility])

## 2023-03-19 NOTE — ASSESSMENT & PLAN NOTE
Patient is identified as having unspecified heart failure that is Acute on chronic. CHF is currently uncontrolled due to Continued edema of extremities and Dyspnea not returned to baseline after 60mg doses of IV diuretic. Latest ECHO performed and demonstrates- Results for orders placed during the hospital encounter of 03/02/22    Echo    Interpretation Summary  · The left ventricle is normal in size with moderate concentric hypertrophy and hyperdynamic systolic function.  · Mild left atrial enlargement.  · The estimated ejection fraction is 75%.  · Normal right ventricular size with low normal right ventricular systolic function.  · There is mild aortic valve stenosis.  · Aortic valve area is 1.97 cm2; peak velocity is 1.67 m/s; mean gradient is 6 mmHg.  · Mild mitral regurgitation.  · There is mild mitral stenosis.  · Mild tricuspid regurgitation.  · Normal central venous pressure (3 mmHg).  · The estimated PA systolic pressure is 35 mmHg.  . Continue Beta Blocker and Furosemide and monitor clinical status closely. Monitor on telemetry. Patient is on CHF pathway.  Monitor strict Is&Os and daily weights.  Place on fluid restriction of 1.5 L. Continue to stress to patient importance of self efficacy and  on diet for CHF. Last BNP reviewed- and noted below   Recent Labs   Lab 03/18/23  1813   *

## 2023-03-19 NOTE — SUBJECTIVE & OBJECTIVE
Past Medical History:   Diagnosis Date    Anemia     Angina pectoris     Anxiety     Anxiety and depression     Arthritis     hip    Carotid artery occlusion     Carpal tunnel syndrome 06/23/2008    emg    Chronic diarrhea     work up in 2011 with EGD, CS and VCE    CKD (chronic kidney disease) stage 3, GFR 30-59 ml/min 5/11/2017    Colitis     Coronary artery disease     Coronary artery disease     Diastolic dysfunction     Diverticulosis     Glaucoma     Greater trochanteric bursitis 2/10/2015    Grief at loss of child 1/26/2016    H/O carotid endarterectomy 12/2/2013    Heart failure     History of coronary angioplasty 3/11/2014    Hypercholesteremia     Hypertension     Liver cyst 02/08/2013    ct abd    Macular degeneration     Primary open-angle glaucoma(365.11) 9/3/2013    Renal cyst 02/08/2013    ct abd    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    Sarcoidosis     Sarcoidosis of lung     Sickle cell trait     Uveitis        Past Surgical History:   Procedure Laterality Date    CAROTID ENDARTERECTOMY Right 2000s    CATARACT EXTRACTION Bilateral     Dr. Liang Dennis    CHOLECYSTECTOMY      laparoscopic, 3/18.    CORONARY ANGIOPLASTY WITH STENT PLACEMENT  11/19/2010    RCA-HALIE 2010    Lathia    JOINT REPLACEMENT Left 11/03/2014    Dr. Braun    TOTAL ABDOMINAL HYSTERECTOMY W/ BILATERAL SALPINGOOPHORECTOMY  1972       Review of patient's allergies indicates:   Allergen Reactions    Lisinopril      angioedema    Codeine Nausea And Vomiting       No current facility-administered medications on file prior to encounter.     Current Outpatient Medications on File Prior to Encounter   Medication Sig    aflibercept 2 mg/0.05 mL Soln 2 mg by Intravitreal route every 28 days.    amLODIPine (NORVASC) 5 MG tablet Take 1 tablet (5 mg total) by mouth 2 (two) times daily.    aspirin (ECOTRIN) 81 MG EC tablet Take 81 mg by mouth once daily.    budesonide (ENTOCORT EC) 3 mg capsule Take 3 mg by mouth 2 (two) times  a day.    carvediloL (COREG) 12.5 MG tablet Take 1 tablet (12.5 mg total) by mouth 2 (two) times daily with meals.    cloNIDine (CATAPRES) 0.1 MG tablet Take 1 tablet (0.1 mg total) by mouth 2 (two) times daily.    dorzolamide-timolol 2-0.5% (COSOPT) 22.3-6.8 mg/mL ophthalmic solution Place 1 drop into both eyes 2 (two) times daily.    FOLIC ACID/MULTIVIT-MIN/LUTEIN (CENTRUM SILVER ORAL) Take 1 tablet by mouth once daily.    furosemide (LASIX) 20 MG tablet Take 1 tablet (20 mg total) by mouth once daily. May also take 2 tablets (40 mg total) daily as needed (for leg swelling).    hydrALAZINE (APRESOLINE) 25 MG tablet Take 1 tablet (25 mg total) by mouth every 8 (eight) hours.    iron-vitamin C 100-250 mg, ICAR-C, (ICAR-C) 100-250 mg Tab Take 1 tablet by mouth once daily.    lipase-protease-amylase 24,000-76,000-120,000 units (CREON) 24,000-76,000 -120,000 unit capsule Take 1 capsule by mouth 3 (three) times daily with meals.    nitroGLYCERIN (NITROSTAT) 0.4 MG SL tablet Place 1 tablet (0.4 mg total) under the tongue every 5 (five) minutes as needed for Chest pain.    pravastatin (PRAVACHOL) 40 MG tablet Take 1 tablet (40 mg total) by mouth once daily.    dexAMETHasone (OZURDEX) 0.7 mg Impl intravitreal implant 0.7 mg by Intravitreal route once.    gabapentin (NEURONTIN) 300 MG capsule Take 1 capsule (300 mg total) by mouth once daily.    pantoprazole (PROTONIX) 40 MG tablet Take 1 tablet (40 mg total) by mouth once daily.    [DISCONTINUED] citalopram (CELEXA) 20 MG tablet Take 1 tablet (20 mg total) by mouth once daily. (Patient not taking: Reported on 3/18/2022)     Family History       Problem Relation (Age of Onset)    Cancer Father, Daughter    Cirrhosis Brother    Heart failure Mother, Brother    Hypertension Mother          Tobacco Use    Smoking status: Never    Smokeless tobacco: Never   Substance and Sexual Activity    Alcohol use: No     Alcohol/week: 0.0 standard drinks    Drug use: No    Sexual activity:  Yes     Partners: Male     Review of Systems   Respiratory:  Positive for chest tightness and shortness of breath.    Cardiovascular:  Negative for chest pain, palpitations and leg swelling.   All other systems reviewed and are negative.  Objective:     Vital Signs (Most Recent):  Temp: 98.5 °F (36.9 °C) (03/18/23 2101)  Pulse: (!) 46 (03/18/23 2101)  Resp: 18 (03/18/23 2101)  BP: (!) 162/73 (03/18/23 2101)  SpO2: 95 % (03/18/23 2101)   Vital Signs (24h Range):  Temp:  [98 °F (36.7 °C)-98.5 °F (36.9 °C)] 98.5 °F (36.9 °C)  Pulse:  [42-46] 46  Resp:  [18-20] 18  SpO2:  [95 %-98 %] 95 %  BP: (154-178)/(70-76) 162/73     Weight: 70.1 kg (154 lb 8.7 oz)  Body mass index is 32.3 kg/m².    Physical Exam  Vitals and nursing note reviewed.   Constitutional:       General: She is awake. She is not in acute distress.     Appearance: Normal appearance. She is well-developed and well-groomed. She is not ill-appearing, toxic-appearing or diaphoretic.   HENT:      Head: Normocephalic and atraumatic.      Mouth/Throat:      Lips: Pink.      Mouth: Mucous membranes are moist.      Pharynx: Oropharynx is clear. Uvula midline.   Eyes:      Extraocular Movements: Extraocular movements intact.      Conjunctiva/sclera: Conjunctivae normal.      Pupils: Pupils are equal, round, and reactive to light.   Cardiovascular:      Rate and Rhythm: Normal rate and regular rhythm.      Heart sounds: Normal heart sounds. No murmur heard.  Pulmonary:      Effort: Pulmonary effort is normal.      Breath sounds: Normal breath sounds. No wheezing, rhonchi or rales.      Comments: 98% on RA  Abdominal:      General: Bowel sounds are normal.      Palpations: Abdomen is soft.      Tenderness: There is no abdominal tenderness.   Musculoskeletal:      Cervical back: Normal range of motion and neck supple.      Comments: 5/5 strength throughout   Skin:     General: Skin is warm and dry.      Capillary Refill: Capillary refill takes less than 2 seconds.    Neurological:      General: No focal deficit present.      Mental Status: She is alert and oriented to person, place, and time. Mental status is at baseline.      GCS: GCS eye subscore is 4. GCS verbal subscore is 5. GCS motor subscore is 6.      Cranial Nerves: Cranial nerves 2-12 are intact.      Sensory: Sensation is intact.      Motor: Motor function is intact.   Psychiatric:         Mood and Affect: Mood normal.         Speech: Speech normal.         Behavior: Behavior normal. Behavior is cooperative.         CRANIAL NERVES     CN III, IV, VI   Pupils are equal, round, and reactive to light.     LABS:  Recent Results (from the past 24 hour(s))   Brain natriuretic peptide    Collection Time: 03/18/23  6:13 PM   Result Value Ref Range     (H) 0 - 99 pg/mL   CBC auto differential    Collection Time: 03/18/23  6:13 PM   Result Value Ref Range    WBC 8.93 3.90 - 12.70 K/uL    RBC 3.67 (L) 4.00 - 5.40 M/uL    Hemoglobin 10.1 (L) 12.0 - 16.0 g/dL    Hematocrit 30.1 (L) 37.0 - 48.5 %    MCV 82 82 - 98 fL    MCH 27.5 27.0 - 31.0 pg    MCHC 33.6 32.0 - 36.0 g/dL    RDW 15.9 (H) 11.5 - 14.5 %    Platelets 294 150 - 450 K/uL    MPV 10.0 9.2 - 12.9 fL    Immature Granulocytes 0.8 (H) 0.0 - 0.5 %    Gran # (ANC) 7.2 1.8 - 7.7 K/uL    Immature Grans (Abs) 0.07 (H) 0.00 - 0.04 K/uL    Lymph # 0.7 (L) 1.0 - 4.8 K/uL    Mono # 0.7 0.3 - 1.0 K/uL    Eos # 0.2 0.0 - 0.5 K/uL    Baso # 0.05 0.00 - 0.20 K/uL    nRBC 0 0 /100 WBC    Gran % 80.9 (H) 38.0 - 73.0 %    Lymph % 8.3 (L) 18.0 - 48.0 %    Mono % 7.4 4.0 - 15.0 %    Eosinophil % 2.0 0.0 - 8.0 %    Basophil % 0.6 0.0 - 1.9 %    Differential Method Automated    Comprehensive metabolic panel    Collection Time: 03/18/23  6:13 PM   Result Value Ref Range    Sodium 143 136 - 145 mmol/L    Potassium 3.3 (L) 3.5 - 5.1 mmol/L    Chloride 108 95 - 110 mmol/L    CO2 21 (L) 23 - 29 mmol/L    Glucose 153 (H) 70 - 110 mg/dL    BUN 40 (H) 8 - 23 mg/dL    Creatinine 2.1 (H) 0.5 -  1.4 mg/dL    Calcium 9.3 8.7 - 10.5 mg/dL    Total Protein 7.8 6.0 - 8.4 g/dL    Albumin 3.8 3.5 - 5.2 g/dL    Total Bilirubin 0.3 0.1 - 1.0 mg/dL    Alkaline Phosphatase 65 55 - 135 U/L    AST 15 10 - 40 U/L    ALT 15 10 - 44 U/L    Anion Gap 14 8 - 16 mmol/L    eGFR 23 (A) >60 mL/min/1.73 m^2   Troponin I    Collection Time: 03/18/23  6:13 PM   Result Value Ref Range    Troponin I 0.030 (H) 0.000 - 0.026 ng/mL   Comprehensive metabolic panel - if not done in ED    Collection Time: 03/18/23  8:09 PM   Result Value Ref Range    Sodium 144 136 - 145 mmol/L    Potassium 2.8 (L) 3.5 - 5.1 mmol/L    Chloride 116 (H) 95 - 110 mmol/L    CO2 16 (L) 23 - 29 mmol/L    Glucose 127 (H) 70 - 110 mg/dL    BUN 35 (H) 8 - 23 mg/dL    Creatinine 1.6 (H) 0.5 - 1.4 mg/dL    Calcium 7.6 (L) 8.7 - 10.5 mg/dL    Total Protein 5.7 (L) 6.0 - 8.4 g/dL    Albumin 2.9 (L) 3.5 - 5.2 g/dL    Total Bilirubin 0.2 0.1 - 1.0 mg/dL    Alkaline Phosphatase 51 (L) 55 - 135 U/L    AST 8 (L) 10 - 40 U/L    ALT 11 10 - 44 U/L    Anion Gap 12 8 - 16 mmol/L    eGFR 31 (A) >60 mL/min/1.73 m^2   Magnesium - if not done in ED    Collection Time: 03/18/23  8:09 PM   Result Value Ref Range    Magnesium 1.5 (L) 1.6 - 2.6 mg/dL   Phosphorus - if not done in ED    Collection Time: 03/18/23  8:09 PM   Result Value Ref Range    Phosphorus 4.0 2.7 - 4.5 mg/dL       RADIOLOGY  X-Ray Chest 1 View    Result Date: 3/16/2023  EXAMINATION: XR CHEST 1 VIEW CLINICAL HISTORY: Dyspnea, unspecified TECHNIQUE: Single frontal view of the chest was performed. COMPARISON: None FINDINGS: Mild perihilar central pulmonary vascular crowding.  No consolidation, pleural effusion or pneumothorax. Cardiomegaly. Bones are intact.     Cardiomegaly with central pulmonary vascular crowding.  Recommend correlation to low-grade CHF Electronically signed by: Low Venegas Date:    03/16/2023 Time:    17:29    X-Ray Chest AP Portable    Result Date: 3/18/2023  EXAMINATION: XR CHEST AP PORTABLE  CLINICAL HISTORY: Shortness of breath FINDINGS: Comparison is made to March 16, 2023. Cardiomegaly.  Pulmonary vascular congestion.  No consolidation.  No significant pleural effusion.  No pneumothorax.     CHF. Electronically signed by: Paolo Gordillo Date:    03/18/2023 Time:    18:23    Posterior Segment OCT Retina-Both eyes    Result Date: 3/8/2023  Right Eye Quality was good. Scan locations included subfoveal, juxtafoveal, extrafoveal, nasal, temporal, superior, inferior. Progression has been stable. Findings include normal foveal contour, cystoid macular edema. Left Eye Quality was good. Scan locations included subfoveal, juxtafoveal, extrafoveal, nasal, temporal, superior, inferior. Progression has been stable. Findings include normal foveal contour, cystoid macular edema. Notes See progress note.       EKG    MICROBIOLOGY    MDM     Amount and/or Complexity of Data Reviewed  Clinical lab tests: reviewed  Tests in the radiology section of CPT®: reviewed  Tests in the medicine section of CPT®: reviewed  Discussion of test results with the performing providers: yes  Decide to obtain previous medical records or to obtain history from someone other than the patient: yes  Review and summarize past medical records: yes  Discuss the patient with other providers: yes  Independent visualization of images, tracings, or specimens: yes

## 2023-03-19 NOTE — PROGRESS NOTES
[facility]  Progress Note    Patient Name: Glo Dumont  MRN: [id number]  Patient Class: OP- Observation   Admission Date: 3/18/2023  Length of Stay: 0 days  Attending Physician: Jailyn Duran MD  Primary Care Provider: Christina Recio MD        Subjective:     Principal Problem:Acute on chronic congestive heart failure        HPI:  Glo Dumont is a 87 y.o. female with a PMH  has a past medical history of Anemia, Angina pectoris, Anxiety, Anxiety and depression, Arthritis, Carotid artery occlusion, Carpal tunnel syndrome (06/23/2008), Chronic diarrhea, CKD (chronic kidney disease) stage 3, GFR 30-59 ml/min (5/11/2017), Colitis, Coronary artery disease, Coronary artery disease, Diastolic dysfunction, Diverticulosis, Glaucoma, Greater trochanteric bursitis (2/10/2015), Grief at loss of child (1/26/2016), H/O carotid endarterectomy (12/2/2013), Heart failure, History of coronary angioplasty (3/11/2014), Hypercholesteremia, Hypertension, Liver cyst (02/08/2013), Macular degeneration, Primary open-angle glaucoma(365.11) (9/3/2013), Renal cyst (02/08/2013), 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  Presented to ER for evaluation of worsening shortness of [place] over the last few days.  Patient recently visited the ED on 03/16/2023 for similar complaint and was given IV Lasix and discharged home with instructions to double up on Lasix for the next 3 days.  Patient reports that she took 40 mg versus 20 mg Lasix b.i.d. for the next 30 days without any significant improvement of her shortness of breath.  However, patient does report drinking more fluid than she has been putting out.  Patient reports chest pressure which feels like an acute CHF exacerbation as she had in the past.  Reports symptoms worsen laying flat, with exertion and is improved with rest and sitting upright denies any use home oxygen  or sleep machine at night.  Denies any other associated symptoms such as fever, aches, chills, sweats, nausea, vomiting, and compliant, dizziness, visual disturbances, palpitations, abdominal pain, or any other symptoms at this time.      ER workup revealed potassium of 3.3, BUN/creatinine of 40/2.1 with EGFR 23 baseline creatinine of 1.7.  CBG of 153 mg/dL, BNP of 349, troponin 0.030, UA positive for leukocyte esterase and wbc's.  Chest x-ray correlate for CHF.  EKG reveals sinus for the with first-degree AV block with ventricular rate of 46 beats per minute in the QT/QTC of 478/14.  Most recent echocardiogram revealed TTE showing EF 75% March 2022.  Is followed by Dr. Lui and was last evaluated by on 01/12/2023.  Meds given in ED were 60 mg Lasix as well as 325 mg aspirin.  Patient is in agreement with treatment plan to be admitted to hospital under observation status for diuresing of acute CHF exacerbation.    PCP: Christina Recio            Overview/Hospital Course:  88 y/o female admitted with SOB and CHF exacerbation. She was just seen in ER on Thursday and told to double her dose of Lasix dose x 3 days, but by Saturday, she had no improvement and came back to ER. , Cr 1.8. She was started on Lasix IV 40 mg BID with some improvement. She sees Dr. Lui in clinic. She denies home oxygen therapy and has been holding Sats > 95% on RA.       Interval History: awake, alert, sitting up in bed, NAD. She reports SOB improved from admission, BLE edema improved. On RA, stable sats. Likely home in am if remains stable.     Review of Systems   Constitutional:  Negative for fatigue and fever.   Respiratory:  Positive for shortness of breath. Negative for cough.    Cardiovascular:  Positive for leg swelling. Negative for chest pain and palpitations.   Gastrointestinal:  Negative for nausea and vomiting.   Neurological:  Negative for weakness.   All other systems reviewed and are negative.  Objective:      Vital Signs (Most Recent):  Temp: 98.3 °F (36.8 °C) (03/19/23 0736)  Pulse: 75 (03/19/23 0900)  Resp: 18 (03/19/23 0736)  BP: (!) 173/81 (03/19/23 0736)  SpO2: 96 % (03/19/23 0736)   Vital Signs (24h Range):  Temp:  [98 °F (36.7 °C)-98.5 °F (36.9 °C)] 98.3 °F (36.8 °C)  Pulse:  [42-76] 75  Resp:  [17-20] 18  SpO2:  [95 %-98 %] 96 %  BP: (154-179)/(70-85) 173/81     Weight: 70.1 kg (154 lb 8.7 oz)  Body mass index is 32.3 kg/m².    Intake/Output Summary (Last 24 hours) at 3/19/2023 1023  Last data filed at 3/19/2023 0620  Gross per 24 hour   Intake --   Output 1000 ml   Net -1000 ml      Physical Exam  Vitals and nursing note reviewed.   Constitutional:       Appearance: She is obese.   Eyes:      Comments: Chronic Periorbital puffiness   Cardiovascular:      Rate and Rhythm: Normal rate and regular rhythm.      Heart sounds: No murmur heard.  Pulmonary:      Effort: Accessory muscle usage present.      Breath sounds: Normal breath sounds.   Abdominal:      General: Bowel sounds are normal.      Palpations: Abdomen is soft.   Musculoskeletal:         General: Normal range of motion.      Right lower leg: Edema present.      Left lower leg: Edema present.   Skin:     General: Skin is warm and dry.   Neurological:      General: No focal deficit present.      Mental Status: She is alert and oriented to person, place, and time.   Psychiatric:         Mood and Affect: Mood normal.         Behavior: Behavior normal.       Significant Labs: All pertinent labs within the past 24 hours have been reviewed.  CBC:   Recent Labs   Lab 03/18/23 1813   WBC 8.93   HGB 10.1*   HCT 30.1*        CMP:   Recent Labs   Lab 03/18/23 1813 03/18/23 2009 03/19/23  0518    144 142   K 3.3* 2.8* 3.5    116* 109   CO2 21* 16* 20*   * 127* 101   BUN 40* 35* 38*   CREATININE 2.1* 1.6* 1.8*   CALCIUM 9.3 7.6* 9.7   PROT 7.8 5.7*  --    ALBUMIN 3.8 2.9*  --    BILITOT 0.3 0.2  --    ALKPHOS 65 51*  --    AST 15 8*   --    ALT 15 11  --    ANIONGAP 14 12 13       Significant Imaging: I have reviewed all pertinent imaging results/findings within the past 24 hours.      Assessment/Plan:      * Acute on chronic congestive heart failure  Patient is identified as having unspecified heart failure that is Acute on chronic. CHF is currently uncontrolled due to Continued edema of extremities and Dyspnea not returned to baseline after 60mg doses of IV diuretic. Latest ECHO performed and demonstrates- Results for orders placed during the hospital encounter of 03/02/22    Echo    Interpretation Summary  · The left ventricle is normal in size with moderate concentric hypertrophy and hyperdynamic systolic function.  · Mild left atrial enlargement.  · The estimated ejection fraction is 75%.  · Normal right ventricular size with low normal right ventricular systolic function.  · There is mild aortic valve stenosis.  · Aortic valve area is 1.97 cm2; peak velocity is 1.67 m/s; mean gradient is 6 mmHg.  · Mild mitral regurgitation.  · There is mild mitral stenosis.  · Mild tricuspid regurgitation.  · Normal central venous pressure (3 mmHg).  · The estimated PA systolic pressure is 35 mmHg.  . Continue Beta Blocker and Furosemide and monitor clinical status closely. Monitor on telemetry. Patient is on CHF pathway.  Monitor strict Is&Os and daily weights.  Place on fluid restriction of 1.5 L. Continue to stress to patient importance of self efficacy and  on diet for CHF. Last BNP reviewed- and noted below   Recent Labs   Lab 03/18/23 1813   *       Hypokalemia  Patient has hypokalemia which is currently currently being treated. Last electrolytes reviewed-   Recent Labs   Lab 03/18/23 1813 03/18/23 2009 03/19/23  0518   K 3.3* 2.8* 3.5   -Will replace potassium and monitor electrolytes closely    Essential hypertension  Chronic, controlled  -Latest blood pressure and vitals reviewed-   Temp:  [98 °F (36.7 °C)-98.5 °F (36.9 °C)]    Pulse:  [42-76]   Resp:  [17-20]   BP: (154-179)/(70-85)   SpO2:  [95 %-98 %] .   -Home meds for hypertension were reviewed and noted below.   Hypertension Medications             amLODIPine (NORVASC) 5 MG tablet Take 1 tablet (5 mg total) by mouth 2 (two) times daily.    carvediloL (COREG) 12.5 MG tablet Take 1 tablet (12.5 mg total) by mouth 2 (two) times daily with meals.    cloNIDine (CATAPRES) 0.1 MG tablet Take 1 tablet (0.1 mg total) by mouth 2 (two) times daily.    furosemide (LASIX) 20 MG tablet Take 1 tablet (20 mg total) by mouth once daily. May also take 2 tablets (40 mg total) daily as needed (for leg swelling).    hydrALAZINE (APRESOLINE) 25 MG tablet Take 1 tablet (25 mg total) by mouth every 8 (eight) hours.    nitroGLYCERIN (NITROSTAT) 0.4 MG SL tablet Place 1 tablet (0.4 mg total) under the tongue every 5 (five) minutes as needed for Chest pain.        -While in the hospital, will manage blood pressure as follows; Adjust home antihypertensive regimen as follows- hld coreg with bradycardia  -Will utilize PRN blood pressure medication only if patient's blood pressure greater than  180/110 and she develops symptoms such as worsening chest pain or shortness of breath.  -optimize pain management      Hypomagnesemia  -Magnesium reviewed- Recent Labs   Lab 03/18/23 2009 03/19/23  0518   MG 1.5* 2.5      -Will replace electrolytes and continue to monitor closely      Obesity with serious comorbidity  Body mass index is 32.3 kg/m². Morbid obesity complicates all aspects of disease management from diagnostic modalities to treatment. Weight loss encouraged and health benefits explained to patient    Central retinal vein occlusion with macular edema of both eyes  -cont home Cosopt  -chronic periorbital puffiness  -f/u OP    Bradycardia  -Chronic  -EKG revealed sinus Tre with first-degree AV block, HR 46   -hold Coreg for now, restart when appropriate  -monitor VS routinely    CKD (chronic kidney disease)  stage 4, GFR 15-29 ml/min  -Cr 1.8  -BMP reviewed- noted Estimated Creatinine Clearance: 20.3 mL/min (A) (based on SCr of 1.8 mg/dL (H)). according to latest data  -Monitor UOP and serial BMP and adjust therapy as needed  -Renally dose meds and avoid nephrotoxins    Other hyperlipidemia  Patient is chronically on statin.will not continue for now. Last Lipid Panel:   Lab Results   Component Value Date    CHOL 160 05/04/2022    HDL 81 (H) 05/04/2022    LDLCALC 63.0 05/04/2022    TRIG 80 05/04/2022    CHOLHDL 50.6 (H) 05/04/2022     -Hold statin 2/2 KEEGAN for now, restart when appropriate      VTE Risk Mitigation (From admission, onward)         Ordered     IP VTE HIGH RISK PATIENT  Once         03/18/23 1953     Place sequential compression device  Until discontinued         03/18/23 1953                Discharge Planning   CANDACE:      Code Status: Full Code   Is the patient medically ready for discharge?:     Reason for patient still in hospital (select all that apply): Patient trending condition, Laboratory test and Treatment                 Heidy Ramon NP  Department of Hospital Medicine   '[place] - Telemetry ([facility])

## 2023-03-19 NOTE — HPI
Glo Dumont is a 87 y.o. female with a PMH  has a past medical history of Anemia, Angina pectoris, Anxiety, Anxiety and depression, Arthritis, Carotid artery occlusion, Carpal tunnel syndrome (06/23/2008), Chronic diarrhea, CKD (chronic kidney disease) stage 3, GFR 30-59 ml/min (5/11/2017), Colitis, Coronary artery disease, Coronary artery disease, Diastolic dysfunction, Diverticulosis, Glaucoma, Greater trochanteric bursitis (2/10/2015), Grief at loss of child (1/26/2016), H/O carotid endarterectomy (12/2/2013), Heart failure, History of coronary angioplasty (3/11/2014), Hypercholesteremia, Hypertension, Liver cyst (02/08/2013), Macular degeneration, Primary open-angle glaucoma(365.11) (9/3/2013), Renal cyst (02/08/2013), 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  Presented to ER for evaluation of worsening shortness of Abeba over the last few days.  Patient recently visited the ED on 03/16/2023 for similar complaint and was given IV Lasix and discharged home with instructions to double up on Lasix for the next 3 days.  Patient reports that she took 40 mg versus 20 mg Lasix b.i.d. for the next 30 days without any significant improvement of her shortness of breath.  However, patient does report drinking more fluid than she has been putting out.  Patient reports chest pressure which feels like an acute CHF exacerbation as she had in the past.  Reports symptoms worsen laying flat, with exertion and is improved with rest and sitting upright denies any use home oxygen or sleep machine at night.  Denies any other associated symptoms such as fever, aches, chills, sweats, nausea, vomiting, and compliant, dizziness, visual disturbances, palpitations, abdominal pain, or any other symptoms at this time.      ER workup revealed potassium of 3.3, BUN/creatinine of 40/2.1 with EGFR 23 baseline creatinine of 1.7.  CBG of 153 mg/dL, BNP of 349,  troponin 0.030, UA positive for leukocyte esterase and wbc's.  Chest x-ray correlate for CHF.  EKG reveals sinus for the with first-degree AV block with ventricular rate of 46 beats per minute in the QT/QTC of 478/14.  Most recent echocardiogram revealed TTE showing EF 75% March 2022.  Is followed by Dr. Lui and was last evaluated by on 01/12/2023.  Meds given in ED were 60 mg Lasix as well as 325 mg aspirin.  Patient is in agreement with treatment plan to be admitted to hospital under observation status for diuresing of acute CHF exacerbation.    PCP: Christina Reico

## 2023-03-19 NOTE — ASSESSMENT & PLAN NOTE
Currently normotensive. BP usually well controlled per patient with home medications.  Plan:  -Optimize pain control   -Continue home medications (norvasc, catapres, coreg, hydralazine), titrate as needed   -hold lasix 2/2 KEEGAN  -Monitor BP  -Low salt/cardiac diet when not NPO  -IV hydralazine prn for SBP>160 or DBP>90

## 2023-03-19 NOTE — ASSESSMENT & PLAN NOTE
-Cr 1.8  -BMP reviewed- noted Estimated Creatinine Clearance: 20.3 mL/min (A) (based on SCr of 1.8 mg/dL (H)). according to latest data  -Monitor UOP and serial BMP and adjust therapy as needed  -Renally dose meds and avoid nephrotoxins

## 2023-03-19 NOTE — ASSESSMENT & PLAN NOTE
Chronic, controlled  -Latest blood pressure and vitals reviewed-   Temp:  [98 °F (36.7 °C)-98.5 °F (36.9 °C)]   Pulse:  [42-76]   Resp:  [17-20]   BP: (154-179)/(70-85)   SpO2:  [95 %-98 %] .   -Home meds for hypertension were reviewed and noted below.   Hypertension Medications             amLODIPine (NORVASC) 5 MG tablet Take 1 tablet (5 mg total) by mouth 2 (two) times daily.    carvediloL (COREG) 12.5 MG tablet Take 1 tablet (12.5 mg total) by mouth 2 (two) times daily with meals.    cloNIDine (CATAPRES) 0.1 MG tablet Take 1 tablet (0.1 mg total) by mouth 2 (two) times daily.    furosemide (LASIX) 20 MG tablet Take 1 tablet (20 mg total) by mouth once daily. May also take 2 tablets (40 mg total) daily as needed (for leg swelling).    hydrALAZINE (APRESOLINE) 25 MG tablet Take 1 tablet (25 mg total) by mouth every 8 (eight) hours.    nitroGLYCERIN (NITROSTAT) 0.4 MG SL tablet Place 1 tablet (0.4 mg total) under the tongue every 5 (five) minutes as needed for Chest pain.        -While in the hospital, will manage blood pressure as follows; Adjust home antihypertensive regimen as follows- hld coreg with bradycardia  -Will utilize PRN blood pressure medication only if patient's blood pressure greater than  180/110 and she develops symptoms such as worsening chest pain or shortness of breath.  -optimize pain management

## 2023-03-19 NOTE — HOSPITAL COURSE
86 y/o female admitted with SOB and CHF exacerbation. She was just seen in ER on Thursday and told to double her dose of Lasix dose x 3 days, but by Saturday, she had no improvement and came back to ER. , Cr 1.8. She was started on Lasix IV 40 mg BID with some improvement. She sees Dr. Lui in clinic. She denies home oxygen therapy. Performed well on ambulatory desat study and was determined to not meet criteria for home O2. PCM inserted on 3/21/2023. Home medication regimen modified. Given recurrence of symptoms after previously being discharged and her having to return to the ER, patient monitored for 48 hours to confirm hemodynamic stability. Medically cleared for discharge on 3/23/2023. Patient to follow up with Cardiology on 3/24/2023.

## 2023-03-19 NOTE — ASSESSMENT & PLAN NOTE
Patient is identified as having unspecified heart failure that is Acute on chronic. CHF is currently uncontrolled due to Continued edema of extremities and Dyspnea not returned to baseline after 60mg doses of IV diuretic. Latest ECHO performed and demonstrates- Results for orders placed during the hospital encounter of 03/02/22    Echo    Interpretation Summary  · The left ventricle is normal in size with moderate concentric hypertrophy and hyperdynamic systolic function.  · Mild left atrial enlargement.  · The estimated ejection fraction is 75%.  · Normal right ventricular size with low normal right ventricular systolic function.  · There is mild aortic valve stenosis.  · Aortic valve area is 1.97 cm2; peak velocity is 1.67 m/s; mean gradient is 6 mmHg.  · Mild mitral regurgitation.  · There is mild mitral stenosis.  · Mild tricuspid regurgitation.  · Normal central venous pressure (3 mmHg).  · The estimated PA systolic pressure is 35 mmHg.  . Continue Beta Blocker and Furosemide and monitor clinical status closely. Monitor on telemetry. Patient is on CHF pathway.  Monitor strict Is&Os and daily weights.  Place on fluid restriction of 1.5 L. Continue to stress to patient importance of self efficacy and  on diet for CHF. Last BNP reviewed- and noted below   Recent Labs   Lab 03/18/23  1813   *   .

## 2023-03-19 NOTE — ASSESSMENT & PLAN NOTE
-Chronic  -EKG revealed sinus Tre with first-degree AV block, HR 46   -hold Coreg for now, restart when appropriate  -monitor VS routinely

## 2023-03-19 NOTE — ASSESSMENT & PLAN NOTE
-Magnesium reviewed- Recent Labs   Lab 03/18/23 2009 03/19/23  0518   MG 1.5* 2.5      -Will replace electrolytes and continue to monitor closely

## 2023-03-19 NOTE — ASSESSMENT & PLAN NOTE
Chronic.  Stable.  Compliant with home medication Coreg.  EKG revealed sinus Tre with first-degree AV block.  Ventricular rate of 46 beats per minute with QT/QTC of 478/418.  Plan:   -continue home medication (coreg), titrate as needed

## 2023-03-19 NOTE — SUBJECTIVE & OBJECTIVE
Interval History: awake, alert, sitting up in bed, NAD. She reports SOB improved from admission, BLE edema improved. On RA, stable sats. Likely home in am if remains stable.     Review of Systems   Constitutional:  Negative for fatigue and fever.   Respiratory:  Positive for shortness of breath. Negative for cough.    Cardiovascular:  Positive for leg swelling. Negative for chest pain and palpitations.   Gastrointestinal:  Negative for nausea and vomiting.   Neurological:  Negative for weakness.   All other systems reviewed and are negative.  Objective:     Vital Signs (Most Recent):  Temp: 98.3 °F (36.8 °C) (03/19/23 0736)  Pulse: 75 (03/19/23 0900)  Resp: 18 (03/19/23 0736)  BP: (!) 173/81 (03/19/23 0736)  SpO2: 96 % (03/19/23 0736)   Vital Signs (24h Range):  Temp:  [98 °F (36.7 °C)-98.5 °F (36.9 °C)] 98.3 °F (36.8 °C)  Pulse:  [42-76] 75  Resp:  [17-20] 18  SpO2:  [95 %-98 %] 96 %  BP: (154-179)/(70-85) 173/81     Weight: 70.1 kg (154 lb 8.7 oz)  Body mass index is 32.3 kg/m².    Intake/Output Summary (Last 24 hours) at 3/19/2023 1023  Last data filed at 3/19/2023 0620  Gross per 24 hour   Intake --   Output 1000 ml   Net -1000 ml      Physical Exam  Vitals and nursing note reviewed.   Constitutional:       Appearance: She is obese.   Eyes:      Comments: Chronic Periorbital puffiness   Cardiovascular:      Rate and Rhythm: Normal rate and regular rhythm.      Heart sounds: No murmur heard.  Pulmonary:      Effort: Accessory muscle usage present.      Breath sounds: Normal breath sounds.   Abdominal:      General: Bowel sounds are normal.      Palpations: Abdomen is soft.   Musculoskeletal:         General: Normal range of motion.      Right lower leg: Edema present.      Left lower leg: Edema present.   Skin:     General: Skin is warm and dry.   Neurological:      General: No focal deficit present.      Mental Status: She is alert and oriented to person, place, and time.   Psychiatric:         Mood and Affect:  Mood normal.         Behavior: Behavior normal.       Significant Labs: All pertinent labs within the past 24 hours have been reviewed.  CBC:   Recent Labs   Lab 03/18/23 1813   WBC 8.93   HGB 10.1*   HCT 30.1*        CMP:   Recent Labs   Lab 03/18/23 1813 03/18/23 2009 03/19/23  0518    144 142   K 3.3* 2.8* 3.5    116* 109   CO2 21* 16* 20*   * 127* 101   BUN 40* 35* 38*   CREATININE 2.1* 1.6* 1.8*   CALCIUM 9.3 7.6* 9.7   PROT 7.8 5.7*  --    ALBUMIN 3.8 2.9*  --    BILITOT 0.3 0.2  --    ALKPHOS 65 51*  --    AST 15 8*  --    ALT 15 11  --    ANIONGAP 14 12 13       Significant Imaging: I have reviewed all pertinent imaging results/findings within the past 24 hours.

## 2023-03-19 NOTE — ASSESSMENT & PLAN NOTE
Body mass index is 32.3 kg/m². Morbid obesity complicates all aspects of disease management from diagnostic modalities to treatment. Weight loss encouraged and health benefits explained to patient.

## 2023-03-19 NOTE — PHARMACY MED REC
"Admission Medication History     The home medication history was taken by Leonardo Hoffman.    You may go to "Admission" then "Reconcile Home Medications" tabs to review and/or act upon these items.     The home medication list has been updated by the Pharmacy department.   Please read ALL comments highlighted in yellow.   Please address this information as you see fit.    Feel free to contact us if you have any questions or require assistance.        Medications listed below were obtained from: Analytic software- [employer] and Medical records  (Not in a hospital admission)      Leonardo Hoffman  [id number]    Current Outpatient Medications on File Prior to Encounter   Medication Sig Dispense Refill Last Dose    aflibercept 2 mg/0.05 mL Soln 2 mg by Intravitreal route every 28 days.   Past Week    amLODIPine (NORVASC) 5 MG tablet Take 1 tablet (5 mg total) by mouth 2 (two) times daily. 60 tablet 11 3/17/2023    aspirin (ECOTRIN) 81 MG EC tablet Take 81 mg by mouth once daily.   3/17/2023    budesonide (ENTOCORT EC) 3 mg capsule Take 3 mg by mouth 2 (two) times a day.   3/17/2023    carvediloL (COREG) 12.5 MG tablet Take 1 tablet (12.5 mg total) by mouth 2 (two) times daily with meals. 60 tablet 11 3/17/2023    dorzolamide-timolol 2-0.5% (COSOPT) 22.3-6.8 mg/mL ophthalmic solution Place 1 drop into both eyes 2 (two) times daily. 10 mL 6 Past Week    furosemide (LASIX) 20 MG tablet Take 1 tablet (20 mg total) by mouth once daily. May also take 2 tablets (40 mg total) daily as needed (for leg swelling). 120 tablet 3 3/17/2023    hydrALAZINE (APRESOLINE) 25 MG tablet Take 1 tablet (25 mg total) by mouth every 8 (eight) hours. 270 tablet 3 3/17/2023    iron-vitamin C 100-250 mg, ICAR-C, (ICAR-C) 100-250 mg Tab Take 1 tablet by mouth once daily. 100 tablet 6 Past Week    lipase-protease-amylase 24,000-76,000-120,000 units (CREON) 24,000-76,000 -120,000 unit capsule Take 1 capsule by mouth 3 (three) times daily with " meals. 90 capsule 11 3/17/2023    pravastatin (PRAVACHOL) 40 MG tablet Take 1 tablet (40 mg total) by mouth once daily. 90 tablet 2 Past Month    cloNIDine (CATAPRES) 0.1 MG tablet Take 1 tablet (0.1 mg total) by mouth 2 (two) times daily. (Patient not taking: Reported on 3/18/2023) 180 tablet 3 Not Taking    dexAMETHasone (OZURDEX) 0.7 mg Impl intravitreal implant 0.7 mg by Intravitreal route once.   More than a month    FOLIC ACID/MULTIVIT-MIN/LUTEIN (CENTRUM SILVER ORAL) Take 1 tablet by mouth once daily.   More than a month    gabapentin (NEURONTIN) 300 MG capsule Take 1 capsule (300 mg total) by mouth once daily. 30 capsule 11     nitroGLYCERIN (NITROSTAT) 0.4 MG SL tablet Place 1 tablet (0.4 mg total) under the tongue every 5 (five) minutes as needed for Chest pain. 25 tablet 3 More than a month    pantoprazole (PROTONIX) 40 MG tablet Take 1 tablet (40 mg total) by mouth once daily. 30 tablet 0                    .

## 2023-03-19 NOTE — ASSESSMENT & PLAN NOTE
Patient has hypokalemia which is currently currently being treated. Last electrolytes reviewed- Recent Labs   Lab 03/18/23  1813 03/18/23 2009   K 3.3* 2.8*   . Will replace potassium and monitor electrolytes closely.

## 2023-03-20 LAB
ANION GAP SERPL CALC-SCNC: 11 MMOL/L (ref 8–16)
AORTIC ROOT ANNULUS: 3.3 CM
ASCENDING AORTA: 3.21 CM
AV INDEX (PROSTH): 0.62
AV MEAN GRADIENT: 8 MMHG
AV PEAK GRADIENT: 14 MMHG
AV VALVE AREA: 1.7 CM2
AV VELOCITY RATIO: 0.52
BASOPHILS # BLD AUTO: 0.05 K/UL (ref 0–0.2)
BASOPHILS NFR BLD: 0.7 % (ref 0–1.9)
BSA FOR ECHO PROCEDURE: 1.65 M2
BUN SERPL-MCNC: 45 MG/DL (ref 8–23)
CALCIUM SERPL-MCNC: 9.3 MG/DL (ref 8.7–10.5)
CHLORIDE SERPL-SCNC: 104 MMOL/L (ref 95–110)
CO2 SERPL-SCNC: 21 MMOL/L (ref 23–29)
CREAT SERPL-MCNC: 2 MG/DL (ref 0.5–1.4)
CV ECHO LV RWT: 0.64 CM
DIFFERENTIAL METHOD: ABNORMAL
DOP CALC AO PEAK VEL: 1.86 M/S
DOP CALC AO VTI: 49 CM
DOP CALC LVOT AREA: 2.7 CM2
DOP CALC LVOT DIAMETER: 1.86 CM
DOP CALC LVOT PEAK VEL: 0.96 M/S
DOP CALC LVOT STROKE VOLUME: 83.1 CM3
DOP CALC MV VTI: 57.4 CM
DOP CALC RVOT PEAK VEL: 0.64 M/S
DOP CALC RVOT VTI: 17.5 CM
DOP CALCLVOT PEAK VEL VTI: 30.6 CM
E WAVE DECELERATION TIME: 387.37 MSEC
E/A RATIO: 0.8
E/E' RATIO: 15.71 M/S
ECHO LV POSTERIOR WALL: 1.09 CM (ref 0.6–1.1)
EJECTION FRACTION: 70 %
EOSINOPHIL # BLD AUTO: 0.1 K/UL (ref 0–0.5)
EOSINOPHIL NFR BLD: 1.8 % (ref 0–8)
ERYTHROCYTE [DISTWIDTH] IN BLOOD BY AUTOMATED COUNT: 15.9 % (ref 11.5–14.5)
EST. GFR  (NO RACE VARIABLE): 24 ML/MIN/1.73 M^2
FRACTIONAL SHORTENING: 32 % (ref 28–44)
GLUCOSE SERPL-MCNC: 110 MG/DL (ref 70–110)
HCT VFR BLD AUTO: 31.2 % (ref 37–48.5)
HGB BLD-MCNC: 10.3 G/DL (ref 12–16)
IMM GRANULOCYTES # BLD AUTO: 0.06 K/UL (ref 0–0.04)
IMM GRANULOCYTES NFR BLD AUTO: 0.8 % (ref 0–0.5)
INTERVENTRICULAR SEPTUM: 1.32 CM (ref 0.6–1.1)
IVC DIAMETER: 0.74 CM
IVRT: 94.2 MSEC
LA MAJOR: 4.12 CM
LA MINOR: 4.09 CM
LA WIDTH: 3.1 CM
LEFT ATRIUM SIZE: 3.28 CM
LEFT ATRIUM VOLUME INDEX: 22.2 ML/M2
LEFT ATRIUM VOLUME: 35.48 CM3
LEFT INTERNAL DIMENSION IN SYSTOLE: 2.31 CM (ref 2.1–4)
LEFT VENTRICLE DIASTOLIC VOLUME INDEX: 29.71 ML/M2
LEFT VENTRICLE DIASTOLIC VOLUME: 47.53 ML
LEFT VENTRICLE MASS INDEX: 82 G/M2
LEFT VENTRICLE SYSTOLIC VOLUME INDEX: 11.5 ML/M2
LEFT VENTRICLE SYSTOLIC VOLUME: 18.39 ML
LEFT VENTRICULAR INTERNAL DIMENSION IN DIASTOLE: 3.4 CM (ref 3.5–6)
LEFT VENTRICULAR MASS: 131.07 G
LV LATERAL E/E' RATIO: 18.33 M/S
LV SEPTAL E/E' RATIO: 13.75 M/S
LVOT MG: 2.16 MMHG
LVOT MV: 0.7 CM/S
LYMPHOCYTES # BLD AUTO: 0.9 K/UL (ref 1–4.8)
LYMPHOCYTES NFR BLD: 11.7 % (ref 18–48)
MAGNESIUM SERPL-MCNC: 2.1 MG/DL (ref 1.6–2.6)
MCH RBC QN AUTO: 27 PG (ref 27–31)
MCHC RBC AUTO-ENTMCNC: 33 G/DL (ref 32–36)
MCV RBC AUTO: 82 FL (ref 82–98)
MONOCYTES # BLD AUTO: 1 K/UL (ref 0.3–1)
MONOCYTES NFR BLD: 13.1 % (ref 4–15)
MV MEAN GRADIENT: 6 MMHG
MV PEAK A VEL: 1.38 M/S
MV PEAK E VEL: 1.1 M/S
MV PEAK GRADIENT: 12 MMHG
MV STENOSIS PRESSURE HALF TIME: 98.51 MS
MV VALVE AREA BY CONTINUITY EQUATION: 1.45 CM2
MV VALVE AREA P 1/2 METHOD: 2.23 CM2
NEUTROPHILS # BLD AUTO: 5.2 K/UL (ref 1.8–7.7)
NEUTROPHILS NFR BLD: 71.9 % (ref 38–73)
NRBC BLD-RTO: 0 /100 WBC
PISA MRMAX VEL: 4.9 M/S
PISA TR MAX VEL: 2.83 M/S
PLATELET # BLD AUTO: 296 K/UL (ref 150–450)
PMV BLD AUTO: 10.1 FL (ref 9.2–12.9)
POTASSIUM SERPL-SCNC: 4.1 MMOL/L (ref 3.5–5.1)
PV MEAN GRADIENT: 0.85 MMHG
RA MAJOR: 4.04 CM
RA PRESSURE: 3 MMHG
RBC # BLD AUTO: 3.81 M/UL (ref 4–5.4)
SODIUM SERPL-SCNC: 136 MMOL/L (ref 136–145)
STJ: 3.16 CM
TDI LATERAL: 0.06 M/S
TDI SEPTAL: 0.08 M/S
TDI: 0.07 M/S
TR MAX PG: 32 MMHG
TRICUSPID ANNULAR PLANE SYSTOLIC EXCURSION: 1.9 CM
TV REST PULMONARY ARTERY PRESSURE: 35 MMHG
WBC # BLD AUTO: 7.24 K/UL (ref 3.9–12.7)

## 2023-03-20 PROCEDURE — 96376 TX/PRO/DX INJ SAME DRUG ADON: CPT

## 2023-03-20 PROCEDURE — 63600175 PHARM REV CODE 636 W HCPCS: Mod: HCNC | Performed by: INTERNAL MEDICINE

## 2023-03-20 PROCEDURE — 25000003 PHARM REV CODE 250: Mod: HCNC | Performed by: NURSE PRACTITIONER

## 2023-03-20 PROCEDURE — 83735 ASSAY OF MAGNESIUM: CPT | Mod: HCNC | Performed by: INTERNAL MEDICINE

## 2023-03-20 PROCEDURE — 99233 SBSQ HOSP IP/OBS HIGH 50: CPT | Mod: 25,HCNC,, | Performed by: INTERNAL MEDICINE

## 2023-03-20 PROCEDURE — 85025 COMPLETE CBC W/AUTO DIFF WBC: CPT | Mod: HCNC | Performed by: INTERNAL MEDICINE

## 2023-03-20 PROCEDURE — 94761 N-INVAS EAR/PLS OXIMETRY MLT: CPT | Mod: HCNC

## 2023-03-20 PROCEDURE — 36415 COLL VENOUS BLD VENIPUNCTURE: CPT | Mod: HCNC | Performed by: NURSE PRACTITIONER

## 2023-03-20 PROCEDURE — 25000003 PHARM REV CODE 250: Mod: HCNC

## 2023-03-20 PROCEDURE — 99233 PR SUBSEQUENT HOSPITAL CARE,LEVL III: ICD-10-PCS | Mod: 25,HCNC,, | Performed by: INTERNAL MEDICINE

## 2023-03-20 PROCEDURE — 27000221 HC OXYGEN, UP TO 24 HOURS: Mod: HCNC

## 2023-03-20 PROCEDURE — 99900035 HC TECH TIME PER 15 MIN (STAT): Mod: HCNC

## 2023-03-20 PROCEDURE — G0378 HOSPITAL OBSERVATION PER HR: HCPCS | Mod: HCNC

## 2023-03-20 PROCEDURE — 93010 EKG 12-LEAD: ICD-10-PCS | Mod: HCNC,,, | Performed by: INTERNAL MEDICINE

## 2023-03-20 PROCEDURE — 93010 ELECTROCARDIOGRAM REPORT: CPT | Mod: HCNC,,, | Performed by: INTERNAL MEDICINE

## 2023-03-20 PROCEDURE — 25000003 PHARM REV CODE 250: Mod: HCNC | Performed by: INTERNAL MEDICINE

## 2023-03-20 PROCEDURE — 93005 ELECTROCARDIOGRAM TRACING: CPT | Mod: HCNC

## 2023-03-20 PROCEDURE — 80048 BASIC METABOLIC PNL TOTAL CA: CPT | Mod: HCNC | Performed by: NURSE PRACTITIONER

## 2023-03-20 RX ORDER — ASPIRIN 81 MG/1
81 TABLET ORAL DAILY
Status: DISCONTINUED | OUTPATIENT
Start: 2023-03-20 | End: 2023-03-23 | Stop reason: HOSPADM

## 2023-03-20 RX ORDER — PRAVASTATIN SODIUM 20 MG/1
40 TABLET ORAL DAILY
Status: DISCONTINUED | OUTPATIENT
Start: 2023-03-21 | End: 2023-03-23 | Stop reason: HOSPADM

## 2023-03-20 RX ADMIN — PANTOPRAZOLE SODIUM 40 MG: 40 TABLET, DELAYED RELEASE ORAL at 08:03

## 2023-03-20 RX ADMIN — HYDRALAZINE HYDROCHLORIDE 25 MG: 25 TABLET, FILM COATED ORAL at 06:03

## 2023-03-20 RX ADMIN — PANCRELIPASE 1 CAPSULE: 24000; 76000; 120000 CAPSULE, DELAYED RELEASE PELLETS ORAL at 08:03

## 2023-03-20 RX ADMIN — DORZOLAMIDE HYDROCHLORIDE AND TIMOLOL MALEATE 1 DROP: 22.3; 6.8 SOLUTION/ DROPS OPHTHALMIC at 08:03

## 2023-03-20 RX ADMIN — HYDRALAZINE HYDROCHLORIDE 25 MG: 25 TABLET, FILM COATED ORAL at 01:03

## 2023-03-20 RX ADMIN — FUROSEMIDE 40 MG: 10 INJECTION, SOLUTION INTRAMUSCULAR; INTRAVENOUS at 09:03

## 2023-03-20 RX ADMIN — AMLODIPINE BESYLATE 5 MG: 5 TABLET ORAL at 08:03

## 2023-03-20 RX ADMIN — ASPIRIN 81 MG: 81 TABLET, COATED ORAL at 12:03

## 2023-03-20 RX ADMIN — CLONIDINE HYDROCHLORIDE 0.1 MG: 0.1 TABLET ORAL at 08:03

## 2023-03-20 RX ADMIN — FUROSEMIDE 40 MG: 10 INJECTION, SOLUTION INTRAMUSCULAR; INTRAVENOUS at 08:03

## 2023-03-20 RX ADMIN — PANCRELIPASE 1 CAPSULE: 24000; 76000; 120000 CAPSULE, DELAYED RELEASE PELLETS ORAL at 04:03

## 2023-03-20 RX ADMIN — PANCRELIPASE 1 CAPSULE: 24000; 76000; 120000 CAPSULE, DELAYED RELEASE PELLETS ORAL at 11:03

## 2023-03-20 RX ADMIN — CLONIDINE HYDROCHLORIDE 0.1 MG: 0.1 TABLET ORAL at 03:03

## 2023-03-20 RX ADMIN — HYDRALAZINE HYDROCHLORIDE 25 MG: 25 TABLET, FILM COATED ORAL at 09:03

## 2023-03-20 RX ADMIN — AMLODIPINE BESYLATE 5 MG: 5 TABLET ORAL at 09:03

## 2023-03-20 RX ADMIN — CLONIDINE HYDROCHLORIDE 0.1 MG: 0.1 TABLET ORAL at 09:03

## 2023-03-20 NOTE — PROGRESS NOTES
[facility]  Progress Note    Patient Name: Glo Dumont  MRN: [id number]  Patient Class: OP- Observation   Admission Date: 3/18/2023  Length of Stay: 0 days  Attending Physician: Leonardo Caldwell MD  Primary Care Provider: Christina Recio MD        Subjective:     Principal Problem:Acute on chronic congestive heart failure        HPI:  Glo Dumont is a 87 y.o. female with a PMH  has a past medical history of Anemia, Angina pectoris, Anxiety, Anxiety and depression, Arthritis, Carotid artery occlusion, Carpal tunnel syndrome (06/23/2008), Chronic diarrhea, CKD (chronic kidney disease) stage 3, GFR 30-59 ml/min (5/11/2017), Colitis, Coronary artery disease, Coronary artery disease, Diastolic dysfunction, Diverticulosis, Glaucoma, Greater trochanteric bursitis (2/10/2015), Grief at loss of child (1/26/2016), H/O carotid endarterectomy (12/2/2013), Heart failure, History of coronary angioplasty (3/11/2014), Hypercholesteremia, Hypertension, Liver cyst (02/08/2013), Macular degeneration, Primary open-angle glaucoma(365.11) (9/3/2013), Renal cyst (02/08/2013), 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  Presented to ER for evaluation of worsening shortness of Abe[place] over the last few days.  Patient recently visited the ED on 03/16/2023 for similar complaint and was given IV Lasix and discharged home with instructions to double up on Lasix for the next 3 days.  Patient reports that she took 40 mg versus 20 mg Lasix b.i.d. for the next 30 days without any significant improvement of her shortness of breath.  However, patient does report drinking more fluid than she has been putting out.  Patient reports chest pressure which feels like an acute CHF exacerbation as she had in the past.  Reports symptoms worsen laying flat, with exertion and is improved with rest and sitting upright denies any use home oxygen  or sleep machine at night.  Denies any other associated symptoms such as fever, aches, chills, sweats, nausea, vomiting, and compliant, dizziness, visual disturbances, palpitations, abdominal pain, or any other symptoms at this time.      ER workup revealed potassium of 3.3, BUN/creatinine of 40/2.1 with EGFR 23 baseline creatinine of 1.7.  CBG of 153 mg/dL, BNP of 349, troponin 0.030, UA positive for leukocyte esterase and wbc's.  Chest x-ray correlate for CHF.  EKG reveals sinus for the with first-degree AV block with ventricular rate of 46 beats per minute in the QT/QTC of 478/14.  Most recent echocardiogram revealed TTE showing EF 75% March 2022.  Is followed by Dr. Lui and was last evaluated by on 01/12/2023.  Meds given in ED were 60 mg Lasix as well as 325 mg aspirin.  Patient is in agreement with treatment plan to be admitted to hospital under observation status for diuresing of acute CHF exacerbation.    PCP: Christina Recio            Overview/Hospital Course:  88 y/o female admitted with SOB and CHF exacerbation. She was just seen in ER on Thursday and told to double her dose of Lasix dose x 3 days, but by Saturday, she had no improvement and came back to ER. , Cr 1.8. She was started on Lasix IV 40 mg BID with some improvement. She sees Dr. Lui in clinic. She denies home oxygen therapy and has been holding Sats > 95% on RA.       Interval History: No acute events overnight. Good Diuresis net -3L since admission.  Doing well this AM but is still requiring O2. No other complaints at our encounter.     Patient on creon TID, could not tell me exactly why. In depth review of chart did not reveal any direct explanation for the medication. No obvious hx of pancreatic disease or insufficiency. Lipase levels normal dating back to 2016      Review of Systems  Objective:     Vital Signs (Most Recent):  Temp: 97.7 °F (36.5 °C) (03/20/23 1503)  Pulse: (!) 39 (03/20/23 1503)  Resp: 16 (03/20/23  1503)  BP: 119/75 (03/20/23 1503)  SpO2: (!) 94 % (03/20/23 1503)   Vital Signs (24h Range):  Temp:  [97.7 °F (36.5 °C)-98.5 °F (36.9 °C)] 97.7 °F (36.5 °C)  Pulse:  [38-75] 39  Resp:  [16-18] 16  SpO2:  [94 %-99 %] 94 %  BP: (119-173)/(61-79) 119/75     Weight: 66.7 kg (147 lb)  Body mass index is 30.72 kg/m².    Intake/Output Summary (Last 24 hours) at 3/20/2023 1517  Last data filed at 3/20/2023 0800  Gross per 24 hour   Intake 360 ml   Output 950 ml   Net -590 ml      Physical Exam  GEN: No acute distress, pleasant, body habitus normal  HEENT: atraumatic and normocephalic  CARDS: regular rate and rhythm, no m/g, pulses palpable in LE  PULM: breathing comfortably on 2LNC, chest symmetric, nonlabored, no abnormal breath sounds on auscultation  ABD: nontender, nondistended, soft, no organomegaly, BS+  Neuro: Alert and oriented x3, CN's I-IX grossly intact, sensation and motor intact; follows directions and answers questions appropriately    Significant Labs: BMP:   Recent Labs   Lab 03/20/23  0551         K 4.1      CO2 21*   BUN 45*   CREATININE 2.0*   CALCIUM 9.3   MG 2.1     CBC:   Recent Labs   Lab 03/18/23 1813 03/20/23  0551   WBC 8.93 7.24   HGB 10.1* 10.3*   HCT 30.1* 31.2*    296     CMP:   Recent Labs   Lab 03/18/23 1813 03/18/23 2009 03/19/23  0518 03/20/23  0551    144 142 136   K 3.3* 2.8* 3.5 4.1    116* 109 104   CO2 21* 16* 20* 21*   * 127* 101 110   BUN 40* 35* 38* 45*   CREATININE 2.1* 1.6* 1.8* 2.0*   CALCIUM 9.3 7.6* 9.7 9.3   PROT 7.8 5.7*  --   --    ALBUMIN 3.8 2.9*  --   --    BILITOT 0.3 0.2  --   --    ALKPHOS 65 51*  --   --    AST 15 8*  --   --    ALT 15 11  --   --    ANIONGAP 14 12 13 11     Cardiac Markers:   Recent Labs   Lab 03/18/23  1813   *     Coagulation: No results for input(s): PT, INR, APTT in the last 48 hours.  Lactic Acid: No results for input(s): LACTATE in the last 48 hours.  Magnesium:   Recent Labs   Lab  03/18/23 2009 03/19/23  0518 03/20/23  0551   MG 1.5* 2.5 2.1     Troponin:   Recent Labs   Lab 03/18/23  1813   TROPONINI 0.030*     TSH: No results for input(s): TSH in the last 4320 hours.  Urine Studies:   No results for input(s): COLORU, APPEARANCEUA, PHUR, SPECGRAV, PROTEINUA, GLUCUA, KETONESU, BILIRUBINUA, OCCULTUA, NITRITE, UROBILINOGEN, LEUKOCYTESUR, RBCUA, WBCUA, BACTERIA, SQUAMEPITHEL, HYALINECASTS in the last 48 hours.    Invalid input(s): WRIGHTSUR      Significant Imaging: I have reviewed all pertinent imaging results/findings within the past 24 hours.      Assessment/Plan:      * Acute on chronic congestive heart failure  03/20/2023  --improving, down approximately 3L since admission  --given repeat ER visits for HF, put in consult for patient's cardiology team to assist in medication optimization  --recommended continued IV diuresis for at least one additional day, to which I agree  --defer rest of management to their team    Hypomagnesemia  -resolved  -Will replace electrolytes and continue to monitor closely      Chronic Creon use?   -unclear why patient is on medication, Epic shows encountners with outpatient GI, but no specific diagnoses have been linked the medication   -will continue for now, low threshold to titrate, especially considering decreasing renal function    Obesity with serious comorbidity  Body mass index is 32.3 kg/m². Morbid obesity complicates all aspects of disease management from diagnostic modalities to treatment. Weight loss encouraged and health benefits explained to patient    Hypokalemia    03/20/2023  -resolved  -replace potassium as needed  -monitor electrolytes     Central retinal vein occlusion with macular edema of both eyes  -cont home Cosopt  -chronic periorbital puffiness  -f/u OP    Bradycardia    03/20/2023  -Chronic  -EKG revealed sinus Tre with first-degree AV block, HR 46   -hold Coreg for now, restart when appropriate  -monitor VS routinely    CKD  (chronic kidney disease) stage 4, GFR 15-29 ml/min  -Cr 1.8  -BMP reviewed- noted Estimated Creatinine Clearance: 20.3 mL/min (A) (based on SCr of 1.8 mg/dL (H)). according to latest data  -Monitor UOP and serial BMP and adjust therapy as needed  -Renally dose meds and avoid nephrotoxins    Essential hypertension    03/20/2023  --controlled at this encounter  --home medications include: clonidine, norvasc, coreg  --continue clonidine, norvasc  --coreg stopped d/t bradycardia  --[id number]    Coronary artery disease, occlusive    03/20/2023  --stable, no active chest pain at this time  --continue aspirin therapy, restart home pravastatin  --monitor clinically, PRN EKG        Other hyperlipidemia  --continue statin therapy on admission      VTE Risk Mitigation (From admission, onward)         Ordered     IP VTE HIGH RISK PATIENT  Once         03/18/23 1953     Place sequential compression device  Until discontinued         03/18/23 1953                Discharge Planning   CANDACE:      Code Status: Full Code   Is the patient medically ready for discharge?:     Reason for patient still in hospital (select all that apply): Patient trending condition, Laboratory test and Consult recommendations         Dispo: Another day of diuresis, will look more into why patient is taking Creon    Leonardo Caldwell MD  Department of Hospital Medicine   [facility] - Telemetry ([facility])

## 2023-03-20 NOTE — SUBJECTIVE & OBJECTIVE
Interval History: No acute events overnight. Good Diuresis net -3L since admission.  Doing well this AM but is still requiring O2. No other complaints at our encounter.     Review of Systems  Objective:     Vital Signs (Most Recent):  Temp: 97.7 °F (36.5 °C) (03/20/23 1503)  Pulse: (!) 39 (03/20/23 1503)  Resp: 16 (03/20/23 1503)  BP: 119/75 (03/20/23 1503)  SpO2: (!) 94 % (03/20/23 1503)   Vital Signs (24h Range):  Temp:  [97.7 °F (36.5 °C)-98.5 °F (36.9 °C)] 97.7 °F (36.5 °C)  Pulse:  [38-75] 39  Resp:  [16-18] 16  SpO2:  [94 %-99 %] 94 %  BP: (119-173)/(61-79) 119/75     Weight: 66.7 kg (147 lb)  Body mass index is 30.72 kg/m².    Intake/Output Summary (Last 24 hours) at 3/20/2023 1517  Last data filed at 3/20/2023 0800  Gross per 24 hour   Intake 360 ml   Output 950 ml   Net -590 ml      Physical Exam  GEN: No acute distress, pleasant, body habitus normal  HEENT: atraumatic and normocephalic  CARDS: regular rate and rhythm, no m/g, pulses palpable in LE  PULM: breathing comfortably on 2LNC, chest symmetric, nonlabored, no abnormal breath sounds on auscultation  ABD: nontender, nondistended, soft, no organomegaly, BS+  Neuro: Alert and oriented x3, CN's I-IX grossly intact, sensation and motor intact; follows directions and answers questions appropriately    Significant Labs: BMP:   Recent Labs   Lab 03/20/23  0551         K 4.1      CO2 21*   BUN 45*   CREATININE 2.0*   CALCIUM 9.3   MG 2.1     CBC:   Recent Labs   Lab 03/18/23 1813 03/20/23  0551   WBC 8.93 7.24   HGB 10.1* 10.3*   HCT 30.1* 31.2*    296     CMP:   Recent Labs   Lab 03/18/23 1813 03/18/23 2009 03/19/23  0518 03/20/23  0551    144 142 136   K 3.3* 2.8* 3.5 4.1    116* 109 104   CO2 21* 16* 20* 21*   * 127* 101 110   BUN 40* 35* 38* 45*   CREATININE 2.1* 1.6* 1.8* 2.0*   CALCIUM 9.3 7.6* 9.7 9.3   PROT 7.8 5.7*  --   --    ALBUMIN 3.8 2.9*  --   --    BILITOT 0.3 0.2  --   --    ALKPHOS 65 51*  --    --    AST 15 8*  --   --    ALT 15 11  --   --    ANIONGAP 14 12 13 11     Cardiac Markers:   Recent Labs   Lab 03/18/23  1813   *     Coagulation: No results for input(s): PT, INR, APTT in the last 48 hours.  Lactic Acid: No results for input(s): LACTATE in the last 48 hours.  Magnesium:   Recent Labs   Lab 03/18/23 2009 03/19/23  0518 03/20/23  0551   MG 1.5* 2.5 2.1     Troponin:   Recent Labs   Lab 03/18/23  1813   TROPONINI 0.030*     TSH: No results for input(s): TSH in the last 4320 hours.  Urine Studies:   No results for input(s): COLORU, APPEARANCEUA, PHUR, SPECGRAV, PROTEINUA, GLUCUA, KETONESU, BILIRUBINUA, OCCULTUA, NITRITE, UROBILINOGEN, LEUKOCYTESUR, RBCUA, WBCUA, BACTERIA, SQUAMEPITHEL, HYALINECASTS in the last 48 hours.    Invalid input(s): JAYME      Significant Imaging: I have reviewed all pertinent imaging results/findings within the past 24 hours.

## 2023-03-20 NOTE — PLAN OF CARE
AAO4. Able to verbalize needs. NAEON. VSS. SB-SR. PIV patent. IV diuresis. 2L O2 NC. Ambulatory w/ assist. Skin WDI. ADA; cardiac; renal; fluid restricted diet. Denies pain. Continent of b/b. NADN. Will continue to monitor. Chart check complete.

## 2023-03-20 NOTE — PLAN OF CARE
POC reviewed with pt. Pt verbalizes understanding of POC. No questions at this time.  AAOx4. NADN.  Sinus bradycardia on cardiac monitor.  Pt remains free of falls. Up to BSC with x1 assistance.  Purewick in place.  Echo done today.   No complaints at this time.  Safety measures in place. Will continue to monitor.  Informed pt to call for assistance before getting up. Pt verbalizes understanding.   Hourly rounding and chart check complete.

## 2023-03-20 NOTE — ASSESSMENT & PLAN NOTE
03/20/2023  --controlled at this encounter  --home medications include: clonidine, norvasc, coreg  --continue clonidine, norvasc  --coreg stopped d/t bradycardia  --Q4HVS

## 2023-03-20 NOTE — ASSESSMENT & PLAN NOTE
03/20/2023  -Chronic  -EKG revealed sinus Tre with first-degree AV block, HR 46   -hold Coreg for now, restart when appropriate  -monitor VS routinely

## 2023-03-20 NOTE — SUBJECTIVE & OBJECTIVE
Past Medical History:   Diagnosis Date    Anemia     Angina pectoris     Anxiety     Anxiety and depression     Arthritis     hip    Carotid artery occlusion     Carpal tunnel syndrome 06/23/2008    emg    Chronic diarrhea     work up in 2011 with EGD, CS and VCE    CKD (chronic kidney disease) stage 3, GFR 30-59 ml/min 5/11/2017    Colitis     Coronary artery disease     Coronary artery disease     Diastolic dysfunction     Diverticulosis     Glaucoma     Greater trochanteric bursitis 2/10/2015    Grief at loss of child 1/26/2016    H/O carotid endarterectomy 12/2/2013    Heart failure     History of coronary angioplasty 3/11/2014    Hypercholesteremia     Hypertension     Liver cyst 02/08/2013    ct abd    Macular degeneration     Obesity with serious comorbidity 3/19/2023    Primary open-angle glaucoma(365.11) 9/3/2013    Renal cyst 02/08/2013    ct abd    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    Sarcoidosis     Sarcoidosis of lung     Sickle cell trait     Uveitis        Past Surgical History:   Procedure Laterality Date    CAROTID ENDARTERECTOMY Right 2000s    CATARACT EXTRACTION Bilateral     Dr. Liang Dennis    CHOLECYSTECTOMY      laparoscopic, 3/18.    CORONARY ANGIOPLASTY WITH STENT PLACEMENT  11/19/2010    RCA-HALIE 2010    Lathia    JOINT REPLACEMENT Left 11/03/2014    Dr. Braun    TOTAL ABDOMINAL HYSTERECTOMY W/ BILATERAL SALPINGOOPHORECTOMY  1972       Review of patient's allergies indicates:   Allergen Reactions    Lisinopril      angioedema    Codeine Nausea And Vomiting       No current facility-administered medications on file prior to encounter.     Current Outpatient Medications on File Prior to Encounter   Medication Sig    aflibercept 2 mg/0.05 mL Soln 2 mg by Intravitreal route every 28 days.    amLODIPine (NORVASC) 5 MG tablet Take 1 tablet (5 mg total) by mouth 2 (two) times daily.    aspirin (ECOTRIN) 81 MG EC tablet Take 81 mg by mouth once daily.    budesonide (ENTOCORT EC)  3 mg capsule Take 3 mg by mouth 2 (two) times a day.    carvediloL (COREG) 12.5 MG tablet Take 1 tablet (12.5 mg total) by mouth 2 (two) times daily with meals.    cloNIDine (CATAPRES) 0.1 MG tablet Take 1 tablet (0.1 mg total) by mouth 2 (two) times daily.    dorzolamide-timolol 2-0.5% (COSOPT) 22.3-6.8 mg/mL ophthalmic solution Place 1 drop into both eyes 2 (two) times daily.    FOLIC ACID/MULTIVIT-MIN/LUTEIN (CENTRUM SILVER ORAL) Take 1 tablet by mouth once daily.    furosemide (LASIX) 20 MG tablet Take 1 tablet (20 mg total) by mouth once daily. May also take 2 tablets (40 mg total) daily as needed (for leg swelling).    hydrALAZINE (APRESOLINE) 25 MG tablet Take 1 tablet (25 mg total) by mouth every 8 (eight) hours.    iron-vitamin C 100-250 mg, ICAR-C, (ICAR-C) 100-250 mg Tab Take 1 tablet by mouth once daily.    lipase-protease-amylase 24,000-76,000-120,000 units (CREON) 24,000-76,000 -120,000 unit capsule Take 1 capsule by mouth 3 (three) times daily with meals.    nitroGLYCERIN (NITROSTAT) 0.4 MG SL tablet Place 1 tablet (0.4 mg total) under the tongue every 5 (five) minutes as needed for Chest pain.    pravastatin (PRAVACHOL) 40 MG tablet Take 1 tablet (40 mg total) by mouth once daily.    dexAMETHasone (OZURDEX) 0.7 mg Impl intravitreal implant 0.7 mg by Intravitreal route once.    gabapentin (NEURONTIN) 300 MG capsule Take 1 capsule (300 mg total) by mouth once daily.    pantoprazole (PROTONIX) 40 MG tablet Take 1 tablet (40 mg total) by mouth once daily.    [DISCONTINUED] citalopram (CELEXA) 20 MG tablet Take 1 tablet (20 mg total) by mouth once daily. (Patient not taking: Reported on 3/18/2022)     Family History       Problem Relation (Age of Onset)    Cancer Father, Daughter    Cirrhosis Brother    Heart failure Mother, Brother    Hypertension Mother          Tobacco Use    Smoking status: Never    Smokeless tobacco: Never   Substance and Sexual Activity    Alcohol use: No     Alcohol/week: 0.0  standard drinks    Drug use: No    Sexual activity: Yes     Partners: Male     Review of Systems   Constitutional: Positive for malaise/fatigue.   HENT: Negative.     Eyes: Negative.    Cardiovascular:  Positive for chest pain and leg swelling.   Respiratory:  Positive for shortness of breath.    Skin: Negative.    Musculoskeletal: Negative.    Gastrointestinal: Negative.    Genitourinary: Negative.    Neurological: Negative.    Psychiatric/Behavioral: Negative.     Objective:     Vital Signs (Most Recent):  Temp: 98.3 °F (36.8 °C) (03/20/23 1103)  Pulse: (!) 40 (03/20/23 1103)  Resp: 17 (03/20/23 1103)  BP: 128/61 (03/20/23 1103)  SpO2: 98 % (03/20/23 1103)   Vital Signs (24h Range):  Temp:  [97.4 °F (36.3 °C)-98.5 °F (36.9 °C)] 98.3 °F (36.8 °C)  Pulse:  [38-75] 40  Resp:  [17-18] 17  SpO2:  [96 %-99 %] 98 %  BP: (128-174)/(61-79) 128/61     Weight: 66.7 kg (147 lb)  Body mass index is 30.72 kg/m².    SpO2: 98 %         Intake/Output Summary (Last 24 hours) at 3/20/2023 1211  Last data filed at 3/20/2023 0800  Gross per 24 hour   Intake 360 ml   Output 2250 ml   Net -1890 ml       Lines/Drains/Airways       Drain  Duration             Female External Urinary Catheter 03/18/23 1940 1 day              Peripheral Intravenous Line  Duration                  Peripheral IV - Single Lumen 03/18/23 1813 20 G Right Antecubital 1 day                    Physical Exam  Vitals and nursing note reviewed.   Constitutional:       Appearance: Normal appearance.   HENT:      Head: Normocephalic.   Eyes:      Pupils: Pupils are equal, round, and reactive to light.   Cardiovascular:      Rate and Rhythm: Normal rate and regular rhythm.      Heart sounds: Normal heart sounds, S1 normal and S2 normal. No murmur heard.    No S3 or S4 sounds.   Pulmonary:      Effort: Accessory muscle usage present.      Breath sounds: Normal breath sounds.   Abdominal:      General: Bowel sounds are normal.      Palpations: Abdomen is soft.    Musculoskeletal:         General: Normal range of motion.      Cervical back: Normal range of motion.      Right lower leg: Edema present.      Left lower leg: Edema present.   Skin:     Capillary Refill: Capillary refill takes less than 2 seconds.   Neurological:      General: No focal deficit present.      Mental Status: She is alert and oriented to person, place, and time.   Psychiatric:         Mood and Affect: Mood normal.         Behavior: Behavior normal.         Thought Content: Thought content normal.       Significant Labs: BMP:   Recent Labs   Lab 03/18/23 2009 03/19/23 0518 03/20/23  0551   * 101 110    142 136   K 2.8* 3.5 4.1   * 109 104   CO2 16* 20* 21*   BUN 35* 38* 45*   CREATININE 1.6* 1.8* 2.0*   CALCIUM 7.6* 9.7 9.3   MG 1.5* 2.5 2.1   , CMP   Recent Labs   Lab 03/18/23 1813 03/18/23 2009 03/19/23 0518 03/20/23  0551    144 142 136   K 3.3* 2.8* 3.5 4.1    116* 109 104   CO2 21* 16* 20* 21*   * 127* 101 110   BUN 40* 35* 38* 45*   CREATININE 2.1* 1.6* 1.8* 2.0*   CALCIUM 9.3 7.6* 9.7 9.3   PROT 7.8 5.7*  --   --    ALBUMIN 3.8 2.9*  --   --    BILITOT 0.3 0.2  --   --    ALKPHOS 65 51*  --   --    AST 15 8*  --   --    ALT 15 11  --   --    ANIONGAP 14 12 13 11   , CBC   Recent Labs   Lab 03/18/23 1813 03/20/23  0551   WBC 8.93 7.24   HGB 10.1* 10.3*   HCT 30.1* 31.2*    296   , INR No results for input(s): INR, PROTIME in the last 48 hours., Lipid Panel No results for input(s): CHOL, HDL, LDLCALC, TRIG, CHOLHDL in the last 48 hours., Troponin   Recent Labs   Lab 03/18/23 1813   TROPONINI 0.030*   , and All pertinent lab results from the last 24 hours have been reviewed.    Significant Imaging: Echocardiogram: Transthoracic echo (TTE) complete (Cupid Only):   Results for orders placed or performed during the hospital encounter of 03/18/23   Echo   Result Value Ref Range    BSA 1.65 m2    TDI SEPTAL 0.08 m/s    LV LATERAL E/E' RATIO 18.33 m/s     LV SEPTAL E/E' RATIO 13.75 m/s    LA WIDTH 3.10 cm    IVC diameter 0.74 cm    Left Ventricular Outflow Tract Mean Velocity 0.70 cm/s    Left Ventricular Outflow Tract Mean Gradient 2.16 mmHg    TDI LATERAL 0.06 m/s    LVIDd 3.40 (A) 3.5 - 6.0 cm    IVS 1.32 (A) 0.6 - 1.1 cm    Posterior Wall 1.09 0.6 - 1.1 cm    Ao root annulus 3.30 cm    LVIDs 2.31 2.1 - 4.0 cm    FS 32 28 - 44 %    LA volume 35.48 cm3    STJ 3.16 cm    Ascending aorta 3.21 cm    LV mass 131.07 g    LA size 3.28 cm    TAPSE 1.90 cm    Left Ventricle Relative Wall Thickness 0.64 cm    AV mean gradient 8 mmHg    AV valve area 1.70 cm2    AV Velocity Ratio 0.52     AV index (prosthetic) 0.62     MV mean gradient 6 mmHg    MV valve area p 1/2 method 2.23 cm2    MV valve area by continuity eq 1.45 cm2    PV peak gradient 1.61 mmHg    E/A ratio 0.80     Mean e' 0.07 m/s    E wave deceleration time 387.37 msec    IVRT 94.20 msec    LVOT diameter 1.86 cm    LVOT area 2.7 cm2    LVOT peak matthew 0.96 m/s    LVOT peak VTI 30.60 cm    Ao peak matthew 1.86 m/s    Ao VTI 49.0 cm    RVOT peak matthew 0.64 m/s    RVOT peak VTI 17.5 cm    Mr max matthew 4.90 m/s    LVOT stroke volume 83.10 cm3    AV peak gradient 14 mmHg    MV peak gradient 12 mmHg    PV mean gradient 0.85 mmHg    E/E' ratio 15.71 m/s    MV Peak E Matthew 1.10 m/s    TR Max Matthew 2.83 m/s    MV VTI 57.4 cm    MV stenosis pressure 1/2 time 98.51 ms    MV Peak A Matthew 1.38 m/s    LV Systolic Volume 18.39 mL    LV Systolic Volume Index 11.5 mL/m2    LV Diastolic Volume 47.53 mL    LV Diastolic Volume Index 29.71 mL/m2    LA Volume Index 22.2 mL/m2    LV Mass Index 82 g/m2    RA Major Axis 4.04 cm    Left Atrium Minor Axis 4.09 cm    Left Atrium Major Axis 4.12 cm    Triscuspid Valve Regurgitation Peak Gradient 32 mmHg   , EKG: reviewed, Stress Test: reviewed, and X-Ray: CXR: X-Ray Chest 1 View (CXR): No results found for this visit on 03/18/23.

## 2023-03-20 NOTE — ASSESSMENT & PLAN NOTE
Echo pending, previous 3/22' EF nml   improved from 4 days ago 938  Cont IV diuresis one more day  Strict I/Os  Low Na diet

## 2023-03-20 NOTE — ASSESSMENT & PLAN NOTE
03/20/2023  --improving, down approximately 3L since admission  --given repeat ER visits for HF, put in consult for patient's cardiology team to assist in medication optimization  --recommended continued IV diuresis for at least one additional day, to which I agree  --defer rest of management to their team

## 2023-03-20 NOTE — CONSULTS
[facility] ([facility])  Cardiology  Consult Note    Patient Name: Glo Dumont  MRN: [id number]  Admission Date: 3/18/2023  Hospital Length of Stay: 0 days  Code Status: Full Code   Attending Provider: Leonardo Caldwell MD   Consulting Provider: Niki Benson NP  Primary Care Physician: Christina Recio MD  Principal Problem:Acute on chronic congestive heart failure    Patient information was obtained from patient and ER records.     Inpatient consult to Cardiology  Consult performed by: Niki Benson NP  Consult ordered by: Leonardo Caldwell MD        Subjective:     Chief Complaint:  SOB     HPI:   Ms. Dumont is a 86y/o AA female with PMHx of CKD, CAD, HTN, HLD, chronic diastolic HF, Anemia, carotid artery occlusion, H/O carotid endarterectomy, Sarcoidosis, Sarcoidosis of lung who presented to [facility] ED c/o SOB, chest pressure onset a few days ago. Cardiology consulted to assist with management. Pt seen and examined today, she reports she was seen in the ED recently with no improvement of sxs since, she follows Dr. Lui in clinic and was told to try lasix BID with no improvement. She c/o fatigue and SOB during exam. Labs reviewed, Crt 2.0,  down from 938 4 days ago, -2.8L for admission, Crt 2.0, Echo pending previous in March 22' EF nml, nuclear stress last year normal, HR noted to be as low as 38 EP consulted, EKG reviewed      Past Medical History:   Diagnosis Date    Anemia     Angina pectoris     Anxiety     Anxiety and depression     Arthritis     hip    Carotid artery occlusion     Carpal tunnel syndrome 06/23/2008    emg    Chronic diarrhea     work up in 2011 with EGD, CS and VCE    CKD (chronic kidney disease) stage 3, GFR 30-59 ml/min 5/11/2017    Colitis     Coronary artery disease     Coronary artery disease     Diastolic dysfunction     Diverticulosis     Glaucoma     Greater trochanteric bursitis 2/10/2015    Grief at loss of child 1/26/2016    H/O carotid  endarterectomy 12/2/2013    Heart failure     History of coronary angioplasty 3/11/2014    Hypercholesteremia     Hypertension     Liver cyst 02/08/2013    ct abd    Macular degeneration     Obesity with serious comorbidity 3/19/2023    Primary open-angle glaucoma(365.11) 9/3/2013    Renal cyst 02/08/2013    ct abd    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    Sarcoidosis     Sarcoidosis of lung     Sickle cell trait     Uveitis        Past Surgical History:   Procedure Laterality Date    CAROTID ENDARTERECTOMY Right 2000s    CATARACT EXTRACTION Bilateral     Dr. Liang Dennis    CHOLECYSTECTOMY      laparoscopic, 3/18.    CORONARY ANGIOPLASTY WITH STENT PLACEMENT  11/19/2010    RCA-HALIE 2010    Lathia    JOINT REPLACEMENT Left 11/03/2014    Dr. Braun    TOTAL ABDOMINAL HYSTERECTOMY W/ BILATERAL SALPINGOOPHORECTOMY  1972       Review of patient's allergies indicates:   Allergen Reactions    Lisinopril      angioedema    Codeine Nausea And Vomiting       No current facility-administered medications on file prior to encounter.     Current Outpatient Medications on File Prior to Encounter   Medication Sig    aflibercept 2 mg/0.05 mL Soln 2 mg by Intravitreal route every 28 days.    amLODIPine (NORVASC) 5 MG tablet Take 1 tablet (5 mg total) by mouth 2 (two) times daily.    aspirin (ECOTRIN) 81 MG EC tablet Take 81 mg by mouth once daily.    budesonide (ENTOCORT EC) 3 mg capsule Take 3 mg by mouth 2 (two) times a day.    carvediloL (COREG) 12.5 MG tablet Take 1 tablet (12.5 mg total) by mouth 2 (two) times daily with meals.    cloNIDine (CATAPRES) 0.1 MG tablet Take 1 tablet (0.1 mg total) by mouth 2 (two) times daily.    dorzolamide-timolol 2-0.5% (COSOPT) 22.3-6.8 mg/mL ophthalmic solution Place 1 drop into both eyes 2 (two) times daily.    FOLIC ACID/MULTIVIT-MIN/LUTEIN (CENTRUM SILVER ORAL) Take 1 tablet by mouth once daily.    furosemide (LASIX) 20 MG tablet Take 1 tablet (20 mg total) by mouth once  daily. May also take 2 tablets (40 mg total) daily as needed (for leg swelling).    hydrALAZINE (APRESOLINE) 25 MG tablet Take 1 tablet (25 mg total) by mouth every 8 (eight) hours.    iron-vitamin C 100-250 mg, ICAR-C, (ICAR-C) 100-250 mg Tab Take 1 tablet by mouth once daily.    lipase-protease-amylase 24,000-76,000-120,000 units (CREON) 24,000-76,000 -120,000 unit capsule Take 1 capsule by mouth 3 (three) times daily with meals.    nitroGLYCERIN (NITROSTAT) 0.4 MG SL tablet Place 1 tablet (0.4 mg total) under the tongue every 5 (five) minutes as needed for Chest pain.    pravastatin (PRAVACHOL) 40 MG tablet Take 1 tablet (40 mg total) by mouth once daily.    dexAMETHasone (OZURDEX) 0.7 mg Impl intravitreal implant 0.7 mg by Intravitreal route once.    gabapentin (NEURONTIN) 300 MG capsule Take 1 capsule (300 mg total) by mouth once daily.    pantoprazole (PROTONIX) 40 MG tablet Take 1 tablet (40 mg total) by mouth once daily.    [DISCONTINUED] citalopram (CELEXA) 20 MG tablet Take 1 tablet (20 mg total) by mouth once daily. (Patient not taking: Reported on 3/18/2022)     Family History       Problem Relation (Age of Onset)    Cancer Father, Daughter    Cirrhosis Brother    Heart failure Mother, Brother    Hypertension Mother          Tobacco Use    Smoking status: Never    Smokeless tobacco: Never   Substance and Sexual Activity    Alcohol use: No     Alcohol/week: 0.0 standard drinks    Drug use: No    Sexual activity: Yes     Partners: Male     Review of Systems   Constitutional: Positive for malaise/fatigue.   HENT: Negative.     Eyes: Negative.    Cardiovascular:  Positive for chest pain and leg swelling.   Respiratory:  Positive for shortness of breath.    Skin: Negative.    Musculoskeletal: Negative.    Gastrointestinal: Negative.    Genitourinary: Negative.    Neurological: Negative.    Psychiatric/Behavioral: Negative.     Objective:     Vital Signs (Most Recent):  Temp: 98.3 °F (36.8 °C) (03/20/23  1103)  Pulse: (!) 40 (03/20/23 1103)  Resp: 17 (03/20/23 1103)  BP: 128/61 (03/20/23 1103)  SpO2: 98 % (03/20/23 1103)   Vital Signs (24h Range):  Temp:  [97.4 °F (36.3 °C)-98.5 °F (36.9 °C)] 98.3 °F (36.8 °C)  Pulse:  [38-75] 40  Resp:  [17-18] 17  SpO2:  [96 %-99 %] 98 %  BP: (128-174)/(61-79) 128/61     Weight: 66.7 kg (147 lb)  Body mass index is 30.72 kg/m².    SpO2: 98 %         Intake/Output Summary (Last 24 hours) at 3/20/2023 1211  Last data filed at 3/20/2023 0800  Gross per 24 hour   Intake 360 ml   Output 2250 ml   Net -1890 ml       Lines/Drains/Airways       Drain  Duration             Female External Urinary Catheter 03/18/23 1940 1 day              Peripheral Intravenous Line  Duration                  Peripheral IV - Single Lumen 03/18/23 1813 20 G Right Antecubital 1 day                    Physical Exam  Vitals and nursing note reviewed.   Constitutional:       Appearance: Normal appearance.   HENT:      Head: Normocephalic.   Eyes:      Pupils: Pupils are equal, round, and reactive to light.   Cardiovascular:      Rate and Rhythm: Bradycardia and regular rhythm.      Heart sounds: Normal heart sounds, S1 normal and S2 normal. No murmur heard.    No S3 or S4 sounds.   Pulmonary:      Effort: Accessory muscle usage present.      Breath sounds: Normal breath sounds.   Abdominal:      General: Bowel sounds are normal.      Palpations: Abdomen is soft.   Musculoskeletal:         General: Normal range of motion.      Cervical back: Normal range of motion.      Right lower leg: Edema present.      Left lower leg: Edema present.   Skin:     Capillary Refill: Capillary refill takes less than 2 seconds.   Neurological:      General: No focal deficit present.      Mental Status: She is alert and oriented to person, place, and time.   Psychiatric:         Mood and Affect: Mood normal.         Behavior: Behavior normal.         Thought Content: Thought content normal.       Significant Labs: BMP:   Recent Labs    Lab 03/18/23 2009 03/19/23 0518 03/20/23  0551   * 101 110    142 136   K 2.8* 3.5 4.1   * 109 104   CO2 16* 20* 21*   BUN 35* 38* 45*   CREATININE 1.6* 1.8* 2.0*   CALCIUM 7.6* 9.7 9.3   MG 1.5* 2.5 2.1   , CMP   Recent Labs   Lab 03/18/23 1813 03/18/23 2009 03/19/23 0518 03/20/23  0551    144 142 136   K 3.3* 2.8* 3.5 4.1    116* 109 104   CO2 21* 16* 20* 21*   * 127* 101 110   BUN 40* 35* 38* 45*   CREATININE 2.1* 1.6* 1.8* 2.0*   CALCIUM 9.3 7.6* 9.7 9.3   PROT 7.8 5.7*  --   --    ALBUMIN 3.8 2.9*  --   --    BILITOT 0.3 0.2  --   --    ALKPHOS 65 51*  --   --    AST 15 8*  --   --    ALT 15 11  --   --    ANIONGAP 14 12 13 11   , CBC   Recent Labs   Lab 03/18/23 1813 03/20/23  0551   WBC 8.93 7.24   HGB 10.1* 10.3*   HCT 30.1* 31.2*    296   , INR No results for input(s): INR, PROTIME in the last 48 hours., Lipid Panel No results for input(s): CHOL, HDL, LDLCALC, TRIG, CHOLHDL in the last 48 hours., Troponin   Recent Labs   Lab 03/18/23 1813   TROPONINI 0.030*   , and All pertinent lab results from the last 24 hours have been reviewed.    Significant Imaging: Echocardiogram: Transthoracic echo (TTE) complete (Cupid Only):   Results for orders placed or performed during the hospital encounter of 03/18/23   Echo   Result Value Ref Range    BSA 1.65 m2    TDI SEPTAL 0.08 m/s    LV LATERAL E/E' RATIO 18.33 m/s    LV SEPTAL E/E' RATIO 13.75 m/s    LA WIDTH 3.10 cm    IVC diameter 0.74 cm    Left Ventricular Outflow Tract Mean Velocity 0.70 cm/s    Left Ventricular Outflow Tract Mean Gradient 2.16 mmHg    TDI LATERAL 0.06 m/s    LVIDd 3.40 (A) 3.5 - 6.0 cm    IVS 1.32 (A) 0.6 - 1.1 cm    Posterior Wall 1.09 0.6 - 1.1 cm    Ao root annulus 3.30 cm    LVIDs 2.31 2.1 - 4.0 cm    FS 32 28 - 44 %    LA volume 35.48 cm3    STJ 3.16 cm    Ascending aorta 3.21 cm    LV mass 131.07 g    LA size 3.28 cm    TAPSE 1.90 cm    Left Ventricle Relative Wall Thickness 0.64 cm     AV mean gradient 8 mmHg    AV valve area 1.70 cm2    AV Velocity Ratio 0.52     AV index (prosthetic) 0.62     MV mean gradient 6 mmHg    MV valve area p 1/2 method 2.23 cm2    MV valve area by continuity eq 1.45 cm2    PV peak gradient 1.61 mmHg    E/A ratio 0.80     Mean e' 0.07 m/s    E wave deceleration time 387.37 msec    IVRT 94.20 msec    LVOT diameter 1.86 cm    LVOT area 2.7 cm2    LVOT peak matthew 0.96 m/s    LVOT peak VTI 30.60 cm    Ao peak matthew 1.86 m/s    Ao VTI 49.0 cm    RVOT peak matthew 0.64 m/s    RVOT peak VTI 17.5 cm    Mr max matthew 4.90 m/s    LVOT stroke volume 83.10 cm3    AV peak gradient 14 mmHg    MV peak gradient 12 mmHg    PV mean gradient 0.85 mmHg    E/E' ratio 15.71 m/s    MV Peak E Matthew 1.10 m/s    TR Max Matthew 2.83 m/s    MV VTI 57.4 cm    MV stenosis pressure 1/2 time 98.51 ms    MV Peak A Matthew 1.38 m/s    LV Systolic Volume 18.39 mL    LV Systolic Volume Index 11.5 mL/m2    LV Diastolic Volume 47.53 mL    LV Diastolic Volume Index 29.71 mL/m2    LA Volume Index 22.2 mL/m2    LV Mass Index 82 g/m2    RA Major Axis 4.04 cm    Left Atrium Minor Axis 4.09 cm    Left Atrium Major Axis 4.12 cm    Triscuspid Valve Regurgitation Peak Gradient 32 mmHg   , EKG: reviewed, Stress Test: reviewed, and X-Ray: CXR: X-Ray Chest 1 View (CXR): No results found for this visit on 03/18/23.    Assessment and Plan:     * Acute on chronic congestive heart failure  Echo pending, previous 3/22' EF nml   improved from 4 days ago 938  Cont IV diuresis one more day  Strict I/Os  Low Na diet      Bradycardia  EKG reviewed  EP consulted  Avoid AV henry blocking agents  Cont to hold home BB    CKD (chronic kidney disease) stage 4, GFR 15-29 ml/min  Follows Dr. Villareal in OP clinic    Essential hypertension  Stable  Cont current medications    Coronary artery disease, occlusive  Cont ASA, statin    Other hyperlipidemia  statin        VTE Risk Mitigation (From admission, onward)           Ordered     IP VTE HIGH RISK  PATIENT  Once         03/18/23 1953     Place sequential compression device  Until discontinued         03/18/23 1953                    Thank you for your consult. I will follow-up with patient. Please contact us if you have any additional questions.    Niki Benson NP  Cardiology   [facility] - Telemetry ([facility])

## 2023-03-20 NOTE — ASSESSMENT & PLAN NOTE
03/20/2023  --stable, no active chest pain at this time  --continue aspirin therapy, restart home pravastatin  --monitor clinically, PRN EKG

## 2023-03-20 NOTE — HPI
Ms. Dumont is a 86y/o AA female with PMHx of CKD, CAD, HTN, HLD, chronic diastolic HF, Anemia, carotid artery occlusion, H/O carotid endarterectomy, Sarcoidosis, Sarcoidosis of lung who presented to [facility] ED c/o SOB, chest pressure onset a few days ago. Cardiology consulted to assist with management. Pt seen and examined today, she reports she was seen in the ED recently with no improvement of sxs since, she follows Dr. Lui in clinic and was told to try lasix BID with no improvement. She c/o fatigue and SOB during exam. Labs reviewed, Crt 2.0,  down from 938 4 days ago, -2.8L for admission, Crt 2.0, Echo pending previous in March 22' EF nml, nuclear stress last year normal, HR noted to be as low as 38 EP consulted, EKG reviewed

## 2023-03-21 ENCOUNTER — ANESTHESIA (OUTPATIENT)
Dept: CARDIOLOGY | Facility: HOSPITAL | Age: 88
DRG: 242 | End: 2023-03-21
Payer: MEDICARE

## 2023-03-21 ENCOUNTER — ANESTHESIA EVENT (OUTPATIENT)
Dept: CARDIOLOGY | Facility: HOSPITAL | Age: 88
DRG: 242 | End: 2023-03-21
Payer: MEDICARE

## 2023-03-21 DIAGNOSIS — I49.5 SA NODE DYSFUNCTION: ICD-10-CM

## 2023-03-21 DIAGNOSIS — Z95.0 CARDIAC PACEMAKER IN SITU: Primary | ICD-10-CM

## 2023-03-21 DIAGNOSIS — I50.30 DIASTOLIC HEART FAILURE, NYHA CLASS 3: ICD-10-CM

## 2023-03-21 DIAGNOSIS — R00.1 BRADYCARDIA: ICD-10-CM

## 2023-03-21 LAB
ANION GAP SERPL CALC-SCNC: 11 MMOL/L (ref 8–16)
BASOPHILS # BLD AUTO: 0.04 K/UL (ref 0–0.2)
BASOPHILS NFR BLD: 0.6 % (ref 0–1.9)
BNP SERPL-MCNC: 170 PG/ML (ref 0–99)
BUN SERPL-MCNC: 64 MG/DL (ref 8–23)
CALCIUM SERPL-MCNC: 8.9 MG/DL (ref 8.7–10.5)
CHLORIDE SERPL-SCNC: 104 MMOL/L (ref 95–110)
CO2 SERPL-SCNC: 20 MMOL/L (ref 23–29)
CREAT SERPL-MCNC: 2.5 MG/DL (ref 0.5–1.4)
DIFFERENTIAL METHOD: ABNORMAL
EOSINOPHIL # BLD AUTO: 0.1 K/UL (ref 0–0.5)
EOSINOPHIL NFR BLD: 1.8 % (ref 0–8)
ERYTHROCYTE [DISTWIDTH] IN BLOOD BY AUTOMATED COUNT: 15.7 % (ref 11.5–14.5)
EST. GFR  (NO RACE VARIABLE): 18 ML/MIN/1.73 M^2
GLUCOSE SERPL-MCNC: 116 MG/DL (ref 70–110)
HCT VFR BLD AUTO: 28.7 % (ref 37–48.5)
HGB BLD-MCNC: 9.4 G/DL (ref 12–16)
IMM GRANULOCYTES # BLD AUTO: 0.07 K/UL (ref 0–0.04)
IMM GRANULOCYTES NFR BLD AUTO: 1 % (ref 0–0.5)
LYMPHOCYTES # BLD AUTO: 0.8 K/UL (ref 1–4.8)
LYMPHOCYTES NFR BLD: 12.4 % (ref 18–48)
MAGNESIUM SERPL-MCNC: 2.2 MG/DL (ref 1.6–2.6)
MCH RBC QN AUTO: 27.2 PG (ref 27–31)
MCHC RBC AUTO-ENTMCNC: 32.8 G/DL (ref 32–36)
MCV RBC AUTO: 83 FL (ref 82–98)
MONOCYTES # BLD AUTO: 0.8 K/UL (ref 0.3–1)
MONOCYTES NFR BLD: 11.5 % (ref 4–15)
NEUTROPHILS # BLD AUTO: 4.9 K/UL (ref 1.8–7.7)
NEUTROPHILS NFR BLD: 72.7 % (ref 38–73)
NRBC BLD-RTO: 0 /100 WBC
PLATELET # BLD AUTO: 269 K/UL (ref 150–450)
PMV BLD AUTO: 10.3 FL (ref 9.2–12.9)
POTASSIUM SERPL-SCNC: 4.4 MMOL/L (ref 3.5–5.1)
RBC # BLD AUTO: 3.45 M/UL (ref 4–5.4)
SODIUM SERPL-SCNC: 135 MMOL/L (ref 136–145)
TROPONIN I SERPL DL<=0.01 NG/ML-MCNC: 0.01 NG/ML (ref 0–0.03)
TROPONIN I SERPL DL<=0.01 NG/ML-MCNC: 0.01 NG/ML (ref 0–0.03)
TROPONIN I SERPL DL<=0.01 NG/ML-MCNC: <0.006 NG/ML (ref 0–0.03)
WBC # BLD AUTO: 6.78 K/UL (ref 3.9–12.7)

## 2023-03-21 PROCEDURE — C1894 INTRO/SHEATH, NON-LASER: HCPCS | Mod: HCNC | Performed by: INTERNAL MEDICINE

## 2023-03-21 PROCEDURE — 63600175 PHARM REV CODE 636 W HCPCS: Mod: HCNC | Performed by: INTERNAL MEDICINE

## 2023-03-21 PROCEDURE — 63600175 PHARM REV CODE 636 W HCPCS: Mod: HCNC | Performed by: NURSE ANESTHETIST, CERTIFIED REGISTERED

## 2023-03-21 PROCEDURE — 99233 SBSQ HOSP IP/OBS HIGH 50: CPT | Mod: HCNC,,, | Performed by: INTERNAL MEDICINE

## 2023-03-21 PROCEDURE — 80048 BASIC METABOLIC PNL TOTAL CA: CPT | Mod: HCNC | Performed by: NURSE PRACTITIONER

## 2023-03-21 PROCEDURE — C1769 GUIDE WIRE: HCPCS | Mod: HCNC | Performed by: INTERNAL MEDICINE

## 2023-03-21 PROCEDURE — C1730 CATH, EP, 19 OR FEW ELECT: HCPCS | Mod: HCNC | Performed by: INTERNAL MEDICINE

## 2023-03-21 PROCEDURE — 25000003 PHARM REV CODE 250: Mod: HCNC | Performed by: INTERNAL MEDICINE

## 2023-03-21 PROCEDURE — C1898 LEAD, PMKR, OTHER THAN TRANS: HCPCS | Mod: HCNC | Performed by: INTERNAL MEDICINE

## 2023-03-21 PROCEDURE — 83880 ASSAY OF NATRIURETIC PEPTIDE: CPT | Mod: HCNC | Performed by: INTERNAL MEDICINE

## 2023-03-21 PROCEDURE — 83735 ASSAY OF MAGNESIUM: CPT | Mod: HCNC | Performed by: STUDENT IN AN ORGANIZED HEALTH CARE EDUCATION/TRAINING PROGRAM

## 2023-03-21 PROCEDURE — 84484 ASSAY OF TROPONIN QUANT: CPT | Mod: HCNC | Performed by: STUDENT IN AN ORGANIZED HEALTH CARE EDUCATION/TRAINING PROGRAM

## 2023-03-21 PROCEDURE — 93010 ELECTROCARDIOGRAM REPORT: CPT | Mod: HCNC,,, | Performed by: INTERNAL MEDICINE

## 2023-03-21 PROCEDURE — 99223 PR INITIAL HOSPITAL CARE,LEVL III: ICD-10-PCS | Mod: HCNC,,, | Performed by: INTERNAL MEDICINE

## 2023-03-21 PROCEDURE — 37000008 HC ANESTHESIA 1ST 15 MINUTES: Mod: HCNC | Performed by: INTERNAL MEDICINE

## 2023-03-21 PROCEDURE — 93010 ELECTROCARDIOGRAM REPORT: CPT | Mod: HCNC,76,, | Performed by: INTERNAL MEDICINE

## 2023-03-21 PROCEDURE — 93005 ELECTROCARDIOGRAM TRACING: CPT | Mod: HCNC

## 2023-03-21 PROCEDURE — 36415 COLL VENOUS BLD VENIPUNCTURE: CPT | Mod: HCNC | Performed by: STUDENT IN AN ORGANIZED HEALTH CARE EDUCATION/TRAINING PROGRAM

## 2023-03-21 PROCEDURE — 25000003 PHARM REV CODE 250: Mod: HCNC | Performed by: STUDENT IN AN ORGANIZED HEALTH CARE EDUCATION/TRAINING PROGRAM

## 2023-03-21 PROCEDURE — 33208 PR INSER HART PACER XVENOUS ATR/VENTR: ICD-10-PCS | Mod: KX,HCNC,, | Performed by: INTERNAL MEDICINE

## 2023-03-21 PROCEDURE — 33208 INSRT HEART PM ATRIAL & VENT: CPT | Mod: KX,HCNC,, | Performed by: INTERNAL MEDICINE

## 2023-03-21 PROCEDURE — 85025 COMPLETE CBC W/AUTO DIFF WBC: CPT | Mod: HCNC | Performed by: STUDENT IN AN ORGANIZED HEALTH CARE EDUCATION/TRAINING PROGRAM

## 2023-03-21 PROCEDURE — 25000003 PHARM REV CODE 250: Mod: HCNC | Performed by: NURSE ANESTHETIST, CERTIFIED REGISTERED

## 2023-03-21 PROCEDURE — 93010 EKG 12-LEAD: ICD-10-PCS | Mod: HCNC,,, | Performed by: INTERNAL MEDICINE

## 2023-03-21 PROCEDURE — 99233 PR SUBSEQUENT HOSPITAL CARE,LEVL III: ICD-10-PCS | Mod: HCNC,,, | Performed by: INTERNAL MEDICINE

## 2023-03-21 PROCEDURE — 27201423 OPTIME MED/SURG SUP & DEVICES STERILE SUPPLY: Mod: HCNC | Performed by: INTERNAL MEDICINE

## 2023-03-21 PROCEDURE — 63600175 PHARM REV CODE 636 W HCPCS: Mod: HCNC | Performed by: NURSE PRACTITIONER

## 2023-03-21 PROCEDURE — 36415 COLL VENOUS BLD VENIPUNCTURE: CPT | Mod: HCNC | Performed by: INTERNAL MEDICINE

## 2023-03-21 PROCEDURE — 37000009 HC ANESTHESIA EA ADD 15 MINS: Mod: HCNC | Performed by: INTERNAL MEDICINE

## 2023-03-21 PROCEDURE — C1785 PMKR, DUAL, RATE-RESP: HCPCS | Mod: HCNC | Performed by: INTERNAL MEDICINE

## 2023-03-21 PROCEDURE — 36415 COLL VENOUS BLD VENIPUNCTURE: CPT | Mod: HCNC | Performed by: NURSE PRACTITIONER

## 2023-03-21 PROCEDURE — 99223 1ST HOSP IP/OBS HIGH 75: CPT | Mod: HCNC,,, | Performed by: INTERNAL MEDICINE

## 2023-03-21 PROCEDURE — 25000003 PHARM REV CODE 250: Mod: HCNC | Performed by: NURSE PRACTITIONER

## 2023-03-21 PROCEDURE — 63600175 PHARM REV CODE 636 W HCPCS: Mod: HCNC

## 2023-03-21 PROCEDURE — 96376 TX/PRO/DX INJ SAME DRUG ADON: CPT

## 2023-03-21 PROCEDURE — 21400001 HC TELEMETRY ROOM: Mod: HCNC

## 2023-03-21 PROCEDURE — 25000003 PHARM REV CODE 250: Mod: HCNC

## 2023-03-21 PROCEDURE — 33208 INSRT HEART PM ATRIAL & VENT: CPT | Mod: HCNC | Performed by: INTERNAL MEDICINE

## 2023-03-21 DEVICE — SLIT SUTURE SLEEVE (4.3 - 4.7F)
Type: IMPLANTABLE DEVICE | Site: CHEST  WALL | Status: FUNCTIONAL
Brand: SJM™

## 2023-03-21 DEVICE — IPG W1DR01 AZURE XT DR MRI USA
Type: IMPLANTABLE DEVICE | Site: CHEST  WALL | Status: FUNCTIONAL
Brand: AZURE™ XT DR MRI SURESCAN™

## 2023-03-21 DEVICE — LEAD 383074 MRI US
Type: IMPLANTABLE DEVICE | Site: CHEST  WALL | Status: FUNCTIONAL
Brand: SELECTSECURE™ MRI SURESCAN™

## 2023-03-21 DEVICE — LEAD 5076-45 MRI US RCMCRD
Type: IMPLANTABLE DEVICE | Site: CHEST  WALL | Status: FUNCTIONAL
Brand: CAPSUREFIX NOVUS MRI™ SURESCAN®

## 2023-03-21 RX ORDER — FUROSEMIDE 20 MG/1
20 TABLET ORAL DAILY
Status: DISCONTINUED | OUTPATIENT
Start: 2023-03-22 | End: 2023-03-23 | Stop reason: HOSPADM

## 2023-03-21 RX ORDER — SODIUM CHLORIDE 9 MG/ML
INJECTION, SOLUTION INTRAVENOUS
Status: DISCONTINUED | OUTPATIENT
Start: 2023-03-21 | End: 2023-03-23 | Stop reason: HOSPADM

## 2023-03-21 RX ORDER — FENTANYL CITRATE 50 UG/ML
INJECTION, SOLUTION INTRAMUSCULAR; INTRAVENOUS
Status: DISCONTINUED | OUTPATIENT
Start: 2023-03-21 | End: 2023-03-21

## 2023-03-21 RX ORDER — HYDRALAZINE HYDROCHLORIDE 50 MG/1
50 TABLET, FILM COATED ORAL EVERY 8 HOURS
Status: DISCONTINUED | OUTPATIENT
Start: 2023-03-21 | End: 2023-03-22

## 2023-03-21 RX ORDER — CEFAZOLIN SODIUM 1 G/3ML
INJECTION, POWDER, FOR SOLUTION INTRAMUSCULAR; INTRAVENOUS
Status: DISCONTINUED | OUTPATIENT
Start: 2023-03-21 | End: 2023-03-21

## 2023-03-21 RX ORDER — EPHEDRINE SULFATE 50 MG/ML
INJECTION, SOLUTION INTRAVENOUS
Status: DISCONTINUED | OUTPATIENT
Start: 2023-03-21 | End: 2023-03-21

## 2023-03-21 RX ORDER — PROPOFOL 10 MG/ML
VIAL (ML) INTRAVENOUS CONTINUOUS PRN
Status: DISCONTINUED | OUTPATIENT
Start: 2023-03-21 | End: 2023-03-21

## 2023-03-21 RX ORDER — KETAMINE HCL IN 0.9 % NACL 50 MG/5 ML
SYRINGE (ML) INTRAVENOUS
Status: DISCONTINUED | OUTPATIENT
Start: 2023-03-21 | End: 2023-03-21

## 2023-03-21 RX ORDER — ACETAMINOPHEN 325 MG/1
650 TABLET ORAL EVERY 4 HOURS PRN
Status: DISCONTINUED | OUTPATIENT
Start: 2023-03-21 | End: 2023-03-23 | Stop reason: HOSPADM

## 2023-03-21 RX ORDER — ASPIRIN 325 MG
325 TABLET ORAL ONCE
Status: COMPLETED | OUTPATIENT
Start: 2023-03-21 | End: 2023-03-21

## 2023-03-21 RX ORDER — VANCOMYCIN HYDROCHLORIDE 1 G/20ML
INJECTION, POWDER, LYOPHILIZED, FOR SOLUTION INTRAVENOUS
Status: DISCONTINUED | OUTPATIENT
Start: 2023-03-21 | End: 2023-03-21 | Stop reason: HOSPADM

## 2023-03-21 RX ORDER — KETOROLAC TROMETHAMINE 30 MG/ML
10 INJECTION, SOLUTION INTRAMUSCULAR; INTRAVENOUS EVERY 8 HOURS PRN
Status: DISCONTINUED | OUTPATIENT
Start: 2023-03-21 | End: 2023-03-22

## 2023-03-21 RX ORDER — LIDOCAINE HYDROCHLORIDE 20 MG/ML
INJECTION, SOLUTION EPIDURAL; INFILTRATION; INTRACAUDAL; PERINEURAL
Status: DISCONTINUED | OUTPATIENT
Start: 2023-03-21 | End: 2023-03-21 | Stop reason: HOSPADM

## 2023-03-21 RX ORDER — MIDAZOLAM HYDROCHLORIDE 1 MG/ML
INJECTION, SOLUTION INTRAMUSCULAR; INTRAVENOUS
Status: DISCONTINUED | OUTPATIENT
Start: 2023-03-21 | End: 2023-03-21

## 2023-03-21 RX ADMIN — ACETAMINOPHEN 650 MG: 325 TABLET ORAL at 08:03

## 2023-03-21 RX ADMIN — HYDRALAZINE HYDROCHLORIDE 50 MG: 50 TABLET ORAL at 10:03

## 2023-03-21 RX ADMIN — CLONIDINE HYDROCHLORIDE 0.1 MG: 0.1 TABLET ORAL at 09:03

## 2023-03-21 RX ADMIN — MIDAZOLAM 1 MG: 1 INJECTION INTRAMUSCULAR; INTRAVENOUS at 03:03

## 2023-03-21 RX ADMIN — HYDRALAZINE HYDROCHLORIDE 25 MG: 25 TABLET, FILM COATED ORAL at 05:03

## 2023-03-21 RX ADMIN — HYDRALAZINE HYDROCHLORIDE 50 MG: 50 TABLET ORAL at 09:03

## 2023-03-21 RX ADMIN — Medication 5 MG: at 03:03

## 2023-03-21 RX ADMIN — PRAVASTATIN SODIUM 40 MG: 20 TABLET ORAL at 08:03

## 2023-03-21 RX ADMIN — MIDAZOLAM 0.5 MG: 1 INJECTION INTRAMUSCULAR; INTRAVENOUS at 03:03

## 2023-03-21 RX ADMIN — CEFAZOLIN 2 G: 330 INJECTION, POWDER, FOR SOLUTION INTRAMUSCULAR; INTRAVENOUS at 02:03

## 2023-03-21 RX ADMIN — FUROSEMIDE 40 MG: 10 INJECTION, SOLUTION INTRAMUSCULAR; INTRAVENOUS at 08:03

## 2023-03-21 RX ADMIN — CLONIDINE HYDROCHLORIDE 0.1 MG: 0.1 TABLET ORAL at 08:03

## 2023-03-21 RX ADMIN — PANCRELIPASE 1 CAPSULE: 24000; 76000; 120000 CAPSULE, DELAYED RELEASE PELLETS ORAL at 08:03

## 2023-03-21 RX ADMIN — ASPIRIN 325 MG ORAL TABLET 325 MG: 325 PILL ORAL at 08:03

## 2023-03-21 RX ADMIN — EPHEDRINE SULFATE 10 MG: 50 INJECTION INTRAVENOUS at 04:03

## 2023-03-21 RX ADMIN — FENTANYL CITRATE 25 MCG: 50 INJECTION, SOLUTION INTRAMUSCULAR; INTRAVENOUS at 04:03

## 2023-03-21 RX ADMIN — AMLODIPINE BESYLATE 5 MG: 5 TABLET ORAL at 09:03

## 2023-03-21 RX ADMIN — EPHEDRINE SULFATE 10 MG: 50 INJECTION INTRAVENOUS at 03:03

## 2023-03-21 RX ADMIN — DEXTROSE MONOHYDRATE 1 G: 5 INJECTION INTRAVENOUS at 11:03

## 2023-03-21 RX ADMIN — SODIUM CHLORIDE: 9 INJECTION, SOLUTION INTRAVENOUS at 11:03

## 2023-03-21 RX ADMIN — Medication 20 MG: at 03:03

## 2023-03-21 RX ADMIN — PANTOPRAZOLE SODIUM 40 MG: 40 TABLET, DELAYED RELEASE ORAL at 08:03

## 2023-03-21 RX ADMIN — ONDANSETRON 4 MG: 2 INJECTION INTRAMUSCULAR; INTRAVENOUS at 08:03

## 2023-03-21 RX ADMIN — SODIUM CHLORIDE, POTASSIUM CHLORIDE, SODIUM LACTATE AND CALCIUM CHLORIDE: 600; 310; 30; 20 INJECTION, SOLUTION INTRAVENOUS at 03:03

## 2023-03-21 RX ADMIN — KETOROLAC TROMETHAMINE 10 MG: 30 INJECTION, SOLUTION INTRAMUSCULAR; INTRAVENOUS at 11:03

## 2023-03-21 RX ADMIN — PROPOFOL 25 MCG/KG/MIN: 10 INJECTION, EMULSION INTRAVENOUS at 02:03

## 2023-03-21 RX ADMIN — DORZOLAMIDE HYDROCHLORIDE AND TIMOLOL MALEATE 1 DROP: 22.3; 6.8 SOLUTION/ DROPS OPHTHALMIC at 09:03

## 2023-03-21 RX ADMIN — FENTANYL CITRATE 25 MCG: 50 INJECTION, SOLUTION INTRAMUSCULAR; INTRAVENOUS at 02:03

## 2023-03-21 RX ADMIN — Medication 25 MG: at 02:03

## 2023-03-21 RX ADMIN — SODIUM CHLORIDE: 9 INJECTION, SOLUTION INTRAVENOUS at 02:03

## 2023-03-21 RX ADMIN — AMLODIPINE BESYLATE 5 MG: 5 TABLET ORAL at 08:03

## 2023-03-21 RX ADMIN — Medication 6 MG: at 09:03

## 2023-03-21 NOTE — ASSESSMENT & PLAN NOTE
EKG reviewed  EP consulted  Avoid AV henry blocking agents  Cont to hold home BB    3/21/23  PPM today

## 2023-03-21 NOTE — HOSPITAL COURSE
3/21/23 Pt seen and examined today feels ok. PPM today w/ Dr. Ambrose. Labs reviewed, chart reviewed    3/22/23 Pt seen and examined today, s/p PPM placement dressing C/D/I no bruising noted at this time. Feels ok, denies any CP at this time. Labs reviewed, chart reviewed

## 2023-03-21 NOTE — PROGRESS NOTES
[facility] - Cath Lab ([facility])  Cardiology  Progress Note    Patient Name: Glo Dumont  MRN: [id number]  Admission Date: 3/18/2023  Hospital Length of Stay: 0 days  Code Status: Full Code   Attending Physician: Leonardo Caldwell MD   Primary Care Physician: Christina Recio MD  Expected Discharge Date:   Principal Problem:Acute on chronic congestive heart failure    Subjective:     Hospital Course:   3/21/23 Pt seen and examined today feels ok. PPM today w/ Dr. Ambrose. Labs reviewed, chart reviewed          Review of Systems   Constitutional: Negative.   HENT: Negative.     Eyes: Negative.    Cardiovascular: Negative.    Respiratory: Negative.     Skin: Negative.    Musculoskeletal: Negative.    Gastrointestinal: Negative.    Genitourinary: Negative.    Neurological: Negative.    Psychiatric/Behavioral: Negative.     Objective:     Vital Signs (Most Recent):  Temp: 97.8 °F (36.6 °C) (03/21/23 1220)  Pulse: (!) 36 (03/21/23 1400)  Resp: 18 (03/21/23 1220)  BP: (!) 116/56 (03/21/23 1400)  SpO2: 98 % (03/21/23 1220)   Vital Signs (24h Range):  Temp:  [97.8 °F (36.6 °C)-99.4 °F (37.4 °C)] 97.8 °F (36.6 °C)  Pulse:  [36-42] 36  Resp:  [18-20] 18  SpO2:  [97 %-98 %] 98 %  BP: (110-172)/(53-74) 116/56     Weight: 69.5 kg (153 lb 3.5 oz)  Body mass index is 32.02 kg/m².     SpO2: 98 %         Intake/Output Summary (Last 24 hours) at 3/21/2023 1658  Last data filed at 3/20/2023 1800  Gross per 24 hour   Intake 240 ml   Output --   Net 240 ml       Lines/Drains/Airways       Drain  Duration             Female External Urinary Catheter 03/18/23 1940 2 days              Peripheral Intravenous Line  Duration                  Peripheral IV - Single Lumen 18 G Left Antecubital -- days         Peripheral IV - Single Lumen 20 G Right Hand -- days         Peripheral IV - Single Lumen 03/18/23 1813 20 G Right Antecubital 2 days                    Physical Exam  Vitals and nursing note reviewed.   Constitutional:       Appearance:  Normal appearance.   HENT:      Head: Normocephalic.   Eyes:      Pupils: Pupils are equal, round, and reactive to light.   Cardiovascular:      Rate and Rhythm: Normal rate. Rhythm irregular.      Heart sounds: Normal heart sounds, S1 normal and S2 normal. No murmur heard.    No S3 or S4 sounds.   Pulmonary:      Effort: Pulmonary effort is normal.      Breath sounds: Normal breath sounds.   Abdominal:      General: Bowel sounds are normal.      Palpations: Abdomen is soft.   Musculoskeletal:         General: Normal range of motion.      Cervical back: Normal range of motion.   Skin:     Capillary Refill: Capillary refill takes less than 2 seconds.   Neurological:      General: No focal deficit present.      Mental Status: She is alert and oriented to person, place, and time.      Motor: Weakness present.   Psychiatric:         Mood and Affect: Mood normal.         Behavior: Behavior normal.         Thought Content: Thought content normal.       Significant Labs: BMP:   Recent Labs   Lab 03/20/23  0551 03/21/23  0510    116*    135*   K 4.1 4.4    104   CO2 21* 20*   BUN 45* 64*   CREATININE 2.0* 2.5*   CALCIUM 9.3 8.9   MG 2.1 2.2   , CMP   Recent Labs   Lab 03/20/23  0551 03/21/23  0510    135*   K 4.1 4.4    104   CO2 21* 20*    116*   BUN 45* 64*   CREATININE 2.0* 2.5*   CALCIUM 9.3 8.9   ANIONGAP 11 11   , CBC   Recent Labs   Lab 03/20/23  0551 03/21/23  0510   WBC 7.24 6.78   HGB 10.3* 9.4*   HCT 31.2* 28.7*    269   , INR No results for input(s): INR, PROTIME in the last 48 hours., Lipid Panel No results for input(s): CHOL, HDL, LDLCALC, TRIG, CHOLHDL in the last 48 hours., Troponin   Recent Labs   Lab 03/21/23  0805 03/21/23  1148   TROPONINI 0.011 <0.006   , and All pertinent lab results from the last 24 hours have been reviewed.    Significant Imaging: Echocardiogram: Transthoracic echo (TTE) complete (Cupid Only):   Results for orders placed or performed  during the hospital encounter of 03/18/23   Echo   Result Value Ref Range    BSA 1.65 m2    TDI SEPTAL 0.08 m/s    LV LATERAL E/E' RATIO 18.33 m/s    LV SEPTAL E/E' RATIO 13.75 m/s    LA WIDTH 3.10 cm    IVC diameter 0.74 cm    Left Ventricular Outflow Tract Mean Velocity 0.70 cm/s    Left Ventricular Outflow Tract Mean Gradient 2.16 mmHg    TDI LATERAL 0.06 m/s    LVIDd 3.40 (A) 3.5 - 6.0 cm    IVS 1.32 (A) 0.6 - 1.1 cm    Posterior Wall 1.09 0.6 - 1.1 cm    Ao root annulus 3.30 cm    LVIDs 2.31 2.1 - 4.0 cm    FS 32 28 - 44 %    LA volume 35.48 cm3    STJ 3.16 cm    Ascending aorta 3.21 cm    LV mass 131.07 g    LA size 3.28 cm    TAPSE 1.90 cm    Left Ventricle Relative Wall Thickness 0.64 cm    AV mean gradient 8 mmHg    AV valve area 1.70 cm2    AV Velocity Ratio 0.52     AV index (prosthetic) 0.62     MV mean gradient 6 mmHg    MV valve area p 1/2 method 2.23 cm2    MV valve area by continuity eq 1.45 cm2    E/A ratio 0.80     Mean e' 0.07 m/s    E wave deceleration time 387.37 msec    IVRT 94.20 msec    LVOT diameter 1.86 cm    LVOT area 2.7 cm2    LVOT peak matthew 0.96 m/s    LVOT peak VTI 30.60 cm    Ao peak matthew 1.86 m/s    Ao VTI 49.0 cm    RVOT peak matthew 0.64 m/s    RVOT peak VTI 17.5 cm    Mr max matthew 4.90 m/s    LVOT stroke volume 83.10 cm3    AV peak gradient 14 mmHg    MV peak gradient 12 mmHg    PV mean gradient 0.85 mmHg    E/E' ratio 15.71 m/s    MV Peak E Matthew 1.10 m/s    TR Max Matthew 2.83 m/s    MV VTI 57.4 cm    MV stenosis pressure 1/2 time 98.51 ms    MV Peak A Matthew 1.38 m/s    LV Systolic Volume 18.39 mL    LV Systolic Volume Index 11.5 mL/m2    LV Diastolic Volume 47.53 mL    LV Diastolic Volume Index 29.71 mL/m2    LA Volume Index 22.2 mL/m2    LV Mass Index 82 g/m2    RA Major Axis 4.04 cm    Left Atrium Minor Axis 4.09 cm    Left Atrium Major Axis 4.12 cm    Triscuspid Valve Regurgitation Peak Gradient 32 mmHg    Right Atrial Pressure (from IVC) 3 mmHg    EF 70 %    TV rest pulmonary artery  pressure 35 mmHg    Narrative    · The left ventricle is small with mild concentric hypertrophy and normal   systolic function.  · The estimated ejection fraction is 70%.  · Grade II left ventricular diastolic dysfunction.  · Normal right ventricular size with normal right ventricular systolic   function.  · There is mild aortic valve stenosis.  · Aortic valve area is 1.70 cm2; peak velocity is 1.86 m/s; mean gradient   is 8 mmHg.  · Mild tricuspid regurgitation.  · Moderate mitral regurgitation.  · The mean diastolic gradient across the mitral valve is 6 mmHg at a heart   rate of 42 bpm.  · There is mild mitral stenosis.  · Normal central venous pressure (3 mmHg).  · The estimated PA systolic pressure is 35 mmHg.      , EKG: reviewed, Stress Test: reviewed, and X-Ray: CXR: X-Ray Chest 1 View (CXR): No results found for this visit on 03/18/23.    Assessment and Plan:         * Acute on chronic congestive heart failure  Echo pending, previous 3/22' EF nml   improved from 4 days ago 938  Cont IV diuresis one more day  Strict I/Os  Low Na diet      3/21/23  Diuresed well  BNP decreased  PO lasix tomorrow am  Strict I/Os  Low Na diet    Bradycardia  EKG reviewed  EP consulted  Avoid AV henry blocking agents  Cont to hold home BB    3/21/23  PPM today    CKD (chronic kidney disease) stage 4, GFR 15-29 ml/min  Follows Dr. Villareal in OP clinic    Essential hypertension  Stable  Cont current medications    Coronary artery disease, occlusive  Cont ASA, statin    Other hyperlipidemia  statin        VTE Risk Mitigation (From admission, onward)         Ordered     IP VTE HIGH RISK PATIENT  Once         03/18/23 1953     Place sequential compression device  Until discontinued         03/18/23 1953                Niki Benson NP  Cardiology  [facility] Cath Lab ([facility])

## 2023-03-21 NOTE — ANESTHESIA PREPROCEDURE EVALUATION
03/21/2023  Glo Dumont is a 87 y.o., female.    Lab Results   Component Value Date    WBC 6.78 03/21/2023    HGB 9.4 (L) 03/21/2023    HCT 28.7 (L) 03/21/2023    MCV 83 03/21/2023     03/21/2023       CMP  Sodium   Date Value Ref Range Status   03/21/2023 135 (L) 136 - 145 mmol/L Final     Potassium   Date Value Ref Range Status   03/21/2023 4.4 3.5 - 5.1 mmol/L Final     Chloride   Date Value Ref Range Status   03/21/2023 104 95 - 110 mmol/L Final     CO2   Date Value Ref Range Status   03/21/2023 20 (L) 23 - 29 mmol/L Final     Glucose   Date Value Ref Range Status   03/21/2023 116 (H) 70 - 110 mg/dL Final     BUN   Date Value Ref Range Status   03/21/2023 64 (H) 8 - 23 mg/dL Final     Creatinine   Date Value Ref Range Status   03/21/2023 2.5 (H) 0.5 - 1.4 mg/dL Final     Calcium   Date Value Ref Range Status   03/21/2023 8.9 8.7 - 10.5 mg/dL Final     Total Protein   Date Value Ref Range Status   03/18/2023 5.7 (L) 6.0 - 8.4 g/dL Final     Albumin   Date Value Ref Range Status   03/18/2023 2.9 (L) 3.5 - 5.2 g/dL Final     Total Bilirubin   Date Value Ref Range Status   03/18/2023 0.2 0.1 - 1.0 mg/dL Final     Comment:     For infants and newborns, interpretation of results should be based  on gestational age, weight and in agreement with clinical  observations.    Premature Infant recommended reference ranges:  Up to 24 hours.............<8.0 mg/dL  Up to 48 hours............<12.0 mg/dL  3-5 days..................<15.0 mg/dL  6-29 days.................<15.0 mg/dL       Alkaline Phosphatase   Date Value Ref Range Status   03/18/2023 51 (L) 55 - 135 U/L Final     AST   Date Value Ref Range Status   03/18/2023 8 (L) 10 - 40 U/L Final     ALT   Date Value Ref Range Status   03/18/2023 11 10 - 44 U/L Final     Anion Gap   Date Value Ref Range Status   03/21/2023 11 8 - 16 mmol/L Final     eGFR   Date  Value Ref Range Status   03/21/2023 18 (A) >60 mL/min/1.73 m^2 Final     Prescription Medications     Last Taken Last Updated   aflibercept 2 mg/0.05 mL Soln    Past Week 03/18/23 1831   amLODIPine (NORVASC) 5 MG tablet    3/18/2023 03/18/23 1943   aspirin (ECOTRIN) 81 MG EC tablet    3/18/2023 03/18/23 1943   budesonide (ENTOCORT EC) 3 mg capsule    3/18/2023 03/18/23 1943   carvediloL (COREG) 12.5 MG tablet    3/18/2023 03/18/23 1943   cloNIDine (CATAPRES) 0.1 MG tablet    3/17/2023 03/18/23 1943   dexAMETHasone (OZURDEX) 0.7 mg Impl intravitreal implant    More than a month 03/18/23 1831   dorzolamide-timolol 2-0.5% (COSOPT) 22.3-6.8 mg/mL ophthalmic solution    3/18/2023 03/18/23 1943   FOLIC ACID/MULTIVIT-MIN/LUTEIN (CENTRUM SILVER ORAL)    3/17/2023 03/18/23 1943   furosemide (LASIX) 20 MG tablet    3/18/2023 03/18/23 1943   hydrALAZINE (APRESOLINE) 25 MG tablet    3/18/2023 03/18/23 1943   iron-vitamin C 100-250 mg, ICAR-C, (ICAR-C) 100-250 mg Tab    Past Week 03/18/23 1831   lipase-protease-amylase 24,000-76,000-120,000 units (CREON) 24,000-76,000 -120,000 unit capsule    3/18/2023 03/18/23 1943   nitroGLYCERIN (NITROSTAT) 0.4 MG SL tablet    Past Week 03/18/23 1943   pravastatin (PRAVACHOL) 40 MG tablet    3/18/2023 03/18/23 1943         Pre-op Assessment    I have reviewed the Patient Summary Reports.     I have reviewed the Nursing Notes. I have reviewed the NPO Status.   I have reviewed the Medications.     Review of Systems  Anesthesia Hx:  Per Dr. Ambrose- PMx of CKD, CAD, HTN, HLD, chronic diastolic HF, Anemia, carotid artery occlusion, H/O carotid endarterectomy, Sarcoidosis, Sarcoidosis of lung who presented to [facility] ED c/o SOB, chest pressure onset a few days ago.  Noted to have slow HRs - seemingly ASxc   On Clonidine 0.1 mg TID but seemingly dependent on the drug to avoid severe HTN.   ECGs reveal persistent second degree AV block with a narrow QRS     Denies Family Hx of Anesthesia  complications.   Denies Personal Hx of Anesthesia complications.   Hematology/Oncology:         -- Anemia: Hematology Comments: Sickle cell trait   EENT/Dental:   Eyes: Primary open-angle glaucoma(365.11)   Cardiovascular:   Hypertension CAD   CHF ECG has been reviewed. Carotid artery occlusion   Pulmonary:   Shortness of breath (patient currently on 1L NC, nurse reports pt sats 80's on RA) Sarcoidosis of lung   Renal/:   Chronic Renal Disease (CKD (chronic kidney disease) stage 3, GFR 30-59 ml/min), CKD Renal cyst   Hepatic/GI:   PUD, Hiatal Hernia, Liver Disease, Diverticulosis  Chronic diarrhea  Liver cyst   Neurological:   Neuromuscular Disease,    Endocrine:  Obesity / BMI > 30  Psych:   anxiety depression          Physical Exam    Dental:  Dentures  Top and Bottom Dentures to be removed for procedure   Results for orders placed during the hospital encounter of 03/18/23    Echo    Interpretation Summary  · The left ventricle is small with mild concentric hypertrophy and normal systolic function.  · The estimated ejection fraction is 70%.  · Grade II left ventricular diastolic dysfunction.  · Normal right ventricular size with normal right ventricular systolic function.  · There is mild aortic valve stenosis.  · Aortic valve area is 1.70 cm2; peak velocity is 1.86 m/s; mean gradient is 8 mmHg.  · Mild tricuspid regurgitation.  · Moderate mitral regurgitation.  · The mean diastolic gradient across the mitral valve is 6 mmHg at a heart rate of 42 bpm.  · There is mild mitral stenosis.  · Normal central venous pressure (3 mmHg).  · The estimated PA systolic pressure is 35 mmHg.    ECG Results          EKG 12-lead (Final result)  Result time 03/19/23 11:53:04    Final result by Interface, Lab In [facility] (03/19/23 11:53:04)                 Narrative:    Test Reason : R06.02,    Vent. Rate : 046 BPM     Atrial Rate : 046 BPM     P-R Int : 274 ms          QRS Dur : 090 ms      QT Int : 478 ms       P-R-T Axes : 053  -01 037 degrees     QTc Int : 418 ms    Sinus bradycardia with 1st degree A-V block  Otherwise normal ECG  When compared with ECG of 16-MAR-2023 16:22,  No significant change was found  Confirmed by ANNE MARIE COLBERT MD ([id number]) on 3/19/2023 11:52:55 AM    Referred By: LEI   SELF           Confirmed By:ANNE MARIE COLBERT MD                                        Anesthesia Plan  Type of Anesthesia, risks & benefits discussed:    Anesthesia Type: MAC  Intra-op Monitoring Plan: Standard ASA Monitors  Induction:  IV  Informed Consent: Informed consent signed with the Patient and all parties understand the risks and agree with anesthesia plan.  All questions answered.   ASA Score: 3  Day of Surgery Review of History & Physical: H&P Update referred to the surgeon/provider.    Ready For Surgery From Anesthesia Perspective.     .

## 2023-03-21 NOTE — TRANSFER OF CARE
"Anesthesia Transfer of Care Note    Patient: Glo Dumont    Procedure(s) Performed: Procedure(s) (LRB):  INSERTION, CARDIAC PACEMAKER, DUAL CHAMBER/His Lead (Left)    Patient location: Cath Lab    Anesthesia Type: MAC    Transport from OR: Transported from OR on 2-3 L/min O2 by NC with adequate spontaneous ventilation    Post pain: adequate analgesia    Post assessment: no apparent anesthetic complications    Post vital signs: stable    Level of consciousness: awake    Nausea/Vomiting: no nausea/vomiting    Complications: none    Transfer of care protocol was followed      Last vitals:   Visit Vitals  BP (!) 116/56   Pulse (!) 36   Temp 36.6 °C (97.8 °F) (Oral)   Resp 18   Ht 4' 10" (1.473 m)   Wt 69.5 kg (153 lb 3.5 oz)   SpO2 98%   BMI 32.02 kg/m²     "

## 2023-03-21 NOTE — INTERVAL H&P NOTE
The patient has been examined and the H&P has been reviewed:    I concur with the findings and changes have been noted since the H&P was written: see my extensive consult note from earlier today - pat needs PPM for AV block reasons    Procedure risks, benefits and alternative options discussed and understood by patient/family.  I have discussed the procedure in detail with the patient. I described its benefits and risks. I reviewed alternative therapies and discussed their potential value. The patient was given ample opportunity to express concerns and ask questions and I provided appropriate responses and  answers to such.The patient understands and agrees to proceed.  Consent form was signed  by patient and myself and appropriately witnessed.           Active Hospital Problems    Diagnosis  POA    *Acute on chronic congestive heart failure [I50.9]  Yes    Hypokalemia [E87.6]  Yes    Obesity with serious comorbidity [E66.9]  Yes    Hypomagnesemia [E83.42]  Yes    Central retinal vein occlusion with macular edema of both eyes [H34.8130]  Yes    Bradycardia [R00.1]  Yes    CKD (chronic kidney disease) stage 4, GFR 15-29 ml/min [N18.4]  Yes    Essential hypertension [I10]  Yes     Chronic    Coronary artery disease, occlusive [I25.10]  Yes     Chronic    Other hyperlipidemia [E78.49]  Yes      Resolved Hospital Problems   No resolved problems to display.

## 2023-03-21 NOTE — ANESTHESIA POSTPROCEDURE EVALUATION
Anesthesia Post Evaluation    Patient: Glo Dumont    Procedure(s) Performed: Procedure(s) (LRB):  INSERTION, CARDIAC PACEMAKER, DUAL CHAMBER/His Lead (Left)    Final Anesthesia Type: MAC      Patient location during evaluation: Cath Lab  Patient participation: Yes- Able to Participate  Level of consciousness: awake and alert  Post-procedure vital signs: reviewed and stable  Pain management: adequate  Airway patency: patent  FAUSTINO mitigation strategies: Multimodal analgesia  PONV status at discharge: No PONV  Anesthetic complications: no      Cardiovascular status: hemodynamically stable  Respiratory status: unassisted, spontaneous ventilation and nasal cannula  Hydration status: euvolemic  Follow-up not needed.          Vitals Value Taken Time   /56 03/21/23 1400   Temp 36.6 °C (97.8 °F) 03/21/23 1220   Pulse 36 03/21/23 1400   Resp 18 03/21/23 1220   SpO2 98 % 03/21/23 1220         No case tracking events are documented in the log.      Pain/Rufus Score: Pain Rating Prior to Med Admin: 9 (3/21/2023  8:00 AM)  Rufus Score: 8 (3/21/2023  5:22 PM)

## 2023-03-21 NOTE — DISCHARGE INSTRUCTIONS
PACEMAKER DISCHARGE INSTRUCTIONS    SAFETY  BECAUSE OF THE AFTEREFFECTS OF THE SEDATION YOU RECEIVED, WE ADVISE YOU TO REFRAIN FROM THE FOLLOWING ACTIVITIES FOR 24 HOURS.   1. DO NOT DRIVE OR OPERATE MACHINERY FOR 24 HOURS   2. DO NOT OPERATE APPLIANCES IF ALONE.     3.DON'T SIGN LEGAL PAPERS FOR 24 HOURS.     4. WE ADVISE THAT SOMEONE STAY WITH YOU AFTER THE PROCEDURE FOR AT LEAST 8 HOURS    DISCOMFORT: FOR GENERAL DISCOMFORT AT THE PUNCTURE, YOU MAY TAKE TYLENOL, 1-2 TABS EVERY 4-6 HOURS AS NEEDED.  DO NOT TAKE MORE THAN 4000 MG  IN 24 HOUR PERIOD.    ACTIVITY/ WOUND INSTRUCTIONS     AFFECTED ARM  DO NOT LIFT ARM ABOVE SHOULDER HEIGHT FOR 6 WEEKS.                                  DO NOT  SWING ARM FOR 6 WEEKS                                DO NOT REMOVE DRESSING.  IT WILL BE REMOVED AT FOLLOW UP (DEVICE CHECK) APPOINTMENT                                YOU MAY SHOWER IN 48 HOURS.  DO NOT REMOVE THE DRESSING FOR SHOWER.                                 USE HIBICLENS SOAP ON CHEST AND NECK AREA  FOR 1 WEEK.  FACE AWAY FROM SPRAY WHEN SHOWERING                                                                               REMOVE SLING FOR SHOWER, THEN REAPPLY AFTER SHOWER.                                USE ICE PACK FOR 48 HOURS .                                WEAR SLING AND SWATHE AROUND THE CLOCK UNTIL YOUR FOLLOW UP APPOINTMENT, THEN ONLY WHILE SLEEPING AT NIGHT FOR 6 WEEKS.              6. CALL YOUR HEALTHCARE PROVIDER IF YOU START TO HAVE THE FOLLOWING SYMPTOMS:                   1. High fever (101 degrees or higher)                 2. Redness,drainage or swelling  or intense pain at the incision site       3.  Drowsiness that doesn't get better       4. Weakness or dizziness that doesn't get better            5. Repeated  vomiting                                                                                                                 NOZIN INSTRUCTIONS     GOAL: TO REDUCE THE RISK OF POST-PROCEDURAL INFECTIONS BY BACTERIA IN THE NASAL CAVITY.  THINK OF IT AS A HAND  FOR YOUR NOSE.     HOW TO USE:  1. SHAKE NOZIN BOTTLE WELL                            2. TAKE A COTTON SWAB AND APPLY FOUR DROPS TO TIP.                            3. INSERT COTTON SWAB INTO ONE NOSTRIL, BEING SURE NOT TO GO DEEPER INTO NOSE THAN THE TIP OF THE SWAB.                            4.SWAB NOSTRIL 6 TIMES CLOCKWISE AND 6 TIMES COUNERCLOCKWISE.  MAKE SURE TO SWAB THE INSIDE FRONT POCKET OF NOSTRIL.                             5. TAKE SWAB OUT AND APPLY 2 DROPS TO THE SAME COTTON TIP.  REPEAT STEPS 3 AND 4 IN THE OTHER NOSTRIL      DO STEPS 1-5 TWICE A DAY FOR 7 DAYS.

## 2023-03-21 NOTE — PLAN OF CARE
AAO4. Able to verbalize needs. NAEON. VSS. SB. PIV patent. IV diuresis. weaned O2 down to 1L NC. Ambulatory w/ assist. Skin WDI. ADA; cardiac; renal; fluid restricted diet. Denies pain. Continent of b/b. NADN. Will continue to monitor. Chart check complete.   Per protocol, refer to provider

## 2023-03-21 NOTE — SUBJECTIVE & OBJECTIVE
Review of Systems   Constitutional: Negative.   HENT: Negative.     Eyes: Negative.    Cardiovascular: Negative.    Respiratory: Negative.     Skin: Negative.    Musculoskeletal: Negative.    Gastrointestinal: Negative.    Genitourinary: Negative.    Neurological: Negative.    Psychiatric/Behavioral: Negative.     Objective:     Vital Signs (Most Recent):  Temp: 97.8 °F (36.6 °C) (03/21/23 1220)  Pulse: (!) 36 (03/21/23 1400)  Resp: 18 (03/21/23 1220)  BP: (!) 116/56 (03/21/23 1400)  SpO2: 98 % (03/21/23 1220)   Vital Signs (24h Range):  Temp:  [97.8 °F (36.6 °C)-99.4 °F (37.4 °C)] 97.8 °F (36.6 °C)  Pulse:  [36-42] 36  Resp:  [18-20] 18  SpO2:  [97 %-98 %] 98 %  BP: (110-172)/(53-74) 116/56     Weight: 69.5 kg (153 lb 3.5 oz)  Body mass index is 32.02 kg/m².     SpO2: 98 %         Intake/Output Summary (Last 24 hours) at 3/21/2023 1658  Last data filed at 3/20/2023 1800  Gross per 24 hour   Intake 240 ml   Output --   Net 240 ml       Lines/Drains/Airways       Drain  Duration             Female External Urinary Catheter 03/18/23 1940 2 days              Peripheral Intravenous Line  Duration                  Peripheral IV - Single Lumen 18 G Left Antecubital -- days         Peripheral IV - Single Lumen 20 G Right Hand -- days         Peripheral IV - Single Lumen 03/18/23 1813 20 G Right Antecubital 2 days                    Physical Exam  Vitals and nursing note reviewed.   Constitutional:       Appearance: Normal appearance.   HENT:      Head: Normocephalic.   Eyes:      Pupils: Pupils are equal, round, and reactive to light.   Cardiovascular:      Rate and Rhythm: Normal rate. Rhythm irregular.      Heart sounds: Normal heart sounds, S1 normal and S2 normal. No murmur heard.    No S3 or S4 sounds.   Pulmonary:      Effort: Pulmonary effort is normal.      Breath sounds: Normal breath sounds.   Abdominal:      General: Bowel sounds are normal.      Palpations: Abdomen is soft.   Musculoskeletal:          General: Normal range of motion.      Cervical back: Normal range of motion.   Skin:     Capillary Refill: Capillary refill takes less than 2 seconds.   Neurological:      General: No focal deficit present.      Mental Status: She is alert and oriented to person, place, and time.      Motor: Weakness present.   Psychiatric:         Mood and Affect: Mood normal.         Behavior: Behavior normal.         Thought Content: Thought content normal.       Significant Labs: BMP:   Recent Labs   Lab 03/20/23  0551 03/21/23  0510    116*    135*   K 4.1 4.4    104   CO2 21* 20*   BUN 45* 64*   CREATININE 2.0* 2.5*   CALCIUM 9.3 8.9   MG 2.1 2.2   , CMP   Recent Labs   Lab 03/20/23  0551 03/21/23  0510    135*   K 4.1 4.4    104   CO2 21* 20*    116*   BUN 45* 64*   CREATININE 2.0* 2.5*   CALCIUM 9.3 8.9   ANIONGAP 11 11   , CBC   Recent Labs   Lab 03/20/23  0551 03/21/23  0510   WBC 7.24 6.78   HGB 10.3* 9.4*   HCT 31.2* 28.7*    269   , INR No results for input(s): INR, PROTIME in the last 48 hours., Lipid Panel No results for input(s): CHOL, HDL, LDLCALC, TRIG, CHOLHDL in the last 48 hours., Troponin   Recent Labs   Lab 03/21/23  0805 03/21/23  1148   TROPONINI 0.011 <0.006   , and All pertinent lab results from the last 24 hours have been reviewed.    Significant Imaging: Echocardiogram: Transthoracic echo (TTE) complete (Cupid Only):   Results for orders placed or performed during the hospital encounter of 03/18/23   Echo   Result Value Ref Range    BSA 1.65 m2    TDI SEPTAL 0.08 m/s    LV LATERAL E/E' RATIO 18.33 m/s    LV SEPTAL E/E' RATIO 13.75 m/s    LA WIDTH 3.10 cm    IVC diameter 0.74 cm    Left Ventricular Outflow Tract Mean Velocity 0.70 cm/s    Left Ventricular Outflow Tract Mean Gradient 2.16 mmHg    TDI LATERAL 0.06 m/s    LVIDd 3.40 (A) 3.5 - 6.0 cm    IVS 1.32 (A) 0.6 - 1.1 cm    Posterior Wall 1.09 0.6 - 1.1 cm    Ao root annulus 3.30 cm    LVIDs 2.31 2.1 - 4.0  cm    FS 32 28 - 44 %    LA volume 35.48 cm3    STJ 3.16 cm    Ascending aorta 3.21 cm    LV mass 131.07 g    LA size 3.28 cm    TAPSE 1.90 cm    Left Ventricle Relative Wall Thickness 0.64 cm    AV mean gradient 8 mmHg    AV valve area 1.70 cm2    AV Velocity Ratio 0.52     AV index (prosthetic) 0.62     MV mean gradient 6 mmHg    MV valve area p 1/2 method 2.23 cm2    MV valve area by continuity eq 1.45 cm2    E/A ratio 0.80     Mean e' 0.07 m/s    E wave deceleration time 387.37 msec    IVRT 94.20 msec    LVOT diameter 1.86 cm    LVOT area 2.7 cm2    LVOT peak matthew 0.96 m/s    LVOT peak VTI 30.60 cm    Ao peak matthew 1.86 m/s    Ao VTI 49.0 cm    RVOT peak matthew 0.64 m/s    RVOT peak VTI 17.5 cm    Mr max matthew 4.90 m/s    LVOT stroke volume 83.10 cm3    AV peak gradient 14 mmHg    MV peak gradient 12 mmHg    PV mean gradient 0.85 mmHg    E/E' ratio 15.71 m/s    MV Peak E Matthew 1.10 m/s    TR Max Matthew 2.83 m/s    MV VTI 57.4 cm    MV stenosis pressure 1/2 time 98.51 ms    MV Peak A Matthew 1.38 m/s    LV Systolic Volume 18.39 mL    LV Systolic Volume Index 11.5 mL/m2    LV Diastolic Volume 47.53 mL    LV Diastolic Volume Index 29.71 mL/m2    LA Volume Index 22.2 mL/m2    LV Mass Index 82 g/m2    RA Major Axis 4.04 cm    Left Atrium Minor Axis 4.09 cm    Left Atrium Major Axis 4.12 cm    Triscuspid Valve Regurgitation Peak Gradient 32 mmHg    Right Atrial Pressure (from IVC) 3 mmHg    EF 70 %    TV rest pulmonary artery pressure 35 mmHg    Narrative    · The left ventricle is small with mild concentric hypertrophy and normal   systolic function.  · The estimated ejection fraction is 70%.  · Grade II left ventricular diastolic dysfunction.  · Normal right ventricular size with normal right ventricular systolic   function.  · There is mild aortic valve stenosis.  · Aortic valve area is 1.70 cm2; peak velocity is 1.86 m/s; mean gradient   is 8 mmHg.  · Mild tricuspid regurgitation.  · Moderate mitral regurgitation.  · The mean  diastolic gradient across the mitral valve is 6 mmHg at a heart   rate of 42 bpm.  · There is mild mitral stenosis.  · Normal central venous pressure (3 mmHg).  · The estimated PA systolic pressure is 35 mmHg.      , EKG: reviewed, Stress Test: reviewed, and X-Ray: CXR: X-Ray Chest 1 View (CXR): No results found for this visit on 03/18/23.

## 2023-03-21 NOTE — ASSESSMENT & PLAN NOTE
Echo pending, previous 3/22' EF nml   improved from 4 days ago 938  Cont IV diuresis one more day  Strict I/Os  Low Na diet      3/21/23  Diuresed well  BNP decreased  PO lasix tomorrow am  Strict I/Os  Low Na diet

## 2023-03-21 NOTE — PLAN OF CARE
[facility] - Cath Lab (Hospital)  Initial Discharge Assessment       Primary Care Provider: Christina Recio MD    Admission Diagnosis: Shortness of breath [R06.02]  NSTEMI (non-ST elevated myocardial infarction) [I21.4]  Acute on chronic congestive heart failure, unspecified heart failure type [I50.9]  Stage 3 chronic kidney disease, unspecified whether stage 3a or 3b CKD [N18.30]    Admission Date: 3/18/2023  Expected Discharge Date:     Discharge Barriers Identified: None    Payor: [employer] MANAGED MEDICARE / Plan: [employer] MEDICARE HMO / Product Type: Capitation /     Extended Emergency Contact Information  Primary Emergency Contact: Jessica Dumont  Address: [address] [place] [address] [place]  Mobile Phone: [phone]  Relation: Daughter   needed? No    Discharge Plan A: Home with family  Discharge Plan B: Home Health      [facility] - [place], [place] - [address]  [address]  [facility] [address]  Phone: [phone] Fax: [phone]    [facility]/pharm[facility] #[id number] - [place], [place] - [address] [facility] [address] [facility] [address]  [address] [facility] [address]  Phone: [phone] Fax: [phone]    [id number] - [place], [place] - [address] AT [facility]  [address] [facility] [address]  Phone: [phone] Fax: [phone]    [facility] - [facility] [address]  [address] [facility] [address]  Phone: [phone] Fax: [phone]      Initial Assessment (most recent)       Adult Discharge Assessment - 03/21/23 1613          Discharge Assessment    Assessment Type Discharge Planning Assessment     Confirmed/corrected address, phone number and insurance Yes     Confirmed Demographics Correct on Facesheet     Source of Information patient     When was your last doctors appointment? 02/01/23     Communicated CANDACE with  patient/caregiver Date not available/Unable to determine     Reason For Admission CHF     People in Home child(ally), adult     Facility Arrived From: home     Do you expect to return to your current living situation? Yes     Do you have help at home or someone to help you manage your care at home? Yes     Who are your caregiver(s) and their phone number(s)? daughter     Prior to hospitilization cognitive status: Alert/Oriented     Current cognitive status: Alert/Oriented     Home Layout Able to live on 1st floor     Equipment Currently Used at Home shower chair;rollator;cane, straight;grab bar     Readmission within 30 days? No     Patient currently being followed by outpatient case management? No     Do you currently have service(s) that help you manage your care at home? No     Do you take prescription medications? Yes     Do you have prescription coverage? Yes     Coverage [employer]     Do you have any problems affording any of your prescribed medications? No     Is the patient taking medications as prescribed? yes     Who is going to help you get home at discharge? daughter     How do you get to doctors appointments? family or friend will provide     Are you on dialysis? No     Do you take coumadin? No     Discharge Plan A Home with family     Discharge Plan B Home Health     DME Needed Upon Discharge  none     Discharge Plan discussed with: Patient     Discharge Barriers Identified None                      Met with patient.  She lives with her daughter (who can be help at home) and son in law.  She independent with ADL's.  Currently has no needs but may benefit from Home Health.

## 2023-03-21 NOTE — PT/OT/SLP PROGRESS
Physical Therapy      Patient Name:  Glo Dumont   MRN:  [id number]    Chart review performed and spoke with nsg prior to session. Nsg hold on EOB/OOB activity due to pt recent hx of chest pain and decreased HR.  Will follow-up as able/appropriate. Subjective hx is as follows: Pt lives with daughter and son-in-law in [facility] with no steps at entry but 2 steps down to recessed den. Pt is independent at baseline. Has RW and cane that is used intermittently, SC that is not used, and grab bars. Pt not longer works and no longer drives.

## 2023-03-21 NOTE — PT/OT/SLP PROGRESS
Occupational Therapy      Patient Name:  Glo Dumont   MRN:  [id number]    OT jourdan initiated via chart review. Patient not seen today at 11:40 AM secondary to HOLD per nurse Samuel. Pt with chest pain and HR of 35. Will follow-up when able/appropriate.    Occupational Profile:  Lives with daughter and HANSA in a 1 story house with no steps to enter. 2 steps inside house with a railing to get to living room.  (I) with ADLs and functional mobility.  Pt has but does not use shower chair, RW, cane, and grab bars.   Does not work or drive.  Occasionally has numbness/tingling in B hands and feet.    3/21/2023  Jodie Faustin, OT   [phone]

## 2023-03-21 NOTE — TELEPHONE ENCOUNTER
Informed pt that we have received transportation forms and will contact her once Dr. Cardona reviews and fills it out. Pt verbalized understanding-KT-     No

## 2023-03-21 NOTE — CONSULTS
Asked to see her for bradycardia  I reviewed the chart in detail including all cardiac study reports and analyzed directly all source data relating to ECGs and rhythm strips etc   I saw patient, interviewed and evaluated her.     In summary:   She is an 88 yo AA woman   PMHx of CKD, CAD, HTN, HLD, chronic diastolic HF, Anemia, carotid artery occlusion, H/O carotid endarterectomy, Sarcoidosis, Sarcoidosis of lung who presented to [facility] ED c/o SOB, chest pressure onset a few days ago.  Noted to have slow HRs - seemingly ASxc   On Clonidine 0.1 mg TID but seemingly dependent on the drug to avoid severe HTN.   ECGs reveal persistent 2:! AV block with a narrow QRS    Nuclear study from a year ago:  Normal myocardial perfusion scan. There is no evidence of myocardial ischemia or infarction.    The gated perfusion images showed an ejection fraction of 74% at rest. The gated perfusion images showed an ejection fraction of 82% post stress.    The EKG portion of this study is negative for ischemia.    The patient reported no chest pain during the stress test.    During stress, rare PVCs are noted.    Echo from yesterday:   The left ventricle is small with mild concentric hypertrophy and normal systolic function.  The estimated ejection fraction is 70%.  Grade II left ventricular diastolic dysfunction.  Normal right ventricular size with normal right ventricular systolic function.  There is mild aortic valve stenosis.  Aortic valve area is 1.70 cm2; peak velocity is 1.86 m/s; mean gradient is 8 mmHg.  Mild tricuspid regurgitation.  Moderate mitral regurgitation.  The mean diastolic gradient across the mitral valve is 6 mmHg at a heart rate of 42 bpm.  There is mild mitral stenosis.  Normal central venous pressure (3 mmHg).  The estimated PA systolic pressure is 35 mmHg.     A and P :   A PPM is indicated on the basis of 2:1 AV block in the presence of both sarcoidosis and AS.   We would implant a RA -His system   I also  discussed possible D/C of clonidine with Dr hyatt but he feels that she does rely on this med quite a bit for BP control

## 2023-03-22 ENCOUNTER — DOCUMENTATION ONLY (OUTPATIENT)
Dept: CARDIOLOGY | Facility: CLINIC | Age: 88
End: 2023-03-22
Payer: MEDICARE

## 2023-03-22 LAB
ANION GAP SERPL CALC-SCNC: 13 MMOL/L (ref 8–16)
BUN SERPL-MCNC: 68 MG/DL (ref 8–23)
CALCIUM SERPL-MCNC: 9 MG/DL (ref 8.7–10.5)
CHLORIDE SERPL-SCNC: 103 MMOL/L (ref 95–110)
CO2 SERPL-SCNC: 19 MMOL/L (ref 23–29)
CREAT SERPL-MCNC: 2.5 MG/DL (ref 0.5–1.4)
EST. GFR  (NO RACE VARIABLE): 18 ML/MIN/1.73 M^2
GLUCOSE SERPL-MCNC: 111 MG/DL (ref 70–110)
POTASSIUM SERPL-SCNC: 4.1 MMOL/L (ref 3.5–5.1)
SODIUM SERPL-SCNC: 135 MMOL/L (ref 136–145)

## 2023-03-22 PROCEDURE — 63600175 PHARM REV CODE 636 W HCPCS: Mod: HCNC | Performed by: INTERNAL MEDICINE

## 2023-03-22 PROCEDURE — 97530 THERAPEUTIC ACTIVITIES: CPT | Mod: HCNC

## 2023-03-22 PROCEDURE — 21400001 HC TELEMETRY ROOM: Mod: HCNC

## 2023-03-22 PROCEDURE — 93010 ELECTROCARDIOGRAM REPORT: CPT | Mod: HCNC,,, | Performed by: INTERNAL MEDICINE

## 2023-03-22 PROCEDURE — 99233 PR SUBSEQUENT HOSPITAL CARE,LEVL III: ICD-10-PCS | Mod: HCNC,,, | Performed by: INTERNAL MEDICINE

## 2023-03-22 PROCEDURE — 25000003 PHARM REV CODE 250: Mod: HCNC | Performed by: INTERNAL MEDICINE

## 2023-03-22 PROCEDURE — 25000003 PHARM REV CODE 250: Mod: HCNC | Performed by: STUDENT IN AN ORGANIZED HEALTH CARE EDUCATION/TRAINING PROGRAM

## 2023-03-22 PROCEDURE — 25000003 PHARM REV CODE 250: Mod: HCNC

## 2023-03-22 PROCEDURE — 97166 OT EVAL MOD COMPLEX 45 MIN: CPT | Mod: HCNC

## 2023-03-22 PROCEDURE — 36415 COLL VENOUS BLD VENIPUNCTURE: CPT | Mod: HCNC | Performed by: NURSE PRACTITIONER

## 2023-03-22 PROCEDURE — 25000003 PHARM REV CODE 250: Mod: HCNC | Performed by: NURSE PRACTITIONER

## 2023-03-22 PROCEDURE — 99233 SBSQ HOSP IP/OBS HIGH 50: CPT | Mod: HCNC,,, | Performed by: INTERNAL MEDICINE

## 2023-03-22 PROCEDURE — 93010 EKG 12-LEAD: ICD-10-PCS | Mod: HCNC,,, | Performed by: INTERNAL MEDICINE

## 2023-03-22 PROCEDURE — 80048 BASIC METABOLIC PNL TOTAL CA: CPT | Mod: HCNC | Performed by: NURSE PRACTITIONER

## 2023-03-22 PROCEDURE — 93005 ELECTROCARDIOGRAM TRACING: CPT | Mod: HCNC

## 2023-03-22 PROCEDURE — 99900035 HC TECH TIME PER 15 MIN (STAT): Mod: HCNC

## 2023-03-22 PROCEDURE — 97162 PT EVAL MOD COMPLEX 30 MIN: CPT | Mod: HCNC

## 2023-03-22 RX ORDER — HYDRALAZINE HYDROCHLORIDE 10 MG/1
10 TABLET, FILM COATED ORAL EVERY 8 HOURS
Status: DISCONTINUED | OUTPATIENT
Start: 2023-03-22 | End: 2023-03-23 | Stop reason: HOSPADM

## 2023-03-22 RX ORDER — CLONIDINE HYDROCHLORIDE 0.1 MG/1
0.1 TABLET ORAL EVERY 8 HOURS PRN
Status: DISCONTINUED | OUTPATIENT
Start: 2023-03-22 | End: 2023-03-23 | Stop reason: HOSPADM

## 2023-03-22 RX ADMIN — PANCRELIPASE 1 CAPSULE: 24000; 76000; 120000 CAPSULE, DELAYED RELEASE PELLETS ORAL at 08:03

## 2023-03-22 RX ADMIN — HYDRALAZINE HYDROCHLORIDE 50 MG: 50 TABLET ORAL at 06:03

## 2023-03-22 RX ADMIN — DEXTROSE MONOHYDRATE 1 G: 5 INJECTION INTRAVENOUS at 08:03

## 2023-03-22 RX ADMIN — PRAVASTATIN SODIUM 40 MG: 20 TABLET ORAL at 10:03

## 2023-03-22 RX ADMIN — DORZOLAMIDE HYDROCHLORIDE AND TIMOLOL MALEATE 1 DROP: 22.3; 6.8 SOLUTION/ DROPS OPHTHALMIC at 10:03

## 2023-03-22 RX ADMIN — PANTOPRAZOLE SODIUM 40 MG: 40 TABLET, DELAYED RELEASE ORAL at 10:03

## 2023-03-22 RX ADMIN — PANCRELIPASE 1 CAPSULE: 24000; 76000; 120000 CAPSULE, DELAYED RELEASE PELLETS ORAL at 04:03

## 2023-03-22 RX ADMIN — DORZOLAMIDE HYDROCHLORIDE AND TIMOLOL MALEATE 1 DROP: 22.3; 6.8 SOLUTION/ DROPS OPHTHALMIC at 09:03

## 2023-03-22 RX ADMIN — HYDRALAZINE HYDROCHLORIDE 10 MG: 10 TABLET, FILM COATED ORAL at 09:03

## 2023-03-22 RX ADMIN — PANCRELIPASE 1 CAPSULE: 24000; 76000; 120000 CAPSULE, DELAYED RELEASE PELLETS ORAL at 12:03

## 2023-03-22 RX ADMIN — HYDRALAZINE HYDROCHLORIDE 10 MG: 10 TABLET, FILM COATED ORAL at 04:03

## 2023-03-22 RX ADMIN — FUROSEMIDE 20 MG: 20 TABLET ORAL at 10:03

## 2023-03-22 RX ADMIN — ASPIRIN 81 MG: 81 TABLET, COATED ORAL at 12:03

## 2023-03-22 NOTE — PROGRESS NOTES
Home Oxygen Evaluation - [facility] - Cardiopulmonary Department      Date Performed: 3/22/2023      1) Patient's Home O2 Sat on room air, while at rest: Room Air SpO2 At Rest: 92 %        If O2 sats on room air at rest are 88% or below, patient qualifies.  Document O2 liter flow needed in Step 2.  If O2 sats are 89% or above, complete Step 3.        2)  If patient is not ambulated and O2 sats are <88%, what is the O2 liter flow required to meet ordered saturation?      If O2 sats on room air while exercising remain 89% or above patient does not qualify, no further testing needed Document N/A in step 3. If O2 sats on room air while exercising are 88% or below, continue to Step 4.    3) Patient's O2 Sat on room air while exercisin        4) Patient's O2 Sat while exercising on O2:   at           (Must show improvement from #4 for patients to qualify)

## 2023-03-22 NOTE — PT/OT/SLP EVAL
Physical Therapy Evaluation    Patient Name:  Glo Dumont   MRN:  [id number]    Recommendations:     Discharge Recommendations: home health PT (with 24/7 care)   Discharge Equipment Recommendations: none   Barriers to discharge: None    Assessment:     Glo Dumont is a 87 y.o. female admitted with a medical diagnosis of Acute on chronic congestive heart failure.  She presents with the following impairments/functional limitations: weakness, impaired endurance, impaired functional mobility, gait instability, impaired balance, decreased safety awareness, decreased lower extremity function, decreased coordination.    Rehab Prognosis: Good; patient would benefit from acute skilled PT services to address these deficits and reach maximum level of function.    Recent Surgery: Procedure(s) (LRB):  INSERTION, CARDIAC PACEMAKER, DUAL CHAMBER/His Lead (Left) 1 Day Post-Op    Plan:     During this hospitalization, patient to be seen 3 x/week to address the identified rehab impairments via gait training, therapeutic activities, therapeutic exercises and progress toward the following goals:    Plan of Care Expires:  04/05/23    Subjective     Chief Complaint: Pt is ready to get out of bed.  Patient/Family Comments/goals:  Pain/Comfort:  Pain Rating 1: 0/10    Patients cultural, spiritual, Restoration conflicts given the current situation: no    Living Environment:  Pt lives with her daughter and son-in-law in a 1 story home, no steps to enter, 2 steps with rail inside house to get in/out of den. Daughter and son-in-law able to assist.  Prior to admission, patients level of function was INDEP with bathing/dressing, household ambulation using a Rolator or SPC, not driving, retired.  Equipment used at home: shower chair, rollator, grab bar, cane, straight.  DME owned (not currently used): none.  Upon discharge, patient will have assistance from FAMILY.    Objective:     Communicated with nurse Baker prior to session.  Patient  found supine with peripheral IV, PureWick, oxygen, telemetry, SCD (sling and swathe)  upon PT entry to room.    General Precautions: Standard, fall (pacemaker)  Orthopedic Precautions:N/A   Braces: Sling and swathe  Respiratory Status: Nasal cannula, flow 2 L/min    Exams:  Cognitive Exam:  Patient is oriented to Person, Place, Time, and Situation  Sensation:    -       Intact  Skin Integrity/Edema:      -       Skin integrity: Visible skin intact  RLE ROM: WFL  RLE Strength: Grossly 4-/5  LLE ROM: WFL  LLE Strength: Grossly 4-/5    Functional Mobility:  Bed Mobility:     Rolling Left:  minimum assistance  Scooting: minimum assistance  Supine to Sit: minimum assistance  Transfers:     Sit to Stand:  contact guard assistance with no AD  Bed to Chair: minimum assistance with  no AD  using  Step Transfer  Gait: Ambulated 30ft x2 MIN A, no AD, 1 LOB with MIN A to recover  Balance: Demonstrated good sitting balance, poor dynamic balance during gait.  C/o dizziness in sitting, improved with time, no c/o SOB    AM-PAC 6 CLICK MOBILITY  Total Score:16     Treatment & Education:  Pt educated on role of PT in acute care and POC. Pt compliant with UE sling. Educated on importance of OOB activities and HEP (hip flex, LAQ, ham curls, ankle pumps) in order to maintain/regain strength. Educated on increased risk of falling due to weakness, instructed to utilize call bell for assistance for all transfers. Pt agreeable to all requests.    Patient left up in chair with all lines intact, call button in reach, and chair alarm on.    GOALS:   Multidisciplinary Problems       Physical Therapy Goals          Problem: Physical Therapy    Goal Priority Disciplines Outcome Goal Variances Interventions   Physical Therapy Goal     PT, PT/OT      Description: Goals to be met by 4/5/23  Pt will complete bed mobility MOD I.  Pt will complete sit to stand MOD I.  Pt will ambulate 150ft MOD I.                       History:     Past Medical  History:   Diagnosis Date    Anemia     Angina pectoris     Anxiety     Anxiety and depression     Arthritis     hip    Carotid artery occlusion     Carpal tunnel syndrome 06/23/2008    emg    Chronic diarrhea     work up in 2011 with EGD, CS and VCE    CKD (chronic kidney disease) stage 3, GFR 30-59 ml/min 5/11/2017    Colitis     Coronary artery disease     Coronary artery disease     Diastolic dysfunction     Diverticulosis     Glaucoma     Greater trochanteric bursitis 2/10/2015    Grief at loss of child 1/26/2016    H/O carotid endarterectomy 12/2/2013    Heart failure     History of coronary angioplasty 3/11/2014    Hypercholesteremia     Hypertension     Liver cyst 02/08/2013    ct abd    Macular degeneration     Obesity with serious comorbidity 3/19/2023    Primary open-angle glaucoma(365.11) 9/3/2013    Renal cyst 02/08/2013    ct abd    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    Sarcoidosis     Sarcoidosis of lung     Sickle cell trait     Uveitis        Past Surgical History:   Procedure Laterality Date    A-V CARDIAC PACEMAKER INSERTION Left 3/21/2023    Procedure: INSERTION, CARDIAC PACEMAKER, DUAL CHAMBER/His Lead;  Surgeon: Cortez Ambrose MD;  Location: [facility] CATH LAB;  Service: Cardiology;  Laterality: Left;  MDT/ MD to confirm in am/possible nurse sedate    CAROTID ENDARTERECTOMY Right 2000s    CATARACT EXTRACTION Bilateral     Dr. Liang Dennis    CHOLECYSTECTOMY      laparoscopic, 3/18.    CORONARY ANGIOPLASTY WITH STENT PLACEMENT  11/19/2010    RCA-HALIE 2010    Lathia    JOINT REPLACEMENT Left 11/03/2014    Dr. Braun    TOTAL ABDOMINAL HYSTERECTOMY W/ BILATERAL SALPINGOOPHORECTOMY  1972       Time Tracking:     PT Received On: 03/22/23  PT Start Time: 0800     PT Stop Time: 0825  PT Total Time (min): 25 min     Billable Minutes: Evaluation 10min and Therapeutic Activity 15min      03/22/2023

## 2023-03-22 NOTE — ASSESSMENT & PLAN NOTE
Echo pending, previous 3/22' EF nml   improved from 4 days ago 938  Cont IV diuresis one more day  Strict I/Os  Low Na diet    3/21/23  Diuresed well  BNP decreased  PO lasix tomorrow am  Strict I/Os  Low Na diet    3/22/23  Cont PO lasix, ASA, statin

## 2023-03-22 NOTE — PT/OT/SLP EVAL
"Occupational Therapy Evaluation and Treatment    Name: Glo Dumont  MRN: [id number]  Admitting Diagnosis: Acute on chronic congestive heart failure  Recent Surgery: Procedure(s) (LRB):  INSERTION, CARDIAC PACEMAKER, DUAL CHAMBER/His Lead (Left) 1 Day Post-Op    Recommendations:     Discharge Recommendations: home health OT (with 24/7 SPV)  Level of Assistance Recommended: 24 hours supervision  Discharge Equipment Recommendations: none  Barriers to discharge: None    Assessment:     Glo Dumont is a 87 y.o. female with a medical diagnosis of Acute on chronic congestive heart failure. She presents with performance deficits affecting function including weakness, impaired endurance, impaired self care skills, impaired functional mobility, impaired balance, decreased safety awareness, impaired cardiopulmonary response to activity, decreased coordination.     Rehab Prognosis: Fair; patient would benefit from acute OT services to address these deficits and reach maximum level of function.    Plan:     Patient to be seen 2 x/week to address the above listed problems via self-care/home management, therapeutic exercises, therapeutic activities  Plan of Care Expires: 04/05/23  Plan of Care Reviewed with: patient    Subjective     Chief Complaint: Patient reported "I feel alright"  Patient Comments/Goals: None reported  Pain/Comfort:  Pain Rating 1: 0/10   Patient reported no numbness or tingling in B UE and B LE.    Social History:  Living Environment: Patient  lives with their daughter and son in law  in a single story house with number of outside stair(s): 0 and number of inside stair(s): 2 stairs with rail to access living room .  Prior Level of Function: Prior to admission, patient was independent with ADLs and modified independent with functional mobility with intermittent use of a rolling walker and straight point cane.  Roles and Routines: Patient was not driving and not working prior to admission.  Equipment Used at " Home: rolling walker, straight point cane, grab bars  DME owned (not currently used): shower chair  Assistance Upon Discharge: family    Objective:     Communicated with nurse, Samuel, prior to session. Patient found HOB elevated with telemetry, PureWick, peripheral IV, oxygen, SCD upon OT entry to room.    General Precautions: Standard, fall, pacemaker precautions  Orthopedic Precautions:N/A   Braces: Sling and swathe (LUE)  Respiratory Status: Nasal cannula, flow 2 L/min Patient's O2 sats remained >90% with exertion.     Occupational Performance    Gait belt applied - No    Bed Mobility:  Supine to sit from left side of bed with minimum assistance     Functional Mobility/Transfers:  Sit <> Stand Transfer with contact guard assistance with no assistive device  Bed <> Chair Transfer using Step Transfer technique with minimum assistance with hand-held assist with v/c for hand placement.  Functional Mobility: Patient completed functional mobility x 30 ft x 2 with contact guard assistance with no assistive device to minimum assistance with hand held assist due to LOB. Functional mobility completed to increase activity tolerance for ADL completion. Patient educated on pacing for energy conservation.    Activities of Daily Living:  Grooming: set up assistance Patient washed face while seated in bedside chair with set up assistance for item retrieval.  Lower Body Dressing: total assistance Patient donned clean socks while seated in bedside chair with total assistance.   Toileting: total assistance Patient doffed soiled brief, completed toileting hygiene, and donned clean brief while standing with total assistance.    Cognitive/Visual Perceptual:  Cognitive/Psychosocial Skills:    -       Oriented to: Person, Place, Time, Situation  -       Follows Commands/attention:Follows one-step commands  -       Communication: clear/fluent  -       Memory: No Deficits noted  -       Safety awareness/insight to disability: impaired      Physical Exam:  Balance:    -       Sitting: minimum assistance  -       Static Standing: contact guard assistance  -       Dynamic Standing: contact guard assistance to minimum assistance d/t LOB  Dominant Hand: Right  Upper Extremity Range of Motion:    -       Right Upper Extremity: WFL  -       Left Upper Extremity: not tested due to LUE in sling and swathe  Upper Extremity Strength:    -       Right Upper Extremity: WFL  -       Left Upper Extremity: not tested due to LUE in sling and swathe   Strength:    -       Right Upper Extremity: WFL  -       Left Upper Extremity: not tested due to LUE in sling and swathe    AMPA 6 Click ADL:  AMPA Total Score: 16    Treatment & Education:  Therapist provided facilitation and instruction of proper body mechanics, energy conservation, and fall prevention strategies during tasks listed above.  Patient educated on role of OT, POC and goals for therapy  Patient educated on importance of OOB activities with staff member assistance and sitting OOB majority of the day.   Patient educated on completion of L UE AROM therex to maintain functional strength for ADL completion.  Patient's HR remained stable this date. RN aware.   Patient educated on pacemaker precautions. Patient verbalized understanding.    Patient left up in chair with all lines intact, call button in reach, RN notified, and chair alarm on.    GOALS:   Multidisciplinary Problems       Occupational Therapy Goals          Problem: Occupational Therapy    Goal Priority Disciplines Outcome Interventions   Occupational Therapy Goal     OT, PT/OT     Description: Goals to be met by: 4/5/23     Patient will increase functional independence with ADLs by performing:    Grooming while seated with [place].  Toileting from toilet with Minimal Assistance for hygiene and clothing management.   Toilet transfer to toilet with Minimal Assistance.                         History:     Past Medical History:   Diagnosis  Date    Anemia     Angina pectoris     Anxiety     Anxiety and depression     Arthritis     hip    Carotid artery occlusion     Carpal tunnel syndrome 06/23/2008    emg    Chronic diarrhea     work up in 2011 with EGD, CS and VCE    CKD (chronic kidney disease) stage 3, GFR 30-59 ml/min 5/11/2017    Colitis     Coronary artery disease     Coronary artery disease     Diastolic dysfunction     Diverticulosis     Glaucoma     Greater trochanteric bursitis 2/10/2015    Grief at loss of child 1/26/2016    H/O carotid endarterectomy 12/2/2013    Heart failure     History of coronary angioplasty 3/11/2014    Hypercholesteremia     Hypertension     Liver cyst 02/08/2013    ct abd    Macular degeneration     Obesity with serious comorbidity 3/19/2023    Primary open-angle glaucoma(365.11) 9/3/2013    Renal cyst 02/08/2013    ct abd    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    Sarcoidosis     Sarcoidosis of lung     Sickle cell trait     Uveitis          Past Surgical History:   Procedure Laterality Date    A-V CARDIAC PACEMAKER INSERTION Left 3/21/2023    Procedure: INSERTION, CARDIAC PACEMAKER, DUAL CHAMBER/His Lead;  Surgeon: Cortez Ambrose MD;  Location: [facility] CATH LAB;  Service: Cardiology;  Laterality: Left;  MDT/ MD to confirm in am/possible nurse sedate    CAROTID ENDARTERECTOMY Right 2000s    CATARACT EXTRACTION Bilateral     Dr. Liang Dennis    CHOLECYSTECTOMY      laparoscopic, 3/18.    CORONARY ANGIOPLASTY WITH STENT PLACEMENT  11/19/2010    RCA-HALIE 2010    Lathia    JOINT REPLACEMENT Left 11/03/2014    Dr. Braun    TOTAL ABDOMINAL HYSTERECTOMY W/ BILATERAL SALPINGOOPHORECTOMY  1972       Time Tracking:     OT Date of Treatment: 03/22/23  OT Start Time: 0755  OT Stop Time: 0820  OT Total Time (min): 25 min    Billable Minutes: Evaluation 10 and Therapeutic Activity 15    MARLEY Arguelles      3/22/2023

## 2023-03-22 NOTE — PROGRESS NOTES
[facility] - Telemetry ([facility])  Cardiology  Progress Note    Patient Name: Glo Dumont  MRN: [id number]  Admission Date: 3/18/2023  Hospital Length of Stay: 1 days  Code Status: Full Code   Attending Physician: Leonardo Caldwell MD   Primary Care Physician: Christina Recio MD  Expected Discharge Date:   Principal Problem:Acute on chronic congestive heart failure    Subjective:     Hospital Course:   3/21/23 Pt seen and examined today feels ok. PPM today w/ Dr. Ambrose. Labs reviewed, chart reviewed    3/22/23 Pt seen and examined today, s/p PPM placement dressing C/D/I no bruising noted at this time. Feels ok, denies any CP at this time. Labs reviewed, chart reviewed          Review of Systems   Constitutional: Negative.   HENT: Negative.     Eyes: Negative.    Cardiovascular: Negative.    Respiratory: Negative.     Skin: Negative.    Musculoskeletal: Negative.    Gastrointestinal: Negative.    Genitourinary: Negative.    Neurological: Negative.    Psychiatric/Behavioral: Negative.     Objective:     Vital Signs (Most Recent):  Temp: 97.5 °F (36.4 °C) (03/22/23 1128)  Pulse: 69 (03/22/23 1128)  Resp: 18 (03/22/23 1128)  BP: 126/62 (03/22/23 1128)  SpO2: 99 % (03/22/23 1128) Vital Signs (24h Range):  Temp:  [97.5 °F (36.4 °C)-97.9 °F (36.6 °C)] 97.5 °F (36.4 °C)  Pulse:  [64-86] 69  Resp:  [18-21] 18  SpO2:  [96 %-99 %] 99 %  BP: (110-145)/(56-77) 126/62     Weight: 69.5 kg (153 lb 3.5 oz)  Body mass index is 32.02 kg/m².     SpO2: 99 %         Intake/Output Summary (Last 24 hours) at 3/22/2023 1500  Last data filed at 3/22/2023 0559  Gross per 24 hour   Intake 181.68 ml   Output --   Net 181.68 ml       Lines/Drains/Airways       Drain  Duration             Female External Urinary Catheter 03/18/23 1940 3 days              Peripheral Intravenous Line  Duration                  Peripheral IV - Single Lumen 18 G Left Antecubital -- days         Peripheral IV - Single Lumen 20 G Right Hand -- days                     Physical Exam  Vitals and nursing note reviewed.   Constitutional:       Appearance: Normal appearance.   HENT:      Head: Normocephalic.   Eyes:      Pupils: Pupils are equal, round, and reactive to light.   Cardiovascular:      Rate and Rhythm: Normal rate and regular rhythm.      Heart sounds: Normal heart sounds, S1 normal and S2 normal. No murmur heard.    No S3 or S4 sounds.   Pulmonary:      Effort: Pulmonary effort is normal.      Breath sounds: Normal breath sounds.   Abdominal:      General: Bowel sounds are normal.      Palpations: Abdomen is soft.   Musculoskeletal:         General: Normal range of motion.      Cervical back: Normal range of motion.      Comments: Left arm sling in place   Skin:     Capillary Refill: Capillary refill takes less than 2 seconds.      Comments: PPM dressing C/D/I   Neurological:      General: No focal deficit present.      Mental Status: She is alert and oriented to person, place, and time.   Psychiatric:         Mood and Affect: Mood normal.         Behavior: Behavior normal.         Thought Content: Thought content normal.       Significant Labs: BMP:   Recent Labs   Lab 03/21/23  0510 03/22/23  0539   * 111*   * 135*   K 4.4 4.1    103   CO2 20* 19*   BUN 64* 68*   CREATININE 2.5* 2.5*   CALCIUM 8.9 9.0   MG 2.2  --    , CMP   Recent Labs   Lab 03/21/23  0510 03/22/23  0539   * 135*   K 4.4 4.1    103   CO2 20* 19*   * 111*   BUN 64* 68*   CREATININE 2.5* 2.5*   CALCIUM 8.9 9.0   ANIONGAP 11 13   , CBC   Recent Labs   Lab 03/21/23  0510   WBC 6.78   HGB 9.4*   HCT 28.7*      , INR No results for input(s): INR, PROTIME in the last 48 hours., Lipid Panel No results for input(s): CHOL, HDL, LDLCALC, TRIG, CHOLHDL in the last 48 hours., Troponin   Recent Labs   Lab 03/21/23  0805 03/21/23  1148 03/21/23  1630   TROPONINI 0.011 <0.006 0.008   , and All pertinent lab results from the last 24 hours have been  reviewed.    Significant Imaging: Echocardiogram: Transthoracic echo (TTE) complete ([facility] Only):   Results for orders placed or performed during the hospital encounter of 03/18/23   Echo   Result Value Ref Range    BSA 1.65 m2    TDI SEPTAL 0.08 m/s    LV LATERAL E/E' RATIO 18.33 m/s    LV SEPTAL E/E' RATIO 13.75 m/s    LA WIDTH 3.10 cm    IVC diameter 0.74 cm    Left Ventricular Outflow Tract Mean Velocity 0.70 cm/s    Left Ventricular Outflow Tract Mean Gradient 2.16 mmHg    TDI LATERAL 0.06 m/s    LVIDd 3.40 (A) 3.5 - 6.0 cm    IVS 1.32 (A) 0.6 - 1.1 cm    Posterior Wall 1.09 0.6 - 1.1 cm    Ao root annulus 3.30 cm    LVIDs 2.31 2.1 - 4.0 cm    FS 32 28 - 44 %    LA volume 35.48 cm3    STJ 3.16 cm    Ascending aorta 3.21 cm    LV mass 131.07 g    LA size 3.28 cm    TAPSE 1.90 cm    Left Ventricle Relative Wall Thickness 0.64 cm    AV mean gradient 8 mmHg    AV valve area 1.70 cm2    AV Velocity Ratio 0.52     AV index (prosthetic) 0.62     MV mean gradient 6 mmHg    MV valve area p 1/2 method 2.23 cm2    MV valve area by continuity eq 1.45 cm2    E/A ratio 0.80     Mean e' 0.07 m/s    E wave deceleration time 387.37 msec    IVRT 94.20 msec    LVOT diameter 1.86 cm    LVOT area 2.7 cm2    LVOT peak matthew 0.96 m/s    LVOT peak VTI 30.60 cm    Ao peak matthew 1.86 m/s    Ao VTI 49.0 cm    RVOT peak matthew 0.64 m/s    RVOT peak VTI 17.5 cm    Mr max matthew 4.90 m/s    LVOT stroke volume 83.10 cm3    AV peak gradient 14 mmHg    MV peak gradient 12 mmHg    PV mean gradient 0.85 mmHg    E/E' ratio 15.71 m/s    MV Peak E Matthew 1.10 m/s    TR Max Matthew 2.83 m/s    MV VTI 57.4 cm    MV stenosis pressure 1/2 time 98.51 ms    MV Peak A Matthew 1.38 m/s    LV Systolic Volume 18.39 mL    LV Systolic Volume Index 11.5 mL/m2    LV Diastolic Volume 47.53 mL    LV Diastolic Volume Index 29.71 mL/m2    LA Volume Index 22.2 mL/m2    LV Mass Index 82 g/m2    RA Major Axis 4.04 cm    Left Atrium Minor Axis 4.09 cm    Left Atrium Major Axis 4.12 cm     Triscuspid Valve Regurgitation Peak Gradient 32 mmHg    Right Atrial Pressure (from IVC) 3 mmHg    EF 70 %    TV rest pulmonary artery pressure 35 mmHg    Narrative    · The left ventricle is small with mild concentric hypertrophy and normal   systolic function.  · The estimated ejection fraction is 70%.  · Grade II left ventricular diastolic dysfunction.  · Normal right ventricular size with normal right ventricular systolic   function.  · There is mild aortic valve stenosis.  · Aortic valve area is 1.70 cm2; peak velocity is 1.86 m/s; mean gradient   is 8 mmHg.  · Mild tricuspid regurgitation.  · Moderate mitral regurgitation.  · The mean diastolic gradient across the mitral valve is 6 mmHg at a heart   rate of 42 bpm.  · There is mild mitral stenosis.  · Normal central venous pressure (3 mmHg).  · The estimated PA systolic pressure is 35 mmHg.      , EKG: reviewed, Stress Test: reviewed, and X-Ray: CXR: X-Ray Chest 1 View (CXR): No results found for this visit on 03/18/23.    Assessment and Plan:       * Acute on chronic congestive heart failure  Echo pending, previous 3/22' EF nml   improved from 4 days ago 938  Cont IV diuresis one more day  Strict I/Os  Low Na diet    3/21/23  Diuresed well  BNP decreased  PO lasix tomorrow am  Strict I/Os  Low Na diet    3/22/23  Cont PO lasix, ASA, statin    Bradycardia  EKG reviewed  EP consulted  Avoid AV henry blocking agents  Cont to hold home BB    3/21/23  PPM today    3/22/23  S/p PPM   Restrictions explained to patient and family, all questions answered.   Follow up in pacemake clinic schedule for Friday 3/24    CKD (chronic kidney disease) stage 4, GFR 15-29 ml/min  Follows Dr. Villareal in OP clinic    Essential hypertension  Stable  Cont current medications    Coronary artery disease, occlusive  Cont ASA, statin    Other hyperlipidemia  statin        VTE Risk Mitigation (From admission, onward)         Ordered     IP VTE HIGH RISK PATIENT  Once          03/18/23 1953     Place sequential compression device  Until discontinued         03/18/23 1953                Niki Benson NP  Cardiology  [facility] - Telemetry ([facility])

## 2023-03-22 NOTE — PROGRESS NOTES
[facility] Medicine  Progress Note     Patient Name: Glo Dumont  MRN: [id number]  Patient Class: OP- Observation          Admission Date: 3/18/2023  Length of Stay: 4 days  Attending Physician: Leonardo Caldwell MD  Primary Care Provider: Christina Recio MD      Subjective:      Principal Problem:Acute on chronic congestive heart failure      HPI:  Glo Dumont is a 87 y.o. female with a PMH  has a past medical history of Anemia, Angina pectoris, Anxiety, Anxiety and depression, Arthritis, Carotid artery occlusion, Carpal tunnel syndrome (06/23/2008), Chronic diarrhea, CKD (chronic kidney disease) stage 3, GFR 30-59 ml/min (5/11/2017), Colitis, Coronary artery disease, Coronary artery disease, Diastolic dysfunction, Diverticulosis, Glaucoma, Greater trochanteric bursitis (2/10/2015), Grief at loss of child (1/26/2016), H/O carotid endarterectomy (12/2/2013), Heart failure, History of coronary angioplasty (3/11/2014), Hypercholesteremia, Hypertension, Liver cyst (02/08/2013), Macular degeneration, Primary open-angle glaucoma(365.11) (9/3/2013), Renal cyst (02/08/2013), 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  Presented to ER for evaluation of worsening shortness of [place] over the last few days.  Patient recently visited the ED on 03/16/2023 for similar complaint and was given IV Lasix and discharged home with instructions to double up on Lasix for the next 3 days.  Patient reports that she took 40 mg versus 20 mg Lasix b.i.d. for the next 30 days without any significant improvement of her shortness of breath.  However, patient does report drinking more fluid than she has been putting out.  Patient reports chest pressure which feels like an acute CHF exacerbation as she had in the past.  Reports symptoms worsen laying flat, with exertion and is improved with rest and sitting upright denies any use home  "oxygen or sleep machine at night.  Denies any other associated symptoms such as fever, aches, chills, sweats, nausea, vomiting, and compliant, dizziness, visual disturbances, palpitations, abdominal pain, or any other symptoms at this time.        ER workup revealed potassium of 3.3, BUN/creatinine of 40/2.1 with EGFR 23 baseline creatinine of 1.7.  CBG of 153 mg/dL, BNP of 349, troponin 0.030, UA positive for leukocyte esterase and wbc's.  Chest x-ray correlate for CHF.  EKG reveals sinus for the with first-degree AV block with ventricular rate of 46 beats per minute in the QT/QTC of 478/14.  Most recent echocardiogram revealed TTE showing EF 75% March 2022.  Is followed by Dr. Lui and was last evaluated by on 01/12/2023.  Meds given in ED were 60 mg Lasix as well as 325 mg aspirin.  Patient is in agreement with treatment plan to be admitted to hospital under observation status for diuresing of acute CHF exacerbation.        Overview/Hospital Course:  86 y/o female admitted with SOB and CHF exacerbation. She was just seen in ER on Thursday and told to double her dose of Lasix dose x 3 days, but by Saturday, she had no improvement and came back to ER. , Cr 1.8. She was started on Lasix IV 40 mg BID with some improvement. She sees Dr. Lui in clinic. She denies home oxygen therapy and has been holding Sats > 95% on 2LNC. PCM inserted on 3/21/2023.        Interval History  Placed back on 2L NC after procedure yesterday. VSS overnight, unable to take her antihypertensives this morning as her SBP was in the 110s.      Objective  BP (!) 117/56   Pulse 70   Temp 97.9 °F (36.6 °C)   Resp 20   Ht 4' 10" (1.473 m)   Wt 69.5 kg (153 lb 3.5 oz)   SpO2 99%   BMI 32.02 kg/m²     Intake/Output Summary (Last 24 hours) at 3/22/2023 0812  Last data filed at 3/22/2023 0559  Gross per 24 hour   Intake 181.68 ml   Output --   Net 181.68 ml       PHYSICAL EXAM    GEN: No acute distress, pleasant, body habitus " normal  HEENT: atraumatic and normocephalic  CARDS: regular rate and rhythm, no m/g, pulses palpable in LE  PULM: breathing comfortably on 2LNC, chest symmetric, nonlabored, no abnormal breath sounds on auscultation  ABD: nontender, nondistended, soft, no organomegaly, BS+  Neuro: Alert and oriented x3, CN's I-IX grossly intact, sensation and motor intact; follows directions and answers questions appropriately      BMP:   Recent Labs   Lab 03/21/23  0510 03/22/23  0539   * 111*   * 135*   K 4.4 4.1    103   CO2 20* 19*   BUN 64* 68*   CREATININE 2.5* 2.5*   CALCIUM 8.9 9.0   MG 2.2  --      CBC:   Recent Labs   Lab 03/21/23  0510   WBC 6.78   HGB 9.4*   HCT 28.7*        CMP:   Recent Labs   Lab 03/21/23  0510 03/22/23  0539   * 135*   K 4.4 4.1    103   CO2 20* 19*   * 111*   BUN 64* 68*   CREATININE 2.5* 2.5*   CALCIUM 8.9 9.0   ANIONGAP 11 13     Cardiac Markers:   Recent Labs   Lab 03/21/23  0941   *     Coagulation: No results for input(s): PT, INR, APTT in the last 48 hours.  Lactic Acid: No results for input(s): LACTATE in the last 48 hours.  Magnesium:   Recent Labs   Lab 03/21/23  0510   MG 2.2     Troponin:   Recent Labs   Lab 03/21/23  0805 03/21/23  1148 03/21/23  1630   TROPONINI 0.011 <0.006 0.008     TSH:   No results for input(s): TSH in the last 4320 hours.  Urine Studies:   No results for input(s): COLORU, APPEARANCEUA, PHUR, SPECGRAV, PROTEINUA, GLUCUA, KETONESU, BILIRUBINUA, OCCULTUA, NITRITE, UROBILINOGEN, LEUKOCYTESUR, RBCUA, WBCUA, BACTERIA, SQUAMEPITHEL, HYALINECASTS in the last 48 hours.    Invalid input(s): WRIGHTSUR    Imaging Results              X-Ray Chest AP Portable (Final result)  Result time 03/18/23 18:23:19      Final result by Paolo Gordillo MD (03/18/23 18:23:19)                   Impression:      CHF.      Electronically signed by: Paolo Gordillo  Date:    03/18/2023  Time:    18:23               Narrative:    EXAMINATION:  XR  CHEST AP PORTABLE    CLINICAL HISTORY:  Shortness of breath    FINDINGS:  Comparison is made to March 16, 2023.    Cardiomegaly.  Pulmonary vascular congestion.  No consolidation.  No significant pleural effusion.  No pneumothorax.                                      Assessment/Plan  Acute on chronic congestive heart failure  03/21/2023  --improving, euvolemic on exam  --I&O's revealed down net 3L fluid loss since admission  --O2 requirements weaned to 1L NC, will order ambulatory desat study to assess need for home O2  --Cardiology following, will f/u their recs  --defer rest of management to their team     Bradycardia, resolved  -EKG revealed sinus Tre with first-degree AV block, HR 46   -s/p PCM insertion (03/21/2023)     03/22/2023  -HR stable since PCM insertion  -continue cardiac monitoring while hospitalized  -follow up cards definitive recs     Hypokalemia  03/22/2023  -resolved  -replace potassium as needed  -monitor electrolytes       Central retinal vein occlusion with macular edema of both eyes  -cont home Cosopt  -chronic periorbital puffiness  -f/u OP     CKD (chronic kidney disease) stage 4, GFR 15-29 ml/min  -Cr 1.8  -BMP reviewed- noted Estimated Creatinine Clearance: 20.3 mL/min (A) (based on SCr of 1.8 mg/dL (H)). according to latest data  -Monitor UOP and serial BMP and adjust therapy as needed  -Renally dose meds and avoid nephrotoxins     Essential hypertension     03/22/2023  --controlled at this encounter  --unable to take antihypertensives this morning due to SBP in the low 100s  --will transition clonidine to PRN  --If BP continues to be low throughout day, may discontinue norvasc and titrate hydralazine doses  --coreg stopped d/t bradycardia  --Q4HVS     Coronary artery disease, occlusive   03/20/2023  --stable, no active chest pain at this time  --continue aspirin therapy, restart home pravastatin  --monitor clinically, PRN EKG  --Cards following     3/21/2023  --episode of CP prior to  PCM insertion, trop and EKG unremarkable  --Cards notified, follow up recs     Other hyperlipidemia  --continue statin therapy              VTE Risk Mitigation (From admission, onward)           Ordered       IP VTE HIGH RISK PATIENT  Once         03/18/23 1953       Place sequential compression device  Until discontinued         03/18/23 1953                       Discharge Planning   CANDACE:      Code Status: Full Code   Is the patient medically ready for discharge?:     Reason for patient still in hospital (select all that apply): Patient trending condition, Laboratory test and Consult recommendations            CORE MEASURES:  Code Status: FULL  Diet: diet advanced to FLD (cardiac)  DVT Ppx: SCD  Disposition: ambulatory desat study vs wean O2, Cardiac clearance       Leonardo Caldwell MD  Department of Hospital Medicine   [facility] - Telemetry ([facility])

## 2023-03-22 NOTE — PROGRESS NOTES
"  [facility]) nurse navigator notified of HFTCC candidate in need of education and introduction to 4-6 week program.      PT aao x 3 while lying in bed. Introduced self to pt as HFTCC nurse navigator.     Reviewed  and provided "Home Care Guide for Heart Failure Patients" , "Heart Failure Transitional Care Clinic" flyer and "Daily weight and symptom tracker".  Encouraged pt to review information.      Reviewed the following key points of HFTCC program with pt and family:   1.) Take your medications as directed.    2.) Weight yourself daily   3.) Follow low salt and limited fluid diet.    4.) Stop smoking and start exercising   5.) Go to your appointments and call your team.      Pt reminded to follow Symptom tracker and to call at the onset of symptoms according to tracker to prevent hospital readmissions.     Reviewed plan for follow up once discharged to include phone calls, in person and virtual visits to assist pt optimizing their heart failure medication regimen and encouraging healthy lifestyle modifications.  Reminded pt that program will assist them over the next 4-6 weeks and then patient will be transferred to long term care provider .  Reminded pt how to contact HFTCC navigator via phone and or via [employer].     Pt instructed appointment with LYDIA Pool will be printed on hospital discharge paperwork.     Pt also reminded HF nurse will call 48-72 hours after discharge to check on them.     PT verbalize read back of information given.  Encouraged pt and family to read over information often and contact team with any questions or concerns.    "

## 2023-03-22 NOTE — PLAN OF CARE
AAO4. Able to verbalize needs. NAEON. VSS. V-paced. PIV patent. IV abx. PO diuresis. 2L NC. Ambulatory w/ assist. Arm sling on LUE. Clear liquid diet. C/O incisional pain to L-chest wall. Continent of b/b. NADN. Will continue to monitor. Chart check complete.

## 2023-03-22 NOTE — ASSESSMENT & PLAN NOTE
EKG reviewed  EP consulted  Avoid AV henry blocking agents  Cont to hold home BB    3/21/23  PPM today    3/22/23  S/p PPM   Restrictions explained to patient and family, all questions answered.   Follow up in pacemake clinic schedule for Friday 3/24

## 2023-03-22 NOTE — PLAN OF CARE
OT jourdan completed. Sup>sit with min A. Sit>stand with CGA. Recommending HHOT with 24/7 SPV at d/c.

## 2023-03-22 NOTE — PLAN OF CARE
PT EVAL complete. Required MIN A for sup>sit, CGA for sit to stand, ambulated 30ft x2 MIN A. Recommending HHPT with 24/7 care upon d/c.

## 2023-03-22 NOTE — PLAN OF CARE
Pt awake, though somewhat disoriented upon arrival to [facility]. Within first 20 minutes, pt became AAOx3.  Sling and swath to left arm and ice pack to left chest in place upon arrival to [facility] and remains in place upon transfer back to room 225. Right groin dressing remains c/d/I and site wnl at time of transfer.   Discharge instructions discussed with pt's family prior to pt being transferred back to floor.   Pt transferred back to [facility], room 225, via hospital bed in no apparent distress. Report given to JULEE Baker; no further questions at this time.   Discharge instructions for post pacemaker placement put in chart; family instructed on where to find these instructions in the AVS.   Family at bedside at time of transfer back to room 225.

## 2023-03-22 NOTE — SUBJECTIVE & OBJECTIVE
Review of Systems   Constitutional: Negative.   HENT: Negative.     Eyes: Negative.    Cardiovascular: Negative.    Respiratory: Negative.     Skin: Negative.    Musculoskeletal: Negative.    Gastrointestinal: Negative.    Genitourinary: Negative.    Neurological: Negative.    Psychiatric/Behavioral: Negative.     Objective:     Vital Signs (Most Recent):  Temp: 97.5 °F (36.4 °C) (03/22/23 1128)  Pulse: 69 (03/22/23 1128)  Resp: 18 (03/22/23 1128)  BP: 126/62 (03/22/23 1128)  SpO2: 99 % (03/22/23 1128) Vital Signs (24h Range):  Temp:  [97.5 °F (36.4 °C)-97.9 °F (36.6 °C)] 97.5 °F (36.4 °C)  Pulse:  [64-86] 69  Resp:  [18-21] 18  SpO2:  [96 %-99 %] 99 %  BP: (110-145)/(56-77) 126/62     Weight: 69.5 kg (153 lb 3.5 oz)  Body mass index is 32.02 kg/m².     SpO2: 99 %         Intake/Output Summary (Last 24 hours) at 3/22/2023 1500  Last data filed at 3/22/2023 0559  Gross per 24 hour   Intake 181.68 ml   Output --   Net 181.68 ml       Lines/Drains/Airways       Drain  Duration             Female External Urinary Catheter 03/18/23 1940 3 days              Peripheral Intravenous Line  Duration                  Peripheral IV - Single Lumen 18 G Left Antecubital -- days         Peripheral IV - Single Lumen 20 G Right Hand -- days                    Physical Exam  Vitals and nursing note reviewed.   Constitutional:       Appearance: Normal appearance.   HENT:      Head: Normocephalic.   Eyes:      Pupils: Pupils are equal, round, and reactive to light.   Cardiovascular:      Rate and Rhythm: Normal rate and regular rhythm.      Heart sounds: Normal heart sounds, S1 normal and S2 normal. No murmur heard.    No S3 or S4 sounds.   Pulmonary:      Effort: Pulmonary effort is normal.      Breath sounds: Normal breath sounds.   Abdominal:      General: Bowel sounds are normal.      Palpations: Abdomen is soft.   Musculoskeletal:         General: Normal range of motion.      Cervical back: Normal range of motion.       Comments: Left arm sling in place   Skin:     Capillary Refill: Capillary refill takes less than 2 seconds.      Comments: PPM dressing C/D/I   Neurological:      General: No focal deficit present.      Mental Status: She is alert and oriented to person, place, and time.   Psychiatric:         Mood and Affect: Mood normal.         Behavior: Behavior normal.         Thought Content: Thought content normal.       Significant Labs: BMP:   Recent Labs   Lab 03/21/23  0510 03/22/23  0539   * 111*   * 135*   K 4.4 4.1    103   CO2 20* 19*   BUN 64* 68*   CREATININE 2.5* 2.5*   CALCIUM 8.9 9.0   MG 2.2  --    , CMP   Recent Labs   Lab 03/21/23  0510 03/22/23  0539   * 135*   K 4.4 4.1    103   CO2 20* 19*   * 111*   BUN 64* 68*   CREATININE 2.5* 2.5*   CALCIUM 8.9 9.0   ANIONGAP 11 13   , CBC   Recent Labs   Lab 03/21/23  0510   WBC 6.78   HGB 9.4*   HCT 28.7*      , INR No results for input(s): INR, PROTIME in the last 48 hours., Lipid Panel No results for input(s): CHOL, HDL, LDLCALC, TRIG, CHOLHDL in the last 48 hours., Troponin   Recent Labs   Lab 03/21/23  0805 03/21/23  1148 03/21/23  1630   TROPONINI 0.011 <0.006 0.008   , and All pertinent lab results from the last 24 hours have been reviewed.    Significant Imaging: Echocardiogram: Transthoracic echo (TTE) complete (Cupid Only):   Results for orders placed or performed during the hospital encounter of 03/18/23   Echo   Result Value Ref Range    BSA 1.65 m2    TDI SEPTAL 0.08 m/s    LV LATERAL E/E' RATIO 18.33 m/s    LV SEPTAL E/E' RATIO 13.75 m/s    LA WIDTH 3.10 cm    IVC diameter 0.74 cm    Left Ventricular Outflow Tract Mean Velocity 0.70 cm/s    Left Ventricular Outflow Tract Mean Gradient 2.16 mmHg    TDI LATERAL 0.06 m/s    LVIDd 3.40 (A) 3.5 - 6.0 cm    IVS 1.32 (A) 0.6 - 1.1 cm    Posterior Wall 1.09 0.6 - 1.1 cm    Ao root annulus 3.30 cm    LVIDs 2.31 2.1 - 4.0 cm    FS 32 28 - 44 %    LA volume 35.48 cm3     STJ 3.16 cm    Ascending aorta 3.21 cm    LV mass 131.07 g    LA size 3.28 cm    TAPSE 1.90 cm    Left Ventricle Relative Wall Thickness 0.64 cm    AV mean gradient 8 mmHg    AV valve area 1.70 cm2    AV Velocity Ratio 0.52     AV index (prosthetic) 0.62     MV mean gradient 6 mmHg    MV valve area p 1/2 method 2.23 cm2    MV valve area by continuity eq 1.45 cm2    E/A ratio 0.80     Mean e' 0.07 m/s    E wave deceleration time 387.37 msec    IVRT 94.20 msec    LVOT diameter 1.86 cm    LVOT area 2.7 cm2    LVOT peak matthew 0.96 m/s    LVOT peak VTI 30.60 cm    Ao peak matthew 1.86 m/s    Ao VTI 49.0 cm    RVOT peak matthew 0.64 m/s    RVOT peak VTI 17.5 cm    Mr max matthew 4.90 m/s    LVOT stroke volume 83.10 cm3    AV peak gradient 14 mmHg    MV peak gradient 12 mmHg    PV mean gradient 0.85 mmHg    E/E' ratio 15.71 m/s    MV Peak E Matthew 1.10 m/s    TR Max Matthew 2.83 m/s    MV VTI 57.4 cm    MV stenosis pressure 1/2 time 98.51 ms    MV Peak A Matthew 1.38 m/s    LV Systolic Volume 18.39 mL    LV Systolic Volume Index 11.5 mL/m2    LV Diastolic Volume 47.53 mL    LV Diastolic Volume Index 29.71 mL/m2    LA Volume Index 22.2 mL/m2    LV Mass Index 82 g/m2    RA Major Axis 4.04 cm    Left Atrium Minor Axis 4.09 cm    Left Atrium Major Axis 4.12 cm    Triscuspid Valve Regurgitation Peak Gradient 32 mmHg    Right Atrial Pressure (from IVC) 3 mmHg    EF 70 %    TV rest pulmonary artery pressure 35 mmHg    Narrative    · The left ventricle is small with mild concentric hypertrophy and normal   systolic function.  · The estimated ejection fraction is 70%.  · Grade II left ventricular diastolic dysfunction.  · Normal right ventricular size with normal right ventricular systolic   function.  · There is mild aortic valve stenosis.  · Aortic valve area is 1.70 cm2; peak velocity is 1.86 m/s; mean gradient   is 8 mmHg.  · Mild tricuspid regurgitation.  · Moderate mitral regurgitation.  · The mean diastolic gradient across the mitral valve is 6 mmHg  at a heart   rate of 42 bpm.  · There is mild mitral stenosis.  · Normal central venous pressure (3 mmHg).  · The estimated PA systolic pressure is 35 mmHg.      , EKG: reviewed, Stress Test: reviewed, and X-Ray: CXR: X-Ray Chest 1 View (CXR): No results found for this visit on 03/18/23.

## 2023-03-23 VITALS
RESPIRATION RATE: 20 BRPM | SYSTOLIC BLOOD PRESSURE: 157 MMHG | BODY MASS INDEX: 32.17 KG/M2 | DIASTOLIC BLOOD PRESSURE: 72 MMHG | WEIGHT: 153.25 LBS | HEART RATE: 98 BPM | HEIGHT: 58 IN | TEMPERATURE: 100 F | OXYGEN SATURATION: 97 %

## 2023-03-23 LAB
ANION GAP SERPL CALC-SCNC: 12 MMOL/L (ref 8–16)
BUN SERPL-MCNC: 67 MG/DL (ref 8–23)
CALCIUM SERPL-MCNC: 9 MG/DL (ref 8.7–10.5)
CHLORIDE SERPL-SCNC: 106 MMOL/L (ref 95–110)
CO2 SERPL-SCNC: 17 MMOL/L (ref 23–29)
CREAT SERPL-MCNC: 2.2 MG/DL (ref 0.5–1.4)
EST. GFR  (NO RACE VARIABLE): 21 ML/MIN/1.73 M^2
GLUCOSE SERPL-MCNC: 100 MG/DL (ref 70–110)
POTASSIUM SERPL-SCNC: 3.9 MMOL/L (ref 3.5–5.1)
SODIUM SERPL-SCNC: 135 MMOL/L (ref 136–145)

## 2023-03-23 PROCEDURE — 25000003 PHARM REV CODE 250: Mod: HCNC | Performed by: STUDENT IN AN ORGANIZED HEALTH CARE EDUCATION/TRAINING PROGRAM

## 2023-03-23 PROCEDURE — 80048 BASIC METABOLIC PNL TOTAL CA: CPT | Mod: HCNC | Performed by: NURSE PRACTITIONER

## 2023-03-23 PROCEDURE — 93005 ELECTROCARDIOGRAM TRACING: CPT | Mod: HCNC

## 2023-03-23 PROCEDURE — 25000003 PHARM REV CODE 250: Mod: HCNC

## 2023-03-23 PROCEDURE — 93010 EKG 12-LEAD: ICD-10-PCS | Mod: HCNC,,, | Performed by: INTERNAL MEDICINE

## 2023-03-23 PROCEDURE — 93010 ELECTROCARDIOGRAM REPORT: CPT | Mod: HCNC,,, | Performed by: INTERNAL MEDICINE

## 2023-03-23 PROCEDURE — 25000003 PHARM REV CODE 250: Mod: HCNC | Performed by: NURSE PRACTITIONER

## 2023-03-23 PROCEDURE — 36415 COLL VENOUS BLD VENIPUNCTURE: CPT | Mod: HCNC | Performed by: NURSE PRACTITIONER

## 2023-03-23 RX ORDER — CARVEDILOL 12.5 MG/1
12.5 TABLET ORAL 2 TIMES DAILY WITH MEALS
Qty: 60 TABLET | Refills: 11 | Status: SHIPPED | OUTPATIENT
Start: 2023-03-23 | End: 2023-04-25

## 2023-03-23 RX ORDER — HYDRALAZINE HYDROCHLORIDE 10 MG/1
10 TABLET, FILM COATED ORAL
Qty: 90 TABLET | Refills: 2 | Status: SHIPPED | OUTPATIENT
Start: 2023-03-23 | End: 2023-03-28

## 2023-03-23 RX ORDER — DEXTROSE 40 %
30 GEL (GRAM) ORAL
Status: DISCONTINUED | OUTPATIENT
Start: 2023-03-23 | End: 2023-03-23 | Stop reason: HOSPADM

## 2023-03-23 RX ORDER — DEXTROSE 40 %
15 GEL (GRAM) ORAL
Status: DISCONTINUED | OUTPATIENT
Start: 2023-03-23 | End: 2023-03-23 | Stop reason: HOSPADM

## 2023-03-23 RX ORDER — CARVEDILOL 12.5 MG/1
12.5 TABLET ORAL 2 TIMES DAILY WITH MEALS
Status: DISCONTINUED | OUTPATIENT
Start: 2023-03-23 | End: 2023-03-23 | Stop reason: HOSPADM

## 2023-03-23 RX ADMIN — PANTOPRAZOLE SODIUM 40 MG: 40 TABLET, DELAYED RELEASE ORAL at 09:03

## 2023-03-23 RX ADMIN — DORZOLAMIDE HYDROCHLORIDE AND TIMOLOL MALEATE 1 DROP: 22.3; 6.8 SOLUTION/ DROPS OPHTHALMIC at 09:03

## 2023-03-23 RX ADMIN — FUROSEMIDE 20 MG: 20 TABLET ORAL at 09:03

## 2023-03-23 RX ADMIN — HYDRALAZINE HYDROCHLORIDE 10 MG: 10 TABLET, FILM COATED ORAL at 06:03

## 2023-03-23 RX ADMIN — ASPIRIN 81 MG: 81 TABLET, COATED ORAL at 09:03

## 2023-03-23 RX ADMIN — CARVEDILOL 12.5 MG: 12.5 TABLET, FILM COATED ORAL at 09:03

## 2023-03-23 RX ADMIN — PANCRELIPASE 1 CAPSULE: 24000; 76000; 120000 CAPSULE, DELAYED RELEASE PELLETS ORAL at 09:03

## 2023-03-23 RX ADMIN — PRAVASTATIN SODIUM 40 MG: 20 TABLET ORAL at 09:03

## 2023-03-23 NOTE — DISCHARGE SUMMARY
[facility]  Discharge Summary      Patient Name: Glo Dumont  MRN: [id number]  [facility]: [id number]  Patient Class: IP- Inpatient  Admission Date: 3/18/2023  Hospital Length of Stay: 2 days  Discharge Date and Time:  03/23/2023 9:16 AM  Attending Physician: Leonardo Caldwell MD   Discharging Provider: Leonardo Caldwell MD  Primary Care Provider: Christina Recio MD    Primary Care Team: Networked reference to record PCT     HPI:   Glo Dumont is a 87 y.o. female with a PMH  has a past medical history of Anemia, Angina pectoris, Anxiety, Anxiety and depression, Arthritis, Carotid artery occlusion, Carpal tunnel syndrome (06/23/2008), Chronic diarrhea, CKD (chronic kidney disease) stage 3, GFR 30-59 ml/min (5/11/2017), Colitis, Coronary artery disease, Coronary artery disease, Diastolic dysfunction, Diverticulosis, Glaucoma, Greater trochanteric bursitis (2/10/2015), Grief at loss of child (1/26/2016), H/O carotid endarterectomy (12/2/2013), Heart failure, History of coronary angioplasty (3/11/2014), Hypercholesteremia, Hypertension, Liver cyst (02/08/2013), Macular degeneration, Primary open-angle glaucoma(365.11) (9/3/2013), Renal cyst (02/08/2013), 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  Presented to ER for evaluation of worsening shortness of [place] over the last few days.  Patient recently visited the ED on 03/16/2023 for similar complaint and was given IV Lasix and discharged home with instructions to double up on Lasix for the next 3 days.  Patient reports that she took 40 mg versus 20 mg Lasix b.i.d. for the next 30 days without any significant improvement of her shortness of breath.  However, patient does report drinking more fluid than she has been putting out.  Patient reports chest pressure which feels like an acute CHF exacerbation as she had in the past.  Reports symptoms worsen laying  flat, with exertion and is improved with rest and sitting upright denies any use home oxygen or sleep machine at night.  Denies any other associated symptoms such as fever, aches, chills, sweats, nausea, vomiting, and compliant, dizziness, visual disturbances, palpitations, abdominal pain, or any other symptoms at this time.      ER workup revealed potassium of 3.3, BUN/creatinine of 40/2.1 with EGFR 23 baseline creatinine of 1.7.  CBG of 153 mg/dL, BNP of 349, troponin 0.030, UA positive for leukocyte esterase and wbc's.  Chest x-ray correlate for CHF.  EKG reveals sinus for the with first-degree AV block with ventricular rate of 46 beats per minute in the QT/QTC of 478/14.  Most recent echocardiogram revealed TTE showing EF 75% March 2022.  Is followed by Dr. Lui and was last evaluated by on 01/12/2023.  Meds given in ED were 60 mg Lasix as well as 325 mg aspirin.  Patient is in agreement with treatment plan to be admitted to hospital under observation status for diuresing of acute CHF exacerbation.    PCP: Christina Recio            Procedure(s) (LRB):  INSERTION, CARDIAC PACEMAKER, DUAL CHAMBER/His Lead (Left)      Hospital Course:   86 y/o female admitted with SOB and CHF exacerbation. She was just seen in ER on Thursday and told to double her dose of Lasix dose x 3 days, but by Saturday, she had no improvement and came back to ER. , Cr 1.8. She was started on Lasix IV 40 mg BID with some improvement. She sees Dr. Lui in clinic. She denies home oxygen therapy. Performed well on ambulatory desat study and was determined to not meet criteria for home O2. PCM inserted on 3/21/2023. Home medication regimen modified. Given recurrence of symptoms after previously being discharged and her having to return to the ER, patient monitored for 48 hours to confirm hemodynamic stability. Medically cleared for discharge on 3/23/2023. Patient to follow up with Cardiology on 3/24/2023.     BP (!) 157/72    "Pulse 98   Temp 99.7 °F (37.6 °C)   Resp 20   Ht 4' 10" (1.473 m)   Wt 69.5 kg (153 lb 3.5 oz)   SpO2 97%   BMI 32.02 kg/m²     GEN: No acute distress, pleasant, body habitus normal  HEENT: atraumatic and normocephalic  CARDS: regular rate and rhythm, no m/g, pulses palpable in LE  PULM: breathing comfortably on room air, chest symmetric, nonlabored, no abnormal breath sounds on auscultation  ABD: nontender, nondistended, soft, no organomegaly, BS+  Neuro: Alert and oriented x3, CN's I-IX grossly intact, sensation and motor intact; follows directions and answers questions appropriately      Goals of Care Treatment Preferences:  Code Status: Full Code      Consults:   Consults (From admission, onward)          Status Ordering Provider     Inpatient consult to Social Work  Once        Provider:  (Not yet assigned)    Completed PASCUAL FERRARI     Inpatient consult to Electrophysiology  Once        Provider:  Cortez Ambrose MD    Completed DEX DOMINGUEZ     Inpatient consult to Cardiology  Once        Provider:  Kingston Hernandez MD    Completed PASCUAL FERRARI            No new Assessment & Plan notes have been filed under this hospital service since the last note was generated.  Service: Hospital Medicine    Final Active Diagnoses:    Diagnosis Date Noted POA    PRINCIPAL PROBLEM:  Acute on chronic congestive heart failure [I50.9] 03/19/2023 Yes    Hypokalemia [E87.6] 03/19/2023 Yes    Obesity with serious comorbidity [E66.9] 03/19/2023 Yes    Hypomagnesemia [E83.42] 03/19/2023 Yes    Central retinal vein occlusion with macular edema of both eyes [H34.8130] 09/30/2020 Yes    Bradycardia [R00.1] 08/17/2020 Yes    CKD (chronic kidney disease) stage 4, GFR 15-29 ml/min [N18.4] 05/11/2017 Yes    Essential hypertension [I10] 02/01/2016 Yes     Chronic    Coronary artery disease, occlusive [I25.10] 05/19/2014 Yes     Chronic    Other hyperlipidemia [E78.49] 07/08/2013 Yes      Problems Resolved During " this Admission:       Discharged Condition: good        Follow Up:   Follow-up Information       Kingston Hernandze Md, MD Follow up on 3/24/2023.    Specialties: Cardiology, Internal Medicine  Contact information:  [address]  [place] [address]  [phone]               Christina Recio MD Follow up.    Specialty: Internal Medicine  Contact information:  [address]  [place] [address]  [phone]                           Patient Instructions:      Ambulatory referral/consult to Home Health   Standing Status: Future   Referral Priority: Routine Referral Type: Home Health   Referral Reason: Specialty Services Required   Requested Specialty: Home Health Services   Number of Visits Requested: 1       Significant Diagnostic Studies:   BMP:   Recent Labs   Lab 03/23/23  0537      *   K 3.9      CO2 17*   BUN 67*   CREATININE 2.2*   CALCIUM 9.0     CBC: No results for input(s): WBC, HGB, HCT, PLT in the last 48 hours.  CMP:   Recent Labs   Lab 03/22/23  0539 03/23/23  0537   * 135*   K 4.1 3.9    106   CO2 19* 17*   * 100   BUN 68* 67*   CREATININE 2.5* 2.2*   CALCIUM 9.0 9.0   ANIONGAP 13 12     Cardiac Markers:   Recent Labs   Lab 03/21/23  0941   *     Coagulation: No results for input(s): PT, INR, APTT in the last 48 hours.  Lactic Acid: No results for input(s): LACTATE in the last 48 hours.  Magnesium: No results for input(s): MG in the last 48 hours.  Troponin:   Recent Labs   Lab 03/21/23  0805 03/21/23  1148 03/21/23  1630   TROPONINI 0.011 <0.006 0.008     TSH: No results for input(s): TSH in the last 4320 hours.  Urine Studies:   No results for input(s): COLORU, APPEARANCEUA, PHUR, SPECGRAV, PROTEINUA, GLUCUA, KETONESU, BILIRUBINUA, OCCULTUA, NITRITE, UROBILINOGEN, LEUKOCYTESUR, RBCUA, WBCUA, BACTERIA, SQUAMEPITHEL, HYALINECASTS in the last 48 hours.    Invalid input(s): WRIGHTSUR    Imaging Results              X-Ray Chest AP Portable  (Final result)  Result time 03/18/23 18:23:19      Final result by Paolo Gordillo MD (03/18/23 18:23:19)                   Impression:      CHF.      Electronically signed by: Paolo Gordillo  Date:    03/18/2023  Time:    18:23               Narrative:    EXAMINATION:  XR CHEST AP PORTABLE    CLINICAL HISTORY:  Shortness of breath    FINDINGS:  Comparison is made to March 16, 2023.    Cardiomegaly.  Pulmonary vascular congestion.  No consolidation.  No significant pleural effusion.  No pneumothorax.                                        Pending Diagnostic Studies:       None           Medications:  Reconciled Home Medications:      Medication List        CHANGE how you take these medications      hydrALAZINE 10 MG tablet  Commonly known as: APRESOLINE  Take 1 tablet (10 mg total) by mouth 3 (three) times daily before meals.  What changed:   medication strength  how much to take  when to take this            CONTINUE taking these medications      aflibercept 2 mg/0.05 mL Soln  2 mg by Intravitreal route every 28 days.     aspirin 81 MG EC tablet  Commonly known as: ECOTRIN  Take 81 mg by mouth once daily.     budesonide 3 mg capsule  Commonly known as: ENTOCORT EC  Take 3 mg by mouth 2 (two) times a day.     CENTRUM SILVER ORAL  Take 1 tablet by mouth once daily.     CREON 24,000-76,000 -120,000 unit capsule  Generic drug: lipase-protease-amylase 24,000-76,000-120,000 units  Take 1 capsule by mouth 3 (three) times daily with meals.     dexAMETHasone 0.7 mg Impl intravitreal implant  Commonly known as: OZURDEX  0.7 mg by Intravitreal route once.     dorzolamide-timolol 2-0.5% 22.3-6.8 mg/mL ophthalmic solution  Commonly known as: COSOPT  Place 1 drop into both eyes 2 (two) times daily.     furosemide 20 MG tablet  Commonly known as: LASIX  Take 1 tablet (20 mg total) by mouth once daily. May also take 2 tablets (40 mg total) daily as needed (for leg swelling).     gabapentin 300 MG capsule  Commonly known as:  NEURONTIN  Take 1 capsule (300 mg total) by mouth once daily.     iron-vitamin C 100-250 mg (ICAR-C) 100-250 mg Tab  Commonly known as: ICAR-C  Take 1 tablet by mouth once daily.     nitroGLYCERIN 0.4 MG SL tablet  Commonly known as: NITROSTAT  Place 1 tablet (0.4 mg total) under the tongue every 5 (five) minutes as needed for Chest pain.     pantoprazole 40 MG tablet  Commonly known as: PROTONIX  Take 1 tablet (40 mg total) by mouth once daily.     pravastatin 40 MG tablet  Commonly known as: PRAVACHOL  Take 1 tablet (40 mg total) by mouth once daily.            STOP taking these medications      amLODIPine 5 MG tablet  Commonly known as: NORVASC     carvediloL 12.5 MG tablet  Commonly known as: COREG     cloNIDine 0.1 MG tablet  Commonly known as: CATAPRES              Indwelling Lines/Drains at time of discharge:   Lines/Drains/Airways       Drain  Duration             Female External Urinary Catheter 03/18/23 1940 4 days                    Time spent on the discharge of patient: 25 minutes  of time spent on discharge including examining patient, providing discharge instructions, arranging follow-up and documentation.           Leonardo Caldwell MD  Department of Hospital Medicine  [facility] Tele[facility] ([facility])

## 2023-03-23 NOTE — PLAN OF CARE
Patient was discharged before home health was set up.      [facility] accepted patient for admit on Saturday.  Attempted to call patient to advised but no answer and mail box is full.  Will try again later.       03/23/23 1410   Post-Acute Status   Post-Acute Authorization Home [facility]   Home Health Status Set-up Complete/Auth obtained   Discharge Plan   Discharge Plan A [facility]

## 2023-03-23 NOTE — PLAN OF CARE
Pt given discharge instructions.  Reviewed with her and her daughter.  Questions answered.  Understanding verbalized.  PIVs removed.  Tip intact.  Remote tele monitor removed from pt and returned to monitor room.  Meds delivered to pt's room from pharmacy.  Pt discharged home via wheelchair to personal vehicle accompanied by daughter.  Pt in possession of personal belongings, including cell phone and .  MARYJO

## 2023-03-23 NOTE — PLAN OF CARE
AAO4. Able to verbalize needs. NAEON. VSS. V-paced. PIV patent. IV abx. PO diuresis. 2L NC. Ambulatory w/ assist. Arm sling on LUE. Cardiac diet. No c/o pain. Continent of b/b. NADN. Will continue to monitor. Chart check complete.

## 2023-03-24 ENCOUNTER — CLINICAL SUPPORT (OUTPATIENT)
Dept: CARDIOLOGY | Facility: HOSPITAL | Age: 88
End: 2023-03-24
Attending: INTERNAL MEDICINE
Payer: MEDICARE

## 2023-03-24 ENCOUNTER — PATIENT OUTREACH (OUTPATIENT)
Dept: ADMINISTRATIVE | Facility: CLINIC | Age: 88
End: 2023-03-24
Payer: MEDICARE

## 2023-03-24 DIAGNOSIS — Z95.0 CARDIAC PACEMAKER IN SITU: ICD-10-CM

## 2023-03-24 DIAGNOSIS — I50.30 DIASTOLIC HEART FAILURE, NYHA CLASS 3: ICD-10-CM

## 2023-03-24 DIAGNOSIS — R00.1 BRADYCARDIA: ICD-10-CM

## 2023-03-24 DIAGNOSIS — I49.5 SA NODE DYSFUNCTION: ICD-10-CM

## 2023-03-24 PROCEDURE — 99999 PR PBB SHADOW E&M-EST. PATIENT-LVL I: CPT | Mod: PBBFAC,HCNC,,

## 2023-03-24 PROCEDURE — 99999 PR PBB SHADOW E&M-EST. PATIENT-LVL I: ICD-10-PCS | Mod: PBBFAC,HCNC,,

## 2023-03-24 PROCEDURE — 93280 CARDIAC DEVICE CHECK - IN CLINIC & HOSPITAL: ICD-10-PCS | Mod: 26,HCNC,, | Performed by: INTERNAL MEDICINE

## 2023-03-24 PROCEDURE — 93280 PM DEVICE PROGR EVAL DUAL: CPT | Mod: 26,HCNC,, | Performed by: INTERNAL MEDICINE

## 2023-03-24 PROCEDURE — 93280 PM DEVICE PROGR EVAL DUAL: CPT | Mod: HCNC

## 2023-03-24 NOTE — PROGRESS NOTES
C3 nurse spoke with Glo Dumont's daughter for a TCC post hospital discharge follow up call. The patient has a scheduled HOSFU appointment with Paula Deal on 03/28/23 @ 9247.

## 2023-03-27 ENCOUNTER — TELEPHONE (OUTPATIENT)
Dept: CARDIOLOGY | Facility: CLINIC | Age: 88
End: 2023-03-27
Payer: MEDICARE

## 2023-03-27 NOTE — TELEPHONE ENCOUNTER
----- Message from Nancy Mcclelland RN sent at 3/24/2023 11:00 AM CDT -----  Regardin hour call

## 2023-03-28 ENCOUNTER — TELEPHONE (OUTPATIENT)
Dept: CARDIOLOGY | Facility: CLINIC | Age: 88
End: 2023-03-28
Payer: MEDICARE

## 2023-03-28 ENCOUNTER — OFFICE VISIT (OUTPATIENT)
Dept: CARDIOLOGY | Facility: CLINIC | Age: 88
End: 2023-03-28
Payer: MEDICARE

## 2023-03-28 ENCOUNTER — OFFICE VISIT (OUTPATIENT)
Dept: PRIMARY CARE CLINIC | Facility: CLINIC | Age: 88
End: 2023-03-28
Payer: MEDICARE

## 2023-03-28 VITALS
BODY MASS INDEX: 29.93 KG/M2 | OXYGEN SATURATION: 96 % | WEIGHT: 143.19 LBS | HEART RATE: 69 BPM | TEMPERATURE: 99 F | DIASTOLIC BLOOD PRESSURE: 84 MMHG | SYSTOLIC BLOOD PRESSURE: 186 MMHG

## 2023-03-28 VITALS
BODY MASS INDEX: 30.04 KG/M2 | DIASTOLIC BLOOD PRESSURE: 70 MMHG | HEART RATE: 70 BPM | OXYGEN SATURATION: 99 % | WEIGHT: 143.75 LBS | SYSTOLIC BLOOD PRESSURE: 170 MMHG

## 2023-03-28 DIAGNOSIS — I11.0 HYPERTENSIVE HEART DISEASE WITH HEART FAILURE: ICD-10-CM

## 2023-03-28 DIAGNOSIS — I10 ESSENTIAL HYPERTENSION: Primary | Chronic | ICD-10-CM

## 2023-03-28 DIAGNOSIS — R00.1 BRADYCARDIA: ICD-10-CM

## 2023-03-28 DIAGNOSIS — N18.4 CKD (CHRONIC KIDNEY DISEASE) STAGE 4, GFR 15-29 ML/MIN: Primary | ICD-10-CM

## 2023-03-28 DIAGNOSIS — I50.30 DIASTOLIC HEART FAILURE, NYHA CLASS 3: ICD-10-CM

## 2023-03-28 DIAGNOSIS — I10 ESSENTIAL HYPERTENSION: Chronic | ICD-10-CM

## 2023-03-28 PROBLEM — I50.9 ACUTE ON CHRONIC CONGESTIVE HEART FAILURE: Status: RESOLVED | Noted: 2023-03-19 | Resolved: 2023-03-28

## 2023-03-28 LAB
ANION GAP SERPL CALC-SCNC: 11 MMOL/L (ref 8–16)
BUN SERPL-MCNC: 47 MG/DL (ref 8–23)
CALCIUM SERPL-MCNC: 9.7 MG/DL (ref 8.7–10.5)
CHLORIDE SERPL-SCNC: 110 MMOL/L (ref 95–110)
CO2 SERPL-SCNC: 21 MMOL/L (ref 23–29)
CREAT SERPL-MCNC: 1.6 MG/DL (ref 0.5–1.4)
EST. GFR  (NO RACE VARIABLE): 31.4 ML/MIN/1.73 M^2
GLUCOSE SERPL-MCNC: 104 MG/DL (ref 70–110)
POTASSIUM SERPL-SCNC: 4.3 MMOL/L (ref 3.5–5.1)
SODIUM SERPL-SCNC: 142 MMOL/L (ref 136–145)

## 2023-03-28 PROCEDURE — 99215 OFFICE O/P EST HI 40 MIN: CPT | Mod: HCNC,S$GLB,, | Performed by: NURSE PRACTITIONER

## 2023-03-28 PROCEDURE — 99215 PR OFFICE/OUTPT VISIT, EST, LEVL V, 40-54 MIN: ICD-10-PCS | Mod: HCNC,S$GLB,, | Performed by: NURSE PRACTITIONER

## 2023-03-28 PROCEDURE — 1111F PR DISCHARGE MEDS RECONCILED W/ CURRENT OUTPATIENT MED LIST: ICD-10-PCS | Mod: HCNC,CPTII,S$GLB, | Performed by: NURSE PRACTITIONER

## 2023-03-28 PROCEDURE — 3077F PR MOST RECENT SYSTOLIC BLOOD PRESSURE >= 140 MM HG: ICD-10-PCS | Mod: HCNC,CPTII,S$GLB, | Performed by: PHYSICIAN ASSISTANT

## 2023-03-28 PROCEDURE — 3078F PR MOST RECENT DIASTOLIC BLOOD PRESSURE < 80 MM HG: ICD-10-PCS | Mod: HCNC,CPTII,S$GLB, | Performed by: PHYSICIAN ASSISTANT

## 2023-03-28 PROCEDURE — 99204 OFFICE O/P NEW MOD 45 MIN: CPT | Mod: HCNC,S$GLB,, | Performed by: PHYSICIAN ASSISTANT

## 2023-03-28 PROCEDURE — 1111F DSCHRG MED/CURRENT MED MERGE: CPT | Mod: HCNC,CPTII,S$GLB, | Performed by: PHYSICIAN ASSISTANT

## 2023-03-28 PROCEDURE — 99999 PR PBB SHADOW E&M-EST. PATIENT-LVL III: CPT | Mod: PBBFAC,HCNC,, | Performed by: PHYSICIAN ASSISTANT

## 2023-03-28 PROCEDURE — 1159F MED LIST DOCD IN RCRD: CPT | Mod: HCNC,CPTII,S$GLB, | Performed by: NURSE PRACTITIONER

## 2023-03-28 PROCEDURE — 80048 BASIC METABOLIC PNL TOTAL CA: CPT | Mod: HCNC | Performed by: NURSE PRACTITIONER

## 2023-03-28 PROCEDURE — 3078F DIAST BP <80 MM HG: CPT | Mod: HCNC,CPTII,S$GLB, | Performed by: PHYSICIAN ASSISTANT

## 2023-03-28 PROCEDURE — 1159F PR MEDICATION LIST DOCUMENTED IN MEDICAL RECORD: ICD-10-PCS | Mod: HCNC,CPTII,S$GLB, | Performed by: PHYSICIAN ASSISTANT

## 2023-03-28 PROCEDURE — 1101F PT FALLS ASSESS-DOCD LE1/YR: CPT | Mod: HCNC,CPTII,S$GLB, | Performed by: NURSE PRACTITIONER

## 2023-03-28 PROCEDURE — 99999 PR PBB SHADOW E&M-EST. PATIENT-LVL IV: CPT | Mod: PBBFAC,HCNC,, | Performed by: NURSE PRACTITIONER

## 2023-03-28 PROCEDURE — 3077F SYST BP >= 140 MM HG: CPT | Mod: HCNC,CPTII,S$GLB, | Performed by: NURSE PRACTITIONER

## 2023-03-28 PROCEDURE — 99999 PR PBB SHADOW E&M-EST. PATIENT-LVL III: ICD-10-PCS | Mod: PBBFAC,HCNC,, | Performed by: PHYSICIAN ASSISTANT

## 2023-03-28 PROCEDURE — 3077F PR MOST RECENT SYSTOLIC BLOOD PRESSURE >= 140 MM HG: ICD-10-PCS | Mod: HCNC,CPTII,S$GLB, | Performed by: NURSE PRACTITIONER

## 2023-03-28 PROCEDURE — 1111F DSCHRG MED/CURRENT MED MERGE: CPT | Mod: HCNC,CPTII,S$GLB, | Performed by: NURSE PRACTITIONER

## 2023-03-28 PROCEDURE — 99999 PR PBB SHADOW E&M-EST. PATIENT-LVL IV: ICD-10-PCS | Mod: PBBFAC,HCNC,, | Performed by: NURSE PRACTITIONER

## 2023-03-28 PROCEDURE — 1126F AMNT PAIN NOTED NONE PRSNT: CPT | Mod: HCNC,CPTII,S$GLB, | Performed by: PHYSICIAN ASSISTANT

## 2023-03-28 PROCEDURE — 3288F FALL RISK ASSESSMENT DOCD: CPT | Mod: HCNC,CPTII,S$GLB, | Performed by: NURSE PRACTITIONER

## 2023-03-28 PROCEDURE — 1101F PR PT FALLS ASSESS DOC 0-1 FALLS W/OUT INJ PAST YR: ICD-10-PCS | Mod: HCNC,CPTII,S$GLB, | Performed by: NURSE PRACTITIONER

## 2023-03-28 PROCEDURE — 99204 PR OFFICE/OUTPT VISIT, NEW, LEVL IV, 45-59 MIN: ICD-10-PCS | Mod: HCNC,S$GLB,, | Performed by: PHYSICIAN ASSISTANT

## 2023-03-28 PROCEDURE — 1159F MED LIST DOCD IN RCRD: CPT | Mod: HCNC,CPTII,S$GLB, | Performed by: PHYSICIAN ASSISTANT

## 2023-03-28 PROCEDURE — 3077F SYST BP >= 140 MM HG: CPT | Mod: HCNC,CPTII,S$GLB, | Performed by: PHYSICIAN ASSISTANT

## 2023-03-28 PROCEDURE — 1160F PR REVIEW ALL MEDS BY PRESCRIBER/CLIN PHARMACIST DOCUMENTED: ICD-10-PCS | Mod: HCNC,CPTII,S$GLB, | Performed by: PHYSICIAN ASSISTANT

## 2023-03-28 PROCEDURE — 3079F DIAST BP 80-89 MM HG: CPT | Mod: HCNC,CPTII,S$GLB, | Performed by: NURSE PRACTITIONER

## 2023-03-28 PROCEDURE — 1111F PR DISCHARGE MEDS RECONCILED W/ CURRENT OUTPATIENT MED LIST: ICD-10-PCS | Mod: HCNC,CPTII,S$GLB, | Performed by: PHYSICIAN ASSISTANT

## 2023-03-28 PROCEDURE — 1126F AMNT PAIN NOTED NONE PRSNT: CPT | Mod: HCNC,CPTII,S$GLB, | Performed by: NURSE PRACTITIONER

## 2023-03-28 PROCEDURE — 1126F PR PAIN SEVERITY QUANTIFIED, NO PAIN PRESENT: ICD-10-PCS | Mod: HCNC,CPTII,S$GLB, | Performed by: NURSE PRACTITIONER

## 2023-03-28 PROCEDURE — 1160F RVW MEDS BY RX/DR IN RCRD: CPT | Mod: HCNC,CPTII,S$GLB, | Performed by: PHYSICIAN ASSISTANT

## 2023-03-28 PROCEDURE — 1159F PR MEDICATION LIST DOCUMENTED IN MEDICAL RECORD: ICD-10-PCS | Mod: HCNC,CPTII,S$GLB, | Performed by: NURSE PRACTITIONER

## 2023-03-28 PROCEDURE — 1126F PR PAIN SEVERITY QUANTIFIED, NO PAIN PRESENT: ICD-10-PCS | Mod: HCNC,CPTII,S$GLB, | Performed by: PHYSICIAN ASSISTANT

## 2023-03-28 PROCEDURE — 3288F PR FALLS RISK ASSESSMENT DOCUMENTED: ICD-10-PCS | Mod: HCNC,CPTII,S$GLB, | Performed by: NURSE PRACTITIONER

## 2023-03-28 PROCEDURE — 3079F PR MOST RECENT DIASTOLIC BLOOD PRESSURE 80-89 MM HG: ICD-10-PCS | Mod: HCNC,CPTII,S$GLB, | Performed by: NURSE PRACTITIONER

## 2023-03-28 RX ORDER — HYDRALAZINE HYDROCHLORIDE 25 MG/1
25 TABLET, FILM COATED ORAL
Qty: 90 TABLET | Refills: 3
Start: 2023-03-28 | End: 2023-04-05 | Stop reason: SDUPTHER

## 2023-03-28 NOTE — PROGRESS NOTES
[facility] Provider Note (Initial Clinic) Consult Note    Date of original referral: 3/23/2023  Age: 87 y.o.  Gender: female  Ethnicity: Not  or /a   Number of admissions for CHF within the preceding year: 1   Duration of CHF:   Type of Congestive Heart Failure: Diastolic   Etiology: Non-ischemic    Social history:   Enrolled in Infusion suite: no    Diagnostic Labs:   EKG - 03/23/2023  CXR - 03/18/2023  ECHO - 03/20/2023  Stress test -   Stress echo - 03/25/2022  Pharmacologic stress -   Cardiac catheterization -    Cardiac MRI -     Lab Results   Component Value Date     (L) 03/23/2023     (L) 03/22/2023    K 3.9 03/23/2023    K 4.1 03/22/2023     03/23/2023     03/22/2023    CO2 17 (L) 03/23/2023    CO2 19 (L) 03/22/2023     03/23/2023     (H) 03/22/2023    BUN 67 (H) 03/23/2023    BUN 68 (H) 03/22/2023    CREATININE 2.2 (H) 03/23/2023    CREATININE 2.5 (H) 03/22/2023    CALCIUM 9.0 03/23/2023    CALCIUM 9.0 03/22/2023    PROT 5.7 (L) 03/18/2023    PROT 7.8 03/18/2023    ALBUMIN 2.9 (L) 03/18/2023    ALBUMIN 3.8 03/18/2023    BILITOT 0.2 03/18/2023    BILITOT 0.3 03/18/2023    ALKPHOS 51 (L) 03/18/2023    ALKPHOS 65 03/18/2023    AST 8 (L) 03/18/2023    AST 15 03/18/2023    ALT 11 03/18/2023    ALT 15 03/18/2023    ANIONGAP 12 03/23/2023    ANIONGAP 13 03/22/2023    ESTGFRAFRICA 33.9 (A) 07/19/2022    ESTGFRAFRICA 33.9 (A) 06/28/2022    EGFRNONAA 29.4 (A) 07/19/2022    EGFRNONAA 29.4 (A) 06/28/2022       Lab Results   Component Value Date    WBC 6.78 03/21/2023    WBC 7.24 03/20/2023    RBC 3.45 (L) 03/21/2023    RBC 3.81 (L) 03/20/2023    HGB 9.4 (L) 03/21/2023    HGB 10.3 (L) 03/20/2023    HCT 28.7 (L) 03/21/2023    HCT 31.2 (L) 03/20/2023    MCV 83 03/21/2023    MCV 82 03/20/2023    MCH 27.2 03/21/2023    MCH 27.0 03/20/2023    MCHC 32.8 03/21/2023    MCHC 33.0 03/20/2023    RDW 15.7 (H) 03/21/2023    RDW 15.9 (H) 03/20/2023     03/21/2023      03/20/2023    MPV 10.3 03/21/2023    MPV 10.1 03/20/2023    IMMGR 1.0 (H) 03/21/2023    IMMGR 0.8 (H) 03/20/2023    IGABS 0.07 (H) 03/21/2023    IGABS 0.06 (H) 03/20/2023    LYMPH 0.8 (L) 03/21/2023    LYMPH 12.4 (L) 03/21/2023    MONO 0.8 03/21/2023    MONO 11.5 03/21/2023    EOS 0.1 03/21/2023    EOS 0.1 03/20/2023    BASO 0.04 03/21/2023    BASO 0.05 03/20/2023    NRBC 0 03/21/2023    NRBC 0 03/20/2023    GRAN 4.9 03/21/2023    GRAN 72.7 03/21/2023    EOSINOPHIL 1.8 03/21/2023    EOSINOPHIL 1.8 03/20/2023    BASOPHIL 0.6 03/21/2023    BASOPHIL 0.7 03/20/2023    PLTEST Appears normal 12/21/2022    PLTEST Adequate 05/20/2013    ANISO Slight 12/21/2022    ANISO sl 05/20/2013    HYPO sl 08/15/2012       Lab Results   Component Value Date     (H) 03/21/2023     (H) 03/18/2023    MG 2.2 03/21/2023    MG 2.1 03/20/2023    PHOS 4.0 03/18/2023    PHOS 3.6 03/19/2022    NTPROBNP 65 11/19/2010    TROPONINI 0.008 03/21/2023    TROPONINI <0.006 03/21/2023    HGBA1C 5.7 11/17/2015    HGBA1C 5.6 01/29/2015    TSH 1.230 12/13/2021    TSH 1.921 09/15/2021    FREET4 0.76 08/23/2019    FREET4 0.93 07/19/2019       Lab Results   Component Value Date    IRON 65 08/25/2020    TIBC 295 08/25/2020    FERRITIN 150 08/25/2020    CHOL 160 05/04/2022    TRIG 80 05/04/2022    HDL 81 (H) 05/04/2022    LDLCALC 63.0 05/04/2022    CHOLHDL 50.6 (H) 05/04/2022    TOTALCHOLEST 2.0 05/04/2022    NONHDLCHOL 79 05/04/2022    COLORU Yellow 03/16/2023    APPEARANCEUA Clear 03/16/2023    PHUR 6.0 03/16/2023    SPECGRAV 1.015 03/16/2023    PROTEINUA Negative 03/16/2023    GLUCUA Negative 03/16/2023    KETONESU Negative 03/16/2023    BILIRUBINUA Negative 03/16/2023    OCCULTUA Negative 03/16/2023    NITRITE Negative 03/16/2023    LEUKOCYTESUR 2+ (A) 03/16/2023       List all implanted cardiac devices:     Cardiomems: no    Current Outpatient Medications on File Prior to Visit   Medication Sig Dispense Refill    aspirin (ECOTRIN) 81 MG EC  tablet Take 81 mg by mouth once daily.      budesonide (ENTOCORT EC) 3 mg capsule Take 3 mg by mouth 2 (two) times a day.      carvediloL (COREG) 12.5 MG tablet Take 1 tablet (12.5 mg total) by mouth 2 (two) times daily with meals. 60 tablet 11    dorzolamide-timolol 2-0.5% (COSOPT) 22.3-6.8 mg/mL ophthalmic solution Place 1 drop into both eyes 2 (two) times daily. 10 mL 6    FOLIC ACID/MULTIVIT-MIN/LUTEIN (CENTRUM SILVER ORAL) Take 1 tablet by mouth once daily.      furosemide (LASIX) 20 MG tablet Take 1 tablet (20 mg total) by mouth once daily. May also take 2 tablets (40 mg total) daily as needed (for leg swelling). 120 tablet 3    lipase-protease-amylase 24,000-76,000-120,000 units (CREON) 24,000-76,000 -120,000 unit capsule Take 1 capsule by mouth 3 (three) times daily with meals. 90 capsule 11    nitroGLYCERIN (NITROSTAT) 0.4 MG SL tablet Place 1 tablet (0.4 mg total) under the tongue every 5 (five) minutes as needed for Chest pain. 25 tablet 3    pravastatin (PRAVACHOL) 40 MG tablet Take 1 tablet (40 mg total) by mouth once daily. 90 tablet 2    [DISCONTINUED] hydrALAZINE (APRESOLINE) 10 MG tablet Take 1 tablet (10 mg total) by mouth 3 (three) times daily before meals. 90 tablet 2    aflibercept 2 mg/0.05 mL Soln 2 mg by Intravitreal route every 28 days.      dexAMETHasone (OZURDEX) 0.7 mg Impl intravitreal implant 0.7 mg by Intravitreal route once.      gabapentin (NEURONTIN) 300 MG capsule Take 1 capsule (300 mg total) by mouth once daily. (Patient not taking: Reported on 3/28/2023) 30 capsule 11    iron-vitamin C 100-250 mg, ICAR-C, (ICAR-C) 100-250 mg Tab Take 1 tablet by mouth once daily. (Patient not taking: Reported on 3/28/2023) 100 tablet 6    pantoprazole (PROTONIX) 40 MG tablet Take 1 tablet (40 mg total) by mouth once daily. (Patient not taking: Reported on 3/28/2023) 30 tablet 0    [DISCONTINUED] citalopram (CELEXA) 20 MG tablet Take 1 tablet (20 mg total) by mouth once daily. (Patient not  taking: Reported on 3/18/2022) 30 tablet 6     No current facility-administered medications on file prior to visit.         HPI:  Patient can walk to clinic    Patient sleeps on 2 pillows   Patient wakes up SOB, has to get out of bed, associated cough, sputum  none   Palpitations - none   Dizzy, light-headed, pre-syncope or syncope none   Since discharge frequency of performing weights, home weight and weight change stable   Other information felt pertinent to HPI    Glo Dumont is a 87 y.o. female with a PMH  has a past medical history of Anemia, Angina pectoris, Anxiety, Anxiety and depression, Arthritis, Carotid artery occlusion, Carpal tunnel syndrome (06/23/2008), Chronic diarrhea, CKD (chronic kidney disease) stage 3, GFR 30-59 ml/min (5/11/2017), Colitis, Coronary artery disease, Coronary artery disease, Diastolic dysfunction, Diverticulosis, Glaucoma, Greater trochanteric bursitis (2/10/2015), Grief at loss of child (1/26/2016), H/O carotid endarterectomy (12/2/2013), Heart failure, History of coronary angioplasty (3/11/2014), Hypercholesteremia, Hypertension, Liver cyst (02/08/2013), Macular degeneration, Primary open-angle glaucoma(365.11) (9/3/2013), Renal cyst (02/08/2013), 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  Presented to ER for evaluation of worsening shortness of [place] over the last few days.  Patient recently visited the ED on 03/16/2023 for similar complaint and was given IV Lasix and discharged home with instructions to double up on Lasix for the next 3 days.  Patient reports that she took 40 mg versus 20 mg Lasix b.i.d. for the next 30 days without any significant improvement of her shortness of breath.  However, patient does report drinking more fluid than she has been putting out.  Patient reports chest pressure which feels like an acute CHF exacerbation as she had in the past.  Reports symptoms worsen  laying flat, with exertion and is improved with rest and sitting upright denies any use home oxygen or sleep machine at night.  Denies any other associated symptoms such as fever, aches, chills, sweats, nausea, vomiting, and compliant, dizziness, visual disturbances, palpitations, abdominal pain, or any other symptoms at this time.        ER workup revealed potassium of 3.3, BUN/creatinine of 40/2.1 with EGFR 23 baseline creatinine of 1.7.  CBG of 153 mg/dL, BNP of 349, troponin 0.030, UA positive for leukocyte esterase and wbc's.  Chest x-ray correlate for CHF.  EKG reveals sinus for the with first-degree AV block with ventricular rate of 46 beats per minute in the QT/QTC of 478/14.  Most recent echocardiogram revealed TTE showing EF 75% March 2022.  Is followed by Dr. Lui and was last evaluated by on 01/12/2023.  Meds given in ED were 60 mg Lasix as well as 325 mg aspirin.  Patient is in agreement with treatment plan to be admitted to hospital under observation status for diuresing of acute CHF exacerbation.     PCP: Christina Recio         88 y/o female admitted with SOB and CHF exacerbation. She was just seen in ER on Thursday and told to double her dose of Lasix dose x 3 days, but by Saturday, she had no improvement and came back to ER. , Cr 1.8. She was started on Lasix IV 40 mg BID with some improvement. She sees Dr. Lui in clinic. She denies home oxygen therapy. Performed well on ambulatory desat study and was determined to not meet criteria for home O2. PCM inserted on 3/21/2023. Home medication regimen modified. Given recurrence of symptoms after previously being discharged and her having to return to the ER, patient monitored for 48 hours to confirm hemodynamic stability. Medically cleared for discharge on 3/23/2023. Patient to follow up with Cardiology on 3/24/2023.    Since dc has done well. No Le edema, BP high today took meds in car. Off norvasc from [place]    Taking lasix, no  PND     PHYSICAL:   Vitals:    03/28/23 1101   BP: (!) 170/70   Pulse: 70      Wt Readings from Last 3 Encounters:   03/28/23 65.2 kg (143 lb 11.8 oz)   03/28/23 65 kg (143 lb 3.2 oz)   03/21/23 69.5 kg (153 lb 3.5 oz)       JVD: no   Heart rhythm: regular  Cardiac murmur: No    S3: no  S4: no  Lungs: clear  Liver span: 10 cm:   Hepatojugular reflux: no  Edema: no      ASSESSMENT: acute diastolic HF    PLAN:      Patient Instructions:   Instruct the patient to notify this clinic if HH, a physician or an advanced care provider wants to change medication one of their HF medications   Activity and Diet restrictions:   Recommend 2-3 gram sodium restriction and 1500cc- 2000cc fluid restriction.  Encourage physical activity with graded exercise program.  Requested patient to weigh themselves daily, and to notify us if their weight increases by more than 3 lbs in 1 day or 5 lbs in 3 days.    Assigned dry weight on home scale: 65 kg  Medication changes (include current dose and changed dose)  Increase hydralazine to 25 TID  Continue lasix daily  Given HF education  RTC 3 weeks  Upcoming labs and date anticipated: 3 weeks

## 2023-03-28 NOTE — TELEPHONE ENCOUNTER
----- Message from Reid Hightower sent at 3/28/2023  8:56 AM CDT -----  Contact: daughter  Pt daughter Jessica  is asking for an return call in reference to seeing if there is an earlier apt for her mom for today, please call back at [phone] Thx CJ

## 2023-03-28 NOTE — ASSESSMENT & PLAN NOTE
EF 70 % with grade II diastolic dysfunction  SOB and leg swelling greatly improved  Taking lasix 20 mg daily  Discussed fluid restriction  F/U with Cardiology

## 2023-03-28 NOTE — ASSESSMENT & PLAN NOTE
Pt with SA node dysfunction and S/P Pacemaker placement  Heart rate has improved  Continue carvedilol  Follow up with Cardiology

## 2023-03-28 NOTE — PROGRESS NOTES
Glo Dumont  03/28/2023  [id number]    Christina Cardona MD  Patient Care Team:  Christina Cardona MD as PCP - General (Internal Medicine)  Gordo Muir MD as Consulting Physician (Pulmonary Disease)  Nik Bergman MD (Inactive) as Consulting Physician (Cardiology)  Franky Bell Jr., MD (Rheumatology)  Julio Galindo MD as Consulting Physician (Gastroenterology)  Glenis Mcfadden MD as Consulting Physician (Otolaryngology)  Ayaz Estrada MD as Consulting Physician (Hematology and Oncology)  Yomi Guy LPN as Care Coordinator      [facility]      Chief Complaint:  Chief Complaint   Patient presents with    Follow-up       History of Present Illness:  Presents for follow up of recent hospitalization for decompensated diastolic heart failure and SA node dysfunction with placement of dual chamber pacemaker  Discharged on 3/23/23, pt states she was feeling 50 % better at time of discharge. She did have a brief episode of SOOD but better now.   She does report mild SOOD throughout the day.   Lower leg edema greatly improved  Cough is better, chest tightness resolved  S/P pacemaker placement and energy level is improving.   Hydralazine 10 mg tid at discharge  Eating and drinking well  Taking lasix 20 mg daily and 40 mg prn leg swelling or mild SOB  Has home health - [facility] with nursing and PT/OT  On admit weight 153 # and today 143 #, pt states her weight at home yesterday was 130 #  Incision to left chest wall intact without erythema, drainage or swelling  Denies urinary symptoms.   Has scheduled appointment with Heart Failure clinic today  Pt's B/P elevated this AM as pt had not taken her medication    Denies fever, chills, URI symptoms, chest pain, palpitations, vomiting, diarrhea, no gross neuro deficits, urinary symptoms and leg swelling, weakness and dizziness/lightheadedness.                   The following were reviewed: Active problem list, medication list, allergies, family  history, social history, and Health Maintenance.     History:  Past Medical History:   Diagnosis Date    Anemia     Angina pectoris     Anxiety     Anxiety and depression     Arthritis     hip    Carotid artery occlusion     Carpal tunnel syndrome 06/23/2008    emg    Chronic diarrhea     work up in 2011 with EGD, CS and VCE    CKD (chronic kidney disease) stage 3, GFR 30-59 ml/min 5/11/2017    Colitis     Coronary artery disease     Coronary artery disease     Diastolic dysfunction     Diverticulosis     Glaucoma     Greater trochanteric bursitis 2/10/2015    Grief at loss of child 1/26/2016    H/O carotid endarterectomy 12/2/2013    Heart failure     History of coronary angioplasty 3/11/2014    Hypercholesteremia     Hypertension     Liver cyst 02/08/2013    ct abd    Macular degeneration     Obesity with serious comorbidity 3/19/2023    Primary open-angle glaucoma(365.11) 9/3/2013    Renal cyst 02/08/2013    ct abd    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    Sarcoidosis     Sarcoidosis of lung     Sickle cell trait     Uveitis      Past Surgical History:   Procedure Laterality Date    A-V CARDIAC PACEMAKER INSERTION Left 3/21/2023    Procedure: INSERTION, CARDIAC PACEMAKER, DUAL CHAMBER/His Lead;  Surgeon: Cortez Ambrose MD;  Location: [facility], [place] CATH LAB;  Service: Cardiology;  Laterality: Left;  MDT/ MD to confirm in am/possible nurse sedate    CAROTID ENDARTERECTOMY Right 2000s    CATARACT EXTRACTION Bilateral     Dr. Liang Dennis    CHOLECYSTECTOMY      laparoscopic, 3/18.    CORONARY ANGIOPLASTY WITH STENT PLACEMENT  11/19/2010    RCA-HALIE 2010    Lathia    JOINT REPLACEMENT Left 11/03/2014    Dr. Braun    TOTAL ABDOMINAL HYSTERECTOMY W/ BILATERAL SALPINGOOPHORECTOMY  1972     Family History   Problem Relation Age of Onset    Hypertension Mother     Heart failure Mother     Cancer Father         prostate    Cirrhosis Brother     Heart failure Brother     Cancer Daughter         Carcinoid      Patient Active Problem List   Diagnosis    Sarcoidosis of lung    Thyroid nodule    Hypertensive heart disease with heart failure    Other hyperlipidemia    Hemoglobin A-S genotype    Ptosis    Coronary artery disease, occlusive    History of central retinal artery occlusion    Iron deficiency anemia    Vitamin D deficiency    Osteopenia    Essential hypertension    Diastolic heart failure, NYHA class 3    Atherosclerosis of aorta    CKD (chronic kidney disease) stage 4, GFR 15-29 ml/min    Uveitis    Ulcerative colitis    History of coronary angioplasty    Hiatal hernia    Bradycardia    Lung nodule    Memory loss    Central retinal vein occlusion with macular edema of both eyes    Aortic stenosis    Weight loss    Unspecified severe protein-calorie malnutrition    Hypokalemia    Obesity with serious comorbidity    Hypomagnesemia     Review of patient's allergies indicates:   Allergen Reactions    Lisinopril      angioedema    Codeine Nausea And Vomiting       Medications:  Current Outpatient Medications on File Prior to Visit   Medication Sig Dispense Refill    aflibercept 2 mg/0.05 mL Soln 2 mg by Intravitreal route every 28 days.      aspirin (ECOTRIN) 81 MG EC tablet Take 81 mg by mouth once daily.      budesonide (ENTOCORT EC) 3 mg capsule Take 3 mg by mouth 2 (two) times a day.      carvediloL (COREG) 12.5 MG tablet Take 1 tablet (12.5 mg total) by mouth 2 (two) times daily with meals. 60 tablet 11    dexAMETHasone (OZURDEX) 0.7 mg Impl intravitreal implant 0.7 mg by Intravitreal route once.      dorzolamide-timolol 2-0.5% (COSOPT) 22.3-6.8 mg/mL ophthalmic solution Place 1 drop into both eyes 2 (two) times daily. 10 mL 6    FOLIC ACID/MULTIVIT-MIN/LUTEIN (CENTRUM SILVER ORAL) Take 1 tablet by mouth once daily.      furosemide (LASIX) 20 MG tablet Take 1 tablet (20 mg total) by mouth once daily. May also take 2 tablets (40 mg total) daily as needed (for leg swelling). 120 tablet 3    hydrALAZINE  (APRESOLINE) 10 MG tablet Take 1 tablet (10 mg total) by mouth 3 (three) times daily before meals. 90 tablet 2    iron-vitamin C 100-250 mg, ICAR-C, (ICAR-C) 100-250 mg Tab Take 1 tablet by mouth once daily. 100 tablet 6    lipase-protease-amylase 24,000-76,000-120,000 units (CREON) 24,000-76,000 -120,000 unit capsule Take 1 capsule by mouth 3 (three) times daily with meals. 90 capsule 11    nitroGLYCERIN (NITROSTAT) 0.4 MG SL tablet Place 1 tablet (0.4 mg total) under the tongue every 5 (five) minutes as needed for Chest pain. 25 tablet 3    pravastatin (PRAVACHOL) 40 MG tablet Take 1 tablet (40 mg total) by mouth once daily. 90 tablet 2    gabapentin (NEURONTIN) 300 MG capsule Take 1 capsule (300 mg total) by mouth once daily. 30 capsule 11    pantoprazole (PROTONIX) 40 MG tablet Take 1 tablet (40 mg total) by mouth once daily. 30 tablet 0    [DISCONTINUED] citalopram (CELEXA) 20 MG tablet Take 1 tablet (20 mg total) by mouth once daily. (Patient not taking: Reported on 3/18/2022) 30 tablet 6     No current facility-administered medications on file prior to visit.       Medications have been reviewed and reconciled with patient at visit today.          Exam:  Vitals:    03/28/23 0900   BP: (!) 186/84   Pulse: 69   Temp: 98.5 °F (36.9 °C)     Weight: 65 kg (143 lb 3.2 oz)   Body mass index is 29.93 kg/m².      BP Readings from Last 3 Encounters:   03/28/23 (!) 186/84   03/23/23 (!) 157/72   03/16/23 (!) 158/62     Wt Readings from Last 3 Encounters:   03/28/23 0900 65 kg (143 lb 3.2 oz)   03/21/23 0428 69.5 kg (153 lb 3.5 oz)   03/20/23 1103 66.7 kg (147 lb)   03/20/23 0006 66.9 kg (147 lb 7.8 oz)   03/18/23 2300 70.1 kg (154 lb 8.7 oz)   03/18/23 1957 70.1 kg (154 lb 8.7 oz)   03/16/23 2122 71.1 kg (156 lb 12.8 oz)            Physical Exam  Vitals reviewed.   Constitutional:       Appearance: Normal appearance. She is not ill-appearing.   HENT:      Head: Normocephalic and atraumatic.      Nose: No congestion.    Eyes:      Extraocular Movements: Extraocular movements intact.      Conjunctiva/sclera: Conjunctivae normal.   Cardiovascular:      Rate and Rhythm: Normal rate and regular rhythm.   Pulmonary:      Effort: Pulmonary effort is normal. No respiratory distress.      Breath sounds: No wheezing or rales.   Abdominal:      General: Bowel sounds are normal. There is no distension.      Palpations: Abdomen is soft.      Tenderness: There is no abdominal tenderness.   Musculoskeletal:         General: Normal range of motion.      Cervical back: Neck supple.      Right lower leg: No edema.      Left lower leg: No edema.   Skin:     General: Skin is warm and dry.   Neurological:      General: No focal deficit present.      Mental Status: She is alert and oriented to person, place, and time.      Motor: No weakness.      Gait: Gait normal.   Psychiatric:         Behavior: Behavior normal.         Thought Content: Thought content normal.       Laboratory Reviewed:   Lab Results   Component Value Date    WBC 6.78 03/21/2023    HGB 9.4 (L) 03/21/2023    HCT 28.7 (L) 03/21/2023     03/21/2023    CHOL 160 05/04/2022    TRIG 80 05/04/2022    HDL 81 (H) 05/04/2022    ALT 11 03/18/2023    AST 8 (L) 03/18/2023     (L) 03/23/2023    K 3.9 03/23/2023     03/23/2023    CREATININE 2.2 (H) 03/23/2023    BUN 67 (H) 03/23/2023    CO2 17 (L) 03/23/2023    TSH 1.230 12/13/2021    INR 1.0 09/09/2020    HGBA1C 5.7 11/17/2015           Health Maintenance  Health Maintenance Topics with due status: Not Due       Topic Last Completion Date    TETANUS VACCINE 02/03/2015    DEXA Scan 10/27/2021    Lipid Panel 05/04/2022    Aspirin/Antiplatelet Therapy 03/28/2023     Health Maintenance Due   Topic Date Due    Shingles Vaccine (1 of 2) Never done    COVID-19 Vaccine (4 - Booster for Pfizer series) 12/09/2021       Assessment and Plan:  1. CKD (chronic kidney disease) stage 4, GFR 15-29 ml/min  Assessment & Plan:  Creatine baseline  1.6  GFR 34  Continue lasix  Follow up with PCP    Orders:  -     BASIC METABOLIC PANEL; Future; Expected date: 03/28/2023    2. Essential hypertension  Assessment & Plan:  Elevated today, did not take her medications PTA  Will take when she returns home  Asymptomatic      3. Diastolic heart failure, NYHA class 3  Assessment & Plan:  EF 70 % with grade II diastolic dysfunction  SOB and leg swelling greatly improved  Taking lasix 20 mg daily  Discussed fluid restriction  F/U with Cardiology      4. Bradycardia  Assessment & Plan:  Pt with SA node dysfunction and S/P Pacemaker placement  Heart rate has improved  Continue carvedilol  Follow up with Cardiology                   -Patient's lab results were reviewed and discussed with patient  -Treatment options and alternatives were discussed with the patient. Patient expressed understanding. Patient was given the opportunity to ask questions and be an active participant in their medical care. Patient had no further questions or concerns at this time.         Future Appointments   Date Time Provider Department Center   3/28/2023 11:30 [facility]   4/18/2023  8:00 AM NAHUM Tamez MD [facility] OPHTHAL [place]   5/3/2023  2:20 PM Christina Cardona MD [id number]   6/23/2023  9:40 AM Cortez Ambrose MD [facility] ARRHYTH [place]   6/23/2023  9:50 AM PACEMAKER CLINIC [facility] ARR PRO [place]   7/13/2023  3:20 PM Stan Lui MD [facility] CARDIO [facility]          After visit summary printed and given to patient upon discharge.  Patient goals and care plan are included in After visit summary.    Total medical decision making time was 40 min.    The following issues were discussed: The primary encounter diagnosis was CKD (chronic kidney disease) stage 4, GFR 15-29 ml/min. Diagnoses of Essential hypertension, Diastolic heart failure, NYHA class 3, and Bradycardia were also pertinent to this visit.    Health maintenance  needs, recent test results and goals of care discussed with pt and questions answered.           ARLENE Mcarthur, NP-C  [facility] [address] [place] [address]

## 2023-03-29 ENCOUNTER — TELEPHONE (OUTPATIENT)
Dept: PRIMARY CARE CLINIC | Facility: CLINIC | Age: 88
End: 2023-03-29
Payer: MEDICARE

## 2023-03-29 NOTE — TELEPHONE ENCOUNTER
Please call pt and bring to [facility] next Wed or Thursday, so we can recheck her BP after Cards increased hydralazine, and maybe start low dose ARB.  This was discussed with Dr. Lui.  SM

## 2023-03-30 ENCOUNTER — TELEPHONE (OUTPATIENT)
Dept: PRIMARY CARE CLINIC | Facility: CLINIC | Age: 88
End: 2023-03-30
Payer: MEDICARE

## 2023-04-01 ENCOUNTER — TELEPHONE (OUTPATIENT)
Dept: ADMINISTRATIVE | Facility: CLINIC | Age: 88
End: 2023-04-01
Payer: MEDICARE

## 2023-04-05 ENCOUNTER — PATIENT MESSAGE (OUTPATIENT)
Dept: CARDIOLOGY | Facility: CLINIC | Age: 88
End: 2023-04-05
Payer: MEDICARE

## 2023-04-05 ENCOUNTER — OFFICE VISIT (OUTPATIENT)
Dept: PRIMARY CARE CLINIC | Facility: CLINIC | Age: 88
End: 2023-04-05
Payer: MEDICARE

## 2023-04-05 VITALS
BODY MASS INDEX: 30.6 KG/M2 | SYSTOLIC BLOOD PRESSURE: 130 MMHG | TEMPERATURE: 98 F | DIASTOLIC BLOOD PRESSURE: 82 MMHG | HEART RATE: 72 BPM | OXYGEN SATURATION: 97 % | WEIGHT: 146.38 LBS

## 2023-04-05 DIAGNOSIS — I10 ESSENTIAL HYPERTENSION: Primary | Chronic | ICD-10-CM

## 2023-04-05 DIAGNOSIS — I11.0 HYPERTENSIVE HEART DISEASE WITH HEART FAILURE: ICD-10-CM

## 2023-04-05 PROCEDURE — 3288F PR FALLS RISK ASSESSMENT DOCUMENTED: ICD-10-PCS | Mod: HCNC,CPTII,S$GLB, | Performed by: NURSE PRACTITIONER

## 2023-04-05 PROCEDURE — 3075F SYST BP GE 130 - 139MM HG: CPT | Mod: HCNC,CPTII,S$GLB, | Performed by: NURSE PRACTITIONER

## 2023-04-05 PROCEDURE — 99213 OFFICE O/P EST LOW 20 MIN: CPT | Mod: HCNC,S$GLB,, | Performed by: NURSE PRACTITIONER

## 2023-04-05 PROCEDURE — 3075F PR MOST RECENT SYSTOLIC BLOOD PRESS GE 130-139MM HG: ICD-10-PCS | Mod: HCNC,CPTII,S$GLB, | Performed by: NURSE PRACTITIONER

## 2023-04-05 PROCEDURE — 1101F PR PT FALLS ASSESS DOC 0-1 FALLS W/OUT INJ PAST YR: ICD-10-PCS | Mod: HCNC,CPTII,S$GLB, | Performed by: NURSE PRACTITIONER

## 2023-04-05 PROCEDURE — 99213 PR OFFICE/OUTPT VISIT, EST, LEVL III, 20-29 MIN: ICD-10-PCS | Mod: HCNC,S$GLB,, | Performed by: NURSE PRACTITIONER

## 2023-04-05 PROCEDURE — 3079F PR MOST RECENT DIASTOLIC BLOOD PRESSURE 80-89 MM HG: ICD-10-PCS | Mod: HCNC,CPTII,S$GLB, | Performed by: NURSE PRACTITIONER

## 2023-04-05 PROCEDURE — 1111F DSCHRG MED/CURRENT MED MERGE: CPT | Mod: HCNC,CPTII,S$GLB, | Performed by: NURSE PRACTITIONER

## 2023-04-05 PROCEDURE — 1126F AMNT PAIN NOTED NONE PRSNT: CPT | Mod: HCNC,CPTII,S$GLB, | Performed by: NURSE PRACTITIONER

## 2023-04-05 PROCEDURE — 1159F MED LIST DOCD IN RCRD: CPT | Mod: HCNC,CPTII,S$GLB, | Performed by: NURSE PRACTITIONER

## 2023-04-05 PROCEDURE — 1111F PR DISCHARGE MEDS RECONCILED W/ CURRENT OUTPATIENT MED LIST: ICD-10-PCS | Mod: HCNC,CPTII,S$GLB, | Performed by: NURSE PRACTITIONER

## 2023-04-05 PROCEDURE — 1159F PR MEDICATION LIST DOCUMENTED IN MEDICAL RECORD: ICD-10-PCS | Mod: HCNC,CPTII,S$GLB, | Performed by: NURSE PRACTITIONER

## 2023-04-05 PROCEDURE — 3288F FALL RISK ASSESSMENT DOCD: CPT | Mod: HCNC,CPTII,S$GLB, | Performed by: NURSE PRACTITIONER

## 2023-04-05 PROCEDURE — 3079F DIAST BP 80-89 MM HG: CPT | Mod: HCNC,CPTII,S$GLB, | Performed by: NURSE PRACTITIONER

## 2023-04-05 PROCEDURE — 1101F PT FALLS ASSESS-DOCD LE1/YR: CPT | Mod: HCNC,CPTII,S$GLB, | Performed by: NURSE PRACTITIONER

## 2023-04-05 PROCEDURE — 99999 PR PBB SHADOW E&M-EST. PATIENT-LVL IV: ICD-10-PCS | Mod: PBBFAC,HCNC,, | Performed by: NURSE PRACTITIONER

## 2023-04-05 PROCEDURE — 1126F PR PAIN SEVERITY QUANTIFIED, NO PAIN PRESENT: ICD-10-PCS | Mod: HCNC,CPTII,S$GLB, | Performed by: NURSE PRACTITIONER

## 2023-04-05 PROCEDURE — 99999 PR PBB SHADOW E&M-EST. PATIENT-LVL IV: CPT | Mod: PBBFAC,HCNC,, | Performed by: NURSE PRACTITIONER

## 2023-04-05 RX ORDER — HYDRALAZINE HYDROCHLORIDE 25 MG/1
25 TABLET, FILM COATED ORAL
Qty: 90 TABLET | Refills: 3 | Status: SHIPPED | OUTPATIENT
Start: 2023-04-05 | End: 2023-05-15

## 2023-04-05 NOTE — ASSESSMENT & PLAN NOTE
Continue to monitor and record blood pressure  Continue current treatment plan of lasix, hydralazine 25 mg tid (was taking 10 mg tid) and Coreg

## 2023-04-05 NOTE — Clinical Note
I saw Glo today for an injection both eyes for retinal edema. She was noted to have pretibial edema, SOB and swelling behind both eyes today. She's scheduled to see you in February but I just wanted you to be aware of her volume status today. Thanks Pt. with elevated serum creatinine; unclear at this time whether or not this is CKD vs PRIYA. No reported history of kidney disease but documented in chart that he has CKD stage 4. No previous labs available for review but on admission pt noted to have a SCr of 2.64 which today has remained elevated at 2.43. UA reviewed without evidence of significant hematuria or proteinuria. Agree with holding ACE/ARB at this time. Recommend renal sonogram and monitor for any urinary retention with bladder scan. Monitor labs and urine output. Avoid potential nephrotoxins at this time. Understand the patient may require further studies involving IV contrast; will follow along to help mitigate risks of DEBBIE.  Dose medications as per eGFR.    If any questions, please feel free to contact me     Jerry Gamble  Nephrology Fellow  [facility] Pager: [phone] -appears to be dry gangrene  -hold off abx  -no fever  -cont vascular workup

## 2023-04-05 NOTE — PATIENT INSTRUCTIONS
Measure and record the B/P    You will see Dr. Cardona in one month    If your blood pressure is higher than 130/80 consistently - we need to know

## 2023-04-05 NOTE — PROGRESS NOTES
Glo Dumont  04/05/2023  [id number]    Christina Cardona MD  Patient Care Team:  Christina Cardona MD as PCP - General (Internal Medicine)  Gordo Muir MD as Consulting Physician (Pulmonary Disease)  Nik Bergman MD (Inactive) as Consulting Physician (Cardiology)  Franky Bell Jr., MD (Rheumatology)  Julio Galindo MD as Consulting Physician (Gastroenterology)  Glenis Mcfadden MD as Consulting Physician (Otolaryngology)  Ayaz Estrada MD as Consulting Physician (Hematology and Oncology)  Yomi Guy LPN as Care Coordinator      [facility] Note      Chief Complaint:  Follow up for elevated B/P    History of Present Illness:  88 yo female with daughter at side. Last seen for follow up after hospitalization for SSS S/P Pacemaker and decompensated diastolic HF.   Denies SOB, has mild SOOD and trace leg swelling  Here for follow up with elevated B/P  According to daughter, mother did not take her medications yet because the daughter had to go to the doctor this AM.   The daughter assists with medication administration.       Has not taken B/P meds this AM  B/P recordings As reported by patient home B/P 143/70, 130/80    Trace dry cough    Just had F/U with Cardiology - B/P was high, Hydralazine changed from 10 mg to 25 mg tid    Denies fever, chills, URI symptoms, chest pain, SOB at rest, palpitations, vomiting, diarrhea, no gross neuro deficits, urinary symptoms      The following were reviewed: Active problem list, medication list, allergies, family history, social history, and Health Maintenance.     History:  Past Medical History:   Diagnosis Date    Anemia     Angina pectoris     Anxiety     Anxiety and depression     Arthritis     hip    Carotid artery occlusion     Carpal tunnel syndrome 06/23/2008    emg    Chronic diarrhea     work up in 2011 with EGD, CS and VCE    CKD (chronic kidney disease) stage 3, GFR 30-59 ml/min 5/11/2017    Colitis     Coronary artery disease     Coronary  artery disease     Diastolic dysfunction     Diverticulosis     Glaucoma     Greater trochanteric bursitis 2/10/2015    Grief at loss of child 1/26/2016    H/O carotid endarterectomy 12/2/2013    Heart failure     History of coronary angioplasty 3/11/2014    Hypercholesteremia     Hypertension     Liver cyst 02/08/2013    ct abd    Macular degeneration     Obesity with serious comorbidity 3/19/2023    Primary open-angle glaucoma(365.11) 9/3/2013    Renal cyst 02/08/2013    ct abd    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    Sarcoidosis     Sarcoidosis of lung     Sickle cell trait     Uveitis      Past Surgical History:   Procedure Laterality Date    A-V CARDIAC PACEMAKER INSERTION Left 3/21/2023    Procedure: INSERTION, CARDIAC PACEMAKER, DUAL CHAMBER/His Lead;  Surgeon: Cortez Ambrose MD;  Location: [facility] CATH LAB;  Service: Cardiology;  Laterality: Left;  MDT/ MD to confirm in am/possible nurse sedate    CAROTID ENDARTERECTOMY Right 2000s    CATARACT EXTRACTION Bilateral     Dr. Liang Dennis    CHOLECYSTECTOMY      laparoscopic, 3/18.    CORONARY ANGIOPLASTY WITH STENT PLACEMENT  11/19/2010    RCA-HALIE 2010    Lathia    JOINT REPLACEMENT Left 11/03/2014    Dr. Braun    TOTAL ABDOMINAL HYSTERECTOMY W/ BILATERAL SALPINGOOPHORECTOMY  1972     Family History   Problem Relation Age of Onset    Hypertension Mother     Heart failure Mother     Cancer Father         prostate    Cirrhosis Brother     Heart failure Brother     Cancer Daughter         Carcinoid     Patient Active Problem List   Diagnosis    Sarcoidosis of lung    Thyroid nodule    Hypertensive heart disease with heart failure    Other hyperlipidemia    Hemoglobin A-S genotype    Ptosis    Coronary artery disease, occlusive    History of central retinal artery occlusion    Iron deficiency anemia    Vitamin D deficiency    Osteopenia    Essential hypertension    Diastolic heart failure, NYHA class 3    Atherosclerosis of aorta    CKD  (chronic kidney disease) stage 4, GFR 15-29 ml/min    Uveitis    Ulcerative colitis    History of coronary angioplasty    Hiatal hernia    Bradycardia    Lung nodule    Memory loss    Central retinal vein occlusion with macular edema of both eyes    Aortic stenosis    Weight loss    Unspecified severe protein-calorie malnutrition    Hypokalemia    Obesity with serious comorbidity    Hypomagnesemia     Review of patient's allergies indicates:   Allergen Reactions    Lisinopril      angioedema    Codeine Nausea And Vomiting       Medications:  Current Outpatient Medications on File Prior to Visit   Medication Sig Dispense Refill    aflibercept 2 mg/0.05 mL Soln 2 mg by Intravitreal route every 28 days.      aspirin (ECOTRIN) 81 MG EC tablet Take 81 mg by mouth once daily.      budesonide (ENTOCORT EC) 3 mg capsule Take 3 mg by mouth 2 (two) times a day.      carvediloL (COREG) 12.5 MG tablet Take 1 tablet (12.5 mg total) by mouth 2 (two) times daily with meals. 60 tablet 11    dexAMETHasone (OZURDEX) 0.7 mg Impl intravitreal implant 0.7 mg by Intravitreal route once.      dorzolamide-timolol 2-0.5% (COSOPT) 22.3-6.8 mg/mL ophthalmic solution Place 1 drop into both eyes 2 (two) times daily. 10 mL 6    FOLIC ACID/MULTIVIT-MIN/LUTEIN (CENTRUM SILVER ORAL) Take 1 tablet by mouth once daily.      furosemide (LASIX) 20 MG tablet Take 1 tablet (20 mg total) by mouth once daily. May also take 2 tablets (40 mg total) daily as needed (for leg swelling). 120 tablet 3    nitroGLYCERIN (NITROSTAT) 0.4 MG SL tablet Place 1 tablet (0.4 mg total) under the tongue every 5 (five) minutes as needed for Chest pain. 25 tablet 3    pravastatin (PRAVACHOL) 40 MG tablet Take 1 tablet (40 mg total) by mouth once daily. 90 tablet 2    lipase-protease-amylase 24,000-76,000-120,000 units (CREON) 24,000-76,000 -120,000 unit capsule Take 1 capsule by mouth 3 (three) times daily with meals. 90 capsule 11    [DISCONTINUED] citalopram (CELEXA) 20 MG  tablet Take 1 tablet (20 mg total) by mouth once daily. (Patient not taking: Reported on 3/18/2022) 30 tablet 6    [DISCONTINUED] gabapentin (NEURONTIN) 300 MG capsule Take 1 capsule (300 mg total) by mouth once daily. (Patient not taking: Reported on 3/28/2023) 30 capsule 11    [DISCONTINUED] hydrALAZINE (APRESOLINE) 25 MG tablet Take 1 tablet (25 mg total) by mouth 3 (three) times daily before meals. 90 tablet 3    [DISCONTINUED] pantoprazole (PROTONIX) 40 MG tablet Take 1 tablet (40 mg total) by mouth once daily. (Patient not taking: Reported on 3/28/2023) 30 tablet 0     No current facility-administered medications on file prior to visit.       Medications have been reviewed and reconciled with patient at visit today.          Exam:  Vitals:    04/05/23 1130   BP: 130/82   Pulse:    Temp:      Weight: 66.4 kg (146 lb 6.4 oz)   Body mass index is 30.6 kg/m².      BP Readings from Last 3 Encounters:   04/05/23 130/82   03/28/23 (!) 170/70   03/28/23 (!) 186/84     Wt Readings from Last 3 Encounters:   04/05/23 1107 66.4 kg (146 lb 6.4 oz)   03/28/23 1101 65.2 kg (143 lb 11.8 oz)   03/28/23 0900 65 kg (143 lb 3.2 oz)            Physical Exam  Vitals reviewed.   Constitutional:       Appearance: Normal appearance. She is not ill-appearing.   HENT:      Head: Normocephalic and atraumatic.      Nose: No congestion.   Eyes:      Extraocular Movements: Extraocular movements intact.      Conjunctiva/sclera: Conjunctivae normal.   Cardiovascular:      Rate and Rhythm: Normal rate and regular rhythm.   Pulmonary:      Effort: Pulmonary effort is normal. No respiratory distress.      Breath sounds: No wheezing or rales.   Abdominal:      General: Bowel sounds are normal. There is no distension.      Palpations: Abdomen is soft.      Tenderness: There is no abdominal tenderness.   Musculoskeletal:         General: Normal range of motion.      Cervical back: Neck supple.      Right lower leg: Edema (trace to ankles) present.       Left lower leg: Edema present.   Skin:     General: Skin is warm and dry.   Neurological:      General: No focal deficit present.      Mental Status: She is alert and oriented to person, place, and time.      Motor: No weakness.      Gait: Gait normal.   Psychiatric:         Behavior: Behavior normal.         Thought Content: Thought content normal.       Laboratory Reviewed:   Lab Results   Component Value Date    WBC 6.78 03/21/2023    HGB 9.4 (L) 03/21/2023    HCT 28.7 (L) 03/21/2023     03/21/2023    CHOL 160 05/04/2022    TRIG 80 05/04/2022    HDL 81 (H) 05/04/2022    ALT 11 03/18/2023    AST 8 (L) 03/18/2023     03/28/2023    K 4.3 03/28/2023     03/28/2023    CREATININE 1.6 (H) 03/28/2023    BUN 47 (H) 03/28/2023    CO2 21 (L) 03/28/2023    TSH 1.230 12/13/2021    INR 1.0 09/09/2020    HGBA1C 5.7 11/17/2015           Health Maintenance  Health Maintenance Topics with due status: Not Due       Topic Last Completion Date    TETANUS VACCINE 02/03/2015    DEXA Scan 10/27/2021    Lipid Panel 05/04/2022    Aspirin/Antiplatelet Therapy 04/05/2023     Health Maintenance Due   Topic Date Due    Shingles Vaccine (1 of 2) Never done    COVID-19 Vaccine (4 - Booster for Pfizer series) 12/09/2021       Assessment and Plan:  1. Essential hypertension  Assessment & Plan:  Continue to monitor and record blood pressure  Continue current treatment plan of lasix, hydralazine 25 mg tid (was taking 10 mg tid) and Coreg      2. Hypertensive heart disease with heart failure  Assessment & Plan:  B/P controlled today  Continue current tx plan  Lasix and currently not in exacerbation                   -Patient's lab results were reviewed and discussed with patient  -Treatment options and alternatives were discussed with the patient. Patient expressed understanding. Patient was given the opportunity to ask questions and be an active participant in their medical care. Patient had no further questions or concerns  at this time.         Future Appointments   Date Time Provider Department Center   4/18/2023  8:00 AM NAHUM Tamez MD [facility] OPHTHAL [place]   4/19/2023  9:00 AM LYDIA Al [facility]   5/3/2023  2:20 PM Christina Cardona MD [id number]   6/23/2023  9:40 AM Cortez Ambrose MD [facility] ARRHYTH [place]   6/23/2023  9:50 AM PACEMAKER CLINIC [facility] ARR PRO [place]   6/27/2023 10:00 AM HOME MONITOR DEVICE CHECK [facility] ARR PRO [place]   7/13/2023  3:20 PM Stan Lui MD [facility] CARDIO [facility]          After visit summary printed and given to patient upon discharge.  Patient goals and care plan are included in After visit summary.    Total medical decision making time was 30 min.    The following issues were discussed: The primary encounter diagnosis was Essential hypertension. A diagnosis of Hypertensive heart disease with heart failure was also pertinent to this visit.    Health maintenance needs, recent test results and goals of care discussed with pt and questions answered.           ARLENE Mcarthur, NP-C  [facility] [address] [place] [address]

## 2023-04-06 ENCOUNTER — TELEPHONE (OUTPATIENT)
Dept: PRIMARY CARE CLINIC | Facility: CLINIC | Age: 88
End: 2023-04-06
Payer: MEDICARE

## 2023-04-11 NOTE — PROGRESS NOTES
===============================  Date today is 4/18/2023  Glo Dumont is a 87 y.o. female  Last visit [facility]: :3/8/2023   Last visit eye dept. 3/8/2023    Corrected distance visual acuity was 20/80 in the right eye and 20/400 in the left eye.  Tonometry       Tonometry (Applanation, 8:21 AM)         Right Left    Pressure 16 16                  Not recorded       Not recorded       Not recorded       Chief Complaint   Patient presents with    crvo / me     Ozurdex ou       Problem List Items Addressed This Visit          Eye/Vision problems    Central retinal vein occlusion with macular edema of both eyes - Primary    Relevant Orders    Posterior Segment OCT Retina-Both eyes (Completed)    Prior authorization Order     Instructed to call 24/7 for any worsening of vision, visual distortion or pain.  Check OU independently daily.    Gave my office and personal cell phone number.  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 Sandoval for Kmap, auto refraction and MOCT       RTC 3-4 months same day with Dr. Sandoval for Kmap, auto refraction and MOCT  Instructed to call 24/7 for any worsening of vision or symptoms. Check OU daily.   Gave my office and cell phone number.      =============================

## 2023-04-12 ENCOUNTER — PATIENT MESSAGE (OUTPATIENT)
Dept: PRIMARY CARE CLINIC | Facility: CLINIC | Age: 88
End: 2023-04-12
Payer: MEDICARE

## 2023-04-18 ENCOUNTER — PROCEDURE VISIT (OUTPATIENT)
Dept: OPHTHALMOLOGY | Facility: CLINIC | Age: 88
End: 2023-04-18
Payer: MEDICARE

## 2023-04-18 DIAGNOSIS — H34.8130 CENTRAL RETINAL VEIN OCCLUSION WITH MACULAR EDEMA OF BOTH EYES: Primary | ICD-10-CM

## 2023-04-18 PROCEDURE — 92134 CPTRZ OPH DX IMG PST SGM RTA: CPT | Mod: HCNC,S$GLB,, | Performed by: OPHTHALMOLOGY

## 2023-04-18 PROCEDURE — 99499 NO LOS: ICD-10-PCS | Mod: HCNC,S$GLB,, | Performed by: OPHTHALMOLOGY

## 2023-04-18 PROCEDURE — 92134 POSTERIOR SEGMENT OCT RETINA (OCULAR COHERENCE TOMOGRAPHY)-BOTH EYES: ICD-10-PCS | Mod: HCNC,S$GLB,, | Performed by: OPHTHALMOLOGY

## 2023-04-18 PROCEDURE — 99499 UNLISTED E&M SERVICE: CPT | Mod: HCNC,S$GLB,, | Performed by: OPHTHALMOLOGY

## 2023-04-20 NOTE — PROGRESS NOTES
Subjective:      Patient ID: Glo Dumont is a 87 y.o. female.    Chief Complaint: No chief complaint on file.      HPI  Here for follow up of medical problems.  Has kept 40# back on.  BP at home running 150s systolic.  No HA.  No cp/sob/palp.  BMs normal.  No dysuria or blood, 3x nocturia, using depends.  Breathing ok.  Lots anxiety feeling regarding son's activities.    Advance Care Planning     Date: 05/03/2023    Power of   I initiated the process of advance care planning today and explained the importance of this process to the patient.  I introduced the concept of advance directives to the patient, as well. Then the patient received detailed information about the importance of designating a Health Care Power of  (HCPOA). She was also instructed to communicate with this person about their wishes for future healthcare, should she become sick and lose decision-making capacity. The patient has not previously appointed a HCPOA. After our discussion, the patient has decided to complete a HCPOA and has appointed her daughter, health care agent:  Jessica Toribio  & health care agent number:  [phone].                Updated/ annual due 10/23:  HM: 10/22 fluvax, 2/21 covid vaccines/booster, 3/15 [id number], 2/14 [id number], 10/22 [id number], 2/15 TDaP, 10/21 BMD rep 2y, 2/11 Cscope no repeat will be done, 1/17 MMG no more, Eye doc monthly, GI Dr. Galindo, Cards Dr. Lui, Nephro Dr. Villareal.     Review of Systems   Constitutional:  Negative for chills, diaphoresis and fever.   Respiratory:  Negative for cough and shortness of breath.    Cardiovascular:  Negative for chest pain, palpitations and leg swelling.   Gastrointestinal:  Negative for blood in stool, constipation, diarrhea, nausea and vomiting.   Genitourinary:  Negative for dysuria, frequency and hematuria.   Psychiatric/Behavioral:  The patient is not nervous/anxious.        Objective:   BP (!) 202/82 (BP Location: Right arm)   Pulse 76    "Temp 98.4 °F (36.9 °C) (Oral)   Ht 4' 10" (1.473 m)   Wt 66.7 kg (147 lb)   SpO2 97%   BMI 30.72 kg/m²     Physical Exam  Constitutional:       Appearance: She is well-developed.   Neck:      Thyroid: No thyroid mass.      Vascular: No carotid bruit.   Cardiovascular:      Rate and Rhythm: Normal rate and regular rhythm.      Heart sounds: No murmur heard.    No friction rub. No gallop.   Pulmonary:      Effort: Pulmonary effort is normal.      Breath sounds: Normal breath sounds. No wheezing or rales.   Abdominal:      General: Bowel sounds are normal.      Palpations: Abdomen is soft. There is no mass.      Tenderness: There is no abdominal tenderness.   Musculoskeletal:         General: Swelling (trace) present.      Cervical back: Neck supple.   Lymphadenopathy:      Cervical: No cervical adenopathy.   Neurological:      Mental Status: She is alert and oriented to person, place, and time.           Assessment:       1. Essential hypertension    2. Hypertensive heart disease with heart failure    3. Other hyperlipidemia    4. Sarcoidosis of lung    5. Ulcerative pancolitis without complication    6. Weight loss    7. Diastolic heart failure, NYHA class 2    8. Situational anxiety          Plan:     Essential hypertension- clonidine now, increase lasix to 40mg daily.  Check BMP in 2 days.  RTC 1wk.  -     cloNIDine tablet 0.1 mg  -     Basic Metabolic Panel; Future; Expected date: 05/03/2023  -     furosemide (LASIX) 40 MG tablet; Take 1 tablet (40 mg total) by mouth once daily.  Dispense: 90 tablet; Refill: 3    Hypertensive heart disease with heart failure    Other hyperlipidemia- cont prava.    Sarcoidosis of lung- doing well.    Ulcerative pancolitis without complication- stable on rx, cont.    Weight loss, stable in past 3mo.    Sit anxiety- try buspar.    Addendum:  BP at home 112/85, so high BP mostly due to her anxiety.  Do not raise lasix dose.  Use buspar.  RTC as scheduled in 2 weeks.          "

## 2023-04-25 ENCOUNTER — OFFICE VISIT (OUTPATIENT)
Dept: CARDIOLOGY | Facility: CLINIC | Age: 88
End: 2023-04-25
Payer: MEDICARE

## 2023-04-25 VITALS
HEIGHT: 58 IN | SYSTOLIC BLOOD PRESSURE: 200 MMHG | BODY MASS INDEX: 31.05 KG/M2 | DIASTOLIC BLOOD PRESSURE: 86 MMHG | WEIGHT: 147.94 LBS | HEART RATE: 64 BPM

## 2023-04-25 DIAGNOSIS — I50.30 DIASTOLIC HEART FAILURE, NYHA CLASS 3: ICD-10-CM

## 2023-04-25 DIAGNOSIS — I10 ESSENTIAL HYPERTENSION: Primary | Chronic | ICD-10-CM

## 2023-04-25 PROCEDURE — 3077F SYST BP >= 140 MM HG: CPT | Mod: HCNC,CPTII,S$GLB, | Performed by: PHYSICIAN ASSISTANT

## 2023-04-25 PROCEDURE — 3079F PR MOST RECENT DIASTOLIC BLOOD PRESSURE 80-89 MM HG: ICD-10-PCS | Mod: HCNC,CPTII,S$GLB, | Performed by: PHYSICIAN ASSISTANT

## 2023-04-25 PROCEDURE — 99999 PR PBB SHADOW E&M-EST. PATIENT-LVL III: ICD-10-PCS | Mod: PBBFAC,HCNC,, | Performed by: PHYSICIAN ASSISTANT

## 2023-04-25 PROCEDURE — 1160F PR REVIEW ALL MEDS BY PRESCRIBER/CLIN PHARMACIST DOCUMENTED: ICD-10-PCS | Mod: HCNC,CPTII,S$GLB, | Performed by: PHYSICIAN ASSISTANT

## 2023-04-25 PROCEDURE — 99213 OFFICE O/P EST LOW 20 MIN: CPT | Mod: HCNC,S$GLB,, | Performed by: PHYSICIAN ASSISTANT

## 2023-04-25 PROCEDURE — 1159F MED LIST DOCD IN RCRD: CPT | Mod: HCNC,CPTII,S$GLB, | Performed by: PHYSICIAN ASSISTANT

## 2023-04-25 PROCEDURE — 3079F DIAST BP 80-89 MM HG: CPT | Mod: HCNC,CPTII,S$GLB, | Performed by: PHYSICIAN ASSISTANT

## 2023-04-25 PROCEDURE — 1126F AMNT PAIN NOTED NONE PRSNT: CPT | Mod: HCNC,CPTII,S$GLB, | Performed by: PHYSICIAN ASSISTANT

## 2023-04-25 PROCEDURE — 1159F PR MEDICATION LIST DOCUMENTED IN MEDICAL RECORD: ICD-10-PCS | Mod: HCNC,CPTII,S$GLB, | Performed by: PHYSICIAN ASSISTANT

## 2023-04-25 PROCEDURE — 1160F RVW MEDS BY RX/DR IN RCRD: CPT | Mod: HCNC,CPTII,S$GLB, | Performed by: PHYSICIAN ASSISTANT

## 2023-04-25 PROCEDURE — 99213 PR OFFICE/OUTPT VISIT, EST, LEVL III, 20-29 MIN: ICD-10-PCS | Mod: HCNC,S$GLB,, | Performed by: PHYSICIAN ASSISTANT

## 2023-04-25 PROCEDURE — 99999 PR PBB SHADOW E&M-EST. PATIENT-LVL III: CPT | Mod: PBBFAC,HCNC,, | Performed by: PHYSICIAN ASSISTANT

## 2023-04-25 PROCEDURE — 3077F PR MOST RECENT SYSTOLIC BLOOD PRESSURE >= 140 MM HG: ICD-10-PCS | Mod: HCNC,CPTII,S$GLB, | Performed by: PHYSICIAN ASSISTANT

## 2023-04-25 PROCEDURE — 1126F PR PAIN SEVERITY QUANTIFIED, NO PAIN PRESENT: ICD-10-PCS | Mod: HCNC,CPTII,S$GLB, | Performed by: PHYSICIAN ASSISTANT

## 2023-04-25 RX ORDER — CARVEDILOL 25 MG/1
25 TABLET ORAL 2 TIMES DAILY WITH MEALS
Qty: 60 TABLET | Refills: 11 | Status: SHIPPED | OUTPATIENT
Start: 2023-04-25 | End: 2023-09-01

## 2023-04-25 NOTE — PROGRESS NOTES
[facility] Provider Note (Follow-up) Consult Note      HPI:  Patient can walk around house, dtr feels she pushes limit of exertion   Patient sleeps on 2 pillows   Patient wakes up SOB, has to get out of bed, associated cough, sputum none   Palpitations - none   Dizzy, light-headed, pre-syncope or syncope none   Since discharge frequency of performing weights, home weight and weight change stable   Other information felt pertinent to HPI    Last visit hydralazine was increased to 25 TID and BP came down nicely. HAs not had any meds today and BP up some. No HA no blurred vision.    No SOB, PND, orthopnea.     Taking lasix 20 mg daily    No LE edema    HH BP log reviewed, am readings 140-160 systolic         PHYSICAL:   Vitals:    04/25/23 0850   BP: (!) 200/86   Pulse: 64      Wt Readings from Last 3 Encounters:   04/25/23 67.1 kg (147 lb 14.9 oz)   04/05/23 66.4 kg (146 lb 6.4 oz)   03/28/23 65.2 kg (143 lb 11.8 oz)       JVD: no   Heart rhythm: regular  Cardiac murmur: No    S3: no  S4: no  Lungs: clear  Liver span: 10 cm:   Hepatojugular reflux: no  Edema: no      ASSESSMENT: chronic diastolic HF    PLAN:      Patient Instructions:   Instruct the patient to notify this clinic if HH, a physician or an advanced care provider wants to change medication one of their HF medications   Activity and Diet restrictions:   Recommend 2-3 gram sodium restriction and 1500cc- 2000cc fluid restriction.  Encourage physical activity with graded exercise program.  Requested patient to weigh themselves daily, and to notify us if their weight increases by more than 3 lbs in 1 day or 5 lbs in 3 days.    Assigned dry weight on home scale: 66 kg  Medication changes (include current dose and changed dose)  Increase coreg to 25 mg BID  She will call in 3 days with BP logs, at that time will increase hydralazine to 50 TID if needed  Continue lasix 20 daily  Upcoming labs and date anticipated: RTC 3 weeks

## 2023-04-27 ENCOUNTER — PATIENT MESSAGE (OUTPATIENT)
Dept: CARDIOLOGY | Facility: CLINIC | Age: 88
End: 2023-04-27
Payer: MEDICARE

## 2023-04-27 ENCOUNTER — EXTERNAL HOME HEALTH (OUTPATIENT)
Dept: HOME HEALTH SERVICES | Facility: HOSPITAL | Age: 88
End: 2023-04-27
Payer: MEDICARE

## 2023-05-01 ENCOUNTER — DOCUMENT SCAN (OUTPATIENT)
Dept: HOME HEALTH SERVICES | Facility: HOSPITAL | Age: 88
End: 2023-05-01
Payer: MEDICARE

## 2023-05-02 DIAGNOSIS — I20.9 ANGINA, CLASS II: Chronic | ICD-10-CM

## 2023-05-02 DIAGNOSIS — I25.10 CORONARY ARTERY DISEASE, OCCLUSIVE: Chronic | ICD-10-CM

## 2023-05-02 RX ORDER — NITROGLYCERIN 0.4 MG/1
TABLET SUBLINGUAL
Qty: 25 TABLET | Refills: 3 | Status: SHIPPED | OUTPATIENT
Start: 2023-05-02

## 2023-05-03 ENCOUNTER — OFFICE VISIT (OUTPATIENT)
Dept: PRIMARY CARE CLINIC | Facility: CLINIC | Age: 88
End: 2023-05-03
Payer: MEDICARE

## 2023-05-03 ENCOUNTER — PATIENT MESSAGE (OUTPATIENT)
Dept: PRIMARY CARE CLINIC | Facility: CLINIC | Age: 88
End: 2023-05-03

## 2023-05-03 VITALS
SYSTOLIC BLOOD PRESSURE: 202 MMHG | TEMPERATURE: 98 F | HEIGHT: 58 IN | BODY MASS INDEX: 30.86 KG/M2 | WEIGHT: 147 LBS | HEART RATE: 76 BPM | OXYGEN SATURATION: 97 % | DIASTOLIC BLOOD PRESSURE: 82 MMHG

## 2023-05-03 VITALS
HEIGHT: 58 IN | HEART RATE: 76 BPM | OXYGEN SATURATION: 97 % | BODY MASS INDEX: 30.92 KG/M2 | DIASTOLIC BLOOD PRESSURE: 74 MMHG | SYSTOLIC BLOOD PRESSURE: 198 MMHG | TEMPERATURE: 98 F

## 2023-05-03 DIAGNOSIS — M85.80 OSTEOPENIA, UNSPECIFIED LOCATION: ICD-10-CM

## 2023-05-03 DIAGNOSIS — I35.0 AORTIC VALVE STENOSIS, ETIOLOGY OF CARDIAC VALVE DISEASE UNSPECIFIED: ICD-10-CM

## 2023-05-03 DIAGNOSIS — Z86.69 HISTORY OF CENTRAL RETINAL ARTERY OCCLUSION: ICD-10-CM

## 2023-05-03 DIAGNOSIS — R63.4 WEIGHT LOSS: ICD-10-CM

## 2023-05-03 DIAGNOSIS — I10 ESSENTIAL HYPERTENSION: Primary | Chronic | ICD-10-CM

## 2023-05-03 DIAGNOSIS — N18.4 CKD (CHRONIC KIDNEY DISEASE) STAGE 4, GFR 15-29 ML/MIN: ICD-10-CM

## 2023-05-03 DIAGNOSIS — Z95.0 S/P PLACEMENT OF CARDIAC PACEMAKER: ICD-10-CM

## 2023-05-03 DIAGNOSIS — E55.9 VITAMIN D DEFICIENCY: ICD-10-CM

## 2023-05-03 DIAGNOSIS — I25.10 CORONARY ARTERY DISEASE, OCCLUSIVE: Chronic | ICD-10-CM

## 2023-05-03 DIAGNOSIS — E78.49 OTHER HYPERLIPIDEMIA: ICD-10-CM

## 2023-05-03 DIAGNOSIS — I70.0 ATHEROSCLEROSIS OF AORTA: ICD-10-CM

## 2023-05-03 DIAGNOSIS — I50.30 DIASTOLIC HEART FAILURE, NYHA CLASS 3: ICD-10-CM

## 2023-05-03 DIAGNOSIS — Z00.00 ENCOUNTER FOR PREVENTIVE HEALTH EXAMINATION: Primary | ICD-10-CM

## 2023-05-03 DIAGNOSIS — E66.09 CLASS 1 OBESITY DUE TO EXCESS CALORIES WITH SERIOUS COMORBIDITY AND BODY MASS INDEX (BMI) OF 30.0 TO 30.9 IN ADULT: ICD-10-CM

## 2023-05-03 DIAGNOSIS — K51.00 ULCERATIVE PANCOLITIS WITHOUT COMPLICATION: ICD-10-CM

## 2023-05-03 DIAGNOSIS — E43 UNSPECIFIED SEVERE PROTEIN-CALORIE MALNUTRITION: ICD-10-CM

## 2023-05-03 DIAGNOSIS — F41.8 SITUATIONAL ANXIETY: ICD-10-CM

## 2023-05-03 DIAGNOSIS — I10 ESSENTIAL HYPERTENSION: Chronic | ICD-10-CM

## 2023-05-03 DIAGNOSIS — I11.0 HYPERTENSIVE HEART DISEASE WITH HEART FAILURE: ICD-10-CM

## 2023-05-03 DIAGNOSIS — D86.0 SARCOIDOSIS OF LUNG: ICD-10-CM

## 2023-05-03 DIAGNOSIS — R91.1 LUNG NODULE: ICD-10-CM

## 2023-05-03 DIAGNOSIS — D57.3 HEMOGLOBIN A-S GENOTYPE: ICD-10-CM

## 2023-05-03 PROCEDURE — G9919 PR SCREENING AND POSITIVE: ICD-10-PCS | Mod: HCNC,CPTII,S$GLB, | Performed by: NURSE PRACTITIONER

## 2023-05-03 PROCEDURE — 3079F DIAST BP 80-89 MM HG: CPT | Mod: HCNC,CPTII,S$GLB, | Performed by: INTERNAL MEDICINE

## 2023-05-03 PROCEDURE — 3078F DIAST BP <80 MM HG: CPT | Mod: HCNC,CPTII,S$GLB, | Performed by: NURSE PRACTITIONER

## 2023-05-03 PROCEDURE — 3288F PR FALLS RISK ASSESSMENT DOCUMENTED: ICD-10-PCS | Mod: HCNC,CPTII,S$GLB, | Performed by: INTERNAL MEDICINE

## 2023-05-03 PROCEDURE — 3077F PR MOST RECENT SYSTOLIC BLOOD PRESSURE >= 140 MM HG: ICD-10-PCS | Mod: HCNC,CPTII,S$GLB, | Performed by: INTERNAL MEDICINE

## 2023-05-03 PROCEDURE — 3077F SYST BP >= 140 MM HG: CPT | Mod: HCNC,CPTII,S$GLB, | Performed by: INTERNAL MEDICINE

## 2023-05-03 PROCEDURE — 1158F PR ADVANCE CARE PLANNING DISCUSS DOCUMENTED IN MEDICAL RECORD: ICD-10-PCS | Mod: HCNC,CPTII,S$GLB, | Performed by: NURSE PRACTITIONER

## 2023-05-03 PROCEDURE — 99214 PR OFFICE/OUTPT VISIT, EST, LEVL IV, 30-39 MIN: ICD-10-PCS | Mod: HCNC,S$GLB,, | Performed by: INTERNAL MEDICINE

## 2023-05-03 PROCEDURE — 99214 OFFICE O/P EST MOD 30 MIN: CPT | Mod: HCNC,S$GLB,, | Performed by: INTERNAL MEDICINE

## 2023-05-03 PROCEDURE — 3078F PR MOST RECENT DIASTOLIC BLOOD PRESSURE < 80 MM HG: ICD-10-PCS | Mod: HCNC,CPTII,S$GLB, | Performed by: NURSE PRACTITIONER

## 2023-05-03 PROCEDURE — 99999 PR PBB SHADOW E&M-EST. PATIENT-LVL III: ICD-10-PCS | Mod: PBBFAC,HCNC,, | Performed by: NURSE PRACTITIONER

## 2023-05-03 PROCEDURE — 1101F PR PT FALLS ASSESS DOC 0-1 FALLS W/OUT INJ PAST YR: ICD-10-PCS | Mod: HCNC,CPTII,S$GLB, | Performed by: INTERNAL MEDICINE

## 2023-05-03 PROCEDURE — 3077F PR MOST RECENT SYSTOLIC BLOOD PRESSURE >= 140 MM HG: ICD-10-PCS | Mod: HCNC,CPTII,S$GLB, | Performed by: NURSE PRACTITIONER

## 2023-05-03 PROCEDURE — 1158F ADVNC CARE PLAN TLK DOCD: CPT | Mod: HCNC,CPTII,S$GLB, | Performed by: NURSE PRACTITIONER

## 2023-05-03 PROCEDURE — 1158F ADVNC CARE PLAN TLK DOCD: CPT | Mod: HCNC,CPTII,S$GLB, | Performed by: INTERNAL MEDICINE

## 2023-05-03 PROCEDURE — 3079F PR MOST RECENT DIASTOLIC BLOOD PRESSURE 80-89 MM HG: ICD-10-PCS | Mod: HCNC,CPTII,S$GLB, | Performed by: INTERNAL MEDICINE

## 2023-05-03 PROCEDURE — 1159F MED LIST DOCD IN RCRD: CPT | Mod: HCNC,CPTII,S$GLB, | Performed by: NURSE PRACTITIONER

## 2023-05-03 PROCEDURE — 3288F FALL RISK ASSESSMENT DOCD: CPT | Mod: HCNC,CPTII,S$GLB, | Performed by: INTERNAL MEDICINE

## 2023-05-03 PROCEDURE — 1159F PR MEDICATION LIST DOCUMENTED IN MEDICAL RECORD: ICD-10-PCS | Mod: HCNC,CPTII,S$GLB, | Performed by: NURSE PRACTITIONER

## 2023-05-03 PROCEDURE — 99999 PR PBB SHADOW E&M-EST. PATIENT-LVL III: ICD-10-PCS | Mod: PBBFAC,HCNC,, | Performed by: INTERNAL MEDICINE

## 2023-05-03 PROCEDURE — 99999 PR PBB SHADOW E&M-EST. PATIENT-LVL III: CPT | Mod: PBBFAC,HCNC,, | Performed by: NURSE PRACTITIONER

## 2023-05-03 PROCEDURE — 3077F SYST BP >= 140 MM HG: CPT | Mod: HCNC,CPTII,S$GLB, | Performed by: NURSE PRACTITIONER

## 2023-05-03 PROCEDURE — 1158F PR ADVANCE CARE PLANNING DISCUSS DOCUMENTED IN MEDICAL RECORD: ICD-10-PCS | Mod: HCNC,CPTII,S$GLB, | Performed by: INTERNAL MEDICINE

## 2023-05-03 PROCEDURE — 99999 PR PBB SHADOW E&M-EST. PATIENT-LVL III: CPT | Mod: PBBFAC,HCNC,, | Performed by: INTERNAL MEDICINE

## 2023-05-03 PROCEDURE — G9919 SCRN ND POS ND PROV OF REC: HCPCS | Mod: HCNC,CPTII,S$GLB, | Performed by: NURSE PRACTITIONER

## 2023-05-03 PROCEDURE — G0439 PPPS, SUBSEQ VISIT: HCPCS | Mod: HCNC,S$GLB,, | Performed by: NURSE PRACTITIONER

## 2023-05-03 PROCEDURE — G0439 PR MEDICARE ANNUAL WELLNESS SUBSEQUENT VISIT: ICD-10-PCS | Mod: HCNC,S$GLB,, | Performed by: NURSE PRACTITIONER

## 2023-05-03 PROCEDURE — 1101F PT FALLS ASSESS-DOCD LE1/YR: CPT | Mod: HCNC,CPTII,S$GLB, | Performed by: INTERNAL MEDICINE

## 2023-05-03 RX ORDER — FUROSEMIDE 40 MG/1
40 TABLET ORAL DAILY
Qty: 90 TABLET | Refills: 3
Start: 2023-05-03 | End: 2023-05-03

## 2023-05-03 RX ORDER — BUSPIRONE HYDROCHLORIDE 5 MG/1
5 TABLET ORAL 2 TIMES DAILY PRN
Qty: 60 TABLET | Refills: 5 | Status: SHIPPED | OUTPATIENT
Start: 2023-05-03 | End: 2024-03-27

## 2023-05-03 RX ORDER — FUROSEMIDE 40 MG/1
TABLET ORAL
Qty: 90 TABLET | Refills: 3
Start: 2023-05-03 | End: 2023-05-03

## 2023-05-03 RX ORDER — CLONIDINE HYDROCHLORIDE 0.1 MG/1
0.1 TABLET ORAL
Status: COMPLETED | OUTPATIENT
Start: 2023-05-03 | End: 2023-05-03

## 2023-05-03 RX ORDER — FUROSEMIDE 40 MG/1
20 TABLET ORAL DAILY
Qty: 90 TABLET | Refills: 3
Start: 2023-05-03 | End: 2023-05-15

## 2023-05-03 RX ADMIN — CLONIDINE HYDROCHLORIDE 0.1 MG: 0.1 TABLET ORAL at 02:05

## 2023-05-03 NOTE — PROGRESS NOTES
Glo Dumont presented for a follow-up Medicare AWV today. The following components were reviewed and updated:    Medical history  Family History  Social history  Allergies and Current Medications  Health Risk Assessment  Health Maintenance  Care Team    **See Completed Assessments for Annual Wellness visit with in the encounter summary    The following assessments were completed:  Depression Screening  Cognitive function Screening  Timed Get Up Test  Whisper Test  Nutrition Screening  PAQ             Wt Readings from Last 3 Encounters:   05/03/23 66.7 kg (147 lb)   04/25/23 67.1 kg (147 lb 14.9 oz)   04/05/23 66.4 kg (146 lb 6.4 oz)     Temp Readings from Last 3 Encounters:   05/03/23 98.4 °F (36.9 °C) (Oral)   05/03/23 98.4 °F (36.9 °C) (Oral)   04/05/23 98.4 °F (36.9 °C) (Oral)     BP Readings from Last 3 Encounters:   05/03/23 (!) 202/82   05/03/23 (!) 198/74   04/25/23 (!) 200/86     Pulse Readings from Last 3 Encounters:   05/03/23 76   05/03/23 76   04/25/23 64          Review of Systems   Constitutional:  Negative for activity change, appetite change, chills, fatigue and fever.   HENT:  Negative for nasal congestion, ear pain, hearing loss, postnasal drip, rhinorrhea, sore throat and trouble swallowing.    Eyes:  Negative for pain and visual disturbance.   Respiratory:  Negative for cough, shortness of breath and wheezing.    Cardiovascular:  Negative for chest pain, palpitations, + leg swelling.   Gastrointestinal:  Negative for abdominal pain, blood in stool, constipation, diarrhea, nausea and vomiting.   Genitourinary:  Negative for bladder incontinence, difficulty urinating, flank pain, frequency and hematuria.   Musculoskeletal:  Negative for arthralgias, back pain, myalgias and neck pain.   Integumentary:  Negative for rash and wound.   Neurological:  Negative for dizziness, speech difficulty, weakness, light-headedness, numbness, headaches and memory loss.   Psychiatric/Behavioral:  Negative for  confusion and sleep disturbance. The patient is not nervous/anxious.        Physical Exam  Vitals reviewed.   Constitutional:       Appearance: Normal appearance. She is not ill-appearing.   HENT:      Head: Normocephalic and atraumatic.      Nose: No congestion.   Eyes:      Extraocular Movements: Extraocular movements intact.      Conjunctiva/sclera: Conjunctivae normal.   Cardiovascular:      Rate and Rhythm: Normal rate and regular rhythm.   Pulmonary:      Effort: Pulmonary effort is normal. No respiratory distress.      Breath sounds: No wheezing or rales.   Abdominal:      General: Bowel sounds are normal. There is no distension.      Palpations: Abdomen is soft.      Tenderness: There is no abdominal tenderness.   Musculoskeletal:         General: Normal range of motion.      Cervical back: Neck supple.      Right lower leg: Edema (trace to ankles) present.      Left lower leg: Edema present.   Skin:     General: Skin is warm and dry.   Neurological:      General: No focal deficit present.      Mental Status: She is alert and oriented to person, place, and time.      Motor: No weakness.      Gait: Gait normal.   Psychiatric:         Behavior: Behavior normal.         Thought Content: Thought content normal.          Health Maintenance         Date Due Completion Date    Shingles Vaccine (1 of 2) Never done ---    COVID-19 Vaccine (4 - Booster for Pfizer series) 12/09/2021 10/14/2021    Lipid Panel 05/04/2023 5/4/2022    Aspirin/Antiplatelet Therapy 04/25/2024 4/25/2023    TETANUS VACCINE 02/03/2025 2/3/2015    DEXA Scan 10/27/2026 10/27/2021    Override on 12/14/2010: Done (Recheck 5 years)              PROBLEM LIST ITEMS ADDRESSED THIS VISIT:    1. Encounter for preventive health examination  Assessment & Plan:  Review for Opioid Screening: Pt does not have Rx for Opioids      Review for Substance Use Disorders: Patient does not use illicit substances as reported       2. History of central retinal artery  occlusion  Assessment & Plan:  Chronic, stable  Follow up with Opthalmology      3. Sarcoidosis of lung  Assessment & Plan:  Chronic, stable  Follow up with Pulmonology      4. Lung nodule  Overview:  9/2020 - Successful CT-guided right upper lobe lung biopsy  Lung, right lung (biopsy):   - Benign lung parenchyma with granulomatous inflammation and fibrosis (see   comment)   - No evidence of dysplasia or malignancy      Assessment & Plan:  Chronic, stable  F/U with Pulmonology      5. Aortic valve stenosis, etiology of cardiac valve disease unspecified  Overview:  ECHO - 2023 There is mild aortic valve stenosis.  Aortic valve area is 1.70 cm2; peak velocity is 1.86 m/s; mean gradient is 8 mmHg.      Assessment & Plan:  Chronic, stable  Follow up with Cardiology      6. Atherosclerosis of aorta  Overview:  Chest x-ray 11/10/14    Assessment & Plan:  Chronic, stable  Control B/P and continue pravastatin  Follow up with PCP      7. Coronary artery disease, occlusive  Assessment & Plan:  2022 - last stress test -  Normal myocardial perfusion scan. There is no evidence of myocardial ischemia or infarction.    The gated perfusion images showed an ejection fraction of 74% at rest. The gated perfusion images showed an ejection fraction of 82% post stress.    The EKG portion of this study is negative for ischemia.    The patient reported no chest pain during the stress test.    During stress, rare PVCs are noted.    Continue Coreg, ASA and STATIN  Follow up with Cardiology      8. Diastolic heart failure, NYHA class 3  Assessment & Plan:  Compensated  Assess and monitor  Continue lasix, control B/P  Follow up with Cardiology      9. Essential hypertension  Assessment & Plan:  Assess and monitor  B/P elevated in the clinic but measured at home 112/85  Elevated secondary to stress and anxiety  Buspar added to medical regimen  Continue Carvediolol, hydralazine and lasix  Follow up with PCP      10. Hypertensive heart disease  with heart failure  Assessment & Plan:  Assess and monitor  B/P elevated due to stress - med mgt  Heart failure compensated  Continue current tx plan  Follow up with Cardiology      11. Other hyperlipidemia  Assessment & Plan:   Latest Reference Range & Units Most Recent   Cholesterol 120 - 199 mg/dL 160  5/4/22 07:15   HDL 40 - 75 mg/dL 81 (H)  5/4/22 07:15   HDL/Cholesterol Ratio 20.0 - 50.0 % 50.6 (H)  5/4/22 07:15   LDL Cholesterol External 63.0 - 159.0 mg/dL 63.0  5/4/22 07:15   Non-HDL Cholesterol mg/dL 79  5/4/22 07:15   Total Cholesterol/HDL Ratio 2.0 - 5.0  2.0  5/4/22 07:15   Triglycerides 30 - 150 mg/dL 80  5/4/22 07:15   Chronic, stable  Follow up with PCP      12. 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 Vitamin D deficiency  Assessment & Plan:  Last vitamin D was normal  Assess and monitor  Follow up with PCP      15. Ulcerative pancolitis without complication  Assessment & Plan:  Chronic, stable  Follow up with Gastroenterology      16. Osteopenia, unspecified location  Overview:  2021 - There is a 5.9% risk of a major osteoporotic fracture and a 1.7% risk of hip fracture in the next 10 years (FRAX).   Impression:   Osteopenia    Assessment & Plan:  Chronic, stable  Try weight bearing activity to help strengthen bones  Follow up with PCP        17. S/P placement of cardiac pacemaker  Assessment & Plan:  3/2023 Successful implantation of PPM Dual.    Patient life expectancy greater than or equal to 1 year.    The patient tolerated procedure well.    There were no immediate complications.    His bundle recording.    TPM placement     The PPM is a Medtronic Lacey XT DR-MRI,  Model W1DR01, SN [id number].  Stable  Follow up with Cardiology      18. Class 1 obesity due to excess calories with  serious comorbidity and body mass index (BMI) of 30.0 to 30.9 in adult  Assessment & Plan:  BMI > 23  Assess and monitor  Encouraged mediterranean diet  Follow up with PCP        19. Unspecified severe protein-calorie malnutrition  Assessment & Plan:  Assess and monitor  Pt's weight has been stable  Follow up with PCP - needs repeat CMP in future to evaluate albumin level               Provided Glo with a 5-10 year written screening schedule and personal prevention plan. Recommendations were developed using the USPSTF age appropriate recommendations. Education, counseling, and referrals were provided as needed.  After Visit Summary printed and given to patient which includes a list of additional screenings\tests needed.    Future Appointments   Date Time Provider Department Center   5/5/2023  1:00 PM NURSE, [facility]   5/10/2023  9:00 AM NICOLE Mcarthur[facility]   5/16/2023  9:00 AM LYDIA Al [facility]   6/23/2023  9:40 AM Cortez Ambrose MD [facility] [place]   6/23/2023  9:50 AM PACEMAKER CLINIC [facility] [place]   6/27/2023 10:00 AM HOME MONITOR DEVICE CHECK V [facility] PRO [place]   7/13/2023  3:20 PM Stan Lui MD [facility] CARDIO [facility]   7/19/2023  2:00 PM Sergio Lozada MD [facility] OPHTHAL [place]   7/19/2023  2:45 PM Wai Sandoval OD [facility] OPHTHAL [place]              ARLENE Mcarthur, NP-C  [facility]  [address] [place] [address]      I offered to discuss advanced care planning, including how to pick a person who would make decisions for you if you were unable to make them for yourself, called a health care power of , and what kind of decisions you might make such as use of life sustaining treatments such as ventilators and tube feeding when faced with a life limiting illness recorded on a living will that they will need to know. (How you want to be cared for as you  near the end of your natural life)     X Patient is interested in learning more about how to make advanced directives.  I provided them paperwork and offered to discuss this with them.

## 2023-05-04 ENCOUNTER — PATIENT MESSAGE (OUTPATIENT)
Dept: PRIMARY CARE CLINIC | Facility: CLINIC | Age: 88
End: 2023-05-04
Payer: MEDICARE

## 2023-05-04 PROBLEM — Z00.00 ENCOUNTER FOR PREVENTIVE HEALTH EXAMINATION: Status: ACTIVE | Noted: 2023-05-04

## 2023-05-04 PROBLEM — Z95.0 S/P PLACEMENT OF CARDIAC PACEMAKER: Status: ACTIVE | Noted: 2023-05-04

## 2023-05-04 PROBLEM — I49.5 SA NODE DYSFUNCTION: Status: ACTIVE | Noted: 2023-05-04

## 2023-05-04 NOTE — ASSESSMENT & PLAN NOTE
Review for Opioid Screening: Pt does not have Rx for Opioids      Review for Substance Use Disorders: Patient does not use illicit substances as reported

## 2023-05-04 NOTE — ASSESSMENT & PLAN NOTE
Assess and monitor  B/P elevated due to stress - med mgt  Heart failure compensated  Continue current tx plan  Follow up with Cardiology

## 2023-05-04 NOTE — ASSESSMENT & PLAN NOTE
3/2023 Successful implantation of PPM Dual.    Patient life expectancy greater than or equal to 1 year.    The patient tolerated procedure well.    There were no immediate complications.    His bundle recording.    TPM placement     The PPM is a Medtronic Lacey XT DR-MRI,  Model W1DR01, SN [id number].  Stable  Follow up with Cardiology

## 2023-05-04 NOTE — ASSESSMENT & PLAN NOTE
Latest Reference Range & Units Most Recent   Cholesterol 120 - 199 mg/dL 160  5/4/22 07:15   HDL 40 - 75 mg/dL 81 (H)  5/4/22 07:15   HDL/Cholesterol Ratio 20.0 - 50.0 % 50.6 (H)  5/4/22 07:15   LDL Cholesterol External 63.0 - 159.0 mg/dL 63.0  5/4/22 07:15   Non-HDL Cholesterol mg/dL 79  5/4/22 07:15   Total Cholesterol/HDL Ratio 2.0 - 5.0  2.0  5/4/22 07:15   Triglycerides 30 - 150 mg/dL 80  5/4/22 07:15   Chronic, stable  Follow up with PCP

## 2023-05-04 NOTE — PATIENT INSTRUCTIONS
Counseling and Referral of Other Preventative  (Italic type indicates deductible and co-insurance are waived)    Patient Name: Glo Dumont  Today's Date: 5/4/2023    Health Maintenance       Date Due Completion Date    Shingles Vaccine (1 of 2) Never done ---    COVID-19 Vaccine (4 - Booster for Pfizer series) 12/09/2021 10/14/2021    Lipid Panel 05/04/2023 5/4/2022    Aspirin/Antiplatelet Therapy 05/03/2024 5/3/2023    TETANUS VACCINE 02/03/2025 2/3/2015    DEXA Scan 10/27/2026 10/27/2021    Override on 12/14/2010: Done (Recheck 5 years)        No orders of the defined types were placed in this encounter.    The following information is provided to all patients.  This information is to help you find resources for any of the problems found today that may be affecting your health:                Living healthy guide: www.[facility].[place].[facility]      Understanding Diabetes: www.diabetes.org      Eating healthy: www.cdc.gov/healthyweight      [facility] home safety checklist: www.cdc.gov/steadi/patient.html      Agency on Aging: www.goea.[place].[facility]      Alcoholics anonymous (AA): www.aa.org      Physical Activity: www.jacob.nih.gov/al6bnqj      Tobacco use: www.quitwithusla.org

## 2023-05-04 NOTE — ASSESSMENT & PLAN NOTE
Assess and monitor  B/P elevated in the clinic but measured at home 112/85  Elevated secondary to stress and anxiety  Buspar added to medical regimen  Continue Carvediolol, hydralazine and lasix  Follow up with PCP

## 2023-05-04 NOTE — ASSESSMENT & PLAN NOTE
Assess and monitor  Pt's weight has been stable  Follow up with PCP - needs repeat CMP in future to evaluate albumin level

## 2023-05-05 ENCOUNTER — LAB VISIT (OUTPATIENT)
Dept: LAB | Facility: HOSPITAL | Age: 88
End: 2023-05-05
Attending: INTERNAL MEDICINE
Payer: MEDICARE

## 2023-05-05 ENCOUNTER — TELEPHONE (OUTPATIENT)
Dept: PRIMARY CARE CLINIC | Facility: CLINIC | Age: 88
End: 2023-05-05
Payer: MEDICARE

## 2023-05-05 ENCOUNTER — CLINICAL SUPPORT (OUTPATIENT)
Dept: PRIMARY CARE CLINIC | Facility: CLINIC | Age: 88
End: 2023-05-05
Payer: MEDICARE

## 2023-05-05 DIAGNOSIS — I10 ESSENTIAL HYPERTENSION: Chronic | ICD-10-CM

## 2023-05-05 DIAGNOSIS — I10 ESSENTIAL HYPERTENSION: Primary | ICD-10-CM

## 2023-05-05 DIAGNOSIS — N18.4 CKD (CHRONIC KIDNEY DISEASE) STAGE 4, GFR 15-29 ML/MIN: ICD-10-CM

## 2023-05-05 PROCEDURE — 36415 COLL VENOUS BLD VENIPUNCTURE: CPT | Mod: HCNC,PO | Performed by: INTERNAL MEDICINE

## 2023-05-05 PROCEDURE — 80048 BASIC METABOLIC PNL TOTAL CA: CPT | Mod: HCNC | Performed by: INTERNAL MEDICINE

## 2023-05-06 ENCOUNTER — PATIENT MESSAGE (OUTPATIENT)
Dept: PRIMARY CARE CLINIC | Facility: CLINIC | Age: 88
End: 2023-05-06
Payer: MEDICARE

## 2023-05-06 LAB
ANION GAP SERPL CALC-SCNC: 11 MMOL/L (ref 8–16)
BUN SERPL-MCNC: 27 MG/DL (ref 8–23)
CALCIUM SERPL-MCNC: 10.2 MG/DL (ref 8.7–10.5)
CHLORIDE SERPL-SCNC: 108 MMOL/L (ref 95–110)
CO2 SERPL-SCNC: 23 MMOL/L (ref 23–29)
CREAT SERPL-MCNC: 1.5 MG/DL (ref 0.5–1.4)
EST. GFR  (NO RACE VARIABLE): 33.9 ML/MIN/1.73 M^2
GLUCOSE SERPL-MCNC: 91 MG/DL (ref 70–110)
POTASSIUM SERPL-SCNC: 3.9 MMOL/L (ref 3.5–5.1)
SODIUM SERPL-SCNC: 142 MMOL/L (ref 136–145)

## 2023-05-08 ENCOUNTER — PATIENT MESSAGE (OUTPATIENT)
Dept: PRIMARY CARE CLINIC | Facility: CLINIC | Age: 88
End: 2023-05-08
Payer: MEDICARE

## 2023-05-09 RX ORDER — CYPROHEPTADINE HYDROCHLORIDE 4 MG/1
4 TABLET ORAL 3 TIMES DAILY PRN
Qty: 270 TABLET | Refills: 3 | Status: SHIPPED | OUTPATIENT
Start: 2023-05-09 | End: 2024-05-08

## 2023-05-12 ENCOUNTER — OFFICE VISIT (OUTPATIENT)
Dept: PRIMARY CARE CLINIC | Facility: CLINIC | Age: 88
End: 2023-05-12
Payer: MEDICARE

## 2023-05-12 ENCOUNTER — PATIENT MESSAGE (OUTPATIENT)
Dept: PRIMARY CARE CLINIC | Facility: CLINIC | Age: 88
End: 2023-05-12

## 2023-05-12 VITALS — DIASTOLIC BLOOD PRESSURE: 82 MMHG | SYSTOLIC BLOOD PRESSURE: 182 MMHG

## 2023-05-12 DIAGNOSIS — I11.0 HYPERTENSIVE HEART DISEASE WITH HEART FAILURE: ICD-10-CM

## 2023-05-12 DIAGNOSIS — R32 URINARY INCONTINENCE, UNSPECIFIED TYPE: ICD-10-CM

## 2023-05-12 DIAGNOSIS — I50.30 DIASTOLIC HEART FAILURE, NYHA CLASS 3: Primary | ICD-10-CM

## 2023-05-12 LAB
BILIRUB UR QL STRIP: NEGATIVE
CLARITY UR REFRACT.AUTO: CLEAR
COLOR UR AUTO: COLORLESS
GLUCOSE UR QL STRIP: NEGATIVE
HGB UR QL STRIP: NEGATIVE
KETONES UR QL STRIP: NEGATIVE
LEUKOCYTE ESTERASE UR QL STRIP: ABNORMAL
MICROSCOPIC COMMENT: NORMAL
NITRITE UR QL STRIP: NEGATIVE
PH UR STRIP: 6 [PH] (ref 5–8)
PROT UR QL STRIP: NEGATIVE
SP GR UR STRIP: 1 (ref 1–1.03)
URN SPEC COLLECT METH UR: ABNORMAL
WBC #/AREA URNS AUTO: 2 /HPF (ref 0–5)

## 2023-05-12 PROCEDURE — 1101F PR PT FALLS ASSESS DOC 0-1 FALLS W/OUT INJ PAST YR: ICD-10-PCS | Mod: CPTII,S$GLB,, | Performed by: NURSE PRACTITIONER

## 2023-05-12 PROCEDURE — 3288F PR FALLS RISK ASSESSMENT DOCUMENTED: ICD-10-PCS | Mod: CPTII,S$GLB,, | Performed by: NURSE PRACTITIONER

## 2023-05-12 PROCEDURE — 96374 THER/PROPH/DIAG INJ IV PUSH: CPT | Mod: S$GLB,,, | Performed by: NURSE PRACTITIONER

## 2023-05-12 PROCEDURE — 1101F PT FALLS ASSESS-DOCD LE1/YR: CPT | Mod: CPTII,S$GLB,, | Performed by: NURSE PRACTITIONER

## 2023-05-12 PROCEDURE — 96374 PR INJECTION,THERAP/PROPH/DIAGNOST, IV PUSH, INITIAL DRUG: ICD-10-PCS | Mod: S$GLB,,, | Performed by: NURSE PRACTITIONER

## 2023-05-12 PROCEDURE — 99999 PR PBB SHADOW E&M-EST. PATIENT-LVL III: CPT | Mod: PBBFAC,,, | Performed by: NURSE PRACTITIONER

## 2023-05-12 PROCEDURE — 1159F PR MEDICATION LIST DOCUMENTED IN MEDICAL RECORD: ICD-10-PCS | Mod: CPTII,S$GLB,, | Performed by: NURSE PRACTITIONER

## 2023-05-12 PROCEDURE — 3079F DIAST BP 80-89 MM HG: CPT | Mod: CPTII,S$GLB,, | Performed by: NURSE PRACTITIONER

## 2023-05-12 PROCEDURE — 3288F FALL RISK ASSESSMENT DOCD: CPT | Mod: CPTII,S$GLB,, | Performed by: NURSE PRACTITIONER

## 2023-05-12 PROCEDURE — 99214 PR OFFICE/OUTPT VISIT, EST, LEVL IV, 30-39 MIN: ICD-10-PCS | Mod: 25,S$GLB,, | Performed by: NURSE PRACTITIONER

## 2023-05-12 PROCEDURE — 99999 PR PBB SHADOW E&M-EST. PATIENT-LVL III: ICD-10-PCS | Mod: PBBFAC,,, | Performed by: NURSE PRACTITIONER

## 2023-05-12 PROCEDURE — 3077F PR MOST RECENT SYSTOLIC BLOOD PRESSURE >= 140 MM HG: ICD-10-PCS | Mod: CPTII,S$GLB,, | Performed by: NURSE PRACTITIONER

## 2023-05-12 PROCEDURE — 81001 URINALYSIS AUTO W/SCOPE: CPT | Performed by: NURSE PRACTITIONER

## 2023-05-12 PROCEDURE — 3077F SYST BP >= 140 MM HG: CPT | Mod: CPTII,S$GLB,, | Performed by: NURSE PRACTITIONER

## 2023-05-12 PROCEDURE — 1159F MED LIST DOCD IN RCRD: CPT | Mod: CPTII,S$GLB,, | Performed by: NURSE PRACTITIONER

## 2023-05-12 PROCEDURE — 99214 OFFICE O/P EST MOD 30 MIN: CPT | Mod: 25,S$GLB,, | Performed by: NURSE PRACTITIONER

## 2023-05-12 PROCEDURE — 3079F PR MOST RECENT DIASTOLIC BLOOD PRESSURE 80-89 MM HG: ICD-10-PCS | Mod: CPTII,S$GLB,, | Performed by: NURSE PRACTITIONER

## 2023-05-12 RX ORDER — FUROSEMIDE 10 MG/ML
30 INJECTION INTRAMUSCULAR; INTRAVENOUS
Status: DISCONTINUED | OUTPATIENT
Start: 2023-05-12 | End: 2023-05-12

## 2023-05-12 RX ORDER — FUROSEMIDE 10 MG/ML
30 INJECTION INTRAMUSCULAR; INTRAVENOUS
Status: COMPLETED | OUTPATIENT
Start: 2023-05-12 | End: 2023-05-12

## 2023-05-12 RX ORDER — FUROSEMIDE 10 MG/ML
40 INJECTION INTRAMUSCULAR; INTRAVENOUS
Status: DISCONTINUED | OUTPATIENT
Start: 2023-05-12 | End: 2023-05-12

## 2023-05-12 RX ORDER — CLONIDINE HYDROCHLORIDE 0.1 MG/1
0.1 TABLET ORAL
Status: COMPLETED | OUTPATIENT
Start: 2023-05-12 | End: 2023-05-12

## 2023-05-12 RX ADMIN — CLONIDINE HYDROCHLORIDE 0.1 MG: 0.1 TABLET ORAL at 09:05

## 2023-05-12 RX ADMIN — FUROSEMIDE 30 MG: 10 INJECTION INTRAMUSCULAR; INTRAVENOUS at 09:05

## 2023-05-12 NOTE — PATIENT INSTRUCTIONS
Lasix 40 mg injection  Clonidine 0.1 mg today    Over the weekend:  take lasix 40 mg daily x 3 days (Sat, Sun, Mon)  Tues - lasix 20 mg   Treat elevated BP with clonidine as needed

## 2023-05-12 NOTE — PROGRESS NOTES
"Glo Dumont  05/12/2023  [id number]    Christina Cardona MD  Patient Care Team:  Christina Cardona MD as PCP - General (Internal Medicine)  Gordo Muir MD as Consulting Physician (Pulmonary Disease)  Nik Bergman MD (Inactive) as Consulting Physician (Cardiology)  Franky Bell Jr., MD (Rheumatology)  Julio Galindo MD as Consulting Physician (Gastroenterology)  Glenis Mcfadden MD as Consulting Physician (Otolaryngology)  Ayaz Estrada MD as Consulting Physician (Hematology and Oncology)  Yomi Guy LPN as Care Coordinator      [facility] Primary Care Note      Chief Complaint:  Chief Complaint   Patient presents with    Follow-up     Pt is here for blood pressure. Pt is also complained of shortness of breath.        History of Present Illness:  Return for f/u B/P  B/P at home: 202/87, 178/84, 178/79, 165/81 and 180/86.   This AM reporting mild SOB and SOOD  One day /101 and took Clonidine and came down to 140 systolic  She was feeling weakness, dizzy, "feeling bad" - symptoms resolved after B/P went down  Weight stable 147#  Took buspar x 1 and she felt more relaxed  Taking lasix 20mg daily  Swelling +1 to BLE  Eating and drinking okay  Urinary incontinence is worsening  Takes lasix 20 mg daily, hydralazine 25 mg tid and carvedilol 25 mg bid today        Denies fever, chills, URI symptoms, chest pain,palpitations, vomiting, diarrhea, no gross neuro deficits       The following were reviewed: Active problem list, medication list, allergies, family history, social history, and Health Maintenance.     History:  Past Medical History:   Diagnosis Date    Anemia     Angina pectoris     Anxiety     Anxiety and depression     Arthritis     hip    Carotid artery occlusion     Carpal tunnel syndrome 06/23/2008    emg    Chronic diarrhea     work up in 2011 with EGD, CS and VCE    CKD (chronic kidney disease) stage 3, GFR 30-59 ml/min 5/11/2017    Colitis     Coronary artery disease     Coronary artery " disease     Diastolic dysfunction     Diverticulosis     Glaucoma     Greater trochanteric bursitis 2/10/2015    Grief at loss of child 1/26/2016    H/O carotid endarterectomy 12/2/2013    Heart failure     History of coronary angioplasty 3/11/2014    Hypercholesteremia     Hypertension     Liver cyst 02/08/2013    ct abd    Macular degeneration     Obesity with serious comorbidity 3/19/2023    Primary open-angle glaucoma(365.11) 9/3/2013    Renal cyst 02/08/2013    ct abd    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    Sarcoidosis     Sarcoidosis of lung     Sickle cell trait     Uveitis      Past Surgical History:   Procedure Laterality Date    A-V CARDIAC PACEMAKER INSERTION Left 3/21/2023    Procedure: INSERTION, CARDIAC PACEMAKER, DUAL CHAMBER/His Lead;  Surgeon: Cortez Ambrose MD;  Location: [facility] CATH LAB;  Service: Cardiology;  Laterality: Left;  MDT/ MD to confirm in am/possible nurse sedate    CAROTID ENDARTERECTOMY Right 2000s    CATARACT EXTRACTION Bilateral     Dr. Liang Dennis    CHOLECYSTECTOMY      laparoscopic, 3/18.    CORONARY ANGIOPLASTY WITH STENT PLACEMENT  11/19/2010    RCA-HALIE 2010    Lathia    JOINT REPLACEMENT Left 11/03/2014    Dr. Braun    TOTAL ABDOMINAL HYSTERECTOMY W/ BILATERAL SALPINGOOPHORECTOMY  1972     Family History   Problem Relation Age of Onset    Hypertension Mother     Heart failure Mother     Cancer Father         prostate    Cirrhosis Brother     Heart failure Brother     Cancer Daughter         Carcinoid     Patient Active Problem List   Diagnosis    Sarcoidosis of lung    Thyroid nodule    Hypertensive heart disease with heart failure    Other hyperlipidemia    Hemoglobin A-S genotype    Ptosis    Coronary artery disease, occlusive    History of central retinal artery occlusion    Iron deficiency anemia    Vitamin D deficiency    Osteopenia    Essential hypertension    Diastolic heart failure, NYHA class 3    Atherosclerosis of aorta    CKD (chronic  kidney disease) stage 4, GFR 15-29 ml/min    Uveitis    Ulcerative colitis    History of coronary angioplasty    Hiatal hernia    Bradycardia    Lung nodule    Memory loss    Central retinal vein occlusion with macular edema of both eyes    Aortic stenosis    Weight loss    Unspecified severe protein-calorie malnutrition    Hypokalemia    Obesity with serious comorbidity    Hypomagnesemia    SA node dysfunction    S/P placement of cardiac pacemaker    Encounter for preventive health examination     Review of patient's allergies indicates:   Allergen Reactions    Lisinopril      angioedema    Codeine Nausea And Vomiting       Medications:  Current Outpatient Medications on File Prior to Visit   Medication Sig Dispense Refill    aflibercept 2 mg/0.05 mL Soln 2 mg by Intravitreal route every 28 days.      aspirin (ECOTRIN) 81 MG EC tablet Take 81 mg by mouth once daily.      budesonide (ENTOCORT EC) 3 mg capsule Take 3 mg by mouth 2 (two) times a day.      busPIRone (BUSPAR) 5 MG Tab Take 1 tablet (5 mg total) by mouth 2 (two) times daily as needed (anxiety). 60 tablet 5    carvediloL (COREG) 25 MG tablet Take 1 tablet (25 mg total) by mouth 2 (two) times daily with meals. 60 tablet 11    cyproheptadine (PERIACTIN) 4 mg tablet Take 1 tablet (4 mg total) by mouth 3 (three) times daily as needed. 270 tablet 3    dexAMETHasone (OZURDEX) 0.7 mg Impl intravitreal implant 0.7 mg by Intravitreal route once.      dorzolamide-timolol 2-0.5% (COSOPT) 22.3-6.8 mg/mL ophthalmic solution Place 1 drop into both eyes 2 (two) times daily. 10 mL 6    FOLIC ACID/MULTIVIT-MIN/LUTEIN (CENTRUM SILVER ORAL) Take 1 tablet by mouth once daily.      furosemide (LASIX) 40 MG tablet Take 0.5 tablets (20 mg total) by mouth once daily. 90 tablet 3    hydrALAZINE (APRESOLINE) 25 MG tablet Take 1 tablet (25 mg total) by mouth 3 (three) times daily before meals. 90 tablet 3    lipase-protease-amylase 24,000-76,000-120,000 units (CREON) 24,000-76,000  -120,000 unit capsule Take 1 capsule by mouth 3 (three) times daily with meals. 90 capsule 11    nitroGLYCERIN (NITROSTAT) 0.4 MG SL tablet PLACE 1 TABLET UNDER THE TONGUE EVERY 5 MINUTES AS NEEDED FOR CHEST PAIN 25 tablet 3    pravastatin (PRAVACHOL) 40 MG tablet Take 1 tablet (40 mg total) by mouth once daily. 90 tablet 2    [DISCONTINUED] citalopram (CELEXA) 20 MG tablet Take 1 tablet (20 mg total) by mouth once daily. (Patient not taking: Reported on 3/18/2022) 30 tablet 6     No current facility-administered medications on file prior to visit.       Medications have been reviewed and reconciled with patient at visit today.          Exam:  Vitals:    05/12/23 1013   BP: (!) 182/82         There is no height or weight on file to calculate BMI.      BP Readings from Last 3 Encounters:   05/12/23 (!) 182/82   05/03/23 (!) 202/82   05/03/23 (!) 198/74     Wt Readings from Last 3 Encounters:   05/03/23 1418 66.7 kg (147 lb)   04/25/23 0850 67.1 kg (147 lb 14.9 oz)   04/05/23 1107 66.4 kg (146 lb 6.4 oz)            Physical Exam  Vitals reviewed.   Constitutional:       Appearance: Normal appearance. She is not ill-appearing.   HENT:      Head: Normocephalic and atraumatic.      Nose: No congestion.   Eyes:      Extraocular Movements: Extraocular movements intact.      Conjunctiva/sclera: Conjunctivae normal.   Cardiovascular:      Rate and Rhythm: Normal rate and regular rhythm.   Pulmonary:      Effort: Pulmonary effort is normal.      Breath sounds: Rales (trace) present. No wheezing.   Abdominal:      General: Bowel sounds are normal. There is no distension.      Palpations: Abdomen is soft.      Tenderness: There is no abdominal tenderness.   Musculoskeletal:         General: Normal range of motion.      Cervical back: Neck supple.      Right lower leg: Edema (trace to ankles) present.      Left lower leg: Edema present.   Skin:     General: Skin is warm and dry.   Neurological:      General: No focal deficit  present.      Mental Status: She is alert and oriented to person, place, and time.      Motor: No weakness.      Gait: Gait normal.   Psychiatric:         Behavior: Behavior normal.         Thought Content: Thought content normal.       Laboratory Reviewed:   Lab Results   Component Value Date    WBC 6.78 03/21/2023    HGB 9.4 (L) 03/21/2023    HCT 28.7 (L) 03/21/2023     03/21/2023    CHOL 160 05/04/2022    TRIG 80 05/04/2022    HDL 81 (H) 05/04/2022    ALT 11 03/18/2023    AST 8 (L) 03/18/2023     05/05/2023    K 3.9 05/05/2023     05/05/2023    CREATININE 1.5 (H) 05/05/2023    BUN 27 (H) 05/05/2023    CO2 23 05/05/2023    TSH 1.230 12/13/2021    INR 1.0 09/09/2020    HGBA1C 5.7 11/17/2015           Health Maintenance  Health Maintenance Topics with due status: Not Due       Topic Last Completion Date    TETANUS VACCINE 02/03/2015    DEXA Scan 10/27/2021    Aspirin/Antiplatelet Therapy 05/12/2023     Health Maintenance Due   Topic Date Due    Shingles Vaccine (1 of 2) Never done    COVID-19 Vaccine (4 - Booster for Pfizer series) 12/09/2021    Lipid Panel  05/04/2023       Assessment and Plan:  1. Diastolic heart failure, NYHA class 3  -     Discontinue: furosemide injection 40 mg  -     furosemide injection 30 mg    2. Hypertensive heart disease with heart failure  -     cloNIDine tablet 0.1 mg  -     Discontinue: furosemide injection 30 mg  -     furosemide injection 30 mg    3. Urinary incontinence, unspecified type  -     Urinalysis, Reflex to Urine Culture Urine, Clean Catch       Over the weekend:  take lasix 40 mg daily x 3 days (Sat, Sun, Mon)  Tues - lasix 20 mg   Treat elevated BP with clonidine as needed        -Patient's lab results were reviewed and discussed with patient  -Treatment options and alternatives were discussed with the patient. Patient expressed understanding. Patient was given the opportunity to ask questions and be an active participant in their medical care. Patient  had no further questions or concerns at this time.         Future Appointments   Date Time Provider Department Center   5/16/2023  2:00 PM Paula Deal NP [facility]   5/19/2023 10:00 AM LYDIA Al [facility]   6/23/2023  9:40 AM Cortez Ambrose MD [facility] [place]   6/23/2023  9:50 AM PACEMAKER CLINIC [facility] [place]   6/27/2023 10:00 AM HOME MONITOR DEVICE CHECK [facility] [place]   7/13/2023  3:20 PM Stan Lui MD [facility] CARDIO [place]   7/19/2023  2:00 PM Sergio Lozada MD [facility] OPHTHAL [place]   7/19/2023  2:45 PM Wai Sandoval OD [facility] OPHTHAL [place]          After visit summary printed and given to patient upon discharge.  Patient goals and care plan are included in After visit summary.    Total medical decision making time was 30 min.    The following issues were discussed: The primary encounter diagnosis was Diastolic heart failure, NYHA class 3. Diagnoses of Hypertensive heart disease with heart failure and Urinary incontinence, unspecified type were also pertinent to this visit.    Health maintenance needs, recent test results and goals of care discussed with pt and questions answered.           ARLENE Mcarthur, NP-C  [place] [id number] [address] [place] [id number]

## 2023-05-14 ENCOUNTER — HOSPITAL ENCOUNTER (EMERGENCY)
Facility: HOSPITAL | Age: 88
Discharge: HOME | End: 2023-05-14
Attending: EMERGENCY MEDICINE
Payer: MEDICARE

## 2023-05-14 VITALS
TEMPERATURE: 98 F | RESPIRATION RATE: 18 BRPM | HEART RATE: 57 BPM | WEIGHT: 151 LBS | DIASTOLIC BLOOD PRESSURE: 76 MMHG | SYSTOLIC BLOOD PRESSURE: 162 MMHG | OXYGEN SATURATION: 97 % | HEIGHT: 61 IN | BODY MASS INDEX: 28.51 KG/M2

## 2023-05-14 DIAGNOSIS — R06.02 SHORTNESS OF BREATH: ICD-10-CM

## 2023-05-14 DIAGNOSIS — I16.0 HYPERTENSIVE URGENCY: Primary | ICD-10-CM

## 2023-05-14 DIAGNOSIS — I50.33 ACUTE ON CHRONIC DIASTOLIC CONGESTIVE HEART FAILURE: ICD-10-CM

## 2023-05-14 LAB
ALBUMIN SERPL BCP-MCNC: 3.5 G/DL (ref 3.5–5.2)
ALP SERPL-CCNC: 50 U/L (ref 55–135)
ALT SERPL W/O P-5'-P-CCNC: 12 U/L (ref 10–44)
ANION GAP SERPL CALC-SCNC: 13 MMOL/L (ref 8–16)
AST SERPL-CCNC: 15 U/L (ref 10–40)
BASOPHILS # BLD AUTO: 0.04 K/UL (ref 0–0.2)
BASOPHILS NFR BLD: 0.7 % (ref 0–1.9)
BILIRUB SERPL-MCNC: 0.3 MG/DL (ref 0.1–1)
BNP SERPL-MCNC: 314 PG/ML (ref 0–99)
BUN SERPL-MCNC: 29 MG/DL (ref 8–23)
CALCIUM SERPL-MCNC: 8.7 MG/DL (ref 8.7–10.5)
CHLORIDE SERPL-SCNC: 108 MMOL/L (ref 95–110)
CO2 SERPL-SCNC: 21 MMOL/L (ref 23–29)
CREAT SERPL-MCNC: 1.5 MG/DL (ref 0.5–1.4)
DIFFERENTIAL METHOD: ABNORMAL
EOSINOPHIL # BLD AUTO: 0.1 K/UL (ref 0–0.5)
EOSINOPHIL NFR BLD: 2 % (ref 0–8)
ERYTHROCYTE [DISTWIDTH] IN BLOOD BY AUTOMATED COUNT: 16.3 % (ref 11.5–14.5)
EST. GFR  (NO RACE VARIABLE): 34 ML/MIN/1.73 M^2
GLUCOSE SERPL-MCNC: 98 MG/DL (ref 70–110)
HCT VFR BLD AUTO: 29.2 % (ref 37–48.5)
HGB BLD-MCNC: 9.4 G/DL (ref 12–16)
IMM GRANULOCYTES # BLD AUTO: 0.04 K/UL (ref 0–0.04)
IMM GRANULOCYTES NFR BLD AUTO: 0.7 % (ref 0–0.5)
LYMPHOCYTES # BLD AUTO: 0.9 K/UL (ref 1–4.8)
LYMPHOCYTES NFR BLD: 14.3 % (ref 18–48)
MCH RBC QN AUTO: 27.2 PG (ref 27–31)
MCHC RBC AUTO-ENTMCNC: 32.2 G/DL (ref 32–36)
MCV RBC AUTO: 84 FL (ref 82–98)
MONOCYTES # BLD AUTO: 0.8 K/UL (ref 0.3–1)
MONOCYTES NFR BLD: 12.9 % (ref 4–15)
NEUTROPHILS # BLD AUTO: 4.1 K/UL (ref 1.8–7.7)
NEUTROPHILS NFR BLD: 69.4 % (ref 38–73)
NRBC BLD-RTO: 0 /100 WBC
PLATELET # BLD AUTO: 192 K/UL (ref 150–450)
PMV BLD AUTO: 10.2 FL (ref 9.2–12.9)
POTASSIUM SERPL-SCNC: 3.3 MMOL/L (ref 3.5–5.1)
PROT SERPL-MCNC: 6.6 G/DL (ref 6–8.4)
RBC # BLD AUTO: 3.46 M/UL (ref 4–5.4)
SODIUM SERPL-SCNC: 142 MMOL/L (ref 136–145)
TROPONIN I SERPL DL<=0.01 NG/ML-MCNC: 0.01 NG/ML (ref 0–0.03)
WBC # BLD AUTO: 5.96 K/UL (ref 3.9–12.7)

## 2023-05-14 PROCEDURE — 63600175 PHARM REV CODE 636 W HCPCS: Performed by: EMERGENCY MEDICINE

## 2023-05-14 PROCEDURE — 93010 EKG 12-LEAD: ICD-10-PCS | Mod: ,,, | Performed by: INTERNAL MEDICINE

## 2023-05-14 PROCEDURE — 93010 ELECTROCARDIOGRAM REPORT: CPT | Mod: ,,, | Performed by: INTERNAL MEDICINE

## 2023-05-14 PROCEDURE — 80053 COMPREHEN METABOLIC PANEL: CPT | Performed by: EMERGENCY MEDICINE

## 2023-05-14 PROCEDURE — 93005 ELECTROCARDIOGRAM TRACING: CPT

## 2023-05-14 PROCEDURE — 84484 ASSAY OF TROPONIN QUANT: CPT | Performed by: EMERGENCY MEDICINE

## 2023-05-14 PROCEDURE — 83880 ASSAY OF NATRIURETIC PEPTIDE: CPT | Performed by: EMERGENCY MEDICINE

## 2023-05-14 PROCEDURE — 25000003 PHARM REV CODE 250: Performed by: EMERGENCY MEDICINE

## 2023-05-14 PROCEDURE — 96375 TX/PRO/DX INJ NEW DRUG ADDON: CPT

## 2023-05-14 PROCEDURE — 85025 COMPLETE CBC W/AUTO DIFF WBC: CPT | Performed by: EMERGENCY MEDICINE

## 2023-05-14 PROCEDURE — 99291 CRITICAL CARE FIRST HOUR: CPT

## 2023-05-14 PROCEDURE — 96374 THER/PROPH/DIAG INJ IV PUSH: CPT

## 2023-05-14 RX ORDER — HYDRALAZINE HYDROCHLORIDE 20 MG/ML
20 INJECTION INTRAMUSCULAR; INTRAVENOUS
Status: COMPLETED | OUTPATIENT
Start: 2023-05-14 | End: 2023-05-14

## 2023-05-14 RX ORDER — AMLODIPINE BESYLATE 10 MG/1
10 TABLET ORAL DAILY
Qty: 30 TABLET | Refills: 0 | Status: SHIPPED | OUTPATIENT
Start: 2023-05-14 | End: 2023-06-09

## 2023-05-14 RX ORDER — FUROSEMIDE 10 MG/ML
80 INJECTION INTRAMUSCULAR; INTRAVENOUS
Status: COMPLETED | OUTPATIENT
Start: 2023-05-14 | End: 2023-05-14

## 2023-05-14 RX ORDER — AMLODIPINE BESYLATE 5 MG/1
10 TABLET ORAL
Status: COMPLETED | OUTPATIENT
Start: 2023-05-14 | End: 2023-05-14

## 2023-05-14 RX ADMIN — NITROGLYCERIN 1 INCH: 20 OINTMENT TOPICAL at 01:05

## 2023-05-14 RX ADMIN — FUROSEMIDE 80 MG: 10 INJECTION, SOLUTION INTRAMUSCULAR; INTRAVENOUS at 01:05

## 2023-05-14 RX ADMIN — HYDRALAZINE HYDROCHLORIDE 20 MG: 20 INJECTION, SOLUTION INTRAMUSCULAR; INTRAVENOUS at 02:05

## 2023-05-14 RX ADMIN — AMLODIPINE BESYLATE 10 MG: 5 TABLET ORAL at 03:05

## 2023-05-14 NOTE — ED PROVIDER NOTES
SCRIBE #1 NOTE: I, Violeta [place], am scribing for, and in the presence of, Michelle Braun MD. I have scribed the entire note.       History     Chief Complaint   Patient presents with    Hypertension    Shortness of Breath     Pt here with c/o SOB, dizziness, HTN, and a headache. Was seen by pcp on Friday; he increased her lasix and gave her PRN clonidine which she took around 8 AM. HX of CHF and a pacemaker     Review of patient's allergies indicates:   Allergen Reactions    Lisinopril      angioedema    Codeine Nausea And Vomiting         History of Present Illness     HPI    5/14/2023, 3:42 PM  History obtained from the patient      History of Present Illness: Glo Dumont is a 87 y.o. female patient with a PMHx of Hypertension, heart failure, CKD, and CAD who presents to the Emergency Department for evaluation of SOB  which onset gradually over the last week. Pt states she went to her PCP about it her and she was told her blood pressure was high. Symptoms are constant and moderate in severity. The pain is exacerbated by laying flat and walking. Associated sxs include a headache. Patient denies any weight change, n/v, and all other sxs at this time. No further complaints or concerns at this time.       Arrival mode: Personal vehicle      PCP: Christina Recio MD        Past Medical History:  Past Medical History:   Diagnosis Date    Anemia     Angina pectoris     Anxiety     Anxiety and depression     Arthritis     hip    Carotid artery occlusion     Carpal tunnel syndrome 06/23/2008    emg    Chronic diarrhea     work up in 2011 with EGD, CS and VCE    CKD (chronic kidney disease) stage 3, GFR 30-59 ml/min 5/11/2017    Colitis     Coronary artery disease     Coronary artery disease     Diastolic dysfunction     Diverticulosis     Glaucoma     Greater trochanteric bursitis 2/10/2015    Grief at loss of child 1/26/2016    H/O carotid endarterectomy 12/2/2013    Heart failure     History of coronary angioplasty  3/11/2014    Hypercholesteremia     Hypertension     Liver cyst 02/08/2013    ct abd    Macular degeneration     Obesity with serious comorbidity 3/19/2023    Primary open-angle glaucoma(365.11) 9/3/2013    Renal cyst 02/08/2013    ct abd    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    Sarcoidosis     Sarcoidosis of lung     Sickle cell trait     Uveitis        Past Surgical History:  Past Surgical History:   Procedure Laterality Date    A-V CARDIAC PACEMAKER INSERTION Left 3/21/2023    Procedure: INSERTION, CARDIAC PACEMAKER, DUAL CHAMBER/His Lead;  Surgeon: Cortez Ambrose MD;  Location: [facility] CATH LAB;  Service: Cardiology;  Laterality: Left;  MDT/ MD to confirm in am/possible nurse sedate    CAROTID ENDARTERECTOMY Right 2000s    CATARACT EXTRACTION Bilateral     Dr. Liang Dennis    CHOLECYSTECTOMY      laparoscopic, 3/18.    CORONARY ANGIOPLASTY WITH STENT PLACEMENT  11/19/2010    RCA-HALIE 2010    Lathia    JOINT REPLACEMENT Left 11/03/2014    Dr. Braun    TOTAL ABDOMINAL HYSTERECTOMY W/ BILATERAL SALPINGOOPHORECTOMY  1972         Family History:  Family History   Problem Relation Age of Onset    Hypertension Mother     Heart failure Mother     Cancer Father         prostate    Cirrhosis Brother     Heart failure Brother     Cancer Daughter         Carcinoid       Social History:  Social History     Tobacco Use    Smoking status: Never    Smokeless tobacco: Never   Substance and Sexual Activity    Alcohol use: No     Alcohol/week: 0.0 standard drinks    Drug use: No    Sexual activity: Yes     Partners: Male        Review of Systems     Review of Systems   Constitutional:  Negative for unexpected weight change.   Respiratory:  Positive for shortness of breath.    Gastrointestinal:  Negative for nausea and vomiting.   Neurological:  Positive for headaches.      Physical Exam     Initial Vitals [05/14/23 1258]   BP Pulse Resp Temp SpO2   (!) 200/93 63 20 98.1 °F (36.7 °C) 99 %      MAP       --           Physical Exam  Nursing Notes and Vital Signs Reviewed.  Constitutional: Patient is in no acute distress. Well-developed and well-nourished.  Head: Atraumatic. Normocephalic.  Eyes: PERRL. EOM intact. Conjunctivae are not pale. No scleral icterus.  ENT: Mucous membranes are moist. Oropharynx is clear and symmetric.    Neck: Supple. Full ROM. No lymphadenopathy.  Cardiovascular: Regular rate. Regular rhythm. No murmurs, rubs, or gallops. Distal pulses are 2+ and symmetric.  Pulmonary/Chest: No respiratory distress. Clear to auscultation bilaterally. No wheezing or rales.  Abdominal: Soft and non-distended.  There is no tenderness.  No rebound, guarding, or rigidity. Good bowel sounds.  Genitourinary: No CVA tenderness  Musculoskeletal: Moves all extremities. No obvious deformities. No edema. No calf tenderness.  Skin: Warm and dry.  Neurological:  Alert, awake, and appropriate.  Normal speech.  No acute focal neurological deficits are appreciated.  Psychiatric: Normal affect. Good eye contact. Appropriate in content.     ED Course   Critical Care    Date/Time: 5/14/2023 3:50 PM  Performed by: Michelle Braun MD  Authorized by: Michelle Braun MD   Direct patient critical care time: 10 minutes  Additional history critical care time: 5 minutes  Consult with family critical care time: 10 minutes  Other critical care time: 5 minutes  Total critical care time (exclusive of procedural time) : 30 minutes  Critical care was necessary to treat or prevent imminent or life-threatening deterioration of the following conditions: hypertensive urgency-CHF.  Critical care was time spent personally by me on the following activities: development of treatment plan with patient or surrogate, blood draw for specimens, interpretation of cardiac output measurements, evaluation of patient's response to treatment, examination of patient, obtaining history from patient or surrogate, ordering and performing treatments and interventions,  "ordering and review of laboratory studies, ordering and review of radiographic studies, pulse oximetry, review of old charts and re-evaluation of patient's condition.      ED Vital Signs:  Vitals:    05/14/23 1258 05/14/23 1302 05/14/23 1400 05/14/23 1444   BP: (!) 200/93 (!) 193/86 (!) 217/100 (!) 222/105   Pulse: 63 62 (!) 58    Resp: 20 20 20    Temp: 98.1 °F (36.7 °C)      TempSrc: Oral      SpO2: 99% 98% 98%    Weight: 68.5 kg (151 lb 0.2 oz)      Height: 5' 1" (1.549 m)       05/14/23 1455 05/14/23 1500 05/14/23 1530 05/14/23 1600   BP: (!) 221/94 (!) 192/87 (!) 171/83 (!) 162/76   Pulse: (!) 58 60 (!) 59 (!) 57   Resp: 20 (!) 25 20 18   Temp:    98.3 °F (36.8 °C)   TempSrc:    Oral   SpO2: 100% 100% 99% 97%   Weight:       Height:           Abnormal Lab Results:  Labs Reviewed   CBC W/ AUTO DIFFERENTIAL - Abnormal; Notable for the following components:       Result Value    RBC 3.46 (*)     Hemoglobin 9.4 (*)     Hematocrit 29.2 (*)     RDW 16.3 (*)     Immature Granulocytes 0.7 (*)     Lymph # 0.9 (*)     Lymph % 14.3 (*)     All other components within normal limits   COMPREHENSIVE METABOLIC PANEL - Abnormal; Notable for the following components:    Potassium 3.3 (*)     CO2 21 (*)     BUN 29 (*)     Creatinine 1.5 (*)     Alkaline Phosphatase 50 (*)     eGFR 34 (*)     All other components within normal limits   B-TYPE NATRIURETIC PEPTIDE - Abnormal; Notable for the following components:     (*)     All other components within normal limits   TROPONIN I        All Lab Results:  Results for orders placed or performed during the hospital encounter of 05/14/23   CBC auto differential   Result Value Ref Range    WBC 5.96 3.90 - 12.70 K/uL    RBC 3.46 (L) 4.00 - 5.40 M/uL    Hemoglobin 9.4 (L) 12.0 - 16.0 g/dL    Hematocrit 29.2 (L) 37.0 - 48.5 %    MCV 84 82 - 98 fL    MCH 27.2 27.0 - 31.0 pg    MCHC 32.2 32.0 - 36.0 g/dL    RDW 16.3 (H) 11.5 - 14.5 %    Platelets 192 150 - 450 K/uL    MPV 10.2 9.2 - " 12.9 fL    Immature Granulocytes 0.7 (H) 0.0 - 0.5 %    Gran # (ANC) 4.1 1.8 - 7.7 K/uL    Immature Grans (Abs) 0.04 0.00 - 0.04 K/uL    Lymph # 0.9 (L) 1.0 - 4.8 K/uL    Mono # 0.8 0.3 - 1.0 K/uL    Eos # 0.1 0.0 - 0.5 K/uL    Baso # 0.04 0.00 - 0.20 K/uL    nRBC 0 0 /100 WBC    Gran % 69.4 38.0 - 73.0 %    Lymph % 14.3 (L) 18.0 - 48.0 %    Mono % 12.9 4.0 - 15.0 %    Eosinophil % 2.0 0.0 - 8.0 %    Basophil % 0.7 0.0 - 1.9 %    Differential Method Automated    Comprehensive metabolic panel   Result Value Ref Range    Sodium 142 136 - 145 mmol/L    Potassium 3.3 (L) 3.5 - 5.1 mmol/L    Chloride 108 95 - 110 mmol/L    CO2 21 (L) 23 - 29 mmol/L    Glucose 98 70 - 110 mg/dL    BUN 29 (H) 8 - 23 mg/dL    Creatinine 1.5 (H) 0.5 - 1.4 mg/dL    Calcium 8.7 8.7 - 10.5 mg/dL    Total Protein 6.6 6.0 - 8.4 g/dL    Albumin 3.5 3.5 - 5.2 g/dL    Total Bilirubin 0.3 0.1 - 1.0 mg/dL    Alkaline Phosphatase 50 (L) 55 - 135 U/L    AST 15 10 - 40 U/L    ALT 12 10 - 44 U/L    Anion Gap 13 8 - 16 mmol/L    eGFR 34 (A) >60 mL/min/1.73 m^2   Troponin I   Result Value Ref Range    Troponin I 0.013 0.000 - 0.026 ng/mL   Brain natriuretic peptide   Result Value Ref Range     (H) 0 - 99 pg/mL     *Note: Due to a large number of results and/or encounters for the requested time period, some results have not been displayed. A complete set of results can be found in Results Review.        Imaging Results:  Imaging Results              X-Ray Chest AP Portable (Final result)  Result time 05/14/23 14:09:37      Final result by Abhishek Burr Jr., MD (05/14/23 14:09:37)                   Impression:      Mild interstitial pulmonary edema appears similar to prior.      Electronically signed by: Abhishek Burr Jr., MD  Date:    05/14/2023  Time:    14:09               Narrative:    EXAMINATION:  XR CHEST AP PORTABLE    CLINICAL HISTORY:  CHF;    COMPARISON:  Prior from 03/18/2023.    FINDINGS:  Left-sided pacer device in place.  Mild pulmonary  vascular congestion with thickening of the minor fissure.  No pleural fluid or pneumothorax.  Heart size within normal limits.  No significant bony findings.                                       The EKG was ordered, reviewed, and independently interpreted by the ED provider.  Interpretation time: 13:39  Rate: 59 BPM  Rhythm: Atrial-sensed ventricular-paced rhythm  Interpretation: No acute ST changes. No STEMI.       ED Discussion       3:50 PM: Reassessed pt at this time. Discussed with pt all pertinent ED information and results. Discussed pt dx and plan of tx. Gave pt all f/u and return to the ED instructions. All questions and concerns were addressed at this time. Pt expresses understanding of information and instructions, and is comfortable with plan to discharge. Pt is stable for discharge.    I discussed with patient and/or family/caretaker that evaluation in the ED does not suggest any emergent or life threatening medical conditions requiring immediate intervention beyond what was provided in the ED, and I believe patient is safe for discharge.  Regardless, an unremarkable evaluation in the ED does not preclude the development or presence of a serious of life threatening condition. As such, patient was instructed to return immediately for any worsening or change in current symptoms.        Medical Decision Making:   Clinical Tests:   Lab Tests: Ordered and Reviewed  Radiological Study: Ordered and Reviewed  Medical Tests: Ordered and Reviewed         ED Medication(s):  Medications   furosemide injection 80 mg (80 mg Intravenous Given 5/14/23 1337)   nitroGLYCERIN 2% TD oint ointment 1 inch (1 inch Topical (Top) Given 5/14/23 1330)   hydrALAZINE injection 20 mg (20 mg Intravenous Given 5/14/23 1444)   amLODIPine tablet 10 mg (10 mg Oral Given 5/14/23 1530)       Discharge Medication List as of 5/14/2023  3:43 PM        START taking these medications    Details   amLODIPine (NORVASC) 10 MG tablet Take 1 tablet  (10 mg total) by mouth once daily., Starting Sun 5/14/2023, Until Mon 5/13/2024, Print              Follow-up Information       Christina Recio MD In 2 days.    Specialty: Internal Medicine  Contact information:  [address]  [facility] [address]  [phone]               [facility] Emergency Dept..    Specialty: Emergency Medicine  Why: As needed, If symptoms worsen  Contact information:  [address]  [facility] [address] [phone]                               Scribe Attestation:   Scribe #1: I performed the above scribed service and the documentation accurately describes the services I performed. I attest to the accuracy of the note.     Attending:   Physician Attestation Statement for Scribe #1: I, Michelle Braun MD, personally performed the services described in this documentation, as scribed by Violeta Gaona, in my presence, and it is both accurate and complete.           Clinical Impression       ICD-10-CM ICD-9-CM   1. Hypertensive urgency  I16.0 401.9   2. Shortness of breath  R06.02 786.05   3. Acute on chronic diastolic congestive heart failure  I50.33 428.33     428.0       Disposition:   Disposition: Discharged  Condition: Stable      Michelle Braun MD  05/18/23 0022

## 2023-05-15 ENCOUNTER — OFFICE VISIT (OUTPATIENT)
Dept: CARDIOLOGY | Facility: CLINIC | Age: 88
End: 2023-05-15
Payer: MEDICARE

## 2023-05-15 VITALS
BODY MASS INDEX: 27.09 KG/M2 | SYSTOLIC BLOOD PRESSURE: 140 MMHG | WEIGHT: 143.5 LBS | HEART RATE: 62 BPM | HEIGHT: 61 IN | DIASTOLIC BLOOD PRESSURE: 60 MMHG

## 2023-05-15 DIAGNOSIS — I10 ESSENTIAL HYPERTENSION: Chronic | ICD-10-CM

## 2023-05-15 DIAGNOSIS — I50.30 DIASTOLIC HEART FAILURE, NYHA CLASS 3: Primary | ICD-10-CM

## 2023-05-15 PROCEDURE — 3078F DIAST BP <80 MM HG: CPT | Mod: CPTII,S$GLB,, | Performed by: PHYSICIAN ASSISTANT

## 2023-05-15 PROCEDURE — 3078F PR MOST RECENT DIASTOLIC BLOOD PRESSURE < 80 MM HG: ICD-10-PCS | Mod: CPTII,S$GLB,, | Performed by: PHYSICIAN ASSISTANT

## 2023-05-15 PROCEDURE — 1159F MED LIST DOCD IN RCRD: CPT | Mod: CPTII,S$GLB,, | Performed by: PHYSICIAN ASSISTANT

## 2023-05-15 PROCEDURE — 3077F PR MOST RECENT SYSTOLIC BLOOD PRESSURE >= 140 MM HG: ICD-10-PCS | Mod: CPTII,S$GLB,, | Performed by: PHYSICIAN ASSISTANT

## 2023-05-15 PROCEDURE — 1160F RVW MEDS BY RX/DR IN RCRD: CPT | Mod: CPTII,S$GLB,, | Performed by: PHYSICIAN ASSISTANT

## 2023-05-15 PROCEDURE — 1126F AMNT PAIN NOTED NONE PRSNT: CPT | Mod: CPTII,S$GLB,, | Performed by: PHYSICIAN ASSISTANT

## 2023-05-15 PROCEDURE — 99999 PR PBB SHADOW E&M-EST. PATIENT-LVL III: ICD-10-PCS | Mod: PBBFAC,,, | Performed by: PHYSICIAN ASSISTANT

## 2023-05-15 PROCEDURE — 3077F SYST BP >= 140 MM HG: CPT | Mod: CPTII,S$GLB,, | Performed by: PHYSICIAN ASSISTANT

## 2023-05-15 PROCEDURE — 1126F PR PAIN SEVERITY QUANTIFIED, NO PAIN PRESENT: ICD-10-PCS | Mod: CPTII,S$GLB,, | Performed by: PHYSICIAN ASSISTANT

## 2023-05-15 PROCEDURE — 99213 PR OFFICE/OUTPT VISIT, EST, LEVL III, 20-29 MIN: ICD-10-PCS | Mod: S$GLB,,, | Performed by: PHYSICIAN ASSISTANT

## 2023-05-15 PROCEDURE — 99999 PR PBB SHADOW E&M-EST. PATIENT-LVL III: CPT | Mod: PBBFAC,,, | Performed by: PHYSICIAN ASSISTANT

## 2023-05-15 PROCEDURE — 1160F PR REVIEW ALL MEDS BY PRESCRIBER/CLIN PHARMACIST DOCUMENTED: ICD-10-PCS | Mod: CPTII,S$GLB,, | Performed by: PHYSICIAN ASSISTANT

## 2023-05-15 PROCEDURE — 99213 OFFICE O/P EST LOW 20 MIN: CPT | Mod: S$GLB,,, | Performed by: PHYSICIAN ASSISTANT

## 2023-05-15 PROCEDURE — 1159F PR MEDICATION LIST DOCUMENTED IN MEDICAL RECORD: ICD-10-PCS | Mod: CPTII,S$GLB,, | Performed by: PHYSICIAN ASSISTANT

## 2023-05-15 RX ORDER — POTASSIUM CHLORIDE 750 MG/1
10 TABLET, EXTENDED RELEASE ORAL DAILY
Qty: 30 TABLET | Refills: 3 | Status: SHIPPED | OUTPATIENT
Start: 2023-05-15

## 2023-05-15 RX ORDER — HYDRALAZINE HYDROCHLORIDE 50 MG/1
50 TABLET, FILM COATED ORAL
Qty: 90 TABLET | Refills: 3
Start: 2023-05-15 | End: 2023-06-07 | Stop reason: SDUPTHER

## 2023-05-15 RX ORDER — FUROSEMIDE 40 MG/1
40 TABLET ORAL DAILY
Qty: 90 TABLET | Refills: 3
Start: 2023-05-15 | End: 2023-09-07 | Stop reason: SDUPTHER

## 2023-05-15 NOTE — PROGRESS NOTES
[facility] Provider Note (Follow-up) Consult Note      HPI:  Patient can walk around house, does not ambulate much gets SOB   Patient sleeps on 2 pillows   Patient wakes up SOB, has to get out of bed, associated cough, sputum none   Palpitations - none   Dizzy, light-headed, pre-syncope or syncope none   Since discharge frequency of performing weights, home weight and weight change down from last visit 4 pounds   Other information felt pertinent to HPI    Last visit with me coreg was increased to 25 BID, still had high BP so hydralazine was increased to 50 TID. Remained on lasix 20 daily. Saw PCP a week ago who increased lasix over weekend to 40 daily.    Had ER visit Sunday for HTN, SOB. Was resumed on norvasc 10 daily, BP today down. HA gone. Remains on lasix 40 daily. LAbs reviewed from ER    Weight down 4 pounds         PHYSICAL:   Vitals:    05/15/23 1315   BP: (!) 140/60   Pulse: 62      Wt Readings from Last 3 Encounters:   05/15/23 65.1 kg (143 lb 8.3 oz)   05/14/23 68.5 kg (151 lb 0.2 oz)   05/03/23 66.7 kg (147 lb)       JVD: no,    Heart rhythm: regular  Cardiac murmur: No    S3: no  S4: no  Lungs: clear  Liver span: 10 cm:   Hepatojugular reflux: no  Edema: no,       ASSESSMENT: chronic diastolic HF    PLAN:      Patient Instructions:   Instruct the patient to notify this clinic if HH, a physician or an advanced care provider wants to change medication one of their HF medications   Activity and Diet restrictions:   Recommend 2-3 gram sodium restriction and 1500cc- 2000cc fluid restriction.  Encourage physical activity with graded exercise program.  Requested patient to weigh themselves daily, and to notify us if their weight increases by more than 3 lbs in 1 day or 5 lbs in 3 days.    Assigned dry weight on home scale: 65 kg  Medication changes (include current dose and changed dose)  Continue lasix at increased dose of 40 daily  Start K 10 mEQ, labs on Thursday  BP improved, continue norvasc, hydralazine,  coreg. Will increase hydralazine next if needed.   HF education  RTC 3 weeks

## 2023-05-16 ENCOUNTER — OFFICE VISIT (OUTPATIENT)
Dept: PRIMARY CARE CLINIC | Facility: CLINIC | Age: 88
End: 2023-05-16
Payer: MEDICARE

## 2023-05-16 ENCOUNTER — PATIENT MESSAGE (OUTPATIENT)
Dept: PRIMARY CARE CLINIC | Facility: CLINIC | Age: 88
End: 2023-05-16

## 2023-05-16 ENCOUNTER — PATIENT MESSAGE (OUTPATIENT)
Dept: CARDIOLOGY | Facility: CLINIC | Age: 88
End: 2023-05-16
Payer: MEDICARE

## 2023-05-16 VITALS
HEIGHT: 61 IN | TEMPERATURE: 98 F | WEIGHT: 143 LBS | OXYGEN SATURATION: 98 % | BODY MASS INDEX: 27 KG/M2 | SYSTOLIC BLOOD PRESSURE: 142 MMHG | DIASTOLIC BLOOD PRESSURE: 54 MMHG | HEART RATE: 62 BPM

## 2023-05-16 DIAGNOSIS — I10 ESSENTIAL HYPERTENSION: Primary | Chronic | ICD-10-CM

## 2023-05-16 DIAGNOSIS — I11.0 HYPERTENSIVE HEART DISEASE WITH HEART FAILURE: ICD-10-CM

## 2023-05-16 DIAGNOSIS — N18.4 CKD (CHRONIC KIDNEY DISEASE) STAGE 4, GFR 15-29 ML/MIN: ICD-10-CM

## 2023-05-16 PROCEDURE — 3288F FALL RISK ASSESSMENT DOCD: CPT | Mod: CPTII,,, | Performed by: NURSE PRACTITIONER

## 2023-05-16 PROCEDURE — 3078F DIAST BP <80 MM HG: CPT | Mod: CPTII,,, | Performed by: NURSE PRACTITIONER

## 2023-05-16 PROCEDURE — 3077F PR MOST RECENT SYSTOLIC BLOOD PRESSURE >= 140 MM HG: ICD-10-PCS | Mod: CPTII,,, | Performed by: NURSE PRACTITIONER

## 2023-05-16 PROCEDURE — 3288F PR FALLS RISK ASSESSMENT DOCUMENTED: ICD-10-PCS | Mod: CPTII,,, | Performed by: NURSE PRACTITIONER

## 2023-05-16 PROCEDURE — 1101F PT FALLS ASSESS-DOCD LE1/YR: CPT | Mod: CPTII,,, | Performed by: NURSE PRACTITIONER

## 2023-05-16 PROCEDURE — 1159F MED LIST DOCD IN RCRD: CPT | Mod: CPTII,,, | Performed by: NURSE PRACTITIONER

## 2023-05-16 PROCEDURE — 1101F PR PT FALLS ASSESS DOC 0-1 FALLS W/OUT INJ PAST YR: ICD-10-PCS | Mod: CPTII,,, | Performed by: NURSE PRACTITIONER

## 2023-05-16 PROCEDURE — 3077F SYST BP >= 140 MM HG: CPT | Mod: CPTII,,, | Performed by: NURSE PRACTITIONER

## 2023-05-16 PROCEDURE — 1160F RVW MEDS BY RX/DR IN RCRD: CPT | Mod: CPTII,,, | Performed by: NURSE PRACTITIONER

## 2023-05-16 PROCEDURE — 99999 PR PBB SHADOW E&M-EST. PATIENT-LVL III: CPT | Mod: PBBFAC,,, | Performed by: NURSE PRACTITIONER

## 2023-05-16 PROCEDURE — 1159F PR MEDICATION LIST DOCUMENTED IN MEDICAL RECORD: ICD-10-PCS | Mod: CPTII,,, | Performed by: NURSE PRACTITIONER

## 2023-05-16 PROCEDURE — 99999 PR PBB SHADOW E&M-EST. PATIENT-LVL III: ICD-10-PCS | Mod: PBBFAC,,, | Performed by: NURSE PRACTITIONER

## 2023-05-16 PROCEDURE — 99214 OFFICE O/P EST MOD 30 MIN: CPT | Mod: ,,, | Performed by: NURSE PRACTITIONER

## 2023-05-16 PROCEDURE — 99214 PR OFFICE/OUTPT VISIT, EST, LEVL IV, 30-39 MIN: ICD-10-PCS | Mod: ,,, | Performed by: NURSE PRACTITIONER

## 2023-05-16 PROCEDURE — 1160F PR REVIEW ALL MEDS BY PRESCRIBER/CLIN PHARMACIST DOCUMENTED: ICD-10-PCS | Mod: CPTII,,, | Performed by: NURSE PRACTITIONER

## 2023-05-16 PROCEDURE — 3078F PR MOST RECENT DIASTOLIC BLOOD PRESSURE < 80 MM HG: ICD-10-PCS | Mod: CPTII,,, | Performed by: NURSE PRACTITIONER

## 2023-05-16 NOTE — ASSESSMENT & PLAN NOTE
Creatinine 1.5 (baseline), GFR 33.9      CMP  Sodium   Date Value Ref Range Status   05/14/2023 142 136 - 145 mmol/L Final     Potassium   Date Value Ref Range Status   05/14/2023 3.3 (L) 3.5 - 5.1 mmol/L Final     Chloride   Date Value Ref Range Status   05/14/2023 108 95 - 110 mmol/L Final     CO2   Date Value Ref Range Status   05/14/2023 21 (L) 23 - 29 mmol/L Final     Glucose   Date Value Ref Range Status   05/14/2023 98 70 - 110 mg/dL Final     BUN   Date Value Ref Range Status   05/14/2023 29 (H) 8 - 23 mg/dL Final     Creatinine   Date Value Ref Range Status   05/14/2023 1.5 (H) 0.5 - 1.4 mg/dL Final     Calcium   Date Value Ref Range Status   05/14/2023 8.7 8.7 - 10.5 mg/dL Final     Total Protein   Date Value Ref Range Status   05/14/2023 6.6 6.0 - 8.4 g/dL Final     Albumin   Date Value Ref Range Status   05/14/2023 3.5 3.5 - 5.2 g/dL Final     Total Bilirubin   Date Value Ref Range Status   05/14/2023 0.3 0.1 - 1.0 mg/dL Final     Comment:     For infants and newborns, interpretation of results should be based  on gestational age, weight and in agreement with clinical  observations.    Premature Infant recommended reference ranges:  Up to 24 hours.............<8.0 mg/dL  Up to 48 hours............<12.0 mg/dL  3-5 days..................<15.0 mg/dL  6-29 days.................<15.0 mg/dL       Alkaline Phosphatase   Date Value Ref Range Status   05/14/2023 50 (L) 55 - 135 U/L Final     AST   Date Value Ref Range Status   05/14/2023 15 10 - 40 U/L Final     ALT   Date Value Ref Range Status   05/14/2023 12 10 - 44 U/L Final     Anion Gap   Date Value Ref Range Status   05/14/2023 13 8 - 16 mmol/L Final     eGFR   Date Value Ref Range Status   05/14/2023 34 (A) >60 mL/min/1.73 m^2 Final

## 2023-05-16 NOTE — ASSESSMENT & PLAN NOTE
pt kept on lasix 40 mg daily, Coreg 25 mg bid, hydralazine increased to 50 mg tid and amlodipine 10 mg and lasix 40 mg daily  Monitor BP for next 2 days, if weakness and low B/P noted, contact office. To decrease lasix to 20 mg daily  Potassium supplement

## 2023-05-16 NOTE — PROGRESS NOTES
Glo Dumont  05/16/2023  [id number]    Christina Cardona MD  Patient Care Team:  Christina Cardona MD as PCP - General (Internal Medicine)  Gordo Muir MD as Consulting Physician (Pulmonary Disease)  Nik Bergman MD (Inactive) as Consulting Physician (Cardiology)  Franky Bell Jr., MD (Rheumatology)  Julio Galindo MD as Consulting Physician (Gastroenterology)  Glenis Mcfadden MD as Consulting Physician (Otolaryngology)  Ayaz Estrada MD as Consulting Physician (Hematology and Oncology)  Yomi Guy LPN as Care Coordinator      [facility] Note      Chief Complaint:  Chief Complaint   Patient presents with    Follow-up     Four day follow up. Recheck b/p       History of Present Illness:    Pt returns to clinic today for hospital ED follow up.   Last seen in the clinic on  Friday 5/12/23 at which time she was given lasix 30 mg IM, U/A was collected and advised clonidine prn for elevated BP. She was advised to take lasix 40 mg daily.    Sunday 5/14/23 - pt presented to the ED with c/o SOB, elevated B/P and headache. Pt's B/P 200/93. CXR noted mild interstitial pulmonary edema.   In the ED: pt given lasix 80 mg IV, topical nitroglycerin, hydralazine 20 mg IV and amlodipine 10 mg. At time of discharge, her B/P had improved to 171/83 and SOB had improved.   On 5/15/23 - pt follow up with Heart Failure POLLY and pt kept on lasix 40 mg daily, Coreg 25 mg bid, hydralazine increased to 50 mg tid and amlodipine 10 mg.   Pt's SOB and headache had improved.    Today, in the clinic, pt reports that SOB greatly improved, no leg swelling. Daughter reports that patient has noted some weakness and mild lightheadedness over the last 2 days. Her B/P this AM at home 117/54.  B/P 142/54.              Denies fever, chills, URI symptoms, chest pain, SOB, palpitations, vomiting, diarrhea, no gross neuro deficits, urinary symptoms and leg swelling       The following were reviewed: Active problem list,  medication list, allergies, family history, social history, and Health Maintenance.     History:  Past Medical History:   Diagnosis Date    Anemia     Angina pectoris     Anxiety     Anxiety and depression     Arthritis     hip    Carotid artery occlusion     Carpal tunnel syndrome 06/23/2008    emg    Chronic diarrhea     work up in 2011 with EGD, CS and VCE    CKD (chronic kidney disease) stage 3, GFR 30-59 ml/min 5/11/2017    Colitis     Coronary artery disease     Coronary artery disease     Diastolic dysfunction     Diverticulosis     Glaucoma     Greater trochanteric bursitis 2/10/2015    Grief at loss of child 1/26/2016    H/O carotid endarterectomy 12/2/2013    Heart failure     History of coronary angioplasty 3/11/2014    Hypercholesteremia     Hypertension     Liver cyst 02/08/2013    ct abd    Macular degeneration     Obesity with serious comorbidity 3/19/2023    Primary open-angle glaucoma(365.11) 9/3/2013    Renal cyst 02/08/2013    ct abd    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    Sarcoidosis     Sarcoidosis of lung     Sickle cell trait     Uveitis      Past Surgical History:   Procedure Laterality Date    A-V CARDIAC PACEMAKER INSERTION Left 3/21/2023    Procedure: INSERTION, CARDIAC PACEMAKER, DUAL CHAMBER/His Lead;  Surgeon: Cortez Ambrose MD;  Location: [facility] CATH LAB;  Service: Cardiology;  Laterality: Left;  MDT/ MD to confirm in am/possible nurse sedate    CAROTID ENDARTERECTOMY Right 2000s    CATARACT EXTRACTION Bilateral     Dr. Liang Dennis    CHOLECYSTECTOMY      laparoscopic, 3/18.    CORONARY ANGIOPLASTY WITH STENT PLACEMENT  11/19/2010    RCA-HALIE 2010    Lathia    JOINT REPLACEMENT Left 11/03/2014    Dr. Braun    TOTAL ABDOMINAL HYSTERECTOMY W/ BILATERAL SALPINGOOPHORECTOMY  1972     Family History   Problem Relation Age of Onset    Hypertension Mother     Heart failure Mother     Cancer Father         prostate    Cirrhosis Brother     Heart failure Brother      Cancer Daughter         Carcinoid     Patient Active Problem List   Diagnosis    Sarcoidosis of lung    Thyroid nodule    Hypertensive heart disease with heart failure    Other hyperlipidemia    Hemoglobin A-S genotype    Ptosis    Coronary artery disease, occlusive    History of central retinal artery occlusion    Iron deficiency anemia    Vitamin D deficiency    Osteopenia    Essential hypertension    Diastolic heart failure, NYHA class 3    Atherosclerosis of aorta    CKD (chronic kidney disease) stage 4, GFR 15-29 ml/min    Uveitis    Ulcerative colitis    History of coronary angioplasty    Hiatal hernia    Bradycardia    Lung nodule    Memory loss    Central retinal vein occlusion with macular edema of both eyes    Aortic stenosis    Weight loss    Unspecified severe protein-calorie malnutrition    Hypokalemia    Obesity with serious comorbidity    Hypomagnesemia    SA node dysfunction    S/P placement of cardiac pacemaker    Encounter for preventive health examination     Review of patient's allergies indicates:   Allergen Reactions    Lisinopril      angioedema    Codeine Nausea And Vomiting       Medications:  Current Outpatient Medications on File Prior to Visit   Medication Sig Dispense Refill    aflibercept 2 mg/0.05 mL Soln 2 mg by Intravitreal route every 28 days.      amLODIPine (NORVASC) 10 MG tablet Take 1 tablet (10 mg total) by mouth once daily. 30 tablet 0    aspirin (ECOTRIN) 81 MG EC tablet Take 81 mg by mouth once daily.      budesonide (ENTOCORT EC) 3 mg capsule Take 3 mg by mouth 2 (two) times a day.      busPIRone (BUSPAR) 5 MG Tab Take 1 tablet (5 mg total) by mouth 2 (two) times daily as needed (anxiety). 60 tablet 5    carvediloL (COREG) 25 MG tablet Take 1 tablet (25 mg total) by mouth 2 (two) times daily with meals. 60 tablet 11    cyproheptadine (PERIACTIN) 4 mg tablet Take 1 tablet (4 mg total) by mouth 3 (three) times daily as needed. 270 tablet 3    dexAMETHasone (OZURDEX) 0.7 mg  Impl intravitreal implant 0.7 mg by Intravitreal route once.      dorzolamide-timolol 2-0.5% (COSOPT) 22.3-6.8 mg/mL ophthalmic solution Place 1 drop into both eyes 2 (two) times daily. 10 mL 6    FOLIC ACID/MULTIVIT-MIN/LUTEIN (CENTRUM SILVER ORAL) Take 1 tablet by mouth once daily.      furosemide (LASIX) 40 MG tablet Take 1 tablet (40 mg total) by mouth once daily. 90 tablet 3    hydrALAZINE (APRESOLINE) 50 MG tablet Take 1 tablet (50 mg total) by mouth 3 (three) times daily before meals. 90 tablet 3    lipase-protease-amylase 24,000-76,000-120,000 units (CREON) 24,000-76,000 -120,000 unit capsule Take 1 capsule by mouth 3 (three) times daily with meals. 90 capsule 11    nitroGLYCERIN (NITROSTAT) 0.4 MG SL tablet PLACE 1 TABLET UNDER THE TONGUE EVERY 5 MINUTES AS NEEDED FOR CHEST PAIN 25 tablet 3    potassium chloride (KLOR-CON) 10 MEQ TbSR Take 1 tablet (10 mEq total) by mouth once daily. 30 tablet 3    pravastatin (PRAVACHOL) 40 MG tablet Take 1 tablet (40 mg total) by mouth once daily. 90 tablet 2     No current facility-administered medications on file prior to visit.       Medications have been reviewed and reconciled with patient at visit today.          Exam:  Vitals:    05/16/23 1340   BP: (!) 142/54   Pulse: 62   Temp: 98.1 °F (36.7 °C)     Weight: 64.9 kg (143 lb)   Body mass index is 27.02 kg/m².      BP Readings from Last 3 Encounters:   05/16/23 (!) 142/54   05/15/23 (!) 140/60   05/14/23 (!) 162/76     Wt Readings from Last 3 Encounters:   05/16/23 1340 64.9 kg (143 lb)   05/15/23 1315 65.1 kg (143 lb 8.3 oz)   05/14/23 1258 68.5 kg (151 lb 0.2 oz)            Physical Exam  Vitals reviewed.   Constitutional:       Appearance: Normal appearance. She is not ill-appearing.   HENT:      Head: Normocephalic and atraumatic.      Nose: No congestion.   Eyes:      Extraocular Movements: Extraocular movements intact.      Conjunctiva/sclera: Conjunctivae normal.   Cardiovascular:      Rate and Rhythm: Normal  rate and regular rhythm.   Pulmonary:      Effort: Pulmonary effort is normal.      Breath sounds: Rales (trace) present. No wheezing.   Abdominal:      General: Bowel sounds are normal. There is no distension.      Palpations: Abdomen is soft.      Tenderness: There is no abdominal tenderness.   Musculoskeletal:         General: Normal range of motion.      Cervical back: Neck supple.   Skin:     General: Skin is warm and dry.   Neurological:      General: No focal deficit present.      Mental Status: She is alert and oriented to person, place, and time.      Motor: No weakness.      Gait: Gait normal.   Psychiatric:         Behavior: Behavior normal.         Thought Content: Thought content normal.       Laboratory Reviewed:   Lab Results   Component Value Date    WBC 5.96 05/14/2023    HGB 9.4 (L) 05/14/2023    HCT 29.2 (L) 05/14/2023     05/14/2023    CHOL 160 05/04/2022    TRIG 80 05/04/2022    HDL 81 (H) 05/04/2022    ALT 12 05/14/2023    AST 15 05/14/2023     05/14/2023    K 3.3 (L) 05/14/2023     05/14/2023    CREATININE 1.5 (H) 05/14/2023    BUN 29 (H) 05/14/2023    CO2 21 (L) 05/14/2023    TSH 1.230 12/13/2021    INR 1.0 09/09/2020    HGBA1C 5.7 11/17/2015           Health Maintenance  Health Maintenance Topics with due status: Not Due       Topic Last Completion Date    TETANUS VACCINE 02/03/2015    DEXA Scan 10/27/2021    Aspirin/Antiplatelet Therapy 05/16/2023     Health Maintenance Due   Topic Date Due    Shingles Vaccine (1 of 2) Never done    COVID-19 Vaccine (4 - Booster for Pfizer series) 12/09/2021    Lipid Panel  05/04/2023       Assessment and Plan:  1. Essential hypertension  Assessment & Plan:  pt kept on lasix 40 mg daily, Coreg 25 mg bid, hydralazine increased to 50 mg tid and amlodipine 10 mg and lasix 40 mg daily  Monitor BP for next 2 days, if weakness and low B/P noted, contact office. To decrease lasix to 20 mg daily  Potassium supplement      2. Hypertensive heart  disease with heart failure    3. CKD (chronic kidney disease) stage 4, GFR 15-29 ml/min  Assessment & Plan:  Creatinine 1.5 (baseline), GFR 33.9      CMP  Sodium   Date Value Ref Range Status   05/14/2023 142 136 - 145 mmol/L Final     Potassium   Date Value Ref Range Status   05/14/2023 3.3 (L) 3.5 - 5.1 mmol/L Final     Chloride   Date Value Ref Range Status   05/14/2023 108 95 - 110 mmol/L Final     CO2   Date Value Ref Range Status   05/14/2023 21 (L) 23 - 29 mmol/L Final     Glucose   Date Value Ref Range Status   05/14/2023 98 70 - 110 mg/dL Final     BUN   Date Value Ref Range Status   05/14/2023 29 (H) 8 - 23 mg/dL Final     Creatinine   Date Value Ref Range Status   05/14/2023 1.5 (H) 0.5 - 1.4 mg/dL Final     Calcium   Date Value Ref Range Status   05/14/2023 8.7 8.7 - 10.5 mg/dL Final     Total Protein   Date Value Ref Range Status   05/14/2023 6.6 6.0 - 8.4 g/dL Final     Albumin   Date Value Ref Range Status   05/14/2023 3.5 3.5 - 5.2 g/dL Final     Total Bilirubin   Date Value Ref Range Status   05/14/2023 0.3 0.1 - 1.0 mg/dL Final     Comment:     For infants and newborns, interpretation of results should be based  on gestational age, weight and in agreement with clinical  observations.    Premature Infant recommended reference ranges:  Up to 24 hours.............<8.0 mg/dL  Up to 48 hours............<12.0 mg/dL  3-5 days..................<15.0 mg/dL  6-29 days.................<15.0 mg/dL       Alkaline Phosphatase   Date Value Ref Range Status   05/14/2023 50 (L) 55 - 135 U/L Final     AST   Date Value Ref Range Status   05/14/2023 15 10 - 40 U/L Final     ALT   Date Value Ref Range Status   05/14/2023 12 10 - 44 U/L Final     Anion Gap   Date Value Ref Range Status   05/14/2023 13 8 - 16 mmol/L Final     eGFR   Date Value Ref Range Status   05/14/2023 34 (A) >60 mL/min/1.73 m^2 Final                      -Patient's lab results were reviewed and discussed with patient  -Treatment options and  alternatives were discussed with the patient. Patient expressed understanding. Patient was given the opportunity to ask questions and be an active participant in their medical care. Patient had no further questions or concerns at this time.         Future Appointments   Date Time Provider Department Center   5/18/2023  1:15 PM LABORATORY, V [facility] LAB [facility]   6/7/2023  9:00 AM LYDIA Al [facility]   6/23/2023  9:40 AM Cortez Ambrose MD [facility] ARRHYTH [facility]   6/23/2023  9:50 AM PACEMAKER CLINIC [facility] ARR PRO [place]   6/27/2023 10:00 AM HOME MONITOR DEVICE CHECK [facility] [place]   7/13/2023  3:20 PM Stan Lui MD [facility] CARDIO [facility]   7/19/2023  2:00 PM Sergio Lozada MD [facility] OPHTHAL [facility]   7/19/2023  2:45 PM Wai Sandoval OD [facility] OPHTHAL [facility]          After visit summary printed and given to patient upon discharge.  Patient goals and care plan are included in After visit summary.    Total medical decision making time was 30 min.    The following issues were discussed: The primary encounter diagnosis was Essential hypertension. Diagnoses of Hypertensive heart disease with heart failure and CKD (chronic kidney disease) stage 4, GFR 15-29 ml/min were also pertinent to this visit.    Health maintenance needs, recent test results and goals of care discussed with pt and questions answered.           ARLENE Mcarthur, NP-C  [id number] [place] [id number]

## 2023-05-18 ENCOUNTER — LAB VISIT (OUTPATIENT)
Dept: LAB | Facility: HOSPITAL | Age: 88
End: 2023-05-18
Attending: INTERNAL MEDICINE
Payer: MEDICARE

## 2023-05-18 DIAGNOSIS — I50.30 DIASTOLIC HEART FAILURE, NYHA CLASS 3: ICD-10-CM

## 2023-05-18 LAB
ANION GAP SERPL CALC-SCNC: 13 MMOL/L (ref 8–16)
BNP SERPL-MCNC: 312 PG/ML (ref 0–99)
BUN SERPL-MCNC: 44 MG/DL (ref 8–23)
CALCIUM SERPL-MCNC: 10.5 MG/DL (ref 8.7–10.5)
CHLORIDE SERPL-SCNC: 106 MMOL/L (ref 95–110)
CO2 SERPL-SCNC: 20 MMOL/L (ref 23–29)
CREAT SERPL-MCNC: 1.8 MG/DL (ref 0.5–1.4)
EST. GFR  (NO RACE VARIABLE): 27 ML/MIN/1.73 M^2
GLUCOSE SERPL-MCNC: 94 MG/DL (ref 70–110)
POTASSIUM SERPL-SCNC: 3.7 MMOL/L (ref 3.5–5.1)
SODIUM SERPL-SCNC: 139 MMOL/L (ref 136–145)

## 2023-05-18 PROCEDURE — 80048 BASIC METABOLIC PNL TOTAL CA: CPT | Performed by: PHYSICIAN ASSISTANT

## 2023-05-18 PROCEDURE — 83880 ASSAY OF NATRIURETIC PEPTIDE: CPT | Performed by: PHYSICIAN ASSISTANT

## 2023-05-18 PROCEDURE — 36415 COLL VENOUS BLD VENIPUNCTURE: CPT | Mod: PO | Performed by: PHYSICIAN ASSISTANT

## 2023-06-07 ENCOUNTER — PATIENT MESSAGE (OUTPATIENT)
Dept: CARDIOLOGY | Facility: CLINIC | Age: 88
End: 2023-06-07
Payer: MEDICARE

## 2023-06-07 DIAGNOSIS — I10 ESSENTIAL HYPERTENSION: Chronic | ICD-10-CM

## 2023-06-07 RX ORDER — HYDRALAZINE HYDROCHLORIDE 50 MG/1
50 TABLET, FILM COATED ORAL
Qty: 90 TABLET | Refills: 3 | Status: SHIPPED | OUTPATIENT
Start: 2023-06-07 | End: 2023-09-07

## 2023-06-09 ENCOUNTER — OFFICE VISIT (OUTPATIENT)
Dept: CARDIOLOGY | Facility: CLINIC | Age: 88
End: 2023-06-09
Payer: MEDICARE

## 2023-06-09 VITALS
BODY MASS INDEX: 27.91 KG/M2 | OXYGEN SATURATION: 99 % | HEART RATE: 70 BPM | DIASTOLIC BLOOD PRESSURE: 60 MMHG | WEIGHT: 147.69 LBS | SYSTOLIC BLOOD PRESSURE: 130 MMHG

## 2023-06-09 DIAGNOSIS — I10 ESSENTIAL HYPERTENSION: Chronic | ICD-10-CM

## 2023-06-09 DIAGNOSIS — I50.30 DIASTOLIC HEART FAILURE, NYHA CLASS 3: Primary | ICD-10-CM

## 2023-06-09 PROCEDURE — 99999 PR PBB SHADOW E&M-EST. PATIENT-LVL III: ICD-10-PCS | Mod: PBBFAC,,, | Performed by: PHYSICIAN ASSISTANT

## 2023-06-09 PROCEDURE — 3078F PR MOST RECENT DIASTOLIC BLOOD PRESSURE < 80 MM HG: ICD-10-PCS | Mod: CPTII,S$GLB,, | Performed by: PHYSICIAN ASSISTANT

## 2023-06-09 PROCEDURE — 1126F AMNT PAIN NOTED NONE PRSNT: CPT | Mod: CPTII,S$GLB,, | Performed by: PHYSICIAN ASSISTANT

## 2023-06-09 PROCEDURE — 1159F MED LIST DOCD IN RCRD: CPT | Mod: CPTII,S$GLB,, | Performed by: PHYSICIAN ASSISTANT

## 2023-06-09 PROCEDURE — 99213 OFFICE O/P EST LOW 20 MIN: CPT | Mod: S$GLB,,, | Performed by: PHYSICIAN ASSISTANT

## 2023-06-09 PROCEDURE — 3078F DIAST BP <80 MM HG: CPT | Mod: CPTII,S$GLB,, | Performed by: PHYSICIAN ASSISTANT

## 2023-06-09 PROCEDURE — 3075F PR MOST RECENT SYSTOLIC BLOOD PRESS GE 130-139MM HG: ICD-10-PCS | Mod: CPTII,S$GLB,, | Performed by: PHYSICIAN ASSISTANT

## 2023-06-09 PROCEDURE — 3075F SYST BP GE 130 - 139MM HG: CPT | Mod: CPTII,S$GLB,, | Performed by: PHYSICIAN ASSISTANT

## 2023-06-09 PROCEDURE — 1126F PR PAIN SEVERITY QUANTIFIED, NO PAIN PRESENT: ICD-10-PCS | Mod: CPTII,S$GLB,, | Performed by: PHYSICIAN ASSISTANT

## 2023-06-09 PROCEDURE — 99213 PR OFFICE/OUTPT VISIT, EST, LEVL III, 20-29 MIN: ICD-10-PCS | Mod: S$GLB,,, | Performed by: PHYSICIAN ASSISTANT

## 2023-06-09 PROCEDURE — 99999 PR PBB SHADOW E&M-EST. PATIENT-LVL III: CPT | Mod: PBBFAC,,, | Performed by: PHYSICIAN ASSISTANT

## 2023-06-09 PROCEDURE — 1159F PR MEDICATION LIST DOCUMENTED IN MEDICAL RECORD: ICD-10-PCS | Mod: CPTII,S$GLB,, | Performed by: PHYSICIAN ASSISTANT

## 2023-06-09 RX ORDER — AMLODIPINE BESYLATE 10 MG/1
10 TABLET ORAL DAILY
Qty: 30 TABLET | Refills: 3 | Status: SHIPPED | OUTPATIENT
Start: 2023-06-09 | End: 2023-06-30 | Stop reason: SDUPTHER

## 2023-06-09 NOTE — PROGRESS NOTES
[facility] Provider Note (Follow-up) Consult Note      HPI:  Patient can walk without SOB   Patient sleeps on 2 pillows   Patient wakes up SOB, has to get out of bed, associated cough, sputum none   Palpitations - none   Dizzy, light-headed, pre-syncope or syncope none   Since discharge frequency of performing weights, home weight and weight change stable    Other information felt pertinent to HPI    Since last visit hydralazine 50 mg TID was started and BP now improved. No HA, PND, orthopnea.     Urinating well, no SOB.     States she has not been taking pravachol as it makes her feel bad     PHYSICAL:   Vitals:    06/09/23 0947   BP: 130/60   Pulse: 70      Wt Readings from Last 3 Encounters:   06/09/23 67 kg (147 lb 11.3 oz)   05/16/23 64.9 kg (143 lb)   05/15/23 65.1 kg (143 lb 8.3 oz)       JVD: no,    Heart rhythm: regular  Cardiac murmur: No    S3: no  S4: no  Lungs: clear  Liver span: 10 cm:   Hepatojugular reflux: no  Edema: no,       ASSESSMENT: chronic systolic HF    PLAN:      Patient Instructions:   Instruct the patient to notify this clinic if HH, a physician or an advanced care provider wants to change medication one of their HF medications   Activity and Diet restrictions:   Recommend 2-3 gram sodium restriction and 1500cc- 2000cc fluid restriction.  Encourage physical activity with graded exercise program.  Requested patient to weigh themselves daily, and to notify us if their weight increases by more than 3 lbs in 1 day or 5 lbs in 3 days.    Assigned dry weight on home scale: 65 kg  Medication changes (include current dose and changed dose)  Stop pravachol  Continue lasix 40 mg daily  Continue coreg/hydralazine and amlodipine for HTN  Close HF episode   RTC in 3 months

## 2023-06-12 ENCOUNTER — PATIENT MESSAGE (OUTPATIENT)
Dept: PRIMARY CARE CLINIC | Facility: CLINIC | Age: 88
End: 2023-06-12
Payer: MEDICARE

## 2023-06-12 ENCOUNTER — PATIENT MESSAGE (OUTPATIENT)
Dept: CARDIOLOGY | Facility: CLINIC | Age: 88
End: 2023-06-12
Payer: MEDICARE

## 2023-06-12 RX ORDER — PRAVASTATIN SODIUM 40 MG/1
TABLET ORAL
Qty: 90 TABLET | Refills: 0 | Status: SHIPPED | OUTPATIENT
Start: 2023-06-12 | End: 2023-06-30

## 2023-06-20 ENCOUNTER — PATIENT MESSAGE (OUTPATIENT)
Dept: CARDIOLOGY | Facility: CLINIC | Age: 88
End: 2023-06-20
Payer: MEDICARE

## 2023-06-22 ENCOUNTER — TELEPHONE (OUTPATIENT)
Dept: CARDIOLOGY | Facility: CLINIC | Age: 88
End: 2023-06-22
Payer: MEDICARE

## 2023-06-22 ENCOUNTER — PATIENT MESSAGE (OUTPATIENT)
Dept: CARDIOLOGY | Facility: CLINIC | Age: 88
End: 2023-06-22
Payer: MEDICARE

## 2023-06-22 NOTE — TELEPHONE ENCOUNTER
Tried to reach the pt daughter at the number listed below and her vm was not set up yet. Rescheduled appt pb----- Message from Melchor Moser sent at 6/22/2023  3:42 PM CDT -----  Contact: Jessica/Daughter  Jessica is needing a call back in regards to rescheduling the patients appt for tomorrow 06/23. Please give her a call back at [phone]

## 2023-06-23 ENCOUNTER — OFFICE VISIT (OUTPATIENT)
Dept: CARDIOLOGY | Facility: CLINIC | Age: 88
End: 2023-06-23
Payer: MEDICARE

## 2023-06-23 ENCOUNTER — CLINICAL SUPPORT (OUTPATIENT)
Dept: CARDIOLOGY | Facility: HOSPITAL | Age: 88
End: 2023-06-23
Attending: INTERNAL MEDICINE
Payer: MEDICARE

## 2023-06-23 VITALS
WEIGHT: 145.31 LBS | BODY MASS INDEX: 27.43 KG/M2 | SYSTOLIC BLOOD PRESSURE: 138 MMHG | HEART RATE: 67 BPM | OXYGEN SATURATION: 97 % | HEIGHT: 61 IN | DIASTOLIC BLOOD PRESSURE: 72 MMHG

## 2023-06-23 DIAGNOSIS — I50.30 DIASTOLIC HEART FAILURE, NYHA CLASS 3: ICD-10-CM

## 2023-06-23 DIAGNOSIS — I44.1 AV BLOCK, 2ND DEGREE: Primary | ICD-10-CM

## 2023-06-23 DIAGNOSIS — Z95.0 S/P PLACEMENT OF CARDIAC PACEMAKER: ICD-10-CM

## 2023-06-23 DIAGNOSIS — I11.0 HYPERTENSIVE HEART DISEASE WITH HEART FAILURE: ICD-10-CM

## 2023-06-23 DIAGNOSIS — N18.4 CKD (CHRONIC KIDNEY DISEASE) STAGE 4, GFR 15-29 ML/MIN: ICD-10-CM

## 2023-06-23 DIAGNOSIS — D50.9 IRON DEFICIENCY ANEMIA, UNSPECIFIED IRON DEFICIENCY ANEMIA TYPE: ICD-10-CM

## 2023-06-23 DIAGNOSIS — I49.5 SA NODE DYSFUNCTION: ICD-10-CM

## 2023-06-23 DIAGNOSIS — Z98.61 HISTORY OF CORONARY ANGIOPLASTY: ICD-10-CM

## 2023-06-23 DIAGNOSIS — Z95.0 CARDIAC PACEMAKER IN SITU: ICD-10-CM

## 2023-06-23 DIAGNOSIS — I25.10 CORONARY ARTERY DISEASE, OCCLUSIVE: Chronic | ICD-10-CM

## 2023-06-23 DIAGNOSIS — R00.1 BRADYCARDIA: ICD-10-CM

## 2023-06-23 DIAGNOSIS — I70.0 ATHEROSCLEROSIS OF AORTA: ICD-10-CM

## 2023-06-23 DIAGNOSIS — I35.0 AORTIC VALVE STENOSIS, ETIOLOGY OF CARDIAC VALVE DISEASE UNSPECIFIED: ICD-10-CM

## 2023-06-23 DIAGNOSIS — D57.3 HEMOGLOBIN A-S GENOTYPE: ICD-10-CM

## 2023-06-23 DIAGNOSIS — D86.0 SARCOIDOSIS OF LUNG: ICD-10-CM

## 2023-06-23 DIAGNOSIS — K51.00 ULCERATIVE PANCOLITIS WITHOUT COMPLICATION: ICD-10-CM

## 2023-06-23 PROCEDURE — 99215 OFFICE O/P EST HI 40 MIN: CPT | Mod: S$GLB,,, | Performed by: INTERNAL MEDICINE

## 2023-06-23 PROCEDURE — 93280 PM DEVICE PROGR EVAL DUAL: CPT | Mod: 26,,, | Performed by: INTERNAL MEDICINE

## 2023-06-23 PROCEDURE — 99999 PR PBB SHADOW E&M-EST. PATIENT-LVL I: CPT | Mod: PBBFAC,,,

## 2023-06-23 PROCEDURE — 99215 PR OFFICE/OUTPT VISIT, EST, LEVL V, 40-54 MIN: ICD-10-PCS | Mod: S$GLB,,, | Performed by: INTERNAL MEDICINE

## 2023-06-23 PROCEDURE — 3078F PR MOST RECENT DIASTOLIC BLOOD PRESSURE < 80 MM HG: ICD-10-PCS | Mod: CPTII,S$GLB,, | Performed by: INTERNAL MEDICINE

## 2023-06-23 PROCEDURE — 1126F PR PAIN SEVERITY QUANTIFIED, NO PAIN PRESENT: ICD-10-PCS | Mod: CPTII,S$GLB,, | Performed by: INTERNAL MEDICINE

## 2023-06-23 PROCEDURE — 99999 PR PBB SHADOW E&M-EST. PATIENT-LVL IV: ICD-10-PCS | Mod: PBBFAC,,, | Performed by: INTERNAL MEDICINE

## 2023-06-23 PROCEDURE — 1101F PT FALLS ASSESS-DOCD LE1/YR: CPT | Mod: CPTII,S$GLB,, | Performed by: INTERNAL MEDICINE

## 2023-06-23 PROCEDURE — 93280 PM DEVICE PROGR EVAL DUAL: CPT

## 2023-06-23 PROCEDURE — 99999 PR PBB SHADOW E&M-EST. PATIENT-LVL IV: CPT | Mod: PBBFAC,,, | Performed by: INTERNAL MEDICINE

## 2023-06-23 PROCEDURE — 1101F PR PT FALLS ASSESS DOC 0-1 FALLS W/OUT INJ PAST YR: ICD-10-PCS | Mod: CPTII,S$GLB,, | Performed by: INTERNAL MEDICINE

## 2023-06-23 PROCEDURE — 1126F AMNT PAIN NOTED NONE PRSNT: CPT | Mod: CPTII,S$GLB,, | Performed by: INTERNAL MEDICINE

## 2023-06-23 PROCEDURE — 99999 PR PBB SHADOW E&M-EST. PATIENT-LVL I: ICD-10-PCS | Mod: PBBFAC,,,

## 2023-06-23 PROCEDURE — 3288F FALL RISK ASSESSMENT DOCD: CPT | Mod: CPTII,S$GLB,, | Performed by: INTERNAL MEDICINE

## 2023-06-23 PROCEDURE — 3078F DIAST BP <80 MM HG: CPT | Mod: CPTII,S$GLB,, | Performed by: INTERNAL MEDICINE

## 2023-06-23 PROCEDURE — 3075F SYST BP GE 130 - 139MM HG: CPT | Mod: CPTII,S$GLB,, | Performed by: INTERNAL MEDICINE

## 2023-06-23 PROCEDURE — 93280 CARDIAC DEVICE CHECK - IN CLINIC & HOSPITAL: ICD-10-PCS | Mod: 26,,, | Performed by: INTERNAL MEDICINE

## 2023-06-23 PROCEDURE — 3288F PR FALLS RISK ASSESSMENT DOCUMENTED: ICD-10-PCS | Mod: CPTII,S$GLB,, | Performed by: INTERNAL MEDICINE

## 2023-06-23 PROCEDURE — 3075F PR MOST RECENT SYSTOLIC BLOOD PRESS GE 130-139MM HG: ICD-10-PCS | Mod: CPTII,S$GLB,, | Performed by: INTERNAL MEDICINE

## 2023-06-23 NOTE — PROGRESS NOTES
Subjective:   Patient ID:  Glo Dumont is a 87 y.o. female     Chief complaint:Congestive Heart Failure and SA node dysfunction (Device check)      HPI  She is an 88 yo AA woman   PMHx of CKD, CAD, HTN, HLD, chronic diastolic HF, Anemia, carotid artery occlusion, H/O carotid endarterectomy, Sarcoidosis, Sarcoidosis of lung who presented to [facility] ED c/o SOB, chest pressure onset a few days ago.  Noted to have slow HRs - seemingly ASxc   On Clonidine 0.1 mg TID but seemingly dependent on the drug to avoid severe HTN.   ECGs reveal persistent 2:! AV block with a narrow QRS     Nuclear study from a year ago:  Normal myocardial perfusion scan. There is no evidence of myocardial ischemia or infarction.    The gated perfusion images showed an ejection fraction of 74% at rest. The gated perfusion images showed an ejection fraction of 82% post stress.    The EKG portion of this study is negative for ischemia.    The patient reported no chest pain during the stress test.    During stress, rare PVCs are noted.     Echo from yesterday:   The left ventricle is small with mild concentric hypertrophy and normal systolic function.  The estimated ejection fraction is 70%.  Grade II left ventricular diastolic dysfunction.  Normal right ventricular size with normal right ventricular systolic function.  There is mild aortic valve stenosis.  Aortic valve area is 1.70 cm2; peak velocity is 1.86 m/s; mean gradient is 8 mmHg.  Mild tricuspid regurgitation.  Moderate mitral regurgitation.  The mean diastolic gradient across the mitral valve is 6 mmHg at a heart rate of 42 bpm.  There is mild mitral stenosis.  Normal central venous pressure (3 mmHg).  The estimated PA systolic pressure is 35 mmHg.       Update 07/02/2023 :  Had RA-His PPM implant on 3/21/23  Has been doing well since then  PPM eval today:  Presenting egram demonstrates: AsVp   Underlying rhythm c/w: sinus @ 67, UT 190ms   RA pacing <0.1%, HIS pacing 99.8%  Battery  Status/Longevity: 5.7 years, 3.11V (RRT 2.63V)  Atrial arrhythmias: none  Ventricular arrhythmias: x 1 on 6/13/13, appears to be PAT, duration 2 sec  Reprogramming at this visit: Intact conduction, pushed out AV delays 180/150 --> 230/210ms.      Current Outpatient Medications   Medication Sig    aflibercept 2 mg/0.05 mL Soln 2 mg by Intravitreal route every 28 days.    aspirin (ECOTRIN) 81 MG EC tablet Take 81 mg by mouth once daily.    budesonide (ENTOCORT EC) 3 mg capsule Take 3 mg by mouth 2 (two) times a day.    busPIRone (BUSPAR) 5 MG Tab Take 1 tablet (5 mg total) by mouth 2 (two) times daily as needed (anxiety).    carvediloL (COREG) 25 MG tablet Take 1 tablet (25 mg total) by mouth 2 (two) times daily with meals.    cyproheptadine (PERIACTIN) 4 mg tablet Take 1 tablet (4 mg total) by mouth 3 (three) times daily as needed.    dorzolamide-timolol 2-0.5% (COSOPT) 22.3-6.8 mg/mL ophthalmic solution Place 1 drop into both eyes 2 (two) times daily.    furosemide (LASIX) 40 MG tablet Take 1 tablet (40 mg total) by mouth once daily.    hydrALAZINE (APRESOLINE) 50 MG tablet Take 1 tablet (50 mg total) by mouth 3 (three) times daily before meals.    lipase-protease-amylase 24,000-76,000-120,000 units (CREON) 24,000-76,000 -120,000 unit capsule Take 1 capsule by mouth 3 (three) times daily with meals.    nitroGLYCERIN (NITROSTAT) 0.4 MG SL tablet PLACE 1 TABLET UNDER THE TONGUE EVERY 5 MINUTES AS NEEDED FOR CHEST PAIN    potassium chloride (KLOR-CON) 10 MEQ TbSR Take 1 tablet (10 mEq total) by mouth once daily.    amLODIPine (NORVASC) 10 MG tablet Take 1 tablet (10 mg total) by mouth once daily.    citalopram (CELEXA) 10 MG tablet Take 1 tablet (10 mg total) by mouth every evening.    dexAMETHasone (OZURDEX) 0.7 mg Impl intravitreal implant 0.7 mg by Intravitreal route once.    FOLIC ACID/MULTIVIT-MIN/LUTEIN (CENTRUM SILVER ORAL) Take 1 tablet by mouth once daily.     No current facility-administered medications for  this visit.     Review of Systems     Constitutional: Reviewed  for decreased appetite, weight gain and weight loss.   HENT: Reviewed for nosebleeds.    Eyes:  Reviewed for blurred vision and visual disturbance.   Cardiovascular: Reviewed for chest pain, claudication, cyanosis,dyspnea on exertion, leg swelling, orthopnea,paroxysmal nocturnal dyspnearregular heartbeats, palpitations, near-syncope, and syncope.   Respiratory: Reviewed for cough, shortness of breath, wheezing, sleep disturbances due to breathing and snoring, .    Endocrine: Reviewed for heat intolerance.   Hematologic/Lymphatic: Reviewed for easy bruisability/bleeding.   Skin: Reviewed for rash.   Musculoskeletal: Reviewed for muscle weakness and myalgias.   Gastrointestinal: Reviewed for abdominal pain, anorexia, melena, nausea and vomiting.   Genitourinary: Reviewed for menorrhagia, frequency, nocturia and incontinence.   Neurological: Reviewed for excessive daytime sleepiness, dizziness, vertigo, weakness, headaches, loss of balance and seizures,   Psychiatric/Behavioral:  Reviewed for insomnia, altered mental status, depression, anxiety and nervousness.       All symptoms reviewed above were negative except for the arthritic complaints, anxiety and depression.       Social History     Tobacco Use   Smoking Status Never   Smokeless Tobacco Never        Objective:     Physical Exam  Vitals and nursing note reviewed.   Constitutional:       Appearance: She is well-developed.   HENT:      Head: Normocephalic and atraumatic.      Right Ear: External ear normal.      Left Ear: External ear normal.   Eyes:      General: No scleral icterus.        Left eye: No discharge.      Conjunctiva/sclera: Conjunctivae normal.      Pupils: Pupils are equal, round, and reactive to light.   Neck:      Thyroid: No thyromegaly.   Cardiovascular:      Rate and Rhythm: Normal rate and regular rhythm.      Pulses: Intact distal pulses.           Carotid pulses are 2+ on the  "right side and 2+ on the left side.       Radial pulses are 2+ on the right side and 2+ on the left side.        Dorsalis pedis pulses are 2+ on the right side and 2+ on the left side.        Posterior tibial pulses are 2+ on the right side and 2+ on the left side.      Heart sounds: Normal heart sounds. No midsystolic click and no opening snap. No murmur heard.    No friction rub. No gallop. No S3 or S4 sounds.   Pulmonary:      Effort: Pulmonary effort is normal.      Breath sounds: Normal breath sounds.   Chest:      Comments: Device pocket is in great repair.  Abdominal:      General: There is no distension.      Palpations: Abdomen is soft. There is no hepatomegaly.      Tenderness: There is no abdominal tenderness. There is no guarding.   Musculoskeletal:      Cervical back: Normal range of motion and neck supple.      Right lower leg: No swelling.      Left lower leg: No swelling.      Right ankle: No swelling.      Left ankle: No swelling.   Skin:     General: Skin is warm and dry.      Findings: No rash.      Nails: There is no clubbing.   Neurological:      Mental Status: She is alert and oriented to person, place, and time.      Cranial Nerves: No cranial nerve deficit.      Gait: Gait normal.   Psychiatric:         Speech: Speech normal.         Behavior: Behavior normal.         Thought Content: Thought content normal.     /72   Pulse 67   Ht 5' 1" (1.549 m)   Wt 65.9 kg (145 lb 4.5 oz)   SpO2 97%   BMI 27.45 kg/m²       Results for orders placed during the hospital encounter of 03/18/23    Echo    Interpretation Summary  · The left ventricle is small with mild concentric hypertrophy and normal systolic function.  · The estimated ejection fraction is 70%.  · Grade II left ventricular diastolic dysfunction.  · Normal right ventricular size with normal right ventricular systolic function.  · There is mild aortic valve stenosis.  · Aortic valve area is 1.70 cm2; peak velocity is 1.86 m/s; mean " gradient is 8 mmHg.  · Mild tricuspid regurgitation.  · Moderate mitral regurgitation.  · The mean diastolic gradient across the mitral valve is 6 mmHg at a heart rate of 42 bpm.  · There is mild mitral stenosis.  · Normal central venous pressure (3 mmHg).  · The estimated PA systolic pressure is 35 mmHg.    WBC   Date Value Ref Range Status   05/14/2023 5.96 3.90 - 12.70 K/uL Final     POC Hematocrit   Date Value Ref Range Status   03/02/2022 <15 (L) 36 - 54 %PCV Final     Hematocrit   Date Value Ref Range Status   05/14/2023 29.2 (L) 37.0 - 48.5 % Final     Hemoglobin   Date Value Ref Range Status   05/14/2023 9.4 (L) 12.0 - 16.0 g/dL Final     Lab Results   Component Value Date     05/14/2023     Lab Results   Component Value Date    CREATININE 1.8 (H) 05/18/2023    EGFRNORACEVR 27 (A) 05/18/2023    K 3.7 05/18/2023     Lab Results   Component Value Date     (H) 05/18/2023            reports no history of alcohol use.  Past Medical History:   Diagnosis Date    Anemia     Angina pectoris     Anxiety     Anxiety and depression     Arthritis     hip    Carotid artery occlusion     Carpal tunnel syndrome 06/23/2008    emg    Chronic diarrhea     work up in 2011 with EGD, CS and VCE    CKD (chronic kidney disease) stage 3, GFR 30-59 ml/min 5/11/2017    Colitis     Coronary artery disease     Coronary artery disease     Diastolic dysfunction     Diverticulosis     Glaucoma     Greater trochanteric bursitis 2/10/2015    Grief at loss of child 1/26/2016    H/O carotid endarterectomy 12/2/2013    Heart failure     History of coronary angioplasty 3/11/2014    Hypercholesteremia     Hypertension     Liver cyst 02/08/2013    ct abd    Macular degeneration     Obesity with serious comorbidity 3/19/2023    Primary open-angle glaucoma(365.11) 9/3/2013    Renal cyst 02/08/2013    ct abd    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    Sarcoidosis     Sarcoidosis of lung     Sickle cell trait     Uveitis       Past Surgical History:   Procedure Laterality Date    A-V CARDIAC PACEMAKER INSERTION Left 3/21/2023    Procedure: INSERTION, CARDIAC PACEMAKER, DUAL CHAMBER/His Lead;  Surgeon: Cortez Ambrose MD;  Location: [facility] CATH LAB;  Service: Cardiology;  Laterality: Left;  MDT/ MD to confirm in am/possible nurse sedate    CAROTID ENDARTERECTOMY Right 2000s    CATARACT EXTRACTION Bilateral     Dr. Liang Dennis    CHOLECYSTECTOMY      laparoscopic, 3/18.    CORONARY ANGIOPLASTY WITH STENT PLACEMENT  11/19/2010    RCA-HALIE 2010    Lathia    JOINT REPLACEMENT Left 11/03/2014    Dr. Braun    TOTAL ABDOMINAL HYSTERECTOMY W/ BILATERAL SALPINGOOPHORECTOMY  1972     Family History   Problem Relation Age of Onset    Hypertension Mother     Heart failure Mother     Cancer Father         prostate    Cirrhosis Brother     Heart failure Brother     Cancer Daughter         Carcinoid       Assessment:   Doing well.  Pacemaker function and thresholds are excellent with selective his pacing achieved.  1. AV block, 2nd degree    2. SA node dysfunction    3. S/P placement of cardiac pacemaker    4. Hypertensive heart disease with heart failure    5. Diastolic heart failure, NYHA class 3    6. Atherosclerosis of aorta    7. Coronary artery disease, occlusive    8. Aortic valve stenosis, etiology of cardiac valve disease unspecified    9. CKD (chronic kidney disease) stage 4, GFR 15-29 ml/min    10. Hemoglobin A-S genotype    11. Iron deficiency anemia, unspecified iron deficiency anemia type    12. Ulcerative pancolitis without complication    13. History of coronary angioplasty    14. Sarcoidosis of lung        Plan:      I discussed routine device follow up including quarterly to bi-annual device checks for device function as well as yearly follow up in the EP clinic. The patient  was advised to call with any concerns regarding their device. Device clinic follow up as scheduled. RTC 6 m        No orders of the defined types were  placed in this encounter.      Follow up in about 6 months (around 12/23/2023), or if symptoms worsen or fail to improve.    There are no discontinued medications.         Medication List with Changes/Refills   New Medications    CITALOPRAM (CELEXA) 10 MG TABLET    Take 1 tablet (10 mg total) by mouth every evening.   Current Medications    AFLIBERCEPT 2 MG/0.05 ML SOLN    2 mg by Intravitreal route every 28 days.    ASPIRIN (ECOTRIN) 81 MG EC TABLET    Take 81 mg by mouth once daily.    BUDESONIDE (ENTOCORT EC) 3 MG CAPSULE    Take 3 mg by mouth 2 (two) times a day.    BUSPIRONE (BUSPAR) 5 MG TAB    Take 1 tablet (5 mg total) by mouth 2 (two) times daily as needed (anxiety).    CARVEDILOL (COREG) 25 MG TABLET    Take 1 tablet (25 mg total) by mouth 2 (two) times daily with meals.    CYPROHEPTADINE (PERIACTIN) 4 MG TABLET    Take 1 tablet (4 mg total) by mouth 3 (three) times daily as needed.    DEXAMETHASONE (OZURDEX) 0.7 MG IMPL INTRAVITREAL IMPLANT    0.7 mg by Intravitreal route once.    DORZOLAMIDE-TIMOLOL 2-0.5% (COSOPT) 22.3-6.8 MG/ML OPHTHALMIC SOLUTION    Place 1 drop into both eyes 2 (two) times daily.    FOLIC ACID/MULTIVIT-MIN/LUTEIN (CENTRUM SILVER ORAL)    Take 1 tablet by mouth once daily.    FUROSEMIDE (LASIX) 40 MG TABLET    Take 1 tablet (40 mg total) by mouth once daily.    HYDRALAZINE (APRESOLINE) 50 MG TABLET    Take 1 tablet (50 mg total) by mouth 3 (three) times daily before meals.    LIPASE-PROTEASE-AMYLASE 24,000-76,000-120,000 UNITS (CREON) 24,000-76,000 -120,000 UNIT CAPSULE    Take 1 capsule by mouth 3 (three) times daily with meals.    NITROGLYCERIN (NITROSTAT) 0.4 MG SL TABLET    PLACE 1 TABLET UNDER THE TONGUE EVERY 5 MINUTES AS NEEDED FOR CHEST PAIN    POTASSIUM CHLORIDE (KLOR-CON) 10 MEQ TBSR    Take 1 tablet (10 mEq total) by mouth once daily.   Changed and/or Refilled Medications    Modified Medication Previous Medication    AMLODIPINE (NORVASC) 10 MG TABLET amLODIPine (NORVASC) 10  MG tablet       Take 1 tablet (10 mg total) by mouth once daily.    Take 1 tablet (10 mg total) by mouth once daily.   Discontinued Medications    PRAVASTATIN (PRAVACHOL) 40 MG TABLET    TAKE 1 TABLET BY MOUTH DAILY        This note is at least partially dictated using the M*Modal Fluency Direct word recognition program. There are word recognition mistakes that are occasionally missed on review.

## 2023-06-27 ENCOUNTER — CLINICAL SUPPORT (OUTPATIENT)
Dept: CARDIOLOGY | Facility: HOSPITAL | Age: 88
End: 2023-06-27
Payer: MEDICARE

## 2023-06-27 DIAGNOSIS — Z95.0 PRESENCE OF CARDIAC PACEMAKER: ICD-10-CM

## 2023-06-27 PROCEDURE — 93294 REM INTERROG EVL PM/LDLS PM: CPT | Mod: S$GLB,,, | Performed by: INTERNAL MEDICINE

## 2023-06-27 PROCEDURE — 93294 CARDIAC DEVICE CHECK - REMOTE: ICD-10-PCS | Mod: S$GLB,,, | Performed by: INTERNAL MEDICINE

## 2023-06-27 PROCEDURE — 93296 REM INTERROG EVL PM/IDS: CPT | Performed by: INTERNAL MEDICINE

## 2023-06-27 NOTE — PROGRESS NOTES
"Subjective:      Patient ID: Glo uDmont is a 87 y.o. female.    Chief Complaint: Follow-up (Pt is here for a follow up. No issues or concerns)      HPI  Here for follow up of medical problems.  Cards stopped pravastatin due to "no energy", and now feels better.  Buspar helps anxiety, using occasionally.  Feels depressed in past couple months. Weight stable.  BMs normal.    Updated/ annual due 10/23:  HM: 10/22 fluvax, 2/21 covid vaccines/booster, 3/15 [id number], 2/14 [id number], 10/22 [id number], 2/15 TDaP, 10/21 BMD rep 2y, 2/11 Cscope no repeat will be done, 1/17 MMG no more, Eye doc monthly, GI Dr. Galindo, Cards Dr. Lui, Nephro Dr. Villareal.     Review of Systems   Constitutional:  Negative for chills, diaphoresis and fever.   Respiratory:  Negative for cough and shortness of breath.    Cardiovascular:  Negative for chest pain, palpitations and leg swelling.   Gastrointestinal:  Negative for blood in stool, constipation, diarrhea, nausea and vomiting.   Genitourinary:  Negative for dysuria, frequency and hematuria.   Psychiatric/Behavioral:  The patient is not nervous/anxious.        Objective:   /62   Pulse 66   Temp 98.3 °F (36.8 °C) (Oral)   Ht 5' 1" (1.549 m)   SpO2 99%   BMI 27.45 kg/m²     Physical Exam  Constitutional:       Appearance: She is well-developed.   Neck:      Thyroid: No thyroid mass.      Vascular: No carotid bruit.   Cardiovascular:      Rate and Rhythm: Normal rate and regular rhythm.      Heart sounds: No murmur heard.    No friction rub. No gallop.   Pulmonary:      Effort: Pulmonary effort is normal.      Breath sounds: Normal breath sounds. No wheezing or rales.   Abdominal:      General: Bowel sounds are normal.      Palpations: Abdomen is soft. There is no mass.      Tenderness: There is no abdominal tenderness.   Musculoskeletal:      Cervical back: Neck supple.   Lymphadenopathy:      Cervical: No cervical adenopathy.   Neurological:      Mental Status: She is alert and " oriented to person, place, and time.         Assessment:       1. Essential hypertension    2. CKD (chronic kidney disease) stage 4, GFR 15-29 ml/min    3. Diastolic heart failure, NYHA class 3    4. Coronary artery disease, occlusive    5. Current mild episode of major depressive disorder without prior episode          Plan:     Essential hypertension- adeq control, cont meds.    CKD (chronic kidney disease) stage 4, GFR 15-29 ml/min- f/w Nephro, cont meds.    Diastolic heart failure, NYHA class 3, Coronary artery disease, occlusive- stable on meds per Cards.    Current mild episode of major depressive disorder without prior episode  -     citalopram (CELEXA) 10 MG tablet; Take 1 tablet (10 mg total) by mouth every evening.  Dispense: 30 tablet; Refill: 11    RTC 6wk.

## 2023-06-30 ENCOUNTER — OFFICE VISIT (OUTPATIENT)
Dept: PRIMARY CARE CLINIC | Facility: CLINIC | Age: 88
End: 2023-06-30
Payer: MEDICARE

## 2023-06-30 ENCOUNTER — PATIENT MESSAGE (OUTPATIENT)
Dept: CARDIOLOGY | Facility: CLINIC | Age: 88
End: 2023-06-30
Payer: MEDICARE

## 2023-06-30 VITALS
TEMPERATURE: 98 F | DIASTOLIC BLOOD PRESSURE: 62 MMHG | HEART RATE: 66 BPM | SYSTOLIC BLOOD PRESSURE: 138 MMHG | HEIGHT: 61 IN | BODY MASS INDEX: 27.45 KG/M2 | OXYGEN SATURATION: 99 %

## 2023-06-30 DIAGNOSIS — N18.4 CKD (CHRONIC KIDNEY DISEASE) STAGE 4, GFR 15-29 ML/MIN: ICD-10-CM

## 2023-06-30 DIAGNOSIS — I50.30 DIASTOLIC HEART FAILURE, NYHA CLASS 3: ICD-10-CM

## 2023-06-30 DIAGNOSIS — F32.0 CURRENT MILD EPISODE OF MAJOR DEPRESSIVE DISORDER WITHOUT PRIOR EPISODE: ICD-10-CM

## 2023-06-30 DIAGNOSIS — I10 ESSENTIAL HYPERTENSION: Primary | Chronic | ICD-10-CM

## 2023-06-30 DIAGNOSIS — I25.10 CORONARY ARTERY DISEASE, OCCLUSIVE: Chronic | ICD-10-CM

## 2023-06-30 PROCEDURE — 99999 PR PBB SHADOW E&M-EST. PATIENT-LVL IV: ICD-10-PCS | Mod: PBBFAC,,, | Performed by: INTERNAL MEDICINE

## 2023-06-30 PROCEDURE — 1159F PR MEDICATION LIST DOCUMENTED IN MEDICAL RECORD: ICD-10-PCS | Mod: CPTII,S$GLB,, | Performed by: INTERNAL MEDICINE

## 2023-06-30 PROCEDURE — 99999 PR PBB SHADOW E&M-EST. PATIENT-LVL IV: CPT | Mod: PBBFAC,,, | Performed by: INTERNAL MEDICINE

## 2023-06-30 PROCEDURE — 99213 PR OFFICE/OUTPT VISIT, EST, LEVL III, 20-29 MIN: ICD-10-PCS | Mod: S$GLB,,, | Performed by: INTERNAL MEDICINE

## 2023-06-30 PROCEDURE — 3078F DIAST BP <80 MM HG: CPT | Mod: CPTII,S$GLB,, | Performed by: INTERNAL MEDICINE

## 2023-06-30 PROCEDURE — 3075F PR MOST RECENT SYSTOLIC BLOOD PRESS GE 130-139MM HG: ICD-10-PCS | Mod: CPTII,S$GLB,, | Performed by: INTERNAL MEDICINE

## 2023-06-30 PROCEDURE — 99213 OFFICE O/P EST LOW 20 MIN: CPT | Mod: S$GLB,,, | Performed by: INTERNAL MEDICINE

## 2023-06-30 PROCEDURE — 1159F MED LIST DOCD IN RCRD: CPT | Mod: CPTII,S$GLB,, | Performed by: INTERNAL MEDICINE

## 2023-06-30 PROCEDURE — 3075F SYST BP GE 130 - 139MM HG: CPT | Mod: CPTII,S$GLB,, | Performed by: INTERNAL MEDICINE

## 2023-06-30 PROCEDURE — 3288F PR FALLS RISK ASSESSMENT DOCUMENTED: ICD-10-PCS | Mod: CPTII,S$GLB,, | Performed by: INTERNAL MEDICINE

## 2023-06-30 PROCEDURE — 1101F PT FALLS ASSESS-DOCD LE1/YR: CPT | Mod: CPTII,S$GLB,, | Performed by: INTERNAL MEDICINE

## 2023-06-30 PROCEDURE — 1101F PR PT FALLS ASSESS DOC 0-1 FALLS W/OUT INJ PAST YR: ICD-10-PCS | Mod: CPTII,S$GLB,, | Performed by: INTERNAL MEDICINE

## 2023-06-30 PROCEDURE — 3078F PR MOST RECENT DIASTOLIC BLOOD PRESSURE < 80 MM HG: ICD-10-PCS | Mod: CPTII,S$GLB,, | Performed by: INTERNAL MEDICINE

## 2023-06-30 PROCEDURE — 3288F FALL RISK ASSESSMENT DOCD: CPT | Mod: CPTII,S$GLB,, | Performed by: INTERNAL MEDICINE

## 2023-06-30 RX ORDER — AMLODIPINE BESYLATE 10 MG/1
10 TABLET ORAL DAILY
Qty: 30 TABLET | Refills: 1 | Status: SHIPPED | OUTPATIENT
Start: 2023-06-30 | End: 2023-08-01 | Stop reason: SDUPTHER

## 2023-06-30 RX ORDER — CITALOPRAM 10 MG/1
10 TABLET ORAL NIGHTLY
Qty: 30 TABLET | Refills: 11 | Status: SHIPPED | OUTPATIENT
Start: 2023-06-30 | End: 2024-06-29

## 2023-07-02 PROBLEM — I44.1 AV BLOCK, 2ND DEGREE: Status: ACTIVE | Noted: 2023-07-02

## 2023-07-03 ENCOUNTER — PATIENT MESSAGE (OUTPATIENT)
Dept: CARDIOLOGY | Facility: CLINIC | Age: 88
End: 2023-07-03
Payer: MEDICARE

## 2023-07-03 ENCOUNTER — TELEPHONE (OUTPATIENT)
Dept: PRIMARY CARE CLINIC | Facility: CLINIC | Age: 88
End: 2023-07-03
Payer: MEDICARE

## 2023-07-03 ENCOUNTER — LAB VISIT (OUTPATIENT)
Dept: LAB | Facility: HOSPITAL | Age: 88
End: 2023-07-03
Attending: INTERNAL MEDICINE
Payer: MEDICARE

## 2023-07-03 ENCOUNTER — PATIENT MESSAGE (OUTPATIENT)
Dept: PRIMARY CARE CLINIC | Facility: CLINIC | Age: 88
End: 2023-07-03
Payer: MEDICARE

## 2023-07-03 DIAGNOSIS — N18.4 CKD (CHRONIC KIDNEY DISEASE) STAGE 4, GFR 15-29 ML/MIN: ICD-10-CM

## 2023-07-03 DIAGNOSIS — R42 POSTURAL DIZZINESS WITH PRESYNCOPE: ICD-10-CM

## 2023-07-03 DIAGNOSIS — R42 POSTURAL DIZZINESS WITH PRESYNCOPE: Primary | ICD-10-CM

## 2023-07-03 DIAGNOSIS — I50.30 DIASTOLIC HEART FAILURE, NYHA CLASS 3: Primary | ICD-10-CM

## 2023-07-03 DIAGNOSIS — R55 POSTURAL DIZZINESS WITH PRESYNCOPE: Primary | ICD-10-CM

## 2023-07-03 DIAGNOSIS — R55 POSTURAL DIZZINESS WITH PRESYNCOPE: ICD-10-CM

## 2023-07-03 LAB
ANION GAP SERPL CALC-SCNC: 14 MMOL/L (ref 8–16)
BASOPHILS # BLD AUTO: 0.06 K/UL (ref 0–0.2)
BASOPHILS NFR BLD: 0.8 % (ref 0–1.9)
BUN SERPL-MCNC: 39 MG/DL (ref 8–23)
CALCIUM SERPL-MCNC: 10.1 MG/DL (ref 8.7–10.5)
CHLORIDE SERPL-SCNC: 110 MMOL/L (ref 95–110)
CO2 SERPL-SCNC: 15 MMOL/L (ref 23–29)
CREAT SERPL-MCNC: 2 MG/DL (ref 0.5–1.4)
DIFFERENTIAL METHOD: ABNORMAL
EOSINOPHIL # BLD AUTO: 0.1 K/UL (ref 0–0.5)
EOSINOPHIL NFR BLD: 1.7 % (ref 0–8)
ERYTHROCYTE [DISTWIDTH] IN BLOOD BY AUTOMATED COUNT: 16 % (ref 11.5–14.5)
EST. GFR  (NO RACE VARIABLE): 24 ML/MIN/1.73 M^2
GLUCOSE SERPL-MCNC: 125 MG/DL (ref 70–110)
HCT VFR BLD AUTO: 33.4 % (ref 37–48.5)
HGB BLD-MCNC: 10.8 G/DL (ref 12–16)
IMM GRANULOCYTES # BLD AUTO: 0.05 K/UL (ref 0–0.04)
IMM GRANULOCYTES NFR BLD AUTO: 0.6 % (ref 0–0.5)
LYMPHOCYTES # BLD AUTO: 0.7 K/UL (ref 1–4.8)
LYMPHOCYTES NFR BLD: 8.3 % (ref 18–48)
MCH RBC QN AUTO: 27.2 PG (ref 27–31)
MCHC RBC AUTO-ENTMCNC: 32.3 G/DL (ref 32–36)
MCV RBC AUTO: 84 FL (ref 82–98)
MONOCYTES # BLD AUTO: 0.5 K/UL (ref 0.3–1)
MONOCYTES NFR BLD: 6.9 % (ref 4–15)
NEUTROPHILS # BLD AUTO: 6.4 K/UL (ref 1.8–7.7)
NEUTROPHILS NFR BLD: 81.7 % (ref 38–73)
NRBC BLD-RTO: 0 /100 WBC
PLATELET # BLD AUTO: 247 K/UL (ref 150–450)
PMV BLD AUTO: 10.4 FL (ref 9.2–12.9)
POTASSIUM SERPL-SCNC: 4.4 MMOL/L (ref 3.5–5.1)
RBC # BLD AUTO: 3.97 M/UL (ref 4–5.4)
SODIUM SERPL-SCNC: 139 MMOL/L (ref 136–145)
WBC # BLD AUTO: 7.8 K/UL (ref 3.9–12.7)

## 2023-07-03 PROCEDURE — 80048 BASIC METABOLIC PNL TOTAL CA: CPT | Mod: HCNC | Performed by: FAMILY MEDICINE

## 2023-07-03 PROCEDURE — 85025 COMPLETE CBC W/AUTO DIFF WBC: CPT | Mod: HCNC | Performed by: FAMILY MEDICINE

## 2023-07-03 PROCEDURE — 36415 COLL VENOUS BLD VENIPUNCTURE: CPT | Mod: HCNC,PO | Performed by: FAMILY MEDICINE

## 2023-07-03 NOTE — TELEPHONE ENCOUNTER
I called and spoke with Jessica.   Pt felt very faint this mrita and broke out in cold sweat.   Daughter is concerned since this was similar to before when her electrolytes were off.   Pt Had diarrhea this morning. She gave pt anti-diarrheal. Symptoms have resolved.   Jessica is asking if she can get her labs scheduled to see what her K looks like.   Bp good. 150-160sbp.   I let her know I can schedule labs and notify the provider of her symptoms.   I advised ER if symptoms severe.

## 2023-07-03 NOTE — TELEPHONE ENCOUNTER
----- Message from Sharon Stoen MD sent at 7/3/2023  4:01 PM CDT -----  Regarding: labs TODAY  Please coordinate with daughter getting thsi patients labs done today. I ordered stat ua, bmp, and cbc. Looks like cardiology scheduled a bmp for 7/5/23 but much better to get it done today since she's unwell.

## 2023-07-05 ENCOUNTER — TELEPHONE (OUTPATIENT)
Dept: PRIMARY CARE CLINIC | Facility: CLINIC | Age: 88
End: 2023-07-05
Payer: MEDICARE

## 2023-07-05 ENCOUNTER — PATIENT MESSAGE (OUTPATIENT)
Dept: PRIMARY CARE CLINIC | Facility: CLINIC | Age: 88
End: 2023-07-05

## 2023-07-05 ENCOUNTER — OFFICE VISIT (OUTPATIENT)
Dept: PRIMARY CARE CLINIC | Facility: CLINIC | Age: 88
End: 2023-07-05
Payer: MEDICARE

## 2023-07-05 VITALS
BODY MASS INDEX: 26.91 KG/M2 | HEART RATE: 61 BPM | TEMPERATURE: 98 F | DIASTOLIC BLOOD PRESSURE: 70 MMHG | SYSTOLIC BLOOD PRESSURE: 132 MMHG | WEIGHT: 142.5 LBS | OXYGEN SATURATION: 98 % | HEIGHT: 61 IN

## 2023-07-05 DIAGNOSIS — N30.00 ACUTE CYSTITIS WITHOUT HEMATURIA: Primary | ICD-10-CM

## 2023-07-05 DIAGNOSIS — N18.4 CKD (CHRONIC KIDNEY DISEASE) STAGE 4, GFR 15-29 ML/MIN: ICD-10-CM

## 2023-07-05 DIAGNOSIS — R63.4 WEIGHT LOSS: ICD-10-CM

## 2023-07-05 DIAGNOSIS — I50.30 DIASTOLIC HEART FAILURE, NYHA CLASS 3: ICD-10-CM

## 2023-07-05 PROCEDURE — 3288F FALL RISK ASSESSMENT DOCD: CPT | Mod: HCNC,CPTII,S$GLB, | Performed by: FAMILY MEDICINE

## 2023-07-05 PROCEDURE — 99214 OFFICE O/P EST MOD 30 MIN: CPT | Mod: HCNC,S$GLB,, | Performed by: FAMILY MEDICINE

## 2023-07-05 PROCEDURE — 1159F MED LIST DOCD IN RCRD: CPT | Mod: HCNC,CPTII,S$GLB, | Performed by: FAMILY MEDICINE

## 2023-07-05 PROCEDURE — 1101F PT FALLS ASSESS-DOCD LE1/YR: CPT | Mod: HCNC,CPTII,S$GLB, | Performed by: FAMILY MEDICINE

## 2023-07-05 PROCEDURE — 99999 PR PBB SHADOW E&M-EST. PATIENT-LVL IV: ICD-10-PCS | Mod: PBBFAC,HCNC,, | Performed by: FAMILY MEDICINE

## 2023-07-05 PROCEDURE — 1160F RVW MEDS BY RX/DR IN RCRD: CPT | Mod: HCNC,CPTII,S$GLB, | Performed by: FAMILY MEDICINE

## 2023-07-05 PROCEDURE — 99214 PR OFFICE/OUTPT VISIT, EST, LEVL IV, 30-39 MIN: ICD-10-PCS | Mod: HCNC,S$GLB,, | Performed by: FAMILY MEDICINE

## 2023-07-05 PROCEDURE — 99999 PR PBB SHADOW E&M-EST. PATIENT-LVL IV: CPT | Mod: PBBFAC,HCNC,, | Performed by: FAMILY MEDICINE

## 2023-07-05 PROCEDURE — 1160F PR REVIEW ALL MEDS BY PRESCRIBER/CLIN PHARMACIST DOCUMENTED: ICD-10-PCS | Mod: HCNC,CPTII,S$GLB, | Performed by: FAMILY MEDICINE

## 2023-07-05 PROCEDURE — 3288F PR FALLS RISK ASSESSMENT DOCUMENTED: ICD-10-PCS | Mod: HCNC,CPTII,S$GLB, | Performed by: FAMILY MEDICINE

## 2023-07-05 PROCEDURE — 3078F DIAST BP <80 MM HG: CPT | Mod: HCNC,CPTII,S$GLB, | Performed by: FAMILY MEDICINE

## 2023-07-05 PROCEDURE — 3075F SYST BP GE 130 - 139MM HG: CPT | Mod: HCNC,CPTII,S$GLB, | Performed by: FAMILY MEDICINE

## 2023-07-05 PROCEDURE — 1159F PR MEDICATION LIST DOCUMENTED IN MEDICAL RECORD: ICD-10-PCS | Mod: HCNC,CPTII,S$GLB, | Performed by: FAMILY MEDICINE

## 2023-07-05 PROCEDURE — 3078F PR MOST RECENT DIASTOLIC BLOOD PRESSURE < 80 MM HG: ICD-10-PCS | Mod: HCNC,CPTII,S$GLB, | Performed by: FAMILY MEDICINE

## 2023-07-05 PROCEDURE — 3075F PR MOST RECENT SYSTOLIC BLOOD PRESS GE 130-139MM HG: ICD-10-PCS | Mod: HCNC,CPTII,S$GLB, | Performed by: FAMILY MEDICINE

## 2023-07-05 PROCEDURE — 1101F PR PT FALLS ASSESS DOC 0-1 FALLS W/OUT INJ PAST YR: ICD-10-PCS | Mod: HCNC,CPTII,S$GLB, | Performed by: FAMILY MEDICINE

## 2023-07-05 RX ORDER — PANCRELIPASE 24000; 76000; 120000 [USP'U]/1; [USP'U]/1; [USP'U]/1
1 CAPSULE, DELAYED RELEASE PELLETS ORAL
Qty: 90 CAPSULE | Refills: 11
Start: 2023-07-05 | End: 2023-07-05 | Stop reason: SDUPTHER

## 2023-07-05 RX ORDER — PANCRELIPASE 24000; 76000; 120000 [USP'U]/1; [USP'U]/1; [USP'U]/1
1 CAPSULE, DELAYED RELEASE PELLETS ORAL
Qty: 270 CAPSULE | Refills: 3 | Status: SHIPPED | OUTPATIENT
Start: 2023-07-05 | End: 2024-07-04

## 2023-07-05 RX ORDER — CIPROFLOXACIN 250 MG/1
250 TABLET, FILM COATED ORAL 2 TIMES DAILY
Qty: 10 TABLET | Refills: 0 | Status: SHIPPED | OUTPATIENT
Start: 2023-07-05 | End: 2023-07-10

## 2023-07-05 NOTE — PATIENT INSTRUCTIONS
If you are feeling unwell, we'd like to be the first ones to know here at [facility]! Please give us a call. Same day appointments are our top priority to keep you well and out of the emergency rooms and hospitals. Call [phone] for our direct line. After hours advice is always available. Please call [phone] after hours to speak to the on-call team.     Be sure to drink enough water each day; try filling a water bottle in the morning and making it a goal to finish it by the end of the day. Aim for about 50-60 ounces per day. This will keep your kidneys and your bladder healthy.    Add an Ensure or Boost to your diet if you're not hungry at mealtime.

## 2023-07-05 NOTE — ASSESSMENT & PLAN NOTE
BMP  Lab Results   Component Value Date     07/03/2023    K 4.4 07/03/2023     07/03/2023    CO2 15 (L) 07/03/2023    BUN 39 (H) 07/03/2023    CREATININE 2.0 (H) 07/03/2023    CALCIUM 10.1 07/03/2023    ANIONGAP 14 07/03/2023    EGFRNORACEVR 24 (A) 07/03/2023     eGFR 18-34 over the past 3 months. Encouraged starting a regimen of regular measured hydration daily, such as starting each day with a water bottle with goal of emptying it by the end of the day.

## 2023-07-05 NOTE — TELEPHONE ENCOUNTER
PC with daughter    Diarrhea started with citalopram.  Rec hold citalopram for now.  Take cipro course for UTI.  Will maybe try half tab of the ssri afterwards.  SM

## 2023-07-05 NOTE — PROGRESS NOTES
Subjective:       Patient ID: Glo Dumont is a 87 y.o. female.    Chief Complaint: Follow-up (Pt is here to go over lab results.)    HPI:     88 yo female here for acute visit to f/u message from her daughter 7/3/23 that she wasn't feeling labs done that evening and remarkable for slight decline in renal function and leukocytes present on urine micro. Her daughter joins for today's visit. She reports feeling ok today; had [place] toast for breakfast today. She does have h/o severe protein calorie malnutrition in the past with return to normal weight. Now with 5# weight loss noted over past 1 month. Denies any specific symptoms, no dysuria, frequency, urgency or pelvic pain. Urine culture non-specific with multiple organisms present. Denies any SOB, CP, SOOD. No falls or weakness. Denies any changes in her activity, energy, or appetite. Her daughter nilam has not noticed any major changes in functional status or cognition. Sleeping well; nl daily BM. Remains ambulatory.     Updated/ annual due 10/23:  HM: 10/22 fluvax, 2/21 covid vaccines/booster, 3/15 [id number], 2/14 [id number], 10/22 [id number], 2/15 TDaP, 10/21 BMD rep 2y, 2/11 Cscope no repeat will be done, 1/17 MMG no more, Eye doc monthly, GI Dr. Galindo, Cards Dr. Lui, Nephro Dr. Villareal.     Objective:     Vitals:    07/05/23 1100   BP: 132/70   Pulse: 61   Temp: 98.1 °F (36.7 °C)     Wt Readings from Last 3 Encounters:   07/05/23 64.6 kg (142 lb 8 oz)   06/23/23 65.9 kg (145 lb 4.5 oz)   06/09/23 67 kg (147 lb 11.3 oz)     Temp Readings from Last 3 Encounters:   07/05/23 98.1 °F (36.7 °C) (Oral)   06/30/23 98.3 °F (36.8 °C) (Oral)   05/16/23 98.1 °F (36.7 °C) (Oral)     BP Readings from Last 3 Encounters:   07/05/23 132/70   06/30/23 138/62   06/23/23 138/72     Pulse Readings from Last 3 Encounters:   07/05/23 61   06/30/23 66   06/23/23 67          Physical Exam  Vitals and nursing note reviewed.   Constitutional:       General: She is not in acute distress.      Appearance: She is well-developed and normal weight. She is not ill-appearing or toxic-appearing.   HENT:      Head: Normocephalic and atraumatic.      Right Ear: External ear normal.      Left Ear: External ear normal.      Nose: Nose normal. No congestion or rhinorrhea.   Eyes:      General: No scleral icterus.     Conjunctiva/sclera: Conjunctivae normal.      Pupils: Pupils are equal, round, and reactive to light.   Cardiovascular:      Rate and Rhythm: Normal rate and regular rhythm.      Pulses: Normal pulses.      Heart sounds: No murmur heard.  Pulmonary:      Effort: Pulmonary effort is normal. No respiratory distress.      Breath sounds: Normal breath sounds. No wheezing, rhonchi or rales.   Abdominal:      General: Bowel sounds are normal.      Palpations: Abdomen is soft. There is no mass.      Tenderness: There is no abdominal tenderness. There is no right CVA tenderness, left CVA tenderness or guarding.   Musculoskeletal:         General: No deformity. Normal range of motion.      Cervical back: Normal range of motion and neck supple.      Right lower leg: No edema.      Left lower leg: No edema.   Skin:     General: Skin is warm and dry.   Neurological:      Mental Status: She is alert and oriented to person, place, and time.   Psychiatric:         Mood and Affect: Mood normal.         Behavior: Behavior normal.         Thought Content: Thought content normal.         Judgment: Judgment normal.      Comments: Quiet, perhaps a little withdrawn         Albumin   Date Value Ref Range Status   05/14/2023 3.5 3.5 - 5.2 g/dL Final     eGFR   Date Value Ref Range Status   07/03/2023 24 (A) >60 mL/min/1.73 m^2 Final      Latest Reference Range & Units 07/03/23 16:40   WBC 3.90 - 12.70 K/uL 7.80   RBC 4.00 - 5.40 M/uL 3.97 (L)   Hemoglobin 12.0 - 16.0 g/dL 10.8 (L)   Hematocrit 37.0 - 48.5 % 33.4 (L)   MCV 82 - 98 fL 84   MCH 27.0 - 31.0 pg 27.2   MCHC 32.0 - 36.0 g/dL 32.3   RDW 11.5 - 14.5 % 16.0 (H)    Platelets 150 - 450 K/uL 247   MPV 9.2 - 12.9 fL 10.4   Gran % 38.0 - 73.0 % 81.7 (H)   Lymph % 18.0 - 48.0 % 8.3 (L)   Mono % 4.0 - 15.0 % 6.9   Eosinophil % 0.0 - 8.0 % 1.7   Basophil % 0.0 - 1.9 % 0.8   Immature Granulocytes 0.0 - 0.5 % 0.6 (H)   Gran # (ANC) 1.8 - 7.7 K/uL 6.4   Lymph # 1.0 - 4.8 K/uL 0.7 (L)   Mono # 0.3 - 1.0 K/uL 0.5   Eos # 0.0 - 0.5 K/uL 0.1   Baso # 0.00 - 0.20 K/uL 0.06   Immature Grans (Abs) 0.00 - 0.04 K/uL 0.05 (H)   nRBC 0 /100 WBC 0   Differential Method  Automated   Sodium 136 - 145 mmol/L 139   Potassium 3.5 - 5.1 mmol/L 4.4   Chloride 95 - 110 mmol/L 110   CO2 23 - 29 mmol/L 15 (L)   Anion Gap 8 - 16 mmol/L 14   BUN 8 - 23 mg/dL 39 (H)   Creatinine 0.5 - 1.4 mg/dL 2.0 (H)   eGFR >60 mL/min/1.73 m^2 24 !   Glucose 70 - 110 mg/dL 125 (H)   Calcium 8.7 - 10.5 mg/dL 10.1      [facility] Reference Range & Units 07/03/23 16:32   Specimen UA  Urine, Clean Catch   Color, UA Yellow, Straw, Terrie  Straw   Appearance, UA Clear  Clear   Specific Gravity, UA 1.005 - 1.030  1.010   pH, UA 5.0 - 8.0  5.0   Protein, UA Negative  Negative   Glucose, UA Negative  Negative   Ketones, UA Negative  Negative   Occult Blood UA Negative  Negative   NITRITE UA Negative  Negative   Bilirubin (UA) Negative  Negative   Leukocytes, UA Negative  2+ !   RBC, UA 0 - 4 /hpf 2   WBC, UA 0 - 5 /hpf 14 (H)   Bacteria, UA None-Occ /hpf Occasional   Squam Epithel, UA /hpf 1   Hyaline Casts, UA 0-1/lpf /lpf 18 !   Non-Squam Epith <1/hpf /hpf <1   Microscopic Comment  SEE COMMENT       Assessment:       1. Acute cystitis without hematuria    2. Weight loss    3. Diastolic heart failure, NYHA class 3    4. CKD (chronic kidney disease) stage 4, GFR 15-29 ml/min        Plan:           Problem List Items Addressed This Visit          Cardiac/Vascular    Diastolic heart failure, NYHA class 3    Current Assessment & Plan     compensated            Renal/    CKD (chronic kidney disease) stage 4, GFR 15-29 ml/min    Current  Assessment & Plan     BMP  Lab Results   Component Value Date     07/03/2023    K 4.4 07/03/2023     07/03/2023    CO2 15 (L) 07/03/2023    BUN 39 (H) 07/03/2023    CREATININE 2.0 (H) 07/03/2023    CALCIUM 10.1 07/03/2023    ANIONGAP 14 07/03/2023    EGFRNORACEVR 24 (A) 07/03/2023   eGFR 18-34 over the past 3 months. Encouraged starting a regimen of regular measured hydration daily, such as starting each day with a water bottle with goal of emptying it by the end of the day.             Endocrine    Weight loss    Current Assessment & Plan     Encouraged to add supplemental drinks as needed particularly if meals are skipped          Other Visit Diagnoses       Acute cystitis without hematuria    -  Primary    Relevant Medications    ciprofloxacin HCl (CIPRO) 250 MG tablet          Next appt scheduled for August with Dr. Cardona; advised to keep this appt and alert us to any changes in her health or condition between now and then. ACP at next visit with Dr. Cardona

## 2023-07-06 ENCOUNTER — PATIENT MESSAGE (OUTPATIENT)
Dept: PRIMARY CARE CLINIC | Facility: CLINIC | Age: 88
End: 2023-07-06
Payer: MEDICARE

## 2023-07-19 ENCOUNTER — PATIENT MESSAGE (OUTPATIENT)
Dept: PRIMARY CARE CLINIC | Facility: CLINIC | Age: 88
End: 2023-07-19

## 2023-07-19 ENCOUNTER — OFFICE VISIT (OUTPATIENT)
Dept: PRIMARY CARE CLINIC | Facility: CLINIC | Age: 88
End: 2023-07-19
Payer: MEDICARE

## 2023-07-19 DIAGNOSIS — R11.2 NAUSEA AND VOMITING, UNSPECIFIED VOMITING TYPE: Primary | ICD-10-CM

## 2023-07-19 DIAGNOSIS — N30.00 ACUTE CYSTITIS WITHOUT HEMATURIA: ICD-10-CM

## 2023-07-19 DIAGNOSIS — R10.9 ABDOMINAL CRAMPS: Primary | ICD-10-CM

## 2023-07-19 DIAGNOSIS — R10.84 GENERALIZED ABDOMINAL PAIN: ICD-10-CM

## 2023-07-19 LAB
BACTERIA #/AREA URNS AUTO: ABNORMAL /HPF
BILIRUB UR QL STRIP: NEGATIVE
CLARITY UR REFRACT.AUTO: CLEAR
COLOR UR AUTO: YELLOW
GLUCOSE UR QL STRIP: NEGATIVE
HGB UR QL STRIP: NEGATIVE
KETONES UR QL STRIP: NEGATIVE
LEUKOCYTE ESTERASE UR QL STRIP: ABNORMAL
MICROSCOPIC COMMENT: ABNORMAL
NITRITE UR QL STRIP: NEGATIVE
NON-SQ EPI CELLS #/AREA URNS AUTO: 1 /HPF
PH UR STRIP: 5 [PH] (ref 5–8)
PROT UR QL STRIP: NEGATIVE
RBC #/AREA URNS AUTO: 2 /HPF (ref 0–4)
SP GR UR STRIP: 1.01 (ref 1–1.03)
SQUAMOUS #/AREA URNS AUTO: 8 /HPF
URN SPEC COLLECT METH UR: ABNORMAL
WBC #/AREA URNS AUTO: 14 /HPF (ref 0–5)

## 2023-07-19 PROCEDURE — 87086 URINE CULTURE/COLONY COUNT: CPT | Mod: HCNC | Performed by: NURSE PRACTITIONER

## 2023-07-19 PROCEDURE — 81001 URINALYSIS AUTO W/SCOPE: CPT | Mod: HCNC | Performed by: NURSE PRACTITIONER

## 2023-07-19 PROCEDURE — S0119 PR ONDANSETRON, ORAL, 4MG: ICD-10-PCS | Mod: HCNC,S$GLB,, | Performed by: NURSE PRACTITIONER

## 2023-07-19 PROCEDURE — 99213 OFFICE O/P EST LOW 20 MIN: CPT | Mod: HCNC,S$GLB,, | Performed by: NURSE PRACTITIONER

## 2023-07-19 PROCEDURE — S0119 ONDANSETRON 4 MG: HCPCS | Mod: HCNC,S$GLB,, | Performed by: NURSE PRACTITIONER

## 2023-07-19 PROCEDURE — 99213 PR OFFICE/OUTPT VISIT, EST, LEVL III, 20-29 MIN: ICD-10-PCS | Mod: HCNC,S$GLB,, | Performed by: NURSE PRACTITIONER

## 2023-07-19 RX ORDER — ONDANSETRON 4 MG/1
4 TABLET, ORALLY DISINTEGRATING ORAL
Status: COMPLETED | OUTPATIENT
Start: 2023-07-19 | End: 2023-07-19

## 2023-07-19 RX ORDER — ONDANSETRON 4 MG/1
4 TABLET, ORALLY DISINTEGRATING ORAL EVERY 8 HOURS PRN
Qty: 12 TABLET | Refills: 0 | Status: SHIPPED | OUTPATIENT
Start: 2023-07-19 | End: 2023-09-11

## 2023-07-19 RX ORDER — DICYCLOMINE HYDROCHLORIDE 10 MG/1
10 CAPSULE ORAL EVERY 8 HOURS PRN
Qty: 30 CAPSULE | Refills: 0 | Status: SHIPPED | OUTPATIENT
Start: 2023-07-19 | End: 2023-08-18

## 2023-07-19 RX ADMIN — ONDANSETRON 4 MG: 4 TABLET, ORALLY DISINTEGRATING ORAL at 10:07

## 2023-07-19 NOTE — PROGRESS NOTES
Glo Dumont  07/19/2023  [id number]    Christina Cardona MD  Patient Care Team:  Christina Cardona MD as PCP - General (Internal Medicine)  Gordo Muir MD as Consulting Physician (Pulmonary Disease)  Nik Bergman MD (Inactive) as Consulting Physician (Cardiology)  Franky Bell Jr., MD (Rheumatology)  Julio Galindo MD as Consulting Physician (Gastroenterology)  Glenis Mcfadden MD as Consulting Physician (Otolaryngology)  Ayaz Estrada MD as Consulting Physician (Hematology and Oncology)  Yomi Guy LPN as Care Coordinator      [facility] Note      Chief Complaint:  Chief Complaint   Patient presents with    upset stomach    Stomach cramping    Vomiting     All symptoms since this morning.        History of Present Illness:  Pt presents,along with her daughter, with reports of abdominal cramping onset this AM associated with nausea/vomiting x 3. Pt has hx of chronic abdominal pain and diarrhea. The cramping did radiate to her lower back. She vomited small volumes of clear fluid. Denies fever and chills. Denies diarrhea. Had a normal BM this AM - no improvement in cramping after BM. She was given 2 Tums and currently the cramping has resolved. Daughter is concerned if this is symptoms of UTI.  Pt denies any  symptoms. Has just completed (7/10/23)  a 5 day course of Cipro 250 mg for acute cystitis with urine culture - non-specific with multiple organisms present. Weight today 142.5#, 2.78 # from last visit.   Saw GI in 2016 regarding abdominal pain and diarrhea.     Denies fever, chills, URI symptoms, chest pain, SOB, palpitations, diarrhea, no gross neuro deficits, urinary symptoms and leg swelling                   The following were reviewed: Active problem list, medication list, allergies, family history, social history, and Health Maintenance.     History:  Past Medical History:   Diagnosis Date    Anemia     Angina pectoris     Anxiety     Anxiety and depression     Arthritis      hip    Carotid artery occlusion     Carpal tunnel syndrome 06/23/2008    emg    Chronic diarrhea     work up in 2011 with EGD, CS and VCE    CKD (chronic kidney disease) stage 3, GFR 30-59 ml/min 5/11/2017    Colitis     Coronary artery disease     Coronary artery disease     Diastolic dysfunction     Diverticulosis     Glaucoma     Greater trochanteric bursitis 2/10/2015    Grief at loss of child 1/26/2016    H/O carotid endarterectomy 12/2/2013    Heart failure     History of coronary angioplasty 3/11/2014    Hypercholesteremia     Hypertension     Liver cyst 02/08/2013    ct abd    Macular degeneration     Obesity with serious comorbidity 3/19/2023    Primary open-angle glaucoma(365.11) 9/3/2013    Renal cyst 02/08/2013    ct abd    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    Sarcoidosis     Sarcoidosis of lung     Sickle cell trait     Uveitis      Past Surgical History:   Procedure Laterality Date    A-V CARDIAC PACEMAKER INSERTION Left 3/21/2023    Procedure: INSERTION, CARDIAC PACEMAKER, DUAL CHAMBER/His Lead;  Surgeon: Cortez Ambrose MD;  Location: [facility];  Service: Cardiology;  Laterality: Left;  MDT/ MD to confirm in am/possible nurse sedate    CAROTID ENDARTERECTOMY Right 2000s    CATARACT EXTRACTION Bilateral     Dr. Liang Dennis    CHOLECYSTECTOMY      laparoscopic, 3/18.    CORONARY ANGIOPLASTY WITH STENT PLACEMENT  11/19/2010    RCA-HALIE 2010    Lathia    JOINT REPLACEMENT Left 11/03/2014    Dr. Braun    TOTAL ABDOMINAL HYSTERECTOMY W/ BILATERAL SALPINGOOPHORECTOMY  1972     Family History   Problem Relation Age of Onset    Hypertension Mother     Heart failure Mother     Cancer Father         prostate    Cirrhosis Brother     Heart failure Brother     Cancer Daughter         Carcinoid     Patient Active Problem List   Diagnosis    Sarcoidosis of lung    Thyroid nodule    Hypertensive heart disease with heart failure    Other hyperlipidemia    Hemoglobin A-S genotype     Ptosis    Coronary artery disease, occlusive    History of central retinal artery occlusion    Iron deficiency anemia    Vitamin D deficiency    Osteopenia    Essential hypertension    Diastolic heart failure, NYHA class 3    Atherosclerosis of aorta    CKD (chronic kidney disease) stage 4, GFR 15-29 ml/min    Uveitis    Ulcerative colitis    History of coronary angioplasty    Hiatal hernia    Bradycardia    Lung nodule    Memory loss    Central retinal vein occlusion with macular edema of both eyes    Aortic stenosis    Weight loss    Unspecified severe protein-calorie malnutrition    Hypokalemia    Obesity with serious comorbidity    Hypomagnesemia    SA node dysfunction    S/P placement of cardiac pacemaker    Encounter for preventive health examination    Current mild episode of major depressive disorder without prior episode    AV block, 2nd degree    Acute cystitis without hematuria    Abdominal pain     Review of patient's allergies indicates:   Allergen Reactions    Lisinopril      angioedema    Codeine Nausea And Vomiting       Medications:  Current Outpatient Medications on File Prior to Visit   Medication Sig Dispense Refill    aflibercept 2 mg/0.05 mL Soln 2 mg by Intravitreal route every 28 days.      amLODIPine (NORVASC) 10 MG tablet Take 1 tablet (10 mg total) by mouth once daily. 30 tablet 1    aspirin (ECOTRIN) 81 MG EC tablet Take 81 mg by mouth once daily.      budesonide (ENTOCORT EC) 3 mg capsule Take 3 mg by mouth 2 (two) times a day.      busPIRone (BUSPAR) 5 MG Tab Take 1 tablet (5 mg total) by mouth 2 (two) times daily as needed (anxiety). 60 tablet 5    carvediloL (COREG) 25 MG tablet Take 1 tablet (25 mg total) by mouth 2 (two) times daily with meals. 60 tablet 11    citalopram (CELEXA) 10 MG tablet Take 1 tablet (10 mg total) by mouth every evening. 30 tablet 11    cyproheptadine (PERIACTIN) 4 mg tablet Take 1 tablet (4 mg total) by mouth 3 (three) times daily as needed. 270 tablet 3     dexAMETHasone (OZURDEX) 0.7 mg Impl intravitreal implant 0.7 mg by Intravitreal route once.      dorzolamide-timolol 2-0.5% (COSOPT) 22.3-6.8 mg/mL ophthalmic solution Place 1 drop into both eyes 2 (two) times daily. 10 mL 6    FOLIC ACID/MULTIVIT-MIN/LUTEIN (CENTRUM SILVER ORAL) Take 1 tablet by mouth once daily.      furosemide (LASIX) 40 MG tablet Take 1 tablet (40 mg total) by mouth once daily. 90 tablet 3    hydrALAZINE (APRESOLINE) 50 MG tablet Take 1 tablet (50 mg total) by mouth 3 (three) times daily before meals. 90 tablet 3    lipase-protease-amylase 24,000-76,000-120,000 units (CREON) 24,000-76,000 -120,000 unit capsule Take 1 capsule by mouth 3 (three) times daily with meals. 270 capsule 3    nitroGLYCERIN (NITROSTAT) 0.4 MG SL tablet PLACE 1 TABLET UNDER THE TONGUE EVERY 5 MINUTES AS NEEDED FOR CHEST PAIN 25 tablet 3    ondansetron (ZOFRAN-ODT) 4 MG TbDL Take 1 tablet (4 mg total) by mouth every 8 (eight) hours as needed (Nausea & vomiting). 12 tablet 0    potassium chloride (KLOR-CON) 10 MEQ TbSR Take 1 tablet (10 mEq total) by mouth once daily. 30 tablet 3     No current facility-administered medications on file prior to visit.       Medications have been reviewed and reconciled with patient at visit today.      Exam:  There were no vitals filed for this visit.      There is no height or weight on file to calculate BMI.      BP Readings from Last 3 Encounters:   07/05/23 132/70   06/30/23 138/62   06/23/23 138/72     Wt Readings from Last 3 Encounters:   07/05/23 1100 64.6 kg (142 lb 8 oz)   06/23/23 1456 65.9 kg (145 lb 4.5 oz)   06/09/23 0947 67 kg (147 lb 11.3 oz)        REVIEW OF SYSTEMS  Review of Systems   Constitutional:  Negative for chills, fever and malaise/fatigue.   HENT:  Negative for congestion, ear discharge, ear pain and sore throat.    Respiratory:  Positive for shortness of breath (mild). Negative for cough.    Cardiovascular:  Negative for chest pain, palpitations and leg swelling.    Gastrointestinal:  Positive for abdominal pain and vomiting. Negative for diarrhea and nausea.   Genitourinary:  Negative for dysuria and frequency.   Musculoskeletal:  Negative for back pain and myalgias.   Neurological:  Negative for dizziness, focal weakness and headaches.   Psychiatric/Behavioral:  Negative for depression. The patient is not nervous/anxious.      Physical Exam  Vitals reviewed.   Constitutional:       General: She is not in acute distress.     Appearance: Normal appearance.   HENT:      Head: Normocephalic and atraumatic.      Nose: Nose normal.      Mouth/Throat:      Mouth: Mucous membranes are moist.   Eyes:      General: No scleral icterus.     Conjunctiva/sclera: Conjunctivae normal.   Cardiovascular:      Rate and Rhythm: Normal rate and regular rhythm.      Heart sounds: No murmur heard.  Pulmonary:      Effort: Pulmonary effort is normal. No respiratory distress.      Breath sounds: Normal breath sounds.   Abdominal:      General: Bowel sounds are normal. There is no distension.      Palpations: Abdomen is soft. There is no mass.      Tenderness: There is no abdominal tenderness. There is no guarding.   Musculoskeletal:         General: No swelling or deformity.      Cervical back: Normal range of motion and neck supple.      Right lower leg: No edema.      Left lower leg: No edema.   Lymphadenopathy:      Cervical: No cervical adenopathy.   Skin:     General: Skin is warm and dry.   Neurological:      Mental Status: She is alert and oriented to person, place, and time. Mental status is at baseline.   Psychiatric:         Mood and Affect: Mood normal.         Thought Content: Thought content normal.       Laboratory Reviewed:     Lab Results   Component Value Date    WBC 7.80 07/03/2023    HGB 10.8 (L) 07/03/2023    HCT 33.4 (L) 07/03/2023     07/03/2023    CHOL 160 05/04/2022    TRIG 80 05/04/2022    HDL 81 (H) 05/04/2022    ALT 12 05/14/2023    AST 15 05/14/2023      07/03/2023    K 4.4 07/03/2023     07/03/2023    CREATININE 2.0 (H) 07/03/2023    BUN 39 (H) 07/03/2023    CO2 15 (L) 07/03/2023    TSH 1.230 12/13/2021    INR 1.0 09/09/2020    HGBA1C 5.7 11/17/2015       Screening or Prevention Patient's value Goal Complete/Controlled?   HgA1C Testing and Control   Lab Results   Component Value Date    HGBA1C 5.7 11/17/2015      Annually/Less than 8% No   Lipid profile : 05/04/2022 Annually No   LDL control Lab Results   Component Value Date    LDLCALC 63.0 05/04/2022    Annually/Less than 100 mg/dl  No   Nephropathy screening Lab Results   Component Value Date    LABMICR 38.0 02/06/2014     Lab Results   Component Value Date    PROTEINUA Negative 07/03/2023    Annually Yes   Blood pressure BP Readings from Last 1 Encounters:   07/05/23 132/70    Less than 140/90 Yes   Dilated retinal exam Most Recent Eye Exam Date: Not Found Annually Yes   Foot exam   Most Recent Foot Exam Date: Not Found Annually Yes       Health Maintenance  Health Maintenance Topics with due status: Not Due       Topic Last Completion Date    TETANUS VACCINE 02/03/2015    DEXA Scan 10/27/2021    Influenza Vaccine 10/31/2022    Aspirin/Antiplatelet Therapy 07/05/2023     Health Maintenance Due   Topic Date Due    Shingles Vaccine (1 of 2) Never done    COVID-19 Vaccine (4 - Pfizer series) 12/09/2021    Lipid Panel  05/04/2023       Assessment and Plan:  1. Acute cystitis without hematuria  Assessment & Plan:  Repeat U/A today  Completed Cipro x 5 days on 7/10/23      2. Generalized abdominal pain  Assessment & Plan:  Cramping has resolve at present  U/A pending   Zofran prn  Drink small volumes of fluid and if no further vomiting can steadily increase the volume.                    -Patient's lab results were reviewed and discussed with patient  -Treatment options and alternatives were discussed with the patient. Patient expressed understanding. Patient was given the opportunity to ask questions and be an  active participant in their medical care. Patient had no further questions or concerns at this time.         Future Appointments   Date Time Provider Department Center   7/19/2023  1:00 PM Paula Deal NP [facility]   7/26/2023  1:45 PM Sergio Lozada MD [facility] [place]   7/26/2023  2:45 PM Wai Sandoval OD [facility] [place]   8/24/2023  8:20 AM Christina Cardona MD [facility]   9/25/2023 10:00 AM HOME MONITOR DEVICE CHECK [facility] PRO [place]   12/15/2023  9:20 AM PACEMAKER CLINIC [facility] [place]          After visit summary printed and given to patient upon discharge.  Patient goals and care plan are included in After visit summary.    Total medical decision making time was 20 min.    The following issues were discussed: Diagnoses of Acute cystitis without hematuria and Generalized abdominal pain were pertinent to this visit.    Health maintenance needs, recent test results and goals of care discussed with pt and questions answered.           ARLENE Mcarthur, NP-C  [facility] [address] [place] [id number]

## 2023-07-19 NOTE — ASSESSMENT & PLAN NOTE
Cramping has resolve at present  U/A pending   Zofran prn  Drink small volumes of fluid and if no further vomiting can steadily increase the volume.

## 2023-07-21 LAB
BACTERIA UR CULT: NORMAL
BACTERIA UR CULT: NORMAL

## 2023-07-26 ENCOUNTER — OFFICE VISIT (OUTPATIENT)
Dept: OPHTHALMOLOGY | Facility: CLINIC | Age: 88
End: 2023-07-26
Payer: MEDICARE

## 2023-07-26 DIAGNOSIS — H35.353 CME (CYSTOID MACULAR EDEMA), BILATERAL: Primary | ICD-10-CM

## 2023-07-26 DIAGNOSIS — H52.213 IRREGULAR ASTIGMATISM OF BOTH EYES: ICD-10-CM

## 2023-07-26 PROCEDURE — 99204 OFFICE O/P NEW MOD 45 MIN: CPT | Mod: HCNC,S$GLB,, | Performed by: OPTOMETRIST

## 2023-07-26 PROCEDURE — 99204 PR OFFICE/OUTPT VISIT, NEW, LEVL IV, 45-59 MIN: ICD-10-PCS | Mod: HCNC,S$GLB,, | Performed by: OPTOMETRIST

## 2023-07-27 NOTE — PROGRESS NOTES
HPI     Concerns About Ocular Health            Comments: The patient is returning today for refraction and MOCT review.   The patient states her eyes are unchanged a this time.         Last edited by Shaina Coffey on 7/26/2023  1:59 PM.            Assessment /Plan     For exam results, see Encounter Report.    CME (cystoid macular edema), bilateral  Present on exam today OS>OD, appears worsened OS. Consult Dr BRIGHT for retinal eval next available.     Irregular astigmatism of both eyes    Minimal improvement with RGP potential performed in office today. Vision appears limited by retina. Consult Dr BRIGHT for retinal eval, can RTC with me after visit with JCC for repeat potential eval afterwards.     RTC w/ Dr Tamez for retinal eval                       Will increase Lantus to 14 units at bed time.  Will increase Humalog to 7 units before each meal in addition to Humalog correction scale coverage.  Patient counseled for compliance with consistent low carb diet.

## 2023-08-01 DIAGNOSIS — I10 ESSENTIAL HYPERTENSION: Chronic | ICD-10-CM

## 2023-08-01 RX ORDER — AMLODIPINE BESYLATE 10 MG/1
10 TABLET ORAL DAILY
Qty: 30 TABLET | Refills: 11 | Status: SHIPPED | OUTPATIENT
Start: 2023-08-01 | End: 2023-09-07

## 2023-08-01 RX ORDER — HYDRALAZINE HYDROCHLORIDE 25 MG/1
50 TABLET, FILM COATED ORAL 3 TIMES DAILY
Qty: 90 TABLET | Refills: 3 | Status: SHIPPED | OUTPATIENT
Start: 2023-08-01 | End: 2023-08-10

## 2023-08-07 PROBLEM — Z00.00 ENCOUNTER FOR PREVENTIVE HEALTH EXAMINATION: Status: RESOLVED | Noted: 2023-05-04 | Resolved: 2023-08-07

## 2023-08-09 ENCOUNTER — PROCEDURE VISIT (OUTPATIENT)
Dept: OPHTHALMOLOGY | Facility: CLINIC | Age: 88
End: 2023-08-09
Payer: MEDICARE

## 2023-08-09 DIAGNOSIS — H34.8130 CENTRAL RETINAL VEIN OCCLUSION WITH MACULAR EDEMA OF BOTH EYES: Primary | ICD-10-CM

## 2023-08-09 PROCEDURE — 99499 NO LOS: ICD-10-PCS | Mod: HCNC,S$GLB,, | Performed by: OPHTHALMOLOGY

## 2023-08-09 PROCEDURE — 92134 POSTERIOR SEGMENT OCT RETINA (OCULAR COHERENCE TOMOGRAPHY)-BOTH EYES: ICD-10-PCS | Mod: HCNC,S$GLB,, | Performed by: OPHTHALMOLOGY

## 2023-08-09 PROCEDURE — 99499 UNLISTED E&M SERVICE: CPT | Mod: HCNC,S$GLB,, | Performed by: OPHTHALMOLOGY

## 2023-08-09 PROCEDURE — 67028 INJECTION EYE DRUG: CPT | Mod: 50,HCNC,S$GLB, | Performed by: OPHTHALMOLOGY

## 2023-08-09 PROCEDURE — 92134 CPTRZ OPH DX IMG PST SGM RTA: CPT | Mod: HCNC,S$GLB,, | Performed by: OPHTHALMOLOGY

## 2023-08-09 PROCEDURE — 67028 PR INJECT INTRAVITREAL PHARMCOLOGIC: ICD-10-PCS | Mod: 50,HCNC,S$GLB, | Performed by: OPHTHALMOLOGY

## 2023-08-09 NOTE — PROGRESS NOTES
===============================  Date today is 8/9/2023  Glo Dumont is a 87 y.o. female  Last visit [facility]: :4/18/2023   Last visit eye dept. 7/26/2023    Corrected distance visual acuity was CF at 3' in the right eye and 20/100 in the left eye.  Tonometry       Tonometry (Applanation, 8:17 AM)         Right Left    Pressure 18 13                  Not recorded       Not recorded       Not recorded       Chief Complaint   Patient presents with    crvo     Ozurdex ou / pt states va is worse       Problem List Items Addressed This Visit          Eye/Vision problems    Central retinal vein occlusion with macular edema of both eyes - Primary    Relevant Medications    dexAMETHasone (OZURDEX) intravitreal implant (Completed)    dexAMETHasone (OZURDEX) intravitreal implant (Completed)    Other Relevant Orders    Posterior Segment OCT Retina-Both eyes (Completed)    Prior authorization Order     Instructed to call 24/7 for any worsening of vision, visual distortion or pain.  Check OU independently daily.    Gave my office and personal cell phone number.  ________________  8/9/2023 today  Glo Dumont    OU CRVO w/ME  OCT worse OU      8/9/2023  Diagnosis :  OU CRVO  Today:   Ozurdex 0.7 mg , OU   Follow up: Ozurdex OU in   10-12   weeks  Instructed to call 24/7 for any worsening of vision. Check Both eyes daily. Gave patient my home phone number.  Risks, benefits, and alternatives to treatment discussed in detail with the patient.  The patient voiced understanding and wished to proceed with the procedure    Ozurdex intravitreal implant Procedure Note:   y capsule intact?  Patient Identified and Time Out complete  Subconjunctival bleb - xylocaine with epi 2%   and Betadine.  Inject OU at 1mm parallel to limbus  Post Operative Dx: Same  Complications: None  Follow up as above.    =============================

## 2023-08-10 ENCOUNTER — OFFICE VISIT (OUTPATIENT)
Dept: CARDIOLOGY | Facility: CLINIC | Age: 88
End: 2023-08-10
Payer: MEDICARE

## 2023-08-10 ENCOUNTER — LAB VISIT (OUTPATIENT)
Dept: LAB | Facility: HOSPITAL | Age: 88
End: 2023-08-10
Attending: PHYSICIAN ASSISTANT
Payer: MEDICARE

## 2023-08-10 VITALS
DIASTOLIC BLOOD PRESSURE: 70 MMHG | BODY MASS INDEX: 27.18 KG/M2 | SYSTOLIC BLOOD PRESSURE: 140 MMHG | HEART RATE: 70 BPM | WEIGHT: 143.94 LBS | HEIGHT: 61 IN

## 2023-08-10 DIAGNOSIS — I11.0 HYPERTENSIVE HEART DISEASE WITH HEART FAILURE: ICD-10-CM

## 2023-08-10 DIAGNOSIS — I70.0 ATHEROSCLEROSIS OF AORTA: Primary | ICD-10-CM

## 2023-08-10 DIAGNOSIS — I70.0 ATHEROSCLEROSIS OF AORTA: ICD-10-CM

## 2023-08-10 DIAGNOSIS — I10 ESSENTIAL HYPERTENSION: Chronic | ICD-10-CM

## 2023-08-10 DIAGNOSIS — I25.10 CORONARY ARTERY DISEASE, OCCLUSIVE: Chronic | ICD-10-CM

## 2023-08-10 DIAGNOSIS — I50.30 DIASTOLIC HEART FAILURE, NYHA CLASS 3: ICD-10-CM

## 2023-08-10 PROCEDURE — 99999 PR PBB SHADOW E&M-EST. PATIENT-LVL III: CPT | Mod: PBBFAC,HCNC,, | Performed by: PHYSICIAN ASSISTANT

## 2023-08-10 PROCEDURE — 1126F PR PAIN SEVERITY QUANTIFIED, NO PAIN PRESENT: ICD-10-PCS | Mod: HCNC,CPTII,S$GLB, | Performed by: PHYSICIAN ASSISTANT

## 2023-08-10 PROCEDURE — 99214 OFFICE O/P EST MOD 30 MIN: CPT | Mod: HCNC,S$GLB,, | Performed by: PHYSICIAN ASSISTANT

## 2023-08-10 PROCEDURE — 3078F DIAST BP <80 MM HG: CPT | Mod: HCNC,CPTII,S$GLB, | Performed by: PHYSICIAN ASSISTANT

## 2023-08-10 PROCEDURE — 99999 PR PBB SHADOW E&M-EST. PATIENT-LVL III: ICD-10-PCS | Mod: PBBFAC,HCNC,, | Performed by: PHYSICIAN ASSISTANT

## 2023-08-10 PROCEDURE — 3077F PR MOST RECENT SYSTOLIC BLOOD PRESSURE >= 140 MM HG: ICD-10-PCS | Mod: HCNC,CPTII,S$GLB, | Performed by: PHYSICIAN ASSISTANT

## 2023-08-10 PROCEDURE — 1160F RVW MEDS BY RX/DR IN RCRD: CPT | Mod: HCNC,CPTII,S$GLB, | Performed by: PHYSICIAN ASSISTANT

## 2023-08-10 PROCEDURE — 3077F SYST BP >= 140 MM HG: CPT | Mod: HCNC,CPTII,S$GLB, | Performed by: PHYSICIAN ASSISTANT

## 2023-08-10 PROCEDURE — 99214 PR OFFICE/OUTPT VISIT, EST, LEVL IV, 30-39 MIN: ICD-10-PCS | Mod: HCNC,S$GLB,, | Performed by: PHYSICIAN ASSISTANT

## 2023-08-10 PROCEDURE — 80048 BASIC METABOLIC PNL TOTAL CA: CPT | Mod: HCNC | Performed by: PHYSICIAN ASSISTANT

## 2023-08-10 PROCEDURE — 1126F AMNT PAIN NOTED NONE PRSNT: CPT | Mod: HCNC,CPTII,S$GLB, | Performed by: PHYSICIAN ASSISTANT

## 2023-08-10 PROCEDURE — 1160F PR REVIEW ALL MEDS BY PRESCRIBER/CLIN PHARMACIST DOCUMENTED: ICD-10-PCS | Mod: HCNC,CPTII,S$GLB, | Performed by: PHYSICIAN ASSISTANT

## 2023-08-10 PROCEDURE — 93005 ELECTROCARDIOGRAM TRACING: CPT | Mod: HCNC

## 2023-08-10 PROCEDURE — 3078F PR MOST RECENT DIASTOLIC BLOOD PRESSURE < 80 MM HG: ICD-10-PCS | Mod: HCNC,CPTII,S$GLB, | Performed by: PHYSICIAN ASSISTANT

## 2023-08-10 PROCEDURE — 36415 COLL VENOUS BLD VENIPUNCTURE: CPT | Mod: HCNC | Performed by: PHYSICIAN ASSISTANT

## 2023-08-10 PROCEDURE — 83880 ASSAY OF NATRIURETIC PEPTIDE: CPT | Mod: HCNC | Performed by: PHYSICIAN ASSISTANT

## 2023-08-10 PROCEDURE — 1159F PR MEDICATION LIST DOCUMENTED IN MEDICAL RECORD: ICD-10-PCS | Mod: HCNC,CPTII,S$GLB, | Performed by: PHYSICIAN ASSISTANT

## 2023-08-10 PROCEDURE — 93010 ELECTROCARDIOGRAM REPORT: CPT | Mod: HCNC,S$GLB,, | Performed by: INTERNAL MEDICINE

## 2023-08-10 PROCEDURE — 93010 EKG 12-LEAD: ICD-10-PCS | Mod: HCNC,S$GLB,, | Performed by: INTERNAL MEDICINE

## 2023-08-10 PROCEDURE — 1159F MED LIST DOCD IN RCRD: CPT | Mod: HCNC,CPTII,S$GLB, | Performed by: PHYSICIAN ASSISTANT

## 2023-08-10 NOTE — PROGRESS NOTES
[facility] Provider Note (Follow-up) Consult Note      HPI:  Patient can walk without SOB   Patient sleeps on 2 pillows   Patient wakes up SOB, has to get out of bed, associated cough, sputum none   Palpitations - none   Dizzy, light-headed, pre-syncope or syncope none   Since discharge frequency of performing weights, home weight and weight change 65 kg   Other information felt pertinent to HPI    HEre for add on visit for chest heaviness and SOB. On lasix 40 mg daily. States she feels mainly at night while asleep. Wakes up and sits up. Does not happen when she is up walking around. Some mild SOB, no orthopnea.     No LE edema. Urinating well. BP well controlled.     No fever, nausea.     Stress test in 2022 was WNL     PHYSICAL:   Vitals:    08/10/23 1245   BP: (!) 140/70   Pulse: 70      Wt Readings from Last 3 Encounters:   08/10/23 65.3 kg (143 lb 15.4 oz)   07/05/23 64.6 kg (142 lb 8 oz)   06/23/23 65.9 kg (145 lb 4.5 oz)       JVD: no,    Heart rhythm: regular  Cardiac murmur: No    S3: no  S4: no  Lungs: clear  Liver span: 10 cm:   Hepatojugular reflux: no  Edema: no,       ASSESSMENT: chronic diastloic HF    PLAN:      Patient Instructions:   Instruct the patient to notify this clinic if HH, a physician or an advanced care provider wants to change medication one of their HF medications   Activity and Diet restrictions:   Recommend 2-3 gram sodium restriction and 1500cc- 2000cc fluid restriction.  Encourage physical activity with graded exercise program.  Requested patient to weigh themselves daily, and to notify us if their weight increases by more than 3 lbs in 1 day or 5 lbs in 3 days.    Assigned dry weight on home scale: 64 kg  Medication changes (include current dose and changed dose)  Increase lasix to 40 mg BID then resume 40 daily  Continue GDMT  EKG in office  Will schedule labs and nuclear stress test.   Upcoming labs and date anticipated: RTC in 3 months, will call with stress results

## 2023-08-11 ENCOUNTER — HOSPITAL ENCOUNTER (EMERGENCY)
Facility: HOSPITAL | Age: 88
Discharge: HOME | End: 2023-08-12
Attending: EMERGENCY MEDICINE
Payer: MEDICARE

## 2023-08-11 DIAGNOSIS — R07.9 CHEST PAIN, UNSPECIFIED TYPE: Primary | ICD-10-CM

## 2023-08-11 DIAGNOSIS — R07.9 CHEST PAIN: ICD-10-CM

## 2023-08-11 LAB
ALBUMIN SERPL BCP-MCNC: 3.3 G/DL (ref 3.5–5.2)
ALP SERPL-CCNC: 59 U/L (ref 55–135)
ALT SERPL W/O P-5'-P-CCNC: 9 U/L (ref 10–44)
ANION GAP SERPL CALC-SCNC: 10 MMOL/L (ref 8–16)
ANION GAP SERPL CALC-SCNC: 11 MMOL/L (ref 8–16)
AST SERPL-CCNC: 11 U/L (ref 10–40)
BASOPHILS # BLD AUTO: 0.06 K/UL (ref 0–0.2)
BASOPHILS NFR BLD: 0.6 % (ref 0–1.9)
BILIRUB SERPL-MCNC: 0.2 MG/DL (ref 0.1–1)
BNP SERPL-MCNC: 149 PG/ML (ref 0–99)
BNP SERPL-MCNC: 170 PG/ML (ref 0–99)
BUN SERPL-MCNC: 33 MG/DL (ref 8–23)
BUN SERPL-MCNC: 40 MG/DL (ref 8–23)
CALCIUM SERPL-MCNC: 9.6 MG/DL (ref 8.7–10.5)
CALCIUM SERPL-MCNC: 9.7 MG/DL (ref 8.7–10.5)
CHLORIDE SERPL-SCNC: 111 MMOL/L (ref 95–110)
CHLORIDE SERPL-SCNC: 111 MMOL/L (ref 95–110)
CO2 SERPL-SCNC: 18 MMOL/L (ref 23–29)
CO2 SERPL-SCNC: 19 MMOL/L (ref 23–29)
CREAT SERPL-MCNC: 1.9 MG/DL (ref 0.5–1.4)
CREAT SERPL-MCNC: 1.9 MG/DL (ref 0.5–1.4)
DIFFERENTIAL METHOD: ABNORMAL
EOSINOPHIL # BLD AUTO: 0.3 K/UL (ref 0–0.5)
EOSINOPHIL NFR BLD: 2.8 % (ref 0–8)
ERYTHROCYTE [DISTWIDTH] IN BLOOD BY AUTOMATED COUNT: 16.2 % (ref 11.5–14.5)
EST. GFR  (NO RACE VARIABLE): 25.6 ML/MIN/1.73 M^2
EST. GFR  (NO RACE VARIABLE): 26 ML/MIN/1.73 M^2
GLUCOSE SERPL-MCNC: 135 MG/DL (ref 70–110)
GLUCOSE SERPL-MCNC: 96 MG/DL (ref 70–110)
HCT VFR BLD AUTO: 27.5 % (ref 37–48.5)
HGB BLD-MCNC: 9.2 G/DL (ref 12–16)
IMM GRANULOCYTES # BLD AUTO: 0.24 K/UL (ref 0–0.04)
IMM GRANULOCYTES NFR BLD AUTO: 2.4 % (ref 0–0.5)
LYMPHOCYTES # BLD AUTO: 0.9 K/UL (ref 1–4.8)
LYMPHOCYTES NFR BLD: 8.7 % (ref 18–48)
MCH RBC QN AUTO: 27.2 PG (ref 27–31)
MCHC RBC AUTO-ENTMCNC: 33.5 G/DL (ref 32–36)
MCV RBC AUTO: 81 FL (ref 82–98)
MONOCYTES # BLD AUTO: 0.9 K/UL (ref 0.3–1)
MONOCYTES NFR BLD: 9.1 % (ref 4–15)
NEUTROPHILS # BLD AUTO: 7.6 K/UL (ref 1.8–7.7)
NEUTROPHILS NFR BLD: 76.4 % (ref 38–73)
NRBC BLD-RTO: 0 /100 WBC
PLATELET # BLD AUTO: 286 K/UL (ref 150–450)
PMV BLD AUTO: 9.5 FL (ref 9.2–12.9)
POTASSIUM SERPL-SCNC: 4.1 MMOL/L (ref 3.5–5.1)
POTASSIUM SERPL-SCNC: 4.4 MMOL/L (ref 3.5–5.1)
PROT SERPL-MCNC: 7.1 G/DL (ref 6–8.4)
RBC # BLD AUTO: 3.38 M/UL (ref 4–5.4)
SODIUM SERPL-SCNC: 140 MMOL/L (ref 136–145)
SODIUM SERPL-SCNC: 140 MMOL/L (ref 136–145)
TROPONIN I SERPL DL<=0.01 NG/ML-MCNC: 0.02 NG/ML (ref 0–0.03)
WBC # BLD AUTO: 9.89 K/UL (ref 3.9–12.7)

## 2023-08-11 PROCEDURE — 80053 COMPREHEN METABOLIC PANEL: CPT | Mod: HCNC | Performed by: EMERGENCY MEDICINE

## 2023-08-11 PROCEDURE — 25000003 PHARM REV CODE 250: Mod: HCNC | Performed by: EMERGENCY MEDICINE

## 2023-08-11 PROCEDURE — 83880 ASSAY OF NATRIURETIC PEPTIDE: CPT | Mod: HCNC | Performed by: EMERGENCY MEDICINE

## 2023-08-11 PROCEDURE — 84484 ASSAY OF TROPONIN QUANT: CPT | Mod: HCNC | Performed by: EMERGENCY MEDICINE

## 2023-08-11 PROCEDURE — 85025 COMPLETE CBC W/AUTO DIFF WBC: CPT | Mod: HCNC | Performed by: EMERGENCY MEDICINE

## 2023-08-11 RX ORDER — ASPIRIN 325 MG
325 TABLET ORAL
Status: COMPLETED | OUTPATIENT
Start: 2023-08-11 | End: 2023-08-11

## 2023-08-11 RX ADMIN — ASPIRIN 325 MG ORAL TABLET 325 MG: 325 PILL ORAL at 11:08

## 2023-08-11 RX ADMIN — NITROGLYCERIN 1 INCH: 20 OINTMENT TOPICAL at 11:08

## 2023-08-12 VITALS
WEIGHT: 143 LBS | TEMPERATURE: 99 F | DIASTOLIC BLOOD PRESSURE: 72 MMHG | OXYGEN SATURATION: 98 % | HEIGHT: 61 IN | HEART RATE: 73 BPM | SYSTOLIC BLOOD PRESSURE: 138 MMHG | BODY MASS INDEX: 27 KG/M2 | RESPIRATION RATE: 18 BRPM

## 2023-08-12 LAB — TROPONIN I SERPL DL<=0.01 NG/ML-MCNC: 0.01 NG/ML (ref 0–0.03)

## 2023-08-12 PROCEDURE — 93010 ELECTROCARDIOGRAM REPORT: CPT | Mod: HCNC,,, | Performed by: INTERNAL MEDICINE

## 2023-08-12 PROCEDURE — 93010 EKG 12-LEAD: ICD-10-PCS | Mod: HCNC,,, | Performed by: INTERNAL MEDICINE

## 2023-08-12 PROCEDURE — 99285 EMERGENCY DEPT VISIT HI MDM: CPT | Mod: 25,27,HCNC

## 2023-08-12 PROCEDURE — 84484 ASSAY OF TROPONIN QUANT: CPT | Mod: HCNC | Performed by: EMERGENCY MEDICINE

## 2023-08-12 PROCEDURE — 93005 ELECTROCARDIOGRAM TRACING: CPT | Mod: HCNC

## 2023-08-12 NOTE — ED PROVIDER NOTES
SCRIBE #1 NOTE: I, Paula Lemus, am scribing for, and in the presence of, Lion Alejo Jr., MD. I have scribed the entire note.       History     Chief Complaint   Patient presents with    Chest Pain     Heaviness for a week but pain started tonight      Review of patient's allergies indicates:   Allergen Reactions    Lisinopril      angioedema    Codeine Nausea And Vomiting         History of Present Illness     HPI    8/11/2023, 10:46 PM  History obtained from the patient      History of Present Illness: Glo Dumont is a 87 y.o. female patient with a PMHx of anemia, angina pectoris, anxiety, carotid artery occlusion, coronary artery disease, diastolic dysfunction, heart failure, coronary angioplasty, hypercholesteremia, and hypertension who presents to the Emergency Department for evaluation of chest pain which onset 1 day PTA. Symptoms are constant and moderate in severity. Sxs started with chest heaviness. She followed up with LYDIA Lee, her cardiologist on 8/10. She had an EKG and blood work done which had no acute findings. However, she started having back pain that radiated to LUE that started several hours PTA. Pt was laying in bed when pain started. She denied any heavy lifting or activity change that could have provoked the pain. She has a Hx of stent and pacemaker. Pt does not have chest pain at bedside. No further complaints or concerns at this time.       Arrival mode: Personal vehicle      PCP: Christina Cardona MD        Past Medical History:  Past Medical History:   Diagnosis Date    Anemia     Angina pectoris     Anxiety     Anxiety and depression     Arthritis     hip    Carotid artery occlusion     Carpal tunnel syndrome 06/23/2008    emg    Chronic diarrhea     work up in 2011 with EGD, CS and VCE    CKD (chronic kidney disease) stage 3, GFR 30-59 ml/min 5/11/2017    Colitis     Coronary artery disease     Coronary artery disease     Diastolic dysfunction     Diverticulosis      Glaucoma     Greater trochanteric bursitis 2/10/2015    Grief at loss of child 1/26/2016    H/O carotid endarterectomy 12/2/2013    Heart failure     History of coronary angioplasty 3/11/2014    Hypercholesteremia     Hypertension     Liver cyst 02/08/2013    ct abd    Macular degeneration     Obesity with serious comorbidity 3/19/2023    Primary open-angle glaucoma(365.11) 9/3/2013    Renal cyst 02/08/2013    ct abd    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    Sarcoidosis     Sarcoidosis of lung     Sickle cell trait     Uveitis        Past Surgical History:  Past Surgical History:   Procedure Laterality Date    A-V CARDIAC PACEMAKER INSERTION Left 3/21/2023    Procedure: INSERTION, CARDIAC PACEMAKER, DUAL CHAMBER/His Lead;  Surgeon: Cortez Ambrose MD;  Location: [facility] CATH LAB;  Service: Cardiology;  Laterality: Left;  MDT/ MD to confirm in am/possible nurse sedate    CAROTID ENDARTERECTOMY Right 2000s    CATARACT EXTRACTION Bilateral     Dr. Liang Dennis    CHOLECYSTECTOMY      laparoscopic, 3/18.    CORONARY ANGIOPLASTY WITH STENT PLACEMENT  11/19/2010    RCA-HALIE 2010    Lathia    JOINT REPLACEMENT Left 11/03/2014    Dr. Braun    TOTAL ABDOMINAL HYSTERECTOMY W/ BILATERAL SALPINGOOPHORECTOMY  1972         Family History:  Family History   Problem Relation Age of Onset    Hypertension Mother     Heart failure Mother     Cancer Father         prostate    Cirrhosis Brother     Heart failure Brother     Cancer Daughter         Carcinoid       Social History:  Social History     Tobacco Use    Smoking status: Never    Smokeless tobacco: Never   Substance and Sexual Activity    Alcohol use: No     Alcohol/week: 0.0 standard drinks of alcohol    Drug use: No    Sexual activity: Yes     Partners: Male        Review of Systems     Review of Systems   Cardiovascular:  Positive for chest pain.   Musculoskeletal:  Positive for back pain and myalgias (LUE).      Physical Exam     Initial Vitals  "[08/11/23 2227]   BP Pulse Resp Temp SpO2   (!) 166/75 92 16 98.9 °F (37.2 °C) 96 %      MAP       --          Physical Exam  Nursing Notes and Vital Signs Reviewed.  Constitutional: Patient is in no acute distress. Well-developed and well-nourished.  Head: Atraumatic. Normocephalic.  Eyes:  EOM intact.  No scleral icterus.  ENT: Mucous membranes are moist.  Nares clear   Neck:  Full ROM. No JVD.  Cardiovascular: Regular rate. Regular rhythm No murmurs, rubs, or gallops. Distal pulses are 2+ and symmetric  Pulmonary/Chest: No respiratory distress. Clear to auscultation bilaterally. No wheezing or rales.  Equal chest wall rise bilaterally  Abdominal: Soft and non-distended.  There is no tenderness.  No rebound, guarding, or rigidity. Good bowel sounds.  Genitourinary: No CVA tenderness.  No suprapubic tenderness  Musculoskeletal: Moves all extremities. No obvious deformities.  5 x 5 strength in all extremities .  No reproducible chest tenderness.  No calf swelling or tenderness  Skin: Warm and dry.  Neurological:  Alert, awake, and appropriate.  Normal speech.  No acute focal neurological deficits are appreciated.  Two through 12 intact bilaterally.  Psychiatric: Normal affect. Good eye contact. Appropriate in content.      ED Course   Procedures  ED Vital Signs:  Vitals:    08/11/23 2227 08/11/23 2321 08/11/23 2322 08/11/23 2333   BP: (!) 166/75 (!) 152/72  (!) 152/76   Pulse: 92 81  80   Resp: 16  (!) 24 19   Temp: 98.9 °F (37.2 °C)      TempSrc: Oral      SpO2: 96% 95%  96%   Weight: 64.9 kg (143 lb)      Height: 5' 1" (1.549 m)       08/12/23 0004 08/12/23 0033 08/12/23 0038   BP: (!) 151/71 136/73 136/73   Pulse: 81 76 75   Resp:  19 20   Temp:   98.8 °F (37.1 °C)   TempSrc:   Oral   SpO2: 98% 97% 96%   Weight:      Height:          Abnormal Lab Results:  Labs Reviewed   CBC W/ AUTO DIFFERENTIAL - Abnormal; Notable for the following components:       Result Value    RBC 3.38 (*)     Hemoglobin 9.2 (*)     " Hematocrit 27.5 (*)     MCV 81 (*)     RDW 16.2 (*)     Immature Granulocytes 2.4 (*)     Immature Grans (Abs) 0.24 (*)     Lymph # 0.9 (*)     Gran % 76.4 (*)     Lymph % 8.7 (*)     All other components within normal limits   COMPREHENSIVE METABOLIC PANEL - Abnormal; Notable for the following components:    Chloride 111 (*)     CO2 18 (*)     Glucose 135 (*)     BUN 40 (*)     Creatinine 1.9 (*)     Albumin 3.3 (*)     ALT 9 (*)     eGFR 26 (*)     All other components within normal limits   B-TYPE NATRIURETIC PEPTIDE - Abnormal; Notable for the following components:     (*)     All other components within normal limits   TROPONIN I   TROPONIN I        All Lab Results:  Results for orders placed or performed during the hospital encounter of 08/11/23   CBC auto differential   Result Value Ref Range    WBC 9.89 3.90 - 12.70 K/uL    RBC 3.38 (L) 4.00 - 5.40 M/uL    Hemoglobin 9.2 (L) 12.0 - 16.0 g/dL    Hematocrit 27.5 (L) 37.0 - 48.5 %    MCV 81 (L) 82 - 98 fL    MCH 27.2 27.0 - 31.0 pg    MCHC 33.5 32.0 - 36.0 g/dL    RDW 16.2 (H) 11.5 - 14.5 %    Platelets 286 150 - 450 K/uL    MPV 9.5 9.2 - 12.9 fL    Immature Granulocytes 2.4 (H) 0.0 - 0.5 %    Gran # (ANC) 7.6 1.8 - 7.7 K/uL    Immature Grans (Abs) 0.24 (H) 0.00 - 0.04 K/uL    Lymph # 0.9 (L) 1.0 - 4.8 K/uL    Mono # 0.9 0.3 - 1.0 K/uL    Eos # 0.3 0.0 - 0.5 K/uL    Baso # 0.06 0.00 - 0.20 K/uL    nRBC 0 0 /100 WBC    Gran % 76.4 (H) 38.0 - 73.0 %    Lymph % 8.7 (L) 18.0 - 48.0 %    Mono % 9.1 4.0 - 15.0 %    Eosinophil % 2.8 0.0 - 8.0 %    Basophil % 0.6 0.0 - 1.9 %    Differential Method Automated    Comprehensive metabolic panel   Result Value Ref Range    Sodium 140 136 - 145 mmol/L    Potassium 4.1 3.5 - 5.1 mmol/L    Chloride 111 (H) 95 - 110 mmol/L    CO2 18 (L) 23 - 29 mmol/L    Glucose 135 (H) 70 - 110 mg/dL    BUN 40 (H) 8 - 23 mg/dL    Creatinine 1.9 (H) 0.5 - 1.4 mg/dL    Calcium 9.6 8.7 - 10.5 mg/dL    Total Protein 7.1 6.0 - 8.4 g/dL     Albumin 3.3 (L) 3.5 - 5.2 g/dL    Total Bilirubin 0.2 0.1 - 1.0 mg/dL    Alkaline Phosphatase 59 55 - 135 U/L    AST 11 10 - 40 U/L    ALT 9 (L) 10 - 44 U/L    eGFR 26 (A) >60 mL/min/1.73 m^2    Anion Gap 11 8 - 16 mmol/L   Troponin I #1   Result Value Ref Range    Troponin I 0.016 0.000 - 0.026 ng/mL   BNP   Result Value Ref Range     (H) 0 - 99 pg/mL   Troponin I #2   Result Value Ref Range    Troponin I 0.014 0.000 - 0.026 ng/mL     *Note: Due to a large number of results and/or encounters for the requested time period, some results have not been displayed. A complete set of results can be found in Results Review.        Imaging Results:  Imaging Results              X-Ray Chest AP Portable (Preliminary result)  Result time 08/12/23 00:32:27      Wet Read by Lion Alejo Jr., MD (08/12/23 00:32:27, '[facility] - Emergency Dept., Emergency Medicine)    No acute findings                                     The EKG was ordered, reviewed, and independently interpreted by the ED provider.  Interpretation time: 22:28  Rate: 89 BPM  Rhythm: normal sinus rhythm  Interpretation: Moderate voltage criteria for LVH, may be normal variant ( R in aVL, [place] product). No STEMI.    The EKG was ordered, reviewed, and independently interpreted by the ED provider.  Interpretation time: 00:08  Rate: 77 BPM  Rhythm: normal sinus rhythm  Interpretation: Moderate voltage criteria for LVH, may be normal variant. No STEMI.               The Emergency Provider reviewed the vital signs and test results, which are outlined above.     ED Discussion     2:37 AM: Reassessed pt at this time. Discussed with pt all pertinent ED information and results. Discussed pt dx and plan of tx. Gave pt all f/u and return to the ED instructions. All questions and concerns were addressed at this time. Pt expresses understanding of information and instructions, and is comfortable with plan to discharge. Pt is stable for discharge.    I discussed with  patient and/or family/caretaker that evaluation in the ED does not suggest any emergent or life threatening medical conditions requiring immediate intervention beyond what was provided in the ED, and I believe patient is safe for discharge.  Regardless, an unremarkable evaluation in the ED does not preclude the development or presence of a serious of life threatening condition. As such, patient was instructed to return immediately for any worsening or change in current symptoms.        Medical Decision Making:   History:   Old Medical Records: I decided to obtain old medical records.  Old Records Summarized: records from previous admission(s).       <> Summary of Records: Patient with history of stent.  She is a longstanding cardiac history.  Initial Assessment:   Patient presents complaining of chest pain.  She is been having symptoms for over a month after visiting her cardiologist yesterday and having a negative workup she noted that her pain worsened overnight.  Patient physical exam is benign.  Differential Diagnosis:   Chest pain, cardiac ischemia, NSTEMI, STEMI, anxiety  Clinical Tests:   Lab Tests: Ordered and Reviewed  Radiological Study: Ordered and Reviewed  Medical Tests: Ordered and Reviewed  ED Management:  Patient evaluated history physical examination.  EKG was obtained independently interpreted as normal sinus rhythm without STEMI.  Chest x-ray shows no acute findings on my own independent read.  Obtain blood work.  Hemoglobin is 9.2 with a hematocrit of 27.5 and MCV of 81.  She is mild renal insufficiency with a BUN of 40 creatinine 1.9 but otherwise benign CMP.  Her BNP was 170.  She has x2 negative troponins without a delta.  Discussed all findings with the patient.  Patient does not have a cardiac event clinically.  She is stable safe for discharge in my opinion.  She is been pain-free throughout her stay here.  She is stable safe for discharge my opinion.  I have answered all of her questions.   I have advised close follow up with the primary care provider in 1-2 days for re-evaluation return as needed for any worsening symptoms, problems, questions or concerns.           ED Medication(s):  Medications   nitroGLYCERIN 2% TD oint ointment 1 inch (1 inch Topical (Top) Given 8/11/23 2324)   aspirin tablet 325 mg (325 mg Oral Given 8/11/23 2324)       New Prescriptions    No medications on file        Follow-up Information       Christina Cardona MD.    Specialty: Internal Medicine  Contact information:  [address] [place] [address]  [phone]                                 Scribe Attestation:   Scribe #1: I performed the above scribed service and the documentation accurately describes the services I performed. I attest to the accuracy of the note.     Attending:   Physician Attestation Statement for Scribe #1: I, Lion Alejo Jr., MD, personally performed the services described in this documentation, as scribed by Paula Lemus, in my presence, and it is both accurate and complete.           Clinical Impression       ICD-10-CM ICD-9-CM   1. Chest pain, unspecified type  R07.9 786.50   2. Chest pain  R07.9 786.50       Disposition:   Disposition: Discharged  Condition: Stable        Lion Alejo Jr., MD  08/12/23 3008

## 2023-08-12 NOTE — DISCHARGE INSTRUCTIONS
Your cardiac workup today is negative.  This is not appear to be a heart attack causing her pain.  Use ibuprofen for pain follow up with her doctor in 1-2 days for re-evaluation.  Return as needed for any worsening symptoms, problems, questions or concerns.

## 2023-08-13 ENCOUNTER — PATIENT MESSAGE (OUTPATIENT)
Dept: CARDIOLOGY | Facility: CLINIC | Age: 88
End: 2023-08-13
Payer: MEDICARE

## 2023-08-14 NOTE — TELEPHONE ENCOUNTER
"I returned Jessica's call   Pt c/o Cp - arm/back pain. Friday Saturday symptoms happened again with some SOB and went back to ER  Troponin negative at ER  Bp "fine" at home.  No worsening sob or swelling.   Gave reflux meds   Says she feels a little better today.   Jessica just wanted to make sure they dont need to do anything else or to make sure it wasn't her heart causing issues.        "

## 2023-08-15 ENCOUNTER — OFFICE VISIT (OUTPATIENT)
Dept: PRIMARY CARE CLINIC | Facility: CLINIC | Age: 88
End: 2023-08-15
Payer: MEDICARE

## 2023-08-15 VITALS
HEIGHT: 61 IN | OXYGEN SATURATION: 98 % | WEIGHT: 143.13 LBS | SYSTOLIC BLOOD PRESSURE: 148 MMHG | DIASTOLIC BLOOD PRESSURE: 68 MMHG | HEART RATE: 71 BPM | BODY MASS INDEX: 27.02 KG/M2 | TEMPERATURE: 98 F

## 2023-08-15 DIAGNOSIS — I25.10 CORONARY ARTERY DISEASE, OCCLUSIVE: Chronic | ICD-10-CM

## 2023-08-15 DIAGNOSIS — K21.00 GASTROESOPHAGEAL REFLUX DISEASE WITH ESOPHAGITIS, UNSPECIFIED WHETHER HEMORRHAGE: Primary | ICD-10-CM

## 2023-08-15 PROCEDURE — 3078F PR MOST RECENT DIASTOLIC BLOOD PRESSURE < 80 MM HG: ICD-10-PCS | Mod: HCNC,CPTII,S$GLB, | Performed by: NURSE PRACTITIONER

## 2023-08-15 PROCEDURE — 99999 PR PBB SHADOW E&M-EST. PATIENT-LVL V: ICD-10-PCS | Mod: PBBFAC,HCNC,, | Performed by: NURSE PRACTITIONER

## 2023-08-15 PROCEDURE — 3078F DIAST BP <80 MM HG: CPT | Mod: HCNC,CPTII,S$GLB, | Performed by: NURSE PRACTITIONER

## 2023-08-15 PROCEDURE — 3288F PR FALLS RISK ASSESSMENT DOCUMENTED: ICD-10-PCS | Mod: HCNC,CPTII,S$GLB, | Performed by: NURSE PRACTITIONER

## 2023-08-15 PROCEDURE — 1101F PT FALLS ASSESS-DOCD LE1/YR: CPT | Mod: HCNC,CPTII,S$GLB, | Performed by: NURSE PRACTITIONER

## 2023-08-15 PROCEDURE — 1159F PR MEDICATION LIST DOCUMENTED IN MEDICAL RECORD: ICD-10-PCS | Mod: HCNC,CPTII,S$GLB, | Performed by: NURSE PRACTITIONER

## 2023-08-15 PROCEDURE — 3077F SYST BP >= 140 MM HG: CPT | Mod: HCNC,CPTII,S$GLB, | Performed by: NURSE PRACTITIONER

## 2023-08-15 PROCEDURE — 1126F PR PAIN SEVERITY QUANTIFIED, NO PAIN PRESENT: ICD-10-PCS | Mod: HCNC,CPTII,S$GLB, | Performed by: NURSE PRACTITIONER

## 2023-08-15 PROCEDURE — 1159F MED LIST DOCD IN RCRD: CPT | Mod: HCNC,CPTII,S$GLB, | Performed by: NURSE PRACTITIONER

## 2023-08-15 PROCEDURE — 1126F AMNT PAIN NOTED NONE PRSNT: CPT | Mod: HCNC,CPTII,S$GLB, | Performed by: NURSE PRACTITIONER

## 2023-08-15 PROCEDURE — 1101F PR PT FALLS ASSESS DOC 0-1 FALLS W/OUT INJ PAST YR: ICD-10-PCS | Mod: HCNC,CPTII,S$GLB, | Performed by: NURSE PRACTITIONER

## 2023-08-15 PROCEDURE — 3288F FALL RISK ASSESSMENT DOCD: CPT | Mod: HCNC,CPTII,S$GLB, | Performed by: NURSE PRACTITIONER

## 2023-08-15 PROCEDURE — 99215 PR OFFICE/OUTPT VISIT, EST, LEVL V, 40-54 MIN: ICD-10-PCS | Mod: HCNC,S$GLB,, | Performed by: NURSE PRACTITIONER

## 2023-08-15 PROCEDURE — 99999 PR PBB SHADOW E&M-EST. PATIENT-LVL V: CPT | Mod: PBBFAC,HCNC,, | Performed by: NURSE PRACTITIONER

## 2023-08-15 PROCEDURE — 3077F PR MOST RECENT SYSTOLIC BLOOD PRESSURE >= 140 MM HG: ICD-10-PCS | Mod: HCNC,CPTII,S$GLB, | Performed by: NURSE PRACTITIONER

## 2023-08-15 PROCEDURE — 99215 OFFICE O/P EST HI 40 MIN: CPT | Mod: HCNC,S$GLB,, | Performed by: NURSE PRACTITIONER

## 2023-08-15 RX ORDER — PANTOPRAZOLE SODIUM 40 MG/1
40 TABLET, DELAYED RELEASE ORAL DAILY
Qty: 90 TABLET | Refills: 1 | Status: SHIPPED | OUTPATIENT
Start: 2023-08-15 | End: 2024-03-25

## 2023-08-15 NOTE — PATIENT INSTRUCTIONS
Keep appointment with Dr. Cardona on 8/24/23    Take Carafate 2 times daily the rest of week  Start Protonix tomorrow  Continue to take Protonix daily indefinitely

## 2023-08-15 NOTE — PROGRESS NOTES
Glo Dumont  08/15/2023  [id number]    Christina Cardona MD  Patient Care Team:  Christina Cardona MD as PCP - General (Internal Medicine)  Gordo Muir MD as Consulting Physician (Pulmonary Disease)  Nik Bergman MD (Inactive) as Consulting Physician (Cardiology)  Franky eBll Jr., MD (Rheumatology)  Julio Galindo MD as Consulting Physician (Gastroenterology)  Glenis Mcfadden MD as Consulting Physician (Otolaryngology)  Ayaz Estrada MD as Consulting Physician (Hematology and Oncology)  Yomi Guy LPN as Care Coordinator      [facility]      Chief Complaint:  Chief Complaint   Patient presents with    [facility]    Follow-up     Hospital f/u    Chest Pain     X 3 days    Arm Pain     X 3 days       History of Present Illness:  Pt is a 88 yo female with PMHx of CAD, diastolic heart failure. Pt presented to the ED on 2 different occasions on 8/11 and 8/12 due to having chest heaviness and left arm pain. Pt reported this discomfort at night and would awaken needing to sit on edge of bed due to SOB.   ED ruled out ACS on both occasions and was thought to have reflux symptoms and prescribed Carafate QID with meals.   Three days later, the patient reports improvement in her symptoms. No longer chest heaviness, left arm discomfort and SOB.  Pt's belching has resolved. Pt does note pain in the right ear but denies any upper respiratory symptoms.   Denies fever, chills, URI symptoms, chest pain, SOB, palpitations, vomiting, diarrhea, abdominal bloating, constipation, no gross neuro deficits, urinary symptoms and leg swelling .                  The following were reviewed: Active problem list, medication list, allergies, family history, social history, and Health Maintenance.     History:  Past Medical History:   Diagnosis Date    Anemia     Angina pectoris     Anxiety     Anxiety and depression     Arthritis     hip    Carotid artery occlusion     Carpal tunnel syndrome  06/23/2008    emg    Chronic diarrhea     work up in 2011 with EGD, CS and VCE    CKD (chronic kidney disease) stage 3, GFR 30-59 ml/min 5/11/2017    Colitis     Coronary artery disease     Coronary artery disease     Diastolic dysfunction     Diverticulosis     Glaucoma     Greater trochanteric bursitis 2/10/2015    Grief at loss of child 1/26/2016    H/O carotid endarterectomy 12/2/2013    Heart failure     History of coronary angioplasty 3/11/2014    Hypercholesteremia     Hypertension     Liver cyst 02/08/2013    ct abd    Macular degeneration     Obesity with serious comorbidity 3/19/2023    Primary open-angle glaucoma(365.11) 9/3/2013    Renal cyst 02/08/2013    ct abd    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    Sarcoidosis     Sarcoidosis of lung     Sickle cell trait     Uveitis      Past Surgical History:   Procedure Laterality Date    A-V CARDIAC PACEMAKER INSERTION Left 3/21/2023    Procedure: INSERTION, CARDIAC PACEMAKER, DUAL CHAMBER/His Lead;  Surgeon: Cortez Ambrose MD;  Location: [facility] CATH LAB;  Service: Cardiology;  Laterality: Left;  MDT/ MD to confirm in am/possible nurse sedate    CAROTID ENDARTERECTOMY Right 2000s    CATARACT EXTRACTION Bilateral     Dr. Liang Dennis    CHOLECYSTECTOMY      laparoscopic, 3/18.    CORONARY ANGIOPLASTY WITH STENT PLACEMENT  11/19/2010    RCA-HALIE 2010    Lathia    JOINT REPLACEMENT Left 11/03/2014    Dr. Braun    TOTAL ABDOMINAL HYSTERECTOMY W/ BILATERAL SALPINGOOPHORECTOMY  1972     Family History   Problem Relation Age of Onset    Hypertension Mother     Heart failure Mother     Cancer Father         prostate    Cirrhosis Brother     Heart failure Brother     Cancer Daughter         Carcinoid     Patient Active Problem List   Diagnosis    Sarcoidosis of lung    Thyroid nodule    Hypertensive heart disease with heart failure    Other hyperlipidemia    Hemoglobin A-S genotype    Ptosis    Coronary artery disease, occlusive    History of  central retinal artery occlusion    Iron deficiency anemia    Vitamin D deficiency    Osteopenia    Essential hypertension    Diastolic heart failure, NYHA class 3    Atherosclerosis of aorta    CKD (chronic kidney disease) stage 4, GFR 15-29 ml/min    Uveitis    Ulcerative colitis    History of coronary angioplasty    Hiatal hernia    Bradycardia    Lung nodule    Memory loss    Central retinal vein occlusion with macular edema of both eyes    Aortic stenosis    Weight loss    Unspecified severe protein-calorie malnutrition    Hypokalemia    Obesity with serious comorbidity    Hypomagnesemia    SA node dysfunction    S/P placement of cardiac pacemaker    Current mild episode of major depressive disorder without prior episode    AV block, 2nd degree    Acute cystitis without hematuria    Abdominal pain    Gastroesophageal reflux disease with esophagitis     Review of patient's allergies indicates:   Allergen Reactions    Lisinopril      angioedema    Codeine Nausea And Vomiting       Medications:  Current Outpatient Medications on File Prior to Visit   Medication Sig Dispense Refill    aflibercept 2 mg/0.05 mL Soln 2 mg by Intravitreal route every 28 days.      amLODIPine (NORVASC) 10 MG tablet Take 1 tablet (10 mg total) by mouth once daily. 30 tablet 11    aspirin (ECOTRIN) 81 MG EC tablet Take 81 mg by mouth once daily.      budesonide (ENTOCORT EC) 3 mg capsule Take 3 mg by mouth 2 (two) times a day.      busPIRone (BUSPAR) 5 MG Tab Take 1 tablet (5 mg total) by mouth 2 (two) times daily as needed (anxiety). 60 tablet 5    carvediloL (COREG) 25 MG tablet Take 1 tablet (25 mg total) by mouth 2 (two) times daily with meals. 60 tablet 11    citalopram (CELEXA) 10 MG tablet Take 1 tablet (10 mg total) by mouth every evening. 30 tablet 11    cyproheptadine (PERIACTIN) 4 mg tablet Take 1 tablet (4 mg total) by mouth 3 (three) times daily as needed. 270 tablet 3    dexAMETHasone (OZURDEX) 0.7 mg Impl intravitreal  implant 0.7 mg by Intravitreal route once.      dicyclomine (BENTYL) 10 MG capsule Take 1 capsule (10 mg total) by mouth every 8 (eight) hours as needed (abdominal cramps). 30 capsule 0    dorzolamide-timolol 2-0.5% (COSOPT) 22.3-6.8 mg/mL ophthalmic solution Place 1 drop into both eyes 2 (two) times daily. 10 mL 6    FOLIC ACID/MULTIVIT-MIN/LUTEIN (CENTRUM SILVER ORAL) Take 1 tablet by mouth once daily.      furosemide (LASIX) 40 MG tablet Take 1 tablet (40 mg total) by mouth once daily. 90 tablet 3    hydrALAZINE (APRESOLINE) 50 MG tablet Take 1 tablet (50 mg total) by mouth 3 (three) times daily before meals. 90 tablet 3    lipase-protease-amylase 24,000-76,000-120,000 units (CREON) 24,000-76,000 -120,000 unit capsule Take 1 capsule by mouth 3 (three) times daily with meals. 270 capsule 3    nitroGLYCERIN (NITROSTAT) 0.4 MG SL tablet PLACE 1 TABLET UNDER THE TONGUE EVERY 5 MINUTES AS NEEDED FOR CHEST PAIN 25 tablet 3    ondansetron (ZOFRAN-ODT) 4 MG TbDL Take 1 tablet (4 mg total) by mouth every 8 (eight) hours as needed (Nausea & vomiting). 12 tablet 0    potassium chloride (KLOR-CON) 10 MEQ TbSR Take 1 tablet (10 mEq total) by mouth once daily. 30 tablet 3    sucralfate (CARAFATE) 1 gram tablet Take 1 tablet (1 g total) by mouth 4 (four) times daily. 20 tablet 0     No current facility-administered medications on file prior to visit.       Medications have been reviewed and reconciled with patient at visit today.      Exam:  Vitals:    08/15/23 0812   BP: (!) 148/68   Pulse: 71   Temp: 98.4 °F (36.9 °C)     Weight: 64.9 kg (143 lb 1.6 oz)   Body mass index is 27.04 kg/m².      BP Readings from Last 3 Encounters:   08/15/23 (!) 148/68   08/12/23 139/68   08/12/23 138/72     Wt Readings from Last 3 Encounters:   08/15/23 0812 64.9 kg (143 lb 1.6 oz)   08/12/23 1539 67.8 kg (149 lb 7.6 oz)   08/11/23 2227 64.9 kg (143 lb)        REVIEW OF SYSTEMS  Review of Systems   Constitutional:  Negative for chills, fever and  malaise/fatigue.   HENT:  Positive for ear pain. Negative for congestion, ear discharge and sore throat.    Respiratory:  Negative for cough and shortness of breath.    Cardiovascular:  Negative for chest pain, palpitations and leg swelling.   Gastrointestinal:  Negative for abdominal pain, constipation, diarrhea, heartburn, nausea and vomiting.   Genitourinary:  Negative for dysuria and frequency.   Musculoskeletal:  Negative for back pain and myalgias.   Neurological:  Negative for dizziness, focal weakness and headaches.   Psychiatric/Behavioral:  Negative for depression. The patient is not nervous/anxious.        Physical Exam  Vitals reviewed.   Constitutional:       General: She is not in acute distress.     Appearance: Normal appearance.   HENT:      Head: Normocephalic and atraumatic.      Comments: Puffiness around bilateral eyes     Right Ear: Tympanic membrane normal. No middle ear effusion. There is no impacted cerumen. Tympanic membrane is not erythematous.      Left Ear: Tympanic membrane normal.  No middle ear effusion. There is no impacted cerumen. Tympanic membrane is not erythematous.      Nose: Nose normal.      Mouth/Throat:      Mouth: Mucous membranes are moist.   Eyes:      General: No scleral icterus.     Conjunctiva/sclera: Conjunctivae normal.   Cardiovascular:      Rate and Rhythm: Normal rate and regular rhythm.      Heart sounds: No murmur heard.  Pulmonary:      Effort: Pulmonary effort is normal. No respiratory distress.      Breath sounds: Normal breath sounds. No rales.   Abdominal:      Palpations: Abdomen is soft. There is no mass.      Tenderness: There is no abdominal tenderness.   Musculoskeletal:         General: No swelling or deformity. Normal range of motion.      Cervical back: Normal range of motion and neck supple.      Right lower leg: No edema.      Left lower leg: No edema.   Lymphadenopathy:      Cervical: No cervical adenopathy.   Skin:     General: Skin is warm and  dry.   Neurological:      Mental Status: She is alert and oriented to person, place, and time. Mental status is at baseline.   Psychiatric:         Mood and Affect: Mood normal.         Thought Content: Thought content normal.         Laboratory Reviewed:     Lab Results   Component Value Date    WBC 9.50 08/12/2023    HGB 9.5 (L) 08/12/2023    HCT 28.5 (L) 08/12/2023     08/12/2023    CHOL 160 05/04/2022    TRIG 80 05/04/2022    HDL 81 (H) 05/04/2022    ALT 10 08/12/2023    AST 12 08/12/2023     08/12/2023    K 3.9 08/12/2023     (H) 08/12/2023    CREATININE 1.9 (H) 08/12/2023    BUN 37 (H) 08/12/2023    CO2 17 (L) 08/12/2023    TSH 1.230 12/13/2021    INR 1.0 09/09/2020    HGBA1C 5.7 11/17/2015       Screening or Prevention Patient's value Goal Complete/Controlled?   HgA1C Testing and Control   Lab Results   Component Value Date    HGBA1C 5.7 11/17/2015      Annually/Less than 8% No   Lipid profile : 05/04/2022 Annually No   LDL control Lab Results   Component Value Date    LDLCALC 63.0 05/04/2022    Annually/Less than 100 mg/dl  No   Nephropathy screening Lab Results   Component Value Date    LABMICR 38.0 02/06/2014     Lab Results   Component Value Date    PROTEINUA Negative 07/19/2023    Annually Yes   Blood pressure BP Readings from Last 1 Encounters:   08/15/23 (!) 148/68    Less than 140/90 No   Dilated retinal exam Most Recent Eye Exam Date: Not Found Annually Yes   Foot exam   Most Recent Foot Exam Date: Not Found Annually Yes       Health Maintenance  Health Maintenance Topics with due status: Not Due       Topic Last Completion Date    TETANUS VACCINE 02/03/2015    DEXA Scan 10/27/2021    Influenza Vaccine 10/31/2022    Aspirin/Antiplatelet Therapy 08/12/2023     Health Maintenance Due   Topic Date Due    Shingles Vaccine (1 of 2) Never done    COVID-19 Vaccine (4 - Pfizer series) 12/09/2021    Lipid Panel  05/04/2023       Assessment and Plan:  1. Gastroesophageal reflux disease with  esophagitis, unspecified whether hemorrhage  Assessment & Plan:  Continue Carafate bid for the remainder of week  Start Protonix in AM  F/U with MD on 8/24/23    Orders:  -     pantoprazole (PROTONIX) 40 MG tablet; Take 1 tablet (40 mg total) by mouth once daily.  Dispense: 90 tablet; Refill: 1    2. Coronary artery disease, occlusive  Assessment & Plan:  Cardiac work up negative for ACS and decompensated heart failure  Pt has schedule stress test on 9/6/23                   -Patient's lab results were reviewed and discussed with patient  -Treatment options and alternatives were discussed with the patient. Patient expressed understanding. Patient was given the opportunity to ask questions and be an active participant in their medical care. Patient had no further questions or concerns at this time.         Future Appointments   Date Time Provider Department Center   8/24/2023  8:20 AM Christina Cardona MD [facility]   9/6/2023  7:30 AM [facility] NM2 CAM1 CARDIAC [facility] [place]   9/6/2023  8:30 AM TREADMILL, NUCLEAR MEDICINE [facility] SPECCPR [place]   9/6/2023  9:30 AM [facility] NM2 CAM1 CARDIAC [facility] [place]   9/25/2023 10:00 AM HOME MONITOR DEVICE CHECK [facility] ARR PRO [place]   11/1/2023  8:15 AM NAHUM Tamez MD [facility] OPHTHAL [place]   12/15/2023  9:20 AM PACEMAKER CLINIC [facility] ARR PRO [place]          After visit summary printed and given to patient upon discharge.  Patient goals and care plan are included in After visit summary.    Total medical decision making time was 48 min.    The following issues were discussed: The primary encounter diagnosis was Gastroesophageal reflux disease with esophagitis, unspecified whether hemorrhage. A diagnosis of Coronary artery disease, occlusive was also pertinent to this visit.    Health maintenance needs, recent test results and goals of care discussed with pt and questions answered.           Paula Deal, APRN, NP-C  [facility] [address]  [place] [id number]

## 2023-08-15 NOTE — ASSESSMENT & PLAN NOTE
Cardiac work up negative for ACS and decompensated heart failure  Pt has schedule stress test on 9/6/23

## 2023-08-21 ENCOUNTER — HOSPITAL ENCOUNTER (EMERGENCY)
Facility: HOSPITAL | Age: 88
Discharge: LEFT BEFORE DISCHARGE | End: 2023-08-21
Payer: MEDICARE

## 2023-08-21 ENCOUNTER — PATIENT MESSAGE (OUTPATIENT)
Dept: PRIMARY CARE CLINIC | Facility: CLINIC | Age: 88
End: 2023-08-21
Payer: MEDICARE

## 2023-08-21 VITALS
DIASTOLIC BLOOD PRESSURE: 70 MMHG | HEART RATE: 75 BPM | BODY MASS INDEX: 27.04 KG/M2 | OXYGEN SATURATION: 97 % | SYSTOLIC BLOOD PRESSURE: 132 MMHG | HEIGHT: 61 IN | RESPIRATION RATE: 18 BRPM | TEMPERATURE: 99 F

## 2023-08-21 DIAGNOSIS — R07.9 CHEST PAIN: ICD-10-CM

## 2023-08-21 PROCEDURE — 93010 EKG 12-LEAD: ICD-10-PCS | Mod: HCNC,,, | Performed by: INTERNAL MEDICINE

## 2023-08-21 PROCEDURE — 93010 ELECTROCARDIOGRAM REPORT: CPT | Mod: HCNC,,, | Performed by: INTERNAL MEDICINE

## 2023-08-21 PROCEDURE — 93005 ELECTROCARDIOGRAM TRACING: CPT | Mod: HCNC

## 2023-08-21 PROCEDURE — 25000003 PHARM REV CODE 250: Mod: HCNC | Performed by: NURSE PRACTITIONER

## 2023-08-21 PROCEDURE — 99900041 HC LEFT WITHOUT BEING SEEN- EMERGENCY: Mod: HCNC

## 2023-08-21 RX ORDER — ASPIRIN 325 MG
325 TABLET ORAL
Status: COMPLETED | OUTPATIENT
Start: 2023-08-21 | End: 2023-08-21

## 2023-08-21 RX ADMIN — ASPIRIN 325 MG ORAL TABLET 325 MG: 325 PILL ORAL at 06:08

## 2023-08-21 NOTE — FIRST PROVIDER EVALUATION
"Medical screening examination initiated.  I have conducted a focused provider triage encounter, findings are as follows:    Brief history of present illness:  87-year-old female presents emergency room with chest pain and shortness of breath    Vitals:    08/21/23 1843   BP: 132/70   BP Location: Right arm   Patient Position: Sitting   Pulse: 75   Resp: 18   TempSrc: Oral   SpO2: 97%   Height: 5' 1" (1.549 m)       Pertinent physical exam:  Moderate distress vital signs stable EKG in process    Brief workup plan:  ACS rule out    Preliminary workup initiated; this workup will be continued and followed by the physician or advanced practice provider that is assigned to the patient when roomed.  " 89

## 2023-08-22 ENCOUNTER — TELEPHONE (OUTPATIENT)
Dept: CARDIOLOGY | Facility: HOSPITAL | Age: 88
End: 2023-08-22
Payer: MEDICARE

## 2023-08-22 ENCOUNTER — TELEPHONE (OUTPATIENT)
Dept: PRIMARY CARE CLINIC | Facility: CLINIC | Age: 88
End: 2023-08-22
Payer: MEDICARE

## 2023-08-22 ENCOUNTER — PATIENT MESSAGE (OUTPATIENT)
Dept: PRIMARY CARE CLINIC | Facility: CLINIC | Age: 88
End: 2023-08-22
Payer: MEDICARE

## 2023-08-22 ENCOUNTER — PATIENT MESSAGE (OUTPATIENT)
Dept: CARDIOLOGY | Facility: CLINIC | Age: 88
End: 2023-08-22
Payer: MEDICARE

## 2023-08-23 ENCOUNTER — HOSPITAL ENCOUNTER (OUTPATIENT)
Dept: RADIOLOGY | Facility: HOSPITAL | Age: 88
Discharge: HOME | End: 2023-08-23
Attending: PHYSICIAN ASSISTANT
Payer: MEDICARE

## 2023-08-23 ENCOUNTER — HOSPITAL ENCOUNTER (OUTPATIENT)
Dept: CARDIOLOGY | Facility: HOSPITAL | Age: 88
Discharge: HOME | End: 2023-08-23
Attending: PHYSICIAN ASSISTANT
Payer: MEDICARE

## 2023-08-23 DIAGNOSIS — F41.1 GENERALIZED ANXIETY DISORDER: Primary | ICD-10-CM

## 2023-08-23 DIAGNOSIS — I70.0 ATHEROSCLEROSIS OF AORTA: ICD-10-CM

## 2023-08-23 DIAGNOSIS — I50.30 DIASTOLIC HEART FAILURE, NYHA CLASS 3: ICD-10-CM

## 2023-08-23 LAB
CV STRESS BASE HR: 71 BPM
DIASTOLIC BLOOD PRESSURE: 92 MMHG
OHS CV CPX 85 PERCENT MAX PREDICTED HEART RATE MALE: 112
OHS CV CPX MAX PREDICTED HEART RATE: 131
OHS CV CPX PATIENT IS FEMALE: 1
OHS CV CPX PATIENT IS MALE: 0
OHS CV CPX PEAK DIASTOLIC BLOOD PRESSURE: 78 MMHG
OHS CV CPX PEAK HEAR RATE: 95 BPM
OHS CV CPX PEAK RATE PRESSURE PRODUCT: NORMAL
OHS CV CPX PEAK SYSTOLIC BLOOD PRESSURE: 144 MMHG
OHS CV CPX PERCENT MAX PREDICTED HEART RATE ACHIEVED: 72
OHS CV CPX RATE PRESSURE PRODUCT PRESENTING: NORMAL
SYSTOLIC BLOOD PRESSURE: 160 MMHG

## 2023-08-23 PROCEDURE — 63600175 PHARM REV CODE 636 W HCPCS: Mod: HCNC | Performed by: PHYSICIAN ASSISTANT

## 2023-08-23 RX ORDER — REGADENOSON 0.08 MG/ML
0.4 INJECTION, SOLUTION INTRAVENOUS ONCE
Status: COMPLETED | OUTPATIENT
Start: 2023-08-23 | End: 2023-08-23

## 2023-08-23 RX ADMIN — REGADENOSON 0.4 MG: 0.08 INJECTION, SOLUTION INTRAVENOUS at 10:08

## 2023-08-23 NOTE — PROGRESS NOTES
Discussed possibility that increased anxiety may be contributing to frequent ER visits in interdisciplinary team meeting. Referral placed to behavioral health; patient coming for visit and may be able to coordinate primary meeting or at least introduction.

## 2023-08-23 NOTE — NURSING NOTE
1040- patient complained of 8/10 mid-sternal, non-radiating, chest pain. This occurred approx 20min post chemical stress test when pt was walking from the bathroom. Evaluated and assessed by MD Aldair, in clinic and instructed pt she needs to go to the ER. VS within normal range. EKG taken at this time, revealed NSR with possible LVH. Pt stated her chest felt better once she sat down.EMS called.    1115- EMS arrived and report was given. VS stable, awake and alert, GCS 15. All direct patient care transferred to EMS personnel at this time.

## 2023-08-24 ENCOUNTER — PATIENT MESSAGE (OUTPATIENT)
Dept: PRIMARY CARE CLINIC | Facility: CLINIC | Age: 88
End: 2023-08-24
Payer: MEDICARE

## 2023-08-25 ENCOUNTER — PATIENT MESSAGE (OUTPATIENT)
Dept: CARDIOLOGY | Facility: CLINIC | Age: 88
End: 2023-08-25
Payer: MEDICARE

## 2023-08-25 ENCOUNTER — PATIENT MESSAGE (OUTPATIENT)
Dept: PRIMARY CARE CLINIC | Facility: CLINIC | Age: 88
End: 2023-08-25
Payer: MEDICARE

## 2023-08-26 ENCOUNTER — PATIENT MESSAGE (OUTPATIENT)
Dept: PRIMARY CARE CLINIC | Facility: CLINIC | Age: 88
End: 2023-08-26
Payer: MEDICARE

## 2023-08-29 ENCOUNTER — PATIENT MESSAGE (OUTPATIENT)
Dept: PRIMARY CARE CLINIC | Facility: CLINIC | Age: 88
End: 2023-08-29
Payer: MEDICARE

## 2023-08-31 ENCOUNTER — EXTERNAL HOSPITAL ADMISSION (OUTPATIENT)
Dept: ADMINISTRATIVE | Facility: CLINIC | Age: 88
End: 2023-08-31
Payer: MEDICARE

## 2023-08-31 ENCOUNTER — PATIENT OUTREACH (OUTPATIENT)
Dept: ADMINISTRATIVE | Facility: CLINIC | Age: 88
End: 2023-08-31
Payer: MEDICARE

## 2023-08-31 RX ORDER — DEXTROMETHORPHAN HYDROBROMIDE, GUAIFENESIN 5; 100 MG/5ML; MG/5ML
650 LIQUID ORAL EVERY 8 HOURS PRN
COMMUNITY
Start: 2023-08-30 | End: 2023-09-09

## 2023-08-31 RX ORDER — ATORVASTATIN CALCIUM 20 MG/1
20 TABLET, FILM COATED ORAL DAILY
COMMUNITY
Start: 2023-08-30 | End: 2023-12-28 | Stop reason: SDUPTHER

## 2023-08-31 RX ORDER — CLOPIDOGREL BISULFATE 75 MG/1
1 TABLET ORAL EVERY MORNING
COMMUNITY
Start: 2023-08-30 | End: 2023-12-05

## 2023-08-31 NOTE — PROGRESS NOTES
Subjective:      Patient ID: Glo Dumont is a 87 y.o. female.    Chief Complaint: Follow-up (Pt is here post surgery. Pt still has swelling around her ankles. )      HPI  Here for follow up of medical problems and had CABG x 3v on 8/25/23 at [facility].  Saw the CT surgeon today:  HISTORY OF PRESENT ILLNESS:  Glo Dumont presents to the clinic today for a follow-up visit status post CABG x3 performed by Surgeon: Dr. Hickey at [facility] on 8/25/2023. She was seen in the clinic yesterday. We received a phone call this morning from the patient's daughter that the patient was having some shortness of breath and some dull pain to her chest wall. As mentioned in yesterday's evaluation, she was seen by her PCP recently due to worsening swelling in her lower extremities. They did continue her Lasix and potassium. Her creatinine was down to 1.4 and potassium was one 3.3. She continues to have lower extremity edema but is wearing compression socks. She did report some dyspnea on exertion yesterday. She had her BMP drawn yesterday to monitor her kidney function. Her creatinine was 1.31 yesterday. Potassium was 3.5. She has an appointment coming up with nephrology. We asked her to present to the clinic today with a chest x-ray for further evaluation. She endorses some shortness of breath this morning. She also reports some dull chest pain and some right arm pain. She states that is no worse since she left the hospital. She was getting out of her bed when she noted the pain to her chest and her right arm. She has not been wearing her compression stockings as she reports that it cuts off her circulation to her lower extremities. She is scheduled to see her PCP today. She is currently on Lasix 40 mg daily with supplemental potassium. No other complaints at this time.   ---------------------------------------------------------------------------------------------------------    BMs normal.  Some SOOD.  No chest pain.   "Tolerating atorvastatin.          Updated/ annual due 10/23:  HM: 10/22 fluvax, 2/21 covid vaccines/booster, 3/15 [id number], 2/14 [id number], 10/22 [id number], 2/15 TDaP, 10/21 BMD rep 2y, 2/11 Cscope no repeat will be done, 1/17 MMG no more, Eye doc monthly, GI Dr. Galindo, Cards Dr. Lui, Nephro Dr. Villareal.     Review of Systems   Constitutional:  Negative for chills, diaphoresis and fever.   Respiratory:  Negative for cough and shortness of breath.    Cardiovascular:  Negative for chest pain, palpitations and leg swelling.   Gastrointestinal:  Negative for blood in stool, constipation, diarrhea, nausea and vomiting.   Genitourinary:  Negative for dysuria, frequency and hematuria.   Psychiatric/Behavioral:  The patient is not nervous/anxious.          Objective:   /60 (BP Location: Right arm)   Pulse 70   Temp 98 °F (36.7 °C) (Oral)   Ht 5' 1" (1.549 m)   Wt 66.6 kg (146 lb 14.4 oz)   SpO2 97%   BMI 27.76 kg/m²     Physical Exam  Constitutional:       Appearance: She is well-developed.   Neck:      Thyroid: No thyroid mass.      Vascular: No carotid bruit.   Cardiovascular:      Rate and Rhythm: Normal rate and regular rhythm.      Heart sounds: No murmur heard.     No friction rub. No gallop.   Pulmonary:      Effort: Pulmonary effort is normal.      Breath sounds: Normal breath sounds. No wheezing or rales.   Abdominal:      General: Bowel sounds are normal.      Palpations: Abdomen is soft. There is no mass.      Tenderness: There is no abdominal tenderness.   Musculoskeletal:      Cervical back: Neck supple.   Lymphadenopathy:      Cervical: No cervical adenopathy.   Neurological:      Mental Status: She is alert and oriented to person, place, and time.             Assessment:       1. Essential hypertension    2. Diastolic heart failure, NYHA class 3    3. Coronary artery disease, occlusive    4. Sarcoidosis of lung    5. Ulcerative pancolitis without complication          Plan:     Essential " hypertension, Diastolic heart failure, NYHA class 3- take second dose lasix today, labs tomorrow with [place].  RTC 1wk.  -     furosemide (LASIX) 40 MG tablet; Take 1 tablet (40 mg total) by mouth once daily.  Dispense: 90 tablet; Refill: 3  -     Basic Metabolic Panel; Future; Expected date: 09/07/2023  -     B-TYPE NATRIURETIC PEPTIDE; Future; Expected date: 09/07/2023    Coronary artery disease, occlusive    Sarcoidosis of lung    Ulcerative pancolitis without complication

## 2023-08-31 NOTE — PROGRESS NOTES
"C3 nurse attempted to contact patient's daughter, Jessica, for a TCC post hospital discharge follow-up call. She is unable to review medications or any further information at this time, as she is not home. C3 nurse offered to callback at a later time to review, and she declined. She stated "I will review it with the doctors when we see them". Was not willing to further discuss any patient information.      "

## 2023-09-01 ENCOUNTER — TELEPHONE (OUTPATIENT)
Dept: PRIMARY CARE CLINIC | Facility: CLINIC | Age: 88
End: 2023-09-01
Payer: MEDICARE

## 2023-09-01 ENCOUNTER — PATIENT MESSAGE (OUTPATIENT)
Dept: PRIMARY CARE CLINIC | Facility: CLINIC | Age: 88
End: 2023-09-01

## 2023-09-01 ENCOUNTER — LAB VISIT (OUTPATIENT)
Dept: LAB | Facility: HOSPITAL | Age: 88
End: 2023-09-01
Attending: NURSE PRACTITIONER
Payer: MEDICARE

## 2023-09-01 ENCOUNTER — OFFICE VISIT (OUTPATIENT)
Dept: PRIMARY CARE CLINIC | Facility: CLINIC | Age: 88
End: 2023-09-01
Payer: MEDICARE

## 2023-09-01 VITALS
DIASTOLIC BLOOD PRESSURE: 70 MMHG | BODY MASS INDEX: 28.51 KG/M2 | SYSTOLIC BLOOD PRESSURE: 132 MMHG | HEIGHT: 61 IN | HEART RATE: 75 BPM | WEIGHT: 151 LBS | OXYGEN SATURATION: 98 %

## 2023-09-01 DIAGNOSIS — D86.0 SARCOIDOSIS OF LUNG: ICD-10-CM

## 2023-09-01 DIAGNOSIS — I25.10 CORONARY ARTERY DISEASE, OCCLUSIVE: Chronic | ICD-10-CM

## 2023-09-01 DIAGNOSIS — N18.4 CKD (CHRONIC KIDNEY DISEASE) STAGE 4, GFR 15-29 ML/MIN: ICD-10-CM

## 2023-09-01 DIAGNOSIS — I11.0 HYPERTENSIVE HEART DISEASE WITH HEART FAILURE: ICD-10-CM

## 2023-09-01 DIAGNOSIS — I50.30 DIASTOLIC HEART FAILURE, NYHA CLASS 3: ICD-10-CM

## 2023-09-01 DIAGNOSIS — Z95.1 S/P CABG X 3: ICD-10-CM

## 2023-09-01 DIAGNOSIS — I50.30 DIASTOLIC HEART FAILURE, NYHA CLASS 3: Primary | ICD-10-CM

## 2023-09-01 LAB
ANION GAP SERPL CALC-SCNC: 10 MMOL/L (ref 8–16)
BNP SERPL-MCNC: 1080 PG/ML (ref 0–99)
BUN SERPL-MCNC: 31 MG/DL (ref 8–23)
CALCIUM SERPL-MCNC: 8.8 MG/DL (ref 8.7–10.5)
CHLORIDE SERPL-SCNC: 119 MMOL/L (ref 95–110)
CO2 SERPL-SCNC: 14 MMOL/L (ref 23–29)
CREAT SERPL-MCNC: 1.5 MG/DL (ref 0.5–1.4)
EST. GFR  (NO RACE VARIABLE): 34 ML/MIN/1.73 M^2
GLUCOSE SERPL-MCNC: 116 MG/DL (ref 70–110)
POTASSIUM SERPL-SCNC: 3.4 MMOL/L (ref 3.5–5.1)
SODIUM SERPL-SCNC: 143 MMOL/L (ref 136–145)

## 2023-09-01 PROCEDURE — 3078F PR MOST RECENT DIASTOLIC BLOOD PRESSURE < 80 MM HG: ICD-10-PCS | Mod: HCNC,CPTII,S$GLB, | Performed by: NURSE PRACTITIONER

## 2023-09-01 PROCEDURE — 99215 OFFICE O/P EST HI 40 MIN: CPT | Mod: HCNC,S$GLB,, | Performed by: NURSE PRACTITIONER

## 2023-09-01 PROCEDURE — 99999 PR PBB SHADOW E&M-EST. PATIENT-LVL IV: CPT | Mod: PBBFAC,HCNC,, | Performed by: NURSE PRACTITIONER

## 2023-09-01 PROCEDURE — 99999 PR PBB SHADOW E&M-EST. PATIENT-LVL IV: ICD-10-PCS | Mod: PBBFAC,HCNC,, | Performed by: NURSE PRACTITIONER

## 2023-09-01 PROCEDURE — 3075F PR MOST RECENT SYSTOLIC BLOOD PRESS GE 130-139MM HG: ICD-10-PCS | Mod: HCNC,CPTII,S$GLB, | Performed by: NURSE PRACTITIONER

## 2023-09-01 PROCEDURE — 1159F MED LIST DOCD IN RCRD: CPT | Mod: HCNC,CPTII,S$GLB, | Performed by: NURSE PRACTITIONER

## 2023-09-01 PROCEDURE — 99215 PR OFFICE/OUTPT VISIT, EST, LEVL V, 40-54 MIN: ICD-10-PCS | Mod: HCNC,S$GLB,, | Performed by: NURSE PRACTITIONER

## 2023-09-01 PROCEDURE — 83880 ASSAY OF NATRIURETIC PEPTIDE: CPT | Mod: HCNC | Performed by: NURSE PRACTITIONER

## 2023-09-01 PROCEDURE — 3075F SYST BP GE 130 - 139MM HG: CPT | Mod: HCNC,CPTII,S$GLB, | Performed by: NURSE PRACTITIONER

## 2023-09-01 PROCEDURE — 1159F PR MEDICATION LIST DOCUMENTED IN MEDICAL RECORD: ICD-10-PCS | Mod: HCNC,CPTII,S$GLB, | Performed by: NURSE PRACTITIONER

## 2023-09-01 PROCEDURE — 80048 BASIC METABOLIC PNL TOTAL CA: CPT | Mod: HCNC | Performed by: NURSE PRACTITIONER

## 2023-09-01 PROCEDURE — 3078F DIAST BP <80 MM HG: CPT | Mod: HCNC,CPTII,S$GLB, | Performed by: NURSE PRACTITIONER

## 2023-09-01 PROCEDURE — 36415 COLL VENOUS BLD VENIPUNCTURE: CPT | Mod: HCNC,PO | Performed by: NURSE PRACTITIONER

## 2023-09-01 RX ORDER — CARVEDILOL 25 MG/1
12.5 TABLET ORAL 2 TIMES DAILY WITH MEALS
Qty: 30 TABLET | Refills: 11
Start: 2023-09-01 | End: 2024-02-16

## 2023-09-01 NOTE — PROGRESS NOTES
Glo Dumont  09/01/2023  [id number]    Christina Cardona MD  Patient Care Team:  Christina Cardona MD as PCP - General (Internal Medicine)  Gordo Muir MD as Consulting Physician (Pulmonary Disease)  Nik Bergman MD (Inactive) as Consulting Physician (Cardiology)  Franky Bell Jr., MD (Rheumatology)  Julio Galindo MD as Consulting Physician (Gastroenterology)  Glenis Mcfadden MD as Consulting Physician (Otolaryngology)  Ayaz Estrada MD as Consulting Physician (Hematology and Oncology)  Yomi Guy LPN as Care Coordinator      [facility] Care Note      Chief Complaint:  Chief Complaint   Patient presents with    Follow-up     Pt is currently off Lasix, post op. Pt has swelling to legs, feet and left hand.        History of Present Illness:  86 yo female returns for c/o foot/leg swelling since lasix discontinued by CT Surgeon.   Pt with PMHx of CAD, CKD 4,  heart failure with preserved EF.  S/P CABG x 3 per Dr. Hickey at [facility] on 8/23/23    Admitted on 8/23/23 and discharged on 8/30/23  Saw Dr. Domingo with [facility] while hospitalized - pt does have a hx of CKD stage 4 with GGFR 15 - 29  Second day from discharge from hospital and daughter has noted swelling to left arm since hospitalization but has decreased  Pt notes swelling in bilateral feet, legs, thighs. She describes SOB with rest and SOOD - Her lasix 40 mg daily was stopped at time of discharge.     Weight 151# up from 143.1 on 8/15/23  Pt has not been checking her B/P since d/c from hospital      The following were reviewed: Active problem list, medication list, allergies, family history, social history, and Health Maintenance.     History:  Past Medical History:   Diagnosis Date    Anemia     Angina pectoris     Anxiety     Anxiety and depression     Arthritis     hip    Carotid artery occlusion     Carpal tunnel syndrome 06/23/2008    emg    Chronic diarrhea     work up in 2011 with EGD, CS and  VCE    CKD (chronic kidney disease) stage 3, GFR 30-59 ml/min 5/11/2017    Colitis     Coronary artery disease     Coronary artery disease     Diastolic dysfunction     Diverticulosis     Glaucoma     Greater trochanteric bursitis 2/10/2015    Grief at loss of child 1/26/2016    H/O carotid endarterectomy 12/2/2013    Heart failure     History of coronary angioplasty 3/11/2014    Hypercholesteremia     Hypertension     Liver cyst 02/08/2013    ct abd    Macular degeneration     Obesity with serious comorbidity 3/19/2023    Primary open-angle glaucoma(365.11) 9/3/2013    Renal cyst 02/08/2013    ct abd    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    Sarcoidosis     Sarcoidosis of lung     Sickle cell trait     Uveitis      Past Surgical History:   Procedure Laterality Date    A-V CARDIAC PACEMAKER INSERTION Left 3/21/2023    Procedure: INSERTION, CARDIAC PACEMAKER, DUAL CHAMBER/His Lead;  Surgeon: Cortez Ambrose MD;  Location: [facility] CATH LAB;  Service: Cardiology;  Laterality: Left;  MDT/ MD to confirm in am/possible nurse sedate    CAROTID ENDARTERECTOMY Right 2000s    CATARACT EXTRACTION Bilateral     Dr. Liang Dennis    CHOLECYSTECTOMY      laparoscopic, 3/18.    CORONARY ANGIOPLASTY WITH STENT PLACEMENT  11/19/2010    RCA-HALIE 2010    Lathia    JOINT REPLACEMENT Left 11/03/2014    Dr. Braun    TOTAL ABDOMINAL HYSTERECTOMY W/ BILATERAL SALPINGOOPHORECTOMY  1972     Family History   Problem Relation Age of Onset    Hypertension Mother     Heart failure Mother     Cancer Father         prostate    Cirrhosis Brother     Heart failure Brother     Cancer Daughter         Carcinoid     Patient Active Problem List   Diagnosis    Sarcoidosis of lung    Thyroid nodule    Hypertensive heart disease with heart failure    Other hyperlipidemia    Hemoglobin A-S genotype    Ptosis    Coronary artery disease, occlusive    History of central retinal artery occlusion    Iron deficiency anemia    Vitamin D  deficiency    Osteopenia    Essential hypertension    Diastolic heart failure, NYHA class 3    Atherosclerosis of aorta    CKD (chronic kidney disease) stage 4, GFR 15-29 ml/min    Uveitis    Ulcerative colitis    History of coronary angioplasty    Hiatal hernia    Bradycardia    Lung nodule    Memory loss    Central retinal vein occlusion with macular edema of both eyes    Aortic stenosis    Weight loss    Unspecified severe protein-calorie malnutrition    Hypokalemia    Obesity with serious comorbidity    Hypomagnesemia    SA node dysfunction    S/P placement of cardiac pacemaker    Current mild episode of major depressive disorder without prior episode    AV block, 2nd degree    Acute cystitis without hematuria    Abdominal pain    Gastroesophageal reflux disease with esophagitis    S/P CABG x 3     Review of patient's allergies indicates:   Allergen Reactions    Lisinopril      angioedema    Codeine Nausea And Vomiting       Medications:  Current Outpatient Medications on File Prior to Visit   Medication Sig Dispense Refill    acetaminophen (TYLENOL) 650 MG TbSR Take 650 mg by mouth every 8 (eight) hours as needed.      aflibercept 2 mg/0.05 mL Soln 2 mg by Intravitreal route every 28 days.      aspirin (ECOTRIN) 81 MG EC tablet Take 81 mg by mouth once daily.      atorvastatin (LIPITOR) 20 MG tablet Take 20 mg by mouth Daily.      budesonide (ENTOCORT EC) 3 mg capsule Take 3 mg by mouth 2 (two) times a day.      busPIRone (BUSPAR) 5 MG Tab Take 1 tablet (5 mg total) by mouth 2 (two) times daily as needed (anxiety). 60 tablet 5    citalopram (CELEXA) 10 MG tablet Take 1 tablet (10 mg total) by mouth every evening. 30 tablet 11    clopidogreL (PLAVIX) 75 mg tablet Take 1 tablet by mouth every morning.      cyproheptadine (PERIACTIN) 4 mg tablet Take 1 tablet (4 mg total) by mouth 3 (three) times daily as needed. 270 tablet 3    dexAMETHasone (OZURDEX) 0.7 mg Impl intravitreal implant 0.7 mg by Intravitreal route  once.      dorzolamide-timolol 2-0.5% (COSOPT) 22.3-6.8 mg/mL ophthalmic solution Place 1 drop into both eyes 2 (two) times daily. 10 mL 6    FOLIC ACID/MULTIVIT-MIN/LUTEIN (CENTRUM SILVER ORAL) Take 1 tablet by mouth once daily.      lipase-protease-amylase 24,000-76,000-120,000 units (CREON) 24,000-76,000 -120,000 unit capsule Take 1 capsule by mouth 3 (three) times daily with meals. 270 capsule 3    ondansetron (ZOFRAN-ODT) 4 MG TbDL Take 1 tablet (4 mg total) by mouth every 8 (eight) hours as needed (Nausea & vomiting). 12 tablet 0    pantoprazole (PROTONIX) 40 MG tablet Take 1 tablet (40 mg total) by mouth once daily. 90 tablet 1    potassium chloride (KLOR-CON) 10 MEQ TbSR Take 1 tablet (10 mEq total) by mouth once daily. 30 tablet 3    sucralfate (CARAFATE) 1 gram tablet Take 1 tablet (1 g total) by mouth 4 (four) times daily. 20 tablet 0    amLODIPine (NORVASC) 10 MG tablet Take 1 tablet (10 mg total) by mouth once daily. (Patient not taking: Reported on 9/1/2023) 30 tablet 11    furosemide (LASIX) 40 MG tablet Take 1 tablet (40 mg total) by mouth once daily. (Patient not taking: Reported on 9/1/2023) 90 tablet 3    hydrALAZINE (APRESOLINE) 50 MG tablet Take 1 tablet (50 mg total) by mouth 3 (three) times daily before meals. (Patient not taking: Reported on 9/1/2023) 90 tablet 3    nitroGLYCERIN (NITROSTAT) 0.4 MG SL tablet PLACE 1 TABLET UNDER THE TONGUE EVERY 5 MINUTES AS NEEDED FOR CHEST PAIN (Patient not taking: Reported on 9/1/2023) 25 tablet 3    [DISCONTINUED] carvediloL (COREG) 25 MG tablet Take 1 tablet (25 mg total) by mouth 2 (two) times daily with meals. (Patient not taking: Reported on 9/1/2023) 60 tablet 11     No current facility-administered medications on file prior to visit.       Medications have been reviewed and reconciled with patient at visit today.      Exam:  Vitals:    09/01/23 1125   BP: 132/70   Pulse:      Weight: 68.5 kg (151 lb)   Body mass index is 28.53 kg/m².      BP  Readings from Last 3 Encounters:   09/01/23 132/70   08/21/23 132/70   08/15/23 (!) 148/68     Wt Readings from Last 3 Encounters:   09/01/23 1104 68.5 kg (151 lb)   08/15/23 0812 64.9 kg (143 lb 1.6 oz)   08/12/23 1539 67.8 kg (149 lb 7.6 oz)        REVIEW OF SYSTEMS  Review of Systems   Constitutional:  Positive for malaise/fatigue. Negative for chills and fever.   HENT:  Negative for congestion, ear discharge, ear pain and sore throat.    Respiratory:  Positive for cough and shortness of breath.    Cardiovascular:  Positive for leg swelling. Negative for chest pain and palpitations.   Gastrointestinal:  Negative for abdominal pain, diarrhea, nausea and vomiting.   Genitourinary:  Negative for dysuria and frequency.   Musculoskeletal:  Negative for back pain and myalgias.   Skin:         Mid sternal surgical incision   Neurological:  Negative for dizziness, focal weakness and headaches.   Psychiatric/Behavioral:  Negative for depression. The patient is not nervous/anxious.        Physical Exam  Vitals reviewed.   Constitutional:       General: She is not in acute distress.     Comments: Elderly AA male - appears well - wearing her pajamas   HENT:      Head: Normocephalic and atraumatic.      Nose: Nose normal.      Mouth/Throat:      Mouth: Mucous membranes are moist.   Eyes:      General: No scleral icterus.     Conjunctiva/sclera: Conjunctivae normal.   Cardiovascular:      Rate and Rhythm: Normal rate and regular rhythm.      Heart sounds: No murmur heard.  Pulmonary:      Effort: Pulmonary effort is normal. No respiratory distress.      Breath sounds: Examination of the left-lower field reveals decreased breath sounds and rales. Decreased breath sounds and rales present.      Comments: Trace rales to LLL -   Abdominal:      Palpations: Abdomen is soft. There is no mass.      Tenderness: There is no abdominal tenderness.   Musculoskeletal:         General: No swelling or deformity.      Cervical back: Normal  range of motion and neck supple.      Right lower leg: No edema.      Left lower leg: No edema.      Comments: Left arm/hand with +2 edema - decreasing per patient. Denies pain, redness to arm  +2/+3 nonpitting edema to lower legs and feet   Lymphadenopathy:      Cervical: No cervical adenopathy.   Skin:     General: Skin is warm and dry.      Comments: Well approximated healing incision to mid sternum with steri strips   Neurological:      Mental Status: She is alert and oriented to person, place, and time. Mental status is at baseline.   Psychiatric:         Mood and Affect: Mood normal.         Thought Content: Thought content normal.         Laboratory Reviewed:     Lab Results   Component Value Date    WBC 9.50 08/12/2023    HGB 9.5 (L) 08/12/2023    HCT 28.5 (L) 08/12/2023     08/12/2023    CHOL 160 05/04/2022    TRIG 80 05/04/2022    HDL 81 (H) 05/04/2022    ALT 10 08/12/2023    AST 12 08/12/2023     08/12/2023    K 3.9 08/12/2023     (H) 08/12/2023    CREATININE 1.9 (H) 08/12/2023    BUN 37 (H) 08/12/2023    CO2 17 (L) 08/12/2023    TSH 1.230 12/13/2021    INR 1.0 09/09/2020    HGBA1C 5.7 11/17/2015       Screening or Prevention Patient's value Goal Complete/Controlled?   HgA1C Testing and Control   Lab Results   Component Value Date    HGBA1C 5.7 11/17/2015      Annually/Less than 8% No   Lipid profile : 05/04/2022 Annually No   LDL control Lab Results   Component Value Date    LDLCALC 63.0 05/04/2022    Annually/Less than 100 mg/dl  No   Nephropathy screening Lab Results   Component Value Date    LABMICR 38.0 02/06/2014     Lab Results   Component Value Date    PROTEINUA Negative 07/19/2023    Annually Yes   Blood pressure BP Readings from Last 1 Encounters:   09/01/23 132/70    Less than 140/90 Yes   Dilated retinal exam Most Recent Eye Exam Date: Not Found Annually Yes   Foot exam   Most Recent Foot Exam Date: Not Found Annually Yes       Health Maintenance  Health Maintenance Topics  with due status: Not Due       Topic Last Completion Date    TETANUS VACCINE 02/03/2015    DEXA Scan 10/27/2021    Aspirin/Antiplatelet Therapy 09/01/2023     Health Maintenance Due   Topic Date Due    Shingles Vaccine (1 of 2) Never done    COVID-19 Vaccine (4 - Pfizer series) 12/09/2021    Lipid Panel  05/04/2023    Influenza Vaccine (1) 09/01/2023       Assessment and Plan:  1. Diastolic heart failure, NYHA class 3  -     BASIC METABOLIC PANEL; Future; Expected date: 09/01/2023  -     BNP; Future; Expected date: 09/01/2023  -     BASIC METABOLIC PANEL; Future; Expected date: 09/05/2023    2. S/P CABG x 3  Assessment & Plan:  FU with CVT surgeon  Continue ASA, STATIN, BB and Plavix        3. Sarcoidosis of lung    4. Coronary artery disease, occlusive    5. Hypertensive heart disease with heart failure  Assessment & Plan:  Results for orders placed during the hospital encounter of 03/18/23    Echo    Interpretation Summary  · The left ventricle is small with mild concentric hypertrophy and normal systolic function.  · The estimated ejection fraction is 70%.  · Grade II left ventricular diastolic dysfunction.  · Normal right ventricular size with normal right ventricular systolic function.  · There is mild aortic valve stenosis.  · Aortic valve area is 1.70 cm2; peak velocity is 1.86 m/s; mean gradient is 8 mmHg.  · Mild tricuspid regurgitation.  · Moderate mitral regurgitation.  · The mean diastolic gradient across the mitral valve is 6 mmHg at a heart rate of 42 bpm.  · There is mild mitral stenosis.  · Normal central venous pressure (3 mmHg).  · The estimated PA systolic pressure is 35 mmHg.  To resume daily lasix 40 mg and follow up next week  Close f/u of chemistries    Orders:  -     BASIC METABOLIC PANEL; Future; Expected date: 09/01/2023  -     BNP; Future; Expected date: 09/01/2023  -     BASIC METABOLIC PANEL; Future; Expected date: 09/05/2023    6. CKD (chronic kidney disease) stage 4, GFR 15-29  ml/min  Assessment & Plan:  BMP today and 9/5/23  Lasix 40 mg daily starting today  Avoid aggressive diuresis    Orders:  -     BASIC METABOLIC PANEL; Future; Expected date: 09/05/2023    Other orders  -     carvediloL (COREG) 25 MG tablet; Take 0.5 tablets (12.5 mg total) by mouth 2 (two) times daily with meals.  Dispense: 30 tablet; Refill: 11                 -Patient's lab results were reviewed and discussed with patient  -Treatment options and alternatives were discussed with the patient. Patient expressed understanding. Patient was given the opportunity to ask questions and be an active participant in their medical care. Patient had no further questions or concerns at this time.         Future Appointments   Date Time Provider Department Center   9/5/2023 10:40 AM [facility]   9/7/2023  3:00 PM Christina Cardona [place] [facility] [place] [facility]   9/25/2023 10:00 AM HOME MONITOR DEVICE CHECK [facility] [place]   10/11/2023  3:40 PM Christina Cardona [place] [facility] [place] [facility]   11/1/2023  8:15 AM NAHUM Tamez MD [facility] [place]   12/15/2023  9:00 AM PACEMAKER CLINIC [facility] [place]          After visit summary printed and given to patient upon discharge.  Patient goals and care plan are included in After visit summary.    Total medical decision making time was 60 min.    The following issues were discussed: The primary encounter diagnosis was Diastolic heart failure, NYHA class 3. Diagnoses of S/P CABG x 3, Sarcoidosis of lung, Coronary artery disease, occlusive, Hypertensive heart disease with heart failure, and CKD (chronic kidney disease) stage 4, GFR 15-29 ml/min were also pertinent to this visit.    Health maintenance needs, recent test results and goals of care discussed with pt and questions answered.           Paula Deal, APRN, NP-C  [facility]  [address] [place] [address]

## 2023-09-01 NOTE — TELEPHONE ENCOUNTER
Spoke with daughter - states that she was receiving Lasix in the hospital and on discharge papers it said to stop taking Lasix. States that both ankles and feet are swollen and right hand.  States she gets a little SOB when ambulating.     Advised to sit with legs elevated and I would call her back after speaking with Dr. Cardona.

## 2023-09-01 NOTE — ASSESSMENT & PLAN NOTE
Results for orders placed during the hospital encounter of 03/18/23    Echo    Interpretation Summary  · The left ventricle is small with mild concentric hypertrophy and normal systolic function.  · The estimated ejection fraction is 70%.  · Grade II left ventricular diastolic dysfunction.  · Normal right ventricular size with normal right ventricular systolic function.  · There is mild aortic valve stenosis.  · Aortic valve area is 1.70 cm2; peak velocity is 1.86 m/s; mean gradient is 8 mmHg.  · Mild tricuspid regurgitation.  · Moderate mitral regurgitation.  · The mean diastolic gradient across the mitral valve is 6 mmHg at a heart rate of 42 bpm.  · There is mild mitral stenosis.  · Normal central venous pressure (3 mmHg).  · The estimated PA systolic pressure is 35 mmHg.  To resume daily lasix 40 mg and follow up next week  Close f/u of chemistries

## 2023-09-05 ENCOUNTER — PATIENT MESSAGE (OUTPATIENT)
Dept: PRIMARY CARE CLINIC | Facility: CLINIC | Age: 88
End: 2023-09-05
Payer: MEDICARE

## 2023-09-05 ENCOUNTER — LAB VISIT (OUTPATIENT)
Dept: LAB | Facility: HOSPITAL | Age: 88
End: 2023-09-05
Attending: NURSE PRACTITIONER
Payer: MEDICARE

## 2023-09-05 DIAGNOSIS — I11.0 HYPERTENSIVE HEART DISEASE WITH HEART FAILURE: ICD-10-CM

## 2023-09-05 DIAGNOSIS — N18.4 CKD (CHRONIC KIDNEY DISEASE) STAGE 4, GFR 15-29 ML/MIN: ICD-10-CM

## 2023-09-05 DIAGNOSIS — I50.30 DIASTOLIC HEART FAILURE, NYHA CLASS 3: ICD-10-CM

## 2023-09-05 LAB
ANION GAP SERPL CALC-SCNC: 12 MMOL/L (ref 8–16)
BUN SERPL-MCNC: 21 MG/DL (ref 8–23)
CALCIUM SERPL-MCNC: 8.7 MG/DL (ref 8.7–10.5)
CHLORIDE SERPL-SCNC: 115 MMOL/L (ref 95–110)
CO2 SERPL-SCNC: 18 MMOL/L (ref 23–29)
CREAT SERPL-MCNC: 1.4 MG/DL (ref 0.5–1.4)
EST. GFR  (NO RACE VARIABLE): 36.9 ML/MIN/1.73 M^2
GLUCOSE SERPL-MCNC: 93 MG/DL (ref 70–110)
POTASSIUM SERPL-SCNC: 3.3 MMOL/L (ref 3.5–5.1)
SODIUM SERPL-SCNC: 145 MMOL/L (ref 136–145)

## 2023-09-05 PROCEDURE — 36415 COLL VENOUS BLD VENIPUNCTURE: CPT | Mod: HCNC,PO | Performed by: NURSE PRACTITIONER

## 2023-09-05 PROCEDURE — 80048 BASIC METABOLIC PNL TOTAL CA: CPT | Mod: HCNC | Performed by: NURSE PRACTITIONER

## 2023-09-07 ENCOUNTER — OFFICE VISIT (OUTPATIENT)
Dept: PRIMARY CARE CLINIC | Facility: CLINIC | Age: 88
End: 2023-09-07
Payer: MEDICARE

## 2023-09-07 VITALS
TEMPERATURE: 98 F | WEIGHT: 146.88 LBS | HEIGHT: 61 IN | SYSTOLIC BLOOD PRESSURE: 138 MMHG | HEART RATE: 70 BPM | OXYGEN SATURATION: 97 % | BODY MASS INDEX: 27.73 KG/M2 | DIASTOLIC BLOOD PRESSURE: 60 MMHG

## 2023-09-07 DIAGNOSIS — I50.30 DIASTOLIC HEART FAILURE, NYHA CLASS 3: ICD-10-CM

## 2023-09-07 DIAGNOSIS — I25.10 CORONARY ARTERY DISEASE, OCCLUSIVE: Chronic | ICD-10-CM

## 2023-09-07 DIAGNOSIS — I10 ESSENTIAL HYPERTENSION: Primary | Chronic | ICD-10-CM

## 2023-09-07 DIAGNOSIS — D86.0 SARCOIDOSIS OF LUNG: ICD-10-CM

## 2023-09-07 DIAGNOSIS — K51.00 ULCERATIVE PANCOLITIS WITHOUT COMPLICATION: ICD-10-CM

## 2023-09-07 PROCEDURE — 3078F PR MOST RECENT DIASTOLIC BLOOD PRESSURE < 80 MM HG: ICD-10-PCS | Mod: HCNC,CPTII,S$GLB, | Performed by: INTERNAL MEDICINE

## 2023-09-07 PROCEDURE — 99999 PR PBB SHADOW E&M-EST. PATIENT-LVL IV: ICD-10-PCS | Mod: PBBFAC,HCNC,, | Performed by: INTERNAL MEDICINE

## 2023-09-07 PROCEDURE — 3075F SYST BP GE 130 - 139MM HG: CPT | Mod: HCNC,CPTII,S$GLB, | Performed by: INTERNAL MEDICINE

## 2023-09-07 PROCEDURE — 3075F PR MOST RECENT SYSTOLIC BLOOD PRESS GE 130-139MM HG: ICD-10-PCS | Mod: HCNC,CPTII,S$GLB, | Performed by: INTERNAL MEDICINE

## 2023-09-07 PROCEDURE — 1101F PR PT FALLS ASSESS DOC 0-1 FALLS W/OUT INJ PAST YR: ICD-10-PCS | Mod: HCNC,CPTII,S$GLB, | Performed by: INTERNAL MEDICINE

## 2023-09-07 PROCEDURE — 99213 OFFICE O/P EST LOW 20 MIN: CPT | Mod: HCNC,S$GLB,, | Performed by: INTERNAL MEDICINE

## 2023-09-07 PROCEDURE — 99999 PR PBB SHADOW E&M-EST. PATIENT-LVL IV: CPT | Mod: PBBFAC,HCNC,, | Performed by: INTERNAL MEDICINE

## 2023-09-07 PROCEDURE — 3288F PR FALLS RISK ASSESSMENT DOCUMENTED: ICD-10-PCS | Mod: HCNC,CPTII,S$GLB, | Performed by: INTERNAL MEDICINE

## 2023-09-07 PROCEDURE — 3288F FALL RISK ASSESSMENT DOCD: CPT | Mod: HCNC,CPTII,S$GLB, | Performed by: INTERNAL MEDICINE

## 2023-09-07 PROCEDURE — 1159F MED LIST DOCD IN RCRD: CPT | Mod: HCNC,CPTII,S$GLB, | Performed by: INTERNAL MEDICINE

## 2023-09-07 PROCEDURE — 99213 PR OFFICE/OUTPT VISIT, EST, LEVL III, 20-29 MIN: ICD-10-PCS | Mod: HCNC,S$GLB,, | Performed by: INTERNAL MEDICINE

## 2023-09-07 PROCEDURE — 1101F PT FALLS ASSESS-DOCD LE1/YR: CPT | Mod: HCNC,CPTII,S$GLB, | Performed by: INTERNAL MEDICINE

## 2023-09-07 PROCEDURE — 1159F PR MEDICATION LIST DOCUMENTED IN MEDICAL RECORD: ICD-10-PCS | Mod: HCNC,CPTII,S$GLB, | Performed by: INTERNAL MEDICINE

## 2023-09-07 PROCEDURE — 3078F DIAST BP <80 MM HG: CPT | Mod: HCNC,CPTII,S$GLB, | Performed by: INTERNAL MEDICINE

## 2023-09-07 RX ORDER — FUROSEMIDE 40 MG/1
40 TABLET ORAL DAILY
Qty: 90 TABLET | Refills: 3
Start: 2023-09-07 | End: 2023-09-13

## 2023-09-08 ENCOUNTER — LAB VISIT (OUTPATIENT)
Dept: LAB | Facility: HOSPITAL | Age: 88
End: 2023-09-08
Attending: INTERNAL MEDICINE
Payer: MEDICARE

## 2023-09-08 ENCOUNTER — PATIENT MESSAGE (OUTPATIENT)
Dept: PRIMARY CARE CLINIC | Facility: CLINIC | Age: 88
End: 2023-09-08
Payer: MEDICARE

## 2023-09-08 DIAGNOSIS — I50.30 DIASTOLIC HEART FAILURE, NYHA CLASS 3: ICD-10-CM

## 2023-09-08 LAB
ANION GAP SERPL CALC-SCNC: 10 MMOL/L (ref 8–16)
BNP SERPL-MCNC: 1092 PG/ML (ref 0–99)
BUN SERPL-MCNC: 17 MG/DL (ref 8–23)
CALCIUM SERPL-MCNC: 8.5 MG/DL (ref 8.7–10.5)
CHLORIDE SERPL-SCNC: 112 MMOL/L (ref 95–110)
CO2 SERPL-SCNC: 18 MMOL/L (ref 23–29)
CREAT SERPL-MCNC: 1.3 MG/DL (ref 0.5–1.4)
EST. GFR  (NO RACE VARIABLE): 40 ML/MIN/1.73 M^2
GLUCOSE SERPL-MCNC: 104 MG/DL (ref 70–110)
POTASSIUM SERPL-SCNC: 3.1 MMOL/L (ref 3.5–5.1)
SODIUM SERPL-SCNC: 140 MMOL/L (ref 136–145)

## 2023-09-08 PROCEDURE — 36415 COLL VENOUS BLD VENIPUNCTURE: CPT | Mod: HCNC,PO | Performed by: INTERNAL MEDICINE

## 2023-09-08 PROCEDURE — 83880 ASSAY OF NATRIURETIC PEPTIDE: CPT | Mod: HCNC | Performed by: INTERNAL MEDICINE

## 2023-09-08 PROCEDURE — 80048 BASIC METABOLIC PNL TOTAL CA: CPT | Mod: HCNC | Performed by: INTERNAL MEDICINE

## 2023-09-08 NOTE — LETTER
[facility] [place]  [address]  [facility] [address]  Phone: [phone]  Fax: [phone] September 8, 2023    Glo Dumont  [facility] [facility] [address]      To Whom It May Concern:    Ms. Jessica Dumont, juror # [id number], is unable to participate in jury duty due to caring for her mother who is recently discharged from the hospital with multiple medical issues.    If you have any questions or concerns, please feel free to call my office.    Sincerely,        Christina Cardona MD

## 2023-09-08 NOTE — TELEPHONE ENCOUNTER
Please print out jury excuse letter to email.  Daughter is supposed to get me the email address.  SM

## 2023-09-11 ENCOUNTER — PATIENT MESSAGE (OUTPATIENT)
Dept: PRIMARY CARE CLINIC | Facility: CLINIC | Age: 88
End: 2023-09-11
Payer: MEDICARE

## 2023-09-11 DIAGNOSIS — M25.561 PAIN IN BOTH KNEES, UNSPECIFIED CHRONICITY: Primary | ICD-10-CM

## 2023-09-11 DIAGNOSIS — R11.2 NAUSEA AND VOMITING, UNSPECIFIED VOMITING TYPE: ICD-10-CM

## 2023-09-11 DIAGNOSIS — M25.562 PAIN IN BOTH KNEES, UNSPECIFIED CHRONICITY: Primary | ICD-10-CM

## 2023-09-11 DIAGNOSIS — M71.20 SYNOVIAL CYST OF POPLITEAL SPACE, UNSPECIFIED LATERALITY: Primary | ICD-10-CM

## 2023-09-11 RX ORDER — ONDANSETRON 4 MG/1
TABLET, ORALLY DISINTEGRATING ORAL
Qty: 12 TABLET | Refills: 0 | Status: SHIPPED | OUTPATIENT
Start: 2023-09-11

## 2023-09-12 ENCOUNTER — HOSPITAL ENCOUNTER (OUTPATIENT)
Dept: RADIOLOGY | Facility: HOSPITAL | Age: 88
Discharge: HOME | End: 2023-09-12
Attending: PHYSICIAN ASSISTANT
Payer: MEDICARE

## 2023-09-12 ENCOUNTER — OFFICE VISIT (OUTPATIENT)
Dept: SPORTS MEDICINE | Facility: CLINIC | Age: 88
End: 2023-09-12
Payer: MEDICARE

## 2023-09-12 VITALS — BODY MASS INDEX: 27.72 KG/M2 | HEIGHT: 61 IN | WEIGHT: 146.81 LBS

## 2023-09-12 DIAGNOSIS — I70.209: ICD-10-CM

## 2023-09-12 DIAGNOSIS — M25.562 PAIN IN BOTH KNEES, UNSPECIFIED CHRONICITY: ICD-10-CM

## 2023-09-12 DIAGNOSIS — M71.20 SYNOVIAL CYST OF POPLITEAL SPACE, UNSPECIFIED LATERALITY: ICD-10-CM

## 2023-09-12 DIAGNOSIS — M25.561 PAIN IN BOTH KNEES, UNSPECIFIED CHRONICITY: ICD-10-CM

## 2023-09-12 DIAGNOSIS — M17.11 PRIMARY OSTEOARTHRITIS OF RIGHT KNEE: Primary | ICD-10-CM

## 2023-09-12 PROCEDURE — 73564 X-RAY EXAM KNEE 4 OR MORE: CPT | Mod: TC,50,HCNC

## 2023-09-12 PROCEDURE — 1160F RVW MEDS BY RX/DR IN RCRD: CPT | Mod: HCNC,CPTII,S$GLB, | Performed by: PHYSICIAN ASSISTANT

## 2023-09-12 PROCEDURE — 97110 THERAPEUTIC EXERCISES: CPT | Mod: HCNC,GP,S$GLB,97 | Performed by: PHYSICIAN ASSISTANT

## 2023-09-12 PROCEDURE — 99203 OFFICE O/P NEW LOW 30 MIN: CPT | Mod: HCNC,25,S$GLB, | Performed by: PHYSICIAN ASSISTANT

## 2023-09-12 PROCEDURE — 73564 X-RAY EXAM KNEE 4 OR MORE: CPT | Mod: 26,50,HCNC, | Performed by: RADIOLOGY

## 2023-09-12 PROCEDURE — 1125F AMNT PAIN NOTED PAIN PRSNT: CPT | Mod: HCNC,CPTII,S$GLB, | Performed by: PHYSICIAN ASSISTANT

## 2023-09-12 PROCEDURE — 3288F PR FALLS RISK ASSESSMENT DOCUMENTED: ICD-10-PCS | Mod: HCNC,CPTII,S$GLB, | Performed by: PHYSICIAN ASSISTANT

## 2023-09-12 PROCEDURE — 1159F PR MEDICATION LIST DOCUMENTED IN MEDICAL RECORD: ICD-10-PCS | Mod: HCNC,CPTII,S$GLB, | Performed by: PHYSICIAN ASSISTANT

## 2023-09-12 PROCEDURE — 1101F PR PT FALLS ASSESS DOC 0-1 FALLS W/OUT INJ PAST YR: ICD-10-PCS | Mod: HCNC,CPTII,S$GLB, | Performed by: PHYSICIAN ASSISTANT

## 2023-09-12 PROCEDURE — 1101F PT FALLS ASSESS-DOCD LE1/YR: CPT | Mod: HCNC,CPTII,S$GLB, | Performed by: PHYSICIAN ASSISTANT

## 2023-09-12 PROCEDURE — 73564 XR KNEE ORTHO BILAT WITH FLEXION: ICD-10-PCS | Mod: 26,50,HCNC, | Performed by: RADIOLOGY

## 2023-09-12 PROCEDURE — 99999 PR PBB SHADOW E&M-EST. PATIENT-LVL V: ICD-10-PCS | Mod: PBBFAC,HCNC,, | Performed by: PHYSICIAN ASSISTANT

## 2023-09-12 PROCEDURE — 99999 PR PBB SHADOW E&M-EST. PATIENT-LVL V: CPT | Mod: PBBFAC,HCNC,, | Performed by: PHYSICIAN ASSISTANT

## 2023-09-12 PROCEDURE — 1159F MED LIST DOCD IN RCRD: CPT | Mod: HCNC,CPTII,S$GLB, | Performed by: PHYSICIAN ASSISTANT

## 2023-09-12 PROCEDURE — 99203 PR OFFICE/OUTPT VISIT, NEW, LEVL III, 30-44 MIN: ICD-10-PCS | Mod: HCNC,25,S$GLB, | Performed by: PHYSICIAN ASSISTANT

## 2023-09-12 PROCEDURE — 1125F PR PAIN SEVERITY QUANTIFIED, PAIN PRESENT: ICD-10-PCS | Mod: HCNC,CPTII,S$GLB, | Performed by: PHYSICIAN ASSISTANT

## 2023-09-12 PROCEDURE — 3288F FALL RISK ASSESSMENT DOCD: CPT | Mod: HCNC,CPTII,S$GLB, | Performed by: PHYSICIAN ASSISTANT

## 2023-09-12 PROCEDURE — 97110 PR THERAPEUTIC EXERCISES: ICD-10-PCS | Mod: HCNC,GP,S$GLB,97 | Performed by: PHYSICIAN ASSISTANT

## 2023-09-12 PROCEDURE — 1160F PR REVIEW ALL MEDS BY PRESCRIBER/CLIN PHARMACIST DOCUMENTED: ICD-10-PCS | Mod: HCNC,CPTII,S$GLB, | Performed by: PHYSICIAN ASSISTANT

## 2023-09-12 RX ORDER — DICLOFENAC SODIUM 10 MG/G
2 GEL TOPICAL 2 TIMES DAILY
Qty: 100 G | Refills: 0 | Status: SHIPPED | OUTPATIENT
Start: 2023-09-12

## 2023-09-12 NOTE — PATIENT INSTRUCTIONS
STRETCHING EXERCISES            STRENGTHENING EXERCISES                  If you have any difficulties reading this information, you may visit the online version using the following link: Knee Conditioning Program ([email or web address])

## 2023-09-12 NOTE — PROGRESS NOTES
Orthopaedics Sports Medicine     Knee Initial Visit         9/12/2023    Referring MD: Sharon Stone MD    Chief Complaint   Patient presents with    Right Knee - Pain, Swelling       History of Present Illness:   Glo Dumont is a 87 y.o. year old female patient presents with pain and dysfunction involving the RIGHT knee.     Onset of the symptoms was about 1 week ago.     Inciting event:  No acute injury or trauma.     Current symptoms include posterior and medial knee pain, morning stiffness, night pain, limited range of motion due to pain, intermittent swelling.     Pain is aggravated by ambulation.      Evaluation to date:  X-ray, ultrasound to rule out clot     Treatment to date:  Rest, activity modification, Tylenol Arthritis (unable to take anti-inflammatories due to pre-existing heart and kidney conditions).       Past Medical History:   Past Medical History:   Diagnosis Date    Anemia     Angina pectoris     Anxiety     Anxiety and depression     Arthritis     hip    Carotid artery occlusion     Carpal tunnel syndrome 06/23/2008    emg    Chronic diarrhea     work up in 2011 with EGD, CS and VCE    CKD (chronic kidney disease) stage 3, GFR 30-59 ml/min 5/11/2017    Colitis     Coronary artery disease     Coronary artery disease     Diastolic dysfunction     Diverticulosis     Glaucoma     Greater trochanteric bursitis 2/10/2015    Grief at loss of child 1/26/2016    H/O carotid endarterectomy 12/2/2013    Heart failure     History of coronary angioplasty 3/11/2014    Hypercholesteremia     Hypertension     Liver cyst 02/08/2013    ct abd    Macular degeneration     Obesity with serious comorbidity 3/19/2023    Primary open-angle glaucoma(365.11) 9/3/2013    Renal cyst 02/08/2013    ct abd    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    Sarcoidosis     Sarcoidosis of lung     Sickle cell trait     Uveitis        Past Surgical History:   Past Surgical History:   Procedure Laterality  Date    A-V CARDIAC PACEMAKER INSERTION Left 3/21/2023    Procedure: INSERTION, CARDIAC PACEMAKER, DUAL CHAMBER/His Lead;  Surgeon: Cortez Ambrose MD;  Location: [facility] CATH LAB;  Service: Cardiology;  Laterality: Left;  MDT/ MD to confirm in am/possible nurse sedate    CAROTID ENDARTERECTOMY Right 2000s    CATARACT EXTRACTION Bilateral     Dr. Liang Dennis    CHOLECYSTECTOMY      laparoscopic, 3/18.    CORONARY ANGIOPLASTY WITH STENT PLACEMENT  11/19/2010    RCA-HALIE 2010    Lathia    JOINT REPLACEMENT Left 11/03/2014    Dr. Braun    TOTAL ABDOMINAL HYSTERECTOMY W/ BILATERAL SALPINGOOPHORECTOMY  1972       Medications:  Patient's Medications   New Prescriptions    DICLOFENAC SODIUM (VOLTAREN) 1 % GEL    Apply 2 g topically 2 (two) times daily.   Previous Medications    AFLIBERCEPT 2 MG/0.05 ML SOLN    2 mg by Intravitreal route every 28 days.    ASPIRIN (ECOTRIN) 81 MG EC TABLET    Take 81 mg by mouth once daily.    ATORVASTATIN (LIPITOR) 20 MG TABLET    Take 20 mg by mouth Daily.    BUDESONIDE (ENTOCORT EC) 3 MG CAPSULE    Take 3 mg by mouth 2 (two) times a day.    BUSPIRONE (BUSPAR) 5 MG TAB    Take 1 tablet (5 mg total) by mouth 2 (two) times daily as needed (anxiety).    CARVEDILOL (COREG) 25 MG TABLET    Take 0.5 tablets (12.5 mg total) by mouth 2 (two) times daily with meals.    CITALOPRAM (CELEXA) 10 MG TABLET    Take 1 tablet (10 mg total) by mouth every evening.    CLOPIDOGREL (PLAVIX) 75 MG TABLET    Take 1 tablet by mouth every morning.    CYPROHEPTADINE (PERIACTIN) 4 MG TABLET    Take 1 tablet (4 mg total) by mouth 3 (three) times daily as needed.    DEXAMETHASONE (OZURDEX) 0.7 MG IMPL INTRAVITREAL IMPLANT    0.7 mg by Intravitreal route once.    DORZOLAMIDE-TIMOLOL 2-0.5% (COSOPT) 22.3-6.8 MG/ML OPHTHALMIC SOLUTION    Place 1 drop into both eyes 2 (two) times daily.    FOLIC ACID/MULTIVIT-MIN/LUTEIN (CENTRUM SILVER ORAL)    Take 1 tablet by mouth once daily.    FUROSEMIDE (LASIX) 40 MG TABLET     "Take 1 tablet (40 mg total) by mouth once daily.    LIPASE-PROTEASE-AMYLASE 24,000-76,000-120,000 UNITS (CREON) 24,000-76,000 -120,000 UNIT CAPSULE    Take 1 capsule by mouth 3 (three) times daily with meals.    NITROGLYCERIN (NITROSTAT) 0.4 MG SL TABLET    PLACE 1 TABLET UNDER THE TONGUE EVERY 5 MINUTES AS NEEDED FOR CHEST PAIN    ONDANSETRON (ZOFRAN-ODT) 4 MG TBDL    DISSOLVE 1 TABLET(4 MG) ON THE TONGUE EVERY 8 HOURS AS NEEDED FOR NAUSEA OR VOMITING    PANTOPRAZOLE (PROTONIX) 40 MG TABLET    Take 1 tablet (40 mg total) by mouth once daily.    POTASSIUM CHLORIDE (KLOR-CON) 10 MEQ TBSR    Take 1 tablet (10 mEq total) by mouth once daily.    SUCRALFATE (CARAFATE) 1 GRAM TABLET    Take 1 tablet (1 g total) by mouth 4 (four) times daily.   Modified Medications    No medications on file   Discontinued Medications    No medications on file        Allergies:   Review of patient's allergies indicates:   Allergen Reactions    Lisinopril      angioedema    Codeine Nausea And Vomiting       Social History:   [place]: [place], [place]  occupation: Retired  alcohol use: She reports no history of alcohol use.  tobacco use: She reports that she has never smoked. She has never used smokeless tobacco.    Review of systems:  history of recent illness, fevers, shakes, or chills: no  history of cardiac problems or chest pain: HTN, HLD, s/p CABGx3, CHF, CAD, pacemaker status  history of of pulmonary problems or asthma: sarcoidosis  history of diabetes: no  history of prior DVT or clotting problems: no  history of sleep apnea: no    Physical Examination:  Estimated body mass index is 27.74 kg/m² as calculated from the following:    Height as of this encounter: 5' 1" (1.549 m).    Weight as of this encounter: 66.6 kg (146 lb 13.2 oz).    Standing exam  stance: normal alignment, no significant leg-length discrepancy  gait: patient presents in [employer] wheelchair, reports normally ambulates with walker or cane "       Knee      RIGHT  LEFT  Skin:     Intact   Intact  ROM:     0-110  0-130  Effusion:    +   Neg  Medial joint line tenderness:  +   Neg  Lateral joint line tenderness:  Neg   Neg  Carmel:     Neg   Neg  Patella crepitus:   +   Neg  Patella tenderness:   Neg   Neg  Patella grind:      Neg   Neg  Lachman:    Neg   Neg  Valgus stress:    Neg   Neg  Varus stress:    Neg   Neg  Posterior drawer:   Neg   Neg  N-V               intact  intact  Hip:    nml    nml   Lower extremity edema: Negative negative    Ttp posterior knee    Neurovascular exam  - motor function grossly intact bilaterally to hip flexion, knee extension and flexion, ankle dorsiflexion and plantarflexion  - sensation intact to light touch bilaterally to femoral, tibial, tibial and peroneal distributions  - symmetrical pedal pulses    Imaging:   XR Results:  Results for orders placed during the hospital encounter of 09/12/23    X-ray Knee Ortho Bilateral with Flexion    Narrative  EXAM: XR KNEE ORTHO BILAT WITH FLEXION      EXAM: XR KNEE ORTHO BILAT WITH FLEXION    CLINICAL HISTORY: Bilateral knee joint pain    FINDINGS:  5 views bilateral knees.  Moderate tricompartment degenerative changes with joint space narrowing and sclerosis greatest within the medial compartments.  No significant joint effusion  No fracture, suspicious bone lesion, erosive change, osteochondral lesion, or loose osteochondral body is identified.  Joint alignment appears anatomic.  Dense atherosclerotic disease.    Impression  See above    Finalized on: 9/12/2023 10:01 AM By:  Conrad Price MD  [facility]# [id number]      2023-09-12 10:03:10.555    [facility]      Physician Read: I agree with the above impression.  Kellgren Mateo grade 3 right knee    Impression:  87 y.o. female with primary osteoarthritis of the right knee, Kellgren Mateo grade 3; arteriosclerosis of lower extremity    Plan:  Discussed diagnosis and treatment options with the patient today.  Her history, physical  exam, and imaging is consistent with primary osteoarthritis of the right knee with medial joint space loss and marginal osteophyte formation consistent with a Kellgren Mateo grade 3.  Her knee x-rays also revealed she has an extensive amount of atherosclerotic disease present in the lower extremities  For her knee she is tried conservative treatment in the form of rest, activity modification, Tylenol Arthritis.  Despite these interventions she still continues to have symptoms.  I discussed the natural progression of osteoarthritis and treatment options.  Further treatment options include rest, activity modification, oral anti-inflammatories (patient unable to take anti-inflammatories due to kidney and heart function), knee bracing, physical therapy, different injection types.  Injection types include corticosteroid injections versus viscosupplementation.  I would like to avoid a corticosteroid injection as she is about 3 weeks out from a coronary artery bypass graft and I would like to avoid raising her blood pressure.    Instead, I recommend we move forward with a prior authorization for viscosupplementation  Prior authorization placed for Orthovisc  MEDICAL NECESSITY FOR VISCOSUPPLEMENTATION: After thorough evaluation of the patient, I have determined that visco-supplementation is medically necessary. The patient has painful DJD of the knee with failure of conservative therapy including lifestyle modifications and rehabilitation exercises. Oral analgesis/NSAIDs have not adequately controlled symptoms and there is radiographic evidence of joint space narrowing, subchondral sclerosis, and some early osteophytic changes Kellgren- Mateo grade 3  I did discuss with the patient that she has a heavy amount of atherosclerotic disease in her lower extremity arteries. Patient is currently seeing a cardiologist with [facility]. She is scheduled to see him this Thursday for hospital F/U. I encouraged patient and the family  with her at bedside to let cardiologist know about atherosclerosis present in her lower extremities, patient voiced understanding  Rx for Voltaren gel provided, encouraged her to only use this once in the morning and once in the evening.  If no improvement of her pain with the Voltaren gel, encouraged to discontinue use  [employer] knee strength and conditioning program printed, reviewed, and provided to the patient.  Discussed modified exercises to avoid falls  10 minutes were spent developing, teaching, and performing a home exercise program.  A written summary was provided and all questions were answered.  This service was performed by Lluvia Ren PA-C under direction of Lluvia Ren PA-C.  CPT 63196-UY.  Follow-up with me in about 2 weeks for right knee Orthovisc        Lluvia Ren PA-C  Sports Medicine Physician Assistant       Disclaimer: This note was prepared using a voice recognition system and is likely to have sound alike errors within the text.

## 2023-09-13 ENCOUNTER — LAB VISIT (OUTPATIENT)
Dept: LAB | Facility: HOSPITAL | Age: 88
End: 2023-09-13
Attending: NURSE PRACTITIONER
Payer: MEDICARE

## 2023-09-13 ENCOUNTER — OFFICE VISIT (OUTPATIENT)
Dept: PRIMARY CARE CLINIC | Facility: CLINIC | Age: 88
End: 2023-09-13
Payer: MEDICARE

## 2023-09-13 VITALS
SYSTOLIC BLOOD PRESSURE: 136 MMHG | WEIGHT: 140.19 LBS | BODY MASS INDEX: 26.49 KG/M2 | TEMPERATURE: 99 F | DIASTOLIC BLOOD PRESSURE: 64 MMHG | OXYGEN SATURATION: 97 % | HEART RATE: 69 BPM | RESPIRATION RATE: 16 BRPM

## 2023-09-13 DIAGNOSIS — K51.00 ULCERATIVE PANCOLITIS WITHOUT COMPLICATION: ICD-10-CM

## 2023-09-13 DIAGNOSIS — I50.30 DIASTOLIC HEART FAILURE, NYHA CLASS 3: ICD-10-CM

## 2023-09-13 DIAGNOSIS — N18.4 CKD (CHRONIC KIDNEY DISEASE) STAGE 4, GFR 15-29 ML/MIN: ICD-10-CM

## 2023-09-13 DIAGNOSIS — N18.4 CKD (CHRONIC KIDNEY DISEASE) STAGE 4, GFR 15-29 ML/MIN: Primary | ICD-10-CM

## 2023-09-13 DIAGNOSIS — I10 ESSENTIAL HYPERTENSION: Chronic | ICD-10-CM

## 2023-09-13 LAB
ANION GAP SERPL CALC-SCNC: 14 MMOL/L (ref 8–16)
BASOPHILS # BLD AUTO: 0.07 K/UL (ref 0–0.2)
BASOPHILS NFR BLD: 0.7 % (ref 0–1.9)
BNP SERPL-MCNC: 717 PG/ML (ref 0–99)
BUN SERPL-MCNC: 18 MG/DL (ref 8–23)
CALCIUM SERPL-MCNC: 9.4 MG/DL (ref 8.7–10.5)
CHLORIDE SERPL-SCNC: 108 MMOL/L (ref 95–110)
CO2 SERPL-SCNC: 15 MMOL/L (ref 23–29)
CREAT SERPL-MCNC: 1.8 MG/DL (ref 0.5–1.4)
DIFFERENTIAL METHOD: ABNORMAL
EOSINOPHIL # BLD AUTO: 0.3 K/UL (ref 0–0.5)
EOSINOPHIL NFR BLD: 3.2 % (ref 0–8)
ERYTHROCYTE [DISTWIDTH] IN BLOOD BY AUTOMATED COUNT: 17.2 % (ref 11.5–14.5)
EST. GFR  (NO RACE VARIABLE): 27.3 ML/MIN/1.73 M^2
GLUCOSE SERPL-MCNC: 95 MG/DL (ref 70–110)
HCT VFR BLD AUTO: 30.6 % (ref 37–48.5)
HGB BLD-MCNC: 10 G/DL (ref 12–16)
IMM GRANULOCYTES # BLD AUTO: 0.07 K/UL (ref 0–0.04)
IMM GRANULOCYTES NFR BLD AUTO: 0.7 % (ref 0–0.5)
LYMPHOCYTES # BLD AUTO: 0.7 K/UL (ref 1–4.8)
LYMPHOCYTES NFR BLD: 6.8 % (ref 18–48)
MCH RBC QN AUTO: 27 PG (ref 27–31)
MCHC RBC AUTO-ENTMCNC: 32.7 G/DL (ref 32–36)
MCV RBC AUTO: 83 FL (ref 82–98)
MONOCYTES # BLD AUTO: 1.2 K/UL (ref 0.3–1)
MONOCYTES NFR BLD: 11.8 % (ref 4–15)
NEUTROPHILS # BLD AUTO: 7.7 K/UL (ref 1.8–7.7)
NEUTROPHILS NFR BLD: 76.8 % (ref 38–73)
NRBC BLD-RTO: 0 /100 WBC
PLATELET # BLD AUTO: 452 K/UL (ref 150–450)
PMV BLD AUTO: 10.2 FL (ref 9.2–12.9)
POTASSIUM SERPL-SCNC: 3.8 MMOL/L (ref 3.5–5.1)
RBC # BLD AUTO: 3.71 M/UL (ref 4–5.4)
SODIUM SERPL-SCNC: 137 MMOL/L (ref 136–145)
WBC # BLD AUTO: 9.99 K/UL (ref 3.9–12.7)

## 2023-09-13 PROCEDURE — 99999 PR PBB SHADOW E&M-EST. PATIENT-LVL III: ICD-10-PCS | Mod: PBBFAC,HCNC,, | Performed by: NURSE PRACTITIONER

## 2023-09-13 PROCEDURE — 99215 PR OFFICE/OUTPT VISIT, EST, LEVL V, 40-54 MIN: ICD-10-PCS | Mod: HCNC,S$GLB,, | Performed by: NURSE PRACTITIONER

## 2023-09-13 PROCEDURE — 3075F SYST BP GE 130 - 139MM HG: CPT | Mod: HCNC,CPTII,S$GLB, | Performed by: NURSE PRACTITIONER

## 2023-09-13 PROCEDURE — 3078F DIAST BP <80 MM HG: CPT | Mod: HCNC,CPTII,S$GLB, | Performed by: NURSE PRACTITIONER

## 2023-09-13 PROCEDURE — 3075F PR MOST RECENT SYSTOLIC BLOOD PRESS GE 130-139MM HG: ICD-10-PCS | Mod: HCNC,CPTII,S$GLB, | Performed by: NURSE PRACTITIONER

## 2023-09-13 PROCEDURE — 99215 OFFICE O/P EST HI 40 MIN: CPT | Mod: HCNC,S$GLB,, | Performed by: NURSE PRACTITIONER

## 2023-09-13 PROCEDURE — 80048 BASIC METABOLIC PNL TOTAL CA: CPT | Mod: HCNC | Performed by: NURSE PRACTITIONER

## 2023-09-13 PROCEDURE — 3078F PR MOST RECENT DIASTOLIC BLOOD PRESSURE < 80 MM HG: ICD-10-PCS | Mod: HCNC,CPTII,S$GLB, | Performed by: NURSE PRACTITIONER

## 2023-09-13 PROCEDURE — 83880 ASSAY OF NATRIURETIC PEPTIDE: CPT | Mod: HCNC | Performed by: NURSE PRACTITIONER

## 2023-09-13 PROCEDURE — 99999 PR PBB SHADOW E&M-EST. PATIENT-LVL III: CPT | Mod: PBBFAC,HCNC,, | Performed by: NURSE PRACTITIONER

## 2023-09-13 PROCEDURE — 36415 COLL VENOUS BLD VENIPUNCTURE: CPT | Mod: HCNC,PO | Performed by: NURSE PRACTITIONER

## 2023-09-13 PROCEDURE — 85025 COMPLETE CBC W/AUTO DIFF WBC: CPT | Mod: HCNC | Performed by: NURSE PRACTITIONER

## 2023-09-13 RX ORDER — LOPERAMIDE HYDROCHLORIDE 2 MG/1
1 CAPSULE ORAL EVERY 6 HOURS PRN
COMMUNITY

## 2023-09-13 RX ORDER — FUROSEMIDE 40 MG/1
20 TABLET ORAL DAILY
Qty: 90 TABLET | Refills: 3
Start: 2023-09-13 | End: 2023-12-05

## 2023-09-13 RX ORDER — BUDESONIDE 3 MG/1
3 CAPSULE, COATED PELLETS ORAL 2 TIMES DAILY
Qty: 180 CAPSULE | Refills: 3 | Status: SHIPPED | OUTPATIENT
Start: 2023-09-13 | End: 2024-09-12

## 2023-09-13 NOTE — PROGRESS NOTES
Glo Dumont  09/13/2023  [id number]    Christina Cardona MD  Patient Care Team:  Christina Cardona MD as PCP - General (Internal Medicine)  Gordo Muir MD as Consulting Physician (Pulmonary Disease)  Nik Bergman MD (Inactive) as Consulting Physician (Cardiology)  Franky Bell Jr., MD (Rheumatology)  Julio Galindo MD as Consulting Physician (Gastroenterology)  Glenis Mcfadden MD as Consulting Physician (Otolaryngology)  Ayaz Estrada MD as Consulting Physician (Hematology and Oncology)  Yomi Guy LPN as Care Coordinator      [facility] Note Transitional Care Note      Chief Complaint:  Chief Complaint   Patient presents with    Follow-up       History of Present Illness:  Transitional Care Note    Family and/or Caretaker present at visit?  Yes.  Diagnostic tests reviewed/disposition: I have reviewed all completed as well as pending diagnostic tests at the time of discharge.  Disease/illness education: Ulcerative Colitis/diarrhea/appetite, CHF, Renal Fxn  Home health/community services discussion/referrals: Patient has home health established at [facility] with PT/OT .   Establishment or re-establishment of referral orders for community resources:  Resumption of  .   Discussion with other health care providers:  Rob Cardona/[facility] .        Returns today for F/U to hospitalization on 9/8/23 - 9/11/23 at [facility] for SOB, wheezing & S/P CABG x 3, CHF exacerbation. Daughter, Jessica, reports low grade fever also.   According to daughter, CXR was clear. No results on U/A. No signs of infection found.   She received diureses with IV lasix for leg swelling/wheezing/SOB.  Now improved. At time of discharge, pt advised taking lasix 20  mg daily. Daughter states pt's potassium level had improved.     Pt has acute on chronic diarrhea from ulcerative pan colitis. Diarrhea was frequent but now slowing. Pt states symptoms are similar to her usual bouts of diarrhea. C/O Intermittent  stomach cramps, denies blood in diarrhea. Reports of decreased appetite and generalized weakness.  Needs refill on budesonide. Pt is taking imodium and lomotil prn for diarrhea. Pt is drinking pedialyte per daughter.     Pt denies decreased urine amount and  denies other  symptoms.         The following were reviewed: Active problem list, medication list, allergies, family history, social history, and Health Maintenance.     History:  Past Medical History:   Diagnosis Date    Anemia     Angina pectoris     Anxiety     Anxiety and depression     Arthritis     hip    Carotid artery occlusion     Carpal tunnel syndrome 06/23/2008    emg    Chronic diarrhea     work up in 2011 with EGD, CS and VCE    CKD (chronic kidney disease) stage 3, GFR 30-59 ml/min 5/11/2017    Colitis     Coronary artery disease     Coronary artery disease     Diastolic dysfunction     Diverticulosis     Glaucoma     Greater trochanteric bursitis 2/10/2015    Grief at loss of child 1/26/2016    H/O carotid endarterectomy 12/2/2013    Heart failure     History of coronary angioplasty 3/11/2014    Hypercholesteremia     Hypertension     Liver cyst 02/08/2013    ct abd    Macular degeneration     Obesity with serious comorbidity 3/19/2023    Primary open-angle glaucoma(365.11) 9/3/2013    Renal cyst 02/08/2013    ct abd    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    Sarcoidosis     Sarcoidosis of lung     Sickle cell trait     Uveitis      Past Surgical History:   Procedure Laterality Date    A-V CARDIAC PACEMAKER INSERTION Left 3/21/2023    Procedure: INSERTION, CARDIAC PACEMAKER, DUAL CHAMBER/His Lead;  Surgeon: Cortez Ambrose MD;  Location: [facility] CATH LAB;  Service: Cardiology;  Laterality: Left;  MDT/ MD to confirm in am/possible nurse sedate    CAROTID ENDARTERECTOMY Right 2000s    CATARACT EXTRACTION Bilateral     Dr. Liang Dennis    CHOLECYSTECTOMY      laparoscopic, 3/18.    CORONARY ANGIOPLASTY WITH STENT PLACEMENT   11/19/2010    RCA-HALIE 2010    Lathia    JOINT REPLACEMENT Left 11/03/2014    Dr. Braun    TOTAL ABDOMINAL HYSTERECTOMY W/ BILATERAL SALPINGOOPHORECTOMY  1972     Family History   Problem Relation Age of Onset    Hypertension Mother     Heart failure Mother     Cancer Father         prostate    Cirrhosis Brother     Heart failure Brother     Cancer Daughter         Carcinoid     Patient Active Problem List   Diagnosis    Sarcoidosis of lung    Thyroid nodule    Hypertensive heart disease with heart failure    Other hyperlipidemia    Hemoglobin A-S genotype    Ptosis    Coronary artery disease, occlusive    History of central retinal artery occlusion    Iron deficiency anemia    Vitamin D deficiency    Osteopenia    Essential hypertension    Diastolic heart failure, NYHA class 3    Atherosclerosis of aorta    CKD (chronic kidney disease) stage 4, GFR 15-29 ml/min    Uveitis    Ulcerative colitis    History of coronary angioplasty    Hiatal hernia    Bradycardia    Lung nodule    Memory loss    Central retinal vein occlusion with macular edema of both eyes    Aortic stenosis    Weight loss    Unspecified severe protein-calorie malnutrition    Hypokalemia    Obesity with serious comorbidity    Hypomagnesemia    SA node dysfunction    S/P placement of cardiac pacemaker    Current mild episode of major depressive disorder without prior episode    AV block, 2nd degree    Acute cystitis without hematuria    Abdominal pain    Gastroesophageal reflux disease with esophagitis    S/P CABG x 3     Review of patient's allergies indicates:   Allergen Reactions    Lisinopril      angioedema    Codeine Nausea And Vomiting       Medications:  Current Outpatient Medications on File Prior to Visit   Medication Sig Dispense Refill    loperamide (IMODIUM) 2 mg capsule Take 1 mg by mouth every 6 (six) hours as needed for Diarrhea.      aflibercept 2 mg/0.05 mL Soln 2 mg by Intravitreal route every 28 days.      aspirin (ECOTRIN) 81 MG EC  tablet Take 81 mg by mouth once daily.      atorvastatin (LIPITOR) 20 MG tablet Take 20 mg by mouth Daily.      busPIRone (BUSPAR) 5 MG Tab Take 1 tablet (5 mg total) by mouth 2 (two) times daily as needed (anxiety). 60 tablet 5    carvediloL (COREG) 25 MG tablet Take 0.5 tablets (12.5 mg total) by mouth 2 (two) times daily with meals. 30 tablet 11    citalopram (CELEXA) 10 MG tablet Take 1 tablet (10 mg total) by mouth every evening. 30 tablet 11    clopidogreL (PLAVIX) 75 mg tablet Take 1 tablet by mouth every morning.      cyproheptadine (PERIACTIN) 4 mg tablet Take 1 tablet (4 mg total) by mouth 3 (three) times daily as needed. 270 tablet 3    dexAMETHasone (OZURDEX) 0.7 mg Impl intravitreal implant 0.7 mg by Intravitreal route once.      diclofenac sodium (VOLTAREN) 1 % Gel Apply 2 g topically 2 (two) times daily. 100 g 0    dorzolamide-timolol 2-0.5% (COSOPT) 22.3-6.8 mg/mL ophthalmic solution Place 1 drop into both eyes 2 (two) times daily. 10 mL 6    FOLIC ACID/MULTIVIT-MIN/LUTEIN (CENTRUM SILVER ORAL) Take 1 tablet by mouth once daily.      lipase-protease-amylase 24,000-76,000-120,000 units (CREON) 24,000-76,000 -120,000 unit capsule Take 1 capsule by mouth 3 (three) times daily with meals. 270 capsule 3    nitroGLYCERIN (NITROSTAT) 0.4 MG SL tablet PLACE 1 TABLET UNDER THE TONGUE EVERY 5 MINUTES AS NEEDED FOR CHEST PAIN 25 tablet 3    ondansetron (ZOFRAN-ODT) 4 MG TbDL DISSOLVE 1 TABLET(4 MG) ON THE TONGUE EVERY 8 HOURS AS NEEDED FOR NAUSEA OR VOMITING 12 tablet 0    pantoprazole (PROTONIX) 40 MG tablet Take 1 tablet (40 mg total) by mouth once daily. 90 tablet 1    potassium chloride (KLOR-CON) 10 MEQ TbSR Take 1 tablet (10 mEq total) by mouth once daily. 30 tablet 3    sucralfate (CARAFATE) 1 gram tablet Take 1 tablet (1 g total) by mouth 4 (four) times daily. 20 tablet 0    [DISCONTINUED] budesonide (ENTOCORT EC) 3 mg capsule Take 3 mg by mouth 2 (two) times a day.      [DISCONTINUED] furosemide  (LASIX) 40 MG tablet Take 1 tablet (40 mg total) by mouth once daily. 90 tablet 3     No current facility-administered medications on file prior to visit.       Medications have been reviewed and reconciled with patient at visit today.      Exam:  Vitals:    09/13/23 0940   BP: 136/64   Pulse: 69   Resp: 16   Temp: 99.2 °F (37.3 °C)     Weight: 63.6 kg (140 lb 3.2 oz)   Body mass index is 26.49 kg/m².      BP Readings from Last 3 Encounters:   09/13/23 136/64   09/07/23 138/60   09/01/23 132/70     Wt Readings from Last 3 Encounters:   09/13/23 0940 63.6 kg (140 lb 3.2 oz)   09/12/23 0931 66.6 kg (146 lb 13.2 oz)   09/07/23 1447 66.6 kg (146 lb 14.4 oz)        REVIEW OF SYSTEMS  Review of Systems   Constitutional:  Positive for malaise/fatigue. Negative for chills and fever.   HENT:  Negative for congestion, ear discharge, ear pain and sore throat.    Respiratory:  Positive for cough and shortness of breath.    Cardiovascular:  Positive for leg swelling. Negative for chest pain and palpitations.   Gastrointestinal:  Negative for abdominal pain, diarrhea, nausea and vomiting.   Genitourinary:  Negative for dysuria and frequency.   Musculoskeletal:  Negative for back pain and myalgias.   Skin:         Mid sternal surgical incision   Neurological:  Negative for dizziness, focal weakness and headaches.   Psychiatric/Behavioral:  Negative for depression. The patient is not nervous/anxious.        Physical Exam  Constitutional:       Appearance: She is well-developed.   Neck:      Thyroid: No thyroid mass.      Vascular: No carotid bruit.   Cardiovascular:      Rate and Rhythm: Normal rate and regular rhythm.      Heart sounds: No murmur heard.     No friction rub. No gallop.   Pulmonary:      Effort: Pulmonary effort is normal.      Breath sounds: Normal breath sounds. No wheezing or rales.   Abdominal:      General: Bowel sounds are normal.      Palpations: Abdomen is soft. There is no mass.      Tenderness: There is no  abdominal tenderness.   Musculoskeletal:      Cervical back: Neck supple.   Lymphadenopathy:      Cervical: No cervical adenopathy.   Neurological:      Mental Status: She is alert and oriented to person, place, and time.         Laboratory Reviewed:     Lab Results   Component Value Date    WBC 9.50 08/12/2023    HGB 9.5 (L) 08/12/2023    HCT 28.5 (L) 08/12/2023     08/12/2023    CHOL 160 05/04/2022    TRIG 80 05/04/2022    HDL 81 (H) 05/04/2022    ALT 10 08/12/2023    AST 12 08/12/2023     09/08/2023    K 3.1 (L) 09/08/2023     (H) 09/08/2023    CREATININE 1.3 09/08/2023    BUN 17 09/08/2023    CO2 18 (L) 09/08/2023    TSH 1.230 12/13/2021    INR 1.0 09/09/2020    HGBA1C 5.7 11/17/2015       Screening or Prevention Patient's value Goal Complete/Controlled?   HgA1C Testing and Control   Lab Results   Component Value Date    HGBA1C 5.7 11/17/2015      Annually/Less than 8% No   Lipid profile : 05/04/2022 Annually No   LDL control Lab Results   Component Value Date    LDLCALC 63.0 05/04/2022    Annually/Less than 100 mg/dl  No   Nephropathy screening Lab Results   Component Value Date    LABMICR 38.0 02/06/2014     Lab Results   Component Value Date    PROTEINUA Negative 07/19/2023    Annually Yes   Blood pressure BP Readings from Last 1 Encounters:   09/13/23 136/64    Less than 140/90 Yes   Dilated retinal exam Most Recent Eye Exam Date: Not Found Annually Yes   Foot exam   Most Recent Foot Exam Date: Not Found Annually Yes       Health Maintenance  Health Maintenance Topics with due status: Not Due       Topic Last Completion Date    TETANUS VACCINE 02/03/2015    DEXA Scan 10/27/2021    Aspirin/Antiplatelet Therapy 09/12/2023     Health Maintenance Due   Topic Date Due    Shingles Vaccine (1 of 2) Never done    COVID-19 Vaccine (4 - Pfizer series) 12/09/2021    Lipid Panel  05/04/2023    Influenza Vaccine (1) 09/01/2023       Assessment and Plan:  1. CKD (chronic kidney disease) stage 4, GFR  15-29 ml/min  Assessment & Plan:  Monitor - after IV lasix diuresis    Orders:  -     CBC W/ AUTO DIFFERENTIAL; Future; Expected date: 09/13/2023  -     BASIC METABOLIC PANEL; Future; Expected date: 09/13/2023    2. Essential hypertension  -     furosemide (LASIX) 40 MG tablet; Take 0.5 tablets (20 mg total) by mouth once daily.  Dispense: 90 tablet; Refill: 3    3. Ulcerative pancolitis without complication  Assessment & Plan:  According to patient - same diarrhea    Orders:  -     budesonide (ENTOCORT EC) 3 mg capsule; Take 1 capsule (3 mg total) by mouth 2 (two) times a day.  Dispense: 180 capsule; Refill: 3  -     WBC, Stool; Future; Expected date: 09/13/2023  -     Stool Exam-Ova,Cysts,Parasites; Future; Expected date: 09/13/2023  -     Clostridium difficile EIA; Future; Expected date: 09/13/2023  -     Stool culture; Future; Expected date: 09/13/2023    4. Diastolic heart failure, NYHA class 3  -     furosemide (LASIX) 40 MG tablet; Take 0.5 tablets (20 mg total) by mouth once daily.  Dispense: 90 tablet; Refill: 3  -     BNP; Future; Expected date: 09/13/2023                 -Patient's lab results were reviewed and discussed with patient  -Treatment options and alternatives were discussed with the patient. Patient expressed understanding. Patient was given the opportunity to ask questions and be an active participant in their medical care. Patient had no further questions or concerns at this time.         Future Appointments   Date Time Provider Department Center   9/15/2023  8:20 AM Christina Cardona MD [facility]    9/25/2023 10:00 AM HOME MONITOR DEVICE CHECK [facility]   9/26/2023 11:30 AM Lluvia Ren PA-C [facility]   10/3/2023 11:30 AM Lluvia Ren PA-C [facility]   10/10/2023 11:30 AM Lluvia Ren PA-C [facility] [place]   10/11/2023  3:40 PM Christina Cardona MD [facility]    11/1/2023  8:15 AM NAHUM Tamez MD  [facility] OPHTHAL [place]   12/15/2023  9:00 AM PACEMAKER CLINIC [facility] ARR PRO [place]          After visit summary printed and given to patient upon discharge.  Patient goals and care plan are included in After visit summary.    Total medical decision making time was 60 min.    The following issues were discussed: The primary encounter diagnosis was CKD (chronic kidney disease) stage 4, GFR 15-29 ml/min. Diagnoses of Essential hypertension, Ulcerative pancolitis without complication, and Diastolic heart failure, NYHA class 3 were also pertinent to this visit.    Health maintenance needs, recent test results and goals of care discussed with pt and questions answered.           Paula Deal, ARLENE, NP-C  [facility] [address] [place] [address]

## 2023-09-14 ENCOUNTER — PATIENT MESSAGE (OUTPATIENT)
Dept: PRIMARY CARE CLINIC | Facility: CLINIC | Age: 88
End: 2023-09-14
Payer: MEDICARE

## 2023-09-14 PROBLEM — D75.839 THROMBOCYTOSIS, UNSPECIFIED: Status: ACTIVE | Noted: 2023-09-14

## 2023-09-15 ENCOUNTER — TELEPHONE (OUTPATIENT)
Dept: PRIMARY CARE CLINIC | Facility: CLINIC | Age: 88
End: 2023-09-15
Payer: MEDICARE

## 2023-09-25 ENCOUNTER — TELEPHONE (OUTPATIENT)
Dept: SPORTS MEDICINE | Facility: CLINIC | Age: 88
End: 2023-09-25
Payer: MEDICARE

## 2023-09-25 ENCOUNTER — CLINICAL SUPPORT (OUTPATIENT)
Dept: CARDIOLOGY | Facility: HOSPITAL | Age: 88
End: 2023-09-25
Payer: MEDICARE

## 2023-09-25 DIAGNOSIS — Z95.0 PRESENCE OF CARDIAC PACEMAKER: ICD-10-CM

## 2023-09-25 PROCEDURE — 93296 REM INTERROG EVL PM/IDS: CPT | Mod: HCNC | Performed by: INTERNAL MEDICINE

## 2023-09-25 NOTE — TELEPHONE ENCOUNTER
----- Message from Max Armenta sent at 9/25/2023 10:23 AM CDT -----  Contact: [phone]  Patient is calling in regards to canceling her procedure appt. Please call pt back at [phone], if any questions. Thanks KB

## 2023-09-25 NOTE — TELEPHONE ENCOUNTER
Reached out to pt about her wanting to cancel appointment, spoke with her daughter and she stated that her mother did not want the shots anymore at all

## 2023-09-27 ENCOUNTER — OFFICE VISIT (OUTPATIENT)
Dept: PRIMARY CARE CLINIC | Facility: CLINIC | Age: 88
End: 2023-09-27
Payer: MEDICARE

## 2023-09-27 ENCOUNTER — PATIENT MESSAGE (OUTPATIENT)
Dept: ADMINISTRATIVE | Facility: OTHER | Age: 88
End: 2023-09-27
Payer: MEDICARE

## 2023-09-27 VITALS
HEIGHT: 61 IN | BODY MASS INDEX: 25.65 KG/M2 | WEIGHT: 135.88 LBS | TEMPERATURE: 98 F | HEART RATE: 63 BPM | SYSTOLIC BLOOD PRESSURE: 162 MMHG | DIASTOLIC BLOOD PRESSURE: 80 MMHG | OXYGEN SATURATION: 99 %

## 2023-09-27 DIAGNOSIS — I10 ESSENTIAL HYPERTENSION: Chronic | ICD-10-CM

## 2023-09-27 DIAGNOSIS — R63.4 WEIGHT LOSS: ICD-10-CM

## 2023-09-27 DIAGNOSIS — I50.30 DIASTOLIC HEART FAILURE, NYHA CLASS 3: ICD-10-CM

## 2023-09-27 DIAGNOSIS — I70.0 ATHEROSCLEROSIS OF AORTA: ICD-10-CM

## 2023-09-27 DIAGNOSIS — N18.4 CKD (CHRONIC KIDNEY DISEASE) STAGE 4, GFR 15-29 ML/MIN: ICD-10-CM

## 2023-09-27 DIAGNOSIS — I11.0 HYPERTENSIVE HEART DISEASE WITH HEART FAILURE: Primary | ICD-10-CM

## 2023-09-27 DIAGNOSIS — N17.9 AKI (ACUTE KIDNEY INJURY): ICD-10-CM

## 2023-09-27 PROCEDURE — 1160F RVW MEDS BY RX/DR IN RCRD: CPT | Mod: HCNC,CPTII,S$GLB, | Performed by: FAMILY MEDICINE

## 2023-09-27 PROCEDURE — 90694 VACC AIIV4 NO PRSRV 0.5ML IM: CPT | Mod: HCNC,S$GLB,, | Performed by: FAMILY MEDICINE

## 2023-09-27 PROCEDURE — 90694 FLU VACCINE - QUADRIVALENT - ADJUVANTED: ICD-10-PCS | Mod: HCNC,S$GLB,, | Performed by: FAMILY MEDICINE

## 2023-09-27 PROCEDURE — 1101F PR PT FALLS ASSESS DOC 0-1 FALLS W/OUT INJ PAST YR: ICD-10-PCS | Mod: HCNC,CPTII,S$GLB, | Performed by: FAMILY MEDICINE

## 2023-09-27 PROCEDURE — 3079F PR MOST RECENT DIASTOLIC BLOOD PRESSURE 80-89 MM HG: ICD-10-PCS | Mod: HCNC,CPTII,S$GLB, | Performed by: FAMILY MEDICINE

## 2023-09-27 PROCEDURE — 3288F FALL RISK ASSESSMENT DOCD: CPT | Mod: HCNC,CPTII,S$GLB, | Performed by: FAMILY MEDICINE

## 2023-09-27 PROCEDURE — 3079F DIAST BP 80-89 MM HG: CPT | Mod: HCNC,CPTII,S$GLB, | Performed by: FAMILY MEDICINE

## 2023-09-27 PROCEDURE — 99214 OFFICE O/P EST MOD 30 MIN: CPT | Mod: HCNC,S$GLB,, | Performed by: FAMILY MEDICINE

## 2023-09-27 PROCEDURE — 99214 PR OFFICE/OUTPT VISIT, EST, LEVL IV, 30-39 MIN: ICD-10-PCS | Mod: HCNC,S$GLB,, | Performed by: FAMILY MEDICINE

## 2023-09-27 PROCEDURE — 99999 PR PBB SHADOW E&M-EST. PATIENT-LVL IV: CPT | Mod: PBBFAC,HCNC,, | Performed by: FAMILY MEDICINE

## 2023-09-27 PROCEDURE — 1159F MED LIST DOCD IN RCRD: CPT | Mod: HCNC,CPTII,S$GLB, | Performed by: FAMILY MEDICINE

## 2023-09-27 PROCEDURE — 3288F PR FALLS RISK ASSESSMENT DOCUMENTED: ICD-10-PCS | Mod: HCNC,CPTII,S$GLB, | Performed by: FAMILY MEDICINE

## 2023-09-27 PROCEDURE — 99999 PR PBB SHADOW E&M-EST. PATIENT-LVL IV: ICD-10-PCS | Mod: PBBFAC,HCNC,, | Performed by: FAMILY MEDICINE

## 2023-09-27 PROCEDURE — G0008 ADMIN INFLUENZA VIRUS VAC: HCPCS | Mod: HCNC,S$GLB,, | Performed by: FAMILY MEDICINE

## 2023-09-27 PROCEDURE — 1159F PR MEDICATION LIST DOCUMENTED IN MEDICAL RECORD: ICD-10-PCS | Mod: HCNC,CPTII,S$GLB, | Performed by: FAMILY MEDICINE

## 2023-09-27 PROCEDURE — G0008 FLU VACCINE - QUADRIVALENT - ADJUVANTED: ICD-10-PCS | Mod: HCNC,S$GLB,, | Performed by: FAMILY MEDICINE

## 2023-09-27 PROCEDURE — 3077F PR MOST RECENT SYSTOLIC BLOOD PRESSURE >= 140 MM HG: ICD-10-PCS | Mod: HCNC,CPTII,S$GLB, | Performed by: FAMILY MEDICINE

## 2023-09-27 PROCEDURE — 1160F PR REVIEW ALL MEDS BY PRESCRIBER/CLIN PHARMACIST DOCUMENTED: ICD-10-PCS | Mod: HCNC,CPTII,S$GLB, | Performed by: FAMILY MEDICINE

## 2023-09-27 PROCEDURE — 1101F PT FALLS ASSESS-DOCD LE1/YR: CPT | Mod: HCNC,CPTII,S$GLB, | Performed by: FAMILY MEDICINE

## 2023-09-27 PROCEDURE — 3077F SYST BP >= 140 MM HG: CPT | Mod: HCNC,CPTII,S$GLB, | Performed by: FAMILY MEDICINE

## 2023-09-27 RX ORDER — AMLODIPINE BESYLATE 2.5 MG/1
10 TABLET ORAL EVERY MORNING
COMMUNITY
Start: 2023-09-21

## 2023-09-27 NOTE — ASSESSMENT & PLAN NOTE
Elevated in office reading today; assures me home BP are around 115/70. Referral to digital HTN today for improved home monitoring

## 2023-09-27 NOTE — ASSESSMENT & PLAN NOTE
Still with poor appetite and weight loss; encouraged offering her favorite foods at meals and supplementing with Boost if appetite is poor. Increase physical activity, social interaction, and daylight exposure to improve appetite and mood.

## 2023-09-27 NOTE — PROGRESS NOTES
Subjective:       Patient ID: Glo Dumont is a 87 y.o. female.    Chief Complaint: Follow-up (Six week follow up. No issues or concerns. )    HPI:     87 year old female here for 6 week f/u of diarrhea and low grade fever at last visit. She had CABG 8/23 and is current with cardiology and CVT follow ups.      /60s at home but elevated in office today. She and her daughter are amenable to trying digital HTN program. She is doing daily weights with dry weight around 140. Down to 135 today but does admit to decreased appetite. Still eating 3 meals per day and slowly increasing activity. Diarrhea has resolved; no abdominal pain. Had last BM yesterday and it was normal. Bladder function has been normal. +nocturia but wears a brief at night and uses a cane to ambulate to the bathroom. No falls. Daughter here without concerns.     Updated/ annual due 10/23:  HM: 9/23 fluvax, 2/21 covid vaccines/booster, 3/15 [id number], 2/14 [id number], 10/22 [id number], 2/15 TDaP, 10/21 BMD rep 2y, 2/11 Cscope no repeat will be done, 1/17 MMG no more, Eye doc monthly, GI Dr. Galindo, Cards Dr. Viera, Nephro Dr. Villareal.       Objective:     Vitals:    09/27/23 0918   BP: (!) 162/80   Pulse: 63   Temp: 98.3 °F (36.8 °C)     Wt Readings from Last 3 Encounters:   09/27/23 61.6 kg (135 lb 14.4 oz)   09/13/23 63.6 kg (140 lb 3.2 oz)   09/12/23 66.6 kg (146 lb 13.2 oz)     Temp Readings from Last 3 Encounters:   09/27/23 98.3 °F (36.8 °C) (Oral)   09/13/23 99.2 °F (37.3 °C)   09/07/23 98 °F (36.7 °C) (Oral)     BP Readings from Last 3 Encounters:   09/27/23 (!) 162/80   09/13/23 136/64   09/07/23 138/60     Pulse Readings from Last 3 Encounters:   09/27/23 63   09/13/23 69   09/07/23 70          Physical Exam  Vitals and nursing note reviewed.   Constitutional:       Appearance: She is well-developed.   HENT:      Head: Normocephalic and atraumatic.   Eyes:      Pupils: Pupils are equal, round, and reactive to light.   Cardiovascular:       Rate and Rhythm: Normal rate and regular rhythm.      Heart sounds: Normal heart sounds.   Pulmonary:      Effort: Pulmonary effort is normal.      Breath sounds: Normal breath sounds. No wheezing or rales.   Abdominal:      General: There is no distension.      Palpations: Abdomen is soft. There is no mass.   Musculoskeletal:         General: No deformity. Normal range of motion.      Cervical back: Normal range of motion and neck supple.      Right lower leg: No edema.      Left lower leg: No edema.   Skin:     General: Skin is warm and dry.   Neurological:      Mental Status: She is alert and oriented to person, place, and time.      Gait: Gait abnormal (ambulates slowly with cane).   Psychiatric:         Behavior: Behavior normal.           Albumin   Date Value Ref Range Status   08/12/2023 3.3 (L) 3.5 - 5.2 g/dL Final     eGFR   Date Value Ref Range Status   09/13/2023 27.3 (A) >60 mL/min/1.73 m^2 Final       Assessment:       1. Hypertensive heart disease with heart failure    2. KEEGAN (acute kidney injury)    3. Atherosclerosis of aorta    4. Weight loss    5. Diastolic heart failure, NYHA class 3    6. Essential hypertension    7. CKD (chronic kidney disease) stage 4, GFR 15-29 ml/min        Plan:           Problem List Items Addressed This Visit          Cardiac/Vascular    Essential hypertension (Chronic)    Current Assessment & Plan     Elevated in office reading today; assures me home BP are around 115/70. Referral to digital HTN today for improved home monitoring         Hypertensive heart disease with heart failure - Primary    Relevant Orders    Hypertension Digital Medicine (HDMP) Enrollment Order (Completed)    Hypertension Digital Medicine (HDMP): Assign Onboarding Questionnaires (Completed)    Diastolic heart failure, NYHA class 3    Current Assessment & Plan     Compensated today; s/p CABG with management per Dr. Viera, cardiology         Atherosclerosis of aorta    Overview     Chest x-ray  11/10/14         Relevant Orders    Lipid Panel       Renal/    CKD (chronic kidney disease) stage 4, GFR 15-29 ml/min    Current Assessment & Plan     Recent acute on chronic kidney injury in setting of diarrhea/IVVD; will repeat BMP             Endocrine    Weight loss    Current Assessment & Plan     Still with poor appetite and weight loss; encouraged offering her favorite foods at meals and supplementing with Boost if appetite is poor. Increase physical activity, social interaction, and daylight exposure to improve appetite and mood.          Other Visit Diagnoses       KEEGAN (acute kidney injury)        Relevant Orders    Basic Metabolic Panel        6 week f/u; flu shot today

## 2023-09-27 NOTE — PATIENT INSTRUCTIONS
If you are feeling unwell, we'd like to be the first ones to know here at [facility]! Please give us a call. Same day appointments are our top priority to keep you well and out of the emergency rooms and hospitals. Call [phone] for our direct line. After hours advice is always available. Please call [phone] after hours to speak to the on-call team.

## 2023-10-23 ENCOUNTER — DOCUMENT SCAN (OUTPATIENT)
Dept: HOME HEALTH SERVICES | Facility: HOSPITAL | Age: 88
End: 2023-10-23
Payer: MEDICARE

## 2023-10-26 ENCOUNTER — DOCUMENTATION ONLY (OUTPATIENT)
Dept: PRIMARY CARE CLINIC | Facility: CLINIC | Age: 88
End: 2023-10-26
Payer: MEDICARE

## 2023-10-26 NOTE — PROGRESS NOTES
"Spoke with patient's daughter Jessica; she provided patient's cell phone number: [phone]. Called and spoke with patient; she says she is "over" any issues she had before and does not need to see LCSW. Offered to meet her during next MD visit on 11/9/23. She is agreeble to meeting during MD visit; will see her then.     "

## 2023-10-26 NOTE — PROGRESS NOTES
"Subjective:      Patient ID: Glo Dumont is a 88 y.o. female.    Chief Complaint: Follow-up (Six week follow up, pt was started on Bumex and Jardiance. )      HPI  Here for follow up of medical problems.  Up about 12# from her baseline.  Cards started Bumex 2mg daily, and Jardiance just today.  To see Cards in 7 days for reeval.  BP at home today 160/70.  No f/c/sw/coug.  Diarrhea last week, but now normal on restarted budesonide.  No cp/sob/palp.  SOB on exertion.    HM: 9/23 fluvax, 2/21 covid vaccines/booster, 3/15 [id number], 2/14 [id number], 10/22 [id number], 2/15 TDaP, 10/21 BMD rep 2y, 2/11 Cscope no repeat will be done, 1/17 MMG no more, Eye doc monthly, GI Dr. Galindo, Cards Dr. Lui, Nephro Dr. Villareal.     Review of Systems   Constitutional:  Negative for chills, diaphoresis and fever.   Respiratory:  Negative for cough and shortness of breath.    Cardiovascular:  Negative for chest pain, palpitations and leg swelling.   Gastrointestinal:  Negative for blood in stool, constipation, diarrhea, nausea and vomiting.   Genitourinary:  Negative for dysuria, frequency and hematuria.   Psychiatric/Behavioral:  The patient is not nervous/anxious.          Objective:   BP (!) 160/70   Pulse 60   Temp 97.9 °F (36.6 °C) (Oral)   Ht 5' 1" (1.549 m)   Wt 66.9 kg (147 lb 6.4 oz)   SpO2 97%   BMI 27.85 kg/m²     Physical Exam  Constitutional:       Appearance: She is well-developed.   Neck:      Thyroid: No thyroid mass.      Vascular: No carotid bruit.   Cardiovascular:      Rate and Rhythm: Normal rate and regular rhythm.      Heart sounds: No murmur heard.     No friction rub. No gallop.   Pulmonary:      Effort: Pulmonary effort is normal.      Breath sounds: Normal breath sounds. No wheezing or rales.   Abdominal:      General: Bowel sounds are normal.      Palpations: Abdomen is soft. There is no mass.      Tenderness: There is no abdominal tenderness.   Musculoskeletal:      Cervical back: Neck supple. " "  Lymphadenopathy:      Cervical: No cervical adenopathy.   Neurological:      Mental Status: She is alert and oriented to person, place, and time.           Assessment:       1. Essential hypertension    2. Diastolic heart failure, NYHA class 3    3. Current mild episode of major depressive disorder without prior episode    4. CKD (chronic kidney disease) stage 4, GFR 15-29 ml/min    5. Sarcoidosis of lung    6. Ulcerative pancolitis without complication          Plan:     Essential hypertension- cont meds, RTC 2wk.    Diastolic heart failure, NYHA class 3- cont meds and Cards f/u.    Current mild episode of major depressive disorder without prior episode- doing well on celexa, cont.    CKD (chronic kidney disease) stage 4, GFR 15-29 ml/min- pt says Cards rechecked and said "great", started jardiance- will recheck lab after next appt.    Sarcoidosis of lung- rec get RSV vaccine at pharmacy.    Ulcerative pancolitis without complication- on budes.    RTC 2 wk.    "

## 2023-10-30 ENCOUNTER — CLINICAL SUPPORT (OUTPATIENT)
Dept: CARDIOLOGY | Facility: HOSPITAL | Age: 88
End: 2023-10-30

## 2023-10-30 DIAGNOSIS — Z95.0 PRESENCE OF CARDIAC PACEMAKER: ICD-10-CM

## 2023-11-08 ENCOUNTER — PATIENT MESSAGE (OUTPATIENT)
Dept: OPHTHALMOLOGY | Facility: CLINIC | Age: 88
End: 2023-11-08
Payer: MEDICARE

## 2023-11-09 ENCOUNTER — OFFICE VISIT (OUTPATIENT)
Dept: PRIMARY CARE CLINIC | Facility: CLINIC | Age: 88
End: 2023-11-09
Payer: MEDICARE

## 2023-11-09 VITALS
BODY MASS INDEX: 27.83 KG/M2 | TEMPERATURE: 98 F | DIASTOLIC BLOOD PRESSURE: 70 MMHG | SYSTOLIC BLOOD PRESSURE: 160 MMHG | HEIGHT: 61 IN | WEIGHT: 147.38 LBS | HEART RATE: 60 BPM | OXYGEN SATURATION: 97 %

## 2023-11-09 DIAGNOSIS — D86.0 SARCOIDOSIS OF LUNG: ICD-10-CM

## 2023-11-09 DIAGNOSIS — N18.4 CKD (CHRONIC KIDNEY DISEASE) STAGE 4, GFR 15-29 ML/MIN: ICD-10-CM

## 2023-11-09 DIAGNOSIS — K51.00 ULCERATIVE PANCOLITIS WITHOUT COMPLICATION: ICD-10-CM

## 2023-11-09 DIAGNOSIS — F32.0 CURRENT MILD EPISODE OF MAJOR DEPRESSIVE DISORDER WITHOUT PRIOR EPISODE: ICD-10-CM

## 2023-11-09 DIAGNOSIS — I50.30 DIASTOLIC HEART FAILURE, NYHA CLASS 3: ICD-10-CM

## 2023-11-09 DIAGNOSIS — I10 ESSENTIAL HYPERTENSION: Primary | Chronic | ICD-10-CM

## 2023-11-09 PROCEDURE — 1101F PT FALLS ASSESS-DOCD LE1/YR: CPT | Mod: HCNC,CPTII,S$GLB, | Performed by: INTERNAL MEDICINE

## 2023-11-09 PROCEDURE — 1159F MED LIST DOCD IN RCRD: CPT | Mod: HCNC,CPTII,S$GLB, | Performed by: INTERNAL MEDICINE

## 2023-11-09 PROCEDURE — 3288F PR FALLS RISK ASSESSMENT DOCUMENTED: ICD-10-PCS | Mod: HCNC,CPTII,S$GLB, | Performed by: INTERNAL MEDICINE

## 2023-11-09 PROCEDURE — 3288F FALL RISK ASSESSMENT DOCD: CPT | Mod: HCNC,CPTII,S$GLB, | Performed by: INTERNAL MEDICINE

## 2023-11-09 PROCEDURE — 1159F PR MEDICATION LIST DOCUMENTED IN MEDICAL RECORD: ICD-10-PCS | Mod: HCNC,CPTII,S$GLB, | Performed by: INTERNAL MEDICINE

## 2023-11-09 PROCEDURE — 1101F PR PT FALLS ASSESS DOC 0-1 FALLS W/OUT INJ PAST YR: ICD-10-PCS | Mod: HCNC,CPTII,S$GLB, | Performed by: INTERNAL MEDICINE

## 2023-11-09 PROCEDURE — 99999 PR PBB SHADOW E&M-EST. PATIENT-LVL IV: CPT | Mod: PBBFAC,HCNC,, | Performed by: INTERNAL MEDICINE

## 2023-11-09 PROCEDURE — 99999 PR PBB SHADOW E&M-EST. PATIENT-LVL IV: ICD-10-PCS | Mod: PBBFAC,HCNC,, | Performed by: INTERNAL MEDICINE

## 2023-11-09 PROCEDURE — 99213 PR OFFICE/OUTPT VISIT, EST, LEVL III, 20-29 MIN: ICD-10-PCS | Mod: HCNC,S$GLB,, | Performed by: INTERNAL MEDICINE

## 2023-11-09 PROCEDURE — 99213 OFFICE O/P EST LOW 20 MIN: CPT | Mod: HCNC,S$GLB,, | Performed by: INTERNAL MEDICINE

## 2023-11-10 NOTE — PROGRESS NOTES
"Subjective:      Patient ID: Glo Dumont is a 88 y.o. female.    Chief Complaint: Follow-up (Two week follow. No issues or concerns. )      HPI  Here for follow up of medical problems.  BP at home 130/70s.  BMs normal.  No cp/sob/palp.  No cough.  Weight stable.    Advance Care Planning     Date: 11/24/2023    Power of   I initiated the process of voluntary advance care planning today and explained the importance of this process to the patient.  I introduced the concept of advance directives to the patient, as well. Then the patient received detailed information about the importance of designating a Health Care Power of  (HCPOA). She was also instructed to communicate with this person about their wishes for future healthcare, should she become sick and lose decision-making capacity. The patient has not previously appointed a HCPOA. After our discussion, the patient has decided to complete a HCPOA and has appointed her daughter, health care agent:  Denis Langston Esperanza Tim  & health care agent number:  [phone], [phone].                HM: 9/23 fluvax, 2/21 covid vaccines/booster, 3/15 [id number], 2/14 [id number], 10/22 [id number], 2/15 TDaP, 10/21 BMD rep 2y, 2/11 Cscope no repeat will be done, 1/17 MMG no more, Eye doc monthly, GI Dr. Galindo, Cards Dr. Lui, Nephro Dr. Villareal.     Review of Systems   Constitutional:  Negative for chills, diaphoresis and fever.   Respiratory:  Negative for cough and shortness of breath.    Cardiovascular:  Negative for chest pain, palpitations and leg swelling.   Gastrointestinal:  Negative for blood in stool, constipation, diarrhea, nausea and vomiting.   Genitourinary:  Negative for dysuria, frequency and hematuria.   Psychiatric/Behavioral:  The patient is not nervous/anxious.          Objective:   /75   Pulse 61   Temp 98.2 °F (36.8 °C) (Oral)   Ht 5' 1" (1.549 m)   Wt 67.2 kg (148 lb 3.2 oz)   SpO2 98%   BMI 28.00 kg/m² " Metformin ER last dispensed 08/30/2020  Pravastatin last dispensed 06/30/2020   "    Physical Exam  Constitutional:       Appearance: She is well-developed.   Neck:      Thyroid: No thyroid mass.      Vascular: No carotid bruit.   Cardiovascular:      Rate and Rhythm: Normal rate and regular rhythm.      Heart sounds: No murmur heard.     No friction rub. No gallop.   Pulmonary:      Effort: Pulmonary effort is normal.      Breath sounds: Normal breath sounds. No wheezing or rales.   Abdominal:      General: Bowel sounds are normal.      Palpations: Abdomen is soft. There is no mass.      Tenderness: There is no abdominal tenderness.   Musculoskeletal:      Cervical back: Neck supple.   Lymphadenopathy:      Cervical: No cervical adenopathy.   Neurological:      Mental Status: She is alert and oriented to person, place, and time.             Assessment:       1. Essential hypertension    2. Diastolic heart failure, NYHA class 3    3. CKD (chronic kidney disease) stage 4, GFR 15-29 ml/min          Plan:     Essential hypertension- cont meds, RTC 6wk with home BP cuff.     Diastolic heart failure, NYHA class 3- cont meds and Cards f/u.     Current mild episode of major depressive disorder without prior episode- doing well on celexa, cont.     CKD (chronic kidney disease) stage 4, GFR 15-29 ml/min- pt says Cards rechecked and said "great", started jardiance- will recheck lab now.     Sarcoidosis of lung- rec get RSV vaccine at pharmacy.     Ulcerative pancolitis without complication- on budes.      "

## 2023-11-15 ENCOUNTER — PROCEDURE VISIT (OUTPATIENT)
Dept: OPHTHALMOLOGY | Facility: CLINIC | Age: 88
End: 2023-11-15
Payer: MEDICARE

## 2023-11-15 ENCOUNTER — DOCUMENT SCAN (OUTPATIENT)
Dept: HOME HEALTH SERVICES | Facility: HOSPITAL | Age: 88
End: 2023-11-15
Payer: MEDICARE

## 2023-11-15 DIAGNOSIS — H34.8130 CENTRAL RETINAL VEIN OCCLUSION WITH MACULAR EDEMA OF BOTH EYES: Primary | ICD-10-CM

## 2023-11-15 PROCEDURE — 92134 CPTRZ OPH DX IMG PST SGM RTA: CPT | Mod: HCNC,S$GLB,, | Performed by: OPHTHALMOLOGY

## 2023-11-15 PROCEDURE — 67028 INJECTION EYE DRUG: CPT | Mod: 50,HCNC,S$GLB, | Performed by: OPHTHALMOLOGY

## 2023-11-15 PROCEDURE — 99499 NO LOS: ICD-10-PCS | Mod: HCNC,S$GLB,, | Performed by: OPHTHALMOLOGY

## 2023-11-15 PROCEDURE — 67028 PR INJECT INTRAVITREAL PHARMCOLOGIC: ICD-10-PCS | Mod: 50,HCNC,S$GLB, | Performed by: OPHTHALMOLOGY

## 2023-11-15 PROCEDURE — 99499 UNLISTED E&M SERVICE: CPT | Mod: HCNC,S$GLB,, | Performed by: OPHTHALMOLOGY

## 2023-11-15 PROCEDURE — 92134 POSTERIOR SEGMENT OCT RETINA (OCULAR COHERENCE TOMOGRAPHY)-BOTH EYES: ICD-10-PCS | Mod: HCNC,S$GLB,, | Performed by: OPHTHALMOLOGY

## 2023-11-15 RX ORDER — BUMETANIDE 2 MG/1
2 TABLET ORAL
COMMUNITY
Start: 2023-11-08 | End: 2024-02-16

## 2023-11-15 NOTE — PROGRESS NOTES
===============================  Date today is 11/15/2023  Glo Dumont is a 88 y.o. female  Last visit [facility]: :8/9/2023   Last visit eye dept. 8/9/2023    Corrected distance visual acuity was 20/200 in the right eye and 20/400 in the left eye.  Tonometry       Tonometry (Applanation, 2:14 PM)         Right Left    Pressure 16 16                  Not recorded       Not recorded       Not recorded       Chief Complaint   Patient presents with    CRVO     OZURDEX OU     HPI     CRVO     Additional comments: OZURDEX OU           Comments    1. Steroid responder glaucoma   2. PCIOL OU MGM   3. SARCOIDOSIS   H\O chronic UVEITIS OU   4. Central vein occlusion of retina, left   5. Retinal edema     H/o Avastin and Eylea OS   Had been getting Ozurdex OU with Dr. Shahrzad Tamez Eylea OU last 4/25/22, 8/3/22, 12/21/22  Ozurdex ou last 2/16/2022, 5/25/22, 11/8/22, 1/31/23 8/9/23      Cosopt BID OU          Last edited by Jessica Jensen on 11/15/2023  1:33 PM.      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  Check OU independently daily.    Gave my office and personal cell phone number.  ________________  11/15/2023 today  Glo Dumont    Oct better but ++ crvo edema   Rec ozurdex today and vabysmo in 6 weeks      11/15/2023  Diagnosis :  ou crvo  Today:   Ozurdex 0.7 mg , OU   Follow up: eval in  6     weeks vab/oz  Instructed to call 24/7 for any worsening of vision. Check Both eyes daily. Gave patient my home phone number.  Risks, benefits, and alternatives to treatment discussed  in detail with the patient.  The patient voiced understanding and wished to proceed with the procedure    Ozurdex intravitreal implant Procedure Note:   y capsule intact?  Patient Identified and Time Out complete  Subconjunctival bleb - xylocaine with epi 2%   and Betadine.  Inject OU at 1mm parallel to limbus  Post Operative Dx: Same  Complications: None  Follow up as above.    =============================

## 2023-11-24 ENCOUNTER — OFFICE VISIT (OUTPATIENT)
Dept: PRIMARY CARE CLINIC | Facility: CLINIC | Age: 88
End: 2023-11-24
Payer: MEDICARE

## 2023-11-24 VITALS
OXYGEN SATURATION: 98 % | SYSTOLIC BLOOD PRESSURE: 130 MMHG | DIASTOLIC BLOOD PRESSURE: 75 MMHG | WEIGHT: 148.19 LBS | HEIGHT: 61 IN | TEMPERATURE: 98 F | BODY MASS INDEX: 27.98 KG/M2 | HEART RATE: 61 BPM

## 2023-11-24 DIAGNOSIS — N18.4 CKD (CHRONIC KIDNEY DISEASE) STAGE 4, GFR 15-29 ML/MIN: ICD-10-CM

## 2023-11-24 DIAGNOSIS — I10 ESSENTIAL HYPERTENSION: Primary | Chronic | ICD-10-CM

## 2023-11-24 DIAGNOSIS — I50.30 DIASTOLIC HEART FAILURE, NYHA CLASS 3: ICD-10-CM

## 2023-11-24 LAB
ANION GAP SERPL CALC-SCNC: 13 MMOL/L (ref 8–16)
BUN SERPL-MCNC: 32 MG/DL (ref 8–23)
CALCIUM SERPL-MCNC: 9.1 MG/DL (ref 8.7–10.5)
CHLORIDE SERPL-SCNC: 109 MMOL/L (ref 95–110)
CO2 SERPL-SCNC: 22 MMOL/L (ref 23–29)
CREAT SERPL-MCNC: 1.6 MG/DL (ref 0.5–1.4)
EST. GFR  (NO RACE VARIABLE): 31.2 ML/MIN/1.73 M^2
GLUCOSE SERPL-MCNC: 94 MG/DL (ref 70–110)
POTASSIUM SERPL-SCNC: 3.5 MMOL/L (ref 3.5–5.1)
SODIUM SERPL-SCNC: 144 MMOL/L (ref 136–145)

## 2023-11-24 PROCEDURE — 99213 PR OFFICE/OUTPT VISIT, EST, LEVL III, 20-29 MIN: ICD-10-PCS | Mod: HCNC,S$GLB,, | Performed by: INTERNAL MEDICINE

## 2023-11-24 PROCEDURE — 3288F FALL RISK ASSESSMENT DOCD: CPT | Mod: HCNC,CPTII,S$GLB, | Performed by: INTERNAL MEDICINE

## 2023-11-24 PROCEDURE — 1159F MED LIST DOCD IN RCRD: CPT | Mod: HCNC,CPTII,S$GLB, | Performed by: INTERNAL MEDICINE

## 2023-11-24 PROCEDURE — 99213 OFFICE O/P EST LOW 20 MIN: CPT | Mod: HCNC,S$GLB,, | Performed by: INTERNAL MEDICINE

## 2023-11-24 PROCEDURE — 1158F PR ADVANCE CARE PLANNING DISCUSS DOCUMENTED IN MEDICAL RECORD: ICD-10-PCS | Mod: HCNC,CPTII,S$GLB, | Performed by: INTERNAL MEDICINE

## 2023-11-24 PROCEDURE — 1101F PR PT FALLS ASSESS DOC 0-1 FALLS W/OUT INJ PAST YR: ICD-10-PCS | Mod: HCNC,CPTII,S$GLB, | Performed by: INTERNAL MEDICINE

## 2023-11-24 PROCEDURE — 3288F PR FALLS RISK ASSESSMENT DOCUMENTED: ICD-10-PCS | Mod: HCNC,CPTII,S$GLB, | Performed by: INTERNAL MEDICINE

## 2023-11-24 PROCEDURE — 1101F PT FALLS ASSESS-DOCD LE1/YR: CPT | Mod: HCNC,CPTII,S$GLB, | Performed by: INTERNAL MEDICINE

## 2023-11-24 PROCEDURE — 99999 PR PBB SHADOW E&M-EST. PATIENT-LVL V: CPT | Mod: PBBFAC,HCNC,, | Performed by: INTERNAL MEDICINE

## 2023-11-24 PROCEDURE — 1159F PR MEDICATION LIST DOCUMENTED IN MEDICAL RECORD: ICD-10-PCS | Mod: HCNC,CPTII,S$GLB, | Performed by: INTERNAL MEDICINE

## 2023-11-24 PROCEDURE — 80048 BASIC METABOLIC PNL TOTAL CA: CPT | Mod: HCNC | Performed by: INTERNAL MEDICINE

## 2023-11-24 PROCEDURE — 99999 PR PBB SHADOW E&M-EST. PATIENT-LVL V: ICD-10-PCS | Mod: PBBFAC,HCNC,, | Performed by: INTERNAL MEDICINE

## 2023-11-24 PROCEDURE — 1158F ADVNC CARE PLAN TLK DOCD: CPT | Mod: HCNC,CPTII,S$GLB, | Performed by: INTERNAL MEDICINE

## 2023-11-24 RX ORDER — EMPAGLIFLOZIN 10 MG/1
10 TABLET, FILM COATED ORAL
COMMUNITY
Start: 2023-11-06 | End: 2024-05-04

## 2023-12-01 ENCOUNTER — PATIENT MESSAGE (OUTPATIENT)
Dept: PRIMARY CARE CLINIC | Facility: CLINIC | Age: 88
End: 2023-12-01
Payer: MEDICARE

## 2023-12-01 NOTE — TELEPHONE ENCOUNTER
PC with daughter Jessica    Says pt's cardiologist Dr. Viera wants pt to go to ER for IV diuresis, then get iron infusion.  SM

## 2023-12-04 ENCOUNTER — PATIENT MESSAGE (OUTPATIENT)
Dept: PRIMARY CARE CLINIC | Facility: CLINIC | Age: 88
End: 2023-12-04
Payer: MEDICARE

## 2023-12-04 ENCOUNTER — TELEPHONE (OUTPATIENT)
Dept: PRIMARY CARE CLINIC | Facility: CLINIC | Age: 88
End: 2023-12-04

## 2023-12-05 ENCOUNTER — OFFICE VISIT (OUTPATIENT)
Dept: PRIMARY CARE CLINIC | Facility: CLINIC | Age: 88
End: 2023-12-05
Payer: MEDICARE

## 2023-12-05 ENCOUNTER — PATIENT MESSAGE (OUTPATIENT)
Dept: PRIMARY CARE CLINIC | Facility: CLINIC | Age: 88
End: 2023-12-05

## 2023-12-05 VITALS
OXYGEN SATURATION: 97 % | BODY MASS INDEX: 27.19 KG/M2 | SYSTOLIC BLOOD PRESSURE: 130 MMHG | HEIGHT: 61 IN | WEIGHT: 144 LBS | DIASTOLIC BLOOD PRESSURE: 62 MMHG | TEMPERATURE: 98 F | HEART RATE: 65 BPM

## 2023-12-05 DIAGNOSIS — I10 ESSENTIAL HYPERTENSION: Chronic | ICD-10-CM

## 2023-12-05 DIAGNOSIS — N30.00 ACUTE CYSTITIS WITHOUT HEMATURIA: Primary | ICD-10-CM

## 2023-12-05 DIAGNOSIS — I50.30 DIASTOLIC HEART FAILURE, NYHA CLASS 3: Primary | ICD-10-CM

## 2023-12-05 PROCEDURE — 1159F MED LIST DOCD IN RCRD: CPT | Mod: HCNC,CPTII,S$GLB, | Performed by: NURSE PRACTITIONER

## 2023-12-05 PROCEDURE — 99213 PR OFFICE/OUTPT VISIT, EST, LEVL III, 20-29 MIN: ICD-10-PCS | Mod: HCNC,S$GLB,, | Performed by: NURSE PRACTITIONER

## 2023-12-05 PROCEDURE — 3288F PR FALLS RISK ASSESSMENT DOCUMENTED: ICD-10-PCS | Mod: HCNC,CPTII,S$GLB, | Performed by: NURSE PRACTITIONER

## 2023-12-05 PROCEDURE — 1101F PT FALLS ASSESS-DOCD LE1/YR: CPT | Mod: HCNC,CPTII,S$GLB, | Performed by: NURSE PRACTITIONER

## 2023-12-05 PROCEDURE — 1126F AMNT PAIN NOTED NONE PRSNT: CPT | Mod: HCNC,CPTII,S$GLB, | Performed by: NURSE PRACTITIONER

## 2023-12-05 PROCEDURE — 99213 OFFICE O/P EST LOW 20 MIN: CPT | Mod: HCNC,S$GLB,, | Performed by: NURSE PRACTITIONER

## 2023-12-05 PROCEDURE — 1159F PR MEDICATION LIST DOCUMENTED IN MEDICAL RECORD: ICD-10-PCS | Mod: HCNC,CPTII,S$GLB, | Performed by: NURSE PRACTITIONER

## 2023-12-05 PROCEDURE — 99999 PR PBB SHADOW E&M-EST. PATIENT-LVL IV: ICD-10-PCS | Mod: PBBFAC,HCNC,, | Performed by: NURSE PRACTITIONER

## 2023-12-05 PROCEDURE — 1101F PR PT FALLS ASSESS DOC 0-1 FALLS W/OUT INJ PAST YR: ICD-10-PCS | Mod: HCNC,CPTII,S$GLB, | Performed by: NURSE PRACTITIONER

## 2023-12-05 PROCEDURE — 1126F PR PAIN SEVERITY QUANTIFIED, NO PAIN PRESENT: ICD-10-PCS | Mod: HCNC,CPTII,S$GLB, | Performed by: NURSE PRACTITIONER

## 2023-12-05 PROCEDURE — 3288F FALL RISK ASSESSMENT DOCD: CPT | Mod: HCNC,CPTII,S$GLB, | Performed by: NURSE PRACTITIONER

## 2023-12-05 PROCEDURE — 1157F PR ADVANCE CARE PLAN OR EQUIV PRESENT IN MEDICAL RECORD: ICD-10-PCS | Mod: HCNC,CPTII,S$GLB, | Performed by: NURSE PRACTITIONER

## 2023-12-05 PROCEDURE — 1157F ADVNC CARE PLAN IN RCRD: CPT | Mod: HCNC,CPTII,S$GLB, | Performed by: NURSE PRACTITIONER

## 2023-12-05 PROCEDURE — 99999 PR PBB SHADOW E&M-EST. PATIENT-LVL IV: CPT | Mod: PBBFAC,HCNC,, | Performed by: NURSE PRACTITIONER

## 2023-12-05 NOTE — PROGRESS NOTES
Glo Dumont  12/05/2023  [id number]    Christina Cardona MD  Patient Care Team:  Christina Cardona MD as PCP - General (Internal Medicine)  Gordo Muir MD as Consulting Physician (Pulmonary Disease)  Nik Bergman MD (Inactive) as Consulting Physician (Cardiology)  Franky Bell Jr., MD (Rheumatology)  Julio Galindo MD as Consulting Physician (Gastroenterology)  Glenis Mcfadden MD as Consulting Physician (Otolaryngology)  Ayaz Estrada MD as Consulting Physician (Hematology and Oncology)  Yomi Guy LPN as Care Coordinator          [facility] Note    Family and/or Caretaker present at visit?  Yes.  Diagnostic tests reviewed/disposition: No diagnosic tests pending after this hospitalization.  Disease/illness education:  Congestive heart failure with preserved EF  Home health/community services discussion/referrals: Patient does not have home health established from hospital visit.  They do need home health.  If needed, we will set up home health for the patient.   Establishment or re-establishment of referral orders for community resources: No other necessary community resources.   Discussion with other health care providers: No discussion with other health care providers necessary.     Hospitalized [facility] 12/1 - 12/3 - CHF exacerbation requiring IV diuresis  Weight prior to admission 149# >>>>>144#  Daughter describes elevated blood pressure, SOB/SOOD    According to daughter symptoms have improved - SOOD has improved, blood pressure improved, less leg edema  Reports occasional cough  B/P 14?/74 - didn't check blood pressure today  Will follow up with Dr. Viera on 12/12/23  Presently taking Bumex 2 mg daily  Other symptoms denied      The following were reviewed: Active problem list, medication list, allergies, family history, social history, and Health Maintenance.     History:  Past Medical History:   Diagnosis Date    Anemia     Angina pectoris      Anxiety     Anxiety and depression     Arthritis     hip    Carotid artery occlusion     Carpal tunnel syndrome 06/23/2008    emg    Chronic diarrhea     work up in 2011 with EGD, CS and VCE    CKD (chronic kidney disease) stage 3, GFR 30-59 ml/min 5/11/2017    Colitis     Coronary artery disease     Coronary artery disease     Diastolic dysfunction     Diverticulosis     Glaucoma     Greater trochanteric bursitis 2/10/2015    Grief at loss of child 1/26/2016    H/O carotid endarterectomy 12/2/2013    Heart failure     History of coronary angioplasty 3/11/2014    Hypercholesteremia     Hypertension     Liver cyst 02/08/2013    ct abd    Macular degeneration     Obesity with serious comorbidity 3/19/2023    Primary open-angle glaucoma(365.11) 9/3/2013    Renal cyst 02/08/2013    ct abd    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    Sarcoidosis     Sarcoidosis of lung     Sickle cell trait     Uveitis      Past Surgical History:   Procedure Laterality Date    A-V CARDIAC PACEMAKER INSERTION Left 3/21/2023    Procedure: INSERTION, CARDIAC PACEMAKER, DUAL CHAMBER/His Lead;  Surgeon: Cortez Ambrose MD;  Location: [facility] CATH LAB;  Service: Cardiology;  Laterality: Left;  MDT/ MD to confirm in am/possible nurse sedate    CAROTID ENDARTERECTOMY Right 2000s    CATARACT EXTRACTION Bilateral     Dr. Liang Dennis    CHOLECYSTECTOMY      laparoscopic, 3/18.    CORONARY ANGIOPLASTY WITH STENT PLACEMENT  11/19/2010    RCA-HALIE 2010    Lathia    JOINT REPLACEMENT Left 11/03/2014    Dr. Braun    TOTAL ABDOMINAL HYSTERECTOMY W/ BILATERAL SALPINGOOPHORECTOMY  1972     Family History   Problem Relation Age of Onset    Hypertension Mother     Heart failure Mother     Cancer Father         prostate    Cirrhosis Brother     Heart failure Brother     Cancer Daughter         Carcinoid     Patient Active Problem List   Diagnosis    Sarcoidosis of lung    Thyroid nodule    Hypertensive heart disease with heart failure     Other hyperlipidemia    Hemoglobin A-S genotype    Ptosis    Coronary artery disease, occlusive    History of central retinal artery occlusion    Iron deficiency anemia    Vitamin D deficiency    Osteopenia    Essential hypertension    Diastolic heart failure, NYHA class 3    Atherosclerosis of aorta    CKD (chronic kidney disease) stage 4, GFR 15-29 ml/min    Uveitis    Ulcerative colitis    History of coronary angioplasty    Hiatal hernia    Bradycardia    Lung nodule    Memory loss    Central retinal vein occlusion with macular edema of both eyes    Aortic stenosis    Weight loss    Unspecified severe protein-calorie malnutrition    Hypokalemia    Obesity with serious comorbidity    Hypomagnesemia    SA node dysfunction    S/P placement of cardiac pacemaker    Current mild episode of major depressive disorder without prior episode    AV block, 2nd degree    Acute cystitis without hematuria    Abdominal pain    Gastroesophageal reflux disease with esophagitis    S/P CABG x 3    Thrombocytosis, unspecified     Review of patient's allergies indicates:   Allergen Reactions    Lisinopril      angioedema    Codeine Nausea And Vomiting       Medications:  Current Outpatient Medications on File Prior to Visit   Medication Sig Dispense Refill    amLODIPine (NORVASC) 2.5 MG tablet Take 10 mg by mouth every morning.      aspirin (ECOTRIN) 81 MG EC tablet Take 81 mg by mouth once daily.      budesonide (ENTOCORT EC) 3 mg capsule Take 1 capsule (3 mg total) by mouth 2 (two) times a day. 180 capsule 3    bumetanide (BUMEX) 2 MG tablet Take 2 mg by mouth.      busPIRone (BUSPAR) 5 MG Tab Take 1 tablet (5 mg total) by mouth 2 (two) times daily as needed (anxiety). 60 tablet 5    citalopram (CELEXA) 10 MG tablet Take 1 tablet (10 mg total) by mouth every evening. 30 tablet 11    cyproheptadine (PERIACTIN) 4 mg tablet Take 1 tablet (4 mg total) by mouth 3 (three) times daily as needed. 270 tablet 3    diclofenac sodium (VOLTAREN)  1 % Gel Apply 2 g topically 2 (two) times daily. 100 g 0    FOLIC ACID/MULTIVIT-MIN/LUTEIN (CENTRUM SILVER ORAL) Take 1 tablet by mouth once daily.      JARDIANCE 10 mg tablet Take 10 mg by mouth.      lipase-protease-amylase 24,000-76,000-120,000 units (CREON) 24,000-76,000 -120,000 unit capsule Take 1 capsule by mouth 3 (three) times daily with meals. 270 capsule 3    loperamide (IMODIUM) 2 mg capsule Take 1 mg by mouth every 6 (six) hours as needed for Diarrhea.      nitroGLYCERIN (NITROSTAT) 0.4 MG SL tablet PLACE 1 TABLET UNDER THE TONGUE EVERY 5 MINUTES AS NEEDED FOR CHEST PAIN 25 tablet 3    ondansetron (ZOFRAN-ODT) 4 MG TbDL DISSOLVE 1 TABLET(4 MG) ON THE TONGUE EVERY 8 HOURS AS NEEDED FOR NAUSEA OR VOMITING 12 tablet 0    pantoprazole (PROTONIX) 40 MG tablet Take 1 tablet (40 mg total) by mouth once daily. 90 tablet 1    potassium chloride (KLOR-CON) 10 MEQ TbSR Take 1 tablet (10 mEq total) by mouth once daily. 30 tablet 3    [DISCONTINUED] clopidogreL (PLAVIX) 75 mg tablet Take 1 tablet by mouth every morning.      aflibercept 2 mg/0.05 mL Soln 2 mg by Intravitreal route every 28 days.      atorvastatin (LIPITOR) 20 MG tablet Take 20 mg by mouth Daily.      carvediloL (COREG) 25 MG tablet Take 0.5 tablets (12.5 mg total) by mouth 2 (two) times daily with meals. 30 tablet 11    dexAMETHasone (OZURDEX) 0.7 mg Impl intravitreal implant 0.7 mg by Intravitreal route once.      dorzolamide-timolol 2-0.5% (COSOPT) 22.3-6.8 mg/mL ophthalmic solution Place 1 drop into both eyes 2 (two) times daily. 10 mL 6    [DISCONTINUED] furosemide (LASIX) 40 MG tablet Take 0.5 tablets (20 mg total) by mouth once daily. 90 tablet 3     No current facility-administered medications on file prior to visit.       Medications have been reviewed and reconciled with patient at visit today.      Exam:  Vitals:    12/05/23 1028   BP: 130/62   Pulse: 65   Temp: 98 °F (36.7 °C)     Weight: 65.3 kg (144 lb)   Body mass index is 27.21  kg/m².      BP Readings from Last 3 Encounters:   12/05/23 130/62   11/24/23 130/75   11/09/23 (!) 160/70     Wt Readings from Last 3 Encounters:   12/05/23 1028 65.3 kg (144 lb)   11/24/23 0823 67.2 kg (148 lb 3.2 oz)   11/09/23 1607 66.9 kg (147 lb 6.4 oz)        REVIEW OF SYSTEMS  Review of Systems   Constitutional:  Negative for chills, fever and malaise/fatigue.   HENT:  Negative for congestion and sore throat.    Respiratory:  Positive for cough. Negative for shortness of breath.    Cardiovascular:  Negative for chest pain, palpitations and leg swelling.   Gastrointestinal:  Negative for abdominal pain, diarrhea, nausea and vomiting.   Genitourinary:  Negative for dysuria and frequency.   Musculoskeletal:  Negative for back pain and myalgias.   Neurological:  Negative for dizziness, focal weakness and headaches.   Psychiatric/Behavioral:  Negative for depression. The patient is not nervous/anxious.        Physical Exam  Vitals reviewed.   Constitutional:       General: She is not in acute distress.     Appearance: Normal appearance.   HENT:      Head: Normocephalic and atraumatic.      Nose: Nose normal.   Eyes:      General: No scleral icterus.     Conjunctiva/sclera: Conjunctivae normal.   Cardiovascular:      Rate and Rhythm: Normal rate and regular rhythm.      Heart sounds: No murmur heard.  Pulmonary:      Effort: Pulmonary effort is normal.      Breath sounds: Normal breath sounds. No rales.   Abdominal:      Palpations: Abdomen is soft. There is no mass.      Tenderness: There is no abdominal tenderness.   Musculoskeletal:         General: Normal range of motion.      Cervical back: Normal range of motion and neck supple.      Right lower leg: No edema.      Left lower leg: No edema.   Lymphadenopathy:      Cervical: No cervical adenopathy.   Skin:     General: Skin is warm and dry.   Neurological:      Mental Status: She is alert and oriented to person, place, and time. Mental status is at baseline.       Comments: Ambulates with assistance of cane   Psychiatric:         Mood and Affect: Mood normal.         Thought Content: Thought content normal.         Laboratory Reviewed:     Lab Results   Component Value Date    WBC 9.99 09/13/2023    HGB 10.0 (L) 09/13/2023    HCT 30.6 (L) 09/13/2023     (H) 09/13/2023    CHOL 160 05/04/2022    TRIG 80 05/04/2022    HDL 81 (H) 05/04/2022    ALT 10 08/12/2023    AST 12 08/12/2023     11/24/2023    K 3.5 11/24/2023     11/24/2023    CREATININE 1.6 (H) 11/24/2023    BUN 32 (H) 11/24/2023    CO2 22 (L) 11/24/2023    TSH 1.230 12/13/2021    INR 1.0 09/09/2020    HGBA1C 5.7 11/17/2015       Screening or Prevention Patient's value Goal Complete/Controlled?   HgA1C Testing and Control   Lab Results   Component Value Date    HGBA1C 5.7 11/17/2015      Annually/Less than 8% No   Lipid profile : 05/04/2022 Annually No   LDL control Lab Results   Component Value Date    LDLCALC 63.0 05/04/2022    Annually/Less than 100 mg/dl  No   Nephropathy screening Lab Results   Component Value Date    LABMICR 38.0 02/06/2014     Lab Results   Component Value Date    PROTEINUA Negative 07/19/2023    Annually Yes   Blood pressure BP Readings from Last 1 Encounters:   12/05/23 130/62    Less than 140/90 Yes   Dilated retinal exam Most Recent Eye Exam Date: Not Found Annually Yes   Foot exam   Most Recent Foot Exam Date: Not Found Annually Yes       Health Maintenance  Health Maintenance Topics with due status: Not Due       Topic Last Completion Date    TETANUS VACCINE 02/03/2015    Aspirin/Antiplatelet Therapy 12/05/2023     Health Maintenance Due   Topic Date Due    Shingles Vaccine (1 of 2) Never done    RSV Vaccine (Age 60+ and Pregnant patients) (1 - 1-dose 60+ series) Never done    Lipid Panel  05/04/2023    COVID-19 Vaccine (4 - 2023-24 season) 09/01/2023       Assessment and Plan:  1. Diastolic heart failure, NYHA class 3  Assessment & Plan:  Exacerbation resolved  Daily  weights  Fluid restriction  Continue Bumex 2 mg daily      Orders:  -     Ambulatory referral/consult to Home Health; Future; Expected date: 12/06/2023                 -Patient's lab results were reviewed and discussed with patient  -Treatment options and alternatives were discussed with the patient. Patient expressed understanding. Patient was given the opportunity to ask questions and be an active participant in their medical care. Patient had no further questions or concerns at this time.         Future Appointments   Date Time Provider Department Center   12/7/2023 12:30 PM PACEMAKER CLINIC [facility]   12/8/2023 10:30 AM NURSE, KELLIE [facility]   12/22/2023  8:00 AM NAHUM Tamez MD [facility]   12/24/2023 10:00 AM HOME MONITOR DEVICE CHECK [facility]   1/2/2024 11:00 AM Sharon Stone MD [facility]          After visit summary printed and given to patient upon discharge.  Patient goals and care plan are included in After visit summary.        The following issues were discussed: The encounter diagnosis was Diastolic heart failure, NYHA class 3.    Health maintenance needs, recent test results and goals of care discussed with pt and questions answered.           Paula Deal, ARLENE, NP-C  [facility] [address] [place] [address]

## 2023-12-06 ENCOUNTER — LAB VISIT (OUTPATIENT)
Dept: LAB | Facility: HOSPITAL | Age: 88
End: 2023-12-06
Attending: NURSE PRACTITIONER
Payer: MEDICARE

## 2023-12-06 ENCOUNTER — TELEPHONE (OUTPATIENT)
Dept: PRIMARY CARE CLINIC | Facility: CLINIC | Age: 88
End: 2023-12-06
Payer: MEDICARE

## 2023-12-06 DIAGNOSIS — N30.00 ACUTE CYSTITIS WITHOUT HEMATURIA: ICD-10-CM

## 2023-12-06 LAB
BILIRUB UR QL STRIP: NEGATIVE
CLARITY UR REFRACT.AUTO: CLEAR
COLOR UR AUTO: YELLOW
GLUCOSE UR QL STRIP: NEGATIVE
HGB UR QL STRIP: NEGATIVE
KETONES UR QL STRIP: NEGATIVE
LEUKOCYTE ESTERASE UR QL STRIP: NEGATIVE
NITRITE UR QL STRIP: NEGATIVE
PH UR STRIP: 5 [PH] (ref 5–8)
PROT UR QL STRIP: NEGATIVE
SP GR UR STRIP: 1.01 (ref 1–1.03)
URN SPEC COLLECT METH UR: NORMAL

## 2023-12-06 PROCEDURE — 81003 URINALYSIS AUTO W/O SCOPE: CPT | Mod: HCNC | Performed by: NURSE PRACTITIONER

## 2023-12-06 NOTE — TELEPHONE ENCOUNTER
PC with Jessica    Since pt just had cardiac admission, rec staying with her Cardiologist who has been managing her and she is better.  SM

## 2023-12-06 NOTE — TELEPHONE ENCOUNTER
----- Message from Eusebia Meadows MA sent at 12/5/2023 10:54 AM CST -----  Ms. Dumont's Daughter wants to change Ms. Cody's Cardiologist and wants to know if you can refer one to her.

## 2023-12-07 ENCOUNTER — CLINICAL SUPPORT (OUTPATIENT)
Dept: CARDIOLOGY | Facility: HOSPITAL | Age: 88
End: 2023-12-07
Attending: INTERNAL MEDICINE
Payer: MEDICARE

## 2023-12-07 DIAGNOSIS — Z95.0 CARDIAC PACEMAKER IN SITU: ICD-10-CM

## 2023-12-07 DIAGNOSIS — R00.1 BRADYCARDIA: ICD-10-CM

## 2023-12-07 DIAGNOSIS — I49.5 SA NODE DYSFUNCTION: ICD-10-CM

## 2023-12-07 DIAGNOSIS — Z95.0 PRESENCE OF CARDIAC PACEMAKER: ICD-10-CM

## 2023-12-07 DIAGNOSIS — I50.30 DIASTOLIC HEART FAILURE, NYHA CLASS 3: ICD-10-CM

## 2023-12-07 DIAGNOSIS — I50.32 CHRONIC DIASTOLIC (CONGESTIVE) HEART FAILURE: ICD-10-CM

## 2023-12-07 PROCEDURE — 99999 PR PBB SHADOW E&M-EST. PATIENT-LVL I: CPT | Mod: PBBFAC,HCNC,,

## 2023-12-07 PROCEDURE — 93280 PM DEVICE PROGR EVAL DUAL: CPT | Mod: HCNC

## 2023-12-07 PROCEDURE — 99999 PR PBB SHADOW E&M-EST. PATIENT-LVL I: ICD-10-PCS | Mod: PBBFAC,HCNC,,

## 2023-12-07 PROCEDURE — 93280 PM DEVICE PROGR EVAL DUAL: CPT | Mod: 26,HCNC,, | Performed by: INTERNAL MEDICINE

## 2023-12-07 PROCEDURE — 93280 CARDIAC DEVICE CHECK - IN CLINIC & HOSPITAL: ICD-10-PCS | Mod: 26,HCNC,, | Performed by: INTERNAL MEDICINE

## 2023-12-20 NOTE — ANESTHESIA RELEASE NOTE
"Anesthesia Release from PACU Note    Patient: Glo Dumont    Procedure(s) Performed: Procedure(s) (LRB):  ESOPHAGOGASTRODUODENOSCOPY (EGD) (N/A)    Anesthesia type: MAC    Post pain: Adequate analgesia    Post assessment: no apparent anesthetic complications, tolerated procedure well and no evidence of recall    Last Vitals:   Visit Vitals  BP (!) 151/71   Pulse (!) 58   Temp 36.8 °C (98.2 °F) (Oral)   Resp 18   Ht 5' 1" (1.549 m)   Wt 57.6 kg (127 lb)   SpO2 100%   Breastfeeding? No   BMI 24.00 kg/m²       Post vital signs: stable    Level of consciousness: awake    Nausea/Vomiting: no nausea/no vomiting    Complications: none    Airway Patency: patent    Respiratory: unassisted, spontaneous ventilation, room air    Cardiovascular: stable and blood pressure at baseline    Hydration: euvolemic  " no

## 2023-12-22 ENCOUNTER — PROCEDURE VISIT (OUTPATIENT)
Dept: OPHTHALMOLOGY | Facility: CLINIC | Age: 88
End: 2023-12-22
Payer: MEDICARE

## 2023-12-22 DIAGNOSIS — H34.8130 CENTRAL RETINAL VEIN OCCLUSION WITH MACULAR EDEMA OF BOTH EYES: Primary | ICD-10-CM

## 2023-12-22 PROCEDURE — 92134 POSTERIOR SEGMENT OCT RETINA (OCULAR COHERENCE TOMOGRAPHY)-BOTH EYES: ICD-10-PCS | Mod: HCNC,S$GLB,, | Performed by: OPHTHALMOLOGY

## 2023-12-22 PROCEDURE — 92134 CPTRZ OPH DX IMG PST SGM RTA: CPT | Mod: HCNC,S$GLB,, | Performed by: OPHTHALMOLOGY

## 2023-12-22 PROCEDURE — 99499 NO LOS: ICD-10-PCS | Mod: HCNC,S$GLB,, | Performed by: OPHTHALMOLOGY

## 2023-12-22 PROCEDURE — 99499 UNLISTED E&M SERVICE: CPT | Mod: HCNC,S$GLB,, | Performed by: OPHTHALMOLOGY

## 2023-12-22 NOTE — PROGRESS NOTES
===============================  Date today is 12/22/2023  Glo Dumont is a 88 y.o. female  Last visit [facility]: :11/15/2023   Last visit eye dept. 11/15/2023    Corrected distance visual acuity was 20/200 in the right eye and 20/400 in the left eye.  Tonometry       Tonometry (Applanation, 8:31 AM)         Right Left    Pressure 14 13                  Not recorded       Not recorded       Not recorded       No chief complaint on file.      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  Check OU independently daily.    Gave my office and personal cell phone number.  ________________  12/22/2023 today  Glo Dumont      OU CRVO   OCT better today post Ozurdex  OD inferior iris traction with iris ectropion  Great response to OU CRVO CME  No real improvement with acuity  No macular vision since 2014  May be at max benefit  Should continue to eval every 12 weeks for Ozurdex due to good clinical response  No injection today      RTC 12 weeks from last Ozurdex for OU Ozurdex  Instructed to call 24/7 for any worsening of vision or symptoms. Check OU daily.   Gave my office and cell phone number.      =============================

## 2023-12-25 ENCOUNTER — CLINICAL SUPPORT (OUTPATIENT)
Dept: CARDIOLOGY | Facility: HOSPITAL | Age: 88
End: 2023-12-25
Attending: INTERNAL MEDICINE
Payer: MEDICARE

## 2023-12-25 DIAGNOSIS — I50.32 CHRONIC DIASTOLIC (CONGESTIVE) HEART FAILURE: ICD-10-CM

## 2023-12-25 DIAGNOSIS — Z95.0 PRESENCE OF CARDIAC PACEMAKER: ICD-10-CM

## 2023-12-25 PROCEDURE — 93296 REM INTERROG EVL PM/IDS: CPT | Mod: HCNC | Performed by: INTERNAL MEDICINE

## 2023-12-25 PROCEDURE — 93294 REM INTERROG EVL PM/LDLS PM: CPT | Mod: HCNC,S$GLB,, | Performed by: INTERNAL MEDICINE

## 2023-12-27 LAB
OHS CV AF BURDEN PERCENT: < 1
OHS CV DC REMOTE DEVICE TYPE: NORMAL
OHS CV RV PACING PERCENT: 0.1 %

## 2023-12-27 NOTE — PROGRESS NOTES
"Subjective:      Patient ID: Glo Dumont is a 88 y.o. female.    Chief Complaint: Follow-up (Hospital follow up)      HPI  Here for follow up of medical problems.  Went to ER at [facility] 12/19 overnight for chest pain, no MI.  Cards Dr. Viera says is CHF, yesterday added zaroloyn.  Pt upset that urinating so much, but feels well.  However, mentions that last night had sharp chest pain that lasted an hour, vomited during that episode.  Says NTG helped, but also had taken 2 tylenols also.  No f/c/sw/cough.        HM: 9/23 fluvax, 2/21 covid vaccines/booster, 3/15 [id number], 2/14 [id number], 10/22 [id number], 2/15 TDaP, 10/21 BMD rep 2y, 2/11 Cscope no repeat will be done, 1/17 MMG no more, Eye doc monthly, GI Dr. Galindo, Cards Dr. Lui, Nephro Dr. Villareal.     Review of Systems   Constitutional:  Negative for chills, diaphoresis and fever.   Respiratory:  Negative for cough and shortness of breath.    Cardiovascular:  Negative for chest pain, palpitations and leg swelling.   Gastrointestinal:  Negative for blood in stool, constipation, diarrhea, nausea and vomiting.   Genitourinary:  Negative for dysuria, frequency and hematuria.   Psychiatric/Behavioral:  The patient is not nervous/anxious.          Objective:   /62 (BP Location: Left arm, Patient Position: Sitting)   Pulse 74   Temp 98.1 °F (36.7 °C) (Oral)   Ht 5' 1" (1.549 m)   Wt 66.7 kg (146 lb 15 oz)   SpO2 95%   BMI 27.76 kg/m²     Physical Exam  Constitutional:       Appearance: She is well-developed.   Neck:      Thyroid: No thyroid mass.      Vascular: No carotid bruit.   Cardiovascular:      Rate and Rhythm: Normal rate and regular rhythm.      Heart sounds: No murmur heard.     No friction rub. No gallop.   Pulmonary:      Effort: Pulmonary effort is normal.      Breath sounds: Normal breath sounds. No wheezing or rales.   Abdominal:      General: Bowel sounds are normal.      Palpations: Abdomen is soft. There is no mass.      Tenderness: There " is no abdominal tenderness.   Musculoskeletal:         General: Swelling (trace pedal.) present.      Cervical back: Neck supple.   Lymphadenopathy:      Cervical: No cervical adenopathy.   Neurological:      Mental Status: She is alert and oriented to person, place, and time.             Assessment:       1. Chest pain, unspecified type    2. Essential hypertension    3. Sarcoidosis of lung    4. S/P CABG x 3    5. Diastolic heart failure, NYHA class 3    6. CKD (chronic kidney disease) stage 4, GFR 15-29 ml/min          Plan:     Glo was seen today for follow-up.    Diagnoses and all orders for this visit:    Chest pain, unspecified type- r/o muscle damage now, take meds per Cards.  Obtain hospital records from [facility].  NEVER started on isordil 30mg, which was prescribed at discharge.  RTC 1wk.  -     atorvastatin (LIPITOR) 40 MG tablet; Take 1 tablet (40 mg total) by mouth Daily.  -     isosorbide mononitrate (IMDUR) 30 MG 24 hr tablet; Take 1 tablet (30 mg total) by mouth once daily.    Essential hypertension- stable, cont rx.    Sarcoidosis of lung    S/P CABG x 3  Diastolic heart failure, NYHA class 3  CKD (chronic kidney disease) stage 4, GFR 15-29 ml/min

## 2023-12-28 ENCOUNTER — OFFICE VISIT (OUTPATIENT)
Dept: PRIMARY CARE CLINIC | Facility: CLINIC | Age: 88
End: 2023-12-28
Payer: MEDICARE

## 2023-12-28 VITALS
HEART RATE: 74 BPM | TEMPERATURE: 98 F | SYSTOLIC BLOOD PRESSURE: 136 MMHG | BODY MASS INDEX: 27.74 KG/M2 | HEIGHT: 61 IN | OXYGEN SATURATION: 95 % | DIASTOLIC BLOOD PRESSURE: 62 MMHG | WEIGHT: 146.94 LBS

## 2023-12-28 DIAGNOSIS — I50.30 DIASTOLIC HEART FAILURE, NYHA CLASS 3: ICD-10-CM

## 2023-12-28 DIAGNOSIS — R07.9 CHEST PAIN, UNSPECIFIED TYPE: Primary | ICD-10-CM

## 2023-12-28 DIAGNOSIS — D86.0 SARCOIDOSIS OF LUNG: ICD-10-CM

## 2023-12-28 DIAGNOSIS — I10 ESSENTIAL HYPERTENSION: Chronic | ICD-10-CM

## 2023-12-28 DIAGNOSIS — Z95.1 S/P CABG X 3: ICD-10-CM

## 2023-12-28 DIAGNOSIS — N18.4 CKD (CHRONIC KIDNEY DISEASE) STAGE 4, GFR 15-29 ML/MIN: ICD-10-CM

## 2023-12-28 PROCEDURE — 99213 OFFICE O/P EST LOW 20 MIN: CPT | Mod: HCNC,S$GLB,, | Performed by: INTERNAL MEDICINE

## 2023-12-28 PROCEDURE — 1159F MED LIST DOCD IN RCRD: CPT | Mod: HCNC,CPTII,S$GLB, | Performed by: INTERNAL MEDICINE

## 2023-12-28 PROCEDURE — 1157F ADVNC CARE PLAN IN RCRD: CPT | Mod: HCNC,CPTII,S$GLB, | Performed by: INTERNAL MEDICINE

## 2023-12-28 PROCEDURE — 1126F AMNT PAIN NOTED NONE PRSNT: CPT | Mod: HCNC,CPTII,S$GLB, | Performed by: INTERNAL MEDICINE

## 2023-12-28 PROCEDURE — 1159F PR MEDICATION LIST DOCUMENTED IN MEDICAL RECORD: ICD-10-PCS | Mod: HCNC,CPTII,S$GLB, | Performed by: INTERNAL MEDICINE

## 2023-12-28 PROCEDURE — 1157F PR ADVANCE CARE PLAN OR EQUIV PRESENT IN MEDICAL RECORD: ICD-10-PCS | Mod: HCNC,CPTII,S$GLB, | Performed by: INTERNAL MEDICINE

## 2023-12-28 PROCEDURE — 3288F FALL RISK ASSESSMENT DOCD: CPT | Mod: HCNC,CPTII,S$GLB, | Performed by: INTERNAL MEDICINE

## 2023-12-28 PROCEDURE — 99999 PR PBB SHADOW E&M-EST. PATIENT-LVL IV: CPT | Mod: PBBFAC,HCNC,, | Performed by: INTERNAL MEDICINE

## 2023-12-28 PROCEDURE — 99213 PR OFFICE/OUTPT VISIT, EST, LEVL III, 20-29 MIN: ICD-10-PCS | Mod: HCNC,S$GLB,, | Performed by: INTERNAL MEDICINE

## 2023-12-28 PROCEDURE — 3288F PR FALLS RISK ASSESSMENT DOCUMENTED: ICD-10-PCS | Mod: HCNC,CPTII,S$GLB, | Performed by: INTERNAL MEDICINE

## 2023-12-28 PROCEDURE — 1126F PR PAIN SEVERITY QUANTIFIED, NO PAIN PRESENT: ICD-10-PCS | Mod: HCNC,CPTII,S$GLB, | Performed by: INTERNAL MEDICINE

## 2023-12-28 PROCEDURE — 1101F PR PT FALLS ASSESS DOC 0-1 FALLS W/OUT INJ PAST YR: ICD-10-PCS | Mod: HCNC,CPTII,S$GLB, | Performed by: INTERNAL MEDICINE

## 2023-12-28 PROCEDURE — 99999 PR PBB SHADOW E&M-EST. PATIENT-LVL IV: ICD-10-PCS | Mod: PBBFAC,HCNC,, | Performed by: INTERNAL MEDICINE

## 2023-12-28 PROCEDURE — 1101F PT FALLS ASSESS-DOCD LE1/YR: CPT | Mod: HCNC,CPTII,S$GLB, | Performed by: INTERNAL MEDICINE

## 2023-12-28 RX ORDER — ATORVASTATIN CALCIUM 40 MG/1
40 TABLET, FILM COATED ORAL DAILY
Qty: 90 TABLET | Refills: 3 | Status: SHIPPED | OUTPATIENT
Start: 2023-12-28

## 2023-12-28 RX ORDER — ISOSORBIDE MONONITRATE 30 MG/1
30 TABLET, EXTENDED RELEASE ORAL DAILY
Qty: 30 TABLET | Refills: 11 | Status: SHIPPED | OUTPATIENT
Start: 2023-12-28 | End: 2024-12-27

## 2024-01-03 ENCOUNTER — OFFICE VISIT (OUTPATIENT)
Dept: PRIMARY CARE CLINIC | Facility: CLINIC | Age: 89
End: 2024-01-03
Payer: MEDICARE

## 2024-01-03 VITALS
SYSTOLIC BLOOD PRESSURE: 130 MMHG | TEMPERATURE: 98 F | DIASTOLIC BLOOD PRESSURE: 62 MMHG | HEART RATE: 72 BPM | HEIGHT: 61 IN | BODY MASS INDEX: 26.93 KG/M2 | WEIGHT: 142.63 LBS | OXYGEN SATURATION: 96 %

## 2024-01-03 DIAGNOSIS — I50.30 DIASTOLIC HEART FAILURE, NYHA CLASS 3: ICD-10-CM

## 2024-01-03 DIAGNOSIS — I10 ESSENTIAL HYPERTENSION: Primary | Chronic | ICD-10-CM

## 2024-01-03 PROCEDURE — 1126F AMNT PAIN NOTED NONE PRSNT: CPT | Mod: HCNC,CPTII,S$GLB, | Performed by: NURSE PRACTITIONER

## 2024-01-03 PROCEDURE — 3288F FALL RISK ASSESSMENT DOCD: CPT | Mod: HCNC,CPTII,S$GLB, | Performed by: NURSE PRACTITIONER

## 2024-01-03 PROCEDURE — 1101F PT FALLS ASSESS-DOCD LE1/YR: CPT | Mod: HCNC,CPTII,S$GLB, | Performed by: NURSE PRACTITIONER

## 2024-01-03 PROCEDURE — 99999 PR PBB SHADOW E&M-EST. PATIENT-LVL III: CPT | Mod: PBBFAC,HCNC,, | Performed by: NURSE PRACTITIONER

## 2024-01-03 PROCEDURE — 99214 OFFICE O/P EST MOD 30 MIN: CPT | Mod: HCNC,S$GLB,, | Performed by: NURSE PRACTITIONER

## 2024-01-03 PROCEDURE — 1157F ADVNC CARE PLAN IN RCRD: CPT | Mod: HCNC,CPTII,S$GLB, | Performed by: NURSE PRACTITIONER

## 2024-01-03 RX ORDER — METOLAZONE 2.5 MG/1
1 TABLET ORAL
COMMUNITY
Start: 2023-12-27 | End: 2024-02-16 | Stop reason: SDUPTHER

## 2024-01-03 NOTE — PROGRESS NOTES
"Glo Dumont  01/03/2024  [id number]    Christina Cardona MD  Patient Care Team:  Christina Cardona MD as PCP - General (Internal Medicine)  Gordo Muir MD as Consulting Physician (Pulmonary Disease)  Nik Bergman MD (Inactive) as Consulting Physician (Cardiology)  Franky Bell Jr., MD (Rheumatology)  Julio Galindo MD as Consulting Physician (Gastroenterology)  Glenis Mcfadden MD as Consulting Physician (Otolaryngology)  Ayaz Estrada MD as Consulting Physician (Hematology and Oncology)  Yomi Guy LPN as Care Coordinator  Bridgett Vargas PharmD as Hypertension Digital Medicine Clinician (Pharmacist)  Christina Cardona MD as Hypertension Digital Medicine Responsible Provider (Internal Medicine)  Felipe Herron as Digital Medicine Health   Medicare, [employer] Managed as Hypertension Digital Medicine Contract      [facility]      Chief Complaint:  Chief Complaint   Patient presents with    Follow-up     Hospital follow up       History of Present Illness:    Saw Dr. Cardona 12/28/23  Had chest pain and ACS was ruled out [facility]  Had an additional episode of chest pain - took 2 Tylenol and pain resolved  SOOD is chronic and continues  Daughter present and voices no concerns presently  Digital med readings from home:   12/28/2023 1/3/2024   Vitals - 1 value per visit     SYSTOLIC 136  130    DIASTOLIC 62  62    Pulse 74  72    Temp 98.1 °F (36.7 °C)  97.9 °F (36.6 °C)    Resp     SPO2 95 %  96 %    Weight (lb) 146.94  142.64    Weight (kg) 66.65  64.7    Height 5' 1" (1.549 m)  5' 1" (1.549 m)    BMI (Calculated) 27.8  27    Pain Score Zero  Zero      Diarrhea x 1 today but common  Remaining active - walking long driveway. Does not check blood glucose at home          The following were reviewed: Active problem list, medication list, allergies, family history, social history, and Health Maintenance.     History:  Past Medical History:   Diagnosis Date "    Anemia     Angina pectoris     Anxiety     Anxiety and depression     Arthritis     hip    Carotid artery occlusion     Carpal tunnel syndrome 06/23/2008    emg    Chronic diarrhea     work up in 2011 with EGD, CS and VCE    CKD (chronic kidney disease) stage 3, GFR 30-59 ml/min 5/11/2017    Colitis     Coronary artery disease     Coronary artery disease     Diastolic dysfunction     Diverticulosis     Glaucoma     Greater trochanteric bursitis 2/10/2015    Grief at loss of child 1/26/2016    H/O carotid endarterectomy 12/2/2013    Heart failure     History of coronary angioplasty 3/11/2014    Hypercholesteremia     Hypertension     Liver cyst 02/08/2013    ct abd    Macular degeneration     Obesity with serious comorbidity 3/19/2023    Primary open-angle glaucoma(365.11) 9/3/2013    Renal cyst 02/08/2013    ct abd    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    Sarcoidosis     Sarcoidosis of lung     Sickle cell trait     Uveitis      Past Surgical History:   Procedure Laterality Date    A-V CARDIAC PACEMAKER INSERTION Left 3/21/2023    Procedure: INSERTION, CARDIAC PACEMAKER, DUAL CHAMBER/His Lead;  Surgeon: Cortez Ambrose MD;  Location: [facility] CATH LAB;  Service: Cardiology;  Laterality: Left;  MDT/ MD to confirm in am/possible nurse sedate    CAROTID ENDARTERECTOMY Right 2000s    CATARACT EXTRACTION Bilateral     Dr. Liang Dennis    CHOLECYSTECTOMY      laparoscopic, 3/18.    CORONARY ANGIOPLASTY WITH STENT PLACEMENT  11/19/2010    RCA-HALIE 2010    Lathia    JOINT REPLACEMENT Left 11/03/2014    Dr. Braun    TOTAL ABDOMINAL HYSTERECTOMY W/ BILATERAL SALPINGOOPHORECTOMY  1972     Family History   Problem Relation Age of Onset    Hypertension Mother     Heart failure Mother     Cancer Father         prostate    Cirrhosis Brother     Heart failure Brother     Cancer Daughter         Carcinoid     Patient Active Problem List   Diagnosis    Sarcoidosis of lung    Thyroid nodule    Hypertensive heart  disease with heart failure    Other hyperlipidemia    Hemoglobin A-S genotype    Ptosis    Coronary artery disease, occlusive    History of central retinal artery occlusion    Iron deficiency anemia    Vitamin D deficiency    Osteopenia    Essential hypertension    Diastolic heart failure, NYHA class 3    Atherosclerosis of aorta    CKD (chronic kidney disease) stage 4, GFR 15-29 ml/min    Uveitis    Ulcerative colitis    History of coronary angioplasty    Hiatal hernia    Bradycardia    Lung nodule    Memory loss    Central retinal vein occlusion with macular edema of both eyes    Aortic stenosis    Weight loss    Unspecified severe protein-calorie malnutrition    Hypokalemia    Obesity with serious comorbidity    Hypomagnesemia    SA node dysfunction    S/P placement of cardiac pacemaker    Current mild episode of major depressive disorder without prior episode    AV block, 2nd degree    Acute cystitis without hematuria    Abdominal pain    Gastroesophageal reflux disease with esophagitis    S/P CABG x 3    Thrombocytosis, unspecified     Review of patient's allergies indicates:   Allergen Reactions    Lisinopril      angioedema    Codeine Nausea And Vomiting       Medications:  Current Outpatient Medications on File Prior to Visit   Medication Sig Dispense Refill    aflibercept 2 mg/0.05 mL Soln 2 mg by Intravitreal route every 28 days.      amLODIPine (NORVASC) 2.5 MG tablet Take 10 mg by mouth every morning.      aspirin (ECOTRIN) 81 MG EC tablet Take 81 mg by mouth once daily.      atorvastatin (LIPITOR) 40 MG tablet Take 1 tablet (40 mg total) by mouth Daily. 90 tablet 3    budesonide (ENTOCORT EC) 3 mg capsule Take 1 capsule (3 mg total) by mouth 2 (two) times a day. 180 capsule 3    bumetanide (BUMEX) 2 MG tablet Take 2 mg by mouth.      busPIRone (BUSPAR) 5 MG Tab Take 1 tablet (5 mg total) by mouth 2 (two) times daily as needed (anxiety). 60 tablet 5    carvediloL (COREG) 25 MG tablet Take 0.5 tablets  (12.5 mg total) by mouth 2 (two) times daily with meals. 30 tablet 11    citalopram (CELEXA) 10 MG tablet Take 1 tablet (10 mg total) by mouth every evening. 30 tablet 11    cyproheptadine (PERIACTIN) 4 mg tablet Take 1 tablet (4 mg total) by mouth 3 (three) times daily as needed. 270 tablet 3    dexAMETHasone (OZURDEX) 0.7 mg Impl intravitreal implant 0.7 mg by Intravitreal route once.      diclofenac sodium (VOLTAREN) 1 % Gel Apply 2 g topically 2 (two) times daily. 100 g 0    dorzolamide-timolol 2-0.5% (COSOPT) 22.3-6.8 mg/mL ophthalmic solution Place 1 drop into both eyes 2 (two) times daily. 10 mL 6    FOLIC ACID/MULTIVIT-MIN/LUTEIN (CENTRUM SILVER ORAL) Take 1 tablet by mouth once daily.      isosorbide mononitrate (IMDUR) 30 MG 24 hr tablet Take 1 tablet (30 mg total) by mouth once daily. 30 tablet 11    JARDIANCE 10 mg tablet Take 10 mg by mouth.      lipase-protease-amylase 24,000-76,000-120,000 units (CREON) 24,000-76,000 -120,000 unit capsule Take 1 capsule by mouth 3 (three) times daily with meals. 270 capsule 3    loperamide (IMODIUM) 2 mg capsule Take 1 mg by mouth every 6 (six) hours as needed for Diarrhea.      metOLazone (ZAROXOLYN) 2.5 MG tablet 1 tablet.      nitroGLYCERIN (NITROSTAT) 0.4 MG SL tablet PLACE 1 TABLET UNDER THE TONGUE EVERY 5 MINUTES AS NEEDED FOR CHEST PAIN 25 tablet 3    ondansetron (ZOFRAN-ODT) 4 MG TbDL DISSOLVE 1 TABLET(4 MG) ON THE TONGUE EVERY 8 HOURS AS NEEDED FOR NAUSEA OR VOMITING 12 tablet 0    pantoprazole (PROTONIX) 40 MG tablet Take 1 tablet (40 mg total) by mouth once daily. 90 tablet 1    potassium chloride (KLOR-CON) 10 MEQ TbSR Take 1 tablet (10 mEq total) by mouth once daily. 30 tablet 3     No current facility-administered medications on file prior to visit.       Medications have been reviewed and reconciled with patient at visit today.      Exam:  Vitals:    01/03/24 1035   BP: 130/62   Pulse: 72   Temp: 97.9 °F (36.6 °C)     Weight: 64.7 kg (142 lb 10.2 oz)    Body mass index is 26.95 kg/m².      BP Readings from Last 3 Encounters:   01/03/24 130/62   12/28/23 136/62   12/05/23 130/62     Wt Readings from Last 3 Encounters:   01/03/24 1035 64.7 kg (142 lb 10.2 oz)   12/28/23 0839 66.7 kg (146 lb 15 oz)   12/05/23 1028 65.3 kg (144 lb)        REVIEW OF SYSTEMS  Review of Systems   Constitutional:  Negative for chills, fever and malaise/fatigue.   HENT:  Negative for congestion, ear pain and sore throat.    Respiratory:  Positive for shortness of breath. Negative for cough.         Chronic SOOD   Cardiovascular:  Positive for leg swelling. Negative for chest pain and palpitations.   Gastrointestinal:  Positive for diarrhea. Negative for abdominal pain, constipation, nausea and vomiting.   Genitourinary:  Negative for dysuria and frequency.   Musculoskeletal:  Negative for back pain and myalgias.   Neurological:  Negative for dizziness, focal weakness and headaches.   Psychiatric/Behavioral:  Negative for depression. The patient is not nervous/anxious.        Physical Exam  Vitals reviewed.   Constitutional:       General: She is not in acute distress.     Appearance: Normal appearance.   HENT:      Head: Normocephalic and atraumatic.      Nose: Nose normal.      Mouth/Throat:      Mouth: Mucous membranes are moist.   Eyes:      General: No scleral icterus.     Conjunctiva/sclera: Conjunctivae normal.   Cardiovascular:      Rate and Rhythm: Normal rate and regular rhythm.      Heart sounds: Murmur heard.   Pulmonary:      Effort: Pulmonary effort is normal. No respiratory distress.      Breath sounds: Rales (trace) present.   Abdominal:      Palpations: Abdomen is soft. There is no mass.      Tenderness: There is no abdominal tenderness.   Musculoskeletal:         General: No swelling or deformity.      Cervical back: Normal range of motion and neck supple.      Right lower leg: Edema (+1) present.      Left lower leg: Edema (+1) present.   Lymphadenopathy:      Cervical:  No cervical adenopathy.   Skin:     General: Skin is warm and dry.   Neurological:      Mental Status: She is alert and oriented to person, place, and time. Mental status is at baseline.   Psychiatric:         Mood and Affect: Mood normal.         Thought Content: Thought content normal.         Laboratory Reviewed:     Lab Results   Component Value Date    WBC 9.99 09/13/2023    HGB 10.0 (L) 09/13/2023    HCT 30.6 (L) 09/13/2023     (H) 09/13/2023    CHOL 160 05/04/2022    TRIG 80 05/04/2022    HDL 81 (H) 05/04/2022    ALT 10 08/12/2023    AST 12 08/12/2023     11/24/2023    K 3.5 11/24/2023     11/24/2023    CREATININE 1.6 (H) 11/24/2023    BUN 32 (H) 11/24/2023    CO2 22 (L) 11/24/2023    TSH 1.230 12/13/2021    INR 1.0 09/09/2020    HGBA1C 5.7 11/17/2015       Screening or Prevention Patient's value Goal Complete/Controlled?   HgA1C Testing and Control   Lab Results   Component Value Date    HGBA1C 5.7 11/17/2015      Annually/Less than 8% No   Lipid profile : 05/04/2022 Annually No   LDL control Lab Results   Component Value Date    LDLCALC 63.0 05/04/2022    Annually/Less than 100 mg/dl  No   Nephropathy screening Lab Results   Component Value Date    LABMICR 38.0 02/06/2014     Lab Results   Component Value Date    PROTEINUA Negative 12/06/2023    Annually Yes   Blood pressure BP Readings from Last 1 Encounters:   01/03/24 130/62    Less than 140/90 Yes   Dilated retinal exam Most Recent Eye Exam Date: Not Found Annually Yes   Foot exam   Most Recent Foot Exam Date: Not Found Annually Yes       Health Maintenance  Health Maintenance Topics with due status: Not Due       Topic Last Completion Date    TETANUS VACCINE 02/03/2015    Aspirin/Antiplatelet Therapy 12/28/2023     Health Maintenance Due   Topic Date Due    Shingles Vaccine (1 of 2) Never done    RSV Vaccine (Age 60+ and Pregnant patients) (1 - 1-dose 60+ series) Never done    Lipid Panel  05/04/2023    COVID-19 Vaccine (4 - 2023-24  season) 09/01/2023       Assessment and Plan:  1. Essential hypertension  Assessment & Plan:  Controlled  Continue isosorbide, amlodipine, carvedilol and Bumex  Monitor and record blood pressure      2. Diastolic heart failure, NYHA class 3  Assessment & Plan:  Compensated  Continue bumex  Daily weights  Monitor for worsened respiratory status                   -Patient's lab results were reviewed and discussed with patient  -Treatment options and alternatives were discussed with the patient. Patient expressed understanding. Patient was given the opportunity to ask questions and be an active participant in their medical care. Patient had no further questions or concerns at this time.         Future Appointments   Date Time Provider Department Center   2/7/2024  1:00 PM NAHUM Tamez MD HG OPHTHAL [facility]   2/16/2024 10:20 AM Christina Cardona MD [facility]   5/3/2024 10:30 AM PACEMAKER CLINIC [facility] ARR PRO [facility]   5/3/2024 10:40 AM Cortez Ambrose MD [facility] ARRHYTH [facility]   5/23/2024 10:00 AM Paula Deal NP [facility]          After visit summary printed and given to patient upon discharge.  Patient goals and care plan are included in After visit summary.      The following issues were discussed: The primary encounter diagnosis was Essential hypertension. A diagnosis of Diastolic heart failure, NYHA class 3 was also pertinent to this visit.    Health maintenance needs, recent test results and goals of care discussed with pt and questions answered.           ARLENE Mcarthur, NP-C  [facility]  [id number] [place] [address]

## 2024-01-03 NOTE — ASSESSMENT & PLAN NOTE
Controlled  Continue isosorbide, amlodipine, carvedilol and Bumex  Monitor and record blood pressure

## 2024-01-04 ENCOUNTER — PATIENT MESSAGE (OUTPATIENT)
Dept: PRIMARY CARE CLINIC | Facility: CLINIC | Age: 89
End: 2024-01-04
Payer: MEDICARE

## 2024-01-04 ENCOUNTER — TELEPHONE (OUTPATIENT)
Dept: PRIMARY CARE CLINIC | Facility: CLINIC | Age: 89
End: 2024-01-04
Payer: MEDICARE

## 2024-01-04 DIAGNOSIS — B37.0 ORAL THRUSH: Primary | ICD-10-CM

## 2024-01-04 RX ORDER — NYSTATIN 100000 [USP'U]/ML
4 SUSPENSION ORAL 4 TIMES DAILY
Qty: 160 ML | Refills: 0 | Status: SHIPPED | OUTPATIENT
Start: 2024-01-04 | End: 2024-01-14

## 2024-01-04 NOTE — TELEPHONE ENCOUNTER
Pt called stating she forgot to tell the Dr that she has thrush.  States it started last week and has gotten worse.     Complaining of tongue burning and painful.  White patches in mouth.     [facility] [address].

## 2024-01-29 ENCOUNTER — EXTERNAL HOME HEALTH (OUTPATIENT)
Dept: HOME HEALTH SERVICES | Facility: HOSPITAL | Age: 89
End: 2024-01-29
Payer: MEDICARE

## 2024-02-07 NOTE — PROGRESS NOTES
"Subjective:      Patient ID: Glo Dumont is a 88 y.o. female.    Chief Complaint: Follow-up (1 month f/u)      HPI  Here for follow up of medical problems.  UTI resolved clinically with cipro, weakness resolved.  Cards yesterday stopped the bumex, cont metolazone but just twice a week.  Breathing is at baseline.  No cp.  Exertional SOB is at baseline.  Active at home without SOB.  BMs normal.  No n/v.  No urinary frequency.  BP 2d ago 130/62.    Seeing Cards monthly for now.    HM: 9/23 fluvax, 2/21 covid vaccines/booster, 3/15 [id number], 2/14 [id number], 10/22 [id number], 2/15 TDaP, 10/21 BMD rep 2y, 2/11 Cscope no repeat will be done, 1/17 MMG no more, Eye doc monthly, GI Dr. Galindo, Cards Dr. Lui, Nephro Dr. Villareal.     Review of Systems   Constitutional:  Negative for chills, diaphoresis and fever.   Respiratory:  Negative for cough and shortness of breath.    Cardiovascular:  Negative for chest pain, palpitations and leg swelling.   Gastrointestinal:  Negative for blood in stool, constipation, diarrhea, nausea and vomiting.   Genitourinary:  Negative for dysuria, frequency and hematuria.   Psychiatric/Behavioral:  The patient is not nervous/anxious.          Objective:   /62   Pulse 78   Temp 98.1 °F (36.7 °C) (Oral)   Ht 5' 1" (1.549 m)   Wt 65.2 kg (143 lb 10.1 oz)   SpO2 97%   BMI 27.14 kg/m²     Physical Exam  Constitutional:       Appearance: She is well-developed.   Neck:      Thyroid: No thyroid mass.      Vascular: No carotid bruit.   Cardiovascular:      Rate and Rhythm: Normal rate and regular rhythm.      Heart sounds: No murmur heard.     No friction rub. No gallop.   Pulmonary:      Effort: Pulmonary effort is normal.      Breath sounds: Normal breath sounds. No wheezing or rales.   Abdominal:      General: Bowel sounds are normal.      Palpations: Abdomen is soft. There is no mass.      Tenderness: There is no abdominal tenderness.   Musculoskeletal:      Cervical back: Neck supple. "   Lymphadenopathy:      Cervical: No cervical adenopathy.   Neurological:      Mental Status: She is alert and oriented to person, place, and time.           Assessment:       1. Essential hypertension    2. Coronary artery disease, occlusive    3. Diastolic heart failure, NYHA class 3    4. Sarcoidosis of lung    5. CKD (chronic kidney disease) stage 4, GFR 15-29 ml/min    6. Current mild episode of major depressive disorder without prior episode    7. Hypertensive heart disease with heart failure    8. Atherosclerosis of aorta          Plan:     Essential hypertension- adeq control, cont rx.    Coronary artery disease, occlusive, Diastolic heart failure, NYHA class 3, Hypertensive heart disease with heart failure- doing well currently compensated on meds per Cards, cont.    Sarcoidosis of lung    CKD (chronic kidney disease) stage 4, GFR 15-29 ml/min  -     Basic Metabolic Panel; Future; Expected date: 02/16/2024    Current mild episode of major depressive disorder without prior episode- doing well on rx, cont.    Atherosclerosis of aorta- cont statin.    Other orders  -     metOLazone (ZAROXOLYN) 2.5 MG tablet; Take on Mon and Friday.  Dispense: 30 tablet; Refill: 0

## 2024-02-08 ENCOUNTER — OFFICE VISIT (OUTPATIENT)
Dept: PRIMARY CARE CLINIC | Facility: CLINIC | Age: 89
End: 2024-02-08
Payer: MEDICARE

## 2024-02-08 ENCOUNTER — HOSPITAL ENCOUNTER (OUTPATIENT)
Dept: RADIOLOGY | Facility: HOSPITAL | Age: 89
Discharge: HOME | End: 2024-02-08
Attending: NURSE PRACTITIONER
Payer: MEDICARE

## 2024-02-08 VITALS
HEIGHT: 61 IN | TEMPERATURE: 98 F | SYSTOLIC BLOOD PRESSURE: 124 MMHG | OXYGEN SATURATION: 96 % | WEIGHT: 141.63 LBS | BODY MASS INDEX: 26.74 KG/M2 | DIASTOLIC BLOOD PRESSURE: 64 MMHG | HEART RATE: 72 BPM

## 2024-02-08 DIAGNOSIS — I10 ESSENTIAL HYPERTENSION: Chronic | ICD-10-CM

## 2024-02-08 DIAGNOSIS — M54.50 ACUTE LOW BACK PAIN, UNSPECIFIED BACK PAIN LATERALITY, UNSPECIFIED WHETHER SCIATICA PRESENT: Primary | ICD-10-CM

## 2024-02-08 DIAGNOSIS — M54.50 ACUTE LOW BACK PAIN, UNSPECIFIED BACK PAIN LATERALITY, UNSPECIFIED WHETHER SCIATICA PRESENT: ICD-10-CM

## 2024-02-08 DIAGNOSIS — E86.0 DEHYDRATION: ICD-10-CM

## 2024-02-08 PROCEDURE — 72110 X-RAY EXAM L-2 SPINE 4/>VWS: CPT | Mod: TC,HCNC,FY,PO

## 2024-02-08 PROCEDURE — 1101F PT FALLS ASSESS-DOCD LE1/YR: CPT | Mod: HCNC,CPTII,S$GLB, | Performed by: NURSE PRACTITIONER

## 2024-02-08 PROCEDURE — 1159F MED LIST DOCD IN RCRD: CPT | Mod: HCNC,CPTII,S$GLB, | Performed by: NURSE PRACTITIONER

## 2024-02-08 PROCEDURE — 72110 X-RAY EXAM L-2 SPINE 4/>VWS: CPT | Mod: 26,HCNC,, | Performed by: RADIOLOGY

## 2024-02-08 PROCEDURE — 99215 OFFICE O/P EST HI 40 MIN: CPT | Mod: HCNC,S$GLB,, | Performed by: NURSE PRACTITIONER

## 2024-02-08 PROCEDURE — 3288F FALL RISK ASSESSMENT DOCD: CPT | Mod: HCNC,CPTII,S$GLB, | Performed by: NURSE PRACTITIONER

## 2024-02-08 PROCEDURE — 1126F AMNT PAIN NOTED NONE PRSNT: CPT | Mod: HCNC,CPTII,S$GLB, | Performed by: NURSE PRACTITIONER

## 2024-02-08 PROCEDURE — 99999 PR PBB SHADOW E&M-EST. PATIENT-LVL V: CPT | Mod: PBBFAC,HCNC,, | Performed by: NURSE PRACTITIONER

## 2024-02-08 PROCEDURE — 1157F ADVNC CARE PLAN IN RCRD: CPT | Mod: HCNC,CPTII,S$GLB, | Performed by: NURSE PRACTITIONER

## 2024-02-08 NOTE — ASSESSMENT & PLAN NOTE
Will call with morning blood pressure  May need to hold antihypertensive and/or diuretics  Possible need for IV hydration in clinic

## 2024-02-08 NOTE — PROGRESS NOTES
Glo Dumont  02/08/2024  [id number]    Christina Cardona MD  Patient Care Team:  Christina Cardona MD as PCP - General (Internal Medicine)  Gordo Muir MD as Consulting Physician (Pulmonary Disease)  Nik Bergman MD (Inactive) as Consulting Physician (Cardiology)  Franky Bell Jr., MD (Rheumatology)  Julio Galindo MD as Consulting Physician (Gastroenterology)  Glenis Mcfadden MD as Consulting Physician (Otolaryngology)  Ayaz Estrada MD as Consulting Physician (Hematology and Oncology)  Yomi Guy LPN as Care Coordinator  Bridgett Vargas PharmD as Hypertension Digital Medicine Clinician (Pharmacist)  Christina Cardona MD as Hypertension Digital Medicine Responsible Provider (Internal Medicine)  Felipe Herron as Digital Medicine Health   Medicare, [employer] Managed as Hypertension Digital Medicine Contract      [facility]      Chief Complaint:  Chief Complaint   Patient presents with    Back Pain     X 2 days    Diarrhea     X 3 days         Assessment/Plan:  1. Acute low back pain, unspecified back pain laterality, unspecified whether sciatica present  Assessment & Plan:  Tylenol does help with pain  Avoid heavy lifting  Apply over the counter lidocaine patches  Apply warming topical sports creams  Warm heating pad, resting the back  L spine xray  U/A pending    Orders:  -     Cancel: Urinalysis, Reflex to Urine Culture Urine, Clean Catch  -     X-Ray Lumbar Spine 5 View; Future; Expected date: 02/08/2024  -     Urinalysis, Reflex to Urine Culture Urine, Clean Catch; Future; Expected date: 02/08/2024    2. Essential hypertension    3. Dehydration  Assessment & Plan:  Will call with morning blood pressure  May need to hold antihypertensive and/or diuretics              History of Present Illness:    Ms. Dumont presents today with her daughter. New onset of lower back pain x 2 days. She denies trauma, heavy lifting, recent fall. Lower back pain aggravated by  sitting. Does not feel the back pain with movement. Pain is dull in nature and severe times.   Denies radiculopathy  Pt reports subjective fever - urinary incontinence, urgency  No hematuria  No abdominal pain, N/V  Taking metolazone M,W,F and Bumex 2 mg daily.    Hx of UC with chornic diarrhea - still taking budesonide, out of creon. Has not been taking imodium. Will follow up with GI next month.   Currently, pt does not have any peripheral edema or rales on exam.         The following were reviewed: Active problem list, medication list, allergies, family history, social history, and Health Maintenance.     History:  Past Medical History:   Diagnosis Date    Anemia     Angina pectoris     Anxiety     Anxiety and depression     Arthritis     hip    Carotid artery occlusion     Carpal tunnel syndrome 06/23/2008    emg    Chronic diarrhea     work up in 2011 with EGD, CS and VCE    CKD (chronic kidney disease) stage 3, GFR 30-59 ml/min 5/11/2017    Colitis     Coronary artery disease     Coronary artery disease     Diastolic dysfunction     Diverticulosis     Glaucoma     Greater trochanteric bursitis 2/10/2015    Grief at loss of child 1/26/2016    H/O carotid endarterectomy 12/2/2013    Heart failure     History of coronary angioplasty 3/11/2014    Hypercholesteremia     Hypertension     Liver cyst 02/08/2013    ct abd    Macular degeneration     Obesity with serious comorbidity 3/19/2023    Primary open-angle glaucoma(365.11) 9/3/2013    Renal cyst 02/08/2013    ct abd    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    Sarcoidosis     Sarcoidosis of lung     Sickle cell trait     Uveitis      Past Surgical History:   Procedure Laterality Date    A-V CARDIAC PACEMAKER INSERTION Left 3/21/2023    Procedure: INSERTION, CARDIAC PACEMAKER, DUAL CHAMBER/His Lead;  Surgeon: Cortez Ambrose MD;  Location: [facility] CATH LAB;  Service: Cardiology;  Laterality: Left;  MDT/ MD to confirm in am/possible nurse sedate     CAROTID ENDARTERECTOMY Right 2000s    CATARACT EXTRACTION Bilateral     Dr. Liang Dennis    CHOLECYSTECTOMY      laparoscopic, 3/18.    CORONARY ANGIOPLASTY WITH STENT PLACEMENT  11/19/2010    RCA-HALIE 2010    Lathia    JOINT REPLACEMENT Left 11/03/2014    Dr. Braun    TOTAL ABDOMINAL HYSTERECTOMY W/ BILATERAL SALPINGOOPHORECTOMY  1972     Family History   Problem Relation Age of Onset    Hypertension Mother     Heart failure Mother     Cancer Father         prostate    Cirrhosis Brother     Heart failure Brother     Cancer Daughter         Carcinoid     Patient Active Problem List   Diagnosis    Sarcoidosis of lung    Thyroid nodule    Hypertensive heart disease with heart failure    Other hyperlipidemia    Hemoglobin A-S genotype    Ptosis    Coronary artery disease, occlusive    History of central retinal artery occlusion    Iron deficiency anemia    Vitamin D deficiency    Osteopenia    Essential hypertension    Diastolic heart failure, NYHA class 3    Atherosclerosis of aorta    CKD (chronic kidney disease) stage 4, GFR 15-29 ml/min    Uveitis    Ulcerative colitis    History of coronary angioplasty    Hiatal hernia    Bradycardia    Lung nodule    Memory loss    Central retinal vein occlusion with macular edema of both eyes    Aortic stenosis    Weight loss    Unspecified severe protein-calorie malnutrition    Hypokalemia    Obesity with serious comorbidity    Hypomagnesemia    SA node dysfunction    S/P placement of cardiac pacemaker    Current mild episode of major depressive disorder without prior episode    AV block, 2nd degree    Acute cystitis without hematuria    Abdominal pain    Gastroesophageal reflux disease with esophagitis    S/P CABG x 3    Thrombocytosis, unspecified    Dehydration    Acute low back pain     Review of patient's allergies indicates:   Allergen Reactions    Lisinopril      angioedema    Codeine Nausea And Vomiting       Medications:  Current Outpatient Medications on File Prior  to Visit   Medication Sig Dispense Refill    aflibercept 2 mg/0.05 mL Soln 2 mg by Intravitreal route every 28 days.      amLODIPine (NORVASC) 2.5 MG tablet Take 10 mg by mouth every morning.      aspirin (ECOTRIN) 81 MG EC tablet Take 81 mg by mouth once daily.      budesonide (ENTOCORT EC) 3 mg capsule Take 1 capsule (3 mg total) by mouth 2 (two) times a day. 180 capsule 3    bumetanide (BUMEX) 2 MG tablet Take 2 mg by mouth.      busPIRone (BUSPAR) 5 MG Tab Take 1 tablet (5 mg total) by mouth 2 (two) times daily as needed (anxiety). 60 tablet 5    citalopram (CELEXA) 10 MG tablet Take 1 tablet (10 mg total) by mouth every evening. 30 tablet 11    cyproheptadine (PERIACTIN) 4 mg tablet Take 1 tablet (4 mg total) by mouth 3 (three) times daily as needed. 270 tablet 3    dexAMETHasone (OZURDEX) 0.7 mg Impl intravitreal implant 0.7 mg by Intravitreal route once.      diclofenac sodium (VOLTAREN) 1 % Gel Apply 2 g topically 2 (two) times daily. 100 g 0    dorzolamide-timolol 2-0.5% (COSOPT) 22.3-6.8 mg/mL ophthalmic solution Place 1 drop into both eyes 2 (two) times daily. 10 mL 6    isosorbide mononitrate (IMDUR) 30 MG 24 hr tablet Take 1 tablet (30 mg total) by mouth once daily. 30 tablet 11    JARDIANCE 10 mg tablet Take 10 mg by mouth.      lipase-protease-amylase 24,000-76,000-120,000 units (CREON) 24,000-76,000 -120,000 unit capsule Take 1 capsule by mouth 3 (three) times daily with meals. 270 capsule 3    loperamide (IMODIUM) 2 mg capsule Take 1 mg by mouth every 6 (six) hours as needed for Diarrhea.      metOLazone (ZAROXOLYN) 2.5 MG tablet 1 tablet.      nitroGLYCERIN (NITROSTAT) 0.4 MG SL tablet PLACE 1 TABLET UNDER THE TONGUE EVERY 5 MINUTES AS NEEDED FOR CHEST PAIN 25 tablet 3    ondansetron (ZOFRAN-ODT) 4 MG TbDL DISSOLVE 1 TABLET(4 MG) ON THE TONGUE EVERY 8 HOURS AS NEEDED FOR NAUSEA OR VOMITING 12 tablet 0    pantoprazole (PROTONIX) 40 MG tablet Take 1 tablet (40 mg total) by mouth once daily. 90 tablet  1    potassium chloride (KLOR-CON) 10 MEQ TbSR Take 1 tablet (10 mEq total) by mouth once daily. 30 tablet 3    atorvastatin (LIPITOR) 40 MG tablet Take 1 tablet (40 mg total) by mouth Daily. 90 tablet 3    carvediloL (COREG) 25 MG tablet Take 0.5 tablets (12.5 mg total) by mouth 2 (two) times daily with meals. (Patient not taking: Reported on 2/8/2024) 30 tablet 11    FOLIC ACID/MULTIVIT-MIN/LUTEIN (CENTRUM SILVER ORAL) Take 1 tablet by mouth once daily.       No current facility-administered medications on file prior to visit.       Medications have been reviewed and reconciled with patient at visit today.      Exam:  Vitals:    02/08/24 1428   BP: 124/64   Pulse: 72   Temp: 97.7 °F (36.5 °C)     Weight: 64.3 kg (141 lb 10.3 oz)   Body mass index is 26.76 kg/m².      BP Readings from Last 3 Encounters:   02/08/24 124/64   01/03/24 130/62   12/28/23 136/62     Wt Readings from Last 3 Encounters:   02/08/24 1428 64.3 kg (141 lb 10.3 oz)   01/03/24 1035 64.7 kg (142 lb 10.2 oz)   12/28/23 0839 66.7 kg (146 lb 15 oz)        REVIEW OF SYSTEMS  Review of Systems   Constitutional:  Positive for appetite change and fever (subjective). Negative for chills and fatigue.   HENT:  Negative for congestion and sore throat.    Respiratory:  Negative for cough, shortness of breath and wheezing.    Cardiovascular:  Negative for chest pain, palpitations and leg swelling.   Gastrointestinal:  Positive for diarrhea. Negative for abdominal pain, blood in stool, constipation, nausea and vomiting.   Genitourinary:  Positive for urgency. Negative for difficulty urinating, dysuria, frequency and hematuria.        Incontinence   Musculoskeletal:  Positive for back pain. Negative for arthralgias, myalgias and neck pain.   Skin:  Negative for rash and wound.   Neurological:  Positive for weakness. Negative for dizziness, light-headedness and headaches.   Psychiatric/Behavioral:  Negative for confusion, dysphoric mood and sleep disturbance.  The patient is not nervous/anxious.         Physical Exam  Vitals reviewed.   Constitutional:       General: She is not in acute distress.     Appearance: She is ill-appearing.   HENT:      Head: Normocephalic and atraumatic.      Nose: Nose normal.   Eyes:      General: No scleral icterus.     Conjunctiva/sclera: Conjunctivae normal.   Cardiovascular:      Rate and Rhythm: Normal rate and regular rhythm.      Heart sounds: No murmur heard.  Pulmonary:      Effort: Pulmonary effort is normal. No respiratory distress.      Breath sounds: Normal breath sounds.   Abdominal:      Palpations: Abdomen is soft. There is no mass.      Tenderness: There is no abdominal tenderness.   Musculoskeletal:      Cervical back: Normal range of motion and neck supple.      Right lower leg: No edema.      Left lower leg: No edema.   Skin:     General: Skin is warm and dry.   Neurological:      Mental Status: She is alert and oriented to person, place, and time. Mental status is at baseline.      Motor: Weakness present.   Psychiatric:         Mood and Affect: Mood normal.         Thought Content: Thought content normal.          Laboratory Reviewed:     Lab Results   Component Value Date    WBC 9.99 09/13/2023    HGB 10.0 (L) 09/13/2023    HCT 30.6 (L) 09/13/2023     (H) 09/13/2023    CHOL 160 05/04/2022    TRIG 80 05/04/2022    HDL 81 (H) 05/04/2022    ALT 10 08/12/2023    AST 12 08/12/2023     11/24/2023    K 3.5 11/24/2023     11/24/2023    CREATININE 1.6 (H) 11/24/2023    BUN 32 (H) 11/24/2023    CO2 22 (L) 11/24/2023    TSH 1.230 12/13/2021    INR 1.0 09/09/2020    HGBA1C 5.7 11/17/2015       Screening or Prevention Patient's value Goal Complete/Controlled?   HgA1C Testing and Control   Lab Results   Component Value Date    HGBA1C 5.7 11/17/2015      Annually/Less than 8% No   Lipid profile : 05/04/2022 Annually No   LDL control Lab Results   Component Value Date    LDLCALC 63.0 05/04/2022    Annually/Less than  100 mg/dl  No   Nephropathy screening Lab Results   Component Value Date    LABMICR 38.0 02/06/2014     Lab Results   Component Value Date    PROTEINUA Negative 12/06/2023    Annually Yes   Blood pressure BP Readings from Last 1 Encounters:   02/08/24 124/64    Less than 140/90 Yes   Dilated retinal exam Most Recent Eye Exam Date: Not Found Annually Yes   Foot exam   Most Recent Foot Exam Date: Not Found Annually Yes       Health Maintenance  Health Maintenance Topics with due status: Not Due       Topic Last Completion Date    TETANUS VACCINE 02/03/2015    Aspirin/Antiplatelet Therapy 02/08/2024     Health Maintenance Due   Topic Date Due    Shingles Vaccine (1 of 2) Never done    RSV Vaccine (Age 60+ and Pregnant patients) (1 - 1-dose 60+ series) Never done    Lipid Panel  05/04/2023    COVID-19 Vaccine (4 - 2023-24 season) 09/01/2023               -Patient's lab results were reviewed and discussed with patient  -Treatment options and alternatives were discussed with the patient. Patient expressed understanding. Patient was given the opportunity to ask questions and be an active participant in their medical care. Patient had no further questions or concerns at this time.         Future Appointments   Date Time Provider Department Center   2/8/2024  3:45 PM [facility] XR1 L XRAY [facility]   2/16/2024 10:20 AM Christina Cardona MD [facility]   3/6/2024  9:15 AM NAHUM Tamez MD [facility]   5/3/2024 10:30 AM PACEMAKER CLINIC [facility]   5/3/2024 10:40 AM Cortez Ambrose MD [facility]   5/23/2024 10:00 AM Paula Deal NP [facility]          After visit summary printed and given to patient upon discharge.  Patient goals and care plan are included in After visit summary.      The following issues were discussed: The primary encounter diagnosis was Acute low back pain, unspecified back pain laterality, unspecified whether sciatica present.  Diagnoses of Essential hypertension and Dehydration were also pertinent to this visit.    Health maintenance needs, recent test results and goals of care discussed with pt and questions answered.           ARLENE Mcarthur, NP-C  [facility] [address] [place] [address]

## 2024-02-08 NOTE — ASSESSMENT & PLAN NOTE
Tylenol does help with pain  Avoid heavy lifting  Apply over the counter lidocaine patches  Apply warming topical sports creams  Warm heating pad, resting the back  L spine xray  U/A pending

## 2024-02-08 NOTE — PATIENT INSTRUCTIONS
Hold fluid medicine if patient is weak and the blood pressure is low    Avoid heavy lifting  Apply over the counter lidocaine patches  Apply warming topical sports creams  Warm heating pad     Please call in the AM with your blood pressure in the AM

## 2024-02-09 ENCOUNTER — OFFICE VISIT (OUTPATIENT)
Dept: PRIMARY CARE CLINIC | Facility: CLINIC | Age: 89
End: 2024-02-09
Payer: MEDICARE

## 2024-02-09 ENCOUNTER — TELEPHONE (OUTPATIENT)
Dept: PRIMARY CARE CLINIC | Facility: CLINIC | Age: 89
End: 2024-02-09

## 2024-02-09 VITALS
OXYGEN SATURATION: 95 % | HEART RATE: 81 BPM | TEMPERATURE: 98 F | WEIGHT: 141.75 LBS | BODY MASS INDEX: 26.78 KG/M2 | SYSTOLIC BLOOD PRESSURE: 138 MMHG | DIASTOLIC BLOOD PRESSURE: 82 MMHG

## 2024-02-09 DIAGNOSIS — E86.0 DEHYDRATION: ICD-10-CM

## 2024-02-09 DIAGNOSIS — K51.819 OTHER ULCERATIVE COLITIS WITH COMPLICATION: ICD-10-CM

## 2024-02-09 DIAGNOSIS — M54.50 ACUTE LOW BACK PAIN WITHOUT SCIATICA, UNSPECIFIED BACK PAIN LATERALITY: ICD-10-CM

## 2024-02-09 DIAGNOSIS — I50.30 DIASTOLIC HEART FAILURE, NYHA CLASS 3: ICD-10-CM

## 2024-02-09 DIAGNOSIS — R19.7 DIARRHEA, UNSPECIFIED TYPE: Primary | ICD-10-CM

## 2024-02-09 DIAGNOSIS — E86.0 DEHYDRATION: Primary | ICD-10-CM

## 2024-02-09 DIAGNOSIS — M54.50 LUMBAR PAIN: ICD-10-CM

## 2024-02-09 PROBLEM — N30.00 ACUTE CYSTITIS WITHOUT HEMATURIA: Status: RESOLVED | Noted: 2023-07-19 | Resolved: 2024-02-09

## 2024-02-09 PROBLEM — R10.9 ABDOMINAL PAIN: Status: RESOLVED | Noted: 2023-07-19 | Resolved: 2024-02-09

## 2024-02-09 LAB
ANION GAP SERPL CALC-SCNC: 13 MMOL/L (ref 8–16)
BACTERIA #/AREA URNS AUTO: ABNORMAL /HPF
BILIRUB SERPL-MCNC: NORMAL MG/DL
BILIRUB UR QL STRIP: NEGATIVE
BLOOD URINE, POC: NORMAL
BUN SERPL-MCNC: 65 MG/DL (ref 8–23)
CALCIUM SERPL-MCNC: 10 MG/DL (ref 8.7–10.5)
CHLORIDE SERPL-SCNC: 106 MMOL/L (ref 95–110)
CLARITY UR REFRACT.AUTO: ABNORMAL
CLARITY, POC UA: CLEAR
CO2 SERPL-SCNC: 13 MMOL/L (ref 23–29)
COLOR UR AUTO: YELLOW
COLOR, POC UA: NORMAL
CREAT SERPL-MCNC: 2.6 MG/DL (ref 0.5–1.4)
EST. GFR  (NO RACE VARIABLE): 17.4 ML/MIN/1.73 M^2
GLUCOSE SERPL-MCNC: 104 MG/DL (ref 70–110)
GLUCOSE UR QL STRIP: ABNORMAL
GLUCOSE UR QL STRIP: NORMAL
HGB UR QL STRIP: NEGATIVE
HYALINE CASTS UR QL AUTO: 5 /LPF
KETONES UR QL STRIP: NEGATIVE
KETONES UR QL STRIP: NORMAL
LEUKOCYTE ESTERASE UR QL STRIP: ABNORMAL
LEUKOCYTE ESTERASE URINE, POC: NORMAL
MICROSCOPIC COMMENT: ABNORMAL
NITRITE UR QL STRIP: NEGATIVE
NITRITE, POC UA: NORMAL
NON-SQ EPI CELLS #/AREA URNS AUTO: 2 /HPF
OHS CV AF BURDEN PERCENT: < 1
OHS CV DC REMOTE DEVICE TYPE: NORMAL
OHS CV RV PACING PERCENT: 0.41 %
PH UR STRIP: 5 [PH] (ref 5–8)
PH, POC UA: 5
POTASSIUM SERPL-SCNC: 5 MMOL/L (ref 3.5–5.1)
PROT UR QL STRIP: NEGATIVE
PROTEIN, POC: NORMAL
RBC #/AREA URNS AUTO: 3 /HPF (ref 0–4)
SODIUM SERPL-SCNC: 132 MMOL/L (ref 136–145)
SP GR UR STRIP: 1.01 (ref 1–1.03)
SPECIFIC GRAVITY, POC UA: 1.01
SQUAMOUS #/AREA URNS AUTO: 21 /HPF
URN SPEC COLLECT METH UR: ABNORMAL
UROBILINOGEN, POC UA: 0.2
WBC #/AREA URNS AUTO: 44 /HPF (ref 0–5)
WBC CLUMPS UR QL AUTO: ABNORMAL

## 2024-02-09 PROCEDURE — 87088 URINE BACTERIA CULTURE: CPT | Mod: HCNC | Performed by: NURSE PRACTITIONER

## 2024-02-09 PROCEDURE — 87186 SC STD MICRODIL/AGAR DIL: CPT | Mod: HCNC | Performed by: NURSE PRACTITIONER

## 2024-02-09 PROCEDURE — 1157F ADVNC CARE PLAN IN RCRD: CPT | Mod: HCNC,CPTII,S$GLB, | Performed by: NURSE PRACTITIONER

## 2024-02-09 PROCEDURE — 99213 OFFICE O/P EST LOW 20 MIN: CPT | Mod: HCNC,S$GLB,, | Performed by: NURSE PRACTITIONER

## 2024-02-09 PROCEDURE — 1126F AMNT PAIN NOTED NONE PRSNT: CPT | Mod: HCNC,CPTII,S$GLB, | Performed by: NURSE PRACTITIONER

## 2024-02-09 PROCEDURE — 81002 URINALYSIS NONAUTO W/O SCOPE: CPT | Mod: HCNC,S$GLB,, | Performed by: NURSE PRACTITIONER

## 2024-02-09 PROCEDURE — 81001 URINALYSIS AUTO W/SCOPE: CPT | Mod: HCNC | Performed by: NURSE PRACTITIONER

## 2024-02-09 PROCEDURE — 87077 CULTURE AEROBIC IDENTIFY: CPT | Mod: 59,HCNC | Performed by: NURSE PRACTITIONER

## 2024-02-09 PROCEDURE — 99999 PR PBB SHADOW E&M-EST. PATIENT-LVL II: CPT | Mod: PBBFAC,HCNC,, | Performed by: NURSE PRACTITIONER

## 2024-02-09 PROCEDURE — 87086 URINE CULTURE/COLONY COUNT: CPT | Mod: HCNC | Performed by: NURSE PRACTITIONER

## 2024-02-09 PROCEDURE — 80048 BASIC METABOLIC PNL TOTAL CA: CPT | Mod: HCNC | Performed by: NURSE PRACTITIONER

## 2024-02-09 NOTE — TELEPHONE ENCOUNTER
Please call and speak to daughter. Assess patient's blood pressure this AM.    If SBP </= 120 hold metolazone and bumex over the weekend and resume on Monday  Continue the carvediolol, amlodipine and isosorbide.    Jasmyn    2/9/24 - 9:06 AM - I ordered the STAT BMP and ask the patient to come now for fluids.    Jasmyn

## 2024-02-09 NOTE — ASSESSMENT & PLAN NOTE
Chronic diarrhea at times  No further diarrhea today, strength improved  Continue budesonide and Imodium prn

## 2024-02-09 NOTE — PROGRESS NOTES
Glo Dumont  02/09/2024  [id number]    Christina Cardona MD  Patient Care Team:  Christina Cardona MD as PCP - General (Internal Medicine)  Gordo Muir MD as Consulting Physician (Pulmonary Disease)  Nik Bergman MD (Inactive) as Consulting Physician (Cardiology)  Franky Bell Jr., MD (Rheumatology)  Julio Galindo MD as Consulting Physician (Gastroenterology)  Glenis Mcfadden MD as Consulting Physician (Otolaryngology)  Ayaz Estrada MD as Consulting Physician (Hematology and Oncology)  Yomi Guy LPN as Care Coordinator  Bridgett Vargas PharmD as Hypertension Digital Medicine Clinician (Pharmacist)  Christina Cardona MD as Hypertension Digital Medicine Responsible Provider (Internal Medicine)  Felipe Herron as Digital Medicine Health   Medicare, Sharon Johnson Managed as Hypertension Digital Medicine Contract    Worry Score: 4    [facility] Note      Chief Complaint:  Chief Complaint   Patient presents with    Diarrhea    Dehydration         Assessment/Plan:  1. Diarrhea, unspecified type  -     BASIC METABOLIC PANEL; Future; Expected date: 02/09/2024    2. Lumbar pain  -     POCT URINE DIPSTICK WITHOUT MICROSCOPE  -     Urinalysis, Reflex to Urine Culture Urine, Clean Catch    3. Other ulcerative colitis with complication  Assessment & Plan:  Chronic diarrhea at times  No further diarrhea today, strength improved  Continue budesonide and Imodium prn      4. Acute low back pain without sciatica, unspecified back pain laterality  Assessment & Plan:  Improved today  Continue Tylenol      5. Diastolic heart failure, NYHA class 3  Assessment & Plan:  Pt is euvolemic today  Take metolazone on  M,W,F  Bumex daily      6. Dehydration  Assessment & Plan:  No concerns for dehydration today.  Gentle fluid intake              History of Present Illness:  Ms. Dumont returns for reevaluation. Concerns for dehydration, diarrhea and weakness at visit yesterday. B/P soft at that  time.Today, she reports feeling better, slept well last night. Further diarrhea and generalized weakness denied. Denies decreased appetite and lower back pain resolved at this time.       The following were reviewed: Active problem list, medication list, allergies, family history, social history, and Health Maintenance.     History:  Past Medical History:   Diagnosis Date    Anemia     Angina pectoris     Anxiety     Anxiety and depression     Arthritis     hip    Carotid artery occlusion     Carpal tunnel syndrome 06/23/2008    emg    Chronic diarrhea     work up in 2011 with EGD, CS and VCE    CKD (chronic kidney disease) stage 3, GFR 30-59 ml/min 5/11/2017    Colitis     Coronary artery disease     Coronary artery disease     Diastolic dysfunction     Diverticulosis     Glaucoma     Greater trochanteric bursitis 2/10/2015    Grief at loss of child 1/26/2016    H/O carotid endarterectomy 12/2/2013    Heart failure     History of coronary angioplasty 3/11/2014    Hypercholesteremia     Hypertension     Liver cyst 02/08/2013    ct abd    Macular degeneration     Obesity with serious comorbidity 3/19/2023    Primary open-angle glaucoma(365.11) 9/3/2013    Renal cyst 02/08/2013    ct abd    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    Sarcoidosis     Sarcoidosis of lung     Sickle cell trait     Uveitis      Past Surgical History:   Procedure Laterality Date    A-V CARDIAC PACEMAKER INSERTION Left 3/21/2023    Procedure: INSERTION, CARDIAC PACEMAKER, DUAL CHAMBER/His Lead;  Surgeon: Cortez Ambrose MD;  Location: [facility];  Service: Cardiology;  Laterality: Left;  MDT/ MD to confirm in am/possible nurse sedate    CAROTID ENDARTERECTOMY Right 2000s    CATARACT EXTRACTION Bilateral     Dr. Liang Dennis    CHOLECYSTECTOMY      laparoscopic, 3/18.    CORONARY ANGIOPLASTY WITH STENT PLACEMENT  11/19/2010    RCA-HALIE 2010    Lathia    JOINT REPLACEMENT Left 11/03/2014    Dr. Braun    TOTAL ABDOMINAL  HYSTERECTOMY W/ BILATERAL SALPINGOOPHORECTOMY  1972     Family History   Problem Relation Age of Onset    Hypertension Mother     Heart failure Mother     Cancer Father         prostate    Cirrhosis Brother     Heart failure Brother     Cancer Daughter         Carcinoid     Patient Active Problem List   Diagnosis    Sarcoidosis of lung    Thyroid nodule    Hypertensive heart disease with heart failure    Other hyperlipidemia    Hemoglobin A-S genotype    Ptosis    Coronary artery disease, occlusive    History of central retinal artery occlusion    Iron deficiency anemia    Vitamin D deficiency    Osteopenia    Essential hypertension    Diastolic heart failure, NYHA class 3    Atherosclerosis of aorta    CKD (chronic kidney disease) stage 4, GFR 15-29 ml/min    Uveitis    Ulcerative colitis    History of coronary angioplasty    Hiatal hernia    Bradycardia    Lung nodule    Memory loss    Central retinal vein occlusion with macular edema of both eyes    Aortic stenosis    Weight loss    Unspecified severe protein-calorie malnutrition    Hypokalemia    Obesity with serious comorbidity    Hypomagnesemia    SA node dysfunction    S/P placement of cardiac pacemaker    Current mild episode of major depressive disorder without prior episode    AV block, 2nd degree    Gastroesophageal reflux disease with esophagitis    S/P CABG x 3    Thrombocytosis, unspecified    Dehydration    Acute low back pain     Review of patient's allergies indicates:   Allergen Reactions    Lisinopril      angioedema    Codeine Nausea And Vomiting       Medications:  Current Outpatient Medications on File Prior to Visit   Medication Sig Dispense Refill    aflibercept 2 mg/0.05 mL Soln 2 mg by Intravitreal route every 28 days.      amLODIPine (NORVASC) 2.5 MG tablet Take 10 mg by mouth every morning.      aspirin (ECOTRIN) 81 MG EC tablet Take 81 mg by mouth once daily.      atorvastatin (LIPITOR) 40 MG tablet Take 1 tablet (40 mg total) by mouth  Daily. 90 tablet 3    budesonide (ENTOCORT EC) 3 mg capsule Take 1 capsule (3 mg total) by mouth 2 (two) times a day. 180 capsule 3    bumetanide (BUMEX) 2 MG tablet Take 2 mg by mouth.      busPIRone (BUSPAR) 5 MG Tab Take 1 tablet (5 mg total) by mouth 2 (two) times daily as needed (anxiety). 60 tablet 5    carvediloL (COREG) 25 MG tablet Take 0.5 tablets (12.5 mg total) by mouth 2 (two) times daily with meals. (Patient not taking: Reported on 2/8/2024) 30 tablet 11    citalopram (CELEXA) 10 MG tablet Take 1 tablet (10 mg total) by mouth every evening. 30 tablet 11    cyproheptadine (PERIACTIN) 4 mg tablet Take 1 tablet (4 mg total) by mouth 3 (three) times daily as needed. 270 tablet 3    dexAMETHasone (OZURDEX) 0.7 mg Impl intravitreal implant 0.7 mg by Intravitreal route once.      diclofenac sodium (VOLTAREN) 1 % Gel Apply 2 g topically 2 (two) times daily. 100 g 0    dorzolamide-timolol 2-0.5% (COSOPT) 22.3-6.8 mg/mL ophthalmic solution Place 1 drop into both eyes 2 (two) times daily. 10 mL 6    FOLIC ACID/MULTIVIT-MIN/LUTEIN (CENTRUM SILVER ORAL) Take 1 tablet by mouth once daily.      isosorbide mononitrate (IMDUR) 30 MG 24 hr tablet Take 1 tablet (30 mg total) by mouth once daily. 30 tablet 11    JARDIANCE 10 mg tablet Take 10 mg by mouth.      lipase-protease-amylase 24,000-76,000-120,000 units (CREON) 24,000-76,000 -120,000 unit capsule Take 1 capsule by mouth 3 (three) times daily with meals. 270 capsule 3    loperamide (IMODIUM) 2 mg capsule Take 1 mg by mouth every 6 (six) hours as needed for Diarrhea.      metOLazone (ZAROXOLYN) 2.5 MG tablet 1 tablet.      nitroGLYCERIN (NITROSTAT) 0.4 MG SL tablet PLACE 1 TABLET UNDER THE TONGUE EVERY 5 MINUTES AS NEEDED FOR CHEST PAIN 25 tablet 3    ondansetron (ZOFRAN-ODT) 4 MG TbDL DISSOLVE 1 TABLET(4 MG) ON THE TONGUE EVERY 8 HOURS AS NEEDED FOR NAUSEA OR VOMITING 12 tablet 0    pantoprazole (PROTONIX) 40 MG tablet Take 1 tablet (40 mg total) by mouth once  daily. 90 tablet 1    potassium chloride (KLOR-CON) 10 MEQ TbSR Take 1 tablet (10 mEq total) by mouth once daily. 30 tablet 3     No current facility-administered medications on file prior to visit.       Medications have been reviewed and reconciled with patient at visit today.      Exam:  Vitals:    02/09/24 0935   BP: 138/82   Pulse: 81   Temp: 98 °F (36.7 °C)     Weight: 64.3 kg (141 lb 12.1 oz)   Body mass index is 26.78 kg/m².      BP Readings from Last 3 Encounters:   02/09/24 138/82   02/08/24 124/64   01/03/24 130/62     Wt Readings from Last 3 Encounters:   02/09/24 0935 64.3 kg (141 lb 12.1 oz)   02/08/24 1428 64.3 kg (141 lb 10.3 oz)   01/03/24 1035 64.7 kg (142 lb 10.2 oz)        REVIEW OF SYSTEMS  Review of Systems   Constitutional:  Negative for appetite change, chills, fatigue and fever.   HENT:  Negative for congestion, rhinorrhea and sore throat.    Respiratory:  Positive for shortness of breath (mild with exertion). Negative for cough.    Cardiovascular:  Negative for chest pain and leg swelling.   Gastrointestinal:  Negative for abdominal pain, diarrhea, nausea and vomiting.   Genitourinary:  Negative for dysuria and frequency.   Musculoskeletal:  Negative for back pain.   Skin:  Negative for pallor.   Neurological:  Negative for dizziness, weakness and light-headedness.   Psychiatric/Behavioral:  Negative for sleep disturbance.         Physical Exam  Vitals reviewed.   Constitutional:       General: She is not in acute distress.     Appearance: Normal appearance. She is not ill-appearing.   HENT:      Head: Normocephalic and atraumatic.      Nose: Nose normal.   Eyes:      General: No scleral icterus.     Conjunctiva/sclera: Conjunctivae normal.   Cardiovascular:      Rate and Rhythm: Normal rate and regular rhythm.      Heart sounds: No murmur heard.  Pulmonary:      Effort: Pulmonary effort is normal. No respiratory distress.      Breath sounds: Normal breath sounds.   Abdominal:       Palpations: Abdomen is soft. There is no mass.      Tenderness: There is no abdominal tenderness.   Musculoskeletal:      Cervical back: Normal range of motion and neck supple.      Right lower leg: No edema.      Left lower leg: No edema.   Skin:     General: Skin is warm and dry.   Neurological:      Mental Status: She is alert and oriented to person, place, and time. Mental status is at baseline.      Motor: No weakness.      Gait: Gait abnormal (uses cane to assist with ambulation).   Psychiatric:         Mood and Affect: Mood normal.         Thought Content: Thought content normal.          Laboratory Reviewed:     Lab Results   Component Value Date    WBC 9.99 09/13/2023    HGB 10.0 (L) 09/13/2023    HCT 30.6 (L) 09/13/2023     (H) 09/13/2023    CHOL 160 05/04/2022    TRIG 80 05/04/2022    HDL 81 (H) 05/04/2022    ALT 10 08/12/2023    AST 12 08/12/2023     11/24/2023    K 3.5 11/24/2023     11/24/2023    CREATININE 1.6 (H) 11/24/2023    BUN 32 (H) 11/24/2023    CO2 22 (L) 11/24/2023    TSH 1.230 12/13/2021    INR 1.0 09/09/2020    HGBA1C 5.7 11/17/2015       Screening or Prevention Patient's value Goal Complete/Controlled?   HgA1C Testing and Control   Lab Results   Component Value Date    HGBA1C 5.7 11/17/2015      Annually/Less than 8% No   Lipid profile : 05/04/2022 Annually No   LDL control Lab Results   Component Value Date    LDLCALC 63.0 05/04/2022    Annually/Less than 100 mg/dl  No   Nephropathy screening Lab Results   Component Value Date    LABMICR 38.0 02/06/2014     Lab Results   Component Value Date    PROTEINUA Negative 12/06/2023    Annually Yes   Blood pressure BP Readings from Last 1 Encounters:   02/09/24 138/82    Less than 140/90 Yes   Dilated retinal exam Most Recent Eye Exam Date: Not Found Annually Yes   Foot exam   Most Recent Foot Exam Date: Not Found Annually Yes       Health Maintenance  Health Maintenance Topics with due status: Not Due       Topic Last  Completion Date    TETANUS VACCINE 02/03/2015    Aspirin/Antiplatelet Therapy 02/08/2024     Health Maintenance Due   Topic Date Due    Shingles Vaccine (1 of 2) Never done    RSV Vaccine (Age 60+ and Pregnant patients) (1 - 1-dose 60+ series) Never done    Lipid Panel  05/04/2023    COVID-19 Vaccine (4 - 2023-24 season) 09/01/2023               -Patient's lab results were reviewed and discussed with patient  -Treatment options and alternatives were discussed with the patient. Patient expressed understanding. Patient was given the opportunity to ask questions and be an active participant in their medical care. Patient had no further questions or concerns at this time.         Future Appointments   Date Time Provider Department Center   2/16/2024 10:20 AM Christina Cardona MD [facility]   3/6/2024  9:15 AM NAHUM Tamez MD [facility] OPHTHAL [facility]   5/3/2024 10:30 AM PACEMAKER CLINIC [facility] PRO [facility]   5/3/2024 10:40 AM Cortez Ambrose MD [facility] ARRHYTH [facility]   5/23/2024 10:00 AM Paula Deal NP [facility]           After visit summary printed and given to patient upon discharge.  Patient goals and care plan are included in After visit summary.      The following issues were discussed: The primary encounter diagnosis was Diarrhea, unspecified type. Diagnoses of Lumbar pain, Other ulcerative colitis with complication, Acute low back pain without sciatica, unspecified back pain laterality, Diastolic heart failure, NYHA class 3, and Dehydration were also pertinent to this visit.    Health maintenance needs, recent test results and goals of care discussed with pt and questions answered.           Paula Deal, ARLENE, NP-C [facility] [address] [place] [address]

## 2024-02-10 RX ORDER — CIPROFLOXACIN 250 MG/1
250 TABLET, FILM COATED ORAL 2 TIMES DAILY
Qty: 10 TABLET | Refills: 0 | Status: SHIPPED | OUTPATIENT
Start: 2024-02-10 | End: 2024-02-15

## 2024-02-11 LAB
BACTERIA UR CULT: ABNORMAL
BACTERIA UR CULT: ABNORMAL

## 2024-02-12 ENCOUNTER — TELEPHONE (OUTPATIENT)
Dept: PRIMARY CARE CLINIC | Facility: CLINIC | Age: 89
End: 2024-02-12
Payer: MEDICARE

## 2024-02-12 NOTE — TELEPHONE ENCOUNTER
Please call Ms. Dumont's daughter. Dr. Cardona prescribed Ciprofloxacin for UTI.  Also, she has some dehydration.  She needs to hold the metolazone and Bumex the next 3 days.  I think she is seeing her Cardiologist tomorrow or Wednesday.    Encouraged gentle fluid intake    Please call daughter to confirm if Dr. Cardona gave any instruction regarding the diuretics.    Jasmyn

## 2024-02-12 NOTE — TELEPHONE ENCOUNTER
Spoke with daughter, Jessica, in regards to patients lab results. Signs of dehydration. Instructed to hold Metolazone and Bumex for a couple of days. Patient is currently taking Cipro for UTI. Daughter states she is feeling ok. Will send Cardiologist on Wednesday. GABE Flores RN

## 2024-02-16 ENCOUNTER — OFFICE VISIT (OUTPATIENT)
Dept: PRIMARY CARE CLINIC | Facility: CLINIC | Age: 89
End: 2024-02-16
Payer: MEDICARE

## 2024-02-16 VITALS
WEIGHT: 143.63 LBS | OXYGEN SATURATION: 97 % | DIASTOLIC BLOOD PRESSURE: 62 MMHG | SYSTOLIC BLOOD PRESSURE: 130 MMHG | HEIGHT: 61 IN | HEART RATE: 78 BPM | TEMPERATURE: 98 F | BODY MASS INDEX: 27.12 KG/M2

## 2024-02-16 DIAGNOSIS — I11.0 HYPERTENSIVE HEART DISEASE WITH HEART FAILURE: ICD-10-CM

## 2024-02-16 DIAGNOSIS — I10 ESSENTIAL HYPERTENSION: Primary | Chronic | ICD-10-CM

## 2024-02-16 DIAGNOSIS — D86.0 SARCOIDOSIS OF LUNG: ICD-10-CM

## 2024-02-16 DIAGNOSIS — I70.0 ATHEROSCLEROSIS OF AORTA: ICD-10-CM

## 2024-02-16 DIAGNOSIS — I50.30 DIASTOLIC HEART FAILURE, NYHA CLASS 3: ICD-10-CM

## 2024-02-16 DIAGNOSIS — F32.0 CURRENT MILD EPISODE OF MAJOR DEPRESSIVE DISORDER WITHOUT PRIOR EPISODE: ICD-10-CM

## 2024-02-16 DIAGNOSIS — I25.10 CORONARY ARTERY DISEASE, OCCLUSIVE: Chronic | ICD-10-CM

## 2024-02-16 DIAGNOSIS — N18.4 CKD (CHRONIC KIDNEY DISEASE) STAGE 4, GFR 15-29 ML/MIN: ICD-10-CM

## 2024-02-16 PROCEDURE — 99213 OFFICE O/P EST LOW 20 MIN: CPT | Mod: HCNC,S$GLB,, | Performed by: INTERNAL MEDICINE

## 2024-02-16 PROCEDURE — 1101F PT FALLS ASSESS-DOCD LE1/YR: CPT | Mod: HCNC,CPTII,S$GLB, | Performed by: INTERNAL MEDICINE

## 2024-02-16 PROCEDURE — 3288F FALL RISK ASSESSMENT DOCD: CPT | Mod: HCNC,CPTII,S$GLB, | Performed by: INTERNAL MEDICINE

## 2024-02-16 PROCEDURE — 1157F ADVNC CARE PLAN IN RCRD: CPT | Mod: HCNC,CPTII,S$GLB, | Performed by: INTERNAL MEDICINE

## 2024-02-16 PROCEDURE — 1159F MED LIST DOCD IN RCRD: CPT | Mod: HCNC,CPTII,S$GLB, | Performed by: INTERNAL MEDICINE

## 2024-02-16 PROCEDURE — 99999 PR PBB SHADOW E&M-EST. PATIENT-LVL IV: CPT | Mod: PBBFAC,HCNC,, | Performed by: INTERNAL MEDICINE

## 2024-02-16 PROCEDURE — 1126F AMNT PAIN NOTED NONE PRSNT: CPT | Mod: HCNC,CPTII,S$GLB, | Performed by: INTERNAL MEDICINE

## 2024-02-16 RX ORDER — METOLAZONE 2.5 MG/1
TABLET ORAL
Qty: 30 TABLET | Refills: 0
Start: 2024-02-16 | End: 2024-05-28

## 2024-02-20 ENCOUNTER — CLINICAL SUPPORT (OUTPATIENT)
Dept: PRIMARY CARE CLINIC | Facility: CLINIC | Age: 89
End: 2024-02-20
Payer: MEDICARE

## 2024-02-20 DIAGNOSIS — N18.4 CKD (CHRONIC KIDNEY DISEASE) STAGE 4, GFR 15-29 ML/MIN: ICD-10-CM

## 2024-02-20 LAB
ANION GAP SERPL CALC-SCNC: 12 MMOL/L (ref 8–16)
BUN SERPL-MCNC: 44 MG/DL (ref 8–23)
CALCIUM SERPL-MCNC: 9.7 MG/DL (ref 8.7–10.5)
CHLORIDE SERPL-SCNC: 112 MMOL/L (ref 95–110)
CO2 SERPL-SCNC: 13 MMOL/L (ref 23–29)
CREAT SERPL-MCNC: 2 MG/DL (ref 0.5–1.4)
EST. GFR  (NO RACE VARIABLE): 23.9 ML/MIN/1.73 M^2
GLUCOSE SERPL-MCNC: 120 MG/DL (ref 70–110)
POTASSIUM SERPL-SCNC: 3.8 MMOL/L (ref 3.5–5.1)
SODIUM SERPL-SCNC: 137 MMOL/L (ref 136–145)

## 2024-02-20 PROCEDURE — 80048 BASIC METABOLIC PNL TOTAL CA: CPT | Mod: HCNC | Performed by: INTERNAL MEDICINE

## 2024-02-21 ENCOUNTER — CLINICAL SUPPORT (OUTPATIENT)
Dept: PRIMARY CARE CLINIC | Facility: CLINIC | Age: 89
End: 2024-02-21
Payer: MEDICARE

## 2024-02-21 DIAGNOSIS — N30.00 ACUTE CYSTITIS WITHOUT HEMATURIA: ICD-10-CM

## 2024-02-21 LAB
BILIRUB UR QL STRIP: NEGATIVE
CLARITY UR REFRACT.AUTO: CLEAR
COLOR UR AUTO: YELLOW
GLUCOSE UR QL STRIP: ABNORMAL
HGB UR QL STRIP: NEGATIVE
KETONES UR QL STRIP: NEGATIVE
LEUKOCYTE ESTERASE UR QL STRIP: NEGATIVE
NITRITE UR QL STRIP: NEGATIVE
PH UR STRIP: 6 [PH] (ref 5–8)
PROT UR QL STRIP: NEGATIVE
SP GR UR STRIP: 1.01 (ref 1–1.03)
URN SPEC COLLECT METH UR: ABNORMAL

## 2024-02-21 PROCEDURE — 81003 URINALYSIS AUTO W/O SCOPE: CPT | Mod: HCNC | Performed by: NURSE PRACTITIONER

## 2024-02-22 ENCOUNTER — PATIENT MESSAGE (OUTPATIENT)
Dept: PRIMARY CARE CLINIC | Facility: CLINIC | Age: 89
End: 2024-02-22

## 2024-02-22 ENCOUNTER — OFFICE VISIT (OUTPATIENT)
Dept: PRIMARY CARE CLINIC | Facility: CLINIC | Age: 89
End: 2024-02-22
Payer: MEDICARE

## 2024-02-22 ENCOUNTER — TELEPHONE (OUTPATIENT)
Dept: PRIMARY CARE CLINIC | Facility: CLINIC | Age: 89
End: 2024-02-22
Payer: MEDICARE

## 2024-02-22 ENCOUNTER — HOSPITAL ENCOUNTER (OUTPATIENT)
Dept: RADIOLOGY | Facility: HOSPITAL | Age: 89
Discharge: HOME | End: 2024-02-22
Attending: INTERNAL MEDICINE
Payer: MEDICARE

## 2024-02-22 VITALS
SYSTOLIC BLOOD PRESSURE: 128 MMHG | HEIGHT: 61 IN | BODY MASS INDEX: 27.14 KG/M2 | WEIGHT: 143.75 LBS | DIASTOLIC BLOOD PRESSURE: 66 MMHG | HEART RATE: 78 BPM | OXYGEN SATURATION: 97 % | TEMPERATURE: 98 F

## 2024-02-22 DIAGNOSIS — N18.4 CKD (CHRONIC KIDNEY DISEASE) STAGE 4, GFR 15-29 ML/MIN: ICD-10-CM

## 2024-02-22 DIAGNOSIS — I50.30 DIASTOLIC HEART FAILURE, NYHA CLASS 3: ICD-10-CM

## 2024-02-22 DIAGNOSIS — I11.0 HYPERTENSIVE HEART DISEASE WITH HEART FAILURE: ICD-10-CM

## 2024-02-22 DIAGNOSIS — I10 ESSENTIAL HYPERTENSION: Chronic | ICD-10-CM

## 2024-02-22 DIAGNOSIS — R53.1 WEAKNESS: ICD-10-CM

## 2024-02-22 DIAGNOSIS — D86.0 SARCOIDOSIS OF LUNG: ICD-10-CM

## 2024-02-22 DIAGNOSIS — R53.1 WEAKNESS: Primary | ICD-10-CM

## 2024-02-22 DIAGNOSIS — K51.00 ULCERATIVE PANCOLITIS WITHOUT COMPLICATION: ICD-10-CM

## 2024-02-22 PROCEDURE — 1159F MED LIST DOCD IN RCRD: CPT | Mod: HCNC,CPTII,S$GLB, | Performed by: INTERNAL MEDICINE

## 2024-02-22 PROCEDURE — 99213 OFFICE O/P EST LOW 20 MIN: CPT | Mod: HCNC,S$GLB,, | Performed by: INTERNAL MEDICINE

## 2024-02-22 PROCEDURE — 1126F AMNT PAIN NOTED NONE PRSNT: CPT | Mod: HCNC,CPTII,S$GLB, | Performed by: INTERNAL MEDICINE

## 2024-02-22 PROCEDURE — 99999 PR PBB SHADOW E&M-EST. PATIENT-LVL IV: CPT | Mod: PBBFAC,HCNC,, | Performed by: INTERNAL MEDICINE

## 2024-02-22 PROCEDURE — 1157F ADVNC CARE PLAN IN RCRD: CPT | Mod: HCNC,CPTII,S$GLB, | Performed by: INTERNAL MEDICINE

## 2024-02-22 PROCEDURE — 71046 X-RAY EXAM CHEST 2 VIEWS: CPT | Mod: 26,HCNC,, | Performed by: RADIOLOGY

## 2024-02-22 PROCEDURE — 3288F FALL RISK ASSESSMENT DOCD: CPT | Mod: HCNC,CPTII,S$GLB, | Performed by: INTERNAL MEDICINE

## 2024-02-22 PROCEDURE — 71046 X-RAY EXAM CHEST 2 VIEWS: CPT | Mod: TC,HCNC,FY,PO

## 2024-02-22 PROCEDURE — 1101F PT FALLS ASSESS-DOCD LE1/YR: CPT | Mod: HCNC,CPTII,S$GLB, | Performed by: INTERNAL MEDICINE

## 2024-02-22 NOTE — PROGRESS NOTES
"Subjective:      Patient ID: Glo Dumont is a 88 y.o. female.    Chief Complaint: No chief complaint on file.      HPI  Here for follow up of medical problems.  3 days ago started feeling weak.  Urine is clear today.    Last week stopped bumex, and metolazone was decreased to twice weekly (Mon and Fri) from daily.  No f/c/n/v.  BMs normal.  No cp/sob/palp.  Weight stable.  Thinks depression is under control with citalopram.    HM: 9/23 fluvax, 2/21 covid vaccines/booster, 3/15 [id number], 2/14 [id number], 10/22 [id number], 2/15 TDaP, 10/21 BMD rep 2y, 2/11 Cscope no repeat will be done, 1/17 MMG no more, Eye doc monthly, GI Dr. Galindo, Cards Dr. Lui, Nephro Dr. Villareal.      Review of Systems   Constitutional:  Negative for chills, diaphoresis and fever.   Respiratory:  Negative for cough and shortness of breath.    Cardiovascular:  Negative for chest pain, palpitations and leg swelling.   Gastrointestinal:  Negative for blood in stool, constipation, diarrhea, nausea and vomiting.   Genitourinary:  Negative for dysuria, frequency and hematuria.   Psychiatric/Behavioral:  The patient is not nervous/anxious.          Objective:   /66 (BP Location: Left arm, Patient Position: Sitting)   Pulse 78   Temp 98.4 °F (36.9 °C) (Oral)   Ht 5' 1" (1.549 m)   Wt 65.2 kg (143 lb 11.8 oz)   SpO2 97%   BMI 27.16 kg/m²     Physical Exam  Constitutional:       Appearance: She is well-developed.   Neck:      Thyroid: No thyroid mass.      Vascular: No carotid bruit.   Cardiovascular:      Rate and Rhythm: Normal rate and regular rhythm.      Heart sounds: No murmur heard.     No friction rub. No gallop.   Pulmonary:      Effort: Pulmonary effort is normal.      Breath sounds: Normal breath sounds. No wheezing or rales.   Abdominal:      General: Bowel sounds are normal.      Palpations: Abdomen is soft. There is no mass.      Tenderness: There is no abdominal tenderness.   Musculoskeletal:      Cervical back: Neck supple. "   Lymphadenopathy:      Cervical: No cervical adenopathy.   Neurological:      Mental Status: She is alert and oriented to person, place, and time.             Assessment:       1. Weakness    2. Essential hypertension    3. Diastolic heart failure, NYHA class 3    4. Ulcerative pancolitis without complication    5. Sarcoidosis of lung    6. Hypertensive heart disease with heart failure    7. CKD (chronic kidney disease) stage 4, GFR 15-29 ml/min          Plan:     Weakness x 4days, since off bumex and reduced zarox.  Hypertensive heart disease with heart failure, Diastolic heart failure, NYHA class 3  -     B-TYPE NATRIURETIC PEPTIDE; Future; Expected date: 02/22/2024  -     X-Ray Chest PA And Lateral; Future; Expected date: 02/22/2024  -     Basic Metabolic Panel; Future; Expected date: 02/22/2024    Essential hypertension- stable, cont rx.    Ulcerative pancolitis without complication- sx under control on budes.    Sarcoidosis of lung    OK results today.  Obtain outside Card notes and last ECHO.  RTC 1wk.      CKD4, had improved off diuretic, but recheck now.

## 2024-02-23 ENCOUNTER — PATIENT MESSAGE (OUTPATIENT)
Dept: PRIMARY CARE CLINIC | Facility: CLINIC | Age: 89
End: 2024-02-23
Payer: MEDICARE

## 2024-02-23 NOTE — PROGRESS NOTES
"Subjective:      Patient ID: Glo Dumont is a 88 y.o. female.    Chief Complaint: Follow-up (1 week f/u)      HPI  Here for follow up of medical problems.  Now on Bumex/zaroxolyn twice weekly, Mon/Friday.  Feeling better in general, not feeling weak.  Breathing well.  Weight stable.  BMs normal.    HM: 9/23 fluvax, 2/21 covid vaccines/booster, 3/15 [id number], 2/14 [id number], 10/22 [id number], 2/15 TDaP, 10/21 BMD rep 2y, 2/11 Cscope no repeat will be done, 1/17 MMG no more, Eye doc monthly, GI Dr. Galindo, Cards Dr. Lui, Nephro Dr. Villareal.      Review of Systems   Constitutional:  Negative for chills, diaphoresis and fever.   Respiratory:  Negative for cough and shortness of breath.    Cardiovascular:  Negative for chest pain, palpitations and leg swelling.   Gastrointestinal:  Negative for blood in stool, constipation, diarrhea, nausea and vomiting.   Genitourinary:  Negative for dysuria, frequency and hematuria.   Psychiatric/Behavioral:  The patient is not nervous/anxious.          Objective:   /68   Pulse 76   Temp 97.8 °F (36.6 °C) (Oral)   Ht 5' 1" (1.549 m)   Wt 64.7 kg (142 lb 10.2 oz)   SpO2 98%   BMI 26.95 kg/m²     Physical Exam  Constitutional:       Appearance: She is well-developed.   Neck:      Thyroid: No thyroid mass.      Vascular: No carotid bruit.   Cardiovascular:      Rate and Rhythm: Normal rate and regular rhythm.      Heart sounds: No murmur heard.     No friction rub. No gallop.   Pulmonary:      Effort: Pulmonary effort is normal.      Breath sounds: Normal breath sounds. No wheezing or rales.   Abdominal:      General: Bowel sounds are normal.      Palpations: Abdomen is soft. There is no mass.      Tenderness: There is no abdominal tenderness.   Musculoskeletal:      Cervical back: Neck supple.   Lymphadenopathy:      Cervical: No cervical adenopathy.   Neurological:      Mental Status: She is alert and oriented to person, place, and time.             Assessment:       1. " Diastolic heart failure, NYHA class 3    2. Nocturnal leg cramps    3. Sarcoidosis of lung    4. Essential hypertension          Plan:     Diastolic heart failure, NYHA class 3- cont bumex/metolozone on Mon /Fridays.  Check BMP next Wed.  ECHO.  -     Basic Metabolic Panel; Future; Expected date: 02/29/2024  -     Echo; Future    Nocturnal leg cramps- 2 Tums at hs.    Sarcoidosis of lung- breathing well now.    Essential hypertension- monitor more freq, and send to me.    RTC 1 mo.

## 2024-02-29 ENCOUNTER — OFFICE VISIT (OUTPATIENT)
Dept: PRIMARY CARE CLINIC | Facility: CLINIC | Age: 89
End: 2024-02-29
Payer: MEDICARE

## 2024-02-29 VITALS
TEMPERATURE: 98 F | DIASTOLIC BLOOD PRESSURE: 68 MMHG | HEART RATE: 76 BPM | SYSTOLIC BLOOD PRESSURE: 138 MMHG | OXYGEN SATURATION: 98 % | WEIGHT: 142.63 LBS | HEIGHT: 61 IN | BODY MASS INDEX: 26.93 KG/M2

## 2024-02-29 DIAGNOSIS — I50.30 DIASTOLIC HEART FAILURE, NYHA CLASS 3: Primary | ICD-10-CM

## 2024-02-29 DIAGNOSIS — D86.0 SARCOIDOSIS OF LUNG: ICD-10-CM

## 2024-02-29 DIAGNOSIS — I10 ESSENTIAL HYPERTENSION: Chronic | ICD-10-CM

## 2024-02-29 DIAGNOSIS — G47.62 NOCTURNAL LEG CRAMPS: ICD-10-CM

## 2024-02-29 PROCEDURE — 1101F PT FALLS ASSESS-DOCD LE1/YR: CPT | Mod: HCNC,CPTII,S$GLB, | Performed by: INTERNAL MEDICINE

## 2024-02-29 PROCEDURE — 3288F FALL RISK ASSESSMENT DOCD: CPT | Mod: HCNC,CPTII,S$GLB, | Performed by: INTERNAL MEDICINE

## 2024-02-29 PROCEDURE — 99999 PR PBB SHADOW E&M-EST. PATIENT-LVL IV: CPT | Mod: PBBFAC,HCNC,, | Performed by: INTERNAL MEDICINE

## 2024-02-29 PROCEDURE — 1126F AMNT PAIN NOTED NONE PRSNT: CPT | Mod: HCNC,CPTII,S$GLB, | Performed by: INTERNAL MEDICINE

## 2024-02-29 PROCEDURE — 1159F MED LIST DOCD IN RCRD: CPT | Mod: HCNC,CPTII,S$GLB, | Performed by: INTERNAL MEDICINE

## 2024-02-29 PROCEDURE — 1157F ADVNC CARE PLAN IN RCRD: CPT | Mod: HCNC,CPTII,S$GLB, | Performed by: INTERNAL MEDICINE

## 2024-02-29 PROCEDURE — 99213 OFFICE O/P EST LOW 20 MIN: CPT | Mod: HCNC,S$GLB,, | Performed by: INTERNAL MEDICINE

## 2024-03-06 ENCOUNTER — PROCEDURE VISIT (OUTPATIENT)
Dept: OPHTHALMOLOGY | Facility: CLINIC | Age: 89
End: 2024-03-06
Payer: MEDICARE

## 2024-03-06 ENCOUNTER — PATIENT MESSAGE (OUTPATIENT)
Dept: PRIMARY CARE CLINIC | Facility: CLINIC | Age: 89
End: 2024-03-06
Payer: MEDICARE

## 2024-03-06 ENCOUNTER — LAB VISIT (OUTPATIENT)
Dept: LAB | Facility: HOSPITAL | Age: 89
End: 2024-03-06
Attending: INTERNAL MEDICINE
Payer: MEDICARE

## 2024-03-06 DIAGNOSIS — H34.8130 CENTRAL RETINAL VEIN OCCLUSION WITH MACULAR EDEMA OF BOTH EYES: Primary | ICD-10-CM

## 2024-03-06 DIAGNOSIS — I50.30 DIASTOLIC HEART FAILURE, NYHA CLASS 3: ICD-10-CM

## 2024-03-06 LAB
ANION GAP SERPL CALC-SCNC: 7 MMOL/L (ref 8–16)
BUN SERPL-MCNC: 50 MG/DL (ref 8–23)
CALCIUM SERPL-MCNC: 9.4 MG/DL (ref 8.7–10.5)
CHLORIDE SERPL-SCNC: 113 MMOL/L (ref 95–110)
CO2 SERPL-SCNC: 19 MMOL/L (ref 23–29)
CREAT SERPL-MCNC: 1.9 MG/DL (ref 0.5–1.4)
EST. GFR  (NO RACE VARIABLE): 25.4 ML/MIN/1.73 M^2
GLUCOSE SERPL-MCNC: 107 MG/DL (ref 70–110)
POTASSIUM SERPL-SCNC: 4.5 MMOL/L (ref 3.5–5.1)
SODIUM SERPL-SCNC: 139 MMOL/L (ref 136–145)

## 2024-03-06 PROCEDURE — 80048 BASIC METABOLIC PNL TOTAL CA: CPT | Mod: HCNC | Performed by: INTERNAL MEDICINE

## 2024-03-06 PROCEDURE — 99499 UNLISTED E&M SERVICE: CPT | Mod: HCNC,S$GLB,, | Performed by: OPHTHALMOLOGY

## 2024-03-06 PROCEDURE — 36415 COLL VENOUS BLD VENIPUNCTURE: CPT | Mod: HCNC | Performed by: INTERNAL MEDICINE

## 2024-03-06 PROCEDURE — 67028 INJECTION EYE DRUG: CPT | Mod: 50,HCNC,S$GLB, | Performed by: OPHTHALMOLOGY

## 2024-03-06 PROCEDURE — 92134 CPTRZ OPH DX IMG PST SGM RTA: CPT | Mod: HCNC,S$GLB,, | Performed by: OPHTHALMOLOGY

## 2024-03-06 NOTE — PROGRESS NOTES
===============================  Date today is 3/6/2024  Glo Dumont is a 88 y.o. female  Last visit [facility]: :12/22/2023   Last visit eye dept. 12/22/2023    Corrected distance visual acuity was 20/100 in the right eye and CF at 5' in the left eye.  Tonometry       Tonometry (Applanation, 9:25 AM)         Right Left    Pressure 16 16                  Not recorded       Not recorded       Not recorded       Chief Complaint   Patient presents with    Injections     Ozurdex OU     HPI     Injections     Additional comments: Ozurdex OU           Comments    States that her vision seems to be worse, compliant with gtts as directed.       1. Steroid responder glaucoma   2. PCIOL OU MGM   3. SARCOIDOSIS   H\O chronic UVEITIS OU   4. Central vein occlusion of retina, left   5. Retinal edema     H/o Avastin and Eylea OS   Had been getting Ozurdex OU with Dr. Shahrzad Tamez Eylea OU last 4/25/22, 8/3/22, 12/21/22  Ozurdex ou last 2/16/2022, 5/25/22, 11/8/22, 1/31/23, 8/9/23, 11/15/23      Cosopt BID OU             Last edited by Melissa Narvaez on 3/6/2024  9:19 AM.      Problem List Items Addressed This Visit          Eye/Vision problems    Central retinal vein occlusion with macular edema of both eyes - Primary    Relevant Medications    dexAMETHasone (OZURDEX) intravitreal implant (Completed) (Start on 3/6/2024 12:45 PM)    dexAMETHasone (OZURDEX) intravitreal implant (Completed) (Start on 3/6/2024 12:45 PM)    Other Relevant Orders    Posterior Segment OCT Retina-Both eyes (Completed)    Prior authorization Order     Instructed to call 24/7 for any worsening of vision, visual distortion or pain.  Check OU independently daily.    Gave my office and personal cell phone number.  ________________  3/6/2024 today  Glo Dumont    OU CRVO w/ME  OCT worse CME OU     3/6/2024  Diagnosis :  OU CRVO/ME  Today:   Ozurdex 0.7 mg , OU   Follow up: Ozurdex OU in   10-12  weeks  Instructed to call 24/7 for any  worsening of vision. Check Both eyes daily. Gave patient my home phone number.  Risks, benefits, and alternatives to treatment discussed in detail with the patient.  The patient voiced understanding and wished to proceed with the procedure    Ozurdex intravitreal implant Procedure Note:   y capsule intact?  Patient Identified and Time Out complete  Subconjunctival bleb - xylocaine with epi 2%   and Betadine.  Inject OU at 1mm parallel to limbus  Post Operative Dx: Same  Complications: None  Follow up as above.    =============================

## 2024-03-12 DIAGNOSIS — K51.00 ULCERATIVE PANCOLITIS WITHOUT COMPLICATION: ICD-10-CM

## 2024-03-12 RX ORDER — BUDESONIDE 3 MG/1
3 CAPSULE, COATED PELLETS ORAL 2 TIMES DAILY
Qty: 180 CAPSULE | Refills: 3 | OUTPATIENT
Start: 2024-03-12 | End: 2025-03-12

## 2024-03-12 RX ORDER — PANCRELIPASE 24000; 76000; 120000 [USP'U]/1; [USP'U]/1; [USP'U]/1
1 CAPSULE, DELAYED RELEASE PELLETS ORAL
Qty: 270 CAPSULE | Refills: 3 | OUTPATIENT
Start: 2024-03-12 | End: 2025-03-12

## 2024-03-13 ENCOUNTER — HOSPITAL ENCOUNTER (OUTPATIENT)
Dept: CARDIOLOGY | Facility: HOSPITAL | Age: 89
Discharge: HOME | End: 2024-03-13
Attending: INTERNAL MEDICINE
Payer: MEDICARE

## 2024-03-13 ENCOUNTER — PATIENT MESSAGE (OUTPATIENT)
Dept: OPHTHALMOLOGY | Facility: CLINIC | Age: 89
End: 2024-03-13
Payer: MEDICARE

## 2024-03-13 VITALS
SYSTOLIC BLOOD PRESSURE: 124 MMHG | WEIGHT: 142 LBS | DIASTOLIC BLOOD PRESSURE: 78 MMHG | BODY MASS INDEX: 26.81 KG/M2 | HEIGHT: 61 IN

## 2024-03-13 DIAGNOSIS — H40.1131 PRIMARY OPEN ANGLE GLAUCOMA (POAG) OF BOTH EYES, MILD STAGE: ICD-10-CM

## 2024-03-13 DIAGNOSIS — I50.30 DIASTOLIC HEART FAILURE, NYHA CLASS 3: ICD-10-CM

## 2024-03-13 LAB
AORTIC ROOT ANNULUS: 2.86 CM
AV INDEX (PROSTH): 0.7
AV MEAN GRADIENT: 4 MMHG
AV PEAK GRADIENT: 8 MMHG
AV REGURGITATION PRESSURE HALF TIME: 974.23 MS
AV VALVE AREA BY VELOCITY RATIO: 1.97 CM²
AV VALVE AREA: 2.17 CM²
AV VELOCITY RATIO: 0.64
BSA FOR ECHO PROCEDURE: 1.66 M2
CV ECHO LV RWT: 0.47 CM
DOP CALC AO PEAK VEL: 1.37 M/S
DOP CALC AO VTI: 30.2 CM
DOP CALC LVOT AREA: 3.1 CM2
DOP CALC LVOT DIAMETER: 1.99 CM
DOP CALC LVOT PEAK VEL: 0.87 M/S
DOP CALC LVOT STROKE VOLUME: 65.59 CM3
DOP CALC MV VTI: 31.8 CM
DOP CALC RVOT PEAK VEL: 0.52 M/S
DOP CALC RVOT VTI: 10.5 CM
DOP CALCLVOT PEAK VEL VTI: 21.1 CM
E WAVE DECELERATION TIME: 396.79 MSEC
E/A RATIO: 0.86
E/E' RATIO: 20.2 M/S
ECHO LV POSTERIOR WALL: 0.89 CM (ref 0.6–1.1)
FRACTIONAL SHORTENING: 29 % (ref 28–44)
INTERVENTRICULAR SEPTUM: 1.19 CM (ref 0.6–1.1)
IVRT: 114.18 MSEC
LA MAJOR: 5.56 CM
LA MINOR: 3.61 CM
LA WIDTH: 3.5 CM
LEFT ATRIUM SIZE: 3.48 CM
LEFT ATRIUM VOLUME INDEX MOD: 25.3 ML/M2
LEFT ATRIUM VOLUME INDEX: 27.8 ML/M2
LEFT ATRIUM VOLUME MOD: 41.27 CM3
LEFT ATRIUM VOLUME: 45.32 CM3
LEFT INTERNAL DIMENSION IN SYSTOLE: 2.68 CM (ref 2.1–4)
LEFT VENTRICLE DIASTOLIC VOLUME INDEX: 36.87 ML/M2
LEFT VENTRICLE DIASTOLIC VOLUME: 60.09 ML
LEFT VENTRICLE MASS INDEX: 75 G/M2
LEFT VENTRICLE SYSTOLIC VOLUME INDEX: 16.4 ML/M2
LEFT VENTRICLE SYSTOLIC VOLUME: 26.75 ML
LEFT VENTRICULAR INTERNAL DIMENSION IN DIASTOLE: 3.75 CM (ref 3.5–6)
LEFT VENTRICULAR MASS: 121.59 G
LV LATERAL E/E' RATIO: 14.43 M/S
LV SEPTAL E/E' RATIO: 33.67 M/S
LVOT MG: 1.92 MMHG
LVOT MV: 0.66 CM/S
MV MEAN GRADIENT: 2 MMHG
MV PEAK A VEL: 1.17 M/S
MV PEAK E VEL: 1.01 M/S
MV PEAK GRADIENT: 5 MMHG
MV VALVE AREA BY CONTINUITY EQUATION: 2.06 CM2
PISA AR MAX VEL: 2.66 M/S
PISA TR MAX VEL: 3.1 M/S
PULM VEIN S/D RATIO: 1.53
PV MEAN GRADIENT: 1 MMHG
PV MV: 0.53 M/S
PV PEAK D VEL: 0.34 M/S
PV PEAK GRADIENT: 1 MMHG
PV PEAK S VEL: 0.52 M/S
PV PEAK VELOCITY: 0.87 M/S
RA MAJOR: 4.45 CM
RA PRESSURE ESTIMATED: 8 MMHG
RA WIDTH: 2.8 CM
RV TB RVSP: 11 MMHG
SINUS: 2.27 CM
STJ: 2.29 CM
TDI LATERAL: 0.07 M/S
TDI SEPTAL: 0.03 M/S
TDI: 0.05 M/S
TR MAX PG: 38 MMHG
TV REST PULMONARY ARTERY PRESSURE: 46 MMHG
Z-SCORE OF LEFT VENTRICULAR DIMENSION IN END DIASTOLE: -2.04
Z-SCORE OF LEFT VENTRICULAR DIMENSION IN END SYSTOLE: -0.49

## 2024-03-13 PROCEDURE — 93306 TTE W/DOPPLER COMPLETE: CPT | Mod: HCNC

## 2024-03-13 PROCEDURE — 93306 TTE W/DOPPLER COMPLETE: CPT | Mod: 26,HCNC,, | Performed by: INTERNAL MEDICINE

## 2024-03-13 RX ORDER — DORZOLAMIDE HYDROCHLORIDE AND TIMOLOL MALEATE 20; 5 MG/ML; MG/ML
1 SOLUTION/ DROPS OPHTHALMIC 2 TIMES DAILY
Qty: 10 ML | Refills: 6 | Status: CANCELLED | OUTPATIENT
Start: 2024-03-13

## 2024-03-13 RX ORDER — DORZOLAMIDE HYDROCHLORIDE AND TIMOLOL MALEATE 20; 5 MG/ML; MG/ML
1 SOLUTION/ DROPS OPHTHALMIC 2 TIMES DAILY
Qty: 10 ML | Refills: 6 | Status: SHIPPED | OUTPATIENT
Start: 2024-03-13

## 2024-03-14 ENCOUNTER — PATIENT MESSAGE (OUTPATIENT)
Dept: PRIMARY CARE CLINIC | Facility: CLINIC | Age: 89
End: 2024-03-14
Payer: MEDICARE

## 2024-03-24 ENCOUNTER — CLINICAL SUPPORT (OUTPATIENT)
Dept: CARDIOLOGY | Facility: HOSPITAL | Age: 89
End: 2024-03-24
Payer: MEDICARE

## 2024-03-24 DIAGNOSIS — Z95.0 PRESENCE OF CARDIAC PACEMAKER: ICD-10-CM

## 2024-03-24 DIAGNOSIS — K21.00 GASTROESOPHAGEAL REFLUX DISEASE WITH ESOPHAGITIS, UNSPECIFIED WHETHER HEMORRHAGE: ICD-10-CM

## 2024-03-24 DIAGNOSIS — I50.32 CHRONIC DIASTOLIC (CONGESTIVE) HEART FAILURE: ICD-10-CM

## 2024-03-25 ENCOUNTER — CLINICAL SUPPORT (OUTPATIENT)
Dept: CARDIOLOGY | Facility: HOSPITAL | Age: 89
End: 2024-03-25
Attending: INTERNAL MEDICINE
Payer: MEDICARE

## 2024-03-25 DIAGNOSIS — Z95.0 PRESENCE OF CARDIAC PACEMAKER: ICD-10-CM

## 2024-03-25 DIAGNOSIS — I50.32 CHRONIC DIASTOLIC (CONGESTIVE) HEART FAILURE: ICD-10-CM

## 2024-03-25 PROCEDURE — 93294 REM INTERROG EVL PM/LDLS PM: CPT | Mod: HCNC,S$GLB,, | Performed by: INTERNAL MEDICINE

## 2024-03-25 PROCEDURE — 93296 REM INTERROG EVL PM/IDS: CPT | Mod: HCNC | Performed by: INTERNAL MEDICINE

## 2024-03-25 RX ORDER — PANTOPRAZOLE SODIUM 40 MG/1
40 TABLET, DELAYED RELEASE ORAL
Qty: 90 TABLET | Refills: 1 | Status: SHIPPED | OUTPATIENT
Start: 2024-03-25

## 2024-03-27 ENCOUNTER — OFFICE VISIT (OUTPATIENT)
Dept: PRIMARY CARE CLINIC | Facility: CLINIC | Age: 89
End: 2024-03-27
Payer: MEDICARE

## 2024-03-27 VITALS
OXYGEN SATURATION: 99 % | TEMPERATURE: 98 F | HEIGHT: 61 IN | DIASTOLIC BLOOD PRESSURE: 68 MMHG | SYSTOLIC BLOOD PRESSURE: 138 MMHG | BODY MASS INDEX: 26.98 KG/M2 | WEIGHT: 142.88 LBS | HEART RATE: 73 BPM

## 2024-03-27 DIAGNOSIS — I25.10 CORONARY ARTERY DISEASE, OCCLUSIVE: Chronic | ICD-10-CM

## 2024-03-27 DIAGNOSIS — K51.00 ULCERATIVE PANCOLITIS WITHOUT COMPLICATION: ICD-10-CM

## 2024-03-27 DIAGNOSIS — F32.0 CURRENT MILD EPISODE OF MAJOR DEPRESSIVE DISORDER WITHOUT PRIOR EPISODE: ICD-10-CM

## 2024-03-27 DIAGNOSIS — F41.8 SITUATIONAL ANXIETY: ICD-10-CM

## 2024-03-27 DIAGNOSIS — D86.0 SARCOIDOSIS OF LUNG: ICD-10-CM

## 2024-03-27 DIAGNOSIS — I10 ESSENTIAL HYPERTENSION: Primary | Chronic | ICD-10-CM

## 2024-03-27 DIAGNOSIS — I50.30 DIASTOLIC HEART FAILURE, NYHA CLASS 3: ICD-10-CM

## 2024-03-27 DIAGNOSIS — R41.3 MEMORY LOSS: ICD-10-CM

## 2024-03-27 LAB
BASOPHILS # BLD AUTO: 0.07 K/UL (ref 0–0.2)
BASOPHILS NFR BLD: 0.7 % (ref 0–1.9)
BILIRUB UR QL STRIP: NEGATIVE
CLARITY UR REFRACT.AUTO: CLEAR
COLOR UR AUTO: YELLOW
DIFFERENTIAL METHOD BLD: ABNORMAL
EOSINOPHIL # BLD AUTO: 0.1 K/UL (ref 0–0.5)
EOSINOPHIL NFR BLD: 0.7 % (ref 0–8)
ERYTHROCYTE [DISTWIDTH] IN BLOOD BY AUTOMATED COUNT: 18.2 % (ref 11.5–14.5)
GLUCOSE UR QL STRIP: ABNORMAL
HCT VFR BLD AUTO: 41.2 % (ref 37–48.5)
HGB BLD-MCNC: 12.9 G/DL (ref 12–16)
HGB UR QL STRIP: NEGATIVE
IMM GRANULOCYTES # BLD AUTO: 0.18 K/UL (ref 0–0.04)
IMM GRANULOCYTES NFR BLD AUTO: 1.7 % (ref 0–0.5)
KETONES UR QL STRIP: NEGATIVE
LEUKOCYTE ESTERASE UR QL STRIP: NEGATIVE
LYMPHOCYTES # BLD AUTO: 0.9 K/UL (ref 1–4.8)
LYMPHOCYTES NFR BLD: 8.9 % (ref 18–48)
MCH RBC QN AUTO: 27.7 PG (ref 27–31)
MCHC RBC AUTO-ENTMCNC: 31.3 G/DL (ref 32–36)
MCV RBC AUTO: 89 FL (ref 82–98)
MONOCYTES # BLD AUTO: 0.8 K/UL (ref 0.3–1)
MONOCYTES NFR BLD: 7.3 % (ref 4–15)
NEUTROPHILS # BLD AUTO: 8.5 K/UL (ref 1.8–7.7)
NEUTROPHILS NFR BLD: 80.7 % (ref 38–73)
NITRITE UR QL STRIP: NEGATIVE
NRBC BLD-RTO: 0 /100 WBC
PH UR STRIP: 6 [PH] (ref 5–8)
PLATELET # BLD AUTO: 269 K/UL (ref 150–450)
PMV BLD AUTO: 10.6 FL (ref 9.2–12.9)
PROT UR QL STRIP: NEGATIVE
RBC # BLD AUTO: 4.65 M/UL (ref 4–5.4)
SP GR UR STRIP: 1.01 (ref 1–1.03)
TSH SERPL DL<=0.005 MIU/L-ACNC: 0.78 UIU/ML (ref 0.4–4)
URN SPEC COLLECT METH UR: ABNORMAL
VIT B12 SERPL-MCNC: 756 PG/ML (ref 210–950)
WBC # BLD AUTO: 10.5 K/UL (ref 3.9–12.7)

## 2024-03-27 PROCEDURE — 82607 VITAMIN B-12: CPT | Mod: HCNC | Performed by: INTERNAL MEDICINE

## 2024-03-27 PROCEDURE — 1159F MED LIST DOCD IN RCRD: CPT | Mod: HCNC,CPTII,S$GLB, | Performed by: INTERNAL MEDICINE

## 2024-03-27 PROCEDURE — 99999 PR PBB SHADOW E&M-EST. PATIENT-LVL IV: CPT | Mod: PBBFAC,HCNC,, | Performed by: INTERNAL MEDICINE

## 2024-03-27 PROCEDURE — 99213 OFFICE O/P EST LOW 20 MIN: CPT | Mod: HCNC,S$GLB,, | Performed by: INTERNAL MEDICINE

## 2024-03-27 PROCEDURE — 3288F FALL RISK ASSESSMENT DOCD: CPT | Mod: HCNC,CPTII,S$GLB, | Performed by: INTERNAL MEDICINE

## 2024-03-27 PROCEDURE — 1125F AMNT PAIN NOTED PAIN PRSNT: CPT | Mod: HCNC,CPTII,S$GLB, | Performed by: INTERNAL MEDICINE

## 2024-03-27 PROCEDURE — 36415 COLL VENOUS BLD VENIPUNCTURE: CPT | Mod: HCNC | Performed by: INTERNAL MEDICINE

## 2024-03-27 PROCEDURE — 85025 COMPLETE CBC W/AUTO DIFF WBC: CPT | Mod: HCNC | Performed by: INTERNAL MEDICINE

## 2024-03-27 PROCEDURE — 81003 URINALYSIS AUTO W/O SCOPE: CPT | Mod: HCNC | Performed by: INTERNAL MEDICINE

## 2024-03-27 PROCEDURE — 1157F ADVNC CARE PLAN IN RCRD: CPT | Mod: HCNC,CPTII,S$GLB, | Performed by: INTERNAL MEDICINE

## 2024-03-27 PROCEDURE — 84443 ASSAY THYROID STIM HORMONE: CPT | Mod: HCNC | Performed by: INTERNAL MEDICINE

## 2024-03-27 PROCEDURE — 1101F PT FALLS ASSESS-DOCD LE1/YR: CPT | Mod: HCNC,CPTII,S$GLB, | Performed by: INTERNAL MEDICINE

## 2024-03-27 RX ORDER — BUSPIRONE HYDROCHLORIDE 5 MG/1
5 TABLET ORAL 2 TIMES DAILY PRN
Qty: 60 TABLET | Refills: 5 | Status: SHIPPED | OUTPATIENT
Start: 2024-03-27 | End: 2025-03-27

## 2024-03-27 NOTE — PROGRESS NOTES
"Subjective:      Patient ID: Glo Dumont is a 88 y.o. female.    Chief Complaint: Memory Loss and Flank Pain      HPI  Here for follow up of medical problems.  Breathing good, energy ok, appetite is good, weight is stable.  Stable on bumex/zarox twice weekly.  Not feeling weak.  BMs normal.  No cp/palp/lightheaded.  SOOD is at baseline.  C/o memory problem upsetting her lately, forgot where  was buried, forgot that food was in microwave.  Not getting lost.      HM: 9/23 fluvax, 2/21 covid vaccines/booster, 3/15 [id number], 2/14 [id number], 10/22 [id number], 2/15 TDaP, 10/21 BMD rep 2y, 2/11 Cscope no repeat will be done, 1/17 MMG no more, Eye doc monthly, GI Dr. Galindo, Cards Dr. Lui, Nephro Dr. Villareal.      Review of Systems   Constitutional:  Negative for chills, diaphoresis and fever.   Respiratory:  Negative for cough and shortness of breath.    Cardiovascular:  Negative for chest pain, palpitations and leg swelling.   Gastrointestinal:  Negative for blood in stool, constipation, diarrhea, nausea and vomiting.   Genitourinary:  Negative for dysuria, frequency and hematuria.   Psychiatric/Behavioral:  The patient is not nervous/anxious.          Objective:   /68 (BP Location: Left arm, Patient Position: Sitting)   Pulse 73   Temp 98.1 °F (36.7 °C) (Oral)   Ht 5' 1" (1.549 m)   Wt 64.8 kg (142 lb 13.7 oz)   SpO2 99%   BMI 26.99 kg/m²     Physical Exam  Constitutional:       Appearance: She is well-developed.   Neck:      Thyroid: No thyroid mass.      Vascular: No carotid bruit.   Cardiovascular:      Rate and Rhythm: Normal rate and regular rhythm.      Heart sounds: No murmur heard.     No friction rub. No gallop.   Pulmonary:      Effort: Pulmonary effort is normal.      Breath sounds: Normal breath sounds. No wheezing or rales.   Abdominal:      General: Bowel sounds are normal.      Palpations: Abdomen is soft. There is no mass.      Tenderness: There is no abdominal tenderness. "   Musculoskeletal:      Cervical back: Neck supple.   Lymphadenopathy:      Cervical: No cervical adenopathy.   Neurological:      Mental Status: She is alert and oriented to person, place, and time.         MMSE: 22/30, missed WORLD backwards, country/hospital, clock draw.  Got recall 3 words.      Assessment:       1. Essential hypertension    2. Current mild episode of major depressive disorder without prior episode    3. Coronary artery disease, occlusive    4. Diastolic heart failure, NYHA class 3    5. Ulcerative pancolitis without complication    6. Sarcoidosis of lung    7. Memory loss    8. Situational anxiety          Plan:     Essential hypertension- adequate control, cont meds.    Current mild episode of major depressive disorder without prior episode    Coronary artery disease, occlusive, Diastolic heart failure, NYHA class 3- cont     Ulcerative pancolitis without complication- doing well of rx now.    Sarcoidosis of lung- breathing well at this time, no rx..    Memory loss- check CBC, B12, TSH, UA.    RTC  1 mo.

## 2024-04-03 NOTE — PROGRESS NOTES
"Subjective:      Patient ID: Glo Dumont is a 88 y.o. female.    Chief Complaint: Follow-up (Hospital follow )      HPI  Here for follow up of medical problems and f/u of ER visit for noncardiac chest pain, at [facility] 3 days ago.  Abrupt onset of back, chest, left arm pain- sharp knife pains, 3 days ago.  Took NTG en route and PAIN RESOLVED.  Had IVF in ER.  Today more SOB on exertion.  Last bumex/zarox on Monday. Up 4# from one week ago.  Denies depression.    HM: 9/23 fluvax, 2/21 covid vaccines/booster, 3/15 [id number], 2/14 [id number], 10/22 [id number], 2/15 TDaP, 10/21 BMD rep 2y, 2/11 Cscope no repeat will be done, 1/17 MMG no more, Eye doc monthly, GI Dr. Galindo, Cards Dr. Lui, Nephro Dr. Villareal.      Review of Systems   Constitutional:  Negative for chills, diaphoresis and fever.   Respiratory:  Negative for cough and shortness of breath.    Cardiovascular:  Negative for chest pain, palpitations and leg swelling.   Gastrointestinal:  Negative for blood in stool, constipation, diarrhea, nausea and vomiting.   Genitourinary:  Negative for dysuria, frequency and hematuria.   Psychiatric/Behavioral:  The patient is not nervous/anxious.          Objective:   /76 (BP Location: Left arm, Patient Position: Sitting)   Pulse 75   Temp 97.4 °F (36.3 °C) (Oral)   Ht 5' 1" (1.549 m)   Wt 66.3 kg (146 lb 2.6 oz)   SpO2 98%   BMI 27.62 kg/m²     Physical Exam  Constitutional:       Appearance: She is well-developed.   Neck:      Thyroid: No thyroid mass.      Vascular: No carotid bruit.   Cardiovascular:      Rate and Rhythm: Normal rate and regular rhythm.      Heart sounds: No murmur heard.     No friction rub. No gallop.   Pulmonary:      Effort: Pulmonary effort is normal.      Breath sounds: Normal breath sounds. No wheezing or rales.   Abdominal:      General: Bowel sounds are normal.      Palpations: Abdomen is soft. There is no mass.      Tenderness: There is no abdominal tenderness.   Musculoskeletal:    "   Cervical back: Neck supple.   Lymphadenopathy:      Cervical: No cervical adenopathy.   Neurological:      Mental Status: She is alert and oriented to person, place, and time.           Assessment:       1. Chronic heart failure with preserved ejection fraction    2. Coronary artery disease, occlusive    3. S/P CABG x 3          Plan:     Chronic heart failure with preserved ejection fraction, Coronary artery disease, occlusive, S/P CABG x 3- take diuretics today instead of waiting until tomorrow.  F/w with Dr. Viera 4/10 as scheduled.  Obtain [facility] ER records, and Dr. Viera clinic notes.    RTC as sched 4/24, call prior if problems.

## 2024-04-04 ENCOUNTER — PATIENT OUTREACH (OUTPATIENT)
Dept: ADMINISTRATIVE | Facility: OTHER | Age: 89
End: 2024-04-04
Payer: MEDICARE

## 2024-04-04 ENCOUNTER — OFFICE VISIT (OUTPATIENT)
Dept: PRIMARY CARE CLINIC | Facility: CLINIC | Age: 89
End: 2024-04-04
Payer: MEDICARE

## 2024-04-04 VITALS
HEIGHT: 61 IN | HEART RATE: 75 BPM | BODY MASS INDEX: 27.6 KG/M2 | DIASTOLIC BLOOD PRESSURE: 76 MMHG | TEMPERATURE: 97 F | OXYGEN SATURATION: 98 % | SYSTOLIC BLOOD PRESSURE: 132 MMHG | WEIGHT: 146.19 LBS

## 2024-04-04 DIAGNOSIS — I50.32 CHRONIC HEART FAILURE WITH PRESERVED EJECTION FRACTION: Primary | ICD-10-CM

## 2024-04-04 DIAGNOSIS — I25.10 CORONARY ARTERY DISEASE, OCCLUSIVE: Chronic | ICD-10-CM

## 2024-04-04 DIAGNOSIS — Z95.1 S/P CABG X 3: ICD-10-CM

## 2024-04-04 PROCEDURE — 1157F ADVNC CARE PLAN IN RCRD: CPT | Mod: HCNC,CPTII,S$GLB, | Performed by: INTERNAL MEDICINE

## 2024-04-04 PROCEDURE — 1126F AMNT PAIN NOTED NONE PRSNT: CPT | Mod: HCNC,CPTII,S$GLB, | Performed by: INTERNAL MEDICINE

## 2024-04-04 PROCEDURE — 3288F FALL RISK ASSESSMENT DOCD: CPT | Mod: HCNC,CPTII,S$GLB, | Performed by: INTERNAL MEDICINE

## 2024-04-04 PROCEDURE — 99213 OFFICE O/P EST LOW 20 MIN: CPT | Mod: HCNC,S$GLB,, | Performed by: INTERNAL MEDICINE

## 2024-04-04 PROCEDURE — 1101F PT FALLS ASSESS-DOCD LE1/YR: CPT | Mod: HCNC,CPTII,S$GLB, | Performed by: INTERNAL MEDICINE

## 2024-04-04 PROCEDURE — 99999 PR PBB SHADOW E&M-EST. PATIENT-LVL III: CPT | Mod: PBBFAC,HCNC,, | Performed by: INTERNAL MEDICINE

## 2024-04-04 NOTE — PROGRESS NOTES
CHW - Initial Contact    This Community Health Worker completed the Social Determinant of Health questionnaire with patient during clinic visit today.    Pt identified barriers of most importance are none at this time.   Referrals to community agencies completed with patient consent outside of [facility] include: No outside referrals at this time.  Referrals were put through [facility] - no  Support and Services: None  Other information discussed the patient needs help with: No other information was discussed at this time.   Follow up required: No  No future outreach task assigned    SDOH was done on this patient she do not need any help at this time.

## 2024-04-05 ENCOUNTER — PATIENT MESSAGE (OUTPATIENT)
Dept: PRIMARY CARE CLINIC | Facility: CLINIC | Age: 89
End: 2024-04-05
Payer: MEDICARE

## 2024-04-08 ENCOUNTER — PATIENT MESSAGE (OUTPATIENT)
Dept: PRIMARY CARE CLINIC | Facility: CLINIC | Age: 89
End: 2024-04-08
Payer: MEDICARE

## 2024-04-09 ENCOUNTER — PATIENT MESSAGE (OUTPATIENT)
Dept: PRIMARY CARE CLINIC | Facility: CLINIC | Age: 89
End: 2024-04-09
Payer: MEDICARE

## 2024-04-10 ENCOUNTER — OFFICE VISIT (OUTPATIENT)
Dept: PRIMARY CARE CLINIC | Facility: CLINIC | Age: 89
End: 2024-04-10
Payer: MEDICARE

## 2024-04-10 VITALS
HEIGHT: 61 IN | WEIGHT: 138.75 LBS | BODY MASS INDEX: 26.2 KG/M2 | TEMPERATURE: 98 F | HEART RATE: 71 BPM | SYSTOLIC BLOOD PRESSURE: 132 MMHG | DIASTOLIC BLOOD PRESSURE: 68 MMHG | OXYGEN SATURATION: 98 %

## 2024-04-10 DIAGNOSIS — N25.81 SECONDARY HYPERPARATHYROIDISM OF RENAL ORIGIN: ICD-10-CM

## 2024-04-10 DIAGNOSIS — I49.5 SA NODE DYSFUNCTION: ICD-10-CM

## 2024-04-10 DIAGNOSIS — R53.1 WEAKNESS: Primary | ICD-10-CM

## 2024-04-10 DIAGNOSIS — I50.32 CHRONIC HEART FAILURE WITH PRESERVED EJECTION FRACTION: ICD-10-CM

## 2024-04-10 DIAGNOSIS — R53.81 MALAISE: ICD-10-CM

## 2024-04-10 DIAGNOSIS — E43 UNSPECIFIED SEVERE PROTEIN-CALORIE MALNUTRITION: ICD-10-CM

## 2024-04-10 PROCEDURE — 99999 PR PBB SHADOW E&M-EST. PATIENT-LVL IV: CPT | Mod: PBBFAC,HCNC,, | Performed by: FAMILY MEDICINE

## 2024-04-10 PROCEDURE — 1159F MED LIST DOCD IN RCRD: CPT | Mod: HCNC,CPTII,S$GLB, | Performed by: FAMILY MEDICINE

## 2024-04-10 PROCEDURE — 1160F RVW MEDS BY RX/DR IN RCRD: CPT | Mod: HCNC,CPTII,S$GLB, | Performed by: FAMILY MEDICINE

## 2024-04-10 PROCEDURE — 99214 OFFICE O/P EST MOD 30 MIN: CPT | Mod: HCNC,S$GLB,, | Performed by: FAMILY MEDICINE

## 2024-04-10 PROCEDURE — 1157F ADVNC CARE PLAN IN RCRD: CPT | Mod: HCNC,CPTII,S$GLB, | Performed by: FAMILY MEDICINE

## 2024-04-10 PROCEDURE — 1126F AMNT PAIN NOTED NONE PRSNT: CPT | Mod: HCNC,CPTII,S$GLB, | Performed by: FAMILY MEDICINE

## 2024-04-10 PROCEDURE — 3288F FALL RISK ASSESSMENT DOCD: CPT | Mod: HCNC,CPTII,S$GLB, | Performed by: FAMILY MEDICINE

## 2024-04-10 PROCEDURE — 1101F PT FALLS ASSESS-DOCD LE1/YR: CPT | Mod: HCNC,CPTII,S$GLB, | Performed by: FAMILY MEDICINE

## 2024-04-10 NOTE — PROGRESS NOTES
Subjective:       Patient ID: Glo Dumont is a 88 y.o. female.    Chief Complaint: Follow-up (Pt feeling weakness )    HPI:     Here for c/o not feeling good. No specific complaints, no pain, syncope, or presyncope. Denies abdominal pain or GI upset. Just came from cardiology visit with Dr. Viera with no changes to regimen made per her daughter who contributes to the history today.  No CP/SOB. 8# weight loss noted. Reports nl bowel and bladder fxn without dysuria, hematuria, change in odor.    HM: 9/23 fluvax, 2/21 covid vaccines/booster, 3/15 [id number], 2/14 [id number], 10/22 [id number], 2/15 TDaP, 10/21 BMD rep 2y, 2/11 Cscope no repeat will be done, 1/17 MMG no more, Eye doc monthly, GI Dr. Galindo, Cards Dr. Lui, Nephro Dr. Villareal.        Objective:     Vitals:    04/10/24 1047   BP: 132/68   Pulse: 71   Temp: 97.9 °F (36.6 °C)     Wt Readings from Last 3 Encounters:   04/10/24 62.9 kg (138 lb 12.5 oz)   04/04/24 66.3 kg (146 lb 2.6 oz)   03/27/24 64.8 kg (142 lb 13.7 oz)     Temp Readings from Last 3 Encounters:   04/10/24 97.9 °F (36.6 °C) (Oral)   04/04/24 97.4 °F (36.3 °C) (Oral)   03/27/24 98.1 °F (36.7 °C) (Oral)     BP Readings from Last 3 Encounters:   04/10/24 132/68   04/04/24 132/76   03/27/24 138/68     Pulse Readings from Last 3 Encounters:   04/10/24 71   04/04/24 75   03/27/24 73          Physical Exam  Vitals and nursing note reviewed.   Constitutional:       Appearance: She is well-developed.   HENT:      Head: Normocephalic and atraumatic.   Eyes:      Pupils: Pupils are equal, round, and reactive to light.   Cardiovascular:      Rate and Rhythm: Normal rate and regular rhythm.      Heart sounds: Normal heart sounds.   Pulmonary:      Effort: Pulmonary effort is normal. No respiratory distress.      Breath sounds: Normal breath sounds. No wheezing, rhonchi or rales.   Abdominal:      Palpations: Abdomen is soft.      Tenderness: There is no abdominal tenderness. There is no right CVA  tenderness or left CVA tenderness.   Musculoskeletal:         General: No deformity. Normal range of motion.      Cervical back: Normal range of motion and neck supple.   Skin:     General: Skin is warm and dry.   Neurological:      Mental Status: She is alert and oriented to person, place, and time.   Psychiatric:         Behavior: Behavior normal.           Albumin   Date Value Ref Range Status   08/12/2023 3.3 (L) 3.5 - 5.2 g/dL Final     eGFR   Date Value Ref Range Status   03/06/2024 25.4 (A) >60 mL/min/1.73 m^2 Final       Assessment:       1. Weakness    2. Malaise    3. Secondary hyperparathyroidism of renal origin    4. SA node dysfunction    5. Unspecified severe protein-calorie malnutrition    6. Chronic heart failure with preserved ejection fraction        Plan:           Problem List Items Addressed This Visit          Cardiac/Vascular    SA node dysfunction    Current Assessment & Plan     Followed by outside cardiology           Chronic heart failure with preserved ejection fraction    Current Assessment & Plan     Compensated; compliant with medication regimen            Endocrine    Unspecified severe protein-calorie malnutrition    Overview     Lab Results   Component Value Date    ALBUMIN 3.3 (L) 08/12/2023              Current Assessment & Plan     Encouraged improved nutrition including with supplemental drinks         Secondary hyperparathyroidism of renal origin    Current Assessment & Plan     Lab Results   Component Value Date    PTH 34.9 01/10/2022    CALCIUM 9.4 03/06/2024    PHOS 4.0 03/18/2023     BMP  Lab Results   Component Value Date     03/06/2024    K 4.5 03/06/2024     (H) 03/06/2024    CO2 19 (L) 03/06/2024    BUN 50 (H) 03/06/2024    CREATININE 1.9 (H) 03/06/2024    CALCIUM 9.4 03/06/2024    ANIONGAP 7 (L) 03/06/2024    EGFRNORACEVR 25.4 (A) 03/06/2024     Continue to monitor renal function, PTH, electrolytes          Other Visit Diagnoses       Weakness    -  Primary     Relevant Orders    COMPREHENSIVE METABOLIC PANEL    CBC Auto Differential    Urinalysis    Malaise        Relevant Orders    COMPREHENSIVE METABOLIC PANEL    CBC Auto Differential    Urinalysis          Labs and u/a today to evaluate vague symptoms; keep f/u in 2 weeks with Dr. Cardona

## 2024-04-11 DIAGNOSIS — I50.30 DIASTOLIC HEART FAILURE, NYHA CLASS 3: ICD-10-CM

## 2024-04-11 RX ORDER — FUROSEMIDE 20 MG/1
20 TABLET ORAL
Qty: 30 TABLET | Refills: 11 | Status: SHIPPED | OUTPATIENT
Start: 2024-04-11 | End: 2024-05-07

## 2024-04-15 PROBLEM — N25.81 SECONDARY HYPERPARATHYROIDISM OF RENAL ORIGIN: Status: ACTIVE | Noted: 2024-04-15

## 2024-04-15 PROBLEM — I25.118 ATHEROSCLEROTIC HEART DISEASE OF NATIVE CORONARY ARTERY WITH OTHER FORMS OF ANGINA PECTORIS: Status: ACTIVE | Noted: 2024-04-15

## 2024-04-15 NOTE — ASSESSMENT & PLAN NOTE
Lab Results   Component Value Date    PTH 34.9 01/10/2022    CALCIUM 9.4 03/06/2024    PHOS 4.0 03/18/2023     BMP  Lab Results   Component Value Date     03/06/2024    K 4.5 03/06/2024     (H) 03/06/2024    CO2 19 (L) 03/06/2024    BUN 50 (H) 03/06/2024    CREATININE 1.9 (H) 03/06/2024    CALCIUM 9.4 03/06/2024    ANIONGAP 7 (L) 03/06/2024    EGFRNORACEVR 25.4 (A) 03/06/2024     Continue to monitor renal function, PTH, electrolytes

## 2024-04-16 ENCOUNTER — TELEPHONE (OUTPATIENT)
Dept: PRIMARY CARE CLINIC | Facility: CLINIC | Age: 89
End: 2024-04-16
Payer: MEDICARE

## 2024-04-16 NOTE — TELEPHONE ENCOUNTER
Good Evening,   I have rescheduled Ms Cody labs for tomorrow 4/17/24 for 09:00 am     See you then...Thanks         [email or web address]    Message was sent to the pt thru Mychart

## 2024-04-17 ENCOUNTER — CLINICAL SUPPORT (OUTPATIENT)
Dept: PRIMARY CARE CLINIC | Facility: CLINIC | Age: 89
End: 2024-04-17
Payer: MEDICARE

## 2024-04-17 DIAGNOSIS — R53.1 WEAKNESS: ICD-10-CM

## 2024-04-17 DIAGNOSIS — R53.81 MALAISE: ICD-10-CM

## 2024-04-17 LAB
ALBUMIN SERPL BCP-MCNC: 3.7 G/DL (ref 3.5–5.2)
ALP SERPL-CCNC: 72 U/L (ref 55–135)
ALT SERPL W/O P-5'-P-CCNC: 9 U/L (ref 10–44)
ANION GAP SERPL CALC-SCNC: 11 MMOL/L (ref 8–16)
AST SERPL-CCNC: 11 U/L (ref 10–40)
BASOPHILS # BLD AUTO: 0.05 K/UL (ref 0–0.2)
BASOPHILS NFR BLD: 0.5 % (ref 0–1.9)
BILIRUB SERPL-MCNC: 0.4 MG/DL (ref 0.1–1)
BILIRUB UR QL STRIP: NEGATIVE
BUN SERPL-MCNC: 64 MG/DL (ref 8–23)
CALCIUM SERPL-MCNC: 9.4 MG/DL (ref 8.7–10.5)
CHLORIDE SERPL-SCNC: 106 MMOL/L (ref 95–110)
CLARITY UR REFRACT.AUTO: CLEAR
CO2 SERPL-SCNC: 19 MMOL/L (ref 23–29)
COLOR UR AUTO: YELLOW
CREAT SERPL-MCNC: 2.4 MG/DL (ref 0.5–1.4)
DIFFERENTIAL METHOD BLD: ABNORMAL
EOSINOPHIL # BLD AUTO: 0.1 K/UL (ref 0–0.5)
EOSINOPHIL NFR BLD: 0.5 % (ref 0–8)
ERYTHROCYTE [DISTWIDTH] IN BLOOD BY AUTOMATED COUNT: 17.2 % (ref 11.5–14.5)
EST. GFR  (NO RACE VARIABLE): 19.2 ML/MIN/1.73 M^2
GLUCOSE SERPL-MCNC: 100 MG/DL (ref 70–110)
GLUCOSE UR QL STRIP: NEGATIVE
HCT VFR BLD AUTO: 38.3 % (ref 37–48.5)
HGB BLD-MCNC: 12.4 G/DL (ref 12–16)
HGB UR QL STRIP: NEGATIVE
IMM GRANULOCYTES # BLD AUTO: 0.5 K/UL (ref 0–0.04)
IMM GRANULOCYTES NFR BLD AUTO: 4.5 % (ref 0–0.5)
KETONES UR QL STRIP: NEGATIVE
LEUKOCYTE ESTERASE UR QL STRIP: NEGATIVE
LYMPHOCYTES # BLD AUTO: 1 K/UL (ref 1–4.8)
LYMPHOCYTES NFR BLD: 9.2 % (ref 18–48)
MCH RBC QN AUTO: 28.4 PG (ref 27–31)
MCHC RBC AUTO-ENTMCNC: 32.4 G/DL (ref 32–36)
MCV RBC AUTO: 88 FL (ref 82–98)
MONOCYTES # BLD AUTO: 0.7 K/UL (ref 0.3–1)
MONOCYTES NFR BLD: 6.7 % (ref 4–15)
NEUTROPHILS # BLD AUTO: 8.7 K/UL (ref 1.8–7.7)
NEUTROPHILS NFR BLD: 78.6 % (ref 38–73)
NITRITE UR QL STRIP: NEGATIVE
NRBC BLD-RTO: 0 /100 WBC
PH UR STRIP: 5 [PH] (ref 5–8)
PLATELET # BLD AUTO: 237 K/UL (ref 150–450)
PMV BLD AUTO: 11.3 FL (ref 9.2–12.9)
POTASSIUM SERPL-SCNC: 4.8 MMOL/L (ref 3.5–5.1)
PROT SERPL-MCNC: 7.1 G/DL (ref 6–8.4)
PROT UR QL STRIP: NEGATIVE
RBC # BLD AUTO: 4.37 M/UL (ref 4–5.4)
SODIUM SERPL-SCNC: 136 MMOL/L (ref 136–145)
SP GR UR STRIP: 1.01 (ref 1–1.03)
URN SPEC COLLECT METH UR: NORMAL
WBC # BLD AUTO: 11.02 K/UL (ref 3.9–12.7)

## 2024-04-17 PROCEDURE — 85025 COMPLETE CBC W/AUTO DIFF WBC: CPT | Mod: HCNC | Performed by: FAMILY MEDICINE

## 2024-04-17 PROCEDURE — 80053 COMPREHEN METABOLIC PANEL: CPT | Mod: HCNC | Performed by: FAMILY MEDICINE

## 2024-04-17 PROCEDURE — 81003 URINALYSIS AUTO W/O SCOPE: CPT | Mod: HCNC | Performed by: FAMILY MEDICINE

## 2024-04-21 LAB
OHS CV AF BURDEN PERCENT: < 1
OHS CV DC REMOTE DEVICE TYPE: NORMAL
OHS CV RV PACING PERCENT: 7.58 %

## 2024-04-26 ENCOUNTER — PATIENT MESSAGE (OUTPATIENT)
Dept: PRIMARY CARE CLINIC | Facility: CLINIC | Age: 89
End: 2024-04-26

## 2024-04-26 ENCOUNTER — TELEPHONE (OUTPATIENT)
Dept: PRIMARY CARE CLINIC | Facility: CLINIC | Age: 89
End: 2024-04-26
Payer: MEDICARE

## 2024-04-26 NOTE — TELEPHONE ENCOUNTER
PC with daughter    Doing well now, walking at the center.  Breathing good, appetite is good, energy is better.  SM

## 2024-04-29 ENCOUNTER — TELEPHONE (OUTPATIENT)
Dept: PRIMARY CARE CLINIC | Facility: CLINIC | Age: 89
End: 2024-04-29
Payer: MEDICARE

## 2024-04-29 ENCOUNTER — PATIENT MESSAGE (OUTPATIENT)
Dept: PRIMARY CARE CLINIC | Facility: CLINIC | Age: 89
End: 2024-04-29
Payer: MEDICARE

## 2024-04-29 DIAGNOSIS — N30.00 ACUTE CYSTITIS WITHOUT HEMATURIA: Primary | ICD-10-CM

## 2024-04-29 RX ORDER — CARVEDILOL 12.5 MG/1
12.5 TABLET ORAL 2 TIMES DAILY WITH MEALS
Qty: 180 TABLET | Refills: 3 | Status: SHIPPED | OUTPATIENT
Start: 2024-04-29

## 2024-04-29 RX ORDER — AMOXICILLIN AND CLAVULANATE POTASSIUM 250; 62.5 MG/5ML; MG/5ML
250 POWDER, FOR SUSPENSION ORAL EVERY 12 HOURS
Qty: 70 ML | Refills: 0 | Status: SHIPPED | OUTPATIENT
Start: 2024-04-29 | End: 2024-05-07

## 2024-04-29 NOTE — TELEPHONE ENCOUNTER
Pt daughter notified of Dr. Stone's recommendations.  Verbalized understanding. States that she cannot get urine sample until tomorrow morning. States that the pt seems ok at the present time and she will check on her later.

## 2024-04-30 ENCOUNTER — CLINICAL SUPPORT (OUTPATIENT)
Dept: PRIMARY CARE CLINIC | Facility: CLINIC | Age: 89
End: 2024-04-30
Payer: MEDICARE

## 2024-04-30 DIAGNOSIS — N30.00 ACUTE CYSTITIS WITHOUT HEMATURIA: ICD-10-CM

## 2024-04-30 LAB
BACTERIA #/AREA URNS AUTO: ABNORMAL /HPF
BILIRUB UR QL STRIP: NEGATIVE
CLARITY UR REFRACT.AUTO: ABNORMAL
COLOR UR AUTO: YELLOW
GLUCOSE UR QL STRIP: NEGATIVE
HGB UR QL STRIP: NEGATIVE
KETONES UR QL STRIP: NEGATIVE
LEUKOCYTE ESTERASE UR QL STRIP: ABNORMAL
MICROSCOPIC COMMENT: ABNORMAL
NITRITE UR QL STRIP: NEGATIVE
PH UR STRIP: 6 [PH] (ref 5–8)
PROT UR QL STRIP: NEGATIVE
RBC #/AREA URNS AUTO: 1 /HPF (ref 0–4)
SP GR UR STRIP: 1.02 (ref 1–1.03)
SQUAMOUS #/AREA URNS AUTO: 29 /HPF
URN SPEC COLLECT METH UR: ABNORMAL
WBC #/AREA URNS AUTO: 3 /HPF (ref 0–5)

## 2024-04-30 PROCEDURE — 81001 URINALYSIS AUTO W/SCOPE: CPT | Mod: HCNC | Performed by: FAMILY MEDICINE

## 2024-05-01 ENCOUNTER — PATIENT MESSAGE (OUTPATIENT)
Dept: CARDIOLOGY | Facility: CLINIC | Age: 89
End: 2024-05-01
Payer: MEDICARE

## 2024-05-01 NOTE — TELEPHONE ENCOUNTER
PC with Jessica.    Pt given dose metolozone and 2mg bumex last night and today.  Taking KCl.Pt says feeling better this morning.  Will try to get more Card information from Dr. Mary's office.  SM   No

## 2024-05-02 DIAGNOSIS — I50.30 DIASTOLIC HEART FAILURE, NYHA CLASS 3: ICD-10-CM

## 2024-05-02 DIAGNOSIS — I50.32 CHRONIC DIASTOLIC (CONGESTIVE) HEART FAILURE: Primary | ICD-10-CM

## 2024-05-02 DIAGNOSIS — I49.5 SA NODE DYSFUNCTION: ICD-10-CM

## 2024-05-02 DIAGNOSIS — R00.1 BRADYCARDIA: ICD-10-CM

## 2024-05-03 ENCOUNTER — TELEPHONE (OUTPATIENT)
Dept: CARDIOLOGY | Facility: CLINIC | Age: 89
End: 2024-05-03
Payer: MEDICARE

## 2024-05-03 ENCOUNTER — HOSPITAL ENCOUNTER (OUTPATIENT)
Dept: CARDIOLOGY | Facility: HOSPITAL | Age: 89
Discharge: HOME | End: 2024-05-03
Attending: INTERNAL MEDICINE
Payer: MEDICARE

## 2024-05-03 ENCOUNTER — CLINICAL SUPPORT (OUTPATIENT)
Dept: CARDIOLOGY | Facility: HOSPITAL | Age: 89
End: 2024-05-03
Attending: INTERNAL MEDICINE
Payer: MEDICARE

## 2024-05-03 ENCOUNTER — OFFICE VISIT (OUTPATIENT)
Dept: CARDIOLOGY | Facility: CLINIC | Age: 89
End: 2024-05-03
Attending: INTERNAL MEDICINE
Payer: MEDICARE

## 2024-05-03 VITALS
BODY MASS INDEX: 27.19 KG/M2 | HEIGHT: 61 IN | HEART RATE: 71 BPM | SYSTOLIC BLOOD PRESSURE: 138 MMHG | DIASTOLIC BLOOD PRESSURE: 82 MMHG | OXYGEN SATURATION: 99 % | WEIGHT: 144 LBS

## 2024-05-03 DIAGNOSIS — N18.4 CKD (CHRONIC KIDNEY DISEASE) STAGE 4, GFR 15-29 ML/MIN: ICD-10-CM

## 2024-05-03 DIAGNOSIS — K51.00 ULCERATIVE PANCOLITIS WITHOUT COMPLICATION: ICD-10-CM

## 2024-05-03 DIAGNOSIS — I50.30 DIASTOLIC HEART FAILURE, NYHA CLASS 3: ICD-10-CM

## 2024-05-03 DIAGNOSIS — D86.0 SARCOIDOSIS OF LUNG: ICD-10-CM

## 2024-05-03 DIAGNOSIS — Z95.1 S/P CABG X 3: ICD-10-CM

## 2024-05-03 DIAGNOSIS — I49.5 SA NODE DYSFUNCTION: ICD-10-CM

## 2024-05-03 DIAGNOSIS — D57.3 HEMOGLOBIN A-S GENOTYPE: ICD-10-CM

## 2024-05-03 DIAGNOSIS — Z98.61 HISTORY OF CORONARY ANGIOPLASTY: ICD-10-CM

## 2024-05-03 DIAGNOSIS — I50.32 CHRONIC HEART FAILURE WITH PRESERVED EJECTION FRACTION: ICD-10-CM

## 2024-05-03 DIAGNOSIS — R00.1 BRADYCARDIA: ICD-10-CM

## 2024-05-03 DIAGNOSIS — E55.9 VITAMIN D DEFICIENCY: ICD-10-CM

## 2024-05-03 DIAGNOSIS — I35.0 AORTIC VALVE STENOSIS, ETIOLOGY OF CARDIAC VALVE DISEASE UNSPECIFIED: ICD-10-CM

## 2024-05-03 DIAGNOSIS — D50.9 IRON DEFICIENCY ANEMIA, UNSPECIFIED IRON DEFICIENCY ANEMIA TYPE: ICD-10-CM

## 2024-05-03 DIAGNOSIS — Z95.0 CARDIAC PACEMAKER IN SITU: ICD-10-CM

## 2024-05-03 DIAGNOSIS — I44.1 AV BLOCK, 2ND DEGREE: Primary | ICD-10-CM

## 2024-05-03 DIAGNOSIS — I50.32 CHRONIC DIASTOLIC (CONGESTIVE) HEART FAILURE: ICD-10-CM

## 2024-05-03 DIAGNOSIS — Z95.0 S/P PLACEMENT OF CARDIAC PACEMAKER: ICD-10-CM

## 2024-05-03 DIAGNOSIS — I25.10 CORONARY ARTERY DISEASE, OCCLUSIVE: Chronic | ICD-10-CM

## 2024-05-03 DIAGNOSIS — R91.1 LUNG NODULE: ICD-10-CM

## 2024-05-03 DIAGNOSIS — R41.3 MEMORY LOSS: ICD-10-CM

## 2024-05-03 DIAGNOSIS — I10 ESSENTIAL HYPERTENSION: Chronic | ICD-10-CM

## 2024-05-03 PROBLEM — E66.9 OBESITY WITH SERIOUS COMORBIDITY: Status: RESOLVED | Noted: 2023-03-19 | Resolved: 2024-05-03

## 2024-05-03 PROCEDURE — 1126F AMNT PAIN NOTED NONE PRSNT: CPT | Mod: HCNC,CPTII,S$GLB, | Performed by: INTERNAL MEDICINE

## 2024-05-03 PROCEDURE — 1157F ADVNC CARE PLAN IN RCRD: CPT | Mod: HCNC,CPTII,S$GLB, | Performed by: INTERNAL MEDICINE

## 2024-05-03 PROCEDURE — 93280 PM DEVICE PROGR EVAL DUAL: CPT | Mod: 26,HCNC,, | Performed by: INTERNAL MEDICINE

## 2024-05-03 PROCEDURE — 93010 ELECTROCARDIOGRAM REPORT: CPT | Mod: HCNC,,, | Performed by: INTERNAL MEDICINE

## 2024-05-03 PROCEDURE — 99214 OFFICE O/P EST MOD 30 MIN: CPT | Mod: HCNC,S$GLB,, | Performed by: INTERNAL MEDICINE

## 2024-05-03 PROCEDURE — 1159F MED LIST DOCD IN RCRD: CPT | Mod: HCNC,CPTII,S$GLB, | Performed by: INTERNAL MEDICINE

## 2024-05-03 PROCEDURE — 93005 ELECTROCARDIOGRAM TRACING: CPT | Mod: HCNC

## 2024-05-03 PROCEDURE — 99999 PR PBB SHADOW E&M-EST. PATIENT-LVL IV: CPT | Mod: PBBFAC,HCNC,, | Performed by: INTERNAL MEDICINE

## 2024-05-03 PROCEDURE — 99999 PR PBB SHADOW E&M-EST. PATIENT-LVL I: CPT | Mod: PBBFAC,HCNC,,

## 2024-05-03 PROCEDURE — 3288F FALL RISK ASSESSMENT DOCD: CPT | Mod: HCNC,CPTII,S$GLB, | Performed by: INTERNAL MEDICINE

## 2024-05-03 PROCEDURE — 1101F PT FALLS ASSESS-DOCD LE1/YR: CPT | Mod: HCNC,CPTII,S$GLB, | Performed by: INTERNAL MEDICINE

## 2024-05-03 PROCEDURE — 93280 PM DEVICE PROGR EVAL DUAL: CPT | Mod: HCNC

## 2024-05-03 PROCEDURE — 1160F RVW MEDS BY RX/DR IN RCRD: CPT | Mod: HCNC,CPTII,S$GLB, | Performed by: INTERNAL MEDICINE

## 2024-05-03 NOTE — PROGRESS NOTES
Subjective:   Patient ID:  Glo Dmuont is a 88 y.o. female     Chief complaint:  Multiple cardiac issues.    HPI  She is an 86 yo AA woman   PMHx of CKD, CAD, HTN, HLD, chronic diastolic HF, Anemia, carotid artery occlusion, H/O carotid endarterectomy, Sarcoidosis, Sarcoidosis of lung who presented to [facility] ED c/o SOB, chest pressure onset a few days ago.  Noted to have slow HRs - seemingly ASxc   On Clonidine 0.1 mg TID but seemingly dependent on the drug to avoid severe HTN.   ECGs reveal persistent 2:! AV block with a narrow QRS     Nuclear study from a year ago:  Normal myocardial perfusion scan. There is no evidence of myocardial ischemia or infarction.    The gated perfusion images showed an ejection fraction of 74% at rest. The gated perfusion images showed an ejection fraction of 82% post stress.    The EKG portion of this study is negative for ischemia.    The patient reported no chest pain during the stress test.    During stress, rare PVCs are noted.     Echo from yesterday:   The left ventricle is small with mild concentric hypertrophy and normal systolic function.  The estimated ejection fraction is 70%.  Grade II left ventricular diastolic dysfunction.  Normal right ventricular size with normal right ventricular systolic function.  There is mild aortic valve stenosis.  Aortic valve area is 1.70 cm2; peak velocity is 1.86 m/s; mean gradient is 8 mmHg.  Mild tricuspid regurgitation.  Moderate mitral regurgitation.  The mean diastolic gradient across the mitral valve is 6 mmHg at a heart rate of 42 bpm.  There is mild mitral stenosis.  Normal central venous pressure (3 mmHg).  The estimated PA systolic pressure is 35 mmHg.        Update 07/02/2023 :  Had RA-His PPM implant on 3/21/23  Has been doing well since then  PPM eval today:  Presenting egram demonstrates: AsVp   Underlying rhythm c/w: sinus @ 67, VT 190ms   RA pacing <0.1%, HIS pacing 99.8%  Battery Status/Longevity: 5.7 years, 3.11V (RRT  2.63V)  Atrial arrhythmias: none  Ventricular arrhythmias: x 1 on 6/13/13, appears to be PAT, duration 2 sec  Reprogramming at this visit: Intact conduction, pushed out AV delays 180/150 --> 230/210ms.    >>  Doing well. Pacemaker function and thresholds are excellent with selective his pacing achieved.   Update 05/03/2024 :  She continues to do well.  She has a lot more energetic since the pacemaker implant.  She has not had any syncope.  Patient's device (RA-his)was fully evaluated today under my direct supervision and real-time feedback.  Summary of findings are as listed below:  Device is in good repair.   The battery is not near JEFERSON.  The sensing and pacing thresholds are favorable with well maintained safety margins.   There were no significant tachy-dysrhythmias noted.  There were no device data indicating possible ongoing fluid retention.    Recommendation:  Device follow up as per clinic routine with remote and in house checks    I have reviewed the actual image of the ECG tracing obtained today and it shows NSR with normal intervals. HR is 71.    Current Outpatient Medications   Medication Sig Dispense Refill    aflibercept 2 mg/0.05 mL Soln 2 mg by Intravitreal route every 28 days.      amLODIPine (NORVASC) 2.5 MG tablet Take 10 mg by mouth every morning.      amoxicillin-pot clavulanate 250-62.5 mg/5ml (AUGMENTIN) 250-62.5 mg/5 mL suspension Take 5 mLs (250 mg total) by mouth every 12 (twelve) hours. for 7 days 70 mL 0    aspirin (ECOTRIN) 81 MG EC tablet Take 81 mg by mouth once daily.      atorvastatin (LIPITOR) 40 MG tablet Take 1 tablet (40 mg total) by mouth Daily. 90 tablet 3    budesonide (ENTOCORT EC) 3 mg capsule Take 1 capsule (3 mg total) by mouth 2 (two) times a day. 180 capsule 3    busPIRone (BUSPAR) 5 MG Tab Take 1 tablet (5 mg total) by mouth 2 (two) times daily as needed (anxiety). 60 tablet 5    carvediloL (COREG) 12.5 MG tablet TAKE 1 TABLET BY MOUTH TWICE DAILY WITH MEALS 180 tablet 3     citalopram (CELEXA) 10 MG tablet Take 1 tablet (10 mg total) by mouth every evening. 30 tablet 11    cyproheptadine (PERIACTIN) 4 mg tablet Take 1 tablet (4 mg total) by mouth 3 (three) times daily as needed. 270 tablet 3    dexAMETHasone (OZURDEX) 0.7 mg Impl intravitreal implant 0.7 mg by Intravitreal route once.      diclofenac sodium (VOLTAREN) 1 % Gel Apply 2 g topically 2 (two) times daily. 100 g 0    dorzolamide-timolol 2-0.5% (COSOPT) 22.3-6.8 mg/mL ophthalmic solution INSTILL 1 DROP IN BOTH EYES TWICE DAILY 10 mL 6    FOLIC ACID/MULTIVIT-MIN/LUTEIN (CENTRUM SILVER ORAL) Take 1 tablet by mouth once daily.      furosemide (LASIX) 20 MG tablet TAKE 1 TABLET(20 MG) BY MOUTH EVERY DAY 30 tablet 11    isosorbide mononitrate (IMDUR) 30 MG 24 hr tablet Take 1 tablet (30 mg total) by mouth once daily. 30 tablet 11    JARDIANCE 10 mg tablet Take 10 mg by mouth.      lipase-protease-amylase 24,000-76,000-120,000 units (CREON) 24,000-76,000 -120,000 unit capsule Take 1 capsule by mouth 3 (three) times daily with meals. 270 capsule 3    loperamide (IMODIUM) 2 mg capsule Take 1 mg by mouth every 6 (six) hours as needed for Diarrhea.      metOLazone (ZAROXOLYN) 2.5 MG tablet Take on Mon and Friday. 30 tablet 0    nitroGLYCERIN (NITROSTAT) 0.4 MG SL tablet PLACE 1 TABLET UNDER THE TONGUE EVERY 5 MINUTES AS NEEDED FOR CHEST PAIN 25 tablet 3    ondansetron (ZOFRAN-ODT) 4 MG TbDL DISSOLVE 1 TABLET(4 MG) ON THE TONGUE EVERY 8 HOURS AS NEEDED FOR NAUSEA OR VOMITING 12 tablet 0    pantoprazole (PROTONIX) 40 MG tablet TAKE 1 TABLET(40 MG) BY MOUTH EVERY DAY 90 tablet 1    potassium chloride (KLOR-CON) 10 MEQ TbSR Take 1 tablet (10 mEq total) by mouth once daily. 30 tablet 3     No current facility-administered medications for this visit.     Review of Systems     Constitutional: Reviewed  for decreased appetite, weight gain and weight loss.   HENT: Reviewed for nosebleeds.    Eyes:  Reviewed for blurred vision and visual  "disturbance.   Cardiovascular: Reviewed for chest pain, claudication, cyanosis,dyspnea on exertion, leg swelling, orthopnea,paroxysmal nocturnal dyspnearregular heartbeats, palpitations, near-syncope, and syncope.   Respiratory: Reviewed for cough, shortness of breath, wheezing, sleep disturbances due to breathing and snoring, .    Endocrine: Reviewed for heat intolerance.   Hematologic/Lymphatic: Reviewed for easy bruisability/bleeding.   Skin: Reviewed for rash.   Musculoskeletal: Reviewed for muscle weakness and myalgias.   Gastrointestinal: Reviewed for abdominal pain, anorexia, melena, nausea and vomiting.   Genitourinary: Reviewed for menorrhagia, frequency, nocturia and incontinence.   Neurological: Reviewed for excessive daytime sleepiness, dizziness, vertigo, weakness, headaches, loss of balance and seizures,   Psychiatric/Behavioral:  Reviewed for insomnia, altered mental status, depression, anxiety and nervousness.       All symptoms reviewed above were negative except for headaches.       Social History     Tobacco Use   Smoking Status Never   Smokeless Tobacco Never        Objective:     Physical Exam  /82   Pulse 71   Ht 5' 1" (1.549 m)   Wt 65.3 kg (144 lb)   SpO2 99%   BMI 27.21 kg/m²       Results for orders placed during the hospital encounter of 03/13/24    Echo    Interpretation Summary    Left Ventricle: The left ventricle is normal in size. Mildly increased wall thickness. There is concentric remodeling. There is normal systolic function with a visually estimated ejection fraction of 60 - 65%. Grade I diastolic dysfunction.    Right Ventricle: Normal right ventricular cavity size. Wall thickness is normal. Right ventricle wall motion  is normal. Systolic function is normal.    Left Atrium: Left atrium is mildly dilated.    Aortic Valve: There is mild annular calcification present. There is mild stenosis. Aortic valve area by VTI is 2.17 cm². Aortic valve peak velocity is 1.37 m/s. " Mean gradient is 4 mmHg. The dimensionless index is 0.70. There is mild aortic regurgitation.    Mitral Valve: There is moderate posterior mitral annular calcification present. There is moderate regurgitation.    Tricuspid Valve: There is moderate regurgitation.    Pulmonary Artery: There is pulmonary hypertension. The estimated pulmonary artery systolic pressure is 46 mmHg.    IVC/SVC: Intermediate venous pressure at 8 mmHg.    WBC   Date Value Ref Range Status   04/17/2024 11.02 3.90 - 12.70 K/uL Final     POC Hematocrit   Date Value Ref Range Status   03/02/2022 <15 (L) 36 - 54 %PCV Final     Hematocrit   Date Value Ref Range Status   04/17/2024 38.3 37.0 - 48.5 % Final     Hemoglobin   Date Value Ref Range Status   04/17/2024 12.4 12.0 - 16.0 g/dL Final     Lab Results   Component Value Date     04/17/2024     Lab Results   Component Value Date    CREATININE 2.4 (H) 04/17/2024    EGFRNORACEVR 19.2 (A) 04/17/2024    K 4.8 04/17/2024     Lab Results   Component Value Date     (H) 02/22/2024            reports no history of alcohol use.  Past Medical History:   Diagnosis Date    Anemia     Angina pectoris     Anxiety     Anxiety and depression     Arthritis     hip    Carotid artery occlusion     Carpal tunnel syndrome 06/23/2008    emg    Chronic diarrhea     work up in 2011 with EGD, CS and VCE    CKD (chronic kidney disease) stage 3, GFR 30-59 ml/min 5/11/2017    Colitis     Coronary artery disease     Coronary artery disease     Diastolic dysfunction     Diverticulosis     Glaucoma     Greater trochanteric bursitis 2/10/2015    Grief at loss of child 1/26/2016    H/O carotid endarterectomy 12/2/2013    Heart failure     History of coronary angioplasty 3/11/2014    Hypercholesteremia     Hypertension     Liver cyst 02/08/2013    ct abd    Macular degeneration     Obesity with serious comorbidity 3/19/2023    Primary open-angle glaucoma(365.11) 9/3/2013    Renal cyst 02/08/2013    ct abd    S/P  prosthetic total arthroplasty of the hip 11/3/2014    Sarcoidosis     Sarcoidosis of lung     Sickle cell trait     Uveitis      Past Surgical History:   Procedure Laterality Date    A-V CARDIAC PACEMAKER INSERTION Left 3/21/2023    Procedure: INSERTION, CARDIAC PACEMAKER, DUAL CHAMBER/His Lead;  Surgeon: Cortez Ambrose MD;  Location: [facility] CATH LAB;  Service: Cardiology;  Laterality: Left;  MDT/ MD to confirm in am/possible nurse sedate    CAROTID ENDARTERECTOMY Right 2000s    CATARACT EXTRACTION Bilateral     Dr. Liang Dennis    CHOLECYSTECTOMY      laparoscopic, 3/18.    CORONARY ANGIOPLASTY WITH STENT PLACEMENT  11/19/2010    RCA-HALIE 2010    Lathia    JOINT REPLACEMENT Left 11/03/2014    Dr. Braun    TOTAL ABDOMINAL HYSTERECTOMY W/ BILATERAL SALPINGOOPHORECTOMY  1972     Family History   Problem Relation Name Age of Onset    Hypertension Mother      Heart failure Mother      Cancer Father          prostate    Cirrhosis Brother      Heart failure Brother      Cancer Daughter          Carcinoid       Assessment:   Doing well. Pacemaker function and thresholds are excellent with selective his pacing achieved.   1. AV block, 2nd degree    2. Aortic valve stenosis, etiology of cardiac valve disease unspecified    3. Bradycardia    4. Chronic heart failure with preserved ejection fraction    5. CKD (chronic kidney disease) stage 4, GFR 15-29 ml/min    6. Coronary artery disease, occlusive    7. Diastolic heart failure, NYHA class 3    8. Essential hypertension    9. Iron deficiency anemia, unspecified iron deficiency anemia type    10. Lung nodule    11. Memory loss    12. S/P CABG x 3    13. History of coronary angioplasty    14. Hemoglobin A-S genotype    15. S/P placement of cardiac pacemaker    16. SA node dysfunction    17. Sarcoidosis of lung    18. Vitamin D deficiency    19. Ulcerative pancolitis without complication        Plan:      I discussed routine device follow up including quarterly to bi-annual  device checks for device function as well as yearly follow up in the EP clinic. The patient  was advised to call with any concerns regarding their device. Device clinic follow up as scheduled. RTC 1y        No orders of the defined types were placed in this encounter.      Follow up in about 1 year (around 5/3/2025), or if symptoms worsen or fail to improve.    There are no discontinued medications.         Medication List with Changes/Refills   Current Medications    AFLIBERCEPT 2 MG/0.05 ML SOLN    2 mg by Intravitreal route every 28 days.    AMLODIPINE (NORVASC) 2.5 MG TABLET    Take 10 mg by mouth every morning.    AMOXICILLIN-POT CLAVULANATE 250-62.5 MG/5ML (AUGMENTIN) 250-62.5 MG/5 ML SUSPENSION    Take 5 mLs (250 mg total) by mouth every 12 (twelve) hours. for 7 days    ASPIRIN (ECOTRIN) 81 MG EC TABLET    Take 81 mg by mouth once daily.    ATORVASTATIN (LIPITOR) 40 MG TABLET    Take 1 tablet (40 mg total) by mouth Daily.    BUDESONIDE (ENTOCORT EC) 3 MG CAPSULE    Take 1 capsule (3 mg total) by mouth 2 (two) times a day.    BUSPIRONE (BUSPAR) 5 MG TAB    Take 1 tablet (5 mg total) by mouth 2 (two) times daily as needed (anxiety).    CARVEDILOL (COREG) 12.5 MG TABLET    TAKE 1 TABLET BY MOUTH TWICE DAILY WITH MEALS    CITALOPRAM (CELEXA) 10 MG TABLET    Take 1 tablet (10 mg total) by mouth every evening.    CYPROHEPTADINE (PERIACTIN) 4 MG TABLET    Take 1 tablet (4 mg total) by mouth 3 (three) times daily as needed.    DEXAMETHASONE (OZURDEX) 0.7 MG IMPL INTRAVITREAL IMPLANT    0.7 mg by Intravitreal route once.    DICLOFENAC SODIUM (VOLTAREN) 1 % GEL    Apply 2 g topically 2 (two) times daily.    DORZOLAMIDE-TIMOLOL 2-0.5% (COSOPT) 22.3-6.8 MG/ML OPHTHALMIC SOLUTION    INSTILL 1 DROP IN BOTH EYES TWICE DAILY    FOLIC ACID/MULTIVIT-MIN/LUTEIN (CENTRUM SILVER ORAL)    Take 1 tablet by mouth once daily.    FUROSEMIDE (LASIX) 20 MG TABLET    TAKE 1 TABLET(20 MG) BY MOUTH EVERY DAY    ISOSORBIDE MONONITRATE  (IMDUR) 30 MG 24 HR TABLET    Take 1 tablet (30 mg total) by mouth once daily.    JARDIANCE 10 MG TABLET    Take 10 mg by mouth.    LIPASE-PROTEASE-AMYLASE 24,000-76,000-120,000 UNITS (CREON) 24,000-76,000 -120,000 UNIT CAPSULE    Take 1 capsule by mouth 3 (three) times daily with meals.    LOPERAMIDE (IMODIUM) 2 MG CAPSULE    Take 1 mg by mouth every 6 (six) hours as needed for Diarrhea.    METOLAZONE (ZAROXOLYN) 2.5 MG TABLET    Take on Mon and Friday.    NITROGLYCERIN (NITROSTAT) 0.4 MG SL TABLET    PLACE 1 TABLET UNDER THE TONGUE EVERY 5 MINUTES AS NEEDED FOR CHEST PAIN    ONDANSETRON (ZOFRAN-ODT) 4 MG TBDL    DISSOLVE 1 TABLET(4 MG) ON THE TONGUE EVERY 8 HOURS AS NEEDED FOR NAUSEA OR VOMITING    PANTOPRAZOLE (PROTONIX) 40 MG TABLET    TAKE 1 TABLET(40 MG) BY MOUTH EVERY DAY    POTASSIUM CHLORIDE (KLOR-CON) 10 MEQ TBSR    Take 1 tablet (10 mEq total) by mouth once daily.        This note is at least partially dictated using the M*Modal Fluency Direct word recognition program. There are word recognition mistakes that are occasionally missed on review.     Pre-visit chart review: 12 min    Face to face time: 10 min, > 50% of which spent in education    I have reviewed the actual images of all relevant ECG, rhythm, holter and long term monitoring tracings available in EPIC, MUSE  and in legacy as well as outside records.   I have reviewed the actual images of all relevant CXRays.   I have directly evaluated all relevant data pertaining to any CIED.     Post visit chart documentation and care coordination: 11 min

## 2024-05-03 NOTE — TELEPHONE ENCOUNTER
Wrong number when called    ----- Message from Max Armenta sent at 5/3/2024  7:51 AM CDT -----  Contact: [phone]  Patient is calling in regards to seeing if she can get a sooner time for the device check scheduled on today. Please call pt back at [phone]. Thanks KB

## 2024-05-04 LAB
OHS QRS DURATION: 76 MS
OHS QTC CALCULATION: 417 MS

## 2024-05-06 NOTE — PROGRESS NOTES
Glo Dumont  05/07/2024  [id number]    Christina Cardona MD  Patient Care Team:  Christina Cardona MD as PCP - General (Internal Medicine)  Gordo Muir MD as Consulting Physician (Pulmonary Disease)  Nik Bergman MD (Inactive) as Consulting Physician (Cardiology)  Franky Bell Jr., MD (Rheumatology)  Julio Galindo MD as Consulting Physician (Gastroenterology)  Glenis Mcfadden MD as Consulting Physician (Otolaryngology)  Ayaz Estrada MD as Consulting Physician (Hematology and Oncology)  Yomi Guy LPN as Care Coordinator  Bridgett Vargas PharmD as Hypertension Digital Medicine Clinician (Pharmacist)  Christina aCrdona MD as Hypertension Digital Medicine Responsible Provider (Internal Medicine)  Felipe Herron as Digital Medicine Health   Medicare, Sharno Johnson Managed as Hypertension Digital Medicine Contract        [facility]      Chief Complaint:  Chief Complaint   Patient presents with    Follow-up     1 month follow up         Assessment/Plan:  1. Hypertensive heart disease with heart failure  Assessment & Plan:  Compensated  F/U with Dr. Viera      2. Essential hypertension  Assessment & Plan:  Controlled  Continue Imdur, amlodipine, carvedilol      3. S/P CABG x 3  Assessment & Plan:  Denies recent chest pain      4. Acute cystitis without hematuria  Assessment & Plan:  Completed Augmentin - no further symptoms      5. S/P placement of cardiac pacemaker  Assessment & Plan:  NSR per Dr. Ambrose            Worry Score: 3  History of Present Illness:    PMHx of CKD, CAD, HTN, HLD, chronic diastolic HF, Anemia, carotid artery occlusion, H/O carotid endarterectomy, Sarcoidosis of lung     Ms. Dumont returns, accompanied by daughter, for follow up of recent report of brief confusion. She was prescribed Augmentin and states she is feeling better. No specific complaints today  Appetite okay - weight stable  Denies recent falls  Feeling well today  Frontal  headache but not hurting last 2 days  Mild cough  She reports edema to left leg > right leg. She has taken an extra diuretic yesterday.   Some anxiety a few days ago.   Saw Cardiology, Dr. Viera, pt was stable - Continue the bumetanide Mondays and Fridays, the metolazone on Mondays only, and the empagliflozin and return in 4 weeks at the request of her daughter.  Also, saw Dr. Ambrose for pacemaker check - I have reviewed the actual image of the ECG tracing obtained today and it shows NSR with normal intervals. HR is 71.     Results for orders placed during the hospital encounter of 03/13/24    Echo    Interpretation Summary    Left Ventricle: The left ventricle is normal in size. Mildly increased wall thickness. There is concentric remodeling. There is normal systolic function with a visually estimated ejection fraction of 60 - 65%. Grade I diastolic dysfunction.    Right Ventricle: Normal right ventricular cavity size. Wall thickness is normal. Right ventricle wall motion  is normal. Systolic function is normal.    Left Atrium: Left atrium is mildly dilated.    Aortic Valve: There is mild annular calcification present. There is mild stenosis. Aortic valve area by VTI is 2.17 cm². Aortic valve peak velocity is 1.37 m/s. Mean gradient is 4 mmHg. The dimensionless index is 0.70. There is mild aortic regurgitation.    Mitral Valve: There is moderate posterior mitral annular calcification present. There is moderate regurgitation.    Tricuspid Valve: There is moderate regurgitation.    Pulmonary Artery: There is pulmonary hypertension. The estimated pulmonary artery systolic pressure is 46 mmHg.    IVC/SVC: Intermediate venous pressure at 8 mmHg.               The following were reviewed: Active problem list, medication list, allergies, family history, social history, and Health Maintenance.     History:  Past Medical History:   Diagnosis Date    Anemia     Angina pectoris     Anxiety     Anxiety and depression      Arthritis     hip    Carotid artery occlusion     Carpal tunnel syndrome 06/23/2008    emg    Chronic diarrhea     work up in 2011 with EGD, CS and VCE    CKD (chronic kidney disease) stage 3, GFR 30-59 ml/min 5/11/2017    Colitis     Coronary artery disease     Coronary artery disease     Diastolic dysfunction     Diverticulosis     Glaucoma     Greater trochanteric bursitis 2/10/2015    Grief at loss of child 1/26/2016    H/O carotid endarterectomy 12/2/2013    Heart failure     History of coronary angioplasty 3/11/2014    Hypercholesteremia     Hypertension     Liver cyst 02/08/2013    ct abd    Macular degeneration     Obesity with serious comorbidity 3/19/2023    Primary open-angle glaucoma(365.11) 9/3/2013    Renal cyst 02/08/2013    ct abd    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    Sarcoidosis     Sarcoidosis of lung     Sickle cell trait     Uveitis      Past Surgical History:   Procedure Laterality Date    A-V CARDIAC PACEMAKER INSERTION Left 3/21/2023    Procedure: INSERTION, CARDIAC PACEMAKER, DUAL CHAMBER/His Lead;  Surgeon: Cortez Ambrose MD;  Location: [facility] CATH LAB;  Service: Cardiology;  Laterality: Left;  MDT/ MD to confirm in am/possible nurse sedate    CAROTID ENDARTERECTOMY Right 2000s    CATARACT EXTRACTION Bilateral     Dr. Liang Dennis    CHOLECYSTECTOMY      laparoscopic, 3/18.    CORONARY ANGIOPLASTY WITH STENT PLACEMENT  11/19/2010    RCA-HALIE 2010    Lathia    JOINT REPLACEMENT Left 11/03/2014    Dr. Braun    TOTAL ABDOMINAL HYSTERECTOMY W/ BILATERAL SALPINGOOPHORECTOMY  1972     Family History   Problem Relation Name Age of Onset    Hypertension Mother      Heart failure Mother      Cancer Father          prostate    Cirrhosis Brother      Heart failure Brother      Cancer Daughter          Carcinoid     Patient Active Problem List   Diagnosis    Sarcoidosis of lung    Thyroid nodule    Hypertensive heart disease with heart failure    Other hyperlipidemia     Hemoglobin A-S genotype    Ptosis    Coronary artery disease, occlusive    History of central retinal artery occlusion    Iron deficiency anemia    Vitamin D deficiency    Osteopenia    Essential hypertension    Diastolic heart failure, NYHA class 3    Atherosclerosis of aorta    CKD (chronic kidney disease) stage 4, GFR 15-29 ml/min    Uveitis    Ulcerative colitis    History of coronary angioplasty    Hiatal hernia    Bradycardia    Lung nodule    Memory loss    Central retinal vein occlusion with macular edema of both eyes    Aortic stenosis    Weight loss    Unspecified severe protein-calorie malnutrition    Hypokalemia    Hypomagnesemia    SA node dysfunction    S/P placement of cardiac pacemaker    Current mild episode of major depressive disorder without prior episode    AV block, 2nd degree    Gastroesophageal reflux disease with esophagitis    S/P CABG x 3    Thrombocytosis, unspecified    Dehydration    Acute low back pain    Chronic heart failure with preserved ejection fraction    Secondary hyperparathyroidism of renal origin    Acute cystitis     Review of patient's allergies indicates:   Allergen Reactions    Lisinopril      angioedema    Codeine Nausea And Vomiting       Medications:  Current Outpatient Medications on File Prior to Visit   Medication Sig Dispense Refill    aflibercept 2 mg/0.05 mL Soln 2 mg by Intravitreal route every 28 days.      amLODIPine (NORVASC) 2.5 MG tablet Take 10 mg by mouth every morning.      aspirin (ECOTRIN) 81 MG EC tablet Take 81 mg by mouth once daily.      atorvastatin (LIPITOR) 40 MG tablet Take 1 tablet (40 mg total) by mouth Daily. 90 tablet 3    budesonide (ENTOCORT EC) 3 mg capsule Take 1 capsule (3 mg total) by mouth 2 (two) times a day. 180 capsule 3    busPIRone (BUSPAR) 5 MG Tab Take 1 tablet (5 mg total) by mouth 2 (two) times daily as needed (anxiety). 60 tablet 5    carvediloL (COREG) 12.5 MG tablet TAKE 1 TABLET BY MOUTH TWICE DAILY WITH MEALS 180  tablet 3    citalopram (CELEXA) 10 MG tablet Take 1 tablet (10 mg total) by mouth every evening. 30 tablet 11    cyproheptadine (PERIACTIN) 4 mg tablet Take 1 tablet (4 mg total) by mouth 3 (three) times daily as needed. 270 tablet 3    dexAMETHasone (OZURDEX) 0.7 mg Impl intravitreal implant 0.7 mg by Intravitreal route once.      diclofenac sodium (VOLTAREN) 1 % Gel Apply 2 g topically 2 (two) times daily. 100 g 0    dorzolamide-timolol 2-0.5% (COSOPT) 22.3-6.8 mg/mL ophthalmic solution INSTILL 1 DROP IN BOTH EYES TWICE DAILY 10 mL 6    FOLIC ACID/MULTIVIT-MIN/LUTEIN (CENTRUM SILVER ORAL) Take 1 tablet by mouth once daily.      isosorbide mononitrate (IMDUR) 30 MG 24 hr tablet Take 1 tablet (30 mg total) by mouth once daily. 30 tablet 11    lipase-protease-amylase 24,000-76,000-120,000 units (CREON) 24,000-76,000 -120,000 unit capsule Take 1 capsule by mouth 3 (three) times daily with meals. 270 capsule 3    loperamide (IMODIUM) 2 mg capsule Take 1 mg by mouth every 6 (six) hours as needed for Diarrhea.      metOLazone (ZAROXOLYN) 2.5 MG tablet Take on Mon and Friday. 30 tablet 0    nitroGLYCERIN (NITROSTAT) 0.4 MG SL tablet PLACE 1 TABLET UNDER THE TONGUE EVERY 5 MINUTES AS NEEDED FOR CHEST PAIN 25 tablet 3    ondansetron (ZOFRAN-ODT) 4 MG TbDL DISSOLVE 1 TABLET(4 MG) ON THE TONGUE EVERY 8 HOURS AS NEEDED FOR NAUSEA OR VOMITING 12 tablet 0    pantoprazole (PROTONIX) 40 MG tablet TAKE 1 TABLET(40 MG) BY MOUTH EVERY DAY 90 tablet 1    potassium chloride (KLOR-CON) 10 MEQ TbSR Take 1 tablet (10 mEq total) by mouth once daily. 30 tablet 3    [DISCONTINUED] furosemide (LASIX) 20 MG tablet TAKE 1 TABLET(20 MG) BY MOUTH EVERY DAY 30 tablet 11    [DISCONTINUED] amoxicillin-pot clavulanate 250-62.5 mg/5ml (AUGMENTIN) 250-62.5 mg/5 mL suspension Take 5 mLs (250 mg total) by mouth every 12 (twelve) hours. for 7 days 70 mL 0     No current facility-administered medications on file prior to visit.       Medications have been  reviewed and reconciled with patient at visit today.      Exam:  Vitals:    05/07/24 1353   BP: 130/78   Pulse: 74   Temp: 97.3 °F (36.3 °C)     Weight: 67.3 kg (148 lb 5.9 oz)   Body mass index is 28.03 kg/m².      BP Readings from Last 3 Encounters:   05/07/24 130/78   05/03/24 138/82   04/10/24 132/68     Wt Readings from Last 3 Encounters:   05/07/24 1353 67.3 kg (148 lb 5.9 oz)   05/03/24 1027 65.3 kg (144 lb)   04/10/24 1047 62.9 kg (138 lb 12.5 oz)        REVIEW OF SYSTEMS  Review of Systems   Constitutional:  Negative for chills, fatigue and fever.   HENT:  Negative for congestion and rhinorrhea.    Respiratory:  Positive for cough. Negative for shortness of breath.    Cardiovascular:  Positive for leg swelling. Negative for chest pain and palpitations.   Gastrointestinal:  Negative for abdominal pain, constipation, diarrhea, nausea and vomiting.   Genitourinary:  Negative for dysuria and frequency.   Musculoskeletal:  Negative for arthralgias and myalgias.   Neurological:  Positive for weakness and headaches. Negative for dizziness and light-headedness.   Psychiatric/Behavioral:  Negative for dysphoric mood and sleep disturbance. The patient is nervous/anxious.         Physical Exam  Vitals reviewed.   Constitutional:       General: She is not in acute distress.     Appearance: Normal appearance.   HENT:      Head: Normocephalic and atraumatic.      Nose: Nose normal.      Mouth/Throat:      Mouth: Mucous membranes are moist.   Eyes:      General: No scleral icterus.     Conjunctiva/sclera: Conjunctivae normal.   Cardiovascular:      Rate and Rhythm: Normal rate and regular rhythm.      Heart sounds: Murmur heard.   Pulmonary:      Effort: Pulmonary effort is normal. No respiratory distress.      Breath sounds: Normal breath sounds.   Abdominal:      Palpations: Abdomen is soft. There is no mass.      Tenderness: There is no abdominal tenderness.   Musculoskeletal:      Cervical back: Normal range of motion  and neck supple.      Right lower leg: No edema.      Left lower le+ Edema present.   Lymphadenopathy:      Cervical: No cervical adenopathy.   Skin:     General: Skin is warm and dry.   Neurological:      Mental Status: She is alert and oriented to person, place, and time. Mental status is at baseline.   Psychiatric:         Mood and Affect: Mood normal.         Thought Content: Thought content normal.          Laboratory Reviewed:     Lab Results   Component Value Date    WBC 11.02 2024    HGB 12.4 2024    HCT 38.3 2024     2024    CHOL 160 2022    TRIG 80 2022    HDL 81 (H) 2022    ALT 9 (L) 2024    AST 11 2024     2024    K 4.8 2024     2024    CREATININE 2.4 (H) 2024    BUN 64 (H) 2024    CO2 19 (L) 2024    TSH 0.776 2024    INR 1.0 2020    HGBA1C 5.7 2015       Screening or Prevention Patient's value Goal Complete/Controlled?   HgA1C Testing and Control   Lab Results   Component Value Date    HGBA1C 5.7 2015      Annually/Less than 8% No   Lipid profile : 2022 Annually No   LDL control Lab Results   Component Value Date    LDLCALC 63.0 2022    Annually/Less than 100 mg/dl  No   Nephropathy screening Lab Results   Component Value Date    LABMICR 38.0 2014     Lab Results   Component Value Date    PROTEINUA Negative 2024    Annually Yes   Blood pressure BP Readings from Last 1 Encounters:   24 130/78    Less than 140/90 Yes   Dilated retinal exam Most Recent Eye Exam Date: Not Found Annually Yes   Foot exam   Most Recent Foot Exam Date: Not Found Annually Yes       Health Maintenance  Health Maintenance Topics with due status: Not Due       Topic Last Completion Date    TETANUS VACCINE 2015    Aspirin/Antiplatelet Therapy 2024     Health Maintenance Due   Topic Date Due    Shingles Vaccine (1 of 2) Never done    RSV Vaccine (Age 60+ and  Pregnant patients) (1 - 1-dose 60+ series) Never done    Lipid Panel  05/04/2023    COVID-19 Vaccine (4 - 2023-24 season) 09/01/2023               -Patient's lab results were reviewed and discussed with patient  -Treatment options and alternatives were discussed with the patient. Patient expressed understanding. Patient was given the opportunity to ask questions and be an active participant in their medical care. Patient had no further questions or concerns at this time.         Future Appointments   Date Time Provider Department Center   5/28/2024  9:00 AM Paula Deal NP [facility]   7/31/2024 11:00 AM Paula Deal NP [facility]   11/8/2024  8:30 AM PACEMAKER CLINIC [facility]          After visit summary printed and given to patient upon discharge.  Patient goals and care plan are included in After visit summary.      The following issues were discussed: The primary encounter diagnosis was Hypertensive heart disease with heart failure. Diagnoses of Essential hypertension, S/P CABG x 3, Acute cystitis without hematuria, and S/P placement of cardiac pacemaker were also pertinent to this visit.    Health maintenance needs, recent test results and goals of care discussed with pt and questions answered.           ARLENE Mcarthur, NP-C [facility] [address] [place] [id number]

## 2024-05-07 ENCOUNTER — OFFICE VISIT (OUTPATIENT)
Dept: PRIMARY CARE CLINIC | Facility: CLINIC | Age: 89
End: 2024-05-07
Payer: MEDICARE

## 2024-05-07 VITALS
HEIGHT: 61 IN | TEMPERATURE: 97 F | DIASTOLIC BLOOD PRESSURE: 78 MMHG | WEIGHT: 148.38 LBS | OXYGEN SATURATION: 96 % | SYSTOLIC BLOOD PRESSURE: 130 MMHG | HEART RATE: 74 BPM | BODY MASS INDEX: 28.01 KG/M2

## 2024-05-07 DIAGNOSIS — N30.00 ACUTE CYSTITIS WITHOUT HEMATURIA: ICD-10-CM

## 2024-05-07 DIAGNOSIS — Z95.0 S/P PLACEMENT OF CARDIAC PACEMAKER: ICD-10-CM

## 2024-05-07 DIAGNOSIS — I10 ESSENTIAL HYPERTENSION: Chronic | ICD-10-CM

## 2024-05-07 DIAGNOSIS — Z95.1 S/P CABG X 3: ICD-10-CM

## 2024-05-07 DIAGNOSIS — I11.0 HYPERTENSIVE HEART DISEASE WITH HEART FAILURE: Primary | ICD-10-CM

## 2024-05-07 PROCEDURE — 1126F AMNT PAIN NOTED NONE PRSNT: CPT | Mod: HCNC,CPTII,S$GLB, | Performed by: NURSE PRACTITIONER

## 2024-05-07 PROCEDURE — 99999 PR PBB SHADOW E&M-EST. PATIENT-LVL IV: CPT | Mod: PBBFAC,HCNC,, | Performed by: NURSE PRACTITIONER

## 2024-05-07 PROCEDURE — 99213 OFFICE O/P EST LOW 20 MIN: CPT | Mod: HCNC,S$GLB,, | Performed by: NURSE PRACTITIONER

## 2024-05-07 PROCEDURE — 1101F PT FALLS ASSESS-DOCD LE1/YR: CPT | Mod: HCNC,CPTII,S$GLB, | Performed by: NURSE PRACTITIONER

## 2024-05-07 PROCEDURE — 3288F FALL RISK ASSESSMENT DOCD: CPT | Mod: HCNC,CPTII,S$GLB, | Performed by: NURSE PRACTITIONER

## 2024-05-07 PROCEDURE — 1159F MED LIST DOCD IN RCRD: CPT | Mod: HCNC,CPTII,S$GLB, | Performed by: NURSE PRACTITIONER

## 2024-05-07 PROCEDURE — 1157F ADVNC CARE PLAN IN RCRD: CPT | Mod: HCNC,CPTII,S$GLB, | Performed by: NURSE PRACTITIONER

## 2024-05-22 ENCOUNTER — PATIENT MESSAGE (OUTPATIENT)
Dept: OPHTHALMOLOGY | Facility: CLINIC | Age: 89
End: 2024-05-22
Payer: MEDICARE

## 2024-05-24 NOTE — PROGRESS NOTES
"  Glo Dumont presented for a follow-up Medicare AWV today. The following components were reviewed and updated:    Medical history  Family History  Social history  Allergies and Current Medications  Health Risk Assessment  Health Maintenance  Care Team    **See Completed Assessments for Annual Wellness visit with in the encounter summary    The following assessments were completed:  Depression Screening  Cognitive function Screening  Timed Get Up Test  Whisper Test  Nutrition Screening    Vitals:    05/28/24 0856   BP: (!) 160/90   BP Location: Left arm   Patient Position: Sitting   BP Method: Medium (Manual)   Pulse: 70   Temp: 98 °F (36.7 °C)   TempSrc: Oral   SpO2: 97%   Weight: 66.5 kg (146 lb 8.6 oz)   Height: 5' 1" (1.549 m)     Body mass index is 27.69 kg/m².            Review of Systems   Constitutional:  Negative for chills and fever.   HENT:  Positive for sore throat. Negative for ear pain and postnasal drip.    Respiratory:  Positive for cough and shortness of breath.    Cardiovascular:  Positive for leg swelling. Negative for chest pain and palpitations.   Gastrointestinal:  Negative for abdominal pain, constipation, nausea and vomiting.   Genitourinary:  Positive for hematuria. Negative for difficulty urinating and dysuria.   Neurological:  Positive for headaches.   Psychiatric/Behavioral:  Negative for sleep disturbance. The patient is nervous/anxious.            Physical Exam  Vitals reviewed.   Constitutional:       General: She is not in acute distress.     Appearance: Normal appearance.   HENT:      Head: Normocephalic and atraumatic.      Nose: Nose normal.   Eyes:      Conjunctiva/sclera: Conjunctivae normal.   Cardiovascular:      Rate and Rhythm: Normal rate and regular rhythm.      Heart sounds: No murmur heard.  Pulmonary:      Effort: Pulmonary effort is normal. No respiratory distress.      Breath sounds: Normal breath sounds.   Abdominal:      Palpations: Abdomen is soft. There is no " mass.      Tenderness: There is no abdominal tenderness.   Musculoskeletal:      Cervical back: Normal range of motion and neck supple.      Right lower leg: No edema (trace).      Left lower leg: Edema (trace) present.   Skin:     General: Skin is warm and dry.   Neurological:      Mental Status: She is alert and oriented to person, place, and time. Mental status is at baseline.      Motor: No weakness.      Gait: Gait abnormal (uses cane to assist with ambulation).   Psychiatric:         Mood and Affect: Mood normal.         Thought Content: Thought content normal.            Health Maintenance       Date Due Completion Date    Shingles Vaccine (1 of 2) Never done ---    RSV Vaccine (Age 60+ and Pregnant patients) (1 - 1-dose 60+ series) Never done ---    Lipid Panel 05/04/2023 5/4/2022    COVID-19 Vaccine (4 - 2023-24 season) 09/01/2023 10/14/2021    TETANUS VACCINE 02/03/2025 2/3/2015    Aspirin/Antiplatelet Therapy 05/07/2025 5/7/2024            PROBLEM LIST ITEMS ADDRESSED THIS VISIT:    1. Current mild episode of major depressive disorder without prior episode  Assessment & Plan:  Chronic, stable  Continue current tx plan  F/U with PCP       2. Central retinal vein occlusion with macular edema of both eyes  Assessment & Plan:  Chronic, stable  Continue current tx plan - aflibercept  F/U with Opthalmology      3. Sarcoidosis of lung  Assessment & Plan:  Chronic, stable  Continue current tx plan  F/U with Pulmonology      4. Lung nodule  Overview:  9/2020 - Successful CT-guided right upper lobe lung biopsy  Lung, right lung (biopsy):   - Benign lung parenchyma with granulomatous inflammation and fibrosis (see   comment)   - No evidence of dysplasia or malignancy      Assessment & Plan:  Chronic, stable  Continue current tx plan  F/U with PCP       5. S/P placement of cardiac pacemaker  Assessment & Plan:  Chronic, stable  Continue current tx plan  F/U with Electrophysiology      6. Other  hyperlipidemia  Assessment & Plan:     Lab Results   Component Value Date    CHOL 160 05/04/2022    HDL 81 (H) 05/04/2022    LDLCALC 63.0 05/04/2022    TRIG 80 05/04/2022     Chronic, stable  Continue STATIN  Include vigorous activity and weight loss in healthcare plan  Mediterranean diet  F/U PCP       7. Hypertensive heart disease with heart failure  Assessment & Plan:  Compensated  Results for orders placed during the hospital encounter of 03/13/24    Echo    Interpretation Summary    Left Ventricle: The left ventricle is normal in size. Mildly increased wall thickness. There is concentric remodeling. There is normal systolic function with a visually estimated ejection fraction of 60 - 65%. Grade I diastolic dysfunction.    Right Ventricle: Normal right ventricular cavity size. Wall thickness is normal. Right ventricle wall motion  is normal. Systolic function is normal.    Left Atrium: Left atrium is mildly dilated.    Aortic Valve: There is mild annular calcification present. There is mild stenosis. Aortic valve area by VTI is 2.17 cm². Aortic valve peak velocity is 1.37 m/s. Mean gradient is 4 mmHg. The dimensionless index is 0.70. There is mild aortic regurgitation.    Mitral Valve: There is moderate posterior mitral annular calcification present. There is moderate regurgitation.    Tricuspid Valve: There is moderate regurgitation.    Pulmonary Artery: There is pulmonary hypertension. The estimated pulmonary artery systolic pressure is 46 mmHg.    IVC/SVC: Intermediate venous pressure at 8 mmHg.   F/U with Dr. Viera  Continue current tx plan, metolazone Monday  Bumex 2 mg Mon and Friday  spironolactone      8. Essential hypertension  Assessment & Plan:  Controlled this visit  Continue amlodipine, Imdur, Coreg  Continue daily activity  F/U with PCP      9. Diastolic heart failure, NYHA class 3  Assessment & Plan:  Same as hypertensive heart disease with heart failure      10. Chronic heart failure  with preserved ejection fraction  Assessment & Plan:  Same as Hypertensive Heart Ds with heart failure    Orders:  -     metOLazone (ZAROXOLYN) 2.5 MG tablet; Take on Mon  -     spironolactone (ALDACTONE) 25 MG tablet; Take 1 tablet (25 mg total) by mouth 3 (three) times a week.  -     budesonide (ENTOCORT EC) 3 mg capsule; As needed  -     bumetanide (BUMEX) 2 MG tablet; Every Monday and Friday    11. Atherosclerosis of aorta  Overview:  Chest x-ray 11/10/14    Assessment & Plan:     Lab Results   Component Value Date    CHOL 160 05/04/2022    HDL 81 (H) 05/04/2022    LDLCALC 63.0 05/04/2022    TRIG 80 05/04/2022     Chronic, stable  Continue STATIN - atorvastatin 40 mg  Control B/P  F/U PCP     Orders:  -     aspirin (ECOTRIN) 81 MG EC tablet; Take 1 tablet (81 mg total) by mouth once daily.  Dispense: 90 tablet; Refill: 3    12. CKD (chronic kidney disease) stage 4, GFR 15-29 ml/min  Assessment & Plan:  Assess and monitor  Lab Results   Component Value Date    CREATININE 2.4 (H) 04/17/2024    CREATININE 1.9 (H) 03/06/2024    CREATININE 2.0 (H) 02/22/2024     Lab Results   Component Value Date    EGFRNONAA 29.4 (A) 07/19/2022    EGFRNONAA 29.4 (A) 06/28/2022    EGFRNONAA 21.1 (A) 05/12/2022   Continue SGLT2 inhibitor - Jardiance  Avoid nephrotoxic agents  Denies any prerenal GI losses, postrenal complaints  F/U with PCP or Nephrology       13. Thrombocytosis, unspecified  Assessment & Plan:  Was elevated in 2023  Platelet count normalized  F/U with PcP      14. Hemoglobin A-S genotype  Assessment & Plan:  Chronic, stable  Continue current tx plan  F/U with PCP       15. Unspecified severe protein-calorie malnutrition  Overview:  Lab Results   Component Value Date    ALBUMIN 3.3 (L) 08/12/2023         Assessment & Plan:  Improved  Chronic, stable  Continue current tx plan  F/U with PCP      Latest Reference Range & Units Most Recent   Albumin 3.5 - 5.2 g/dL 3.7  4/17/24 08:51         16. Secondary  hyperparathyroidism of renal origin  Assessment & Plan:  Lab Results   Component Value Date    PTH 34.9 01/10/2022    CALCIUM 9.4 04/17/2024    PHOS 4.0 03/18/2023    Chronic, stable  Continue current tx plan  F/U with PCP       17. Ulcerative colitis without complications, unspecified location  Assessment & Plan:  Chronic, stable  Continue current tx plan - budesonide prn  F/U with GI      18. Osteopenia, unspecified location  Overview:  2021 - There is a 5.9% risk of a major osteoporotic fracture and a 1.7% risk of hip fracture in the next 10 years (FRAX).   Impression:   Osteopenia    Assessment & Plan:  Chronic, stable  Continue current tx plan  F/U with PCP       19. Pancreatic insuffiency  -     lipase-protease-amylase 24,000-76,000-120,000 units (CREON) 24,000-76,000 -120,000 unit capsule; Take 1 capsule by mouth 3 (three) times daily with meals.  Dispense: 270 capsule; Refill: 3    20. Hypokalemia  Assessment & Plan:  Needs refill    Orders:  -     potassium chloride (KLOR-CON) 10 MEQ TbSR; Take 1 tablet (10 mEq total) by mouth once daily.  Dispense: 90 tablet; Refill: 3    21. Ulcerative pancolitis without complication  Assessment & Plan:  Chronic, stable  Continue current tx plan - budesonide prn  F/U with GI    Orders:  -     budesonide (ENTOCORT EC) 3 mg capsule; As needed    22. Pancreatic insufficiency          Provided Glo with a 5-10 year written screening schedule and personal prevention plan. Recommendations were developed using the USPSTF age appropriate recommendations. Education, counseling, and referrals were provided as needed.  After Visit Summary printed and given to patient which includes a list of additional screenings\tests needed.    Future Appointments   Date Time Provider Department Center   5/29/2024  1:45 PM NAHUM Tamez MD [facility] [place]   6/20/2024  3:40 PM Christina Cardona MD [id number]   11/8/2024  8:30 AM PACEMAKER CLINIC [facility] [place]           ARLENE Mcarthur, NP-C  [facility] [place] [address]      I offered to discuss advanced care planning, including how to pick a person who would make decisions for you if you were unable to make them for yourself, called a health care power of , and what kind of decisions you might make such as use of life sustaining treatments such as ventilators and tube feeding when faced with a life limiting illness recorded on a living will that they will need to know. (How you want to be cared for as you near the end of your natural life)     X  Patient has advanced directives on file, which we reviewed, and they do not wish to make changes.

## 2024-05-27 NOTE — ASSESSMENT & PLAN NOTE
Lab Results   Component Value Date    CHOL 160 05/04/2022    HDL 81 (H) 05/04/2022    LDLCALC 63.0 05/04/2022    TRIG 80 05/04/2022     Chronic, stable  Continue STATIN - atorvastatin 40 mg  Control B/P  F/U PCP

## 2024-05-27 NOTE — ASSESSMENT & PLAN NOTE
Compensated  Results for orders placed during the hospital encounter of 03/13/24    Echo    Interpretation Summary    Left Ventricle: The left ventricle is normal in size. Mildly increased wall thickness. There is concentric remodeling. There is normal systolic function with a visually estimated ejection fraction of 60 - 65%. Grade I diastolic dysfunction.    Right Ventricle: Normal right ventricular cavity size. Wall thickness is normal. Right ventricle wall motion  is normal. Systolic function is normal.    Left Atrium: Left atrium is mildly dilated.    Aortic Valve: There is mild annular calcification present. There is mild stenosis. Aortic valve area by VTI is 2.17 cm². Aortic valve peak velocity is 1.37 m/s. Mean gradient is 4 mmHg. The dimensionless index is 0.70. There is mild aortic regurgitation.    Mitral Valve: There is moderate posterior mitral annular calcification present. There is moderate regurgitation.    Tricuspid Valve: There is moderate regurgitation.    Pulmonary Artery: There is pulmonary hypertension. The estimated pulmonary artery systolic pressure is 46 mmHg.    IVC/SVC: Intermediate venous pressure at 8 mmHg.   F/U with Dr. Veira  Continue current tx plan, metolazone Monday  Bumex 2 mg Mon and Friday  spironolactone

## 2024-05-27 NOTE — ASSESSMENT & PLAN NOTE
Improved  Chronic, stable  Continue current tx plan  F/U with PCP      Latest Reference Range & Units Most Recent   Albumin 3.5 - 5.2 g/dL 3.7  4/17/24 08:51

## 2024-05-27 NOTE — ASSESSMENT & PLAN NOTE
Lab Results   Component Value Date    PTH 34.9 01/10/2022    CALCIUM 9.4 04/17/2024    PHOS 4.0 03/18/2023    Chronic, stable  Continue current tx plan  F/U with PCP

## 2024-05-27 NOTE — ASSESSMENT & PLAN NOTE
Lab Results   Component Value Date    CHOL 160 05/04/2022    HDL 81 (H) 05/04/2022    LDLCALC 63.0 05/04/2022    TRIG 80 05/04/2022     Chronic, stable  Continue STATIN  Include vigorous activity and weight loss in healthcare plan  Mediterranean diet  F/U PCP

## 2024-05-27 NOTE — ASSESSMENT & PLAN NOTE
Assess and monitor  Lab Results   Component Value Date    CREATININE 2.4 (H) 04/17/2024    CREATININE 1.9 (H) 03/06/2024    CREATININE 2.0 (H) 02/22/2024     Lab Results   Component Value Date    EGFRNONAA 29.4 (A) 07/19/2022    EGFRNONAA 29.4 (A) 06/28/2022    EGFRNONAA 21.1 (A) 05/12/2022   Continue SGLT2 inhibitor - Jardiance  Avoid nephrotoxic agents  Denies any prerenal GI losses, postrenal complaints  F/U with PCP or Nephrology

## 2024-05-28 ENCOUNTER — OFFICE VISIT (OUTPATIENT)
Dept: PRIMARY CARE CLINIC | Facility: CLINIC | Age: 89
End: 2024-05-28
Payer: MEDICARE

## 2024-05-28 VITALS
TEMPERATURE: 98 F | OXYGEN SATURATION: 97 % | SYSTOLIC BLOOD PRESSURE: 160 MMHG | DIASTOLIC BLOOD PRESSURE: 90 MMHG | HEART RATE: 70 BPM | BODY MASS INDEX: 27.67 KG/M2 | HEIGHT: 61 IN | WEIGHT: 146.56 LBS

## 2024-05-28 DIAGNOSIS — F32.0 CURRENT MILD EPISODE OF MAJOR DEPRESSIVE DISORDER WITHOUT PRIOR EPISODE: Primary | ICD-10-CM

## 2024-05-28 DIAGNOSIS — R26.9 ABNORMALITY OF GAIT AND MOBILITY: ICD-10-CM

## 2024-05-28 DIAGNOSIS — R91.1 LUNG NODULE: ICD-10-CM

## 2024-05-28 DIAGNOSIS — I11.0 HYPERTENSIVE HEART DISEASE WITH HEART FAILURE: ICD-10-CM

## 2024-05-28 DIAGNOSIS — K86.89 PANCREATIC INSUFFICIENCY: ICD-10-CM

## 2024-05-28 DIAGNOSIS — D86.0 SARCOIDOSIS OF LUNG: ICD-10-CM

## 2024-05-28 DIAGNOSIS — Z00.00 ENCOUNTER FOR PREVENTIVE HEALTH EXAMINATION: ICD-10-CM

## 2024-05-28 DIAGNOSIS — N18.4 CKD (CHRONIC KIDNEY DISEASE) STAGE 4, GFR 15-29 ML/MIN: ICD-10-CM

## 2024-05-28 DIAGNOSIS — H34.8130 CENTRAL RETINAL VEIN OCCLUSION WITH MACULAR EDEMA OF BOTH EYES: ICD-10-CM

## 2024-05-28 DIAGNOSIS — I10 ESSENTIAL HYPERTENSION: Chronic | ICD-10-CM

## 2024-05-28 DIAGNOSIS — E78.49 OTHER HYPERLIPIDEMIA: ICD-10-CM

## 2024-05-28 DIAGNOSIS — Z74.09 OTHER REDUCED MOBILITY: ICD-10-CM

## 2024-05-28 DIAGNOSIS — D75.839 THROMBOCYTOSIS, UNSPECIFIED: ICD-10-CM

## 2024-05-28 DIAGNOSIS — K51.90 ULCERATIVE COLITIS WITHOUT COMPLICATIONS, UNSPECIFIED LOCATION: ICD-10-CM

## 2024-05-28 DIAGNOSIS — K85.10 ACUTE BILIARY PANCREATITIS, UNSPECIFIED COMPLICATION STATUS: ICD-10-CM

## 2024-05-28 DIAGNOSIS — I50.32 CHRONIC HEART FAILURE WITH PRESERVED EJECTION FRACTION: ICD-10-CM

## 2024-05-28 DIAGNOSIS — M85.80 OSTEOPENIA, UNSPECIFIED LOCATION: ICD-10-CM

## 2024-05-28 DIAGNOSIS — I50.30 DIASTOLIC HEART FAILURE, NYHA CLASS 3: ICD-10-CM

## 2024-05-28 DIAGNOSIS — D57.3 HEMOGLOBIN A-S GENOTYPE: ICD-10-CM

## 2024-05-28 DIAGNOSIS — E87.6 HYPOKALEMIA: ICD-10-CM

## 2024-05-28 DIAGNOSIS — N25.81 SECONDARY HYPERPARATHYROIDISM OF RENAL ORIGIN: ICD-10-CM

## 2024-05-28 DIAGNOSIS — Z95.0 S/P PLACEMENT OF CARDIAC PACEMAKER: ICD-10-CM

## 2024-05-28 DIAGNOSIS — K51.00 ULCERATIVE PANCOLITIS WITHOUT COMPLICATION: ICD-10-CM

## 2024-05-28 DIAGNOSIS — E43 UNSPECIFIED SEVERE PROTEIN-CALORIE MALNUTRITION: ICD-10-CM

## 2024-05-28 DIAGNOSIS — I70.0 ATHEROSCLEROSIS OF AORTA: ICD-10-CM

## 2024-05-28 LAB
OHS CV AF BURDEN PERCENT: < 1
OHS CV DC REMOTE DEVICE TYPE: NORMAL
OHS CV RV PACING PERCENT: 18.4 %

## 2024-05-28 PROCEDURE — 1159F MED LIST DOCD IN RCRD: CPT | Mod: HCNC,CPTII,S$GLB, | Performed by: NURSE PRACTITIONER

## 2024-05-28 PROCEDURE — 1123F ACP DISCUSS/DSCN MKR DOCD: CPT | Mod: HCNC,CPTII,S$GLB, | Performed by: NURSE PRACTITIONER

## 2024-05-28 PROCEDURE — 1126F AMNT PAIN NOTED NONE PRSNT: CPT | Mod: HCNC,CPTII,S$GLB, | Performed by: NURSE PRACTITIONER

## 2024-05-28 PROCEDURE — 3288F FALL RISK ASSESSMENT DOCD: CPT | Mod: HCNC,CPTII,S$GLB, | Performed by: NURSE PRACTITIONER

## 2024-05-28 PROCEDURE — 99999 PR PBB SHADOW E&M-EST. PATIENT-LVL III: CPT | Mod: PBBFAC,HCNC,, | Performed by: NURSE PRACTITIONER

## 2024-05-28 PROCEDURE — G9919 SCRN ND POS ND PROV OF REC: HCPCS | Mod: HCNC,CPTII,S$GLB, | Performed by: NURSE PRACTITIONER

## 2024-05-28 PROCEDURE — 1170F FXNL STATUS ASSESSED: CPT | Mod: HCNC,CPTII,S$GLB, | Performed by: NURSE PRACTITIONER

## 2024-05-28 PROCEDURE — G0439 PPPS, SUBSEQ VISIT: HCPCS | Mod: HCNC,S$GLB,, | Performed by: NURSE PRACTITIONER

## 2024-05-28 PROCEDURE — 1101F PT FALLS ASSESS-DOCD LE1/YR: CPT | Mod: HCNC,CPTII,S$GLB, | Performed by: NURSE PRACTITIONER

## 2024-05-28 RX ORDER — BUMETANIDE 2 MG/1
TABLET ORAL
Start: 2024-06-03

## 2024-05-28 RX ORDER — POTASSIUM CHLORIDE 750 MG/1
10 TABLET, EXTENDED RELEASE ORAL DAILY
Qty: 90 TABLET | Refills: 3 | Status: SHIPPED | OUTPATIENT
Start: 2024-05-28 | End: 2025-05-28

## 2024-05-28 RX ORDER — SPIRONOLACTONE 25 MG/1
25 TABLET ORAL
Start: 2024-05-29 | End: 2025-05-29

## 2024-05-28 RX ORDER — ASPIRIN 81 MG/1
81 TABLET ORAL DAILY
Qty: 90 TABLET | Refills: 3 | Status: SHIPPED | OUTPATIENT
Start: 2024-05-28 | End: 2025-05-28

## 2024-05-28 RX ORDER — BUDESONIDE 3 MG/1
CAPSULE, COATED PELLETS ORAL
Start: 2024-05-28 | End: 2024-06-11 | Stop reason: SDUPTHER

## 2024-05-28 RX ORDER — PANCRELIPASE 24000; 76000; 120000 [USP'U]/1; [USP'U]/1; [USP'U]/1
1 CAPSULE, DELAYED RELEASE PELLETS ORAL
Qty: 270 CAPSULE | Refills: 3 | Status: SHIPPED | OUTPATIENT
Start: 2024-05-28 | End: 2025-05-28

## 2024-05-28 RX ORDER — METOLAZONE 2.5 MG/1
TABLET ORAL
Start: 2024-05-28

## 2024-05-28 NOTE — PATIENT INSTRUCTIONS
Counseling and Referral of Other Preventative  (Italic type indicates deductible and co-insurance are waived)    Patient Name: Glo Dumont  Today's Date: 5/28/2024    Health Maintenance       Date Due Completion Date    Shingles Vaccine (1 of 2) Never done ---    RSV Vaccine (Age 60+ and Pregnant patients) (1 - 1-dose 60+ series) Never done ---    Lipid Panel 05/04/2023 5/4/2022    COVID-19 Vaccine (4 - 2023-24 season) 09/01/2023 10/14/2021    TETANUS VACCINE 02/03/2025 2/3/2015    Aspirin/Antiplatelet Therapy 05/07/2025 5/7/2024        No orders of the defined types were placed in this encounter.    The following information is provided to all patients.  This information is to help you find resources for any of the problems found today that may be affecting your health:                  Living healthy guide: [email or web address].[email or web address].[facility]      Understanding Diabetes: www.diabetes.org      Eating healthy: www.cdc.gov/healthyweight      [facility] home safety checklist: www.cdc.gov/steadi/patient.html      Agency on Aging: www.goea.[place].[facility]      Alcoholics anonymous (AA): www.aa.org      Physical Activity: www.jacob.nih.gov/dq4homc      Tobacco use: www.quitwithusla.org

## 2024-05-29 ENCOUNTER — PROCEDURE VISIT (OUTPATIENT)
Dept: OPHTHALMOLOGY | Facility: CLINIC | Age: 89
End: 2024-05-29
Payer: MEDICARE

## 2024-05-29 DIAGNOSIS — H34.8130 CENTRAL RETINAL VEIN OCCLUSION WITH MACULAR EDEMA OF BOTH EYES: Primary | ICD-10-CM

## 2024-05-29 PROCEDURE — 99499 UNLISTED E&M SERVICE: CPT | Mod: HCNC,S$GLB,, | Performed by: OPHTHALMOLOGY

## 2024-05-29 PROCEDURE — 67028 INJECTION EYE DRUG: CPT | Mod: 50,HCNC,S$GLB, | Performed by: OPHTHALMOLOGY

## 2024-05-29 PROCEDURE — 92134 CPTRZ OPH DX IMG PST SGM RTA: CPT | Mod: HCNC,S$GLB,, | Performed by: OPHTHALMOLOGY

## 2024-05-29 NOTE — PROGRESS NOTES
===============================  Date today is 5/29/2024  Glo Dumont is a 88 y.o. female  Last visit [facility]: :3/6/2024   Last visit eye dept. 3/6/2024    Corrected distance visual acuity was 20/100- in the right eye and 20/400 in the left eye.  Tonometry       Tonometry (Tonopen, 2:16 PM)         Right Left    Pressure 16 16                  Not recorded       Not recorded       Not recorded       Chief Complaint   Patient presents with    Injections     ozurdex ou     HPI     Injections            Comments: ozurdex ou         Last edited by Melissa Narvaez on 5/29/2024  2:11 PM.      Problem List Items Addressed This Visit          Eye/Vision problems    Central retinal vein occlusion with macular edema of both eyes - Primary    Relevant Orders    Posterior Segment OCT Retina-Both eyes (Completed)    Prior authorization Order     Instructed to call 24/7 for any worsening of vision, visual distortion or pain.  Check OU independently daily.    Gave my office and personal cell phone number.  ________________  5/29/2024 today  Glo Dumont      OU CRVO/ME  OCT better CME OU        5/29/2024  Diagnosis :  OU CRVO  Today:   Ozurdex 0.7 mg , OU   Follow up: Ozurdex OU in 10   weeks  Instructed to call 24/7 for any worsening of vision. Check Both eyes daily. Gave patient my home phone number.  Risks, benefits, and alternatives to treatment discussed in detail with the patient.  The patient voiced understanding and wished to proceed with the procedure    Ozurdex intravitreal implant Procedure Note:   y capsule intact?  Patient Identified and Time Out complete  Subconjunctival bleb - xylocaine with epi 2%   and Betadine.  Inject OU at 1mm parallel to limbus  Post Operative Dx: Same  Complications: None  Follow up as above.    =============================

## 2024-06-06 NOTE — PROGRESS NOTES
"Subjective:      Patient ID: Glo Dumont is a 88 y.o. female.    Chief Complaint: Shortness of Breath (Off and on )      HPI  Here for follow up of medical problems.  No more CP episodes.  Stable SOOD, stable on meds, diuretic twice a week.  2 days ago tripped over foot, and fell down striking left side ribs on table.  No cough, f/c.  Depression doing well.  BMs and urine normal.  BP good range at home.    HM: 9/23 fluvax, 2/21 covid vaccines/booster, 3/15 [id number], 2/14 [id number], 10/22 [id number], 2/15 Tdap, 10/21 BMD rep 2y, 2/11 Cscope no repeat will be done, 1/17 MMG no more, Eye doc monthly, GI Dr. Galindo, Cards Dr. Lui, Nephro Dr. Villareal.      Review of Systems   Constitutional:  Negative for chills, diaphoresis and fever.   Respiratory:  Negative for cough and shortness of breath.    Cardiovascular:  Negative for chest pain, palpitations and leg swelling.   Gastrointestinal:  Negative for blood in stool, constipation, diarrhea, nausea and vomiting.   Genitourinary:  Negative for dysuria, frequency and hematuria.   Psychiatric/Behavioral:  The patient is not nervous/anxious.          Objective:   /73   Pulse 75   Temp 97.2 °F (36.2 °C) (Skin)   Ht 5' 1" (1.549 m)   Wt 65.6 kg (144 lb 10 oz)   SpO2 96%   BMI 27.33 kg/m²     Physical Exam  Constitutional:       Appearance: She is well-developed.   Neck:      Thyroid: No thyroid mass.      Vascular: No carotid bruit.   Cardiovascular:      Rate and Rhythm: Normal rate and regular rhythm.      Heart sounds: No murmur heard.     No friction rub. No gallop.   Pulmonary:      Effort: Pulmonary effort is normal.      Breath sounds: Normal breath sounds. No wheezing or rales.   Abdominal:      General: Bowel sounds are normal.      Palpations: Abdomen is soft. There is no mass.      Tenderness: There is no abdominal tenderness.   Musculoskeletal:      Cervical back: Neck supple.   Lymphadenopathy:      Cervical: No cervical adenopathy.   Neurological: "      Mental Status: She is alert and oriented to person, place, and time.             Assessment:       1. Essential hypertension    2. Asymptomatic postmenopausal state    3. Chronic heart failure with preserved ejection fraction    4. Rib pain on left side    5. Current mild episode of major depressive disorder without prior episode    6. Sarcoidosis of lung    7. Ulcerative pancolitis without complication          Plan:     1. Essential hypertension- stable, cont rx.    2. Asymptomatic postmenopausal state  -     DXA Bone Density Axial Skeleton 1 or more sites; Future; Expected date: 06/20/2024    3. Chronic heart failure with preserved ejection fraction- cont med regimen, f/u with Cards.    4. Rib pain on left side, tender to touch, hx trauma.  -     X-Ray Ribs 2 View Left; Future; Expected date: 06/20/2024    5. Current mild episode of major depressive disorder without prior episode    6. Sarcoidosis of lung- doing well currently.    7. Ulcerative pancolitis without complication- doing well with budesonide prn.    RTC 6wk.

## 2024-06-08 RX ORDER — NYSTATIN 100000 [USP'U]/ML
4 SUSPENSION ORAL 4 TIMES DAILY
Qty: 80 ML | Refills: 1 | Status: SHIPPED | OUTPATIENT
Start: 2024-06-08 | End: 2024-06-18

## 2024-06-11 DIAGNOSIS — K51.00 ULCERATIVE PANCOLITIS WITHOUT COMPLICATION: ICD-10-CM

## 2024-06-11 DIAGNOSIS — I50.32 CHRONIC HEART FAILURE WITH PRESERVED EJECTION FRACTION: ICD-10-CM

## 2024-06-12 RX ORDER — BUDESONIDE 3 MG/1
3-6 CAPSULE, COATED PELLETS ORAL DAILY PRN
Qty: 60 EACH | Refills: 2 | Status: SHIPPED | OUTPATIENT
Start: 2024-06-12

## 2024-06-12 RX ORDER — BUDESONIDE 3 MG/1
3-6 CAPSULE, COATED PELLETS ORAL DAILY PRN
Qty: 60 EACH | Refills: 2
Start: 2024-06-12 | End: 2024-06-12 | Stop reason: SDUPTHER

## 2024-06-20 ENCOUNTER — HOSPITAL ENCOUNTER (OUTPATIENT)
Dept: RADIOLOGY | Facility: HOSPITAL | Age: 89
Discharge: HOME | End: 2024-06-20
Attending: INTERNAL MEDICINE
Payer: MEDICARE

## 2024-06-20 ENCOUNTER — OFFICE VISIT (OUTPATIENT)
Dept: PRIMARY CARE CLINIC | Facility: CLINIC | Age: 89
End: 2024-06-20
Payer: MEDICARE

## 2024-06-20 ENCOUNTER — TELEPHONE (OUTPATIENT)
Dept: PRIMARY CARE CLINIC | Facility: CLINIC | Age: 89
End: 2024-06-20

## 2024-06-20 VITALS
OXYGEN SATURATION: 96 % | SYSTOLIC BLOOD PRESSURE: 132 MMHG | DIASTOLIC BLOOD PRESSURE: 73 MMHG | WEIGHT: 144.63 LBS | BODY MASS INDEX: 27.3 KG/M2 | TEMPERATURE: 97 F | HEART RATE: 75 BPM | HEIGHT: 61 IN

## 2024-06-20 DIAGNOSIS — K51.00 ULCERATIVE PANCOLITIS WITHOUT COMPLICATION: ICD-10-CM

## 2024-06-20 DIAGNOSIS — D86.0 SARCOIDOSIS OF LUNG: ICD-10-CM

## 2024-06-20 DIAGNOSIS — F32.0 CURRENT MILD EPISODE OF MAJOR DEPRESSIVE DISORDER WITHOUT PRIOR EPISODE: ICD-10-CM

## 2024-06-20 DIAGNOSIS — I50.32 CHRONIC HEART FAILURE WITH PRESERVED EJECTION FRACTION: ICD-10-CM

## 2024-06-20 DIAGNOSIS — R07.81 RIB PAIN ON LEFT SIDE: ICD-10-CM

## 2024-06-20 DIAGNOSIS — I10 ESSENTIAL HYPERTENSION: Primary | Chronic | ICD-10-CM

## 2024-06-20 DIAGNOSIS — Z78.0 ASYMPTOMATIC POSTMENOPAUSAL STATE: ICD-10-CM

## 2024-06-20 PROCEDURE — 99999 PR PBB SHADOW E&M-EST. PATIENT-LVL IV: CPT | Mod: PBBFAC,HCNC,, | Performed by: INTERNAL MEDICINE

## 2024-06-20 PROCEDURE — 71100 X-RAY EXAM RIBS UNI 2 VIEWS: CPT | Mod: 26,HCNC,LT, | Performed by: RADIOLOGY

## 2024-06-20 PROCEDURE — 71100 X-RAY EXAM RIBS UNI 2 VIEWS: CPT | Mod: TC,HCNC,FY,PO,LT

## 2024-06-21 ENCOUNTER — PATIENT MESSAGE (OUTPATIENT)
Dept: PRIMARY CARE CLINIC | Facility: CLINIC | Age: 89
End: 2024-06-21
Payer: MEDICARE

## 2024-06-24 ENCOUNTER — CLINICAL SUPPORT (OUTPATIENT)
Dept: CARDIOLOGY | Facility: HOSPITAL | Age: 89
End: 2024-06-24
Attending: INTERNAL MEDICINE
Payer: MEDICARE

## 2024-06-24 ENCOUNTER — CLINICAL SUPPORT (OUTPATIENT)
Dept: CARDIOLOGY | Facility: HOSPITAL | Age: 89
End: 2024-06-24
Payer: MEDICARE

## 2024-06-24 DIAGNOSIS — I50.32 CHRONIC DIASTOLIC (CONGESTIVE) HEART FAILURE: ICD-10-CM

## 2024-06-24 DIAGNOSIS — Z95.0 PRESENCE OF CARDIAC PACEMAKER: ICD-10-CM

## 2024-06-24 PROCEDURE — 93296 REM INTERROG EVL PM/IDS: CPT | Mod: HCNC | Performed by: INTERNAL MEDICINE

## 2024-06-26 LAB
OHS CV AF BURDEN PERCENT: < 1
OHS CV DC REMOTE DEVICE TYPE: NORMAL
OHS CV ICD SHOCK: NO
OHS CV RV PACING PERCENT: 0.55 %

## 2024-07-02 DIAGNOSIS — I25.10 CORONARY ARTERY DISEASE, OCCLUSIVE: Chronic | ICD-10-CM

## 2024-07-02 DIAGNOSIS — I20.9 ANGINA, CLASS II: Chronic | ICD-10-CM

## 2024-07-02 RX ORDER — CYPROHEPTADINE HYDROCHLORIDE 4 MG/1
TABLET ORAL
Qty: 270 TABLET | Refills: 1 | Status: SHIPPED | OUTPATIENT
Start: 2024-07-02

## 2024-07-02 RX ORDER — NITROGLYCERIN 0.4 MG/1
TABLET SUBLINGUAL
Qty: 25 TABLET | Refills: 2 | Status: SHIPPED | OUTPATIENT
Start: 2024-07-02

## 2024-07-08 ENCOUNTER — TELEPHONE (OUTPATIENT)
Dept: PRIMARY CARE CLINIC | Facility: CLINIC | Age: 89
End: 2024-07-08
Payer: MEDICARE

## 2024-07-08 ENCOUNTER — PATIENT MESSAGE (OUTPATIENT)
Dept: PRIMARY CARE CLINIC | Facility: CLINIC | Age: 89
End: 2024-07-08
Payer: MEDICARE

## 2024-07-08 LAB
ALBUMIN SERPL BCP-MCNC: 3.8 G/DL (ref 3.5–5.2)
ALP SERPL-CCNC: 105 U/L (ref 55–135)
ALT SERPL W/O P-5'-P-CCNC: 14 U/L (ref 10–44)
ANION GAP SERPL CALC-SCNC: 12 MMOL/L (ref 8–16)
AST SERPL-CCNC: 15 U/L (ref 10–40)
BASOPHILS NFR BLD: 0 % (ref 0–1.9)
BILIRUB SERPL-MCNC: 0.6 MG/DL (ref 0.1–1)
BNP SERPL-MCNC: 178 PG/ML (ref 0–99)
BUN SERPL-MCNC: 75 MG/DL (ref 8–23)
CALCIUM SERPL-MCNC: 9.5 MG/DL (ref 8.7–10.5)
CHLORIDE SERPL-SCNC: 110 MMOL/L (ref 95–110)
CO2 SERPL-SCNC: 11 MMOL/L (ref 23–29)
CREAT SERPL-MCNC: 3.1 MG/DL (ref 0.5–1.4)
DACRYOCYTES BLD QL SMEAR: ABNORMAL
DIFFERENTIAL METHOD BLD: ABNORMAL
EOSINOPHIL NFR BLD: 0 % (ref 0–8)
ERYTHROCYTE [DISTWIDTH] IN BLOOD BY AUTOMATED COUNT: 15.4 % (ref 11.5–14.5)
EST. GFR  (NO RACE VARIABLE): 14 ML/MIN/1.73 M^2
GLUCOSE SERPL-MCNC: 126 MG/DL (ref 70–110)
HCT VFR BLD AUTO: 37.5 % (ref 37–48.5)
HGB BLD-MCNC: 12.6 G/DL (ref 12–16)
IMM GRANULOCYTES # BLD AUTO: ABNORMAL K/UL (ref 0–0.04)
IMM GRANULOCYTES NFR BLD AUTO: ABNORMAL % (ref 0–0.5)
LYMPHOCYTES NFR BLD: 8 % (ref 18–48)
MCH RBC QN AUTO: 29 PG (ref 27–31)
MCHC RBC AUTO-ENTMCNC: 33.6 G/DL (ref 32–36)
MCV RBC AUTO: 86 FL (ref 82–98)
MONOCYTES NFR BLD: 6 % (ref 4–15)
NEUTROPHILS NFR BLD: 84 % (ref 38–73)
NEUTS BAND NFR BLD MANUAL: 2 %
NRBC BLD-RTO: 0 /100 WBC
OVALOCYTES BLD QL SMEAR: ABNORMAL
PLATELET # BLD AUTO: 283 K/UL (ref 150–450)
PMV BLD AUTO: 9.4 FL (ref 9.2–12.9)
POIKILOCYTOSIS BLD QL SMEAR: ABNORMAL
POTASSIUM SERPL-SCNC: 5.1 MMOL/L (ref 3.5–5.1)
PROT SERPL-MCNC: 7.6 G/DL (ref 6–8.4)
RBC # BLD AUTO: 4.35 M/UL (ref 4–5.4)
SODIUM SERPL-SCNC: 133 MMOL/L (ref 136–145)
TROPONIN I SERPL DL<=0.01 NG/ML-MCNC: 0.05 NG/ML (ref 0–0.03)
WBC # BLD AUTO: 14.27 K/UL (ref 3.9–12.7)

## 2024-07-08 PROCEDURE — 85027 COMPLETE CBC AUTOMATED: CPT | Mod: HCNC | Performed by: NURSE PRACTITIONER

## 2024-07-08 PROCEDURE — 93010 ELECTROCARDIOGRAM REPORT: CPT | Mod: HCNC,,, | Performed by: INTERNAL MEDICINE

## 2024-07-08 PROCEDURE — 93005 ELECTROCARDIOGRAM TRACING: CPT | Mod: HCNC

## 2024-07-08 PROCEDURE — 85007 BL SMEAR W/DIFF WBC COUNT: CPT | Mod: HCNC | Performed by: NURSE PRACTITIONER

## 2024-07-08 PROCEDURE — 84484 ASSAY OF TROPONIN QUANT: CPT | Mod: HCNC | Performed by: NURSE PRACTITIONER

## 2024-07-08 PROCEDURE — 80053 COMPREHEN METABOLIC PANEL: CPT | Mod: HCNC | Performed by: NURSE PRACTITIONER

## 2024-07-08 PROCEDURE — 99285 EMERGENCY DEPT VISIT HI MDM: CPT | Mod: 25,HCNC

## 2024-07-08 PROCEDURE — 83880 ASSAY OF NATRIURETIC PEPTIDE: CPT | Mod: HCNC | Performed by: NURSE PRACTITIONER

## 2024-07-08 NOTE — TELEPHONE ENCOUNTER
Spoke with daughter- states that pt is not eating much and feels weak.  Wanted to be seen tomorrow.  Advised that we only have one provider here tomorrow and no available appointments.  Told daughter she may have to take her to urgent care if she does not start feeling better.  Daughter already made appt for Wed to see Dr. Blanchard. States she will take her to urgent care if needed.

## 2024-07-09 ENCOUNTER — HOSPITAL ENCOUNTER (INPATIENT)
Facility: HOSPITAL | Age: 89
LOS: 5 days | Discharge: HOME | DRG: 683 | End: 2024-07-14
Attending: EMERGENCY MEDICINE | Admitting: HOSPITALIST
Payer: MEDICARE

## 2024-07-09 DIAGNOSIS — I10 PRIMARY HYPERTENSION: ICD-10-CM

## 2024-07-09 DIAGNOSIS — N18.9 CHRONIC KIDNEY DISEASE, UNSPECIFIED CKD STAGE: ICD-10-CM

## 2024-07-09 DIAGNOSIS — N39.0 URINARY TRACT INFECTION WITH HEMATURIA, SITE UNSPECIFIED: ICD-10-CM

## 2024-07-09 DIAGNOSIS — R31.9 URINARY TRACT INFECTION WITH HEMATURIA, SITE UNSPECIFIED: ICD-10-CM

## 2024-07-09 DIAGNOSIS — R07.9 CHEST PAIN: ICD-10-CM

## 2024-07-09 DIAGNOSIS — R53.1 WEAKNESS: Primary | ICD-10-CM

## 2024-07-09 DIAGNOSIS — R06.02 SHORTNESS OF BREATH: ICD-10-CM

## 2024-07-09 PROBLEM — K21.00 GASTROESOPHAGEAL REFLUX DISEASE WITH ESOPHAGITIS: Chronic | Status: ACTIVE | Noted: 2023-08-15

## 2024-07-09 PROBLEM — I25.10 CORONARY ARTERY DISEASE INVOLVING NATIVE CORONARY ARTERY OF NATIVE HEART: Status: ACTIVE | Noted: 2024-07-09

## 2024-07-09 PROBLEM — K51.90 ULCERATIVE COLITIS: Chronic | Status: ACTIVE | Noted: 2017-05-11

## 2024-07-09 PROBLEM — R79.89 ELEVATED TROPONIN: Status: ACTIVE | Noted: 2024-07-09

## 2024-07-09 PROBLEM — N18.4 CKD (CHRONIC KIDNEY DISEASE) STAGE 4, GFR 15-29 ML/MIN: Chronic | Status: ACTIVE | Noted: 2017-05-11

## 2024-07-09 PROBLEM — I25.10 CORONARY ARTERY DISEASE INVOLVING NATIVE CORONARY ARTERY OF NATIVE HEART: Chronic | Status: ACTIVE | Noted: 2024-07-09

## 2024-07-09 PROBLEM — I50.32 CHRONIC HEART FAILURE WITH PRESERVED EJECTION FRACTION: Chronic | Status: ACTIVE | Noted: 2024-04-04

## 2024-07-09 LAB
ALBUMIN SERPL BCP-MCNC: 3.4 G/DL (ref 3.5–5.2)
ALP SERPL-CCNC: 96 U/L (ref 55–135)
ALT SERPL W/O P-5'-P-CCNC: 13 U/L (ref 10–44)
ANION GAP SERPL CALC-SCNC: 13 MMOL/L (ref 8–16)
AST SERPL-CCNC: 16 U/L (ref 10–40)
BACTERIA #/AREA URNS HPF: ABNORMAL /HPF
BASOPHILS # BLD AUTO: 0.09 K/UL (ref 0–0.2)
BASOPHILS NFR BLD: 0.7 % (ref 0–1.9)
BILIRUB SERPL-MCNC: 0.3 MG/DL (ref 0.1–1)
BILIRUB UR QL STRIP: NEGATIVE
BUN SERPL-MCNC: 71 MG/DL (ref 8–23)
CALCIUM SERPL-MCNC: 8.9 MG/DL (ref 8.7–10.5)
CHLORIDE SERPL-SCNC: 112 MMOL/L (ref 95–110)
CLARITY UR: ABNORMAL
CO2 SERPL-SCNC: 9 MMOL/L (ref 23–29)
COLOR UR: YELLOW
CREAT SERPL-MCNC: 2.9 MG/DL (ref 0.5–1.4)
DIFFERENTIAL METHOD BLD: ABNORMAL
EOSINOPHIL # BLD AUTO: 0 K/UL (ref 0–0.5)
EOSINOPHIL NFR BLD: 0.2 % (ref 0–8)
ERYTHROCYTE [DISTWIDTH] IN BLOOD BY AUTOMATED COUNT: 15.5 % (ref 11.5–14.5)
EST. GFR  (NO RACE VARIABLE): 15 ML/MIN/1.73 M^2
GLUCOSE SERPL-MCNC: 96 MG/DL (ref 70–110)
GLUCOSE UR QL STRIP: NEGATIVE
HCT VFR BLD AUTO: 35.7 % (ref 37–48.5)
HGB BLD-MCNC: 11.9 G/DL (ref 12–16)
HGB UR QL STRIP: ABNORMAL
HYALINE CASTS #/AREA URNS LPF: 33 /LPF
IMM GRANULOCYTES # BLD AUTO: 0.53 K/UL (ref 0–0.04)
IMM GRANULOCYTES NFR BLD AUTO: 4 % (ref 0–0.5)
KETONES UR QL STRIP: NEGATIVE
LEUKOCYTE ESTERASE UR QL STRIP: ABNORMAL
LYMPHOCYTES # BLD AUTO: 0.7 K/UL (ref 1–4.8)
LYMPHOCYTES NFR BLD: 4.9 % (ref 18–48)
MCH RBC QN AUTO: 28.7 PG (ref 27–31)
MCHC RBC AUTO-ENTMCNC: 33.3 G/DL (ref 32–36)
MCV RBC AUTO: 86 FL (ref 82–98)
MICROSCOPIC COMMENT: ABNORMAL
MONOCYTES # BLD AUTO: 1 K/UL (ref 0.3–1)
MONOCYTES NFR BLD: 7.5 % (ref 4–15)
NEUTROPHILS # BLD AUTO: 11 K/UL (ref 1.8–7.7)
NEUTROPHILS NFR BLD: 82.7 % (ref 38–73)
NITRITE UR QL STRIP: NEGATIVE
NRBC BLD-RTO: 0 /100 WBC
OHS QRS DURATION: 82 MS
OHS QTC CALCULATION: 421 MS
PH UR STRIP: 6 [PH] (ref 5–8)
PLATELET # BLD AUTO: 240 K/UL (ref 150–450)
PMV BLD AUTO: 9.9 FL (ref 9.2–12.9)
POTASSIUM SERPL-SCNC: 4.6 MMOL/L (ref 3.5–5.1)
PROT SERPL-MCNC: 7 G/DL (ref 6–8.4)
PROT UR QL STRIP: ABNORMAL
RBC # BLD AUTO: 4.15 M/UL (ref 4–5.4)
RBC #/AREA URNS HPF: 8 /HPF (ref 0–4)
SARS-COV-2 RDRP RESP QL NAA+PROBE: NEGATIVE
SODIUM SERPL-SCNC: 134 MMOL/L (ref 136–145)
SP GR UR STRIP: 1.01 (ref 1–1.03)
SQUAMOUS #/AREA URNS HPF: 5 /HPF
TROPONIN I SERPL DL<=0.01 NG/ML-MCNC: 0.06 NG/ML (ref 0–0.03)
UNIDENT CRYS URNS QL MICRO: ABNORMAL
URN SPEC COLLECT METH UR: ABNORMAL
UROBILINOGEN UR STRIP-ACNC: NEGATIVE EU/DL
WBC # BLD AUTO: 13.25 K/UL (ref 3.9–12.7)
WBC #/AREA URNS HPF: 34 /HPF (ref 0–5)
WBC CLUMPS URNS QL MICRO: ABNORMAL

## 2024-07-09 PROCEDURE — 25000003 PHARM REV CODE 250: Mod: HCNC | Performed by: EMERGENCY MEDICINE

## 2024-07-09 PROCEDURE — 96361 HYDRATE IV INFUSION ADD-ON: CPT | Mod: HCNC

## 2024-07-09 PROCEDURE — 84484 ASSAY OF TROPONIN QUANT: CPT | Mod: HCNC | Performed by: EMERGENCY MEDICINE

## 2024-07-09 PROCEDURE — 63600175 PHARM REV CODE 636 W HCPCS: Mod: HCNC | Performed by: EMERGENCY MEDICINE

## 2024-07-09 PROCEDURE — 81000 URINALYSIS NONAUTO W/SCOPE: CPT | Mod: HCNC | Performed by: NURSE PRACTITIONER

## 2024-07-09 PROCEDURE — 80053 COMPREHEN METABOLIC PANEL: CPT | Mod: HCNC | Performed by: HOSPITALIST

## 2024-07-09 PROCEDURE — 25000003 PHARM REV CODE 250: Mod: HCNC | Performed by: HOSPITALIST

## 2024-07-09 PROCEDURE — 87086 URINE CULTURE/COLONY COUNT: CPT | Mod: HCNC | Performed by: NURSE PRACTITIONER

## 2024-07-09 PROCEDURE — U0002 COVID-19 LAB TEST NON-CDC: HCPCS | Mod: HCNC | Performed by: EMERGENCY MEDICINE

## 2024-07-09 PROCEDURE — 63600175 PHARM REV CODE 636 W HCPCS: Mod: HCNC | Performed by: HOSPITALIST

## 2024-07-09 PROCEDURE — 85025 COMPLETE CBC W/AUTO DIFF WBC: CPT | Mod: HCNC | Performed by: HOSPITALIST

## 2024-07-09 PROCEDURE — 96365 THER/PROPH/DIAG IV INF INIT: CPT | Mod: HCNC

## 2024-07-09 PROCEDURE — 21400001 HC TELEMETRY ROOM: Mod: HCNC

## 2024-07-09 RX ORDER — AMLODIPINE BESYLATE 10 MG/1
10 TABLET ORAL EVERY MORNING
Status: DISCONTINUED | OUTPATIENT
Start: 2024-07-09 | End: 2024-07-14 | Stop reason: HOSPADM

## 2024-07-09 RX ORDER — IPRATROPIUM BROMIDE AND ALBUTEROL SULFATE 2.5; .5 MG/3ML; MG/3ML
3 SOLUTION RESPIRATORY (INHALATION) EVERY 6 HOURS PRN
Status: DISCONTINUED | OUTPATIENT
Start: 2024-07-09 | End: 2024-07-14 | Stop reason: HOSPADM

## 2024-07-09 RX ORDER — PANTOPRAZOLE SODIUM 40 MG/1
40 TABLET, DELAYED RELEASE ORAL DAILY
Status: DISCONTINUED | OUTPATIENT
Start: 2024-07-09 | End: 2024-07-14 | Stop reason: HOSPADM

## 2024-07-09 RX ORDER — SODIUM CHLORIDE 0.9 % (FLUSH) 0.9 %
10 SYRINGE (ML) INJECTION EVERY 12 HOURS PRN
Status: DISCONTINUED | OUTPATIENT
Start: 2024-07-09 | End: 2024-07-14 | Stop reason: HOSPADM

## 2024-07-09 RX ORDER — ONDANSETRON HYDROCHLORIDE 2 MG/ML
4 INJECTION, SOLUTION INTRAVENOUS EVERY 8 HOURS PRN
Status: DISCONTINUED | OUTPATIENT
Start: 2024-07-09 | End: 2024-07-14 | Stop reason: HOSPADM

## 2024-07-09 RX ORDER — PROMETHAZINE HYDROCHLORIDE 25 MG/1
25 TABLET ORAL EVERY 6 HOURS PRN
Status: DISCONTINUED | OUTPATIENT
Start: 2024-07-09 | End: 2024-07-14 | Stop reason: HOSPADM

## 2024-07-09 RX ORDER — GLUCAGON 1 MG
1 KIT INJECTION
Status: DISCONTINUED | OUTPATIENT
Start: 2024-07-09 | End: 2024-07-14 | Stop reason: HOSPADM

## 2024-07-09 RX ORDER — ACETAMINOPHEN 650 MG/1
650 SUPPOSITORY RECTAL EVERY 6 HOURS PRN
Status: DISCONTINUED | OUTPATIENT
Start: 2024-07-09 | End: 2024-07-14 | Stop reason: HOSPADM

## 2024-07-09 RX ORDER — ACETAMINOPHEN 325 MG/1
650 TABLET ORAL EVERY 8 HOURS PRN
Status: DISCONTINUED | OUTPATIENT
Start: 2024-07-09 | End: 2024-07-14 | Stop reason: HOSPADM

## 2024-07-09 RX ORDER — ONDANSETRON 4 MG/1
4 TABLET, FILM COATED ORAL EVERY 8 HOURS PRN
Qty: 12 TABLET | Refills: 0 | Status: SHIPPED | OUTPATIENT
Start: 2024-07-09 | End: 2024-07-14

## 2024-07-09 RX ORDER — ASPIRIN 81 MG/1
81 TABLET ORAL DAILY
Status: DISCONTINUED | OUTPATIENT
Start: 2024-07-09 | End: 2024-07-14 | Stop reason: HOSPADM

## 2024-07-09 RX ORDER — CEFDINIR 300 MG/1
300 CAPSULE ORAL 2 TIMES DAILY
Qty: 10 CAPSULE | Refills: 0 | Status: SHIPPED | OUTPATIENT
Start: 2024-07-09 | End: 2024-07-14

## 2024-07-09 RX ORDER — CARVEDILOL 12.5 MG/1
12.5 TABLET ORAL 2 TIMES DAILY WITH MEALS
Status: DISCONTINUED | OUTPATIENT
Start: 2024-07-09 | End: 2024-07-14 | Stop reason: HOSPADM

## 2024-07-09 RX ORDER — AMOXICILLIN 250 MG
1 CAPSULE ORAL 2 TIMES DAILY PRN
Status: DISCONTINUED | OUTPATIENT
Start: 2024-07-09 | End: 2024-07-14 | Stop reason: HOSPADM

## 2024-07-09 RX ORDER — ATORVASTATIN CALCIUM 40 MG/1
40 TABLET, FILM COATED ORAL DAILY
Status: DISCONTINUED | OUTPATIENT
Start: 2024-07-09 | End: 2024-07-14 | Stop reason: HOSPADM

## 2024-07-09 RX ORDER — ENOXAPARIN SODIUM 100 MG/ML
30 INJECTION SUBCUTANEOUS EVERY 24 HOURS
Status: DISCONTINUED | OUTPATIENT
Start: 2024-07-09 | End: 2024-07-14 | Stop reason: HOSPADM

## 2024-07-09 RX ORDER — SODIUM CHLORIDE, SODIUM LACTATE, POTASSIUM CHLORIDE, CALCIUM CHLORIDE 600; 310; 30; 20 MG/100ML; MG/100ML; MG/100ML; MG/100ML
INJECTION, SOLUTION INTRAVENOUS CONTINUOUS
Status: DISCONTINUED | OUTPATIENT
Start: 2024-07-09 | End: 2024-07-10

## 2024-07-09 RX ORDER — ALUMINUM HYDROXIDE, MAGNESIUM HYDROXIDE, AND SIMETHICONE 1200; 120; 1200 MG/30ML; MG/30ML; MG/30ML
30 SUSPENSION ORAL 4 TIMES DAILY PRN
Status: DISCONTINUED | OUTPATIENT
Start: 2024-07-09 | End: 2024-07-14 | Stop reason: HOSPADM

## 2024-07-09 RX ORDER — IBUPROFEN 200 MG
16 TABLET ORAL
Status: DISCONTINUED | OUTPATIENT
Start: 2024-07-09 | End: 2024-07-14 | Stop reason: HOSPADM

## 2024-07-09 RX ORDER — NALOXONE HCL 0.4 MG/ML
0.02 VIAL (ML) INJECTION
Status: DISCONTINUED | OUTPATIENT
Start: 2024-07-09 | End: 2024-07-14 | Stop reason: HOSPADM

## 2024-07-09 RX ORDER — TALC
6 POWDER (GRAM) TOPICAL NIGHTLY PRN
Status: DISCONTINUED | OUTPATIENT
Start: 2024-07-09 | End: 2024-07-14 | Stop reason: HOSPADM

## 2024-07-09 RX ORDER — IBUPROFEN 200 MG
24 TABLET ORAL
Status: DISCONTINUED | OUTPATIENT
Start: 2024-07-09 | End: 2024-07-14 | Stop reason: HOSPADM

## 2024-07-09 RX ORDER — ISOSORBIDE MONONITRATE 30 MG/1
30 TABLET, EXTENDED RELEASE ORAL DAILY
Status: DISCONTINUED | OUTPATIENT
Start: 2024-07-09 | End: 2024-07-14 | Stop reason: HOSPADM

## 2024-07-09 RX ORDER — DORZOLAMIDE HYDROCHLORIDE AND TIMOLOL MALEATE 20; 5 MG/ML; MG/ML
1 SOLUTION/ DROPS OPHTHALMIC 2 TIMES DAILY
Status: DISCONTINUED | OUTPATIENT
Start: 2024-07-09 | End: 2024-07-14 | Stop reason: HOSPADM

## 2024-07-09 RX ADMIN — CARVEDILOL 12.5 MG: 12.5 TABLET, FILM COATED ORAL at 05:07

## 2024-07-09 RX ADMIN — PANCRELIPASE 1 CAPSULE: 24000; 76000; 120000 CAPSULE, DELAYED RELEASE PELLETS ORAL at 05:07

## 2024-07-09 RX ADMIN — PANCRELIPASE 1 CAPSULE: 24000; 76000; 120000 CAPSULE, DELAYED RELEASE PELLETS ORAL at 11:07

## 2024-07-09 RX ADMIN — ASPIRIN 81 MG: 81 TABLET, COATED ORAL at 11:07

## 2024-07-09 RX ADMIN — SODIUM CHLORIDE 500 ML: 0.9 INJECTION, SOLUTION INTRAVENOUS at 12:07

## 2024-07-09 RX ADMIN — DORZOLAMIDE HYDROCHLORIDE AND TIMOLOL MALEATE 1 DROP: 20; 5 SOLUTION OPHTHALMIC at 11:07

## 2024-07-09 RX ADMIN — ATORVASTATIN CALCIUM 40 MG: 40 TABLET, FILM COATED ORAL at 05:07

## 2024-07-09 RX ADMIN — ENOXAPARIN SODIUM 30 MG: 30 INJECTION SUBCUTANEOUS at 05:07

## 2024-07-09 RX ADMIN — ACETAMINOPHEN 650 MG: 325 TABLET ORAL at 08:07

## 2024-07-09 RX ADMIN — ISOSORBIDE MONONITRATE 30 MG: 30 TABLET, EXTENDED RELEASE ORAL at 11:07

## 2024-07-09 RX ADMIN — PANTOPRAZOLE SODIUM 40 MG: 40 TABLET, DELAYED RELEASE ORAL at 11:07

## 2024-07-09 RX ADMIN — SODIUM CHLORIDE, SODIUM LACTATE, POTASSIUM CHLORIDE, AND CALCIUM CHLORIDE: 600; 310; 30; 20 INJECTION, SOLUTION INTRAVENOUS at 04:07

## 2024-07-09 RX ADMIN — DORZOLAMIDE HYDROCHLORIDE AND TIMOLOL MALEATE 1 DROP: 20; 5 SOLUTION OPHTHALMIC at 08:07

## 2024-07-09 RX ADMIN — CEFTRIAXONE SODIUM 1 G: 1 INJECTION, POWDER, FOR SOLUTION INTRAMUSCULAR; INTRAVENOUS at 01:07

## 2024-07-09 NOTE — ASSESSMENT & PLAN NOTE
Chronic, controlled. Latest blood pressure and vitals reviewed-     Temp:  [97.7 °F (36.5 °C)]   Pulse:  [79-84]   Resp:  [14-20]   BP: (128-161)/(61-85)   SpO2:  [96 %-99 %] .   Home meds for hypertension were reviewed and noted below.   Hypertension Medications               amLODIPine (NORVASC) 2.5 MG tablet Take 10 mg by mouth every morning.    bumetanide (BUMEX) 2 MG tablet Every Monday and Friday    carvediloL (COREG) 12.5 MG tablet TAKE 1 TABLET BY MOUTH TWICE DAILY WITH MEALS    isosorbide mononitrate (IMDUR) 30 MG 24 hr tablet Take 1 tablet (30 mg total) by mouth once daily.    metOLazone (ZAROXOLYN) 2.5 MG tablet Take on Mon    nitroGLYCERIN (NITROSTAT) 0.4 MG SL tablet PLACE ONE TABLET UNDERNEATH THE TONGUE EVERY 5 MINUTES AS NEEDED FOR CHEST PAIN    spironolactone (ALDACTONE) 25 MG tablet Take 1 tablet (25 mg total) by mouth 3 (three) times a week.     While in the hospital, will manage blood pressure as follows; Continue home antihypertensive regimen    Will utilize p.r.n. blood pressure medication only if patient's blood pressure greater than 160/100 and she develops symptoms such as worsening chest pain or shortness of breath.

## 2024-07-09 NOTE — SUBJECTIVE & OBJECTIVE
Past Medical History:   Diagnosis Date    Anemia     Angina pectoris     Anxiety     Anxiety and depression     Arthritis     hip    Carotid artery occlusion     Carpal tunnel syndrome 06/23/2008    emg    Chronic diarrhea     work up in 2011 with EGD, CS and VCE    CKD (chronic kidney disease) stage 3, GFR 30-59 ml/min 5/11/2017    Colitis     Coronary artery disease     Coronary artery disease     Diastolic dysfunction     Diverticulosis     Glaucoma     Greater trochanteric bursitis 2/10/2015    Grief at loss of child 1/26/2016    H/O carotid endarterectomy 12/2/2013    Heart failure     History of coronary angioplasty 3/11/2014    Hypercholesteremia     Hypertension     Liver cyst 02/08/2013    ct abd    Macular degeneration     Obesity with serious comorbidity 3/19/2023    Primary open-angle glaucoma(365.11) 9/3/2013    Renal cyst 02/08/2013    ct abd    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    Sarcoidosis     Sarcoidosis of lung     Sickle cell trait     Uveitis        Past Surgical History:   Procedure Laterality Date    A-V CARDIAC PACEMAKER INSERTION Left 3/21/2023    Procedure: INSERTION, CARDIAC PACEMAKER, DUAL CHAMBER/His Lead;  Surgeon: Cortez Ambrose MD;  Location: [facility] CATH LAB;  Service: Cardiology;  Laterality: Left;  MDT/ MD to confirm in am/possible nurse sedate    CAROTID ENDARTERECTOMY Right 2000s    CATARACT EXTRACTION Bilateral     Dr. Liang Dennis    CHOLECYSTECTOMY      laparoscopic, 3/18.    CORONARY ANGIOPLASTY WITH STENT PLACEMENT  11/19/2010    RCA-HALIE 2010    Lathia    JOINT REPLACEMENT Left 11/03/2014    Dr. Braun    TOTAL ABDOMINAL HYSTERECTOMY W/ BILATERAL SALPINGOOPHORECTOMY  1972       Review of patient's allergies indicates:   Allergen Reactions    Lisinopril      angioedema    Codeine Nausea And Vomiting       No current facility-administered medications on file prior to encounter.     Current Outpatient Medications on File Prior to Encounter   Medication  Sig    aflibercept 2 mg/0.05 mL Soln 2 mg by Intravitreal route every 28 days.    amLODIPine (NORVASC) 2.5 MG tablet Take 10 mg by mouth every morning.    aspirin (ECOTRIN) 81 MG EC tablet Take 1 tablet (81 mg total) by mouth once daily.    atorvastatin (LIPITOR) 40 MG tablet Take 1 tablet (40 mg total) by mouth Daily.    budesonide (ENTOCORT EC) 3 mg capsule Take 1-2 capsules (3-6 mg total) by mouth daily as needed (flare). As needed    bumetanide (BUMEX) 2 MG tablet Every Monday and Friday    busPIRone (BUSPAR) 5 MG Tab Take 1 tablet (5 mg total) by mouth 2 (two) times daily as needed (anxiety).    carvediloL (COREG) 12.5 MG tablet TAKE 1 TABLET BY MOUTH TWICE DAILY WITH MEALS    citalopram (CELEXA) 10 MG tablet Take 1 tablet (10 mg total) by mouth every evening.    cyproheptadine (PERIACTIN) 4 mg tablet TAKE ONE TABLET BY MOUTH THREE TIMES A DAY IF NEEDED    dexAMETHasone (OZURDEX) 0.7 mg Impl intravitreal implant 0.7 mg by Intravitreal route once.    dorzolamide-timolol 2-0.5% (COSOPT) 22.3-6.8 mg/mL ophthalmic solution INSTILL 1 DROP IN BOTH EYES TWICE DAILY    FOLIC ACID/MULTIVIT-MIN/LUTEIN (CENTRUM SILVER ORAL) Take 1 tablet by mouth once daily.    isosorbide mononitrate (IMDUR) 30 MG 24 hr tablet Take 1 tablet (30 mg total) by mouth once daily.    lipase-protease-amylase 24,000-76,000-120,000 units (CREON) 24,000-76,000 -120,000 unit capsule Take 1 capsule by mouth 3 (three) times daily with meals.    loperamide (IMODIUM) 2 mg capsule Take 1 mg by mouth every 6 (six) hours as needed for Diarrhea.    metOLazone (ZAROXOLYN) 2.5 MG tablet Take on Mon    nitroGLYCERIN (NITROSTAT) 0.4 MG SL tablet PLACE ONE TABLET UNDERNEATH THE TONGUE EVERY 5 MINUTES AS NEEDED FOR CHEST PAIN    pantoprazole (PROTONIX) 40 MG tablet TAKE 1 TABLET(40 MG) BY MOUTH EVERY DAY    potassium chloride (KLOR-CON) 10 MEQ TbSR Take 1 tablet (10 mEq total) by mouth once daily.    spironolactone (ALDACTONE) 25 MG tablet Take 1 tablet (25 mg  total) by mouth 3 (three) times a week.    [DISCONTINUED] ondansetron (ZOFRAN-ODT) 4 MG TbDL DISSOLVE 1 TABLET(4 MG) ON THE TONGUE EVERY 8 HOURS AS NEEDED FOR NAUSEA OR VOMITING     Family History       Problem Relation (Age of Onset)    Cancer Father, Daughter    Cirrhosis Brother    Heart failure Mother, Brother    Hypertension Mother          Tobacco Use    Smoking status: Never    Smokeless tobacco: Never   Substance and Sexual Activity    Alcohol use: No     Alcohol/week: 0.0 standard drinks of alcohol    Drug use: No    Sexual activity: Yes     Partners: Male     Review of Systems   All other systems reviewed and are negative.    Objective:     Vital Signs (Most Recent):  Temp: 97.7 °F (36.5 °C) (07/08/24 2001)  Pulse: 82 (07/09/24 0330)  Resp: 20 (07/09/24 0330)  BP: 138/73 (07/09/24 0330)  SpO2: 98 % (07/09/24 0330) Vital Signs (24h Range):  Temp:  [97.7 °F (36.5 °C)] 97.7 °F (36.5 °C)  Pulse:  [79-82] 82  Resp:  [14-20] 20  SpO2:  [96 %-99 %] 98 %  BP: (132-161)/(61-85) 138/73     Weight: 61.7 kg (136 lb)  Body mass index is 25.7 kg/m².     Physical Exam  Vitals reviewed.   Constitutional:       General: She is not in acute distress.     Appearance: Normal appearance. She is normal weight. She is not ill-appearing, toxic-appearing or diaphoretic.   HENT:      Head: Normocephalic and atraumatic.      Right Ear: External ear normal.      Left Ear: External ear normal.      Nose: Nose normal. No congestion or rhinorrhea.      Mouth/Throat:      Mouth: Mucous membranes are moist.      Pharynx: Oropharynx is clear. No oropharyngeal exudate or posterior oropharyngeal erythema.   Eyes:      General: No scleral icterus.     Extraocular Movements: Extraocular movements intact.      Conjunctiva/sclera: Conjunctivae normal.      Pupils: Pupils are equal, round, and reactive to light.   Neck:      Vascular: No carotid bruit.   Cardiovascular:      Rate and Rhythm: Normal rate and regular rhythm.      Pulses: Normal  pulses.      Heart sounds: Normal heart sounds. No murmur heard.     No friction rub. No gallop.   Pulmonary:      Effort: Pulmonary effort is normal. No respiratory distress.      Breath sounds: Normal breath sounds. No stridor. No wheezing, rhonchi or rales.   Chest:      Chest wall: No tenderness.   Abdominal:      General: Abdomen is flat. Bowel sounds are normal. There is no distension.      Palpations: Abdomen is soft.      Tenderness: There is no abdominal tenderness. There is no right CVA tenderness, left CVA tenderness, guarding or rebound.      Hernia: No hernia is present.   Musculoskeletal:         General: No swelling, tenderness, deformity or signs of injury. Normal range of motion.      Cervical back: Normal range of motion and neck supple. No rigidity or tenderness.      Right lower leg: No edema.      Left lower leg: No edema.   Lymphadenopathy:      Cervical: No cervical adenopathy.   Skin:     General: Skin is warm and dry.      Capillary Refill: Capillary refill takes less than 2 seconds.      Coloration: Skin is not jaundiced or pale.      Findings: No bruising, erythema, lesion or rash.   Neurological:      General: No focal deficit present.      Mental Status: She is alert and oriented to person, place, and time. Mental status is at baseline.      Cranial Nerves: No cranial nerve deficit.      Sensory: No sensory deficit.      Motor: No weakness.      Coordination: Coordination normal.   Psychiatric:         Mood and Affect: Mood normal.         Behavior: Behavior normal.         Thought Content: Thought content normal.         Judgment: Judgment normal.              CRANIAL NERVES     CN III, IV, VI   Pupils are equal, round, and reactive to light.       Significant Labs: All pertinent labs within the past 24 hours have been reviewed.    Significant Imaging: I have reviewed all pertinent imaging results/findings within the past 24 hours.    LABS:  Recent Results (from the past 24 hour(s))    Brain natriuretic peptide    Collection Time: 07/08/24  9:01 PM   Result Value Ref Range     (H) 0 - 99 pg/mL   CBC auto differential    Collection Time: 07/08/24  9:01 PM   Result Value Ref Range    WBC 14.27 (H) 3.90 - 12.70 K/uL    RBC 4.35 4.00 - 5.40 M/uL    Hemoglobin 12.6 12.0 - 16.0 g/dL    Hematocrit 37.5 37.0 - 48.5 %    MCV 86 82 - 98 fL    MCH 29.0 27.0 - 31.0 pg    MCHC 33.6 32.0 - 36.0 g/dL    RDW 15.4 (H) 11.5 - 14.5 %    Platelets 283 150 - 450 K/uL    MPV 9.4 9.2 - 12.9 fL    Immature Granulocytes CANCELED 0.0 - 0.5 %    Immature Grans (Abs) CANCELED 0.00 - 0.04 K/uL    nRBC 0 0 /100 WBC    Gran % 84.0 (H) 38.0 - 73.0 %    Lymph % 8.0 (L) 18.0 - 48.0 %    Mono % 6.0 4.0 - 15.0 %    Eosinophil % 0.0 0.0 - 8.0 %    Basophil % 0.0 0.0 - 1.9 %    Bands 2.0 %    Poik Moderate     Ovalocytes Occasional     Tear Drop Cells Occasional     Differential Method Manual    Comprehensive metabolic panel    Collection Time: 07/08/24  9:01 PM   Result Value Ref Range    Sodium 133 (L) 136 - 145 mmol/L    Potassium 5.1 3.5 - 5.1 mmol/L    Chloride 110 95 - 110 mmol/L    CO2 11 (L) 23 - 29 mmol/L    Glucose 126 (H) 70 - 110 mg/dL    BUN 75 (H) 8 - 23 mg/dL    Creatinine 3.1 (H) 0.5 - 1.4 mg/dL    Calcium 9.5 8.7 - 10.5 mg/dL    Total Protein 7.6 6.0 - 8.4 g/dL    Albumin 3.8 3.5 - 5.2 g/dL    Total Bilirubin 0.6 0.1 - 1.0 mg/dL    Alkaline Phosphatase 105 55 - 135 U/L    AST 15 10 - 40 U/L    ALT 14 10 - 44 U/L    eGFR 14 (A) >60 mL/min/1.73 m^2    Anion Gap 12 8 - 16 mmol/L   Troponin I    Collection Time: 07/08/24  9:01 PM   Result Value Ref Range    Troponin I 0.051 (H) 0.000 - 0.026 ng/mL   Troponin I    Collection Time: 07/09/24 12:21 AM   Result Value Ref Range    Troponin I 0.059 (H) 0.000 - 0.026 ng/mL   Urinalysis, Reflex to Urine Culture Urine, Clean Catch    Collection Time: 07/09/24 12:47 AM    Specimen: Urine, Clean Catch   Result Value Ref Range    Specimen UA Urine, Clean Catch     Color, UA  Yellow Yellow, Straw, Terrie    Appearance, UA Cloudy (A) Clear    pH, UA 6.0 5.0 - 8.0    Specific Gravity, UA 1.010 1.005 - 1.030    Protein, UA Trace (A) Negative    Glucose, UA Negative Negative    Ketones, UA Negative Negative    Bilirubin (UA) Negative Negative    Occult Blood UA 1+ (A) Negative    Nitrite, UA Negative Negative    Urobilinogen, UA Negative <2.0 EU/dL    Leukocytes, UA 3+ (A) Negative   Urinalysis Microscopic    Collection Time: 07/09/24 12:47 AM   Result Value Ref Range    RBC, UA 8 (H) 0 - 4 /hpf    WBC, UA 34 (H) 0 - 5 /hpf    WBC Clumps, UA Many (A) None-Rare    Bacteria Moderate (A) None-Occ /hpf    Squam Epithel, UA 5 /hpf    Hyaline Casts, UA 33 (A) 0-1/lpf /lpf    Unclass Alee UA Moderate None-Moderate    Microscopic Comment SEE COMMENT    COVID-19 Rapid Screening    Collection Time: 07/09/24  2:16 AM   Result Value Ref Range    SARS-CoV-2 RNA, Amplification, Qual Negative Negative       RADIOLOGY  CT Abdomen Pelvis  Without Contrast    Result Date: 7/8/2024  EXAM: CT ABDOMEN PELVIS WITHOUT CONTRAST CLINICAL HISTORY: Concern for abdominal abscess/infection COMPARISON: None TECHNIQUE: Standard thin-section axial images, with reformatted sagittal and coronal images. FINDINGS: Pacemaker leads.  Considerable calcification within the coronary arteries and at the mitral valve annulus.  Small hiatal hernia. Cholecystectomy.  Within limits of a noncontrast CT, the liver, spleen, fatty replaced pancreas and adrenals are unremarkable. Right kidney: 6 mm nonobstructing stone at the interpolar region.  1.2 cm cyst at the interpolar region.  Moderate cortical atrophy.  No hydronephrosis, hydroureter or ureteral stones. Left kidney: Cyst at the superior pole.  Additional subcentimeter indeterminate low attenuating lesion at the interpolar region likely a cyst.  7 mm nonobstructing stone at the lower pole.  Moderate cortical atrophy.  No hydronephrosis hydroureter or ureteral stones. No abdominal or  retroperitoneal lymphadenopathy.  No ascites or fat stranding within the abdomen.  No dilatation of the large or small bowel.  Colonic diverticula without diverticulitis.  No evidence for appendicitis.  Considerable calcification, aorta and iliac vessels. Pelvis: Streak artifact from the left hip arthroplasty limits evaluation.  No pelvic free fluid, iliac chain or inguinal lymphadenopathy. No concerning lytic or sclerotic bony lesions.  Sternotomy wires.      No acute findings. All CT scans at [this location] are performed using dose modulation techniques as appropriate to a performed exam including the following:  Automated exposure control; adjustment of the mA and/or kV according to patient size (this includes techniques or standardized protocols for targeted exams where dose is matched to indication / reason for exam; i.e. extremities or head); use of iterative reconstruction technique. Finalized on: 7/8/2024 11:07 PM By:  Claudio Gomes [facility]# [id number]      2024-07-08 23:09:58.979    [facility]    X-Ray Chest 1 View    Result Date: 7/8/2024  Exam: XR CHEST 1 VIEW Comparison: 06/20/2024 Clinical Indication:  Shortness of breath Findings: Prior sternotomy.  Pacemaker leads are unchanged in position.  Surgical clips in the right upper quadrant.   Heart size and pulmonary vasculature are unremarkable.  No focal consolidation, pleural effusions or pneumothorax. No acute angulated or displaced fractures.     No acute cardiopulmonary disease. Finalized on: 7/8/2024 9:10 PM By:  Claudio Gomes R# [id number]      2024-07-08 21:12:12.779    [facility]    X-Ray Ribs 2 View Left    Result Date: 6/20/2024  EXAM:  XR RIBS 2 VIEW LEFT CLINICAL HISTORY: [R07.81]-Pleurodynia. COMPARISON: Comparison was made to a chest x-ray performed on 04/01/2024. FINDINGS: This examination consists of 4 views of the left ribs.  There is no acute rib fracture visualized.  There is no focal pulmonary infiltrate visualized.  There is no pneumothorax.  The left  costophrenic angle is sharp.       There is no acute rib fracture visualized. Finalized on: 6/20/2024 7:11 PM By:  Eliezer Dempsey MD [facility]# [id number]      2024-06-20 19:13:28.028    [facility]      EKG    MICROBIOLOGY    MDM

## 2024-07-09 NOTE — ASSESSMENT & PLAN NOTE
Chronic.  Stable.  Not in acute flare.  Plan:  -continue to monitor   -f/u outpatient as directed

## 2024-07-09 NOTE — ED PROVIDER NOTES
SCRIBE #1 NOTE: IMery, am scribing for, and in the presence of, Michelle Braun MD. I have scribed the entire note.       History     Chief Complaint   Patient presents with    Diarrhea    Weakness     Diarrhea, weakness, decreased appetite and SOB x one week.       Review of patient's allergies indicates:   Allergen Reactions    Lisinopril      angioedema    Codeine Nausea And Vomiting         History of Present Illness     HPI    7/9/2024, 12:20 AM  History obtained from the patient      History of Present Illness: Glo Dumont is a 88 y.o. female patient with a PMHx of anemia, CAD, heart failure, and HTN who presents to the Emergency Department for evaluation of constant, moderate generalized weakness which onset 1 week PTA. The patient is additionally c/o SOB, diarrhea (3 episodes today), dizziness, and decreased appetite. No mitigating or exacerbating factors reported. Patient denies any fever, CP, cough, rhinorrhea, congestion, abdominal pain, N/V, dysuria, and all other sxs at this time. No prior Tx reported. No further complaints or concerns at this time.       Arrival mode: Personal vehicle    PCP: Christina Cardona MD        Past Medical History:  Past Medical History:   Diagnosis Date    Anemia     Angina pectoris     Anxiety     Anxiety and depression     Arthritis     hip    Carotid artery occlusion     Carpal tunnel syndrome 06/23/2008    emg    Chronic diarrhea     work up in 2011 with EGD, CS and VCE    CKD (chronic kidney disease) stage 3, GFR 30-59 ml/min 5/11/2017    Colitis     Coronary artery disease     Coronary artery disease     Diastolic dysfunction     Diverticulosis     Glaucoma     Greater trochanteric bursitis 2/10/2015    Grief at loss of child 1/26/2016    H/O carotid endarterectomy 12/2/2013    Heart failure     History of coronary angioplasty 3/11/2014    Hypercholesteremia     Hypertension     Liver cyst 02/08/2013    ct abd    Macular degeneration     Obesity with  serious comorbidity 3/19/2023    Primary open-angle glaucoma(365.11) 9/3/2013    Renal cyst 02/08/2013    ct abd    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    Sarcoidosis     Sarcoidosis of lung     Sickle cell trait     Uveitis        Past Surgical History:  Past Surgical History:   Procedure Laterality Date    A-V CARDIAC PACEMAKER INSERTION Left 3/21/2023    Procedure: INSERTION, CARDIAC PACEMAKER, DUAL CHAMBER/His Lead;  Surgeon: Cortez Ambrose MD;  Location: [facility] CATH LAB;  Service: Cardiology;  Laterality: Left;  MDT/ MD to confirm in am/possible nurse sedate    CAROTID ENDARTERECTOMY Right 2000s    CATARACT EXTRACTION Bilateral     Dr. Liang Dennis    CHOLECYSTECTOMY      laparoscopic, 3/18.    CORONARY ANGIOPLASTY WITH STENT PLACEMENT  11/19/2010    RCA-HALIE 2010    Lathia    JOINT REPLACEMENT Left 11/03/2014    Dr. Braun    TOTAL ABDOMINAL HYSTERECTOMY W/ BILATERAL SALPINGOOPHORECTOMY  1972         Family History:  Family History   Problem Relation Name Age of Onset    Hypertension Mother      Heart failure Mother      Cancer Father          prostate    Cirrhosis Brother      Heart failure Brother      Cancer Daughter          Carcinoid       Social History:  Social History     Tobacco Use    Smoking status: Never    Smokeless tobacco: Never   Substance and Sexual Activity    Alcohol use: No     Alcohol/week: 0.0 standard drinks of alcohol    Drug use: No    Sexual activity: Yes     Partners: Male        Review of Systems     Review of Systems   Constitutional:  Positive for appetite change (decreased). Negative for fever.   HENT:  Negative for congestion, rhinorrhea and sore throat.    Respiratory:  Positive for shortness of breath.    Cardiovascular:  Negative for chest pain.   Gastrointestinal:  Positive for diarrhea. Negative for abdominal pain, nausea and vomiting.   Genitourinary:  Negative for dysuria.   Musculoskeletal:  Negative for back pain.   Skin:  Negative for rash.    Neurological:  Positive for dizziness and weakness (generalized).   Hematological:  Does not bruise/bleed easily.   All other systems reviewed and are negative.     Physical Exam     Initial Vitals [07/08/24 2001]   BP Pulse Resp Temp SpO2   132/72 82 18 97.7 °F (36.5 °C) 98 %      MAP       --          Physical Exam  Nursing Notes and Vital Signs Reviewed.  Constitutional: Patient is in no acute distress. Well-developed and well-nourished.  Head: Atraumatic. Normocephalic.  Eyes: PERRL. EOM intact. Conjunctivae are not pale. No scleral icterus.  ENT: Mucous membranes are moist. Oropharynx is clear and symmetric.    Neck: Supple. Full ROM. No lymphadenopathy.  Cardiovascular: Regular rate. Regular rhythm. No murmurs, rubs, or gallops. Distal pulses are 2+ and symmetric.  Pulmonary/Chest: No respiratory distress. Clear to auscultation bilaterally. No wheezing or rales.  Abdominal: Soft and non-distended.  There is no tenderness.  No rebound, guarding, or rigidity. Good bowel sounds.  Genitourinary: No CVA tenderness  Musculoskeletal: Moves all extremities. No obvious deformities. No edema. No calf tenderness.  Skin: Warm and dry.  Neurological:  Alert, awake, and appropriate. Oriented to person and place. Not oriented to date. Normal speech.  No acute focal neurological deficits are appreciated.  Psychiatric: Normal affect. Good eye contact. Appropriate in content.     ED Course   Procedures  ED Vital Signs:  Vitals:    07/09/24 0330 07/09/24 0400 07/09/24 0430 07/09/24 0615   BP: 138/73 128/71 129/62 135/64   Pulse: 82 83 84 84   Resp: 20 20 17 18   Temp:       TempSrc:       SpO2: 98% 96% 97% 97%   Weight:        07/09/24 0645 07/09/24 0700 07/09/24 0745 07/09/24 0830   BP: 122/73 101/63 101/63 130/62   Pulse: 82  80 80   Resp: 20  18 17   Temp:   97.7 °F (36.5 °C)    TempSrc:   Oral    SpO2: 97%  97% 96%   Weight:        07/09/24 0900 07/09/24 1011 07/09/24 1315 07/09/24 1740   BP: (!) 156/76 (!) 146/70 134/77  127/73   Pulse: 87 89 83 81   Resp: 18 20 (!) 22 (!) 23   Temp:  97.6 °F (36.4 °C) 97.6 °F (36.4 °C) 98.7 °F (37.1 °C)   TempSrc:  Oral Oral Oral   SpO2: 99% 98% 98% 97%   Weight:        07/09/24 1943 07/09/24 2332 07/10/24 0426   BP: 127/60 118/66 133/63   Pulse: 86 76 69   Resp: 19 20 19   Temp: 98.5 °F (36.9 °C) 98.2 °F (36.8 °C) 97.3 °F (36.3 °C)   TempSrc: Oral Oral Oral   SpO2: 96% 96% 98%   Weight: 62.2 kg (137 lb 2 oz)         Abnormal Lab Results:  Labs Reviewed   B-TYPE NATRIURETIC PEPTIDE - Abnormal; Notable for the following components:       Result Value     (*)     All other components within normal limits   CBC W/ AUTO DIFFERENTIAL - Abnormal; Notable for the following components:    WBC 14.27 (*)     RDW 15.4 (*)     Gran % 84.0 (*)     Lymph % 8.0 (*)     All other components within normal limits   COMPREHENSIVE METABOLIC PANEL - Abnormal; Notable for the following components:    Sodium 133 (*)     CO2 11 (*)     Glucose 126 (*)     BUN 75 (*)     Creatinine 3.1 (*)     eGFR 14 (*)     All other components within normal limits   TROPONIN I - Abnormal; Notable for the following components:    Troponin I 0.051 (*)     All other components within normal limits   URINALYSIS, REFLEX TO URINE CULTURE - Abnormal; Notable for the following components:    Appearance, UA Cloudy (*)     Protein, UA Trace (*)     Occult Blood UA 1+ (*)     Leukocytes, UA 3+ (*)     All other components within normal limits    Narrative:     Specimen Source->Urine   TROPONIN I - Abnormal; Notable for the following components:    Troponin I 0.059 (*)     All other components within normal limits   URINALYSIS MICROSCOPIC - Abnormal; Notable for the following components:    RBC, UA 8 (*)     WBC, UA 34 (*)     WBC Clumps, UA Many (*)     Bacteria Moderate (*)     Hyaline Casts, UA 33 (*)     All other components within normal limits    Narrative:     Specimen Source->Urine   COMPREHENSIVE METABOLIC PANEL - Abnormal; Notable  for the following components:    Sodium 134 (*)     Chloride 112 (*)     CO2 9 (*)     BUN 71 (*)     Creatinine 2.9 (*)     Albumin 3.4 (*)     eGFR 15 (*)     All other components within normal limits    Narrative:     CO2  critical result(s) called and verbal readback obtained from   KORY TUBBS RN by RA2 07/09/2024 07:02   CBC W/ AUTO DIFFERENTIAL - Abnormal; Notable for the following components:    WBC 13.25 (*)     Hemoglobin 11.9 (*)     Hematocrit 35.7 (*)     RDW 15.5 (*)     Immature Granulocytes 4.0 (*)     Gran # (ANC) 11.0 (*)     Immature Grans (Abs) 0.53 (*)     Lymph # 0.7 (*)     Gran % 82.7 (*)     Lymph % 4.9 (*)     All other components within normal limits   CULTURE, URINE   SARS-COV-2 RNA AMPLIFICATION, QUAL        All Lab Results:  Results for orders placed or performed during the hospital encounter of 07/09/24   Brain natriuretic peptide   Result Value Ref Range     (H) 0 - 99 pg/mL   CBC auto differential   Result Value Ref Range    WBC 14.27 (H) 3.90 - 12.70 K/uL    RBC 4.35 4.00 - 5.40 M/uL    Hemoglobin 12.6 12.0 - 16.0 g/dL    Hematocrit 37.5 37.0 - 48.5 %    MCV 86 82 - 98 fL    MCH 29.0 27.0 - 31.0 pg    MCHC 33.6 32.0 - 36.0 g/dL    RDW 15.4 (H) 11.5 - 14.5 %    Platelets 283 150 - 450 K/uL    MPV 9.4 9.2 - 12.9 fL    Immature Granulocytes CANCELED 0.0 - 0.5 %    Immature Grans (Abs) CANCELED 0.00 - 0.04 K/uL    nRBC 0 0 /100 WBC    Gran % 84.0 (H) 38.0 - 73.0 %    Lymph % 8.0 (L) 18.0 - 48.0 %    Mono % 6.0 4.0 - 15.0 %    Eosinophil % 0.0 0.0 - 8.0 %    Basophil % 0.0 0.0 - 1.9 %    Bands 2.0 %    Poik Moderate     Ovalocytes Occasional     Tear Drop Cells Occasional     Differential Method Manual    Comprehensive metabolic panel   Result Value Ref Range    Sodium 133 (L) 136 - 145 mmol/L    Potassium 5.1 3.5 - 5.1 mmol/L    Chloride 110 95 - 110 mmol/L    CO2 11 (L) 23 - 29 mmol/L    Glucose 126 (H) 70 - 110 mg/dL    BUN 75 (H) 8 - 23 mg/dL    Creatinine 3.1 (H) 0.5 -  1.4 mg/dL    Calcium 9.5 8.7 - 10.5 mg/dL    Total Protein 7.6 6.0 - 8.4 g/dL    Albumin 3.8 3.5 - 5.2 g/dL    Total Bilirubin 0.6 0.1 - 1.0 mg/dL    Alkaline Phosphatase 105 55 - 135 U/L    AST 15 10 - 40 U/L    ALT 14 10 - 44 U/L    eGFR 14 (A) >60 mL/min/1.73 m^2    Anion Gap 12 8 - 16 mmol/L   Troponin I   Result Value Ref Range    Troponin I 0.051 (H) 0.000 - 0.026 ng/mL   Urinalysis, Reflex to Urine Culture Urine, Clean Catch    Specimen: Urine, Clean Catch   Result Value Ref Range    Specimen UA Urine, Clean Catch     Color, UA Yellow Yellow, Straw, Terrie    Appearance, UA Cloudy (A) Clear    pH, UA 6.0 5.0 - 8.0    Specific Gravity, UA 1.010 1.005 - 1.030    Protein, UA Trace (A) Negative    Glucose, UA Negative Negative    Ketones, UA Negative Negative    Bilirubin (UA) Negative Negative    Occult Blood UA 1+ (A) Negative    Nitrite, UA Negative Negative    Urobilinogen, UA Negative <2.0 EU/dL    Leukocytes, UA 3+ (A) Negative   Troponin I   Result Value Ref Range    Troponin I 0.059 (H) 0.000 - 0.026 ng/mL   COVID-19 Rapid Screening   Result Value Ref Range    SARS-CoV-2 RNA, Amplification, Qual Negative Negative   Urinalysis Microscopic   Result Value Ref Range    RBC, UA 8 (H) 0 - 4 /hpf    WBC, UA 34 (H) 0 - 5 /hpf    WBC Clumps, UA Many (A) None-Rare    Bacteria Moderate (A) None-Occ /hpf    Squam Epithel, UA 5 /hpf    Hyaline Casts, UA 33 (A) 0-1/lpf /lpf    Unclass Alee UA Moderate None-Moderate    Microscopic Comment SEE COMMENT    Comprehensive Metabolic Panel (CMP)   Result Value Ref Range    Sodium 134 (L) 136 - 145 mmol/L    Potassium 4.6 3.5 - 5.1 mmol/L    Chloride 112 (H) 95 - 110 mmol/L    CO2 9 (LL) 23 - 29 mmol/L    Glucose 96 70 - 110 mg/dL    BUN 71 (H) 8 - 23 mg/dL    Creatinine 2.9 (H) 0.5 - 1.4 mg/dL    Calcium 8.9 8.7 - 10.5 mg/dL    Total Protein 7.0 6.0 - 8.4 g/dL    Albumin 3.4 (L) 3.5 - 5.2 g/dL    Total Bilirubin 0.3 0.1 - 1.0 mg/dL    Alkaline Phosphatase 96 55 - 135 U/L    AST  16 10 - 40 U/L    ALT 13 10 - 44 U/L    eGFR 15 (A) >60 mL/min/1.73 m^2    Anion Gap 13 8 - 16 mmol/L   CBC with Automated Differential   Result Value Ref Range    WBC 13.25 (H) 3.90 - 12.70 K/uL    RBC 4.15 4.00 - 5.40 M/uL    Hemoglobin 11.9 (L) 12.0 - 16.0 g/dL    Hematocrit 35.7 (L) 37.0 - 48.5 %    MCV 86 82 - 98 fL    MCH 28.7 27.0 - 31.0 pg    MCHC 33.3 32.0 - 36.0 g/dL    RDW 15.5 (H) 11.5 - 14.5 %    Platelets 240 150 - 450 K/uL    MPV 9.9 9.2 - 12.9 fL    Immature Granulocytes 4.0 (H) 0.0 - 0.5 %    Gran # (ANC) 11.0 (H) 1.8 - 7.7 K/uL    Immature Grans (Abs) 0.53 (H) 0.00 - 0.04 K/uL    Lymph # 0.7 (L) 1.0 - 4.8 K/uL    Mono # 1.0 0.3 - 1.0 K/uL    Eos # 0.0 0.0 - 0.5 K/uL    Baso # 0.09 0.00 - 0.20 K/uL    nRBC 0 0 /100 WBC    Gran % 82.7 (H) 38.0 - 73.0 %    Lymph % 4.9 (L) 18.0 - 48.0 %    Mono % 7.5 4.0 - 15.0 %    Eosinophil % 0.2 0.0 - 8.0 %    Basophil % 0.7 0.0 - 1.9 %    Differential Method Automated    EKG 12-lead   Result Value Ref Range    QRS Duration 82 ms    [facility] QTC Calculation 421 ms     *Note: Due to a large number of results and/or encounters for the requested time period, some results have not been displayed. A complete set of results can be found in Results Review.         Imaging Results:  Imaging Results              CT Abdomen Pelvis  Without Contrast (Final result)  Result time 07/08/24 23:07:53      Final result by Claudio Gomes DO (07/08/24 23:07:53)                   Impression:     No acute findings.    All CT scans at [this location] are performed using dose modulation techniques as appropriate to a performed exam including the following:  Automated exposure control; adjustment of the mA and/or kV according to patient size (this includes techniques or standardized protocols for targeted exams where dose is matched to indication / reason for exam; i.e. extremities or head); use of iterative reconstruction technique.    Finalized on: 7/8/2024 11:07 PM By:  Claudio  Eliseo  R# [id number]      2024-07-08 23:09:58.979    [facility]               Narrative:    EXAM: CT ABDOMEN PELVIS WITHOUT CONTRAST    CLINICAL HISTORY: Concern for abdominal abscess/infection    COMPARISON: None    TECHNIQUE: Standard thin-section axial images, with reformatted sagittal and coronal images.    FINDINGS: Pacemaker leads.  Considerable calcification within the coronary arteries and at the mitral valve annulus.  Small hiatal hernia.    Cholecystectomy.  Within limits of a noncontrast CT, the liver, spleen, fatty replaced pancreas and adrenals are unremarkable.    Right kidney: 6 mm nonobstructing stone at the interpolar region.  1.2 cm cyst at the interpolar region.  Moderate cortical atrophy.  No hydronephrosis, hydroureter or ureteral stones.    Left kidney: Cyst at the superior pole.  Additional subcentimeter indeterminate low attenuating lesion at the interpolar region likely a cyst.  7 mm nonobstructing stone at the lower pole.  Moderate cortical atrophy.  No hydronephrosis hydroureter or ureteral stones.    No abdominal or retroperitoneal lymphadenopathy.  No ascites or fat stranding within the abdomen.  No dilatation of the large or small bowel.  Colonic diverticula without diverticulitis.  No evidence for appendicitis.  Considerable calcification, aorta and iliac vessels.    Pelvis: Streak artifact from the left hip arthroplasty limits evaluation.  No pelvic free fluid, iliac chain or inguinal lymphadenopathy.    No concerning lytic or sclerotic bony lesions.  Sternotomy wires.                                         X-Ray Chest 1 View (Final result)  Result time 07/08/24 21:10:05      Final result by Claudio Gomes DO (07/08/24 21:10:05)                   Impression:    No acute cardiopulmonary disease.    Finalized on: 7/8/2024 9:10 PM By:  Claudio Gomes  R# [id number]      2024-07-08 21:12:12.779    [facility]               Narrative:    Exam: XR CHEST 1 VIEW    Comparison: 06/20/2024    Clinical  Indication:  Shortness of breath    Findings: Prior sternotomy.  Pacemaker leads are unchanged in position.  Surgical clips in the right upper quadrant.      Heart size and pulmonary vasculature are unremarkable.  No focal consolidation, pleural effusions or pneumothorax.    No acute angulated or displaced fractures.                                         The EKG was ordered, reviewed, and independently interpreted by the ED provider.  Interpretation time: 20:55  Rate: 78 BPM  Rhythm: normal sinus rhythm  Interpretation: Possible left atrial enlargement. No STEMI.           The Emergency Provider reviewed the vital signs and test results, which are outlined above.     ED Discussion     3:28 AM: Re-evaluated pt. Considered discharging the patient, however, family is uncomfortable due to the patient's continued weakness and decreased appetite. Will consul HM.    3:45 AM: Discussed case with Homero Rendon MD ([facility]). Dr. Rendon agrees with current care and management of pt and accepts admission.   Admitting Service: [facility]  Admitting Physician: Dr. Rendon  Admit to: Inpatient Med Surg    3:45 AM: Re-evaluated pt. I have discussed test results, shared treatment plan, and the need for admission with patient and family at bedside. Pt and family express understanding at this time and agree with all information. All questions answered. Pt and family have no further questions or concerns at this time. Pt is ready for admit.         Medical Decision Making  Amount and/or Complexity of Data Reviewed  Labs: ordered. Decision-making details documented in ED Course.  Radiology: ordered. Decision-making details documented in ED Course.  ECG/medicine tests: ordered and independent interpretation performed. Decision-making details documented in ED Course.    Risk  Prescription drug management.  Decision regarding hospitalization.                ED Medication(s):  Medications   sodium chloride 0.9%  flush 10 mL (has no administration in time range)   lactated ringers infusion ( Intravenous New Bag 7/9/24 0445)   albuterol-ipratropium 2.5 mg-0.5 mg/3 mL nebulizer solution 3 mL (has no administration in time range)   melatonin tablet 6 mg (has no administration in time range)   ondansetron injection 4 mg (has no administration in time range)   promethazine tablet 25 mg (has no administration in time range)   senna-docusate 8.6-50 mg per tablet 1 tablet (has no administration in time range)   acetaminophen tablet 650 mg (650 mg Oral Given 7/9/24 2015)   aluminum-magnesium hydroxide-simethicone 200-200-20 mg/5 mL suspension 30 mL (has no administration in time range)   acetaminophen suppository 650 mg (has no administration in time range)   naloxone 0.4 mg/mL injection 0.02 mg (has no administration in time range)   glucose chewable tablet 16 g (has no administration in time range)   glucose chewable tablet 24 g (has no administration in time range)   glucagon (human recombinant) injection 1 mg (has no administration in time range)   enoxaparin injection 30 mg (30 mg Subcutaneous Given 7/9/24 1746)   dextrose 10% bolus 125 mL 125 mL (has no administration in time range)   dextrose 10% bolus 250 mL 250 mL (has no administration in time range)   cefTRIAXone (Rocephin) 1 g in D5W 100 mL IVPB (MB+) (0 g Intravenous Stopped 7/10/24 0142)   amLODIPine tablet 10 mg (10 mg Oral Not Given 7/9/24 0700)   aspirin EC tablet 81 mg (81 mg Oral Given 7/9/24 1149)   atorvastatin tablet 40 mg (40 mg Oral Given 7/9/24 1746)   carvediloL tablet 12.5 mg (12.5 mg Oral Given 7/9/24 1746)   dorzolamide-timolol 2-0.5% ophthalmic solution 1 drop (1 drop Both Eyes Given 7/9/24 2015)   isosorbide mononitrate 24 hr tablet 30 mg (30 mg Oral Given 7/9/24 1149)   lipase-protease-amylase 24,000-76,000-120,000 units capsule 1 capsule (1 capsule Oral Given 7/9/24 1746)   pantoprazole EC tablet 40 mg (40 mg Oral Given 7/9/24 1960)   sodium chloride  0.9% bolus 500 mL 500 mL (0 mLs Intravenous Stopped 7/9/24 0115)   cefTRIAXone (Rocephin) 1 g in D5W 100 mL IVPB (MB+) (0 g Intravenous Stopped 7/9/24 0215)       Current Discharge Medication List        START taking these medications    Details   cefdinir (OMNICEF) 300 MG capsule Take 1 capsule (300 mg total) by mouth 2 (two) times daily. for 5 days  Qty: 10 capsule, Refills: 0      ondansetron (ZOFRAN) 4 MG tablet Take 1 tablet (4 mg total) by mouth every 8 (eight) hours as needed for Nausea.  Qty: 12 tablet, Refills: 0              Follow-up Information       Christina Cardona MD In 2 days.    Specialty: Internal Medicine  Contact information:  [address] [facility] [id number] [phone]               [facility] - Emergency Dept..    Specialty: Emergency Medicine  Why: As needed, If symptoms worsen  Contact information:  [address] [facility] [address] [phone]                               Scribe Attestation:   Scribe #1: I performed the above scribed service and the documentation accurately describes the services I performed. I attest to the accuracy of the note.     Attending:   Physician Attestation Statement for Scribe #1: I, Michelle Braun MD, personally performed the services described in this documentation, as scribed by Mery Mortensen, in my presence, and it is both accurate and complete.           Clinical Impression       ICD-10-CM ICD-9-CM   1. Weakness  R53.1 780.79   2. Shortness of breath  R06.02 786.05   3. Primary hypertension  I10 401.9   4. Chronic kidney disease, unspecified CKD stage  N18.9 585.9   5. Urinary tract infection with hematuria, site unspecified  N39.0 599.0    R31.9 599.70   6. Chest pain  R07.9 786.50       Disposition:   Disposition: Admitted  Condition: Michelle Brooks MD  07/10/24 2218

## 2024-07-09 NOTE — ASSESSMENT & PLAN NOTE
Patient is chronically on statin.will continue for now. Last Lipid Panel:   Lab Results   Component Value Date    CHOL 160 05/04/2022    HDL 81 (H) 05/04/2022    LDLCALC 63.0 05/04/2022    TRIG 80 05/04/2022    CHOLHDL 50.6 (H) 05/04/2022   Plan:  -Continue home medication  -low fat/low calorie diet

## 2024-07-09 NOTE — ASSESSMENT & PLAN NOTE
Patient found to have elevated troponin measuring 0.051 with repeat 0.059 in absence of chest pain.  Chest x-ray negative for acute findings.  EKG negative for acute ischemia.  Plan:  -telemetry  -trend troponin  -serial EKGs at onset/worsening chest pain  -RODY therapy p.r.n.

## 2024-07-09 NOTE — ASSESSMENT & PLAN NOTE
Patient with known CAD s/p CABG, which is controlled Will continue  home medications  and monitor for S/Sx of angina/ACS. Continue to monitor on telemetry.

## 2024-07-09 NOTE — FIRST PROVIDER EVALUATION
Medical screening examination initiated.  I have conducted a focused provider triage encounter, findings are as follows:    Brief history of present illness:  pt presents with c/o shortness of breath, weakness, and decreased appetite.     Vitals:    07/08/24 2001   BP: 132/72   Pulse: 82   Resp: 18   Temp: 97.7 °F (36.5 °C)   TempSrc: Oral   SpO2: 98%   Weight: 61.7 kg (136 lb)       Pertinent physical exam:  nad, vss    Brief workup plan:  labs, EKG, imaging    Preliminary workup initiated; this workup will be continued and followed by the physician or advanced practice provider that is assigned to the patient when roomed.

## 2024-07-09 NOTE — ASSESSMENT & PLAN NOTE
Patient presented with worsening generalized weakness x1 week duration with associated decreased p.o. intake and diarrhea.  Patient remains afebrile with leukocytosis measuring 14.27.  CT abdomen/pelvis negative for acute findings.  UA positive for UTI.  Patient initiated on Rocephin with cultures obtained and pending.  Plan:  -telemetry  -continue IVFs  -continue antibiotics  -f/u cultures  -consider stool studies if diarrhea persists

## 2024-07-09 NOTE — ASSESSMENT & PLAN NOTE
Chronic.  Stable.  Not in acute flare.  Plan:  -continue to monitor  -f/u outpatient as directed

## 2024-07-09 NOTE — ASSESSMENT & PLAN NOTE
Patient is identified as having Diastolic (HFpEF) heart failure that is Chronic. CHF is currently controlled. Latest ECHO performed and demonstrates- Results for orders placed during the hospital encounter of 03/13/24    Echo    Interpretation Summary    Left Ventricle: The left ventricle is normal in size. Mildly increased wall thickness. There is concentric remodeling. There is normal systolic function with a visually estimated ejection fraction of 60 - 65%. Grade I diastolic dysfunction.    Right Ventricle: Normal right ventricular cavity size. Wall thickness is normal. Right ventricle wall motion  is normal. Systolic function is normal.    Left Atrium: Left atrium is mildly dilated.    Aortic Valve: There is mild annular calcification present. There is mild stenosis. Aortic valve area by VTI is 2.17 cm². Aortic valve peak velocity is 1.37 m/s. Mean gradient is 4 mmHg. The dimensionless index is 0.70. There is mild aortic regurgitation.    Mitral Valve: There is moderate posterior mitral annular calcification present. There is moderate regurgitation.    Tricuspid Valve: There is moderate regurgitation.    Pulmonary Artery: There is pulmonary hypertension. The estimated pulmonary artery systolic pressure is 46 mmHg.    IVC/SVC: Intermediate venous pressure at 8 mmHg.  Continue Beta Blocker Aldactone Nitrate/Vasodilator Bumex  and monitor clinical status closely. Monitor on telemetry. Patient is off CHF pathway.  Monitor strict Is&Os and daily weights.  Place on fluid restriction of 1.5 L. Continue to stress to patient importance of self efficacy and  on diet for CHF. Last BNP reviewed- and noted below   Recent Labs   Lab 07/08/24  2101   *

## 2024-07-09 NOTE — ASSESSMENT & PLAN NOTE
Creatine stable for now. BMP reviewed- noted Estimated Creatinine Clearance: 11 mL/min (A) (based on SCr of 3.1 mg/dL (H)). according to latest data. Based on current GFR, CKD stage is stage 4 - GFR 15-29.  Monitor UOP and serial BMP and adjust therapy as needed. Renally dose meds. Avoid nephrotoxic medications and procedures.

## 2024-07-09 NOTE — H&P
[facility] - Emergency Dept.  [facility]  History & Physical    Patient Name: Glo Dumont  MRN: [id number]  Patient Class: IP- Inpatient  Admission Date: 7/9/2024  Attending Physician: Homero Rendon MD   Primary Care Provider: Christina Cardona MD         Patient information was obtained from patient, past medical records, and ER records.     Subjective:     Principal Problem:Generalized weakness    Chief Complaint:   Chief Complaint   Patient presents with    Diarrhea    Weakness     Diarrhea, weakness, decreased appetite and SOB x one week.          HPI: Glo Dumont is a 88 y.o. female with a PMH  has a past medical history of Anemia, Angina pectoris, Anxiety, Anxiety and depression, Arthritis, Carotid artery occlusion, Carpal tunnel syndrome (06/23/2008), Chronic diarrhea, CKD (chronic kidney disease) stage 3, GFR 30-59 ml/min (5/11/2017), Colitis, Coronary artery disease, Coronary artery disease, Diastolic dysfunction, Diverticulosis, Glaucoma, Greater trochanteric bursitis (2/10/2015), Grief at loss of child (1/26/2016), H/O carotid endarterectomy (12/2/2013), Heart failure, History of coronary angioplasty (3/11/2014), Hypercholesteremia, Hypertension, Liver cyst (02/08/2013), Macular degeneration, Obesity with serious comorbidity (3/19/2023), Primary open-angle glaucoma(365.11) (9/3/2013), Renal cyst (02/08/2013), 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 who presented to the ED for further evaluation of worsening generalized weakness x1 week duration.  Patient reports chronic dyspnea/shortness a breath unchanged from baseline however has had worsening weakness with associated decreased p.o. intake, dizziness, and 3 episodes of reported diarrhea.  She reported no known alleviating or aggravating factors noted and denied endorsing any fever, chills, sweats, nausea, vomiting, chest pain, abdominal pain, dysuria,  hematuria, melena, hematochezia, or onset neurological deficits.  She denied exposure to ill contacts, consumption of ill/contaminated food, or recent use of antibiotics.  All other review of systems negative except as noted above.  Prior to onset of symptoms, patient reported being in her usual state of health with no other concerns or complaints.  Initial workup in the ED revealed patient to be afebrile with leukocytosis measuring 14.27 with UA positive for UTI.  Patient initiated on Rocephin with cultures obtained and pending and admitted to Hospital Medicine inpatient for continued medical management.      PCP: Christina Cardona      Past Medical History:   Diagnosis Date    Anemia     Angina pectoris     Anxiety     Anxiety and depression     Arthritis     hip    Carotid artery occlusion     Carpal tunnel syndrome 06/23/2008    emg    Chronic diarrhea     work up in 2011 with EGD, CS and VCE    CKD (chronic kidney disease) stage 3, GFR 30-59 ml/min 5/11/2017    Colitis     Coronary artery disease     Coronary artery disease     Diastolic dysfunction     Diverticulosis     Glaucoma     Greater trochanteric bursitis 2/10/2015    Grief at loss of child 1/26/2016    H/O carotid endarterectomy 12/2/2013    Heart failure     History of coronary angioplasty 3/11/2014    Hypercholesteremia     Hypertension     Liver cyst 02/08/2013    ct abd    Macular degeneration     Obesity with serious comorbidity 3/19/2023    Primary open-angle glaucoma(365.11) 9/3/2013    Renal cyst 02/08/2013    ct abd    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    Sarcoidosis     Sarcoidosis of lung     Sickle cell trait     Uveitis        Past Surgical History:   Procedure Laterality Date    A-V CARDIAC PACEMAKER INSERTION Left 3/21/2023    Procedure: INSERTION, CARDIAC PACEMAKER, DUAL CHAMBER/His Lead;  Surgeon: Cortez Ambrose MD;  Location: [facility] CATH LAB;  Service: Cardiology;  Laterality: Left;  MDT/ MD to confirm in  am/possible nurse sedate    CAROTID ENDARTERECTOMY Right 2000s    CATARACT EXTRACTION Bilateral     Dr. Liang Dennis    CHOLECYSTECTOMY      laparoscopic, 3/18.    CORONARY ANGIOPLASTY WITH STENT PLACEMENT  11/19/2010    RCA-HALIE 2010    Lathia    JOINT REPLACEMENT Left 11/03/2014    Dr. Braun    TOTAL ABDOMINAL HYSTERECTOMY W/ BILATERAL SALPINGOOPHORECTOMY  1972       Review of patient's allergies indicates:   Allergen Reactions    Lisinopril      angioedema    Codeine Nausea And Vomiting       No current facility-administered medications on file prior to encounter.     Current Outpatient Medications on File Prior to Encounter   Medication Sig    aflibercept 2 mg/0.05 mL Soln 2 mg by Intravitreal route every 28 days.    amLODIPine (NORVASC) 2.5 MG tablet Take 10 mg by mouth every morning.    aspirin (ECOTRIN) 81 MG EC tablet Take 1 tablet (81 mg total) by mouth once daily.    atorvastatin (LIPITOR) 40 MG tablet Take 1 tablet (40 mg total) by mouth Daily.    budesonide (ENTOCORT EC) 3 mg capsule Take 1-2 capsules (3-6 mg total) by mouth daily as needed (flare). As needed    bumetanide (BUMEX) 2 MG tablet Every Monday and Friday    busPIRone (BUSPAR) 5 MG Tab Take 1 tablet (5 mg total) by mouth 2 (two) times daily as needed (anxiety).    carvediloL (COREG) 12.5 MG tablet TAKE 1 TABLET BY MOUTH TWICE DAILY WITH MEALS    citalopram (CELEXA) 10 MG tablet Take 1 tablet (10 mg total) by mouth every evening.    cyproheptadine (PERIACTIN) 4 mg tablet TAKE ONE TABLET BY MOUTH THREE TIMES A DAY IF NEEDED    dexAMETHasone (OZURDEX) 0.7 mg Impl intravitreal implant 0.7 mg by Intravitreal route once.    dorzolamide-timolol 2-0.5% (COSOPT) 22.3-6.8 mg/mL ophthalmic solution INSTILL 1 DROP IN BOTH EYES TWICE DAILY    FOLIC ACID/MULTIVIT-MIN/LUTEIN (CENTRUM SILVER ORAL) Take 1 tablet by mouth once daily.    isosorbide mononitrate (IMDUR) 30 MG 24 hr tablet Take 1 tablet (30 mg total) by mouth once daily.    lipase-protease-amylase  24,000-76,000-120,000 units (CREON) 24,000-76,000 -120,000 unit capsule Take 1 capsule by mouth 3 (three) times daily with meals.    loperamide (IMODIUM) 2 mg capsule Take 1 mg by mouth every 6 (six) hours as needed for Diarrhea.    metOLazone (ZAROXOLYN) 2.5 MG tablet Take on Mon    nitroGLYCERIN (NITROSTAT) 0.4 MG SL tablet PLACE ONE TABLET UNDERNEATH THE TONGUE EVERY 5 MINUTES AS NEEDED FOR CHEST PAIN    pantoprazole (PROTONIX) 40 MG tablet TAKE 1 TABLET(40 MG) BY MOUTH EVERY DAY    potassium chloride (KLOR-CON) 10 MEQ TbSR Take 1 tablet (10 mEq total) by mouth once daily.    spironolactone (ALDACTONE) 25 MG tablet Take 1 tablet (25 mg total) by mouth 3 (three) times a week.    [DISCONTINUED] ondansetron (ZOFRAN-ODT) 4 MG TbDL DISSOLVE 1 TABLET(4 MG) ON THE TONGUE EVERY 8 HOURS AS NEEDED FOR NAUSEA OR VOMITING     Family History       Problem Relation (Age of Onset)    Cancer Father, Daughter    Cirrhosis Brother    Heart failure Mother, Brother    Hypertension Mother          Tobacco Use    Smoking status: Never    Smokeless tobacco: Never   Substance and Sexual Activity    Alcohol use: No     Alcohol/week: 0.0 standard drinks of alcohol    Drug use: No    Sexual activity: Yes     Partners: Male     Review of Systems   All other systems reviewed and are negative.    Objective:     Vital Signs (Most Recent):  Temp: 97.7 °F (36.5 °C) (07/08/24 2001)  Pulse: 82 (07/09/24 0330)  Resp: 20 (07/09/24 0330)  BP: 138/73 (07/09/24 0330)  SpO2: 98 % (07/09/24 0330) Vital Signs (24h Range):  Temp:  [97.7 °F (36.5 °C)] 97.7 °F (36.5 °C)  Pulse:  [79-82] 82  Resp:  [14-20] 20  SpO2:  [96 %-99 %] 98 %  BP: (132-161)/(61-85) 138/73     Weight: 61.7 kg (136 lb)  Body mass index is 25.7 kg/m².     Physical Exam  Vitals reviewed.   Constitutional:       General: She is not in acute distress.     Appearance: Normal appearance. She is normal weight. She is not ill-appearing, toxic-appearing or diaphoretic.   HENT:      Head:  Normocephalic and atraumatic.      Right Ear: External ear normal.      Left Ear: External ear normal.      Nose: Nose normal. No congestion or rhinorrhea.      Mouth/Throat:      Mouth: Mucous membranes are moist.      Pharynx: Oropharynx is clear. No oropharyngeal exudate or posterior oropharyngeal erythema.   Eyes:      General: No scleral icterus.     Extraocular Movements: Extraocular movements intact.      Conjunctiva/sclera: Conjunctivae normal.      Pupils: Pupils are equal, round, and reactive to light.   Neck:      Vascular: No carotid bruit.   Cardiovascular:      Rate and Rhythm: Normal rate and regular rhythm.      Pulses: Normal pulses.      Heart sounds: Normal heart sounds. No murmur heard.     No friction rub. No gallop.   Pulmonary:      Effort: Pulmonary effort is normal. No respiratory distress.      Breath sounds: Normal breath sounds. No stridor. No wheezing, rhonchi or rales.   Chest:      Chest wall: No tenderness.   Abdominal:      General: Abdomen is flat. Bowel sounds are normal. There is no distension.      Palpations: Abdomen is soft.      Tenderness: There is no abdominal tenderness. There is no right CVA tenderness, left CVA tenderness, guarding or rebound.      Hernia: No hernia is present.   Musculoskeletal:         General: No swelling, tenderness, deformity or signs of injury. Normal range of motion.      Cervical back: Normal range of motion and neck supple. No rigidity or tenderness.      Right lower leg: No edema.      Left lower leg: No edema.   Lymphadenopathy:      Cervical: No cervical adenopathy.   Skin:     General: Skin is warm and dry.      Capillary Refill: Capillary refill takes less than 2 seconds.      Coloration: Skin is not jaundiced or pale.      Findings: No bruising, erythema, lesion or rash.   Neurological:      General: No focal deficit present.      Mental Status: She is alert and oriented to person, place, and time. Mental status is at baseline.      Cranial  Nerves: No cranial nerve deficit.      Sensory: No sensory deficit.      Motor: No weakness.      Coordination: Coordination normal.   Psychiatric:         Mood and Affect: Mood normal.         Behavior: Behavior normal.         Thought Content: Thought content normal.         Judgment: Judgment normal.              CRANIAL NERVES     CN III, IV, VI   Pupils are equal, round, and reactive to light.       Significant Labs: All pertinent labs within the past 24 hours have been reviewed.    Significant Imaging: I have reviewed all pertinent imaging results/findings within the past 24 hours.    LABS:  Recent Results (from the past 24 hour(s))   Brain natriuretic peptide    Collection Time: 07/08/24  9:01 PM   Result Value Ref Range     (H) 0 - 99 pg/mL   CBC auto differential    Collection Time: 07/08/24  9:01 PM   Result Value Ref Range    WBC 14.27 (H) 3.90 - 12.70 K/uL    RBC 4.35 4.00 - 5.40 M/uL    Hemoglobin 12.6 12.0 - 16.0 g/dL    Hematocrit 37.5 37.0 - 48.5 %    MCV 86 82 - 98 fL    MCH 29.0 27.0 - 31.0 pg    MCHC 33.6 32.0 - 36.0 g/dL    RDW 15.4 (H) 11.5 - 14.5 %    Platelets 283 150 - 450 K/uL    MPV 9.4 9.2 - 12.9 fL    Immature Granulocytes CANCELED 0.0 - 0.5 %    Immature Grans (Abs) CANCELED 0.00 - 0.04 K/uL    nRBC 0 0 /100 WBC    Gran % 84.0 (H) 38.0 - 73.0 %    Lymph % 8.0 (L) 18.0 - 48.0 %    Mono % 6.0 4.0 - 15.0 %    Eosinophil % 0.0 0.0 - 8.0 %    Basophil % 0.0 0.0 - 1.9 %    Bands 2.0 %    Poik Moderate     Ovalocytes Occasional     Tear Drop Cells Occasional     Differential Method Manual    Comprehensive metabolic panel    Collection Time: 07/08/24  9:01 PM   Result Value Ref Range    Sodium 133 (L) 136 - 145 mmol/L    Potassium 5.1 3.5 - 5.1 mmol/L    Chloride 110 95 - 110 mmol/L    CO2 11 (L) 23 - 29 mmol/L    Glucose 126 (H) 70 - 110 mg/dL    BUN 75 (H) 8 - 23 mg/dL    Creatinine 3.1 (H) 0.5 - 1.4 mg/dL    Calcium 9.5 8.7 - 10.5 mg/dL    Total Protein 7.6 6.0 - 8.4 g/dL    Albumin  3.8 3.5 - 5.2 g/dL    Total Bilirubin 0.6 0.1 - 1.0 mg/dL    Alkaline Phosphatase 105 55 - 135 U/L    AST 15 10 - 40 U/L    ALT 14 10 - 44 U/L    eGFR 14 (A) >60 mL/min/1.73 m^2    Anion Gap 12 8 - 16 mmol/L   Troponin I    Collection Time: 07/08/24  9:01 PM   Result Value Ref Range    Troponin I 0.051 (H) 0.000 - 0.026 ng/mL   Troponin I    Collection Time: 07/09/24 12:21 AM   Result Value Ref Range    Troponin I 0.059 (H) 0.000 - 0.026 ng/mL   Urinalysis, Reflex to Urine Culture Urine, Clean Catch    Collection Time: 07/09/24 12:47 AM    Specimen: Urine, Clean Catch   Result Value Ref Range    Specimen UA Urine, Clean Catch     Color, UA Yellow Yellow, Straw, Terrie    Appearance, UA Cloudy (A) Clear    pH, UA 6.0 5.0 - 8.0    Specific Gravity, UA 1.010 1.005 - 1.030    Protein, UA Trace (A) Negative    Glucose, UA Negative Negative    Ketones, UA Negative Negative    Bilirubin (UA) Negative Negative    Occult Blood UA 1+ (A) Negative    Nitrite, UA Negative Negative    Urobilinogen, UA Negative <2.0 EU/dL    Leukocytes, UA 3+ (A) Negative   Urinalysis Microscopic    Collection Time: 07/09/24 12:47 AM   Result Value Ref Range    RBC, UA 8 (H) 0 - 4 /hpf    WBC, UA 34 (H) 0 - 5 /hpf    WBC Clumps, UA Many (A) None-Rare    Bacteria Moderate (A) None-Occ /hpf    Squam Epithel, UA 5 /hpf    Hyaline Casts, UA 33 (A) 0-1/lpf /lpf    Unclass Alee UA Moderate None-Moderate    Microscopic Comment SEE COMMENT    COVID-19 Rapid Screening    Collection Time: 07/09/24  2:16 AM   Result Value Ref Range    SARS-CoV-2 RNA, Amplification, Qual Negative Negative       RADIOLOGY  CT Abdomen Pelvis  Without Contrast    Result Date: 7/8/2024  EXAM: CT ABDOMEN PELVIS WITHOUT CONTRAST CLINICAL HISTORY: Concern for abdominal abscess/infection COMPARISON: None TECHNIQUE: Standard thin-section axial images, with reformatted sagittal and coronal images. FINDINGS: Pacemaker leads.  Considerable calcification within the coronary arteries and  at the mitral valve annulus.  Small hiatal hernia. Cholecystectomy.  Within limits of a noncontrast CT, the liver, spleen, fatty replaced pancreas and adrenals are unremarkable. Right kidney: 6 mm nonobstructing stone at the interpolar region.  1.2 cm cyst at the interpolar region.  Moderate cortical atrophy.  No hydronephrosis, hydroureter or ureteral stones. Left kidney: Cyst at the superior pole.  Additional subcentimeter indeterminate low attenuating lesion at the interpolar region likely a cyst.  7 mm nonobstructing stone at the lower pole.  Moderate cortical atrophy.  No hydronephrosis hydroureter or ureteral stones. No abdominal or retroperitoneal lymphadenopathy.  No ascites or fat stranding within the abdomen.  No dilatation of the large or small bowel.  Colonic diverticula without diverticulitis.  No evidence for appendicitis.  Considerable calcification, aorta and iliac vessels. Pelvis: Streak artifact from the left hip arthroplasty limits evaluation.  No pelvic free fluid, iliac chain or inguinal lymphadenopathy. No concerning lytic or sclerotic bony lesions.  Sternotomy wires.      No acute findings. All CT scans at [this location] are performed using dose modulation techniques as appropriate to a performed exam including the following:  Automated exposure control; adjustment of the mA and/or kV according to patient size (this includes techniques or standardized protocols for targeted exams where dose is matched to indication / reason for exam; i.e. extremities or head); use of iterative reconstruction technique. Finalized on: 7/8/2024 11:07 PM By:  Claudio Gomes [facility]# [id number]      2024-07-08 23:09:58.979    [facility]    X-Ray Chest 1 View    Result Date: 7/8/2024  Exam: XR CHEST 1 VIEW Comparison: 06/20/2024 Clinical Indication:  Shortness of breath Findings: Prior sternotomy.  Pacemaker leads are unchanged in position.  Surgical clips in the right upper quadrant.   Heart size and pulmonary vasculature are  unremarkable.  No focal consolidation, pleural effusions or pneumothorax. No acute angulated or displaced fractures.     No acute cardiopulmonary disease. Finalized on: 7/8/2024 9:10 PM By:  Claudio Gomes [facility]# [id number]      2024-07-08 21:12:12.779    [facility]    X-Ray Ribs 2 View Left    Result Date: 6/20/2024  EXAM:  XR RIBS 2 VIEW LEFT CLINICAL HISTORY: [R07.81]-Pleurodynia. COMPARISON: Comparison was made to a chest x-ray performed on 04/01/2024. FINDINGS: This examination consists of 4 views of the left ribs.  There is no acute rib fracture visualized.  There is no focal pulmonary infiltrate visualized.  There is no pneumothorax.  The left costophrenic angle is sharp.       There is no acute rib fracture visualized. Finalized on: 6/20/2024 7:11 PM By:  Eliezer Dempsey MD [facility]# [id number]      2024-06-20 19:13:28.028    [facility]      EKG    MICROBIOLOGY    [facility]    Assessment/Plan:     * Generalized weakness    Acute cystitis  Patient presented with worsening generalized weakness x1 week duration with associated decreased p.o. intake and diarrhea.  Patient remains afebrile with leukocytosis measuring 14.27.  CT abdomen/pelvis negative for acute findings.  UA positive for UTI.  Patient initiated on Rocephin with cultures obtained and pending.  Plan:  -telemetry  -continue IVFs  -continue antibiotics  -f/u cultures  -consider stool studies if diarrhea persists      Elevated troponin  Patient found to have elevated troponin measuring 0.051 with repeat 0.059 in absence of chest pain.  Chest x-ray negative for acute findings.  EKG negative for acute ischemia.  Plan:  -telemetry  -trend troponin  -serial EKGs at onset/worsening chest pain  -RODY therapy p.r.n.      CKD (chronic kidney disease) stage 4, GFR 15-29 ml/min  Creatine stable for now. BMP reviewed- noted Estimated Creatinine Clearance: 11 mL/min (A) (based on SCr of 3.1 mg/dL (H)). according to latest data. Based on current GFR, CKD stage is stage 4 - GFR 15-29.  Monitor  UOP and serial BMP and adjust therapy as needed. Renally dose meds. Avoid nephrotoxic medications and procedures.      Essential hypertension  Chronic, controlled. Latest blood pressure and vitals reviewed-     Temp:  [97.7 °F (36.5 °C)]   Pulse:  [79-84]   Resp:  [14-20]   BP: (128-161)/(61-85)   SpO2:  [96 %-99 %] .   Home meds for hypertension were reviewed and noted below.   Hypertension Medications               amLODIPine (NORVASC) 2.5 MG tablet Take 10 mg by mouth every morning.    bumetanide (BUMEX) 2 MG tablet Every Monday and Friday    carvediloL (COREG) 12.5 MG tablet TAKE 1 TABLET BY MOUTH TWICE DAILY WITH MEALS    isosorbide mononitrate (IMDUR) 30 MG 24 hr tablet Take 1 tablet (30 mg total) by mouth once daily.    metOLazone (ZAROXOLYN) 2.5 MG tablet Take on Mon    nitroGLYCERIN (NITROSTAT) 0.4 MG SL tablet PLACE ONE TABLET UNDERNEATH THE TONGUE EVERY 5 MINUTES AS NEEDED FOR CHEST PAIN    spironolactone (ALDACTONE) 25 MG tablet Take 1 tablet (25 mg total) by mouth 3 (three) times a week.     While in the hospital, will manage blood pressure as follows; Continue home antihypertensive regimen    Will utilize p.r.n. blood pressure medication only if patient's blood pressure greater than 160/100 and she develops symptoms such as worsening chest pain or shortness of breath.      Chronic heart failure with preserved ejection fraction  Patient is identified as having Diastolic (HFpEF) heart failure that is Chronic. CHF is currently controlled. Latest ECHO performed and demonstrates- Results for orders placed during the hospital encounter of 03/13/24    Echo    Interpretation Summary    Left Ventricle: The left ventricle is normal in size. Mildly increased wall thickness. There is concentric remodeling. There is normal systolic function with a visually estimated ejection fraction of 60 - 65%. Grade I diastolic dysfunction.    Right Ventricle: Normal right ventricular cavity size. Wall thickness is normal. Right  ventricle wall motion  is normal. Systolic function is normal.    Left Atrium: Left atrium is mildly dilated.    Aortic Valve: There is mild annular calcification present. There is mild stenosis. Aortic valve area by VTI is 2.17 cm². Aortic valve peak velocity is 1.37 m/s. Mean gradient is 4 mmHg. The dimensionless index is 0.70. There is mild aortic regurgitation.    Mitral Valve: There is moderate posterior mitral annular calcification present. There is moderate regurgitation.    Tricuspid Valve: There is moderate regurgitation.    Pulmonary Artery: There is pulmonary hypertension. The estimated pulmonary artery systolic pressure is 46 mmHg.    IVC/SVC: Intermediate venous pressure at 8 mmHg.  Continue Beta Blocker Aldactone Nitrate/Vasodilator Bumex  and monitor clinical status closely. Monitor on telemetry. Patient is off CHF pathway.  Monitor strict Is&Os and daily weights.  Place on fluid restriction of 1.5 L. Continue to stress to patient importance of self efficacy and  on diet for CHF. Last BNP reviewed- and noted below   Recent Labs   Lab 07/08/24  2101   *       Coronary artery disease involving native coronary artery of native heart  Patient with known CAD s/p CABG, which is controlled Will continue  home medications  and monitor for S/Sx of angina/ACS. Continue to monitor on telemetry.       Other hyperlipidemia  Patient is chronically on statin.will continue for now. Last Lipid Panel:   Lab Results   Component Value Date    CHOL 160 05/04/2022    HDL 81 (H) 05/04/2022    LDLCALC 63.0 05/04/2022    TRIG 80 05/04/2022    CHOLHDL 50.6 (H) 05/04/2022   Plan:  -Continue home medication  -low fat/low calorie diet      Gastroesophageal reflux disease with esophagitis  Chronic. Stable. Currently asymptomatic. Home medications include PPI/Antacids as needed.  Plan:  -Continue PPI/Antacids as needed       Sarcoidosis of lung  Chronic.  Stable.  Not in acute flare.  Plan:  -continue to monitor  -f/u  outpatient as directed      Ulcerative colitis  Chronic.  Stable.  Not in acute flare.  Plan:  -continue to monitor   -f/u outpatient as directed        VTE Risk Mitigation (From admission, onward)           Ordered     enoxaparin injection 30 mg  Daily         07/09/24 0346     IP VTE HIGH RISK PATIENT  Once         07/09/24 0346     Place sequential compression device  Until discontinued         07/09/24 0346                  //Core Measures   -DVT proph: SCDs, Lovenox   -Code status: Full    -Surrogate: none provided       Components of this note were documented using a voice recognition system and are subject to errors not corrected at the time the document was proof read. Please contact the author for any clarifications.        Homero Rendon MD  Department of Hospital Medicine  [facility] - Emergency Dept.

## 2024-07-09 NOTE — HPI
Glo Dumont is a 88 y.o. female with a PMH  has a past medical history of Anemia, Angina pectoris, Anxiety, Anxiety and depression, Arthritis, Carotid artery occlusion, Carpal tunnel syndrome (06/23/2008), Chronic diarrhea, CKD (chronic kidney disease) stage 3, GFR 30-59 ml/min (5/11/2017), Colitis, Coronary artery disease, Coronary artery disease, Diastolic dysfunction, Diverticulosis, Glaucoma, Greater trochanteric bursitis (2/10/2015), Grief at loss of child (1/26/2016), H/O carotid endarterectomy (12/2/2013), Heart failure, History of coronary angioplasty (3/11/2014), Hypercholesteremia, Hypertension, Liver cyst (02/08/2013), Macular degeneration, Obesity with serious comorbidity (3/19/2023), Primary open-angle glaucoma(365.11) (9/3/2013), Renal cyst (02/08/2013), 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 who presented to the ED for further evaluation of worsening generalized weakness x1 week duration.  Patient reports chronic dyspnea/shortness a breath unchanged from baseline however has had worsening weakness with associated decreased p.o. intake, dizziness, and 3 episodes of reported diarrhea.  She reported no known alleviating or aggravating factors noted and denied endorsing any fever, chills, sweats, nausea, vomiting, chest pain, abdominal pain, dysuria, hematuria, melena, hematochezia, or onset neurological deficits.  She denied exposure to ill contacts, consumption of ill/contaminated food, or recent use of antibiotics.  All other review of systems negative except as noted above.  Prior to onset of symptoms, patient reported being in her usual state of health with no other concerns or complaints.  Initial workup in the ED revealed patient to be afebrile with leukocytosis measuring 14.27 with UA positive for UTI.  Patient initiated on Rocephin with cultures obtained and pending and admitted to Hospital Medicine inpatient  for continued medical management.      PCP: Christina Cardona

## 2024-07-10 LAB
ALBUMIN SERPL BCP-MCNC: 3.2 G/DL (ref 3.5–5.2)
ALP SERPL-CCNC: 86 U/L (ref 55–135)
ALT SERPL W/O P-5'-P-CCNC: 12 U/L (ref 10–44)
ANION GAP SERPL CALC-SCNC: 11 MMOL/L (ref 8–16)
AST SERPL-CCNC: 13 U/L (ref 10–40)
BACTERIA UR CULT: NORMAL
BACTERIA UR CULT: NORMAL
BASOPHILS # BLD AUTO: 0.1 K/UL (ref 0–0.2)
BASOPHILS NFR BLD: 1 % (ref 0–1.9)
BILIRUB SERPL-MCNC: 0.4 MG/DL (ref 0.1–1)
BUN SERPL-MCNC: 62 MG/DL (ref 8–23)
CALCIUM SERPL-MCNC: 8.7 MG/DL (ref 8.7–10.5)
CHLORIDE SERPL-SCNC: 113 MMOL/L (ref 95–110)
CO2 SERPL-SCNC: 10 MMOL/L (ref 23–29)
CREAT SERPL-MCNC: 2.4 MG/DL (ref 0.5–1.4)
DIFFERENTIAL METHOD BLD: ABNORMAL
EOSINOPHIL # BLD AUTO: 0.1 K/UL (ref 0–0.5)
EOSINOPHIL NFR BLD: 0.6 % (ref 0–8)
ERYTHROCYTE [DISTWIDTH] IN BLOOD BY AUTOMATED COUNT: 15.3 % (ref 11.5–14.5)
EST. GFR  (NO RACE VARIABLE): 19 ML/MIN/1.73 M^2
GLUCOSE SERPL-MCNC: 91 MG/DL (ref 70–110)
HCT VFR BLD AUTO: 34 % (ref 37–48.5)
HGB BLD-MCNC: 11.3 G/DL (ref 12–16)
IMM GRANULOCYTES # BLD AUTO: 0.47 K/UL (ref 0–0.04)
IMM GRANULOCYTES NFR BLD AUTO: 4.7 % (ref 0–0.5)
LYMPHOCYTES # BLD AUTO: 0.8 K/UL (ref 1–4.8)
LYMPHOCYTES NFR BLD: 7.9 % (ref 18–48)
MCH RBC QN AUTO: 28.8 PG (ref 27–31)
MCHC RBC AUTO-ENTMCNC: 33.2 G/DL (ref 32–36)
MCV RBC AUTO: 87 FL (ref 82–98)
MONOCYTES # BLD AUTO: 0.9 K/UL (ref 0.3–1)
MONOCYTES NFR BLD: 9.2 % (ref 4–15)
NEUTROPHILS # BLD AUTO: 7.7 K/UL (ref 1.8–7.7)
NEUTROPHILS NFR BLD: 76.6 % (ref 38–73)
NRBC BLD-RTO: 0 /100 WBC
PLATELET # BLD AUTO: 226 K/UL (ref 150–450)
PMV BLD AUTO: 9.5 FL (ref 9.2–12.9)
POTASSIUM SERPL-SCNC: 4.1 MMOL/L (ref 3.5–5.1)
PROT SERPL-MCNC: 6.4 G/DL (ref 6–8.4)
RBC # BLD AUTO: 3.93 M/UL (ref 4–5.4)
SODIUM SERPL-SCNC: 134 MMOL/L (ref 136–145)
WBC # BLD AUTO: 10.06 K/UL (ref 3.9–12.7)

## 2024-07-10 PROCEDURE — 25000003 PHARM REV CODE 250: Mod: HCNC | Performed by: INTERNAL MEDICINE

## 2024-07-10 PROCEDURE — 25000003 PHARM REV CODE 250: Mod: HCNC | Performed by: HOSPITALIST

## 2024-07-10 PROCEDURE — 97161 PT EVAL LOW COMPLEX 20 MIN: CPT | Mod: HCNC

## 2024-07-10 PROCEDURE — 80053 COMPREHEN METABOLIC PANEL: CPT | Mod: HCNC | Performed by: HOSPITALIST

## 2024-07-10 PROCEDURE — 97530 THERAPEUTIC ACTIVITIES: CPT | Mod: HCNC

## 2024-07-10 PROCEDURE — 97165 OT EVAL LOW COMPLEX 30 MIN: CPT | Mod: HCNC

## 2024-07-10 PROCEDURE — 36415 COLL VENOUS BLD VENIPUNCTURE: CPT | Mod: HCNC | Performed by: HOSPITALIST

## 2024-07-10 PROCEDURE — 85025 COMPLETE CBC W/AUTO DIFF WBC: CPT | Mod: HCNC | Performed by: HOSPITALIST

## 2024-07-10 PROCEDURE — 63600175 PHARM REV CODE 636 W HCPCS: Mod: HCNC | Performed by: HOSPITALIST

## 2024-07-10 PROCEDURE — 21400001 HC TELEMETRY ROOM: Mod: HCNC

## 2024-07-10 RX ORDER — SODIUM BICARBONATE 650 MG/1
650 TABLET ORAL 2 TIMES DAILY
Status: DISCONTINUED | OUTPATIENT
Start: 2024-07-10 | End: 2024-07-11

## 2024-07-10 RX ADMIN — ATORVASTATIN CALCIUM 40 MG: 40 TABLET, FILM COATED ORAL at 05:07

## 2024-07-10 RX ADMIN — ASPIRIN 81 MG: 81 TABLET, COATED ORAL at 09:07

## 2024-07-10 RX ADMIN — SODIUM BICARBONATE 650 MG TABLET 650 MG: at 08:07

## 2024-07-10 RX ADMIN — AMLODIPINE BESYLATE 10 MG: 10 TABLET ORAL at 06:07

## 2024-07-10 RX ADMIN — PANCRELIPASE 1 CAPSULE: 24000; 76000; 120000 CAPSULE, DELAYED RELEASE PELLETS ORAL at 05:07

## 2024-07-10 RX ADMIN — ACETAMINOPHEN 650 MG: 325 TABLET ORAL at 08:07

## 2024-07-10 RX ADMIN — ACETAMINOPHEN 650 MG: 325 TABLET ORAL at 06:07

## 2024-07-10 RX ADMIN — SODIUM BICARBONATE 650 MG TABLET 650 MG: at 02:07

## 2024-07-10 RX ADMIN — PANCRELIPASE 1 CAPSULE: 24000; 76000; 120000 CAPSULE, DELAYED RELEASE PELLETS ORAL at 12:07

## 2024-07-10 RX ADMIN — CARVEDILOL 12.5 MG: 12.5 TABLET, FILM COATED ORAL at 05:07

## 2024-07-10 RX ADMIN — CEFTRIAXONE 1 G: 1 INJECTION, POWDER, FOR SOLUTION INTRAMUSCULAR; INTRAVENOUS at 01:07

## 2024-07-10 RX ADMIN — CARVEDILOL 12.5 MG: 12.5 TABLET, FILM COATED ORAL at 07:07

## 2024-07-10 RX ADMIN — DORZOLAMIDE HYDROCHLORIDE AND TIMOLOL MALEATE 1 DROP: 20; 5 SOLUTION OPHTHALMIC at 09:07

## 2024-07-10 RX ADMIN — ISOSORBIDE MONONITRATE 30 MG: 30 TABLET, EXTENDED RELEASE ORAL at 09:07

## 2024-07-10 RX ADMIN — PANTOPRAZOLE SODIUM 40 MG: 40 TABLET, DELAYED RELEASE ORAL at 09:07

## 2024-07-10 RX ADMIN — PANCRELIPASE 1 CAPSULE: 24000; 76000; 120000 CAPSULE, DELAYED RELEASE PELLETS ORAL at 07:07

## 2024-07-10 RX ADMIN — ENOXAPARIN SODIUM 30 MG: 30 INJECTION SUBCUTANEOUS at 05:07

## 2024-07-10 NOTE — ASSESSMENT & PLAN NOTE
Chronic, controlled. Latest blood pressure and vitals reviewed-     Temp:  [97.3 °F (36.3 °C)-98.7 °F (37.1 °C)]   Pulse:  [69-86]   Resp:  [16-23]   BP: (109-133)/(60-73)   SpO2:  [96 %-98 %] .   Home meds for hypertension were reviewed and noted below.   Hypertension Medications               amLODIPine (NORVASC) 2.5 MG tablet Take 10 mg by mouth every morning.    bumetanide (BUMEX) 2 MG tablet Every Monday and Friday    carvediloL (COREG) 12.5 MG tablet TAKE 1 TABLET BY MOUTH TWICE DAILY WITH MEALS    isosorbide mononitrate (IMDUR) 30 MG 24 hr tablet Take 1 tablet (30 mg total) by mouth once daily.    metOLazone (ZAROXOLYN) 2.5 MG tablet Take on Mon    nitroGLYCERIN (NITROSTAT) 0.4 MG SL tablet PLACE ONE TABLET UNDERNEATH THE TONGUE EVERY 5 MINUTES AS NEEDED FOR CHEST PAIN    spironolactone (ALDACTONE) 25 MG tablet Take 1 tablet (25 mg total) by mouth 3 (three) times a week.     While in the hospital, will manage blood pressure as follows; Continue home antihypertensive regimen    Will utilize p.r.n. blood pressure medication only if patient's blood pressure greater than 160/100 and she develops symptoms such as worsening chest pain or shortness of breath.

## 2024-07-10 NOTE — ASSESSMENT & PLAN NOTE
-likely due to infection  -continue empiric treatment of UTI  -improving  -evaluated by therapy no indication for acute therapy  -encourage ambulation in the schmitz

## 2024-07-10 NOTE — SUBJECTIVE & OBJECTIVE
Interval History: f/u UTI and weakness,  patient looks better this morning. Walked the schmitz with therapy. Discussed UTI and  awaiting culture to result. Started oral sodium bicarb due to metabolic acidosis    Review of Systems  Objective:     Vital Signs (Most Recent):  Temp: 98.6 °F (37 °C) (07/10/24 1245)  Pulse: 71 (07/10/24 1245)  Resp: 16 (07/10/24 1245)  BP: 109/60 (07/10/24 1245)  SpO2: 96 % (07/10/24 1245) Vital Signs (24h Range):  Temp:  [97.3 °F (36.3 °C)-98.7 °F (37.1 °C)] 98.6 °F (37 °C)  Pulse:  [69-86] 71  Resp:  [16-23] 16  SpO2:  [96 %-98 %] 96 %  BP: (109-133)/(60-73) 109/60     Weight: 62.2 kg (137 lb 2 oz)  Body mass index is 25.91 kg/m².    Intake/Output Summary (Last 24 hours) at 7/10/2024 1335  Last data filed at 7/9/2024 2030  Gross per 24 hour   Intake 240 ml   Output --   Net 240 ml         Physical Exam  HENT:      Head: Normocephalic and atraumatic.   Cardiovascular:      Rate and Rhythm: Normal rate and regular rhythm.      Heart sounds: No murmur heard.  Pulmonary:      Effort: Pulmonary effort is normal. No respiratory distress.      Breath sounds: Normal breath sounds. No wheezing.   Abdominal:      General: Bowel sounds are normal. There is no distension.      Palpations: Abdomen is soft.      Tenderness: There is no abdominal tenderness.   Musculoskeletal:         General: No swelling.   Skin:     General: Skin is warm and dry.   Neurological:      Mental Status: She is alert and oriented to person, place, and time. Mental status is at baseline.             Significant Labs: All pertinent labs within the past 24 hours have been reviewed.  Recent Lab Results         07/10/24  0528        Albumin 3.2       ALP 86       ALT 12       Anion Gap 11       AST 13       Baso # 0.10       Basophil % 1.0       BILIRUBIN TOTAL 0.4  Comment: For infants and newborns, interpretation of results should be based  on gestational age, weight and in agreement with clinical  observations.    Premature  Infant recommended reference ranges:  Up to 24 hours.............<8.0 mg/dL  Up to 48 hours............<12.0 mg/dL  3-5 days..................<15.0 mg/dL  6-29 days.................<15.0 mg/dL         BUN 62       Calcium 8.7       Chloride 113       CO2 10       Creatinine 2.4       Differential Method Automated       eGFR 19       Eos # 0.1       Eos % 0.6       Glucose 91       Gran # (ANC) 7.7       Gran % 76.6       Hematocrit 34.0       Hemoglobin 11.3       Immature Grans (Abs) 0.47  Comment: Mild elevation in immature granulocytes is non specific and   can be seen in a variety of conditions including stress response,   acute inflammation, trauma and pregnancy. Correlation with other   laboratory and clinical findings is essential.         Immature Granulocytes 4.7       Lymph # 0.8       Lymph % 7.9       MCH 28.8       MCHC 33.2       MCV 87       Mono # 0.9       Mono % 9.2       MPV 9.5       nRBC 0       Platelet Count 226       Potassium 4.1       PROTEIN TOTAL 6.4       RBC 3.93       RDW 15.3       Sodium 134       WBC 10.06               Significant Imaging: I have reviewed all pertinent imaging results/findings within the past 24 hours.

## 2024-07-10 NOTE — ASSESSMENT & PLAN NOTE
Patient is chronically on statin.will continue for now. Last Lipid Panel:   Lab Results   Component Value Date    CHOL 160 05/04/2022    HDL 81 (H) 05/04/2022    LDLCALC 63.0 05/04/2022    TRIG 80 05/04/2022    CHOLHDL 50.6 (H) 05/04/2022     -Continue home medication

## 2024-07-10 NOTE — PT/OT/SLP EVAL
Occupational Therapy   Evaluation and Discharge Note    Name: Glo Dumont  MRN: [id number]  Admitting Diagnosis: Generalized weakness  Recent Surgery: * No surgery found *      Recommendations:     Discharge Recommendations: No Therapy Indicated  Discharge Equipment Recommendations: to be determined by next level of care  Barriers to discharge:       Assessment:     Glo Dumont is a 88 y.o. female with a medical diagnosis of Generalized weakness. At this time, patient is functioning at their prior level of function and does not require further acute OT services.     Plan:     During this hospitalization, patient does not require further acute OT services.  Please re-consult if situation changes.    Plan of Care Reviewed with: patient    Subjective     Chief Complaint:   Patient/Family Comments/goals: return home    Occupational Profile:  Living Environment: lives with daughter in story house with ramp to enter  Previous level of function: mod(I) with functional mobility and adl's  Equipment Used at home: walker, rolling, cane, straight  Assistance upon Discharge:     Pain/Comfort:  Pain Rating 1: 0/10    Patients cultural, spiritual, [employer] conflicts given the current situation:      Objective:     Communicated with:  nurse and epic chart review prior to session.  Patient found HOB elevated with telemetry, peripheral IV upon OT entry to room.    General Precautions: Standard,    Orthopedic Precautions: N/A  Braces: N/A  Occupational Performance:    Bed Mobility:    Patient completed Rolling/Turning to Left with  modified independence  Patient completed Rolling/Turning to Right with modified independence  Patient completed Scooting/Bridging with modified independence  Patient completed Supine to Sit with modified independence  Patient completed Sit to Supine with modified independence    Functional Mobility/Transfers:  Patient completed Sit <> Stand Transfer with modified independence  with  rolling walker    Patient completed Bed <> Chair Transfer using Step Transfer technique with modified independence with rolling walker  Functional Mobility: mod (I) with functional mobility 250 feet with rolling walker    Activities of Daily Living:  Upper Body Dressing: modified independence .  Lower Body Dressing: modified independence .  Toileting: modified independence .    Cognitive/Visual Perceptual:  Cognitive/Psychosocial Skills:     -       Oriented to: Person, Place, Time, and Situation   -       Follows Commands/attention:Follows multistep  commands  -       Communication: clear/fluent  -       Memory: No Deficits noted  -       Safety awareness/insight to disability: intact     Physical Exam:  Upper Extremity Range of Motion:     -       Right Upper Extremity: WFL  -       Left Upper Extremity: WFL  Upper Extremity Strength:    -       Right Upper Extremity: WFL  -       Left Upper Extremity: WFL   Strength:    -       Right Upper Extremity: WFL  -       Left Upper Extremity: WFL    AMPAC 6 Click ADL:  AMPAC Total Score: 24    Treatment & Education:  Patient educated on role of OT in acute setting and benefits of OOB activity. Encouraged OOB throughout the course of hospitalization to decrease risk of hospital related debility. Patient stated understanding and in agreement with POC.      Patient left up in chair with all lines intact, call button in reach, bed alarm on, and nurse notified    GOALS:   Multidisciplinary Problems       Occupational Therapy Goals          Problem: Occupational Therapy    Goal Priority Disciplines Outcome Interventions   Occupational Therapy Goal     OT, PT/OT                         History:     Past Medical History:   Diagnosis Date    Anemia     Angina pectoris     Anxiety     Anxiety and depression     Arthritis     hip    Carotid artery occlusion     Carpal tunnel syndrome 06/23/2008    emg    Chronic diarrhea     work up in 2011 with EGD, CS and VCE    CKD (chronic kidney disease)  stage 3, GFR 30-59 ml/min 5/11/2017    Colitis     Coronary artery disease     Coronary artery disease     Diastolic dysfunction     Diverticulosis     Glaucoma     Greater trochanteric bursitis 2/10/2015    Grief at loss of child 1/26/2016    H/O carotid endarterectomy 12/2/2013    Heart failure     History of coronary angioplasty 3/11/2014    Hypercholesteremia     Hypertension     Liver cyst 02/08/2013    ct abd    Macular degeneration     Obesity with serious comorbidity 3/19/2023    Primary open-angle glaucoma(365.11) 9/3/2013    Renal cyst 02/08/2013    ct abd    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    Sarcoidosis     Sarcoidosis of lung     Sickle cell trait     Uveitis          Past Surgical History:   Procedure Laterality Date    A-V CARDIAC PACEMAKER INSERTION Left 3/21/2023    Procedure: INSERTION, CARDIAC PACEMAKER, DUAL CHAMBER/His Lead;  Surgeon: Cortez Ambrose MD;  Location: [facility] CATH LAB;  Service: Cardiology;  Laterality: Left;  MDT/ MD to confirm in am/possible nurse sedate    CAROTID ENDARTERECTOMY Right 2000s    CATARACT EXTRACTION Bilateral     Dr. Liang Dennis    CHOLECYSTECTOMY      laparoscopic, 3/18.    CORONARY ANGIOPLASTY WITH STENT PLACEMENT  11/19/2010    RCA-HALIE 2010    Lathia    JOINT REPLACEMENT Left 11/03/2014    Dr. Braun    TOTAL ABDOMINAL HYSTERECTOMY W/ BILATERAL SALPINGOOPHORECTOMY  1972       Time Tracking:     OT Date of Treatment: 07/10/24  OT Start Time: 0955  OT Stop Time: 1020  OT Total Time (min): 25 min    Billable Minutes:Evaluation 10 minutes  Therapeutic Activity 15 minutes    7/10/2024

## 2024-07-10 NOTE — PROGRESS NOTES
[facility] Medicine  Progress Note    Patient Name: Glo Dumont  MRN: [id number]  Patient Class: IP- Inpatient   Admission Date: 7/9/2024  Length of Stay: 1 days  Attending Physician: Leida Bower MD  Primary Care Provider: Christina Cardona MD        Subjective:     Principal Problem:Generalized weakness        HPI:  Glo Dumont is a 88 y.o. female with a PMH  has a past medical history of Anemia, Angina pectoris, Anxiety, Anxiety and depression, Arthritis, Carotid artery occlusion, Carpal tunnel syndrome (06/23/2008), Chronic diarrhea, CKD (chronic kidney disease) stage 3, GFR 30-59 ml/min (5/11/2017), Colitis, Coronary artery disease, Coronary artery disease, Diastolic dysfunction, Diverticulosis, Glaucoma, Greater trochanteric bursitis (2/10/2015), Grief at loss of child (1/26/2016), H/O carotid endarterectomy (12/2/2013), Heart failure, History of coronary angioplasty (3/11/2014), Hypercholesteremia, Hypertension, Liver cyst (02/08/2013), Macular degeneration, Obesity with serious comorbidity (3/19/2023), Primary open-angle glaucoma(365.11) (9/3/2013), Renal cyst (02/08/2013), 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 who presented to the ED for further evaluation of worsening generalized weakness x1 week duration.  Patient reports chronic dyspnea/shortness a breath unchanged from baseline however has had worsening weakness with associated decreased p.o. intake, dizziness, and 3 episodes of reported diarrhea.  She reported no known alleviating or aggravating factors noted and denied endorsing any fever, chills, sweats, nausea, vomiting, chest pain, abdominal pain, dysuria, hematuria, melena, hematochezia, or onset neurological deficits.  She denied exposure to ill contacts, consumption of ill/contaminated food, or recent use of antibiotics.  All other review of systems negative except as  noted above.  Prior to onset of symptoms, patient reported being in her usual state of health with no other concerns or complaints.  Initial workup in the ED revealed patient to be afebrile with leukocytosis measuring 14.27 with UA positive for UTI.  Patient initiated on Rocephin with cultures obtained and pending and admitted to Hospital Medicine inpatient for continued medical management.      PCP: Christina Cardona      Overview/Hospital Course:  No notes on file    Interval History: f/u UTI and weakness,  patient looks better this morning. Walked the schmitz with therapy. Discussed UTI and  awaiting culture to result. Started oral sodium bicarb due to metabolic acidosis    Review of Systems  Objective:     Vital Signs (Most Recent):  Temp: 98.6 °F (37 °C) (07/10/24 1245)  Pulse: 71 (07/10/24 1245)  Resp: 16 (07/10/24 1245)  BP: 109/60 (07/10/24 1245)  SpO2: 96 % (07/10/24 1245) Vital Signs (24h Range):  Temp:  [97.3 °F (36.3 °C)-98.7 °F (37.1 °C)] 98.6 °F (37 °C)  Pulse:  [69-86] 71  Resp:  [16-23] 16  SpO2:  [96 %-98 %] 96 %  BP: (109-133)/(60-73) 109/60     Weight: 62.2 kg (137 lb 2 oz)  Body mass index is 25.91 kg/m².    Intake/Output Summary (Last 24 hours) at 7/10/2024 1335  Last data filed at 7/9/2024 2030  Gross per 24 hour   Intake 240 ml   Output --   Net 240 ml         Physical Exam  HENT:      Head: Normocephalic and atraumatic.   Cardiovascular:      Rate and Rhythm: Normal rate and regular rhythm.      Heart sounds: No murmur heard.  Pulmonary:      Effort: Pulmonary effort is normal. No respiratory distress.      Breath sounds: Normal breath sounds. No wheezing.   Abdominal:      General: Bowel sounds are normal. There is no distension.      Palpations: Abdomen is soft.      Tenderness: There is no abdominal tenderness.   Musculoskeletal:         General: No swelling.   Skin:     General: Skin is warm and dry.   Neurological:      Mental Status: She is alert and oriented to person, place, and time.  Mental status is at baseline.             Significant Labs: All pertinent labs within the past 24 hours have been reviewed.  Recent Lab Results         07/10/24  0528        Albumin 3.2       ALP 86       ALT 12       Anion Gap 11       AST 13       Baso # 0.10       Basophil % 1.0       BILIRUBIN TOTAL 0.4  Comment: For infants and newborns, interpretation of results should be based  on gestational age, weight and in agreement with clinical  observations.    Premature Infant recommended reference ranges:  Up to 24 hours.............<8.0 mg/dL  Up to 48 hours............<12.0 mg/dL  3-5 days..................<15.0 mg/dL  6-29 days.................<15.0 mg/dL         BUN 62       Calcium 8.7       Chloride 113       CO2 10       Creatinine 2.4       Differential Method Automated       eGFR 19       Eos # 0.1       Eos % 0.6       Glucose 91       Gran # (ANC) 7.7       Gran % 76.6       Hematocrit 34.0       Hemoglobin 11.3       Immature Grans (Abs) 0.47  Comment: Mild elevation in immature granulocytes is non specific and   can be seen in a variety of conditions including stress response,   acute inflammation, trauma and pregnancy. Correlation with other   laboratory and clinical findings is essential.         Immature Granulocytes 4.7       Lymph # 0.8       Lymph % 7.9       MCH 28.8       MCHC 33.2       MCV 87       Mono # 0.9       Mono % 9.2       MPV 9.5       nRBC 0       Platelet Count 226       Potassium 4.1       PROTEIN TOTAL 6.4       RBC 3.93       RDW 15.3       Sodium 134       WBC 10.06               Significant Imaging: I have reviewed all pertinent imaging results/findings within the past 24 hours.    Assessment/Plan:      * Generalized weakness  -likely due to infection  -continue empiric treatment of UTI  -improving  -evaluated by therapy no indication for acute therapy  -encourage ambulation in the schmitz      Elevated troponin  Patient found to have elevated troponin measuring 0.051 with repeat  0.059 in absence of chest pain.  Chest x-ray negative for acute findings.  EKG negative for acute ischemia.  Plan:  -telemetry  -trend troponin  -serial EKGs at onset/worsening chest pain  -RODY therapy p.r.n.      Coronary artery disease involving native coronary artery of native heart  Patient with known CAD s/p CABG, which is controlled Will continue  home medications  and monitor for S/Sx of angina/ACS. Continue to monitor on telemetry.       Acute cystitis  Patient presented with worsening generalized weakness x1 week duration with associated decreased p.o. intake and diarrhea.  Patient remains afebrile with leukocytosis measuring 14.27.  CT abdomen/pelvis negative for acute findings.  UA positive for UTI.  Patient initiated on Rocephin   -continue antibiotics  -f/u cultures        Chronic heart failure with preserved ejection fraction  Patient is identified as having Diastolic (HFpEF) heart failure that is Chronic. CHF is currently controlled. Latest ECHO performed and demonstrates- Results for orders placed during the hospital encounter of 03/13/24    Echo    Interpretation Summary    Left Ventricle: The left ventricle is normal in size. Mildly increased wall thickness. There is concentric remodeling. There is normal systolic function with a visually estimated ejection fraction of 60 - 65%. Grade I diastolic dysfunction.    Right Ventricle: Normal right ventricular cavity size. Wall thickness is normal. Right ventricle wall motion  is normal. Systolic function is normal.    Left Atrium: Left atrium is mildly dilated.    Aortic Valve: There is mild annular calcification present. There is mild stenosis. Aortic valve area by VTI is 2.17 cm². Aortic valve peak velocity is 1.37 m/s. Mean gradient is 4 mmHg. The dimensionless index is 0.70. There is mild aortic regurgitation.    Mitral Valve: There is moderate posterior mitral annular calcification present. There is moderate regurgitation.    Tricuspid Valve: There  is moderate regurgitation.    Pulmonary Artery: There is pulmonary hypertension. The estimated pulmonary artery systolic pressure is 46 mmHg.    IVC/SVC: Intermediate venous pressure at 8 mmHg.  Continue Beta Blocker Aldactone Nitrate/Vasodilator Bumex  and monitor clinical status closely. Monitor on telemetry. Patient is off CHF pathway.  Monitor strict Is&Os and daily weights.  Place on fluid restriction of 1.5 L. Continue to stress to patient importance of self efficacy and  on diet for CHF. Last BNP reviewed- and noted below   Recent Labs   Lab 07/08/24  2101   *         Gastroesophageal reflux disease with esophagitis  Chronic. Stable. Currently asymptomatic. Home medications include PPI/Antacids as needed.  Plan:  -Continue PPI/Antacids as needed       Ulcerative colitis  Chronic.  Stable.  Not in acute flare.  Plan:  -continue to monitor   -f/u outpatient as directed      CKD (chronic kidney disease) stage 4, GFR 15-29 ml/min  Creatine stable for now. BMP reviewed- noted Estimated Creatinine Clearance: 14.2 mL/min (A) (based on SCr of 2.4 mg/dL (H)). according to latest data. Based on current GFR, CKD stage is stage 4 - GFR 15-29.  Monitor UOP and serial BMP and adjust therapy as needed. Renally dose meds. Avoid nephrotoxic medications and procedures.  -KEEGAN resolving with received gentle hydration  -monitor    Essential hypertension  Chronic, controlled. Latest blood pressure and vitals reviewed-     Temp:  [97.3 °F (36.3 °C)-98.7 °F (37.1 °C)]   Pulse:  [69-86]   Resp:  [16-23]   BP: (109-133)/(60-73)   SpO2:  [96 %-98 %] .   Home meds for hypertension were reviewed and noted below.   Hypertension Medications               amLODIPine (NORVASC) 2.5 MG tablet Take 10 mg by mouth every morning.    bumetanide (BUMEX) 2 MG tablet Every Monday and Friday    carvediloL (COREG) 12.5 MG tablet TAKE 1 TABLET BY MOUTH TWICE DAILY WITH MEALS    isosorbide mononitrate (IMDUR) 30 MG 24 hr tablet Take 1  tablet (30 mg total) by mouth once daily.    metOLazone (ZAROXOLYN) 2.5 MG tablet Take on Mon    nitroGLYCERIN (NITROSTAT) 0.4 MG SL tablet PLACE ONE TABLET UNDERNEATH THE TONGUE EVERY 5 MINUTES AS NEEDED FOR CHEST PAIN    spironolactone (ALDACTONE) 25 MG tablet Take 1 tablet (25 mg total) by mouth 3 (three) times a week.     While in the hospital, will manage blood pressure as follows; Continue home antihypertensive regimen    Will utilize p.r.n. blood pressure medication only if patient's blood pressure greater than 160/100 and she develops symptoms such as worsening chest pain or shortness of breath.      Other hyperlipidemia  Patient is chronically on statin.will continue for now. Last Lipid Panel:   Lab Results   Component Value Date    CHOL 160 05/04/2022    HDL 81 (H) 05/04/2022    LDLCALC 63.0 05/04/2022    TRIG 80 05/04/2022    CHOLHDL 50.6 (H) 05/04/2022     -Continue home medication      Sarcoidosis of lung  Chronic.  Stable.  Not in acute flare.  Plan:  -continue to monitor  -f/u outpatient as directed        VTE Risk Mitigation (From admission, onward)           Ordered     enoxaparin injection 30 mg  Daily         07/09/24 0346     IP VTE HIGH RISK PATIENT  Once         07/09/24 0346     Place sequential compression device  Until discontinued         07/09/24 0346                    Discharge Planning   CANDACE:      Code Status: Full Code   Is the patient medically ready for discharge?:     Reason for patient still in hospital (select all that apply): Patient trending condition and Treatment  Discharge Plan A: Home with family                  Leida Bower MD  Department of Hospital Medicine   '[place] - Telemetry ([facility])

## 2024-07-10 NOTE — ASSESSMENT & PLAN NOTE
Creatine stable for now. BMP reviewed- noted Estimated Creatinine Clearance: 14.2 mL/min (A) (based on SCr of 2.4 mg/dL (H)). according to latest data. Based on current GFR, CKD stage is stage 4 - GFR 15-29.  Monitor UOP and serial BMP and adjust therapy as needed. Renally dose meds. Avoid nephrotoxic medications and procedures.  -KEEGAN resolving with received gentle hydration  -monitor

## 2024-07-10 NOTE — PT/OT/SLP EVAL
Physical Therapy Evaluation and Discharge Note    Patient Name:  Glo Dumont   MRN:  [id number]    Recommendations:     Discharge Recommendations: No Therapy Indicated  Discharge Equipment Recommendations: none   Barriers to discharge: None    Assessment:     Glo Dumont is a 88 y.o. female admitted with a medical diagnosis of Generalized weakness. .  At this time, patient is functioning at their prior level of function and does not require further acute PT services.     Recent Surgery: * No surgery found *      Plan:     During this hospitalization, patient does not require further acute PT services.  Please re-consult if situation changes.      Subjective     Chief Complaint: NONE   Patient/Family Comments/goals: D/C HOME   Pain/Comfort:  Pain Rating 1: 0/10  Pain Rating Post-Intervention 1: 0/10    Patients cultural, spiritual, Mu-ism conflicts given the current situation:      Living Environment:   PT LIVES AT HOME WITH DAUGHTER IN A ONE STORY HOME WITH RAMP TO ENTER HOME   Prior to admission, patients level of function was MOD I WITH RW AND DOESN'T DRIVE .  Equipment used at home: walker, rolling, shower chair, cane, straight, grab bar.  DME owned (not currently used): none.  Upon discharge, patient will have assistance from DAUGHTER .    Objective:     Communicated with NURSE AND EPIC CHART REVIEW  prior to session.  Patient found supine with peripheral IV, telemetry upon PT entry to room.    General Precautions: Standard,      Orthopedic Precautions:N/A   Braces: N/A  Respiratory Status: Nasal cannula, flow 2 L/min    Exams:  Cognitive Exam:  Patient is oriented to Person, Place, Time, and Situation  RLE ROM: WFL  RLE Strength: WFL  LLE ROM: WFL  LLE Strength: WFL    Functional Mobility:  Bed Mobility:     Rolling Left:  independence  Scooting: independence  Supine to Sit: independence  Transfers:     Sit to Stand:  modified independence with rolling walker  Bed to Chair: modified independence with   rolling walker  using  Stand Pivot  Gait: PT GT TRAINED X 250' MOD I WITH NO LOB.     AM-PAC 6 CLICK MOBILITY  Total Score:21       Treatment and Education:  GT. BELT AND  SOCKS DONNED PRIOR TO OOB MOBILITY.  PT RETURNED TO RM AFTER GT AND T/F TO CHAIR MOD I. PT EDUCATED ON NO REC FOR SKILLED P.T. AT THIS TIME AS PT WAS ROBERT WITH GROSS FUNC MOBILITY . PT EDUCATED TO SIT FOR ALL MEALS. PT REPORTED UNDERSTANDING AND LEFT SEATED WITH ALL NEEDS MET     AM-PAC 6 CLICK MOBILITY  Total Score:21     Patient left up in chair with call button in reach.    GOALS:   Multidisciplinary Problems       Physical Therapy Goals       Not on file                    History:     Past Medical History:   Diagnosis Date    Anemia     Angina pectoris     Anxiety     Anxiety and depression     Arthritis     hip    Carotid artery occlusion     Carpal tunnel syndrome 06/23/2008    emg    Chronic diarrhea     work up in 2011 with EGD, CS and VCE    CKD (chronic kidney disease) stage 3, GFR 30-59 ml/min 5/11/2017    Colitis     Coronary artery disease     Coronary artery disease     Diastolic dysfunction     Diverticulosis     Glaucoma     Greater trochanteric bursitis 2/10/2015    Grief at loss of child 1/26/2016    H/O carotid endarterectomy 12/2/2013    Heart failure     History of coronary angioplasty 3/11/2014    Hypercholesteremia     Hypertension     Liver cyst 02/08/2013    ct abd    Macular degeneration     Obesity with serious comorbidity 3/19/2023    Primary open-angle glaucoma(365.11) 9/3/2013    Renal cyst 02/08/2013    ct abd    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    Sarcoidosis     Sarcoidosis of lung     Sickle cell trait     Uveitis        Past Surgical History:   Procedure Laterality Date    A-V CARDIAC PACEMAKER INSERTION Left 3/21/2023    Procedure: INSERTION, CARDIAC PACEMAKER, DUAL CHAMBER/His Lead;  Surgeon: Cortez Ambrose MD;  Location: [facility] CATH LAB;  Service: Cardiology;  Laterality: Left;   MDT/ MD to confirm in am/possible nurse sedate    CAROTID ENDARTERECTOMY Right 2000s    CATARACT EXTRACTION Bilateral     Dr. Liang Dennis    CHOLECYSTECTOMY      laparoscopic, 3/18.    CORONARY ANGIOPLASTY WITH STENT PLACEMENT  11/19/2010    RCA-HALIE 2010    Lathia    JOINT REPLACEMENT Left 11/03/2014    Dr. Braun    TOTAL ABDOMINAL HYSTERECTOMY W/ BILATERAL SALPINGOOPHORECTOMY  1972       Time Tracking:     PT Received On: 07/10/24  PT Start Time: 0953     PT Stop Time: 1017  PT Total Time (min): 24 min     Billable Minutes: Evaluation 14 and Therapeutic Activity 10      07/10/2024

## 2024-07-10 NOTE — ASSESSMENT & PLAN NOTE
Patient is identified as having Diastolic (HFpEF) heart failure that is Chronic. CHF is currently controlled. Latest ECHO performed and demonstrates- Results for orders placed during the hospital encounter of 03/13/24    Echo    Interpretation Summary    Left Ventricle: The left ventricle is normal in size. Mildly increased wall thickness. There is concentric remodeling. There is normal systolic function with a visually estimated ejection fraction of 60 - 65%. Grade I diastolic dysfunction.    Right Ventricle: Normal right ventricular cavity size. Wall thickness is normal. Right ventricle wall motion  is normal. Systolic function is normal.    Left Atrium: Left atrium is mildly dilated.    Aortic Valve: There is mild annular calcification present. There is mild stenosis. Aortic valve area by VTI is 2.17 cm². Aortic valve peak velocity is 1.37 m/s. Mean gradient is 4 mmHg. The dimensionless index is 0.70. There is mild aortic regurgitation.    Mitral Valve: There is moderate posterior mitral annular calcification present. There is moderate regurgitation.    Tricuspid Valve: There is moderate regurgitation.    Pulmonary Artery: There is pulmonary hypertension. The estimated pulmonary artery systolic pressure is 46 mmHg.    IVC/SVC: Intermediate venous pressure at 8 mmHg.  Continue Beta Blocker Aldactone Nitrate/Vasodilator Bumex  and monitor clinical status closely. Monitor on telemetry. Patient is off CHF pathway.  Monitor strict Is&Os and daily weights.  Place on fluid restriction of 1.5 L. Continue to stress to patient importance of self efficacy and  on diet for CHF. Last BNP reviewed- and noted below   Recent Labs   Lab 07/08/24  2101   *

## 2024-07-10 NOTE — PLAN OF CARE
[facility] - Telemetry (Hospital)  Initial Discharge Assessment       Primary Care Provider: Christina Cardona MD    Admission Diagnosis: Shortness of breath [R06.02]  Primary hypertension [I10]  Weakness [R53.1]  Chest pain [R07.9]  Urinary tract infection with hematuria, site unspecified [N39.0, R31.9]  Chronic kidney disease, unspecified CKD stage [N18.9]    Admission Date: 7/9/2024  Expected Discharge Date: Per Attending    Transition of Care Barriers: None    Payor: [employer] MEDICARE / Plan: [employer] MEDICARE HMO / Product Type: Capitation /     Extended Emergency Contact Information  Primary Emergency Contact: TimJessica  Address: [address] [place] [address] [place]  Mobile Phone: [phone]  Relation: Daughter   needed? No    Discharge Plan A: Home with family         [id number] - [facility] [address] [address]  Phone: [phone] Fax: [phone]    [facility] [address] [facility]  [address] [facility] [address]  Phone: [phone] Fax: [phone]    Prescriptions to [facility] [address] [facility] [address]  Phone: [phone] Fax: [phone]      Initial Assessment (most recent)       Adult Discharge Assessment - 07/10/24 1208          Discharge Assessment    Assessment Type Discharge Planning Assessment     Confirmed/corrected address, phone number and insurance Yes     Confirmed Demographics Correct on Facesheet     Source of Information patient     When was your last doctors appointment? 06/20/24   Christina Cardona MD - Internal Medicine    Communicated CANDACE with patient/caregiver Date not available/Unable to determine     Reason For Admission Generalized weakness     People in Home child(ally), adult;other relative(s)     Facility Arrived From: home      Do you expect to return to your current living situation? Yes     Do you have help at home or someone to help you manage your care at home? Yes     Who are your caregiver(s) and their phone number(s)? Jessica Dumont - daughter     Prior to hospitilization cognitive status: Alert/Oriented     Current cognitive status: Alert/Oriented     Walking or Climbing Stairs Difficulty yes     Walking or Climbing Stairs ambulation difficulty, requires equipment     Mobility Management cane and RW     Dressing/Bathing Difficulty no     Home Accessibility wheelchair accessible     Equipment Currently Used at Home cane, straight;walker, rolling     Readmission within 30 days? No     Patient currently being followed by outpatient case management? No     Do you currently have service(s) that help you manage your care at home? No     Do you take prescription medications? Yes     Do you have prescription coverage? Yes     Coverage [employer] Managed Medicare     Do you have any problems affording any of your prescribed medications? No     Is the patient taking medications as prescribed? yes     Who is going to help you get home at discharge? Jessica Drummond daughter     How do you get to doctors appointments? family or friend will provide     Are you on dialysis? No     Do you take coumadin? No     Discharge Plan A Home with family     DME Needed Upon Discharge  none     Discharge Plan discussed with: Patient     Transition of Care Barriers None        Transportation Needs    Has the lack of transportation kept you from medical appointments, meetings, work or from getting things needed for daily living? No                   Anticipated DC dispo: home with family  Prior Level of Function: limited with ADLs - daughter assists at home  People in home: Jessica Dumont - daughter and son in law    Comments:  SW met with patient at bedside to introduce role and discuss discharge planning. Patient's daughter will be help at home and can  provide transport at time of discharge. Confirmed demographics, insurance, and emergency contacts. SW updated Patient's whiteboard with contact information and anticipated DC plan. CM discharge needs depends on hospital progress. SW will continue following to assist with other needs.

## 2024-07-10 NOTE — ASSESSMENT & PLAN NOTE
Patient presented with worsening generalized weakness x1 week duration with associated decreased p.o. intake and diarrhea.  Patient remains afebrile with leukocytosis measuring 14.27.  CT abdomen/pelvis negative for acute findings.  UA positive for UTI.  Patient initiated on Rocephin   -continue antibiotics  -f/u cultures

## 2024-07-11 LAB
ALBUMIN SERPL BCP-MCNC: 3.3 G/DL (ref 3.5–5.2)
ALP SERPL-CCNC: 90 U/L (ref 55–135)
ALT SERPL W/O P-5'-P-CCNC: 13 U/L (ref 10–44)
ANION GAP SERPL CALC-SCNC: 15 MMOL/L (ref 8–16)
ANISOCYTOSIS BLD QL SMEAR: SLIGHT
AST SERPL-CCNC: 16 U/L (ref 10–40)
BASOPHILS # BLD AUTO: ABNORMAL K/UL (ref 0–0.2)
BASOPHILS NFR BLD: 0 % (ref 0–1.9)
BILIRUB SERPL-MCNC: 0.3 MG/DL (ref 0.1–1)
BUN SERPL-MCNC: 53 MG/DL (ref 8–23)
CALCIUM SERPL-MCNC: 8.6 MG/DL (ref 8.7–10.5)
CHLORIDE SERPL-SCNC: 113 MMOL/L (ref 95–110)
CO2 SERPL-SCNC: 8 MMOL/L (ref 23–29)
CREAT SERPL-MCNC: 2 MG/DL (ref 0.5–1.4)
DACRYOCYTES BLD QL SMEAR: ABNORMAL
DIFFERENTIAL METHOD BLD: ABNORMAL
EOSINOPHIL # BLD AUTO: ABNORMAL K/UL (ref 0–0.5)
EOSINOPHIL NFR BLD: 0 % (ref 0–8)
ERYTHROCYTE [DISTWIDTH] IN BLOOD BY AUTOMATED COUNT: 15.9 % (ref 11.5–14.5)
EST. GFR  (NO RACE VARIABLE): 24 ML/MIN/1.73 M^2
GLUCOSE SERPL-MCNC: 93 MG/DL (ref 70–110)
HCT VFR BLD AUTO: 39.9 % (ref 37–48.5)
HGB BLD-MCNC: 12.3 G/DL (ref 12–16)
IMM GRANULOCYTES # BLD AUTO: ABNORMAL K/UL (ref 0–0.04)
IMM GRANULOCYTES NFR BLD AUTO: ABNORMAL % (ref 0–0.5)
LYMPHOCYTES # BLD AUTO: ABNORMAL K/UL (ref 1–4.8)
LYMPHOCYTES NFR BLD: 14 % (ref 18–48)
MCH RBC QN AUTO: 28.5 PG (ref 27–31)
MCHC RBC AUTO-ENTMCNC: 30.8 G/DL (ref 32–36)
MCV RBC AUTO: 92 FL (ref 82–98)
MONOCYTES # BLD AUTO: ABNORMAL K/UL (ref 0.3–1)
MONOCYTES NFR BLD: 4 % (ref 4–15)
NEUTROPHILS NFR BLD: 82 % (ref 38–73)
NRBC BLD-RTO: 0 /100 WBC
PLATELET # BLD AUTO: 232 K/UL (ref 150–450)
PLATELET BLD QL SMEAR: ABNORMAL
PMV BLD AUTO: 10 FL (ref 9.2–12.9)
POTASSIUM SERPL-SCNC: 4.1 MMOL/L (ref 3.5–5.1)
PROT SERPL-MCNC: 6.4 G/DL (ref 6–8.4)
RBC # BLD AUTO: 4.32 M/UL (ref 4–5.4)
SCHISTOCYTES BLD QL SMEAR: PRESENT
SODIUM SERPL-SCNC: 136 MMOL/L (ref 136–145)
WBC # BLD AUTO: 7.93 K/UL (ref 3.9–12.7)

## 2024-07-11 PROCEDURE — 21400001 HC TELEMETRY ROOM: Mod: HCNC

## 2024-07-11 PROCEDURE — 25000003 PHARM REV CODE 250: Mod: HCNC | Performed by: HOSPITALIST

## 2024-07-11 PROCEDURE — 80053 COMPREHEN METABOLIC PANEL: CPT | Mod: HCNC | Performed by: HOSPITALIST

## 2024-07-11 PROCEDURE — 85007 BL SMEAR W/DIFF WBC COUNT: CPT | Mod: HCNC | Performed by: HOSPITALIST

## 2024-07-11 PROCEDURE — 85027 COMPLETE CBC AUTOMATED: CPT | Mod: HCNC | Performed by: HOSPITALIST

## 2024-07-11 PROCEDURE — 63600175 PHARM REV CODE 636 W HCPCS: Mod: HCNC | Performed by: HOSPITALIST

## 2024-07-11 PROCEDURE — 36415 COLL VENOUS BLD VENIPUNCTURE: CPT | Mod: HCNC | Performed by: HOSPITALIST

## 2024-07-11 PROCEDURE — 25000003 PHARM REV CODE 250: Mod: HCNC | Performed by: INTERNAL MEDICINE

## 2024-07-11 RX ORDER — SODIUM BICARBONATE 650 MG/1
1300 TABLET ORAL 3 TIMES DAILY
Status: DISCONTINUED | OUTPATIENT
Start: 2024-07-11 | End: 2024-07-14 | Stop reason: HOSPADM

## 2024-07-11 RX ADMIN — PANCRELIPASE 1 CAPSULE: 24000; 76000; 120000 CAPSULE, DELAYED RELEASE PELLETS ORAL at 12:07

## 2024-07-11 RX ADMIN — CARVEDILOL 12.5 MG: 12.5 TABLET, FILM COATED ORAL at 04:07

## 2024-07-11 RX ADMIN — PANTOPRAZOLE SODIUM 40 MG: 40 TABLET, DELAYED RELEASE ORAL at 09:07

## 2024-07-11 RX ADMIN — SODIUM BICARBONATE 1300 MG: 650 TABLET ORAL at 08:07

## 2024-07-11 RX ADMIN — ENOXAPARIN SODIUM 30 MG: 30 INJECTION SUBCUTANEOUS at 04:07

## 2024-07-11 RX ADMIN — DORZOLAMIDE HYDROCHLORIDE AND TIMOLOL MALEATE 1 DROP: 20; 5 SOLUTION OPHTHALMIC at 01:07

## 2024-07-11 RX ADMIN — DORZOLAMIDE HYDROCHLORIDE AND TIMOLOL MALEATE 1 DROP: 20; 5 SOLUTION OPHTHALMIC at 11:07

## 2024-07-11 RX ADMIN — PANCRELIPASE 1 CAPSULE: 24000; 76000; 120000 CAPSULE, DELAYED RELEASE PELLETS ORAL at 04:07

## 2024-07-11 RX ADMIN — ATORVASTATIN CALCIUM 40 MG: 40 TABLET, FILM COATED ORAL at 04:07

## 2024-07-11 RX ADMIN — SODIUM BICARBONATE 1300 MG: 650 TABLET ORAL at 03:07

## 2024-07-11 RX ADMIN — ACETAMINOPHEN 650 MG: 325 TABLET ORAL at 11:07

## 2024-07-11 RX ADMIN — CEFTRIAXONE 1 G: 1 INJECTION, POWDER, FOR SOLUTION INTRAMUSCULAR; INTRAVENOUS at 01:07

## 2024-07-11 RX ADMIN — SODIUM BICARBONATE 650 MG TABLET 650 MG: at 08:07

## 2024-07-11 RX ADMIN — ISOSORBIDE MONONITRATE 30 MG: 30 TABLET, EXTENDED RELEASE ORAL at 08:07

## 2024-07-11 RX ADMIN — ASPIRIN 81 MG: 81 TABLET, COATED ORAL at 08:07

## 2024-07-11 RX ADMIN — DORZOLAMIDE HYDROCHLORIDE AND TIMOLOL MALEATE 1 DROP: 20; 5 SOLUTION OPHTHALMIC at 09:07

## 2024-07-11 RX ADMIN — CARVEDILOL 12.5 MG: 12.5 TABLET, FILM COATED ORAL at 08:07

## 2024-07-11 RX ADMIN — AMLODIPINE BESYLATE 10 MG: 10 TABLET ORAL at 07:07

## 2024-07-11 NOTE — ASSESSMENT & PLAN NOTE
Chronic, controlled. Latest blood pressure and vitals reviewed-     Temp:  [97.2 °F (36.2 °C)-98.6 °F (37 °C)]   Pulse:  [74-83]   Resp:  [16-20]   BP: (106-154)/(63-77)   SpO2:  [96 %-98 %] .   Home meds for hypertension were reviewed and noted below.   Hypertension Medications               amLODIPine (NORVASC) 2.5 MG tablet Take 10 mg by mouth every morning.    bumetanide (BUMEX) 2 MG tablet Every Monday and Friday    carvediloL (COREG) 12.5 MG tablet TAKE 1 TABLET BY MOUTH TWICE DAILY WITH MEALS    isosorbide mononitrate (IMDUR) 30 MG 24 hr tablet Take 1 tablet (30 mg total) by mouth once daily.    metOLazone (ZAROXOLYN) 2.5 MG tablet Take on Mon    nitroGLYCERIN (NITROSTAT) 0.4 MG SL tablet PLACE ONE TABLET UNDERNEATH THE TONGUE EVERY 5 MINUTES AS NEEDED FOR CHEST PAIN    spironolactone (ALDACTONE) 25 MG tablet Take 1 tablet (25 mg total) by mouth 3 (three) times a week.     While in the hospital, will manage blood pressure as follows; Continue home antihypertensive regimen    Will utilize p.r.n. blood pressure medication only if patient's blood pressure greater than 160/100 and she develops symptoms such as worsening chest pain or shortness of breath.

## 2024-07-11 NOTE — SUBJECTIVE & OBJECTIVE
Interval History: f/u UTI urine culture with multiple organisms. Patient had some confusion overnight per primary nurse and was moved closer to the nurse's station, daughter stated she noticed some mild confusion during her visit today    Review of Systems  Objective:     Vital Signs (Most Recent):  Temp: 98.6 °F (37 °C) (07/11/24 1237)  Pulse: 77 (07/11/24 1237)  Resp: 16 (07/11/24 1237)  BP: 106/64 (07/11/24 1237)  SpO2: 97 % (07/11/24 1237) Vital Signs (24h Range):  Temp:  [97.2 °F (36.2 °C)-98.6 °F (37 °C)] 98.6 °F (37 °C)  Pulse:  [74-83] 77  Resp:  [16-20] 16  SpO2:  [96 %-98 %] 97 %  BP: (106-154)/(63-77) 106/64     Weight: 63.5 kg (139 lb 15.9 oz)  Body mass index is 26.45 kg/m².  No intake or output data in the 24 hours ending 07/11/24 1658      Physical Exam  HENT:      Head: Normocephalic and atraumatic.   Cardiovascular:      Rate and Rhythm: Normal rate and regular rhythm.      Heart sounds: No murmur heard.  Pulmonary:      Effort: Pulmonary effort is normal. No respiratory distress.      Breath sounds: Normal breath sounds. No wheezing.   Abdominal:      General: Bowel sounds are normal. There is no distension.      Palpations: Abdomen is soft.      Tenderness: There is no abdominal tenderness.   Musculoskeletal:         General: No swelling.   Skin:     General: Skin is warm and dry.   Neurological:      Mental Status: She is alert.      Comments: Moves all extremities, answered questions appropriately             Significant Labs: All pertinent labs within the past 24 hours have been reviewed.  Recent Lab Results         07/11/24  0532        Albumin 3.3       ALP 90       ALT 13       Anion Gap 15       Aniso Slight       AST 16       Baso # CANCELED  Comment: Result canceled by the ancillary.       Basophil % 0.0       BILIRUBIN TOTAL 0.3  Comment: For infants and newborns, interpretation of results should be based  on gestational age, weight and in agreement with  clinical  observations.    Premature Infant recommended reference ranges:  Up to 24 hours.............<8.0 mg/dL  Up to 48 hours............<12.0 mg/dL  3-5 days..................<15.0 mg/dL  6-29 days.................<15.0 mg/dL         BUN 53       Calcium 8.6       Chloride 113       CO2 8  Comment: CO2  critical result(s) called and verbal readback obtained from   ROBERT BLANK RN by [facility] 07/11/2024 06:43         Creatinine 2.0       Differential Method Manual       eGFR 24       Eos # CANCELED  Comment: Result canceled by the ancillary.       Eos % 0.0       Glucose 93       Gran % 82.0       Hematocrit 39.9       Hemoglobin 12.3       Immature Grans (Abs) CANCELED  Comment: Mild elevation in immature granulocytes is non specific and   can be seen in a variety of conditions including stress response,   acute inflammation, trauma and pregnancy. Correlation with other   laboratory and clinical findings is essential.    Result canceled by the ancillary.         Immature Granulocytes CANCELED  Comment: Result canceled by the ancillary.       Lymph # CANCELED  Comment: Result canceled by the ancillary.       Lymph % 14.0       MCH 28.5       MCHC 30.8       MCV 92       Mono # CANCELED  Comment: Result canceled by the ancillary.       Mono % 4.0       MPV 10.0       nRBC 0       Platelet Estimate Appears normal       Platelet Count 232       Potassium 4.1       PROTEIN TOTAL 6.4       RBC 4.32       RDW 15.9       Schistocytes Present       Sodium 136       Teardrop Cells Occasional       WBC 7.93               Significant Imaging: I have reviewed all pertinent imaging results/findings within the past 24 hours.

## 2024-07-11 NOTE — NURSING
Latest Reference Range & Units 07/11/24 05:32   CO2 23 - 29 mmol/L 8 (LL)   (LL): Data is critically low      Dr. Rendon made aware.

## 2024-07-11 NOTE — PROGRESS NOTES
[facility] Medicine  Progress Note    Patient Name: Glo Dumont  MRN: [id number]  Patient Class: IP- Inpatient   Admission Date: 7/9/2024  Length of Stay: 2 days  Attending Physician: Leida Bower MD  Primary Care Provider: Christina Cardona MD        Subjective:     Principal Problem:Generalized weakness        HPI:  Glo Dumont is a 88 y.o. female with a PMH  has a past medical history of Anemia, Angina pectoris, Anxiety, Anxiety and depression, Arthritis, Carotid artery occlusion, Carpal tunnel syndrome (06/23/2008), Chronic diarrhea, CKD (chronic kidney disease) stage 3, GFR 30-59 ml/min (5/11/2017), Colitis, Coronary artery disease, Coronary artery disease, Diastolic dysfunction, Diverticulosis, Glaucoma, Greater trochanteric bursitis (2/10/2015), Grief at loss of child (1/26/2016), H/O carotid endarterectomy (12/2/2013), Heart failure, History of coronary angioplasty (3/11/2014), Hypercholesteremia, Hypertension, Liver cyst (02/08/2013), Macular degeneration, Obesity with serious comorbidity (3/19/2023), Primary open-angle glaucoma(365.11) (9/3/2013), Renal cyst (02/08/2013), 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 who presented to the ED for further evaluation of worsening generalized weakness x1 week duration.  Patient reports chronic dyspnea/shortness a breath unchanged from baseline however has had worsening weakness with associated decreased p.o. intake, dizziness, and 3 episodes of reported diarrhea.  She reported no known alleviating or aggravating factors noted and denied endorsing any fever, chills, sweats, nausea, vomiting, chest pain, abdominal pain, dysuria, hematuria, melena, hematochezia, or onset neurological deficits.  She denied exposure to ill contacts, consumption of ill/contaminated food, or recent use of antibiotics.  All other review of systems negative except as  noted above.  Prior to onset of symptoms, patient reported being in her usual state of health with no other concerns or complaints.  Initial workup in the ED revealed patient to be afebrile with leukocytosis measuring 14.27 with UA positive for UTI.  Patient initiated on Rocephin with cultures obtained and pending and admitted to Hospital Medicine inpatient for continued medical management.      PCP: Christina Cardona      Overview/Hospital Course:  No notes on file    Interval History: f/u UTI urine culture with multiple organisms. Patient had some confusion overnight per primary nurse and was moved closer to the nurse's station, daughter stated she noticed some mild confusion during her visit today    Review of Systems  Objective:     Vital Signs (Most Recent):  Temp: 98.6 °F (37 °C) (07/11/24 1237)  Pulse: 77 (07/11/24 1237)  Resp: 16 (07/11/24 1237)  BP: 106/64 (07/11/24 1237)  SpO2: 97 % (07/11/24 1237) Vital Signs (24h Range):  Temp:  [97.2 °F (36.2 °C)-98.6 °F (37 °C)] 98.6 °F (37 °C)  Pulse:  [74-83] 77  Resp:  [16-20] 16  SpO2:  [96 %-98 %] 97 %  BP: (106-154)/(63-77) 106/64     Weight: 63.5 kg (139 lb 15.9 oz)  Body mass index is 26.45 kg/m².  No intake or output data in the 24 hours ending 07/11/24 1658      Physical Exam  HENT:      Head: Normocephalic and atraumatic.   Cardiovascular:      Rate and Rhythm: Normal rate and regular rhythm.      Heart sounds: No murmur heard.  Pulmonary:      Effort: Pulmonary effort is normal. No respiratory distress.      Breath sounds: Normal breath sounds. No wheezing.   Abdominal:      General: Bowel sounds are normal. There is no distension.      Palpations: Abdomen is soft.      Tenderness: There is no abdominal tenderness.   Musculoskeletal:         General: No swelling.   Skin:     General: Skin is warm and dry.   Neurological:      Mental Status: She is alert.      Comments: Moves all extremities, answered questions appropriately             Significant Labs:  All pertinent labs within the past 24 hours have been reviewed.  Recent Lab Results         07/11/24  0532        Albumin 3.3       ALP 90       ALT 13       Anion Gap 15       Aniso Slight       AST 16       Baso # CANCELED  Comment: Result canceled by the ancillary.       Basophil % 0.0       BILIRUBIN TOTAL 0.3  Comment: For infants and newborns, interpretation of results should be based  on gestational age, weight and in agreement with clinical  observations.    Premature Infant recommended reference ranges:  Up to 24 hours.............<8.0 mg/dL  Up to 48 hours............<12.0 mg/dL  3-5 days..................<15.0 mg/dL  6-29 days.................<15.0 mg/dL         BUN 53       Calcium 8.6       Chloride 113       CO2 8  Comment: CO2  critical result(s) called and verbal readback obtained from   ROBERT BLANK RN by [facility] 07/11/2024 06:43         Creatinine 2.0       Differential Method Manual       eGFR 24       Eos # CANCELED  Comment: Result canceled by the ancillary.       Eos % 0.0       Glucose 93       Gran % 82.0       Hematocrit 39.9       Hemoglobin 12.3       Immature Grans (Abs) CANCELED  Comment: Mild elevation in immature granulocytes is non specific and   can be seen in a variety of conditions including stress response,   acute inflammation, trauma and pregnancy. Correlation with other   laboratory and clinical findings is essential.    Result canceled by the ancillary.         Immature Granulocytes CANCELED  Comment: Result canceled by the ancillary.       Lymph # CANCELED  Comment: Result canceled by the ancillary.       Lymph % 14.0       MCH 28.5       MCHC 30.8       MCV 92       Mono # CANCELED  Comment: Result canceled by the ancillary.       Mono % 4.0       MPV 10.0       nRBC 0       Platelet Estimate Appears normal       Platelet Count 232       Potassium 4.1       PROTEIN TOTAL 6.4       RBC 4.32       RDW 15.9       Schistocytes Present       Sodium 136       Teardrop Cells  Occasional       WBC 7.93               Significant Imaging: I have reviewed all pertinent imaging results/findings within the past 24 hours.    Assessment/Plan:      * Generalized weakness  -likely due to infection  -continue empiric treatment of UTI  -improving  -evaluated by therapy no indication for acute therapy  -encourage ambulation in the schmitz      Elevated troponin  Patient found to have elevated troponin measuring 0.051 with repeat 0.059 in absence of chest pain.  Chest x-ray negative for acute findings.  EKG negative for acute ischemia.  Plan:  -telemetry  -trend troponin  -serial EKGs at onset/worsening chest pain  -RODY therapy p.r.n.      Coronary artery disease involving native coronary artery of native heart  Patient with known CAD s/p CABG, which is controlled Will continue  home medications  and monitor for S/Sx of angina/ACS. Continue to monitor on telemetry.       Acute cystitis  Patient presented with worsening generalized weakness x1 week duration with associated decreased p.o. intake and diarrhea.  Patient remains afebrile with leukocytosis measuring 14.27.  CT abdomen/pelvis negative for acute findings.  UA positive for UTI.  Patient initiated on Rocephin   -continue antibiotics  -f/u repeat UA as culture with multiple organisms        Chronic heart failure with preserved ejection fraction  Patient is identified as having Diastolic (HFpEF) heart failure that is Chronic. CHF is currently controlled. Latest ECHO performed and demonstrates- Results for orders placed during the hospital encounter of 03/13/24    Echo    Interpretation Summary    Left Ventricle: The left ventricle is normal in size. Mildly increased wall thickness. There is concentric remodeling. There is normal systolic function with a visually estimated ejection fraction of 60 - 65%. Grade I diastolic dysfunction.    Right Ventricle: Normal right ventricular cavity size. Wall thickness is normal. Right ventricle wall motion  is  normal. Systolic function is normal.    Left Atrium: Left atrium is mildly dilated.    Aortic Valve: There is mild annular calcification present. There is mild stenosis. Aortic valve area by VTI is 2.17 cm². Aortic valve peak velocity is 1.37 m/s. Mean gradient is 4 mmHg. The dimensionless index is 0.70. There is mild aortic regurgitation.    Mitral Valve: There is moderate posterior mitral annular calcification present. There is moderate regurgitation.    Tricuspid Valve: There is moderate regurgitation.    Pulmonary Artery: There is pulmonary hypertension. The estimated pulmonary artery systolic pressure is 46 mmHg.    IVC/SVC: Intermediate venous pressure at 8 mmHg.  Continue Beta Blocker Aldactone Nitrate/Vasodilator Bumex  and monitor clinical status closely. Monitor on telemetry. Patient is off CHF pathway.  Monitor strict Is&Os and daily weights.  Place on fluid restriction of 1.5 L. Continue to stress to patient importance of self efficacy and  on diet for CHF. Last BNP reviewed- and noted below   Recent Labs   Lab 07/08/24  2101   *         Gastroesophageal reflux disease with esophagitis  Chronic. Stable. Currently asymptomatic. Home medications include PPI/Antacids as needed.  Plan:  -Continue PPI/Antacids as needed       Ulcerative colitis  Chronic.  Stable.  Not in acute flare.  Plan:  -continue to monitor   -f/u outpatient as directed      CKD (chronic kidney disease) stage 4, GFR 15-29 ml/min  Creatine stable for now. BMP reviewed- noted Estimated Creatinine Clearance: 17.2 mL/min (A) (based on SCr of 2 mg/dL (H)). according to latest data. Based on current GFR, CKD stage is stage 4 - GFR 15-29.  Monitor UOP and serial BMP and adjust therapy as needed. Renally dose meds. Avoid nephrotoxic medications and procedures.  -KEEGAN resolving with received gentle hydration  -monitor    Essential hypertension  Chronic, controlled. Latest blood pressure and vitals reviewed-     Temp:  [97.2 °F (36.2  °C)-98.6 °F (37 °C)]   Pulse:  [74-83]   Resp:  [16-20]   BP: (106-154)/(63-77)   SpO2:  [96 %-98 %] .   Home meds for hypertension were reviewed and noted below.   Hypertension Medications               amLODIPine (NORVASC) 2.5 MG tablet Take 10 mg by mouth every morning.    bumetanide (BUMEX) 2 MG tablet Every Monday and Friday    carvediloL (COREG) 12.5 MG tablet TAKE 1 TABLET BY MOUTH TWICE DAILY WITH MEALS    isosorbide mononitrate (IMDUR) 30 MG 24 hr tablet Take 1 tablet (30 mg total) by mouth once daily.    metOLazone (ZAROXOLYN) 2.5 MG tablet Take on Mon    nitroGLYCERIN (NITROSTAT) 0.4 MG SL tablet PLACE ONE TABLET UNDERNEATH THE TONGUE EVERY 5 MINUTES AS NEEDED FOR CHEST PAIN    spironolactone (ALDACTONE) 25 MG tablet Take 1 tablet (25 mg total) by mouth 3 (three) times a week.     While in the hospital, will manage blood pressure as follows; Continue home antihypertensive regimen    Will utilize p.r.n. blood pressure medication only if patient's blood pressure greater than 160/100 and she develops symptoms such as worsening chest pain or shortness of breath.      Other hyperlipidemia  Patient is chronically on statin.will continue for now. Last Lipid Panel:   Lab Results   Component Value Date    CHOL 160 05/04/2022    HDL 81 (H) 05/04/2022    LDLCALC 63.0 05/04/2022    TRIG 80 05/04/2022    CHOLHDL 50.6 (H) 05/04/2022     -Continue home medication      Sarcoidosis of lung  Chronic.  Stable.  Not in acute flare.  Plan:  -continue to monitor  -f/u outpatient as directed        VTE Risk Mitigation (From admission, onward)           Ordered     enoxaparin injection 30 mg  Daily         07/09/24 0346     IP VTE HIGH RISK PATIENT  Once         07/09/24 0346     Place sequential compression device  Until discontinued         07/09/24 0346                    Discharge Planning   CANDACE:      Code Status: Full Code   Is the patient medically ready for discharge?:     Reason for patient still in hospital (select all  that apply): Patient trending condition and Treatment  Discharge Plan A: Home with family                  Leida Bower MD  Department of Hospital Medicine   [facility] - Telemetry ([facility])

## 2024-07-11 NOTE — ASSESSMENT & PLAN NOTE
Patient presented with worsening generalized weakness x1 week duration with associated decreased p.o. intake and diarrhea.  Patient remains afebrile with leukocytosis measuring 14.27.  CT abdomen/pelvis negative for acute findings.  UA positive for UTI.  Patient initiated on Rocephin   -continue antibiotics  -f/u repeat UA as culture with multiple organisms

## 2024-07-11 NOTE — ASSESSMENT & PLAN NOTE
Creatine stable for now. BMP reviewed- noted Estimated Creatinine Clearance: 17.2 mL/min (A) (based on SCr of 2 mg/dL (H)). according to latest data. Based on current GFR, CKD stage is stage 4 - GFR 15-29.  Monitor UOP and serial BMP and adjust therapy as needed. Renally dose meds. Avoid nephrotoxic medications and procedures.  -KEEGAN resolving with received gentle hydration  -monitor

## 2024-07-12 LAB
ALBUMIN SERPL BCP-MCNC: 2.9 G/DL (ref 3.5–5.2)
ANION GAP SERPL CALC-SCNC: 11 MMOL/L (ref 8–16)
BILIRUB UR QL STRIP: NEGATIVE
BUN SERPL-MCNC: 42 MG/DL (ref 8–23)
CALCIUM SERPL-MCNC: 8.4 MG/DL (ref 8.7–10.5)
CHLORIDE SERPL-SCNC: 111 MMOL/L (ref 95–110)
CLARITY UR: CLEAR
CO2 SERPL-SCNC: 13 MMOL/L (ref 23–29)
COLOR UR: YELLOW
CREAT SERPL-MCNC: 1.7 MG/DL (ref 0.5–1.4)
EST. GFR  (NO RACE VARIABLE): 29 ML/MIN/1.73 M^2
GLUCOSE SERPL-MCNC: 93 MG/DL (ref 70–110)
GLUCOSE UR QL STRIP: NEGATIVE
HGB UR QL STRIP: NEGATIVE
KETONES UR QL STRIP: NEGATIVE
LEUKOCYTE ESTERASE UR QL STRIP: NEGATIVE
NITRITE UR QL STRIP: NEGATIVE
PH UR STRIP: 6 [PH] (ref 5–8)
PHOSPHATE SERPL-MCNC: 2.8 MG/DL (ref 2.7–4.5)
POTASSIUM SERPL-SCNC: 3.6 MMOL/L (ref 3.5–5.1)
PROT UR QL STRIP: NEGATIVE
SODIUM SERPL-SCNC: 135 MMOL/L (ref 136–145)
SP GR UR STRIP: 1.01 (ref 1–1.03)
URN SPEC COLLECT METH UR: NORMAL
UROBILINOGEN UR STRIP-ACNC: NEGATIVE EU/DL

## 2024-07-12 PROCEDURE — 81003 URINALYSIS AUTO W/O SCOPE: CPT | Mod: HCNC | Performed by: INTERNAL MEDICINE

## 2024-07-12 PROCEDURE — 25000003 PHARM REV CODE 250: Mod: HCNC | Performed by: INTERNAL MEDICINE

## 2024-07-12 PROCEDURE — 80069 RENAL FUNCTION PANEL: CPT | Mod: HCNC | Performed by: INTERNAL MEDICINE

## 2024-07-12 PROCEDURE — 36415 COLL VENOUS BLD VENIPUNCTURE: CPT | Mod: HCNC | Performed by: INTERNAL MEDICINE

## 2024-07-12 PROCEDURE — 25000003 PHARM REV CODE 250: Mod: HCNC | Performed by: HOSPITALIST

## 2024-07-12 PROCEDURE — 21400001 HC TELEMETRY ROOM: Mod: HCNC

## 2024-07-12 PROCEDURE — 63600175 PHARM REV CODE 636 W HCPCS: Mod: HCNC | Performed by: HOSPITALIST

## 2024-07-12 RX ADMIN — PANCRELIPASE 1 CAPSULE: 24000; 76000; 120000 CAPSULE, DELAYED RELEASE PELLETS ORAL at 05:07

## 2024-07-12 RX ADMIN — PANCRELIPASE 1 CAPSULE: 24000; 76000; 120000 CAPSULE, DELAYED RELEASE PELLETS ORAL at 12:07

## 2024-07-12 RX ADMIN — SODIUM BICARBONATE 1300 MG: 650 TABLET ORAL at 08:07

## 2024-07-12 RX ADMIN — SODIUM BICARBONATE 1300 MG: 650 TABLET ORAL at 02:07

## 2024-07-12 RX ADMIN — ACETAMINOPHEN 650 MG: 325 TABLET ORAL at 02:07

## 2024-07-12 RX ADMIN — PANCRELIPASE 1 CAPSULE: 24000; 76000; 120000 CAPSULE, DELAYED RELEASE PELLETS ORAL at 10:07

## 2024-07-12 RX ADMIN — ASPIRIN 81 MG: 81 TABLET, COATED ORAL at 10:07

## 2024-07-12 RX ADMIN — DORZOLAMIDE HYDROCHLORIDE AND TIMOLOL MALEATE 1 DROP: 20; 5 SOLUTION OPHTHALMIC at 08:07

## 2024-07-12 RX ADMIN — SODIUM BICARBONATE 1300 MG: 650 TABLET ORAL at 10:07

## 2024-07-12 RX ADMIN — CEFTRIAXONE 1 G: 1 INJECTION, POWDER, FOR SOLUTION INTRAMUSCULAR; INTRAVENOUS at 02:07

## 2024-07-12 RX ADMIN — ATORVASTATIN CALCIUM 40 MG: 40 TABLET, FILM COATED ORAL at 05:07

## 2024-07-12 RX ADMIN — PANTOPRAZOLE SODIUM 40 MG: 40 TABLET, DELAYED RELEASE ORAL at 10:07

## 2024-07-12 RX ADMIN — ENOXAPARIN SODIUM 30 MG: 30 INJECTION SUBCUTANEOUS at 05:07

## 2024-07-12 RX ADMIN — ISOSORBIDE MONONITRATE 30 MG: 30 TABLET, EXTENDED RELEASE ORAL at 10:07

## 2024-07-12 RX ADMIN — CARVEDILOL 12.5 MG: 12.5 TABLET, FILM COATED ORAL at 12:07

## 2024-07-12 RX ADMIN — CARVEDILOL 12.5 MG: 12.5 TABLET, FILM COATED ORAL at 05:07

## 2024-07-12 RX ADMIN — AMLODIPINE BESYLATE 10 MG: 10 TABLET ORAL at 06:07

## 2024-07-12 RX ADMIN — DORZOLAMIDE HYDROCHLORIDE AND TIMOLOL MALEATE 1 DROP: 20; 5 SOLUTION OPHTHALMIC at 10:07

## 2024-07-12 NOTE — PROGRESS NOTES
[facility] Medicine  Progress Note    Patient Name: Glo Dumont  MRN: [id number]  Patient Class: IP- Inpatient   Admission Date: 7/9/2024  Length of Stay: 3 days  Attending Physician: Leida Bower MD  Primary Care Provider: Christina Cardona MD        Subjective:     Principal Problem:Generalized weakness        HPI:  Glo Dumont is a 88 y.o. female with a PMH  has a past medical history of Anemia, Angina pectoris, Anxiety, Anxiety and depression, Arthritis, Carotid artery occlusion, Carpal tunnel syndrome (06/23/2008), Chronic diarrhea, CKD (chronic kidney disease) stage 3, GFR 30-59 ml/min (5/11/2017), Colitis, Coronary artery disease, Coronary artery disease, Diastolic dysfunction, Diverticulosis, Glaucoma, Greater trochanteric bursitis (2/10/2015), Grief at loss of child (1/26/2016), H/O carotid endarterectomy (12/2/2013), Heart failure, History of coronary angioplasty (3/11/2014), Hypercholesteremia, Hypertension, Liver cyst (02/08/2013), Macular degeneration, Obesity with serious comorbidity (3/19/2023), Primary open-angle glaucoma(365.11) (9/3/2013), Renal cyst (02/08/2013), 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 who presented to the ED for further evaluation of worsening generalized weakness x1 week duration.  Patient reports chronic dyspnea/shortness a breath unchanged from baseline however has had worsening weakness with associated decreased p.o. intake, dizziness, and 3 episodes of reported diarrhea.  She reported no known alleviating or aggravating factors noted and denied endorsing any fever, chills, sweats, nausea, vomiting, chest pain, abdominal pain, dysuria, hematuria, melena, hematochezia, or onset neurological deficits.  She denied exposure to ill contacts, consumption of ill/contaminated food, or recent use of antibiotics.  All other review of systems negative except as  noted above.  Prior to onset of symptoms, patient reported being in her usual state of health with no other concerns or complaints.  Initial workup in the ED revealed patient to be afebrile with leukocytosis measuring 14.27 with UA positive for UTI.  Patient initiated on Rocephin with cultures obtained and pending and admitted to Hospital Medicine inpatient for continued medical management.      PCP: Christina Cardona      Overview/Hospital Course:  The patient presented with generalized weakness. Workup revealed UTI and KEEGAN. She also had metabolic acidosis. She was placed on IVFs and empiric IV antibiotics. Her weakness improved. She ambulating in the schmitz with therapy. Urine culture with multiple organisms. Repeat UA pending. Sodium bicarb increased as serum bicarb was 8.     Interval History: f/u UTI, metabolic acidosis awaiting repeat UA. Increased sodium bicarb.     Review of Systems  Objective:     Vital Signs (Most Recent):  Temp: 98.3 °F (36.8 °C) (07/12/24 0800)  Pulse: 70 (07/12/24 0800)  Resp: 18 (07/12/24 0800)  BP: 118/73 (07/12/24 0800)  SpO2: 97 % (07/12/24 0800) Vital Signs (24h Range):  Temp:  [98.1 °F (36.7 °C)-98.9 °F (37.2 °C)] 98.3 °F (36.8 °C)  Pulse:  [70-77] 70  Resp:  [16-18] 18  SpO2:  [95 %-97 %] 97 %  BP: (106-133)/(64-83) 118/73     Weight: 62.8 kg (138 lb 7.2 oz)  Body mass index is 26.16 kg/m².  No intake or output data in the 24 hours ending 07/12/24 1148      Physical Exam  HENT:      Head: Normocephalic and atraumatic.   Cardiovascular:      Rate and Rhythm: Normal rate and regular rhythm.      Heart sounds: No murmur heard.  Pulmonary:      Effort: Pulmonary effort is normal. No respiratory distress.      Breath sounds: Normal breath sounds. No wheezing.   Abdominal:      General: Bowel sounds are normal. There is no distension.      Palpations: Abdomen is soft.      Tenderness: There is no abdominal tenderness.   Musculoskeletal:         General: No swelling.   Skin:     General:  Skin is warm and dry.   Neurological:      Mental Status: She is alert and oriented to person, place, and time. Mental status is at baseline.             Significant Labs: All pertinent labs within the past 24 hours have been reviewed.  Recent Lab Results         07/12/24  0534        Albumin 2.9       Anion Gap 11       BUN 42       Calcium 8.4       Chloride 111       CO2 13       Creatinine 1.7       eGFR 29       Glucose 93       Phosphorus Level 2.8       Potassium 3.6       Sodium 135               Significant Imaging: I have reviewed all pertinent imaging results/findings within the past 24 hours.    Assessment/Plan:      * Generalized weakness  -likely due to infection  -continue empiric treatment of UTI  -improving  -evaluated by therapy no indication for acute therapy  -encourage ambulation in the schmitz      Elevated troponin  Patient found to have elevated troponin measuring 0.051 with repeat 0.059 in absence of chest pain.  Chest x-ray negative for acute findings.  EKG negative for acute ischemia.  Plan:  -telemetry  -trend troponin  -serial EKGs at onset/worsening chest pain  -RODY therapy p.r.n.      Coronary artery disease involving native coronary artery of native heart  Patient with known CAD s/p CABG, which is controlled Will continue  home medications  and monitor for S/Sx of angina/ACS. Continue to monitor on telemetry.       Acute cystitis  Patient presented with worsening generalized weakness x1 week duration with associated decreased p.o. intake and diarrhea.  Patient remains afebrile with leukocytosis measuring 14.27.  CT abdomen/pelvis negative for acute findings.  UA positive for UTI.  Patient initiated on Rocephin   -continue antibiotics  -f/u repeat UA as culture with multiple organisms        Chronic heart failure with preserved ejection fraction  Patient is identified as having Diastolic (HFpEF) heart failure that is Chronic. CHF is currently controlled. Latest ECHO performed and  demonstrates- Results for orders placed during the hospital encounter of 03/13/24    Echo    Interpretation Summary    Left Ventricle: The left ventricle is normal in size. Mildly increased wall thickness. There is concentric remodeling. There is normal systolic function with a visually estimated ejection fraction of 60 - 65%. Grade I diastolic dysfunction.    Right Ventricle: Normal right ventricular cavity size. Wall thickness is normal. Right ventricle wall motion  is normal. Systolic function is normal.    Left Atrium: Left atrium is mildly dilated.    Aortic Valve: There is mild annular calcification present. There is mild stenosis. Aortic valve area by VTI is 2.17 cm². Aortic valve peak velocity is 1.37 m/s. Mean gradient is 4 mmHg. The dimensionless index is 0.70. There is mild aortic regurgitation.    Mitral Valve: There is moderate posterior mitral annular calcification present. There is moderate regurgitation.    Tricuspid Valve: There is moderate regurgitation.    Pulmonary Artery: There is pulmonary hypertension. The estimated pulmonary artery systolic pressure is 46 mmHg.    IVC/SVC: Intermediate venous pressure at 8 mmHg.  Continue Beta Blocker Aldactone Nitrate/Vasodilator Bumex  and monitor clinical status closely. Monitor on telemetry. Patient is off CHF pathway.  Monitor strict Is&Os and daily weights.  Place on fluid restriction of 1.5 L. Continue to stress to patient importance of self efficacy and  on diet for CHF. Last BNP reviewed- and noted below   Recent Labs   Lab 07/08/24  2101   *         Gastroesophageal reflux disease with esophagitis  Chronic. Stable. Currently asymptomatic. Home medications include PPI/Antacids as needed.  Plan:  -Continue PPI/Antacids as needed       Metabolic acidosis  -increased sodium bicarb to 1300 mg tid  -monitor labs      Ulcerative colitis  Chronic.  Stable.  Not in acute flare.  Plan:  -continue to monitor   -f/u outpatient as directed      CKD  (chronic kidney disease) stage 4, GFR 15-29 ml/min  Creatine stable for now. BMP reviewed- noted Estimated Creatinine Clearance: 20.2 mL/min (A) (based on SCr of 1.7 mg/dL (H)). according to latest data. Based on current GFR, CKD stage is stage 4 - GFR 15-29.  Monitor UOP and serial BMP and adjust therapy as needed. Renally dose meds. Avoid nephrotoxic medications and procedures.  -KEEGAN resolved with received gentle hydration  -monitor    Essential hypertension  Chronic, controlled. Latest blood pressure and vitals reviewed-     Temp:  [98.1 °F (36.7 °C)-98.9 °F (37.2 °C)]   Pulse:  [70-77]   Resp:  [16-18]   BP: (106-133)/(64-83)   SpO2:  [95 %-97 %] .   Home meds for hypertension were reviewed and noted below.   Hypertension Medications               amLODIPine (NORVASC) 2.5 MG tablet Take 10 mg by mouth every morning.    bumetanide (BUMEX) 2 MG tablet Every Monday and Friday    carvediloL (COREG) 12.5 MG tablet TAKE 1 TABLET BY MOUTH TWICE DAILY WITH MEALS    isosorbide mononitrate (IMDUR) 30 MG 24 hr tablet Take 1 tablet (30 mg total) by mouth once daily.    metOLazone (ZAROXOLYN) 2.5 MG tablet Take on Mon    nitroGLYCERIN (NITROSTAT) 0.4 MG SL tablet PLACE ONE TABLET UNDERNEATH THE TONGUE EVERY 5 MINUTES AS NEEDED FOR CHEST PAIN    spironolactone (ALDACTONE) 25 MG tablet Take 1 tablet (25 mg total) by mouth 3 (three) times a week.     While in the hospital, will manage blood pressure as follows; Continue home antihypertensive regimen    Will utilize p.r.n. blood pressure medication only if patient's blood pressure greater than 160/100 and she develops symptoms such as worsening chest pain or shortness of breath.      Other hyperlipidemia  Patient is chronically on statin.will continue for now. Last Lipid Panel:   Lab Results   Component Value Date    CHOL 160 05/04/2022    HDL 81 (H) 05/04/2022    LDLCALC 63.0 05/04/2022    TRIG 80 05/04/2022    CHOLHDL 50.6 (H) 05/04/2022     -Continue home  medication      Sarcoidosis of lung  Chronic.  Stable.  Not in acute flare.  Plan:  -continue to monitor  -f/u outpatient as directed        VTE Risk Mitigation (From admission, onward)           Ordered     enoxaparin injection 30 mg  Daily         07/09/24 0346     IP VTE HIGH RISK PATIENT  Once         07/09/24 0346     Place sequential compression device  Until discontinued         07/09/24 0346                    Discharge Planning   CANDACE:      Code Status: Full Code   Is the patient medically ready for discharge?:     Reason for patient still in hospital (select all that apply): Patient trending condition, Laboratory test, and Treatment  Discharge Plan A: Home with family                  Leida Bower MD  Department of Hospital Medicine   [facility] - Telemetry ([facility])

## 2024-07-12 NOTE — SUBJECTIVE & OBJECTIVE
Interval History: f/u UTI, metabolic acidosis awaiting repeat UA. Increased sodium bicarb.     Review of Systems  Objective:     Vital Signs (Most Recent):  Temp: 98.3 °F (36.8 °C) (07/12/24 0800)  Pulse: 70 (07/12/24 0800)  Resp: 18 (07/12/24 0800)  BP: 118/73 (07/12/24 0800)  SpO2: 97 % (07/12/24 0800) Vital Signs (24h Range):  Temp:  [98.1 °F (36.7 °C)-98.9 °F (37.2 °C)] 98.3 °F (36.8 °C)  Pulse:  [70-77] 70  Resp:  [16-18] 18  SpO2:  [95 %-97 %] 97 %  BP: (106-133)/(64-83) 118/73     Weight: 62.8 kg (138 lb 7.2 oz)  Body mass index is 26.16 kg/m².  No intake or output data in the 24 hours ending 07/12/24 1148      Physical Exam  HENT:      Head: Normocephalic and atraumatic.   Cardiovascular:      Rate and Rhythm: Normal rate and regular rhythm.      Heart sounds: No murmur heard.  Pulmonary:      Effort: Pulmonary effort is normal. No respiratory distress.      Breath sounds: Normal breath sounds. No wheezing.   Abdominal:      General: Bowel sounds are normal. There is no distension.      Palpations: Abdomen is soft.      Tenderness: There is no abdominal tenderness.   Musculoskeletal:         General: No swelling.   Skin:     General: Skin is warm and dry.   Neurological:      Mental Status: She is alert and oriented to person, place, and time. Mental status is at baseline.             Significant Labs: All pertinent labs within the past 24 hours have been reviewed.  Recent Lab Results         07/12/24  0534        Albumin 2.9       Anion Gap 11       BUN 42       Calcium 8.4       Chloride 111       CO2 13       Creatinine 1.7       eGFR 29       Glucose 93       Phosphorus Level 2.8       Potassium 3.6       Sodium 135               Significant Imaging: I have reviewed all pertinent imaging results/findings within the past 24 hours.

## 2024-07-12 NOTE — ASSESSMENT & PLAN NOTE
Chronic, controlled. Latest blood pressure and vitals reviewed-     Temp:  [98.1 °F (36.7 °C)-98.9 °F (37.2 °C)]   Pulse:  [70-77]   Resp:  [16-18]   BP: (106-133)/(64-83)   SpO2:  [95 %-97 %] .   Home meds for hypertension were reviewed and noted below.   Hypertension Medications               amLODIPine (NORVASC) 2.5 MG tablet Take 10 mg by mouth every morning.    bumetanide (BUMEX) 2 MG tablet Every Monday and Friday    carvediloL (COREG) 12.5 MG tablet TAKE 1 TABLET BY MOUTH TWICE DAILY WITH MEALS    isosorbide mononitrate (IMDUR) 30 MG 24 hr tablet Take 1 tablet (30 mg total) by mouth once daily.    metOLazone (ZAROXOLYN) 2.5 MG tablet Take on Mon    nitroGLYCERIN (NITROSTAT) 0.4 MG SL tablet PLACE ONE TABLET UNDERNEATH THE TONGUE EVERY 5 MINUTES AS NEEDED FOR CHEST PAIN    spironolactone (ALDACTONE) 25 MG tablet Take 1 tablet (25 mg total) by mouth 3 (three) times a week.     While in the hospital, will manage blood pressure as follows; Continue home antihypertensive regimen    Will utilize p.r.n. blood pressure medication only if patient's blood pressure greater than 160/100 and she develops symptoms such as worsening chest pain or shortness of breath.

## 2024-07-12 NOTE — ASSESSMENT & PLAN NOTE
Creatine stable for now. BMP reviewed- noted Estimated Creatinine Clearance: 20.2 mL/min (A) (based on SCr of 1.7 mg/dL (H)). according to latest data. Based on current GFR, CKD stage is stage 4 - GFR 15-29.  Monitor UOP and serial BMP and adjust therapy as needed. Renally dose meds. Avoid nephrotoxic medications and procedures.  -KEEGAN resolved with received gentle hydration  -monitor

## 2024-07-12 NOTE — HOSPITAL COURSE
The patient presented with generalized weakness. Workup revealed UTI and KEEGAN. She also had metabolic acidosis. She was placed on IVFs and empiric IV antibiotics. Her weakness improved. She ambulating in the schmitz with therapy. Urine culture with multiple organisms. Repeat UA with resolving infection. Sodium bicarb supplementation increased as serum bicarb was low on 7/12. Labs improved. Patient was transitioned to oral antibiotics. Patient seen and examined, stable for discharge.

## 2024-07-13 LAB
ALBUMIN SERPL BCP-MCNC: 3 G/DL (ref 3.5–5.2)
ANION GAP SERPL CALC-SCNC: 13 MMOL/L (ref 8–16)
BUN SERPL-MCNC: 39 MG/DL (ref 8–23)
CALCIUM SERPL-MCNC: 8.5 MG/DL (ref 8.7–10.5)
CHLORIDE SERPL-SCNC: 110 MMOL/L (ref 95–110)
CO2 SERPL-SCNC: 15 MMOL/L (ref 23–29)
CREAT SERPL-MCNC: 1.8 MG/DL (ref 0.5–1.4)
EST. GFR  (NO RACE VARIABLE): 27 ML/MIN/1.73 M^2
GLUCOSE SERPL-MCNC: 93 MG/DL (ref 70–110)
PHOSPHATE SERPL-MCNC: 2.5 MG/DL (ref 2.7–4.5)
POTASSIUM SERPL-SCNC: 3.6 MMOL/L (ref 3.5–5.1)
SODIUM SERPL-SCNC: 138 MMOL/L (ref 136–145)

## 2024-07-13 PROCEDURE — 36415 COLL VENOUS BLD VENIPUNCTURE: CPT | Mod: HCNC | Performed by: INTERNAL MEDICINE

## 2024-07-13 PROCEDURE — 63600175 PHARM REV CODE 636 W HCPCS: Mod: HCNC | Performed by: HOSPITALIST

## 2024-07-13 PROCEDURE — 80069 RENAL FUNCTION PANEL: CPT | Mod: HCNC | Performed by: INTERNAL MEDICINE

## 2024-07-13 PROCEDURE — 25000003 PHARM REV CODE 250: Mod: HCNC | Performed by: INTERNAL MEDICINE

## 2024-07-13 PROCEDURE — 25000003 PHARM REV CODE 250: Mod: HCNC | Performed by: HOSPITALIST

## 2024-07-13 PROCEDURE — 21400001 HC TELEMETRY ROOM: Mod: HCNC

## 2024-07-13 RX ADMIN — DORZOLAMIDE HYDROCHLORIDE AND TIMOLOL MALEATE 1 DROP: 20; 5 SOLUTION OPHTHALMIC at 08:07

## 2024-07-13 RX ADMIN — CARVEDILOL 12.5 MG: 12.5 TABLET, FILM COATED ORAL at 05:07

## 2024-07-13 RX ADMIN — PANCRELIPASE 1 CAPSULE: 24000; 76000; 120000 CAPSULE, DELAYED RELEASE PELLETS ORAL at 11:07

## 2024-07-13 RX ADMIN — ASPIRIN 81 MG: 81 TABLET, COATED ORAL at 08:07

## 2024-07-13 RX ADMIN — PANTOPRAZOLE SODIUM 40 MG: 40 TABLET, DELAYED RELEASE ORAL at 08:07

## 2024-07-13 RX ADMIN — ENOXAPARIN SODIUM 30 MG: 30 INJECTION SUBCUTANEOUS at 05:07

## 2024-07-13 RX ADMIN — ACETAMINOPHEN 650 MG: 325 TABLET ORAL at 05:07

## 2024-07-13 RX ADMIN — PANCRELIPASE 1 CAPSULE: 24000; 76000; 120000 CAPSULE, DELAYED RELEASE PELLETS ORAL at 08:07

## 2024-07-13 RX ADMIN — CEFTRIAXONE 1 G: 1 INJECTION, POWDER, FOR SOLUTION INTRAMUSCULAR; INTRAVENOUS at 01:07

## 2024-07-13 RX ADMIN — AMLODIPINE BESYLATE 10 MG: 10 TABLET ORAL at 06:07

## 2024-07-13 RX ADMIN — ACETAMINOPHEN 650 MG: 325 TABLET ORAL at 08:07

## 2024-07-13 RX ADMIN — ATORVASTATIN CALCIUM 40 MG: 40 TABLET, FILM COATED ORAL at 05:07

## 2024-07-13 RX ADMIN — SODIUM BICARBONATE 1300 MG: 650 TABLET ORAL at 03:07

## 2024-07-13 RX ADMIN — PANCRELIPASE 1 CAPSULE: 24000; 76000; 120000 CAPSULE, DELAYED RELEASE PELLETS ORAL at 05:07

## 2024-07-13 RX ADMIN — ISOSORBIDE MONONITRATE 30 MG: 30 TABLET, EXTENDED RELEASE ORAL at 08:07

## 2024-07-13 RX ADMIN — CARVEDILOL 12.5 MG: 12.5 TABLET, FILM COATED ORAL at 08:07

## 2024-07-13 RX ADMIN — SODIUM BICARBONATE 1300 MG: 650 TABLET ORAL at 08:07

## 2024-07-13 RX ADMIN — POTASSIUM PHOSPHATE, MONOBASIC 500 MG: 500 TABLET, SOLUBLE ORAL at 11:07

## 2024-07-13 NOTE — ASSESSMENT & PLAN NOTE
Patient presented with worsening generalized weakness x1 week duration with associated decreased p.o. intake and diarrhea.  CT abdomen/pelvis negative for acute findings.  UA positive for UTI.  Patient initiated on Rocephin   -continue antibiotics  -repeat UA revealed Rocephin covering infection

## 2024-07-13 NOTE — PROGRESS NOTES
[facility] Medicine  Progress Note    Patient Name: Glo Dumont  MRN: [id number]  Patient Class: IP- Inpatient   Admission Date: 7/9/2024  Length of Stay: 4 days  Attending Physician: Leida Bower MD  Primary Care Provider: Christina Cardona MD        Subjective:     Principal Problem:Generalized weakness        HPI:  Glo Dumont is a 88 y.o. female with a PMH  has a past medical history of Anemia, Angina pectoris, Anxiety, Anxiety and depression, Arthritis, Carotid artery occlusion, Carpal tunnel syndrome (06/23/2008), Chronic diarrhea, CKD (chronic kidney disease) stage 3, GFR 30-59 ml/min (5/11/2017), Colitis, Coronary artery disease, Coronary artery disease, Diastolic dysfunction, Diverticulosis, Glaucoma, Greater trochanteric bursitis (2/10/2015), Grief at loss of child (1/26/2016), H/O carotid endarterectomy (12/2/2013), Heart failure, History of coronary angioplasty (3/11/2014), Hypercholesteremia, Hypertension, Liver cyst (02/08/2013), Macular degeneration, Obesity with serious comorbidity (3/19/2023), Primary open-angle glaucoma(365.11) (9/3/2013), Renal cyst (02/08/2013), 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 who presented to the ED for further evaluation of worsening generalized weakness x1 week duration.  Patient reports chronic dyspnea/shortness a breath unchanged from baseline however has had worsening weakness with associated decreased p.o. intake, dizziness, and 3 episodes of reported diarrhea.  She reported no known alleviating or aggravating factors noted and denied endorsing any fever, chills, sweats, nausea, vomiting, chest pain, abdominal pain, dysuria, hematuria, melena, hematochezia, or onset neurological deficits.  She denied exposure to ill contacts, consumption of ill/contaminated food, or recent use of antibiotics.  All other review of systems negative except as  noted above.  Prior to onset of symptoms, patient reported being in her usual state of health with no other concerns or complaints.  Initial workup in the ED revealed patient to be afebrile with leukocytosis measuring 14.27 with UA positive for UTI.  Patient initiated on Rocephin with cultures obtained and pending and admitted to Hospital Medicine inpatient for continued medical management.      PCP: Christina Cardona      Overview/Hospital Course:  The patient presented with generalized weakness. Workup revealed UTI and KEEGAN. She also had metabolic acidosis. She was placed on IVFs and empiric IV antibiotics. Her weakness improved. She ambulating in the schmitz with therapy. Urine culture with multiple organisms. Repeat UA with resolving infection. Sodium bicarb supplementation increased as serum bicarb was low on 7/12. Labs improving. Anticipate discharge in am if bicarbonate improved on labs.    Interval History: f/u UTI metabolic acidosis. UA revealed Rocephin covered infection. Bicarb up to 15    Review of Systems  Objective:     Vital Signs (Most Recent):  Temp: 98.3 °F (36.8 °C) (07/13/24 1547)  Pulse: 72 (07/13/24 1547)  Resp: 18 (07/13/24 1547)  BP: 125/74 (07/13/24 1547)  SpO2: (!) 94 % (07/13/24 1547) Vital Signs (24h Range):  Temp:  [97.9 °F (36.6 °C)-98.4 °F (36.9 °C)] 98.3 °F (36.8 °C)  Pulse:  [67-79] 72  Resp:  [18] 18  SpO2:  [94 %-97 %] 94 %  BP: (103-149)/(58-74) 125/74     Weight: 61.5 kg (135 lb 9.3 oz)  Body mass index is 25.62 kg/m².    Intake/Output Summary (Last 24 hours) at 7/13/2024 1610  Last data filed at 7/13/2024 0000  Gross per 24 hour   Intake --   Output 1 ml   Net -1 ml         Physical Exam  HENT:      Head: Normocephalic and atraumatic.   Cardiovascular:      Rate and Rhythm: Normal rate and regular rhythm.      Heart sounds: No murmur heard.  Pulmonary:      Effort: Pulmonary effort is normal. No respiratory distress.      Breath sounds: Normal breath sounds. No wheezing.    Abdominal:      General: Bowel sounds are normal. There is no distension.      Palpations: Abdomen is soft.      Tenderness: There is no abdominal tenderness.   Musculoskeletal:         General: No swelling.   Skin:     General: Skin is warm and dry.   Neurological:      Mental Status: She is alert and oriented to person, place, and time. Mental status is at baseline.             Significant Labs: All pertinent labs within the past 24 hours have been reviewed.  Recent Lab Results         07/13/24  0634        Albumin 3.0       Anion Gap 13       BUN 39       Calcium 8.5       Chloride 110       CO2 15       Creatinine 1.8       eGFR 27       Glucose 93       Phosphorus Level 2.5       Potassium 3.6       Sodium 138               Significant Imaging: I have reviewed all pertinent imaging results/findings within the past 24 hours.    Assessment/Plan:      * Generalized weakness  -likely due to infection  -continue empiric treatment of UTI  -improving  -evaluated by therapy no indication for acute therapy  -encourage ambulation in the schmitz      Elevated troponin  -flat trend  -no chest pain      Coronary artery disease involving native coronary artery of native heart  Patient with known CAD s/p CABG, which is controlled Will continue  home medications  and monitor for S/Sx of angina/ACS. Continue to monitor on telemetry.       Acute cystitis  Patient presented with worsening generalized weakness x1 week duration with associated decreased p.o. intake and diarrhea.  CT abdomen/pelvis negative for acute findings.  UA positive for UTI.  Patient initiated on Rocephin   -continue antibiotics  -repeat UA revealed Rocephin covering infection    Chronic heart failure with preserved ejection fraction  Patient is identified as having Diastolic (HFpEF) heart failure that is Chronic. CHF is currently controlled. Latest ECHO performed and demonstrates- Results for orders placed during the hospital encounter of  03/13/24    Echo    Interpretation Summary    Left Ventricle: The left ventricle is normal in size. Mildly increased wall thickness. There is concentric remodeling. There is normal systolic function with a visually estimated ejection fraction of 60 - 65%. Grade I diastolic dysfunction.    Right Ventricle: Normal right ventricular cavity size. Wall thickness is normal. Right ventricle wall motion  is normal. Systolic function is normal.    Left Atrium: Left atrium is mildly dilated.    Aortic Valve: There is mild annular calcification present. There is mild stenosis. Aortic valve area by VTI is 2.17 cm². Aortic valve peak velocity is 1.37 m/s. Mean gradient is 4 mmHg. The dimensionless index is 0.70. There is mild aortic regurgitation.    Mitral Valve: There is moderate posterior mitral annular calcification present. There is moderate regurgitation.    Tricuspid Valve: There is moderate regurgitation.    Pulmonary Artery: There is pulmonary hypertension. The estimated pulmonary artery systolic pressure is 46 mmHg.    IVC/SVC: Intermediate venous pressure at 8 mmHg.  Continue Beta Blocker Aldactone Nitrate/Vasodilator Bumex  and monitor clinical status closely. Monitor on telemetry. Patient is off CHF pathway.  Monitor strict Is&Os and daily weights.  Place on fluid restriction of 1.5 L. Continue to stress to patient importance of self efficacy and  on diet for CHF. Last BNP reviewed- and noted below   Recent Labs   Lab 07/08/24  2101   *         Gastroesophageal reflux disease with esophagitis  Chronic. Stable. Currently asymptomatic. Home medications include PPI/Antacids as needed.  Plan:  -Continue PPI/Antacids as needed       Metabolic acidosis  -continue sodium bicarb at 1300 mg tid  -likely due to kidney disease  -monitor labs      Ulcerative colitis  Chronic.  Stable.  Not in acute flare.  Plan:  -continue to monitor   -f/u outpatient as directed      CKD (chronic kidney disease) stage 4, GFR  15-29 ml/min  Creatine stable for now. BMP reviewed- noted Estimated Creatinine Clearance: 18.9 mL/min (A) (based on SCr of 1.8 mg/dL (H)). according to latest data. Based on current GFR, CKD stage is stage 4 - GFR 15-29.  Monitor UOP and serial BMP and adjust therapy as needed. Renally dose meds. Avoid nephrotoxic medications and procedures.  -KEEGAN resolved with received gentle hydration  -monitor    Essential hypertension  Chronic, controlled. Latest blood pressure and vitals reviewed-     Temp:  [97.9 °F (36.6 °C)-98.4 °F (36.9 °C)]   Pulse:  [67-79]   Resp:  [18]   BP: (103-149)/(58-74)   SpO2:  [94 %-97 %] .   Home meds for hypertension were reviewed and noted below.   Hypertension Medications               amLODIPine (NORVASC) 2.5 MG tablet Take 10 mg by mouth every morning.    bumetanide (BUMEX) 2 MG tablet Every Monday and Friday    carvediloL (COREG) 12.5 MG tablet TAKE 1 TABLET BY MOUTH TWICE DAILY WITH MEALS    isosorbide mononitrate (IMDUR) 30 MG 24 hr tablet Take 1 tablet (30 mg total) by mouth once daily.    metOLazone (ZAROXOLYN) 2.5 MG tablet Take on Mon    nitroGLYCERIN (NITROSTAT) 0.4 MG SL tablet PLACE ONE TABLET UNDERNEATH THE TONGUE EVERY 5 MINUTES AS NEEDED FOR CHEST PAIN    spironolactone (ALDACTONE) 25 MG tablet Take 1 tablet (25 mg total) by mouth 3 (three) times a week.     While in the hospital, will manage blood pressure as follows; Continue home antihypertensive regimen    Will utilize p.r.n. blood pressure medication only if patient's blood pressure greater than 160/100 and she develops symptoms such as worsening chest pain or shortness of breath.      Other hyperlipidemia  Patient is chronically on statin.will continue for now. Last Lipid Panel:   Lab Results   Component Value Date    CHOL 160 05/04/2022    HDL 81 (H) 05/04/2022    LDLCALC 63.0 05/04/2022    TRIG 80 05/04/2022    CHOLHDL 50.6 (H) 05/04/2022     -Continue home medication      Sarcoidosis of lung  Chronic.  Stable.  Not  in acute flare.  Plan:  -continue to monitor  -f/u outpatient as directed        VTE Risk Mitigation (From admission, onward)           Ordered     enoxaparin injection 30 mg  Daily         07/09/24 0346     IP VTE HIGH RISK PATIENT  Once         07/09/24 0346     Place sequential compression device  Until discontinued         07/09/24 0346                    Discharge Planning   CANDACE:      Code Status: Full Code   Is the patient medically ready for discharge?:     Reason for patient still in hospital (select all that apply): Patient trending condition, Laboratory test, and Treatment  Discharge Plan A: Home with family                  Leida Bower MD  Department of Hospital Medicine   [facility] - Telemetry ([facility])

## 2024-07-13 NOTE — ASSESSMENT & PLAN NOTE
Chronic, controlled. Latest blood pressure and vitals reviewed-     Temp:  [97.9 °F (36.6 °C)-98.4 °F (36.9 °C)]   Pulse:  [67-79]   Resp:  [18]   BP: (103-149)/(58-74)   SpO2:  [94 %-97 %] .   Home meds for hypertension were reviewed and noted below.   Hypertension Medications               amLODIPine (NORVASC) 2.5 MG tablet Take 10 mg by mouth every morning.    bumetanide (BUMEX) 2 MG tablet Every Monday and Friday    carvediloL (COREG) 12.5 MG tablet TAKE 1 TABLET BY MOUTH TWICE DAILY WITH MEALS    isosorbide mononitrate (IMDUR) 30 MG 24 hr tablet Take 1 tablet (30 mg total) by mouth once daily.    metOLazone (ZAROXOLYN) 2.5 MG tablet Take on Mon    nitroGLYCERIN (NITROSTAT) 0.4 MG SL tablet PLACE ONE TABLET UNDERNEATH THE TONGUE EVERY 5 MINUTES AS NEEDED FOR CHEST PAIN    spironolactone (ALDACTONE) 25 MG tablet Take 1 tablet (25 mg total) by mouth 3 (three) times a week.     While in the hospital, will manage blood pressure as follows; Continue home antihypertensive regimen    Will utilize p.r.n. blood pressure medication only if patient's blood pressure greater than 160/100 and she develops symptoms such as worsening chest pain or shortness of breath.

## 2024-07-13 NOTE — SUBJECTIVE & OBJECTIVE
Interval History: f/u UTI metabolic acidosis. UA revealed Rocephin covered infection. Bicarb up to 15    Review of Systems  Objective:     Vital Signs (Most Recent):  Temp: 98.3 °F (36.8 °C) (07/13/24 1547)  Pulse: 72 (07/13/24 1547)  Resp: 18 (07/13/24 1547)  BP: 125/74 (07/13/24 1547)  SpO2: (!) 94 % (07/13/24 1547) Vital Signs (24h Range):  Temp:  [97.9 °F (36.6 °C)-98.4 °F (36.9 °C)] 98.3 °F (36.8 °C)  Pulse:  [67-79] 72  Resp:  [18] 18  SpO2:  [94 %-97 %] 94 %  BP: (103-149)/(58-74) 125/74     Weight: 61.5 kg (135 lb 9.3 oz)  Body mass index is 25.62 kg/m².    Intake/Output Summary (Last 24 hours) at 7/13/2024 1610  Last data filed at 7/13/2024 0000  Gross per 24 hour   Intake --   Output 1 ml   Net -1 ml         Physical Exam  HENT:      Head: Normocephalic and atraumatic.   Cardiovascular:      Rate and Rhythm: Normal rate and regular rhythm.      Heart sounds: No murmur heard.  Pulmonary:      Effort: Pulmonary effort is normal. No respiratory distress.      Breath sounds: Normal breath sounds. No wheezing.   Abdominal:      General: Bowel sounds are normal. There is no distension.      Palpations: Abdomen is soft.      Tenderness: There is no abdominal tenderness.   Musculoskeletal:         General: No swelling.   Skin:     General: Skin is warm and dry.   Neurological:      Mental Status: She is alert and oriented to person, place, and time. Mental status is at baseline.             Significant Labs: All pertinent labs within the past 24 hours have been reviewed.  Recent Lab Results         07/13/24  0634        Albumin 3.0       Anion Gap 13       BUN 39       Calcium 8.5       Chloride 110       CO2 15       Creatinine 1.8       eGFR 27       Glucose 93       Phosphorus Level 2.5       Potassium 3.6       Sodium 138               Significant Imaging: I have reviewed all pertinent imaging results/findings within the past 24 hours.

## 2024-07-13 NOTE — ASSESSMENT & PLAN NOTE
Creatine stable for now. BMP reviewed- noted Estimated Creatinine Clearance: 18.9 mL/min (A) (based on SCr of 1.8 mg/dL (H)). according to latest data. Based on current GFR, CKD stage is stage 4 - GFR 15-29.  Monitor UOP and serial BMP and adjust therapy as needed. Renally dose meds. Avoid nephrotoxic medications and procedures.  -KEEGAN resolved with received gentle hydration  -monitor

## 2024-07-13 NOTE — NURSING
Patient A/Ox4, VSS on RA, ambulates with SBA. She complains of a toothache, MD aware, PRN tylenol given. Call light is within reach, belongings are within reach, bed alarm is on and bed is in low position. Patient instructed to use call light for assistance and verbalized understanding.

## 2024-07-13 NOTE — PLAN OF CARE
A238/A238 THAD  Glo Dumont is a 88 y.o.female admitted on 7/9/2024 for Generalized weakness   Code Status: Full Code MRN: [id number]   Review of patient's allergies indicates:   Allergen Reactions    Lisinopril      angioedema    Codeine Nausea And Vomiting     Past Medical History:   Diagnosis Date    Anemia     Angina pectoris     Anxiety     Anxiety and depression     Arthritis     hip    Carotid artery occlusion     Carpal tunnel syndrome 06/23/2008    emg    Chronic diarrhea     work up in 2011 with EGD, CS and VCE    CKD (chronic kidney disease) stage 3, GFR 30-59 ml/min 5/11/2017    Colitis     Coronary artery disease     Coronary artery disease     Diastolic dysfunction     Diverticulosis     Glaucoma     Greater trochanteric bursitis 2/10/2015    Grief at loss of child 1/26/2016    H/O carotid endarterectomy 12/2/2013    Heart failure     History of coronary angioplasty 3/11/2014    Hypercholesteremia     Hypertension     Liver cyst 02/08/2013    ct abd    Macular degeneration     Obesity with serious comorbidity 3/19/2023    Primary open-angle glaucoma(365.11) 9/3/2013    Renal cyst 02/08/2013    ct abd    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    Sarcoidosis     Sarcoidosis of lung     Sickle cell trait     Uveitis       PRN meds    acetaminophen, 650 mg, Q6H PRN  acetaminophen, 650 mg, Q8H PRN  albuterol-ipratropium, 3 mL, Q6H PRN  aluminum-magnesium hydroxide-simethicone, 30 mL, QID PRN  dextrose 10%, 12.5 g, PRN  dextrose 10%, 25 g, PRN  glucagon (human recombinant), 1 mg, PRN  glucose, 16 g, PRN  glucose, 24 g, PRN  melatonin, 6 mg, Nightly PRN  naloxone, 0.02 mg, PRN  ondansetron, 4 mg, Q8H PRN  promethazine, 25 mg, Q6H PRN  senna-docusate 8.6-50 mg, 1 tablet, BID PRN  sodium chloride 0.9%, 10 mL, Q12H PRN      Chart check completed. Will continue plan of care.      Orientation: oriented x 4  Linda Coma Scale Score: 15               VTE Required Core Measure: Pharmacological  prophylaxis initiated/maintained Last Bowel Movement: 07/11/24  Diet Cardiac Fluid - 1000mL     Michael Score: 18  Fall Risk Score: 10  Accucheck []   Freq?      Lines/Drains/Airways       Peripheral Intravenous Line  Duration                  Peripheral IV - Single Lumen 07/11/24 2004 22 G Anterior;Left Forearm 1 day                       Problem: Adult Inpatient Plan of Care  Goal: Plan of Care Review  Outcome: Progressing  Goal: Patient-Specific Goal (Individualized)  Outcome: Progressing  Goal: Absence of Hospital-Acquired Illness or Injury  Outcome: Progressing  Goal: Optimal Comfort and Wellbeing  Outcome: Progressing  Goal: Readiness for Transition of Care  Outcome: Progressing     Problem: Skin Injury Risk Increased  Goal: Skin Health and Integrity  Outcome: Progressing     Problem: Fall Injury Risk  Goal: Absence of Fall and Fall-Related Injury  Outcome: Progressing

## 2024-07-14 VITALS
HEIGHT: 61 IN | HEART RATE: 77 BPM | SYSTOLIC BLOOD PRESSURE: 129 MMHG | RESPIRATION RATE: 18 BRPM | WEIGHT: 137.13 LBS | DIASTOLIC BLOOD PRESSURE: 76 MMHG | TEMPERATURE: 99 F | OXYGEN SATURATION: 95 % | BODY MASS INDEX: 25.89 KG/M2

## 2024-07-14 LAB
ALBUMIN SERPL BCP-MCNC: 2.7 G/DL (ref 3.5–5.2)
ANION GAP SERPL CALC-SCNC: 10 MMOL/L (ref 8–16)
BUN SERPL-MCNC: 37 MG/DL (ref 8–23)
CALCIUM SERPL-MCNC: 8.5 MG/DL (ref 8.7–10.5)
CHLORIDE SERPL-SCNC: 110 MMOL/L (ref 95–110)
CO2 SERPL-SCNC: 20 MMOL/L (ref 23–29)
CREAT SERPL-MCNC: 1.8 MG/DL (ref 0.5–1.4)
EST. GFR  (NO RACE VARIABLE): 27 ML/MIN/1.73 M^2
GLUCOSE SERPL-MCNC: 97 MG/DL (ref 70–110)
PHOSPHATE SERPL-MCNC: 2.9 MG/DL (ref 2.7–4.5)
POTASSIUM SERPL-SCNC: 4.2 MMOL/L (ref 3.5–5.1)
SODIUM SERPL-SCNC: 140 MMOL/L (ref 136–145)

## 2024-07-14 PROCEDURE — 63600175 PHARM REV CODE 636 W HCPCS: Mod: HCNC | Performed by: HOSPITALIST

## 2024-07-14 PROCEDURE — 36415 COLL VENOUS BLD VENIPUNCTURE: CPT | Mod: HCNC | Performed by: INTERNAL MEDICINE

## 2024-07-14 PROCEDURE — 25000003 PHARM REV CODE 250: Mod: HCNC | Performed by: HOSPITALIST

## 2024-07-14 PROCEDURE — 80069 RENAL FUNCTION PANEL: CPT | Mod: HCNC | Performed by: INTERNAL MEDICINE

## 2024-07-14 PROCEDURE — 25000003 PHARM REV CODE 250: Mod: HCNC | Performed by: INTERNAL MEDICINE

## 2024-07-14 RX ORDER — SODIUM BICARBONATE 650 MG/1
1300 TABLET ORAL 2 TIMES DAILY
Qty: 60 TABLET | Refills: 0 | Status: SHIPPED | OUTPATIENT
Start: 2024-07-14

## 2024-07-14 RX ORDER — ONDANSETRON 4 MG/1
4 TABLET, FILM COATED ORAL EVERY 8 HOURS PRN
Qty: 12 TABLET | Refills: 0 | Status: SHIPPED | OUTPATIENT
Start: 2024-07-14

## 2024-07-14 RX ORDER — CEFDINIR 300 MG/1
300 CAPSULE ORAL 2 TIMES DAILY
Qty: 10 CAPSULE | Refills: 0 | Status: SHIPPED | OUTPATIENT
Start: 2024-07-14 | End: 2024-07-19

## 2024-07-14 RX ADMIN — SODIUM BICARBONATE 1300 MG: 650 TABLET ORAL at 08:07

## 2024-07-14 RX ADMIN — CEFTRIAXONE 1 G: 1 INJECTION, POWDER, FOR SOLUTION INTRAMUSCULAR; INTRAVENOUS at 01:07

## 2024-07-14 RX ADMIN — CARVEDILOL 12.5 MG: 12.5 TABLET, FILM COATED ORAL at 08:07

## 2024-07-14 RX ADMIN — PANTOPRAZOLE SODIUM 40 MG: 40 TABLET, DELAYED RELEASE ORAL at 08:07

## 2024-07-14 RX ADMIN — ISOSORBIDE MONONITRATE 30 MG: 30 TABLET, EXTENDED RELEASE ORAL at 08:07

## 2024-07-14 RX ADMIN — ASPIRIN 81 MG: 81 TABLET, COATED ORAL at 08:07

## 2024-07-14 RX ADMIN — AMLODIPINE BESYLATE 10 MG: 10 TABLET ORAL at 08:07

## 2024-07-14 RX ADMIN — PANCRELIPASE 1 CAPSULE: 24000; 76000; 120000 CAPSULE, DELAYED RELEASE PELLETS ORAL at 08:07

## 2024-07-14 RX ADMIN — DORZOLAMIDE HYDROCHLORIDE AND TIMOLOL MALEATE 1 DROP: 20; 5 SOLUTION OPHTHALMIC at 08:07

## 2024-07-14 RX ADMIN — ACETAMINOPHEN 650 MG: 325 TABLET ORAL at 09:07

## 2024-07-14 RX ADMIN — ACETAMINOPHEN 650 MG: 325 TABLET ORAL at 01:07

## 2024-07-14 RX ADMIN — POTASSIUM PHOSPHATE, MONOBASIC 500 MG: 500 TABLET, SOLUBLE ORAL at 08:07

## 2024-07-14 NOTE — PLAN OF CARE
Patient remained free from falls throughout shift, call bell within reach. Rocephin continued for UTI. Tylenol given for toothache. No other complaints. Vitals stable.

## 2024-07-14 NOTE — PLAN OF CARE
[facility] - Telemetry (Hospital)  Discharge Final Note    Primary Care Provider: Christina Cardona MD    Expected Discharge Date: 7/14/2024    Final Discharge Note (most recent)       Final Note - 07/14/24 1146          Final Note    Assessment Type Final Discharge Note     Anticipated Discharge Disposition Home or Self Care        Post-Acute Status    Discharge Delays None known at this time                  No needs at time of chart review. [id number]    Important Message from Medicare             Contact Info       Christina Cardona MD   Specialty: Internal Medicine   Relationship: PCP - General  Hypertension Digital Medicine Responsible Provider    [address]  [facility] [address]   Phone: [phone]       Next Steps: Follow up    [facility] - Emergency Dept.   Specialty: Emergency Medicine    [address]  [facility] [address]   Phone: [phone]       Next Steps: Follow up    Instructions: As needed, If symptoms worsen

## 2024-07-14 NOTE — DISCHARGE SUMMARY
[facility] Medicine  Discharge Summary      Patient Name: Glo Dumont  MRN: [id number]  [facility]: [id number]  Patient Class: IP- Inpatient  Admission Date: 7/9/2024  Hospital Length of Stay: 5 days  Discharge Date and Time:  07/14/2024 8:57 AM  Attending Physician: Leida Bower MD   Discharging Provider: Leida Bower MD  Primary Care Provider: Christina Cardona MD    Primary Care Team: Networked reference to record PCT     HPI:   Glo Dumont is a 88 y.o. female with a PMH  has a past medical history of Anemia, Angina pectoris, Anxiety, Anxiety and depression, Arthritis, Carotid artery occlusion, Carpal tunnel syndrome (06/23/2008), Chronic diarrhea, CKD (chronic kidney disease) stage 3, GFR 30-59 ml/min (5/11/2017), Colitis, Coronary artery disease, Coronary artery disease, Diastolic dysfunction, Diverticulosis, Glaucoma, Greater trochanteric bursitis (2/10/2015), Grief at loss of child (1/26/2016), H/O carotid endarterectomy (12/2/2013), Heart failure, History of coronary angioplasty (3/11/2014), Hypercholesteremia, Hypertension, Liver cyst (02/08/2013), Macular degeneration, Obesity with serious comorbidity (3/19/2023), Primary open-angle glaucoma(365.11) (9/3/2013), Renal cyst (02/08/2013), 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 who presented to the ED for further evaluation of worsening generalized weakness x1 week duration.  Patient reports chronic dyspnea/shortness a breath unchanged from baseline however has had worsening weakness with associated decreased p.o. intake, dizziness, and 3 episodes of reported diarrhea.  She reported no known alleviating or aggravating factors noted and denied endorsing any fever, chills, sweats, nausea, vomiting, chest pain, abdominal pain, dysuria, hematuria, melena, hematochezia, or onset neurological deficits.  She denied exposure to ill contacts,  consumption of ill/contaminated food, or recent use of antibiotics.  All other review of systems negative except as noted above.  Prior to onset of symptoms, patient reported being in her usual state of health with no other concerns or complaints.  Initial workup in the ED revealed patient to be afebrile with leukocytosis measuring 14.27 with UA positive for UTI.  Patient initiated on Rocephin with cultures obtained and pending and admitted to Hospital Medicine inpatient for continued medical management.      PCP: Christina Cardona      * No surgery found *      Hospital Course:   The patient presented with generalized weakness. Workup revealed UTI and KEEGAN. She also had metabolic acidosis. She was placed on IVFs and empiric IV antibiotics. Her weakness improved. She ambulating in the schmitz with therapy. Urine culture with multiple organisms. Repeat UA with resolving infection. Sodium bicarb supplementation increased as serum bicarb was low on 7/12. Labs improved. Patient was transitioned to oral antibiotics. Patient seen and examined, stable for discharge.     Goals of Care Treatment Preferences:  Code Status: Full Code    Health care agent: Denis Langston [facility] agent number: [phone], [phone].                   Consults:   Consults (From admission, onward)          Status Ordering Provider     Inpatient consult to Hospitalist  Once        Provider:  Homero Rendon MD    Acknowledged JIM MURRY.                Final Active Diagnoses:    Diagnosis Date Noted POA    PRINCIPAL PROBLEM:  Generalized weakness [R53.1] 07/09/2024 Yes    Coronary artery disease involving native coronary artery of native heart [I25.10] 07/09/2024 Yes     Chronic    Elevated troponin [R79.89] 07/09/2024 Yes    Acute cystitis [N30.00] 05/07/2024 Yes    Chronic heart failure with preserved ejection fraction [I50.32] 04/04/2024 Yes     Chronic    Gastroesophageal reflux disease with esophagitis  [K21.00] 08/15/2023 Yes     Chronic    Metabolic acidosis [E87.20] 03/02/2022 Yes    CKD (chronic kidney disease) stage 4, GFR 15-29 ml/min [N18.4] 05/11/2017 Yes     Chronic    Ulcerative colitis [K51.90] 05/11/2017 Yes     Chronic    Essential hypertension [I10] 02/01/2016 Yes     Chronic    Other hyperlipidemia [E78.49] 07/08/2013 Yes     Chronic    Sarcoidosis of lung [D86.0]  Yes     Chronic      Problems Resolved During this Admission:       Discharged Condition: stable    Disposition: Home or Self Care    Follow Up:   Follow-up Information       Christina Cardona MD Follow up.    Specialty: Internal Medicine  Contact information:  [address] [facility] [place] [id number] [phone]               [facility] Emergency Dept. .    Specialty: Emergency Medicine  Why: As needed, If symptoms worsen  Contact information:  [address] [facility] [address] [phone]                         Patient Instructions:      Ambulatory referral/consult to Outpatient Case Management   Referral Priority: Routine Referral Type: Consultation   Referral Reason: Specialty Services Required   Number of Visits Requested: 1     Diet Cardiac     Activity as tolerated       Significant Diagnostic Studies: Radiology:   Imaging Results              CT Abdomen Pelvis  Without Contrast (Final result)  Result time 07/08/24 23:07:53      Final result by Claudio Gomes DO (07/08/24 23:07:53)                   Impression:     No acute findings.    All CT scans at [this location] are performed using dose modulation techniques as appropriate to a performed exam including the following:  Automated exposure control; adjustment of the mA and/or kV according to patient size (this includes techniques or standardized protocols for targeted exams where dose is matched to indication / reason for exam; i.e. extremities or head); use of iterative reconstruction technique.    Finalized on: 7/8/2024 11:07 PM By:   Claudio Gomes  [facility]# [id number]      2024-07-08 23:09:58.979    [facility]               Narrative:    EXAM: CT ABDOMEN PELVIS WITHOUT CONTRAST    CLINICAL HISTORY: Concern for abdominal abscess/infection    COMPARISON: None    TECHNIQUE: Standard thin-section axial images, with reformatted sagittal and coronal images.    FINDINGS: Pacemaker leads.  Considerable calcification within the coronary arteries and at the mitral valve annulus.  Small hiatal hernia.    Cholecystectomy.  Within limits of a noncontrast CT, the liver, spleen, fatty replaced pancreas and adrenals are unremarkable.    Right kidney: 6 mm nonobstructing stone at the interpolar region.  1.2 cm cyst at the interpolar region.  Moderate cortical atrophy.  No hydronephrosis, hydroureter or ureteral stones.    Left kidney: Cyst at the superior pole.  Additional subcentimeter indeterminate low attenuating lesion at the interpolar region likely a cyst.  7 mm nonobstructing stone at the lower pole.  Moderate cortical atrophy.  No hydronephrosis hydroureter or ureteral stones.    No abdominal or retroperitoneal lymphadenopathy.  No ascites or fat stranding within the abdomen.  No dilatation of the large or small bowel.  Colonic diverticula without diverticulitis.  No evidence for appendicitis.  Considerable calcification, aorta and iliac vessels.    Pelvis: Streak artifact from the left hip arthroplasty limits evaluation.  No pelvic free fluid, iliac chain or inguinal lymphadenopathy.    No concerning lytic or sclerotic bony lesions.  Sternotomy wires.                                         X-Ray Chest 1 View (Final result)  Result time 07/08/24 21:10:05      Final result by Claudio Gomes DO (07/08/24 21:10:05)                   Impression:    No acute cardiopulmonary disease.    Finalized on: 7/8/2024 9:10 PM By:  Claudio Gomes  [facility]# [id number]      2024-07-08 21:12:12.779    [facility]               Narrative:    Exam: XR CHEST 1 VIEW    Comparison:  06/20/2024    Clinical Indication:  Shortness of breath    Findings: Prior sternotomy.  Pacemaker leads are unchanged in position.  Surgical clips in the right upper quadrant.      Heart size and pulmonary vasculature are unremarkable.  No focal consolidation, pleural effusions or pneumothorax.    No acute angulated or displaced fractures.                                          Pending Diagnostic Studies:       None           Medications:  Reconciled Home Medications:      Medication List        START taking these medications      cefdinir 300 MG capsule  Commonly known as: OMNICEF  Take 1 capsule (300 mg total) by mouth 2 (two) times daily. for 5 days     ondansetron 4 MG tablet  Commonly known as: ZOFRAN  Take 1 tablet (4 mg total) by mouth every 8 (eight) hours as needed for Nausea.     sodium bicarbonate 650 MG tablet  Take 2 tablets (1,300 mg total) by mouth 2 (two) times daily.            CONTINUE taking these medications      aflibercept 2 mg/0.05 mL Soln  2 mg by Intravitreal route every 28 days.     amLODIPine 2.5 MG tablet  Commonly known as: NORVASC  Take 10 mg by mouth every morning.     aspirin 81 MG EC tablet  Commonly known as: ECOTRIN  Take 1 tablet (81 mg total) by mouth once daily.     atorvastatin 40 MG tablet  Commonly known as: LIPITOR  Take 1 tablet (40 mg total) by mouth Daily.     budesonide 3 mg capsule  Commonly known as: ENTOCORT EC  Take 1-2 capsules (3-6 mg total) by mouth daily as needed (flare). As needed     bumetanide 2 MG tablet  Commonly known as: BUMEX  Every Monday and Friday     busPIRone 5 MG Tab  Commonly known as: BUSPAR  Take 1 tablet (5 mg total) by mouth 2 (two) times daily as needed (anxiety).     carvediloL 12.5 MG tablet  Commonly known as: COREG  TAKE 1 TABLET BY MOUTH TWICE DAILY WITH MEALS     CENTRUM SILVER ORAL  Take 1 tablet by mouth once daily.     citalopram 10 MG tablet  Commonly known as: CeleXA  Take 1 tablet (10 mg total) by mouth every evening.     CREON  24,000-76,000 -120,000 unit capsule  Generic drug: lipase-protease-amylase 24,000-76,000-120,000 units  Take 1 capsule by mouth 3 (three) times daily with meals.     cyproheptadine 4 mg tablet  Commonly known as: PERIACTIN  TAKE ONE TABLET BY MOUTH THREE TIMES A DAY IF NEEDED     dexAMETHasone 0.7 mg Impl intravitreal implant  Commonly known as: OZURDEX  0.7 mg by Intravitreal route once.     dorzolamide-timolol 2-0.5% 22.3-6.8 mg/mL ophthalmic solution  Commonly known as: COSOPT  INSTILL 1 DROP IN BOTH EYES TWICE DAILY     isosorbide mononitrate 30 MG 24 hr tablet  Commonly known as: IMDUR  Take 1 tablet (30 mg total) by mouth once daily.     loperamide 2 mg capsule  Commonly known as: IMODIUM  Take 1 mg by mouth every 6 (six) hours as needed for Diarrhea.     metOLazone 2.5 MG tablet  Commonly known as: ZAROXOLYN  Take on Mon     nitroGLYCERIN 0.4 MG SL tablet  Commonly known as: NITROSTAT  PLACE ONE TABLET UNDERNEATH THE TONGUE EVERY 5 MINUTES AS NEEDED FOR CHEST PAIN     pantoprazole 40 MG tablet  Commonly known as: PROTONIX  TAKE 1 TABLET(40 MG) BY MOUTH EVERY DAY     potassium chloride 10 MEQ Tbsr  Commonly known as: KLOR-CON  Take 1 tablet (10 mEq total) by mouth once daily.     spironolactone 25 MG tablet  Commonly known as: ALDACTONE  Take 1 tablet (25 mg total) by mouth 3 (three) times a week.              Indwelling Lines/Drains at time of discharge:   Lines/Drains/Airways       None                   Time spent on the discharge of patient: 31 minutes         Leida Bower MD  Department of Hospital Medicine  '[place] - Telemetry ([facility])

## 2024-07-14 NOTE — PLAN OF CARE
A238/A238 THAD  Glo Dumont is a 88 y.o.female admitted on 7/9/2024 for Generalized weakness   Code Status: Full Code MRN: [id number]   Review of patient's allergies indicates:   Allergen Reactions    Lisinopril      angioedema    Codeine Nausea And Vomiting     Past Medical History:   Diagnosis Date    Anemia     Angina pectoris     Anxiety     Anxiety and depression     Arthritis     hip    Carotid artery occlusion     Carpal tunnel syndrome 06/23/2008    emg    Chronic diarrhea     work up in 2011 with EGD, CS and VCE    CKD (chronic kidney disease) stage 3, GFR 30-59 ml/min 5/11/2017    Colitis     Coronary artery disease     Coronary artery disease     Diastolic dysfunction     Diverticulosis     Glaucoma     Greater trochanteric bursitis 2/10/2015    Grief at loss of child 1/26/2016    H/O carotid endarterectomy 12/2/2013    Heart failure     History of coronary angioplasty 3/11/2014    Hypercholesteremia     Hypertension     Liver cyst 02/08/2013    ct abd    Macular degeneration     Obesity with serious comorbidity 3/19/2023    Primary open-angle glaucoma(365.11) 9/3/2013    Renal cyst 02/08/2013    ct abd    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    Sarcoidosis     Sarcoidosis of lung     Sickle cell trait     Uveitis       PRN meds    acetaminophen, 650 mg, Q6H PRN  acetaminophen, 650 mg, Q8H PRN  albuterol-ipratropium, 3 mL, Q6H PRN  aluminum-magnesium hydroxide-simethicone, 30 mL, QID PRN  dextrose 10%, 12.5 g, PRN  dextrose 10%, 25 g, PRN  glucagon (human recombinant), 1 mg, PRN  glucose, 16 g, PRN  glucose, 24 g, PRN  melatonin, 6 mg, Nightly PRN  naloxone, 0.02 mg, PRN  ondansetron, 4 mg, Q8H PRN  promethazine, 25 mg, Q6H PRN  senna-docusate 8.6-50 mg, 1 tablet, BID PRN  sodium chloride 0.9%, 10 mL, Q12H PRN      AVS Discharge instructions received and reviewed with pt and family at bedside.  Pt voiced understanding and all questions answered to satisfaction.  Stressed importance to  making and keeping all follow up appointments.  Medications at reviewed with pt.  Tele monitor removed and brought to monitor tech.  IV d/c'd with tip intact, pressure dressing applied.  Pt transported to front of hospital via w/c to be discharged home.      Orientation: oriented x 4  Linda Coma Scale Score: 15               VTE Required Core Measure: Pharmacological prophylaxis initiated/maintained Last Bowel Movement: 07/14/24  Diet Cardiac Fluid - 1000mL  Diet Cardiac     Michael Score: 18  Fall Risk Score: 10  Accucheck []   Freq?      Lines/Drains/Airways       None                      Problem: Adult Inpatient Plan of Care  Goal: Plan of Care Review  Outcome: Met  Goal: Patient-Specific Goal (Individualized)  Outcome: Met  Goal: Absence of Hospital-Acquired Illness or Injury  Outcome: Met  Goal: Optimal Comfort and Wellbeing  Outcome: Met  Goal: Readiness for Transition of Care  Outcome: Met     Problem: Skin Injury Risk Increased  Goal: Skin Health and Integrity  Outcome: Met     Problem: Fall Injury Risk  Goal: Absence of Fall and Fall-Related Injury  Outcome: Met

## 2024-07-15 ENCOUNTER — PATIENT MESSAGE (OUTPATIENT)
Dept: PRIMARY CARE CLINIC | Facility: CLINIC | Age: 89
End: 2024-07-15
Payer: MEDICARE

## 2024-07-15 ENCOUNTER — PATIENT OUTREACH (OUTPATIENT)
Dept: ADMINISTRATIVE | Facility: CLINIC | Age: 89
End: 2024-07-15
Payer: MEDICARE

## 2024-07-15 NOTE — PROGRESS NOTES
C3 nurse spoke with Glo Dumont (daughter, Jessica) for a TCC post hospital discharge follow up call. The patient has a scheduled [facility] appointment with Christina Cardona MD on 07/19/2024 @ 1100.

## 2024-07-17 ENCOUNTER — OFFICE VISIT (OUTPATIENT)
Dept: PRIMARY CARE CLINIC | Facility: CLINIC | Age: 89
End: 2024-07-17
Payer: MEDICARE

## 2024-07-17 VITALS
BODY MASS INDEX: 26.35 KG/M2 | SYSTOLIC BLOOD PRESSURE: 124 MMHG | HEART RATE: 75 BPM | OXYGEN SATURATION: 97 % | WEIGHT: 139.56 LBS | HEIGHT: 61 IN | TEMPERATURE: 97 F | DIASTOLIC BLOOD PRESSURE: 64 MMHG

## 2024-07-17 DIAGNOSIS — R53.1 GENERALIZED WEAKNESS: ICD-10-CM

## 2024-07-17 DIAGNOSIS — N30.00 ACUTE CYSTITIS WITHOUT HEMATURIA: Primary | ICD-10-CM

## 2024-07-17 DIAGNOSIS — I10 ESSENTIAL HYPERTENSION: Chronic | ICD-10-CM

## 2024-07-17 DIAGNOSIS — N18.4 CKD (CHRONIC KIDNEY DISEASE) STAGE 4, GFR 15-29 ML/MIN: Chronic | ICD-10-CM

## 2024-07-17 DIAGNOSIS — I50.32 CHRONIC HEART FAILURE WITH PRESERVED EJECTION FRACTION: Chronic | ICD-10-CM

## 2024-07-17 DIAGNOSIS — K51.00 ULCERATIVE PANCOLITIS WITHOUT COMPLICATION: Chronic | ICD-10-CM

## 2024-07-17 DIAGNOSIS — F32.0 CURRENT MILD EPISODE OF MAJOR DEPRESSIVE DISORDER WITHOUT PRIOR EPISODE: ICD-10-CM

## 2024-07-17 DIAGNOSIS — I25.111 CORONARY ARTERY DISEASE INVOLVING NATIVE CORONARY ARTERY OF NATIVE HEART WITH ANGINA PECTORIS WITH DOCUMENTED SPASM: Chronic | ICD-10-CM

## 2024-07-17 PROCEDURE — 99999 PR PBB SHADOW E&M-EST. PATIENT-LVL IV: CPT | Mod: PBBFAC,HCNC,, | Performed by: INTERNAL MEDICINE

## 2024-07-17 RX ORDER — CITALOPRAM 10 MG/1
10 TABLET ORAL NIGHTLY
Qty: 90 TABLET | Refills: 3 | Status: SHIPPED | OUTPATIENT
Start: 2024-07-17 | End: 2025-07-17

## 2024-07-17 NOTE — PROGRESS NOTES
Subjective:      Patient ID: Glo Dumont is a 88 y.o. female.    Chief Complaint: Follow-up (Pt was admitted to hospital for  a UTI, and confusion. )      HPI  Here for follow up of medical problems and hospital follow up for:    Patient Name: Glo Dumont  MRN: [id number]  [facility]ERT[facility]: [id number]  Patient Class: IP- Inpatient  Admission Date: 7/9/2024  Hospital Length of Stay: 5 days  Discharge Date and Time:  07/14/2024 8:57 AM  Attending Physician: Leida Bower MD   Discharging Provider: Leida Bower MD  Primary Care Provider: Christina Cardona MD     Primary Care Team: Networked reference to record PCT      HPI:   Glo Dumont is a 88 y.o. female with a PMH  has a past medical history of Anemia, Angina pectoris, Anxiety, Anxiety and depression, Arthritis, Carotid artery occlusion, Carpal tunnel syndrome (06/23/2008), Chronic diarrhea, CKD (chronic kidney disease) stage 3, GFR 30-59 ml/min (5/11/2017), Colitis, Coronary artery disease, Coronary artery disease, Diastolic dysfunction, Diverticulosis, Glaucoma, Greater trochanteric bursitis (2/10/2015), Grief at loss of child (1/26/2016), H/O carotid endarterectomy (12/2/2013), Heart failure, History of coronary angioplasty (3/11/2014), Hypercholesteremia, Hypertension, Liver cyst (02/08/2013), Macular degeneration, Obesity with serious comorbidity (3/19/2023), Primary open-angle glaucoma(365.11) (9/3/2013), Renal cyst (02/08/2013), 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 who presented to the ED for further evaluation of worsening generalized weakness x1 week duration.  Patient reports chronic dyspnea/shortness a breath unchanged from baseline however has had worsening weakness with associated decreased p.o. intake, dizziness, and 3 episodes of reported diarrhea.  She reported no known alleviating or aggravating factors noted and denied endorsing any fever, chills, sweats,  nausea, vomiting, chest pain, abdominal pain, dysuria, hematuria, melena, hematochezia, or onset neurological deficits.  She denied exposure to ill contacts, consumption of ill/contaminated food, or recent use of antibiotics.  All other review of systems negative except as noted above.  Prior to onset of symptoms, patient reported being in her usual state of health with no other concerns or complaints.  Initial workup in the ED revealed patient to be afebrile with leukocytosis measuring 14.27 with UA positive for UTI.  Patient initiated on Rocephin with cultures obtained and pending and admitted to Hospital Medicine inpatient for continued medical management.       PCP: Christina Cardona        * No surgery found *       Hospital Course:   The patient presented with generalized weakness. Workup revealed UTI and KEEGAN. She also had metabolic acidosis. She was placed on IVFs and empiric IV antibiotics. Her weakness improved. She ambulating in the schmitz with therapy. Urine culture with multiple organisms. Repeat UA with resolving infection. Sodium bicarb supplementation increased as serum bicarb was low on 7/12. Labs improved. Patient was transitioned to oral antibiotics. Patient seen and examined, stable for discharge.   ----------------------------------------------------------------------------------    No dysuria.  Energy much improved, even from yesterday.  No f/c/n/v.  No cp/sob/palp.  On diuretic twice a week.  Checks weight daily.  Depression doing well on SSRI.        HM: 9/23 fluvax, 2/21 covid vaccines/booster, 3/15 [id number], 2/14 [id number], 10/22 [id number], 2/15 Tdap, 10/21 BMD rep 2y, 2/11 Cscope no repeat will be done, 1/17 MMG no more, Eye doc monthly, GI Dr. Galindo, Cards Dr. Lui, Nephro Dr. Villareal.      Review of Systems   Constitutional:  Negative for chills, diaphoresis and fever.   Respiratory:  Negative for cough and shortness of breath.    Cardiovascular:  Negative for chest pain, palpitations  "and leg swelling.   Gastrointestinal:  Negative for blood in stool, constipation, diarrhea, nausea and vomiting.   Genitourinary:  Negative for dysuria, frequency and hematuria.   Psychiatric/Behavioral:  The patient is not nervous/anxious.          Objective:   /64 (BP Location: Right arm)   Pulse 75   Temp 96.9 °F (36.1 °C) (Temporal)   Ht 5' 1" (1.549 m)   Wt 63.3 kg (139 lb 8.8 oz)   SpO2 97%   BMI 26.37 kg/m²     Physical Exam  Constitutional:       Appearance: She is well-developed.   Neck:      Thyroid: No thyroid mass.      Vascular: No carotid bruit.   Cardiovascular:      Rate and Rhythm: Normal rate and regular rhythm.      Heart sounds: No murmur heard.     No friction rub. No gallop.   Pulmonary:      Effort: Pulmonary effort is normal.      Breath sounds: Normal breath sounds. No wheezing or rales.   Abdominal:      General: Bowel sounds are normal.      Palpations: Abdomen is soft. There is no mass.      Tenderness: There is no abdominal tenderness.   Musculoskeletal:      Cervical back: Neck supple.   Lymphadenopathy:      Cervical: No cervical adenopathy.   Neurological:      Mental Status: She is alert and oriented to person, place, and time.            Latest Reference Range & Units 07/12/24 14:40 07/13/24 06:34 07/14/24 07:33   Sodium 136 - 145 mmol/L  138 140   Potassium 3.5 - 5.1 mmol/L  3.6 4.2   Chloride 95 - 110 mmol/L  110 110   CO2 23 - 29 mmol/L  15 (L) 20 (L)   Anion Gap 8 - 16 mmol/L  13 10   BUN 8 - 23 mg/dL  39 (H) 37 (H)   Creatinine 0.5 - 1.4 mg/dL  1.8 (H) 1.8 (H)   eGFR >60 mL/min/1.73 m^2  27 ! 27 !   Glucose 70 - 110 mg/dL  93 97   Calcium 8.7 - 10.5 mg/dL  8.5 (L) 8.5 (L)   Phosphorus Level 2.7 - 4.5 mg/dL  2.5 (L) 2.9   Albumin 3.5 - 5.2 g/dL  3.0 (L) 2.7 (L)   Specimen UA  Urine, Clean Catch     Color, UA Yellow, Straw, Terrie  Yellow     Appearance, UA Clear  Clear     Spec Grav UA 1.005 - 1.030  1.015     pH, UA 5.0 - 8.0  6.0     Protein, UA Negative  Negative   "   Glucose, UA Negative  Negative     Ketones, UA Negative  Negative     Blood, UA Negative  Negative     NITRITE UA Negative  Negative     UROBILINOGEN UA <2.0 EU/dL Negative     Bilirubin (UA) Negative  Negative     Leukocyte Esterase, UA Negative  Negative        Latest Reference Range & Units 07/09/24 00:47 07/12/24 14:40   Specimen UA  Urine, Clean Catch Urine, Clean Catch   Color, UA Yellow, Straw, Terrie  Yellow Yellow   Appearance, UA Clear  Cloudy ! Clear   Spec Grav UA 1.005 - 1.030  1.010 1.015   pH, UA 5.0 - 8.0  6.0 6.0   Protein, UA Negative  Trace ! Negative   Glucose, UA Negative  Negative Negative   Ketones, UA Negative  Negative Negative   Blood, UA Negative  1+ ! Negative   NITRITE UA Negative  Negative Negative   UROBILINOGEN UA <2.0 EU/dL Negative Negative   Bilirubin (UA) Negative  Negative Negative   Leukocyte Esterase, UA Negative  3+ ! Negative   RBC, UA 0 - 4 /hpf 8 (H)    WBC, UA 0 - 5 /hpf 34 (H)    Bacteria, UA None-Occ /hpf Moderate !    Squam Epithel, UA /hpf 5    WBC Clumps, UA None-Rare  Many !    Hyaline Casts, UA 0-1/lpf /lpf 33 !      Assessment:       1. Acute cystitis without hematuria    2. Essential hypertension    3. Generalized weakness    4. Ulcerative pancolitis without complication    5. CKD (chronic kidney disease) stage 4, GFR 15-29 ml/min    6. Chronic heart failure with preserved ejection fraction    7. Coronary artery disease involving native coronary artery of native heart with angina pectoris with documented spasm    8. Current mild episode of major depressive disorder without prior episode          Plan:     Acute cystitis without hematuria, Generalized weakness- much improved, ok to stop omnicef tonight.    Essential hypertension- stable, cont rx.    Ulcerative pancolitis without complication- doing well on prn budesonide.    CKD (chronic kidney disease) stage 4, GFR 15-29 ml/min- cont to monitor closely.  On diuretics just twice weekly.    Chronic heart failure  with preserved ejection fraction, Coronary artery disease involving native coronary artery of native heart with angina pectoris with documented spasm- cont meds, to establish with Cards here.    Current mild episode of major depressive disorder without prior episode  -     citalopram (CELEXA) 10 MG tablet; Take 1 tablet (10 mg total) by mouth every evening.  Dispense: 90 tablet; Refill: 3

## 2024-07-23 ENCOUNTER — PATIENT MESSAGE (OUTPATIENT)
Dept: PRIMARY CARE CLINIC | Facility: CLINIC | Age: 89
End: 2024-07-23
Payer: MEDICARE

## 2024-07-23 ENCOUNTER — APPOINTMENT (OUTPATIENT)
Dept: RADIOLOGY | Facility: HOSPITAL | Age: 89
End: 2024-07-23
Attending: INTERNAL MEDICINE
Payer: MEDICARE

## 2024-07-23 DIAGNOSIS — Z78.0 ASYMPTOMATIC POSTMENOPAUSAL STATE: ICD-10-CM

## 2024-07-23 PROCEDURE — 77080 DXA BONE DENSITY AXIAL: CPT | Mod: 26,HCNC,, | Performed by: RADIOLOGY

## 2024-07-23 PROCEDURE — 77080 DXA BONE DENSITY AXIAL: CPT | Mod: TC,HCNC

## 2024-07-24 ENCOUNTER — OUTPATIENT CASE MANAGEMENT (OUTPATIENT)
Dept: ADMINISTRATIVE | Facility: OTHER | Age: 89
End: 2024-07-24
Payer: MEDICARE

## 2024-07-29 ENCOUNTER — OUTPATIENT CASE MANAGEMENT (OUTPATIENT)
Dept: ADMINISTRATIVE | Facility: OTHER | Age: 89
End: 2024-07-29
Payer: MEDICARE

## 2024-08-01 ENCOUNTER — OFFICE VISIT (OUTPATIENT)
Dept: CARDIOLOGY | Facility: CLINIC | Age: 89
End: 2024-08-01
Payer: MEDICARE

## 2024-08-01 VITALS
HEIGHT: 61 IN | WEIGHT: 139.75 LBS | DIASTOLIC BLOOD PRESSURE: 84 MMHG | SYSTOLIC BLOOD PRESSURE: 130 MMHG | OXYGEN SATURATION: 98 % | HEART RATE: 69 BPM | BODY MASS INDEX: 26.39 KG/M2

## 2024-08-01 DIAGNOSIS — I25.10 ATHEROSCLEROSIS OF NATIVE CORONARY ARTERY OF NATIVE HEART WITHOUT ANGINA PECTORIS: ICD-10-CM

## 2024-08-01 DIAGNOSIS — Z95.0 PRESENCE OF CARDIAC PACEMAKER: ICD-10-CM

## 2024-08-01 DIAGNOSIS — I50.32 CHRONIC HEART FAILURE WITH PRESERVED EJECTION FRACTION: ICD-10-CM

## 2024-08-01 DIAGNOSIS — N18.9 CHRONIC KIDNEY DISEASE, UNSPECIFIED CKD STAGE: ICD-10-CM

## 2024-08-01 DIAGNOSIS — I50.32 CHRONIC DIASTOLIC (CONGESTIVE) HEART FAILURE: ICD-10-CM

## 2024-08-01 DIAGNOSIS — Z95.1 S/P CABG X 3: Primary | ICD-10-CM

## 2024-08-01 DIAGNOSIS — I10 PRIMARY HYPERTENSION: ICD-10-CM

## 2024-08-01 DIAGNOSIS — I49.5 SA NODE DYSFUNCTION: ICD-10-CM

## 2024-08-01 PROCEDURE — 1159F MED LIST DOCD IN RCRD: CPT | Mod: HCNC,CPTII,S$GLB, | Performed by: INTERNAL MEDICINE

## 2024-08-01 PROCEDURE — 1101F PT FALLS ASSESS-DOCD LE1/YR: CPT | Mod: HCNC,CPTII,S$GLB, | Performed by: INTERNAL MEDICINE

## 2024-08-01 PROCEDURE — 1126F AMNT PAIN NOTED NONE PRSNT: CPT | Mod: HCNC,CPTII,S$GLB, | Performed by: INTERNAL MEDICINE

## 2024-08-01 PROCEDURE — 1111F DSCHRG MED/CURRENT MED MERGE: CPT | Mod: HCNC,CPTII,S$GLB, | Performed by: INTERNAL MEDICINE

## 2024-08-01 PROCEDURE — 99214 OFFICE O/P EST MOD 30 MIN: CPT | Mod: HCNC,S$GLB,, | Performed by: INTERNAL MEDICINE

## 2024-08-01 PROCEDURE — 99999 PR PBB SHADOW E&M-EST. PATIENT-LVL IV: CPT | Mod: PBBFAC,HCNC,, | Performed by: INTERNAL MEDICINE

## 2024-08-01 PROCEDURE — 3288F FALL RISK ASSESSMENT DOCD: CPT | Mod: HCNC,CPTII,S$GLB, | Performed by: INTERNAL MEDICINE

## 2024-08-01 PROCEDURE — 1157F ADVNC CARE PLAN IN RCRD: CPT | Mod: HCNC,CPTII,S$GLB, | Performed by: INTERNAL MEDICINE

## 2024-08-01 RX ORDER — BUMETANIDE 1 MG/1
1 TABLET ORAL DAILY
Qty: 30 TABLET | Refills: 11 | Status: SHIPPED | OUTPATIENT
Start: 2024-08-01

## 2024-08-01 NOTE — PROGRESS NOTES
Subjective:   Patient ID:  Glo Dumont is a 88 y.o. female who presents for evaluation of No chief complaint on file.        HPI   August 1, 2024.    Comes in for a overdue follow-up.  Last seen 1.5 years ago  Last summer she was having a nuclear stress test he started having chest pain and EKG changes she was sent to the nearest emergency room.  Was found to have triple-vessel disease and she underwent CABG x3 at [facility].    Not seen since then.    Denies dyspnea.  But has significant lower extremity swelling left more than the right.  BP not at goal today did not take medications yet.      1.12.2023  She comes in for a follow up   Increased leg swelling and hansen, started zeny thanksgiving and the holidays s   She denies any orthopnea, pnd, chest pain, palpitations, syncope or presyncope   No other complains   She is only taking lasix 20 mg once a day   Decreased in the past due to intolerance to the 40 mg daily     10.10.2022  Comes in for six-month follow-up.  Denies any chest pain, CP, syncope or presyncope.    She does complain of dyspnea on exertion NYHA 1-2.  Bilateral lower extremity swelling.    Daughter states that patient had stopped taking the Lasix cause once the leg swelling resolved she felt the shoes were loose.    She was taking 40 mg daily of Lasix.    Blood pressure mildly elevated today.    5.30.2022  Doing well, no complaints   Questions about the carvedilol dose  No chest pain, no dyspnea, no palpitations, no leg swelling, orthopnea or pnd  No cns symptoms,   No syncope or presyncope  No palpitations       3.29.2022  Patient was recently discharged from the hospital with hypertensive emergency.  She is here for short post discharge follow-up.  Her hypertensive urgency was due to noncompliance.  Patient daughter states that her mother stop taking all of her medications all at once.  She had a normal nuclear stress test while in the hospital.  Her troponin was negative.  BNP  was mildly elevated.  She is euvolemic today.  Denies any dyspnea, any leg swelling or orthopnea.  States she is taking all of her medications.  They brought the bottles with them to clinic today.  Denies any more chest pain or syncope or presyncope.    3.15.2022  87 yo male seen Dr STRANGE and Dr Tim before   exrtensive pmhx as below   Comes in for evaluation of chest pain, h/o CAD s/p PCI  , unknown   States she has been having more chest pains lately described as retrosternal tightness, 5/10, more with strenuous acitivity but can happen at rest as well , no nausea or vomiting   No syncope, no dizziness,   Compliant with meds     She had a syncope, two weeks ago, work up revealed hyperkalemia, patient was taking kcl at home, had k of 7 on admission   Reversed with lokelma   Tre on admission 55 bpm however, not very significant   Scheduled for HM 48 hours end of the month       Past Medical History:   Diagnosis Date    Anemia     Angina pectoris     Anxiety     Anxiety and depression     Arthritis     hip    Carotid artery occlusion     Carpal tunnel syndrome 06/23/2008    emg    Chronic diarrhea     work up in 2011 with EGD, CS and VCE    CKD (chronic kidney disease) stage 3, GFR 30-59 ml/min 5/11/2017    Colitis     Coronary artery disease     Coronary artery disease     Diastolic dysfunction     Diverticulosis     Glaucoma     Greater trochanteric bursitis 2/10/2015    Grief at loss of child 1/26/2016    H/O carotid endarterectomy 12/2/2013    Heart failure     History of coronary angioplasty 3/11/2014    Hypercholesteremia     Hypertension     Liver cyst 02/08/2013    ct abd    Macular degeneration     Obesity with serious comorbidity 3/19/2023    Primary open-angle glaucoma(365.11) 9/3/2013    Renal cyst 02/08/2013    ct abd    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    Sarcoidosis     Sarcoidosis of lung     Sickle cell trait     Uveitis        Past Surgical History:   Procedure Laterality Date     A-V CARDIAC PACEMAKER INSERTION Left 3/21/2023    Procedure: INSERTION, CARDIAC PACEMAKER, DUAL CHAMBER/His Lead;  Surgeon: Cortez Ambrose MD;  Location: [facility] CATH LAB;  Service: Cardiology;  Laterality: Left;  MDT/ MD to confirm in am/possible nurse sedate    CAROTID ENDARTERECTOMY Right 2000s    CATARACT EXTRACTION Bilateral     Dr. Liang Dennis    CHOLECYSTECTOMY      laparoscopic, 3/18.    CORONARY ANGIOPLASTY WITH STENT PLACEMENT  11/19/2010    RCA-HALIE 2010    Lathia    JOINT REPLACEMENT Left 11/03/2014    Dr. Braun    TOTAL ABDOMINAL HYSTERECTOMY W/ BILATERAL SALPINGOOPHORECTOMY  1972       Social History     Tobacco Use    Smoking status: Never    Smokeless tobacco: Never   Substance Use Topics    Alcohol use: No     Alcohol/week: 0.0 standard drinks of alcohol    Drug use: No       Family History   Problem Relation Name Age of Onset    Hypertension Mother      Heart failure Mother      Cancer Father          prostate    Cirrhosis Brother      Heart failure Brother      Cancer Daughter          Carcinoid       Review of Systems   Cardiovascular:  Negative for chest pain, dyspnea on exertion, palpitations and syncope.   Genitourinary: Negative.    Neurological: Negative.        Current Outpatient Medications on File Prior to Visit   Medication Sig    aflibercept 2 mg/0.05 mL Soln 2 mg by Intravitreal route every 28 days.    amLODIPine (NORVASC) 2.5 MG tablet Take 10 mg by mouth every morning.    aspirin (ECOTRIN) 81 MG EC tablet Take 1 tablet (81 mg total) by mouth once daily.    atorvastatin (LIPITOR) 40 MG tablet Take 1 tablet (40 mg total) by mouth Daily.    budesonide (ENTOCORT EC) 3 mg capsule Take 1-2 capsules (3-6 mg total) by mouth daily as needed (flare). As needed    busPIRone (BUSPAR) 5 MG Tab Take 1 tablet (5 mg total) by mouth 2 (two) times daily as needed (anxiety).    carvediloL (COREG) 12.5 MG tablet TAKE 1 TABLET BY MOUTH TWICE DAILY WITH MEALS    citalopram (CELEXA) 10 MG tablet Take  1 tablet (10 mg total) by mouth every evening.    cyproheptadine (PERIACTIN) 4 mg tablet TAKE ONE TABLET BY MOUTH THREE TIMES A DAY IF NEEDED    dexAMETHasone (OZURDEX) 0.7 mg Impl intravitreal implant 0.7 mg by Intravitreal route once.    dorzolamide-timolol 2-0.5% (COSOPT) 22.3-6.8 mg/mL ophthalmic solution INSTILL 1 DROP IN BOTH EYES TWICE DAILY    empagliflozin (JARDIANCE) 10 mg tablet Take 10 mg by mouth once daily.    FOLIC ACID/MULTIVIT-MIN/LUTEIN (CENTRUM SILVER ORAL) Take 1 tablet by mouth once daily.    isosorbide mononitrate (IMDUR) 30 MG 24 hr tablet Take 1 tablet (30 mg total) by mouth once daily.    lipase-protease-amylase 24,000-76,000-120,000 units (CREON) 24,000-76,000 -120,000 unit capsule Take 1 capsule by mouth 3 (three) times daily with meals.    loperamide (IMODIUM) 2 mg capsule Take 1 mg by mouth every 6 (six) hours as needed for Diarrhea.    metOLazone (ZAROXOLYN) 2.5 MG tablet Take on Mon    nitroGLYCERIN (NITROSTAT) 0.4 MG SL tablet PLACE ONE TABLET UNDERNEATH THE TONGUE EVERY 5 MINUTES AS NEEDED FOR CHEST PAIN    ondansetron (ZOFRAN) 4 MG tablet Take 1 tablet (4 mg total) by mouth every 8 (eight) hours as needed for Nausea.    pantoprazole (PROTONIX) 40 MG tablet TAKE 1 TABLET(40 MG) BY MOUTH EVERY DAY    potassium chloride (KLOR-CON) 10 MEQ TbSR Take 1 tablet (10 mEq total) by mouth once daily.    sodium bicarbonate 650 MG tablet Take 2 tablets (1,300 mg total) by mouth 2 (two) times daily.    spironolactone (ALDACTONE) 25 MG tablet Take 1 tablet (25 mg total) by mouth 3 (three) times a week.    [DISCONTINUED] bumetanide (BUMEX) 2 MG tablet Every Monday and Friday     No current facility-administered medications on file prior to visit.       Objective:   Objective:  Wt Readings from Last 3 Encounters:   08/01/24 63.4 kg (139 lb 12.4 oz)   07/17/24 63.3 kg (139 lb 8.8 oz)   07/14/24 62.2 kg (137 lb 2 oz)     Temp Readings from Last 3 Encounters:   07/17/24 96.9 °F (36.1 °C) (Temporal)    07/14/24 98.9 °F (37.2 °C) (Axillary)   06/20/24 97.2 °F (36.2 °C) (Skin)     BP Readings from Last 3 Encounters:   08/01/24 130/84   07/17/24 124/64   07/14/24 129/76     Pulse Readings from Last 3 Encounters:   08/01/24 69   07/17/24 75   07/14/24 77       Physical Exam  Vitals reviewed.   Constitutional:       Appearance: She is well-developed.   Neck:      Vascular: No carotid bruit.   Cardiovascular:      Rate and Rhythm: Normal rate and regular rhythm.      Pulses: Intact distal pulses.      Heart sounds: Normal heart sounds. No murmur heard.  Pulmonary:      Breath sounds: Rales (Left lower base) present.   Neurological:      Mental Status: She is oriented to person, place, and time.         Lab Results   Component Value Date    CHOL 160 05/04/2022    CHOL 193 09/15/2021    CHOL 153 05/27/2021     Lab Results   Component Value Date    HDL 81 (H) 05/04/2022    HDL 56 09/15/2021    HDL 68 05/27/2021     Lab Results   Component Value Date    LDLCALC 63.0 05/04/2022    LDLCALC 113.2 09/15/2021    LDLCALC 69.6 05/27/2021     Lab Results   Component Value Date    TRIG 80 05/04/2022    TRIG 119 09/15/2021    TRIG 77 05/27/2021     Lab Results   Component Value Date    CHOLHDL 50.6 (H) 05/04/2022    CHOLHDL 29.0 09/15/2021    CHOLHDL 44.4 05/27/2021       Chemistry        Component Value Date/Time     07/14/2024 0733    K 4.2 07/14/2024 0733     07/14/2024 0733    CO2 20 (L) 07/14/2024 0733    BUN 37 (H) 07/14/2024 0733    CREATININE 1.8 (H) 07/14/2024 0733    GLU 97 07/14/2024 0733        Component Value Date/Time    CALCIUM 8.5 (L) 07/14/2024 0733    ALKPHOS 90 07/11/2024 0532    AST 16 07/11/2024 0532    ALT 13 07/11/2024 0532    BILITOT 0.3 07/11/2024 0532    ESTGFRAFRICA 33.9 (A) 07/19/2022 0835    EGFRNONAA 29.4 (A) 07/19/2022 0835          Lab Results   Component Value Date    TSH 0.776 03/27/2024     Lab Results   Component Value Date    INR 1.0 09/09/2020    INR 1.0 12/22/2010    INR 1.0  11/19/2010     Lab Results   Component Value Date    WBC 7.93 07/11/2024    HGB 12.3 07/11/2024    HCT 39.9 07/11/2024    MCV 92 07/11/2024     07/11/2024     BNP  @LABRCNTIP(BNP,BNPTRIAGEBLO)@  CrCl cannot be calculated (Patient's most recent lab result is older than the maximum 7 days allowed.).     Imaging:  ======  Results for orders placed during the hospital encounter of 03/02/22    Echo    Interpretation Summary  · The left ventricle is normal in size with moderate concentric hypertrophy and hyperdynamic systolic function.  · Mild left atrial enlargement.  · The estimated ejection fraction is 75%.  · Normal right ventricular size with low normal right ventricular systolic function.  · There is mild aortic valve stenosis.  · Aortic valve area is 1.97 cm2; peak velocity is 1.67 m/s; mean gradient is 6 mmHg.  · Mild mitral regurgitation.  · There is mild mitral stenosis.  · Mild tricuspid regurgitation.  · Normal central venous pressure (3 mmHg).  · The estimated PA systolic pressure is 35 mmHg.    No results found for this or any previous visit.    Results for orders placed during the hospital encounter of 03/02/22    US Carotid Bilateral    Narrative  EXAMINATION:  US CAROTID BILATERAL    CLINICAL HISTORY:  syncope;    COMPARISON:  None    FINDINGS:  Sonographic evaluation of the carotid systems was performed.    There is some calcified plaque in the carotid bulbs bilaterally and in the proximal internal carotid arteries.    The peak systolic velocity in the right internal carotid artery was approximately 67 cm/sec.    The peak systolic velocity in the left internal carotid artery was [id number] cm/sec.    Antegrade flow noted in both vertebral arteries.    Stenosis percentage validated velocity measurements with angiographic measurements, velocity criteria are extrapolated from diameter data as defined by the Society of Radiologists in Ultrasound Consensus Conference Radiology 2003;  229;340-346.    Impression  No sonographic evidence of a significant stenosis is identified in either carotid system.  Calcified plaque in both carotid bulbs and proximal internal carotid arteries.      Electronically signed by: Keith East  Date:    03/02/2022  Time:    19:19    Results for orders placed during the hospital encounter of 12/29/21    X-Ray Chest PA And Lateral    Narrative  EXAMINATION:  XR CHEST PA AND LATERAL    CLINICAL HISTORY:  Disorientation, unspecified    TECHNIQUE:  PA and lateral views of the chest were performed.    COMPARISON:  Chest radiographs dating back to September 23, 2020    FINDINGS:  Unchanged mild pleuroparenchymal scarring within the lung apices.  Otherwise, lungs are clear without acute opacity.  No pneumothorax or pleural effusion.  Heart size is normal.  Mild tortuosity of the thoracic aorta.  Atherosclerotic calcifications noted within the thoracic aorta.  No acute osseous abnormality.  Surgical clips present within the upper abdomen.    Impression  As above.      Electronically signed by: Candelario Walsh  Date:    12/29/2021  Time:    10:29    No results found for this or any previous visit.    No valid procedures specified.    Diagnostic Results:  ECG: Reviewed  EKG 2/1/2022 normal sinus rhythm, nonspecific ST T-wave changes, minimal LVH,  ms, 69 bpm,  ms  The ASCVD Risk score (Celia DK, et al., 2019) failed to calculate for the following reasons:    The 2019 ASCVD risk score is only valid for ages 40 to 79    Assessment and Plan:   S/P CABG x 3  -     CV Ultrasound Bilateral Doppler Carotid; Future    Chronic heart failure with preserved ejection fraction  -     bumetanide (BUMEX) 1 MG tablet; Take 1 tablet (1 mg total) by mouth once daily.  Dispense: 30 tablet; Refill: 11    Atherosclerosis of native coronary artery of native heart without angina pectoris  -     CV Ultrasound Bilateral Doppler Carotid; Future    Chronic kidney disease, unspecified CKD  stage    Primary hypertension    Presence of cardiac pacemaker    SA node dysfunction    Chronic diastolic (congestive) heart failure           Change Bumex 2 mg twice a week metolazone twice a week to lower dose Bumex daily and metolazone p.r.n..  Discussed 2 grams salt diet   Recent normal nuclear stress test.2022  Continue with current regimen.  We have stopped clonidine in a.m. in the past due to associated sleepiness.    Reviewed all tests and above medical conditions with patient in detail and formulated treatment plan.  Risk factor modification discussed.   Cardiac low salt diet discussed.  Maintaining healthy weight and weight loss goals were discussed in clinic.  Obtain carotid ultrasound scan  Follow up in 6 months

## 2024-08-14 ENCOUNTER — PROCEDURE VISIT (OUTPATIENT)
Dept: OPHTHALMOLOGY | Facility: CLINIC | Age: 89
End: 2024-08-14
Payer: MEDICARE

## 2024-08-14 DIAGNOSIS — H34.8130 CENTRAL RETINAL VEIN OCCLUSION WITH MACULAR EDEMA OF BOTH EYES: Primary | ICD-10-CM

## 2024-08-14 PROCEDURE — 67028 INJECTION EYE DRUG: CPT | Mod: 50,HCNC,S$GLB, | Performed by: OPHTHALMOLOGY

## 2024-08-14 PROCEDURE — 92134 CPTRZ OPH DX IMG PST SGM RTA: CPT | Mod: HCNC,S$GLB,, | Performed by: OPHTHALMOLOGY

## 2024-08-14 PROCEDURE — 99499 UNLISTED E&M SERVICE: CPT | Mod: HCNC,S$GLB,, | Performed by: OPHTHALMOLOGY

## 2024-08-14 NOTE — PROGRESS NOTES
===============================  Date today is 8/14/2024  Glo Dumont is a 88 y.o. female  Last visit [facility]: :5/29/2024   Last visit eye dept. 5/29/2024    Corrected distance visual acuity was 20/200 in the right eye and 20/400 in the left eye.  Tonometry       Tonometry (Applanation, 1:53 PM)         Right Left    Pressure 21 10                  Not recorded       Not recorded       Not recorded       Chief Complaint   Patient presents with    CRVO/ME     OZURDEX OU     HPI     CRVO/ME            Comments: OZURDEX OU          Comments    1. Steroid responder glaucoma   2. PCIOL OU MGM   3. SARCOIDOSIS   H\O chronic UVEITIS OU   4. Central vein occlusion of retina, left   5. Retinal edema     H/o Avastin and Eylea OS   Had been getting Ozurdex OU with Dr. Shahrzad Tamez Eylea OU last 4/25/22, 8/3/22, 12/21/22  Ozurdex ou last 2/16/2022, 5/25/22, 11/8/22, 1/31/23, 8/9/23, 11/15/23   5/29/24      Cosopt BID OU             Last edited by Jessica Jensen on 8/14/2024  1:39 PM.      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  Check OU independently daily.    Gave my office and personal cell phone number.  ________________  8/14/2024 today  Glo Dumont    OU CRVO/ME  OCT stable    8/14/2024  Diagnosis :  OU CRVO/ME  Today:   Ozurdex 0.7 mg , OU   Follow up: eval in     weeks  Instructed to call 24/7 for any worsening of vision. Check Both eyes daily. Gave patient my home phone number.  Risks, benefits, and  alternatives to treatment discussed in detail with the patient.  The patient voiced understanding and wished to proceed with the procedure    Ozurdex intravitreal implant Procedure Note:   y capsule intact?  Patient Identified and Time Out complete  Subconjunctival bleb - xylocaine with epi 2%   and Betadine.  Inject OU at 1mm parallel to limbus  Post Operative Dx: Same  Complications: None  Follow up as above.    =============================

## 2024-08-22 RX ORDER — CYPROHEPTADINE HYDROCHLORIDE 4 MG/1
4 TABLET ORAL 3 TIMES DAILY PRN
Qty: 270 TABLET | Refills: 1 | Status: SHIPPED | OUTPATIENT
Start: 2024-08-22

## 2024-08-23 ENCOUNTER — HOSPITAL ENCOUNTER (OUTPATIENT)
Dept: CARDIOLOGY | Facility: HOSPITAL | Age: 89
Discharge: HOME | End: 2024-08-23
Attending: INTERNAL MEDICINE
Payer: MEDICARE

## 2024-08-23 VITALS
HEIGHT: 61 IN | SYSTOLIC BLOOD PRESSURE: 153 MMHG | DIASTOLIC BLOOD PRESSURE: 89 MMHG | BODY MASS INDEX: 26.24 KG/M2 | WEIGHT: 139 LBS

## 2024-08-23 DIAGNOSIS — Z95.1 S/P CABG X 3: ICD-10-CM

## 2024-08-23 DIAGNOSIS — I25.10 ATHEROSCLEROSIS OF NATIVE CORONARY ARTERY OF NATIVE HEART WITHOUT ANGINA PECTORIS: ICD-10-CM

## 2024-08-23 LAB
LEFT ARM DIASTOLIC BLOOD PRESSURE: 80 MMHG
LEFT ARM SYSTOLIC BLOOD PRESSURE: 134 MMHG
LEFT CBA DIAS: 12 CM/S
LEFT CBA SYS: 72 CM/S
LEFT CCA DIST DIAS: 10 CM/S
LEFT CCA DIST SYS: 46 CM/S
LEFT CCA MID DIAS: 15 CM/S
LEFT CCA MID SYS: 64 CM/S
LEFT CCA PROX DIAS: 9 CM/S
LEFT CCA PROX SYS: 57 CM/S
LEFT ECA DIAS: 2 CM/S
LEFT ECA SYS: 41 CM/S
LEFT ICA DIST DIAS: 20 CM/S
LEFT ICA DIST SYS: 78 CM/S
LEFT ICA MID DIAS: 17 CM/S
LEFT ICA MID SYS: 87 CM/S
LEFT ICA PROX DIAS: 15 CM/S
LEFT ICA PROX SYS: 71 CM/S
LEFT VERTEBRAL DIAS: 7 CM/S
LEFT VERTEBRAL SYS: 42 CM/S
OHS CV CAROTID RIGHT ICA EDV HIGHEST: 16
OHS CV CAROTID ULTRASOUND LEFT ICA/CCA RATIO: 1.89
OHS CV CAROTID ULTRASOUND RIGHT ICA/CCA RATIO: 1.02
OHS CV PV CAROTID LEFT HIGHEST CCA: 64
OHS CV PV CAROTID LEFT HIGHEST ICA: 87
OHS CV PV CAROTID RIGHT HIGHEST CCA: 49
OHS CV PV CAROTID RIGHT HIGHEST ICA: 42
OHS CV US CAROTID LEFT HIGHEST EDV: 20
RIGHT ARM DIASTOLIC BLOOD PRESSURE: 89 MMHG
RIGHT ARM SYSTOLIC BLOOD PRESSURE: 153 MMHG
RIGHT CBA DIAS: 8 CM/S
RIGHT CBA SYS: 34 CM/S
RIGHT CCA DIST DIAS: 10 CM/S
RIGHT CCA DIST SYS: 41 CM/S
RIGHT CCA MID DIAS: 9 CM/S
RIGHT CCA MID SYS: 49 CM/S
RIGHT CCA PROX DIAS: 8 CM/S
RIGHT CCA PROX SYS: 39 CM/S
RIGHT ECA DIAS: 7 CM/S
RIGHT ECA SYS: 96 CM/S
RIGHT ICA DIST DIAS: 16 CM/S
RIGHT ICA DIST SYS: 42 CM/S
RIGHT ICA MID DIAS: 10 CM/S
RIGHT ICA MID SYS: 35 CM/S
RIGHT ICA PROX DIAS: 6 CM/S
RIGHT ICA PROX SYS: 26 CM/S
RIGHT VERTEBRAL DIAS: 8 CM/S
RIGHT VERTEBRAL SYS: 42 CM/S

## 2024-08-23 PROCEDURE — 93880 EXTRACRANIAL BILAT STUDY: CPT | Mod: HCNC

## 2024-08-23 PROCEDURE — 93880 EXTRACRANIAL BILAT STUDY: CPT | Mod: 26,HCNC,, | Performed by: INTERNAL MEDICINE

## 2024-09-08 DIAGNOSIS — R07.9 CHEST PAIN, UNSPECIFIED TYPE: ICD-10-CM

## 2024-09-08 DIAGNOSIS — F41.8 SITUATIONAL ANXIETY: ICD-10-CM

## 2024-09-08 DIAGNOSIS — K21.00 GASTROESOPHAGEAL REFLUX DISEASE WITH ESOPHAGITIS, UNSPECIFIED WHETHER HEMORRHAGE: ICD-10-CM

## 2024-09-09 RX ORDER — CARVEDILOL 12.5 MG/1
12.5 TABLET ORAL 2 TIMES DAILY WITH MEALS
Qty: 180 TABLET | Refills: 3 | Status: SHIPPED | OUTPATIENT
Start: 2024-09-09

## 2024-09-09 RX ORDER — PANTOPRAZOLE SODIUM 40 MG/1
40 TABLET, DELAYED RELEASE ORAL DAILY
Qty: 90 TABLET | Refills: 1 | Status: SHIPPED | OUTPATIENT
Start: 2024-09-09

## 2024-09-09 RX ORDER — BUSPIRONE HYDROCHLORIDE 5 MG/1
5 TABLET ORAL 2 TIMES DAILY PRN
Qty: 60 TABLET | Refills: 5 | Status: SHIPPED | OUTPATIENT
Start: 2024-09-09 | End: 2025-09-09

## 2024-09-09 RX ORDER — ATORVASTATIN CALCIUM 40 MG/1
40 TABLET, FILM COATED ORAL DAILY
Qty: 90 TABLET | Refills: 3 | Status: SHIPPED | OUTPATIENT
Start: 2024-09-09

## 2024-09-23 ENCOUNTER — CLINICAL SUPPORT (OUTPATIENT)
Dept: CARDIOLOGY | Facility: HOSPITAL | Age: 89
End: 2024-09-23
Attending: INTERNAL MEDICINE
Payer: MEDICARE

## 2024-09-23 ENCOUNTER — CLINICAL SUPPORT (OUTPATIENT)
Dept: CARDIOLOGY | Facility: HOSPITAL | Age: 89
End: 2024-09-23
Payer: MEDICARE

## 2024-09-23 DIAGNOSIS — I50.32 CHRONIC DIASTOLIC (CONGESTIVE) HEART FAILURE: ICD-10-CM

## 2024-09-23 DIAGNOSIS — Z95.0 PRESENCE OF CARDIAC PACEMAKER: ICD-10-CM

## 2024-09-23 PROCEDURE — 93294 REM INTERROG EVL PM/LDLS PM: CPT | Mod: HCNC,S$GLB,, | Performed by: INTERNAL MEDICINE

## 2024-09-23 PROCEDURE — 93296 REM INTERROG EVL PM/IDS: CPT | Mod: HCNC | Performed by: INTERNAL MEDICINE

## 2024-10-04 NOTE — PROGRESS NOTES
"Subjective:      Patient ID: Glo Dumont is a 88 y.o. female.    Chief Complaint: Follow-up (1 month f/u )      HPI  Here for follow up of medical problems.  Says feels pretty good.  4 glasses water a day, sometimes a cold drink.  No f/c/sw/cough.  Sleeps well.  No cp/sob/palp/lightheaded.  BMs normal.    Cards changed diuretic to bumex 1mg daily, zaroxolyn.  Feels low, not taking celexa, not sure why.    HM: 10/24 fluvax, 2/21 covid vaccines/booster, 3/15 [id number], 2/14 azwdps05, 10/22 [id number], 2/15 Tdap, 10/21 BMD rep 2y, 2/11 Cscope no repeat will be done, 1/17 MMG no more, Eye doc monthly, GI Dr. Galindo, Cards Dr. Lui, Nephro Dr. Villareal.      Review of Systems   Constitutional:  Negative for chills, diaphoresis and fever.   Respiratory:  Negative for cough and shortness of breath.    Cardiovascular:  Negative for chest pain, palpitations and leg swelling.   Gastrointestinal:  Negative for blood in stool, constipation, diarrhea, nausea and vomiting.   Genitourinary:  Negative for dysuria, frequency and hematuria.   Psychiatric/Behavioral:  The patient is not nervous/anxious.          Objective:   /68   Pulse 69   Temp 96.7 °F (35.9 °C) (Temporal)   Ht 5' 1" (1.549 m)   Wt 62.8 kg (138 lb 7.2 oz)   SpO2 98%   BMI 26.16 kg/m²     Physical Exam  Constitutional:       Appearance: She is well-developed.   Neck:      Thyroid: No thyroid mass.      Vascular: No carotid bruit.   Cardiovascular:      Rate and Rhythm: Normal rate and regular rhythm.      Heart sounds: No murmur heard.     No friction rub. No gallop.   Pulmonary:      Effort: Pulmonary effort is normal.      Breath sounds: Normal breath sounds. No wheezing or rales.   Abdominal:      General: Bowel sounds are normal.      Palpations: Abdomen is soft. There is no mass.      Tenderness: There is no abdominal tenderness.   Musculoskeletal:      Cervical back: Neck supple.   Lymphadenopathy:      Cervical: No cervical adenopathy. "   Neurological:      Mental Status: She is alert and oriented to person, place, and time.             Assessment:       1. Essential hypertension    2. Coronary artery disease involving native coronary artery of native heart with angina pectoris with documented spasm    3. Chronic heart failure with preserved ejection fraction    4. CKD (chronic kidney disease) stage 4, GFR 15-29 ml/min    5. Other iron deficiency anemia    6. Sarcoidosis of lung    7. Ulcerative pancolitis without complication          Plan:     1. Essential hypertension- cont meds, stable.    2. Coronary artery disease involving native coronary artery of native heart with angina pectoris with documented spasm, Chronic heart failure with preserved ejection fraction- cont meds per Cards, pt compensated.    4. CKD (chronic kidney disease) stage 4, GFR 15-29 ml/min- discussed try to get more fluids in.    5. Other iron deficiency anemia- cont iron, last Hct better.    6. Sarcoidosis of lung, no sx now.    7. Ulcerative pancolitis without complication  Overview:  Budesonide 3mg daily.       Fluvax now, RSV at pharm.  RTC 6wk.

## 2024-10-07 ENCOUNTER — PATIENT MESSAGE (OUTPATIENT)
Dept: PRIMARY CARE CLINIC | Facility: CLINIC | Age: 89
End: 2024-10-07
Payer: MEDICARE

## 2024-10-08 LAB
OHS CV AF BURDEN PERCENT: < 1
OHS CV DC REMOTE DEVICE TYPE: NORMAL
OHS CV ICD SHOCK: NO
OHS CV RV PACING PERCENT: 24.02 %

## 2024-10-11 ENCOUNTER — OFFICE VISIT (OUTPATIENT)
Dept: PRIMARY CARE CLINIC | Facility: CLINIC | Age: 89
End: 2024-10-11
Payer: MEDICARE

## 2024-10-11 VITALS
HEART RATE: 69 BPM | WEIGHT: 138.44 LBS | OXYGEN SATURATION: 98 % | BODY MASS INDEX: 26.14 KG/M2 | TEMPERATURE: 97 F | HEIGHT: 61 IN | DIASTOLIC BLOOD PRESSURE: 68 MMHG | SYSTOLIC BLOOD PRESSURE: 130 MMHG

## 2024-10-11 DIAGNOSIS — K21.00 GASTROESOPHAGEAL REFLUX DISEASE WITH ESOPHAGITIS, UNSPECIFIED WHETHER HEMORRHAGE: ICD-10-CM

## 2024-10-11 DIAGNOSIS — Z23 NEED FOR VACCINATION: ICD-10-CM

## 2024-10-11 DIAGNOSIS — D50.8 OTHER IRON DEFICIENCY ANEMIA: ICD-10-CM

## 2024-10-11 DIAGNOSIS — K85.10 ACUTE BILIARY PANCREATITIS, UNSPECIFIED COMPLICATION STATUS: ICD-10-CM

## 2024-10-11 DIAGNOSIS — N18.4 CKD (CHRONIC KIDNEY DISEASE) STAGE 4, GFR 15-29 ML/MIN: Chronic | ICD-10-CM

## 2024-10-11 DIAGNOSIS — K51.00 ULCERATIVE PANCOLITIS WITHOUT COMPLICATION: Chronic | ICD-10-CM

## 2024-10-11 DIAGNOSIS — I25.111 CORONARY ARTERY DISEASE INVOLVING NATIVE CORONARY ARTERY OF NATIVE HEART WITH ANGINA PECTORIS WITH DOCUMENTED SPASM: Chronic | ICD-10-CM

## 2024-10-11 DIAGNOSIS — R07.9 CHEST PAIN, UNSPECIFIED TYPE: ICD-10-CM

## 2024-10-11 DIAGNOSIS — I10 ESSENTIAL HYPERTENSION: Primary | Chronic | ICD-10-CM

## 2024-10-11 DIAGNOSIS — I70.0 ATHEROSCLEROSIS OF AORTA: ICD-10-CM

## 2024-10-11 DIAGNOSIS — I50.32 CHRONIC HEART FAILURE WITH PRESERVED EJECTION FRACTION: Chronic | ICD-10-CM

## 2024-10-11 DIAGNOSIS — E87.6 HYPOKALEMIA: ICD-10-CM

## 2024-10-11 DIAGNOSIS — F32.0 CURRENT MILD EPISODE OF MAJOR DEPRESSIVE DISORDER WITHOUT PRIOR EPISODE: ICD-10-CM

## 2024-10-11 DIAGNOSIS — D86.0 SARCOIDOSIS OF LUNG: Chronic | ICD-10-CM

## 2024-10-11 PROCEDURE — 99999 PR PBB SHADOW E&M-EST. PATIENT-LVL III: CPT | Mod: PBBFAC,HCNC,, | Performed by: INTERNAL MEDICINE

## 2024-10-11 RX ORDER — ASPIRIN 81 MG/1
81 TABLET ORAL DAILY
Qty: 90 TABLET | Refills: 3 | Status: SHIPPED | OUTPATIENT
Start: 2024-10-11 | End: 2025-10-11

## 2024-10-11 RX ORDER — POTASSIUM CHLORIDE 750 MG/1
10 TABLET, EXTENDED RELEASE ORAL DAILY
Qty: 90 TABLET | Refills: 3 | Status: SHIPPED | OUTPATIENT
Start: 2024-10-11 | End: 2025-10-11

## 2024-10-11 RX ORDER — CARVEDILOL 12.5 MG/1
12.5 TABLET ORAL 2 TIMES DAILY WITH MEALS
Qty: 180 TABLET | Refills: 3 | Status: SHIPPED | OUTPATIENT
Start: 2024-10-11

## 2024-10-11 RX ORDER — CITALOPRAM 10 MG/1
10 TABLET ORAL NIGHTLY
Qty: 90 TABLET | Refills: 3 | Status: SHIPPED | OUTPATIENT
Start: 2024-10-11 | End: 2025-10-11

## 2024-10-11 RX ORDER — PANCRELIPASE 24000; 76000; 120000 [USP'U]/1; [USP'U]/1; [USP'U]/1
1 CAPSULE, DELAYED RELEASE PELLETS ORAL
Qty: 270 CAPSULE | Refills: 3 | Status: SHIPPED | OUTPATIENT
Start: 2024-10-11 | End: 2025-10-11

## 2024-10-11 RX ORDER — PANTOPRAZOLE SODIUM 40 MG/1
40 TABLET, DELAYED RELEASE ORAL DAILY
Qty: 90 TABLET | Refills: 3 | Status: SHIPPED | OUTPATIENT
Start: 2024-10-11

## 2024-10-11 RX ORDER — CYPROHEPTADINE HYDROCHLORIDE 4 MG/1
4 TABLET ORAL 3 TIMES DAILY PRN
Qty: 270 TABLET | Refills: 1 | Status: SHIPPED | OUTPATIENT
Start: 2024-10-11

## 2024-10-11 RX ORDER — ATORVASTATIN CALCIUM 40 MG/1
40 TABLET, FILM COATED ORAL DAILY
Qty: 90 TABLET | Refills: 3 | Status: SHIPPED | OUTPATIENT
Start: 2024-10-11

## 2024-10-11 NOTE — TELEPHONE ENCOUNTER
Pt given flu injection , tolerated well, 15 wait instructed, no pain , allergies and medication verifed.socratesf

## 2024-10-13 ENCOUNTER — HOSPITAL ENCOUNTER (OUTPATIENT)
Facility: HOSPITAL | Age: 89
Discharge: HOME | End: 2024-10-16
Attending: EMERGENCY MEDICINE | Admitting: FAMILY MEDICINE
Payer: MEDICARE

## 2024-10-13 DIAGNOSIS — R41.0 CONFUSION: ICD-10-CM

## 2024-10-13 DIAGNOSIS — R06.02 SHORTNESS OF BREATH: ICD-10-CM

## 2024-10-13 DIAGNOSIS — R07.9 CHEST PAIN: ICD-10-CM

## 2024-10-13 DIAGNOSIS — N17.9 ACUTE KIDNEY INJURY: Primary | ICD-10-CM

## 2024-10-13 PROBLEM — N18.9 ACUTE KIDNEY INJURY SUPERIMPOSED ON CKD: Status: ACTIVE | Noted: 2024-10-13

## 2024-10-13 LAB
ALBUMIN SERPL BCP-MCNC: 3.7 G/DL (ref 3.5–5.2)
ALP SERPL-CCNC: 79 U/L (ref 55–135)
ALT SERPL W/O P-5'-P-CCNC: 18 U/L (ref 10–44)
ANION GAP SERPL CALC-SCNC: 13 MMOL/L (ref 8–16)
APTT PPP: 30.9 SEC (ref 21–32)
AST SERPL-CCNC: 18 U/L (ref 10–40)
BASOPHILS # BLD AUTO: 0.04 K/UL (ref 0–0.2)
BASOPHILS NFR BLD: 0.4 % (ref 0–1.9)
BILIRUB SERPL-MCNC: 0.6 MG/DL (ref 0.1–1)
BILIRUB UR QL STRIP: NEGATIVE
BNP SERPL-MCNC: 160 PG/ML (ref 0–99)
BUN SERPL-MCNC: 88 MG/DL (ref 8–23)
CALCIUM SERPL-MCNC: 9.7 MG/DL (ref 8.7–10.5)
CHLORIDE SERPL-SCNC: 109 MMOL/L (ref 95–110)
CLARITY UR: CLEAR
CO2 SERPL-SCNC: 14 MMOL/L (ref 23–29)
COLOR UR: COLORLESS
CREAT SERPL-MCNC: 2.9 MG/DL (ref 0.5–1.4)
DIFFERENTIAL METHOD BLD: ABNORMAL
EOSINOPHIL # BLD AUTO: 0.1 K/UL (ref 0–0.5)
EOSINOPHIL NFR BLD: 0.8 % (ref 0–8)
ERYTHROCYTE [DISTWIDTH] IN BLOOD BY AUTOMATED COUNT: 15.4 % (ref 11.5–14.5)
EST. GFR  (NO RACE VARIABLE): 15 ML/MIN/1.73 M^2
FERRITIN SERPL-MCNC: 182 NG/ML (ref 20–300)
GLUCOSE SERPL-MCNC: 103 MG/DL (ref 70–110)
GLUCOSE UR QL STRIP: NEGATIVE
HCT VFR BLD AUTO: 39.8 % (ref 37–48.5)
HGB BLD-MCNC: 13.3 G/DL (ref 12–16)
HGB UR QL STRIP: NEGATIVE
IMM GRANULOCYTES # BLD AUTO: 0.23 K/UL (ref 0–0.04)
IMM GRANULOCYTES NFR BLD AUTO: 2.4 % (ref 0–0.5)
INR PPP: 1 (ref 0.8–1.2)
KETONES UR QL STRIP: NEGATIVE
LACTATE SERPL-SCNC: 0.9 MMOL/L (ref 0.5–2.2)
LEUKOCYTE ESTERASE UR QL STRIP: NEGATIVE
LIPASE SERPL-CCNC: 41 U/L (ref 4–60)
LYMPHOCYTES # BLD AUTO: 0.9 K/UL (ref 1–4.8)
LYMPHOCYTES NFR BLD: 9.1 % (ref 18–48)
MCH RBC QN AUTO: 29.3 PG (ref 27–31)
MCHC RBC AUTO-ENTMCNC: 33.4 G/DL (ref 32–36)
MCV RBC AUTO: 88 FL (ref 82–98)
MONOCYTES # BLD AUTO: 0.8 K/UL (ref 0.3–1)
MONOCYTES NFR BLD: 8 % (ref 4–15)
NEUTROPHILS # BLD AUTO: 7.7 K/UL (ref 1.8–7.7)
NEUTROPHILS NFR BLD: 79.3 % (ref 38–73)
NITRITE UR QL STRIP: NEGATIVE
NRBC BLD-RTO: 0 /100 WBC
PH UR STRIP: 5 [PH] (ref 5–8)
PLATELET # BLD AUTO: 159 K/UL (ref 150–450)
PMV BLD AUTO: 9.5 FL (ref 9.2–12.9)
POTASSIUM SERPL-SCNC: 4.5 MMOL/L (ref 3.5–5.1)
PROCALCITONIN SERPL IA-MCNC: 0.1 NG/ML
PROT SERPL-MCNC: 7.3 G/DL (ref 6–8.4)
PROT UR QL STRIP: NEGATIVE
PROTHROMBIN TIME: 10.6 SEC (ref 9–12.5)
RBC # BLD AUTO: 4.54 M/UL (ref 4–5.4)
SODIUM SERPL-SCNC: 136 MMOL/L (ref 136–145)
SP GR UR STRIP: 1.01 (ref 1–1.03)
T4 FREE SERPL-MCNC: 1 NG/DL (ref 0.71–1.51)
TROPONIN I SERPL DL<=0.01 NG/ML-MCNC: 0.16 NG/ML (ref 0–0.03)
TSH SERPL DL<=0.005 MIU/L-ACNC: 1.19 UIU/ML (ref 0.4–4)
URN SPEC COLLECT METH UR: ABNORMAL
UROBILINOGEN UR STRIP-ACNC: NEGATIVE EU/DL
WBC # BLD AUTO: 9.76 K/UL (ref 3.9–12.7)

## 2024-10-13 PROCEDURE — 84439 ASSAY OF FREE THYROXINE: CPT | Mod: HCNC

## 2024-10-13 PROCEDURE — 82728 ASSAY OF FERRITIN: CPT | Mod: HCNC

## 2024-10-13 PROCEDURE — 80053 COMPREHEN METABOLIC PANEL: CPT | Mod: HCNC | Performed by: EMERGENCY MEDICINE

## 2024-10-13 PROCEDURE — 25000003 PHARM REV CODE 250: Mod: HCNC | Performed by: EMERGENCY MEDICINE

## 2024-10-13 PROCEDURE — 85610 PROTHROMBIN TIME: CPT | Mod: HCNC

## 2024-10-13 PROCEDURE — G0378 HOSPITAL OBSERVATION PER HR: HCPCS | Mod: HCNC

## 2024-10-13 PROCEDURE — 63600175 PHARM REV CODE 636 W HCPCS: Mod: HCNC

## 2024-10-13 PROCEDURE — 96360 HYDRATION IV INFUSION INIT: CPT | Mod: HCNC

## 2024-10-13 PROCEDURE — 81003 URINALYSIS AUTO W/O SCOPE: CPT | Mod: HCNC | Performed by: EMERGENCY MEDICINE

## 2024-10-13 PROCEDURE — 83036 HEMOGLOBIN GLYCOSYLATED A1C: CPT | Mod: HCNC

## 2024-10-13 PROCEDURE — 85730 THROMBOPLASTIN TIME PARTIAL: CPT | Mod: HCNC

## 2024-10-13 PROCEDURE — 83880 ASSAY OF NATRIURETIC PEPTIDE: CPT | Mod: HCNC

## 2024-10-13 PROCEDURE — 84145 PROCALCITONIN (PCT): CPT | Mod: HCNC | Performed by: EMERGENCY MEDICINE

## 2024-10-13 PROCEDURE — 83690 ASSAY OF LIPASE: CPT | Mod: HCNC | Performed by: EMERGENCY MEDICINE

## 2024-10-13 PROCEDURE — 84484 ASSAY OF TROPONIN QUANT: CPT | Mod: HCNC

## 2024-10-13 PROCEDURE — 85025 COMPLETE CBC W/AUTO DIFF WBC: CPT | Mod: HCNC | Performed by: EMERGENCY MEDICINE

## 2024-10-13 PROCEDURE — 99285 EMERGENCY DEPT VISIT HI MDM: CPT | Mod: 25,HCNC

## 2024-10-13 PROCEDURE — 84466 ASSAY OF TRANSFERRIN: CPT | Mod: HCNC

## 2024-10-13 PROCEDURE — 96372 THER/PROPH/DIAG INJ SC/IM: CPT

## 2024-10-13 PROCEDURE — 84443 ASSAY THYROID STIM HORMONE: CPT | Mod: HCNC

## 2024-10-13 PROCEDURE — 83605 ASSAY OF LACTIC ACID: CPT | Mod: HCNC

## 2024-10-13 RX ORDER — ONDANSETRON HYDROCHLORIDE 2 MG/ML
4 INJECTION, SOLUTION INTRAVENOUS EVERY 8 HOURS PRN
Status: DISCONTINUED | OUTPATIENT
Start: 2024-10-13 | End: 2024-10-16 | Stop reason: HOSPADM

## 2024-10-13 RX ORDER — SODIUM CHLORIDE 0.9 % (FLUSH) 0.9 %
10 SYRINGE (ML) INJECTION EVERY 12 HOURS PRN
Status: DISCONTINUED | OUTPATIENT
Start: 2024-10-13 | End: 2024-10-16 | Stop reason: HOSPADM

## 2024-10-13 RX ORDER — CITALOPRAM 10 MG/1
10 TABLET ORAL NIGHTLY
Status: DISCONTINUED | OUTPATIENT
Start: 2024-10-14 | End: 2024-10-16 | Stop reason: HOSPADM

## 2024-10-13 RX ORDER — ISOSORBIDE MONONITRATE 30 MG/1
30 TABLET, EXTENDED RELEASE ORAL DAILY
Status: DISCONTINUED | OUTPATIENT
Start: 2024-10-14 | End: 2024-10-16 | Stop reason: HOSPADM

## 2024-10-13 RX ORDER — IBUPROFEN 200 MG
16 TABLET ORAL
Status: DISCONTINUED | OUTPATIENT
Start: 2024-10-13 | End: 2024-10-16 | Stop reason: HOSPADM

## 2024-10-13 RX ORDER — ONDANSETRON 8 MG/1
8 TABLET, ORALLY DISINTEGRATING ORAL EVERY 6 HOURS
COMMUNITY
Start: 2024-10-12

## 2024-10-13 RX ORDER — ALUMINUM HYDROXIDE, MAGNESIUM HYDROXIDE, AND SIMETHICONE 1200; 120; 1200 MG/30ML; MG/30ML; MG/30ML
30 SUSPENSION ORAL 4 TIMES DAILY PRN
Status: DISCONTINUED | OUTPATIENT
Start: 2024-10-13 | End: 2024-10-16 | Stop reason: HOSPADM

## 2024-10-13 RX ORDER — FUROSEMIDE 20 MG/1
20 TABLET ORAL
Status: ON HOLD | COMMUNITY
End: 2024-10-16 | Stop reason: HOSPADM

## 2024-10-13 RX ORDER — HEPARIN SODIUM 5000 [USP'U]/ML
5000 INJECTION, SOLUTION INTRAVENOUS; SUBCUTANEOUS EVERY 8 HOURS
Status: DISCONTINUED | OUTPATIENT
Start: 2024-10-13 | End: 2024-10-16 | Stop reason: HOSPADM

## 2024-10-13 RX ORDER — GLUCAGON 1 MG
1 KIT INJECTION
Status: DISCONTINUED | OUTPATIENT
Start: 2024-10-13 | End: 2024-10-16 | Stop reason: HOSPADM

## 2024-10-13 RX ORDER — IBUPROFEN 200 MG
24 TABLET ORAL
Status: DISCONTINUED | OUTPATIENT
Start: 2024-10-13 | End: 2024-10-16 | Stop reason: HOSPADM

## 2024-10-13 RX ORDER — NALOXONE HCL 0.4 MG/ML
0.02 VIAL (ML) INJECTION
Status: DISCONTINUED | OUTPATIENT
Start: 2024-10-13 | End: 2024-10-16 | Stop reason: HOSPADM

## 2024-10-13 RX ORDER — ACETAMINOPHEN 325 MG/1
650 TABLET ORAL EVERY 4 HOURS PRN
Status: DISCONTINUED | OUTPATIENT
Start: 2024-10-13 | End: 2024-10-16 | Stop reason: HOSPADM

## 2024-10-13 RX ORDER — ATORVASTATIN CALCIUM 40 MG/1
40 TABLET, FILM COATED ORAL DAILY
Status: DISCONTINUED | OUTPATIENT
Start: 2024-10-14 | End: 2024-10-16 | Stop reason: HOSPADM

## 2024-10-13 RX ORDER — AMLODIPINE BESYLATE 10 MG/1
10 TABLET ORAL EVERY MORNING
Status: DISCONTINUED | OUTPATIENT
Start: 2024-10-14 | End: 2024-10-16 | Stop reason: HOSPADM

## 2024-10-13 RX ORDER — ASPIRIN 81 MG/1
81 TABLET ORAL DAILY
Status: DISCONTINUED | OUTPATIENT
Start: 2024-10-14 | End: 2024-10-16 | Stop reason: HOSPADM

## 2024-10-13 RX ADMIN — HEPARIN SODIUM 5000 UNITS: 5000 INJECTION INTRAVENOUS; SUBCUTANEOUS at 10:10

## 2024-10-13 RX ADMIN — SODIUM CHLORIDE 500 ML: 9 INJECTION, SOLUTION INTRAVENOUS at 05:10

## 2024-10-13 NOTE — ASSESSMENT & PLAN NOTE
KEEGAN is likely due to pre-renal azotemia due to dehydration. Baseline creatinine is  1.4 . Most recent creatinine and eGFR are listed below.  Recent Labs     10/13/24  1441   CREATININE 2.9*   EGFRNORACEVR 15*      Plan  - will trend KEEGAN in a.m. with repeat labs   - Avoid nephrotoxins and renally dose meds for GFR listed above  - Monitor urine output, serial BMP, and adjust therapy as needed  - patient given 1 bolus of 500ml NS in ER   -bladder scan patient   -UA negative for UTI

## 2024-10-13 NOTE — ASSESSMENT & PLAN NOTE
Patients blood pressure range in the last 24 hours was: BP  Min: 123/68  Max: 142/73.The patient's inpatient anti-hypertensive regimen is listed below:  Current Antihypertensives  amLODIPine tablet 10 mg, Every morning, Oral  isosorbide mononitrate 24 hr tablet 30 mg, Daily, Oral    Plan  - BP is controlled, no changes needed to their regimen  - tele monitoring

## 2024-10-13 NOTE — HPI
Glo Dumont is a 88 y.o. female with a PMH has a past medical history of Anemia, Angina pectoris, Anxiety, Anxiety and depression, Arthritis, Carotid artery occlusion, Carpal tunnel syndrome (06/23/2008), Chronic diarrhea, CKD (chronic kidney disease) stage 3, GFR 30-59 ml/min (5/11/2017), Colitis, Coronary artery disease, Coronary artery disease, Diastolic dysfunction, Diverticulosis, Glaucoma, Greater trochanteric bursitis (2/10/2015), Grief at loss of child (1/26/2016), H/O carotid endarterectomy (12/2/2013), Heart failure, History of coronary angioplasty (3/11/2014), Hypercholesteremia, Hypertension, Liver cyst (02/08/2013), Macular degeneration, Obesity with serious comorbidity (3/19/2023), Primary open-angle glaucoma(365.11) (9/3/2013), Renal cyst (02/08/2013), 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 Presented to the ER today with c/o generalized weakness and worsening confusion. Patients daughter at bedside during exam, reports confusion has worsened over the past couple of days. No confusion noted on exam and No focal deficits noted. Patient reports mild SOB with exertion, denies CP. Does endorse lower abdominal pain, denies any dysuria, nausea or vomiting. In ER: Labs- WBC 9.76, H/H 13.3/39.8, plt count 159, sodium 136, potassium 4.5, BUN 88, Crit 2.9, Pro-shelly 0.10, UA negative for UTI. CT A/P: No definite acute abnormality identified. CT head: No acute abnormality. Atrophy and chronic white matter changes. CXR: No acute abnormality. VS: /73, HR 68, SpO2 98% on room air, Temp 98F, RR 20. [facility] consulted for admission.

## 2024-10-13 NOTE — ASSESSMENT & PLAN NOTE
-Daughter reports worsening of chronic confusion, no focal deficits noted on exam and no recent falls   -Head CT: No acute abnormality. Atrophy and chronic white matter changes   -Neurology consult in a.m. per daughter request   -Pt/Ot to eval and treat

## 2024-10-13 NOTE — H&P
[facility] - Emergency Dept.  [facility]  History & Physical    Patient Name: Glo Dumont  MRN: [id number]  Patient Class: OP- Observation  Admission Date: 10/13/2024  Attending Physician: Madhu Nichols MD   Primary Care Provider: Christina Cardona MD         Patient information was obtained from patient, past medical records, and ER records.     Subjective:     Principal Problem:Acute kidney injury superimposed on CKD    Chief Complaint:   Chief Complaint   Patient presents with    Altered Mental Status     Pt's daughter reports confusion for several days, loss of appetite, and generalized weakness x 3/4 days. Pt seen at [facility] ED yesterday and was diagnosed with viral syndrome.         HPI: Glo Dumont is a 88 y.o. female with a PMH has a past medical history of Anemia, Angina pectoris, Anxiety, Anxiety and depression, Arthritis, Carotid artery occlusion, Carpal tunnel syndrome (06/23/2008), Chronic diarrhea, CKD (chronic kidney disease) stage 3, GFR 30-59 ml/min (5/11/2017), Colitis, Coronary artery disease, Coronary artery disease, Diastolic dysfunction, Diverticulosis, Glaucoma, Greater trochanteric bursitis (2/10/2015), Grief at loss of child (1/26/2016), H/O carotid endarterectomy (12/2/2013), Heart failure, History of coronary angioplasty (3/11/2014), Hypercholesteremia, Hypertension, Liver cyst (02/08/2013), Macular degeneration, Obesity with serious comorbidity (3/19/2023), Primary open-angle glaucoma(365.11) (9/3/2013), Renal cyst (02/08/2013), 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 Presented to the ER today with c/o generalized weakness and worsening confusion. Patients daughter at bedside during exam, reports confusion has worsened over the past couple of days. No confusion noted on exam and No focal deficits noted. Patient reports mild SOB with exertion, denies CP. Does endorse lower abdominal pain, denies any  dysuria, nausea or vomiting. In ER: Labs- WBC 9.76, H/H 13.3/39.8, plt count 159, sodium 136, potassium 4.5, BUN 88, Crit 2.9, Pro-shelly 0.10, UA negative for UTI. CT A/P: No definite acute abnormality identified. CT head: No acute abnormality. Atrophy and chronic white matter changes. CXR: No acute abnormality. VS: /73, HR 68, SpO2 98% on room air, Temp 98F, RR 20. [facility] consulted for admission.        Past Medical History:   Diagnosis Date    Anemia     Angina pectoris     Anxiety     Anxiety and depression     Arthritis     hip    Carotid artery occlusion     Carpal tunnel syndrome 06/23/2008    emg    Chronic diarrhea     work up in 2011 with EGD, CS and VCE    CKD (chronic kidney disease) stage 3, GFR 30-59 ml/min 5/11/2017    Colitis     Coronary artery disease     Coronary artery disease     Diastolic dysfunction     Diverticulosis     Glaucoma     Greater trochanteric bursitis 2/10/2015    Grief at loss of child 1/26/2016    H/O carotid endarterectomy 12/2/2013    Heart failure     History of coronary angioplasty 3/11/2014    Hypercholesteremia     Hypertension     Liver cyst 02/08/2013    ct abd    Macular degeneration     Obesity with serious comorbidity 3/19/2023    Primary open-angle glaucoma(365.11) 9/3/2013    Renal cyst 02/08/2013    ct abd    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    Sarcoidosis     Sarcoidosis of lung     Sickle cell trait     Uveitis        Past Surgical History:   Procedure Laterality Date    A-V CARDIAC PACEMAKER INSERTION Left 3/21/2023    Procedure: INSERTION, CARDIAC PACEMAKER, DUAL CHAMBER/His Lead;  Surgeon: Cortez Ambrose MD;  Location: [facility] CATH LAB;  Service: Cardiology;  Laterality: Left;  MDT/ MD to confirm in am/possible nurse sedate    CAROTID ENDARTERECTOMY Right 2000s    CATARACT EXTRACTION Bilateral     Dr. Liang Dennis    CHOLECYSTECTOMY      laparoscopic, 3/18.    CORONARY ANGIOPLASTY WITH STENT PLACEMENT  11/19/2010    RCA-HALIE 2010     [facility]    JOINT REPLACEMENT Left 11/03/2014    Dr. Braun    TOTAL ABDOMINAL HYSTERECTOMY W/ BILATERAL SALPINGOOPHORECTOMY  1972       Review of patient's allergies indicates:   Allergen Reactions    Lisinopril      angioedema    Codeine Nausea And Vomiting       No current facility-administered medications on file prior to encounter.     Current Outpatient Medications on File Prior to Encounter   Medication Sig    aflibercept 2 mg/0.05 mL Soln 2 mg by Intravitreal route every 28 days.    amLODIPine (NORVASC) 2.5 MG tablet Take 10 mg by mouth every morning.    aspirin (ECOTRIN) 81 MG EC tablet Take 1 tablet (81 mg total) by mouth once daily.    atorvastatin (LIPITOR) 40 MG tablet Take 1 tablet (40 mg total) by mouth Daily.    budesonide (ENTOCORT EC) 3 mg capsule Take 1-2 capsules (3-6 mg total) by mouth daily as needed (flare). As needed    bumetanide (BUMEX) 1 MG tablet Take 1 tablet (1 mg total) by mouth once daily.    busPIRone (BUSPAR) 5 MG Tab Take 1 tablet (5 mg total) by mouth 2 (two) times daily as needed (anxiety).    carvediloL (COREG) 12.5 MG tablet Take 1 tablet (12.5 mg total) by mouth 2 (two) times daily with meals.    citalopram (CELEXA) 10 MG tablet Take 1 tablet (10 mg total) by mouth every evening.    cyproheptadine (PERIACTIN) 4 mg tablet Take 1 tablet (4 mg total) by mouth 3 (three) times daily as needed (appetite).    dexAMETHasone (OZURDEX) 0.7 mg Impl intravitreal implant 0.7 mg by Intravitreal route once.    dorzolamide-timolol 2-0.5% (COSOPT) 22.3-6.8 mg/mL ophthalmic solution INSTILL 1 DROP IN BOTH EYES TWICE DAILY    empagliflozin (JARDIANCE) 10 mg tablet Take 10 mg by mouth once daily.    FOLIC ACID/MULTIVIT-MIN/LUTEIN (CENTRUM SILVER ORAL) Take 1 tablet by mouth once daily.    isosorbide mononitrate (IMDUR) 30 MG 24 hr tablet Take 1 tablet (30 mg total) by mouth once daily.    lipase-protease-amylase 24,000-76,000-120,000 units (CREON) 24,000-76,000 -120,000 unit capsule Take 1 capsule  by mouth 3 (three) times daily with meals.    loperamide (IMODIUM) 2 mg capsule Take 1 mg by mouth every 6 (six) hours as needed for Diarrhea.    metOLazone (ZAROXOLYN) 2.5 MG tablet Take on Mon    nitroGLYCERIN (NITROSTAT) 0.4 MG SL tablet PLACE ONE TABLET UNDERNEATH THE TONGUE EVERY 5 MINUTES AS NEEDED FOR CHEST PAIN    ondansetron (ZOFRAN) 4 MG tablet Take 1 tablet (4 mg total) by mouth every 8 (eight) hours as needed for Nausea.    pantoprazole (PROTONIX) 40 MG tablet Take 1 tablet (40 mg total) by mouth once daily.    potassium chloride (KLOR-CON) 10 MEQ TbSR Take 1 tablet (10 mEq total) by mouth once daily.    sodium bicarbonate 650 MG tablet Take 2 tablets (1,300 mg total) by mouth 2 (two) times daily.    spironolactone (ALDACTONE) 25 MG tablet Take 1 tablet (25 mg total) by mouth 3 (three) times a week.     Family History       Problem Relation (Age of Onset)    Cancer Father, Daughter    Cirrhosis Brother    Heart failure Mother, Brother    Hypertension Mother          Tobacco Use    Smoking status: Never    Smokeless tobacco: Never   Substance and Sexual Activity    Alcohol use: No     Alcohol/week: 0.0 standard drinks of alcohol    Drug use: No    Sexual activity: Yes     Partners: Male     Review of Systems   Constitutional:  Positive for activity change, appetite change (decreased PO intake) and fatigue.   Respiratory:  Positive for shortness of breath (with exertion).    Cardiovascular:  Negative for chest pain and leg swelling.   Gastrointestinal:  Positive for abdominal pain (lower abdominal pain). Negative for diarrhea, nausea and vomiting.   Genitourinary: Negative.    Musculoskeletal: Negative.    Neurological: Negative.      Objective:     Vital Signs (Most Recent):  Temp: 98 °F (36.7 °C) (10/13/24 1428)  Pulse: 77 (10/13/24 1800)  Resp: 18 (10/13/24 1232)  BP: 128/70 (10/13/24 1800)  SpO2: 97 % (10/13/24 1800) Vital Signs (24h Range):  Temp:  [97.9 °F (36.6 °C)-98 °F (36.7 °C)] 98 °F (36.7  °C)  Pulse:  [68-77] 77  Resp:  [18] 18  SpO2:  [97 %-98 %] 97 %  BP: (123-142)/(68-73) 128/70     Weight: 62.4 kg (137 lb 9.6 oz)  Body mass index is 26 kg/m².     Physical Exam  Vitals and nursing note reviewed.   Constitutional:       General: She is not in acute distress.  HENT:      Head: Normocephalic and atraumatic.      Mouth/Throat:      Mouth: Mucous membranes are moist.      Pharynx: Oropharynx is clear.   Eyes:      Pupils: Pupils are equal, round, and reactive to light.   Cardiovascular:      Rate and Rhythm: Normal rate and regular rhythm.      Heart sounds: No murmur heard.  Pulmonary:      Effort: Pulmonary effort is normal.      Breath sounds: Normal breath sounds. No wheezing.   Abdominal:      General: Bowel sounds are normal. There is no distension.      Palpations: Abdomen is soft.      Tenderness: There is no abdominal tenderness.   Genitourinary:     Comments: deferred  Musculoskeletal:         General: Normal range of motion.   Skin:     General: Skin is warm and dry.   Neurological:      General: No focal deficit present.      Mental Status: She is alert.              CRANIAL NERVES     CN III, IV, VI   Pupils are equal, round, and reactive to light.       Significant Labs: All pertinent labs within the past 24 hours have been reviewed.  Recent Lab Results         10/13/24  1710   10/13/24  1441        Procalcitonin   0.10  Comment: A concentration < 0.25 ng/mL represents a low risk of bacterial   infection.  Procalcitonin may not be accurate among patients with localized   infection, recent trauma or major surgery, immunosuppressed state,   invasive fungal infection, renal dysfunction. Decisions regarding   initiation or continuation of antibiotic therapy should not be based   solely on procalcitonin levels.         Albumin   3.7       ALP   79       ALT   18       Anion Gap   13       Appearance, UA Clear         AST   18       Baso #   0.04       Basophil %   0.4       Bilirubin (UA)  Negative         BILIRUBIN TOTAL   0.6  Comment: For infants and newborns, interpretation of results should be based  on gestational age, weight and in agreement with clinical  observations.    Premature Infant recommended reference ranges:  Up to 24 hours.............<8.0 mg/dL  Up to 48 hours............<12.0 mg/dL  3-5 days..................<15.0 mg/dL  6-29 days.................<15.0 mg/dL         BUN   88       Calcium   9.7       Chloride   109       CO2   14       Color, UA Colorless         Creatinine   2.9       Differential Method   Automated       eGFR   15       Eos #   0.1       Eos %   0.8       Glucose   103       Glucose, UA Negative         Gran # (ANC)   7.7       Gran %   79.3       Hematocrit   39.8       Hemoglobin   13.3       Immature Grans (Abs)   0.23  Comment: Mild elevation in immature granulocytes is non specific and   can be seen in a variety of conditions including stress response,   acute inflammation, trauma and pregnancy. Correlation with other   laboratory and clinical findings is essential.         Immature Granulocytes   2.4       Ketones, UA Negative         Leukocyte Esterase, UA Negative         Lipase   41       Lymph #   0.9       Lymph %   9.1       MCH   29.3       MCHC   33.4       MCV   88       Mono #   0.8       Mono %   8.0       MPV   9.5       NITRITE UA Negative         nRBC   0       Blood, UA Negative         pH, UA 5.0         Platelet Count   159       Potassium   4.5       PROTEIN TOTAL   7.3       Protein, UA Negative  Comment: Recommend a 24 hour urine protein or a urine   protein/creatinine ratio if globulin induced proteinuria is  clinically suspected.           RBC   4.54       RDW   15.4       Sodium   136       Spec Grav UA 1.010         Specimen UA Urine, Catheterized         UROBILINOGEN UA Negative         WBC   9.76               Significant Imaging: I have reviewed all pertinent imaging results/findings within the past 24 hours.  Assessment/Plan:      * Acute kidney injury superimposed on CKD  KEEGAN is likely due to pre-renal azotemia due to dehydration. Baseline creatinine is  1.4 . Most recent creatinine and eGFR are listed below.  Recent Labs     10/13/24  1441   CREATININE 2.9*   EGFRNORACEVR 15*      Plan  - will trend KEEGAN in a.m. with repeat labs   - Avoid nephrotoxins and renally dose meds for GFR listed above  - Monitor urine output, serial BMP, and adjust therapy as needed  - patient given 1 bolus of 500ml NS in ER   -bladder scan patient   -UA negative for UTI     Confusion  -Daughter reports worsening of chronic confusion, no focal deficits noted on exam and no recent falls   -Head CT: No acute abnormality. Atrophy and chronic white matter changes   -Neurology consult in a.m. per daughter request   -Pt/Ot to eval and treat       Generalized weakness  -Pt/ot to eval and treat         Diastolic heart failure, NYHA class 3  -Patient reports SOB with exertion   -Check BNP and Troponin   -Check ECHO   -CXR: No acute abnormality.         Essential hypertension  Patients blood pressure range in the last 24 hours was: BP  Min: 123/68  Max: 142/73.The patient's inpatient anti-hypertensive regimen is listed below:  Current Antihypertensives  amLODIPine tablet 10 mg, Every morning, Oral  isosorbide mononitrate 24 hr tablet 30 mg, Daily, Oral    Plan  - BP is controlled, no changes needed to their regimen  - tele monitoring     Other hyperlipidemia  -continue home statin         VTE Risk Mitigation (From admission, onward)           Ordered     heparin (porcine) injection 5,000 Units  Every 8 hours         10/13/24 1759     IP VTE HIGH RISK PATIENT  Once         10/13/24 1759     Place sequential compression device  Until discontinued         10/13/24 1759                         On 10/13/2024, patient should be placed in hospital observation services under my care in collaboration with Dr. ROBERSON.    The patient's case has been reviewed by my supervising physician.    Supervising physician will provide additional orders and recommendations at his/her discretion.  Please see supervising physicians documentation and/or orders for further details.          Emelia Rincon NP  Department of Hospital Medicine  [facility] - Emergency Dept.

## 2024-10-13 NOTE — SUBJECTIVE & OBJECTIVE
Past Medical History:   Diagnosis Date    Anemia     Angina pectoris     Anxiety     Anxiety and depression     Arthritis     hip    Carotid artery occlusion     Carpal tunnel syndrome 06/23/2008    emg    Chronic diarrhea     work up in 2011 with EGD, CS and VCE    CKD (chronic kidney disease) stage 3, GFR 30-59 ml/min 5/11/2017    Colitis     Coronary artery disease     Coronary artery disease     Diastolic dysfunction     Diverticulosis     Glaucoma     Greater trochanteric bursitis 2/10/2015    Grief at loss of child 1/26/2016    H/O carotid endarterectomy 12/2/2013    Heart failure     History of coronary angioplasty 3/11/2014    Hypercholesteremia     Hypertension     Liver cyst 02/08/2013    ct abd    Macular degeneration     Obesity with serious comorbidity 3/19/2023    Primary open-angle glaucoma(365.11) 9/3/2013    Renal cyst 02/08/2013    ct abd    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    Sarcoidosis     Sarcoidosis of lung     Sickle cell trait     Uveitis        Past Surgical History:   Procedure Laterality Date    A-V CARDIAC PACEMAKER INSERTION Left 3/21/2023    Procedure: INSERTION, CARDIAC PACEMAKER, DUAL CHAMBER/His Lead;  Surgeon: Cortez Ambrose MD;  Location: [facility] CATH LAB;  Service: Cardiology;  Laterality: Left;  MDT/ MD to confirm in am/possible nurse sedate    CAROTID ENDARTERECTOMY Right 2000s    CATARACT EXTRACTION Bilateral     Dr. Liang Dennis    CHOLECYSTECTOMY      laparoscopic, 3/18.    CORONARY ANGIOPLASTY WITH STENT PLACEMENT  11/19/2010    RCA-HALIE 2010    [facility]    JOINT REPLACEMENT Left 11/03/2014    Dr. Braun    TOTAL ABDOMINAL HYSTERECTOMY W/ BILATERAL SALPINGOOPHORECTOMY  1972       Review of patient's allergies indicates:   Allergen Reactions    Lisinopril      angioedema    Codeine Nausea And Vomiting       No current facility-administered medications on file prior to encounter.     Current Outpatient Medications on File Prior to Encounter   Medication  Sig    aflibercept 2 mg/0.05 mL Soln 2 mg by Intravitreal route every 28 days.    amLODIPine (NORVASC) 2.5 MG tablet Take 10 mg by mouth every morning.    aspirin (ECOTRIN) 81 MG EC tablet Take 1 tablet (81 mg total) by mouth once daily.    atorvastatin (LIPITOR) 40 MG tablet Take 1 tablet (40 mg total) by mouth Daily.    budesonide (ENTOCORT EC) 3 mg capsule Take 1-2 capsules (3-6 mg total) by mouth daily as needed (flare). As needed    bumetanide (BUMEX) 1 MG tablet Take 1 tablet (1 mg total) by mouth once daily.    busPIRone (BUSPAR) 5 MG Tab Take 1 tablet (5 mg total) by mouth 2 (two) times daily as needed (anxiety).    carvediloL (COREG) 12.5 MG tablet Take 1 tablet (12.5 mg total) by mouth 2 (two) times daily with meals.    citalopram (CELEXA) 10 MG tablet Take 1 tablet (10 mg total) by mouth every evening.    cyproheptadine (PERIACTIN) 4 mg tablet Take 1 tablet (4 mg total) by mouth 3 (three) times daily as needed (appetite).    dexAMETHasone (OZURDEX) 0.7 mg Impl intravitreal implant 0.7 mg by Intravitreal route once.    dorzolamide-timolol 2-0.5% (COSOPT) 22.3-6.8 mg/mL ophthalmic solution INSTILL 1 DROP IN BOTH EYES TWICE DAILY    empagliflozin (JARDIANCE) 10 mg tablet Take 10 mg by mouth once daily.    FOLIC ACID/MULTIVIT-MIN/LUTEIN (CENTRUM SILVER ORAL) Take 1 tablet by mouth once daily.    isosorbide mononitrate (IMDUR) 30 MG 24 hr tablet Take 1 tablet (30 mg total) by mouth once daily.    lipase-protease-amylase 24,000-76,000-120,000 units (CREON) 24,000-76,000 -120,000 unit capsule Take 1 capsule by mouth 3 (three) times daily with meals.    loperamide (IMODIUM) 2 mg capsule Take 1 mg by mouth every 6 (six) hours as needed for Diarrhea.    metOLazone (ZAROXOLYN) 2.5 MG tablet Take on Mon    nitroGLYCERIN (NITROSTAT) 0.4 MG SL tablet PLACE ONE TABLET UNDERNEATH THE TONGUE EVERY 5 MINUTES AS NEEDED FOR CHEST PAIN    ondansetron (ZOFRAN) 4 MG tablet Take 1 tablet (4 mg total) by mouth every 8 (eight)  hours as needed for Nausea.    pantoprazole (PROTONIX) 40 MG tablet Take 1 tablet (40 mg total) by mouth once daily.    potassium chloride (KLOR-CON) 10 MEQ TbSR Take 1 tablet (10 mEq total) by mouth once daily.    sodium bicarbonate 650 MG tablet Take 2 tablets (1,300 mg total) by mouth 2 (two) times daily.    spironolactone (ALDACTONE) 25 MG tablet Take 1 tablet (25 mg total) by mouth 3 (three) times a week.     Family History       Problem Relation (Age of Onset)    Cancer Father, Daughter    Cirrhosis Brother    Heart failure Mother, Brother    Hypertension Mother          Tobacco Use    Smoking status: Never    Smokeless tobacco: Never   Substance and Sexual Activity    Alcohol use: No     Alcohol/week: 0.0 standard drinks of alcohol    Drug use: No    Sexual activity: Yes     Partners: Male     Review of Systems   Constitutional:  Positive for activity change, appetite change (decreased PO intake) and fatigue.   Respiratory:  Positive for shortness of breath (with exertion).    Cardiovascular:  Negative for chest pain and leg swelling.   Gastrointestinal:  Positive for abdominal pain (lower abdominal pain). Negative for diarrhea, nausea and vomiting.   Genitourinary: Negative.    Musculoskeletal: Negative.    Neurological: Negative.      Objective:     Vital Signs (Most Recent):  Temp: 98 °F (36.7 °C) (10/13/24 1428)  Pulse: 77 (10/13/24 1800)  Resp: 18 (10/13/24 1232)  BP: 128/70 (10/13/24 1800)  SpO2: 97 % (10/13/24 1800) Vital Signs (24h Range):  Temp:  [97.9 °F (36.6 °C)-98 °F (36.7 °C)] 98 °F (36.7 °C)  Pulse:  [68-77] 77  Resp:  [18] 18  SpO2:  [97 %-98 %] 97 %  BP: (123-142)/(68-73) 128/70     Weight: 62.4 kg (137 lb 9.6 oz)  Body mass index is 26 kg/m².     Physical Exam  Vitals and nursing note reviewed.   Constitutional:       General: She is not in acute distress.  HENT:      Head: Normocephalic and atraumatic.      Mouth/Throat:      Mouth: Mucous membranes are moist.      Pharynx: Oropharynx is  clear.   Eyes:      Pupils: Pupils are equal, round, and reactive to light.   Cardiovascular:      Rate and Rhythm: Normal rate and regular rhythm.      Heart sounds: No murmur heard.  Pulmonary:      Effort: Pulmonary effort is normal.      Breath sounds: Normal breath sounds. No wheezing.   Abdominal:      General: Bowel sounds are normal. There is no distension.      Palpations: Abdomen is soft.      Tenderness: There is no abdominal tenderness.   Genitourinary:     Comments: deferred  Musculoskeletal:         General: Normal range of motion.   Skin:     General: Skin is warm and dry.   Neurological:      General: No focal deficit present.      Mental Status: She is alert.              CRANIAL NERVES     CN III, IV, VI   Pupils are equal, round, and reactive to light.       Significant Labs: All pertinent labs within the past 24 hours have been reviewed.  Recent Lab Results         10/13/24  1710   10/13/24  1441        Procalcitonin   0.10  Comment: A concentration < 0.25 ng/mL represents a low risk of bacterial   infection.  Procalcitonin may not be accurate among patients with localized   infection, recent trauma or major surgery, immunosuppressed state,   invasive fungal infection, renal dysfunction. Decisions regarding   initiation or continuation of antibiotic therapy should not be based   solely on procalcitonin levels.         Albumin   3.7       ALP   79       ALT   18       Anion Gap   13       Appearance, UA Clear         AST   18       Baso #   0.04       Basophil %   0.4       Bilirubin (UA) Negative         BILIRUBIN TOTAL   0.6  Comment: For infants and newborns, interpretation of results should be based  on gestational age, weight and in agreement with clinical  observations.    Premature Infant recommended reference ranges:  Up to 24 hours.............<8.0 mg/dL  Up to 48 hours............<12.0 mg/dL  3-5 days..................<15.0 mg/dL  6-29 days.................<15.0 mg/dL         BUN   88        Calcium   9.7       Chloride   109       CO2   14       Color, UA Colorless         Creatinine   2.9       Differential Method   Automated       eGFR   15       Eos #   0.1       Eos %   0.8       Glucose   103       Glucose, UA Negative         Gran # (ANC)   7.7       Gran %   79.3       Hematocrit   39.8       Hemoglobin   13.3       Immature Grans (Abs)   0.23  Comment: Mild elevation in immature granulocytes is non specific and   can be seen in a variety of conditions including stress response,   acute inflammation, trauma and pregnancy. Correlation with other   laboratory and clinical findings is essential.         Immature Granulocytes   2.4       Ketones, UA Negative         Leukocyte Esterase, UA Negative         Lipase   41       Lymph #   0.9       Lymph %   9.1       MCH   29.3       MCHC   33.4       MCV   88       Mono #   0.8       Mono %   8.0       MPV   9.5       NITRITE UA Negative         nRBC   0       Blood, UA Negative         pH, UA 5.0         Platelet Count   159       Potassium   4.5       PROTEIN TOTAL   7.3       Protein, UA Negative  Comment: Recommend a 24 hour urine protein or a urine   protein/creatinine ratio if globulin induced proteinuria is  clinically suspected.           RBC   4.54       RDW   15.4       Sodium   136       Spec Grav UA 1.010         Specimen UA Urine, Catheterized         UROBILINOGEN UA Negative         WBC   9.76               Significant Imaging: I have reviewed all pertinent imaging results/findings within the past 24 hours.

## 2024-10-13 NOTE — ED PROVIDER NOTES
Assessment    1  Well child visit (V20 2) (Z00 129)   2  Viral URI with cough (465 9) (J06 9,B97 89)    Plan  Health Maintenance    · Begin a limited exercise program ; Status:Complete;   Done: [id number]   · We recommend you offer your child a diet that is low in fat and rich in fruits and  vegetables  Avoid high intake of sweetened beverages like soda and fruit juices  We  encourage you to eat meals and scheduled snacks as a family  Offer your child new  foods regularly but do not force him or her to eat specific foods ; Status:Complete;   Done:  [id number]    Discussion/Summary    Impression:   No growth, development, elimination, feeding, skin and sleep concerns  no medical problems  Anticipatory guidance addressed as per the history of present illness section  declined flu and HPV vaccine  No vaccines needed  He is not on any medications  Information discussed with patient and Parent/Guardian  URI -supportive care  Possible side effects of new medications were reviewed with the patient/guardian today  The treatment plan was reviewed with the patient/guardian  The patient/guardian understands and agrees with the treatment plan      Chief Complaint  Patient is here for a drivers physical       History of Present Illness  HM, 12-18 years Male (Brief): [place] presents today for routine health maintenance with his mother   Social and birth history reviewed  General Health: The child's health since the last visit is described as good   illness since last visit  Immunization status: Up to date   the patient has not had any significant adverse reactions to immunizations  mom declined HPV vaccine and flu vaccine  Caregiver concerns:   Caregivers deny concerns regarding nutrition, sleep, behavior, school, development and elimination  Nutrition/Elimination:   Diet:  his current diet is diverse and healthy  No elimination issues are expressed  Sleep:  No sleep issues are reported  Behavior:  The Emergency Medicine Provider Note - 10/13/2024       SCRIBE NOTE: I, Heath Harris, am scribing for, and in the presence of Prema Rose DO, FACEP     History     Chief Complaint   Patient presents with    Altered Mental Status     Pt's daughter reports confusion for several days, loss of appetite, and generalized weakness x 3/4 days. Pt seen at [facility] ED yesterday and was diagnosed with viral syndrome.        Allergies:  Review of patient's allergies indicates:   Allergen Reactions    Lisinopril      angioedema    Codeine Nausea And Vomiting       History of Present Illness   HPI    10/13/2024, 1:52 PM  The history is provided by the daughter and patient    Glo Dumont is a 88 y.o. female presenting to the ED due to altered mental status. PMHx:Anemia, Angina pectoris, Anxiety, Anxiety and depression, Arthritis, Carotid artery occlusion, Carpal tunnel syndrome (06/23/2008), Chronic diarrhea, CKD (chronic kidney disease) stage 3, GFR 30-59 ml/min (5/11/2017), Colitis, Coronary artery disease, Coronary artery disease, Diastolic dysfunction, Diverticulosis, Glaucoma, Greater trochanteric bursitis (2/10/2015), Grief at loss of child (1/26/2016), H/O carotid endarterectomy (12/2/2013), Heart failure, History of coronary angioplasty (3/11/2014), Hypercholesteremia, Hypertension, Liver cyst (02/08/2013), Macular degeneration, Obesity with serious comorbidity (3/19/2023), Primary open-angle glaucoma(365.11) (9/3/2013), Renal cyst (02/08/2013), 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      Pt's daughter reports pt has been having confusion, generalized weakness, and decreased appetite for about a week. She states pt has been confusing days and times of events and appointments. Daughter says she did not know the day of the week yesterday. She reports pt has had similar sxs during previous UTIs. She says on a good day, pt is more active.   Patient was seen at [facility] yesterday. (unable to review encounter computer).  Discharge paperwork says viral infection.    Despite sxs, pt knew what day of the week it is and what the year is. Pt states she had abdominal pain yesterday, but her abdomin does not current hurt. Pt c/o diarrhea. Pt denies rash, cough, sore throat, CP, chest pressure, runny nose, numbness, dysuria, N/V, and headache.     Arrival mode: Private Vehicle     PCP: Christina Cardona MD     Past Medical History:  Past Medical History:   Diagnosis Date    Anemia     Angina pectoris     Anxiety     Anxiety and depression     Arthritis     hip    Carotid artery occlusion     Carpal tunnel syndrome 06/23/2008    emg    Chronic diarrhea     work up in 2011 with EGD, CS and VCE    CKD (chronic kidney disease) stage 3, GFR 30-59 ml/min 5/11/2017    Colitis     Coronary artery disease     Coronary artery disease     Diastolic dysfunction     Diverticulosis     Glaucoma     Greater trochanteric bursitis 2/10/2015    Grief at loss of child 1/26/2016    H/O carotid endarterectomy 12/2/2013    Heart failure     History of coronary angioplasty 3/11/2014    Hypercholesteremia     Hypertension     Liver cyst 02/08/2013    ct abd    Macular degeneration     Obesity with serious comorbidity 3/19/2023    Primary open-angle glaucoma(365.11) 9/3/2013    Renal cyst 02/08/2013    ct abd    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    Sarcoidosis     Sarcoidosis of lung     Sickle cell trait     Uveitis        Past Surgical History:  Past Surgical History:   Procedure Laterality Date    A-V CARDIAC PACEMAKER INSERTION Left 3/21/2023    Procedure: INSERTION, CARDIAC PACEMAKER, DUAL CHAMBER/His Lead;  Surgeon: Cortez Ambrose MD;  Location: [facility] CATH LAB;  Service: Cardiology;  Laterality: Left;  MDT/ MD to confirm in am/possible nurse sedate    CAROTID ENDARTERECTOMY Right 2000s    CATARACT EXTRACTION Bilateral     Dr. Liang Dennis     child's temperament is described as happy  No behavior issues identified  Health Risks:   Weekly activity: he gets exercise 5 times per week  Childcare/School: He is in grade 10 in  high school  School performance has been fair  Sports Participation Questions:   HPI: 18year old here for [email or web address] physical     Also having a cold - congestion, cough, can't taste - not getting better  Symptoms present for 2 weeks  Feels he is getting better other than the cough  Review of Systems    Constitutional: No complaints of tiredness, feels well, no fever, no chills, no recent weight gain or loss  Eyes: No complaints of eye pain, no discharge from eyes, no eyesight problems, eyes do not itch, no red or dry eyes  ENT: as noted in HPI  Cardiovascular: No complaints of chest pain, no palpitations, normal heart rate, no leg claudication or lower leg edema  Respiratory: cough  Gastrointestinal: No complaints of abdominal pain, no nausea or vomiting, no constipation, no diarrhea or bloody stools  Genitourinary: No complaints of testicular pain, no dysuria or nocturia, no incontinence, no hesitancy, no gential lesion  Musculoskeletal: No complaints of joint stiffness or swelling, no myalgias, no limb pain or swelling  Integumentary: No complaints of skin rash, no skin lesions or wounds, no itching, no dry skin  Neurological: No complaints of headache, no numbness or tingling, no dizziness or fainting, no confusion, no convulsions, no limb weakness or difficulty walking  Psychiatric: No complaints of feeling depressed, no suicidal thoughts, no emotional problems, no anxiety, no sleep disturbances or changes in personality  Endocrine: No complaints of muscle weakness, no feelings of weakness, no erectile dysfunction, no deepening of voice, no hot flashes or proptosis  Hematologic/Lymphatic: No complaints of swollen glands, no neck swollen glands, does not bleed or bruise easily     ROS reported by CHOLECYSTECTOMY      laparoscopic, 3/18.    CORONARY ANGIOPLASTY WITH STENT PLACEMENT  11/19/2010    RCA-HALIE 2010    Lathia    JOINT REPLACEMENT Left 11/03/2014    Dr. Braun    TOTAL ABDOMINAL HYSTERECTOMY W/ BILATERAL SALPINGOOPHORECTOMY  1972         Family History:  Family History   Problem Relation Name Age of Onset    Hypertension Mother      Heart failure Mother      Cancer Father          prostate    Cirrhosis Brother      Heart failure Brother      Cancer Daughter          Carcinoid       Social History:  Social History     Tobacco Use    Smoking status: Never    Smokeless tobacco: Never   Substance and Sexual Activity    Alcohol use: No     Alcohol/week: 0.0 standard drinks of alcohol    Drug use: No    Sexual activity: Yes     Partners: Male       I have reviewed the Past Medical History, Past Surgical History, Family History and Social History as documented above.      Review of Systems   Review of Systems   Constitutional:  Positive for activity change (decreased) and appetite change (decreased). Negative for fever.   HENT:  Negative for rhinorrhea and sore throat.    Respiratory:  Negative for cough and shortness of breath.         (-)chest pressure   Cardiovascular:  Negative for chest pain.   Gastrointestinal:  Positive for abdominal pain (yesterday) and diarrhea. Negative for nausea and vomiting.   Genitourinary:  Negative for dysuria.   Musculoskeletal:  Negative for back pain.   Skin:  Negative for rash.   Neurological:  Positive for weakness (generalized). Negative for numbness and headaches.   Hematological:  Does not bruise/bleed easily.   Psychiatric/Behavioral:  Positive for confusion.    All other systems reviewed and are negative.       Physical Exam     Initial Vitals [10/13/24 1232]   BP Pulse Resp Temp SpO2   123/68 71 18 97.9 °F (36.6 °C) 98 %      MAP       --          Physical Exam    Nursing Notes and Vital Signs Reviewed.  Constitutional: Patient is in no acute distress.  the patient  Past Medical History    · History of Contusion Of The Right Leg With Intact Skin Surface (924 10)   · History of allergic rhinitis (V12 69) (Z87 09)   · History of pharyngitis (V12 69) (Z87 09)   · History of Otalgia, unspecified laterality (388 70) (H92 09)   · History of Sore throat (462) (J02 9)   · History of Sore throat (462) (J02 9)    Surgical History    · History of Ear Pressure Equalization Tube, Insertion, Bilaterally   · History of Tonsillectomy    Family History  Sister    · History of placement of ear tubes    Social History    · Denied: Caffeine Use   · Never a smoker   · Never Drank Alcohol    Current Meds   1  Daily Value Multivitamin Oral Tablet Recorded    Allergies    1  Omnicef CAPS   2  Zithromax PACK    Vitals   Recorded: [id number] 11:44AM   Temperature 96 3 F   Heart Rate 96   Systolic 975   Diastolic 80   Height 5 ft 11 in   Weight 200 lb 12 8 oz   BMI Calculated 28 01   BSA Calculated 2 11   O2 Saturation 99     Physical Exam    Constitutional - General appearance: No acute distress, well appearing and well nourished  Head and Face - Head and face: Normocephalic, atraumatic  Eyes - Conjunctiva and lids: No injection, edema or discharge  Pupils and irises: Equal, round, reactive to light bilaterally  Ears, Nose, Mouth, and Throat - External inspection of ears and nose: Normal without deformities or discharge  Otoscopic examination: Tympanic membranes gray, translucent with good bony landmarks and light reflex  Canals patent without erythema  Lips, teeth, and gums: Normal, good dentition  Oropharynx: Moist mucosa, normal tongue and tonsils without lesions  Pulmonary - Respiratory effort: Normal respiratory rate and rhythm, no increased work of breathing  Auscultation of lungs: Clear bilaterally  Cardiovascular - Auscultation of heart: Regular rate and rhythm, normal S1 and S2, no murmur  Abdomen - Abdomen: Normal bowel sounds, soft, non-tender, no masses  Musculoskeletal - Gait and station: Normal gait  Psychiatric - judgment and insight: Normal  Orientation to person, place, and time: Normal  Recent and remote memory: Normal  Mood and affect: Normal       Results/Data  PHQ-A Adolescent Depression Screening [id number] 12:08PM User, Kenny     Test Name Result Flag Reference   PHQ-9 Adolescent Depression Score 0     Q1: 0, Q2: 0, Q3: 0, Q4: 0, Q5: 0, Q6: 0, Q7: 0, Q8: 0, Q9: 0   PHQ-9 Adolescent Depression Screening Negative     PHQ-9 Difficulty Level Not difficult at all     In the past year have you felt depressed or sad most days, even if you felt okay sometimes? No     Has there been a time in the past month when you have had serious thoughts about ending your life? No     Have you EVER in your WHOLE LIFE, tried to kill yourself or made a suicide attempt? No     PHQ-9 Severity No Depression         Procedure    Procedure: Visual Acuity Test    Indication: routine screening  Inforrmation supplied by  a Snellen chart  Results: 20/20 in the right eye with corrective device, 20/20 in the left eye with corrective device normal in both eyes  Color vision was and the results were normal    The patient tolerated the procedure well        Signatures   Electronically signed by : Carlee Carter MD; Dec 30 2016 12:15PM EST                       (Author) Well-developed and well-nourished.  Head: Atraumatic. Normocephalic.  Eyes: PERRL. EOM intact. Conjunctivae are not pale. No scleral icterus.  ENT: Mucous membranes are dry. Oropharynx is clear and symmetric.    Neck: Supple. Full ROM. No lymphadenopathy.  Cardiovascular: Regular rate. Regular rhythm. No murmurs, rubs, or gallops. Distal pulses are 2+ and symmetric.  Pulmonary/Chest: No respiratory distress. Clear to auscultation bilaterally. No wheezing or rales.  Abdominal: Soft and non-distended.  There is no tenderness.  No rebound, guarding, or rigidity. Good bowel sounds.  Genitourinary: N/A  Musculoskeletal: Moves all extremities. No obvious deformities. No edema. No calf tenderness.  Skin: Warm and dry.  Neurological:  Alert, awake, and appropriate.  Normal speech.  No acute focal neurological deficits are appreciated.  GCS 15.  Cranial nerves 2-12 intact.  No deficits.  Oriented to year, in day a week.  Not month.  Psychiatric: Normal affect. Good eye contact. Appropriate in content.     ED Course   ED Procedures:  Critical Care    Date/Time: 10/13/2024 1:52 AM    Performed by: Prema Rose DO  Authorized by: Madhu Nichols MD  Direct patient critical care time: 6 minutes  Additional history critical care time: 11 minutes  Ordering / reviewing critical care time: 5 minutes  Documentation critical care time: 10 minutes  Consulting other physicians critical care time: 3 minutes  Total critical care time (exclusive of procedural time) : 35 minutes  Critical care time was exclusive of separately billable procedures and treating other patients.  Critical care was necessary to treat or prevent imminent or life-threatening deterioration of the following conditions: renal failure.  Critical care was time spent personally by me on the following activities: blood draw for specimens, development of treatment plan with patient or surrogate, discussions with consultants, interpretation of cardiac output measurements,  "evaluation of patient's response to treatment, examination of patient, obtaining history from patient or surrogate, ordering and performing treatments and interventions, ordering and review of laboratory studies, ordering and review of radiographic studies, pulse oximetry, re-evaluation of patient's condition, review of old charts and vascular access procedures.          ED Vital Signs:  Vitals:    10/13/24 1232 10/13/24 1426 10/13/24 1428 10/13/24 1800   BP: 123/68 (!) 142/73 (!) 142/73 128/70   Pulse: 71 71 68 77   Resp: 18      Temp: 97.9 °F (36.6 °C) 98 °F (36.7 °C) 98 °F (36.7 °C)    TempSrc: Oral Oral Oral    SpO2: 98% 98% 98% 97%   Weight:   62.4 kg (137 lb 9.6 oz)    Height:   5' 1" (1.549 m)     10/13/24 1957 10/13/24 2022 10/14/24 0416 10/14/24 0511   BP:  130/70  136/70   Pulse: 74 75 65 73   Resp:  16  18   Temp:  98.2 °F (36.8 °C)  98.4 °F (36.9 °C)   TempSrc:    Oral   SpO2:  96%  99%   Weight:       Height:        10/14/24 0716   BP: 117/67   Pulse: 72   Resp: 18   Temp: 97.7 °F (36.5 °C)   TempSrc:    SpO2: 98%   Weight:    Height:        All Lab Results:  Results for orders placed or performed during the hospital encounter of 10/13/24   Procalcitonin    Collection Time: 10/13/24  2:41 PM   Result Value Ref Range    Procalcitonin 0.10 <0.25 ng/mL   CBC Auto Differential    Collection Time: 10/13/24  2:41 PM   Result Value Ref Range    WBC 9.76 3.90 - 12.70 K/uL    RBC 4.54 4.00 - 5.40 M/uL    Hemoglobin 13.3 12.0 - 16.0 g/dL    Hematocrit 39.8 37.0 - 48.5 %    MCV 88 82 - 98 fL    MCH 29.3 27.0 - 31.0 pg    MCHC 33.4 32.0 - 36.0 g/dL    RDW 15.4 (H) 11.5 - 14.5 %    Platelets 159 150 - 450 K/uL    MPV 9.5 9.2 - 12.9 fL    Immature Granulocytes 2.4 (H) 0.0 - 0.5 %    Gran # (ANC) 7.7 1.8 - 7.7 K/uL    Immature Grans (Abs) 0.23 (H) 0.00 - 0.04 K/uL    Lymph # 0.9 (L) 1.0 - 4.8 K/uL    Mono # 0.8 0.3 - 1.0 K/uL    Eos # 0.1 0.0 - 0.5 K/uL    Baso # 0.04 0.00 - 0.20 K/uL    nRBC 0 0 /100 WBC    Gran % " 79.3 (H) 38.0 - 73.0 %    Lymph % 9.1 (L) 18.0 - 48.0 %    Mono % 8.0 4.0 - 15.0 %    Eosinophil % 0.8 0.0 - 8.0 %    Basophil % 0.4 0.0 - 1.9 %    Differential Method Automated    Comprehensive Metabolic Panel    Collection Time: 10/13/24  2:41 PM   Result Value Ref Range    Sodium 136 136 - 145 mmol/L    Potassium 4.5 3.5 - 5.1 mmol/L    Chloride 109 95 - 110 mmol/L    CO2 14 (L) 23 - 29 mmol/L    Glucose 103 70 - 110 mg/dL    BUN 88 (H) 8 - 23 mg/dL    Creatinine 2.9 (H) 0.5 - 1.4 mg/dL    Calcium 9.7 8.7 - 10.5 mg/dL    Total Protein 7.3 6.0 - 8.4 g/dL    Albumin 3.7 3.5 - 5.2 g/dL    Total Bilirubin 0.6 0.1 - 1.0 mg/dL    Alkaline Phosphatase 79 55 - 135 U/L    AST 18 10 - 40 U/L    ALT 18 10 - 44 U/L    eGFR 15 (A) >60 mL/min/1.73 m^2    Anion Gap 13 8 - 16 mmol/L   Lipase    Collection Time: 10/13/24  2:41 PM   Result Value Ref Range    Lipase 41 4 - 60 U/L   Urinalysis, Reflex to Urine Culture Urine, Catheterized    Collection Time: 10/13/24  5:10 PM    Specimen: Urine   Result Value Ref Range    Specimen UA Urine, Catheterized     Color, UA Colorless (A) Yellow, Straw, Terrie    Appearance, UA Clear Clear    pH, UA 5.0 5.0 - 8.0    Specific Gravity, UA 1.010 1.005 - 1.030    Protein, UA Negative Negative    Glucose, UA Negative Negative    Ketones, UA Negative Negative    Bilirubin (UA) Negative Negative    Occult Blood UA Negative Negative    Nitrite, UA Negative Negative    Urobilinogen, UA Negative <2.0 EU/dL    Leukocytes, UA Negative Negative   Brain natriuretic peptide    Collection Time: 10/13/24  7:53 PM   Result Value Ref Range     (H) 0 - 99 pg/mL   Troponin I    Collection Time: 10/13/24  7:53 PM   Result Value Ref Range    Troponin I 0.163 (H) 0.000 - 0.026 ng/mL   TSH    Collection Time: 10/13/24  7:53 PM   Result Value Ref Range    TSH 1.191 0.400 - 4.000 uIU/mL   T4, free    Collection Time: 10/13/24  7:53 PM   Result Value Ref Range    Free T4 1.00 0.71 - 1.51 ng/dL   Lactic acid,  plasma    Collection Time: 10/13/24  7:53 PM   Result Value Ref Range    Lactate (Lactic Acid) 0.9 0.5 - 2.2 mmol/L   Hemoglobin A1c    Collection Time: 10/13/24  7:53 PM   Result Value Ref Range    Hemoglobin A1C 6.3 (H) 4.0 - 5.6 %    Estimated Avg Glucose 134 (H) 68 - 131 mg/dL   Iron and TIBC    Collection Time: 10/13/24  7:53 PM   Result Value Ref Range    Iron 71 30 - 160 ug/dL    Transferrin 229 200 - 375 mg/dL    TIBC 339 250 - 450 ug/dL    Saturated Iron 21 20 - 50 %   Ferritin    Collection Time: 10/13/24  7:53 PM   Result Value Ref Range    Ferritin 182 20.0 - 300.0 ng/mL   PTT    Collection Time: 10/13/24  7:53 PM   Result Value Ref Range    aPTT 30.9 21.0 - 32.0 sec   PT/INR    Collection Time: 10/13/24  7:53 PM   Result Value Ref Range    Prothrombin Time 10.6 9.0 - 12.5 sec    INR 1.0 0.8 - 1.2   Comprehensive Metabolic Panel (CMP)    Collection Time: 10/14/24  4:36 AM   Result Value Ref Range    Sodium 139 136 - 145 mmol/L    Potassium 4.4 3.5 - 5.1 mmol/L    Chloride 110 95 - 110 mmol/L    CO2 12 (L) 23 - 29 mmol/L    Glucose 90 70 - 110 mg/dL    BUN 84 (H) 8 - 23 mg/dL    Creatinine 2.4 (H) 0.5 - 1.4 mg/dL    Calcium 9.1 8.7 - 10.5 mg/dL    Total Protein 6.4 6.0 - 8.4 g/dL    Albumin 3.6 3.5 - 5.2 g/dL    Total Bilirubin 0.5 0.1 - 1.0 mg/dL    Alkaline Phosphatase 76 55 - 135 U/L    AST 16 10 - 40 U/L    ALT 18 10 - 44 U/L    eGFR 19 (A) >60 mL/min/1.73 m^2    Anion Gap 17 (H) 8 - 16 mmol/L   CBC with Automated Differential    Collection Time: 10/14/24  4:36 AM   Result Value Ref Range    WBC 7.35 3.90 - 12.70 K/uL    RBC 4.54 4.00 - 5.40 M/uL    Hemoglobin 13.2 12.0 - 16.0 g/dL    Hematocrit 40.6 37.0 - 48.5 %    MCV 89 82 - 98 fL    MCH 29.1 27.0 - 31.0 pg    MCHC 32.5 32.0 - 36.0 g/dL    RDW 15.6 (H) 11.5 - 14.5 %    Platelets 189 150 - 450 K/uL    MPV 10.4 9.2 - 12.9 fL    Immature Granulocytes 2.4 (H) 0.0 - 0.5 %    Gran # (ANC) 5.6 1.8 - 7.7 K/uL    Immature Grans (Abs) 0.18 (H) 0.00 - 0.04  K/uL    Lymph # 0.9 (L) 1.0 - 4.8 K/uL    Mono # 0.6 0.3 - 1.0 K/uL    Eos # 0.1 0.0 - 0.5 K/uL    Baso # 0.06 0.00 - 0.20 K/uL    nRBC 0 0 /100 WBC    Gran % 75.6 (H) 38.0 - 73.0 %    Lymph % 12.2 (L) 18.0 - 48.0 %    Mono % 7.9 4.0 - 15.0 %    Eosinophil % 1.1 0.0 - 8.0 %    Basophil % 0.8 0.0 - 1.9 %    Differential Method Automated      *Note: Due to a large number of results and/or encounters for the requested time period, some results have not been displayed. A complete set of results can be found in Results Review.         Reviewed Prior Labs:   Latest Reference Range & Units 07/12/24 05:34 07/13/24 06:34 07/14/24 07:33 10/13/24 14:41   BUN 8 - 23 mg/dL 42 (H) 39 (H) 37 (H) 88 (H)   Creatinine 0.5 - 1.4 mg/dL 1.7 (H) 1.8 (H) 1.8 (H) 2.9 (H)   (H): Data is abnormally high    Imaging Results:  Imaging Results              CT Abdomen Pelvis  Without Contrast (Final result)  Result time 10/13/24 16:33:24      Final result by Gordo Holland MD (10/13/24 16:33:24)                   Impression:      No definite acute abnormality identified.  Correlation and further evaluation as warranted.    Complete findings as above.    All CT scans at this facility are performed  using dose modulation techniques as appropriate to performed exam including the following:  automated exposure control; adjustment of mA and/or kV according to the patients size (this includes techniques or standardized protocols for targeted exams where dose is matched to indication/reason for exam: i.e. extremities or head);  iterative reconstruction technique.      Electronically signed by: Gordo Holland  Date:    10/13/2024  Time:    16:33               Narrative:    EXAMINATION:  CT ABDOMEN PELVIS WITHOUT CONTRAST    CLINICAL HISTORY:  Abdominal pain, acute, nonlocalized;    TECHNIQUE:  Low dose axial images, sagittal and coronal reformations were obtained from the lung bases to the pubic symphysis.  Contrast was not  administered.    COMPARISON:  Multiple    FINDINGS:  Heart: Normal in size. No pericardial effusion.    Lung Bases: Well aerated, without consolidation or pleural fluid.    Liver: Normal in size and attenuation, with no focal hepatic lesions.    Gallbladder: Gallbladder surgically absent    Bile Ducts: Moderate extrahepatic biliary ductal dilation.    Pancreas: Pancreatic atrophy.    Spleen: Unremarkable.    Adrenals: Unremarkable.    Kidneys/ Ureters: Indeterminate left renal lesions.  Recommend outpatient retroperitoneal ultrasound, however these favor simple cysts.  Nonobstructive nephrolithiasis.  No hydronephrosis.    Bladder: Visualized bladder within normal limits.  Streak metal artifact from the left hip prosthesis obscures portions of the bladder.    Reproductive organs: Uterus surgically absent.    GI Tract/Mesentery: Diverticulosis without diverticulitis.  No evidence of appendicitis.    Peritoneal Space: No ascites. No free air.    Retroperitoneum: No significant adenopathy.    Abdominal wall: Unremarkable.    Vasculature: Moderate aortoiliac atherosclerosis.  No aneurysm.    Bones: No acute fracture.                                       CT Head Without Contrast (Final result)  Result time 10/13/24 16:28:27      Final result by Low Venegas MD (10/13/24 16:28:27)                   Impression:      No acute abnormality.  Atrophy and chronic white matter changes    New complete opacification of the left maxillary sinus    All CT scans   are performed using dose optimization techniques including the following: automated exposure control; adjustment of the mA and/or kV; use of iterative reconstruction technique.  Dose modulation was employed for ALARA by means of: Automated exposure control; adjustment of the mA and/or kV according to patient size (this includes techniques or standardized protocols for targeted exams where dose is matched to indication/reason for exam; i.e. extremities or head); and/or  use of iterative reconstructive technique.      Electronically signed by: Low Venegas  Date:    10/13/2024  Time:    16:28               Narrative:    EXAMINATION:  CT HEAD WITHOUT CONTRAST    CLINICAL HISTORY:  Confusion; Disorientation, unspecified    TECHNIQUE:  Low dose axial CT images obtained throughout the head without intravenous contrast. Sagittal and coronal reconstructions were performed.    COMPARISON:  None.    FINDINGS:  Atrophy and chronic white matter changes    . No extra-axial blood or fluid collections.    No parenchymal mass, hemorrhage, edema or major vascular distribution infarct.    Skull/extracranial contents (limited evaluation): No fracture.  Near complete opacification of the left maxillary sinus                                       X-Ray Chest AP Portable (Final result)  Result time 10/13/24 15:20:30      Final result by Gordo Holland MD (10/13/24 15:20:30)                   Impression:      No acute abnormality.      Electronically signed by: Gordo Holland  Date:    10/13/2024  Time:    15:20               Narrative:    EXAMINATION:  XR CHEST AP PORTABLE    CLINICAL HISTORY:  Disorientation, unspecified    TECHNIQUE:  Single frontal view of the chest was performed.    COMPARISON:  Multiple    FINDINGS:  The lungs are clear, with normal appearance of pulmonary vasculature and no pleural effusion or pneumothorax.    The cardiac silhouette is normal in size. The hilar and mediastinal contours are unremarkable.  Left chest cardiac pacing device.    Bones are intact.                                            The Emergency Provider reviewed the vital signs and test results, which are outlined above.     ED Discussion   ED Medication(s):  Medications   amLODIPine tablet 10 mg (10 mg Oral Given 10/14/24 0633)   aspirin EC tablet 81 mg (has no administration in time range)   atorvastatin tablet 40 mg (has no administration in time range)   citalopram tablet 10 mg (has no administration  in time range)   lipase-protease-amylase 24,000-76,000-120,000 units capsule 1 capsule (has no administration in time range)   isosorbide mononitrate 24 hr tablet 30 mg (has no administration in time range)   sodium chloride 0.9% flush 10 mL (has no administration in time range)   naloxone 0.4 mg/mL injection 0.02 mg (has no administration in time range)   glucose chewable tablet 16 g (has no administration in time range)   glucose chewable tablet 24 g (has no administration in time range)   glucagon (human recombinant) injection 1 mg (has no administration in time range)   heparin (porcine) injection 5,000 Units (5,000 Units Subcutaneous Given 10/14/24 0633)   ondansetron injection 4 mg (has no administration in time range)   acetaminophen tablet 650 mg (has no administration in time range)   aluminum-magnesium hydroxide-simethicone 200-200-20 mg/5 mL suspension 30 mL (has no administration in time range)   dextrose 10% bolus 125 mL 125 mL (has no administration in time range)   dextrose 10% bolus 250 mL 250 mL (has no administration in time range)   sodium chloride 0.9% bolus 500 mL 500 mL ( Intravenous Restarted 10/13/24 1953)               6:00 PM: Discussed case with Madhu Nichols MD  (Hospital Medicine). Dr. Nichols agrees with current care and management of pt and accepts admission.   Admitting Service: Hospital Medicine   Admitting Physician: Dr. Nichols  Admit to: Observation, Med/Tele    6:00 PM: Re-evaluated pt. I have discussed test results, shared treatment plan, and the need for admission with patient and family at bedside. Pt and family express understanding at this time and agree with all information. All questions answered. Pt and family have no further questions or concerns at this time. Pt is ready for admit.       MIPS Measures     Smoker? No     Hypertension: Patient has a known history of hypertension.         Medical Decision Making                 Medical Decision Making  Differential diagnosis: Urinary  tract infection, symptomatic anemia, electrolyte abnormality    ED course: Patient had IV established.  White cell count 9.76.  Hemoglobin/hematocrit 13.3/39.8.  Positive left shift.  BUN 88.  Creatinine 2.9.  There appears to be acute kidney injury.  Patient's last creatinine was 1.8 on July 14, 2024.  Patient's baseline in the last 3 months is between 1.7 - 1.8.  Suspect prerenal azotemia as patient's BUN is elevated to 88.  Lipase is 41.  CT abdomen pelvis showed no acute abnormality.  CT head showed no acute add for mortality.  Atrophy and chronic white matter changes.  Chest x-ray shows no acute abnormality.  Will give fluid hydration.  Consider observation hospital.    Problems Addressed:  Acute kidney injury: acute illness or injury     Details: Baseline creatinine 1.7 - 1.8.  Currently creatinine is 2.9.  500 cc fluid bolus administered.  P.o. fluids encouraged.  Trend urine output.  Observation to hospital.  Note this is the 2nd emergency visit contact within 24 hours for this patient.  Confusion:     Details: Chest x-ray no pneumonia.  UA: Negative.  CT head negative for acute findings.  Question advancing dementia possibly delirium secondary to prerenal azotemia.    Amount and/or Complexity of Data Reviewed  Independent Historian: caregiver     Details: See HPI  External Data Reviewed: labs.     Details: See ED course.  Labs: ordered. Decision-making details documented in ED Course.  Radiology: ordered. Decision-making details documented in ED Course.  ECG/medicine tests: ordered and independent interpretation performed. Decision-making details documented in ED Course.    Risk  Decision regarding hospitalization.    Critical Care  Total time providing critical care: 35 minutes        Prescription Management: I performed a review of the patient's current Rx medication list as input by nursing staff.    Current Discharge Medication List        CONTINUE these medications which have NOT CHANGED    Details    aflibercept 2 mg/0.05 mL Soln 2 mg by Intravitreal route every 28 days.      amLODIPine (NORVASC) 2.5 MG tablet Take 10 mg by mouth every morning.      aspirin (ECOTRIN) 81 MG EC tablet Take 1 tablet (81 mg total) by mouth once daily.  Qty: 90 tablet, Refills: 3    Associated Diagnoses: Atherosclerosis of aorta      atorvastatin (LIPITOR) 40 MG tablet Take 1 tablet (40 mg total) by mouth Daily.  Qty: 90 tablet, Refills: 3    Associated Diagnoses: Chest pain, unspecified type      budesonide (ENTOCORT EC) 3 mg capsule Take 1-2 capsules (3-6 mg total) by mouth daily as needed (flare). As needed  Qty: 60 each, Refills: 2    Associated Diagnoses: Chronic heart failure with preserved ejection fraction; Ulcerative pancolitis without complication      bumetanide (BUMEX) 1 MG tablet Take 1 tablet (1 mg total) by mouth once daily.  Qty: 30 tablet, Refills: 11    Comments: .  Associated Diagnoses: Chronic heart failure with preserved ejection fraction      busPIRone (BUSPAR) 5 MG Tab Take 1 tablet (5 mg total) by mouth 2 (two) times daily as needed (anxiety).  Qty: 60 tablet, Refills: 5    Associated Diagnoses: Situational anxiety      carvediloL (COREG) 12.5 MG tablet Take 1 tablet (12.5 mg total) by mouth 2 (two) times daily with meals.  Qty: 180 tablet, Refills: 3    Comments: **Patient requests 90 days supply** - .      citalopram (CELEXA) 10 MG tablet Take 1 tablet (10 mg total) by mouth every evening.  Qty: 90 tablet, Refills: 3    Associated Diagnoses: Current mild episode of major depressive disorder without prior episode      cyproheptadine (PERIACTIN) 4 mg tablet Take 1 tablet (4 mg total) by mouth 3 (three) times daily as needed (appetite).  Qty: 270 tablet, Refills: 1      dexAMETHasone (OZURDEX) 0.7 mg Impl intravitreal implant 0.7 mg by Intravitreal route once.      dorzolamide-timolol 2-0.5% (COSOPT) 22.3-6.8 mg/mL ophthalmic solution INSTILL 1 DROP IN BOTH EYES TWICE DAILY  Qty: 10 mL, Refills: 6     Associated Diagnoses: Primary open angle glaucoma (POAG) of both eyes, mild stage      empagliflozin (JARDIANCE) 10 mg tablet Take 10 mg by mouth once daily.      FOLIC ACID/MULTIVIT-MIN/LUTEIN (CENTRUM SILVER ORAL) Take 1 tablet by mouth once daily.      isosorbide mononitrate (IMDUR) 30 MG 24 hr tablet Take 1 tablet (30 mg total) by mouth once daily.  Qty: 30 tablet, Refills: 11    Associated Diagnoses: Chest pain, unspecified type      lipase-protease-amylase 24,000-76,000-120,000 units (CREON) 24,000-76,000 -120,000 unit capsule Take 1 capsule by mouth 3 (three) times daily with meals.  Qty: 270 capsule, Refills: 3    Associated Diagnoses: Acute biliary pancreatitis, unspecified complication status      loperamide (IMODIUM) 2 mg capsule Take 1 mg by mouth every 6 (six) hours as needed for Diarrhea.      metOLazone (ZAROXOLYN) 2.5 MG tablet Take on Mon    Comments: .  Associated Diagnoses: Chronic heart failure with preserved ejection fraction      nitroGLYCERIN (NITROSTAT) 0.4 MG SL tablet PLACE ONE TABLET UNDERNEATH THE TONGUE EVERY 5 MINUTES AS NEEDED FOR CHEST PAIN  Qty: 25 tablet, Refills: 2    Associated Diagnoses: Coronary artery disease, occlusive; Angina, class II      ondansetron (ZOFRAN) 4 MG tablet Take 1 tablet (4 mg total) by mouth every 8 (eight) hours as needed for Nausea.  Qty: 12 tablet, Refills: 0      ondansetron (ZOFRAN-ODT) 8 MG TbDL Take 8 mg by mouth every 6 (six) hours.      pantoprazole (PROTONIX) 40 MG tablet Take 1 tablet (40 mg total) by mouth once daily.  Qty: 90 tablet, Refills: 3    Associated Diagnoses: Gastroesophageal reflux disease with esophagitis, unspecified whether hemorrhage      potassium chloride (KLOR-CON) 10 MEQ TbSR Take 1 tablet (10 mEq total) by mouth once daily.  Qty: 90 tablet, Refills: 3    Associated Diagnoses: Hypokalemia      sodium bicarbonate 650 MG tablet Take 2 tablets (1,300 mg total) by mouth 2 (two) times daily.  Qty: 60 tablet, Refills: 0     "  spironolactone (ALDACTONE) 25 MG tablet Take 1 tablet (25 mg total) by mouth 3 (three) times a week.    Comments: .  Associated Diagnoses: Chronic heart failure with preserved ejection fraction      furosemide (LASIX) 20 MG tablet Take 20 mg by mouth.              Discussed case verbally with:N/A    Referrals:  No orders of the defined types were placed in this encounter.      Portions of this note may have been created with voice recognition software. Occasional "wrong-word" or "sound-a-like" substitutions may have occurred due to the inherent limitations of voice recognition software. Please, read the note carefully and recognize, using context, where substitutions have occurred.          Clinical Impression       ICD-10-CM ICD-9-CM   1. Acute kidney injury  N17.9 584.9   2. Confusion  R41.0 298.9                   ED Disposition     Admit to  Observation, Med/Tele  Patient condition: Stable      Scribe Attestation:   Scribe #1: I, Heath Harris,  performed the above scribed service and the documentation accurately describes the services I performed. I attest to the accuracy of the note.     Attending:   Physician Attestation Statement for Scribe #1: IPrema DO, FACE, personally performed the services described in this documentation, as scribed by Heath Harris , in my presence, and it is both accurate and complete.                   Prema Rose DO  10/14/24 0724    "

## 2024-10-13 NOTE — ASSESSMENT & PLAN NOTE
-Patient reports SOB with exertion   -Check BNP and Troponin   -Check ECHO   -CXR: No acute abnormality.

## 2024-10-14 PROBLEM — G93.40 ENCEPHALOPATHY: Status: ACTIVE | Noted: 2024-10-14

## 2024-10-14 LAB
ALBUMIN SERPL BCP-MCNC: 3.6 G/DL (ref 3.5–5.2)
ALP SERPL-CCNC: 76 U/L (ref 55–135)
ALT SERPL W/O P-5'-P-CCNC: 18 U/L (ref 10–44)
ANION GAP SERPL CALC-SCNC: 17 MMOL/L (ref 8–16)
AORTIC ROOT ANNULUS: 3.06 CM
ASCENDING AORTA: 3.06 CM
AST SERPL-CCNC: 16 U/L (ref 10–40)
AV INDEX (PROSTH): 1.22
AV MEAN GRADIENT: 11.4 MMHG
AV PEAK GRADIENT: 25 MMHG
AV VALVE AREA BY VELOCITY RATIO: 2.4 CM²
AV VALVE AREA: 3.4 CM²
AV VELOCITY RATIO: 0.84
BASOPHILS # BLD AUTO: 0.06 K/UL (ref 0–0.2)
BASOPHILS NFR BLD: 0.8 % (ref 0–1.9)
BILIRUB SERPL-MCNC: 0.5 MG/DL (ref 0.1–1)
BSA FOR ECHO PROCEDURE: 1.64 M2
BUN SERPL-MCNC: 84 MG/DL (ref 8–23)
CALCIUM SERPL-MCNC: 9.1 MG/DL (ref 8.7–10.5)
CHLORIDE SERPL-SCNC: 110 MMOL/L (ref 95–110)
CHOLEST SERPL-MCNC: 153 MG/DL (ref 120–199)
CHOLEST/HDLC SERPL: 3.4 {RATIO} (ref 2–5)
CO2 SERPL-SCNC: 12 MMOL/L (ref 23–29)
CREAT SERPL-MCNC: 2.4 MG/DL (ref 0.5–1.4)
CV ECHO LV RWT: 0.9 CM
DIFFERENTIAL METHOD BLD: ABNORMAL
DOP CALC AO PEAK VEL: 2.5 M/S
DOP CALC AO VTI: 25.4 CM
DOP CALC LVOT AREA: 2.8 CM2
DOP CALC LVOT DIAMETER: 1.9 CM
DOP CALC LVOT PEAK VEL: 2.1 M/S
DOP CALC LVOT STROKE VOLUME: 87.6 CM3
DOP CALC MV VTI: 27.6 CM
DOP CALC RVOT PEAK VEL: 0.71 M/S
DOP CALC RVOT VTI: 10 CM
DOP CALCLVOT PEAK VEL VTI: 30.9 CM
E WAVE DECELERATION TIME: 357.3 MSEC
E/A RATIO: 0.57
E/E' RATIO: 16.22 M/S
ECHO LV POSTERIOR WALL: 1.3 CM (ref 0.6–1.1)
EJECTION FRACTION: 60 %
EOSINOPHIL # BLD AUTO: 0.1 K/UL (ref 0–0.5)
EOSINOPHIL NFR BLD: 1.1 % (ref 0–8)
ERYTHROCYTE [DISTWIDTH] IN BLOOD BY AUTOMATED COUNT: 15.6 % (ref 11.5–14.5)
EST. GFR  (NO RACE VARIABLE): 19 ML/MIN/1.73 M^2
ESTIMATED AVG GLUCOSE: 134 MG/DL (ref 68–131)
FRACTIONAL SHORTENING: 31 % (ref 28–44)
GLUCOSE SERPL-MCNC: 90 MG/DL (ref 70–110)
HBA1C MFR BLD: 6.3 % (ref 4–5.6)
HCT VFR BLD AUTO: 40.6 % (ref 37–48.5)
HDLC SERPL-MCNC: 45 MG/DL (ref 40–75)
HDLC SERPL: 29.4 % (ref 20–50)
HGB BLD-MCNC: 13.2 G/DL (ref 12–16)
IMM GRANULOCYTES # BLD AUTO: 0.18 K/UL (ref 0–0.04)
IMM GRANULOCYTES NFR BLD AUTO: 2.4 % (ref 0–0.5)
INTERVENTRICULAR SEPTUM: 1.3 CM (ref 0.6–1.1)
IRON SERPL-MCNC: 71 UG/DL (ref 30–160)
IVC DIAMETER: 0.74 CM
IVRT: 54.23 MSEC
LA MAJOR: 5.31 CM
LA MINOR: 4.63 CM
LA WIDTH: 4.6 CM
LDLC SERPL CALC-MCNC: 78.4 MG/DL (ref 63–159)
LEFT ATRIUM AREA SYSTOLIC (APICAL 2 CHAMBER): 14.58 CM2
LEFT ATRIUM AREA SYSTOLIC (APICAL 4 CHAMBER): 18.61 CM2
LEFT ATRIUM SIZE: 2.47 CM
LEFT ATRIUM VOLUME INDEX MOD: 31.1 ML/M2
LEFT ATRIUM VOLUME INDEX: 29.7 ML/M2
LEFT ATRIUM VOLUME MOD: 50.14 ML
LEFT ATRIUM VOLUME: 47.77 CM3
LEFT INTERNAL DIMENSION IN SYSTOLE: 2 CM (ref 2.1–4)
LEFT VENTRICLE DIASTOLIC VOLUME INDEX: 19.35 ML/M2
LEFT VENTRICLE DIASTOLIC VOLUME: 31.15 ML
LEFT VENTRICLE END SYSTOLIC VOLUME APICAL 2 CHAMBER: 43.69 ML
LEFT VENTRICLE END SYSTOLIC VOLUME APICAL 4 CHAMBER: 42.28 ML
LEFT VENTRICLE MASS INDEX: 73.7 G/M2
LEFT VENTRICLE SYSTOLIC VOLUME INDEX: 8 ML/M2
LEFT VENTRICLE SYSTOLIC VOLUME: 12.84 ML
LEFT VENTRICULAR INTERNAL DIMENSION IN DIASTOLE: 2.9 CM (ref 3.5–6)
LEFT VENTRICULAR MASS: 118.7 G
LV LATERAL E/E' RATIO: 14.6 M/S
LV SEPTAL E/E' RATIO: 18.25 M/S
LVED V (TEICH): 31.15 ML
LVES V (TEICH): 12.84 ML
LVOT MG: 9.2 MMHG
LVOT MV: 1.39 CM/S
LYMPHOCYTES # BLD AUTO: 0.9 K/UL (ref 1–4.8)
LYMPHOCYTES NFR BLD: 12.2 % (ref 18–48)
MCH RBC QN AUTO: 29.1 PG (ref 27–31)
MCHC RBC AUTO-ENTMCNC: 32.5 G/DL (ref 32–36)
MCV RBC AUTO: 89 FL (ref 82–98)
MONOCYTES # BLD AUTO: 0.6 K/UL (ref 0.3–1)
MONOCYTES NFR BLD: 7.9 % (ref 4–15)
MV MEAN GRADIENT: 3 MMHG
MV PEAK A VEL: 1.27 M/S
MV PEAK E VEL: 0.73 M/S
MV PEAK GRADIENT: 8 MMHG
MV STENOSIS PRESSURE HALF TIME: 103.62 MS
MV VALVE AREA BY CONTINUITY EQUATION: 3.17 CM2
MV VALVE AREA P 1/2 METHOD: 2.12 CM2
NEUTROPHILS # BLD AUTO: 5.6 K/UL (ref 1.8–7.7)
NEUTROPHILS NFR BLD: 75.6 % (ref 38–73)
NONHDLC SERPL-MCNC: 108 MG/DL
NRBC BLD-RTO: 0 /100 WBC
PISA MRMAX VEL: 5.98 M/S
PISA TR MAX VEL: 2.68 M/S
PLATELET # BLD AUTO: 189 K/UL (ref 150–450)
PMV BLD AUTO: 10.4 FL (ref 9.2–12.9)
POCT GLUCOSE: 100 MG/DL (ref 70–110)
POTASSIUM SERPL-SCNC: 4.4 MMOL/L (ref 3.5–5.1)
PROT SERPL-MCNC: 6.4 G/DL (ref 6–8.4)
PV MEAN GRADIENT: 1 MMHG
RA MAJOR: 4.4 CM
RA PRESSURE ESTIMATED: 3 MMHG
RA WIDTH: 3.2 CM
RBC # BLD AUTO: 4.54 M/UL (ref 4–5.4)
RV TB RVSP: 6 MMHG
SATURATED IRON: 21 % (ref 20–50)
SODIUM SERPL-SCNC: 139 MMOL/L (ref 136–145)
STJ: 3.01 CM
TDI LATERAL: 0.05 M/S
TDI SEPTAL: 0.04 M/S
TDI: 0.05 M/S
TOTAL IRON BINDING CAPACITY: 339 UG/DL (ref 250–450)
TR MAX PG: 29 MMHG
TRANSFERRIN SERPL-MCNC: 229 MG/DL (ref 200–375)
TRICUSPID ANNULAR PLANE SYSTOLIC EXCURSION: 1.48 CM
TRIGL SERPL-MCNC: 148 MG/DL (ref 30–150)
TV REST PULMONARY ARTERY PRESSURE: 32 MMHG
WBC # BLD AUTO: 7.35 K/UL (ref 3.9–12.7)
Z-SCORE OF LEFT VENTRICULAR DIMENSION IN END DIASTOLE: -4.54
Z-SCORE OF LEFT VENTRICULAR DIMENSION IN END SYSTOLE: -2.75

## 2024-10-14 PROCEDURE — 25000003 PHARM REV CODE 250: Mod: HCNC | Performed by: FAMILY MEDICINE

## 2024-10-14 PROCEDURE — 97530 THERAPEUTIC ACTIVITIES: CPT | Mod: HCNC

## 2024-10-14 PROCEDURE — 25000003 PHARM REV CODE 250: Mod: HCNC

## 2024-10-14 PROCEDURE — 80053 COMPREHEN METABOLIC PANEL: CPT | Mod: HCNC

## 2024-10-14 PROCEDURE — 85025 COMPLETE CBC W/AUTO DIFF WBC: CPT | Mod: HCNC

## 2024-10-14 PROCEDURE — 96372 THER/PROPH/DIAG INJ SC/IM: CPT

## 2024-10-14 PROCEDURE — G0378 HOSPITAL OBSERVATION PER HR: HCPCS | Mod: HCNC

## 2024-10-14 PROCEDURE — 80061 LIPID PANEL: CPT | Mod: HCNC

## 2024-10-14 PROCEDURE — 63600175 PHARM REV CODE 636 W HCPCS: Mod: HCNC

## 2024-10-14 PROCEDURE — 99222 1ST HOSP IP/OBS MODERATE 55: CPT | Mod: HCNC,95,, | Performed by: STUDENT IN AN ORGANIZED HEALTH CARE EDUCATION/TRAINING PROGRAM

## 2024-10-14 PROCEDURE — 97165 OT EVAL LOW COMPLEX 30 MIN: CPT | Mod: HCNC

## 2024-10-14 PROCEDURE — 97166 OT EVAL MOD COMPLEX 45 MIN: CPT | Mod: HCNC

## 2024-10-14 PROCEDURE — 97162 PT EVAL MOD COMPLEX 30 MIN: CPT | Mod: HCNC

## 2024-10-14 PROCEDURE — 36415 COLL VENOUS BLD VENIPUNCTURE: CPT | Mod: HCNC

## 2024-10-14 RX ORDER — SODIUM BICARBONATE 650 MG/1
1300 TABLET ORAL 3 TIMES DAILY
Status: COMPLETED | OUTPATIENT
Start: 2024-10-14 | End: 2024-10-14

## 2024-10-14 RX ORDER — DOCUSATE SODIUM 100 MG
200 CAPSULE ORAL
Status: DISCONTINUED | OUTPATIENT
Start: 2024-10-14 | End: 2024-10-16 | Stop reason: HOSPADM

## 2024-10-14 RX ADMIN — SODIUM BICARBONATE 650 MG TABLET 1300 MG: at 08:10

## 2024-10-14 RX ADMIN — ASPIRIN 81 MG: 81 TABLET, COATED ORAL at 07:10

## 2024-10-14 RX ADMIN — PANCRELIPASE 1 CAPSULE: 120000; 24000; 76000 CAPSULE, DELAYED RELEASE PELLETS ORAL at 01:10

## 2024-10-14 RX ADMIN — Medication 200 ML: at 06:10

## 2024-10-14 RX ADMIN — PANCRELIPASE 1 CAPSULE: 120000; 24000; 76000 CAPSULE, DELAYED RELEASE PELLETS ORAL at 07:10

## 2024-10-14 RX ADMIN — ISOSORBIDE MONONITRATE 30 MG: 30 TABLET, EXTENDED RELEASE ORAL at 07:10

## 2024-10-14 RX ADMIN — SODIUM BICARBONATE 650 MG TABLET 1300 MG: at 10:10

## 2024-10-14 RX ADMIN — ATORVASTATIN CALCIUM 40 MG: 40 TABLET, FILM COATED ORAL at 05:10

## 2024-10-14 RX ADMIN — HEPARIN SODIUM 5000 UNITS: 5000 INJECTION INTRAVENOUS; SUBCUTANEOUS at 06:10

## 2024-10-14 RX ADMIN — Medication 200 ML: at 08:10

## 2024-10-14 RX ADMIN — PANCRELIPASE 1 CAPSULE: 120000; 24000; 76000 CAPSULE, DELAYED RELEASE PELLETS ORAL at 05:10

## 2024-10-14 RX ADMIN — HEPARIN SODIUM 5000 UNITS: 5000 INJECTION INTRAVENOUS; SUBCUTANEOUS at 02:10

## 2024-10-14 RX ADMIN — SODIUM BICARBONATE 650 MG TABLET 1300 MG: at 02:10

## 2024-10-14 RX ADMIN — HEPARIN SODIUM 5000 UNITS: 5000 INJECTION INTRAVENOUS; SUBCUTANEOUS at 08:10

## 2024-10-14 RX ADMIN — AMLODIPINE BESYLATE 10 MG: 10 TABLET ORAL at 06:10

## 2024-10-14 RX ADMIN — Medication 200 ML: at 10:10

## 2024-10-14 RX ADMIN — CITALOPRAM HYDROBROMIDE 10 MG: 10 TABLET ORAL at 08:10

## 2024-10-14 RX ADMIN — Medication 200 ML: at 02:10

## 2024-10-14 NOTE — PLAN OF CARE
P.T. EVAL COMPLETE.  PT CURRENTLY REQUIRES SBA FOR BED MOBILITY, CGA FOR TF'S AND SHORT DISTANCE GAIT WITH RW.  P.T. RECOMMENDS LOW INTENSITY THERAPY UPON DISCHARGE WITH 24 HOUR CARE FOR SAFETY

## 2024-10-14 NOTE — TELEMEDICINE CONSULT
"[facility]   General Neurology  Consult Note      Consult Information  Inpatient consult to Telemedicine-General Neurology  Consult performed by: Amador Burleson MD  Consult ordered by: Madhu Nichols MD          Consulting Provider:    Current Providers  No providers found    Patient Location:  [facility] MED/TELE 2 IP Unit    Spoke hospital nurse at bedside with patient assisting consultant.  Patient information was obtained from primary team.       Neurology Documentation  Acute Stroke Times   Stroke Team Called Date: 10/14/24  Stroke Team Called Time: 1000  Stroke Team Arrival Date: 10/14/24  Stroke Team Arrival Time: 1015    Blood pressure 117/67, pulse 80, temperature 97.7 °F (36.5 °C), resp. rate 18, height 5' 1" (1.549 m), weight 62.1 kg (137 lb), SpO2 98%.    Medical Decision Making  HPI:    88 y.o. female with medical history of HTN, HLD, CHF, CAD, CKD, CVA - with admission concerns of encephalopathy - and neurology consulted for the same.    History from primary team / medical records review. Fluctuating encephalopathy - in the spectrum of affect in attention / concentration and orientation. No new focal motor or sensory or cranial nerve deficits. No concerns for clinical seizures. No history of epilepsy or complex migraines. No history of primary progressive neurodegenerative cognitive disorder or neuro psychiatric disorder.     In ER: Labs- WBC 9.76, H/H 13.3/39.8, plt count 159, sodium 136, potassium 4.5, BUN 88, Crit 2.9, Pro-shelly 0.10, UA negative for UTI. CT A/P: No definite acute abnormality identified. CT head: No acute abnormality. Atrophy and chronic white matter changes. CXR: No acute abnormality. VS: /73, HR 68, SpO2 98% on room air, Temp 98F, RR 20.    On active management for KEEGAN / pre-renal azotemia pattern.     Currently on exam - awake, alert, oriented to person month  / place / no focal motor or sensory or cranial nerve deficits     Images personally reviewed and " interpreted:  Study: Head CT  Study Interpretation: No acute findings     Additional studies reviewed:   Study: Cardiac_Neuro: EEG  Study Interpretation: N/A    Laboratory studies reviewed:  BMP:   Lab Results   Component Value Date     10/14/2024    K 4.4 10/14/2024     10/14/2024    CO2 12 (L) 10/14/2024    BUN 84 (H) 10/14/2024    CREATININE 2.4 (H) 10/14/2024    CALCIUM 9.1 10/14/2024     CBC:   Lab Results   Component Value Date    WBC 7.35 10/14/2024    RBC 4.54 10/14/2024    HGB 13.2 10/14/2024    HCT 40.6 10/14/2024    HCT <15 (L) 03/02/2022     10/14/2024    MCV 89 10/14/2024    MCH 29.1 10/14/2024    MCHC 32.5 10/14/2024     Lipid Panel:   Lab Results   Component Value Date    CHOL 153 10/14/2024    LDLCALC 78.4 10/14/2024    HDL 45 10/14/2024    TRIG 148 10/14/2024     Coagulation:   Lab Results   Component Value Date    INR 1.0 10/13/2024    APTT 30.9 10/13/2024     Hgb A1C:   Lab Results   Component Value Date    HGBA1C 6.3 (H) 10/13/2024     TSH:   Lab Results   Component Value Date    TSH 1.191 10/13/2024       Documentation personally reviewed:  Notes: Notes: H&P     Assessment and plan:  Recs:    Fluctuating encephalopathy - suspect in spectrum of metabolic triggers. Low suspicion for focal cerebrovascular ischemic event or epileptic or neuro inflammatory etiology.      Need no acute MRI brain wo contrast or EEG at the moment / consider if any clinical concerns for seizure or focal neurological deficits     Encephalopathy evaluation and management  -- Correct lytes as needed / investigate and control infection / avoid hypoxia or hypercapnia / avoid hypoglycemia / control uremia - avoid rapid correction / avoid-correct metabolic-respiratory acidosis or alkalosis   -- Correct any nutritional deficiencies / correct anemia / provide nutritional support as appropriate / ambulate when appropriate   -- PT/OT evaluation and management     Delirium precautions      - Recommend PRN depacon  250 mg IV qam and 500 mg IV qhs for management of agitation and behavioral disturbances associated with delirium.  - Recommend Zyprexa 2.5 mg PO/IM q6h PRN severe, nonredirectable agitation.  Max dose is 30 mg daily from all sources.  Parenteral Zyprexa is not to be given within 1 hours of parenteral benzodiazepines including Ativan.    - Recommend standard delirium precautions:               - Avoid use of physical restraints and judiciously use chemical sedatives for management of agitation              - Avoid deliriogenic agents if possible including benzodiazepines, anticholinergics, and opiates              - Normalize patient's sleep-wake cycle if possible              - Environmental adjustments to include bright lighting and windows open during the day              - Early involvement in PT/OT              - Recommend physical sitter to be present at bedside.        Post charge discharge plan:  Clinic follow up: None    Visit Type: N/A  Timeframe: N/A        Additional Physical Exam, History, & ROS    Past Medical History:   Diagnosis Date    Anemia     Angina pectoris     Anxiety     Anxiety and depression     Arthritis     hip    Carotid artery occlusion     Carpal tunnel syndrome 06/23/2008    emg    Chronic diarrhea     work up in 2011 with EGD, CS and VCE    CKD (chronic kidney disease) stage 3, GFR 30-59 ml/min 5/11/2017    Colitis     Coronary artery disease     Coronary artery disease     Diastolic dysfunction     Diverticulosis     Encephalopathy 10/14/2024    Glaucoma     Greater trochanteric bursitis 2/10/2015    Grief at loss of child 1/26/2016    H/O carotid endarterectomy 12/2/2013    Heart failure     History of coronary angioplasty 3/11/2014    Hypercholesteremia     Hypertension     Liver cyst 02/08/2013    ct abd    Macular degeneration     Obesity with serious comorbidity 3/19/2023    Primary open-angle glaucoma(365.11) 9/3/2013    Renal cyst 02/08/2013    ct abd    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 Liang Dennis    CHOLECYSTECTOMY      laparoscopic, 3/18.    CORONARY ANGIOPLASTY WITH STENT PLACEMENT  11/19/2010    RCA-HALIE 2010    Lathia    JOINT REPLACEMENT Left 11/03/2014    Dr. Braun    TOTAL ABDOMINAL HYSTERECTOMY W/ BILATERAL SALPINGOOPHORECTOMY  1972     Family History   Problem Relation Name Age of Onset    Hypertension Mother      Heart failure Mother      Cancer Father          prostate    Cirrhosis Brother      Heart failure Brother      Cancer Daughter          Carcinoid       Diagnoses  Problem Noted   Encephalopathy 10/14/2024       Amador Burleson MD    Neurology consultation requested by spoke provider. Audiovisual encounter with the patient performed using a secure connection.  Results and impressions from the visit are documented on this note and were communicated to the consulting provider/team via direct communication. The note has been shared for addition to the patients electronic medical record.

## 2024-10-14 NOTE — PLAN OF CARE
[facility] - Telemetry (Hospital)  Initial Discharge Assessment       Primary Care Provider: Christina Cardona MD    Admission Diagnosis: Shortness of breath [R06.02]  Confusion [R41.0]  Chest pain [R07.9]    Admission Date: 10/13/2024  Expected Discharge Date: Per Attending    Transition of Care Barriers: None    Payor: HU[employer] MANAGED MEDICARE / Plan: [employer] SNP HMO PPO SPECIAL NEEDS / Product Type: Medicare Advantage /     Extended Emergency Contact Information  Primary Emergency Contact: Jessica Dumont  Address: [address] [place] [address] [place]  Mobile Phone: [phone]  Relation: Daughter   needed? No    Discharge Plan A: Home Health  Discharge Plan B: Home with family      [id number] - [facility] [address] [facility]  [address] [place] [address]  Phone: [phone] Fax: [phone]    [facility] [address] [facility]  [address] [facility] [address]  Phone: [phone] Fax: [phone]    Prescriptions to aux [facility] [address] [address] [facility] [address]  Phone: [phone] Fax: [phone]      Initial Assessment (most recent)       Adult Discharge Assessment - 10/14/24 1437          Discharge Assessment    Assessment Type Discharge Planning Assessment     Confirmed/corrected address, phone number and insurance Yes     Confirmed Demographics Correct on Facesheet     Source of Information patient     When was your last doctors appointment? 10/11/24   Christina Cardona MD - Internal Medicine    Communicated CANDACE with patient/caregiver Date not available/Unable to determine     Reason For Admission Acute kidney injury superimposed on CKD     People in Home child(ally), adult;other relative(s)   son-in-law    Facility Arrived From: home     Do you expect to return to your current living situation? Yes     Do you  have help at home or someone to help you manage your care at home? Yes     Who are your caregiver(s) and their phone number(s)? Jessica Dumont - daughter and son in law     Prior to hospitilization cognitive status: Alert/Oriented     Current cognitive status: Alert/Oriented     Walking or Climbing Stairs Difficulty yes     Walking or Climbing Stairs ambulation difficulty, requires equipment     Mobility Management cane and rolling walker     Dressing/Bathing Difficulty no     Home Accessibility wheelchair accessible     Equipment Currently Used at Home cane, straight;walker, rolling     Readmission within 30 days? No     Patient currently being followed by outpatient case management? No     Do you currently have service(s) that help you manage your care at home? No     Do you take prescription medications? Yes     Do you have prescription coverage? Yes     Coverage [employer] Managed Medicare     Do you have any problems affording any of your prescribed medications? No     Is the patient taking medications as prescribed? yes     Who is going to help you get home at discharge? Jessica Drummond daughter and son in law     How do you get to doctors appointments? family or friend will provide     Are you on dialysis? No     Do you take coumadin? No     Discharge Plan A Home Health     Discharge Plan B Home with family     DME Needed Upon Discharge  none     Discharge Plan discussed with: Patient     Transition of Care Barriers None        Transportation Needs    Has the lack of transportation kept you from medical appointments, meetings, work or from getting things needed for daily living? No                   Anticipated DC dispo: home with family  Prior Level of Function: independent with ADLs  People in home: Jessica Dumont - daughter and son in law     Comments:  SW met with patient at bedside to introduce role and discuss discharge planning. Patient's daughter, Jessica, and son in law will be help at home and  can provide transport at time of discharge. Confirmed demographics, insurance, and emergency contacts. SW updated Patient's whiteboard with contact information and anticipated DC plan. CM discharge needs depends on hospital progress. SW will continue following to assist with other needs.

## 2024-10-14 NOTE — HOSPITAL COURSE
Patient was admitted for KEEGAN secondary to dehydration.  Likely combination reduced oral intake and over-diuresis.  Diuretics were held.  Patient was started on IVF and transitioned to oral rehydration solution.  Sodium bicarb added for metabolic acidosis.  Creatinine improved.  Patient tolerated p.o. diet.  She was discharged home.  Home health ordered.

## 2024-10-14 NOTE — PHARMACY MED REC
"Admission Medication History     The home medication history was taken by Leonardo Hoffman.    You may go to "Admission" then "Reconcile Home Medications" tabs to review and/or act upon these items.     The home medication list has been updated by the Pharmacy department.   Please read ALL comments highlighted in yellow.   Please address this information as you see fit.    Feel free to contact us if you have any questions or require assistance.      Medications listed below were obtained from: Analytic software- [employer], Medical records, and Patient's pharmacy  (Not in a hospital admission)        Leonardo Hoffman  [id number]      Current Outpatient Medications on File Prior to Encounter   Medication Sig Dispense Refill Last Dose/Taking    aflibercept 2 mg/0.05 mL Soln 2 mg by Intravitreal route every 28 days.   10/13/2024    amLODIPine (NORVASC) 2.5 MG tablet Take 10 mg by mouth every morning.   10/13/2024    aspirin (ECOTRIN) 81 MG EC tablet Take 1 tablet (81 mg total) by mouth once daily. 90 tablet 3 10/13/2024    atorvastatin (LIPITOR) 40 MG tablet Take 1 tablet (40 mg total) by mouth Daily. 90 tablet 3 10/13/2024    budesonide (ENTOCORT EC) 3 mg capsule Take 1-2 capsules (3-6 mg total) by mouth daily as needed (flare). As needed 60 each 2 10/13/2024    bumetanide (BUMEX) 1 MG tablet Take 1 tablet (1 mg total) by mouth once daily. 30 tablet 11 10/13/2024    busPIRone (BUSPAR) 5 MG Tab Take 1 tablet (5 mg total) by mouth 2 (two) times daily as needed (anxiety). 60 tablet 5 10/13/2024    carvediloL (COREG) 12.5 MG tablet Take 1 tablet (12.5 mg total) by mouth 2 (two) times daily with meals. 180 tablet 3 10/13/2024    citalopram (CELEXA) 10 MG tablet Take 1 tablet (10 mg total) by mouth every evening. 90 tablet 3 10/13/2024    cyproheptadine (PERIACTIN) 4 mg tablet Take 1 tablet (4 mg total) by mouth 3 (three) times daily as needed (appetite). 270 tablet 1 10/13/2024    dexAMETHasone (OZURDEX) 0.7 mg Impl " intravitreal implant 0.7 mg by Intravitreal route once.   10/13/2024    dorzolamide-timolol 2-0.5% (COSOPT) 22.3-6.8 mg/mL ophthalmic solution INSTILL 1 DROP IN BOTH EYES TWICE DAILY 10 mL 6 10/13/2024    empagliflozin (JARDIANCE) 10 mg tablet Take 10 mg by mouth once daily.   10/13/2024    FOLIC ACID/MULTIVIT-MIN/LUTEIN (CENTRUM SILVER ORAL) Take 1 tablet by mouth once daily.   10/13/2024    isosorbide mononitrate (IMDUR) 30 MG 24 hr tablet Take 1 tablet (30 mg total) by mouth once daily. 30 tablet 11 10/13/2024    lipase-protease-amylase 24,000-76,000-120,000 units (CREON) 24,000-76,000 -120,000 unit capsule Take 1 capsule by mouth 3 (three) times daily with meals. 270 capsule 3 10/13/2024    loperamide (IMODIUM) 2 mg capsule Take 1 mg by mouth every 6 (six) hours as needed for Diarrhea.   10/13/2024    metOLazone (ZAROXOLYN) 2.5 MG tablet Take on Mon   10/13/2024    nitroGLYCERIN (NITROSTAT) 0.4 MG SL tablet PLACE ONE TABLET UNDERNEATH THE TONGUE EVERY 5 MINUTES AS NEEDED FOR CHEST PAIN 25 tablet 2 10/13/2024    ondansetron (ZOFRAN) 4 MG tablet Take 1 tablet (4 mg total) by mouth every 8 (eight) hours as needed for Nausea. 12 tablet 0 10/13/2024    pantoprazole (PROTONIX) 40 MG tablet Take 1 tablet (40 mg total) by mouth once daily. 90 tablet 3 10/13/2024    potassium chloride (KLOR-CON) 10 MEQ TbSR Take 1 tablet (10 mEq total) by mouth once daily. 90 tablet 3 10/13/2024    sodium bicarbonate 650 MG tablet Take 2 tablets (1,300 mg total) by mouth 2 (two) times daily. 60 tablet 0 10/13/2024    spironolactone (ALDACTONE) 25 MG tablet Take 1 tablet (25 mg total) by mouth 3 (three) times a week.   10/13/2024                         .

## 2024-10-14 NOTE — SUBJECTIVE & OBJECTIVE
HPI:    88 y.o. female with medical history of HTN, HLD, CHF, CAD, CKD, CVA - with admission concerns of encephalopathy - and neurology consulted for the same.    History from primary team / medical records review. Fluctuating encephalopathy - in the spectrum of affect in attention / concentration and orientation. No new focal motor or sensory or cranial nerve deficits. No concerns for clinical seizures. No history of epilepsy or complex migraines. No history of primary progressive neurodegenerative cognitive disorder or neuro psychiatric disorder.     In ER: Labs- WBC 9.76, H/H 13.3/39.8, plt count 159, sodium 136, potassium 4.5, BUN 88, Crit 2.9, Pro-shelly 0.10, UA negative for UTI. CT A/P: No definite acute abnormality identified. CT head: No acute abnormality. Atrophy and chronic white matter changes. CXR: No acute abnormality. VS: /73, HR 68, SpO2 98% on room air, Temp 98F, RR 20.    On active management for KEEGAN / pre-renal azotemia pattern.     Currently on exam - awake, alert, oriented to person month  / place / no focal motor or sensory or cranial nerve deficits     Images personally reviewed and interpreted:  Study: Head CT  Study Interpretation: No acute findings     Additional studies reviewed:   Study: Cardiac_Neuro: EEG  Study Interpretation: N/A    Laboratory studies reviewed:  BMP:   Lab Results   Component Value Date     10/14/2024    K 4.4 10/14/2024     10/14/2024    CO2 12 (L) 10/14/2024    BUN 84 (H) 10/14/2024    CREATININE 2.4 (H) 10/14/2024    CALCIUM 9.1 10/14/2024     CBC:   Lab Results   Component Value Date    WBC 7.35 10/14/2024    RBC 4.54 10/14/2024    HGB 13.2 10/14/2024    HCT 40.6 10/14/2024    HCT <15 (L) 2022     10/14/2024    MCV 89 10/14/2024    MCH 29.1 10/14/2024    MCHC 32.5 10/14/2024     Lipid Panel:   Lab Results   Component Value Date    CHOL 153 10/14/2024    LDLCALC 78.4 10/14/2024    HDL 45 10/14/2024    TRIG 148 10/14/2024      Coagulation:   Lab Results   Component Value Date    INR 1.0 10/13/2024    APTT 30.9 10/13/2024     Hgb A1C:   Lab Results   Component Value Date    HGBA1C 6.3 (H) 10/13/2024     TSH:   Lab Results   Component Value Date    TSH 1.191 10/13/2024       Documentation personally reviewed:  Notes: Notes: H&P     Assessment and plan:  Recs:    Fluctuating encephalopathy - suspect in spectrum of metabolic triggers. Low suspicion for focal cerebrovascular ischemic event or epileptic or neuro inflammatory etiology.      Need no acute MRI brain wo contrast or EEG at the moment / consider if any clinical concerns for seizure or focal neurological deficits     Encephalopathy evaluation and management  -- Correct lytes as needed / investigate and control infection / avoid hypoxia or hypercapnia / avoid hypoglycemia / control uremia - avoid rapid correction / avoid-correct metabolic-respiratory acidosis or alkalosis   -- Correct any nutritional deficiencies / correct anemia / provide nutritional support as appropriate / ambulate when appropriate   -- PT/OT evaluation and management     Delirium precautions      - Recommend PRN depacon 250 mg IV qam and 500 mg IV qhs for management of agitation and behavioral disturbances associated with delirium.  - Recommend Zyprexa 2.5 mg PO/IM q6h PRN severe, nonredirectable agitation.  Max dose is 30 mg daily from all sources.  Parenteral Zyprexa is not to be given within 1 hours of parenteral benzodiazepines including Ativan.    - Recommend standard delirium precautions:               - Avoid use of physical restraints and judiciously use chemical sedatives for management of agitation              - Avoid deliriogenic agents if possible including benzodiazepines, anticholinergics, and opiates              - Normalize patient's sleep-wake cycle if possible              - Environmental adjustments to include bright lighting and windows open during the day              - Early involvement  in PT/OT              - Recommend physical sitter to be present at bedside.        Post charge discharge plan:  Clinic follow up: None    Visit Type: N/A  Timeframe: N/A

## 2024-10-14 NOTE — PLAN OF CARE
See eval for details. Pt displayed deficits with functional mobility/ transfers, deficits with adl's skills also decrease b ue strength/endurance. Recommendation: home health O.T.

## 2024-10-14 NOTE — ASSESSMENT & PLAN NOTE
KEEGAN is likely due to pre-renal azotemia due to dehydration. Baseline creatinine is  1.4 . Most recent creatinine and eGFR are listed below.  Recent Labs     10/13/24  1441 10/14/24  0436   CREATININE 2.9* 2.4*   EGFRNORACEVR 15* 19*        Plan  - will trend KEEGAN in a.m. with repeat labs   - Avoid nephrotoxins and renally dose meds for GFR listed above  - Monitor urine output, serial BMP, and adjust therapy as needed  - patient given 1 bolus of 500ml NS in ER   -bladder scan patient   -UA negative for UTI     10/14/2024  Creatinine improving   Will continue oral rehydration solution  Sodium bicarb tablets added normal gap metabolic acidosis likely secondary to KEEGAN

## 2024-10-14 NOTE — PLAN OF CARE
A238/A238 THAD  Glo Dumont is a 88 y.o.female admitted on 10/13/2024 for Acute kidney injury superimposed on CKD   Code Status: DNR MRN: [id number]   Review of patient's allergies indicates:   Allergen Reactions    Lisinopril      angioedema    Codeine Nausea And Vomiting     Past Medical History:   Diagnosis Date    Anemia     Angina pectoris     Anxiety     Anxiety and depression     Arthritis     hip    Carotid artery occlusion     Carpal tunnel syndrome 06/23/2008    emg    Chronic diarrhea     work up in 2011 with EGD, CS and VCE    CKD (chronic kidney disease) stage 3, GFR 30-59 ml/min 5/11/2017    Colitis     Coronary artery disease     Coronary artery disease     Diastolic dysfunction     Diverticulosis     Encephalopathy 10/14/2024    Glaucoma     Greater trochanteric bursitis 2/10/2015    Grief at loss of child 1/26/2016    H/O carotid endarterectomy 12/2/2013    Heart failure     History of coronary angioplasty 3/11/2014    Hypercholesteremia     Hypertension     Liver cyst 02/08/2013    ct abd    Macular degeneration     Obesity with serious comorbidity 3/19/2023    Primary open-angle glaucoma(365.11) 9/3/2013    Renal cyst 02/08/2013    ct abd    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 Will continue plan of care.       Pt oriented x4.  VSS.  Pt remained afebrile throughout this shift.   All meds administered per order.   Pt remained free of falls this shift.   Plan of care reviewed. Patient verbalizes understanding.   Pt moving/turning independently.   Bed low, side  rails up x 2, wheels locked, call light in reach.   Patient instructed to call for assistance.  Patient education provided  Will continue to monitor.     Pt had an episode of SOB and dizziness this AM but it resolved on its own.              Orientation: disoriented to, situation  [place] Coma Scale Score: 15     Lead Monitored: Lead II Rhythm: normal sinus rhythm    Cardiac/Telemetry Box Number: [id number]  VTE Required Core Measure: Pharmacological prophylaxis initiated/maintained Last Bowel Movement: 10/12/24  Diet Cardiac  Voiding Characteristics: external catheter  Michael Score: 20  Fall Risk Score: 15  Accucheck []   Freq?      Lines/Drains/Airways       Peripheral Intravenous Line  Duration                  Peripheral IV - Single Lumen 10/13/24 1953 20 G Left Antecubital <1 day

## 2024-10-14 NOTE — PLAN OF CARE
A238/A238 THAD  Glo Dumont is a 88 y.o.female admitted on 10/13/2024 for Acute kidney injury superimposed on CKD   Code Status: DNR MRN: [id number]   Review of patient's allergies indicates:   Allergen Reactions    Lisinopril      angioedema    Codeine Nausea And Vomiting     Past Medical History:   Diagnosis Date    Anemia     Angina pectoris     Anxiety     Anxiety and depression     Arthritis     hip    Carotid artery occlusion     Carpal tunnel syndrome 06/23/2008    emg    Chronic diarrhea     work up in 2011 with EGD, CS and VCE    CKD (chronic kidney disease) stage 3, GFR 30-59 ml/min 5/11/2017    Colitis     Coronary artery disease     Coronary artery disease     Diastolic dysfunction     Diverticulosis     Glaucoma     Greater trochanteric bursitis 2/10/2015    Grief at loss of child 1/26/2016    H/O carotid endarterectomy 12/2/2013    Heart failure     History of coronary angioplasty 3/11/2014    Hypercholesteremia     Hypertension     Liver cyst 02/08/2013    ct abd    Macular degeneration     Obesity with serious comorbidity 3/19/2023    Primary open-angle glaucoma(365.11) 9/3/2013    Renal cyst 02/08/2013    ct abd    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 Will continue plan of care.      Orientation: disoriented to, situation  [place] Coma Scale Score: 15     Lead Monitored: Lead II Rhythm: normal sinus rhythm    Cardiac/Telemetry Box Number: [id number]  VTE Required Core Measure: Pharmacological prophylaxis initiated/maintained Last Bowel Movement: 10/12/24  Diet  Cardiac  Voiding Characteristics: external catheter  Michael Score: 20  Fall Risk Score: 17  Accucheck []   Freq?      Lines/Drains/Airways       Peripheral Intravenous Line  Duration                  Peripheral IV - Single Lumen 10/13/24 1953 20 G Left Antecubital <1 day

## 2024-10-14 NOTE — PROGRESS NOTES
[facility] Medicine  Progress Note    Patient Name: Glo Dumont  MRN: [id number]  Patient Class: OP- Observation   Admission Date: 10/13/2024  Length of Stay: 0 days  Attending Physician: Madhu Nichols MD  Primary Care Provider: Christina Cardona MD        Subjective:     Principal Problem:Acute kidney injury superimposed on CKD        HPI:  Glo Dumont is a 88 y.o. female with a PMH has a past medical history of Anemia, Angina pectoris, Anxiety, Anxiety and depression, Arthritis, Carotid artery occlusion, Carpal tunnel syndrome (06/23/2008), Chronic diarrhea, CKD (chronic kidney disease) stage 3, GFR 30-59 ml/min (5/11/2017), Colitis, Coronary artery disease, Coronary artery disease, Diastolic dysfunction, Diverticulosis, Glaucoma, Greater trochanteric bursitis (2/10/2015), Grief at loss of child (1/26/2016), H/O carotid endarterectomy (12/2/2013), Heart failure, History of coronary angioplasty (3/11/2014), Hypercholesteremia, Hypertension, Liver cyst (02/08/2013), Macular degeneration, Obesity with serious comorbidity (3/19/2023), Primary open-angle glaucoma(365.11) (9/3/2013), Renal cyst (02/08/2013), 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 Presented to the ER today with c/o generalized weakness and worsening confusion. Patients daughter at bedside during exam, reports confusion has worsened over the past couple of days. No confusion noted on exam and No focal deficits noted. Patient reports mild SOB with exertion, denies CP. Does endorse lower abdominal pain, denies any dysuria, nausea or vomiting. In ER: Labs- WBC 9.76, H/H 13.3/39.8, plt count 159, sodium 136, potassium 4.5, BUN 88, Crit 2.9, Pro-shelly 0.10, UA negative for UTI. CT A/P: No definite acute abnormality identified. CT head: No acute abnormality. Atrophy and chronic white matter changes. CXR: No acute abnormality. VS: /73, HR 68,  SpO2 98% on room air, Temp 98F, RR 20. [facility] consulted for admission.        Overview/Hospital Course:  10/14/2024  Creatinine improving.  Will continue oral rehydration solution.  Sodium bicarb added for normal gap metabolic acidosis likely secondary to KEEGAN.    Interval History:  No acute issues reported overnight.  Patient tolerating oral diet.    Review of Systems   Constitutional:  Negative for fatigue and fever.   HENT:  Negative for sinus pressure.    Eyes:  Negative for visual disturbance.   Respiratory:  Negative for shortness of breath.    Cardiovascular:  Negative for chest pain.   Gastrointestinal:  Negative for nausea and vomiting.   Genitourinary:  Negative for difficulty urinating.   Musculoskeletal:  Negative for back pain.   Skin:  Negative for rash.   Neurological:  Negative for headaches.   Psychiatric/Behavioral:  Negative for confusion.      Objective:     Vital Signs (Most Recent):  Temp: 97.7 °F (36.5 °C) (10/14/24 0716)  Pulse: 80 (10/14/24 0929)  Resp: 18 (10/14/24 0716)  BP: 117/67 (10/14/24 0716)  SpO2: 98 % (10/14/24 0716) Vital Signs (24h Range):  Temp:  [97.7 °F (36.5 °C)-98.4 °F (36.9 °C)] 97.7 °F (36.5 °C)  Pulse:  [64-80] 80  Resp:  [16-18] 18  SpO2:  [96 %-99 %] 98 %  BP: (117-142)/(67-73) 117/67     Weight: 62.1 kg (137 lb)  Body mass index is 25.89 kg/m².  No intake or output data in the 24 hours ending 10/14/24 1012      Physical Exam  Constitutional:       General: She is not in acute distress.     Appearance: She is well-developed. She is not diaphoretic.   HENT:      Head: Normocephalic and atraumatic.   Eyes:      Pupils: Pupils are equal, round, and reactive to light.   Cardiovascular:      Rate and Rhythm: Normal rate and regular rhythm.      Heart sounds: Normal heart sounds. No murmur heard.     No friction rub. No gallop.   Pulmonary:      Effort: Pulmonary effort is normal. No respiratory distress.      Breath sounds: Normal breath sounds. No stridor. No wheezing or rales.    Abdominal:      General: Bowel sounds are normal. There is no distension.      Palpations: Abdomen is soft. There is no mass.      Tenderness: There is no abdominal tenderness. There is no guarding.   Skin:     General: Skin is warm.      Findings: No erythema.   Neurological:      Mental Status: She is alert. Mental status is at baseline.             Significant Labs: All pertinent labs within the past 24 hours have been reviewed.    Significant Imaging: I have reviewed all pertinent imaging results/findings within the past 24 hours.    Assessment/Plan:      * Acute kidney injury superimposed on CKD  KEEGAN is likely due to pre-renal azotemia due to dehydration. Baseline creatinine is  1.4 . Most recent creatinine and eGFR are listed below.  Recent Labs     10/13/24  1441 10/14/24  0436   CREATININE 2.9* 2.4*   EGFRNORACEVR 15* 19*        Plan  - will trend KEEGAN in a.m. with repeat labs   - Avoid nephrotoxins and renally dose meds for GFR listed above  - Monitor urine output, serial BMP, and adjust therapy as needed  - patient given 1 bolus of 500ml NS in ER   -bladder scan patient   -UA negative for UTI     10/14/2024  Creatinine improving   Will continue oral rehydration solution  Sodium bicarb tablets added normal gap metabolic acidosis likely secondary to KEEGAN    Confusion  -Daughter reports worsening of chronic confusion, no focal deficits noted on exam and no recent falls   -Head CT: No acute abnormality. Atrophy and chronic white matter changes   -Neurology consult in a.m. per daughter request   -Pt/Ot to eval and treat     10/14/2024  Mentation appeared to be improving   Neurology eval pending      Generalized weakness  -Pt/ot to eval and treat         Diastolic heart failure, NYHA class 3  -Patient reports SOB with exertion   -Check BNP and Troponin   -Check ECHO   -CXR: No acute abnormality.         Essential hypertension  Patients blood pressure range in the last 24 hours was: BP  Min: 123/68  Max:  142/73.The patient's inpatient anti-hypertensive regimen is listed below:  Current Antihypertensives  amLODIPine tablet 10 mg, Every morning, Oral  isosorbide mononitrate 24 hr tablet 30 mg, Daily, Oral    Plan  - BP is controlled, no changes needed to their regimen  - tele monitoring     Other hyperlipidemia  -continue home statin         VTE Risk Mitigation (From admission, onward)           Ordered     heparin (porcine) injection 5,000 Units  Every 8 hours         10/13/24 1759     IP VTE HIGH RISK PATIENT  Once         10/13/24 1759     Place sequential compression device  Until discontinued         10/13/24 1759                    Discharge Planning   CANDACE:      Code Status: DNR   Is the patient medically ready for discharge?:     Reason for patient still in hospital (select all that apply): Patient trending condition                     Madhu Nichols MD  Department of Hospital Medicine   [facility] - Telemetry ([facility])

## 2024-10-14 NOTE — SUBJECTIVE & OBJECTIVE
Interval History:  No acute issues reported overnight.  Patient tolerating oral diet.    Review of Systems   Constitutional:  Negative for fatigue and fever.   HENT:  Negative for sinus pressure.    Eyes:  Negative for visual disturbance.   Respiratory:  Negative for shortness of breath.    Cardiovascular:  Negative for chest pain.   Gastrointestinal:  Negative for nausea and vomiting.   Genitourinary:  Negative for difficulty urinating.   Musculoskeletal:  Negative for back pain.   Skin:  Negative for rash.   Neurological:  Negative for headaches.   Psychiatric/Behavioral:  Negative for confusion.      Objective:     Vital Signs (Most Recent):  Temp: 97.7 °F (36.5 °C) (10/14/24 0716)  Pulse: 80 (10/14/24 0929)  Resp: 18 (10/14/24 0716)  BP: 117/67 (10/14/24 0716)  SpO2: 98 % (10/14/24 0716) Vital Signs (24h Range):  Temp:  [97.7 °F (36.5 °C)-98.4 °F (36.9 °C)] 97.7 °F (36.5 °C)  Pulse:  [64-80] 80  Resp:  [16-18] 18  SpO2:  [96 %-99 %] 98 %  BP: (117-142)/(67-73) 117/67     Weight: 62.1 kg (137 lb)  Body mass index is 25.89 kg/m².  No intake or output data in the 24 hours ending 10/14/24 1012      Physical Exam  Constitutional:       General: She is not in acute distress.     Appearance: She is well-developed. She is not diaphoretic.   HENT:      Head: Normocephalic and atraumatic.   Eyes:      Pupils: Pupils are equal, round, and reactive to light.   Cardiovascular:      Rate and Rhythm: Normal rate and regular rhythm.      Heart sounds: Normal heart sounds. No murmur heard.     No friction rub. No gallop.   Pulmonary:      Effort: Pulmonary effort is normal. No respiratory distress.      Breath sounds: Normal breath sounds. No stridor. No wheezing or rales.   Abdominal:      General: Bowel sounds are normal. There is no distension.      Palpations: Abdomen is soft. There is no mass.      Tenderness: There is no abdominal tenderness. There is no guarding.   Skin:     General: Skin is warm.      Findings: No  erythema.   Neurological:      Mental Status: She is alert. Mental status is at baseline.             Significant Labs: All pertinent labs within the past 24 hours have been reviewed.    Significant Imaging: I have reviewed all pertinent imaging results/findings within the past 24 hours.

## 2024-10-14 NOTE — PT/OT/SLP EVAL
Occupational Therapy Evaluation and Treatment    Name: Glo Dumont  MRN: [id number]  Admitting Diagnosis: Acute kidney injury superimposed on CKD  Recent Surgery: * No surgery found *      Recommendations:     Discharge Recommendations: Low Intensity Therapy  Level of Assistance Recommended: 24 hours light assistance  Discharge Equipment Recommendations:    Barriers to discharge:      Assessment:     Glo Dumont is a 88 y.o. female with a medical diagnosis of Acute kidney injury superimposed on CKD. She presents with performance deficits affecting function including weakness, impaired endurance, impaired self care skills, decreased upper extremity function, impaired functional mobility, decreased lower extremity function, gait instability, decreased safety awareness, impaired balance. debility and generalized weakness  Rehab Prognosis: Good; patient would benefit from acute OT services to address these deficits and reach maximum level of function.    Plan:     Patient to be seen 2 x/week to address the above listed problems via self-care/home management, therapeutic activities, therapeutic exercises  Plan of Care Expires: 10/28/24  Plan of Care Reviewed with:      Subjective     Chief Complaint: debility and generalized weakness  Patient Comments/Goals: return home  Pain/Comfort:  Pain Rating 1: 0/10    Patients cultural, spiritual, Restorationist conflicts given the current situation:      Social History:  Living Environment: Patient lives with their daughter in a single story home with number of outside stair(s): 0  Prior Level of Function: Prior to admission, patient was modified independent  Roles and Routines: prior to admission.  DME owned (not currently used): none  Assistance Upon Discharge: family    Objective:     Communicated with nurse Chyrstal and epic chart review prior to session. Patient found HOB elevated with telemetry, peripheral IV, bed alarm upon OT entry to room.    General Precautions:  Standard, fall   Orthopedic Precautions: N/A   Braces: N/A    Respiratory Status: Room air    Occupational Performance      Bed Mobility:   Rolling/Turning to Left with stand by assistance  Rolling/Turning to Right with stand by assistance  Scooting anteriorly to EOB to have both feet planted on floor: stand by assistance  Supine to sit from right side of bed with stand by assistance  Supine to sit from left side of bed with stand by assistance    Functional Mobility/Transfers:  Sit <> Stand Transfer with minimum assistance with hand-held assist  Functional Mobility: pt ambulated side stepping r<l with hand held assist     Activities of Daily Living:  Lower Body Dressing: minimum assistance mercedes socks    Cognitive/Visual Perceptual:  Cognitive/Psychosocial Skills:    -     Oriented to: Person, Place, Time, Situation  -     Follows Commands/attention: Follows multistep  commands  -     Communication: clear/fluent  -     Memory: No Deficits noted  -     Safety awareness/insight to disability: intact    Physical Exam:  Upper Extremity Range of Motion:     -       Right Upper Extremity: WFL  -       Left Upper Extremity: WFL  Upper Extremity Strength:    -       Right Upper Extremity: mmt: 3+/5 grossly  -       Left Upper Extremity: mmt: 3+/5 grossly   Strength:    -       Right Upper Extremity: mmt: 3+/5 grossly  -       Left Upper Extremity: mmt: 3+/5 grossly    AMPAC 6 Click ADL:  AMPAC Total Score: 20    Treatment & Education:  Educated patient on importance of increased tolerance to upright position and direct impact on CV endurance and strength. Patient encouraged to sit up in chair/ EOB, for a minimum of 2 consecutive hours including for all meals.. Encouraged patient to perform AROM TE to BUE throughout the day within all available planes of motion. Re enforced importance of utilizing call light to meet needs in room and not attempt to get up without staff assistance. Patient verbalized understanding and  agreed to comply.         Patient clear to stand pivot transfer with RN/PCT, assist xstep<pivot with min a and hand held assist via bsc .    Patient left HOB elevated with all lines intact, call button in reach, and RN notified.    GOALS:   Multidisciplinary Problems       Occupational Therapy Goals          Problem: Occupational Therapy    Goal Priority Disciplines Outcome Interventions   Occupational Therapy Goal     OT, PT/OT     Description: O.T. goal to be met 10-28-24  Pt will tolerate 1 set x 15 reps b ue rom exercise  S with le dressing  S with toilet t/f's                       History:     Past Medical History:   Diagnosis Date    Anemia     Angina pectoris     Anxiety     Anxiety and depression     Arthritis     hip    Carotid artery occlusion     Carpal tunnel syndrome 06/23/2008    emg    Chronic diarrhea     work up in 2011 with EGD, CS and VCE    CKD (chronic kidney disease) stage 3, GFR 30-59 ml/min 5/11/2017    Colitis     Coronary artery disease     Coronary artery disease     Diastolic dysfunction     Diverticulosis     Encephalopathy 10/14/2024    Glaucoma     Greater trochanteric bursitis 2/10/2015    Grief at loss of child 1/26/2016    H/O carotid endarterectomy 12/2/2013    Heart failure     History of coronary angioplasty 3/11/2014    Hypercholesteremia     Hypertension     Liver cyst 02/08/2013    ct abd    Macular degeneration     Obesity with serious comorbidity 3/19/2023    Primary open-angle glaucoma(365.11) 9/3/2013    Renal cyst 02/08/2013    ct abd    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    Sarcoidosis     Sarcoidosis of lung     Sickle cell trait     Uveitis          Past Surgical History:   Procedure Laterality Date    A-V CARDIAC PACEMAKER INSERTION Left 3/21/2023    Procedure: INSERTION, CARDIAC PACEMAKER, DUAL CHAMBER/His Lead;  Surgeon: Cortez Ambrose MD;  Location: [facility] CATH LAB;  Service: Cardiology;  Laterality: Left;  MDT/ MD to confirm in am/possible  nurse sedate    CAROTID ENDARTERECTOMY Right 2000s    CATARACT EXTRACTION Bilateral     Dr. Liang Dennis    CHOLECYSTECTOMY      laparoscopic, 3/18.    CORONARY ANGIOPLASTY WITH STENT PLACEMENT  11/19/2010    RCA-HALIE 2010    Lathia    JOINT REPLACEMENT Left 11/03/2014    Dr. Braun    TOTAL ABDOMINAL HYSTERECTOMY W/ BILATERAL SALPINGOOPHORECTOMY  1972       Time Tracking:     OT Date of Treatment: 10/14/24  OT Start Time: 1503  OT Stop Time: 1519  OT Total Time (min): 16 min    Billable Minutes: Evaluation 8 minutes and Therapeutic Activity 8 minutes    10/14/2024

## 2024-10-14 NOTE — ASSESSMENT & PLAN NOTE
-Daughter reports worsening of chronic confusion, no focal deficits noted on exam and no recent falls   -Head CT: No acute abnormality. Atrophy and chronic white matter changes   -Neurology consult in a.m. per daughter request   -Pt/Ot to eval and treat     10/14/2024  Mentation appeared to be improving   Neurology eval pending

## 2024-10-15 LAB
ALBUMIN SERPL BCP-MCNC: 3.4 G/DL (ref 3.5–5.2)
ALP SERPL-CCNC: 73 U/L (ref 55–135)
ALT SERPL W/O P-5'-P-CCNC: 16 U/L (ref 10–44)
ANION GAP SERPL CALC-SCNC: 12 MMOL/L (ref 8–16)
AST SERPL-CCNC: 18 U/L (ref 10–40)
BASOPHILS # BLD AUTO: 0.08 K/UL (ref 0–0.2)
BASOPHILS NFR BLD: 0.8 % (ref 0–1.9)
BILIRUB SERPL-MCNC: 0.7 MG/DL (ref 0.1–1)
BUN SERPL-MCNC: 69 MG/DL (ref 8–23)
CALCIUM SERPL-MCNC: 9.3 MG/DL (ref 8.7–10.5)
CHLORIDE SERPL-SCNC: 107 MMOL/L (ref 95–110)
CO2 SERPL-SCNC: 14 MMOL/L (ref 23–29)
CREAT SERPL-MCNC: 2.2 MG/DL (ref 0.5–1.4)
DIFFERENTIAL METHOD BLD: ABNORMAL
EOSINOPHIL # BLD AUTO: 0.1 K/UL (ref 0–0.5)
EOSINOPHIL NFR BLD: 0.9 % (ref 0–8)
ERYTHROCYTE [DISTWIDTH] IN BLOOD BY AUTOMATED COUNT: 15.5 % (ref 11.5–14.5)
EST. GFR  (NO RACE VARIABLE): 21 ML/MIN/1.73 M^2
GLUCOSE SERPL-MCNC: 112 MG/DL (ref 70–110)
HCT VFR BLD AUTO: 38.8 % (ref 37–48.5)
HGB BLD-MCNC: 12.8 G/DL (ref 12–16)
IMM GRANULOCYTES # BLD AUTO: 0.22 K/UL (ref 0–0.04)
IMM GRANULOCYTES NFR BLD AUTO: 2.1 % (ref 0–0.5)
LYMPHOCYTES # BLD AUTO: 0.6 K/UL (ref 1–4.8)
LYMPHOCYTES NFR BLD: 5.7 % (ref 18–48)
MCH RBC QN AUTO: 29.2 PG (ref 27–31)
MCHC RBC AUTO-ENTMCNC: 33 G/DL (ref 32–36)
MCV RBC AUTO: 89 FL (ref 82–98)
MONOCYTES # BLD AUTO: 1 K/UL (ref 0.3–1)
MONOCYTES NFR BLD: 9.5 % (ref 4–15)
NEUTROPHILS # BLD AUTO: 8.6 K/UL (ref 1.8–7.7)
NEUTROPHILS NFR BLD: 81 % (ref 38–73)
NRBC BLD-RTO: 0 /100 WBC
PLATELET # BLD AUTO: 187 K/UL (ref 150–450)
PMV BLD AUTO: 10.9 FL (ref 9.2–12.9)
POTASSIUM SERPL-SCNC: 4.2 MMOL/L (ref 3.5–5.1)
PROT SERPL-MCNC: 6.8 G/DL (ref 6–8.4)
RBC # BLD AUTO: 4.38 M/UL (ref 4–5.4)
SODIUM SERPL-SCNC: 133 MMOL/L (ref 136–145)
WBC # BLD AUTO: 10.66 K/UL (ref 3.9–12.7)

## 2024-10-15 PROCEDURE — 36415 COLL VENOUS BLD VENIPUNCTURE: CPT | Mod: HCNC

## 2024-10-15 PROCEDURE — 85025 COMPLETE CBC W/AUTO DIFF WBC: CPT | Mod: HCNC

## 2024-10-15 PROCEDURE — 25000003 PHARM REV CODE 250: Mod: HCNC | Performed by: FAMILY MEDICINE

## 2024-10-15 PROCEDURE — 25000003 PHARM REV CODE 250: Mod: HCNC

## 2024-10-15 PROCEDURE — 63600175 PHARM REV CODE 636 W HCPCS: Mod: HCNC

## 2024-10-15 PROCEDURE — G0378 HOSPITAL OBSERVATION PER HR: HCPCS | Mod: HCNC

## 2024-10-15 PROCEDURE — 80053 COMPREHEN METABOLIC PANEL: CPT | Mod: HCNC

## 2024-10-15 PROCEDURE — 96372 THER/PROPH/DIAG INJ SC/IM: CPT

## 2024-10-15 RX ORDER — SODIUM BICARBONATE 650 MG/1
1300 TABLET ORAL 3 TIMES DAILY
Status: COMPLETED | OUTPATIENT
Start: 2024-10-15 | End: 2024-10-16

## 2024-10-15 RX ADMIN — SODIUM BICARBONATE 650 MG TABLET 1300 MG: at 02:10

## 2024-10-15 RX ADMIN — Medication 200 ML: at 03:10

## 2024-10-15 RX ADMIN — HEPARIN SODIUM 5000 UNITS: 5000 INJECTION INTRAVENOUS; SUBCUTANEOUS at 09:10

## 2024-10-15 RX ADMIN — ACETAMINOPHEN 650 MG: 325 TABLET ORAL at 12:10

## 2024-10-15 RX ADMIN — CITALOPRAM HYDROBROMIDE 10 MG: 10 TABLET ORAL at 09:10

## 2024-10-15 RX ADMIN — HEPARIN SODIUM 5000 UNITS: 5000 INJECTION INTRAVENOUS; SUBCUTANEOUS at 02:10

## 2024-10-15 RX ADMIN — AMLODIPINE BESYLATE 10 MG: 10 TABLET ORAL at 06:10

## 2024-10-15 RX ADMIN — Medication 200 ML: at 06:10

## 2024-10-15 RX ADMIN — Medication 200 ML: at 10:10

## 2024-10-15 RX ADMIN — SODIUM BICARBONATE 650 MG TABLET 1300 MG: at 09:10

## 2024-10-15 RX ADMIN — Medication 200 ML: at 09:10

## 2024-10-15 RX ADMIN — Medication 200 ML: at 02:10

## 2024-10-15 RX ADMIN — PANCRELIPASE 1 CAPSULE: 120000; 24000; 76000 CAPSULE, DELAYED RELEASE PELLETS ORAL at 05:10

## 2024-10-15 RX ADMIN — HEPARIN SODIUM 5000 UNITS: 5000 INJECTION INTRAVENOUS; SUBCUTANEOUS at 06:10

## 2024-10-15 RX ADMIN — ATORVASTATIN CALCIUM 40 MG: 40 TABLET, FILM COATED ORAL at 05:10

## 2024-10-15 RX ADMIN — PANCRELIPASE 1 CAPSULE: 120000; 24000; 76000 CAPSULE, DELAYED RELEASE PELLETS ORAL at 07:10

## 2024-10-15 RX ADMIN — ISOSORBIDE MONONITRATE 30 MG: 30 TABLET, EXTENDED RELEASE ORAL at 08:10

## 2024-10-15 RX ADMIN — ASPIRIN 81 MG: 81 TABLET, COATED ORAL at 08:10

## 2024-10-15 NOTE — ASSESSMENT & PLAN NOTE
KEEGAN is likely due to pre-renal azotemia due to dehydration. Baseline creatinine is  1.4 . Most recent creatinine and eGFR are listed below.  Recent Labs     10/13/24  1441 10/14/24  0436 10/15/24  0501   CREATININE 2.9* 2.4* 2.2*   EGFRNORACEVR 15* 19* 21*        Plan  - will trend KEEGAN in a.m. with repeat labs   - Avoid nephrotoxins and renally dose meds for GFR listed above  - Monitor urine output, serial BMP, and adjust therapy as needed  - patient given 1 bolus of 500ml NS in ER   -bladder scan patient   -UA negative for UTI     10/15/2024  Creatinine improving   Will continue oral rehydration solution  Sodium bicarb tablets added normal gap metabolic acidosis likely secondary to KEEGAN    Patient is in need of IV fluids for the treatment of keegan and dehydration.  Due to a shortage of IV fluids, patient to receive oral rehydration solution.  Patient must receive this treatment in a hospital location due to the risk of adverse effects, insufficient response to treatment, or other potential complications of disease such as monitoring of renal functions and electrolytes.

## 2024-10-15 NOTE — SUBJECTIVE & OBJECTIVE
Interval History:  No acute issues overnight.    Review of Systems   Constitutional:  Negative for fatigue and fever.   HENT:  Negative for sinus pressure.    Eyes:  Negative for visual disturbance.   Respiratory:  Negative for shortness of breath.    Cardiovascular:  Negative for chest pain.   Gastrointestinal:  Negative for nausea and vomiting.   Genitourinary:  Negative for difficulty urinating.   Musculoskeletal:  Negative for back pain.   Skin:  Negative for rash.   Neurological:  Negative for headaches.   Psychiatric/Behavioral:  Negative for confusion.      Objective:     Vital Signs (Most Recent):  Temp: 97.4 °F (36.3 °C) (10/15/24 1202)  Pulse: 85 (10/15/24 1202)  Resp: 18 (10/15/24 1202)  BP: 110/66 (10/15/24 1202)  SpO2: 97 % (10/15/24 1202) Vital Signs (24h Range):  Temp:  [97.4 °F (36.3 °C)-98.7 °F (37.1 °C)] 97.4 °F (36.3 °C)  Pulse:  [73-92] 85  Resp:  [18-22] 18  SpO2:  [95 %-98 %] 97 %  BP: (110-135)/(66-77) 110/66     Weight: 63.3 kg (139 lb 8.8 oz)  Body mass index is 26.37 kg/m².    Intake/Output Summary (Last 24 hours) at 10/15/2024 1308  Last data filed at 10/15/2024 0326  Gross per 24 hour   Intake 400 ml   Output --   Net 400 ml         Physical Exam  Constitutional:       General: She is not in acute distress.     Appearance: She is well-developed. She is not diaphoretic.   HENT:      Head: Normocephalic and atraumatic.   Eyes:      Pupils: Pupils are equal, round, and reactive to light.   Cardiovascular:      Rate and Rhythm: Normal rate and regular rhythm.      Heart sounds: Normal heart sounds. No murmur heard.     No friction rub. No gallop.   Pulmonary:      Effort: Pulmonary effort is normal. No respiratory distress.      Breath sounds: Normal breath sounds. No stridor. No wheezing or rales.   Abdominal:      General: Bowel sounds are normal. There is no distension.      Palpations: Abdomen is soft. There is no mass.      Tenderness: There is no abdominal tenderness. There is no  guarding.   Skin:     General: Skin is warm.      Findings: No erythema.   Neurological:      Mental Status: She is alert. Mental status is at baseline.             Significant Labs: All pertinent labs within the past 24 hours have been reviewed.    Significant Imaging: I have reviewed all pertinent imaging results/findings within the past 24 hours.

## 2024-10-15 NOTE — PLAN OF CARE
A238/A238 THAD  Glo Dumont is a 88 y.o.female admitted on 10/13/2024 for Acute kidney injury superimposed on CKD   Code Status: DNR MRN: [id number]   Review of patient's allergies indicates:   Allergen Reactions    Lisinopril      angioedema    Codeine Nausea And Vomiting     Past Medical History:   Diagnosis Date    Anemia     Angina pectoris     Anxiety     Anxiety and depression     Arthritis     hip    Carotid artery occlusion     Carpal tunnel syndrome 06/23/2008    emg    Chronic diarrhea     work up in 2011 with EGD, CS and VCE    CKD (chronic kidney disease) stage 3, GFR 30-59 ml/min 5/11/2017    Colitis     Coronary artery disease     Coronary artery disease     Diastolic dysfunction     Diverticulosis     Encephalopathy 10/14/2024    Glaucoma     Greater trochanteric bursitis 2/10/2015    Grief at loss of child 1/26/2016    H/O carotid endarterectomy 12/2/2013    Heart failure     History of coronary angioplasty 3/11/2014    Hypercholesteremia     Hypertension     Liver cyst 02/08/2013    ct abd    Macular degeneration     Obesity with serious comorbidity 3/19/2023    Primary open-angle glaucoma(365.11) 9/3/2013    Renal cyst 02/08/2013    ct abd    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 Will continue plan of care.      Orientation: disoriented to, situation  Linda Coma Scale Score: 15     Lead Monitored: Lead II Rhythm: normal sinus rhythm    Cardiac/Telemetry Box Number: [id number]  VTE Required Core Measure: Pharmacological prophylaxis initiated/maintained Last Bowel  Movement: 10/15/24  Diet Cardiac  Voiding Characteristics: incontinence  Michael Score: 20  Fall Risk Score: 15  Accucheck []   Freq?      Lines/Drains/Airways       Peripheral Intravenous Line  Duration                  Peripheral IV - Single Lumen 10/13/24 1953 20 G Left Antecubital 1 day

## 2024-10-15 NOTE — PROGRESS NOTES
[facility] Medicine  Progress Note    Patient Name: Glo Dumont  MRN: [id number]  Patient Class: OP- Observation   Admission Date: 10/13/2024  Length of Stay: 0 days  Attending Physician: Madhu Nichols MD  Primary Care Provider: Christina Cardona MD        Subjective:     Principal Problem:Acute kidney injury superimposed on CKD        HPI:  Glo Dumont is a 88 y.o. female with a PMH has a past medical history of Anemia, Angina pectoris, Anxiety, Anxiety and depression, Arthritis, Carotid artery occlusion, Carpal tunnel syndrome (06/23/2008), Chronic diarrhea, CKD (chronic kidney disease) stage 3, GFR 30-59 ml/min (5/11/2017), Colitis, Coronary artery disease, Coronary artery disease, Diastolic dysfunction, Diverticulosis, Glaucoma, Greater trochanteric bursitis (2/10/2015), Grief at loss of child (1/26/2016), H/O carotid endarterectomy (12/2/2013), Heart failure, History of coronary angioplasty (3/11/2014), Hypercholesteremia, Hypertension, Liver cyst (02/08/2013), Macular degeneration, Obesity with serious comorbidity (3/19/2023), Primary open-angle glaucoma(365.11) (9/3/2013), Renal cyst (02/08/2013), 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 Presented to the ER today with c/o generalized weakness and worsening confusion. Patients daughter at bedside during exam, reports confusion has worsened over the past couple of days. No confusion noted on exam and No focal deficits noted. Patient reports mild SOB with exertion, denies CP. Does endorse lower abdominal pain, denies any dysuria, nausea or vomiting. In ER: Labs- WBC 9.76, H/H 13.3/39.8, plt count 159, sodium 136, potassium 4.5, BUN 88, Crit 2.9, Pro-shelly 0.10, UA negative for UTI. CT A/P: No definite acute abnormality identified. CT head: No acute abnormality. Atrophy and chronic white matter changes. CXR: No acute abnormality. VS: /73, HR 68,  SpO2 98% on room air, Temp 98F, RR 20. [facility] consulted for admission.        Overview/Hospital Course:  10/14/2024  Creatinine improving.  Will continue oral rehydration solution.  Sodium bicarb added for normal gap metabolic acidosis likely secondary to KEEGAN.  10/15/2024  Creatinine continues to improve.  Will need further inpatient monitoring of renal functions.  Continue sodium bicarb.  Oral rehydration.    Interval History:  No acute issues overnight.    Review of Systems   Constitutional:  Negative for fatigue and fever.   HENT:  Negative for sinus pressure.    Eyes:  Negative for visual disturbance.   Respiratory:  Negative for shortness of breath.    Cardiovascular:  Negative for chest pain.   Gastrointestinal:  Negative for nausea and vomiting.   Genitourinary:  Negative for difficulty urinating.   Musculoskeletal:  Negative for back pain.   Skin:  Negative for rash.   Neurological:  Negative for headaches.   Psychiatric/Behavioral:  Negative for confusion.      Objective:     Vital Signs (Most Recent):  Temp: 97.4 °F (36.3 °C) (10/15/24 1202)  Pulse: 85 (10/15/24 1202)  Resp: 18 (10/15/24 1202)  BP: 110/66 (10/15/24 1202)  SpO2: 97 % (10/15/24 1202) Vital Signs (24h Range):  Temp:  [97.4 °F (36.3 °C)-98.7 °F (37.1 °C)] 97.4 °F (36.3 °C)  Pulse:  [73-92] 85  Resp:  [18-22] 18  SpO2:  [95 %-98 %] 97 %  BP: (110-135)/(66-77) 110/66     Weight: 63.3 kg (139 lb 8.8 oz)  Body mass index is 26.37 kg/m².    Intake/Output Summary (Last 24 hours) at 10/15/2024 1308  Last data filed at 10/15/2024 0326  Gross per 24 hour   Intake 400 ml   Output --   Net 400 ml         Physical Exam  Constitutional:       General: She is not in acute distress.     Appearance: She is well-developed. She is not diaphoretic.   HENT:      Head: Normocephalic and atraumatic.   Eyes:      Pupils: Pupils are equal, round, and reactive to light.   Cardiovascular:      Rate and Rhythm: Normal rate and regular rhythm.      Heart sounds: Normal  heart sounds. No murmur heard.     No friction rub. No gallop.   Pulmonary:      Effort: Pulmonary effort is normal. No respiratory distress.      Breath sounds: Normal breath sounds. No stridor. No wheezing or rales.   Abdominal:      General: Bowel sounds are normal. There is no distension.      Palpations: Abdomen is soft. There is no mass.      Tenderness: There is no abdominal tenderness. There is no guarding.   Skin:     General: Skin is warm.      Findings: No erythema.   Neurological:      Mental Status: She is alert. Mental status is at baseline.             Significant Labs: All pertinent labs within the past 24 hours have been reviewed.    Significant Imaging: I have reviewed all pertinent imaging results/findings within the past 24 hours.    Assessment/Plan:      * Acute kidney injury superimposed on CKD  KEEGAN is likely due to pre-renal azotemia due to dehydration. Baseline creatinine is  1.4 . Most recent creatinine and eGFR are listed below.  Recent Labs     10/13/24  1441 10/14/24  0436 10/15/24  0501   CREATININE 2.9* 2.4* 2.2*   EGFRNORACEVR 15* 19* 21*        Plan  - will trend KEEGAN in a.m. with repeat labs   - Avoid nephrotoxins and renally dose meds for GFR listed above  - Monitor urine output, serial BMP, and adjust therapy as needed  - patient given 1 bolus of 500ml NS in ER   -bladder scan patient   -UA negative for UTI     10/15/2024  Creatinine improving   Will continue oral rehydration solution  Sodium bicarb tablets added normal gap metabolic acidosis likely secondary to KEEGAN    Patient is in need of IV fluids for the treatment of keegan and dehydration.  Due to a shortage of IV fluids, patient to receive oral rehydration solution.  Patient must receive this treatment in a hospital location due to the risk of adverse effects, insufficient response to treatment, or other potential complications of disease such as monitoring of renal functions and electrolytes.      Confusion  -Daughter reports  worsening of chronic confusion, no focal deficits noted on exam and no recent falls   -Head CT: No acute abnormality. Atrophy and chronic white matter changes   -Neurology consult in a.m. per daughter request   -Pt/Ot to eval and treat     10/14/2024  Mentation appeared to be improving   Neurology eval pending      Generalized weakness  -Pt/ot to eval and treat         Diastolic heart failure, NYHA class 3  -Patient reports SOB with exertion   -Check BNP and Troponin   -Check ECHO   -CXR: No acute abnormality.         Essential hypertension  Patients blood pressure range in the last 24 hours was: BP  Min: 123/68  Max: 142/73.The patient's inpatient anti-hypertensive regimen is listed below:  Current Antihypertensives  amLODIPine tablet 10 mg, Every morning, Oral  isosorbide mononitrate 24 hr tablet 30 mg, Daily, Oral    Plan  - BP is controlled, no changes needed to their regimen  - tele monitoring     Other hyperlipidemia  -continue home statin         VTE Risk Mitigation (From admission, onward)           Ordered     heparin (porcine) injection 5,000 Units  Every 8 hours         10/13/24 1759     IP VTE HIGH RISK PATIENT  Once         10/13/24 1759     Place sequential compression device  Until discontinued         10/13/24 1759                    Discharge Planning   CANDACE:      Code Status: DNR   Is the patient medically ready for discharge?:     Reason for patient still in hospital (select all that apply): Patient trending condition  Discharge Plan A: Home Health                  Madhu Nichols MD  Department of Hospital Medicine   [facility] - Telemetry ([facility])

## 2024-10-16 VITALS
WEIGHT: 141.56 LBS | DIASTOLIC BLOOD PRESSURE: 63 MMHG | OXYGEN SATURATION: 96 % | RESPIRATION RATE: 19 BRPM | HEIGHT: 61 IN | SYSTOLIC BLOOD PRESSURE: 110 MMHG | HEART RATE: 107 BPM | TEMPERATURE: 98 F | BODY MASS INDEX: 26.73 KG/M2

## 2024-10-16 LAB
ALBUMIN SERPL BCP-MCNC: 3.2 G/DL (ref 3.5–5.2)
ALP SERPL-CCNC: 74 U/L (ref 55–135)
ALT SERPL W/O P-5'-P-CCNC: 15 U/L (ref 10–44)
ANION GAP SERPL CALC-SCNC: 13 MMOL/L (ref 8–16)
AST SERPL-CCNC: 14 U/L (ref 10–40)
BASOPHILS # BLD AUTO: 0.05 K/UL (ref 0–0.2)
BASOPHILS NFR BLD: 0.6 % (ref 0–1.9)
BILIRUB SERPL-MCNC: 0.6 MG/DL (ref 0.1–1)
BUN SERPL-MCNC: 75 MG/DL (ref 8–23)
CALCIUM SERPL-MCNC: 9.2 MG/DL (ref 8.7–10.5)
CHLORIDE SERPL-SCNC: 106 MMOL/L (ref 95–110)
CO2 SERPL-SCNC: 16 MMOL/L (ref 23–29)
CREAT SERPL-MCNC: 2.1 MG/DL (ref 0.5–1.4)
DIFFERENTIAL METHOD BLD: ABNORMAL
EOSINOPHIL # BLD AUTO: 0.1 K/UL (ref 0–0.5)
EOSINOPHIL NFR BLD: 1.4 % (ref 0–8)
ERYTHROCYTE [DISTWIDTH] IN BLOOD BY AUTOMATED COUNT: 15.4 % (ref 11.5–14.5)
EST. GFR  (NO RACE VARIABLE): 23 ML/MIN/1.73 M^2
GLUCOSE SERPL-MCNC: 111 MG/DL (ref 70–110)
HCT VFR BLD AUTO: 39.8 % (ref 37–48.5)
HGB BLD-MCNC: 13.2 G/DL (ref 12–16)
IMM GRANULOCYTES # BLD AUTO: 0.2 K/UL (ref 0–0.04)
IMM GRANULOCYTES NFR BLD AUTO: 2.3 % (ref 0–0.5)
LYMPHOCYTES # BLD AUTO: 0.7 K/UL (ref 1–4.8)
LYMPHOCYTES NFR BLD: 7.8 % (ref 18–48)
MCH RBC QN AUTO: 29.1 PG (ref 27–31)
MCHC RBC AUTO-ENTMCNC: 33.2 G/DL (ref 32–36)
MCV RBC AUTO: 88 FL (ref 82–98)
MONOCYTES # BLD AUTO: 0.7 K/UL (ref 0.3–1)
MONOCYTES NFR BLD: 8 % (ref 4–15)
NEUTROPHILS # BLD AUTO: 6.9 K/UL (ref 1.8–7.7)
NEUTROPHILS NFR BLD: 79.9 % (ref 38–73)
NRBC BLD-RTO: 0 /100 WBC
PLATELET # BLD AUTO: 182 K/UL (ref 150–450)
PMV BLD AUTO: 10.6 FL (ref 9.2–12.9)
POTASSIUM SERPL-SCNC: 4 MMOL/L (ref 3.5–5.1)
PROT SERPL-MCNC: 6.5 G/DL (ref 6–8.4)
RBC # BLD AUTO: 4.54 M/UL (ref 4–5.4)
SODIUM SERPL-SCNC: 135 MMOL/L (ref 136–145)
WBC # BLD AUTO: 8.63 K/UL (ref 3.9–12.7)

## 2024-10-16 PROCEDURE — 96372 THER/PROPH/DIAG INJ SC/IM: CPT

## 2024-10-16 PROCEDURE — 25000003 PHARM REV CODE 250: Mod: HCNC | Performed by: FAMILY MEDICINE

## 2024-10-16 PROCEDURE — 85025 COMPLETE CBC W/AUTO DIFF WBC: CPT | Mod: HCNC

## 2024-10-16 PROCEDURE — 80053 COMPREHEN METABOLIC PANEL: CPT | Mod: HCNC

## 2024-10-16 PROCEDURE — 97530 THERAPEUTIC ACTIVITIES: CPT | Mod: HCNC

## 2024-10-16 PROCEDURE — 36415 COLL VENOUS BLD VENIPUNCTURE: CPT | Mod: HCNC

## 2024-10-16 PROCEDURE — 63600175 PHARM REV CODE 636 W HCPCS: Mod: HCNC

## 2024-10-16 PROCEDURE — 97116 GAIT TRAINING THERAPY: CPT | Mod: HCNC

## 2024-10-16 PROCEDURE — G0378 HOSPITAL OBSERVATION PER HR: HCPCS | Mod: HCNC

## 2024-10-16 PROCEDURE — 25000003 PHARM REV CODE 250: Mod: HCNC

## 2024-10-16 RX ADMIN — Medication 200 ML: at 10:10

## 2024-10-16 RX ADMIN — SODIUM BICARBONATE 650 MG TABLET 1300 MG: at 08:10

## 2024-10-16 RX ADMIN — AMLODIPINE BESYLATE 10 MG: 10 TABLET ORAL at 06:10

## 2024-10-16 RX ADMIN — ISOSORBIDE MONONITRATE 30 MG: 30 TABLET, EXTENDED RELEASE ORAL at 08:10

## 2024-10-16 RX ADMIN — HEPARIN SODIUM 5000 UNITS: 5000 INJECTION INTRAVENOUS; SUBCUTANEOUS at 06:10

## 2024-10-16 RX ADMIN — PANCRELIPASE 1 CAPSULE: 120000; 24000; 76000 CAPSULE, DELAYED RELEASE PELLETS ORAL at 08:10

## 2024-10-16 RX ADMIN — Medication 200 ML: at 02:10

## 2024-10-16 RX ADMIN — Medication 200 ML: at 06:10

## 2024-10-16 RX ADMIN — ASPIRIN 81 MG: 81 TABLET, COATED ORAL at 08:10

## 2024-10-16 NOTE — PT/OT/SLP PROGRESS
Physical Therapy Treatment    Patient Name:  Glo Dumont   MRN:  [id number]    Recommendations:     Discharge Recommendations: Low Intensity Therapy  Discharge Equipment Recommendations: walker, rolling  Barriers to discharge: None    Assessment:     Glo Dumont is a 88 y.o. female admitted with a medical diagnosis of Acute kidney injury superimposed on CKD.  She presents with the following impairments/functional limitations: weakness, impaired endurance, impaired functional mobility, gait instability, impaired balance, decreased safety awareness, decreased lower extremity function.    Rehab Prognosis: Good; patient would benefit from acute skilled PT services to address these deficits and reach maximum level of function.    Recent Surgery: * No surgery found *      Plan:     During this hospitalization, patient to be seen 3 x/week to address the identified rehab impairments via gait training, therapeutic activities, therapeutic exercises and progress toward the following goals:    Plan of Care Expires:  10/28/24    Subjective     Chief Complaint: Pt is motivated to participate  Patient/Family Comments/goals: none stated  Pain/Comfort:  Pain Rating 1: 0/10      Objective:     Communicated with nurse and epic chart review prior to session.  Patient found supine with peripheral IV, telemetry, bed alarm upon PT entry to room.     General Precautions: Standard, fall  Orthopedic Precautions: N/A  Braces: N/A  Respiratory Status: Room air     Functional Mobility:  Gait belt applied - Yes  Bed Mobility  Rolling Right: stand by assistance  Scooting: stand by assistance  Supine to Sit: stand by assistance  Transfers  Sit to Stand: stand by assistance with rolling walker  Bed to Chair: stand by assistance with rolling walker using Step Transfer  Gait  Patient ambulated 60ft with rolling walker and stand by assistance. Patient demonstrates steady gait, slow pace. No c/o dizziness or SOB, no LOB. All lines remained intact  "throughout ambulation trail.  Balance  Sitting: stand by assistance  Standing: stand by assistance  Increased time needed    AM-PAC 6 CLICK MOBILITY  Turning over in bed (including adjusting bedclothes, sheets and blankets)?: 3  Sitting down on and standing up from a chair with arms (e.g., wheelchair, bedside commode, etc.): 3  Moving from lying on back to sitting on the side of the bed?: 3  Moving to and from a bed to a chair (including a wheelchair)?: 3  Need to walk in hospital room?: 3  Climbing 3-5 steps with a railing?: 1 (NT)  Basic Mobility Total Score: 16       Treatment & Education:  Reviewed role of PT in acute care and POC. Pt tolerated interventions well. Reviewed importance of OOB activities, activity pacing, and HEP (marching/hip flex, hip abd, heel slides/LAQ, quad sets, ankle pumps) in order to maintain/regain strength. Encouraged to sit up in chair for all meals. Reviewed proper use of RW for safety and to reduce risk of falling. Reviewed "call don't fall" policy and increased risk of falling due to weakness, instructed to utilize call bell for assistance with all transfers. Pt agreeable to all requests.    Patient left up in chair with all lines intact, call button in reach, and chair alarm on..    GOALS:   Multidisciplinary Problems       Physical Therapy Goals          Problem: Physical Therapy    Goal Priority Disciplines Outcome Interventions   Physical Therapy Goal     PT, PT/OT Progressing    Description: LTG'S TO BE MET IN 14 DAYS (10-28-24)  PT WILL BE ROBERT FOR BED MOBILITY  PT WILL BE ROBERT FOR BED<>CHAIR TF'S  PT WILL  FEET WITH RW AND SBA  PT WILL INC AMPAC SCORE BY 2 POINTS TO PROGRESS GROSS FUNC MOBILITY                         Time Tracking:     PT Received On: 10/16/24  PT Start Time: 1023     PT Stop Time: 1046  PT Total Time (min): 23 min     Billable Minutes: Gait Training 13min and Therapeutic Activity 10min    Treatment Type: Treatment  PT/PTA: PT     Number of PTA visits " since last PT visit: 0     10/16/2024

## 2024-10-16 NOTE — DISCHARGE SUMMARY
[facility] Medicine  Discharge Summary      Patient Name: Glo Dumont  MRN: [id number]  ALBERTA: [id number]  Patient Class: OP- Observation  Admission Date: 10/13/2024  Hospital Length of Stay: 0 days  Discharge Date and Time: 10/16/2024 12:40 PM  Attending Physician: No att. providers found   Discharging Provider: Madhu Nichols MD  Primary Care Provider: Christina Cardona MD    Primary Care Team: Networked reference to record PCT     HPI:   Glo Dumont is a 88 y.o. female with a PMH has a past medical history of Anemia, Angina pectoris, Anxiety, Anxiety and depression, Arthritis, Carotid artery occlusion, Carpal tunnel syndrome (06/23/2008), Chronic diarrhea, CKD (chronic kidney disease) stage 3, GFR 30-59 ml/min (5/11/2017), Colitis, Coronary artery disease, Coronary artery disease, Diastolic dysfunction, Diverticulosis, Glaucoma, Greater trochanteric bursitis (2/10/2015), Grief at loss of child (1/26/2016), H/O carotid endarterectomy (12/2/2013), Heart failure, History of coronary angioplasty (3/11/2014), Hypercholesteremia, Hypertension, Liver cyst (02/08/2013), Macular degeneration, Obesity with serious comorbidity (3/19/2023), Primary open-angle glaucoma(365.11) (9/3/2013), Renal cyst (02/08/2013), 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 Presented to the ER today with c/o generalized weakness and worsening confusion. Patients daughter at bedside during exam, reports confusion has worsened over the past couple of days. No confusion noted on exam and No focal deficits noted. Patient reports mild SOB with exertion, denies CP. Does endorse lower abdominal pain, denies any dysuria, nausea or vomiting. In ER: Labs- WBC 9.76, H/H 13.3/39.8, plt count 159, sodium 136, potassium 4.5, BUN 88, Crit 2.9, Pro-shelly 0.10, UA negative for UTI. CT A/P: No definite acute abnormality identified. CT head: No acute  abnormality. Atrophy and chronic white matter changes. CXR: No acute abnormality. VS: /73, HR 68, SpO2 98% on room air, Temp 98F, RR 20. [facility] consulted for admission.        * No surgery found *      Hospital Course:   Patient was admitted for KEEGAN secondary to dehydration.  Likely combination reduced oral intake and over-diuresis.  Diuretics were held.  Patient was started on IVF and transitioned to oral rehydration solution.  Sodium bicarb added for metabolic acidosis.  Creatinine improved.  Patient tolerated p.o. diet.  She was discharged home.  Home health ordered.     Goals of Care Treatment Preferences:  Code Status: DNR    Health care agent: Denis Langston Esperanza [facility] agent number: [phone], [phone].                   SDOH Screening:  The patient was screened for utility difficulties, food insecurity, transport difficulties, housing insecurity, and interpersonal safety and there were no concerns identified this admission.     Consults:   Consults (From admission, onward)          Status Ordering Provider     Inpatient consult to Telemedicine-General Neurology  Once        Provider:  Edwina Talavera NP    Completed BRAYAN ORBERSON            No new Assessment & Plan notes have been filed under this hospital service since the last note was generated.  Service: Hospital Medicine    Final Active Diagnoses:    Diagnosis Date Noted POA    PRINCIPAL PROBLEM:  Acute kidney injury superimposed on CKD [N17.9, N18.9] 10/13/2024 Yes    Encephalopathy [G93.40] 10/14/2024 Yes    Confusion [R41.0] 10/13/2024 Yes    Generalized weakness [R53.1] 07/09/2024 Yes    Essential hypertension [I10] 02/01/2016 Yes     Chronic    Diastolic heart failure, NYHA class 3 [I50.30] 02/01/2016 Yes    Other hyperlipidemia [E78.49] 07/08/2013 Yes     Chronic      Problems Resolved During this Admission:       Discharged Condition: good    Disposition: Home or Self Care    Follow Up:   Contact information for  follow-up providers       Christina Cardona MD Follow up in 1 week(s).    Specialty: Internal Medicine  Contact information:  [address]  [facility] [id number] [phone]                       Contact information for after-discharge care       Home Medical Care       [facility] .    Service: Home Health Services  Contact information:  [address] [facility] [address]  [facility] [address] [phone]                                 Patient Instructions:      SUBSEQUENT HOME HEALTH ORDERS     Order Specific Question Answer Comments   What Home Health Agency is the patient currently using? Other/External        Significant Diagnostic Studies: N/A    Pending Diagnostic Studies:       None           Medications:  Reconciled Home Medications:      Medication List        CONTINUE taking these medications      aflibercept 2 mg/0.05 mL Soln  2 mg by Intravitreal route every 28 days.     amLODIPine 2.5 MG tablet  Commonly known as: NORVASC  Take 10 mg by mouth every morning.     aspirin 81 MG EC tablet  Commonly known as: ECOTRIN  Take 1 tablet (81 mg total) by mouth once daily.     atorvastatin 40 MG tablet  Commonly known as: LIPITOR  Take 1 tablet (40 mg total) by mouth Daily.     budesonide 3 mg capsule  Commonly known as: ENTOCORT EC  Take 1-2 capsules (3-6 mg total) by mouth daily as needed (flare). As needed     bumetanide 1 MG tablet  Commonly known as: BUMEX  Take 1 tablet (1 mg total) by mouth once daily.     busPIRone 5 MG Tab  Commonly known as: BUSPAR  Take 1 tablet (5 mg total) by mouth 2 (two) times daily as needed (anxiety).     carvediloL 12.5 MG tablet  Commonly known as: COREG  Take 1 tablet (12.5 mg total) by mouth 2 (two) times daily with meals.     CENTRUM SILVER ORAL  Take 1 tablet by mouth once daily.     citalopram 10 MG tablet  Commonly known as: CeleXA  Take 1 tablet (10 mg total) by mouth every evening.     CREON 24,000-76,000 -120,000 unit  capsule  Generic drug: lipase-protease-amylase 24,000-76,000-120,000 units  Take 1 capsule by mouth 3 (three) times daily with meals.     cyproheptadine 4 mg tablet  Commonly known as: PERIACTIN  Take 1 tablet (4 mg total) by mouth 3 (three) times daily as needed (appetite).     dexAMETHasone 0.7 mg Impl intravitreal implant  Commonly known as: OZURDEX  0.7 mg by Intravitreal route once.     dorzolamide-timolol 2-0.5% 22.3-6.8 mg/mL ophthalmic solution  Commonly known as: COSOPT  INSTILL 1 DROP IN BOTH EYES TWICE DAILY     isosorbide mononitrate 30 MG 24 hr tablet  Commonly known as: IMDUR  Take 1 tablet (30 mg total) by mouth once daily.     JARDIANCE 10 mg tablet  Generic drug: empagliflozin  Take 10 mg by mouth once daily.     loperamide 2 mg capsule  Commonly known as: IMODIUM  Take 1 mg by mouth every 6 (six) hours as needed for Diarrhea.     metOLazone 2.5 MG tablet  Commonly known as: ZAROXOLYN  Take on Mon     nitroGLYCERIN 0.4 MG SL tablet  Commonly known as: NITROSTAT  PLACE ONE TABLET UNDERNEATH THE TONGUE EVERY 5 MINUTES AS NEEDED FOR CHEST PAIN     ondansetron 4 MG tablet  Commonly known as: ZOFRAN  Take 1 tablet (4 mg total) by mouth every 8 (eight) hours as needed for Nausea.     ondansetron 8 MG Tbdl  Commonly known as: ZOFRAN-ODT  Take 8 mg by mouth every 6 (six) hours.     pantoprazole 40 MG tablet  Commonly known as: PROTONIX  Take 1 tablet (40 mg total) by mouth once daily.     potassium chloride 10 MEQ Tbsr  Commonly known as: KLOR-CON  Take 1 tablet (10 mEq total) by mouth once daily.     sodium bicarbonate 650 MG tablet  Take 2 tablets (1,300 mg total) by mouth 2 (two) times daily.            STOP taking these medications      furosemide 20 MG tablet  Commonly known as: LASIX     spironolactone 25 MG tablet  Commonly known as: ALDACTONE              Indwelling Lines/Drains at time of discharge:   Lines/Drains/Airways       None                   Time spent on the discharge of patient: 37  dave Nichols MD  Department of Hospital Medicine  '[facility] - Telemetry ([facility])

## 2024-10-16 NOTE — PLAN OF CARE
[facility] - Tele[facility] (Hospital)  Discharge Final Note    Primary Care Provider: Christina Cardona MD    Expected Discharge Date: 10/16/2024    Final Discharge Note (most recent)       Final Note - 10/16/24 1134          Final Note    Assessment Type Final Discharge Note     Anticipated Discharge Disposition Home-Health Care [facility] [place] [place]     Hospital Resources/Appts/Education Provided Appointments scheduled and added to AVS;Post-Acute resouces added to AVS        Post-Acute Status    Post-Acute Authorization Home Health     Home Health Status Set-up Complete/Auth obtained     Discharge Delays None known at this time                   Pt to discharge home today. PCP scheduled on AVS: 10/21 @ 4PM  Pt agreeable to [facility]; referral placed in Careport and accepted.       Important Message from Medicare              Follow-up providers       Christina Cardona MD   Specialty: Internal Medicine   Relationship: PCP - General  Hypert[facility] Digital Medicine Responsible Provider    [address] [place] [address]   Phone: [phone]       Next Steps: Follow up in 1 week(s)              After-discharge care                Home Medical Care       *[facility]   Service: Home Health Services    [address] [facility] [address]  [place] [address]   Phone: [phone]

## 2024-10-16 NOTE — PLAN OF CARE
Pt tolerated interventions well. Required SBA for bed mobility, ambulated 60ft SBA, RW. Recommending low intensity therapy upon d/c.

## 2024-10-16 NOTE — PLAN OF CARE
POC reviewed with pt. Pt verbalizes understanding of POC. No questions at this time.  AAOx4. NADN.  NSR on cardiac monitor.  Pt remains free of falls.  No complaints at this time.  Safety measures in place.   Informed pt to call for assistance before getting up. Pt verbalizes understanding.  Hourly rounding completed.   Will continue to monitor.    Problem: Adult Inpatient Plan of Care  Goal: Plan of Care Review  Outcome: Progressing  Goal: Patient-Specific Goal (Individualized)  Outcome: Progressing  Goal: Absence of Hospital-Acquired Illness or Injury  Outcome: Progressing  Goal: Optimal Comfort and Wellbeing  Outcome: Progressing  Goal: Readiness for Transition of Care  Outcome: Progressing     Problem: Acute Kidney Injury/Impairment  Goal: Fluid and Electrolyte Balance  Outcome: Progressing  Goal: Improved Oral Intake  Outcome: Progressing  Goal: Effective Renal Function  Outcome: Progressing     Problem: Fall Injury Risk  Goal: Absence of Fall and Fall-Related Injury  Outcome: Progressing

## 2024-10-16 NOTE — NURSING
AVS virtually reviewed with patient and her sister-in-law in its entirety with emphasis on diet, medications, follow-up appointments and reasons to return to the ED or contact the [facility]. Education complete and patient voiced understanding. All questions answered. Discharge teaching complete.

## 2024-10-16 NOTE — PLAN OF CARE
Bed mobility SBA  Seated scoot SBA  Gait trained with RW SBA x60 feet slow pace\  Transferred to bedside chair with RW SBA    Recommend low intensity therapy at HI

## 2024-10-16 NOTE — PT/OT/SLP PROGRESS
Occupational Therapy   Treatment    Name: Glo Dumont  MRN: [id number]  Admitting Diagnosis:  Acute kidney injury superimposed on CKD       Recommendations:     Discharge Recommendations: Low Intensity Therapy  Discharge Equipment Recommendations:  walker, rolling  Barriers to discharge:       Assessment:     Glo Dumont is a 88 y.o. female with a medical diagnosis of Acute kidney injury superimposed on CKD. Performance deficits affecting function are weakness, gait instability, decreased upper extremity function, decreased lower extremity function, impaired balance, impaired endurance, decreased safety awareness, impaired self care skills, impaired functional mobility.     Rehab Prognosis:  Good; patient would benefit from acute skilled OT services to address these deficits and reach maximum level of function.       Plan:     Patient to be seen 2 x/week to address the above listed problems via self-care/home management, therapeutic activities, therapeutic exercises  Plan of Care Expires: 10/28/24  Plan of Care Reviewed with: patient    Subjective     Chief Complaint: None stated  Patient/Family Comments/goals: return home  Pain/Comfort:  Pain Rating 1: 0/10  Pain Rating Post-Intervention 1: 0/10    Objective:     Communicated with: Nurse prior to session.  Patient found supine with telemetry, peripheral IV, bed alarm upon OT entry to room.    General Precautions: Standard, fall    Orthopedic Precautions:N/A  Braces: N/A  Respiratory Status: Room air     Occupational Performance:     Bed Mobility:    Patient completed Rolling/Turning to Right with stand by assistance  Patient completed Scooting/Bridging with stand by assistance  Patient completed Supine to Sit with stand by assistance     Functional Mobility/Transfers:  Patient completed Sit <> Stand Transfer with stand by assistance  with  rolling walker   Patient completed Bed <> Chair Transfer using Stand Pivot technique with stand by assistance with rolling  walker  Functional Mobility: Pt ambulated with RW SBA x60 feet with slow pace    Activities of Daily Living:  Upper Body Dressing: set-up bibiana chatterjee      [facility] 6 Click ADL: 21    Treatment & Education:  Pt eager to participate. Continues to demonstrate positive progress with skilled OT services. Pt transferred to bedside chair SBA with all needs met. Educated on importance of upright position to prevent pneumonia, and to continue with performing AROM exercises daily. Pt verbalized understanding.    Patient left up in chair with all lines intact, chair alarm on, and nurse notified    GOALS:   Multidisciplinary Problems       Occupational Therapy Goals          Problem: Occupational Therapy    Goal Priority Disciplines Outcome Interventions   Occupational Therapy Goal     OT, PT/OT Progressing    Description: O.T. goal to be met 10-28-24  Pt will tolerate 1 set x 15 reps b ue rom exercise  S with le dressing  S with toilet t/f's                       Time Tracking:     OT Date of Treatment: 10/16/24  OT Start Time: 1020  OT Stop Time: 1045  OT Total Time (min): 25 min    Billable Minutes:Therapeutic Activity 25    MARCELINO Rushing  OT/ALEXIA: OT          10/16/2024

## 2024-10-16 NOTE — PLAN OF CARE
Problem: Adult Inpatient Plan of Care  Goal: Plan of Care Review  Outcome: Adequate for Care Transition  Goal: Patient-Specific Goal (Individualized)  Outcome: Adequate for Care Transition  Goal: Absence of Hospital-Acquired Illness or Injury  Outcome: Adequate for Care Transition  Goal: Optimal Comfort and Wellbeing  Outcome: Adequate for Care Transition  Goal: Readiness for Transition of Care  Outcome: Adequate for Care Transition     Problem: Acute Kidney Injury/Impairment  Goal: Fluid and Electrolyte Balance  Outcome: Adequate for Care Transition  Goal: Improved Oral Intake  Outcome: Adequate for Care Transition  Goal: Effective Renal Function  Outcome: Adequate for Care Transition     Problem: Fall Injury Risk  Goal: Absence of Fall and Fall-Related Injury  Outcome: Adequate for Care Transition

## 2024-10-17 ENCOUNTER — PATIENT OUTREACH (OUTPATIENT)
Dept: ADMINISTRATIVE | Facility: CLINIC | Age: 89
End: 2024-10-17
Payer: MEDICARE

## 2024-10-17 ENCOUNTER — PATIENT MESSAGE (OUTPATIENT)
Dept: ADMINISTRATIVE | Facility: CLINIC | Age: 89
End: 2024-10-17
Payer: MEDICARE

## 2024-10-17 NOTE — PROGRESS NOTES
C3 nurse attempted to contact Glo Dumont for a TCC post hospital discharge follow up call. No answer. Left voicemail with callback information. The patient has a scheduled HOSFU appointment with Paula Deal on 10/21/2024 @ 1600.

## 2024-10-18 NOTE — PROGRESS NOTES
3rd Attempt made to reach patient for TCC call. No answer after several rings and no voicemail options.

## 2024-10-18 NOTE — PROGRESS NOTES
2nd attempt made to reach patient for TCC call. Left voicemail please call [phone] and leave first name, last name and  for Tone. I will return your call.

## 2024-10-21 ENCOUNTER — OFFICE VISIT (OUTPATIENT)
Dept: PRIMARY CARE CLINIC | Facility: CLINIC | Age: 89
End: 2024-10-21
Payer: MEDICARE

## 2024-10-21 ENCOUNTER — PATIENT MESSAGE (OUTPATIENT)
Dept: PRIMARY CARE CLINIC | Facility: CLINIC | Age: 89
End: 2024-10-21

## 2024-10-21 VITALS
HEIGHT: 61 IN | BODY MASS INDEX: 25.02 KG/M2 | SYSTOLIC BLOOD PRESSURE: 122 MMHG | DIASTOLIC BLOOD PRESSURE: 62 MMHG | WEIGHT: 132.5 LBS | TEMPERATURE: 98 F | HEART RATE: 62 BPM | OXYGEN SATURATION: 96 %

## 2024-10-21 DIAGNOSIS — N17.9 AKI (ACUTE KIDNEY INJURY): ICD-10-CM

## 2024-10-21 DIAGNOSIS — N18.4 CKD (CHRONIC KIDNEY DISEASE) STAGE 4, GFR 15-29 ML/MIN: Chronic | ICD-10-CM

## 2024-10-21 DIAGNOSIS — R53.1 GENERALIZED WEAKNESS: Primary | ICD-10-CM

## 2024-10-21 DIAGNOSIS — T24.221A: ICD-10-CM

## 2024-10-21 DIAGNOSIS — I50.32 CHRONIC HEART FAILURE WITH PRESERVED EJECTION FRACTION: Chronic | ICD-10-CM

## 2024-10-21 DIAGNOSIS — R29.6 RECURRENT FALLS: ICD-10-CM

## 2024-10-21 LAB
ANION GAP SERPL CALC-SCNC: 14 MMOL/L (ref 8–16)
BUN SERPL-MCNC: 84 MG/DL (ref 8–23)
CALCIUM SERPL-MCNC: 9.9 MG/DL (ref 8.7–10.5)
CHLORIDE SERPL-SCNC: 105 MMOL/L (ref 95–110)
CO2 SERPL-SCNC: 15 MMOL/L (ref 23–29)
CREAT SERPL-MCNC: 2.6 MG/DL (ref 0.5–1.4)
EST. GFR  (NO RACE VARIABLE): 17.4 ML/MIN/1.73 M^2
GLUCOSE SERPL-MCNC: 107 MG/DL (ref 70–110)
POTASSIUM SERPL-SCNC: 5.5 MMOL/L (ref 3.5–5.1)
SODIUM SERPL-SCNC: 134 MMOL/L (ref 136–145)

## 2024-10-21 PROCEDURE — 1157F ADVNC CARE PLAN IN RCRD: CPT | Mod: HCNC,CPTII,S$GLB, | Performed by: NURSE PRACTITIONER

## 2024-10-21 PROCEDURE — 99215 OFFICE O/P EST HI 40 MIN: CPT | Mod: HCNC,S$GLB,, | Performed by: NURSE PRACTITIONER

## 2024-10-21 PROCEDURE — 99999 PR PBB SHADOW E&M-EST. PATIENT-LVL V: CPT | Mod: PBBFAC,HCNC,, | Performed by: NURSE PRACTITIONER

## 2024-10-21 PROCEDURE — 3288F FALL RISK ASSESSMENT DOCD: CPT | Mod: HCNC,CPTII,S$GLB, | Performed by: NURSE PRACTITIONER

## 2024-10-21 PROCEDURE — 1125F AMNT PAIN NOTED PAIN PRSNT: CPT | Mod: HCNC,CPTII,S$GLB, | Performed by: NURSE PRACTITIONER

## 2024-10-21 PROCEDURE — 1101F PT FALLS ASSESS-DOCD LE1/YR: CPT | Mod: HCNC,CPTII,S$GLB, | Performed by: NURSE PRACTITIONER

## 2024-10-21 PROCEDURE — 80048 BASIC METABOLIC PNL TOTAL CA: CPT | Mod: HCNC | Performed by: NURSE PRACTITIONER

## 2024-10-21 NOTE — PROGRESS NOTES
Glo Dumont  10/21/2024  [id number]    Christina Cardona MD  Patient Care Team:  Christina Cardona MD as PCP - General (Internal Medicine)  Gordo Muir MD as Consulting Physician (Pulmonary Disease)  Nik Bergman MD (Inactive) as Consulting Physician (Cardiology)  Franky Bell Jr., MD (Rheumatology)  Julio Galindo MD as Consulting Physician (Gastroenterology)  Glenis Mcfadden MD as Consulting Physician (Otolaryngology)  Ayaz Estrada MD as Consulting Physician (Hematology and Oncology)  Yomi Guy LPN as Care Coordinator  Bridgett Vargas PharmD as Hypertension Digital Medicine Clinician (Pharmacist)  Christina Cardona MD as Hypertension Digital Medicine Responsible Provider (Internal Medicine)  Felipe Herron as Digital Medicine Health   Medicare, [employer] Managed as Hypertension Digital Medicine Contract        [facility] Note      Chief Complaint:  Chief Complaint   Patient presents with    Follow-up     Follo -up  on fall    Fall    Foot Injury    Knee Injury         Assessment/Plan:  {There are no diagnoses linked to this encounter. (Refresh or delete this SmartLink)}      Worry Score: 3  History of Present Illness:          Last visit with Dr. Cardona 10/11/2024     Slipped in bathtub yesterday - did not hit head    Recent Fall:  []Yes  []No  Activity:  []Vigorous []Moderate []Sedentary  Appetite:  []Good  []Fair  []Poor  Mood: []Stable []Anxious []Depressed   Insomnia: []Yes  []No          Denies fever, chills, URI symptoms, chest pain, SOB, palpitations, vomiting, diarrhea, no gross neuro deficits, urinary symptoms and leg swelling.      The following were reviewed: Active problem list, medication list, allergies, family history, social history, and Health Maintenance.     History:  Past Medical History:   Diagnosis Date    Anemia     Angina pectoris     Anxiety     Anxiety and depression     Arthritis     hip    Carotid artery occlusion     Carpal  tunnel syndrome 06/23/2008    emg    Chronic diarrhea     work up in 2011 with EGD, CS and VCE    CKD (chronic kidney disease) stage 3, GFR 30-59 ml/min 5/11/2017    Colitis     Coronary artery disease     Coronary artery disease     Diastolic dysfunction     Diverticulosis     Encephalopathy 10/14/2024    Glaucoma     Greater trochanteric bursitis 2/10/2015    Grief at loss of child 1/26/2016    H/O carotid endarterectomy 12/2/2013    Heart failure     History of coronary angioplasty 3/11/2014    Hypercholesteremia     Hypertension     Liver cyst 02/08/2013    ct abd    Macular degeneration     Obesity with serious comorbidity 3/19/2023    Primary open-angle glaucoma(365.11) 9/3/2013    Renal cyst 02/08/2013    ct abd    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    Sarcoidosis     Sarcoidosis of lung     Sickle cell trait     Uveitis      Past Surgical History:   Procedure Laterality Date    A-V CARDIAC PACEMAKER INSERTION Left 3/21/2023    Procedure: INSERTION, CARDIAC PACEMAKER, DUAL CHAMBER/His Lead;  Surgeon: Cortez Ambrose MD;  Location: [facility] CATH LAB;  Service: Cardiology;  Laterality: Left;  MDT/ MD to confirm in am/possible nurse sedate    CAROTID ENDARTERECTOMY Right 2000s    CATARACT EXTRACTION Bilateral     Dr. Liang Dennis    CHOLECYSTECTOMY      laparoscopic, 3/18.    CORONARY ANGIOPLASTY WITH STENT PLACEMENT  11/19/2010    RCA-HALIE 2010    Lathia    JOINT REPLACEMENT Left 11/03/2014    Dr. Braun    TOTAL ABDOMINAL HYSTERECTOMY W/ BILATERAL SALPINGOOPHORECTOMY  1972     Family History   Problem Relation Name Age of Onset    Hypertension Mother      Heart failure Mother      Cancer Father          prostate    Cirrhosis Brother      Heart failure Brother      Cancer Daughter          Carcinoid     Patient Active Problem List   Diagnosis    Sarcoidosis of lung    Thyroid nodule    Hypertensive heart disease with heart failure    Other hyperlipidemia    Hemoglobin A-S genotype    Ptosis     Coronary artery disease, occlusive    History of central retinal artery occlusion    Iron deficiency anemia    Vitamin D deficiency    Osteopenia    Essential hypertension    Diastolic heart failure, NYHA class 3    Atherosclerosis of aorta    CKD (chronic kidney disease) stage 4, GFR 15-29 ml/min    Uveitis    Ulcerative colitis    History of coronary angioplasty    Hiatal hernia    Bradycardia    Lung nodule    Memory loss    Central retinal vein occlusion with macular edema of both eyes    Aortic stenosis    Metabolic acidosis    Weight loss    Unspecified severe protein-calorie malnutrition    Hypokalemia    Hypomagnesemia    SA node dysfunction    S/P placement of cardiac pacemaker    Current mild episode of major depressive disorder without prior episode    AV block, 2nd degree    Gastroesophageal reflux disease with esophagitis    S/P CABG x 3    Thrombocytosis, unspecified    Dehydration    Acute low back pain    Chronic heart failure with preserved ejection fraction    Secondary hyperparathyroidism of renal origin    Acute cystitis    Pancreatic insufficiency    Generalized weakness    Coronary artery disease involving native coronary artery of native heart    Elevated troponin    Acute kidney injury superimposed on CKD    Confusion    Encephalopathy     Review of patient's allergies indicates:   Allergen Reactions    Lisinopril      angioedema    Codeine Nausea And Vomiting       Medications:  Current Outpatient Medications on File Prior to Visit   Medication Sig Dispense Refill    aflibercept 2 mg/0.05 mL Soln 2 mg by Intravitreal route every 28 days.      amLODIPine (NORVASC) 2.5 MG tablet Take 10 mg by mouth every morning.      aspirin (ECOTRIN) 81 MG EC tablet Take 1 tablet (81 mg total) by mouth once daily. 90 tablet 3    atorvastatin (LIPITOR) 40 MG tablet Take 1 tablet (40 mg total) by mouth Daily. 90 tablet 3    budesonide (ENTOCORT EC) 3 mg capsule Take 1-2 capsules (3-6 mg total) by mouth  daily as needed (flare). As needed 60 each 2    bumetanide (BUMEX) 1 MG tablet Take 1 tablet (1 mg total) by mouth once daily. 30 tablet 11    busPIRone (BUSPAR) 5 MG Tab Take 1 tablet (5 mg total) by mouth 2 (two) times daily as needed (anxiety). 60 tablet 5    carvediloL (COREG) 12.5 MG tablet Take 1 tablet (12.5 mg total) by mouth 2 (two) times daily with meals. 180 tablet 3    citalopram (CELEXA) 10 MG tablet Take 1 tablet (10 mg total) by mouth every evening. 90 tablet 3    cyproheptadine (PERIACTIN) 4 mg tablet Take 1 tablet (4 mg total) by mouth 3 (three) times daily as needed (appetite). 270 tablet 1    dexAMETHasone (OZURDEX) 0.7 mg Impl intravitreal implant 0.7 mg by Intravitreal route once.      dorzolamide-timolol 2-0.5% (COSOPT) 22.3-6.8 mg/mL ophthalmic solution INSTILL 1 DROP IN BOTH EYES TWICE DAILY 10 mL 6    empagliflozin (JARDIANCE) 10 mg tablet Take 10 mg by mouth once daily.      FOLIC ACID/MULTIVIT-MIN/LUTEIN (CENTRUM SILVER ORAL) Take 1 tablet by mouth once daily.      isosorbide mononitrate (IMDUR) 30 MG 24 hr tablet Take 1 tablet (30 mg total) by mouth once daily. 30 tablet 11    lipase-protease-amylase 24,000-76,000-120,000 units (CREON) 24,000-76,000 -120,000 unit capsule Take 1 capsule by mouth 3 (three) times daily with meals. 270 capsule 3    loperamide (IMODIUM) 2 mg capsule Take 1 mg by mouth every 6 (six) hours as needed for Diarrhea.      metOLazone (ZAROXOLYN) 2.5 MG tablet Take on Mon      nitroGLYCERIN (NITROSTAT) 0.4 MG SL tablet PLACE ONE TABLET UNDERNEATH THE TONGUE EVERY 5 MINUTES AS NEEDED FOR CHEST PAIN 25 tablet 2    ondansetron (ZOFRAN) 4 MG tablet Take 1 tablet (4 mg total) by mouth every 8 (eight) hours as needed for Nausea. 12 tablet 0    ondansetron (ZOFRAN-ODT) 8 MG TbDL Take 8 mg by mouth every 6 (six) hours.      pantoprazole (PROTONIX) 40 MG tablet Take 1 tablet (40 mg total) by mouth once daily. 90 tablet 3    potassium chloride (KLOR-CON) 10 MEQ TbSR Take 1  tablet (10 mEq total) by mouth once daily. 90 tablet 3    sodium bicarbonate 650 MG tablet Take 2 tablets (1,300 mg total) by mouth 2 (two) times daily. 60 tablet 0     No current facility-administered medications on file prior to visit.       Medications have been reviewed and reconciled with patient at visit today.      Exam:  Vitals:    10/21/24 1316   BP: 122/62   Pulse: 62   Temp: 97.6 °F (36.4 °C)     Weight: 60.1 kg (132 lb 7.9 oz)   Body mass index is 25.03 kg/m².      BP Readings from Last 3 Encounters:   10/21/24 122/62   10/16/24 110/63   10/11/24 130/68     Wt Readings from Last 3 Encounters:   10/21/24 60.1 kg (132 lb 7.9 oz)   10/15/24 64.2 kg (141 lb 8.6 oz)   10/11/24 62.8 kg (138 lb 7.2 oz)         Physical Exam         Laboratory Reviewed:     Lab Results   Component Value Date    WBC 8.63 10/16/2024    HGB 13.2 10/16/2024    HCT 39.8 10/16/2024     10/16/2024    CHOL 153 10/14/2024    TRIG 148 10/14/2024    HDL 45 10/14/2024    ALT 15 10/16/2024    AST 14 10/16/2024     (L) 10/16/2024    K 4.0 10/16/2024     10/16/2024    CREATININE 2.1 (H) 10/16/2024    BUN 75 (H) 10/16/2024    CO2 16 (L) 10/16/2024    TSH 1.191 10/13/2024    INR 1.0 10/13/2024    HGBA1C 6.3 (H) 10/13/2024       Screening or Prevention Patient's value Goal Complete/Controlled?   HgA1C Testing and Control   Lab Results   Component Value Date    HGBA1C 6.3 (H) 10/13/2024      Annually/Less than 8% Yes   Lipid profile : 10/14/2024 Annually Yes   LDL control Lab Results   Component Value Date    LDLCALC 78.4 10/14/2024    Annually/Less than 100 mg/dl  Yes   Nephropathy screening Lab Results   Component Value Date    LABMICR 38.0 02/06/2014     Lab Results   Component Value Date    PROTEINUA Negative 10/13/2024    Annually Yes   Blood pressure BP Readings from Last 1 Encounters:   10/21/24 122/62    Less than 140/90 Yes   Dilated retinal exam Most Recent Eye Exam Date: Not Found Annually Yes   Foot exam   Most  Recent Foot Exam Date: Not Found Annually Yes       Health Maintenance  Health Maintenance Topics with due status: Not Due       Topic Last Completion Date    TETANUS VACCINE 02/03/2015    Lipid Panel 10/14/2024     Health Maintenance Due   Topic Date Due    Shingles Vaccine (1 of 2) Never done    RSV Vaccine (Age 60+ and Pregnant patients) (1 - 1-dose 75+ series) Never done    COVID-19 Vaccine (4 - 2024-25 season) 09/01/2024               -Patient's lab results were reviewed and discussed with patient  -Treatment options and alternatives were discussed with the patient. Patient expressed understanding. Patient was given the opportunity to ask questions and be an active participant in their medical care. Patient had no further questions or concerns at this time.         Future Appointments   Date Time Provider Department Center   11/8/2024  8:30 AM PACEMAKER CLINIC [facility] ARR PRO [facility]   11/11/2024 10:15 AM NAHUM Tamez MD [facility] OPHTHAL [facility]   11/21/2024  9:20 AM Stan Lui MD [facility] CARDIO [facility]   11/22/2024  2:20 PM Christina Cardona MD [facility]          After visit summary printed and given to patient upon discharge.  Patient goals and care plan are included in After visit summary.      The following issues were discussed: {There were no encounter diagnoses. (Refresh or delete this SmartLink)}    Health maintenance needs, recent test results and goals of care discussed with pt and questions answered.           Paula Deal, APRN, NP-C  [facility] [address] [place] [address]
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
## 2024-10-22 ENCOUNTER — CLINICAL SUPPORT (OUTPATIENT)
Dept: PRIMARY CARE CLINIC | Facility: CLINIC | Age: 89
End: 2024-10-22
Payer: MEDICARE

## 2024-10-22 VITALS
HEART RATE: 78 BPM | SYSTOLIC BLOOD PRESSURE: 139 MMHG | DIASTOLIC BLOOD PRESSURE: 78 MMHG | RESPIRATION RATE: 20 BRPM | OXYGEN SATURATION: 98 % | TEMPERATURE: 98 F

## 2024-10-22 DIAGNOSIS — E87.5 HYPERKALEMIA: ICD-10-CM

## 2024-10-22 DIAGNOSIS — N17.9 AKI (ACUTE KIDNEY INJURY): Primary | ICD-10-CM

## 2024-10-22 DIAGNOSIS — E87.20 METABOLIC ACIDOSIS: ICD-10-CM

## 2024-10-22 PROCEDURE — 99999 PR PBB SHADOW E&M-EST. PATIENT-LVL V: CPT | Mod: PBBFAC,HCNC,,

## 2024-10-22 RX ORDER — SODIUM BICARBONATE 650 MG/1
650 TABLET ORAL 2 TIMES DAILY
Qty: 180 TABLET | Refills: 0 | Status: SHIPPED | OUTPATIENT
Start: 2024-10-22 | End: 2025-10-22

## 2024-10-22 NOTE — PROGRESS NOTES
Latest Reference Range & Units 07/14/24 07:33 10/13/24 14:41 10/14/24 04:36   BUN 8 - 23 mg/dL 37 (H) 88 (H) 84 (H)   Creatinine 0.5 - 1.4 mg/dL 1.8 (H) 2.9 (H) 2.4 (H)   eGFR >60 mL/min/1.73 m^2 27 ! 15 ! 19 !      Latest Reference Range & Units 10/15/24 05:01 10/16/24 04:59   CO2 23 - 29 mmol/L 14 (L) 16 (L)   Anion Gap 8 - 16 mmol/L 12 13   BUN 8 - 23 mg/dL 69 (H) 75 (H)   Creatinine 0.5 - 1.4 mg/dL 2.2 (H) 2.1 (H)   eGFR >60 mL/min/1.73 m^2 21 ! 23 !     Discharged from hospital on 10/16/24 when treated for KEEGAN  Last 2 days had resumed Bumex 1 mg  Needs prescription for NaHCO3      BMP  Lab Results   Component Value Date     (L) 10/21/2024    K 5.5 (H) 10/21/2024     10/21/2024    CO2 15 (L) 10/21/2024    BUN 84 (H) 10/21/2024    CREATININE 2.6 (H) 10/21/2024    CALCIUM 9.9 10/21/2024    ANIONGAP 14 10/21/2024    EGFRNORACEVR 17.4 (A) 10/21/2024      Advised to stop potassium supplement, bumex 1 mg daily and metolazone

## 2024-10-23 ENCOUNTER — CLINICAL SUPPORT (OUTPATIENT)
Dept: PRIMARY CARE CLINIC | Facility: CLINIC | Age: 89
End: 2024-10-23
Payer: MEDICARE

## 2024-10-23 ENCOUNTER — PATIENT MESSAGE (OUTPATIENT)
Dept: PRIMARY CARE CLINIC | Facility: CLINIC | Age: 89
End: 2024-10-23

## 2024-10-23 VITALS
SYSTOLIC BLOOD PRESSURE: 138 MMHG | TEMPERATURE: 98 F | HEART RATE: 81 BPM | DIASTOLIC BLOOD PRESSURE: 75 MMHG | RESPIRATION RATE: 20 BRPM | OXYGEN SATURATION: 98 %

## 2024-10-23 DIAGNOSIS — N17.9 AKI (ACUTE KIDNEY INJURY): Primary | ICD-10-CM

## 2024-10-23 DIAGNOSIS — N18.9 ACUTE KIDNEY INJURY SUPERIMPOSED ON CKD: ICD-10-CM

## 2024-10-23 DIAGNOSIS — N18.4 CKD (CHRONIC KIDNEY DISEASE) STAGE 4, GFR 15-29 ML/MIN: Primary | Chronic | ICD-10-CM

## 2024-10-23 DIAGNOSIS — I50.30 DIASTOLIC HEART FAILURE, NYHA CLASS 3: ICD-10-CM

## 2024-10-23 DIAGNOSIS — N17.9 ACUTE KIDNEY INJURY SUPERIMPOSED ON CKD: ICD-10-CM

## 2024-10-23 PROCEDURE — 99999 PR PBB SHADOW E&M-EST. PATIENT-LVL II: CPT | Mod: PBBFAC,HCNC,,

## 2024-10-23 RX ORDER — HYDROCODONE BITARTRATE AND ACETAMINOPHEN 5; 325 MG/1; MG/1
TABLET ORAL
Qty: 10 TABLET | Refills: 0 | Status: SHIPPED | OUTPATIENT
Start: 2024-10-23

## 2024-10-23 RX ORDER — HYDROCODONE BITARTRATE AND ACETAMINOPHEN 5; 325 MG/1; MG/1
TABLET ORAL
Qty: 10 TABLET | Refills: 0 | Status: SHIPPED | OUTPATIENT
Start: 2024-10-23 | End: 2024-10-23 | Stop reason: SDUPTHER

## 2024-10-23 NOTE — PROGRESS NOTES
Pt identified by name & . 24g IV cathilon to Left wrist x1 attempt per ANIBAL Knox. 500ml NS infusing w/o difficulty @ 250ml/hr. Pt tolerated well.  Pt c/o pain to bilateral knees and R foot.  Pt states she is elevating legs and taking tylenol for pain, but not getting any relief.

## 2024-10-23 NOTE — PROGRESS NOTES
Treating KEEGAN superimposed on CKD    500 ml bolus ordered today  10/24/25 - will need BMP and BNP    Per Dr. Cardona - she spoke with daughter and advised Bumex 1 mg twice weekly.    Paula Deal NP  [facility]

## 2024-10-23 NOTE — PROGRESS NOTES
24g IV removed with cath tip intact. IV site w/o redness or swelling. Pressure dressing applied. Pt tolerated well.     Pt. D/c'd via wheelchair to care of daughter.

## 2024-10-24 ENCOUNTER — LAB VISIT (OUTPATIENT)
Dept: LAB | Facility: HOSPITAL | Age: 89
End: 2024-10-24
Attending: INTERNAL MEDICINE
Payer: MEDICARE

## 2024-10-24 DIAGNOSIS — N18.9 ACUTE KIDNEY INJURY SUPERIMPOSED ON CKD: ICD-10-CM

## 2024-10-24 DIAGNOSIS — N17.9 ACUTE KIDNEY INJURY SUPERIMPOSED ON CKD: ICD-10-CM

## 2024-10-24 DIAGNOSIS — I50.30 DIASTOLIC HEART FAILURE, NYHA CLASS 3: ICD-10-CM

## 2024-10-24 PROBLEM — T24.221A: Status: ACTIVE | Noted: 2024-10-24

## 2024-10-24 LAB
ANION GAP SERPL CALC-SCNC: 11 MMOL/L (ref 8–16)
BNP SERPL-MCNC: 803 PG/ML (ref 0–99)
BUN SERPL-MCNC: 72 MG/DL (ref 8–23)
CALCIUM SERPL-MCNC: 9.7 MG/DL (ref 8.7–10.5)
CHLORIDE SERPL-SCNC: 113 MMOL/L (ref 95–110)
CO2 SERPL-SCNC: 12 MMOL/L (ref 23–29)
CREAT SERPL-MCNC: 2.2 MG/DL (ref 0.5–1.4)
EST. GFR  (NO RACE VARIABLE): 21.3 ML/MIN/1.73 M^2
GLUCOSE SERPL-MCNC: 108 MG/DL (ref 70–110)
POTASSIUM SERPL-SCNC: 4.8 MMOL/L (ref 3.5–5.1)
SODIUM SERPL-SCNC: 136 MMOL/L (ref 136–145)

## 2024-10-24 PROCEDURE — 36415 COLL VENOUS BLD VENIPUNCTURE: CPT | Mod: HCNC,PO | Performed by: NURSE PRACTITIONER

## 2024-10-24 PROCEDURE — 80048 BASIC METABOLIC PNL TOTAL CA: CPT | Mod: HCNC | Performed by: NURSE PRACTITIONER

## 2024-10-24 PROCEDURE — 83880 ASSAY OF NATRIURETIC PEPTIDE: CPT | Mod: HCNC | Performed by: NURSE PRACTITIONER
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
## 2024-10-25 ENCOUNTER — PATIENT MESSAGE (OUTPATIENT)
Dept: PRIMARY CARE CLINIC | Facility: CLINIC | Age: 89
End: 2024-10-25
Payer: MEDICARE

## 2024-10-25 ENCOUNTER — HOSPITAL ENCOUNTER (EMERGENCY)
Facility: HOSPITAL | Age: 89
Discharge: HOME | End: 2024-10-25
Attending: EMERGENCY MEDICINE
Payer: MEDICARE

## 2024-10-25 VITALS
HEIGHT: 61 IN | DIASTOLIC BLOOD PRESSURE: 90 MMHG | OXYGEN SATURATION: 96 % | RESPIRATION RATE: 18 BRPM | HEART RATE: 78 BPM | TEMPERATURE: 98 F | BODY MASS INDEX: 25.03 KG/M2 | SYSTOLIC BLOOD PRESSURE: 127 MMHG

## 2024-10-25 DIAGNOSIS — R79.89 ELEVATED BRAIN NATRIURETIC PEPTIDE (BNP) LEVEL: ICD-10-CM

## 2024-10-25 DIAGNOSIS — T24.221A: Primary | ICD-10-CM

## 2024-10-25 DIAGNOSIS — R06.02 SHORTNESS OF BREATH: ICD-10-CM

## 2024-10-25 DIAGNOSIS — T24.239A: Primary | ICD-10-CM

## 2024-10-25 DIAGNOSIS — L03.119 CELLULITIS OF LOWER EXTREMITY, UNSPECIFIED LATERALITY: ICD-10-CM

## 2024-10-25 LAB
ALBUMIN SERPL BCP-MCNC: 3.3 G/DL (ref 3.5–5.2)
ALP SERPL-CCNC: 81 U/L (ref 40–150)
ALT SERPL W/O P-5'-P-CCNC: 13 U/L (ref 10–44)
ANION GAP SERPL CALC-SCNC: 12 MMOL/L (ref 8–16)
AST SERPL-CCNC: 15 U/L (ref 10–40)
BASOPHILS # BLD AUTO: 0.08 K/UL (ref 0–0.2)
BASOPHILS NFR BLD: 0.6 % (ref 0–1.9)
BILIRUB SERPL-MCNC: 0.5 MG/DL (ref 0.1–1)
BNP SERPL-MCNC: 488 PG/ML (ref 0–99)
BUN SERPL-MCNC: 67 MG/DL (ref 8–23)
CALCIUM SERPL-MCNC: 9.7 MG/DL (ref 8.7–10.5)
CHLORIDE SERPL-SCNC: 109 MMOL/L (ref 95–110)
CO2 SERPL-SCNC: 15 MMOL/L (ref 23–29)
CREAT SERPL-MCNC: 2.2 MG/DL (ref 0.5–1.4)
DIFFERENTIAL METHOD BLD: ABNORMAL
EOSINOPHIL # BLD AUTO: 0.1 K/UL (ref 0–0.5)
EOSINOPHIL NFR BLD: 0.6 % (ref 0–8)
ERYTHROCYTE [DISTWIDTH] IN BLOOD BY AUTOMATED COUNT: 15.3 % (ref 11.5–14.5)
EST. GFR  (NO RACE VARIABLE): 21 ML/MIN/1.73 M^2
GLUCOSE SERPL-MCNC: 134 MG/DL (ref 70–110)
HCT VFR BLD AUTO: 34.6 % (ref 37–48.5)
HGB BLD-MCNC: 11.4 G/DL (ref 12–16)
IMM GRANULOCYTES # BLD AUTO: 0.48 K/UL (ref 0–0.04)
IMM GRANULOCYTES NFR BLD AUTO: 3.7 % (ref 0–0.5)
LYMPHOCYTES # BLD AUTO: 0.7 K/UL (ref 1–4.8)
LYMPHOCYTES NFR BLD: 5 % (ref 18–48)
MCH RBC QN AUTO: 28.7 PG (ref 27–31)
MCHC RBC AUTO-ENTMCNC: 32.9 G/DL (ref 32–36)
MCV RBC AUTO: 87 FL (ref 82–98)
MONOCYTES # BLD AUTO: 1.2 K/UL (ref 0.3–1)
MONOCYTES NFR BLD: 9.4 % (ref 4–15)
NEUTROPHILS # BLD AUTO: 10.4 K/UL (ref 1.8–7.7)
NEUTROPHILS NFR BLD: 80.7 % (ref 38–73)
NRBC BLD-RTO: 0 /100 WBC
PLATELET # BLD AUTO: 270 K/UL (ref 150–450)
PMV BLD AUTO: 9.6 FL (ref 9.2–12.9)
POTASSIUM SERPL-SCNC: 4.6 MMOL/L (ref 3.5–5.1)
PROT SERPL-MCNC: 7.4 G/DL (ref 6–8.4)
RBC # BLD AUTO: 3.97 M/UL (ref 4–5.4)
SODIUM SERPL-SCNC: 136 MMOL/L (ref 136–145)
TROPONIN I SERPL DL<=0.01 NG/ML-MCNC: 0.04 NG/ML (ref 0–0.03)
WBC # BLD AUTO: 12.88 K/UL (ref 3.9–12.7)

## 2024-10-25 PROCEDURE — 99285 EMERGENCY DEPT VISIT HI MDM: CPT | Mod: 25,HCNC

## 2024-10-25 PROCEDURE — 85025 COMPLETE CBC W/AUTO DIFF WBC: CPT | Mod: HCNC | Performed by: PHYSICIAN ASSISTANT

## 2024-10-25 PROCEDURE — 25000003 PHARM REV CODE 250: Mod: HCNC | Performed by: EMERGENCY MEDICINE

## 2024-10-25 PROCEDURE — 83880 ASSAY OF NATRIURETIC PEPTIDE: CPT | Mod: HCNC | Performed by: PHYSICIAN ASSISTANT

## 2024-10-25 PROCEDURE — 93005 ELECTROCARDIOGRAM TRACING: CPT | Mod: HCNC

## 2024-10-25 PROCEDURE — 80053 COMPREHEN METABOLIC PANEL: CPT | Mod: HCNC | Performed by: PHYSICIAN ASSISTANT

## 2024-10-25 PROCEDURE — 96375 TX/PRO/DX INJ NEW DRUG ADDON: CPT | Mod: HCNC

## 2024-10-25 PROCEDURE — 96374 THER/PROPH/DIAG INJ IV PUSH: CPT | Mod: HCNC

## 2024-10-25 PROCEDURE — 93010 ELECTROCARDIOGRAM REPORT: CPT | Mod: HCNC,,, | Performed by: INTERNAL MEDICINE

## 2024-10-25 PROCEDURE — 63600175 PHARM REV CODE 636 W HCPCS: Mod: HCNC | Performed by: EMERGENCY MEDICINE

## 2024-10-25 PROCEDURE — 84484 ASSAY OF TROPONIN QUANT: CPT | Mod: HCNC | Performed by: PHYSICIAN ASSISTANT

## 2024-10-25 RX ORDER — MORPHINE SULFATE 4 MG/ML
4 INJECTION, SOLUTION INTRAMUSCULAR; INTRAVENOUS
Status: COMPLETED | OUTPATIENT
Start: 2024-10-25 | End: 2024-10-25

## 2024-10-25 RX ORDER — SILVER SULFADIAZINE 10 G/1000G
1 CREAM TOPICAL
Status: COMPLETED | OUTPATIENT
Start: 2024-10-25 | End: 2024-10-25

## 2024-10-25 RX ORDER — ONDANSETRON HYDROCHLORIDE 2 MG/ML
4 INJECTION, SOLUTION INTRAVENOUS
Status: COMPLETED | OUTPATIENT
Start: 2024-10-25 | End: 2024-10-25

## 2024-10-25 RX ORDER — SILVER SULFADIAZINE 10 G/1000G
CREAM TOPICAL 2 TIMES DAILY
Qty: 30 G | Refills: 0 | Status: SHIPPED | OUTPATIENT
Start: 2024-10-25

## 2024-10-25 RX ORDER — SILVER SULFADIAZINE 10 G/1000G
CREAM TOPICAL 2 TIMES DAILY
Qty: 30 G | Refills: 0 | Status: SHIPPED | OUTPATIENT
Start: 2024-10-25 | End: 2024-10-25

## 2024-10-25 RX ORDER — CEPHALEXIN 500 MG/1
500 CAPSULE ORAL 4 TIMES DAILY
Qty: 20 CAPSULE | Refills: 0 | Status: SHIPPED | OUTPATIENT
Start: 2024-10-25 | End: 2024-10-31

## 2024-10-25 RX ADMIN — MORPHINE SULFATE 4 MG: 4 INJECTION INTRAVENOUS at 09:10

## 2024-10-25 RX ADMIN — ONDANSETRON 4 MG: 2 INJECTION INTRAMUSCULAR; INTRAVENOUS at 09:10

## 2024-10-25 RX ADMIN — SILVER SULFADIAZINE 1 TUBE: 10 CREAM TOPICAL at 09:10

## 2024-10-27 ENCOUNTER — TELEPHONE (OUTPATIENT)
Dept: ADMINISTRATIVE | Facility: CLINIC | Age: 89
End: 2024-10-27
Payer: MEDICARE

## 2024-10-27 ENCOUNTER — PATIENT OUTREACH (OUTPATIENT)
Dept: ADMINISTRATIVE | Facility: OTHER | Age: 89
End: 2024-10-27
Payer: MEDICARE

## 2024-10-27 LAB
OHS QRS DURATION: 80 MS
OHS QTC CALCULATION: 406 MS

## 2024-10-28 ENCOUNTER — TELEPHONE (OUTPATIENT)
Dept: PRIMARY CARE CLINIC | Facility: CLINIC | Age: 89
End: 2024-10-28
Payer: MEDICARE

## 2024-10-28 ENCOUNTER — DOCUMENTATION ONLY (OUTPATIENT)
Dept: CARDIOLOGY | Facility: CLINIC | Age: 89
End: 2024-10-28
Payer: MEDICARE

## 2024-10-29 ENCOUNTER — TELEPHONE (OUTPATIENT)
Dept: PRIMARY CARE CLINIC | Facility: CLINIC | Age: 89
End: 2024-10-29
Payer: MEDICARE

## 2024-10-29 ENCOUNTER — OFFICE VISIT (OUTPATIENT)
Dept: PRIMARY CARE CLINIC | Facility: CLINIC | Age: 89
End: 2024-10-29
Payer: MEDICARE

## 2024-10-29 DIAGNOSIS — E87.20 METABOLIC ACIDOSIS: ICD-10-CM

## 2024-10-29 DIAGNOSIS — N17.9 AKI (ACUTE KIDNEY INJURY): ICD-10-CM

## 2024-10-29 DIAGNOSIS — T24.221A: ICD-10-CM

## 2024-10-29 DIAGNOSIS — N18.4 CKD (CHRONIC KIDNEY DISEASE) STAGE 4, GFR 15-29 ML/MIN: Chronic | ICD-10-CM

## 2024-10-29 DIAGNOSIS — I50.32 CHRONIC HEART FAILURE WITH PRESERVED EJECTION FRACTION: Primary | Chronic | ICD-10-CM

## 2024-10-29 RX ORDER — SODIUM BICARBONATE 650 MG/1
650 TABLET ORAL DAILY
Qty: 180 TABLET | Refills: 0 | Status: SHIPPED | OUTPATIENT
Start: 2024-10-29 | End: 2025-10-29
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
## 2024-10-30 ENCOUNTER — TELEPHONE (OUTPATIENT)
Dept: PRIMARY CARE CLINIC | Facility: CLINIC | Age: 89
End: 2024-10-30
Payer: MEDICARE

## 2024-10-30 DIAGNOSIS — R29.6 RECURRENT FALLS: Primary | ICD-10-CM

## 2024-10-31 ENCOUNTER — OFFICE VISIT (OUTPATIENT)
Dept: CARDIOLOGY | Facility: CLINIC | Age: 89
End: 2024-10-31
Payer: MEDICARE

## 2024-10-31 VITALS
DIASTOLIC BLOOD PRESSURE: 60 MMHG | HEIGHT: 61 IN | BODY MASS INDEX: 25.02 KG/M2 | HEART RATE: 77 BPM | WEIGHT: 132.5 LBS | SYSTOLIC BLOOD PRESSURE: 90 MMHG | OXYGEN SATURATION: 97 %

## 2024-10-31 DIAGNOSIS — I10 PRIMARY HYPERTENSION: ICD-10-CM

## 2024-10-31 DIAGNOSIS — Z95.1 S/P CABG X 3: Primary | ICD-10-CM

## 2024-10-31 DIAGNOSIS — N18.9 CHRONIC KIDNEY DISEASE, UNSPECIFIED CKD STAGE: ICD-10-CM

## 2024-10-31 DIAGNOSIS — I25.10 ATHEROSCLEROSIS OF NATIVE CORONARY ARTERY OF NATIVE HEART WITHOUT ANGINA PECTORIS: ICD-10-CM

## 2024-10-31 DIAGNOSIS — I50.32 CHRONIC HEART FAILURE WITH PRESERVED EJECTION FRACTION: ICD-10-CM

## 2024-10-31 DIAGNOSIS — Z95.0 PRESENCE OF CARDIAC PACEMAKER: ICD-10-CM

## 2024-10-31 PROCEDURE — 99999 PR PBB SHADOW E&M-EST. PATIENT-LVL IV: CPT | Mod: PBBFAC,HCNC,, | Performed by: INTERNAL MEDICINE

## 2024-10-31 PROCEDURE — 3288F FALL RISK ASSESSMENT DOCD: CPT | Mod: HCNC,CPTII,S$GLB, | Performed by: INTERNAL MEDICINE

## 2024-10-31 PROCEDURE — 1126F AMNT PAIN NOTED NONE PRSNT: CPT | Mod: HCNC,CPTII,S$GLB, | Performed by: INTERNAL MEDICINE

## 2024-10-31 PROCEDURE — 1159F MED LIST DOCD IN RCRD: CPT | Mod: HCNC,CPTII,S$GLB, | Performed by: INTERNAL MEDICINE

## 2024-10-31 PROCEDURE — 1101F PT FALLS ASSESS-DOCD LE1/YR: CPT | Mod: HCNC,CPTII,S$GLB, | Performed by: INTERNAL MEDICINE

## 2024-10-31 PROCEDURE — 99214 OFFICE O/P EST MOD 30 MIN: CPT | Mod: HCNC,S$GLB,, | Performed by: INTERNAL MEDICINE

## 2024-10-31 PROCEDURE — 1157F ADVNC CARE PLAN IN RCRD: CPT | Mod: HCNC,CPTII,S$GLB, | Performed by: INTERNAL MEDICINE
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
## 2024-11-01 ENCOUNTER — PATIENT MESSAGE (OUTPATIENT)
Dept: CARDIOLOGY | Facility: CLINIC | Age: 89
End: 2024-11-01
Payer: MEDICARE

## 2024-11-01 ENCOUNTER — LAB VISIT (OUTPATIENT)
Dept: LAB | Facility: HOSPITAL | Age: 89
End: 2024-11-01
Attending: INTERNAL MEDICINE
Payer: MEDICARE

## 2024-11-01 DIAGNOSIS — N17.9 ACUTE KIDNEY FAILURE, UNSPECIFIED: Primary | ICD-10-CM

## 2024-11-01 LAB
ANION GAP SERPL CALC-SCNC: 14 MMOL/L (ref 8–16)
ANISOCYTOSIS BLD QL SMEAR: SLIGHT
BASOPHILS NFR BLD: 0 % (ref 0–1.9)
BUN SERPL-MCNC: 63 MG/DL (ref 8–23)
BURR CELLS BLD QL SMEAR: ABNORMAL
CALCIUM SERPL-MCNC: 9.4 MG/DL (ref 8.7–10.5)
CHLORIDE SERPL-SCNC: 104 MMOL/L (ref 95–110)
CO2 SERPL-SCNC: 15 MMOL/L (ref 23–29)
CREAT SERPL-MCNC: 1.9 MG/DL (ref 0.5–1.4)
DIFFERENTIAL METHOD BLD: ABNORMAL
EOSINOPHIL NFR BLD: 1 % (ref 0–8)
ERYTHROCYTE [DISTWIDTH] IN BLOOD BY AUTOMATED COUNT: 15.7 % (ref 11.5–14.5)
EST. GFR  (NO RACE VARIABLE): 25.2 ML/MIN/1.73 M^2
GLUCOSE SERPL-MCNC: 115 MG/DL (ref 70–110)
HCT VFR BLD AUTO: 34.3 % (ref 37–48.5)
HGB BLD-MCNC: 11.3 G/DL (ref 12–16)
IMM GRANULOCYTES # BLD AUTO: ABNORMAL K/UL (ref 0–0.04)
IMM GRANULOCYTES NFR BLD AUTO: ABNORMAL % (ref 0–0.5)
LYMPHOCYTES NFR BLD: 3 % (ref 18–48)
MCH RBC QN AUTO: 29.6 PG (ref 27–31)
MCHC RBC AUTO-ENTMCNC: 32.9 G/DL (ref 32–36)
MCV RBC AUTO: 90 FL (ref 82–98)
MONOCYTES NFR BLD: 7 % (ref 4–15)
NEUTROPHILS NFR BLD: 89 % (ref 38–73)
NRBC BLD-RTO: 0 /100 WBC
OVALOCYTES BLD QL SMEAR: ABNORMAL
PLATELET # BLD AUTO: 378 K/UL (ref 150–450)
PLATELET BLD QL SMEAR: ABNORMAL
PMV BLD AUTO: 10.3 FL (ref 9.2–12.9)
POIKILOCYTOSIS BLD QL SMEAR: SLIGHT
POTASSIUM SERPL-SCNC: 4.6 MMOL/L (ref 3.5–5.1)
RBC # BLD AUTO: 3.82 M/UL (ref 4–5.4)
SODIUM SERPL-SCNC: 133 MMOL/L (ref 136–145)
SPHEROCYTES BLD QL SMEAR: ABNORMAL
WBC # BLD AUTO: 16.98 K/UL (ref 3.9–12.7)

## 2024-11-01 PROCEDURE — 80048 BASIC METABOLIC PNL TOTAL CA: CPT | Mod: HCNC | Performed by: INTERNAL MEDICINE

## 2024-11-01 PROCEDURE — 36415 COLL VENOUS BLD VENIPUNCTURE: CPT | Mod: HCNC,PN | Performed by: INTERNAL MEDICINE

## 2024-11-01 PROCEDURE — 85027 COMPLETE CBC AUTOMATED: CPT | Mod: HCNC | Performed by: INTERNAL MEDICINE

## 2024-11-01 PROCEDURE — 85007 BL SMEAR W/DIFF WBC COUNT: CPT | Mod: HCNC | Performed by: INTERNAL MEDICINE

## 2024-11-05 NOTE — ASSESSMENT & PLAN NOTE
Latest Reference Range & Units 10/21/24 14:39 10/24/24 09:19 10/25/24 16:31 11/01/24 09:50   Creatinine 0.5 - 1.4 mg/dL 2.6 (H) 2.2 (H) 2.2 (H) 1.9 (H)   eGFR >60 mL/min/1.73 m^2 17.4 ! 21.3 ! 21 ! 25.2 !   Stable - improved

## 2024-11-06 ENCOUNTER — OFFICE VISIT (OUTPATIENT)
Dept: PRIMARY CARE CLINIC | Facility: CLINIC | Age: 89
End: 2024-11-06
Payer: MEDICARE

## 2024-11-06 ENCOUNTER — PATIENT MESSAGE (OUTPATIENT)
Dept: OPHTHALMOLOGY | Facility: CLINIC | Age: 89
End: 2024-11-06
Payer: MEDICARE

## 2024-11-06 ENCOUNTER — EXTERNAL HOME HEALTH (OUTPATIENT)
Dept: HOME HEALTH SERVICES | Facility: HOSPITAL | Age: 89
End: 2024-11-06
Payer: MEDICARE

## 2024-11-06 ENCOUNTER — PATIENT MESSAGE (OUTPATIENT)
Dept: PRIMARY CARE CLINIC | Facility: CLINIC | Age: 89
End: 2024-11-06

## 2024-11-06 VITALS
BODY MASS INDEX: 25.02 KG/M2 | TEMPERATURE: 97 F | OXYGEN SATURATION: 99 % | SYSTOLIC BLOOD PRESSURE: 98 MMHG | DIASTOLIC BLOOD PRESSURE: 62 MMHG | HEIGHT: 61 IN | WEIGHT: 132.5 LBS | HEART RATE: 65 BPM

## 2024-11-06 DIAGNOSIS — D86.0 SARCOIDOSIS OF LUNG: Chronic | ICD-10-CM

## 2024-11-06 DIAGNOSIS — T30.0 BURNS OF MULTIPLE SPECIFIED SITES: Primary | ICD-10-CM

## 2024-11-06 DIAGNOSIS — N18.4 CKD (CHRONIC KIDNEY DISEASE) STAGE 4, GFR 15-29 ML/MIN: Chronic | ICD-10-CM

## 2024-11-06 DIAGNOSIS — I10 ESSENTIAL HYPERTENSION: Chronic | ICD-10-CM

## 2024-11-06 DIAGNOSIS — I50.30 DIASTOLIC HEART FAILURE, NYHA CLASS 3: ICD-10-CM

## 2024-11-06 PROCEDURE — 1159F MED LIST DOCD IN RCRD: CPT | Mod: HCNC,CPTII,S$GLB, | Performed by: INTERNAL MEDICINE

## 2024-11-06 PROCEDURE — 1101F PT FALLS ASSESS-DOCD LE1/YR: CPT | Mod: HCNC,CPTII,S$GLB, | Performed by: INTERNAL MEDICINE

## 2024-11-06 PROCEDURE — 1157F ADVNC CARE PLAN IN RCRD: CPT | Mod: HCNC,CPTII,S$GLB, | Performed by: INTERNAL MEDICINE

## 2024-11-06 PROCEDURE — 3288F FALL RISK ASSESSMENT DOCD: CPT | Mod: HCNC,CPTII,S$GLB, | Performed by: INTERNAL MEDICINE

## 2024-11-06 PROCEDURE — 99999 PR PBB SHADOW E&M-EST. PATIENT-LVL V: CPT | Mod: PBBFAC,HCNC,, | Performed by: INTERNAL MEDICINE

## 2024-11-06 PROCEDURE — 1125F AMNT PAIN NOTED PAIN PRSNT: CPT | Mod: HCNC,CPTII,S$GLB, | Performed by: INTERNAL MEDICINE

## 2024-11-06 PROCEDURE — 99214 OFFICE O/P EST MOD 30 MIN: CPT | Mod: HCNC,S$GLB,, | Performed by: INTERNAL MEDICINE

## 2024-11-06 RX ORDER — HYDROCODONE BITARTRATE AND ACETAMINOPHEN 5; 325 MG/1; MG/1
TABLET ORAL
Qty: 20 TABLET | Refills: 0 | Status: SHIPPED | OUTPATIENT
Start: 2024-11-06

## 2024-11-08 ENCOUNTER — CLINICAL SUPPORT (OUTPATIENT)
Dept: CARDIOLOGY | Facility: HOSPITAL | Age: 89
End: 2024-11-08
Attending: INTERNAL MEDICINE
Payer: MEDICARE

## 2024-11-08 DIAGNOSIS — R00.1 BRADYCARDIA: ICD-10-CM

## 2024-11-08 DIAGNOSIS — I50.30 DIASTOLIC HEART FAILURE, NYHA CLASS 3: ICD-10-CM

## 2024-11-08 DIAGNOSIS — Z95.0 CARDIAC PACEMAKER IN SITU: Primary | ICD-10-CM

## 2024-11-08 DIAGNOSIS — I49.5 SA NODE DYSFUNCTION: ICD-10-CM

## 2024-11-08 DIAGNOSIS — Z95.0 CARDIAC PACEMAKER IN SITU: ICD-10-CM

## 2024-11-08 PROCEDURE — 93280 PM DEVICE PROGR EVAL DUAL: CPT | Mod: HCNC

## 2024-11-08 PROCEDURE — 99999 PR PBB SHADOW E&M-EST. PATIENT-LVL I: CPT | Mod: PBBFAC,HCNC,,

## 2024-11-14 ENCOUNTER — PROCEDURE VISIT (OUTPATIENT)
Dept: OPHTHALMOLOGY | Facility: CLINIC | Age: 89
End: 2024-11-14
Payer: MEDICARE

## 2024-11-14 DIAGNOSIS — H34.8130 CENTRAL RETINAL VEIN OCCLUSION WITH MACULAR EDEMA OF BOTH EYES: Primary | ICD-10-CM

## 2024-11-14 NOTE — PROGRESS NOTES
===============================  Glo Dumont,  2024 today   89 y.o. female   Last visit [facility]: :2024   Last visit eye dept. 2024  [facility]:  Corrected distance visual acuity was 20/100 in the right eye and 20/400 in the left eye.  Tonometry       Tonometry (Applanation, 1:34 PM)         Right Left    Pressure 13 11                  Not recorded       Not recorded       Not recorded       Chief Complaint   Patient presents with    CRVO/ME     OZURDEX OU     Ophthalmic Medications       Ophthalmic - Anti-inflammatory, Glucocorticoids Start End     dexAMETHasone (OZURDEX) 0.7 mg Impl intravitreal implant 2021 --    Si.7 mg by Intravitreal route once.    Class: Historical Med    Route: Intravitreal     dexAMETHasone (OZURDEX) intravitreal implant 2024 --     0.7 mg, Intravitreal, Clinic/HOD 1 time     dexAMETHasone (OZURDEX) intravitreal implant 2024 --     0.7 mg, Intravitreal, Clinic/HOD 1 time          ________________  2024 today  HPI     CRVO/ME            Comments: OZURDEX OU          Comments    1. Steroid responder glaucoma   2. PCIOL OU MGM   3. SARCOIDOSIS   H\O chronic UVEITIS OU   4. Central vein occlusion of retina, left   5. Retinal edema     H/o Avastin and Eylea OS   Had been getting Ozurdex OU with Dr. Shahrzad Tamez Eylea OU last 22, 8/3/22, 22  Ozurdex ou last 2022, 22, 22, 23, 23, 11/15/23   5/29/24 8/14/24      Cosopt BID OU             Last edited by Jessica Jensen on 2024  1:20 PM.      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      ===============================  11/14/2024  Diagnosis :  OU CRVO/ME  Today:   Ozurdex 0.7 mg , OU   Follow up: eval in  10-12   weeks  Instructed to call 24/7 for any worsening of vision. Check Both eyes daily. Gave patient my home phone number.  Risks, benefits, and alternatives to treatment discussed in detail with the patient.  The patient voiced understanding and wished to proceed with the procedure    Ozurdex intravitreal implant Procedure Note:   yes capsule intact?  Patient Identified and Time Out complete  Subconjunctival bleb - xylocaine with epi 2%   and Betadine.  Inject yes at 1mm parallel to limbus  Post Operative Dx: Same  Complications: None  Follow up as above.

## 2024-11-18 ENCOUNTER — TELEPHONE (OUTPATIENT)
Dept: ADMINISTRATIVE | Facility: CLINIC | Age: 89
End: 2024-11-18
Payer: MEDICARE

## 2024-11-18 NOTE — TELEPHONE ENCOUNTER
[facility] [phone] needs order to continue OT for pt, verbal given

## 2024-11-19 ENCOUNTER — TELEPHONE (OUTPATIENT)
Dept: PRIMARY CARE CLINIC | Facility: CLINIC | Age: 89
End: 2024-11-19
Payer: MEDICARE

## 2024-11-22 ENCOUNTER — DOCUMENT SCAN (OUTPATIENT)
Dept: HOME HEALTH SERVICES | Facility: HOSPITAL | Age: 89
End: 2024-11-22
Payer: MEDICARE

## 2024-11-24 ENCOUNTER — DOCUMENT SCAN (OUTPATIENT)
Dept: HOME HEALTH SERVICES | Facility: HOSPITAL | Age: 89
End: 2024-11-24
Payer: MEDICARE

## 2024-11-26 ENCOUNTER — OUTPATIENT CASE MANAGEMENT (OUTPATIENT)
Dept: ADMINISTRATIVE | Facility: OTHER | Age: 89
End: 2024-11-26
Payer: MEDICARE

## 2024-11-26 NOTE — LETTER
Glo Dumont  [address] [address]      Dear Glo Dumont,     I work with [facility] Outpatient Care Management Department. We received a referral to call you to discuss your medical history. These services are free of charge and are offered to [facility] patients who have recently been discharged from any of our facilities or who have medical conditions that may require the skill of a nurse to assist with management.     I am a Registered Nurse who specializes in connecting patients with available medical and financial resources as well as addressing any educational needs that may be indicated.    I attempted to reach you by telephone, but I was unsuccessful. Please call our department so that we can go over some questions with you, regarding your health.    The Outpatient Care Management Department can be reached at [phone], from 8:00AM to 4:30 PM, on Monday thru Friday.     Additionally, [facility] also has a program where a nurse is available 24/7 to answer questions or provide medical advice, their number is [phone].      Thanks,        Joi Cooper RN  Outpatient Care Management  Phone #: [phone]

## 2024-11-26 NOTE — LETTER
Glo Dumont  [address] [address]    Dear Glo Dumont,     Welcome to [facility] Outpatient Care Management Program. We are here to assist patients with multiple long-term (chronic) conditions who often need more personalized healthcare.    It was a pleasure talking with you today. My name is Joi Cooper RN. I look forward to working with you as your Care Manager. I will be contacting you by telephone routinely to help coordinate care and resolve issues.    My goal is to help you function at the healthiest and highest level possible. You can contact me directly at [phone].    As an [facility] patient with [employer], some of the services we provide, at no cost to you, include:     Development of an individualized care plan with a Registered Nurse   Connection with a   Assistance from a Community Health Worker  Connection with available resources and services    Coordinate communication among your care team members   Provide coaching and education  Help you understand your doctor's treatment plan  Help you obtain information about your insurance coverage.    All services provided by [facility]s Outpatient Care Managers and other care team members are coordinated with and communicated to your primary care team.      As part of your enrollment, you will be receiving education materials and more information about these services in your [facility] account, by phone, or through the mail. If you do not wish to participate or receive information, you can Opt Out by contacting our office at [phone].      Sincerely,        Joi Cooper RN  [facility]   Outpatient Care Management

## 2024-11-27 ENCOUNTER — PATIENT MESSAGE (OUTPATIENT)
Dept: ADMINISTRATIVE | Facility: OTHER | Age: 89
End: 2024-11-27
Payer: MEDICARE

## 2024-11-27 ENCOUNTER — PATIENT MESSAGE (OUTPATIENT)
Dept: OPHTHALMOLOGY | Facility: CLINIC | Age: 89
End: 2024-11-27
Payer: MEDICARE

## 2024-11-27 DIAGNOSIS — H40.1131 PRIMARY OPEN ANGLE GLAUCOMA (POAG) OF BOTH EYES, MILD STAGE: ICD-10-CM

## 2024-11-27 RX ORDER — DORZOLAMIDE HYDROCHLORIDE AND TIMOLOL MALEATE 20; 5 MG/ML; MG/ML
1 SOLUTION/ DROPS OPHTHALMIC 2 TIMES DAILY
Qty: 10 ML | Refills: 6 | Status: SHIPPED | OUTPATIENT
Start: 2024-11-27
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
## 2024-11-30 ENCOUNTER — DOCUMENT SCAN (OUTPATIENT)
Dept: HOME HEALTH SERVICES | Facility: HOSPITAL | Age: 89
End: 2024-11-30
Payer: MEDICARE

## 2024-12-03 ENCOUNTER — OUTPATIENT CASE MANAGEMENT (OUTPATIENT)
Dept: ADMINISTRATIVE | Facility: OTHER | Age: 89
End: 2024-12-03
Payer: MEDICARE

## 2024-12-04 ENCOUNTER — DOCUMENT SCAN (OUTPATIENT)
Dept: HOME HEALTH SERVICES | Facility: HOSPITAL | Age: 89
End: 2024-12-04
Payer: MEDICARE

## 2024-12-05 ENCOUNTER — PATIENT MESSAGE (OUTPATIENT)
Dept: PRIMARY CARE CLINIC | Facility: CLINIC | Age: 89
End: 2024-12-05

## 2024-12-05 ENCOUNTER — OFFICE VISIT (OUTPATIENT)
Dept: PRIMARY CARE CLINIC | Facility: CLINIC | Age: 89
End: 2024-12-05
Payer: MEDICARE

## 2024-12-05 VITALS
DIASTOLIC BLOOD PRESSURE: 100 MMHG | TEMPERATURE: 97 F | WEIGHT: 142.19 LBS | SYSTOLIC BLOOD PRESSURE: 160 MMHG | OXYGEN SATURATION: 95 % | HEART RATE: 76 BPM | BODY MASS INDEX: 26.85 KG/M2 | HEIGHT: 61 IN

## 2024-12-05 DIAGNOSIS — I10 ESSENTIAL HYPERTENSION: Chronic | ICD-10-CM

## 2024-12-05 DIAGNOSIS — F32.0 CURRENT MILD EPISODE OF MAJOR DEPRESSIVE DISORDER WITHOUT PRIOR EPISODE: ICD-10-CM

## 2024-12-05 DIAGNOSIS — N18.4 CKD (CHRONIC KIDNEY DISEASE) STAGE 4, GFR 15-29 ML/MIN: Primary | Chronic | ICD-10-CM

## 2024-12-05 DIAGNOSIS — T25.321D FULL THICKNESS BURN OF RIGHT FOOT, SUBSEQUENT ENCOUNTER: ICD-10-CM

## 2024-12-05 DIAGNOSIS — I50.32 CHRONIC HEART FAILURE WITH PRESERVED EJECTION FRACTION: Chronic | ICD-10-CM

## 2024-12-05 PROBLEM — N17.9 ACUTE KIDNEY INJURY SUPERIMPOSED ON CKD: Status: RESOLVED | Noted: 2024-10-13 | Resolved: 2024-12-05

## 2024-12-05 PROBLEM — N18.9 ACUTE KIDNEY INJURY SUPERIMPOSED ON CKD: Status: RESOLVED | Noted: 2024-10-13 | Resolved: 2024-12-05

## 2024-12-05 PROBLEM — T25.321A THIRD DEGREE BURN OF RIGHT FOOT: Status: ACTIVE | Noted: 2024-12-05

## 2024-12-05 PROCEDURE — 99999 PR PBB SHADOW E&M-EST. PATIENT-LVL V: CPT | Mod: PBBFAC,HCNC,, | Performed by: NURSE PRACTITIONER

## 2024-12-05 RX ORDER — CITALOPRAM 10 MG/1
10 TABLET ORAL NIGHTLY
Start: 2024-12-05 | End: 2025-12-05

## 2024-12-05 NOTE — ASSESSMENT & PLAN NOTE
Latest Reference Range & Units 10/24/24 09:19 10/25/24 16:31 11/01/24 09:50   Creatinine 0.5 - 1.4 mg/dL 2.2 (H) 2.2 (H) 1.9 (H)   eGFR >60 mL/min/1.73 m^2 21.3 ! 21 ! 25.2 !   Stable  Avoid dehydration and nephrotoxic agents

## 2024-12-05 NOTE — PATIENT INSTRUCTIONS
Give Bumex dose over the next 2 to 3 days    Daily weight x 3 days    Assess swelling to ankles over the next 3 days    Monitor her breathing pattern

## 2024-12-05 NOTE — ASSESSMENT & PLAN NOTE
Hypertension Medications               bumetanide (BUMEX) 1 MG tablet Take 1 tablet (1 mg total) by mouth once daily.    carvediloL (COREG) 12.5 MG tablet Take 1 tablet (12.5 mg total) by mouth 2 (two) times daily with meals.    nitroGLYCERIN (NITROSTAT) 0.4 MG SL tablet PLACE ONE TABLET UNDERNEATH THE TONGUE EVERY 5 MINUTES AS NEEDED FOR CHEST PAIN         Bumex prescribed prn
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
## 2024-12-05 NOTE — ASSESSMENT & PLAN NOTE
Elevated in clinic today  Did not take carvedilol  Last dose of bumex last week  Advise dose of bumex and carvedilol upon return home

## 2024-12-05 NOTE — ASSESSMENT & PLAN NOTE
Weight increased  Wt Readings from Last 3 Encounters:   12/05/24 0902 64.5 kg (142 lb 3.2 oz)   11/06/24 1337 60.1 kg (132 lb 7.9 oz)   10/31/24 1339 60.1 kg (132 lb 7.9 oz)    Pitting lower leg and pedal edema  Advised Bumex daily over the next 2 to 3 days  Leg elevation on 2 to 3 pillows  Monitor respiratory status  Any concerns please notify the clinic

## 2024-12-07 ENCOUNTER — LAB VISIT (OUTPATIENT)
Dept: LAB | Facility: HOSPITAL | Age: 89
End: 2024-12-07
Attending: NURSE PRACTITIONER
Payer: MEDICARE

## 2024-12-07 DIAGNOSIS — N18.4 CKD (CHRONIC KIDNEY DISEASE) STAGE 4, GFR 15-29 ML/MIN: Chronic | ICD-10-CM

## 2024-12-07 LAB
ANION GAP SERPL CALC-SCNC: 13 MMOL/L (ref 8–16)
BUN SERPL-MCNC: 30 MG/DL (ref 8–23)
CALCIUM SERPL-MCNC: 7.7 MG/DL (ref 8.7–10.5)
CHLORIDE SERPL-SCNC: 111 MMOL/L (ref 95–110)
CO2 SERPL-SCNC: 20 MMOL/L (ref 23–29)
CREAT SERPL-MCNC: 1.4 MG/DL (ref 0.5–1.4)
EST. GFR  (NO RACE VARIABLE): 36.4 ML/MIN/1.73 M^2
GLUCOSE SERPL-MCNC: 92 MG/DL (ref 70–110)
POTASSIUM SERPL-SCNC: 3.3 MMOL/L (ref 3.5–5.1)
SODIUM SERPL-SCNC: 144 MMOL/L (ref 136–145)

## 2024-12-07 PROCEDURE — 36415 COLL VENOUS BLD VENIPUNCTURE: CPT | Mod: HCNC | Performed by: NURSE PRACTITIONER

## 2024-12-07 PROCEDURE — 80048 BASIC METABOLIC PNL TOTAL CA: CPT | Mod: HCNC | Performed by: NURSE PRACTITIONER

## 2024-12-09 ENCOUNTER — PATIENT MESSAGE (OUTPATIENT)
Dept: ADMINISTRATIVE | Facility: OTHER | Age: 89
End: 2024-12-09
Payer: MEDICARE

## 2024-12-09 ENCOUNTER — OUTPATIENT CASE MANAGEMENT (OUTPATIENT)
Dept: ADMINISTRATIVE | Facility: OTHER | Age: 89
End: 2024-12-09
Payer: MEDICARE

## 2024-12-11 ENCOUNTER — TELEPHONE (OUTPATIENT)
Dept: PRIMARY CARE CLINIC | Facility: CLINIC | Age: 89
End: 2024-12-11
Payer: MEDICARE

## 2024-12-11 ENCOUNTER — PATIENT MESSAGE (OUTPATIENT)
Dept: PRIMARY CARE CLINIC | Facility: CLINIC | Age: 89
End: 2024-12-11
Payer: MEDICARE

## 2024-12-11 DIAGNOSIS — G93.41 METABOLIC ENCEPHALOPATHY: Primary | ICD-10-CM

## 2024-12-11 NOTE — TELEPHONE ENCOUNTER
PC with daughter- states when she got home pt was complaining of back pain and was confused.  Thinks she may have UTI.  Would like to bring urine sample to [facility] today

## 2024-12-11 NOTE — TELEPHONE ENCOUNTER
Please call daughter and check the status of Ms. Dumont.    Leg swelling?  SOB?      Last dose of Bumex 1 mg?      NICOLE Vines

## 2024-12-12 ENCOUNTER — CLINICAL SUPPORT (OUTPATIENT)
Dept: PRIMARY CARE CLINIC | Facility: CLINIC | Age: 89
End: 2024-12-12
Payer: MEDICARE

## 2024-12-12 DIAGNOSIS — G93.41 METABOLIC ENCEPHALOPATHY: ICD-10-CM

## 2024-12-12 LAB
BILIRUB UR QL STRIP: NEGATIVE
CLARITY UR REFRACT.AUTO: CLEAR
COLOR UR AUTO: YELLOW
GLUCOSE UR QL STRIP: ABNORMAL
HGB UR QL STRIP: NEGATIVE
KETONES UR QL STRIP: NEGATIVE
LEUKOCYTE ESTERASE UR QL STRIP: NEGATIVE
NITRITE UR QL STRIP: NEGATIVE
PH UR STRIP: 6 [PH] (ref 5–8)
PROT UR QL STRIP: NEGATIVE
SP GR UR STRIP: 1.02 (ref 1–1.03)
URN SPEC COLLECT METH UR: ABNORMAL

## 2024-12-12 PROCEDURE — 87186 SC STD MICRODIL/AGAR DIL: CPT | Mod: HCNC | Performed by: NURSE PRACTITIONER

## 2024-12-12 PROCEDURE — 81003 URINALYSIS AUTO W/O SCOPE: CPT | Mod: HCNC | Performed by: NURSE PRACTITIONER

## 2024-12-12 PROCEDURE — 87088 URINE BACTERIA CULTURE: CPT | Mod: HCNC | Performed by: NURSE PRACTITIONER

## 2024-12-12 PROCEDURE — 87086 URINE CULTURE/COLONY COUNT: CPT | Mod: HCNC | Performed by: NURSE PRACTITIONER

## 2024-12-13 ENCOUNTER — TELEPHONE (OUTPATIENT)
Dept: PRIMARY CARE CLINIC | Facility: CLINIC | Age: 89
End: 2024-12-13
Payer: MEDICARE

## 2024-12-13 ENCOUNTER — PATIENT MESSAGE (OUTPATIENT)
Dept: CARDIOLOGY | Facility: CLINIC | Age: 89
End: 2024-12-13
Payer: MEDICARE

## 2024-12-13 DIAGNOSIS — E43 UNSPECIFIED SEVERE PROTEIN-CALORIE MALNUTRITION: ICD-10-CM

## 2024-12-13 DIAGNOSIS — I50.32 CHRONIC HEART FAILURE WITH PRESERVED EJECTION FRACTION: Primary | Chronic | ICD-10-CM

## 2024-12-13 LAB
BACTERIA UR CULT: ABNORMAL
BACTERIA UR CULT: ABNORMAL

## 2024-12-15 ENCOUNTER — HOSPITAL ENCOUNTER (INPATIENT)
Facility: HOSPITAL | Age: 89
LOS: 1 days | Discharge: HOME HEALTH | DRG: 304 | End: 2024-12-17
Attending: EMERGENCY MEDICINE | Admitting: INTERNAL MEDICINE
Payer: MEDICARE

## 2024-12-15 DIAGNOSIS — M25.552 LEFT HIP PAIN: ICD-10-CM

## 2024-12-15 DIAGNOSIS — E87.6 CHRONIC HYPOKALEMIA: ICD-10-CM

## 2024-12-15 DIAGNOSIS — N20.0 BILATERAL NEPHROLITHIASIS: ICD-10-CM

## 2024-12-15 DIAGNOSIS — I10 HYPERTENSION: ICD-10-CM

## 2024-12-15 DIAGNOSIS — R10.9 LEFT FLANK PAIN: ICD-10-CM

## 2024-12-15 DIAGNOSIS — E83.42 HYPOMAGNESEMIA: ICD-10-CM

## 2024-12-15 DIAGNOSIS — S32.010A COMPRESSION FRACTURE OF L1 VERTEBRA, INITIAL ENCOUNTER: ICD-10-CM

## 2024-12-15 DIAGNOSIS — I50.43 ACUTE ON CHRONIC COMBINED SYSTOLIC AND DIASTOLIC HEART FAILURE: ICD-10-CM

## 2024-12-15 DIAGNOSIS — I16.0 HYPERTENSIVE URGENCY: Primary | ICD-10-CM

## 2024-12-15 PROBLEM — I50.33 ACUTE ON CHRONIC HEART FAILURE WITH PRESERVED EJECTION FRACTION: Status: ACTIVE | Noted: 2024-04-04

## 2024-12-15 LAB
ALBUMIN SERPL BCP-MCNC: 3.2 G/DL (ref 3.5–5.2)
ALP SERPL-CCNC: 69 U/L (ref 40–150)
ALT SERPL W/O P-5'-P-CCNC: 11 U/L (ref 10–44)
ANION GAP SERPL CALC-SCNC: 15 MMOL/L (ref 8–16)
AST SERPL-CCNC: 17 U/L (ref 10–40)
BACTERIA #/AREA URNS HPF: NORMAL /HPF
BASOPHILS # BLD AUTO: 0.05 K/UL (ref 0–0.2)
BASOPHILS NFR BLD: 0.6 % (ref 0–1.9)
BILIRUB SERPL-MCNC: 1.2 MG/DL (ref 0.1–1)
BILIRUB UR QL STRIP: NEGATIVE
BNP SERPL-MCNC: 1170 PG/ML (ref 0–99)
BUN SERPL-MCNC: 23 MG/DL (ref 8–23)
CALCIUM SERPL-MCNC: 8 MG/DL (ref 8.7–10.5)
CHLORIDE SERPL-SCNC: 112 MMOL/L (ref 95–110)
CLARITY UR: CLEAR
CO2 SERPL-SCNC: 16 MMOL/L (ref 23–29)
COLOR UR: YELLOW
CREAT SERPL-MCNC: 1.1 MG/DL (ref 0.5–1.4)
DIFFERENTIAL METHOD BLD: ABNORMAL
EOSINOPHIL # BLD AUTO: 0.1 K/UL (ref 0–0.5)
EOSINOPHIL NFR BLD: 1 % (ref 0–8)
ERYTHROCYTE [DISTWIDTH] IN BLOOD BY AUTOMATED COUNT: 17.2 % (ref 11.5–14.5)
EST. GFR  (NO RACE VARIABLE): 49 ML/MIN/1.73 M^2
GLUCOSE SERPL-MCNC: 101 MG/DL (ref 70–110)
GLUCOSE UR QL STRIP: ABNORMAL
HCT VFR BLD AUTO: 31.3 % (ref 37–48.5)
HCV AB SERPL QL IA: NEGATIVE
HEP C VIRUS HOLD SPECIMEN: NORMAL
HGB BLD-MCNC: 10.2 G/DL (ref 12–16)
HGB UR QL STRIP: NEGATIVE
HIV 1+2 AB+HIV1 P24 AG SERPL QL IA: NEGATIVE
IMM GRANULOCYTES # BLD AUTO: 0.14 K/UL (ref 0–0.04)
IMM GRANULOCYTES NFR BLD AUTO: 1.7 % (ref 0–0.5)
KETONES UR QL STRIP: ABNORMAL
LEUKOCYTE ESTERASE UR QL STRIP: NEGATIVE
LYMPHOCYTES # BLD AUTO: 0.7 K/UL (ref 1–4.8)
LYMPHOCYTES NFR BLD: 7.8 % (ref 18–48)
MAGNESIUM SERPL-MCNC: 1.1 MG/DL (ref 1.6–2.6)
MCH RBC QN AUTO: 29.1 PG (ref 27–31)
MCHC RBC AUTO-ENTMCNC: 32.6 G/DL (ref 32–36)
MCV RBC AUTO: 89 FL (ref 82–98)
MICROSCOPIC COMMENT: NORMAL
MONOCYTES # BLD AUTO: 0.6 K/UL (ref 0.3–1)
MONOCYTES NFR BLD: 7.2 % (ref 4–15)
NEUTROPHILS # BLD AUTO: 6.8 K/UL (ref 1.8–7.7)
NEUTROPHILS NFR BLD: 81.7 % (ref 38–73)
NITRITE UR QL STRIP: NEGATIVE
NRBC BLD-RTO: 0 /100 WBC
PH UR STRIP: 7 [PH] (ref 5–8)
PLATELET # BLD AUTO: 175 K/UL (ref 150–450)
PMV BLD AUTO: 10.4 FL (ref 9.2–12.9)
POTASSIUM SERPL-SCNC: 3.1 MMOL/L (ref 3.5–5.1)
PROT SERPL-MCNC: 6.2 G/DL (ref 6–8.4)
PROT UR QL STRIP: ABNORMAL
RBC # BLD AUTO: 3.5 M/UL (ref 4–5.4)
SODIUM SERPL-SCNC: 143 MMOL/L (ref 136–145)
SP GR UR STRIP: 1.01 (ref 1–1.03)
SQUAMOUS #/AREA URNS HPF: 6 /HPF
TROPONIN I SERPL DL<=0.01 NG/ML-MCNC: 0.06 NG/ML (ref 0–0.03)
TROPONIN I SERPL DL<=0.01 NG/ML-MCNC: 0.08 NG/ML (ref 0–0.03)
TROPONIN I SERPL DL<=0.01 NG/ML-MCNC: 0.08 NG/ML (ref 0–0.03)
URN SPEC COLLECT METH UR: ABNORMAL
UROBILINOGEN UR STRIP-ACNC: NEGATIVE EU/DL
WBC # BLD AUTO: 8.29 K/UL (ref 3.9–12.7)
YEAST URNS QL MICRO: NORMAL

## 2024-12-15 PROCEDURE — G0378 HOSPITAL OBSERVATION PER HR: HCPCS | Mod: HCNC

## 2024-12-15 PROCEDURE — 96375 TX/PRO/DX INJ NEW DRUG ADDON: CPT | Mod: HCNC

## 2024-12-15 PROCEDURE — 84484 ASSAY OF TROPONIN QUANT: CPT | Mod: HCNC | Performed by: EMERGENCY MEDICINE

## 2024-12-15 PROCEDURE — 93005 ELECTROCARDIOGRAM TRACING: CPT | Mod: HCNC

## 2024-12-15 PROCEDURE — 86803 HEPATITIS C AB TEST: CPT | Mod: HCNC | Performed by: EMERGENCY MEDICINE

## 2024-12-15 PROCEDURE — 25000003 PHARM REV CODE 250: Mod: HCNC | Performed by: NURSE PRACTITIONER

## 2024-12-15 PROCEDURE — 93010 ELECTROCARDIOGRAM REPORT: CPT | Mod: HCNC,,, | Performed by: INTERNAL MEDICINE

## 2024-12-15 PROCEDURE — 96372 THER/PROPH/DIAG INJ SC/IM: CPT | Performed by: NURSE PRACTITIONER

## 2024-12-15 PROCEDURE — 36415 COLL VENOUS BLD VENIPUNCTURE: CPT | Mod: HCNC | Performed by: NURSE PRACTITIONER

## 2024-12-15 PROCEDURE — 96365 THER/PROPH/DIAG IV INF INIT: CPT | Mod: HCNC

## 2024-12-15 PROCEDURE — 84484 ASSAY OF TROPONIN QUANT: CPT | Mod: 91,HCNC | Performed by: NURSE PRACTITIONER

## 2024-12-15 PROCEDURE — 85025 COMPLETE CBC W/AUTO DIFF WBC: CPT | Mod: HCNC | Performed by: EMERGENCY MEDICINE

## 2024-12-15 PROCEDURE — 63600175 PHARM REV CODE 636 W HCPCS: Mod: HCNC | Performed by: EMERGENCY MEDICINE

## 2024-12-15 PROCEDURE — 87389 HIV-1 AG W/HIV-1&-2 AB AG IA: CPT | Mod: HCNC | Performed by: EMERGENCY MEDICINE

## 2024-12-15 PROCEDURE — 25000003 PHARM REV CODE 250: Mod: HCNC | Performed by: EMERGENCY MEDICINE

## 2024-12-15 PROCEDURE — 83735 ASSAY OF MAGNESIUM: CPT | Mod: HCNC | Performed by: EMERGENCY MEDICINE

## 2024-12-15 PROCEDURE — 83880 ASSAY OF NATRIURETIC PEPTIDE: CPT | Mod: HCNC | Performed by: EMERGENCY MEDICINE

## 2024-12-15 PROCEDURE — 81000 URINALYSIS NONAUTO W/SCOPE: CPT | Mod: HCNC | Performed by: EMERGENCY MEDICINE

## 2024-12-15 PROCEDURE — 96376 TX/PRO/DX INJ SAME DRUG ADON: CPT | Mod: HCNC

## 2024-12-15 PROCEDURE — 96366 THER/PROPH/DIAG IV INF ADDON: CPT | Mod: HCNC

## 2024-12-15 PROCEDURE — 80053 COMPREHEN METABOLIC PANEL: CPT | Mod: HCNC | Performed by: EMERGENCY MEDICINE

## 2024-12-15 PROCEDURE — 63600175 PHARM REV CODE 636 W HCPCS: Mod: HCNC | Performed by: NURSE PRACTITIONER

## 2024-12-15 PROCEDURE — 99291 CRITICAL CARE FIRST HOUR: CPT | Mod: HCNC

## 2024-12-15 RX ORDER — MAGNESIUM SULFATE HEPTAHYDRATE 40 MG/ML
2 INJECTION, SOLUTION INTRAVENOUS
Status: COMPLETED | OUTPATIENT
Start: 2024-12-15 | End: 2024-12-15

## 2024-12-15 RX ORDER — HYDROCODONE BITARTRATE AND ACETAMINOPHEN 7.5; 325 MG/1; MG/1
1 TABLET ORAL EVERY 6 HOURS PRN
Status: DISCONTINUED | OUTPATIENT
Start: 2024-12-15 | End: 2024-12-17 | Stop reason: HOSPADM

## 2024-12-15 RX ORDER — ONDANSETRON 8 MG/1
8 TABLET, ORALLY DISINTEGRATING ORAL EVERY 8 HOURS PRN
Status: DISCONTINUED | OUTPATIENT
Start: 2024-12-15 | End: 2024-12-17 | Stop reason: HOSPADM

## 2024-12-15 RX ORDER — LANOLIN ALCOHOL/MO/W.PET/CERES
800 CREAM (GRAM) TOPICAL
Status: DISCONTINUED | OUTPATIENT
Start: 2024-12-15 | End: 2024-12-16

## 2024-12-15 RX ORDER — POTASSIUM CHLORIDE 750 MG/1
10 TABLET, EXTENDED RELEASE ORAL DAILY
Status: DISCONTINUED | OUTPATIENT
Start: 2024-12-16 | End: 2024-12-16

## 2024-12-15 RX ORDER — PANTOPRAZOLE SODIUM 40 MG/1
40 TABLET, DELAYED RELEASE ORAL DAILY
Status: DISCONTINUED | OUTPATIENT
Start: 2024-12-16 | End: 2024-12-17 | Stop reason: HOSPADM

## 2024-12-15 RX ORDER — DORZOLAMIDE HYDROCHLORIDE AND TIMOLOL MALEATE 20; 5 MG/ML; MG/ML
1 SOLUTION/ DROPS OPHTHALMIC 2 TIMES DAILY
Status: DISCONTINUED | OUTPATIENT
Start: 2024-12-15 | End: 2024-12-17 | Stop reason: HOSPADM

## 2024-12-15 RX ORDER — ENOXAPARIN SODIUM 100 MG/ML
30 INJECTION SUBCUTANEOUS EVERY 24 HOURS
Status: DISCONTINUED | OUTPATIENT
Start: 2024-12-15 | End: 2024-12-15

## 2024-12-15 RX ORDER — HYDRALAZINE HYDROCHLORIDE 20 MG/ML
10 INJECTION INTRAMUSCULAR; INTRAVENOUS
Status: COMPLETED | OUTPATIENT
Start: 2024-12-15 | End: 2024-12-15

## 2024-12-15 RX ORDER — POLYETHYLENE GLYCOL 3350 17 G/17G
17 POWDER, FOR SOLUTION ORAL DAILY
Status: DISCONTINUED | OUTPATIENT
Start: 2024-12-16 | End: 2024-12-17 | Stop reason: HOSPADM

## 2024-12-15 RX ORDER — BUMETANIDE 0.25 MG/ML
1 INJECTION, SOLUTION INTRAMUSCULAR; INTRAVENOUS
Status: COMPLETED | OUTPATIENT
Start: 2024-12-15 | End: 2024-12-15

## 2024-12-15 RX ORDER — SODIUM BICARBONATE 650 MG/1
650 TABLET ORAL DAILY
Status: DISCONTINUED | OUTPATIENT
Start: 2024-12-16 | End: 2024-12-17 | Stop reason: HOSPADM

## 2024-12-15 RX ORDER — ASPIRIN 81 MG/1
81 TABLET ORAL DAILY
Status: DISCONTINUED | OUTPATIENT
Start: 2024-12-16 | End: 2024-12-16

## 2024-12-15 RX ORDER — CITALOPRAM 10 MG/1
10 TABLET ORAL NIGHTLY
Status: DISCONTINUED | OUTPATIENT
Start: 2024-12-15 | End: 2024-12-17 | Stop reason: HOSPADM

## 2024-12-15 RX ORDER — POTASSIUM CHLORIDE 20 MEQ/1
40 TABLET, EXTENDED RELEASE ORAL ONCE
Status: COMPLETED | OUTPATIENT
Start: 2024-12-15 | End: 2024-12-15

## 2024-12-15 RX ORDER — NALOXONE HCL 0.4 MG/ML
0.02 VIAL (ML) INJECTION
Status: DISCONTINUED | OUTPATIENT
Start: 2024-12-15 | End: 2024-12-17 | Stop reason: HOSPADM

## 2024-12-15 RX ORDER — LABETALOL HYDROCHLORIDE 5 MG/ML
20 INJECTION, SOLUTION INTRAVENOUS ONCE
Status: DISCONTINUED | OUTPATIENT
Start: 2024-12-15 | End: 2024-12-15

## 2024-12-15 RX ORDER — HYDROCODONE BITARTRATE AND ACETAMINOPHEN 5; 325 MG/1; MG/1
1 TABLET ORAL EVERY 6 HOURS PRN
Status: DISCONTINUED | OUTPATIENT
Start: 2024-12-15 | End: 2024-12-17 | Stop reason: HOSPADM

## 2024-12-15 RX ORDER — LANOLIN ALCOHOL/MO/W.PET/CERES
400 CREAM (GRAM) TOPICAL ONCE
Status: COMPLETED | OUTPATIENT
Start: 2024-12-15 | End: 2024-12-15

## 2024-12-15 RX ORDER — ATORVASTATIN CALCIUM 40 MG/1
40 TABLET, FILM COATED ORAL DAILY
Status: DISCONTINUED | OUTPATIENT
Start: 2024-12-15 | End: 2024-12-17 | Stop reason: HOSPADM

## 2024-12-15 RX ORDER — ENOXAPARIN SODIUM 100 MG/ML
40 INJECTION SUBCUTANEOUS EVERY 24 HOURS
Status: DISCONTINUED | OUTPATIENT
Start: 2024-12-16 | End: 2024-12-16

## 2024-12-15 RX ORDER — LABETALOL 100 MG/1
200 TABLET, FILM COATED ORAL EVERY 12 HOURS
Status: DISCONTINUED | OUTPATIENT
Start: 2024-12-15 | End: 2024-12-17 | Stop reason: HOSPADM

## 2024-12-15 RX ORDER — AMLODIPINE BESYLATE 10 MG/1
10 TABLET ORAL DAILY
Status: DISCONTINUED | OUTPATIENT
Start: 2024-12-15 | End: 2024-12-16

## 2024-12-15 RX ORDER — BUMETANIDE 1 MG/1
1 TABLET ORAL 2 TIMES DAILY
Status: DISCONTINUED | OUTPATIENT
Start: 2024-12-16 | End: 2024-12-17

## 2024-12-15 RX ORDER — ISOSORBIDE MONONITRATE 30 MG/1
30 TABLET, EXTENDED RELEASE ORAL DAILY
Status: DISCONTINUED | OUTPATIENT
Start: 2024-12-16 | End: 2024-12-17 | Stop reason: HOSPADM

## 2024-12-15 RX ORDER — HYDRALAZINE HYDROCHLORIDE 25 MG/1
50 TABLET, FILM COATED ORAL EVERY 8 HOURS
Status: DISCONTINUED | OUTPATIENT
Start: 2024-12-15 | End: 2024-12-15

## 2024-12-15 RX ORDER — METHOCARBAMOL 500 MG/1
500 TABLET, FILM COATED ORAL
Status: COMPLETED | OUTPATIENT
Start: 2024-12-15 | End: 2024-12-15

## 2024-12-15 RX ADMIN — ONDANSETRON 8 MG: 8 TABLET, ORALLY DISINTEGRATING ORAL at 08:12

## 2024-12-15 RX ADMIN — CITALOPRAM HYDROBROMIDE 10 MG: 10 TABLET ORAL at 08:12

## 2024-12-15 RX ADMIN — AMLODIPINE BESYLATE 10 MG: 10 TABLET ORAL at 04:12

## 2024-12-15 RX ADMIN — ATORVASTATIN CALCIUM 40 MG: 40 TABLET, FILM COATED ORAL at 06:12

## 2024-12-15 RX ADMIN — DORZOLAMIDE HYDROCHLORIDE AND TIMOLOL MALEATE 1 DROP: 20; 5 SOLUTION OPHTHALMIC at 08:12

## 2024-12-15 RX ADMIN — POTASSIUM CHLORIDE 40 MEQ: 1500 TABLET, EXTENDED RELEASE ORAL at 03:12

## 2024-12-15 RX ADMIN — HYDRALAZINE HYDROCHLORIDE 10 MG: 20 INJECTION INTRAMUSCULAR; INTRAVENOUS at 01:12

## 2024-12-15 RX ADMIN — ENOXAPARIN SODIUM 30 MG: 30 INJECTION SUBCUTANEOUS at 06:12

## 2024-12-15 RX ADMIN — HYDRALAZINE HYDROCHLORIDE 10 MG: 20 INJECTION INTRAMUSCULAR; INTRAVENOUS at 04:12

## 2024-12-15 RX ADMIN — PANCRELIPASE 1 CAPSULE: 120000; 24000; 76000 CAPSULE, DELAYED RELEASE PELLETS ORAL at 06:12

## 2024-12-15 RX ADMIN — MAGNESIUM SULFATE HEPTAHYDRATE 2 G: 40 INJECTION, SOLUTION INTRAVENOUS at 04:12

## 2024-12-15 RX ADMIN — BUMETANIDE 1 MG: 0.25 INJECTION INTRAMUSCULAR; INTRAVENOUS at 03:12

## 2024-12-15 RX ADMIN — LABETALOL HYDROCHLORIDE 200 MG: 200 TABLET, FILM COATED ORAL at 05:12

## 2024-12-15 RX ADMIN — HYDROCODONE BITARTRATE AND ACETAMINOPHEN 1 TABLET: 7.5; 325 TABLET ORAL at 08:12

## 2024-12-15 RX ADMIN — NITROGLYCERIN 1 INCH: 20 OINTMENT TOPICAL at 03:12

## 2024-12-15 RX ADMIN — Medication 400 MG: at 04:12

## 2024-12-15 RX ADMIN — METHOCARBAMOL 500 MG: 500 TABLET ORAL at 01:12

## 2024-12-15 NOTE — HPI
Glo Dumont is a 89 y.o. female patient with a PMHx of HTN, diverticulosis, HLD, CAD, carotid artery occlusion, anemia, CHF, CKD, anxiety, and depression who presents to the Emergency Department for evaluation of non-radiating left side flank pain which onset gradually since Wednesday. Pt's pain started to worsen Friday. Pain worsens with movement and sitting up. Pt's daughter reports the pt's HTN is poorly controlled. Symptoms are constant and moderate in severity. No associated sxs. Patient denies any hematuria, dysuria, fever, chills, N/V, SOB, and all other sxs at this time.     ED workup: Bp 200s/90s, serum potassium 3.1, magnesium 1.1, BNP 1170, troponin 0.081.  CT renal showed  Bilateral nonobstructing nephrolithiasis. Small volume of free fluid in the pelvis. Diverticulosis coli without evidence of acute diverticulitis. New minor superior L1 vertebral body compression deformity from the prior CT 10/13/2024.  In the ED patient received Bumex 1 mg IV, hydralazine 20 mg IV, and nitropaste with mild improvement in blood pressure.  Most recent blood pressure 175/87.  Patient admitted for uncontrolled blood pressure, CHF, and pain under the care of Hospital Medicine team.

## 2024-12-15 NOTE — SUBJECTIVE & OBJECTIVE
Past Medical History:   Diagnosis Date    Anemia     Angina pectoris     Anxiety     Anxiety and depression     Arthritis     hip    Carotid artery occlusion     Carpal tunnel syndrome 06/23/2008    emg    Chronic diarrhea     work up in 2011 with EGD, CS and VCE    CKD (chronic kidney disease) stage 3, GFR 30-59 ml/min 5/11/2017    Colitis     Coronary artery disease     Coronary artery disease     Diastolic dysfunction     Diverticulosis     Encephalopathy 10/14/2024    Glaucoma     Greater trochanteric bursitis 2/10/2015    Grief at loss of child 1/26/2016    H/O carotid endarterectomy 12/2/2013    Heart failure     History of coronary angioplasty 3/11/2014    Hypercholesteremia     Hypertension     Liver cyst 02/08/2013    ct abd    Macular degeneration     Obesity with serious comorbidity 3/19/2023    Primary open-angle glaucoma(365.11) 9/3/2013    Renal cyst 02/08/2013    ct abd    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    Sarcoidosis     Sarcoidosis of lung     Sickle cell trait     Uveitis        Past Surgical History:   Procedure Laterality Date    A-V CARDIAC PACEMAKER INSERTION Left 3/21/2023    Procedure: INSERTION, CARDIAC PACEMAKER, DUAL CHAMBER/His Lead;  Surgeon: Cortez Ambrose MD;  Location: [facility] CATH LAB;  Service: Cardiology;  Laterality: Left;  MDT/ MD to confirm in am/possible nurse sedate    CAROTID ENDARTERECTOMY Right 2000s    CATARACT EXTRACTION Bilateral     Dr. Liang Dennis    CHOLECYSTECTOMY      laparoscopic, 3/18.    CORONARY ANGIOPLASTY WITH STENT PLACEMENT  11/19/2010    RCA-HALIE 2010    Lathia    JOINT REPLACEMENT Left 11/03/2014    Dr. Braun    TOTAL ABDOMINAL HYSTERECTOMY W/ BILATERAL SALPINGOOPHORECTOMY  1972       Review of patient's allergies indicates:   Allergen Reactions    Lisinopril      angioedema    Codeine Nausea And Vomiting       No current facility-administered medications on file prior to encounter.     Current Outpatient Medications on File  Prior to Encounter   Medication Sig    aflibercept 2 mg/0.05 mL Soln 2 mg by Intravitreal route every 28 days.    aspirin (ECOTRIN) 81 MG EC tablet Take 1 tablet (81 mg total) by mouth once daily.    atorvastatin (LIPITOR) 40 MG tablet Take 1 tablet (40 mg total) by mouth Daily.    budesonide (ENTOCORT EC) 3 mg capsule Take 1-2 capsules (3-6 mg total) by mouth daily as needed (flare). As needed    bumetanide (BUMEX) 1 MG tablet Take 1 tablet (1 mg total) by mouth once daily. (Patient taking differently: Take 1 mg by mouth once daily. As needed)    busPIRone (BUSPAR) 5 MG Tab Take 1 tablet (5 mg total) by mouth 2 (two) times daily as needed (anxiety).    carvediloL (COREG) 12.5 MG tablet Take 1 tablet (12.5 mg total) by mouth 2 (two) times daily with meals.    citalopram (CELEXA) 10 MG tablet Take 1 tablet (10 mg total) by mouth every evening.    cyproheptadine (PERIACTIN) 4 mg tablet Take 1 tablet (4 mg total) by mouth 3 (three) times daily as needed (appetite).    dexAMETHasone (OZURDEX) 0.7 mg Impl intravitreal implant 0.7 mg by Intravitreal route once. (Patient not taking: Reported on 12/5/2024)    dorzolamide-timolol 2-0.5% (COSOPT) 22.3-6.8 mg/mL ophthalmic solution Place 1 drop into both eyes 2 (two) times daily.    empagliflozin (JARDIANCE) 10 mg tablet Take 1 tablet (10 mg total) by mouth once daily.    FOLIC ACID/MULTIVIT-MIN/LUTEIN (CENTRUM SILVER ORAL) Take 1 tablet by mouth once daily. (Patient not taking: Reported on 12/5/2024)    HYDROcodone-acetaminophen (NORCO) 5-325 mg per tablet 1/2 tablet every 6 to 8 hours as needed for pain    lipase-protease-amylase 24,000-76,000-120,000 units (CREON) 24,000-76,000 -120,000 unit capsule Take 1 capsule by mouth 3 (three) times daily with meals.    loperamide (IMODIUM) 2 mg capsule Take 1 mg by mouth every 6 (six) hours as needed for Diarrhea.    nitroGLYCERIN (NITROSTAT) 0.4 MG SL tablet PLACE ONE TABLET UNDERNEATH THE TONGUE EVERY 5 MINUTES AS NEEDED FOR CHEST  PAIN    ondansetron (ZOFRAN) 4 MG tablet Take 1 tablet (4 mg total) by mouth every 8 (eight) hours as needed for Nausea.    ondansetron (ZOFRAN-ODT) 8 MG TbDL Take 8 mg by mouth every 6 (six) hours.    pantoprazole (PROTONIX) 40 MG tablet Take 1 tablet (40 mg total) by mouth once daily.    potassium chloride (KLOR-CON) 10 MEQ TbSR Take 1 tablet (10 mEq total) by mouth once daily.    silver sulfADIAZINE 1% (SILVADENE) 1 % cream Apply topically 2 (two) times daily.    sodium bicarbonate 650 MG tablet Take 1 tablet (650 mg total) by mouth once daily.     Family History       Problem Relation (Age of Onset)    Cancer Father, Daughter    Cirrhosis Brother    Heart failure Mother, Brother    Hypertension Mother          Tobacco Use    Smoking status: Never    Smokeless tobacco: Never   Substance and Sexual Activity    Alcohol use: No     Alcohol/week: 0.0 standard drinks of alcohol    Drug use: No    Sexual activity: Yes     Partners: Male     Review of Systems   Constitutional: Negative.    HENT: Negative.     Eyes: Negative.    Respiratory: Negative.     Cardiovascular: Negative.    Gastrointestinal: Negative.    Endocrine: Negative.    Genitourinary: Negative.    Musculoskeletal:  Positive for back pain (left lower side).   Skin: Negative.    Allergic/Immunologic: Negative.    Neurological: Negative.    Hematological: Negative.    Psychiatric/Behavioral: Negative.       Objective:     Vital Signs (Most Recent):  Temp: 98.8 °F (37.1 °C) (12/15/24 1500)  Pulse: 80 (12/15/24 1615)  Resp: 17 (12/15/24 1615)  BP: (!) 168/73 (12/15/24 1653)  SpO2: 97 % (12/15/24 1615) Vital Signs (24h Range):  Temp:  [98.8 °F (37.1 °C)] 98.8 °F (37.1 °C)  Pulse:  [78-80] 80  Resp:  [16-18] 17  SpO2:  [97 %] 97 %  BP: (168-222)/() 168/73     Weight: 59.7 kg (131 lb 9.6 oz)  Body mass index is 24.87 kg/m².     Physical Exam  Vitals and nursing note reviewed.   Constitutional:       Appearance: She is well-developed.   HENT:      Head:  Normocephalic and atraumatic.   Eyes:      Conjunctiva/sclera: Conjunctivae normal.      Pupils: Pupils are equal, round, and reactive to light.   Cardiovascular:      Rate and Rhythm: Normal rate and regular rhythm.      Heart sounds: Normal heart sounds.   Pulmonary:      Effort: Pulmonary effort is normal.      Breath sounds: Normal breath sounds.   Abdominal:      General: Bowel sounds are normal. There is no distension.      Palpations: Abdomen is soft.      Tenderness: There is no abdominal tenderness.   Musculoskeletal:         General: Normal range of motion.      Cervical back: Normal range of motion and neck supple.      Lumbar back: Tenderness (left lower) present.   Skin:     General: Skin is warm and dry.   Neurological:      Mental Status: She is alert and oriented to person, place, and time.   Psychiatric:         Behavior: Behavior normal.         Thought Content: Thought content normal.         Judgment: Judgment normal.            Significant Labs: All pertinent labs within the past 24 hours have been reviewed.  BMP:   Recent Labs   Lab 12/15/24  1353         K 3.1*   *   CO2 16*   BUN 23   CREATININE 1.1   CALCIUM 8.0*   MG 1.1*     CBC:   Recent Labs   Lab 12/15/24  1353   WBC 8.29   HGB 10.2*   HCT 31.3*        CMP:   Recent Labs   Lab 12/15/24  1353      K 3.1*   *   CO2 16*      BUN 23   CREATININE 1.1   CALCIUM 8.0*   PROT 6.2   ALBUMIN 3.2*   BILITOT 1.2*   ALKPHOS 69   AST 17   ALT 11   ANIONGAP 15     Cardiac Markers:   Recent Labs   Lab 12/15/24  1353   BNP 1,170*     Magnesium:   Recent Labs   Lab 12/15/24  1353   MG 1.1*     Troponin:   Recent Labs   Lab 12/15/24  1353   TROPONINI 0.081*     Urine Studies:   Recent Labs   Lab 12/15/24  1455   COLORU Yellow   APPEARANCEUA Clear   PHUR 7.0   SPECGRAV 1.010   PROTEINUA Trace*   GLUCUA 3+*   KETONESU 1+*   BILIRUBINUA Negative   OCCULTUA Negative   NITRITE Negative   UROBILINOGEN Negative    LEUKOCYTESUR Negative   BACTERIA Rare   SQUAMEPITHEL 6       Significant Imaging: I have reviewed all pertinent imaging results/findings within the past 24 hours.

## 2024-12-15 NOTE — ASSESSMENT & PLAN NOTE
Patient has Abnormal Magnesium: hypomagnesemia. Will continue to monitor electrolytes closely. Will replace the affected electrolytes and repeat labs to be done after interventions completed. The patient's magnesium results have been reviewed and are listed below.  Recent Labs   Lab 12/15/24  1353   MG 1.1*

## 2024-12-15 NOTE — PROGRESS NOTES
Pharmacist Renal Dose Adjustment Note    Glo Dumont is a 89 y.o. female being treated with the medication enoxaparin.     Patient Data:    Vital Signs (Most Recent):  Temp: 98.8 °F (37.1 °C) (12/15/24 1500)  Pulse: 76 (12/15/24 1712)  Resp: 17 (12/15/24 1615)  BP: (!) 177/82 (12/15/24 1712)  SpO2: 97 % (12/15/24 1615) Vital Signs (72h Range):  Temp:  [98.8 °F (37.1 °C)]   Pulse:  [76-80]   Resp:  [16-18]   BP: (168-222)/()   SpO2:  [97 %]      Recent Labs   Lab 12/15/24  1353   CREATININE 1.1     Serum creatinine: 1.1 mg/dL 12/15/24 1353  Estimated creatinine clearance: 29.9 mL/min    Enoxaparin 40 mg subq every 24 hours will be changed to enoxaparin 30 mg subq every 24 hours per renal dose protocol for CrCl < 30 ml/min.     Thank you,  Pharmacist's Name: Saul Guaman

## 2024-12-15 NOTE — ED PROVIDER NOTES
SCRIBE #1 NOTE: I, Christopher Galeano, am scribing for, and in the presence of, Genesis Ovalle MD. I have scribed the entire note.       History     Chief Complaint   Patient presents with    Back Pain     Lower back pain that has worsen over the past few days with activity. No previous injury to back . Denies issues urinating.     Review of patient's allergies indicates:   Allergen Reactions    Lisinopril      angioedema    Codeine Nausea And Vomiting         History of Present Illness     HPI    12/15/2024, 1:54 PM  History obtained from the pt's daughter at bedside      History of Present Illness: Glo Dumont is a 89 y.o. female patient with a PMHx of HTN, diverticulosis, HLD, CAD, carotid artery occlusion, anemia, CHF, CKD, anxiety, and depression who presents to the Emergency Department for evaluation of non-radiating left side flank pain which onset gradually since Wednesday. Pt's pain started to worsen Friday. Contrary to triage, pain is more flank than back. Pain worsens with movement and sitting up. Pt's daughter reports the pt's HTN is poorly controlled. Symptoms are constant and moderate in severity. No associated sxs. Patient denies any hematuria, dysuria, fever, chills, N/V, SOB, and all other sxs at this time. No prior Tx. No further complaints or concerns at this time.       Arrival mode: Personal vehicle     PCP: No primary care provider on file.        Past Medical History:  Past Medical History:   Diagnosis Date    Anemia     Angina pectoris     Anxiety     Anxiety and depression     Arthritis     hip    Carotid artery occlusion     Carpal tunnel syndrome 06/23/2008    emg    Chronic diarrhea     work up in 2011 with EGD, CS and VCE    CKD (chronic kidney disease) stage 3, GFR 30-59 ml/min 5/11/2017    Colitis     Coronary artery disease     Coronary artery disease     Diastolic dysfunction     Diverticulosis     Encephalopathy 10/14/2024    Glaucoma     Greater trochanteric bursitis 2/10/2015     Grief at loss of child 1/26/2016    H/O carotid endarterectomy 12/2/2013    Heart failure     History of coronary angioplasty 3/11/2014    Hypercholesteremia     Hypertension     Liver cyst 02/08/2013    ct abd    Low back pain 2/8/2024    Macular degeneration     Obesity with serious comorbidity 3/19/2023    Primary open-angle glaucoma(365.11) 9/3/2013    Renal cyst 02/08/2013    ct abd    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    Sarcoidosis     Sarcoidosis of lung     Sickle cell trait     Uveitis        Past Surgical History:  Past Surgical History:   Procedure Laterality Date    A-V CARDIAC PACEMAKER INSERTION Left 3/21/2023    Procedure: INSERTION, CARDIAC PACEMAKER, DUAL CHAMBER/His Lead;  Surgeon: Cortez Ambrose MD;  Location: [facility] CATH LAB;  Service: Cardiology;  Laterality: Left;  MDT/ MD to confirm in am/possible nurse sedate    CAROTID ENDARTERECTOMY Right 2000s    CATARACT EXTRACTION Bilateral     Dr. Liang Dennis    CHOLECYSTECTOMY      laparoscopic, 3/18.    CORONARY ANGIOPLASTY WITH STENT PLACEMENT  11/19/2010    RCA-HALIE 2010    Lathia    JOINT REPLACEMENT Left 11/03/2014    Dr. Braun    TOTAL ABDOMINAL HYSTERECTOMY W/ BILATERAL SALPINGOOPHORECTOMY  1972         Family History:  Family History   Problem Relation Name Age of Onset    Hypertension Mother      Heart failure Mother      Cancer Father          prostate    Cirrhosis Brother      Heart failure Brother      Cancer Daughter          Carcinoid       Social History:  Social History     Tobacco Use    Smoking status: Never    Smokeless tobacco: Never   Substance and Sexual Activity    Alcohol use: No     Alcohol/week: 0.0 standard drinks of alcohol    Drug use: No    Sexual activity: Yes     Partners: Male        Review of Systems     Review of Systems   Constitutional:  Negative for chills and fever.   HENT:  Negative for congestion.    Respiratory:  Negative for cough and shortness of breath.    Cardiovascular:  Negative  for chest pain.   Gastrointestinal:  Negative for abdominal pain, nausea and vomiting.   Genitourinary:  Positive for flank pain (left). Negative for dysuria and hematuria.   Musculoskeletal:  Negative for back pain.   Skin:  Negative for rash.   Neurological:  Negative for dizziness, facial asymmetry, weakness, light-headedness and headaches.   Hematological:  Does not bruise/bleed easily.   All other systems reviewed and are negative.       Physical Exam     Initial Vitals [12/15/24 1210]   BP Pulse Resp Temp SpO2   (!) 201/96 78 16 98.8 °F (37.1 °C) 97 %      MAP       --          Physical Exam  Nursing Notes and Vital Signs Reviewed.  Constitutional: Patient is in no acute distress. Well-developed and well-nourished.  Head: Atraumatic. Normocephalic.  Eyes: PERRL. EOM intact. Conjunctivae are not pale. No scleral icterus.  ENT: Mucous membranes are moist. Oropharynx is clear and symmetric.    Neck: Supple. Full ROM. No lymphadenopathy.  Cardiovascular: Regular rate. Regular rhythm. No murmurs, rubs, or gallops. Distal pulses are 2+ and symmetric.  Pulmonary/Chest: No respiratory distress. Clear to auscultation bilaterally. No wheezing or rales.  Abdominal: Soft and non-distended.  There is no tenderness.  No rebound, guarding, or rigidity. Good bowel sounds.  Genitourinary: Left CVA tenderness  Musculoskeletal: Moves all extremities. No obvious deformities. No edema. No calf tenderness. No midline spinal tenderness.  Skin: Warm and dry.  Neurological:  Alert, awake, and appropriate.  Normal speech.  No acute focal neurological deficits are appreciated.  Psychiatric: Normal affect. Good eye contact. Appropriate in content.     ED Course   Critical Care    Date/Time: 12/15/2024 4:00 PM    Performed by: Genesis Ovalle MD  Authorized by: Genesis Ovalle MD  Direct patient critical care time: 40 minutes  Additional history critical care time: 8 minutes  Ordering / reviewing critical care time: 6  minutes  Documentation critical care time: 5 minutes  Consulting other physicians critical care time: 5 minutes  Total critical care time (exclusive of procedural time) : 64 minutes  Critical care was necessary to treat or prevent imminent or life-threatening deterioration of the following conditions: circulatory failure, cardiac failure and metabolic crisis (HTN urgency, Electrolyte replacement).  Critical care was time spent personally by me on the following activities: blood draw for specimens, development of treatment plan with patient or surrogate, interpretation of cardiac output measurements, evaluation of patient's response to treatment, examination of patient, ordering and performing treatments and interventions, obtaining history from patient or surrogate, discussions with consultants, ordering and review of laboratory studies, ordering and review of radiographic studies, pulse oximetry, re-evaluation of patient's condition and review of old charts.        ED Vital Signs:  Vitals:    12/16/24 1340 12/16/24 1345 12/16/24 1350 12/16/24 1524   BP:       Pulse: 84 84 84 78   Resp:       Temp:       TempSrc:       SpO2: 98% 98% 98%    Weight:       Height:        12/16/24 1615 12/16/24 1922 12/16/24 1938 12/16/24 2000   BP: (!) 98/54  (!) 99/54    Pulse: 79 79 79 76   Resp: 16 17    Temp: 98.1 °F (36.7 °C)  97.8 °F (36.6 °C)    TempSrc: Oral  Oral    SpO2: (!) 93%  (!) 92%    Weight:       Height:        12/16/24 2340 12/17/24 0430 12/17/24 0434 12/17/24 0526   BP: (!) 111/53 132/61     Pulse: 74 81 81 90   Resp: 17 17     Temp: 98.1 °F (36.7 °C) 98.3 °F (36.8 °C)     TempSrc: Oral Oral     SpO2: (!) 93% (!) 92%     Weight:       Height:        12/17/24 0712 12/17/24 0911 12/17/24 0959   BP: 130/60     Pulse: 79 71 63   Resp: 18     Temp: 98.4 °F (36.9 °C)     TempSrc: Oral     SpO2: (!) 92%     Weight:      Height:          Abnormal Lab Results:  Labs Reviewed   CBC W/ AUTO DIFFERENTIAL - Abnormal        Result Value    WBC 8.29      RBC 3.50 (*)     Hemoglobin 10.2 (*)     Hematocrit 31.3 (*)     MCV 89      MCH 29.1      MCHC 32.6      RDW 17.2 (*)     Platelets 175      MPV 10.4      Immature Granulocytes 1.7 (*)     Gran # (ANC) 6.8      Immature Grans (Abs) 0.14 (*)     Lymph # 0.7 (*)     Mono # 0.6      Eos # 0.1      Baso # 0.05      nRBC 0      Gran % 81.7 (*)     Lymph % 7.8 (*)     Mono % 7.2      Eosinophil % 1.0      Basophil % 0.6      Differential Method Automated     COMPREHENSIVE METABOLIC PANEL - Abnormal    Sodium 143      Potassium 3.1 (*)     Chloride 112 (*)     CO2 16 (*)     Glucose 101      BUN 23      Creatinine 1.1      Calcium 8.0 (*)     Total Protein 6.2      Albumin 3.2 (*)     Total Bilirubin 1.2 (*)     Alkaline Phosphatase 69      AST 17      ALT 11      eGFR 49 (*)     Anion Gap 15     TROPONIN I - Abnormal    Troponin I 0.081 (*)    B-TYPE NATRIURETIC PEPTIDE - Abnormal    BNP 1,170 (*)    URINALYSIS, REFLEX TO URINE CULTURE - Abnormal    Specimen UA Urine, Catheterized      Color, UA Yellow      Appearance, UA Clear      pH, UA 7.0      Specific Gravity, UA 1.010      Protein, UA Trace (*)     Glucose, UA 3+ (*)     Ketones, UA 1+ (*)     Bilirubin (UA) Negative      Occult Blood UA Negative      Nitrite, UA Negative      Urobilinogen, UA Negative      Leukocytes, UA Negative      Narrative:     Specimen Source->Urine   MAGNESIUM - Abnormal    Magnesium 1.1 (*)    TROPONIN I - Abnormal    Troponin I 0.062 (*)    HEPATITIS C ANTIBODY    Hepatitis C Ab Negative      Narrative:     Release to patient->Immediate   HEP C VIRUS HOLD SPECIMEN    HEP C Virus Hold Specimen Hold for HCV sendout      Narrative:     Release to patient->Immediate   HIV 1 / 2 ANTIBODY    HIV 1/2 Ag/Ab Negative      Narrative:     Release to patient->Immediate   MAGNESIUM   URINALYSIS MICROSCOPIC    Bacteria Rare      Yeast, UA None      Squam Epithel, UA 6      Microscopic Comment SEE COMMENT      Narrative:      Specimen Source->Urine        All Lab Results:  Results for orders placed or performed during the hospital encounter of 12/15/24   EKG 12-lead    Collection Time: 12/15/24  1:39 PM   Result Value Ref Range    QRS Duration 80 ms    [facility] QTC Calculation 461 ms   Hepatitis C Antibody    Collection Time: 12/15/24  1:53 PM   Result Value Ref Range    Hepatitis C Ab Negative Negative   HCV Virus Hold Specimen    Collection Time: 12/15/24  1:53 PM   Result Value Ref Range    HEP C Virus Hold Specimen Hold for HCV sendout    HIV 1/2 Ag/Ab (4th Gen)    Collection Time: 12/15/24  1:53 PM   Result Value Ref Range    HIV 1/2 Ag/Ab Negative Negative   CBC auto differential    Collection Time: 12/15/24  1:53 PM   Result Value Ref Range    WBC 8.29 3.90 - 12.70 K/uL    RBC 3.50 (L) 4.00 - 5.40 M/uL    Hemoglobin 10.2 (L) 12.0 - 16.0 g/dL    Hematocrit 31.3 (L) 37.0 - 48.5 %    MCV 89 82 - 98 fL    MCH 29.1 27.0 - 31.0 pg    MCHC 32.6 32.0 - 36.0 g/dL    RDW 17.2 (H) 11.5 - 14.5 %    Platelets 175 150 - 450 K/uL    MPV 10.4 9.2 - 12.9 fL    Immature Granulocytes 1.7 (H) 0.0 - 0.5 %    Gran # (ANC) 6.8 1.8 - 7.7 K/uL    Immature Grans (Abs) 0.14 (H) 0.00 - 0.04 K/uL    Lymph # 0.7 (L) 1.0 - 4.8 K/uL    Mono # 0.6 0.3 - 1.0 K/uL    Eos # 0.1 0.0 - 0.5 K/uL    Baso # 0.05 0.00 - 0.20 K/uL    nRBC 0 0 /100 WBC    Gran % 81.7 (H) 38.0 - 73.0 %    Lymph % 7.8 (L) 18.0 - 48.0 %    Mono % 7.2 4.0 - 15.0 %    Eosinophil % 1.0 0.0 - 8.0 %    Basophil % 0.6 0.0 - 1.9 %    Differential Method Automated    Comprehensive metabolic panel    Collection Time: 12/15/24  1:53 PM   Result Value Ref Range    Sodium 143 136 - 145 mmol/L    Potassium 3.1 (L) 3.5 - 5.1 mmol/L    Chloride 112 (H) 95 - 110 mmol/L    CO2 16 (L) 23 - 29 mmol/L    Glucose 101 70 - 110 mg/dL    BUN 23 8 - 23 mg/dL    Creatinine 1.1 0.5 - 1.4 mg/dL    Calcium 8.0 (L) 8.7 - 10.5 mg/dL    Total Protein 6.2 6.0 - 8.4 g/dL    Albumin 3.2 (L) 3.5 - 5.2 g/dL    Total Bilirubin 1.2  (H) 0.1 - 1.0 mg/dL    Alkaline Phosphatase 69 40 - 150 U/L    AST 17 10 - 40 U/L    ALT 11 10 - 44 U/L    eGFR 49 (A) >60 mL/min/1.73 m^2    Anion Gap 15 8 - 16 mmol/L   Troponin I #1    Collection Time: 12/15/24  1:53 PM   Result Value Ref Range    Troponin I 0.081 (H) 0.000 - 0.026 ng/mL   BNP    Collection Time: 12/15/24  1:53 PM   Result Value Ref Range    BNP 1,170 (H) 0 - 99 pg/mL   Magnesium    Collection Time: 12/15/24  1:53 PM   Result Value Ref Range    Magnesium 1.1 (L) 1.6 - 2.6 mg/dL   Urinalysis, Reflex to Urine Culture Urine, Catheterized    Collection Time: 12/15/24  2:55 PM    Specimen: Urine   Result Value Ref Range    Specimen UA Urine, Catheterized     Color, UA Yellow Yellow, Straw, Terrie    Appearance, UA Clear Clear    pH, UA 7.0 5.0 - 8.0    Specific Gravity, UA 1.010 1.005 - 1.030    Protein, UA Trace (A) Negative    Glucose, UA 3+ (A) Negative    Ketones, UA 1+ (A) Negative    Bilirubin (UA) Negative Negative    Occult Blood UA Negative Negative    Nitrite, UA Negative Negative    Urobilinogen, UA Negative <2.0 EU/dL    Leukocytes, UA Negative Negative   Urinalysis Microscopic    Collection Time: 12/15/24  2:55 PM   Result Value Ref Range    Bacteria Rare None-Occ /hpf    Yeast, UA None None    Squam Epithel, UA 6 /hpf    Microscopic Comment SEE COMMENT    Troponin I    Collection Time: 12/15/24  6:26 PM   Result Value Ref Range    Troponin I 0.062 (H) 0.000 - 0.026 ng/mL   Troponin I    Collection Time: 12/15/24  8:29 PM   Result Value Ref Range    Troponin I 0.077 (H) 0.000 - 0.026 ng/mL   CBC Auto Differential    Collection Time: 12/16/24  8:27 AM   Result Value Ref Range    WBC 7.80 3.90 - 12.70 K/uL    RBC 3.51 (L) 4.00 - 5.40 M/uL    Hemoglobin 10.2 (L) 12.0 - 16.0 g/dL    Hematocrit 31.5 (L) 37.0 - 48.5 %    MCV 90 82 - 98 fL    MCH 29.1 27.0 - 31.0 pg    MCHC 32.4 32.0 - 36.0 g/dL    RDW 17.1 (H) 11.5 - 14.5 %    Platelets 197 150 - 450 K/uL    MPV 10.3 9.2 - 12.9 fL    Immature  Granulocytes 1.3 (H) 0.0 - 0.5 %    Gran # (ANC) 6.8 1.8 - 7.7 K/uL    Immature Grans (Abs) 0.10 (H) 0.00 - 0.04 K/uL    Lymph # 0.4 (L) 1.0 - 4.8 K/uL    Mono # 0.5 0.3 - 1.0 K/uL    Eos # 0.0 0.0 - 0.5 K/uL    Baso # 0.03 0.00 - 0.20 K/uL    nRBC 0 0 /100 WBC    Gran % 87.6 (H) 38.0 - 73.0 %    Lymph % 4.5 (L) 18.0 - 48.0 %    Mono % 5.9 4.0 - 15.0 %    Eosinophil % 0.3 0.0 - 8.0 %    Basophil % 0.4 0.0 - 1.9 %    Differential Method Automated    Basic Metabolic Panel    Collection Time: 12/16/24  8:27 AM   Result Value Ref Range    Sodium 144 136 - 145 mmol/L    Potassium 3.0 (L) 3.5 - 5.1 mmol/L    Chloride 110 95 - 110 mmol/L    CO2 18 (L) 23 - 29 mmol/L    Glucose 112 (H) 70 - 110 mg/dL    BUN 23 8 - 23 mg/dL    Creatinine 1.2 0.5 - 1.4 mg/dL    Calcium 8.1 (L) 8.7 - 10.5 mg/dL    Anion Gap 16 8 - 16 mmol/L    eGFR 44 (A) >60 mL/min/1.73 m^2   Magnesium    Collection Time: 12/16/24  8:27 AM   Result Value Ref Range    Magnesium 1.8 1.6 - 2.6 mg/dL   Troponin I    Collection Time: 12/16/24  8:27 AM   Result Value Ref Range    Troponin I 0.064 (H) 0.000 - 0.026 ng/mL   Basic Metabolic Panel    Collection Time: 12/17/24  8:15 AM   Result Value Ref Range    Sodium 143 136 - 145 mmol/L    Potassium 3.9 3.5 - 5.1 mmol/L    Chloride 109 95 - 110 mmol/L    CO2 19 (L) 23 - 29 mmol/L    Glucose 102 70 - 110 mg/dL    BUN 27 (H) 8 - 23 mg/dL    Creatinine 1.8 (H) 0.5 - 1.4 mg/dL    Calcium 9.1 8.7 - 10.5 mg/dL    Anion Gap 15 8 - 16 mmol/L    eGFR 27 (A) >60 mL/min/1.73 m^2   CBC Auto Differential    Collection Time: 12/17/24  8:15 AM   Result Value Ref Range    WBC 8.21 3.90 - 12.70 K/uL    RBC 3.72 (L) 4.00 - 5.40 M/uL    Hemoglobin 10.7 (L) 12.0 - 16.0 g/dL    Hematocrit 33.4 (L) 37.0 - 48.5 %    MCV 90 82 - 98 fL    MCH 28.8 27.0 - 31.0 pg    MCHC 32.0 32.0 - 36.0 g/dL    RDW 17.1 (H) 11.5 - 14.5 %    Platelets 243 150 - 450 K/uL    MPV 10.0 9.2 - 12.9 fL    Immature Granulocytes 1.2 (H) 0.0 - 0.5 %    Gran # (ANC)  7.0 1.8 - 7.7 K/uL    Immature Grans (Abs) 0.10 (H) 0.00 - 0.04 K/uL    Lymph # 0.5 (L) 1.0 - 4.8 K/uL    Mono # 0.5 0.3 - 1.0 K/uL    Eos # 0.0 0.0 - 0.5 K/uL    Baso # 0.04 0.00 - 0.20 K/uL    nRBC 0 0 /100 WBC    Gran % 85.8 (H) 38.0 - 73.0 %    Lymph % 6.3 (L) 18.0 - 48.0 %    Mono % 5.7 4.0 - 15.0 %    Eosinophil % 0.5 0.0 - 8.0 %    Basophil % 0.5 0.0 - 1.9 %    Differential Method Automated    Magnesium    Collection Time: 12/17/24  8:15 AM   Result Value Ref Range    Magnesium 1.7 1.6 - 2.6 mg/dL     *Note: Due to a large number of results and/or encounters for the requested time period, some results have not been displayed. A complete set of results can be found in Results Review.         Imaging Results:  Imaging Results              X-Ray Hip 2 or 3 views Left with Pelvis when performed (Final result)  Result time 12/15/24 16:35:07      Final result by Low Venegas MD (12/15/24 16:35:07)                   Impression:      Status post left hip arthroplasty.      Electronically signed by: Low Venegas  Date:    12/15/2024  Time:    16:35               Narrative:    EXAMINATION:  XR HIP WITH PELVIS WHEN PERFORMED 2 OR 3 VIEWS LEFT    CLINICAL HISTORY:  Pain in left hip    TECHNIQUE:  AP view of the pelvis and frog leg lateral view of the left hip were performed.    COMPARISON:  None    FINDINGS:  No fracture or dislocation.  Status post left hip arthroplasty.  Decreased bone mineral density.                                       CT Renal Stone Study ABD Pelvis WO (Final result)  Result time 12/15/24 14:46:03      Final result by Saul العراقي MD (12/15/24 14:46:03)                   Impression:      Bilateral nonobstructing nephrolithiasis.    Small volume of free fluid in the pelvis.    No evidence of bowel obstruction or acute inflammatory change.  Diverticulosis coli without evidence of acute diverticulitis.    New minor superior L1 vertebral body compression deformity from the prior CT  10/13/2024.    All CT scans at this facility use dose modulation, iterative reconstruction, and/or weight based dosing when appropriate to reduce radiation dose to as low as reasonable achievable.      Electronically signed by: Saul العراقي  Date:    12/15/2024  Time:    14:46               Narrative:    EXAMINATION:  CT RENAL STONE STUDY ABD PELVIS WO    CLINICAL HISTORY:  Flank pain, kidney stone suspected;    TECHNIQUE:  Low dose axial images, sagittal and coronal reformations were obtained from the lung bases to the pubic symphysis.  Contrast was not administered.    COMPARISON:  CT abdomen pelvis 10/13/2024 with priors    FINDINGS:  Heart: Enlarged in size with heavy coronary atherosclerotic disease.  No pericardial effusion.    Lung Bases: Left greater than right bibasilar atelectasis.  No large pulmonary consolidation or effusion.    Liver: Normal in size and attenuation, with no focal hepatic lesions.    Gallbladder: Surgically absent.    Bile Ducts: No evidence of dilated ducts.    Pancreas: Severe pancreatic atrophy.  No focal pancreatic abnormality.    Spleen: Within normal limits.    Adrenals: Within normal limits.    Kidneys/ Ureters: Kidneys are normal in size and position.  Bilateral nonobstructing nephrolithiasis measuring up to 1.0 cm in the right kidney and 0.5 cm in the left.  No hydronephrosis.  The ureters are nondilated.  No evidence of an intraureteral calculus.    Bladder: No evidence of wall thickening.    Reproductive organs: Uterus is surgically absent.  No adnexal mass.    GI Tract/Mesentery: Small hiatal hernia, the stomach is otherwise within normal limits.  Small bowel loops appear within normal limits.  There are few scattered colonic diverticula.  No evidence of bowel obstruction or acute inflammatory change.  The appendix is visualized and appears within normal limits.    Peritoneal Space: Small volume of ascites within the pelvis.  No organized intra-abdominal collection.  No  free air.    Retroperitoneum: No significant adenopathy.    Abdominal wall: Within normal limits.    Vasculature: Heavy atherosclerotic calcification.  No aneurysm.    Bones: Postsurgical changes of a total left hip replacement.  Minor superior L1 compression deformity is new from the prior CT.  Degenerative changes of the osseous structures without acute osseous abnormality otherwise.                                       X-Ray Chest AP Portable (Final result)  Result time 12/15/24 13:56:48      Final result by Saul العراقي MD (12/15/24 13:56:48)                   Impression:      Stable chest radiograph without acute abnormality.      Electronically signed by: Saul العراقي  Date:    12/15/2024  Time:    13:56               Narrative:    EXAMINATION:  XR CHEST AP PORTABLE    CLINICAL HISTORY:  hypertension;    TECHNIQUE:  Single frontal view of the chest was performed.    COMPARISON:  Chest radiograph 10/25/2024 with priors    FINDINGS:  Cardiac leads project over the chest.  Postsurgical changes of a sternotomy.  Unchanged left-sided pacemaker.  Cardiomediastinal silhouette is unchanged in size.  No new pulmonary infiltrate or consolidation.  Similar perihilar vascular congestion.  No evidence of a large pleural effusion.  No pneumothorax.  The visualized osseous structures appear intact.                                       The EKG was ordered, reviewed, and independently interpreted by the ED provider.  Interpretation time: 13:39  Rate: 77 BPM  Rhythm: normal sinus rhythm  Interpretation: No acute ST changes. No STEMI.             The Emergency Provider reviewed the vital signs and test results, which are outlined above.     ED Discussion     3:45 PM: Dr. Caldwell recommends a dedicated study of her left hip.      1600 PM: Discussed case with Dr. Caldwell ([facility]). Dr. Caldwell  agrees with current care and management of pt and accepts admission.   Admitting Service:   Admitting Physician:   Javi  Admit to: Obs      16:00 PM: Re-evaluated pt. I have discussed test results, shared treatment plan, and the need for admission with patient and family at bedside. Pt and family express understanding at this time and agree with all information. All questions answered. Pt and family have no further questions or concerns at this time. Pt is ready for admit.        Medical Decision Making  DDX: 1. Ureteral stone 2. Back Spasm 3. AAA     ECG NSR, no ischemic changes, trop mildly elevated, mag low and replaced, potassium low replaced, BP treated with IV and po meds, BNP over 1,000, diuresis initiated, CXR negative, CT abd/pelvis negative for active kidney stones, shows L1 mild compression fx, xray left hip normal, hosp med to admit.     Amount and/or Complexity of Data Reviewed  Independent Historian: caregiver     Details: Pt's daughter at bedside    Labs: ordered. Decision-making details documented in ED Course.  Radiology: ordered. Decision-making details documented in ED Course.  ECG/medicine tests: ordered and independent interpretation performed. Decision-making details documented in ED Course.    Risk  OTC drugs.  Prescription drug management.  Decision regarding hospitalization.                ED Medication(s):  Medications   hydrALAZINE injection 10 mg (10 mg Intravenous Given 12/15/24 1350)   methocarbamoL tablet 500 mg (500 mg Oral Given 12/15/24 1355)   potassium chloride SA CR tablet 40 mEq (40 mEq Oral Given 12/15/24 1518)   nitroGLYCERIN 2% TD oint ointment 1 inch (1 inch Topical (Top) Given 12/15/24 1534)   bumetanide injection 1 mg (1 mg Intravenous Given 12/15/24 1529)   magnesium sulfate 2g in water 50mL IVPB (premix) (0 g Intravenous Stopped 12/15/24 1802)   magnesium oxide tablet 400 mg (400 mg Oral Given 12/15/24 1603)   hydrALAZINE injection 10 mg (10 mg Intravenous Given 12/15/24 1601)   potassium chloride SA CR tablet 40 mEq (40 mEq Oral Given 12/16/24 1524)       Discharge Medication List  as of 12/17/2024 12:03 PM        START taking these medications    Details   cefdinir (OMNICEF) 300 MG capsule Take 1 capsule (300 mg total) by mouth once daily. for 7 days, Starting Wed 12/18/2024, Until Wed 12/25/2024, Normal              Follow-up Information       Christina Cardona MD. Schedule an appointment as soon as possible for a visit in 3 day(s).    Specialty: Internal Medicine  Contact information:  [id number]  [facility] [address] [phone]               Stan Lui MD. Schedule an appointment as soon as possible for a visit.    Specialties: Interventional Cardiology, Cardiology  Contact information:  [id number] [facility] [address] [phone]                                 Scribe Attestation:   Scribe #1: I performed the above scribed service and the documentation accurately describes the services I performed. I attest to the accuracy of the note.     Attending:   Physician Attestation Statement for Scribe #1: I, Genesis Ovalle MD, personally performed the services described in this documentation, as scribed by Christopher Galeano, in my presence, and it is both accurate and complete.           Clinical Impression       ICD-10-CM ICD-9-CM   1. Hypertensive urgency  I16.0 401.9   2. Hypertension  I10 401.9   3. Left hip pain  M25.552 719.45   4. Acute on chronic combined systolic and diastolic heart failure  I50.43 428.43   5. Chronic hypokalemia  E87.6 276.8   6. Hypomagnesemia  E83.42 275.2   7. Bilateral nephrolithiasis  N20.0 592.0   8. Compression fracture of L1 vertebra, initial encounter  S32.010A 805.4   9. Left flank pain  R10.9 789.09       Disposition:   Disposition: Placed in Observation  Condition: Genesis Nieto MD  12/21/24 9921

## 2024-12-15 NOTE — H&P
[facility] - Emergency Dept.  [facility] Medicine  History & Physical    Patient Name: Glo Dumont  MRN: [id number]  Patient Class: OP- Observation  Admission Date: 12/15/2024  Attending Physician: Leonardo Caldwell MD   Primary Care Provider: Christina Cardona MD         Patient information was obtained from patient, relative(s), and ER records.     Subjective:     Principal Problem:Hypertensive urgency    Chief Complaint:   Chief Complaint   Patient presents with    Back Pain     Lower back pain that has worsen over the past few days with activity. No previous injury to back . Denies issues urinating.        HPI:  Glo Dumont is a 89 y.o. female patient with a PMHx of HTN, diverticulosis, HLD, CAD, carotid artery occlusion, anemia, CHF, CKD, anxiety, and depression who presents to the Emergency Department for evaluation of non-radiating left side flank pain which onset gradually since Wednesday. Pt's pain started to worsen Friday. Pain worsens with movement and sitting up. Pt's daughter reports the pt's HTN is poorly controlled. Symptoms are constant and moderate in severity. No associated sxs. Patient denies any hematuria, dysuria, fever, chills, N/V, SOB, and all other sxs at this time.     ED workup: Bp 200s/90s, serum potassium 3.1, magnesium 1.1, BNP 1170, troponin 0.081.  CT renal showed  Bilateral nonobstructing nephrolithiasis. Small volume of free fluid in the pelvis. Diverticulosis coli without evidence of acute diverticulitis. New minor superior L1 vertebral body compression deformity from the prior CT 10/13/2024.  In the ED patient received Bumex 1 mg IV, hydralazine 20 mg IV, and nitropaste with mild improvement in blood pressure.  Most recent blood pressure 175/87.  Patient admitted for uncontrolled blood pressure, CHF, and pain under the care of Hospital Medicine team.     Past Medical History:   Diagnosis Date    Anemia     Angina pectoris     Anxiety     Anxiety and depression     Arthritis      hip    Carotid artery occlusion     Carpal tunnel syndrome 06/23/2008    emg    Chronic diarrhea     work up in 2011 with EGD, CS and VCE    CKD (chronic kidney disease) stage 3, GFR 30-59 ml/min 5/11/2017    Colitis     Coronary artery disease     Coronary artery disease     Diastolic dysfunction     Diverticulosis     Encephalopathy 10/14/2024    Glaucoma     Greater trochanteric bursitis 2/10/2015    Grief at loss of child 1/26/2016    H/O carotid endarterectomy 12/2/2013    Heart failure     History of coronary angioplasty 3/11/2014    Hypercholesteremia     Hypertension     Liver cyst 02/08/2013    ct abd    Macular degeneration     Obesity with serious comorbidity 3/19/2023    Primary open-angle glaucoma(365.11) 9/3/2013    Renal cyst 02/08/2013    ct abd    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    Sarcoidosis     Sarcoidosis of lung     Sickle cell trait     Uveitis        Past Surgical History:   Procedure Laterality Date    A-V CARDIAC PACEMAKER INSERTION Left 3/21/2023    Procedure: INSERTION, CARDIAC PACEMAKER, DUAL CHAMBER/His Lead;  Surgeon: Cortez Ambrose MD;  Location: [facility] CATH LAB;  Service: Cardiology;  Laterality: Left;  MDT/ MD to confirm in am/possible nurse sedate    CAROTID ENDARTERECTOMY Right 2000s    CATARACT EXTRACTION Bilateral     Dr. Liang Dennis    CHOLECYSTECTOMY      laparoscopic, 3/18.    CORONARY ANGIOPLASTY WITH STENT PLACEMENT  11/19/2010    RCA-HALIE 2010    Lathia    JOINT REPLACEMENT Left 11/03/2014    Dr. Braun    TOTAL ABDOMINAL HYSTERECTOMY W/ BILATERAL SALPINGOOPHORECTOMY  1972       Review of patient's allergies indicates:   Allergen Reactions    Lisinopril      angioedema    Codeine Nausea And Vomiting       No current facility-administered medications on file prior to encounter.     Current Outpatient Medications on File Prior to Encounter   Medication Sig    aflibercept 2 mg/0.05 mL Soln 2 mg by Intravitreal route every 28 days.    aspirin  (ECOTRIN) 81 MG EC tablet Take 1 tablet (81 mg total) by mouth once daily.    atorvastatin (LIPITOR) 40 MG tablet Take 1 tablet (40 mg total) by mouth Daily.    budesonide (ENTOCORT EC) 3 mg capsule Take 1-2 capsules (3-6 mg total) by mouth daily as needed (flare). As needed    bumetanide (BUMEX) 1 MG tablet Take 1 tablet (1 mg total) by mouth once daily. (Patient taking differently: Take 1 mg by mouth once daily. As needed)    busPIRone (BUSPAR) 5 MG Tab Take 1 tablet (5 mg total) by mouth 2 (two) times daily as needed (anxiety).    carvediloL (COREG) 12.5 MG tablet Take 1 tablet (12.5 mg total) by mouth 2 (two) times daily with meals.    citalopram (CELEXA) 10 MG tablet Take 1 tablet (10 mg total) by mouth every evening.    cyproheptadine (PERIACTIN) 4 mg tablet Take 1 tablet (4 mg total) by mouth 3 (three) times daily as needed (appetite).    dexAMETHasone (OZURDEX) 0.7 mg Impl intravitreal implant 0.7 mg by Intravitreal route once. (Patient not taking: Reported on 12/5/2024)    dorzolamide-timolol 2-0.5% (COSOPT) 22.3-6.8 mg/mL ophthalmic solution Place 1 drop into both eyes 2 (two) times daily.    empagliflozin (JARDIANCE) 10 mg tablet Take 1 tablet (10 mg total) by mouth once daily.    FOLIC ACID/MULTIVIT-MIN/LUTEIN (CENTRUM SILVER ORAL) Take 1 tablet by mouth once daily. (Patient not taking: Reported on 12/5/2024)    HYDROcodone-acetaminophen (NORCO) 5-325 mg per tablet 1/2 tablet every 6 to 8 hours as needed for pain    lipase-protease-amylase 24,000-76,000-120,000 units (CREON) 24,000-76,000 -120,000 unit capsule Take 1 capsule by mouth 3 (three) times daily with meals.    loperamide (IMODIUM) 2 mg capsule Take 1 mg by mouth every 6 (six) hours as needed for Diarrhea.    nitroGLYCERIN (NITROSTAT) 0.4 MG SL tablet PLACE ONE TABLET UNDERNEATH THE TONGUE EVERY 5 MINUTES AS NEEDED FOR CHEST PAIN    ondansetron (ZOFRAN) 4 MG tablet Take 1 tablet (4 mg total) by mouth every 8 (eight) hours as needed for Nausea.     ondansetron (ZOFRAN-ODT) 8 MG TbDL Take 8 mg by mouth every 6 (six) hours.    pantoprazole (PROTONIX) 40 MG tablet Take 1 tablet (40 mg total) by mouth once daily.    potassium chloride (KLOR-CON) 10 MEQ TbSR Take 1 tablet (10 mEq total) by mouth once daily.    silver sulfADIAZINE 1% (SILVADENE) 1 % cream Apply topically 2 (two) times daily.    sodium bicarbonate 650 MG tablet Take 1 tablet (650 mg total) by mouth once daily.     Family History       Problem Relation (Age of Onset)    Cancer Father, Daughter    Cirrhosis Brother    Heart failure Mother, Brother    Hypertension Mother          Tobacco Use    Smoking status: Never    Smokeless tobacco: Never   Substance and Sexual Activity    Alcohol use: No     Alcohol/week: 0.0 standard drinks of alcohol    Drug use: No    Sexual activity: Yes     Partners: Male     Review of Systems   Constitutional: Negative.    HENT: Negative.     Eyes: Negative.    Respiratory: Negative.     Cardiovascular: Negative.    Gastrointestinal: Negative.    Endocrine: Negative.    Genitourinary: Negative.    Musculoskeletal:  Positive for back pain (left lower side).   Skin: Negative.    Allergic/Immunologic: Negative.    Neurological: Negative.    Hematological: Negative.    Psychiatric/Behavioral: Negative.       Objective:     Vital Signs (Most Recent):  Temp: 98.8 °F (37.1 °C) (12/15/24 1500)  Pulse: 80 (12/15/24 1615)  Resp: 17 (12/15/24 1615)  BP: (!) 168/73 (12/15/24 1653)  SpO2: 97 % (12/15/24 1615) Vital Signs (24h Range):  Temp:  [98.8 °F (37.1 °C)] 98.8 °F (37.1 °C)  Pulse:  [78-80] 80  Resp:  [16-18] 17  SpO2:  [97 %] 97 %  BP: (168-222)/() 168/73     Weight: 59.7 kg (131 lb 9.6 oz)  Body mass index is 24.87 kg/m².     Physical Exam  Vitals and nursing note reviewed.   Constitutional:       Appearance: She is well-developed.   HENT:      Head: Normocephalic and atraumatic.   Eyes:      Conjunctiva/sclera: Conjunctivae normal.      Pupils: Pupils are equal, round,  and reactive to light.   Cardiovascular:      Rate and Rhythm: Normal rate and regular rhythm.      Heart sounds: Normal heart sounds.   Pulmonary:      Effort: Pulmonary effort is normal.      Breath sounds: Normal breath sounds.   Abdominal:      General: Bowel sounds are normal. There is no distension.      Palpations: Abdomen is soft.      Tenderness: There is no abdominal tenderness.   Musculoskeletal:         General: Normal range of motion.      Cervical back: Normal range of motion and neck supple.      Lumbar back: Tenderness (left lower) present.   Skin:     General: Skin is warm and dry.   Neurological:      Mental Status: She is alert and oriented to person, place, and time.   Psychiatric:         Behavior: Behavior normal.         Thought Content: Thought content normal.         Judgment: Judgment normal.            Significant Labs: All pertinent labs within the past 24 hours have been reviewed.  BMP:   Recent Labs   Lab 12/15/24  1353         K 3.1*   *   CO2 16*   BUN 23   CREATININE 1.1   CALCIUM 8.0*   MG 1.1*     CBC:   Recent Labs   Lab 12/15/24  1353   WBC 8.29   HGB 10.2*   HCT 31.3*        CMP:   Recent Labs   Lab 12/15/24  1353      K 3.1*   *   CO2 16*      BUN 23   CREATININE 1.1   CALCIUM 8.0*   PROT 6.2   ALBUMIN 3.2*   BILITOT 1.2*   ALKPHOS 69   AST 17   ALT 11   ANIONGAP 15     Cardiac Markers:   Recent Labs   Lab 12/15/24  1353   BNP 1,170*     Magnesium:   Recent Labs   Lab 12/15/24  1353   MG 1.1*     Troponin:   Recent Labs   Lab 12/15/24  1353   TROPONINI 0.081*     Urine Studies:   Recent Labs   Lab 12/15/24  1455   COLORU Yellow   APPEARANCEUA Clear   PHUR 7.0   SPECGRAV 1.010   PROTEINUA Trace*   GLUCUA 3+*   KETONESU 1+*   BILIRUBINUA Negative   OCCULTUA Negative   NITRITE Negative   UROBILINOGEN Negative   LEUKOCYTESUR Negative   BACTERIA Rare   SQUAMEPITHEL 6       Significant Imaging: I have reviewed all pertinent imaging  results/findings within the past 24 hours.  Assessment/Plan:     * Hypertensive urgency  Patient has a current diagnosis of Hypertensive emergency with end organ damage evidenced by acute heart failure which is uncontrolled.  Latest blood pressure and vitals reviewed-   Temp:  [98.8 °F (37.1 °C)]   Pulse:  [76-80]   Resp:  [16-18]   BP: (168-222)/()   SpO2:  [97 %] .   Home meds for hypertension were reviewed and noted below.   Hypertension Medications               bumetanide (BUMEX) 1 MG tablet Take 1 tablet (1 mg total) by mouth once daily.    carvediloL (COREG) 12.5 MG tablet Take 1 tablet (12.5 mg total) by mouth 2 (two) times daily with meals.    nitroGLYCERIN (NITROSTAT) 0.4 MG SL tablet PLACE ONE TABLET UNDERNEATH THE TONGUE EVERY 5 MINUTES AS NEEDED FOR CHEST PAIN            Medication adjustment for hospital antihypertensives is as follows- AMLODIPINE 10 MG DAILY, IMDUR 30 MG DAILY, LABETALOL 200 MG BID.     Will aim for controlled BP reduction by medications noted above. Monitor and mitigate end organ damage as indicated.    Coronary artery disease involving native coronary artery of native heart  Patient with known CAD s/p CABG, which is controlled Will continue home medications and monitor for S/Sx of angina/ACS. Continue to monitor on telemetry.     Acute on chronic heart failure with preserved ejection fraction  Patient has Diastolic (HFpEF) heart failure that is Acute on chronic. On presentation their CHF was decompensated. The etiology of their decompensation is likely medication non-compliance. Most recent BNP and echo results are listed below.  Recent Labs     12/15/24  1353   BNP 1,170*     Latest ECHO  Results for orders placed during the hospital encounter of 10/13/24    Echo    Interpretation Summary    Left Ventricle: The left ventricle is normal in size. Normal wall thickness. There is concentric remodeling. There is normal systolic function with a visually estimated ejection fraction  of 60 - 65%. Ejection fraction is approximately 60%.    Right Ventricle: Normal right ventricular cavity size. Wall thickness is normal. Systolic function is normal.    Mitral Valve: There is moderate mitral annular calcification present. There is mild regurgitation.    Pulmonary Artery: The estimated pulmonary artery systolic pressure is 32 mmHg.    IVC/SVC: Normal venous pressure at 3 mmHg.    Current Heart Failure Medications  bumetanide tablet 1 mg, 2 times daily, Oral    Plan  - Monitor strict I&Os and daily weights.    - Place on telemetry  - Low sodium diet  - Place on fluid restriction of 1.5 L.   - Cardiology has not been consulted  - BNP 1170  -BUMEX 1 MG P.O. B.I.D.      Hypomagnesemia  Patient has Abnormal Magnesium: hypomagnesemia. Will continue to monitor electrolytes closely. Will replace the affected electrolytes and repeat labs to be done after interventions completed. The patient's magnesium results have been reviewed and are listed below.  Recent Labs   Lab 12/15/24  1353   MG 1.1*        Hypokalemia  Patient's most recent potassium results are listed below.   Recent Labs     12/15/24  1353   K 3.1*     Plan  - Replete potassium per protocol  - Monitor potassium Daily  - Patient's hypokalemia is stable      CKD (chronic kidney disease) stage 4, GFR 15-29 ml/min  Creatine stable for now. BMP reviewed- noted Estimated Creatinine Clearance: 29.9 mL/min (based on SCr of 1.1 mg/dL). according to latest data. Based on current GFR, CKD stage is stage 4 - GFR 15-29.  Monitor UOP and serial BMP and adjust therapy as needed. Renally dose meds. Avoid nephrotoxic medications and procedures.    Other hyperlipidemia  Continue statin.         VTE Risk Mitigation (From admission, onward)           Ordered     enoxaparin injection 30 mg  Daily         12/15/24 1709     IP VTE HIGH RISK PATIENT  Once         12/15/24 1709     Place sequential compression device  Until discontinued         12/15/24 1709                          On 12/15/2024, patient should be placed in hospital observation services under my care in collaboration with Dr. Caldwell.      Pharmacist Renal Dose Adjustment Note    Glo Dumont is a 89 y.o. female being treated with the medication enoxaparin.     Patient Data:    Vital Signs (Most Recent):  Temp: 98.8 °F (37.1 °C) (12/15/24 1500)  Pulse: 76 (12/15/24 1712)  Resp: 17 (12/15/24 1615)  BP: (!) 177/82 (12/15/24 1712)  SpO2: 97 % (12/15/24 1615) Vital Signs (72h Range):  Temp:  [98.8 °F (37.1 °C)]   Pulse:  [76-80]   Resp:  [16-18]   BP: (168-222)/()   SpO2:  [97 %]      Recent Labs   Lab 12/15/24  1353   CREATININE 1.1     Serum creatinine: 1.1 mg/dL 12/15/24 1353  Estimated creatinine clearance: 29.9 mL/min    Enoxaparin 40 mg subq every 24 hours will be changed to enoxaparin 30 mg subq every 24 hours per renal dose protocol for CrCl < 30 ml/min.     Thank you,  Pharmacist's Name: Saul Bower NP  Department of Hospital Medicine  '[place] - Emergency Dept.

## 2024-12-15 NOTE — ASSESSMENT & PLAN NOTE
Patient's most recent potassium results are listed below.   Recent Labs     12/15/24  1353   K 3.1*     Plan  - Replete potassium per protocol  - Monitor potassium Daily  - Patient's hypokalemia is stable

## 2024-12-15 NOTE — ASSESSMENT & PLAN NOTE
Patient has Diastolic (HFpEF) heart failure that is Acute on chronic. On presentation their CHF was decompensated. The etiology of their decompensation is likely medication non-compliance. Most recent BNP and echo results are listed below.  Recent Labs     12/15/24  1353   BNP 1,170*     Latest ECHO  Results for orders placed during the hospital encounter of 10/13/24    Echo    Interpretation Summary    Left Ventricle: The left ventricle is normal in size. Normal wall thickness. There is concentric remodeling. There is normal systolic function with a visually estimated ejection fraction of 60 - 65%. Ejection fraction is approximately 60%.    Right Ventricle: Normal right ventricular cavity size. Wall thickness is normal. Systolic function is normal.    Mitral Valve: There is moderate mitral annular calcification present. There is mild regurgitation.    Pulmonary Artery: The estimated pulmonary artery systolic pressure is 32 mmHg.    IVC/SVC: Normal venous pressure at 3 mmHg.    Current Heart Failure Medications  bumetanide tablet 1 mg, 2 times daily, Oral    Plan  - Monitor strict I&Os and daily weights.    - Place on telemetry  - Low sodium diet  - Place on fluid restriction of 1.5 L.   - Cardiology has not been consulted  - BNP 1170  -BUMEX 1 MG P.O. B.I.D.

## 2024-12-15 NOTE — ASSESSMENT & PLAN NOTE
Patient has a current diagnosis of Hypertensive emergency with end organ damage evidenced by acute heart failure which is uncontrolled.  Latest blood pressure and vitals reviewed-   Temp:  [98.8 °F (37.1 °C)]   Pulse:  [76-80]   Resp:  [16-18]   BP: (168-222)/()   SpO2:  [97 %] .   Home meds for hypertension were reviewed and noted below.   Hypertension Medications               bumetanide (BUMEX) 1 MG tablet Take 1 tablet (1 mg total) by mouth once daily.    carvediloL (COREG) 12.5 MG tablet Take 1 tablet (12.5 mg total) by mouth 2 (two) times daily with meals.    nitroGLYCERIN (NITROSTAT) 0.4 MG SL tablet PLACE ONE TABLET UNDERNEATH THE TONGUE EVERY 5 MINUTES AS NEEDED FOR CHEST PAIN            Medication adjustment for hospital antihypertensives is as follows- AMLODIPINE 10 MG DAILY, IMDUR 30 MG DAILY, LABETALOL 200 MG BID.     Will aim for controlled BP reduction by medications noted above. Monitor and mitigate end organ damage as indicated.

## 2024-12-15 NOTE — ASSESSMENT & PLAN NOTE
Creatine stable for now. BMP reviewed- noted Estimated Creatinine Clearance: 29.9 mL/min (based on SCr of 1.1 mg/dL). according to latest data. Based on current GFR, CKD stage is stage 4 - GFR 15-29.  Monitor UOP and serial BMP and adjust therapy as needed. Renally dose meds. Avoid nephrotoxic medications and procedures.

## 2024-12-16 LAB
ANION GAP SERPL CALC-SCNC: 16 MMOL/L (ref 8–16)
BASOPHILS # BLD AUTO: 0.03 K/UL (ref 0–0.2)
BASOPHILS NFR BLD: 0.4 % (ref 0–1.9)
BUN SERPL-MCNC: 23 MG/DL (ref 8–23)
CALCIUM SERPL-MCNC: 8.1 MG/DL (ref 8.7–10.5)
CHLORIDE SERPL-SCNC: 110 MMOL/L (ref 95–110)
CO2 SERPL-SCNC: 18 MMOL/L (ref 23–29)
CREAT SERPL-MCNC: 1.2 MG/DL (ref 0.5–1.4)
DIFFERENTIAL METHOD BLD: ABNORMAL
EOSINOPHIL # BLD AUTO: 0 K/UL (ref 0–0.5)
EOSINOPHIL NFR BLD: 0.3 % (ref 0–8)
ERYTHROCYTE [DISTWIDTH] IN BLOOD BY AUTOMATED COUNT: 17.1 % (ref 11.5–14.5)
EST. GFR  (NO RACE VARIABLE): 44 ML/MIN/1.73 M^2
GLUCOSE SERPL-MCNC: 112 MG/DL (ref 70–110)
HCT VFR BLD AUTO: 31.5 % (ref 37–48.5)
HGB BLD-MCNC: 10.2 G/DL (ref 12–16)
IMM GRANULOCYTES # BLD AUTO: 0.1 K/UL (ref 0–0.04)
IMM GRANULOCYTES NFR BLD AUTO: 1.3 % (ref 0–0.5)
LYMPHOCYTES # BLD AUTO: 0.4 K/UL (ref 1–4.8)
LYMPHOCYTES NFR BLD: 4.5 % (ref 18–48)
MAGNESIUM SERPL-MCNC: 1.8 MG/DL (ref 1.6–2.6)
MCH RBC QN AUTO: 29.1 PG (ref 27–31)
MCHC RBC AUTO-ENTMCNC: 32.4 G/DL (ref 32–36)
MCV RBC AUTO: 90 FL (ref 82–98)
MONOCYTES # BLD AUTO: 0.5 K/UL (ref 0.3–1)
MONOCYTES NFR BLD: 5.9 % (ref 4–15)
NEUTROPHILS # BLD AUTO: 6.8 K/UL (ref 1.8–7.7)
NEUTROPHILS NFR BLD: 87.6 % (ref 38–73)
NRBC BLD-RTO: 0 /100 WBC
PLATELET # BLD AUTO: 197 K/UL (ref 150–450)
PMV BLD AUTO: 10.3 FL (ref 9.2–12.9)
POTASSIUM SERPL-SCNC: 3 MMOL/L (ref 3.5–5.1)
RBC # BLD AUTO: 3.51 M/UL (ref 4–5.4)
SODIUM SERPL-SCNC: 144 MMOL/L (ref 136–145)
TROPONIN I SERPL DL<=0.01 NG/ML-MCNC: 0.06 NG/ML (ref 0–0.03)
WBC # BLD AUTO: 7.8 K/UL (ref 3.9–12.7)

## 2024-12-16 PROCEDURE — 80048 BASIC METABOLIC PNL TOTAL CA: CPT | Mod: HCNC | Performed by: INTERNAL MEDICINE

## 2024-12-16 PROCEDURE — 25000003 PHARM REV CODE 250: Mod: HCNC | Performed by: INTERNAL MEDICINE

## 2024-12-16 PROCEDURE — 97116 GAIT TRAINING THERAPY: CPT | Mod: HCNC

## 2024-12-16 PROCEDURE — G0179 MD RECERTIFICATION HHA PT: HCPCS | Mod: ,,, | Performed by: INTERNAL MEDICINE

## 2024-12-16 PROCEDURE — 51798 US URINE CAPACITY MEASURE: CPT | Mod: HCNC

## 2024-12-16 PROCEDURE — 85025 COMPLETE CBC W/AUTO DIFF WBC: CPT | Mod: HCNC | Performed by: INTERNAL MEDICINE

## 2024-12-16 PROCEDURE — 97166 OT EVAL MOD COMPLEX 45 MIN: CPT | Mod: HCNC

## 2024-12-16 PROCEDURE — 11000001 HC ACUTE MED/SURG PRIVATE ROOM: Mod: HCNC

## 2024-12-16 PROCEDURE — 36415 COLL VENOUS BLD VENIPUNCTURE: CPT | Mod: HCNC | Performed by: INTERNAL MEDICINE

## 2024-12-16 PROCEDURE — 63600175 PHARM REV CODE 636 W HCPCS: Mod: HCNC | Performed by: INTERNAL MEDICINE

## 2024-12-16 PROCEDURE — 83735 ASSAY OF MAGNESIUM: CPT | Mod: HCNC | Performed by: INTERNAL MEDICINE

## 2024-12-16 PROCEDURE — 84484 ASSAY OF TROPONIN QUANT: CPT | Mod: HCNC | Performed by: INTERNAL MEDICINE

## 2024-12-16 PROCEDURE — 97530 THERAPEUTIC ACTIVITIES: CPT | Mod: HCNC

## 2024-12-16 PROCEDURE — 25000003 PHARM REV CODE 250: Mod: HCNC | Performed by: NURSE PRACTITIONER

## 2024-12-16 PROCEDURE — 97161 PT EVAL LOW COMPLEX 20 MIN: CPT | Mod: HCNC

## 2024-12-16 RX ORDER — ENOXAPARIN SODIUM 100 MG/ML
30 INJECTION SUBCUTANEOUS EVERY 24 HOURS
Status: DISCONTINUED | OUTPATIENT
Start: 2024-12-16 | End: 2024-12-16

## 2024-12-16 RX ORDER — CEFDINIR 300 MG/1
300 CAPSULE ORAL DAILY
Status: DISCONTINUED | OUTPATIENT
Start: 2024-12-16 | End: 2024-12-17 | Stop reason: HOSPADM

## 2024-12-16 RX ORDER — CEFDINIR 300 MG/1
300 CAPSULE ORAL EVERY 12 HOURS
Status: DISCONTINUED | OUTPATIENT
Start: 2024-12-16 | End: 2024-12-16

## 2024-12-16 RX ORDER — POTASSIUM CHLORIDE 20 MEQ/1
40 TABLET, EXTENDED RELEASE ORAL ONCE
Status: COMPLETED | OUTPATIENT
Start: 2024-12-16 | End: 2024-12-16

## 2024-12-16 RX ADMIN — ATORVASTATIN CALCIUM 40 MG: 40 TABLET, FILM COATED ORAL at 05:12

## 2024-12-16 RX ADMIN — POLYETHYLENE GLYCOL 3350 17 G: 17 POWDER, FOR SOLUTION ORAL at 08:12

## 2024-12-16 RX ADMIN — PANCRELIPASE 1 CAPSULE: 120000; 24000; 76000 CAPSULE, DELAYED RELEASE PELLETS ORAL at 08:12

## 2024-12-16 RX ADMIN — PANCRELIPASE 1 CAPSULE: 120000; 24000; 76000 CAPSULE, DELAYED RELEASE PELLETS ORAL at 12:12

## 2024-12-16 RX ADMIN — BUMETANIDE 1 MG: 1 TABLET ORAL at 08:12

## 2024-12-16 RX ADMIN — DORZOLAMIDE HYDROCHLORIDE AND TIMOLOL MALEATE 1 DROP: 20; 5 SOLUTION OPHTHALMIC at 09:12

## 2024-12-16 RX ADMIN — PANCRELIPASE 1 CAPSULE: 120000; 24000; 76000 CAPSULE, DELAYED RELEASE PELLETS ORAL at 05:12

## 2024-12-16 RX ADMIN — POTASSIUM CHLORIDE 10 MEQ: 750 TABLET, EXTENDED RELEASE ORAL at 08:12

## 2024-12-16 RX ADMIN — CEFDINIR 300 MG: 300 CAPSULE ORAL at 09:12

## 2024-12-16 RX ADMIN — PANTOPRAZOLE SODIUM 40 MG: 40 TABLET, DELAYED RELEASE ORAL at 08:12

## 2024-12-16 RX ADMIN — BUMETANIDE 1 MG: 1 TABLET ORAL at 09:12

## 2024-12-16 RX ADMIN — POTASSIUM CHLORIDE 40 MEQ: 1500 TABLET, EXTENDED RELEASE ORAL at 03:12

## 2024-12-16 RX ADMIN — CITALOPRAM HYDROBROMIDE 10 MG: 10 TABLET ORAL at 09:12

## 2024-12-16 RX ADMIN — AMLODIPINE BESYLATE 10 MG: 10 TABLET ORAL at 08:12

## 2024-12-16 RX ADMIN — ENOXAPARIN SODIUM 30 MG: 30 INJECTION SUBCUTANEOUS at 05:12

## 2024-12-16 RX ADMIN — SODIUM BICARBONATE 650 MG TABLET 650 MG: at 08:12

## 2024-12-16 RX ADMIN — ASPIRIN 81 MG: 81 TABLET, COATED ORAL at 08:12

## 2024-12-16 RX ADMIN — ISOSORBIDE MONONITRATE 30 MG: 30 TABLET, EXTENDED RELEASE ORAL at 08:12

## 2024-12-16 NOTE — ASSESSMENT & PLAN NOTE
"CT imaging on admission with "Minor superior L1 compression deformity is new from the prior CT"  IR consulted for kyphoplasty, ASA held   MRI L spine shows cute/subacute L1 vertebral body compression deformity with only minimal height loss and minor superior retropulsion, no associated spinal stenosis       "

## 2024-12-16 NOTE — ASSESSMENT & PLAN NOTE
Patient has Abnormal Magnesium: hypomagnesemia. Will continue to monitor electrolytes closely. Will replace the affected electrolytes and repeat labs to be done after interventions completed. The patient's magnesium results have been reviewed and are listed below.  Recent Labs   Lab 12/16/24  0827   MG 1.8      Improving   Monitor BMP and replete PRN

## 2024-12-16 NOTE — PROGRESS NOTES
Pharmacist Renal Dose Adjustment Note    Glo Dumont is a 89 y.o. female being treated with the medication enoxaparin.    Patient Data:    Vital Signs (Most Recent):  Temp: 98.5 °F (36.9 °C) (12/15/24 2020)  Pulse: 75 (12/15/24 2046)  Resp: 16 (12/15/24 2020)  BP: 131/67 (12/15/24 2020)  SpO2: (!) 94 % (12/15/24 2020) Vital Signs (72h Range):  Temp:  [98.5 °F (36.9 °C)-98.8 °F (37.1 °C)]   Pulse:  [75-80]   Resp:  [16-18]   BP: (131-222)/()   SpO2:  [94 %-97 %]      Recent Labs   Lab 12/15/24  1353   CREATININE 1.1     Serum creatinine: 1.1 mg/dL 12/15/24 1353  Estimated creatinine clearance: 30.5 mL/min    Enoxaparin 30 mg subcutaneous every 24 hours will be changed to enoxaparin 40 mg subcutaneous every 24 hours for DVT prevention with CrCl > 30 ml/min.    Pharmacist's Name: Jesse Dao  Pharmacist's Extension: [phone]

## 2024-12-16 NOTE — PLAN OF CARE
Inpatient Upgrade Note    Glo Dumont has warranted treatment spanning two or more midnights of hospital level care for the management of heart failure and hypertensive urgency. She continues to require daily labs, monitoring of vital signs, medication adjustments, and further evaluation by consultants. Her condition is also complicated by the following comorbidities: HTN, diverticulosis, HLD, CAD, carotid artery occlusion, anemia, CHF, CKD, anxiety, and depression .

## 2024-12-16 NOTE — ASSESSMENT & PLAN NOTE
Patient's most recent potassium results are listed below.   Recent Labs     12/15/24  1353 12/16/24  0827   K 3.1* 3.0*       Plan  - Replete potassium per protocol  - Monitor potassium Daily and replete as needed   - Patient's hypokalemia is stable

## 2024-12-16 NOTE — CONSULTS
Chart reviewed by Dr. Price.       ASSESSMENT/PLAN:    Lumbar fracture    Please place order for IR Kyphoplasty and send Reg Hernandez RN in radiology an epic chat once the order is in and the procedure can be scheduled this week.    I was unable to place the order for kyphoplasty.         Thank you for the consult.
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
## 2024-12-16 NOTE — HOSPITAL COURSE
BP trends improved with diuresis (Bumex PO 1 BID, was taking Bumex 1mg daily PRN at home prior to admission)     Outpatient UA done 12/12 showed > 100K CFU e. Coli and 50-99K CFU Klebsiella oxytoca, started on PO Cefdinir based on sensitivities  given complaints of back pain on admission     IR consulted for possible kyphoplasty, MRI L spine showed Acute/subacute L1 vertebral body compression deformity with only minimal height loss and minor superior retropulsion. No associated spinal stenosis. IR unable to complete kyphoplasty until later in the week, will call patient to schedule procedure as outpatient. PT/OT evaluated and recommend low intensity therapy on discharge     Pt seen and examined on day of discharge, daughter at bedside. Pt awake, alert, reports no back pain at this time. Denies CP, SOB, cough. Feels well. Appears stable for discharge home with HH per my exam. Per daughter, they were in process of getting home hospice/palliative care set up prior to patient being admitted to hospital.   She will continue Bumex 1 mg daily until she can followup with CHF clinic/Cardiology. Continue PO Cefdinir to complete total 7d course of abx.     Followup with PCP within 3-5 days, CHF clinic, [facility] NP at home and IR to reach out to patient to set up kyphoplasty.

## 2024-12-16 NOTE — PLAN OF CARE
Pt remains free from injury  Bed in lowest position, call bell within reach  O2 via NC d/c  C/o no back pain  VS are WNL  Electrolyte replacements given  Pt has poor appetite, Boost ordered. daughter is aware of nutrition, states that pt is lactose intolerant so DO NOT give pt Boost  Pt and family verbalizes understanding of POC      Problem: Adult Inpatient Plan of Care  Goal: Plan of Care Review  Outcome: Progressing  Goal: Patient-Specific Goal (Individualized)  Outcome: Progressing  Goal: Absence of Hospital-Acquired Illness or Injury  Outcome: Progressing  Goal: Optimal Comfort and Wellbeing  Outcome: Progressing  Goal: Readiness for Transition of Care  Outcome: Progressing     Problem: Skin Injury Risk Increased  Goal: Skin Health and Integrity  Outcome: Progressing     Problem: Fall Injury Risk  Goal: Absence of Fall and Fall-Related Injury  Outcome: Progressing     Problem: Pain Acute  Goal: Optimal Pain Control and Function  Outcome: Progressing     Problem: Burn Injury  Goal: Optimal Coping with Burn  Outcome: Progressing  Goal: Fluid Balance  Outcome: Progressing  Goal: Optimal Functional Ability  Outcome: Progressing  Goal: Absence of Infection Signs and Symptoms  Outcome: Progressing  Goal: Optimal Nutrition Intake  Outcome: Progressing  Goal: Acceptable Pain Control  Outcome: Progressing  Goal: Effective Oxygenation and Ventilation  Outcome: Progressing  Goal: Body Temperature Remains in Desired Range  Outcome: Progressing  Goal: Effective Peripheral Tissue Perfusion  Outcome: Progressing  Goal: Optimal Burn Wound Healing  Outcome: Progressing

## 2024-12-16 NOTE — NURSING
MRI complete at this time, patient tolerated MRI well, heart rate 84 , O2 sat. 98% on RA, cardiac device placed in normal operating mode per [employer] Rep., patient returned to her room in NAD.

## 2024-12-16 NOTE — PLAN OF CARE
Discussed plan of care with pt and family. Pt verbalized understanding. No signs of acute distress. Q2h turns/repositioning encouraged. Bed alarm set with bed at lowest position. Pain managed with ordered medication. Tele monitor 8686 in place. Call light within reach. Purposeful rounding Q2h.      Chart check complete.     Problem: Adult Inpatient Plan of Care  Goal: Plan of Care Review  12/16/2024 0810 by Keerthi Lizarraga RN  Outcome: Progressing  12/16/2024 0159 by Keerthi Lizarraga RN  Outcome: Progressing  Goal: Patient-Specific Goal (Individualized)  12/16/2024 0810 by Keerthi Lizarraga RN  Outcome: Progressing  12/16/2024 0159 by Keerthi Lizarraga RN  Outcome: Progressing  Goal: Absence of Hospital-Acquired Illness or Injury  12/16/2024 0810 by Keerthi Lizarraga RN  Outcome: Progressing  12/16/2024 0159 by Keerthi Lizarraga RN  Outcome: Progressing  Goal: Optimal Comfort and Wellbeing  12/16/2024 0810 by Keerthi Lizarraga RN  Outcome: Progressing  12/16/2024 0159 by Keerthi Lizarraga RN  Outcome: Progressing  Goal: Readiness for Transition of Care  12/16/2024 0810 by Keerthi Lizarraga RN  Outcome: Progressing  12/16/2024 0159 by Keerthi Lizarraga RN  Outcome: Progressing     Problem: Skin Injury Risk Increased  Goal: Skin Health and Integrity  12/16/2024 0810 by Keerthi Lizarraga RN  Outcome: Progressing  12/16/2024 0159 by Keerthi Lizarraga RN  Outcome: Progressing     Problem: Fall Injury Risk  Goal: Absence of Fall and Fall-Related Injury  12/16/2024 0810 by Keerthi Lizarraga RN  Outcome: Progressing  12/16/2024 0159 by Keerthi Lizarraga RN  Outcome: Progressing     Problem: Pain Acute  Goal: Optimal Pain Control and Function  12/16/2024 0810 by Keerthi Lizarraga RN  Outcome: Progressing  12/16/2024 0159 by Keerthi Lizarraga RN  Outcome: Progressing
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
## 2024-12-16 NOTE — PT/OT/SLP EVAL
Occupational Therapy Evaluation and Treatment    Name: Glo Dumont  MRN: [id number]  Admitting Diagnosis: Hypertensive urgency  Recent Surgery: * No surgery found *      Recommendations:     Discharge Recommendations: Low Intensity Therapy  Level of Assistance Recommended: 24 hours light assistance  Discharge Equipment Recommendations: none  Barriers to discharge:      Assessment:     Glo Dumont is a 89 y.o. female with a medical diagnosis of Hypertensive urgency. She presents with performance deficits affecting function including weakness, impaired balance, impaired endurance, impaired self care skills, impaired functional mobility, decreased upper extremity function, gait instability.     Rehab Prognosis: Good; patient would benefit from acute OT services to address these deficits and reach maximum level of function.    Plan:     Patient to be seen   to address the above listed problems via self-care/home management, therapeutic activities, therapeutic exercises  Plan of Care Expires: 12/30/24  Plan of Care Reviewed with:      Subjective     Chief Complaint: debility and generalized weakness  Patient Comments/Goals:   Pain/Comfort:  Pain Rating 1: 0/10    Patients cultural, spiritual, Voodoo conflicts given the current situation:      Social History:  Living Environment: Patient lives with their daughter   in a single story home with number of outside stair(s): 0  Prior Level of Function: Prior to admission, patient was modified independent  Roles and Routines: Patient was not driving prior to admission.  Equipment Used at Home: walker, rolling  DME owned (not currently used): wheelchair  Assistance Upon Discharge: family    Objective:     Communicated with nurse Ryan and epic chart review prior to session. Patient found up in chair with   upon OT entry to room.    General Precautions: Standard, fall   Orthopedic Precautions: N/A   Braces: N/A    Respiratory Status: Room air    Occupational  Performance    Gait belt applied - Yes    Bed Mobility:   Rolling/Turning to Right with minimum assistance and with leg lift  Scooting anteriorly to EOB to have both feet planted on floor: minimum assistance and with leg lift  Supine to sit from right side of bed with minimum assistance and with leg lift    Functional Mobility/Transfers:  Sit <> Stand Transfer with minimum assistance with rolling walker  Bed <> Chair Transfer using Step Transfer technique with minimum assistance with rolling walker  Functional Mobility: pt ambulated 200 feet with min a and rolling walker at6 slow pace and req tactile cues at times to move with fluency    Activities of Daily Living:  Upper Body Dressing: mod a  Lower Body Dressing: maximal assistance    Cognitive/Visual Perceptual:  Cognitive/Psychosocial Skills:    -     Oriented to: Person, Place, Time, Situation  -     Follows Commands/attention: Follows multistep  commands  -     Communication: clear/fluent  -     Memory: No Deficits noted  -     Safety awareness/insight to disability: impaired    Physical Exam:  Upper Extremity Range of Motion:     -       Right Upper Extremity: WFL  -       Left Upper Extremity: WFL  Upper Extremity Strength:    -       Right Upper Extremity: mmt: 3/5 grossly  -       Left Upper Extremity: mmt: 3/5 grossly   Strength:    -       Right Upper Extremity: mmt: 3/5 grossly  -       Left Upper Extremity: mmt: 3/5 grossly    AMPAC 6 Click ADL:  AMPAC Total Score: 19    Treatment & Education:  Educated patient on importance of increased tolerance to upright position and direct impact on CV endurance and strength. Patient encouraged to sit up in chair/ EOB, for a minimum of 2 consecutive hours including for all meals.. Encouraged patient to perform AROM TE to BUE throughout the day within all available planes of motion. Re enforced importance of utilizing call light to meet needs in room and not attempt to get up without staff assistance. Patient  verbalized understanding and agreed to comply.           Patient clear to stand pivot transfer with RN/PCT, assist xstep<pivot min a with rolling walker  .    Patient left HOB elevated with all lines intact, call button in reach, RN notified, and chair alarm on.    GOALS:   Multidisciplinary Problems       Occupational Therapy Goals          Problem: Occupational Therapy    Goal Priority Disciplines Outcome Interventions   Occupational Therapy Goal     OT, PT/OT     Description: O.T. goals to be met 12-  Mod (I) with toilet transfer  Mod (I) with ue dressing  Sba with  le dressing   Pt will tolerate 1 set x 15 reps b ue rom exercise                         History:     Past Medical History:   Diagnosis Date    Anemia     Angina pectoris     Anxiety     Anxiety and depression     Arthritis     hip    Carotid artery occlusion     Carpal tunnel syndrome 06/23/2008    emg    Chronic diarrhea     work up in 2011 with EGD, CS and VCE    CKD (chronic kidney disease) stage 3, GFR 30-59 ml/min 5/11/2017    Colitis     Coronary artery disease     Coronary artery disease     Diastolic dysfunction     Diverticulosis     Encephalopathy 10/14/2024    Glaucoma     Greater trochanteric bursitis 2/10/2015    Grief at loss of child 1/26/2016    H/O carotid endarterectomy 12/2/2013    Heart failure     History of coronary angioplasty 3/11/2014    Hypercholesteremia     Hypertension     Liver cyst 02/08/2013    ct abd    Macular degeneration     Obesity with serious comorbidity 3/19/2023    Primary open-angle glaucoma(365.11) 9/3/2013    Renal cyst 02/08/2013    ct abd    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    Sarcoidosis     Sarcoidosis of lung     Sickle cell trait     Uveitis          Past Surgical History:   Procedure Laterality Date    A-V CARDIAC PACEMAKER INSERTION Left 3/21/2023    Procedure: INSERTION, CARDIAC PACEMAKER, DUAL CHAMBER/His Lead;  Surgeon: Cortez Ambrose MD;  Location: [facility] CATH  LAB;  Service: Cardiology;  Laterality: Left;  MDT/ MD to confirm in am/possible nurse sedate    CAROTID ENDARTERECTOMY Right 2000s    CATARACT EXTRACTION Bilateral     Dr. Liang Dennis    CHOLECYSTECTOMY      laparoscopic, 3/18.    CORONARY ANGIOPLASTY WITH STENT PLACEMENT  11/19/2010    RCA-HALIE 2010    Lathia    JOINT REPLACEMENT Left 11/03/2014    Dr. Braun    TOTAL ABDOMINAL HYSTERECTOMY W/ BILATERAL SALPINGOOPHORECTOMY  1972       Time Tracking:     OT Date of Treatment: 12/16/24  OT Start Time: 0910  OT Stop Time: 0940  OT Total Time (min): 30 min    Billable Minutes: Evaluation 15 minutes and Therapeutic Activity 15 minutes    12/16/2024

## 2024-12-16 NOTE — PROGRESS NOTES
Pharmacist Renal Dose Adjustment Note    Glo Dumont is a 89 y.o. female being treated with the medication cefdinir.     Patient Data:    Vital Signs (Most Recent):  Temp: 98.5 °F (36.9 °C) (12/16/24 0413)  Pulse: 78 (12/16/24 0859)  Resp: 16 (12/16/24 0859)  BP: (!) 112/59 (12/16/24 0859)  SpO2: 95 % (12/16/24 0859) Vital Signs (72h Range):  Temp:  [98.3 °F (36.8 °C)-98.8 °F (37.1 °C)]   Pulse:  [72-82]   Resp:  [16-18]   BP: (107-222)/()   SpO2:  [92 %-97 %]      Recent Labs   Lab 12/15/24  1353 12/16/24  0827   CREATININE 1.1 1.2     Serum creatinine: 1.2 mg/dL 12/16/24 0827  Estimated creatinine clearance: 27.9 mL/min    Medication: cefdinir 300 mg PO BID will be changed to cefdinir 300 mg PO daily per pharmacy renal dose adjustment protocol for patients with CrCl less than 30 mL/min.    Pharmacist's Name: Jessa Rogers PharmD  Pharmacist's Extension: [phone]     Thank you for allowing us to participate in this patient's care.     Jessa Rogers PharmD 12/16/2024 9:24 AM

## 2024-12-16 NOTE — PT/OT/SLP EVAL
Physical Therapy Evaluation    Patient Name:  Glo Dumont   MRN:  [id number]    Recommendations:     Discharge Recommendations: Low Intensity Therapy   Discharge Equipment Recommendations: none   Barriers to discharge: None    Assessment:     Glo Dumont is a 89 y.o. female admitted with a medical diagnosis of Hypertensive urgency.  She presents with the following impairments/functional limitations: weakness, impaired endurance, impaired self care skills, impaired functional mobility, gait instability, impaired balance, decreased ROM, decreased lower extremity function .    Rehab Prognosis: Good; patient would benefit from acute skilled PT services to address these deficits and reach maximum level of function.    Recent Surgery: * No surgery found *      Plan:     During this hospitalization, patient to be seen 3 x/week to address the identified rehab impairments via gait training, therapeutic activities, therapeutic exercises and progress toward the following goals:    Plan of Care Expires:  12/30/24    Subjective     Chief Complaint: NONE   Patient/Family Comments/goals: NONE STATED   Pain/Comfort:  Pain Rating 1: 0/10  Pain Rating Post-Intervention 1: 0/10    Patients cultural, spiritual, Islam conflicts given the current situation:      Living Environment:   PT LIVES AT HOME WITH DAUGHTER IN A ONE STORY HOME WITH NO STEPS   Prior to admission, patients level of function was MOD I WITH RW AND DOESNT DRIVE .  Equipment used at home: walker, rolling, grab bar, shower chair.  DME owned (not currently used): none.  Upon discharge, patient will have assistance from DAUGHTER .    Objective:     Communicated with NURSE MELGAR AND EPIC CHART REVIEW  prior to session.  Patient found supine with telemetry, peripheral IV, PureWick  upon PT entry to room.    General Precautions: Standard, fall  Orthopedic Precautions:N/A   Braces: N/A  Respiratory Status: Room air    Exams:  Cognitive Exam:  Patient is oriented to  "Person, Place, Time, and Situation  RLE ROM: WFL  RLE Strength: WFL  LLE ROM: WFL  LLE Strength: WFL    Functional Mobility:  Bed Mobility:     Rolling Right: stand by assistance  Scooting: stand by assistance  Supine to Sit: stand by assistance  Transfers:     Sit to Stand:  stand by assistance with rolling walker  Bed to Chair: stand by assistance with  rolling walker  using  Stand Pivot  Gait: PT GT TRAINED X 200' WITH RW AND SBA WITH SLOW PACE GT.       AM-PAC 6 CLICK MOBILITY  Total Score:21       Treatment & Education:  GT. BELT AND  SOCKS DONNED PRIOR TO OOB MOBILITY.  PT EDUCATED ON ROLE OF P.T. PT RETURNED TO RM T/F TO CHAIR WITH RW AND SBA. PT COMPLETED AP, TKE AND MIP. PT LEFT SEATED IN CHAIR FOR OOB TOLERANCE AND PT EDUCATED ON "CALL DON'T FALL", ENCOURAGED TO CALL FOR ASSISTANCE WITH ALL NEEDS FOR OOB MOBILITY.      Patient left up in chair with call button in reach and chair alarm on.    GOALS:   Multidisciplinary Problems       Physical Therapy Goals          Problem: Physical Therapy    Goal Priority Disciplines Outcome Interventions   Physical Therapy Goal     PT, PT/OT     Description: LT24  1. PT WILL COMPLETE BED MOBILITY IND  2. PT WILL STAND T/F TO CHAIR WITH RW ROBERT    3. PT WILL GT TRAIN X 250' WITH RW MOD I TO PROGRESS GT  4. PT WILL INC AMPAC SCORE BY 2 POINTS TO PROGRESS GROSS FUNC MOBILITY.                          History:     Past Medical History:   Diagnosis Date    Anemia     Angina pectoris     Anxiety     Anxiety and depression     Arthritis     hip    Carotid artery occlusion     Carpal tunnel syndrome 2008    emg    Chronic diarrhea     work up in  with EGD, CS and VCE    CKD (chronic kidney disease) stage 3, GFR 30-59 ml/min 2017    Colitis     Coronary artery disease     Coronary artery disease     Diastolic dysfunction     Diverticulosis     Encephalopathy 10/14/2024    Glaucoma     Greater trochanteric bursitis 2/10/2015    Grief at loss of child " 1/26/2016    H/O carotid endarterectomy 12/2/2013    Heart failure     History of coronary angioplasty 3/11/2014    Hypercholesteremia     Hypertension     Liver cyst 02/08/2013    ct abd    Macular degeneration     Obesity with serious comorbidity 3/19/2023    Primary open-angle glaucoma(365.11) 9/3/2013    Renal cyst 02/08/2013    ct abd    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    Sarcoidosis     Sarcoidosis of lung     Sickle cell trait     Uveitis        Past Surgical History:   Procedure Laterality Date    A-V CARDIAC PACEMAKER INSERTION Left 3/21/2023    Procedure: INSERTION, CARDIAC PACEMAKER, DUAL CHAMBER/His Lead;  Surgeon: Cortez Ambrose MD;  Location: [facility] CATH LAB;  Service: Cardiology;  Laterality: Left;  MDT/ MD to confirm in am/possible nurse sedate    CAROTID ENDARTERECTOMY Right 2000s    CATARACT EXTRACTION Bilateral     Dr. Liang Dennis    CHOLECYSTECTOMY      laparoscopic, 3/18.    CORONARY ANGIOPLASTY WITH STENT PLACEMENT  11/19/2010    RCA-HALIE 2010    Lathia    JOINT REPLACEMENT Left 11/03/2014    Dr. Braun    TOTAL ABDOMINAL HYSTERECTOMY W/ BILATERAL SALPINGOOPHORECTOMY  1972       Time Tracking:     PT Received On: 12/16/24  PT Start Time: 0905     PT Stop Time: 0930  PT Total Time (min): 25 min     Billable Minutes: Evaluation 15 and Gait Training 10      12/16/2024

## 2024-12-16 NOTE — PLAN OF CARE
[facility] - Med Surg  Discharge Assessment    Primary Care Provider: Christina Cardona MD     Discharge Assessment (most recent)       BRIEF DISCHARGE ASSESSMENT - 12/16/24 1101          Discharge Planning    Assessment Type Discharge Planning Brief Assessment     Resource/Environmental Concerns none     Support Systems Children     Equipment Currently Used at Home walker, rolling;cane, straight     Patient/Family Anticipates Transition to home     Patient/Family Anticipated Services at Transition none     DME Needed Upon Discharge  none     Discharge Plan A Home Health                   Pt is current with [facility].

## 2024-12-16 NOTE — PROGRESS NOTES
Pharmacist Renal Dose Adjustment Note    Glo Dumont is a 89 y.o. female being treated with the medication enoxaparin.     Patient Data:    Vital Signs (Most Recent):  Temp: 98.5 °F (36.9 °C) (12/16/24 0413)  Pulse: 78 (12/16/24 0859)  Resp: 16 (12/16/24 0859)  BP: (!) 112/59 (12/16/24 0859)  SpO2: 95 % (12/16/24 0859) Vital Signs (72h Range):  Temp:  [98.3 °F (36.8 °C)-98.8 °F (37.1 °C)]   Pulse:  [72-82]   Resp:  [16-18]   BP: (107-222)/()   SpO2:  [92 %-97 %]      Recent Labs   Lab 12/15/24  1353 12/16/24  0827   CREATININE 1.1 1.2     Serum creatinine: 1.2 mg/dL 12/16/24 0827  Estimated creatinine clearance: 27.9 mL/min    Medication: enoxaparin 40 mg SQ daily will be changed to enoxaparin 30 mg SQ daily per pharmacy renal dose adjustment protocol for patients with CrCl less than 30 mL/min.    Pharmacist's Name: Jessa Rogers PharmD  Pharmacist's Extension: [phone]     Thank you for allowing us to participate in this patient's care.     Jessa Rogers PharmD 12/16/2024 9:23 AM

## 2024-12-16 NOTE — SUBJECTIVE & OBJECTIVE
Interval History: NAEON. Pt seen and examined, no family at bedside during rounds. Pt denies any back pain this AM. Denies numbness/tingling to the extremities. Denies CP, SOB, cough. Currently on room air.     Review of Systems  Objective:     Vital Signs (Most Recent):  Temp: 98.2 °F (36.8 °C) (12/16/24 1301)  Pulse: 84 (12/16/24 1350)  Resp: 16 (12/16/24 1301)  BP: (!) 93/51 (12/16/24 1301)  SpO2: 98 % (12/16/24 1350) Vital Signs (24h Range):  Temp:  [98.2 °F (36.8 °C)-98.8 °F (37.1 °C)] 98.2 °F (36.8 °C)  Pulse:  [71-85] 84  Resp:  [16-18] 16  SpO2:  [90 %-98 %] 98 %  BP: ()/(51-97) 93/51     Weight: 62.2 kg (137 lb 2 oz)  Body mass index is 25.91 kg/m².    Intake/Output Summary (Last 24 hours) at 12/16/2024 1440  Last data filed at 12/16/2024 0038  Gross per 24 hour   Intake 50 ml   Output 200 ml   Net -150 ml         Physical Exam  Vitals and nursing note reviewed.   Constitutional:       General: She is not in acute distress.     Appearance: Ill appearance: c hronic.   Cardiovascular:      Rate and Rhythm: Normal rate.   Pulmonary:      Effort: Pulmonary effort is normal.      Breath sounds: Normal breath sounds. No wheezing or rales.   Abdominal:      General: Bowel sounds are normal. There is no distension.      Palpations: Abdomen is soft.      Tenderness: There is no abdominal tenderness.   Musculoskeletal:      Right lower leg: No edema.      Left lower leg: No edema.   Neurological:      Mental Status: She is alert and oriented to person, place, and time. Mental status is at baseline.      Sensory: No sensory deficit.             Significant Labs: All pertinent labs within the past 24 hours have been reviewed.    Significant Imaging: I have reviewed all pertinent imaging results/findings within the past 24 hours.

## 2024-12-16 NOTE — PLAN OF CARE
Nutrition Recommendations/Interventions on 12/16/24:  Cardiac diet as tolerated/medically appropriate. Monitor BG trends, consider adding diabetic restrictions if > 180 mg/dL.   Will add Tanner BID for wound healing.  Will add Boost Plus with meals for additional kcal/protein. Will monitor electrolyte levels and adjust supplement plan PRN.   Will monitor % intake, weight, I/O, labs/BG trends, wound healing progression.    Goals:   Goal: Meet greater than 80% of nutritional needs by follow-up. (new)  Goal: Consume % of oral supplements by follow-up. (new)    Melissa Anaya, SALLIE, LDN, CNSC

## 2024-12-16 NOTE — ASSESSMENT & PLAN NOTE
Patient has a current diagnosis of Hypertensive emergency with end organ damage evidenced by acute heart failure which is uncontrolled.  Latest blood pressure and vitals reviewed-   Temp:  [98.2 °F (36.8 °C)-98.8 °F (37.1 °C)]   Pulse:  [71-85]   Resp:  [16-18]   BP: ()/(51-97)   SpO2:  [90 %-98 %] .   Home meds for hypertension were reviewed and noted below.   Hypertension Medications               bumetanide (BUMEX) 1 MG tablet Take 1 tablet (1 mg total) by mouth once daily.    carvediloL (COREG) 12.5 MG tablet Take 1 tablet (12.5 mg total) by mouth 2 (two) times daily with meals.    nitroGLYCERIN (NITROSTAT) 0.4 MG SL tablet PLACE ONE TABLET UNDERNEATH THE TONGUE EVERY 5 MINUTES AS NEEDED FOR CHEST PAIN            Medication adjustment for hospital antihypertensives is as follows- BP trends improved, will hold norvasc. Continue Imdur, Labetalol     Will aim for controlled BP reduction by medications noted above. Monitor and mitigate end organ damage as indicated.

## 2024-12-16 NOTE — NURSING
Patient arrived for scheduled MRI, patient identification verified X 2, allergies reviewed, patient assisted to MRI table without difficulty, cardiac pacemaker placed in MRI safe mode per [employer] MRI safe cardiac monitor and pulse ox. applied, heart rate 85, O2 sat. 90% on RA, NAD noted at this time, will continue to monitor patient throughout MRI.

## 2024-12-16 NOTE — H&P (VIEW-ONLY)
[facility] Medicine  Progress Note    Patient Name: Glo Dumont  MRN: [id number]  Patient Class: OP- Observation   Admission Date: 12/15/2024  Length of Stay: 0 days  Attending Physician: Jailyn Duran MD  Primary Care Provider: Christina Cardona MD        Subjective     Principal Problem:Hypertensive urgency        HPI:   Glo Dumont is a 89 y.o. female patient with a PMHx of HTN, diverticulosis, HLD, CAD, carotid artery occlusion, anemia, CHF, CKD, anxiety, and depression who presents to the Emergency Department for evaluation of non-radiating left side flank pain which onset gradually since Wednesday. Pt's pain started to worsen Friday. Pain worsens with movement and sitting up. Pt's daughter reports the pt's HTN is poorly controlled. Symptoms are constant and moderate in severity. No associated sxs. Patient denies any hematuria, dysuria, fever, chills, N/V, SOB, and all other sxs at this time.     ED workup: Bp 200s/90s, serum potassium 3.1, magnesium 1.1, BNP 1170, troponin 0.081.  CT renal showed  Bilateral nonobstructing nephrolithiasis. Small volume of free fluid in the pelvis. Diverticulosis coli without evidence of acute diverticulitis. New minor superior L1 vertebral body compression deformity from the prior CT 10/13/2024.  In the ED patient received Bumex 1 mg IV, hydralazine 20 mg IV, and nitropaste with mild improvement in blood pressure.  Most recent blood pressure 175/87.  Patient admitted for uncontrolled blood pressure, CHF, and pain under the care of Hospital Medicine team.     Overview/Hospital Course:  BP trends improved.   Continued on PO Bumex for diuresis given BP now low  IR consulted for possible kyphoplasty, MRI L spine pending   PT/OT evaluated and recommend low intensity therapy on discharge   Outpatient UA done 12/12 showed > 100K CFU e. Coli and 50-99K CFU Klebsiella oxytoca, started on PO Cefdinir based on sensitivities  given complaints of back pain on  admission     Interval History: NAEON. Pt seen and examined, no family at bedside during rounds. Pt denies any back pain this AM. Denies numbness/tingling to the extremities. Denies CP, SOB, cough. Currently on room air.     Review of Systems  Objective:     Vital Signs (Most Recent):  Temp: 98.2 °F (36.8 °C) (12/16/24 1301)  Pulse: 84 (12/16/24 1350)  Resp: 16 (12/16/24 1301)  BP: (!) 93/51 (12/16/24 1301)  SpO2: 98 % (12/16/24 1350) Vital Signs (24h Range):  Temp:  [98.2 °F (36.8 °C)-98.8 °F (37.1 °C)] 98.2 °F (36.8 °C)  Pulse:  [71-85] 84  Resp:  [16-18] 16  SpO2:  [90 %-98 %] 98 %  BP: ()/(51-97) 93/51     Weight: 62.2 kg (137 lb 2 oz)  Body mass index is 25.91 kg/m².    Intake/Output Summary (Last 24 hours) at 12/16/2024 1440  Last data filed at 12/16/2024 0038  Gross per 24 hour   Intake 50 ml   Output 200 ml   Net -150 ml         Physical Exam  Vitals and nursing note reviewed.   Constitutional:       General: She is not in acute distress.     Appearance: Ill appearance: c hronic.   Cardiovascular:      Rate and Rhythm: Normal rate.   Pulmonary:      Effort: Pulmonary effort is normal.      Breath sounds: Normal breath sounds. No wheezing or rales.   Abdominal:      General: Bowel sounds are normal. There is no distension.      Palpations: Abdomen is soft.      Tenderness: There is no abdominal tenderness.   Musculoskeletal:      Right lower leg: No edema.      Left lower leg: No edema.   Neurological:      Mental Status: She is alert and oriented to person, place, and time. Mental status is at baseline.      Sensory: No sensory deficit.             Significant Labs: All pertinent labs within the past 24 hours have been reviewed.    Significant Imaging: I have reviewed all pertinent imaging results/findings within the past 24 hours.    Assessment and Plan     * Hypertensive urgency  Patient has a current diagnosis of Hypertensive emergency with end organ damage evidenced by acute heart failure which is  uncontrolled.  Latest blood pressure and vitals reviewed-   Temp:  [98.2 °F (36.8 °C)-98.8 °F (37.1 °C)]   Pulse:  [71-85]   Resp:  [16-18]   BP: ()/(51-97)   SpO2:  [90 %-98 %] .   Home meds for hypertension were reviewed and noted below.   Hypertension Medications               bumetanide (BUMEX) 1 MG tablet Take 1 tablet (1 mg total) by mouth once daily.    carvediloL (COREG) 12.5 MG tablet Take 1 tablet (12.5 mg total) by mouth 2 (two) times daily with meals.    nitroGLYCERIN (NITROSTAT) 0.4 MG SL tablet PLACE ONE TABLET UNDERNEATH THE TONGUE EVERY 5 MINUTES AS NEEDED FOR CHEST PAIN            Medication adjustment for hospital antihypertensives is as follows- BP trends improved, will hold norvasc. Continue Imdur, Labetalol     Will aim for controlled BP reduction by medications noted above. Monitor and mitigate end organ damage as indicated.    Acute on chronic heart failure with preserved ejection fraction  Patient has Diastolic (HFpEF) heart failure that is Acute on chronic. On presentation their CHF was decompensated. The etiology of their decompensation is likely medication non-compliance. Most recent BNP and echo results are listed below.  Recent Labs     12/15/24  1353   BNP 1,170*       Latest ECHO  Results for orders placed during the hospital encounter of 10/13/24    Echo    Interpretation Summary    Left Ventricle: The left ventricle is normal in size. Normal wall thickness. There is concentric remodeling. There is normal systolic function with a visually estimated ejection fraction of 60 - 65%. Ejection fraction is approximately 60%.    Right Ventricle: Normal right ventricular cavity size. Wall thickness is normal. Systolic function is normal.    Mitral Valve: There is moderate mitral annular calcification present. There is mild regurgitation.    Pulmonary Artery: The estimated pulmonary artery systolic pressure is 32 mmHg.    IVC/SVC: Normal venous pressure at 3 mmHg.    Current Heart Failure  "Medications  bumetanide tablet 1 mg, 2 times daily, Oral    Plan  - Monitor strict I&Os and daily weights.    - Place on telemetry  - Low sodium diet  - Place on fluid restriction of 1.5 L.   - Cardiology has not been consulted  - BNP 1170  -BUMEX 1 MG P.O. B.I.D.      Hypomagnesemia  Patient has Abnormal Magnesium: hypomagnesemia. Will continue to monitor electrolytes closely. Will replace the affected electrolytes and repeat labs to be done after interventions completed. The patient's magnesium results have been reviewed and are listed below.  Recent Labs   Lab 12/16/24  0827   MG 1.8      Improving   Monitor BMP and replete PRN     Hypokalemia  Patient's most recent potassium results are listed below.   Recent Labs     12/15/24  1353 12/16/24  0827   K 3.1* 3.0*       Plan  - Replete potassium per protocol  - Monitor potassium Daily and replete as needed   - Patient's hypokalemia is stable      Low back pain  CT imaging on admission with "Minor superior L1 compression deformity is new from the prior CT"  IR consulted for kyphoplasty, ASA held   MRI L spine shows cute/subacute L1 vertebral body compression deformity with only minimal height loss and minor superior retropulsion, no associated spinal stenosis         Coronary artery disease involving native coronary artery of native heart  Patient with known CAD s/p CABG, which is controlled Will continue home medications and monitor for S/Sx of angina/ACS. Continue to monitor on telemetry.     CKD (chronic kidney disease) stage 4, GFR 15-29 ml/min  Creatine stable for now. BMP reviewed- noted Estimated Creatinine Clearance: 29.9 mL/min (based on SCr of 1.1 mg/dL). according to latest data. Based on current GFR, CKD stage is stage 4 - GFR 15-29.  Monitor UOP and serial BMP and adjust therapy as needed. Renally dose meds. Avoid nephrotoxic medications and procedures.    Other hyperlipidemia  Continue statin.         VTE Risk Mitigation (From admission, onward)        "    Ordered     enoxaparin injection 30 mg  Daily         12/16/24 0922     IP VTE HIGH RISK PATIENT  Once         12/15/24 1709     Place sequential compression device  Until discontinued         12/15/24 1709                    Discharge Planning   CANDACE: 12/18/2024     Code Status: Full Code   Medical Readiness for Discharge Date:   Discharge Plan A: Home Health                Please place Justification for DME        Jailyn Duran MD  Department of Hospital Medicine   [facility]

## 2024-12-16 NOTE — PROGRESS NOTES
[facility] Medicine  Progress Note    Patient Name: Glo Dumont  MRN: [id number]  Patient Class: OP- Observation   Admission Date: 12/15/2024  Length of Stay: 0 days  Attending Physician: Jailyn Duran MD  Primary Care Provider: Christina Cardona MD        Subjective     Principal Problem:Hypertensive urgency        HPI:   Glo Dumont is a 89 y.o. female patient with a PMHx of HTN, diverticulosis, HLD, CAD, carotid artery occlusion, anemia, CHF, CKD, anxiety, and depression who presents to the Emergency Department for evaluation of non-radiating left side flank pain which onset gradually since Wednesday. Pt's pain started to worsen Friday. Pain worsens with movement and sitting up. Pt's daughter reports the pt's HTN is poorly controlled. Symptoms are constant and moderate in severity. No associated sxs. Patient denies any hematuria, dysuria, fever, chills, N/V, SOB, and all other sxs at this time.     ED workup: Bp 200s/90s, serum potassium 3.1, magnesium 1.1, BNP 1170, troponin 0.081.  CT renal showed  Bilateral nonobstructing nephrolithiasis. Small volume of free fluid in the pelvis. Diverticulosis coli without evidence of acute diverticulitis. New minor superior L1 vertebral body compression deformity from the prior CT 10/13/2024.  In the ED patient received Bumex 1 mg IV, hydralazine 20 mg IV, and nitropaste with mild improvement in blood pressure.  Most recent blood pressure 175/87.  Patient admitted for uncontrolled blood pressure, CHF, and pain under the care of Hospital Medicine team.     Overview/Hospital Course:  BP trends improved.   Continued on PO Bumex for diuresis given BP now low  IR consulted for possible kyphoplasty, MRI L spine pending   PT/OT evaluated and recommend low intensity therapy on discharge   Outpatient UA done 12/12 showed > 100K CFU e. Coli and 50-99K CFU Klebsiella oxytoca, started on PO Cefdinir based on sensitivities  given complaints of back pain on  admission     Interval History: NAEON. Pt seen and examined, no family at bedside during rounds. Pt denies any back pain this AM. Denies numbness/tingling to the extremities. Denies CP, SOB, cough. Currently on room air.     Review of Systems  Objective:     Vital Signs (Most Recent):  Temp: 98.2 °F (36.8 °C) (12/16/24 1301)  Pulse: 84 (12/16/24 1350)  Resp: 16 (12/16/24 1301)  BP: (!) 93/51 (12/16/24 1301)  SpO2: 98 % (12/16/24 1350) Vital Signs (24h Range):  Temp:  [98.2 °F (36.8 °C)-98.8 °F (37.1 °C)] 98.2 °F (36.8 °C)  Pulse:  [71-85] 84  Resp:  [16-18] 16  SpO2:  [90 %-98 %] 98 %  BP: ()/(51-97) 93/51     Weight: 62.2 kg (137 lb 2 oz)  Body mass index is 25.91 kg/m².    Intake/Output Summary (Last 24 hours) at 12/16/2024 1440  Last data filed at 12/16/2024 0038  Gross per 24 hour   Intake 50 ml   Output 200 ml   Net -150 ml         Physical Exam  Vitals and nursing note reviewed.   Constitutional:       General: She is not in acute distress.     Appearance: Ill appearance: c hronic.   Cardiovascular:      Rate and Rhythm: Normal rate.   Pulmonary:      Effort: Pulmonary effort is normal.      Breath sounds: Normal breath sounds. No wheezing or rales.   Abdominal:      General: Bowel sounds are normal. There is no distension.      Palpations: Abdomen is soft.      Tenderness: There is no abdominal tenderness.   Musculoskeletal:      Right lower leg: No edema.      Left lower leg: No edema.   Neurological:      Mental Status: She is alert and oriented to person, place, and time. Mental status is at baseline.      Sensory: No sensory deficit.             Significant Labs: All pertinent labs within the past 24 hours have been reviewed.    Significant Imaging: I have reviewed all pertinent imaging results/findings within the past 24 hours.    Assessment and Plan     * Hypertensive urgency  Patient has a current diagnosis of Hypertensive emergency with end organ damage evidenced by acute heart failure which is  uncontrolled.  Latest blood pressure and vitals reviewed-   Temp:  [98.2 °F (36.8 °C)-98.8 °F (37.1 °C)]   Pulse:  [71-85]   Resp:  [16-18]   BP: ()/(51-97)   SpO2:  [90 %-98 %] .   Home meds for hypertension were reviewed and noted below.   Hypertension Medications               bumetanide (BUMEX) 1 MG tablet Take 1 tablet (1 mg total) by mouth once daily.    carvediloL (COREG) 12.5 MG tablet Take 1 tablet (12.5 mg total) by mouth 2 (two) times daily with meals.    nitroGLYCERIN (NITROSTAT) 0.4 MG SL tablet PLACE ONE TABLET UNDERNEATH THE TONGUE EVERY 5 MINUTES AS NEEDED FOR CHEST PAIN            Medication adjustment for hospital antihypertensives is as follows- BP trends improved, will hold norvasc. Continue Imdur, Labetalol     Will aim for controlled BP reduction by medications noted above. Monitor and mitigate end organ damage as indicated.    Acute on chronic heart failure with preserved ejection fraction  Patient has Diastolic (HFpEF) heart failure that is Acute on chronic. On presentation their CHF was decompensated. The etiology of their decompensation is likely medication non-compliance. Most recent BNP and echo results are listed below.  Recent Labs     12/15/24  1353   BNP 1,170*       Latest ECHO  Results for orders placed during the hospital encounter of 10/13/24    Echo    Interpretation Summary    Left Ventricle: The left ventricle is normal in size. Normal wall thickness. There is concentric remodeling. There is normal systolic function with a visually estimated ejection fraction of 60 - 65%. Ejection fraction is approximately 60%.    Right Ventricle: Normal right ventricular cavity size. Wall thickness is normal. Systolic function is normal.    Mitral Valve: There is moderate mitral annular calcification present. There is mild regurgitation.    Pulmonary Artery: The estimated pulmonary artery systolic pressure is 32 mmHg.    IVC/SVC: Normal venous pressure at 3 mmHg.    Current Heart Failure  "Medications  bumetanide tablet 1 mg, 2 times daily, Oral    Plan  - Monitor strict I&Os and daily weights.    - Place on telemetry  - Low sodium diet  - Place on fluid restriction of 1.5 L.   - Cardiology has not been consulted  - BNP 1170  -BUMEX 1 MG P.O. B.I.D.      Hypomagnesemia  Patient has Abnormal Magnesium: hypomagnesemia. Will continue to monitor electrolytes closely. Will replace the affected electrolytes and repeat labs to be done after interventions completed. The patient's magnesium results have been reviewed and are listed below.  Recent Labs   Lab 12/16/24  0827   MG 1.8      Improving   Monitor BMP and replete PRN     Hypokalemia  Patient's most recent potassium results are listed below.   Recent Labs     12/15/24  1353 12/16/24  0827   K 3.1* 3.0*       Plan  - Replete potassium per protocol  - Monitor potassium Daily and replete as needed   - Patient's hypokalemia is stable      Low back pain  CT imaging on admission with "Minor superior L1 compression deformity is new from the prior CT"  IR consulted for kyphoplasty, ASA held   MRI L spine shows cute/subacute L1 vertebral body compression deformity with only minimal height loss and minor superior retropulsion, no associated spinal stenosis         Coronary artery disease involving native coronary artery of native heart  Patient with known CAD s/p CABG, which is controlled Will continue home medications and monitor for S/Sx of angina/ACS. Continue to monitor on telemetry.     CKD (chronic kidney disease) stage 4, GFR 15-29 ml/min  Creatine stable for now. BMP reviewed- noted Estimated Creatinine Clearance: 29.9 mL/min (based on SCr of 1.1 mg/dL). according to latest data. Based on current GFR, CKD stage is stage 4 - GFR 15-29.  Monitor UOP and serial BMP and adjust therapy as needed. Renally dose meds. Avoid nephrotoxic medications and procedures.    Other hyperlipidemia  Continue statin.         VTE Risk Mitigation (From admission, onward)        "    Ordered     enoxaparin injection 30 mg  Daily         12/16/24 0922     IP VTE HIGH RISK PATIENT  Once         12/15/24 1709     Place sequential compression device  Until discontinued         12/15/24 1709                    Discharge Planning   CANDACE: 12/18/2024     Code Status: Full Code   Medical Readiness for Discharge Date:   Discharge Plan A: Home Health                Please place Justification for DME        Jailyn Duran MD  Department of Hospital Medicine   [facility]

## 2024-12-16 NOTE — ASSESSMENT & PLAN NOTE
Patient has Diastolic (HFpEF) heart failure that is Acute on chronic. On presentation their CHF was decompensated. The etiology of their decompensation is likely medication non-compliance. Most recent BNP and echo results are listed below.  Recent Labs     12/15/24  1353   BNP 1,170*       Latest ECHO  Results for orders placed during the hospital encounter of 10/13/24    Echo    Interpretation Summary    Left Ventricle: The left ventricle is normal in size. Normal wall thickness. There is concentric remodeling. There is normal systolic function with a visually estimated ejection fraction of 60 - 65%. Ejection fraction is approximately 60%.    Right Ventricle: Normal right ventricular cavity size. Wall thickness is normal. Systolic function is normal.    Mitral Valve: There is moderate mitral annular calcification present. There is mild regurgitation.    Pulmonary Artery: The estimated pulmonary artery systolic pressure is 32 mmHg.    IVC/SVC: Normal venous pressure at 3 mmHg.    Current Heart Failure Medications  bumetanide tablet 1 mg, 2 times daily, Oral    Plan  - Monitor strict I&Os and daily weights.    - Place on telemetry  - Low sodium diet  - Place on fluid restriction of 1.5 L.   - Cardiology has not been consulted  - BNP 1170  -BUMEX 1 MG P.O. B.I.D.

## 2024-12-17 ENCOUNTER — OUTPATIENT CASE MANAGEMENT (OUTPATIENT)
Dept: ADMINISTRATIVE | Facility: OTHER | Age: 89
End: 2024-12-17
Payer: MEDICARE

## 2024-12-17 ENCOUNTER — DOCUMENT SCAN (OUTPATIENT)
Dept: HOME HEALTH SERVICES | Facility: HOSPITAL | Age: 89
End: 2024-12-17
Payer: MEDICARE

## 2024-12-17 VITALS
WEIGHT: 137.13 LBS | DIASTOLIC BLOOD PRESSURE: 60 MMHG | HEIGHT: 61 IN | HEART RATE: 63 BPM | TEMPERATURE: 98 F | RESPIRATION RATE: 18 BRPM | OXYGEN SATURATION: 92 % | BODY MASS INDEX: 25.89 KG/M2 | SYSTOLIC BLOOD PRESSURE: 130 MMHG

## 2024-12-17 LAB
ANION GAP SERPL CALC-SCNC: 15 MMOL/L (ref 8–16)
BASOPHILS # BLD AUTO: 0.04 K/UL (ref 0–0.2)
BASOPHILS NFR BLD: 0.5 % (ref 0–1.9)
BUN SERPL-MCNC: 27 MG/DL (ref 8–23)
CALCIUM SERPL-MCNC: 9.1 MG/DL (ref 8.7–10.5)
CHLORIDE SERPL-SCNC: 109 MMOL/L (ref 95–110)
CO2 SERPL-SCNC: 19 MMOL/L (ref 23–29)
CREAT SERPL-MCNC: 1.8 MG/DL (ref 0.5–1.4)
DIFFERENTIAL METHOD BLD: ABNORMAL
EOSINOPHIL # BLD AUTO: 0 K/UL (ref 0–0.5)
EOSINOPHIL NFR BLD: 0.5 % (ref 0–8)
ERYTHROCYTE [DISTWIDTH] IN BLOOD BY AUTOMATED COUNT: 17.1 % (ref 11.5–14.5)
EST. GFR  (NO RACE VARIABLE): 27 ML/MIN/1.73 M^2
GLUCOSE SERPL-MCNC: 102 MG/DL (ref 70–110)
HCT VFR BLD AUTO: 33.4 % (ref 37–48.5)
HGB BLD-MCNC: 10.7 G/DL (ref 12–16)
IMM GRANULOCYTES # BLD AUTO: 0.1 K/UL (ref 0–0.04)
IMM GRANULOCYTES NFR BLD AUTO: 1.2 % (ref 0–0.5)
LYMPHOCYTES # BLD AUTO: 0.5 K/UL (ref 1–4.8)
LYMPHOCYTES NFR BLD: 6.3 % (ref 18–48)
MAGNESIUM SERPL-MCNC: 1.7 MG/DL (ref 1.6–2.6)
MCH RBC QN AUTO: 28.8 PG (ref 27–31)
MCHC RBC AUTO-ENTMCNC: 32 G/DL (ref 32–36)
MCV RBC AUTO: 90 FL (ref 82–98)
MONOCYTES # BLD AUTO: 0.5 K/UL (ref 0.3–1)
MONOCYTES NFR BLD: 5.7 % (ref 4–15)
NEUTROPHILS # BLD AUTO: 7 K/UL (ref 1.8–7.7)
NEUTROPHILS NFR BLD: 85.8 % (ref 38–73)
NRBC BLD-RTO: 0 /100 WBC
PLATELET # BLD AUTO: 243 K/UL (ref 150–450)
PMV BLD AUTO: 10 FL (ref 9.2–12.9)
POTASSIUM SERPL-SCNC: 3.9 MMOL/L (ref 3.5–5.1)
RBC # BLD AUTO: 3.72 M/UL (ref 4–5.4)
SODIUM SERPL-SCNC: 143 MMOL/L (ref 136–145)
WBC # BLD AUTO: 8.21 K/UL (ref 3.9–12.7)

## 2024-12-17 PROCEDURE — 25000003 PHARM REV CODE 250: Mod: HCNC | Performed by: NURSE PRACTITIONER

## 2024-12-17 PROCEDURE — 36415 COLL VENOUS BLD VENIPUNCTURE: CPT | Mod: HCNC | Performed by: INTERNAL MEDICINE

## 2024-12-17 PROCEDURE — 85025 COMPLETE CBC W/AUTO DIFF WBC: CPT | Mod: HCNC | Performed by: INTERNAL MEDICINE

## 2024-12-17 PROCEDURE — 83735 ASSAY OF MAGNESIUM: CPT | Mod: HCNC | Performed by: INTERNAL MEDICINE

## 2024-12-17 PROCEDURE — 25000003 PHARM REV CODE 250: Mod: HCNC | Performed by: INTERNAL MEDICINE

## 2024-12-17 PROCEDURE — 97530 THERAPEUTIC ACTIVITIES: CPT | Mod: HCNC

## 2024-12-17 PROCEDURE — 80048 BASIC METABOLIC PNL TOTAL CA: CPT | Mod: HCNC | Performed by: INTERNAL MEDICINE

## 2024-12-17 PROCEDURE — 97116 GAIT TRAINING THERAPY: CPT | Mod: HCNC

## 2024-12-17 RX ORDER — CEFDINIR 300 MG/1
300 CAPSULE ORAL DAILY
Qty: 7 CAPSULE | Refills: 0 | Status: SHIPPED | OUTPATIENT
Start: 2024-12-18 | End: 2024-12-25

## 2024-12-17 RX ADMIN — SODIUM BICARBONATE 650 MG TABLET 650 MG: at 09:12

## 2024-12-17 RX ADMIN — DORZOLAMIDE HYDROCHLORIDE AND TIMOLOL MALEATE 1 DROP: 20; 5 SOLUTION OPHTHALMIC at 09:12

## 2024-12-17 RX ADMIN — BUMETANIDE 1 MG: 1 TABLET ORAL at 09:12

## 2024-12-17 RX ADMIN — PANTOPRAZOLE SODIUM 40 MG: 40 TABLET, DELAYED RELEASE ORAL at 09:12

## 2024-12-17 RX ADMIN — LABETALOL HYDROCHLORIDE 200 MG: 200 TABLET, FILM COATED ORAL at 09:12

## 2024-12-17 RX ADMIN — PANCRELIPASE 1 CAPSULE: 120000; 24000; 76000 CAPSULE, DELAYED RELEASE PELLETS ORAL at 11:12

## 2024-12-17 RX ADMIN — PANCRELIPASE 1 CAPSULE: 120000; 24000; 76000 CAPSULE, DELAYED RELEASE PELLETS ORAL at 09:12

## 2024-12-17 RX ADMIN — CEFDINIR 300 MG: 300 CAPSULE ORAL at 09:12

## 2024-12-17 RX ADMIN — ISOSORBIDE MONONITRATE 30 MG: 30 TABLET, EXTENDED RELEASE ORAL at 09:12

## 2024-12-17 NOTE — PT/OT/SLP PROGRESS
"Occupational Therapy   Treatment    Name: Glo Dumont  MRN: [id number]  Admitting Diagnosis:  Hypertensive urgency       Recommendations:     Discharge Recommendations: Low Intensity Therapy  Discharge Equipment Recommendations:  none  Barriers to discharge:  None    Assessment:     Glo Dumont is a 89 y.o. female with a medical diagnosis of Hypertensive urgency.  She presents with fatigue and displaying SOB. SPO2 93% at rest in bedside chair. Performance deficits affecting function are weakness, impaired balance, impaired endurance, impaired self care skills, impaired functional mobility, decreased upper extremity function, gait instability.     Rehab Prognosis:  Good; patient would benefit from acute skilled OT services to address these deficits and reach maximum level of function.       Plan:     Patient to be seen   to address the above listed problems via self-care/home management, therapeutic activities, therapeutic exercises  Plan of Care Expires: 12/30/24  Plan of Care Reviewed with: patient    Subjective     Chief Complaint: "I feel real tired."  Patient/Family Comments/goals: To return home today.   Pain/Comfort:  Pain Rating 1: 0/10    Objective:     Communicated with: nurse prior to session.  Patient found HOB elevated with peripheral IV, telemetry upon OT entry to room.    General Precautions: Standard, fall    Orthopedic Precautions:N/A  Braces: N/A  Respiratory Status: Room air     Occupational Performance:     Bed Mobility:    Patient completed Scooting/Bridging with independence  Patient completed Supine to Sit with independence     Functional Mobility/Transfers:  Patient completed Sit <> Stand Transfer with stand by assistance  with  rolling walker   Patient completed Bed <> Chair Transfer using Step Transfer technique with stand by assistance with rolling walker  Functional Mobility: To increase endurance needed to perform bathing, pt completed functional mobility in her room and hallway. Pt " reporting fatigue and displaying SOB. Pt safely returned to room and bedside chair.       [facility] 6 Click ADL: 23    Treatment & Education:  SPO2 93% at rest in bedside chair following mobility. Pt reporting symptoms resolving with rest sitting up in chair. Pt instructed to notify nurse if symptoms worsen.     Patient left up in chair with all lines intact, call button in reach, and chair alarm on    GOALS:   Multidisciplinary Problems       Occupational Therapy Goals          Problem: Occupational Therapy    Goal Priority Disciplines Outcome Interventions   Occupational Therapy Goal     OT, PT/OT Progressing    Description: O.T. goals to be met 12-  Mod (I) with toilet transfer  Mod (I) with ue dressing  Sba with  le dressing   Pt will tolerate 1 set x 15 reps b ue rom exercise                         Time Tracking:     OT Date of Treatment: 12/17/24  OT Start Time: 0848  OT Stop Time: 0906  OT Total Time (min): 18 min    Billable Minutes:Therapeutic Activity 18    OT/ALEXIA: OT     Number of ALEXIA visits since last OT visit: 0    12/17/2024

## 2024-12-17 NOTE — PLAN OF CARE
Pt performed bed mobility and functional mobility standby assistance with RW. Pt reporting fatigue and displaying SOB. SPO2 93% at rest in bedside chair.

## 2024-12-17 NOTE — PROGRESS NOTES
12/17/24 - Mrs Cody is scheduled for a follow up contact from this  today, but noted on chart review she is currently hospitalized at [facility] in [place]. Will monitor her chart and plan to follow up when she is d/c'd home. Joi Cooper RN

## 2024-12-17 NOTE — PLAN OF CARE
Problem: Adult Inpatient Plan of Care  Goal: Plan of Care Review  Outcome: Met  Goal: Patient-Specific Goal (Individualized)  Outcome: Met  Goal: Absence of Hospital-Acquired Illness or Injury  Outcome: Met  Goal: Optimal Comfort and Wellbeing  Outcome: Met  Goal: Readiness for Transition of Care  Outcome: Met     Problem: Skin Injury Risk Increased  Goal: Skin Health and Integrity  Outcome: Met     Problem: Fall Injury Risk  Goal: Absence of Fall and Fall-Related Injury  Outcome: Met     Problem: Pain Acute  Goal: Optimal Pain Control and Function  Outcome: Met     Problem: Burn Injury  Goal: Optimal Coping with Burn  Outcome: Met  Goal: Fluid Balance  Outcome: Met  Goal: Optimal Functional Ability  Outcome: Met  Goal: Absence of Infection Signs and Symptoms  Outcome: Met  Goal: Optimal Nutrition Intake  Outcome: Met  Goal: Acceptable Pain Control  Outcome: Met  Goal: Effective Oxygenation and Ventilation  Outcome: Met  Goal: Body Temperature Remains in Desired Range  Outcome: Met  Goal: Effective Peripheral Tissue Perfusion  Outcome: Met  Goal: Optimal Burn Wound Healing  Outcome: Met     Problem: Wound  Goal: Optimal Coping  Outcome: Met  Goal: Optimal Functional Ability  Outcome: Met  Goal: Absence of Infection Signs and Symptoms  Outcome: Met  Goal: Improved Oral Intake  Outcome: Met  Goal: Optimal Pain Control and Function  Outcome: Met  Goal: Skin Health and Integrity  Outcome: Met  Goal: Optimal Wound Healing  Outcome: Met

## 2024-12-17 NOTE — PLAN OF CARE
[facility]  Discharge Final Note    Primary Care Provider: Christina Cardona MD    Expected Discharge Date: 12/17/2024    Final Discharge Note (most recent)       Final Note - 12/17/24 1245          Final Note    Assessment Type Final Discharge Note     Anticipated Discharge Disposition Home-Health Care [facility]     Hospital Resources/Appts/Education Provided Appointments scheduled and added to AVS        Post-Acute Status    Post-Acute Authorization Home Health     Home Health Status Set-up Complete/Auth obtained                              Contact Info       Christina Cardona MD   Specialty: Internal Medicine   Relationship: PCP - General  Hypertension Digital Medicine Responsible Provider    [address]  [facility] [address]   Phone: [phone]       Next Steps: Schedule an appointment as soon as possible for a visit in 3 day(s)    Stan Lui MD   Specialty: Interventional Cardiology, Cardiology    [address] [facility] [address]   Phone: [phone]       Next Steps: Schedule an appointment as soon as possible for a visit        Pt is discharging with [facility].

## 2024-12-17 NOTE — PT/OT/SLP PROGRESS
"Physical Therapy  Treatment    Glo Dumont   MRN: [id number]   Admitting Diagnosis: Hypertensive urgency    PT Received On: 12/17/24  PT Start Time: 0845     PT Stop Time: 0910    PT Total Time (min): 25 min       Billable Minutes:  Gait Training 15 and Therapeutic Activity 10    Treatment Type: Treatment  PT/PTA: PT     Number of PTA visits since last PT visit: 0       General Precautions: Standard, fall  Orthopedic Precautions: N/A  Braces: N/A  Respiratory Status: Room air         Subjective:  Communicated with NURSE QUE AND EPIC CHART REVIEW  prior to session.   PT AGREED TO TX " I FEEL BETTER."    Pain/Comfort  Pain Rating 1: 0/10  Pain Rating Post-Intervention 1: 0/10    Objective:   Patient found with: peripheral IV, telemetry    Functional Mobility:  PT MET IN RM SUP >SIT EOB WITH S. PT SCOOTED TO EOB AND GT. BELT AND  SOCKS DONNED PRIOR TO OOB MOBILITY.  PT STOOD WITH RW AND S FOR GT TRAINING. PT WITH SLOW PACE GT X 200' WITH SBA. PT REPORTED INC FATIGUE AND SOB WITH GT. PT RETURNED TO RM T/F TO CHAIR WITH RW AND SBA. PT O2 SATS @ 93% ROOM AIR. PT NURSE ALERTED TO HOW PT WAS FEELING . PT REPORTED SOMETIMES SHE TAKES BREATHING TX AT HOME. PT SEATED IN CHAIR FOR REST AND OOB TOLERANCE     Treatment and Education:  PT DECLINED TE AT THIS TIME D/T FATIGUE. PT EDUCATED ON "CALL DON'T FALL", ENCOURAGED TO CALL FOR ASSISTANCE WITH ALL NEEDS FOR OOB MOBILITY.      AM-PAC 6 CLICK MOBILITY  How much help from another person does this patient currently need?   1 = Unable, Total/Dependent Assistance  2 = A lot, Maximum/Moderate Assistance  3 = A little, Minimum/Contact Guard/Supervision  4 = None, Modified [place]/Independent    Turning over in bed (including adjusting bedclothes, sheets and blankets)?: 4  Sitting down on and standing up from a chair with arms (e.g., wheelchair, bedside commode, etc.): 4  Moving from lying on back to sitting on the side of the bed?: 4  Moving to and from a bed to a chair " (including a wheelchair)?: 4  Need to walk in hospital room?: 4  Climbing 3-5 steps with a railing?: 1  Basic Mobility Total Score: 21    AM-PAC Raw Score CMS G-Code Modifier Level of Impairment Assistance   6 % Total / Unable   7 - 9 CM 80 - 100% Maximal Assist   10 - 14 CL 60 - 80% Moderate Assist   15 - 19 CK 40 - 60% Moderate Assist   20 - 22 CJ 20 - 40% Minimal Assist   23 CI 1-20% SBA / CGA   24 CH 0% Independent/ Mod I     Patient left up in chair with call button in reach.    Assessment:  PT FÁTIMA TX WITH INC FATIGUE.     Rehab identified problem list/impairments: weakness, impaired endurance, impaired cardiopulmonary response to activity, impaired balance, gait instability, impaired functional mobility, decreased lower extremity function, decreased ROM, impaired self care skills    Rehab potential is good.    Activity tolerance: Fair    Discharge recommendations: Low Intensity Therapy      Barriers to discharge:      Equipment recommendations: none     GOALS:   Multidisciplinary Problems       Physical Therapy Goals          Problem: Physical Therapy    Goal Priority Disciplines Outcome Interventions   Physical Therapy Goal     PT, PT/OT     Description: LT24  1. PT WILL COMPLETE BED MOBILITY IND  2. PT WILL STAND T/F TO CHAIR WITH RW ROBERT    3. PT WILL GT TRAIN X 250' WITH RW MOD I TO PROGRESS GT  4. PT WILL INC AMPAC SCORE BY 2 POINTS TO PROGRESS GROSS FUNC MOBILITY.                          PLAN:    Patient to be seen 3 x/week to address the above listed problems via gait training, therapeutic activities, therapeutic exercises  Plan of Care expires: 24  Plan of Care reviewed with: patient         2024

## 2024-12-18 ENCOUNTER — TELEPHONE (OUTPATIENT)
Dept: HOME HEALTH SERVICES | Facility: CLINIC | Age: 89
End: 2024-12-18
Payer: MEDICARE

## 2024-12-18 LAB
OHS QRS DURATION: 80 MS
OHS QTC CALCULATION: 461 MS

## 2024-12-19 DIAGNOSIS — M80.08XA AGE-RELATED OSTEOPOROSIS WITH CURRENT PATHOLOGICAL FRACTURE, VERTEBRA(E), INITIAL ENCOUNTER FOR FRACTURE: ICD-10-CM

## 2024-12-19 DIAGNOSIS — S32.000A COMPRESSION FRACTURE OF LUMBAR VERTEBRA, UNSPECIFIED LUMBAR VERTEBRAL LEVEL, INITIAL ENCOUNTER: Primary | ICD-10-CM

## 2024-12-19 NOTE — DISCHARGE SUMMARY
[facility] [place] [facility]  Discharge Summary      Patient Name: Glo Dumont  MRN: [id number]  ALBERTA: [id number]  Patient Class: IP- Inpatient  Admission Date: 12/15/2024  Hospital Length of Stay: 1 days  Discharge Date and Time: 12/17/2024 12:52 PM  Attending Physician: No att. providers found   Discharging Provider: Jailyn Duran MD  Primary Care Provider: No primary care provider on file.    Primary Care Team: Networked reference to record PCT     HPI:    Glo Dumont is a 89 y.o. female patient with a PMHx of HTN, diverticulosis, HLD, CAD, carotid artery occlusion, anemia, CHF, CKD, anxiety, and depression who presents to the Emergency Department for evaluation of non-radiating left side flank pain which onset gradually since Wednesday. Pt's pain started to worsen Friday. Pain worsens with movement and sitting up. Pt's daughter reports the pt's HTN is poorly controlled. Symptoms are constant and moderate in severity. No associated sxs. Patient denies any hematuria, dysuria, fever, chills, N/V, SOB, and all other sxs at this time.     ED workup: Bp 200s/90s, serum potassium 3.1, magnesium 1.1, BNP 1170, troponin 0.081.  CT renal showed  Bilateral nonobstructing nephrolithiasis. Small volume of free fluid in the pelvis. Diverticulosis coli without evidence of acute diverticulitis. New minor superior L1 vertebral body compression deformity from the prior CT 10/13/2024.  In the ED patient received Bumex 1 mg IV, hydralazine 20 mg IV, and nitropaste with mild improvement in blood pressure.  Most recent blood pressure 175/87.  Patient admitted for uncontrolled blood pressure, CHF, and pain under the care of Hospital Medicine team.     * No surgery found *      Hospital Course:   BP trends improved with diuresis (Bumex PO 1 BID, was taking Bumex 1mg daily PRN at home prior to admission)     Outpatient UA done 12/12 showed > 100K CFU e. Coli and 50-99K CFU Klebsiella oxytoca, started on PO Cefdinir based  on sensitivities  given complaints of back pain on admission     IR consulted for possible kyphoplasty, MRI L spine showed Acute/subacute L1 vertebral body compression deformity with only minimal height loss and minor superior retropulsion. No associated spinal stenosis. IR unable to complete kyphoplasty until later in the week, will call patient to schedule procedure as outpatient. PT/OT evaluated and recommend low intensity therapy on discharge     Pt seen and examined on day of discharge, daughter at bedside. Pt awake, alert, reports no back pain at this time. Denies CP, SOB, cough. Feels well. Appears stable for discharge home with HH per my exam. Per daughter, they were in process of getting home hospice/palliative care set up prior to patient being admitted to hospital.   She will continue Bumex 1 mg daily until she can followup with CHF clinic/Cardiology. Continue PO Cefdinir to complete total 7d course of abx.     Followup with PCP within 3-5 days, CHF clinic, Jeisonsjose martin NP at home and IR to reach out to patient to set up kyphoplasty.          Goals of Care Treatment Preferences:  Code Status: Full Code    Health care agent: Denis Langston [facility] agent number: [phone], [phone].                   SDOH Screening:  The patient was screened for utility difficulties, food insecurity, transport difficulties, housing insecurity, and interpersonal safety and there were no concerns identified this admission.     Consults:   Consults (From admission, onward)          Status Ordering Provider     Inpatient consult to Interventional Radiology  Once        Provider:  (Not yet assigned)    Completed LIVIA SAENZ            Final Active Diagnoses:    Diagnosis Date Noted POA    PRINCIPAL PROBLEM:  Hypertensive urgency [I16.0] 12/15/2024 Yes    Acute on chronic heart failure with preserved ejection fraction [I50.33] 04/04/2024 Yes    Hypomagnesemia [E83.42] 03/19/2023 Yes     Hypokalemia [E87.6] 03/19/2023 Yes    Low back pain [M54.50] 02/08/2024 Yes    Coronary artery disease involving native coronary artery of native heart [I25.10] 07/09/2024 Yes     Chronic    CKD (chronic kidney disease) stage 4, GFR 15-29 ml/min [N18.4] 05/11/2017 Yes     Chronic    Other hyperlipidemia [E78.49] 07/08/2013 Yes     Chronic      Problems Resolved During this Admission:       Discharged Condition: good    Disposition: Home-Health Care [facility]    Follow Up:   Follow-up Information       Christina Cardona MD. Schedule an appointment as soon as possible for a visit in 3 day(s).    Specialty: Internal Medicine  Contact information:  [address] [facility] [address] [phone]               Stan Lui MD. Schedule an appointment as soon as possible for a visit.    Specialties: Interventional Cardiology, Cardiology  Contact information:  [address] [facility] [id number] [phone]                           Patient Instructions:      Ambulatory referral/consult to General Congestive Heart Failure Clinic   Standing Status: Future   Referral Priority: Routine Referral Type: Consultation   Referral Reason: Specialty Services Required   Requested Specialty: Cardiology   Number of Visits Requested: 1     Ambulatory referral/consult to [facility]   Standing Status: Future   Referral Priority: Routine Referral Type: Consultation   Referral Reason: Specialty Services Required   Number of Visits Requested: 1     Diet Cardiac     Diet renal     SUBSEQUENT HOME HEALTH ORDERS     Order Specific Question Answer Comments   What Home Health Agency is the patient currently using? [facility]      Other Wound Care Instructions   Order Comments: R foot   1. Cleanse with saline or Vashe and allow to dwell   2. Pat dry   3. Paint intact hong wound skin with zinc moisture barrier paste   4. Cut Aquacel AG Advantage to size and apply to cover/fill wound bed    5. Secure with foam dressing   6. Change every 3 days and as needed for excess drainage     Activity as tolerated       Significant Diagnostic Studies: See Hospital course     Pending Diagnostic Studies:       None           Medications:  Reconciled Home Medications:      Medication List        START taking these medications      cefdinir 300 MG capsule  Commonly known as: OMNICEF  Take 1 capsule (300 mg total) by mouth once daily. for 7 days            CHANGE how you take these medications      bumetanide 1 MG tablet  Commonly known as: BUMEX  Take 1 tablet (1 mg total) by mouth once daily.  What changed: additional instructions            CONTINUE taking these medications      aflibercept 2 mg/0.05 mL Soln  2 mg by Intravitreal route every 28 days.     aspirin 81 MG EC tablet  Commonly known as: ECOTRIN  Take 1 tablet (81 mg total) by mouth once daily.  Notes to patient: DO NOT TAKE UNTIL AFTER PROCEDURE COMPLETED      atorvastatin 40 MG tablet  Commonly known as: LIPITOR  Take 1 tablet (40 mg total) by mouth Daily.     budesonide 3 mg capsule  Commonly known as: ENTOCORT EC  Take 1-2 capsules (3-6 mg total) by mouth daily as needed (flare). As needed     busPIRone 5 MG Tab  Commonly known as: BUSPAR  Take 1 tablet (5 mg total) by mouth 2 (two) times daily as needed (anxiety).     carvediloL 12.5 MG tablet  Commonly known as: COREG  Take 1 tablet (12.5 mg total) by mouth 2 (two) times daily with meals.     CENTRUM SILVER ORAL  Take 1 tablet by mouth once daily.     citalopram 10 MG tablet  Commonly known as: CeleXA  Take 1 tablet (10 mg total) by mouth every evening.     CREON 24,000-76,000 -120,000 unit capsule  Generic drug: lipase-protease-amylase 24,000-76,000-120,000 units  Take 1 capsule by mouth 3 (three) times daily with meals.     cyproheptadine 4 mg tablet  Commonly known as: PERIACTIN  Take 1 tablet (4 mg total) by mouth 3 (three) times daily as needed (appetite).     dorzolamide-timolol 2-0.5%  22.3-6.8 mg/mL ophthalmic solution  Commonly known as: COSOPT  Place 1 drop into both eyes 2 (two) times daily.     empagliflozin 10 mg tablet  Commonly known as: JARDIANCE  Take 1 tablet (10 mg total) by mouth once daily.  Notes to patient: DO NOT TAKE UNTIL YOU CAN HAVE LABS RECHECKED BY PCP OR HOME HEALTH      HYDROcodone-acetaminophen 5-325 mg per tablet  Commonly known as: NORCO  1/2 tablet every 6 to 8 hours as needed for pain     loperamide 2 mg capsule  Commonly known as: IMODIUM  Take 1 mg by mouth every 6 (six) hours as needed for Diarrhea.     nitroGLYCERIN 0.4 MG SL tablet  Commonly known as: NITROSTAT  PLACE ONE TABLET UNDERNEATH THE TONGUE EVERY 5 MINUTES AS NEEDED FOR CHEST PAIN     ondansetron 4 MG tablet  Commonly known as: ZOFRAN  Take 1 tablet (4 mg total) by mouth every 8 (eight) hours as needed for Nausea.     ondansetron 8 MG Tbdl  Commonly known as: ZOFRAN-ODT  Take 8 mg by mouth every 6 (six) hours.     pantoprazole 40 MG tablet  Commonly known as: PROTONIX  Take 1 tablet (40 mg total) by mouth once daily.     potassium chloride 10 MEQ Tbsr  Commonly known as: KLOR-CON  Take 1 tablet (10 mEq total) by mouth once daily.     silver sulfADIAZINE 1% 1 % cream  Commonly known as: SILVADENE  Apply topically 2 (two) times daily.     sodium bicarbonate 650 MG tablet  Take 1 tablet (650 mg total) by mouth once daily.            ASK your doctor about these medications      dexAMETHasone 0.7 mg Impl intravitreal implant  Commonly known as: OZURDEX  0.7 mg by Intravitreal route once.              Indwelling Lines/Drains at time of discharge:   Lines/Drains/Airways       None                   Time spent on the discharge of patient: 32 minutes         Jailyn Duran MD  Department of Hospital Medicine  [facility]

## 2024-12-20 ENCOUNTER — TELEPHONE (OUTPATIENT)
Dept: RADIOLOGY | Facility: HOSPITAL | Age: 89
End: 2024-12-20
Payer: MEDICARE

## 2024-12-20 ENCOUNTER — OUTPATIENT CASE MANAGEMENT (OUTPATIENT)
Dept: ADMINISTRATIVE | Facility: OTHER | Age: 89
End: 2024-12-20
Payer: MEDICARE

## 2024-12-20 NOTE — PROGRESS NOTES
Outpatient Care Management  Plan of Care Follow Up Visit    Patient: Glo Dumont  MRN: [id number]  Date of Service: 12/20/2024  Completed by: Joi Cooper RN  Referral Date: 10/21/2024    No chief complaint on file.      Brief Summary:  Mrs Cody was admitted to [facility] on 12/17/24 upon d/c from [facility].   Next Steps: Will notify LCSW, close care plans and d/c from [facility]. Joi Cooper RN

## 2024-12-20 NOTE — TELEPHONE ENCOUNTER
Interventional Radiology  I called pt.'s daughter, Jessica, to schedule a kyphoplasty.  Jessica stated that the pt. is with hospice now and this procedure won't be needed.

## 2024-12-23 ENCOUNTER — CLINICAL SUPPORT (OUTPATIENT)
Dept: CARDIOLOGY | Facility: HOSPITAL | Age: 89
End: 2024-12-23
Attending: INTERNAL MEDICINE
Payer: MEDICARE

## 2024-12-23 DIAGNOSIS — Z95.0 PRESENCE OF CARDIAC PACEMAKER: ICD-10-CM

## 2024-12-23 DIAGNOSIS — I50.32 CHRONIC DIASTOLIC (CONGESTIVE) HEART FAILURE: ICD-10-CM

## 2024-12-23 PROCEDURE — 93296 REM INTERROG EVL PM/IDS: CPT | Mod: HCNC | Performed by: INTERNAL MEDICINE

## 2024-12-23 PROCEDURE — 93294 REM INTERROG EVL PM/LDLS PM: CPT | Mod: HCNC,S$GLB,, | Performed by: INTERNAL MEDICINE

## 2024-12-26 ENCOUNTER — TELEPHONE (OUTPATIENT)
Dept: RADIOLOGY | Facility: HOSPITAL | Age: 89
End: 2024-12-26
Payer: MEDICARE

## 2024-12-26 NOTE — TELEPHONE ENCOUNTER
Interventional Radiology  Scheduled 1:00PM kyphoplasty for 12/30/24 w/Jessica, pt.'s daughter.  Instructed that pt. should arrive by 11:30AM to the hospital ([address]/ [address]) off [address] in [place] and check in at Patient Registration on the first floor.  Informed that pt. must have a ride and be NPO after 4:00AM the day of procedure.  Pt. took last dose of ASA on 12/25/24 and does not take other blood thinners, NSAIDS, fish oil, or GLP-1/GIP agonists.  Jessica verbalized understanding of all instructions.

## 2024-12-27 ENCOUNTER — TELEPHONE (OUTPATIENT)
Dept: RADIOLOGY | Facility: HOSPITAL | Age: 89
End: 2024-12-27
Payer: MEDICARE

## 2024-12-27 NOTE — TELEPHONE ENCOUNTER
Interventional Radiology  Received call from pt.'s daughter, Jessica, that the appointment  for 12/30/24 at 1:00PM has not been approved yet by the insurance.  Jessica stated she will plan on bringing her mother and hope the insurance approves the procedure by Monday.  Pt. is to arrive by 11:30AM to the hospital ([address]/ [address]) off [address] [facility] in [place] and be NPO after 4:00AM the day of the procedure.  Explained that she can take morning medications the day of the procedure with a small sip of water.  Pt. stopped ASA on 12/25/24 and she does not take any other medications that need to be stopped prior to this procedure.  Jessica verbalized understanding of all instructions.

## 2024-12-30 ENCOUNTER — HOSPITAL ENCOUNTER (OUTPATIENT)
Dept: RADIOLOGY | Facility: HOSPITAL | Age: 89
Discharge: HOME | End: 2024-12-30
Attending: INTERNAL MEDICINE
Payer: MEDICARE

## 2024-12-30 VITALS
WEIGHT: 134 LBS | OXYGEN SATURATION: 96 % | BODY MASS INDEX: 25.3 KG/M2 | SYSTOLIC BLOOD PRESSURE: 170 MMHG | RESPIRATION RATE: 17 BRPM | HEIGHT: 61 IN | DIASTOLIC BLOOD PRESSURE: 85 MMHG | HEART RATE: 77 BPM

## 2024-12-30 DIAGNOSIS — S32.000A COMPRESSION FRACTURE OF LUMBAR VERTEBRA, UNSPECIFIED LUMBAR VERTEBRAL LEVEL, INITIAL ENCOUNTER: ICD-10-CM

## 2024-12-30 DIAGNOSIS — M80.08XA AGE-RELATED OSTEOPOROSIS WITH CURRENT PATHOLOGICAL FRACTURE, VERTEBRA(E), INITIAL ENCOUNTER FOR FRACTURE: ICD-10-CM

## 2024-12-30 LAB
INR PPP: 1 (ref 0.8–1.2)
PROTHROMBIN TIME: 11.4 SEC (ref 9–12.5)

## 2024-12-30 PROCEDURE — 27201055 IR KYPHOPLASTY LUMBAR FIRST VERTEBRAL WITH IMAGING: Mod: HCNC

## 2024-12-30 PROCEDURE — 99152 MOD SED SAME PHYS/QHP 5/>YRS: CPT | Mod: HCNC,,, | Performed by: RADIOLOGY

## 2024-12-30 PROCEDURE — 85610 PROTHROMBIN TIME: CPT | Mod: HCNC | Performed by: RADIOLOGY

## 2024-12-30 PROCEDURE — 22514 PERQ VERTEBRAL AUGMENTATION: CPT | Mod: HCNC | Performed by: RADIOLOGY

## 2024-12-30 PROCEDURE — 63600175 PHARM REV CODE 636 W HCPCS: Mod: HCNC | Performed by: RADIOLOGY

## 2024-12-30 PROCEDURE — 99152 MOD SED SAME PHYS/QHP 5/>YRS: CPT | Mod: HCNC | Performed by: RADIOLOGY

## 2024-12-30 PROCEDURE — 99153 MOD SED SAME PHYS/QHP EA: CPT | Mod: HCNC | Performed by: RADIOLOGY

## 2024-12-30 RX ORDER — HYDRALAZINE HYDROCHLORIDE 20 MG/ML
10 INJECTION INTRAMUSCULAR; INTRAVENOUS ONCE
Status: DISCONTINUED | OUTPATIENT
Start: 2024-12-30 | End: 2024-12-31 | Stop reason: HOSPADM

## 2024-12-30 RX ORDER — LIDOCAINE HYDROCHLORIDE 20 MG/ML
INJECTION, SOLUTION EPIDURAL; INFILTRATION; INTRACAUDAL; PERINEURAL CODE/TRAUMA/SEDATION MEDICATION
Status: COMPLETED | OUTPATIENT
Start: 2024-12-30 | End: 2024-12-30

## 2024-12-30 RX ORDER — FENTANYL CITRATE 50 UG/ML
INJECTION, SOLUTION INTRAMUSCULAR; INTRAVENOUS CODE/TRAUMA/SEDATION MEDICATION
Status: COMPLETED | OUTPATIENT
Start: 2024-12-30 | End: 2024-12-30

## 2024-12-30 RX ORDER — MIDAZOLAM HYDROCHLORIDE 1 MG/ML
INJECTION, SOLUTION INTRAMUSCULAR; INTRAVENOUS CODE/TRAUMA/SEDATION MEDICATION
Status: COMPLETED | OUTPATIENT
Start: 2024-12-30 | End: 2024-12-30

## 2024-12-30 RX ORDER — HYDRALAZINE HYDROCHLORIDE 20 MG/ML
10 INJECTION INTRAMUSCULAR; INTRAVENOUS ONCE
Status: COMPLETED | OUTPATIENT
Start: 2024-12-30 | End: 2024-12-30

## 2024-12-30 RX ADMIN — FENTANYL CITRATE 50 MCG: 50 INJECTION, SOLUTION INTRAMUSCULAR; INTRAVENOUS at 02:12

## 2024-12-30 RX ADMIN — MIDAZOLAM HYDROCHLORIDE 1 MG: 1 INJECTION, SOLUTION INTRAMUSCULAR; INTRAVENOUS at 02:12

## 2024-12-30 RX ADMIN — HYDRALAZINE HYDROCHLORIDE 10 MG: 20 INJECTION INTRAMUSCULAR; INTRAVENOUS at 12:12

## 2024-12-30 RX ADMIN — LIDOCAINE HYDROCHLORIDE 5 ML: 20 INJECTION, SOLUTION EPIDURAL; INFILTRATION; INTRACAUDAL; PERINEURAL at 02:12

## 2024-12-30 NOTE — PLAN OF CARE
PT BROUGHT TO ROOM VIA WHEELCHAIR. MARTELL VELEZ. PLACED IN HOSPITAL GOWN ON STRETCHER IN SUPINE POSITION. HISTORY PER PATIENT AND DAUGHTER (KAMALJIT). SON-IN-LAW IN LOBBY.

## 2024-12-30 NOTE — PLAN OF CARE
Band aid to lower back C/D/I with no bleeding/redness/swelling noted. VSS, NADN, and pt meets criteria for discharge. Discharge instructions given to and reviewed with pt, and pt verbalized understanding of all. Pt discharged to home, taken out via wheelchair and driven home by daughter.

## 2024-12-30 NOTE — INTERVAL H&P NOTE
The patient has been examined and the H&P has been reviewed:    I concur with the findings and changes have been noted since the H&P was written: vertebral body deformity        There are no hospital problems to display for this patient.

## 2025-01-06 ENCOUNTER — DOCUMENT SCAN (OUTPATIENT)
Dept: HOME HEALTH SERVICES | Facility: HOSPITAL | Age: 89
End: 2025-01-06
Payer: MEDICARE

## 2025-01-10 ENCOUNTER — PATIENT MESSAGE (OUTPATIENT)
Dept: OPHTHALMOLOGY | Facility: CLINIC | Age: 89
End: 2025-01-10
Payer: MEDICARE

## 2025-01-13 RX ORDER — ISOSORBIDE MONONITRATE 30 MG/1
TABLET, EXTENDED RELEASE ORAL
Qty: 30 TABLET | Refills: 8 | OUTPATIENT
Start: 2025-01-13

## 2025-01-14 LAB
OHS CV AF BURDEN PERCENT: < 1
OHS CV DC REMOTE DEVICE TYPE: NORMAL
OHS CV RV PACING PERCENT: 0 %

## 2025-01-20 ENCOUNTER — OCHSNER VIRTUAL EMERGENCY DEPARTMENT (OUTPATIENT)
Facility: CLINIC | Age: 89
End: 2025-01-20
Payer: MEDICARE

## 2025-01-20 ENCOUNTER — NURSE TRIAGE (OUTPATIENT)
Dept: ADMINISTRATIVE | Facility: CLINIC | Age: 89
End: 2025-01-20
Payer: MEDICARE

## 2025-01-20 ENCOUNTER — PATIENT MESSAGE (OUTPATIENT)
Dept: OPHTHALMOLOGY | Facility: CLINIC | Age: 89
End: 2025-01-20
Payer: MEDICARE

## 2025-01-20 DIAGNOSIS — H40.1131 PRIMARY OPEN ANGLE GLAUCOMA (POAG) OF BOTH EYES, MILD STAGE: ICD-10-CM

## 2025-01-20 RX ORDER — DORZOLAMIDE HYDROCHLORIDE AND TIMOLOL MALEATE 20; 5 MG/ML; MG/ML
1 SOLUTION/ DROPS OPHTHALMIC 2 TIMES DAILY
Qty: 10 ML | Refills: 6 | Status: SHIPPED | OUTPATIENT
Start: 2025-01-20

## 2025-01-20 NOTE — TELEPHONE ENCOUNTER
Patient's daughter, Jessica, states patient's Pharmacy is out of their supply of Dorzolamide-Timolol 2-0.5% Ophthalmic solution. Daughter is requesting patient's prescription be transferred to [facility] #[id number] for  today, 01/20/25.     [facility] On Call Provider, Dr. Conrad Moss advised that he will send in a month's supply of patient's prescription for Dorzolamide - Timolol 2-0.5% Ophthalmic solution to [facility] #[id number].    Patient's daughter advised that a prescription for Dorzolamide-Timol 2-0.5% Ophthalmic solution has been submitted to [facility] #[id number] and will be available for pick-up tonight. Patient's daughter also advised to contact the [facility] on Call Service for any additional questions/symptoms. Patient's daughter states understanding of care advice.     Reason for Disposition   Patient has refills remaining on their prescription    Additional Information   Negative: New-onset or worsening symptoms, see that guideline (e.g., diarrhea, runny nose, sore throat)   Negative: Medicine question not related to refill or renewal   Negative: Caller (e.g., patient or pharmacist) requesting information about a new medicine   Negative: Caller requesting information unrelated to medicine   Negative: [1] Prescription refill request for ESSENTIAL medicine (i.e., likelihood of harm to patient if not taken) AND [2] triager unable to refill per department policy   Negative: [1] Prescription not at pharmacy AND [2] was prescribed by PCP recently  (Exception: Triager has access to EMR and prescription is recorded there. Go to Home Care and confirm for pharmacy.)   Negative: [1] Pharmacy calling with prescription questions AND [2] triager unable to answer question   Negative: Prescription request for new medicine (not a refill)   Negative: Caller requesting a CONTROLLED substance prescription refill (e.g., narcotics, ADHD medicines)   Negative: [1] Prescription refill request for  NON-ESSENTIAL medicine (i.e., no harm to patient if med not taken) AND [2] triager unable to refill per department policy   Negative: [1] Caller has NON-URGENT medicine question about med that PCP prescribed AND [2] triager unable to answer question   Negative: [1] Prescription prescribed recently is not at pharmacy AND [2] triager has access to patient's EMR AND [3] prescription is recorded in the EMR   Negative: [1] Caller requesting a prescription renewal (no refills left), no triage required, AND [2] triager able to renew prescription per department policy    Protocols used: Medication Refill and Renewal Call-A-

## 2025-01-21 NOTE — PLAN OF CARE-OVED
[facility] Emergency Department Plan of Care Note  Referral Source: Nurse On-Call                               Chief Complaint   Patient presents with    Medication Refill     Regular pharmacy out of [place], would like sent to a different pharmacy       Recommendation: Treat in place                          Encounter Diagnosis   Name Primary?    Primary open angle glaucoma (POAG) of both eyes, mild stage         Medications Ordered This Encounter   Medications    dorzolamide-timolol 2-0.5% ([place]) 22.3-6.8 mg/mL ophthalmic solution     Sig: Place 1 drop into both eyes 2 (two) times daily.     Dispense:  10 mL     Refill:  6

## 2025-01-22 ENCOUNTER — PATIENT MESSAGE (OUTPATIENT)
Dept: OPHTHALMOLOGY | Facility: CLINIC | Age: 89
End: 2025-01-22
Payer: MEDICARE

## 2025-01-24 ENCOUNTER — TELEPHONE (OUTPATIENT)
Dept: OPHTHALMOLOGY | Facility: CLINIC | Age: 89
End: 2025-01-24
Payer: MEDICARE

## 2025-01-24 NOTE — TELEPHONE ENCOUNTER
Called ms. Dumont back at 1:26 with no answer.  I left her a detailed voice mail to give us a call back. kf

## 2025-01-24 NOTE — TELEPHONE ENCOUNTER
Spoke to Ms. Nick daughter and she said that Ms. Nick eyes are worse.  I told her to call Dr. Tamez and tell him and also tell him that she is scheduled to come in on Tuesday morning at 9:00 for injections.  She was going to call Dr. Tamez and tell him . kf

## 2025-01-28 ENCOUNTER — PROCEDURE VISIT (OUTPATIENT)
Dept: OPHTHALMOLOGY | Facility: CLINIC | Age: 89
End: 2025-01-28
Payer: MEDICARE

## 2025-01-28 ENCOUNTER — PATIENT MESSAGE (OUTPATIENT)
Dept: OPHTHALMOLOGY | Facility: CLINIC | Age: 89
End: 2025-01-28

## 2025-01-28 DIAGNOSIS — H34.8130 CENTRAL RETINAL VEIN OCCLUSION WITH MACULAR EDEMA OF BOTH EYES: Primary | ICD-10-CM

## 2025-01-28 PROCEDURE — 92134 CPTRZ OPH DX IMG PST SGM RTA: CPT | Mod: HCNC,S$GLB,, | Performed by: OPHTHALMOLOGY

## 2025-01-28 PROCEDURE — 67028 INJECTION EYE DRUG: CPT | Mod: 50,HCNC,S$GLB, | Performed by: OPHTHALMOLOGY

## 2025-01-28 PROCEDURE — 99499 UNLISTED E&M SERVICE: CPT | Mod: HCNC,S$GLB,, | Performed by: OPHTHALMOLOGY

## 2025-01-28 RX ADMIN — Medication 1.25 MG: at 09:01

## 2025-01-28 NOTE — PROGRESS NOTES
===============================  Date today is 1/28/2025  Glo Dumont is a 89 y.o. female  Last visit [facility]: :11/14/2024   Last visit eye dept. 11/14/2024    Corrected distance visual acuity was 20/400 in the right eye and 20/400 in the left eye.  Tonometry       Tonometry (Applanation, 9:13 AM)         Right Left    Pressure 10 10                  Not recorded       Not recorded       Not recorded       Chief Complaint   Patient presents with    Injections     Ozurdex 10-12wk       HPI     Injections            Comments: Ozurdex 10-12wk            Comments    1. Steroid responder glaucoma   2. PCIOL OU MGM   3. SARCOIDOSIS   H\O chronic UVEITIS OU   4. Central vein occlusion of retina, left   5. Retinal edema     H/o Avastin and Eylea OS   Had been getting Ozurdex OU with Dr. Shahrzad Tamez Eylea OU last 4/25/22, 8/3/22, 12/21/22  Ozurdex ou last 2/16/2022, 5/25/22, 11/8/22, 1/31/23, 8/9/23, 11/15/23,   3/6/24, 5/29/24, 8/14/24      [place] BID OU             Last edited by Melissa Narvaez on 1/28/2025  8:58 AM.      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  Check OU independently daily.    Gave my office and personal cell phone number.  ________________  1/28/2025 today  Glo Dumont    OU CRVO  OCT slightly worse OD, better OS    Unable to give Ozurdex due to [employer] funding  Will proceed with Avastin OU today but plan to give Ozurdex OU in 3-4  weeks    ..    Injection Procedure Note:    1/28/2025  Diagnosis :  OU CRVO  Today:   Avastin (Bevacizumab) 1.25 mg/0.05 ml Intravitreal Injection, OU   Follow up: rtc 3-4 weeks for Ozurdex OU    Instructed to call 24/7 for any worsening of vision. Check Both eyes daily. Gave patient my home phone number.  Risks, benefits, and alternatives to treatment discussed in detail with the patient.  The patient voiced understanding and wished to proceed with the procedure.     Patient Identified and Time Out complete  Subconjunctival bleb - xylocaine with epi 2%   and Betadine.  Inject at Avastin (Bevacizumab) 1.25 mg/0.05 ml Intravitreal Injection , OU 6:00 @ 3.5-4mm posterior to limbus  1 stop: no   Post Operative Dx: Same  Complications: None  Follow up as above.    =============================

## 2025-01-29 ENCOUNTER — EXTERNAL HOME HEALTH (OUTPATIENT)
Dept: HOME HEALTH SERVICES | Facility: HOSPITAL | Age: 89
End: 2025-01-29

## 2025-01-29 ENCOUNTER — DOCUMENT SCAN (OUTPATIENT)
Dept: HOME HEALTH SERVICES | Facility: HOSPITAL | Age: 89
End: 2025-01-29
Payer: MEDICARE

## 2025-01-31 ENCOUNTER — DOCUMENT SCAN (OUTPATIENT)
Dept: HOME HEALTH SERVICES | Facility: HOSPITAL | Age: 89
End: 2025-01-31
Payer: MEDICARE

## 2025-01-31 ENCOUNTER — OFFICE VISIT (OUTPATIENT)
Dept: PULMONOLOGY | Facility: CLINIC | Age: 89
End: 2025-01-31
Payer: MEDICARE

## 2025-01-31 ENCOUNTER — LAB VISIT (OUTPATIENT)
Dept: LAB | Facility: HOSPITAL | Age: 89
End: 2025-01-31
Attending: INTERNAL MEDICINE
Payer: MEDICARE

## 2025-01-31 VITALS
HEIGHT: 61 IN | DIASTOLIC BLOOD PRESSURE: 78 MMHG | HEART RATE: 61 BPM | BODY MASS INDEX: 24.64 KG/M2 | RESPIRATION RATE: 16 BRPM | OXYGEN SATURATION: 95 % | WEIGHT: 130.5 LBS | SYSTOLIC BLOOD PRESSURE: 124 MMHG

## 2025-01-31 DIAGNOSIS — Z95.0 S/P PLACEMENT OF CARDIAC PACEMAKER: ICD-10-CM

## 2025-01-31 DIAGNOSIS — R91.1 LUNG NODULE: ICD-10-CM

## 2025-01-31 DIAGNOSIS — D86.0 SARCOIDOSIS OF LUNG: Chronic | ICD-10-CM

## 2025-01-31 DIAGNOSIS — Z95.1 S/P CABG X 3: ICD-10-CM

## 2025-01-31 DIAGNOSIS — D86.0 SARCOIDOSIS OF LUNG: Primary | Chronic | ICD-10-CM

## 2025-01-31 LAB — ERYTHROCYTE [SEDIMENTATION RATE] IN BLOOD BY WESTERGREN METHOD: 6 MM/HR (ref 0–20)

## 2025-01-31 PROCEDURE — 99999 PR PBB SHADOW E&M-EST. PATIENT-LVL V: CPT | Mod: PBBFAC,HCNC,, | Performed by: INTERNAL MEDICINE

## 2025-01-31 PROCEDURE — 3288F FALL RISK ASSESSMENT DOCD: CPT | Mod: HCNC,CPTII,S$GLB, | Performed by: INTERNAL MEDICINE

## 2025-01-31 PROCEDURE — 99204 OFFICE O/P NEW MOD 45 MIN: CPT | Mod: HCNC,S$GLB,, | Performed by: INTERNAL MEDICINE

## 2025-01-31 PROCEDURE — 1159F MED LIST DOCD IN RCRD: CPT | Mod: HCNC,CPTII,S$GLB, | Performed by: INTERNAL MEDICINE

## 2025-01-31 PROCEDURE — 83520 IMMUNOASSAY QUANT NOS NONAB: CPT | Mod: HCNC | Performed by: INTERNAL MEDICINE

## 2025-01-31 PROCEDURE — 1101F PT FALLS ASSESS-DOCD LE1/YR: CPT | Mod: HCNC,CPTII,S$GLB, | Performed by: INTERNAL MEDICINE

## 2025-01-31 PROCEDURE — 86140 C-REACTIVE PROTEIN: CPT | Mod: HCNC | Performed by: INTERNAL MEDICINE

## 2025-01-31 PROCEDURE — 82164 ANGIOTENSIN I ENZYME TEST: CPT | Mod: HCNC | Performed by: INTERNAL MEDICINE

## 2025-01-31 PROCEDURE — 85651 RBC SED RATE NONAUTOMATED: CPT | Mod: HCNC | Performed by: INTERNAL MEDICINE

## 2025-01-31 PROCEDURE — 1126F AMNT PAIN NOTED NONE PRSNT: CPT | Mod: HCNC,CPTII,S$GLB, | Performed by: INTERNAL MEDICINE

## 2025-01-31 PROCEDURE — 1160F RVW MEDS BY RX/DR IN RCRD: CPT | Mod: HCNC,CPTII,S$GLB, | Performed by: INTERNAL MEDICINE

## 2025-01-31 PROCEDURE — 36415 COLL VENOUS BLD VENIPUNCTURE: CPT | Mod: HCNC | Performed by: INTERNAL MEDICINE

## 2025-01-31 PROCEDURE — 1157F ADVNC CARE PLAN IN RCRD: CPT | Mod: HCNC,CPTII,S$GLB, | Performed by: INTERNAL MEDICINE

## 2025-01-31 NOTE — PROGRESS NOTES
Initial Outpatient Pulmonary Evaluation       SUBJECTIVE:     Chief Complaint   Patient presents with    Pulmonary Nodules       History of Present Illness:    Patient is a 89 y.o. female presenting to reestablish care for pulmonary sarcoidosis.      Not on oxygen.  Limited with walking.  Currently on wheelchair.  Denies flare-ups of coughing wheezing or SOB.  Denies hospital admission for respiratory exacerbations.      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      Not on maintenance treatment for sarcoidosis.    Review of Systems   Respiratory:  Positive for previous hospitalization due to pulmonary problems.    Musculoskeletal:  Positive for back pain.       Review of patient's allergies indicates:   Allergen Reactions    Lisinopril      angioedema    Codeine Nausea And Vomiting       Current Outpatient Medications   Medication Sig Dispense Refill    aflibercept 2 mg/0.05 mL Soln 2 mg by Intravitreal route every 28 days.      aspirin (ECOTRIN) 81 MG EC tablet Take 1 tablet (81 mg total) by mouth once daily. 90 tablet 3    atorvastatin (LIPITOR) 40 MG tablet Take 1 tablet (40 mg total) by mouth Daily. 90 tablet 3    budesonide (ENTOCORT EC) 3 mg capsule Take 1-2 capsules (3-6 mg total) by mouth daily as needed (flare). As needed 60 each 2    bumetanide (BUMEX) 1 MG tablet Take 1 tablet (1 mg total) by mouth once daily. 30 tablet 11    busPIRone (BUSPAR) 5 MG Tab Take 1 tablet (5 mg total) by mouth 2 (two) times daily as needed (anxiety). 60 tablet 5    carvediloL (COREG) 12.5 MG tablet Take 1 tablet (12.5 mg total) by mouth 2 (two) times daily with meals. 180 tablet 3    citalopram (CELEXA) 10 MG tablet Take 1 tablet (10 mg total) by mouth every evening.      cyproheptadine (PERIACTIN) 4 mg tablet Take 1 tablet (4 mg total) by mouth 3 (three) times daily as needed (appetite). 270 tablet 1    dexAMETHasone (OZURDEX) 0.7 mg Impl intravitreal implant  0.7 mg by Intravitreal route once.      dorzolamide-timolol 2-0.5% (COSOPT) 22.3-6.8 mg/mL ophthalmic solution Place 1 drop into both eyes 2 (two) times daily. 10 mL 6    empagliflozin (JARDIANCE) 10 mg tablet Take 1 tablet (10 mg total) by mouth once daily. 30 tablet 11    FOLIC ACID/MULTIVIT-MIN/LUTEIN (CENTRUM SILVER ORAL) Take 1 tablet by mouth once daily.      HYDROcodone-acetaminophen (NORCO) 5-325 mg per tablet 1/2 tablet every 6 to 8 hours as needed for pain 20 tablet 0    lipase-protease-amylase 24,000-76,000-120,000 units (CREON) 24,000-76,000 -120,000 unit capsule Take 1 capsule by mouth 3 (three) times daily with meals. 270 capsule 3    loperamide (IMODIUM) 2 mg capsule Take 1 mg by mouth every 6 (six) hours as needed for Diarrhea.      nitroGLYCERIN (NITROSTAT) 0.4 MG SL tablet PLACE ONE TABLET UNDERNEATH THE TONGUE EVERY 5 MINUTES AS NEEDED FOR CHEST PAIN 25 tablet 2    ondansetron (ZOFRAN) 4 MG tablet Take 1 tablet (4 mg total) by mouth every 8 (eight) hours as needed for Nausea. 12 tablet 0    ondansetron (ZOFRAN-ODT) 8 MG TbDL Take 8 mg by mouth every 6 (six) hours.      pantoprazole (PROTONIX) 40 MG tablet Take 1 tablet (40 mg total) by mouth once daily. 90 tablet 3    potassium chloride (KLOR-CON) 10 MEQ TbSR Take 1 tablet (10 mEq total) by mouth once daily. 90 tablet 3    silver sulfADIAZINE 1% (SILVADENE) 1 % cream Apply topically 2 (two) times daily. 30 g 0    sodium bicarbonate 650 MG tablet Take 1 tablet (650 mg total) by mouth once daily. 180 tablet 0     No current facility-administered medications for this visit.       Past Medical History:   Diagnosis Date    Anemia     Angina pectoris     Anxiety     Anxiety and depression     Arthritis     hip    Carotid artery occlusion     Carpal tunnel syndrome 06/23/2008    emg    Chronic diarrhea     work up in 2011 with EGD, CS and VCE    CKD (chronic kidney disease) stage 3, GFR 30-59 ml/min 5/11/2017    Colitis     Coronary artery disease      Coronary artery disease     Diastolic dysfunction     Diverticulosis     Encephalopathy 10/14/2024    Glaucoma     Greater trochanteric bursitis 2/10/2015    Grief at loss of child 1/26/2016    H/O carotid endarterectomy 12/2/2013    Heart failure     History of coronary angioplasty 3/11/2014    Hypercholesteremia     Hypertension     Liver cyst 02/08/2013    ct abd    Low back pain 2/8/2024    Macular degeneration     Obesity with serious comorbidity 3/19/2023    Primary open-angle glaucoma(365.11) 9/3/2013    Renal cyst 02/08/2013    ct abd    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    Sarcoidosis     Sarcoidosis of lung     Sickle cell trait     Uveitis      Past Surgical History:   Procedure Laterality Date    A-V CARDIAC PACEMAKER INSERTION Left 3/21/2023    Procedure: INSERTION, CARDIAC PACEMAKER, DUAL CHAMBER/His Lead;  Surgeon: Cortez Ambrose MD;  Location: [facility], [place] CATH LAB;  Service: Cardiology;  Laterality: Left;  MDT/ MD to confirm in am/possible nurse sedate    CAROTID ENDARTERECTOMY Right 2000s    CATARACT EXTRACTION Bilateral     Dr. Liang Dennis    CHOLECYSTECTOMY      laparoscopic, 3/18.    CORONARY ANGIOPLASTY WITH STENT PLACEMENT  11/19/2010    RCA-HALIE 2010    Lathia    JOINT REPLACEMENT Left 11/03/2014    Dr. Braun    TOTAL ABDOMINAL HYSTERECTOMY W/ BILATERAL SALPINGOOPHORECTOMY  1972     Family History   Problem Relation Name Age of Onset    Hypertension Mother      Heart failure Mother      Cancer Father          prostate    Cirrhosis Brother      Heart failure Brother      Cancer Daughter          Carcinoid     Social History     Tobacco Use    Smoking status: Never    Smokeless tobacco: Never   Substance Use Topics    Alcohol use: No     Alcohol/week: 0.0 standard drinks of alcohol    Drug use: No          OBJECTIVE:     Vital Signs (Most Recent)  Vital Signs  Pulse: 61  Resp: 16  SpO2: 95 %  BP: 124/78  Patient Position: Sitting  Pain Score: 0-No pain  Height and  "Weight  Height: 5' 1" (154.9 cm)  Weight: 59.2 kg (130 lb 8.2 oz)  BSA (Calculated - sq m): 1.6 sq meters  BMI (Calculated): 24.7  Weight in (lb) to have BMI = 25: 132]  Wt Readings from Last 2 Encounters:   01/31/25 59.2 kg (130 lb 8.2 oz)   12/30/24 60.8 kg (134 lb)         Physical Exam:  Physical Exam   Constitutional: She is oriented to person, place, and time. She appears well-developed.   Pulmonary/Chest: Normal expansion and effort normal. No respiratory distress. She has decreased breath sounds. She has no wheezes. She has no rhonchi.   Neurological: She is alert and oriented to person, place, and time. Gait abnormal.   Psychiatric: Her behavior is normal.       Laboratory  Lab Results   Component Value Date    WBC 8.21 12/17/2024    RBC 3.72 (L) 12/17/2024    HGB 10.7 (L) 12/17/2024    HCT 33.4 (L) 12/17/2024    MCV 90 12/17/2024    MCH 28.8 12/17/2024    MCHC 32.0 12/17/2024    RDW 17.1 (H) 12/17/2024     12/17/2024    MPV 10.0 12/17/2024    GRAN 7.0 12/17/2024    GRAN 85.8 (H) 12/17/2024    LYMPH 0.5 (L) 12/17/2024    LYMPH 6.3 (L) 12/17/2024    MONO 0.5 12/17/2024    MONO 5.7 12/17/2024    EOS 0.0 12/17/2024    BASO 0.04 12/17/2024    EOSINOPHIL 0.5 12/17/2024    BASOPHIL 0.5 12/17/2024       BMP  Lab Results   Component Value Date     12/17/2024    K 3.9 12/17/2024     12/17/2024    CO2 19 (L) 12/17/2024    BUN 27 (H) 12/17/2024    CREATININE 1.8 (H) 12/17/2024    CALCIUM 9.1 12/17/2024    ANIONGAP 15 12/17/2024    ESTGFRAFRICA 33.9 (A) 07/19/2022    EGFRNONAA 29.4 (A) 07/19/2022    AST 17 12/15/2024    ALT 11 12/15/2024    PROT 6.2 12/15/2024       Lab Results   Component Value Date    BNP 1,170 (H) 12/15/2024     (H) 10/25/2024     (H) 10/24/2024     (H) 10/13/2024     (H) 07/08/2024     (H) 02/22/2024       Lab Results   Component Value Date    TSH 1.191 10/13/2024       Lab Results   Component Value Date    SEDRATE 4 01/22/2013       Lab Results " "  Component Value Date    CRP 3.5 08/25/2020       No results found for: "IGE"    No results found for: "ASPERGILLUS"  No results found for: "AFUMIGATUSCL"     Lab Results   Component Value Date    ACE 49 08/25/2020       Diagnostic Results:  I have personally reviewed today the following studies :      Chest x-ray 2024 stable abnormalities      ASSESSMENT/PLAN:     Sarcoidosis of lung  -     Sedimentation rate; Future; Expected date: 01/31/2025  -     C-REACTIVE PROTEIN; Future; Expected date: 01/31/2025  -     INTERLEUKIN-2 RECEPTOR; Future; Expected date: 01/31/2025  -     Angiotensin Converting Enzyme; Future; Expected date: 01/31/2025  -     Complete PFT w/ bronchodilator; Future  -     Stress test, pulmonary; Future    Lung nodule    S/P CABG x 3    S/P placement of cardiac pacemaker        Sarcoidosis of the lung does not appear rapidly progressive.  Not on treatment and has not followed for the last 5 years.      Patient of Dr. Muir last seen in 2020.      Repeat serological markers of inflammation/sarcoidosis.      Check PFT and 6 minute walk.      No indication for sarcoidosis treatment at the moment.    Continue cardiac meds.  Follow up in about 3 months (around 4/30/2025).    This note was prepared using voice recognition system and is likely to have sound alike errors that may have been overlooked even after proof reading.  Please call me with any questions    Discussed diagnosis, its evaluation, treatment and usual course. All questions answered.    Thank you for the courtesy of participating in the care of this patient    Neto Lui MD          "

## 2025-02-01 LAB — CRP SERPL-MCNC: 3.8 MG/L (ref 0–8.2)

## 2025-02-03 DIAGNOSIS — F41.8 SITUATIONAL ANXIETY: ICD-10-CM

## 2025-02-03 RX ORDER — BUSPIRONE HYDROCHLORIDE 5 MG/1
5 TABLET ORAL 2 TIMES DAILY
Qty: 60 TABLET | Refills: 5 | OUTPATIENT
Start: 2025-02-03

## 2025-02-04 LAB — ACE SERPL-CCNC: 20 U/L (ref 16–85)

## 2025-02-05 LAB — SOL IL2 RECEP SERPL-MCNC: 798.7 PG/ML (ref 175.3–858.2)

## 2025-02-06 ENCOUNTER — PATIENT MESSAGE (OUTPATIENT)
Dept: CARDIOLOGY | Facility: CLINIC | Age: 89
End: 2025-02-06
Payer: MEDICARE

## 2025-02-06 DIAGNOSIS — M79.89 LEG SWELLING: Primary | ICD-10-CM

## 2025-02-07 ENCOUNTER — CLINICAL SUPPORT (OUTPATIENT)
Dept: PULMONOLOGY | Facility: CLINIC | Age: 89
End: 2025-02-07
Payer: MEDICARE

## 2025-02-07 VITALS — WEIGHT: 130.5 LBS | BODY MASS INDEX: 24.64 KG/M2 | HEIGHT: 61 IN

## 2025-02-07 DIAGNOSIS — D86.0 SARCOIDOSIS OF LUNG: Chronic | ICD-10-CM

## 2025-02-07 LAB
BRPFT: ABNORMAL
DLCO ADJ PRE: 7.23 ML/(MIN*MMHG) (ref 11.7–23.17)
DLCO SINGLE BREATH LLN: 11.7
DLCO SINGLE BREATH PRE REF: 41.4 %
DLCO SINGLE BREATH REF: 17.44
DLCOC SBVA LLN: 2.31
DLCOC SBVA PRE REF: 77.9 %
DLCOC SBVA REF: 3.81
DLCOC SINGLE BREATH LLN: 11.7
DLCOC SINGLE BREATH PRE REF: 41.4 %
DLCOC SINGLE BREATH REF: 17.44
DLCOVA LLN: 2.31
DLCOVA PRE REF: 77.9 %
DLCOVA PRE: 2.97 ML/(MIN*MMHG*L) (ref 2.31–5.32)
DLCOVA REF: 3.81
DLVAADJ PRE: 2.97 ML/(MIN*MMHG*L) (ref 2.31–5.32)
ERV LLN: -16449.63
ERV PRE REF: 78.4 %
ERV REF: 0.37
FEF 25 75 CHG: 6.8 %
FEF 25 75 LLN: 0.53
FEF 25 75 POST REF: 64.4 %
FEF 25 75 PRE REF: 60.3 %
FEF 25 75 REF: 1.92
FET100 CHG: -13.1 %
FEV1 CHG: -1.3 %
FEV1 FVC CHG: 1.6 %
FEV1 FVC LLN: 62
FEV1 FVC POST REF: 109.4 %
FEV1 FVC PRE REF: 107.7 %
FEV1 FVC REF: 77
FEV1 LLN: 0.91
FEV1 POST REF: 72.4 %
FEV1 PRE REF: 73.4 %
FEV1 REF: 1.41
FRCPLETH LLN: 1.78
FRCPLETH PREREF: 73 %
FRCPLETH REF: 2.6
FVC CHG: -2.8 %
FVC LLN: 1.22
FVC POST REF: 65.2 %
FVC PRE REF: 67.1 %
FVC REF: 1.86
IVC PRE: 1.31 L (ref 1.22–2.51)
IVC SINGLE BREATH LLN: 1.22
IVC SINGLE BREATH PRE REF: 70.5 %
IVC SINGLE BREATH REF: 1.86
MVV LLN: 49
MVV PRE REF: 70.1 %
MVV REF: 57
PEF CHG: -17.1 %
PEF LLN: 0.95
PEF POST REF: 94.2 %
PEF PRE REF: 113.6 %
PEF REF: 2.82
POST FEF 25 75: 1.24 L/S (ref 0.53–3.32)
POST FET 100: 2.86 SEC
POST FEV1 FVC: 84.25 % (ref 61.73–92.25)
POST FEV1: 1.02 L (ref 0.91–1.91)
POST FVC: 1.21 L (ref 1.22–2.51)
POST PEF: 2.66 L/S (ref 0.95–4.69)
PRE DLCO: 7.23 ML/(MIN*MMHG) (ref 11.7–23.17)
PRE ERV: 0.29 L (ref -16449.63–16450.37)
PRE FEF 25 75: 1.16 L/S (ref 0.53–3.32)
PRE FET 100: 3.3 SEC
PRE FEV1 FVC: 82.93 % (ref 61.73–92.25)
PRE FEV1: 1.03 L (ref 0.91–1.91)
PRE FRC PL: 1.9 L
PRE FVC: 1.25 L (ref 1.22–2.51)
PRE MVV: 40 L/MIN (ref 48.5–65.62)
PRE PEF: 3.21 L/S (ref 0.95–4.69)
PRE RV: 1.64 L (ref 1.66–2.81)
PRE TLC: 3.02 L (ref 3.58–5.56)
RAW LLN: 3.06
RAW PRE REF: 236.2 %
RAW PRE: 7.23 CMH2O*S/L (ref 3.06–3.06)
RAW REF: 3.06
RV LLN: 1.66
RV PRE REF: 73.5 %
RV REF: 2.23
RVTLC LLN: 39
RVTLC PRE REF: 111.9 %
RVTLC PRE: 54.32 % (ref 38.95–58.13)
RVTLC REF: 49
TLC LLN: 3.58
TLC PRE REF: 66.1 %
TLC REF: 4.57
VA PRE: 2.44 L (ref 4.42–4.42)
VA SINGLE BREATH LLN: 4.42
VA SINGLE BREATH PRE REF: 55.1 %
VA SINGLE BREATH REF: 4.42
VC LLN: 1.22
VC PRE REF: 74.2 %
VC PRE: 1.38 L (ref 1.22–2.51)
VC REF: 1.86
VTGRAWPRE: 1.74 L

## 2025-02-07 PROCEDURE — 94726 PLETHYSMOGRAPHY LUNG VOLUMES: CPT | Mod: HCNC,S$GLB,, | Performed by: INTERNAL MEDICINE

## 2025-02-07 PROCEDURE — 94060 EVALUATION OF WHEEZING: CPT | Mod: HCNC,S$GLB,, | Performed by: INTERNAL MEDICINE

## 2025-02-07 PROCEDURE — 94729 DIFFUSING CAPACITY: CPT | Mod: HCNC,S$GLB,, | Performed by: INTERNAL MEDICINE

## 2025-02-07 NOTE — PROGRESS NOTES
Unable to do walk testing today. Patient blood pressure was consistently over 180/100. Walk will need to be rescheduled when BP is under control.

## 2025-02-17 ENCOUNTER — PATIENT MESSAGE (OUTPATIENT)
Dept: PULMONOLOGY | Facility: CLINIC | Age: 89
End: 2025-02-17
Payer: MEDICARE

## 2025-02-23 ENCOUNTER — HOSPITAL ENCOUNTER (INPATIENT)
Facility: HOSPITAL | Age: 89
LOS: 4 days | Discharge: HOME | DRG: 291 | End: 2025-02-28
Attending: EMERGENCY MEDICINE | Admitting: INTERNAL MEDICINE
Payer: MEDICARE

## 2025-02-23 DIAGNOSIS — R60.9 EDEMA, UNSPECIFIED TYPE: ICD-10-CM

## 2025-02-23 DIAGNOSIS — R07.9 CHEST PAIN: ICD-10-CM

## 2025-02-23 DIAGNOSIS — R05.9 COUGH: ICD-10-CM

## 2025-02-23 DIAGNOSIS — I50.9 HEART FAILURE, UNSPECIFIED HF CHRONICITY, UNSPECIFIED HEART FAILURE TYPE: Primary | ICD-10-CM

## 2025-02-23 DIAGNOSIS — I50.32 CHRONIC HEART FAILURE WITH PRESERVED EJECTION FRACTION: ICD-10-CM

## 2025-02-23 DIAGNOSIS — R41.0 DELIRIUM: ICD-10-CM

## 2025-02-23 LAB
ADENOVIRUS: NOT DETECTED
ALBUMIN SERPL BCP-MCNC: 3 G/DL (ref 3.5–5.2)
ALP SERPL-CCNC: 70 U/L (ref 40–150)
ALT SERPL W/O P-5'-P-CCNC: 9 U/L (ref 10–44)
ANION GAP SERPL CALC-SCNC: 14 MMOL/L (ref 8–16)
AST SERPL-CCNC: 14 U/L (ref 10–40)
BASOPHILS # BLD AUTO: 0.01 K/UL (ref 0–0.2)
BASOPHILS NFR BLD: 0.2 % (ref 0–1.9)
BILIRUB SERPL-MCNC: 0.4 MG/DL (ref 0.1–1)
BILIRUB UR QL STRIP: NEGATIVE
BNP SERPL-MCNC: 940 PG/ML (ref 0–99)
BORDETELLA PARAPERTUSSIS (IS1001): NOT DETECTED
BORDETELLA PERTUSSIS (PTXP): NOT DETECTED
BUN SERPL-MCNC: 26 MG/DL (ref 8–23)
CALCIUM SERPL-MCNC: 8.4 MG/DL (ref 8.7–10.5)
CHLAMYDIA PNEUMONIAE: NOT DETECTED
CHLORIDE SERPL-SCNC: 109 MMOL/L (ref 95–110)
CLARITY UR: CLEAR
CO2 SERPL-SCNC: 20 MMOL/L (ref 23–29)
COLOR UR: YELLOW
CORONAVIRUS 229E, COMMON COLD VIRUS: NOT DETECTED
CORONAVIRUS HKU1, COMMON COLD VIRUS: NOT DETECTED
CORONAVIRUS NL63, COMMON COLD VIRUS: NOT DETECTED
CORONAVIRUS OC43, COMMON COLD VIRUS: NOT DETECTED
CREAT SERPL-MCNC: 1.9 MG/DL (ref 0.5–1.4)
DIFFERENTIAL METHOD BLD: ABNORMAL
EOSINOPHIL # BLD AUTO: 0 K/UL (ref 0–0.5)
EOSINOPHIL NFR BLD: 0.4 % (ref 0–8)
ERYTHROCYTE [DISTWIDTH] IN BLOOD BY AUTOMATED COUNT: 15.5 % (ref 11.5–14.5)
EST. GFR  (NO RACE VARIABLE): 25 ML/MIN/1.73 M^2
FLUBV RNA NPH QL NAA+NON-PROBE: NOT DETECTED
GLUCOSE SERPL-MCNC: 110 MG/DL (ref 70–110)
GLUCOSE UR QL STRIP: NEGATIVE
HCT VFR BLD AUTO: 27.5 % (ref 37–48.5)
HGB BLD-MCNC: 8.8 G/DL (ref 12–16)
HGB UR QL STRIP: NEGATIVE
HPIV1 RNA NPH QL NAA+NON-PROBE: NOT DETECTED
HPIV2 RNA NPH QL NAA+NON-PROBE: NOT DETECTED
HPIV3 RNA NPH QL NAA+NON-PROBE: NOT DETECTED
HPIV4 RNA NPH QL NAA+NON-PROBE: NOT DETECTED
HUMAN METAPNEUMOVIRUS: NOT DETECTED
IMM GRANULOCYTES # BLD AUTO: 0.05 K/UL (ref 0–0.04)
IMM GRANULOCYTES NFR BLD AUTO: 0.9 % (ref 0–0.5)
INFLUENZA A, MOLECULAR: NEGATIVE
INFLUENZA A: NOT DETECTED
INFLUENZA B, MOLECULAR: NEGATIVE
KETONES UR QL STRIP: NEGATIVE
LEUKOCYTE ESTERASE UR QL STRIP: NEGATIVE
LYMPHOCYTES # BLD AUTO: 0.3 K/UL (ref 1–4.8)
LYMPHOCYTES NFR BLD: 5.5 % (ref 18–48)
MCH RBC QN AUTO: 29.4 PG (ref 27–31)
MCHC RBC AUTO-ENTMCNC: 32 G/DL (ref 32–36)
MCV RBC AUTO: 92 FL (ref 82–98)
MONOCYTES # BLD AUTO: 0.3 K/UL (ref 0.3–1)
MONOCYTES NFR BLD: 6.2 % (ref 4–15)
MYCOPLASMA PNEUMONIAE: NOT DETECTED
NEUTROPHILS # BLD AUTO: 4.6 K/UL (ref 1.8–7.7)
NEUTROPHILS NFR BLD: 86.8 % (ref 38–73)
NITRITE UR QL STRIP: NEGATIVE
NRBC BLD-RTO: 0 /100 WBC
PH UR STRIP: 6 [PH] (ref 5–8)
PLATELET # BLD AUTO: 163 K/UL (ref 150–450)
PLATELET BLD QL SMEAR: ABNORMAL
PMV BLD AUTO: 11.6 FL (ref 9.2–12.9)
POCT GLUCOSE: 103 MG/DL (ref 70–110)
POTASSIUM SERPL-SCNC: 3.4 MMOL/L (ref 3.5–5.1)
PROT SERPL-MCNC: 5.6 G/DL (ref 6–8.4)
PROT UR QL STRIP: ABNORMAL
RBC # BLD AUTO: 2.99 M/UL (ref 4–5.4)
RESPIRATORY INFECTION PANEL SOURCE: NORMAL
RSV RNA NPH QL NAA+NON-PROBE: NOT DETECTED
RV+EV RNA NPH QL NAA+NON-PROBE: NOT DETECTED
SARS-COV-2 RDRP RESP QL NAA+PROBE: NEGATIVE
SARS-COV-2 RNA RESP QL NAA+PROBE: NOT DETECTED
SODIUM SERPL-SCNC: 143 MMOL/L (ref 136–145)
SP GR UR STRIP: 1.01 (ref 1–1.03)
SPECIMEN SOURCE: NORMAL
TROPONIN I SERPL DL<=0.01 NG/ML-MCNC: 0.1 NG/ML (ref 0–0.03)
TROPONIN I SERPL DL<=0.01 NG/ML-MCNC: 0.12 NG/ML (ref 0–0.03)
URN SPEC COLLECT METH UR: ABNORMAL
UROBILINOGEN UR STRIP-ACNC: NEGATIVE EU/DL
WBC # BLD AUTO: 5.31 K/UL (ref 3.9–12.7)

## 2025-02-23 PROCEDURE — 99285 EMERGENCY DEPT VISIT HI MDM: CPT | Mod: 25,HCNC

## 2025-02-23 PROCEDURE — 87502 INFLUENZA DNA AMP PROBE: CPT | Mod: HCNC | Performed by: EMERGENCY MEDICINE

## 2025-02-23 PROCEDURE — 63600175 PHARM REV CODE 636 W HCPCS: Mod: JZ,TB,HCNC | Performed by: EMERGENCY MEDICINE

## 2025-02-23 PROCEDURE — G0378 HOSPITAL OBSERVATION PER HR: HCPCS | Mod: HCNC

## 2025-02-23 PROCEDURE — 93010 ELECTROCARDIOGRAM REPORT: CPT | Mod: HCNC,,, | Performed by: INTERNAL MEDICINE

## 2025-02-23 PROCEDURE — 84484 ASSAY OF TROPONIN QUANT: CPT | Mod: HCNC | Performed by: EMERGENCY MEDICINE

## 2025-02-23 PROCEDURE — 82962 GLUCOSE BLOOD TEST: CPT | Mod: HCNC

## 2025-02-23 PROCEDURE — 94640 AIRWAY INHALATION TREATMENT: CPT | Mod: HCNC

## 2025-02-23 PROCEDURE — 63600175 PHARM REV CODE 636 W HCPCS: Mod: JZ,TB,HCNC | Performed by: NURSE PRACTITIONER

## 2025-02-23 PROCEDURE — 85025 COMPLETE CBC W/AUTO DIFF WBC: CPT | Mod: HCNC | Performed by: EMERGENCY MEDICINE

## 2025-02-23 PROCEDURE — 80053 COMPREHEN METABOLIC PANEL: CPT | Mod: HCNC | Performed by: EMERGENCY MEDICINE

## 2025-02-23 PROCEDURE — 93005 ELECTROCARDIOGRAM TRACING: CPT | Mod: HCNC

## 2025-02-23 PROCEDURE — 96374 THER/PROPH/DIAG INJ IV PUSH: CPT | Mod: HCNC

## 2025-02-23 PROCEDURE — 96375 TX/PRO/DX INJ NEW DRUG ADDON: CPT | Mod: HCNC

## 2025-02-23 PROCEDURE — 96372 THER/PROPH/DIAG INJ SC/IM: CPT | Performed by: NURSE PRACTITIONER

## 2025-02-23 PROCEDURE — 83880 ASSAY OF NATRIURETIC PEPTIDE: CPT | Mod: HCNC | Performed by: EMERGENCY MEDICINE

## 2025-02-23 PROCEDURE — 81003 URINALYSIS AUTO W/O SCOPE: CPT | Mod: HCNC | Performed by: EMERGENCY MEDICINE

## 2025-02-23 PROCEDURE — 84484 ASSAY OF TROPONIN QUANT: CPT | Mod: 91,HCNC | Performed by: NURSE PRACTITIONER

## 2025-02-23 PROCEDURE — 25000003 PHARM REV CODE 250: Mod: HCNC | Performed by: NURSE PRACTITIONER

## 2025-02-23 PROCEDURE — 25000242 PHARM REV CODE 250 ALT 637 W/ HCPCS: Mod: HCNC | Performed by: HOSPITALIST

## 2025-02-23 PROCEDURE — 87635 SARS-COV-2 COVID-19 AMP PRB: CPT | Mod: HCNC | Performed by: EMERGENCY MEDICINE

## 2025-02-23 RX ORDER — IPRATROPIUM BROMIDE AND ALBUTEROL SULFATE 2.5; .5 MG/3ML; MG/3ML
3 SOLUTION RESPIRATORY (INHALATION) EVERY 6 HOURS PRN
Status: DISCONTINUED | OUTPATIENT
Start: 2025-02-23 | End: 2025-02-28 | Stop reason: HOSPADM

## 2025-02-23 RX ORDER — BISACODYL 10 MG/1
10 SUPPOSITORY RECTAL DAILY PRN
Status: DISCONTINUED | OUTPATIENT
Start: 2025-02-23 | End: 2025-02-28 | Stop reason: HOSPADM

## 2025-02-23 RX ORDER — SODIUM CHLORIDE 0.9 % (FLUSH) 0.9 %
10 SYRINGE (ML) INJECTION
Status: DISCONTINUED | OUTPATIENT
Start: 2025-02-23 | End: 2025-02-28 | Stop reason: HOSPADM

## 2025-02-23 RX ORDER — DORZOLAMIDE HYDROCHLORIDE AND TIMOLOL MALEATE 20; 5 MG/ML; MG/ML
1 SOLUTION/ DROPS OPHTHALMIC 2 TIMES DAILY
Status: DISCONTINUED | OUTPATIENT
Start: 2025-02-24 | End: 2025-02-28 | Stop reason: HOSPADM

## 2025-02-23 RX ORDER — INSULIN ASPART 100 [IU]/ML
0-5 INJECTION, SOLUTION INTRAVENOUS; SUBCUTANEOUS
Status: DISCONTINUED | OUTPATIENT
Start: 2025-02-23 | End: 2025-02-28 | Stop reason: HOSPADM

## 2025-02-23 RX ORDER — PANTOPRAZOLE SODIUM 40 MG/1
40 TABLET, DELAYED RELEASE ORAL DAILY
Status: DISCONTINUED | OUTPATIENT
Start: 2025-02-24 | End: 2025-02-28 | Stop reason: HOSPADM

## 2025-02-23 RX ORDER — NALOXONE HCL 0.4 MG/ML
0.02 VIAL (ML) INJECTION
Status: DISCONTINUED | OUTPATIENT
Start: 2025-02-23 | End: 2025-02-28 | Stop reason: HOSPADM

## 2025-02-23 RX ORDER — ACETAMINOPHEN 325 MG/1
650 TABLET ORAL EVERY 4 HOURS PRN
Status: DISCONTINUED | OUTPATIENT
Start: 2025-02-23 | End: 2025-02-28 | Stop reason: HOSPADM

## 2025-02-23 RX ORDER — TALC
6 POWDER (GRAM) TOPICAL NIGHTLY PRN
Status: DISCONTINUED | OUTPATIENT
Start: 2025-02-23 | End: 2025-02-28 | Stop reason: HOSPADM

## 2025-02-23 RX ORDER — SODIUM CHLORIDE 0.9 % (FLUSH) 0.9 %
10 SYRINGE (ML) INJECTION EVERY 12 HOURS PRN
Status: DISCONTINUED | OUTPATIENT
Start: 2025-02-23 | End: 2025-02-28 | Stop reason: HOSPADM

## 2025-02-23 RX ORDER — BUMETANIDE 0.25 MG/ML
1 INJECTION, SOLUTION INTRAMUSCULAR; INTRAVENOUS
Status: COMPLETED | OUTPATIENT
Start: 2025-02-23 | End: 2025-02-23

## 2025-02-23 RX ORDER — CITALOPRAM 10 MG/1
10 TABLET ORAL NIGHTLY
Status: DISCONTINUED | OUTPATIENT
Start: 2025-02-23 | End: 2025-02-28 | Stop reason: HOSPADM

## 2025-02-23 RX ORDER — CARVEDILOL 12.5 MG/1
12.5 TABLET ORAL 2 TIMES DAILY WITH MEALS
Status: DISCONTINUED | OUTPATIENT
Start: 2025-02-23 | End: 2025-02-28 | Stop reason: HOSPADM

## 2025-02-23 RX ORDER — IBUPROFEN 200 MG
16 TABLET ORAL
Status: DISCONTINUED | OUTPATIENT
Start: 2025-02-23 | End: 2025-02-28 | Stop reason: HOSPADM

## 2025-02-23 RX ORDER — ATORVASTATIN CALCIUM 40 MG/1
40 TABLET, FILM COATED ORAL DAILY
Status: DISCONTINUED | OUTPATIENT
Start: 2025-02-24 | End: 2025-02-28 | Stop reason: HOSPADM

## 2025-02-23 RX ORDER — IPRATROPIUM BROMIDE AND ALBUTEROL SULFATE 2.5; .5 MG/3ML; MG/3ML
3 SOLUTION RESPIRATORY (INHALATION)
Status: COMPLETED | OUTPATIENT
Start: 2025-02-23 | End: 2025-02-23

## 2025-02-23 RX ORDER — BENZONATATE 100 MG/1
100 CAPSULE ORAL 3 TIMES DAILY PRN
Status: DISCONTINUED | OUTPATIENT
Start: 2025-02-23 | End: 2025-02-28 | Stop reason: HOSPADM

## 2025-02-23 RX ORDER — ONDANSETRON HYDROCHLORIDE 2 MG/ML
4 INJECTION, SOLUTION INTRAVENOUS EVERY 8 HOURS PRN
Status: DISCONTINUED | OUTPATIENT
Start: 2025-02-23 | End: 2025-02-28 | Stop reason: HOSPADM

## 2025-02-23 RX ORDER — ENOXAPARIN SODIUM 100 MG/ML
30 INJECTION SUBCUTANEOUS EVERY 24 HOURS
Status: DISCONTINUED | OUTPATIENT
Start: 2025-02-23 | End: 2025-02-28 | Stop reason: HOSPADM

## 2025-02-23 RX ORDER — ASPIRIN 81 MG/1
81 TABLET ORAL DAILY
Status: DISCONTINUED | OUTPATIENT
Start: 2025-02-24 | End: 2025-02-28 | Stop reason: HOSPADM

## 2025-02-23 RX ORDER — AMLODIPINE BESYLATE 5 MG/1
5 TABLET ORAL DAILY
Status: DISCONTINUED | OUTPATIENT
Start: 2025-02-24 | End: 2025-02-28 | Stop reason: HOSPADM

## 2025-02-23 RX ORDER — BUMETANIDE 0.25 MG/ML
2 INJECTION, SOLUTION INTRAMUSCULAR; INTRAVENOUS 2 TIMES DAILY
Status: DISCONTINUED | OUTPATIENT
Start: 2025-02-24 | End: 2025-02-25

## 2025-02-23 RX ORDER — AMLODIPINE BESYLATE 5 MG/1
5 TABLET ORAL
COMMUNITY
Start: 2025-01-28

## 2025-02-23 RX ORDER — ALUMINUM HYDROXIDE, MAGNESIUM HYDROXIDE, AND SIMETHICONE 1200; 120; 1200 MG/30ML; MG/30ML; MG/30ML
30 SUSPENSION ORAL 4 TIMES DAILY PRN
Status: DISCONTINUED | OUTPATIENT
Start: 2025-02-23 | End: 2025-02-28 | Stop reason: HOSPADM

## 2025-02-23 RX ORDER — GLUCAGON 1 MG
1 KIT INJECTION
Status: DISCONTINUED | OUTPATIENT
Start: 2025-02-23 | End: 2025-02-28 | Stop reason: HOSPADM

## 2025-02-23 RX ORDER — IBUPROFEN 200 MG
24 TABLET ORAL
Status: DISCONTINUED | OUTPATIENT
Start: 2025-02-23 | End: 2025-02-28 | Stop reason: HOSPADM

## 2025-02-23 RX ORDER — SODIUM BICARBONATE 650 MG/1
650 TABLET ORAL DAILY
Status: DISCONTINUED | OUTPATIENT
Start: 2025-02-24 | End: 2025-02-28 | Stop reason: HOSPADM

## 2025-02-23 RX ADMIN — POTASSIUM BICARBONATE 25 MEQ: 978 TABLET, EFFERVESCENT ORAL at 08:02

## 2025-02-23 RX ADMIN — CARVEDILOL 12.5 MG: 12.5 TABLET, FILM COATED ORAL at 06:02

## 2025-02-23 RX ADMIN — BUMETANIDE 1 MG: 0.25 INJECTION INTRAMUSCULAR; INTRAVENOUS at 03:02

## 2025-02-23 RX ADMIN — IPRATROPIUM BROMIDE AND ALBUTEROL SULFATE 3 ML: 2.5; .5 SOLUTION RESPIRATORY (INHALATION) at 06:02

## 2025-02-23 RX ADMIN — ENOXAPARIN SODIUM 30 MG: 30 INJECTION SUBCUTANEOUS at 06:02

## 2025-02-23 RX ADMIN — POTASSIUM BICARBONATE 25 MEQ: 978 TABLET, EFFERVESCENT ORAL at 10:02

## 2025-02-23 RX ADMIN — BUMETANIDE 1 MG: 0.25 INJECTION INTRAMUSCULAR; INTRAVENOUS at 06:02

## 2025-02-23 NOTE — PROGRESS NOTES
Pharmacist Renal Dose Adjustment Note    Glo Dumont is a 89 y.o. female being treated with the medication enoxaparin.     Patient Data:    Vital Signs (Most Recent):  Temp: 98.4 °F (36.9 °C) (02/23/25 1412)  Pulse: 70 (02/23/25 1745)  Resp: 18 (02/23/25 1745)  BP: (!) 121/56 (02/23/25 1745)  SpO2: 97 % (02/23/25 1745) Vital Signs (72h Range):  Temp:  [98.4 °F (36.9 °C)]   Pulse:  [63-70]   Resp:  [15-24]   BP: (114-132)/(56-67)   SpO2:  [94 %-97 %]      Recent Labs   Lab 02/23/25  1553   CREATININE 1.9*     Serum creatinine: 1.9 mg/dL (H) 02/23/25 1553  Estimated creatinine clearance: 17.9 mL/min (A)    Enoxaparin 40 mg subq every 24 hours will be changed to enoxaparin 30 mg subq every 24 hours per renal dose protocol for CrCl < 30 ml/min.     Thank you,  Pharmacist's Name: Saul Guaman

## 2025-02-23 NOTE — ED PROVIDER NOTES
SCRIBE #1 NOTE: IRemberto, am scribing for, and in the presence of, Lion Alejo Jr., MD. I have scribed the HPI/ROS/PEx.    SCRIBE #2 NOTE: Tamara PRATHER and Cherry Dejesus, am scribing for, and in the presence of,  Bam Blanca MD. I have scribed the remaining portions of the note not scribed by Scribe #1.      History     Chief Complaint   Patient presents with    General Illness     Per daughter pt c/o cough and congestion for 2-3 days. Daughter states pt seems slightly confused at times. Hx of CHF     Review of patient's allergies indicates:   Allergen Reactions    Lisinopril      angioedema    Codeine Nausea And Vomiting         History of Present Illness     HPI    2/23/2025, 2:28 PM  History obtained from the patient, medical records, and daughter      History of Present Illness: Glo Dumont is a 89 y.o. female patient with a PMHx of sarcoidosis of lung, obesity, HTN, hypercholesterolemia, CHF, CAD, diverticulitis, stage 3 CKD, carotid artery occlusion, angina pectoris, and anemia who presents to the Emergency Department for evaluation of general illness which began 2-3 days ago. Associated sxs include cough, wheezing, SOB, rhinorrhea, congestion, sore throat, eye swelling, and confusion. Symptoms are constant and moderate in severity. No mitigating or exacerbating factors reported.  Daughter denies pt having history of emphysema or COPD. No prior Tx specified.  No further complaints or concerns at this time.       Arrival mode: Personal Transportation    PCP: No primary care provider on file.        Past Medical History:  Past Medical History:   Diagnosis Date    Anemia     Angina pectoris     Anxiety     Anxiety and depression     Arthritis     hip    Carotid artery occlusion     Carpal tunnel syndrome 06/23/2008    emg    Chronic diarrhea     work up in 2011 with EGD, CS and VCE    CKD (chronic kidney disease) stage 3, GFR 30-59 ml/min 5/11/2017    Colitis     Coronary artery disease      Coronary artery disease     Diastolic dysfunction     Diverticulosis     Encephalopathy 10/14/2024    Glaucoma     Greater trochanteric bursitis 2/10/2015    Grief at loss of child 1/26/2016    H/O carotid endarterectomy 12/2/2013    Heart failure     History of coronary angioplasty 3/11/2014    Hypercholesteremia     Hypertension     Liver cyst 02/08/2013    ct abd    Low back pain 2/8/2024    Macular degeneration     Obesity with serious comorbidity 3/19/2023    Primary open-angle glaucoma(365.11) 9/3/2013    Renal cyst 02/08/2013    ct abd    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    Sarcoidosis     Sarcoidosis of lung     Sickle cell trait     Uveitis        Past Surgical History:  Past Surgical History:   Procedure Laterality Date    A-V CARDIAC PACEMAKER INSERTION Left 3/21/2023    Procedure: INSERTION, CARDIAC PACEMAKER, DUAL CHAMBER/His Lead;  Surgeon: Cortez Ambrose MD;  Location: [facility] CATH LAB;  Service: Cardiology;  Laterality: Left;  MDT/ MD to confirm in am/possible nurse sedate    CAROTID ENDARTERECTOMY Right 2000s    CATARACT EXTRACTION Bilateral     Dr. Liang Dennis    CHOLECYSTECTOMY      laparoscopic, 3/18.    CORONARY ANGIOPLASTY WITH STENT PLACEMENT  11/19/2010    RCA-HALIE 2010    Lathia    JOINT REPLACEMENT Left 11/03/2014    Dr. Braun    TOTAL ABDOMINAL HYSTERECTOMY W/ BILATERAL SALPINGOOPHORECTOMY  1972         Family History:  Family History   Problem Relation Name Age of Onset    Hypertension Mother      Heart failure Mother      Cancer Father          prostate    Cirrhosis Brother      Heart failure Brother      Cancer Daughter          Carcinoid       Social History:  Social History     Tobacco Use    Smoking status: Never    Smokeless tobacco: Never   Substance and Sexual Activity    Alcohol use: No     Alcohol/week: 0.0 standard drinks of alcohol    Drug use: No    Sexual activity: Yes     Partners: Male        Review of Systems     Review of Systems    Constitutional:  Negative for fever.   HENT:  Positive for congestion, facial swelling (to eyes), rhinorrhea and sore throat.    Respiratory:  Positive for cough, shortness of breath and wheezing.    Cardiovascular:  Negative for chest pain.   Gastrointestinal:  Negative for nausea.   Genitourinary:  Negative for dysuria.   Musculoskeletal:  Negative for back pain.   Skin:  Negative for rash.   Neurological:  Negative for weakness.   Hematological:  Does not bruise/bleed easily.   Psychiatric/Behavioral:  Positive for confusion.    All other systems reviewed and are negative.       Physical Exam     Initial Vitals [02/23/25 1412]   BP Pulse Resp Temp SpO2   (!) 114/58 63 15 98.4 °F (36.9 °C) (!) 94 %      MAP       --          Physical Exam  Nursing Notes and Vital Signs Reviewed.  Constitutional: Patient is in no acute distress. Well-developed and well-nourished.  Head: Atraumatic. Normocephalic.  Eyes:  EOM intact.  No scleral icterus.  ENT: Mucous membranes are moist.  Nares clear   Neck:  Full ROM. No JVD.  Cardiovascular: Regular rate. Regular rhythm No murmurs, rubs, or gallops. Distal pulses are 2+ and symmetric  Pulmonary/Chest: No respiratory distress. Clear to auscultation bilaterally. No wheezing or rales.  Equal chest wall rise bilaterally  Abdominal: Soft and non-distended.  There is no tenderness.  No rebound, guarding, or rigidity. Good bowel sounds.  Genitourinary: No CVA tenderness.  No suprapubic tenderness  Musculoskeletal: Moves all extremities. No obvious deformities.  5 x 5 strength in all extremities   Skin: Warm and dry.  Neurological:  Alert, awake, and appropriate.  Normal speech.  No acute focal neurological deficits are appreciated.  Two through 12 intact bilaterally.  Psychiatric: Normal affect. Good eye contact. Appropriate in content.      ED Course   Procedures  ED Vital Signs:  Vitals:    02/23/25 1800 02/23/25 1805 02/23/25 1815 02/23/25 1830   BP: (!) 123/57 (!) 123/57 128/63  126/61   Pulse: 68 68 68 67   Resp: 19 17 17 18   Temp:       TempSrc:       SpO2: 96% 96% 96% 96%   Weight:       Height:        02/23/25 1849 02/23/25 1850 02/23/25 1900 02/23/25 1930   BP:  (!) 120/56 122/62 (!) 118/58   Pulse: 67 68 69 69   Resp: (!) 21 (!) 23 (!) 21 (!) 21   Temp:       TempSrc:       SpO2: 98% 97% 97% 95%   Weight:       Height:        02/23/25 2032 02/23/25 2101 02/23/25 2102 02/23/25 2115   BP: (!) 122/55  (!) 120/57 123/62   Pulse: 67 66  68   Resp:    (!) 21   Temp:       TempSrc:       SpO2: 97% 96%  98%   Weight:       Height:        02/23/25 2130 02/23/25 2200 02/23/25 2215   BP: (!) 116/58 (!) 119/58 (!) 116/56   Pulse: 67 68 68   Resp: (!) 21 (!) 23 (!) 22   Temp:      TempSrc:      SpO2: 97% 98% 98%   Weight:      Height:          Abnormal Lab Results:  Labs Reviewed   CBC W/ AUTO DIFFERENTIAL - Abnormal       Result Value    WBC 5.31      RBC 2.99 (*)     Hemoglobin 8.8 (*)     Hematocrit 27.5 (*)     MCV 92      MCH 29.4      MCHC 32.0      RDW 15.5 (*)     Platelets 163      MPV 11.6      Immature Granulocytes 0.9 (*)     Gran # (ANC) 4.6      Immature Grans (Abs) 0.05 (*)     Lymph # 0.3 (*)     Mono # 0.3      Eos # 0.0      Baso # 0.01      nRBC 0      Gran % 86.8 (*)     Lymph % 5.5 (*)     Mono % 6.2      Eosinophil % 0.4      Basophil % 0.2      Platelet Estimate Appears normal      Differential Method Automated     B-TYPE NATRIURETIC PEPTIDE - Abnormal     (*)    URINALYSIS, REFLEX TO URINE CULTURE - Abnormal    Specimen UA Urine, Catheterized      Color, UA Yellow      Appearance, UA Clear      pH, UA 6.0      Specific Gravity, UA 1.015      Protein, UA Trace (*)     Glucose, UA Negative      Ketones, UA Negative      Bilirubin (UA) Negative      Occult Blood UA Negative      Nitrite, UA Negative      Urobilinogen, UA Negative      Leukocytes, UA Negative      Narrative:     Specimen Source->Urine   COMPREHENSIVE METABOLIC PANEL - Abnormal    Sodium 143       Potassium 3.4 (*)     Chloride 109      CO2 20 (*)     Glucose 110      BUN 26 (*)     Creatinine 1.9 (*)     Calcium 8.4 (*)     Total Protein 5.6 (*)     Albumin 3.0 (*)     Total Bilirubin 0.4      Alkaline Phosphatase 70      AST 14      ALT 9 (*)     eGFR 25 (*)     Anion Gap 14     TROPONIN I - Abnormal    Troponin I 0.121 (*)    TROPONIN I - Abnormal    Troponin I 0.102 (*)     Narrative:     STAT, if not done in ED, then at 2nd and 6th hour from  initial draw.   INFLUENZA A & B BY MOLECULAR    Influenza A, Molecular Negative      Influenza B, Molecular Negative      Flu A & B Source Nasal swab     RESPIRATORY INFECTION PANEL (PCR), NASOPHARYNGEAL    Respiratory Infection Panel Source NP Swab      Adenovirus Not Detected      Coronavirus 229E, Common Cold Virus Not Detected      Coronavirus HKU1, Common Cold Virus Not Detected      Coronavirus NL63, Common Cold Virus Not Detected      Coronavirus OC43, Common Cold Virus Not Detected      SARS-CoV2 (COVID-19) Qualitative PCR Not Detected      Human Metapneumovirus Not Detected      Human Rhinovirus/Enterovirus Not Detected      Influenza A Not Detected      Influenza B Not Detected      Parainfluenza Virus 1 Not Detected      Parainfluenza Virus 2 Not Detected      Parainfluenza Virus 3 Not Detected      Parainfluenza Virus 4 Not Detected      Respiratory Syncytial Virus Not Detected      Bordetella Parapertussis ([id number]) Not Detected      Bordetella pertussis (ptxP) Not Detected      Chlamydia pneumoniae Not Detected      Mycoplasma pneumoniae Not Detected      Narrative:     Assay not valid for lower respiratory specimens, alternate  testing required.   SARS-COV-2 RNA AMPLIFICATION, QUAL    SARS-CoV-2 RNA, Amplification, Qual Negative     TROPONIN I   POCT GLUCOSE    POCT Glucose 103     POCT GLUCOSE MONITORING CONTINUOUS        All Lab Results:  Results for orders placed or performed during the hospital encounter of 02/23/25   EKG 12-lead    Collection  Time: 02/23/25  2:26 PM   Result Value Ref Range    QRS Duration 80 ms    [facility] QTC Calculation 478 ms   Influenza A & B by Molecular    Collection Time: 02/23/25  2:34 PM    Specimen: Nasopharyngeal Swab   Result Value Ref Range    Influenza A, Molecular Negative Negative    Influenza B, Molecular Negative Negative    Flu A & B Source Nasal swab    COVID-19 Rapid Screening    Collection Time: 02/23/25  2:34 PM   Result Value Ref Range    SARS-CoV-2 RNA, Amplification, Qual Negative Negative   CBC auto differential    Collection Time: 02/23/25  2:46 PM   Result Value Ref Range    WBC 5.31 3.90 - 12.70 K/uL    RBC 2.99 (L) 4.00 - 5.40 M/uL    Hemoglobin 8.8 (L) 12.0 - 16.0 g/dL    Hematocrit 27.5 (L) 37.0 - 48.5 %    MCV 92 82 - 98 fL    MCH 29.4 27.0 - 31.0 pg    MCHC 32.0 32.0 - 36.0 g/dL    RDW 15.5 (H) 11.5 - 14.5 %    Platelets 163 150 - 450 K/uL    MPV 11.6 9.2 - 12.9 fL    Immature Granulocytes 0.9 (H) 0.0 - 0.5 %    Gran # (ANC) 4.6 1.8 - 7.7 K/uL    Immature Grans (Abs) 0.05 (H) 0.00 - 0.04 K/uL    Lymph # 0.3 (L) 1.0 - 4.8 K/uL    Mono # 0.3 0.3 - 1.0 K/uL    Eos # 0.0 0.0 - 0.5 K/uL    Baso # 0.01 0.00 - 0.20 K/uL    nRBC 0 0 /100 WBC    Gran % 86.8 (H) 38.0 - 73.0 %    Lymph % 5.5 (L) 18.0 - 48.0 %    Mono % 6.2 4.0 - 15.0 %    Eosinophil % 0.4 0.0 - 8.0 %    Basophil % 0.2 0.0 - 1.9 %    Platelet Estimate Appears normal     Differential Method Automated    Brain natriuretic peptide    Collection Time: 02/23/25  2:46 PM   Result Value Ref Range     (H) 0 - 99 pg/mL   Comprehensive metabolic panel    Collection Time: 02/23/25  3:53 PM   Result Value Ref Range    Sodium 143 136 - 145 mmol/L    Potassium 3.4 (L) 3.5 - 5.1 mmol/L    Chloride 109 95 - 110 mmol/L    CO2 20 (L) 23 - 29 mmol/L    Glucose 110 70 - 110 mg/dL    BUN 26 (H) 8 - 23 mg/dL    Creatinine 1.9 (H) 0.5 - 1.4 mg/dL    Calcium 8.4 (L) 8.7 - 10.5 mg/dL    Total Protein 5.6 (L) 6.0 - 8.4 g/dL    Albumin 3.0 (L) 3.5 - 5.2 g/dL    Total  Bilirubin 0.4 0.1 - 1.0 mg/dL    Alkaline Phosphatase 70 40 - 150 U/L    AST 14 10 - 40 U/L    ALT 9 (L) 10 - 44 U/L    eGFR 25 (A) >60 mL/min/1.73 m^2    Anion Gap 14 8 - 16 mmol/L   Troponin I    Collection Time: 02/23/25  3:53 PM   Result Value Ref Range    Troponin I 0.121 (H) 0.000 - 0.026 ng/mL   Urinalysis, Reflex to Urine Culture Urine, Catheterized    Collection Time: 02/23/25  5:43 PM    Specimen: Urine, Clean Catch   Result Value Ref Range    Specimen UA Urine, Catheterized     Color, UA Yellow Yellow, Straw, Terrie    Appearance, UA Clear Clear    pH, UA 6.0 5.0 - 8.0    Specific Gravity, UA 1.015 1.005 - 1.030    Protein, UA Trace (A) Negative    Glucose, UA Negative Negative    Ketones, UA Negative Negative    Bilirubin (UA) Negative Negative    Occult Blood UA Negative Negative    Nitrite, UA Negative Negative    Urobilinogen, UA Negative <2.0 EU/dL    Leukocytes, UA Negative Negative   POCT glucose    Collection Time: 02/23/25  6:07 PM   Result Value Ref Range    POCT Glucose 103 70 - 110 mg/dL   Respiratory Infection Panel (PCR), Nasopharyngeal    Collection Time: 02/23/25  6:37 PM    Specimen: Nasopharyngeal Swab   Result Value Ref Range    Respiratory Infection Panel Source NP Swab Not Detected    Adenovirus Not Detected Not Detected    Coronavirus 229E, Common Cold Virus Not Detected Not Detected    Coronavirus HKU1, Common Cold Virus Not Detected Not Detected    Coronavirus NL63, Common Cold Virus Not Detected Not Detected    Coronavirus OC43, Common Cold Virus Not Detected Not Detected    SARS-CoV2 (COVID-19) Qualitative PCR Not Detected Not Detected    Human Metapneumovirus Not Detected Not Detected    Human Rhinovirus/Enterovirus Not Detected Not Detected    Influenza A Not Detected Not Detected    Influenza B Not Detected Not Detected    Parainfluenza Virus 1 Not Detected Not Detected    Parainfluenza Virus 2 Not Detected Not Detected    Parainfluenza Virus 3 Not Detected Not Detected     Parainfluenza Virus 4 Not Detected Not Detected    Respiratory Syncytial Virus Not Detected Not Detected    Bordetella Parapertussis (QI4462) Not Detected Not Detected    Bordetella pertussis (ptxP) Not Detected Not Detected    Chlamydia pneumoniae Not Detected Not Detected    Mycoplasma pneumoniae Not Detected Not Detected   Troponin I    Collection Time: 02/23/25  8:13 PM   Result Value Ref Range    Troponin I 0.102 (H) 0.000 - 0.026 ng/mL     *Note: Due to a large number of results and/or encounters for the requested time period, some results have not been displayed. A complete set of results can be found in Results Review.       Imaging Results:  Imaging Results              X-Ray Chest AP Portable (Final result)  Result time 02/23/25 14:44:41      Final result by Ehsan Sparrow III, MD (02/23/25 14:44:41)                   Impression:     Small left pleural effusion.    Finalized on: 2/23/2025 2:44 PM By:  Ehsan Sparrow MD  [facility]# [id number]      2025-02-23 14:46:[id number]     [facility]               Narrative:      EXAM:  XR CHEST AP PORTABLE    CLINICAL HISTORY: cough;    TECHNIQUE: AP chest x-ray one view performed    FINDINGS: Stable sternotomy, mild cardiomegaly, aortic atherosclerosis and well-positioned pacemaker leads. Small left pleural effusion. The lungs otherwise clear.                                         The EKG was ordered, reviewed, and independently interpreted by the ED provider.  Interpretation time: 14:26  Rate: 63 BPM  Rhythm: normal sinus rhythm  Interpretation: No acute ST changes. No STEMI.         The Emergency Provider reviewed the vital signs and test results, which are outlined above.     ED Discussion     4:00 PM: Dr. Alejo transfers care of patient to Dr. Blanca pending lab results.     5:51 PM: Discussed case with Yesy Mccullough NP (Hospital Medicine). Dr. Vargas agrees with current care and management of pt and accepts admission.   Admitting Service: Hospital Medicine  Admitting  Physician: Dr. Vargas  Admit to: Obs/Tele    5:51 PM: Re-evaluated pt. I have discussed test results, shared treatment plan, and the need for admission with patient/family/caretaker at bedside. Pt and/or family/caretaker express understanding at this time and agree with all information. All questions answered. Pt/caretaker/family member(s) have no further questions or concerns at this time. Pt is ready for admit.             ED Course as of 02/24/25 0051   Sun Feb 23, 2025   1537 Cardiac monitor interpretation  Independent interpretation  Indication: Shortness breath  Normal sinus rhythm.  Rate 63.  No STEMI [RT]      ED Course User Index  [RT] Lion Alejo Jr., MD     Medical Decision Making  88 y/o F with PMH of f sarcoidosis of lung, obesity, HTN, hypercholesterolemia, CHF, CAD, diverticulitis, stage 3 CKD here with c/o swelling, and cough with wheezing. She has periorbital edema. BNP and troponin elevated. Bumex given. No UOP with bumex yet. She has a small pleural effusion on CXR    Amount and/or Complexity of Data Reviewed  Labs: ordered. Decision-making details documented in ED Course.  Radiology: ordered. Decision-making details documented in ED Course.  ECG/medicine tests: ordered and independent interpretation performed. Decision-making details documented in ED Course.    Risk  Prescription drug management.  Decision regarding hospitalization.  Risk Details: DDx includes Sarcoidosis, HF, ACS, COPD, URI, Viral syndrome, Volume overload, PNA                ED Medication(s):  Medications   benzonatate capsule 100 mg (has no administration in time range)   amLODIPine tablet 5 mg (has no administration in time range)   aspirin EC tablet 81 mg (has no administration in time range)   atorvastatin tablet 40 mg (has no administration in time range)   carvediloL tablet 12.5 mg (12.5 mg Oral Given 2/23/25 1805)   citalopram tablet 10 mg (10 mg Oral Not Given 2/23/25 2130)   dorzolamide-timolol 2-0.5% ophthalmic  solution 1 drop (has no administration in time range)   empagliflozin (Jardiance) tablet 10 mg (has no administration in time range)   lipase-protease-amylase 24,000-76,000-120,000 units capsule 1 capsule (has no administration in time range)   pantoprazole EC tablet 40 mg (has no administration in time range)   sodium bicarbonate tablet 650 mg (has no administration in time range)   sodium chloride 0.9% flush 10 mL (has no administration in time range)   melatonin tablet 6 mg (has no administration in time range)   ondansetron injection 4 mg (has no administration in time range)   bisacodyL suppository 10 mg (has no administration in time range)   aluminum-magnesium hydroxide-simethicone 200-200-20 mg/5 mL suspension 30 mL (has no administration in time range)   acetaminophen tablet 650 mg (has no administration in time range)   naloxone 0.4 mg/mL injection 0.02 mg (has no administration in time range)   glucose chewable tablet 16 g (has no administration in time range)   glucose chewable tablet 24 g (has no administration in time range)   dextrose 50% injection 12.5 g (has no administration in time range)   dextrose 50% injection 25 g (has no administration in time range)   glucagon (human recombinant) injection 1 mg (has no administration in time range)   enoxaparin injection 30 mg (30 mg Subcutaneous Given 2/23/25 1805)   insulin aspart U-100 pen 0-5 Units (has no administration in time range)   sodium chloride 0.9% flush 10 mL (has no administration in time range)   bumetanide injection 2 mg (has no administration in time range)   albuterol-ipratropium 2.5 mg-0.5 mg/3 mL nebulizer solution 3 mL (has no administration in time range)   bumetanide injection 1 mg (1 mg Intravenous Given 2/23/25 1522)   bumetanide injection 1 mg (1 mg Intravenous Given 2/23/25 1800)   potassium bicarbonate disintegrating tablet 25 mEq (25 mEq Oral Given 2/23/25 2229)   albuterol-ipratropium 2.5 mg-0.5 mg/3 mL nebulizer solution 3 mL  (3 mLs Nebulization Given 2/23/25 1849)       New Prescriptions    No medications on file               Scribe Attestation:   Scribe #1: I performed the above scribed service and the documentation accurately describes the services I performed. I attest to the accuracy of the note.     Attending:   Physician Attestation Statement for Scribe #1: I, Lion Alejo Jr., MD, personally performed the services described in this documentation, as scribed by Remberto Gallardo, in my presence, and it is both accurate and complete.       Scribe Attestation:   Scribe #2: I performed the above scribed service and the documentation accurately describes the services I performed. I attest to the accuracy of the note.    Attending Attestation:           Physician Attestation for Scribe:    Physician Attestation Statement for Scribe #2: I, Bam Blanca MD, reviewed documentation, as scribed by Tamara Solis and Cherry Dejesus in my presence, and it is both accurate and complete. I also acknowledge and confirm the content of the note done by Scribe #1.           Clinical Impression       ICD-10-CM ICD-9-CM   1. Heart failure, unspecified HF chronicity, unspecified heart failure type  I50.9 428.9   2. Cough  R05.9 786.2   3. Chest pain  R07.9 786.50   4. Edema, unspecified type  R60.9 782.3       Disposition:   Disposition: Placed in Observation  Condition: Bam Gutierrez MD  02/24/25 0051

## 2025-02-23 NOTE — HPI
89 y.o. female, comorbid conditions include sarcoidosis of lung, obesity, HTN, HLD, CHF, CAD, stage 3 CKD, CAD, CHF, and anemia. Presented to the ED for evaluation of general illness which began 2-3 days PTA. Associated sxs include cough, wheezing, SOB, rhinorrhea, congestion, sore throat, eye swelling, and confusion. Symptoms are constant and moderate in severity. Daughter denies pt having history of emphysema or COPD. In the ED, vitals: 114/58, 63, 15, 98.4F, 94% RA on arrival. Significant Labs: Hgb: 8.8, Cr: 1.9, BNP: 940, Trop: 0.121. CXR: Left Pleural Effusion. Treated with IV diuretic in the ED. Patient is a full code. Placed in Observation under the care of Hospital Medicine for management of Acute On Chronic CHF.

## 2025-02-24 ENCOUNTER — DOCUMENTATION ONLY (OUTPATIENT)
Dept: CARDIOLOGY | Facility: CLINIC | Age: 89
End: 2025-02-24
Payer: MEDICARE

## 2025-02-24 LAB
ALBUMIN SERPL BCP-MCNC: 3.3 G/DL (ref 3.5–5.2)
ALP SERPL-CCNC: 74 U/L (ref 40–150)
ALT SERPL W/O P-5'-P-CCNC: 10 U/L (ref 10–44)
ANION GAP SERPL CALC-SCNC: 15 MMOL/L (ref 8–16)
AORTIC ROOT ANNULUS: 2.93 CM
ASCENDING AORTA: 3.41 CM
AST SERPL-CCNC: 14 U/L (ref 10–40)
AV INDEX (PROSTH): 0.58
AV MEAN GRADIENT: 8 MMHG
AV PEAK GRADIENT: 16 MMHG
AV VALVE AREA BY VELOCITY RATIO: 1.4 CM²
AV VALVE AREA: 1.6 CM²
AV VELOCITY RATIO: 0.5
BASOPHILS # BLD AUTO: 0.02 K/UL (ref 0–0.2)
BASOPHILS NFR BLD: 0.4 % (ref 0–1.9)
BILIRUB SERPL-MCNC: 0.4 MG/DL (ref 0.1–1)
BSA FOR ECHO PROCEDURE: 1.59 M2
BUN SERPL-MCNC: 29 MG/DL (ref 8–23)
CALCIUM SERPL-MCNC: 8.5 MG/DL (ref 8.7–10.5)
CHLORIDE SERPL-SCNC: 107 MMOL/L (ref 95–110)
CO2 SERPL-SCNC: 21 MMOL/L (ref 23–29)
CREAT SERPL-MCNC: 2.3 MG/DL (ref 0.5–1.4)
CV ECHO LV RWT: 0.51 CM
DIFFERENTIAL METHOD BLD: ABNORMAL
DOP CALC AO PEAK VEL: 2 M/S
DOP CALC AO VTI: 42.9 CM
DOP CALC LVOT AREA: 2.8 CM2
DOP CALC LVOT DIAMETER: 1.9 CM
DOP CALC LVOT PEAK VEL: 1 M/S
DOP CALC LVOT STROKE VOLUME: 70.6 CM3
DOP CALC MV VTI: 54.9 CM
DOP CALC RVOT PEAK VEL: 0.45 M/S
DOP CALC RVOT VTI: 11.7 CM
DOP CALCLVOT PEAK VEL VTI: 24.9 CM
E WAVE DECELERATION TIME: 223 MSEC
E/A RATIO: 1.31
E/E' RATIO: 18 M/S
ECHO LV POSTERIOR WALL: 1 CM (ref 0.6–1.1)
EJECTION FRACTION: 60 %
EOSINOPHIL # BLD AUTO: 0 K/UL (ref 0–0.5)
EOSINOPHIL NFR BLD: 0 % (ref 0–8)
ERYTHROCYTE [DISTWIDTH] IN BLOOD BY AUTOMATED COUNT: 15.4 % (ref 11.5–14.5)
EST. GFR  (NO RACE VARIABLE): 20 ML/MIN/1.73 M^2
FRACTIONAL SHORTENING: 33.3 % (ref 28–44)
GLUCOSE SERPL-MCNC: 97 MG/DL (ref 70–110)
HCT VFR BLD AUTO: 30.2 % (ref 37–48.5)
HGB BLD-MCNC: 9.5 G/DL (ref 12–16)
IMM GRANULOCYTES # BLD AUTO: 0.05 K/UL (ref 0–0.04)
IMM GRANULOCYTES NFR BLD AUTO: 1 % (ref 0–0.5)
INTERVENTRICULAR SEPTUM: 1 CM (ref 0.6–1.1)
IVC DIAMETER: 2.28 CM
IVRT: 60 MSEC
LA MAJOR: 7.2 CM
LA MINOR: 7.4 CM
LA WIDTH: 4.1 CM
LEFT ATRIUM SIZE: 4 CM
LEFT ATRIUM VOLUME INDEX: 65 ML/M2
LEFT ATRIUM VOLUME: 102 CM3
LEFT INTERNAL DIMENSION IN SYSTOLE: 2.6 CM (ref 2.1–4)
LEFT VENTRICLE DIASTOLIC VOLUME INDEX: 41.94 ML/M2
LEFT VENTRICLE DIASTOLIC VOLUME: 65.84 ML
LEFT VENTRICLE MASS INDEX: 77.8 G/M2
LEFT VENTRICLE SYSTOLIC VOLUME INDEX: 15.4 ML/M2
LEFT VENTRICLE SYSTOLIC VOLUME: 24.23 ML
LEFT VENTRICULAR INTERNAL DIMENSION IN DIASTOLE: 3.9 CM (ref 3.5–6)
LEFT VENTRICULAR MASS: 122.1 G
LV LATERAL E/E' RATIO: 15.9 M/S
LV SEPTAL E/E' RATIO: 19.9 M/S
LVED V (TEICH): 65.84 ML
LVES V (TEICH): 24.23 ML
LVOT MG: 2.33 MMHG
LVOT MV: 0.74 CM/S
LYMPHOCYTES # BLD AUTO: 0.4 K/UL (ref 1–4.8)
LYMPHOCYTES NFR BLD: 7.5 % (ref 18–48)
MAGNESIUM SERPL-MCNC: 1.7 MG/DL (ref 1.6–2.6)
MCH RBC QN AUTO: 28.5 PG (ref 27–31)
MCHC RBC AUTO-ENTMCNC: 31.5 G/DL (ref 32–36)
MCV RBC AUTO: 91 FL (ref 82–98)
MONOCYTES # BLD AUTO: 0.4 K/UL (ref 0.3–1)
MONOCYTES NFR BLD: 7.7 % (ref 4–15)
MV MEAN GRADIENT: 5 MMHG
MV PEAK A VEL: 1.21 M/S
MV PEAK E VEL: 1.59 M/S
MV PEAK GRADIENT: 12 MMHG
MV VALVE AREA BY CONTINUITY EQUATION: 1.29 CM2
NEUTROPHILS # BLD AUTO: 4.3 K/UL (ref 1.8–7.7)
NEUTROPHILS NFR BLD: 83.4 % (ref 38–73)
NRBC BLD-RTO: 0 /100 WBC
OHS QRS DURATION: 80 MS
OHS QTC CALCULATION: 478 MS
PHOSPHATE SERPL-MCNC: 3.5 MG/DL (ref 2.7–4.5)
PISA MRMAX VEL: 5.71 M/S
PISA TR MAX VEL: 3.6 M/S
PLATELET # BLD AUTO: 197 K/UL (ref 150–450)
PMV BLD AUTO: 11 FL (ref 9.2–12.9)
POCT GLUCOSE: 111 MG/DL (ref 70–110)
POCT GLUCOSE: 93 MG/DL (ref 70–110)
POTASSIUM SERPL-SCNC: 4.1 MMOL/L (ref 3.5–5.1)
PROT SERPL-MCNC: 5.7 G/DL (ref 6–8.4)
PV MEAN GRADIENT: 0 MMHG
RA MAJOR: 5.89 CM
RA PRESSURE ESTIMATED: 3 MMHG
RA WIDTH: 3.6 CM
RBC # BLD AUTO: 3.33 M/UL (ref 4–5.4)
RV TB RVSP: 7 MMHG
SODIUM SERPL-SCNC: 143 MMOL/L (ref 136–145)
STJ: 3.45 CM
TDI LATERAL: 0.1 M/S
TDI SEPTAL: 0.08 M/S
TDI: 0.09 M/S
TR MAX PG: 52 MMHG
TRICUSPID ANNULAR PLANE SYSTOLIC EXCURSION: 1.5 CM
TROPONIN I SERPL DL<=0.01 NG/ML-MCNC: 0.1 NG/ML (ref 0–0.03)
TV REST PULMONARY ARTERY PRESSURE: 55 MMHG
WBC # BLD AUTO: 5.09 K/UL (ref 3.9–12.7)
Z-SCORE OF LEFT VENTRICULAR DIMENSION IN END DIASTOLE: -1.46
Z-SCORE OF LEFT VENTRICULAR DIMENSION IN END SYSTOLE: -0.58

## 2025-02-24 PROCEDURE — 36415 COLL VENOUS BLD VENIPUNCTURE: CPT | Mod: HCNC | Performed by: NURSE PRACTITIONER

## 2025-02-24 PROCEDURE — 84100 ASSAY OF PHOSPHORUS: CPT | Mod: HCNC | Performed by: NURSE PRACTITIONER

## 2025-02-24 PROCEDURE — 80053 COMPREHEN METABOLIC PANEL: CPT | Mod: HCNC | Performed by: NURSE PRACTITIONER

## 2025-02-24 PROCEDURE — 63600175 PHARM REV CODE 636 W HCPCS: Mod: JZ,TB,HCNC | Performed by: NURSE PRACTITIONER

## 2025-02-24 PROCEDURE — 84484 ASSAY OF TROPONIN QUANT: CPT | Mod: HCNC | Performed by: NURSE PRACTITIONER

## 2025-02-24 PROCEDURE — 85025 COMPLETE CBC W/AUTO DIFF WBC: CPT | Mod: HCNC | Performed by: NURSE PRACTITIONER

## 2025-02-24 PROCEDURE — 21400001 HC TELEMETRY ROOM: Mod: HCNC

## 2025-02-24 PROCEDURE — 25000003 PHARM REV CODE 250: Mod: HCNC | Performed by: NURSE PRACTITIONER

## 2025-02-24 PROCEDURE — 83735 ASSAY OF MAGNESIUM: CPT | Mod: HCNC | Performed by: NURSE PRACTITIONER

## 2025-02-24 PROCEDURE — 99222 1ST HOSP IP/OBS MODERATE 55: CPT | Mod: 25,HCNC,, | Performed by: INTERNAL MEDICINE

## 2025-02-24 RX ADMIN — SODIUM BICARBONATE 650 MG TABLET 650 MG: at 09:02

## 2025-02-24 RX ADMIN — CARVEDILOL 12.5 MG: 12.5 TABLET, FILM COATED ORAL at 04:02

## 2025-02-24 RX ADMIN — PANCRELIPASE 1 CAPSULE: 120000; 24000; 76000 CAPSULE, DELAYED RELEASE PELLETS ORAL at 09:02

## 2025-02-24 RX ADMIN — EMPAGLIFLOZIN 10 MG: 10 TABLET, FILM COATED ORAL at 09:02

## 2025-02-24 RX ADMIN — PANTOPRAZOLE SODIUM 40 MG: 40 TABLET, DELAYED RELEASE ORAL at 09:02

## 2025-02-24 RX ADMIN — BUMETANIDE 2 MG: 0.25 INJECTION INTRAMUSCULAR; INTRAVENOUS at 08:02

## 2025-02-24 RX ADMIN — DORZOLAMIDE HYDROCHLORIDE AND TIMOLOL MALEATE 1 DROP: 22.3; 6.8 SOLUTION/ DROPS OPHTHALMIC at 08:02

## 2025-02-24 RX ADMIN — PANCRELIPASE 1 CAPSULE: 120000; 24000; 76000 CAPSULE, DELAYED RELEASE PELLETS ORAL at 02:02

## 2025-02-24 RX ADMIN — AMLODIPINE BESYLATE 5 MG: 5 TABLET ORAL at 09:02

## 2025-02-24 RX ADMIN — ASPIRIN 81 MG: 81 TABLET, COATED ORAL at 09:02

## 2025-02-24 RX ADMIN — CARVEDILOL 12.5 MG: 12.5 TABLET, FILM COATED ORAL at 09:02

## 2025-02-24 RX ADMIN — BUMETANIDE 2 MG: 0.25 INJECTION INTRAMUSCULAR; INTRAVENOUS at 09:02

## 2025-02-24 RX ADMIN — CITALOPRAM HYDROBROMIDE 10 MG: 10 TABLET ORAL at 08:02

## 2025-02-24 RX ADMIN — DORZOLAMIDE HYDROCHLORIDE AND TIMOLOL MALEATE 1 DROP: 22.3; 6.8 SOLUTION/ DROPS OPHTHALMIC at 09:02

## 2025-02-24 RX ADMIN — PANCRELIPASE 1 CAPSULE: 120000; 24000; 76000 CAPSULE, DELAYED RELEASE PELLETS ORAL at 04:02

## 2025-02-24 RX ADMIN — ENOXAPARIN SODIUM 30 MG: 30 INJECTION SUBCUTANEOUS at 04:02

## 2025-02-24 RX ADMIN — ATORVASTATIN CALCIUM 40 MG: 40 TABLET, FILM COATED ORAL at 04:02

## 2025-02-24 NOTE — ASSESSMENT & PLAN NOTE
Patient has Diastolic (HFpEF) heart failure that is Acute on chronic. On presentation their CHF was decompensated. Evidence of decompensated CHF on presentation includes: edema. The etiology of their decompensation is likely dietary indiscretion and increased fluid intake. Most recent BNP and echo results are listed below.  Recent Labs     02/23/25  1446   *       Latest ECHO  Results for orders placed during the hospital encounter of 10/13/24    Echo    Interpretation Summary    Left Ventricle: The left ventricle is normal in size. Normal wall thickness. There is concentric remodeling. There is normal systolic function with a visually estimated ejection fraction of 60 - 65%. Ejection fraction is approximately 60%.    Right Ventricle: Normal right ventricular cavity size. Wall thickness is normal. Systolic function is normal.    Mitral Valve: There is moderate mitral annular calcification present. There is mild regurgitation.    Pulmonary Artery: The estimated pulmonary artery systolic pressure is 32 mmHg.    IVC/SVC: Normal venous pressure at 3 mmHg.    Current Heart Failure Medications  carvediloL tablet 12.5 mg, 2 times daily with meals, Oral  bumetanide injection 2 mg, 2 times daily, Intravenous    Plan  - Monitor strict I&Os and daily weights.    - Place on telemetry  - Low sodium diet  - Place on fluid restriction of 1.5 L.   - Cardiology has been consulted  - Continue IV Bumex   - consult cardiology

## 2025-02-24 NOTE — HOSPITAL COURSE
Continued on IV Bumex for diuresis. Cardiology consulted 02/24 given uptrending Cr, diuretics held 02/25. CR stable, restarted on PO diuretics at decreased dose on 02/27 per cardiology recommendations.   PT/OT rec low intensity  Pt with some intermittent confusion, suspect secondary to delirium as symptoms improved with initiation of seroquel qhs     Pt seen and examined on day of discharge, Ox 3, has no complaints. Denies cp, SOB. LUE edema slightly improved, suspect positional in nature as pt favors laying on L side. LUE duplex neg for DvT. Pt discharged home with HH and family in stable condition per my exam. Will continue PO bumex 1 mg daily per cardiology recommendations. Follow up with PCP within 3-5 days, CHF clinic within 1 week and cardiology [facility]. [facility] NP at home referral placed.

## 2025-02-24 NOTE — PROGRESS NOTES
[facility] ([facility]  Progress Note    Patient Name: Glo Dumont  MRN: [id number]  Patient Class: OP- Observation   Admission Date: 2/23/2025  Length of Stay: 0 days  Attending Physician: Jailyn Duran MD  Primary Care Provider: No primary care provider on file.        Subjective     Principal Problem:Acute on chronic heart failure with preserved ejection fraction        HPI:  89 y.o. female, comorbid conditions include sarcoidosis of lung, obesity, HTN, HLD, CHF, CAD, stage 3 CKD, CAD, CHF, and anemia. Presented to the ED for evaluation of general illness which began 2-3 days PTA. Associated sxs include cough, wheezing, SOB, rhinorrhea, congestion, sore throat, eye swelling, and confusion. Symptoms are constant and moderate in severity. Daughter denies pt having history of emphysema or COPD. In the ED, vitals: 114/58, 63, 15, 98.4F, 94% RA on arrival. Significant Labs: Hgb: 8.8, Cr: 1.9, BNP: 940, Trop: 0.121. CXR: Left Pleural Effusion. Treated with IV diuretic in the ED. Patient is a full code. Placed in Observation under the care of Hospital Medicine for management of Acute On Chronic CHF.     Overview/Hospital Course:  Continued on IV Bumex for diuresis. Cardiology consulted 02/24 given uptrending Cr     Interval History: NAEON. Pt seen and examined with nurse at bedside. Reports shortness of breath is improved since yesterday, still has slight cough. Denies CP, dizziness, leg swelling.     Review of Systems  Objective:     Vital Signs (Most Recent):  Temp: 97.9 °F (36.6 °C) (02/24/25 0804)  Pulse: 68 (02/24/25 0804)  Resp: 19 (02/24/25 0804)  BP: (!) 146/62 (02/24/25 0804)  SpO2: 97 % (02/24/25 0804) Vital Signs (24h Range):  Temp:  [97.9 °F (36.6 °C)-98.6 °F (37 °C)] 97.9 °F (36.6 °C)  Pulse:  [63-71] 68  Resp:  [15-24] 19  SpO2:  [94 %-99 %] 97 %  BP: (112-146)/(55-99) 146/62     Weight: 59 kg (130 lb)  Body mass index is 24.56 kg/m².  No intake or output data in the 24 hours  ending 02/24/25 1129      Physical Exam  Vitals and nursing note reviewed.   Constitutional:       General: She is not in acute distress.     Appearance: Ill appearance: chronic.   Cardiovascular:      Rate and Rhythm: Normal rate and regular rhythm.      Heart sounds: No murmur heard.  Pulmonary:      Comments: Scattered wheezes, faint rales to RLB, on room air   Abdominal:      General: Bowel sounds are normal. There is no distension.      Palpations: Abdomen is soft.      Tenderness: There is no abdominal tenderness.   Musculoskeletal:      Right lower leg: No edema.      Left lower leg: No edema.   Neurological:      Mental Status: She is alert and oriented to person, place, and time.             Significant Labs: All pertinent labs within the past 24 hours have been reviewed.    Significant Imaging: I have reviewed all pertinent imaging results/findings within the past 24 hours.    Assessment and Plan     * Acute on chronic heart failure with preserved ejection fraction  Patient has Diastolic (HFpEF) heart failure that is Acute on chronic. On presentation their CHF was decompensated. Evidence of decompensated CHF on presentation includes: edema. The etiology of their decompensation is likely dietary indiscretion and increased fluid intake. Most recent BNP and echo results are listed below.  Recent Labs     02/23/25  1446   *       Latest ECHO  Results for orders placed during the hospital encounter of 10/13/24    Echo    Interpretation Summary    Left Ventricle: The left ventricle is normal in size. Normal wall thickness. There is concentric remodeling. There is normal systolic function with a visually estimated ejection fraction of 60 - 65%. Ejection fraction is approximately 60%.    Right Ventricle: Normal right ventricular cavity size. Wall thickness is normal. Systolic function is normal.    Mitral Valve: There is moderate mitral annular calcification present. There is mild regurgitation.     Pulmonary Artery: The estimated pulmonary artery systolic pressure is 32 mmHg.    IVC/SVC: Normal venous pressure at 3 mmHg.    Current Heart Failure Medications  carvediloL tablet 12.5 mg, 2 times daily with meals, Oral  bumetanide injection 2 mg, 2 times daily, Intravenous    Plan  - Monitor strict I&Os and daily weights.    - Place on telemetry  - Low sodium diet  - Place on fluid restriction of 1.5 L.   - Cardiology has been consulted  - Continue IV Bumex   - consult cardiology         Elevated troponin  Likely secondary to demand ischemia related to CHF exacerbation  Trop plateau at 0.103  Continue aspirin, statin, B blocker   No CP at this time   Cardiology consult   Tele monitoring       Hypokalemia  Patient's most recent potassium results are listed below.   Recent Labs     02/23/25  1553 02/24/25  0502   K 3.4* 4.1       Plan  - Replete potassium per protocol  - Monitor potassium Daily  - Patient's hypokalemia is improving      CKD (chronic kidney disease) stage 4, GFR 15-29 ml/min  Creatine stable for now. BMP reviewed- noted Estimated Creatinine Clearance: 14.2 mL/min (A) (based on SCr of 2.3 mg/dL (H)). according to latest data. Based on current GFR, CKD stage is stage 4 - GFR 15-29.  Monitor UOP and serial BMP and adjust therapy as needed. Renally dose meds. Avoid nephrotoxic medications and procedures.  Cr baseline 1.2-1.4, most recently 1.8  Cr uptrending   Hold Jardiance   Monitor BMP with diuresis   Strict IS and Os     S/P CABG x 3  Followed by cardiology      S/P placement of cardiac pacemaker  Followed by cardiology      Metabolic acidosis  - cont home sodium bicarb       Essential hypertension  Patient's blood pressure range in the last 24 hours was: BP  Min: 114/58  Max: 132/66.The patient's inpatient anti-hypertensive regimen is listed below:  Current Antihypertensives  , , Oral  amLODIPine tablet 5 mg, Daily, Oral  carvediloL tablet 12.5 mg, 2 times daily with meals, Oral  dorzolamide-timolol  2-0.5% ophthalmic solution 1 drop, 2 times daily, Both Eyes  bumetanide injection 2 mg, 2 times daily, Intravenous    Plan  - BP is controlled, no changes needed to their regimen      Iron deficiency anemia  Anemia is likely due to Iron deficiency. Most recent hemoglobin and hematocrit are listed below.  Recent Labs     02/23/25  1446 02/24/25  0502   HGB 8.8* 9.5*   HCT 27.5* 30.2*       Plan  - Monitor serial CBC: Daily  - Transfuse PRBC if patient becomes hemodynamically unstable, symptomatic or H/H drops below 7/21.  - Patient has not received any PRBC transfusions to date  - Patient's anemia is currently worsening. Will continue current treatment    Other hyperlipidemia    --cont statin    Sarcoidosis of lung  Followed by Pulmonology as OP    --Duoneb PRN        VTE Risk Mitigation (From admission, onward)           Ordered     enoxaparin injection 30 mg  Daily         02/23/25 1755     IP VTE HIGH RISK PATIENT  Once         02/23/25 1755     Place sequential compression device  Until discontinued         02/23/25 1755                    Discharge Planning   CANDACE:      Code Status: Full Code   Medical Readiness for Discharge Date:                            Jailyn Duran MD  Department of Hospital Medicine   [facility] Tele[facility] ([facility])

## 2025-02-24 NOTE — ASSESSMENT & PLAN NOTE
Patient has Diastolic (HFpEF) heart failure that is Acute on chronic. On presentation their CHF was decompensated. Evidence of decompensated CHF on presentation includes: dyspnea on exertion (SOOD). The etiology of their decompensation is likely unknown . Most recent BNP and echo results are listed below.  Recent Labs     02/23/25  1446   *     Latest ECHO  Results for orders placed during the hospital encounter of 02/23/25    Echo Saline Bubble? No; Ultrasound enhancing contrast? No    Interpretation Summary    Left Ventricle: The left ventricle is normal in size. Normal wall thickness. There is concentric remodeling. There is normal systolic function. Ejection fraction is approximately 60%. Grade II diastolic dysfunction.    Right Ventricle: Normal right ventricular cavity size. Wall thickness is normal. Systolic function is normal.    Left Atrium: Left atrium is severely dilated.    Aortic Valve: There is mild aortic valve sclerosis. There is mild stenosis. Aortic valve area by VTI is 1.6 cm². Aortic valve peak velocity is 2.0 m/s. Mean gradient is 8 mmHg. The dimensionless index is 0.58.    Tricuspid Valve: There is moderate to severe regurgitation with a centrally directed jet.    Pulmonary Artery: The estimated pulmonary artery systolic pressure is 55 mmHg.    IVC/SVC: Normal venous pressure at 3 mmHg.    Current Heart Failure Medications  carvediloL tablet 12.5 mg, 2 times daily with meals, Oral  bumetanide injection 2 mg, 2 times daily, Intravenous    Plan  - Monitor strict I&Os and daily weights.    - Place on telemetry  - Low sodium diet  - Place on fluid restriction of 2 L.   - Cardiology has been consulted  - The patient's volume status is improving but not at their baseline as indicated by shortness of breath  -       89 y.o. female, with PMhx for  sarcoidosis of lung, obesity, HTN, HLD, CHF diastolic , CAD, stage 3 CKD, and anemia admitted for acute on chronic CHF.  BNP 90s. CXR-small left  pleural effusion. BP ok. EKG is normal. There is a mild increase in troponin. H/H mildly decreased.    Recommend IV diuresis, monitor h/h, BP control. Troponin is from demand ischemia

## 2025-02-24 NOTE — PLAN OF CARE
AAOx4. SPO2 97% on RA. Still remains SOB at rest with abdominal muscle use while breathing. HR NSR and BP normotensive throughout the shift. External catheter in place with no measured output during this shift. POC reviewed with patient. Bed in lowest position, side rails up x 3, wheels locked, call light within reach and alarms on and audible.

## 2025-02-24 NOTE — ASSESSMENT & PLAN NOTE
Patient's blood pressure range in the last 24 hours was: BP  Min: 114/58  Max: 132/66.The patient's inpatient anti-hypertensive regimen is listed below:  Current Antihypertensives  , , Oral  amLODIPine tablet 5 mg, Daily, Oral  carvediloL tablet 12.5 mg, 2 times daily with meals, Oral  dorzolamide-timolol 2-0.5% ophthalmic solution 1 drop, 2 times daily, Both Eyes  bumetanide injection 2 mg, 2 times daily, Intravenous    Plan  - BP is controlled, no changes needed to their regimen

## 2025-02-24 NOTE — SUBJECTIVE & OBJECTIVE
Past Medical History:   Diagnosis Date    Anemia     Angina pectoris     Anxiety     Anxiety and depression     Arthritis     hip    Carotid artery occlusion     Carpal tunnel syndrome 06/23/2008    emg    Chronic diarrhea     work up in 2011 with EGD, CS and VCE    CKD (chronic kidney disease) stage 3, GFR 30-59 ml/min 5/11/2017    Colitis     Coronary artery disease     Coronary artery disease     Diastolic dysfunction     Diverticulosis     Encephalopathy 10/14/2024    Glaucoma     Greater trochanteric bursitis 2/10/2015    Grief at loss of child 1/26/2016    H/O carotid endarterectomy 12/2/2013    Heart failure     History of coronary angioplasty 3/11/2014    Hypercholesteremia     Hypertension     Liver cyst 02/08/2013    ct abd    Low back pain 2/8/2024    Macular degeneration     Obesity with serious comorbidity 3/19/2023    Primary open-angle glaucoma(365.11) 9/3/2013    Renal cyst 02/08/2013    ct abd    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    Sarcoidosis     Sarcoidosis of lung     Sickle cell trait     Uveitis        Past Surgical History:   Procedure Laterality Date    A-V CARDIAC PACEMAKER INSERTION Left 3/21/2023    Procedure: INSERTION, CARDIAC PACEMAKER, DUAL CHAMBER/His Lead;  Surgeon: Cortez Ambrose MD;  Location: [facility], [place] CATH LAB;  Service: Cardiology;  Laterality: Left;  MDT/ MD to confirm in am/possible nurse sedate    CAROTID ENDARTERECTOMY Right 2000s    CATARACT EXTRACTION Bilateral     Dr. Liang Dennis    CHOLECYSTECTOMY      laparoscopic, 3/18.    CORONARY ANGIOPLASTY WITH STENT PLACEMENT  11/19/2010    RCA-HALIE 2010    Lathia    JOINT REPLACEMENT Left 11/03/2014    Dr. Braun    TOTAL ABDOMINAL HYSTERECTOMY W/ BILATERAL SALPINGOOPHORECTOMY  1972       Review of patient's allergies indicates:   Allergen Reactions    Lisinopril      angioedema    Codeine Nausea And Vomiting       No current facility-administered medications on file prior to encounter.     Current  Outpatient Medications on File Prior to Encounter   Medication Sig    amLODIPine (NORVASC) 5 MG tablet Take 5 mg by mouth.    aspirin (ECOTRIN) 81 MG EC tablet Take 1 tablet (81 mg total) by mouth once daily.    atorvastatin (LIPITOR) 40 MG tablet Take 1 tablet (40 mg total) by mouth Daily.    budesonide (ENTOCORT EC) 3 mg capsule Take 1-2 capsules (3-6 mg total) by mouth daily as needed (flare). As needed    bumetanide (BUMEX) 1 MG tablet Take 1 tablet (1 mg total) by mouth once daily.    busPIRone (BUSPAR) 5 MG Tab Take 1 tablet (5 mg total) by mouth 2 (two) times daily as needed (anxiety).    carvediloL (COREG) 12.5 MG tablet Take 1 tablet (12.5 mg total) by mouth 2 (two) times daily with meals.    citalopram (CELEXA) 10 MG tablet Take 1 tablet (10 mg total) by mouth every evening.    cyproheptadine (PERIACTIN) 4 mg tablet Take 1 tablet (4 mg total) by mouth 3 (three) times daily as needed (appetite).    dorzolamide-timolol 2-0.5% (COSOPT) 22.3-6.8 mg/mL ophthalmic solution Place 1 drop into both eyes 2 (two) times daily.    empagliflozin (JARDIANCE) 10 mg tablet Take 1 tablet (10 mg total) by mouth once daily.    lipase-protease-amylase 24,000-76,000-120,000 units (CREON) 24,000-76,000 -120,000 unit capsule Take 1 capsule by mouth 3 (three) times daily with meals.    pantoprazole (PROTONIX) 40 MG tablet Take 1 tablet (40 mg total) by mouth once daily.    potassium chloride (KLOR-CON) 10 MEQ TbSR Take 1 tablet (10 mEq total) by mouth once daily.    silver sulfADIAZINE 1% (SILVADENE) 1 % cream Apply topically 2 (two) times daily.    sodium bicarbonate 650 MG tablet Take 1 tablet (650 mg total) by mouth once daily.    aflibercept 2 mg/0.05 mL Soln 2 mg by Intravitreal route every 28 days.    dexAMETHasone (OZURDEX) 0.7 mg Impl intravitreal implant 0.7 mg by Intravitreal route once.    FOLIC ACID/MULTIVIT-MIN/LUTEIN (CENTRUM SILVER ORAL) Take 1 tablet by mouth once daily.    HYDROcodone-acetaminophen (NORCO) 5-325  mg per tablet 1/2 tablet every 6 to 8 hours as needed for pain    loperamide (IMODIUM) 2 mg capsule Take 1 mg by mouth every 6 (six) hours as needed for Diarrhea.    nitroGLYCERIN (NITROSTAT) 0.4 MG SL tablet PLACE ONE TABLET UNDERNEATH THE TONGUE EVERY 5 MINUTES AS NEEDED FOR CHEST PAIN    ondansetron (ZOFRAN) 4 MG tablet Take 1 tablet (4 mg total) by mouth every 8 (eight) hours as needed for Nausea.    ondansetron (ZOFRAN-ODT) 8 MG TbDL Take 8 mg by mouth every 6 (six) hours.     Family History       Problem Relation (Age of Onset)    Cancer Father, Daughter    Cirrhosis Brother    Heart failure Mother, Brother    Hypertension Mother          Tobacco Use    Smoking status: Never    Smokeless tobacco: Never   Substance and Sexual Activity    Alcohol use: No     Alcohol/week: 0.0 standard drinks of alcohol    Drug use: No    Sexual activity: Yes     Partners: Male     Review of Systems   Constitutional: Negative. Negative for weight gain.   HENT: Negative.     Eyes: Negative.    Cardiovascular: Negative.  Negative for chest pain, leg swelling and palpitations.   Respiratory: Negative.  Negative for shortness of breath.    Endocrine: Negative.    Hematologic/Lymphatic: Negative.    Skin: Negative.    Musculoskeletal:  Negative for muscle weakness.   Gastrointestinal: Negative.    Genitourinary: Negative.    Neurological: Negative.  Negative for dizziness.   Psychiatric/Behavioral: Negative.     Allergic/Immunologic: Negative.    All other systems reviewed and are negative.    Objective:     Vital Signs (Most Recent):  Temp: 97.6 °F (36.4 °C) (02/24/25 1237)  Pulse: 66 (02/24/25 1237)  Resp: 19 (02/24/25 1237)  BP: 132/63 (02/24/25 1237)  SpO2: 98 % (02/24/25 1237) Vital Signs (24h Range):  Temp:  [97.6 °F (36.4 °C)-98.6 °F (37 °C)] 97.6 °F (36.4 °C)  Pulse:  [63-71] 66  Resp:  [15-24] 19  SpO2:  [94 %-99 %] 98 %  BP: (112-146)/(55-99) 132/63     Weight: 59 kg (130 lb)  Body mass index is 24.56 kg/m².    SpO2: 98 %        No intake or output data in the 24 hours ending 02/24/25 1338    Lines/Drains/Airways       Drain  Duration             Female External Urinary Catheter w/ Suction 02/23/25 1537 <1 day              Peripheral Intravenous Line  Duration                  Peripheral IV - Single Lumen 02/23/25 1456 20 G Anterior;Distal;Left Upper Arm <1 day                     Physical Exam  Vitals and nursing note reviewed.   Constitutional:       Appearance: She is well-developed.   HENT:      Head: Normocephalic and atraumatic.   Eyes:      Conjunctiva/sclera: Conjunctivae normal.      Pupils: Pupils are equal, round, and reactive to light.   Cardiovascular:      Rate and Rhythm: Normal rate and regular rhythm.      Pulses: Intact distal pulses.      Heart sounds: Normal heart sounds.   Pulmonary:      Effort: Pulmonary effort is normal.      Breath sounds: Normal breath sounds.   Abdominal:      General: Bowel sounds are normal.      Palpations: Abdomen is soft.   Musculoskeletal:         General: Normal range of motion.      Cervical back: Normal range of motion and neck supple.   Skin:     General: Skin is warm and dry.   Neurological:      Mental Status: She is alert and oriented to person, place, and time.          Significant Labs: All pertinent lab results from the last 24 hours have been reviewed.    Significant Imaging: X-Ray: CXR: X-Ray Chest 1 View (CXR): No results found for this visit on 02/23/25.

## 2025-02-24 NOTE — SUBJECTIVE & OBJECTIVE
Past Medical History:   Diagnosis Date    Anemia     Angina pectoris     Anxiety     Anxiety and depression     Arthritis     hip    Carotid artery occlusion     Carpal tunnel syndrome 06/23/2008    emg    Chronic diarrhea     work up in 2011 with EGD, CS and VCE    CKD (chronic kidney disease) stage 3, GFR 30-59 ml/min 5/11/2017    Colitis     Coronary artery disease     Coronary artery disease     Diastolic dysfunction     Diverticulosis     Encephalopathy 10/14/2024    Glaucoma     Greater trochanteric bursitis 2/10/2015    Grief at loss of child 1/26/2016    H/O carotid endarterectomy 12/2/2013    Heart failure     History of coronary angioplasty 3/11/2014    Hypercholesteremia     Hypertension     Liver cyst 02/08/2013    ct abd    Low back pain 2/8/2024    Macular degeneration     Obesity with serious comorbidity 3/19/2023    Primary open-angle glaucoma(365.11) 9/3/2013    Renal cyst 02/08/2013    ct abd    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    Sarcoidosis     Sarcoidosis of lung     Sickle cell trait     Uveitis        Past Surgical History:   Procedure Laterality Date    A-V CARDIAC PACEMAKER INSERTION Left 3/21/2023    Procedure: INSERTION, CARDIAC PACEMAKER, DUAL CHAMBER/His Lead;  Surgeon: Cortez Ambrose MD;  Location: [facility] CATH LAB;  Service: Cardiology;  Laterality: Left;  MDT/ MD to confirm in am/possible nurse sedate    CAROTID ENDARTERECTOMY Right 2000s    CATARACT EXTRACTION Bilateral     Dr. Liang Dennis    CHOLECYSTECTOMY      laparoscopic, 3/18.    CORONARY ANGIOPLASTY WITH STENT PLACEMENT  11/19/2010    RCA-HALIE 2010    Lathia    JOINT REPLACEMENT Left 11/03/2014    Dr. Braun    TOTAL ABDOMINAL HYSTERECTOMY W/ BILATERAL SALPINGOOPHORECTOMY  1972       Review of patient's allergies indicates:   Allergen Reactions    Lisinopril      angioedema    Codeine Nausea And Vomiting       No current facility-administered medications on file prior to encounter.     Current  Outpatient Medications on File Prior to Encounter   Medication Sig    amLODIPine (NORVASC) 5 MG tablet Take 5 mg by mouth.    aspirin (ECOTRIN) 81 MG EC tablet Take 1 tablet (81 mg total) by mouth once daily.    atorvastatin (LIPITOR) 40 MG tablet Take 1 tablet (40 mg total) by mouth Daily.    budesonide (ENTOCORT EC) 3 mg capsule Take 1-2 capsules (3-6 mg total) by mouth daily as needed (flare). As needed    bumetanide (BUMEX) 1 MG tablet Take 1 tablet (1 mg total) by mouth once daily.    busPIRone (BUSPAR) 5 MG Tab Take 1 tablet (5 mg total) by mouth 2 (two) times daily as needed (anxiety).    carvediloL (COREG) 12.5 MG tablet Take 1 tablet (12.5 mg total) by mouth 2 (two) times daily with meals.    citalopram (CELEXA) 10 MG tablet Take 1 tablet (10 mg total) by mouth every evening.    cyproheptadine (PERIACTIN) 4 mg tablet Take 1 tablet (4 mg total) by mouth 3 (three) times daily as needed (appetite).    dorzolamide-timolol 2-0.5% (COSOPT) 22.3-6.8 mg/mL ophthalmic solution Place 1 drop into both eyes 2 (two) times daily.    empagliflozin (JARDIANCE) 10 mg tablet Take 1 tablet (10 mg total) by mouth once daily.    lipase-protease-amylase 24,000-76,000-120,000 units (CREON) 24,000-76,000 -120,000 unit capsule Take 1 capsule by mouth 3 (three) times daily with meals.    pantoprazole (PROTONIX) 40 MG tablet Take 1 tablet (40 mg total) by mouth once daily.    potassium chloride (KLOR-CON) 10 MEQ TbSR Take 1 tablet (10 mEq total) by mouth once daily.    silver sulfADIAZINE 1% (SILVADENE) 1 % cream Apply topically 2 (two) times daily.    sodium bicarbonate 650 MG tablet Take 1 tablet (650 mg total) by mouth once daily.    aflibercept 2 mg/0.05 mL Soln 2 mg by Intravitreal route every 28 days.    dexAMETHasone (OZURDEX) 0.7 mg Impl intravitreal implant 0.7 mg by Intravitreal route once.    FOLIC ACID/MULTIVIT-MIN/LUTEIN (CENTRUM SILVER ORAL) Take 1 tablet by mouth once daily.    HYDROcodone-acetaminophen (NORCO) 5-325  mg per tablet 1/2 tablet every 6 to 8 hours as needed for pain    loperamide (IMODIUM) 2 mg capsule Take 1 mg by mouth every 6 (six) hours as needed for Diarrhea.    nitroGLYCERIN (NITROSTAT) 0.4 MG SL tablet PLACE ONE TABLET UNDERNEATH THE TONGUE EVERY 5 MINUTES AS NEEDED FOR CHEST PAIN    ondansetron (ZOFRAN) 4 MG tablet Take 1 tablet (4 mg total) by mouth every 8 (eight) hours as needed for Nausea.    ondansetron (ZOFRAN-ODT) 8 MG TbDL Take 8 mg by mouth every 6 (six) hours.     Family History       Problem Relation (Age of Onset)    Cancer Father, Daughter    Cirrhosis Brother    Heart failure Mother, Brother    Hypertension Mother          Tobacco Use    Smoking status: Never    Smokeless tobacco: Never   Substance and Sexual Activity    Alcohol use: No     Alcohol/week: 0.0 standard drinks of alcohol    Drug use: No    Sexual activity: Yes     Partners: Male     Review of Systems   Constitutional:  Positive for fatigue.   HENT:  Positive for congestion, facial swelling and postnasal drip.    Respiratory:  Positive for chest tightness, shortness of breath and wheezing.    Cardiovascular:  Positive for chest pain.   All other systems reviewed and are negative.    Objective:     Vital Signs (Most Recent):  Temp: 98.4 °F (36.9 °C) (02/23/25 1412)  Pulse: 68 (02/23/25 1805)  Resp: 18 (02/23/25 1745)  BP: (!) 123/57 (02/23/25 1805)  SpO2: 97 % (02/23/25 1745) Vital Signs (24h Range):  Temp:  [98.4 °F (36.9 °C)] 98.4 °F (36.9 °C)  Pulse:  [63-70] 68  Resp:  [15-24] 18  SpO2:  [94 %-97 %] 97 %  BP: (114-132)/(56-67) 123/57     Weight: 64.1 kg (141 lb 6.4 oz)  Body mass index is 26.72 kg/m².     Physical Exam  Vitals and nursing note reviewed.   Constitutional:       General: She is not in acute distress.     Appearance: She is well-developed.   HENT:      Head: Normocephalic and atraumatic.      Right Ear: Hearing and external ear normal.      Left Ear: Hearing and external ear normal.      Nose: No rhinorrhea.       Right Sinus: No maxillary sinus tenderness or frontal sinus tenderness.      Left Sinus: No maxillary sinus tenderness or frontal sinus tenderness.      Mouth/Throat:      Mouth: No oral lesions.      Pharynx: Uvula midline.   Eyes:      General:         Right eye: No discharge.         Left eye: No discharge.      Conjunctiva/sclera: Conjunctivae normal.      Pupils: Pupils are equal, round, and reactive to light.   Neck:      Thyroid: No thyromegaly.      Vascular: No carotid bruit.      Trachea: No tracheal deviation.   Cardiovascular:      Rate and Rhythm: Normal rate and regular rhythm.      Pulses:           Dorsalis pedis pulses are 2+ on the right side and 2+ on the left side.      Heart sounds: Normal heart sounds, S1 normal and S2 normal. No murmur heard.  Pulmonary:      Effort: Pulmonary effort is normal. No respiratory distress.      Breath sounds: Examination of the right-lower field reveals decreased breath sounds. Examination of the left-lower field reveals decreased breath sounds. Decreased breath sounds present.   Abdominal:      General: Bowel sounds are normal. There is no distension.      Palpations: Abdomen is soft. There is no mass.      Tenderness: There is no abdominal tenderness.   Musculoskeletal:         General: Normal range of motion.      Cervical back: Normal range of motion.   Lymphadenopathy:      Cervical: No cervical adenopathy.      Upper Body:      Right upper body: No supraclavicular adenopathy.      Left upper body: No supraclavicular adenopathy.   Skin:     General: Skin is warm and dry.      Capillary Refill: Capillary refill takes less than 2 seconds.      Findings: No rash.   Neurological:      Mental Status: She is alert and oriented to person, place, and time.      Sensory: No sensory deficit.      Coordination: Coordination normal.      Gait: Gait normal.   Psychiatric:         Mood and Affect: Mood is not anxious or depressed.         Speech: Speech normal.          Behavior: Behavior normal.         Thought Content: Thought content normal.         Judgment: Judgment normal.              CRANIAL NERVES     CN III, IV, VI   Pupils are equal, round, and reactive to light.       Significant Labs: All pertinent labs within the past 24 hours have been reviewed.  CBC:   Recent Labs   Lab 02/23/25  1446   WBC 5.31   HGB 8.8*   HCT 27.5*        CMP:   Recent Labs   Lab 02/23/25  1553      K 3.4*      CO2 20*      BUN 26*   CREATININE 1.9*   CALCIUM 8.4*   PROT 5.6*   ALBUMIN 3.0*   BILITOT 0.4   ALKPHOS 70   AST 14   ALT 9*   ANIONGAP 14       Significant Imaging: I have reviewed all pertinent imaging results/findings within the past 24 hours.

## 2025-02-24 NOTE — PROGRESS NOTES
Spoke with pt daughter. She is no longer on hospice.Will seek home health after care upon discharge.

## 2025-02-24 NOTE — ASSESSMENT & PLAN NOTE
Patient has Diastolic (HFpEF) heart failure that is Acute on chronic. On presentation their CHF was decompensated. Evidence of decompensated CHF on presentation includes: edema and shortness of breath. The etiology of their decompensation is likely dietary indiscretion and increased fluid intake. Most recent BNP and echo results are listed below.  Recent Labs     02/23/25  1446   *     Latest ECHO  Results for orders placed during the hospital encounter of 10/13/24    Echo    Interpretation Summary    Left Ventricle: The left ventricle is normal in size. Normal wall thickness. There is concentric remodeling. There is normal systolic function with a visually estimated ejection fraction of 60 - 65%. Ejection fraction is approximately 60%.    Right Ventricle: Normal right ventricular cavity size. Wall thickness is normal. Systolic function is normal.    Mitral Valve: There is moderate mitral annular calcification present. There is mild regurgitation.    Pulmonary Artery: The estimated pulmonary artery systolic pressure is 32 mmHg.    IVC/SVC: Normal venous pressure at 3 mmHg.    Current Heart Failure Medications  carvediloL tablet 12.5 mg, 2 times daily with meals, Oral  empagliflozin (Jardiance) tablet 10 mg, Daily, Oral  bumetanide injection 2 mg, 2 times daily, Intravenous    Plan  - Monitor strict I&Os and daily weights.    - Place on telemetry  - Low sodium diet  - Place on fluid restriction of 1.5 L.   - Cardiology has not been consulted

## 2025-02-24 NOTE — HPI
89 y.o. female, with PMhx for  sarcoidosis of lung, obesity, HTN, HLD, CHF diastolic , CAD, stage 3 CKD, and anemia admitted for acute on chronic CHF.  BNP 90s. CXR-small left pleural effusion. BP ok. EKG is normal. There is a mild increase in troponin. H/H mildly decreased.

## 2025-02-24 NOTE — ASSESSMENT & PLAN NOTE
Creatine stable for now. BMP reviewed- noted Estimated Creatinine Clearance: 17.9 mL/min (A) (based on SCr of 1.9 mg/dL (H)). according to latest data. Based on current GFR, CKD stage is stage 4 - GFR 15-29.  Monitor UOP and serial BMP and adjust therapy as needed. Renally dose meds. Avoid nephrotoxic medications and procedures.

## 2025-02-24 NOTE — PROGRESS NOTES
"  [facility]) nurse navigator notified of [facility] candidate in need of education and introduction to 4-6 week program.      PT aao while lying in bed. Introduced self to pt as [facility] nurse navigator.     Patient given "Home Care Guide for Heart Failure Patients" , "[facility]" flyer and "Daily weight and symptom tracker".  Encouraged pt and daughter to review information.      Reviewed the following key points of [facility] program with pt and family:   1.) Take your medications as directed.    2.) Weight yourself daily   3.) Follow low salt and limited fluid diet.    4.) Stop smoking and start exercising   5.) Go to your appointments and call your team.      Pt reminded to follow Symptom tracker and to call at the onset of symptoms according to tracker.     Reviewed plan for follow up once discharged to include phone calls, in person and virtual visits to assist pt optimizing their heart failure medication regimen and encouraging healthy lifestyle modifications.  Reminded pt that program will assist them over the next 4-6 weeks and then patient will be transferred to long term care provider .  Reminded pt how to contact [facility] navigator via phone and or via [employer].     Pt given appointment or instructed appointment will be printed on hospital discharge paperwork.     Pt also reminded HF nurse will call 48-72 hours after discharge to check on them.     PT and daughter verbalize read back of information given.  Encouraged pt and family to read over information often and contact team with any questions or concerns.    "

## 2025-02-24 NOTE — CONSULTS
Nutrition Education    Education Provided: low sodium diet and fluid restriction  Teaching Method: printed materials  Comprehension: needs further teaching  Barriers to Learning: dietitian working remotely - pt did not answer when called into room  Expected Compliance: unable to assess  Comments: Consulted for education on fluid and salt restriction. Dietitian working remotely. Attempted to call into room to speak with pt without success. Will attach diet handouts to discharge paperwork. RD to continue to attempt to provide diet education as schedule allows.      Melissa Anaya, RD, LDN, CNSC

## 2025-02-24 NOTE — ASSESSMENT & PLAN NOTE
Likely secondary to demand ischemia related to CHF exacerbation    --Trend trop  --Tele  --ECHO  --EKG PRN Chest pain/rhythm changes  --Consult Cardiology if Troponin increases  --Vitals per protocol  --Cardiac Diet

## 2025-02-24 NOTE — H&P
[facility] - Emergency Dept.  Hospital Medicine  History & Physical    Patient Name: Glo Dumont  MRN: [id number]  Patient Class: OP- Observation  Admission Date: 2/23/2025  Attending Physician: Byron Vargas MD   Primary Care Provider: No primary care provider on file.         Patient information was obtained from patient, relative(s), past medical records, and ER records.     Subjective:     Principal Problem:Acute on chronic heart failure with preserved ejection fraction    Chief Complaint:   Chief Complaint   Patient presents with    General Illness     Per daughter pt c/o cough and congestion for 2-3 days. Daughter states pt seems slightly confused at times. Hx of CHF        HPI: 89 y.o. female, comorbid conditions include sarcoidosis of lung, obesity, HTN, HLD, CHF, CAD, stage 3 CKD, CAD, CHF, and anemia. Presented to the ED for evaluation of general illness which began 2-3 days PTA. Associated sxs include cough, wheezing, SOB, rhinorrhea, congestion, sore throat, eye swelling, and confusion. Symptoms are constant and moderate in severity. Daughter denies pt having history of emphysema or COPD. In the ED, vitals: 114/58, 63, 15, 98.4F, 94% RA on arrival. Significant Labs: Hgb: 8.8, Cr: 1.9, BNP: 940, Trop: 0.121. CXR: Left Pleural Effusion. Treated with IV diuretic in the ED. Patient is a full code. Placed in Observation under the care of Hospital Medicine for management of Acute On Chronic CHF.     Past Medical History:   Diagnosis Date    Anemia     Angina pectoris     Anxiety     Anxiety and depression     Arthritis     hip    Carotid artery occlusion     Carpal tunnel syndrome 06/23/2008    emg    Chronic diarrhea     work up in 2011 with EGD, CS and VCE    CKD (chronic kidney disease) stage 3, GFR 30-59 ml/min 5/11/2017    Colitis     Coronary artery disease     Coronary artery disease     Diastolic dysfunction     Diverticulosis     Encephalopathy 10/14/2024    Glaucoma     Greater trochanteric bursitis  2/10/2015    Grief at loss of child 1/26/2016    H/O carotid endarterectomy 12/2/2013    Heart failure     History of coronary angioplasty 3/11/2014    Hypercholesteremia     Hypertension     Liver cyst 02/08/2013    ct abd    Low back pain 2/8/2024    Macular degeneration     Obesity with serious comorbidity 3/19/2023    Primary open-angle glaucoma(365.11) 9/3/2013    Renal cyst 02/08/2013    ct abd    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    Sarcoidosis     Sarcoidosis of lung     Sickle cell trait     Uveitis        Past Surgical History:   Procedure Laterality Date    A-V CARDIAC PACEMAKER INSERTION Left 3/21/2023    Procedure: INSERTION, CARDIAC PACEMAKER, DUAL CHAMBER/His Lead;  Surgeon: Cortez Ambrose MD;  Location: [facility] CATH LAB;  Service: Cardiology;  Laterality: Left;  MDT/ MD to confirm in am/possible nurse sedate    CAROTID ENDARTERECTOMY Right 2000s    CATARACT EXTRACTION Bilateral     Dr. Liang Dennis    CHOLECYSTECTOMY      laparoscopic, 3/18.    CORONARY ANGIOPLASTY WITH STENT PLACEMENT  11/19/2010    RCA-HALIE 2010    Lathia    JOINT REPLACEMENT Left 11/03/2014    Dr. Braun    TOTAL ABDOMINAL HYSTERECTOMY W/ BILATERAL SALPINGOOPHORECTOMY  1972       Review of patient's allergies indicates:   Allergen Reactions    Lisinopril      angioedema    Codeine Nausea And Vomiting       No current facility-administered medications on file prior to encounter.     Current Outpatient Medications on File Prior to Encounter   Medication Sig    amLODIPine (NORVASC) 5 MG tablet Take 5 mg by mouth.    aspirin (ECOTRIN) 81 MG EC tablet Take 1 tablet (81 mg total) by mouth once daily.    atorvastatin (LIPITOR) 40 MG tablet Take 1 tablet (40 mg total) by mouth Daily.    budesonide (ENTOCORT EC) 3 mg capsule Take 1-2 capsules (3-6 mg total) by mouth daily as needed (flare). As needed    bumetanide (BUMEX) 1 MG tablet Take 1 tablet (1 mg total) by mouth once daily.    busPIRone (BUSPAR) 5 MG Tab Take 1  tablet (5 mg total) by mouth 2 (two) times daily as needed (anxiety).    carvediloL (COREG) 12.5 MG tablet Take 1 tablet (12.5 mg total) by mouth 2 (two) times daily with meals.    citalopram (CELEXA) 10 MG tablet Take 1 tablet (10 mg total) by mouth every evening.    cyproheptadine (PERIACTIN) 4 mg tablet Take 1 tablet (4 mg total) by mouth 3 (three) times daily as needed (appetite).    dorzolamide-timolol 2-0.5% (COSOPT) 22.3-6.8 mg/mL ophthalmic solution Place 1 drop into both eyes 2 (two) times daily.    empagliflozin (JARDIANCE) 10 mg tablet Take 1 tablet (10 mg total) by mouth once daily.    lipase-protease-amylase 24,000-76,000-120,000 units (CREON) 24,000-76,000 -120,000 unit capsule Take 1 capsule by mouth 3 (three) times daily with meals.    pantoprazole (PROTONIX) 40 MG tablet Take 1 tablet (40 mg total) by mouth once daily.    potassium chloride (KLOR-CON) 10 MEQ TbSR Take 1 tablet (10 mEq total) by mouth once daily.    silver sulfADIAZINE 1% (SILVADENE) 1 % cream Apply topically 2 (two) times daily.    sodium bicarbonate 650 MG tablet Take 1 tablet (650 mg total) by mouth once daily.    aflibercept 2 mg/0.05 mL Soln 2 mg by Intravitreal route every 28 days.    dexAMETHasone (OZURDEX) 0.7 mg Impl intravitreal implant 0.7 mg by Intravitreal route once.    FOLIC ACID/MULTIVIT-MIN/LUTEIN (CENTRUM SILVER ORAL) Take 1 tablet by mouth once daily.    HYDROcodone-acetaminophen (NORCO) 5-325 mg per tablet 1/2 tablet every 6 to 8 hours as needed for pain    loperamide (IMODIUM) 2 mg capsule Take 1 mg by mouth every 6 (six) hours as needed for Diarrhea.    nitroGLYCERIN (NITROSTAT) 0.4 MG SL tablet PLACE ONE TABLET UNDERNEATH THE TONGUE EVERY 5 MINUTES AS NEEDED FOR CHEST PAIN    ondansetron (ZOFRAN) 4 MG tablet Take 1 tablet (4 mg total) by mouth every 8 (eight) hours as needed for Nausea.    ondansetron (ZOFRAN-ODT) 8 MG TbDL Take 8 mg by mouth every 6 (six) hours.     Family History       Problem Relation (Age  of Onset)    Cancer Father, Daughter    Cirrhosis Brother    Heart failure Mother, Brother    Hypertension Mother          Tobacco Use    Smoking status: Never    Smokeless tobacco: Never   Substance and Sexual Activity    Alcohol use: No     Alcohol/week: 0.0 standard drinks of alcohol    Drug use: No    Sexual activity: Yes     Partners: Male     Review of Systems   Constitutional:  Positive for fatigue.   HENT:  Positive for congestion, facial swelling and postnasal drip.    Respiratory:  Positive for chest tightness, shortness of breath and wheezing.    Cardiovascular:  Positive for chest pain.   All other systems reviewed and are negative.    Objective:     Vital Signs (Most Recent):  Temp: 98.4 °F (36.9 °C) (02/23/25 1412)  Pulse: 68 (02/23/25 1805)  Resp: 18 (02/23/25 1745)  BP: (!) 123/57 (02/23/25 1805)  SpO2: 97 % (02/23/25 1745) Vital Signs (24h Range):  Temp:  [98.4 °F (36.9 °C)] 98.4 °F (36.9 °C)  Pulse:  [63-70] 68  Resp:  [15-24] 18  SpO2:  [94 %-97 %] 97 %  BP: (114-132)/(56-67) 123/57     Weight: 64.1 kg (141 lb 6.4 oz)  Body mass index is 26.72 kg/m².     Physical Exam  Vitals and nursing note reviewed.   Constitutional:       General: She is not in acute distress.     Appearance: She is well-developed.   HENT:      Head: Normocephalic and atraumatic.      Right Ear: Hearing and external ear normal.      Left Ear: Hearing and external ear normal.      Nose: No rhinorrhea.      Right Sinus: No maxillary sinus tenderness or frontal sinus tenderness.      Left Sinus: No maxillary sinus tenderness or frontal sinus tenderness.      Mouth/Throat:      Mouth: No oral lesions.      Pharynx: Uvula midline.   Eyes:      General:         Right eye: No discharge.         Left eye: No discharge.      Conjunctiva/sclera: Conjunctivae normal.      Pupils: Pupils are equal, round, and reactive to light.   Neck:      Thyroid: No thyromegaly.      Vascular: No carotid bruit.      Trachea: No tracheal deviation.    Cardiovascular:      Rate and Rhythm: Normal rate and regular rhythm.      Pulses:           Dorsalis pedis pulses are 2+ on the right side and 2+ on the left side.      Heart sounds: Normal heart sounds, S1 normal and S2 normal. No murmur heard.  Pulmonary:      Effort: Pulmonary effort is normal. No respiratory distress.      Breath sounds: Examination of the right-lower field reveals decreased breath sounds. Examination of the left-lower field reveals decreased breath sounds. Decreased breath sounds present.   Abdominal:      General: Bowel sounds are normal. There is no distension.      Palpations: Abdomen is soft. There is no mass.      Tenderness: There is no abdominal tenderness.   Musculoskeletal:         General: Normal range of motion.      Cervical back: Normal range of motion.   Lymphadenopathy:      Cervical: No cervical adenopathy.      Upper Body:      Right upper body: No supraclavicular adenopathy.      Left upper body: No supraclavicular adenopathy.   Skin:     General: Skin is warm and dry.      Capillary Refill: Capillary refill takes less than 2 seconds.      Findings: No rash.   Neurological:      Mental Status: She is alert and oriented to person, place, and time.      Sensory: No sensory deficit.      Coordination: Coordination normal.      Gait: Gait normal.   Psychiatric:         Mood and Affect: Mood is not anxious or depressed.         Speech: Speech normal.         Behavior: Behavior normal.         Thought Content: Thought content normal.         Judgment: Judgment normal.              CRANIAL NERVES     CN III, IV, VI   Pupils are equal, round, and reactive to light.       Significant Labs: All pertinent labs within the past 24 hours have been reviewed.  CBC:   Recent Labs   Lab 02/23/25  1446   WBC 5.31   HGB 8.8*   HCT 27.5*        CMP:   Recent Labs   Lab 02/23/25  1553      K 3.4*      CO2 20*      BUN 26*   CREATININE 1.9*   CALCIUM 8.4*   PROT 5.6*    ALBUMIN 3.0*   BILITOT 0.4   ALKPHOS 70   AST 14   ALT 9*   ANIONGAP 14       Significant Imaging: I have reviewed all pertinent imaging results/findings within the past 24 hours.  Assessment/Plan:     * Acute on chronic heart failure with preserved ejection fraction  Patient has Diastolic (HFpEF) heart failure that is Acute on chronic. On presentation their CHF was decompensated. Evidence of decompensated CHF on presentation includes: edema and shortness of breath. The etiology of their decompensation is likely dietary indiscretion and increased fluid intake. Most recent BNP and echo results are listed below.  Recent Labs     02/23/25  1446   *     Latest ECHO  Results for orders placed during the hospital encounter of 10/13/24    Echo    Interpretation Summary    Left Ventricle: The left ventricle is normal in size. Normal wall thickness. There is concentric remodeling. There is normal systolic function with a visually estimated ejection fraction of 60 - 65%. Ejection fraction is approximately 60%.    Right Ventricle: Normal right ventricular cavity size. Wall thickness is normal. Systolic function is normal.    Mitral Valve: There is moderate mitral annular calcification present. There is mild regurgitation.    Pulmonary Artery: The estimated pulmonary artery systolic pressure is 32 mmHg.    IVC/SVC: Normal venous pressure at 3 mmHg.    Current Heart Failure Medications  carvediloL tablet 12.5 mg, 2 times daily with meals, Oral  empagliflozin (Jardiance) tablet 10 mg, Daily, Oral  bumetanide injection 2 mg, 2 times daily, Intravenous    Plan  - Monitor strict I&Os and daily weights.    - Place on telemetry  - Low sodium diet  - Place on fluid restriction of 1.5 L.   - Cardiology has not been consulted        Elevated troponin  Likely secondary to demand ischemia related to CHF exacerbation    --Trend trop  --Tele  --ECHO  --EKG PRN Chest pain/rhythm changes  --Consult Cardiology if Troponin  increases  --Vitals per protocol  --Cardiac Diet       S/P CABG x 3  Followed by cardiology      S/P placement of cardiac pacemaker  Followed by cardiology      Hypokalemia  Patient's most recent potassium results are listed below.   Recent Labs     02/23/25  1553   K 3.4*     Plan  - Replete potassium per protocol  - Monitor potassium Daily  - Patient's hypokalemia is stable      Metabolic acidosis  CO2: 20 on admission    --cont sodium bicarb      CKD (chronic kidney disease) stage 4, GFR 15-29 ml/min  Creatine stable for now. BMP reviewed- noted Estimated Creatinine Clearance: 17.9 mL/min (A) (based on SCr of 1.9 mg/dL (H)). according to latest data. Based on current GFR, CKD stage is stage 4 - GFR 15-29.  Monitor UOP and serial BMP and adjust therapy as needed. Renally dose meds. Avoid nephrotoxic medications and procedures.    Essential hypertension  Patient's blood pressure range in the last 24 hours was: BP  Min: 114/58  Max: 132/66.The patient's inpatient anti-hypertensive regimen is listed below:  Current Antihypertensives  , , Oral  amLODIPine tablet 5 mg, Daily, Oral  carvediloL tablet 12.5 mg, 2 times daily with meals, Oral  dorzolamide-timolol 2-0.5% ophthalmic solution 1 drop, 2 times daily, Both Eyes  bumetanide injection 2 mg, 2 times daily, Intravenous    Plan  - BP is controlled, no changes needed to their regimen      Iron deficiency anemia  Anemia is likely due to Iron deficiency. Most recent hemoglobin and hematocrit are listed below.  Recent Labs     02/23/25  1446   HGB 8.8*   HCT 27.5*     Plan  - Monitor serial CBC: Daily  - Transfuse PRBC if patient becomes hemodynamically unstable, symptomatic or H/H drops below 7/21.  - Patient has not received any PRBC transfusions to date  - Patient's anemia is currently worsening. Will continue current treatment  - Repeat CBC in AM    Other hyperlipidemia    --cont statin    Sarcoidosis of lung  Followed by Pulmonology as OP    --Duoneb PRN        VTE  Risk Mitigation (From admission, onward)           Ordered     enoxaparin injection 30 mg  Daily         02/23/25 1755     IP VTE HIGH RISK PATIENT  Once         02/23/25 1755     Place sequential compression device  Until discontinued         02/23/25 1755                         On 02/23/2025, patient should be placed in hospital observation services under my care in collaboration with Byron Vargas MD.      Pharmacist Renal Dose Adjustment Note    Glo Dumont is a 89 y.o. female being treated with the medication enoxaparin.     Patient Data:    Vital Signs (Most Recent):  Temp: 98.4 °F (36.9 °C) (02/23/25 1412)  Pulse: 70 (02/23/25 1745)  Resp: 18 (02/23/25 1745)  BP: (!) 121/56 (02/23/25 1745)  SpO2: 97 % (02/23/25 1745) Vital Signs (72h Range):  Temp:  [98.4 °F (36.9 °C)]   Pulse:  [63-70]   Resp:  [15-24]   BP: (114-132)/(56-67)   SpO2:  [94 %-97 %]      Recent Labs   Lab 02/23/25  1553   CREATININE 1.9*     Serum creatinine: 1.9 mg/dL (H) 02/23/25 1553  Estimated creatinine clearance: 17.9 mL/min (A)    Enoxaparin 40 mg subq every 24 hours will be changed to enoxaparin 30 mg subq every 24 hours per renal dose protocol for CrCl < 30 ml/min.     Thank you,  Pharmacist's Name: Saul Mccullough NP  Department of Hospital Medicine  [facility] - Emergency Dept.

## 2025-02-24 NOTE — PLAN OF CARE
Inpatient Upgrade Note    Glo Dumont has warranted treatment spanning two or more midnights of hospital level care for the management of heart failure and CKD -increased CR . She continues to require IV diuresis, daily labs, monitoring of vital signs, and further evaluation by consultants. Her condition is also complicated by the following comorbidities: Coronary Artery Disease, Diabetes, and Chronic kidney disease.

## 2025-02-24 NOTE — ASSESSMENT & PLAN NOTE
Anemia is likely due to Iron deficiency. Most recent hemoglobin and hematocrit are listed below.  Recent Labs     02/23/25  1446   HGB 8.8*   HCT 27.5*     Plan  - Monitor serial CBC: Daily  - Transfuse PRBC if patient becomes hemodynamically unstable, symptomatic or H/H drops below 7/21.  - Patient has not received any PRBC transfusions to date  - Patient's anemia is currently worsening. Will continue current treatment  - Repeat CBC in AM

## 2025-02-24 NOTE — ASSESSMENT & PLAN NOTE
Patient's most recent potassium results are listed below.   Recent Labs     02/23/25  1553   K 3.4*     Plan  - Replete potassium per protocol  - Monitor potassium Daily  - Patient's hypokalemia is stable

## 2025-02-24 NOTE — ASSESSMENT & PLAN NOTE
Creatine stable for now. BMP reviewed- noted Estimated Creatinine Clearance: 14.2 mL/min (A) (based on SCr of 2.3 mg/dL (H)). according to latest data. Based on current GFR, CKD stage is stage 4 - GFR 15-29.  Monitor UOP and serial BMP and adjust therapy as needed. Renally dose meds. Avoid nephrotoxic medications and procedures.  Cr baseline 1.2-1.4, most recently 1.8  Cr uptrending   Hold Jardiance   Monitor BMP with diuresis   Strict IS and Os

## 2025-02-24 NOTE — ASSESSMENT & PLAN NOTE
Patient's most recent potassium results are listed below.   Recent Labs     02/23/25  1553 02/24/25  0502   K 3.4* 4.1       Plan  - Replete potassium per protocol  - Monitor potassium Daily  - Patient's hypokalemia is improving

## 2025-02-24 NOTE — ASSESSMENT & PLAN NOTE
Anemia is likely due to Iron deficiency. Most recent hemoglobin and hematocrit are listed below.  Recent Labs     02/23/25  1446 02/24/25  0502   HGB 8.8* 9.5*   HCT 27.5* 30.2*       Plan  - Monitor serial CBC: Daily  - Transfuse PRBC if patient becomes hemodynamically unstable, symptomatic or H/H drops below 7/21.  - Patient has not received any PRBC transfusions to date  - Patient's anemia is currently worsening. Will continue current treatment

## 2025-02-24 NOTE — CONSULTS
[facility] - [facility] ([facility])  Cardiology  Consult Note    Patient Name: Glo Dumont  MRN: [id number]  Admission Date: 2/23/2025  Hospital Length of Stay: 0 days  Code Status: Full Code   Attending Provider: Jailyn Duran MD   Consulting Provider: David Tong MD  Primary Care Physician: No primary care provider on file.  Principal Problem:Acute on chronic heart failure with preserved ejection fraction    Patient information was obtained from patient and ER records.     Inpatient consult to Cardiology  Consult performed by: David Tong MD  Consult ordered by: Jailyn Duran MD        Subjective:     Chief Complaint:  sob     HPI:   89 y.o. female, with PMhx for  sarcoidosis of lung, obesity, HTN, HLD, CHF diastolic , CAD, stage 3 CKD, and anemia admitted for acute on chronic CHF.  BNP 90s. CXR-small left pleural effusion. BP ok. EKG is normal. There is a mild increase in troponin. H/H mildly decreased.    Past Medical History:   Diagnosis Date    Anemia     Angina pectoris     Anxiety     Anxiety and depression     Arthritis     hip    Carotid artery occlusion     Carpal tunnel syndrome 06/23/2008    emg    Chronic diarrhea     work up in 2011 with EGD, CS and VCE    CKD (chronic kidney disease) stage 3, GFR 30-59 ml/min 5/11/2017    Colitis     Coronary artery disease     Coronary artery disease     Diastolic dysfunction     Diverticulosis     Encephalopathy 10/14/2024    Glaucoma     Greater trochanteric bursitis 2/10/2015    Grief at loss of child 1/26/2016    H/O carotid endarterectomy 12/2/2013    Heart failure     History of coronary angioplasty 3/11/2014    Hypercholesteremia     Hypertension     Liver cyst 02/08/2013    ct abd    Low back pain 2/8/2024    Macular degeneration     Obesity with serious comorbidity 3/19/2023    Primary open-angle glaucoma(365.11) 9/3/2013    Renal cyst 02/08/2013    ct abd    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    Sarcoidosis      Sarcoidosis of lung     Sickle cell trait     Uveitis        Past Surgical History:   Procedure Laterality Date    A-V CARDIAC PACEMAKER INSERTION Left 3/21/2023    Procedure: INSERTION, CARDIAC PACEMAKER, DUAL CHAMBER/His Lead;  Surgeon: Cortez Ambrose MD;  Location: [facility] CATH LAB;  Service: Cardiology;  Laterality: Left;  MDT/ MD to confirm in am/possible nurse sedate    CAROTID ENDARTERECTOMY Right 2000s    CATARACT EXTRACTION Bilateral     Dr. Liang Dennis    CHOLECYSTECTOMY      laparoscopic, 3/18.    CORONARY ANGIOPLASTY WITH STENT PLACEMENT  11/19/2010    RCA-HALIE 2010    Lathia    JOINT REPLACEMENT Left 11/03/2014    Dr. Braun    TOTAL ABDOMINAL HYSTERECTOMY W/ BILATERAL SALPINGOOPHORECTOMY  1972       Review of patient's allergies indicates:   Allergen Reactions    Lisinopril      angioedema    Codeine Nausea And Vomiting       No current facility-administered medications on file prior to encounter.     Current Outpatient Medications on File Prior to Encounter   Medication Sig    amLODIPine (NORVASC) 5 MG tablet Take 5 mg by mouth.    aspirin (ECOTRIN) 81 MG EC tablet Take 1 tablet (81 mg total) by mouth once daily.    atorvastatin (LIPITOR) 40 MG tablet Take 1 tablet (40 mg total) by mouth Daily.    budesonide (ENTOCORT EC) 3 mg capsule Take 1-2 capsules (3-6 mg total) by mouth daily as needed (flare). As needed    bumetanide (BUMEX) 1 MG tablet Take 1 tablet (1 mg total) by mouth once daily.    busPIRone (BUSPAR) 5 MG Tab Take 1 tablet (5 mg total) by mouth 2 (two) times daily as needed (anxiety).    carvediloL (COREG) 12.5 MG tablet Take 1 tablet (12.5 mg total) by mouth 2 (two) times daily with meals.    citalopram (CELEXA) 10 MG tablet Take 1 tablet (10 mg total) by mouth every evening.    cyproheptadine (PERIACTIN) 4 mg tablet Take 1 tablet (4 mg total) by mouth 3 (three) times daily as needed (appetite).    dorzolamide-timolol 2-0.5% (COSOPT) 22.3-6.8 mg/mL ophthalmic solution Place 1 drop into  both eyes 2 (two) times daily.    empagliflozin (JARDIANCE) 10 mg tablet Take 1 tablet (10 mg total) by mouth once daily.    lipase-protease-amylase 24,000-76,000-120,000 units (CREON) 24,000-76,000 -120,000 unit capsule Take 1 capsule by mouth 3 (three) times daily with meals.    pantoprazole (PROTONIX) 40 MG tablet Take 1 tablet (40 mg total) by mouth once daily.    potassium chloride (KLOR-CON) 10 MEQ TbSR Take 1 tablet (10 mEq total) by mouth once daily.    silver sulfADIAZINE 1% (SILVADENE) 1 % cream Apply topically 2 (two) times daily.    sodium bicarbonate 650 MG tablet Take 1 tablet (650 mg total) by mouth once daily.    aflibercept 2 mg/0.05 mL Soln 2 mg by Intravitreal route every 28 days.    dexAMETHasone (OZURDEX) 0.7 mg Impl intravitreal implant 0.7 mg by Intravitreal route once.    FOLIC ACID/MULTIVIT-MIN/LUTEIN (CENTRUM SILVER ORAL) Take 1 tablet by mouth once daily.    HYDROcodone-acetaminophen (NORCO) 5-325 mg per tablet 1/2 tablet every 6 to 8 hours as needed for pain    loperamide (IMODIUM) 2 mg capsule Take 1 mg by mouth every 6 (six) hours as needed for Diarrhea.    nitroGLYCERIN (NITROSTAT) 0.4 MG SL tablet PLACE ONE TABLET UNDERNEATH THE TONGUE EVERY 5 MINUTES AS NEEDED FOR CHEST PAIN    ondansetron (ZOFRAN) 4 MG tablet Take 1 tablet (4 mg total) by mouth every 8 (eight) hours as needed for Nausea.    ondansetron (ZOFRAN-ODT) 8 MG TbDL Take 8 mg by mouth every 6 (six) hours.     Family History       Problem Relation (Age of Onset)    Cancer Father, Daughter    Cirrhosis Brother    Heart failure Mother, Brother    Hypertension Mother          Tobacco Use    Smoking status: Never    Smokeless tobacco: Never   Substance and Sexual Activity    Alcohol use: No     Alcohol/week: 0.0 standard drinks of alcohol    Drug use: No    Sexual activity: Yes     Partners: Male     Review of Systems   Constitutional: Negative. Negative for weight gain.   HENT: Negative.     Eyes: Negative.    Cardiovascular:  Negative.  Negative for chest pain, leg swelling and palpitations.   Respiratory: Negative.  Negative for shortness of breath.    Endocrine: Negative.    Hematologic/Lymphatic: Negative.    Skin: Negative.    Musculoskeletal:  Negative for muscle weakness.   Gastrointestinal: Negative.    Genitourinary: Negative.    Neurological: Negative.  Negative for dizziness.   Psychiatric/Behavioral: Negative.     Allergic/Immunologic: Negative.    All other systems reviewed and are negative.    Objective:     Vital Signs (Most Recent):  Temp: 97.6 °F (36.4 °C) (02/24/25 1237)  Pulse: 66 (02/24/25 1237)  Resp: 19 (02/24/25 1237)  BP: 132/63 (02/24/25 1237)  SpO2: 98 % (02/24/25 1237) Vital Signs (24h Range):  Temp:  [97.6 °F (36.4 °C)-98.6 °F (37 °C)] 97.6 °F (36.4 °C)  Pulse:  [63-71] 66  Resp:  [15-24] 19  SpO2:  [94 %-99 %] 98 %  BP: (112-146)/(55-99) 132/63     Weight: 59 kg (130 lb)  Body mass index is 24.56 kg/m².    SpO2: 98 %       No intake or output data in the 24 hours ending 02/24/25 1338    Lines/Drains/Airways       Drain  Duration             Female External Urinary Catheter w/ Suction 02/23/25 1537 <1 day              Peripheral Intravenous Line  Duration                  Peripheral IV - Single Lumen 02/23/25 1456 20 G Anterior;Distal;Left Upper Arm <1 day                     Physical Exam  Vitals and nursing note reviewed.   Constitutional:       Appearance: She is well-developed.   HENT:      Head: Normocephalic and atraumatic.   Eyes:      Conjunctiva/sclera: Conjunctivae normal.      Pupils: Pupils are equal, round, and reactive to light.   Cardiovascular:      Rate and Rhythm: Normal rate and regular rhythm.      Pulses: Intact distal pulses.      Heart sounds: Normal heart sounds.   Pulmonary:      Effort: Pulmonary effort is normal.      Breath sounds: Normal breath sounds.   Abdominal:      General: Bowel sounds are normal.      Palpations: Abdomen is soft.   Musculoskeletal:         General: Normal  range of motion.      Cervical back: Normal range of motion and neck supple.   Skin:     General: Skin is warm and dry.   Neurological:      Mental Status: She is alert and oriented to person, place, and time.          Significant Labs: All pertinent lab results from the last 24 hours have been reviewed.    Significant Imaging: X-Ray: CXR: X-Ray Chest 1 View (CXR): No results found for this visit on 02/23/25.  Assessment and Plan:     * Acute on chronic heart failure with preserved ejection fraction  Patient has Diastolic (HFpEF) heart failure that is Acute on chronic. On presentation their CHF was decompensated. Evidence of decompensated CHF on presentation includes: dyspnea on exertion (SOOD). The etiology of their decompensation is likely unknown . Most recent BNP and echo results are listed below.  Recent Labs     02/23/25  1446   *     Latest ECHO  Results for orders placed during the hospital encounter of 02/23/25    Echo Saline Bubble? No; Ultrasound enhancing contrast? No    Interpretation Summary    Left Ventricle: The left ventricle is normal in size. Normal wall thickness. There is concentric remodeling. There is normal systolic function. Ejection fraction is approximately 60%. Grade II diastolic dysfunction.    Right Ventricle: Normal right ventricular cavity size. Wall thickness is normal. Systolic function is normal.    Left Atrium: Left atrium is severely dilated.    Aortic Valve: There is mild aortic valve sclerosis. There is mild stenosis. Aortic valve area by VTI is 1.6 cm². Aortic valve peak velocity is 2.0 m/s. Mean gradient is 8 mmHg. The dimensionless index is 0.58.    Tricuspid Valve: There is moderate to severe regurgitation with a centrally directed jet.    Pulmonary Artery: The estimated pulmonary artery systolic pressure is 55 mmHg.    IVC/SVC: Normal venous pressure at 3 mmHg.    Current Heart Failure Medications  carvediloL tablet 12.5 mg, 2 times daily with meals, Oral  bumetanide  injection 2 mg, 2 times daily, Intravenous    Plan  - Monitor strict I&Os and daily weights.    - Place on telemetry  - Low sodium diet  - Place on fluid restriction of 2 L.   - Cardiology has been consulted  - The patient's volume status is improving but not at their baseline as indicated by shortness of breath  -       89 y.o. female, with PMhx for  sarcoidosis of lung, obesity, HTN, HLD, CHF diastolic , CAD, stage 3 CKD, and anemia admitted for acute on chronic CHF.  BNP 90s. CXR-small left pleural effusion. BP ok. EKG is normal. There is a mild increase in troponin. H/H mildly decreased.    Recommend IV diuresis, monitor h/h, BP control. Troponin is from demand ischemia        VTE Risk Mitigation (From admission, onward)           Ordered     enoxaparin injection 30 mg  Daily         02/23/25 1755     IP VTE HIGH RISK PATIENT  Once         02/23/25 1755     Place sequential compression device  Until discontinued         02/23/25 1755                    Thank you for your consult. I will follow-up with patient. Please contact us if you have any additional questions.    David Tong MD  Cardiology   [facility] - Telemetry ([facility])

## 2025-02-24 NOTE — ASSESSMENT & PLAN NOTE
Likely secondary to demand ischemia related to CHF exacerbation  Trop plateau at 0.103  Continue aspirin, statin, B blocker   No CP at this time   Cardiology consult   Tele monitoring

## 2025-02-24 NOTE — SUBJECTIVE & OBJECTIVE
Interval History: NAEON. Pt seen and examined with nurse at bedside. Reports shortness of breath is improved since yesterday, still has slight cough. Denies CP, dizziness, leg swelling.     Review of Systems  Objective:     Vital Signs (Most Recent):  Temp: 97.9 °F (36.6 °C) (02/24/25 0804)  Pulse: 68 (02/24/25 0804)  Resp: 19 (02/24/25 0804)  BP: (!) 146/62 (02/24/25 0804)  SpO2: 97 % (02/24/25 0804) Vital Signs (24h Range):  Temp:  [97.9 °F (36.6 °C)-98.6 °F (37 °C)] 97.9 °F (36.6 °C)  Pulse:  [63-71] 68  Resp:  [15-24] 19  SpO2:  [94 %-99 %] 97 %  BP: (112-146)/(55-99) 146/62     Weight: 59 kg (130 lb)  Body mass index is 24.56 kg/m².  No intake or output data in the 24 hours ending 02/24/25 1129      Physical Exam  Vitals and nursing note reviewed.   Constitutional:       General: She is not in acute distress.     Appearance: Ill appearance: chronic.   Cardiovascular:      Rate and Rhythm: Normal rate and regular rhythm.      Heart sounds: No murmur heard.  Pulmonary:      Comments: Scattered wheezes, faint rales to RLB, on room air   Abdominal:      General: Bowel sounds are normal. There is no distension.      Palpations: Abdomen is soft.      Tenderness: There is no abdominal tenderness.   Musculoskeletal:      Right lower leg: No edema.      Left lower leg: No edema.   Neurological:      Mental Status: She is alert and oriented to person, place, and time.             Significant Labs: All pertinent labs within the past 24 hours have been reviewed.    Significant Imaging: I have reviewed all pertinent imaging results/findings within the past 24 hours.

## 2025-02-24 NOTE — PLAN OF CARE
[facility] - Telemetry (Hospital)  Initial Discharge Assessment       Primary Care Provider: No primary care provider on file.    Admission Diagnosis: Cough [R05.9]  Chest pain [R07.9]  Edema, unspecified type [R60.9]  Heart failure, unspecified HF chronicity, unspecified heart failure type [I50.9]    Admission Date: 2/23/2025  Expected Discharge Date:     Transition of Care Barriers: None    Payor: HU[employer] MANAGED MEDICARE / Plan: HU[employer] MEDICARE HMO / Product Type: Capitation /     Extended Emergency Contact Information  Primary Emergency Contact: Jessica Dumont  Address: [address] [place] [address] [place]  Mobile Phone: [phone]  Relation: Daughter   needed? No    Discharge Plan A: Home, Home with family  Discharge Plan B: Home, Home with family      [facility] [place] [id number] [address] [address]  Phone: [phone] Fax: [phone]      Initial Assessment (most recent)       Adult Discharge Assessment - 02/24/25 1405          Discharge Assessment    Assessment Type Discharge Planning Assessment     Confirmed/corrected address, phone number and insurance Yes     Confirmed Demographics Correct on Facesheet     Source of Information patient     Communicated CANDACE with patient/caregiver Date not available/Unable to determine     Reason For Admission Acute on chronic HF     People in Home child(ally), adult     Facility Arrived From: Home     Do you expect to return to your current living situation? Yes     Do you have help at home or someone to help you manage your care at home? Yes     Who are your caregiver(s) and their phone number(s)? Jessica Bates     Prior to hospitilization cognitive status: Alert/Oriented     Current cognitive status: Alert/Oriented     Walking or Climbing Stairs Difficulty no     Dressing/Bathing Difficulty no     Equipment Currently Used at Home cane, straight;walker,  rolling     Readmission within 30 days? No     Patient currently being followed by outpatient case management? No     Do you currently have service(s) that help you manage your care at home? No     Do you take prescription medications? Yes     Do you have prescription coverage? Yes     Do you have any problems affording any of your prescribed medications? No     Is the patient taking medications as prescribed? yes     Who is going to help you get home at discharge? Daughter     How do you get to doctors appointments? family or friend will provide     Are you on dialysis? No     Do you take coumadin? No     Discharge Plan A Home;Home with family     Discharge Plan B Home;Home with family     DME Needed Upon Discharge  none     Discharge Plan discussed with: Patient     Transition of Care Barriers None                   Anticipated DC dispo: Home   Prior Level of Function: Lives at home with daughter, Jessica; indpt with ADLs; has DME at home but does not use. DTR assists with transport  PCP: MD Dulce      Comments: Pt reports she was recently d/c'd from home health and does not feel she needs service again.   Pt's whiteboard updated with CM contact and anticipated discharge disposition. SW to remain available as needed.

## 2025-02-25 LAB
ALBUMIN SERPL BCP-MCNC: 3 G/DL (ref 3.5–5.2)
ALP SERPL-CCNC: 67 U/L (ref 40–150)
ALT SERPL W/O P-5'-P-CCNC: 5 U/L (ref 10–44)
ANION GAP SERPL CALC-SCNC: 12 MMOL/L (ref 8–16)
AST SERPL-CCNC: 11 U/L (ref 10–40)
BASOPHILS # BLD AUTO: 0.02 K/UL (ref 0–0.2)
BASOPHILS NFR BLD: 0.4 % (ref 0–1.9)
BILIRUB SERPL-MCNC: 0.5 MG/DL (ref 0.1–1)
BUN SERPL-MCNC: 32 MG/DL (ref 8–23)
CALCIUM SERPL-MCNC: 8.4 MG/DL (ref 8.7–10.5)
CHLORIDE SERPL-SCNC: 107 MMOL/L (ref 95–110)
CO2 SERPL-SCNC: 22 MMOL/L (ref 23–29)
CREAT SERPL-MCNC: 2.5 MG/DL (ref 0.5–1.4)
CREAT UR-MCNC: 44.4 MG/DL (ref 15–325)
DIFFERENTIAL METHOD BLD: ABNORMAL
EOSINOPHIL # BLD AUTO: 0 K/UL (ref 0–0.5)
EOSINOPHIL NFR BLD: 0.6 % (ref 0–8)
ERYTHROCYTE [DISTWIDTH] IN BLOOD BY AUTOMATED COUNT: 15.3 % (ref 11.5–14.5)
EST. GFR  (NO RACE VARIABLE): 18 ML/MIN/1.73 M^2
GLUCOSE SERPL-MCNC: 97 MG/DL (ref 70–110)
HCT VFR BLD AUTO: 28.2 % (ref 37–48.5)
HGB BLD-MCNC: 9.1 G/DL (ref 12–16)
IMM GRANULOCYTES # BLD AUTO: 0.05 K/UL (ref 0–0.04)
IMM GRANULOCYTES NFR BLD AUTO: 1 % (ref 0–0.5)
LYMPHOCYTES # BLD AUTO: 0.4 K/UL (ref 1–4.8)
LYMPHOCYTES NFR BLD: 8.3 % (ref 18–48)
MAGNESIUM SERPL-MCNC: 1.5 MG/DL (ref 1.6–2.6)
MCH RBC QN AUTO: 29 PG (ref 27–31)
MCHC RBC AUTO-ENTMCNC: 32.3 G/DL (ref 32–36)
MCV RBC AUTO: 90 FL (ref 82–98)
MONOCYTES # BLD AUTO: 0.6 K/UL (ref 0.3–1)
MONOCYTES NFR BLD: 12.2 % (ref 4–15)
NEUTROPHILS # BLD AUTO: 4 K/UL (ref 1.8–7.7)
NEUTROPHILS NFR BLD: 77.5 % (ref 38–73)
NRBC BLD-RTO: 0 /100 WBC
PHOSPHATE SERPL-MCNC: 2.5 MG/DL (ref 2.7–4.5)
PLATELET # BLD AUTO: 198 K/UL (ref 150–450)
PMV BLD AUTO: 10.6 FL (ref 9.2–12.9)
POCT GLUCOSE: 102 MG/DL (ref 70–110)
POCT GLUCOSE: 88 MG/DL (ref 70–110)
POCT GLUCOSE: 93 MG/DL (ref 70–110)
POCT GLUCOSE: 99 MG/DL (ref 70–110)
POTASSIUM SERPL-SCNC: 3.5 MMOL/L (ref 3.5–5.1)
PROT SERPL-MCNC: 5.4 G/DL (ref 6–8.4)
RBC # BLD AUTO: 3.14 M/UL (ref 4–5.4)
SODIUM SERPL-SCNC: 141 MMOL/L (ref 136–145)
WBC # BLD AUTO: 5.18 K/UL (ref 3.9–12.7)

## 2025-02-25 PROCEDURE — 25000003 PHARM REV CODE 250: Mod: HCNC | Performed by: INTERNAL MEDICINE

## 2025-02-25 PROCEDURE — 63600175 PHARM REV CODE 636 W HCPCS: Mod: HCNC | Performed by: INTERNAL MEDICINE

## 2025-02-25 PROCEDURE — 80053 COMPREHEN METABOLIC PANEL: CPT | Mod: HCNC | Performed by: NURSE PRACTITIONER

## 2025-02-25 PROCEDURE — 82570 ASSAY OF URINE CREATININE: CPT | Mod: HCNC | Performed by: INTERNAL MEDICINE

## 2025-02-25 PROCEDURE — 83735 ASSAY OF MAGNESIUM: CPT | Mod: HCNC | Performed by: NURSE PRACTITIONER

## 2025-02-25 PROCEDURE — 84540 ASSAY OF URINE/UREA-N: CPT | Mod: HCNC | Performed by: INTERNAL MEDICINE

## 2025-02-25 PROCEDURE — 25000003 PHARM REV CODE 250: Mod: HCNC | Performed by: NURSE PRACTITIONER

## 2025-02-25 PROCEDURE — 85025 COMPLETE CBC W/AUTO DIFF WBC: CPT | Mod: HCNC | Performed by: NURSE PRACTITIONER

## 2025-02-25 PROCEDURE — 84100 ASSAY OF PHOSPHORUS: CPT | Mod: HCNC | Performed by: NURSE PRACTITIONER

## 2025-02-25 PROCEDURE — 36415 COLL VENOUS BLD VENIPUNCTURE: CPT | Mod: HCNC | Performed by: NURSE PRACTITIONER

## 2025-02-25 PROCEDURE — 99232 SBSQ HOSP IP/OBS MODERATE 35: CPT | Mod: HCNC,,, | Performed by: INTERNAL MEDICINE

## 2025-02-25 PROCEDURE — 21400001 HC TELEMETRY ROOM: Mod: HCNC

## 2025-02-25 PROCEDURE — 63600175 PHARM REV CODE 636 W HCPCS: Mod: HCNC | Performed by: NURSE PRACTITIONER

## 2025-02-25 RX ORDER — QUETIAPINE FUMARATE 25 MG/1
25 TABLET, FILM COATED ORAL NIGHTLY
Status: COMPLETED | OUTPATIENT
Start: 2025-02-25 | End: 2025-02-25

## 2025-02-25 RX ORDER — BUDESONIDE 3 MG/1
3 CAPSULE, COATED PELLETS ORAL DAILY
Status: DISCONTINUED | OUTPATIENT
Start: 2025-02-25 | End: 2025-02-28 | Stop reason: HOSPADM

## 2025-02-25 RX ORDER — MAGNESIUM SULFATE HEPTAHYDRATE 40 MG/ML
2 INJECTION, SOLUTION INTRAVENOUS ONCE
Status: COMPLETED | OUTPATIENT
Start: 2025-02-25 | End: 2025-02-25

## 2025-02-25 RX ADMIN — ASPIRIN 81 MG: 81 TABLET, COATED ORAL at 08:02

## 2025-02-25 RX ADMIN — DORZOLAMIDE HYDROCHLORIDE AND TIMOLOL MALEATE 1 DROP: 22.3; 6.8 SOLUTION/ DROPS OPHTHALMIC at 08:02

## 2025-02-25 RX ADMIN — PANTOPRAZOLE SODIUM 40 MG: 40 TABLET, DELAYED RELEASE ORAL at 08:02

## 2025-02-25 RX ADMIN — MAGNESIUM SULFATE HEPTAHYDRATE 2 G: 40 INJECTION, SOLUTION INTRAVENOUS at 08:02

## 2025-02-25 RX ADMIN — DORZOLAMIDE HYDROCHLORIDE AND TIMOLOL MALEATE 1 DROP: 22.3; 6.8 SOLUTION/ DROPS OPHTHALMIC at 09:02

## 2025-02-25 RX ADMIN — AMLODIPINE BESYLATE 5 MG: 5 TABLET ORAL at 08:02

## 2025-02-25 RX ADMIN — CARVEDILOL 12.5 MG: 12.5 TABLET, FILM COATED ORAL at 04:02

## 2025-02-25 RX ADMIN — ENOXAPARIN SODIUM 30 MG: 30 INJECTION SUBCUTANEOUS at 04:02

## 2025-02-25 RX ADMIN — CARVEDILOL 12.5 MG: 12.5 TABLET, FILM COATED ORAL at 08:02

## 2025-02-25 RX ADMIN — SODIUM BICARBONATE 650 MG TABLET 650 MG: at 08:02

## 2025-02-25 RX ADMIN — PANCRELIPASE 1 CAPSULE: 120000; 24000; 76000 CAPSULE, DELAYED RELEASE PELLETS ORAL at 04:02

## 2025-02-25 RX ADMIN — PANCRELIPASE 1 CAPSULE: 120000; 24000; 76000 CAPSULE, DELAYED RELEASE PELLETS ORAL at 11:02

## 2025-02-25 RX ADMIN — CITALOPRAM HYDROBROMIDE 10 MG: 10 TABLET ORAL at 09:02

## 2025-02-25 RX ADMIN — PANCRELIPASE 1 CAPSULE: 120000; 24000; 76000 CAPSULE, DELAYED RELEASE PELLETS ORAL at 08:02

## 2025-02-25 RX ADMIN — ATORVASTATIN CALCIUM 40 MG: 40 TABLET, FILM COATED ORAL at 04:02

## 2025-02-25 RX ADMIN — BUDESONIDE 3 MG: 3 CAPSULE, COATED PELLETS ORAL at 04:02

## 2025-02-25 RX ADMIN — QUETIAPINE FUMARATE 25 MG: 25 TABLET ORAL at 09:02

## 2025-02-25 RX ADMIN — ACETAMINOPHEN 650 MG: 325 TABLET ORAL at 01:02

## 2025-02-25 NOTE — HOSPITAL COURSE
2-25-25  echo nml EF, pulmonary HTN, less SOB, walked in room ok, IV bumex held secondary to increased Cr    2-26-27   No c/o , can restart home bumex at lower dose, continue BP control , can follow up in clinic 1-2 weeks

## 2025-02-25 NOTE — ASSESSMENT & PLAN NOTE
Creatine stable for now. BMP reviewed- noted Estimated Creatinine Clearance: 13.1 mL/min (A) (based on SCr of 2.5 mg/dL (H)). according to latest data. Based on current GFR, CKD stage is stage 4 - GFR 15-29.  Monitor UOP and serial BMP and adjust therapy as needed. Renally dose meds. Avoid nephrotoxic medications and procedures.  Cr baseline 1.2-1.4, most recently 1.8  Cr uptrending   Hold Jardiance   Monitor BMP   Strict IS and Os   Obtain urine lytes

## 2025-02-25 NOTE — ASSESSMENT & PLAN NOTE
Patient's most recent potassium results are listed below.   Recent Labs     02/23/25  1553 02/24/25  0502 02/25/25  0454   K 3.4* 4.1 3.5       Plan  - Replete potassium per protocol  - Monitor potassium Daily  - Patient's hypokalemia is stable
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
## 2025-02-25 NOTE — SUBJECTIVE & OBJECTIVE
Interval History: NAEON. Pt has no complaints today. Reports shortness of breath is better. Denies cough, leg swelling. On exam, has LUE swelling, will obtain duplex. Bumex held given rise in Cr     Review of Systems  Objective:     Vital Signs (Most Recent):  Temp: 97.5 °F (36.4 °C) (02/25/25 0806)  Pulse: 64 (02/25/25 0900)  Resp: 19 (02/25/25 0806)  BP: 135/63 (02/25/25 0839)  SpO2: 95 % (02/25/25 0806) Vital Signs (24h Range):  Temp:  [97.5 °F (36.4 °C)-98.6 °F (37 °C)] 97.5 °F (36.4 °C)  Pulse:  [62-74] 64  Resp:  [17-19] 19  SpO2:  [93 %-98 %] 95 %  BP: (106-135)/(52-63) 135/63     Weight: 59 kg (130 lb)  Body mass index is 24.56 kg/m².  No intake or output data in the 24 hours ending 02/25/25 1054      Physical Exam  Vitals and nursing note reviewed.   Constitutional:       General: She is not in acute distress.     Appearance: Ill appearance: chronic.   Cardiovascular:      Rate and Rhythm: Normal rate and regular rhythm.      Heart sounds: No murmur heard.  Pulmonary:      Effort: Pulmonary effort is normal.      Breath sounds: Normal breath sounds. No wheezing or rales.   Abdominal:      General: Bowel sounds are normal. There is no distension.      Palpations: Abdomen is soft.      Tenderness: There is no abdominal tenderness.   Musculoskeletal:      Comments: Trace BLE edema, + LUE edema    Neurological:      Mental Status: She is alert and oriented to person, place, and time.             Significant Labs: All pertinent labs within the past 24 hours have been reviewed.    Significant Imaging: I have reviewed all pertinent imaging results/findings within the past 24 hours.

## 2025-02-25 NOTE — PROGRESS NOTES
[facility] - Tele[facility] ([facility])  Cardiology  Progress Note    Patient Name: Glo Dumont  MRN: [id number]  Admission Date: 2/23/2025  Hospital Length of Stay: 1 days  Code Status: Full Code   Attending Physician: Jailyn Duran MD   Primary Care Physician: No primary care provider on file.  Expected Discharge Date:   Principal Problem:Acute on chronic heart failure with preserved ejection fraction    Subjective:     Hospital Course:   2-25-25  echo nml EF, pulmonary HTN, less SOB, walked in room ok, IV bumex held secondary to increased Cr    Interval History:     Review of Systems   Constitutional: Negative. Negative for weight gain.   HENT: Negative.     Eyes: Negative.    Cardiovascular: Negative.  Negative for chest pain, leg swelling and palpitations.   Respiratory: Negative.  Negative for shortness of breath.    Endocrine: Negative.    Hematologic/Lymphatic: Negative.    Skin: Negative.    Musculoskeletal:  Negative for muscle weakness.   Gastrointestinal: Negative.    Genitourinary: Negative.    Neurological: Negative.  Negative for dizziness.   Psychiatric/Behavioral: Negative.     Allergic/Immunologic: Negative.    All other systems reviewed and are negative.    Objective:     Vital Signs (Most Recent):  Temp: 97.5 °F (36.4 °C) (02/25/25 0806)  Pulse: 64 (02/25/25 0900)  Resp: 19 (02/25/25 0806)  BP: 135/63 (02/25/25 0839)  SpO2: 95 % (02/25/25 0806) Vital Signs (24h Range):  Temp:  [97.5 °F (36.4 °C)-98.6 °F (37 °C)] 97.5 °F (36.4 °C)  Pulse:  [62-74] 64  Resp:  [17-19] 19  SpO2:  [93 %-98 %] 95 %  BP: (106-135)/(52-63) 135/63     Weight: 59 kg (130 lb)  Body mass index is 24.56 kg/m².     SpO2: 95 %       No intake or output data in the 24 hours ending 02/25/25 1146    Lines/Drains/Airways       Peripheral Intravenous Line  Duration                  Peripheral IV - Single Lumen 02/23/25 1456 20 G Anterior;Distal;Left Upper Arm 1 day                       Physical Exam  Vitals and nursing note reviewed.    Constitutional:       Appearance: She is well-developed.   HENT:      Head: Normocephalic and atraumatic.   Eyes:      Conjunctiva/sclera: Conjunctivae normal.      Pupils: Pupils are equal, round, and reactive to light.   Cardiovascular:      Rate and Rhythm: Normal rate and regular rhythm.      Pulses: Intact distal pulses.      Heart sounds: Normal heart sounds.   Pulmonary:      Effort: Pulmonary effort is normal.      Breath sounds: Normal breath sounds.   Abdominal:      General: Bowel sounds are normal.      Palpations: Abdomen is soft.   Musculoskeletal:         General: Normal range of motion.      Cervical back: Normal range of motion and neck supple.   Skin:     General: Skin is warm and dry.   Neurological:      Mental Status: She is alert and oriented to person, place, and time.            Significant Labs: All pertinent lab results from the last 24 hours have been reviewed.    Significant Imaging: X-Ray: CXR: X-Ray Chest 1 View (CXR): No results found for this visit on 02/23/25.  Assessment and Plan:     Brief HPI:     * Acute on chronic heart failure with preserved ejection fraction  Patient has Diastolic (HFpEF) heart failure that is Acute on chronic. On presentation their CHF was decompensated. Evidence of decompensated CHF on presentation includes: dyspnea on exertion (SOOD). The etiology of their decompensation is likely unknown . Most recent BNP and echo results are listed below.  Recent Labs     02/23/25  1446   *     Latest ECHO  Results for orders placed during the hospital encounter of 02/23/25    Echo Saline Bubble? No; Ultrasound enhancing contrast? No    Interpretation Summary    Left Ventricle: The left ventricle is normal in size. Normal wall thickness. There is concentric remodeling. There is normal systolic function. Ejection fraction is approximately 60%. Grade II diastolic dysfunction.    Right Ventricle: Normal right ventricular cavity size. Wall thickness is normal.  Systolic function is normal.    Left Atrium: Left atrium is severely dilated.    Aortic Valve: There is mild aortic valve sclerosis. There is mild stenosis. Aortic valve area by VTI is 1.6 cm². Aortic valve peak velocity is 2.0 m/s. Mean gradient is 8 mmHg. The dimensionless index is 0.58.    Tricuspid Valve: There is moderate to severe regurgitation with a centrally directed jet.    Pulmonary Artery: The estimated pulmonary artery systolic pressure is 55 mmHg.    IVC/SVC: Normal venous pressure at 3 mmHg.    Current Heart Failure Medications  carvediloL tablet 12.5 mg, 2 times daily with meals, Oral  bumetanide injection 2 mg, 2 times daily, Intravenous    Plan  - Monitor strict I&Os and daily weights.    - Place on telemetry  - Low sodium diet  - Place on fluid restriction of 2 L.   - Cardiology has been consulted  - The patient's volume status is improving but not at their baseline as indicated by shortness of breath  -       89 y.o. female, with PMhx for  sarcoidosis of lung, obesity, HTN, HLD, CHF diastolic , CAD, stage 3 CKD, and anemia admitted for acute on chronic CHF.  BNP 90s. CXR-small left pleural effusion. BP ok. EKG is normal. There is a mild increase in troponin. H/H mildly decreased.    Recommend IV diuresis, monitor h/h, BP control. Troponin is from demand ischemia        VTE Risk Mitigation (From admission, onward)           Ordered     enoxaparin injection 30 mg  Daily         02/23/25 1755     IP VTE HIGH RISK PATIENT  Once         02/23/25 1755     Place sequential compression device  Until discontinued         02/23/25 1755                    David Tong MD  Cardiology  [facility] - Telemetry ([facility])

## 2025-02-25 NOTE — PROGRESS NOTES
[facility] Telemetry ([facility])  [facility] Medicine  Progress Note    Patient Name: Glo Dumont  MRN: [id number]  Patient Class: IP- Inpatient   Admission Date: 2/23/2025  Length of Stay: 1 days  Attending Physician: Jailyn Duran MD  Primary Care Provider: No primary care provider on file.        Subjective     Principal Problem:Acute on chronic heart failure with preserved ejection fraction        HPI:  89 y.o. female, comorbid conditions include sarcoidosis of lung, obesity, HTN, HLD, CHF, CAD, stage 3 CKD, CAD, CHF, and anemia. Presented to the ED for evaluation of general illness which began 2-3 days PTA. Associated sxs include cough, wheezing, SOB, rhinorrhea, congestion, sore throat, eye swelling, and confusion. Symptoms are constant and moderate in severity. Daughter denies pt having history of emphysema or COPD. In the ED, vitals: 114/58, 63, 15, 98.4F, 94% RA on arrival. Significant Labs: Hgb: 8.8, Cr: 1.9, BNP: 940, Trop: 0.121. CXR: Left Pleural Effusion. Treated with IV diuretic in the ED. Patient is a full code. Placed in Observation under the care of Hospital Medicine for management of Acute On Chronic CHF.     Overview/Hospital Course:  Continued on IV Bumex for diuresis. Cardiology consulted 02/24 given uptrending Cr     Interval History: NAEON. Pt has no complaints today. Reports shortness of breath is better. Denies cough, leg swelling. On exam, has LUE swelling, will obtain duplex. Bumex held given rise in Cr     Review of Systems  Objective:     Vital Signs (Most Recent):  Temp: 97.5 °F (36.4 °C) (02/25/25 0806)  Pulse: 64 (02/25/25 0900)  Resp: 19 (02/25/25 0806)  BP: 135/63 (02/25/25 0839)  SpO2: 95 % (02/25/25 0806) Vital Signs (24h Range):  Temp:  [97.5 °F (36.4 °C)-98.6 °F (37 °C)] 97.5 °F (36.4 °C)  Pulse:  [62-74] 64  Resp:  [17-19] 19  SpO2:  [93 %-98 %] 95 %  BP: (106-135)/(52-63) 135/63     Weight: 59 kg (130 lb)  Body mass index is 24.56 kg/m².  No intake or output data in the 24  hours ending 02/25/25 1054      Physical Exam  Vitals and nursing note reviewed.   Constitutional:       General: She is not in acute distress.     Appearance: Ill appearance: chronic.   Cardiovascular:      Rate and Rhythm: Normal rate and regular rhythm.      Heart sounds: No murmur heard.  Pulmonary:      Effort: Pulmonary effort is normal.      Breath sounds: Normal breath sounds. No wheezing or rales.   Abdominal:      General: Bowel sounds are normal. There is no distension.      Palpations: Abdomen is soft.      Tenderness: There is no abdominal tenderness.   Musculoskeletal:      Comments: Trace BLE edema, + LUE edema    Neurological:      Mental Status: She is alert and oriented to person, place, and time.             Significant Labs: All pertinent labs within the past 24 hours have been reviewed.    Significant Imaging: I have reviewed all pertinent imaging results/findings within the past 24 hours.    Assessment and Plan     * Acute on chronic heart failure with preserved ejection fraction  Patient has Diastolic (HFpEF) heart failure that is Acute on chronic. On presentation their CHF was decompensated. Evidence of decompensated CHF on presentation includes: edema. The etiology of their decompensation is likely dietary indiscretion and increased fluid intake. Most recent BNP and echo results are listed below.  Recent Labs     02/23/25  1446   *       Latest ECHO  Results for orders placed during the hospital encounter of 10/13/24    Echo    Interpretation Summary    Left Ventricle: The left ventricle is normal in size. Normal wall thickness. There is concentric remodeling. There is normal systolic function with a visually estimated ejection fraction of 60 - 65%. Ejection fraction is approximately 60%.    Right Ventricle: Normal right ventricular cavity size. Wall thickness is normal. Systolic function is normal.    Mitral Valve: There is moderate mitral annular calcification present. There is mild  regurgitation.    Pulmonary Artery: The estimated pulmonary artery systolic pressure is 32 mmHg.    IVC/SVC: Normal venous pressure at 3 mmHg.    Current Heart Failure Medications  carvediloL tablet 12.5 mg, 2 times daily with meals, Oral    Plan  - Monitor strict I&Os and daily weights.    - Place on telemetry  - Low sodium diet  - Place on fluid restriction of 1.5 L.   - Cardiology has been consulted  - IV bumex held this AM due to rise in Cr- discussed with Dr. Tong          Elevated troponin  Likely secondary to demand ischemia related to CHF exacerbation  Trop plateau at 0.103  Continue aspirin, statin, B blocker   No CP at this time   Cardiology consulted - trop elevation felt to be demand ischemia   Tele monitoring       Hypokalemia  Patient's most recent potassium results are listed below.   Recent Labs     02/23/25  1553 02/24/25  0502 02/25/25  0454   K 3.4* 4.1 3.5       Plan  - Replete potassium per protocol  - Monitor potassium Daily  - Patient's hypokalemia is stable      CKD (chronic kidney disease) stage 4, GFR 15-29 ml/min  Creatine stable for now. BMP reviewed- noted Estimated Creatinine Clearance: 13.1 mL/min (A) (based on SCr of 2.5 mg/dL (H)). according to latest data. Based on current GFR, CKD stage is stage 4 - GFR 15-29.  Monitor UOP and serial BMP and adjust therapy as needed. Renally dose meds. Avoid nephrotoxic medications and procedures.  Cr baseline 1.2-1.4, most recently 1.8  Cr uptrending   Hold Jardiance   Monitor BMP   Strict IS and Os   Obtain urine lytes     S/P CABG x 3  Followed by cardiology      S/P placement of cardiac pacemaker  Followed by cardiology      Metabolic acidosis  - cont home sodium bicarb       Essential hypertension  Patient's blood pressure range in the last 24 hours was: BP  Min: 114/58  Max: 132/66.The patient's inpatient anti-hypertensive regimen is listed below:  Current Antihypertensives  , , Oral  amLODIPine tablet 5 mg, Daily, Oral  carvediloL tablet  12.5 mg, 2 times daily with meals, Oral  dorzolamide-timolol 2-0.5% ophthalmic solution 1 drop, 2 times daily, Both Eyes  bumetanide injection 2 mg, 2 times daily, Intravenous    Plan  - BP is controlled, no changes needed to their regimen      Iron deficiency anemia  Anemia is likely due to Iron deficiency. Most recent hemoglobin and hematocrit are listed below.  Recent Labs     02/23/25  1446 02/24/25  0502 02/25/25  0454   HGB 8.8* 9.5* 9.1*   HCT 27.5* 30.2* 28.2*       Plan  - Monitor serial CBC: Daily  - Transfuse PRBC if patient becomes hemodynamically unstable, symptomatic or H/H drops below 7/21.  - Patient has not received any PRBC transfusions to date  - Patient's anemia is currently stable    Other hyperlipidemia    --cont statin    Sarcoidosis of lung  Followed by Pulmonology as OP    --Duoneb PRN        VTE Risk Mitigation (From admission, onward)           Ordered     enoxaparin injection 30 mg  Daily         02/23/25 1755     IP VTE HIGH RISK PATIENT  Once         02/23/25 1755     Place sequential compression device  Until discontinued         02/23/25 1755                    Discharge Planning   CANDACE:      Code Status: Full Code   Medical Readiness for Discharge Date:   Discharge Plan A: Home, Home with family                        Jailyn Duran MD  Department of Hospital Medicine   [facility] - Tele[facility] ([facility])

## 2025-02-25 NOTE — ASSESSMENT & PLAN NOTE
Anemia is likely due to Iron deficiency. Most recent hemoglobin and hematocrit are listed below.  Recent Labs     02/23/25  1446 02/24/25  0502 02/25/25  0454   HGB 8.8* 9.5* 9.1*   HCT 27.5* 30.2* 28.2*       Plan  - Monitor serial CBC: Daily  - Transfuse PRBC if patient becomes hemodynamically unstable, symptomatic or H/H drops below 7/21.  - Patient has not received any PRBC transfusions to date  - Patient's anemia is currently stable

## 2025-02-25 NOTE — ASSESSMENT & PLAN NOTE
Patient has Diastolic (HFpEF) heart failure that is Acute on chronic. On presentation their CHF was decompensated. Evidence of decompensated CHF on presentation includes: edema. The etiology of their decompensation is likely dietary indiscretion and increased fluid intake. Most recent BNP and echo results are listed below.  Recent Labs     02/23/25  1446   *       Latest ECHO  Results for orders placed during the hospital encounter of 10/13/24    Echo    Interpretation Summary    Left Ventricle: The left ventricle is normal in size. Normal wall thickness. There is concentric remodeling. There is normal systolic function with a visually estimated ejection fraction of 60 - 65%. Ejection fraction is approximately 60%.    Right Ventricle: Normal right ventricular cavity size. Wall thickness is normal. Systolic function is normal.    Mitral Valve: There is moderate mitral annular calcification present. There is mild regurgitation.    Pulmonary Artery: The estimated pulmonary artery systolic pressure is 32 mmHg.    IVC/SVC: Normal venous pressure at 3 mmHg.    Current Heart Failure Medications  carvediloL tablet 12.5 mg, 2 times daily with meals, Oral    Plan  - Monitor strict I&Os and daily weights.    - Place on telemetry  - Low sodium diet  - Place on fluid restriction of 1.5 L.   - Cardiology has been consulted  - IV bumex held this AM due to rise in Cr- discussed with Dr. Tong

## 2025-02-25 NOTE — SUBJECTIVE & OBJECTIVE
Interval History:     Review of Systems   Constitutional: Negative. Negative for weight gain.   HENT: Negative.     Eyes: Negative.    Cardiovascular: Negative.  Negative for chest pain, leg swelling and palpitations.   Respiratory: Negative.  Negative for shortness of breath.    Endocrine: Negative.    Hematologic/Lymphatic: Negative.    Skin: Negative.    Musculoskeletal:  Negative for muscle weakness.   Gastrointestinal: Negative.    Genitourinary: Negative.    Neurological: Negative.  Negative for dizziness.   Psychiatric/Behavioral: Negative.     Allergic/Immunologic: Negative.    All other systems reviewed and are negative.    Objective:     Vital Signs (Most Recent):  Temp: 97.5 °F (36.4 °C) (02/25/25 0806)  Pulse: 64 (02/25/25 0900)  Resp: 19 (02/25/25 0806)  BP: 135/63 (02/25/25 0839)  SpO2: 95 % (02/25/25 0806) Vital Signs (24h Range):  Temp:  [97.5 °F (36.4 °C)-98.6 °F (37 °C)] 97.5 °F (36.4 °C)  Pulse:  [62-74] 64  Resp:  [17-19] 19  SpO2:  [93 %-98 %] 95 %  BP: (106-135)/(52-63) 135/63     Weight: 59 kg (130 lb)  Body mass index is 24.56 kg/m².     SpO2: 95 %       No intake or output data in the 24 hours ending 02/25/25 1146    Lines/Drains/Airways       Peripheral Intravenous Line  Duration                  Peripheral IV - Single Lumen 02/23/25 1456 20 G Anterior;Distal;Left Upper Arm 1 day                       Physical Exam  Vitals and nursing note reviewed.   Constitutional:       Appearance: She is well-developed.   HENT:      Head: Normocephalic and atraumatic.   Eyes:      Conjunctiva/sclera: Conjunctivae normal.      Pupils: Pupils are equal, round, and reactive to light.   Cardiovascular:      Rate and Rhythm: Normal rate and regular rhythm.      Pulses: Intact distal pulses.      Heart sounds: Normal heart sounds.   Pulmonary:      Effort: Pulmonary effort is normal.      Breath sounds: Normal breath sounds.   Abdominal:      General: Bowel sounds are normal.      Palpations: Abdomen is soft.    Musculoskeletal:         General: Normal range of motion.      Cervical back: Normal range of motion and neck supple.   Skin:     General: Skin is warm and dry.   Neurological:      Mental Status: She is alert and oriented to person, place, and time.            Significant Labs: All pertinent lab results from the last 24 hours have been reviewed.    Significant Imaging: X-Ray: CXR: X-Ray Chest 1 View (CXR): No results found for this visit on 02/23/25.

## 2025-02-25 NOTE — ASSESSMENT & PLAN NOTE
Likely secondary to demand ischemia related to CHF exacerbation  Trop plateau at 0.103  Continue aspirin, statin, B blocker   No CP at this time   Cardiology consulted - trop elevation felt to be demand ischemia   Tele monitoring

## 2025-02-26 LAB
ALBUMIN SERPL BCP-MCNC: 2.9 G/DL (ref 3.5–5.2)
ALP SERPL-CCNC: 69 U/L (ref 40–150)
ALT SERPL W/O P-5'-P-CCNC: 9 U/L (ref 10–44)
ANION GAP SERPL CALC-SCNC: 10 MMOL/L (ref 8–16)
AST SERPL-CCNC: 11 U/L (ref 10–40)
BILIRUB SERPL-MCNC: 0.5 MG/DL (ref 0.1–1)
BUN SERPL-MCNC: 29 MG/DL (ref 8–23)
CALCIUM SERPL-MCNC: 8.5 MG/DL (ref 8.7–10.5)
CHLORIDE SERPL-SCNC: 105 MMOL/L (ref 95–110)
CO2 SERPL-SCNC: 24 MMOL/L (ref 23–29)
CREAT SERPL-MCNC: 2.3 MG/DL (ref 0.5–1.4)
EST. GFR  (NO RACE VARIABLE): 20 ML/MIN/1.73 M^2
GLUCOSE SERPL-MCNC: 80 MG/DL (ref 70–110)
MAGNESIUM SERPL-MCNC: 2.3 MG/DL (ref 1.6–2.6)
PHOSPHATE SERPL-MCNC: 3.5 MG/DL (ref 2.7–4.5)
POCT GLUCOSE: 113 MG/DL (ref 70–110)
POCT GLUCOSE: 135 MG/DL (ref 70–110)
POCT GLUCOSE: 90 MG/DL (ref 70–110)
POCT GLUCOSE: 97 MG/DL (ref 70–110)
POTASSIUM SERPL-SCNC: 3.6 MMOL/L (ref 3.5–5.1)
PROT SERPL-MCNC: 5.2 G/DL (ref 6–8.4)
SODIUM SERPL-SCNC: 139 MMOL/L (ref 136–145)
UUN UR-MCNC: 248 MG/DL (ref 140–1050)

## 2025-02-26 PROCEDURE — 63600175 PHARM REV CODE 636 W HCPCS: Mod: HCNC | Performed by: NURSE PRACTITIONER

## 2025-02-26 PROCEDURE — 84100 ASSAY OF PHOSPHORUS: CPT | Mod: HCNC | Performed by: NURSE PRACTITIONER

## 2025-02-26 PROCEDURE — 25000003 PHARM REV CODE 250: Mod: HCNC | Performed by: INTERNAL MEDICINE

## 2025-02-26 PROCEDURE — 80053 COMPREHEN METABOLIC PANEL: CPT | Mod: HCNC | Performed by: NURSE PRACTITIONER

## 2025-02-26 PROCEDURE — 21400001 HC TELEMETRY ROOM: Mod: HCNC

## 2025-02-26 PROCEDURE — 83735 ASSAY OF MAGNESIUM: CPT | Mod: HCNC | Performed by: NURSE PRACTITIONER

## 2025-02-26 PROCEDURE — 36415 COLL VENOUS BLD VENIPUNCTURE: CPT | Mod: HCNC | Performed by: NURSE PRACTITIONER

## 2025-02-26 PROCEDURE — 25000003 PHARM REV CODE 250: Mod: HCNC | Performed by: NURSE PRACTITIONER

## 2025-02-26 RX ORDER — POTASSIUM CHLORIDE 750 MG/1
30 TABLET, EXTENDED RELEASE ORAL ONCE
Status: COMPLETED | OUTPATIENT
Start: 2025-02-26 | End: 2025-02-26

## 2025-02-26 RX ADMIN — PANCRELIPASE 1 CAPSULE: 120000; 24000; 76000 CAPSULE, DELAYED RELEASE PELLETS ORAL at 07:02

## 2025-02-26 RX ADMIN — DORZOLAMIDE HYDROCHLORIDE AND TIMOLOL MALEATE 1 DROP: 22.3; 6.8 SOLUTION/ DROPS OPHTHALMIC at 10:02

## 2025-02-26 RX ADMIN — SODIUM BICARBONATE 650 MG TABLET 650 MG: at 08:02

## 2025-02-26 RX ADMIN — BUDESONIDE 3 MG: 3 CAPSULE, COATED PELLETS ORAL at 08:02

## 2025-02-26 RX ADMIN — AMLODIPINE BESYLATE 5 MG: 5 TABLET ORAL at 08:02

## 2025-02-26 RX ADMIN — POTASSIUM CHLORIDE 30 MEQ: 750 TABLET, EXTENDED RELEASE ORAL at 08:02

## 2025-02-26 RX ADMIN — PANCRELIPASE 1 CAPSULE: 120000; 24000; 76000 CAPSULE, DELAYED RELEASE PELLETS ORAL at 11:02

## 2025-02-26 RX ADMIN — CITALOPRAM HYDROBROMIDE 10 MG: 10 TABLET ORAL at 10:02

## 2025-02-26 RX ADMIN — DORZOLAMIDE HYDROCHLORIDE AND TIMOLOL MALEATE 1 DROP: 22.3; 6.8 SOLUTION/ DROPS OPHTHALMIC at 08:02

## 2025-02-26 RX ADMIN — CARVEDILOL 12.5 MG: 12.5 TABLET, FILM COATED ORAL at 07:02

## 2025-02-26 RX ADMIN — PANCRELIPASE 1 CAPSULE: 120000; 24000; 76000 CAPSULE, DELAYED RELEASE PELLETS ORAL at 05:02

## 2025-02-26 RX ADMIN — PANTOPRAZOLE SODIUM 40 MG: 40 TABLET, DELAYED RELEASE ORAL at 08:02

## 2025-02-26 RX ADMIN — ASPIRIN 81 MG: 81 TABLET, COATED ORAL at 08:02

## 2025-02-26 RX ADMIN — CARVEDILOL 12.5 MG: 12.5 TABLET, FILM COATED ORAL at 05:02

## 2025-02-26 RX ADMIN — ENOXAPARIN SODIUM 30 MG: 30 INJECTION SUBCUTANEOUS at 05:02

## 2025-02-26 RX ADMIN — ATORVASTATIN CALCIUM 40 MG: 40 TABLET, FILM COATED ORAL at 05:02

## 2025-02-26 NOTE — PROGRESS NOTES
[facility] Telemetry ([facility] Medicine  Progress Note    Patient Name: Glo Dumont  MRN: [id number]  Patient Class: IP- Inpatient   Admission Date: 2/23/2025  Length of Stay: 2 days  Attending Physician: Jailyn Duran MD  Primary Care Provider: No primary care provider on file.        Subjective     Principal Problem:Acute on chronic heart failure with preserved ejection fraction        HPI:  89 y.o. female, comorbid conditions include sarcoidosis of lung, obesity, HTN, HLD, CHF, CAD, stage 3 CKD, CAD, CHF, and anemia. Presented to the ED for evaluation of general illness which began 2-3 days PTA. Associated sxs include cough, wheezing, SOB, rhinorrhea, congestion, sore throat, eye swelling, and confusion. Symptoms are constant and moderate in severity. Daughter denies pt having history of emphysema or COPD. In the ED, vitals: 114/58, 63, 15, 98.4F, 94% RA on arrival. Significant Labs: Hgb: 8.8, Cr: 1.9, BNP: 940, Trop: 0.121. CXR: Left Pleural Effusion. Treated with IV diuretic in the ED. Patient is a full code. Placed in Observation under the care of Hospital Medicine for management of Acute On Chronic CHF.     Overview/Hospital Course:  Continued on IV Bumex for diuresis. Cardiology consulted 02/24 given uptrending Cr, diuretics held 02/25.       Interval History: NAEON. Cr slightly improved, electrolytes stable. PT seen and examined, no family at bedside during rounds. Pt oriented to self, place, situation but not time. Denies CP, SOB, abd pain, cough. L arm still swollen, duplex neg for DVT    Review of Systems  Objective:     Vital Signs (Most Recent):  Temp: 98 °F (36.7 °C) (02/26/25 1133)  Pulse: 66 (02/26/25 1133)  Resp: 18 (02/26/25 1133)  BP: (!) 125/59 (02/26/25 1133)  SpO2: 95 % (02/26/25 1133) Vital Signs (24h Range):  Temp:  [97.5 °F (36.4 °C)-98.7 °F (37.1 °C)] 98 °F (36.7 °C)  Pulse:  [58-68] 66  Resp:  [17-19] 18  SpO2:  [95 %-98 %] 95 %  BP: (112-155)/(58-67) 125/59     Weight: 56.8  kg (125 lb 5.3 oz)  Body mass index is 23.68 kg/m².    Intake/Output Summary (Last 24 hours) at 2/26/2025 1309  Last data filed at 2/26/2025 0845  Gross per 24 hour   Intake 120 ml   Output 300 ml   Net -180 ml         Physical Exam  Vitals and nursing note reviewed.   Constitutional:       General: She is not in acute distress.     Appearance: Ill appearance: chronic.   Cardiovascular:      Rate and Rhythm: Normal rate and regular rhythm.      Heart sounds: No murmur heard.  Pulmonary:      Effort: Pulmonary effort is normal.      Breath sounds: Normal breath sounds. No wheezing or rales.   Abdominal:      General: Bowel sounds are normal. There is no distension.      Palpations: Abdomen is soft.      Tenderness: There is no abdominal tenderness.   Musculoskeletal:      Comments: LUE edema, no BLE edema    Neurological:      Mental Status: She is alert.      Comments: Oriented to person, place, situation              Significant Labs: All pertinent labs within the past 24 hours have been reviewed.    Significant Imaging: I have reviewed all pertinent imaging results/findings within the past 24 hours.    Assessment and Plan     * Acute on chronic heart failure with preserved ejection fraction  Patient has Diastolic (HFpEF) heart failure that is Acute on chronic. On presentation their CHF was decompensated. Evidence of decompensated CHF on presentation includes: edema. The etiology of their decompensation is likely dietary indiscretion and increased fluid intake. Most recent BNP and echo results are listed below.  Recent Labs     02/23/25  1446   *       Latest ECHO  Results for orders placed during the hospital encounter of 10/13/24    Echo    Interpretation Summary    Left Ventricle: The left ventricle is normal in size. Normal wall thickness. There is concentric remodeling. There is normal systolic function with a visually estimated ejection fraction of 60 - 65%. Ejection fraction is approximately 60%.     Right Ventricle: Normal right ventricular cavity size. Wall thickness is normal. Systolic function is normal.    Mitral Valve: There is moderate mitral annular calcification present. There is mild regurgitation.    Pulmonary Artery: The estimated pulmonary artery systolic pressure is 32 mmHg.    IVC/SVC: Normal venous pressure at 3 mmHg.    Current Heart Failure Medications  carvediloL tablet 12.5 mg, 2 times daily with meals, Oral    Plan  - Monitor strict I&Os and daily weights.    - Place on telemetry  - Low sodium diet  - Place on fluid restriction of 1.5 L.   - Cardiology has been consulted  - IV bumex held 02/25  due to rise in Cr, will resume diuretics in AM if Cr cont to improve          Elevated troponin  Likely secondary to demand ischemia related to CHF exacerbation  Trop plateau at 0.103  Continue aspirin, statin, B blocker   No CP at this time   Cardiology consulted - trop elevation felt to be demand ischemia   Tele monitoring       Hypokalemia  Patient's most recent potassium results are listed below.   Recent Labs     02/24/25  0502 02/25/25  0454 02/26/25  0452   K 4.1 3.5 3.6       Plan  - Replete potassium per protocol  - Monitor potassium Daily  - Patient's hypokalemia is stable      CKD (chronic kidney disease) stage 4, GFR 15-29 ml/min  Creatine stable for now. BMP reviewed- noted Estimated Creatinine Clearance: 13 mL/min (A) (based on SCr of 2.3 mg/dL (H)). according to latest data. Based on current GFR, CKD stage is stage 4 - GFR 15-29.  Monitor UOP and serial BMP and adjust therapy as needed. Renally dose meds. Avoid nephrotoxic medications and procedures.  Cr baseline 1.2-1.4, most recently 1.8  Cr uptrended with diuresis, diuretics held 02/25, Cr improving   Hold Jardiance   Monitor BMP   Strict IS and Os        S/P CABG x 3  Followed by cardiology      S/P placement of cardiac pacemaker  Followed by cardiology      Metabolic acidosis  - cont home sodium bicarb       Essential  hypertension  Patient's blood pressure range in the last 24 hours was: BP  Min: 114/58  Max: 132/66.The patient's inpatient anti-hypertensive regimen is listed below:  Current Antihypertensives  , , Oral  amLODIPine tablet 5 mg, Daily, Oral  carvediloL tablet 12.5 mg, 2 times daily with meals, Oral  dorzolamide-timolol 2-0.5% ophthalmic solution 1 drop, 2 times daily, Both Eyes  bumetanide injection 2 mg, 2 times daily, Intravenous    Plan  - BP is controlled, no changes needed to their regimen      Iron deficiency anemia  Anemia is likely due to Iron deficiency. Most recent hemoglobin and hematocrit are listed below.  Recent Labs     02/23/25  1446 02/24/25  0502 02/25/25  0454   HGB 8.8* 9.5* 9.1*   HCT 27.5* 30.2* 28.2*       Plan  - Monitor serial CBC: Daily  - Transfuse PRBC if patient becomes hemodynamically unstable, symptomatic or H/H drops below 7/21.  - Patient has not received any PRBC transfusions to date  - Patient's anemia is currently stable    Other hyperlipidemia    --cont statin    Sarcoidosis of lung  Followed by Pulmonology as OP    --Duoneb PRN        VTE Risk Mitigation (From admission, onward)           Ordered     enoxaparin injection 30 mg  Daily         02/23/25 1755     IP VTE HIGH RISK PATIENT  Once         02/23/25 1755     Place sequential compression device  Until discontinued         02/23/25 1755                    Discharge Planning   CANDACE: 2/25/2025     Code Status: Full Code   Medical Readiness for Discharge Date:   Discharge Plan A: Home, Home with family                        Jailyn Duran MD  Department of Hospital Medicine   [facility] - Telemetry ([facility])

## 2025-02-26 NOTE — SUBJECTIVE & OBJECTIVE
Interval History: NAEON. Cr slightly improved, electrolytes stable. PT seen and examined, no family at bedside during rounds. Pt oriented to self, place, situation but not time. Denies CP, SOB, abd pain, cough. L arm still swollen, duplex neg for DVT    Review of Systems  Objective:     Vital Signs (Most Recent):  Temp: 98 °F (36.7 °C) (02/26/25 1133)  Pulse: 66 (02/26/25 1133)  Resp: 18 (02/26/25 1133)  BP: (!) 125/59 (02/26/25 1133)  SpO2: 95 % (02/26/25 1133) Vital Signs (24h Range):  Temp:  [97.5 °F (36.4 °C)-98.7 °F (37.1 °C)] 98 °F (36.7 °C)  Pulse:  [58-68] 66  Resp:  [17-19] 18  SpO2:  [95 %-98 %] 95 %  BP: (112-155)/(58-67) 125/59     Weight: 56.8 kg (125 lb 5.3 oz)  Body mass index is 23.68 kg/m².    Intake/Output Summary (Last 24 hours) at 2/26/2025 1309  Last data filed at 2/26/2025 0845  Gross per 24 hour   Intake 120 ml   Output 300 ml   Net -180 ml         Physical Exam  Vitals and nursing note reviewed.   Constitutional:       General: She is not in acute distress.     Appearance: Ill appearance: chronic.   Cardiovascular:      Rate and Rhythm: Normal rate and regular rhythm.      Heart sounds: No murmur heard.  Pulmonary:      Effort: Pulmonary effort is normal.      Breath sounds: Normal breath sounds. No wheezing or rales.   Abdominal:      General: Bowel sounds are normal. There is no distension.      Palpations: Abdomen is soft.      Tenderness: There is no abdominal tenderness.   Musculoskeletal:      Comments: LUE edema, no BLE edema    Neurological:      Mental Status: She is alert.      Comments: Oriented to person, place, situation              Significant Labs: All pertinent labs within the past 24 hours have been reviewed.    Significant Imaging: I have reviewed all pertinent imaging results/findings within the past 24 hours.

## 2025-02-26 NOTE — PROGRESS NOTES
Nutrition Education    Education Provided: low sodium diet and fluid restriction  Teaching Method: printed materials  Comprehension: needs further teaching  Barriers to Learning: dietitian working remotely - pt did not answer when called into room  Expected Compliance: unable to assess  Comments: Consulted for education on fluid and salt restriction. Dietitian working remotely. Second attempt to call into room to speak with pt without success. Attached diet handouts to discharge paperwork on 2/24/25. RD to continue to attempt to provide diet education as schedule allows.      Melissa Anaya RD, LDN, CNSC

## 2025-02-26 NOTE — PLAN OF CARE
Problem: Adult Inpatient Plan of Care  Goal: Plan of Care Review  2/26/2025 0430 by Jesse Guy RN  Outcome: Progressing  2/26/2025 0233 by Jesse Guy RN  Outcome: Progressing  2/26/2025 0233 by Jesse Guy RN  Outcome: Progressing  Goal: Patient-Specific Goal (Individualized)  2/26/2025 0430 by Jesse Guy RN  Outcome: Progressing  2/26/2025 0233 by Jesse Guy RN  Outcome: Progressing  2/26/2025 0233 by Jesse Guy RN  Outcome: Progressing  Goal: Absence of Hospital-Acquired Illness or Injury  2/26/2025 0430 by Jesse Guy RN  Outcome: Progressing  2/26/2025 0233 by Jesse Guy RN  Outcome: Progressing  2/26/2025 0233 by Jesse Guy RN  Outcome: Progressing  Goal: Optimal Comfort and Wellbeing  2/26/2025 0430 by Jesse Guy RN  Outcome: Progressing  2/26/2025 0233 by Jesse Guy RN  Outcome: Progressing  2/26/2025 0233 by Jesse Guy RN  Outcome: Progressing  Goal: Readiness for Transition of Care  2/26/2025 0430 by Jesse Guy RN  Outcome: Progressing  2/26/2025 0233 by Jesse Guy RN  Outcome: Progressing  2/26/2025 0233 by Jesse Guy RN  Outcome: Progressing     Problem: Wound  Goal: Optimal Coping  2/26/2025 0430 by Jesse Guy RN  Outcome: Progressing  2/26/2025 0233 by Jesse Guy RN  Outcome: Progressing  2/26/2025 0233 by Jesse Guy RN  Outcome: Progressing  Goal: Optimal Functional Ability  2/26/2025 0430 by Jesse Guy RN  Outcome: Progressing  2/26/2025 0233 by Jesse Guy RN  Outcome: Progressing  2/26/2025 0233 by Jesse Guy RN  Outcome: Progressing  Goal: Absence of Infection Signs and Symptoms  2/26/2025 0430 by Jesse Guy RN  Outcome: Progressing  2/26/2025 0233 by Jesse Guy RN  Outcome: Progressing  2/26/2025 0233 by Jesse Guy RN  Outcome: Progressing  Goal: Improved Oral Intake  2/26/2025 0430 by Jesse Guy RN  Outcome: Progressing  2/26/2025 0233 by Brooks, Jesse, RN  Outcome: Progressing  2/26/2025 0233 by Jesse Guy, RN  Outcome:  Progressing  Goal: Optimal Pain Control and Function  2/26/2025 0430 by Jesse Guy RN  Outcome: Progressing  2/26/2025 0233 by Jesse Guy RN  Outcome: Progressing  2/26/2025 0233 by Jesse Guy RN  Outcome: Progressing  Goal: Skin Health and Integrity  2/26/2025 0430 by Jesse Guy RN  Outcome: Progressing  2/26/2025 0233 by Jesse Guy RN  Outcome: Progressing  2/26/2025 0233 by Jesse Guy RN  Outcome: Progressing  Goal: Optimal Wound Healing  2/26/2025 0430 by Jesse Guy RN  Outcome: Progressing  2/26/2025 0233 by Jesse Guy RN  Outcome: Progressing  2/26/2025 0233 by Jesse Guy RN  Outcome: Progressing     Problem: Fall Injury Risk  Goal: Absence of Fall and Fall-Related Injury  2/26/2025 0430 by Jesse Guy RN  Outcome: Progressing  2/26/2025 0233 by Jesse Guy RN  Outcome: Progressing  2/26/2025 0233 by Jesse Guy RN  Outcome: Progressing     Problem: Skin Injury Risk Increased  Goal: Skin Health and Integrity  2/26/2025 0430 by Jesse Guy RN  Outcome: Progressing  2/26/2025 0233 by Jesse Guy RN  Outcome: Progressing  2/26/2025 0233 by Jesse Guy RN  Outcome: Progressing     Problem: Infection  Goal: Absence of Infection Signs and Symptoms  2/26/2025 0430 by Jesse Guy RN  Outcome: Progressing  2/26/2025 0233 by Jesse Guy RN  Outcome: Progressing  2/26/2025 0233 by Jesse Guy RN  Outcome: Progressing

## 2025-02-26 NOTE — NURSING
Pt oriented to self, place, and situation today. VSS.  Pt remained afebrile throughout this shift.   All meds administered per order, see MAR.  Pt remained free of falls this shift.   Plan of care reviewed. Patient verbalizes understanding.   Pt moving/turning with assist.  Bed low, side rails up x 2, wheels locked, call light in reach. Bed alarm on. [employer] at bedside.  Patient instructed to call for assistance.  Patient education provided.

## 2025-02-26 NOTE — NURSING
Pt oriented to self and place.  VSS.  Pt remained afebrile throughout this shift.   All meds administered per order, see MAR.  Pt remained free of falls this shift.   Plan of care reviewed. Patient verbalizes understanding.   Pt moving/turning with assist.  Bed low, side rails up x 2, wheels locked, call light in reach. Bed alarm on. [employer] at bedside.  Patient instructed to call for assistance.  Patient education provided.    Pt removed IV at 1500 and refused to let RN attempt to put another one in despite education about importance of IV access. Pt still refused, MD consulted and counseled patient.

## 2025-02-26 NOTE — ASSESSMENT & PLAN NOTE
Creatine stable for now. BMP reviewed- noted Estimated Creatinine Clearance: 13 mL/min (A) (based on SCr of 2.3 mg/dL (H)). according to latest data. Based on current GFR, CKD stage is stage 4 - GFR 15-29.  Monitor UOP and serial BMP and adjust therapy as needed. Renally dose meds. Avoid nephrotoxic medications and procedures.  Cr baseline 1.2-1.4, most recently 1.8  Cr uptrended with diuresis, diuretics held 02/25, Cr improving   Hold Jardiance   Monitor BMP   Strict IS and Os

## 2025-02-26 NOTE — ASSESSMENT & PLAN NOTE
Patient has Diastolic (HFpEF) heart failure that is Acute on chronic. On presentation their CHF was decompensated. Evidence of decompensated CHF on presentation includes: edema. The etiology of their decompensation is likely dietary indiscretion and increased fluid intake. Most recent BNP and echo results are listed below.  Recent Labs     02/23/25  1446   *       Latest ECHO  Results for orders placed during the hospital encounter of 10/13/24    Echo    Interpretation Summary    Left Ventricle: The left ventricle is normal in size. Normal wall thickness. There is concentric remodeling. There is normal systolic function with a visually estimated ejection fraction of 60 - 65%. Ejection fraction is approximately 60%.    Right Ventricle: Normal right ventricular cavity size. Wall thickness is normal. Systolic function is normal.    Mitral Valve: There is moderate mitral annular calcification present. There is mild regurgitation.    Pulmonary Artery: The estimated pulmonary artery systolic pressure is 32 mmHg.    IVC/SVC: Normal venous pressure at 3 mmHg.    Current Heart Failure Medications  carvediloL tablet 12.5 mg, 2 times daily with meals, Oral    Plan  - Monitor strict I&Os and daily weights.    - Place on telemetry  - Low sodium diet  - Place on fluid restriction of 1.5 L.   - Cardiology has been consulted  - IV bumex held 02/25  due to rise in Cr, will resume diuretics in AM if Cr cont to improve

## 2025-02-26 NOTE — PLAN OF CARE
Problem: Adult Inpatient Plan of Care  Goal: Plan of Care Review  2/26/2025 0233 by Jesse Guy RN  Outcome: Progressing  2/26/2025 0233 by Jesse Guy RN  Outcome: Progressing  Goal: Patient-Specific Goal (Individualized)  2/26/2025 0233 by Jesse Guy RN  Outcome: Progressing  2/26/2025 0233 by Jesse Guy RN  Outcome: Progressing  Goal: Absence of Hospital-Acquired Illness or Injury  2/26/2025 0233 by Jesse Guy RN  Outcome: Progressing  2/26/2025 0233 by Jesse Guy RN  Outcome: Progressing  Goal: Optimal Comfort and Wellbeing  2/26/2025 0233 by Jesse Guy RN  Outcome: Progressing  2/26/2025 0233 by Jesse Guy RN  Outcome: Progressing  Goal: Readiness for Transition of Care  2/26/2025 0233 by Jesse Guy RN  Outcome: Progressing  2/26/2025 0233 by Jesse Guy RN  Outcome: Progressing     Problem: Wound  Goal: Optimal Coping  2/26/2025 0233 by Jesse Guy RN  Outcome: Progressing  2/26/2025 0233 by Jesse Guy RN  Outcome: Progressing  Goal: Optimal Functional Ability  2/26/2025 0233 by Jesse Guy RN  Outcome: Progressing  2/26/2025 0233 by Jesse Guy RN  Outcome: Progressing  Goal: Absence of Infection Signs and Symptoms  2/26/2025 0233 by Jesse Guy RN  Outcome: Progressing  2/26/2025 0233 by Jesse Guy RN  Outcome: Progressing  Goal: Improved Oral Intake  2/26/2025 0233 by Jesse Guy RN  Outcome: Progressing  2/26/2025 0233 by Jesse Guy RN  Outcome: Progressing  Goal: Optimal Pain Control and Function  2/26/2025 0233 by Jesse Guy RN  Outcome: Progressing  2/26/2025 0233 by Jesse Guy RN  Outcome: Progressing  Goal: Skin Health and Integrity  2/26/2025 0233 by Jesse Guy RN  Outcome: Progressing  2/26/2025 0233 by Jesse Guy RN  Outcome: Progressing  Goal: Optimal Wound Healing  2/26/2025 0233 by Jesse Guy RN  Outcome: Progressing  2/26/2025 0233 by Jesse Guy RN  Outcome: Progressing     Problem: Fall Injury Risk  Goal: Absence of Fall and  Fall-Related Injury  2/26/2025 0233 by Jesse uGy RN  Outcome: Progressing  2/26/2025 0233 by Jesse Guy RN  Outcome: Progressing     Problem: Skin Injury Risk Increased  Goal: Skin Health and Integrity  2/26/2025 0233 by Jesse Guy RN  Outcome: Progressing  2/26/2025 0233 by Jesse Guy RN  Outcome: Progressing     Problem: Infection  Goal: Absence of Infection Signs and Symptoms  2/26/2025 0233 by Jesse Guy RN  Outcome: Progressing  2/26/2025 0233 by Jesse Guy RN  Outcome: Progressing

## 2025-02-26 NOTE — ASSESSMENT & PLAN NOTE
Patient's most recent potassium results are listed below.   Recent Labs     02/24/25  0502 02/25/25  0454 02/26/25  0452   K 4.1 3.5 3.6       Plan  - Replete potassium per protocol  - Monitor potassium Daily  - Patient's hypokalemia is stable

## 2025-02-27 LAB
ANION GAP SERPL CALC-SCNC: 12 MMOL/L (ref 8–16)
BUN SERPL-MCNC: 27 MG/DL (ref 8–23)
CALCIUM SERPL-MCNC: 8.5 MG/DL (ref 8.7–10.5)
CHLORIDE SERPL-SCNC: 105 MMOL/L (ref 95–110)
CO2 SERPL-SCNC: 21 MMOL/L (ref 23–29)
CREAT SERPL-MCNC: 2.4 MG/DL (ref 0.5–1.4)
EST. GFR  (NO RACE VARIABLE): 19 ML/MIN/1.73 M^2
GLUCOSE SERPL-MCNC: 110 MG/DL (ref 70–110)
MAGNESIUM SERPL-MCNC: 2.2 MG/DL (ref 1.6–2.6)
POCT GLUCOSE: 107 MG/DL (ref 70–110)
POTASSIUM SERPL-SCNC: 4.1 MMOL/L (ref 3.5–5.1)
SODIUM SERPL-SCNC: 138 MMOL/L (ref 136–145)

## 2025-02-27 PROCEDURE — 21400001 HC TELEMETRY ROOM: Mod: HCNC

## 2025-02-27 PROCEDURE — 97530 THERAPEUTIC ACTIVITIES: CPT | Mod: HCNC

## 2025-02-27 PROCEDURE — 97162 PT EVAL MOD COMPLEX 30 MIN: CPT | Mod: HCNC

## 2025-02-27 PROCEDURE — 63600175 PHARM REV CODE 636 W HCPCS: Mod: HCNC | Performed by: NURSE PRACTITIONER

## 2025-02-27 PROCEDURE — 83735 ASSAY OF MAGNESIUM: CPT | Mod: HCNC | Performed by: INTERNAL MEDICINE

## 2025-02-27 PROCEDURE — 99232 SBSQ HOSP IP/OBS MODERATE 35: CPT | Mod: HCNC,,, | Performed by: INTERNAL MEDICINE

## 2025-02-27 PROCEDURE — 80048 BASIC METABOLIC PNL TOTAL CA: CPT | Mod: HCNC | Performed by: INTERNAL MEDICINE

## 2025-02-27 PROCEDURE — 36415 COLL VENOUS BLD VENIPUNCTURE: CPT | Mod: HCNC | Performed by: INTERNAL MEDICINE

## 2025-02-27 PROCEDURE — 25000003 PHARM REV CODE 250: Mod: HCNC | Performed by: NURSE PRACTITIONER

## 2025-02-27 PROCEDURE — 25000003 PHARM REV CODE 250: Mod: HCNC | Performed by: INTERNAL MEDICINE

## 2025-02-27 PROCEDURE — 97166 OT EVAL MOD COMPLEX 45 MIN: CPT | Mod: HCNC

## 2025-02-27 RX ORDER — BUMETANIDE 1 MG/1
1 TABLET ORAL DAILY
Status: DISCONTINUED | OUTPATIENT
Start: 2025-02-27 | End: 2025-02-28 | Stop reason: HOSPADM

## 2025-02-27 RX ORDER — QUETIAPINE FUMARATE 25 MG/1
25 TABLET, FILM COATED ORAL NIGHTLY
Status: DISCONTINUED | OUTPATIENT
Start: 2025-02-27 | End: 2025-02-28 | Stop reason: HOSPADM

## 2025-02-27 RX ADMIN — AMLODIPINE BESYLATE 5 MG: 5 TABLET ORAL at 08:02

## 2025-02-27 RX ADMIN — CARVEDILOL 12.5 MG: 12.5 TABLET, FILM COATED ORAL at 08:02

## 2025-02-27 RX ADMIN — PANTOPRAZOLE SODIUM 40 MG: 40 TABLET, DELAYED RELEASE ORAL at 08:02

## 2025-02-27 RX ADMIN — PANCRELIPASE 1 CAPSULE: 120000; 24000; 76000 CAPSULE, DELAYED RELEASE PELLETS ORAL at 12:02

## 2025-02-27 RX ADMIN — BUDESONIDE 3 MG: 3 CAPSULE, COATED PELLETS ORAL at 08:02

## 2025-02-27 RX ADMIN — DORZOLAMIDE HYDROCHLORIDE AND TIMOLOL MALEATE 1 DROP: 22.3; 6.8 SOLUTION/ DROPS OPHTHALMIC at 08:02

## 2025-02-27 RX ADMIN — BUMETANIDE 1 MG: 1 TABLET ORAL at 05:02

## 2025-02-27 RX ADMIN — CARVEDILOL 12.5 MG: 12.5 TABLET, FILM COATED ORAL at 05:02

## 2025-02-27 RX ADMIN — ASPIRIN 81 MG: 81 TABLET, COATED ORAL at 08:02

## 2025-02-27 RX ADMIN — SODIUM BICARBONATE 650 MG TABLET 650 MG: at 08:02

## 2025-02-27 RX ADMIN — PANCRELIPASE 1 CAPSULE: 120000; 24000; 76000 CAPSULE, DELAYED RELEASE PELLETS ORAL at 05:02

## 2025-02-27 RX ADMIN — QUETIAPINE FUMARATE 25 MG: 25 TABLET ORAL at 08:02

## 2025-02-27 RX ADMIN — CITALOPRAM HYDROBROMIDE 10 MG: 10 TABLET ORAL at 08:02

## 2025-02-27 RX ADMIN — ATORVASTATIN CALCIUM 40 MG: 40 TABLET, FILM COATED ORAL at 05:02

## 2025-02-27 RX ADMIN — PANCRELIPASE 1 CAPSULE: 120000; 24000; 76000 CAPSULE, DELAYED RELEASE PELLETS ORAL at 08:02

## 2025-02-27 RX ADMIN — ENOXAPARIN SODIUM 30 MG: 30 INJECTION SUBCUTANEOUS at 05:02

## 2025-02-27 NOTE — ASSESSMENT & PLAN NOTE
Patient's most recent potassium results are listed below.   Recent Labs     02/25/25  0454 02/26/25  0452 02/27/25  0453   K 3.5 3.6 4.1       Plan  - Replete potassium per protocol  - Monitor potassium Daily  - Patient's hypokalemia is stable

## 2025-02-27 NOTE — ASSESSMENT & PLAN NOTE
"Patient has Diastolic (HFpEF) heart failure that is Acute on chronic. On presentation their CHF was decompensated. Evidence of decompensated CHF on presentation includes: edema. The etiology of their decompensation is likely dietary indiscretion and increased fluid intake. Most recent BNP and echo results are listed below.  No results for input(s): "BNP" in the last 72 hours.    Latest ECHO  Results for orders placed during the hospital encounter of 10/13/24    Echo    Interpretation Summary    Left Ventricle: The left ventricle is normal in size. Normal wall thickness. There is concentric remodeling. There is normal systolic function with a visually estimated ejection fraction of 60 - 65%. Ejection fraction is approximately 60%.    Right Ventricle: Normal right ventricular cavity size. Wall thickness is normal. Systolic function is normal.    Mitral Valve: There is moderate mitral annular calcification present. There is mild regurgitation.    Pulmonary Artery: The estimated pulmonary artery systolic pressure is 32 mmHg.    IVC/SVC: Normal venous pressure at 3 mmHg.    Current Heart Failure Medications  carvediloL tablet 12.5 mg, 2 times daily with meals, Oral    Plan  - Monitor strict I&Os and daily weights.    - Place on telemetry  - Low sodium diet  - Place on fluid restriction of 1.5 L.   - Cardiology has been consulted  - IV bumex held 02/25  due to rise in Cr, discussed with Dr. Tong- he recommends resumption of bumex 1mg daily       "

## 2025-02-27 NOTE — PLAN OF CARE
OT jourdan completed. Recommends low intensity therapy with 24/7 assist.  SBA for bed mobility. CGA for STS, no AD. Min A for step t/f with HHA.

## 2025-02-27 NOTE — PT/OT/SLP EVAL
Occupational Therapy   Evaluation    Name: Glo Dumont  MRN: [id number]  Admitting Diagnosis: Acute on chronic heart failure with preserved ejection fraction  Recent Surgery: * No surgery found *      Recommendations:     Discharge Recommendations: Low Intensity Therapy (with 24/7 care)  Discharge Equipment Recommendations:  bedside commode  Barriers to discharge:  None    Assessment:     Glo Dumont is a 89 y.o. female with a medical diagnosis of Acute on chronic heart failure with preserved ejection fraction.  She presents with the following performance deficits affecting function: weakness, impaired endurance, impaired self care skills, impaired functional mobility, gait instability, impaired balance, decreased coordination, decreased lower extremity function, decreased safety awareness, edema, impaired skin, impaired cardiopulmonary response to activity.      Rehab Prognosis: Good; patient would benefit from acute skilled OT services to address these deficits and reach maximum level of function.       Plan:     Patient to be seen 2 x/week to address the above listed problems via self-care/home management, therapeutic activities, therapeutic exercises  Plan of Care Expires: 03/13/25  Plan of Care Reviewed with: patient    Subjective     Chief Complaint: weakness  Patient/Family Comments/goals: improve strength and mobility    Occupational Profile:  Living Environment: lives with daughter and son-in-law in a 1 story house with 2 steps to enter. Tub/shower combo.  Previous level of function: Pt Mod (I) with ADLs and (I) with functional mobility household distances.  Roles and Routines: does not drive or work  Equipment Used at Home: shower chair, grab bar, cane, straight, walker, rolling  Assistance upon Discharge: Pt reports 24/7 care at home    Pain/Comfort:  Pain Rating 1: 0/10    Objective:     Communicated with: Nurse and epic chart review prior to session.  Patient found supine with peripheral IV,  telemetry, bed alarm, Other (comments) (AVASYS) upon OT entry to room.    General Precautions: Standard, fall  Orthopedic Precautions: N/A  Braces: N/A  Respiratory Status: Room air    Occupational Performance:    Bed Mobility:    Patient completed Rolling/Turning to Right with stand by assistance  Patient completed Scooting/Bridging with stand by assistance  Patient completed Supine to Sit with stand by assistance    Functional Mobility/Transfers:  Patient completed Sit <> Stand Transfer with contact guard assistance  with  no assistive device   Patient completed Bed <> Chair Transfer using Step Transfer technique with minimum assistance with hand-held assist  Pt unsteady; quick to fatigue.    Activities of Daily Living:  Upper Body Dressing: minimum assistance mercedes robe around back    Cognitive/Visual Perceptual:  Cognitive/Psychosocial Skills:     -       Oriented to: Person, Place, and Situation   -       Follows Commands/attention:Follows two-step commands  -       Communication: clear/fluent  -       Safety awareness/insight to disability: impaired     Physical Exam:  Sensation:    -       Intact  Dominant hand:    -       right  Upper Extremity Range of Motion:     -       Right Upper Extremity: WFL  -       Left Upper Extremity: WFL  Upper Extremity Strength:    -       Right Upper Extremity: 4/5 grossly  -       Left Upper Extremity: 4/5 grossly   Strength:    -       Right Upper Extremity: fair  -       Left Upper Extremity: WFL    AMPAC 6 Click ADL:  AMPAC Total Score: 19    Treatment & Education:  Patient educated on role of OT in acute setting and benefits of participation. Educated on techniques to use to increase independence and decrease fall risk with functional transfers. Educated on importance of OOB activity and calling for A to transfer back to bed and meet needs. Encouraged completion of B UE AROM therex throughout the day to tolerance to increase functional strength and activity tolerance.  Educated patient on importance of increased tolerance to upright position and direct impact on CV endurance and strength. Patient encouraged to sit up in chair for a minimum of 2 consecutive hours per day.  Patient stated understanding and in agreement with POC.     Patient left up in chair with all lines intact, call button in reach, chair alarm on, and AVASYS present    GOALS:   Multidisciplinary Problems       Occupational Therapy Goals          Problem: Occupational Therapy    Goal Priority Disciplines Outcome Interventions   Occupational Therapy Goal     OT, PT/OT     Description: Goals to be met by: 3/13/25     Patient will increase functional independence with ADLs by performing:    UE Dressing with Set-up Assistance.  Grooming while standing at sink with Supervision.  Toileting from toilet with Supervision for hygiene and clothing management.   Toilet transfer to toilet with Supervision with RW as needed.  Upper extremity exercise program x15 reps per handout, with independence.                       History:     Past Medical History:   Diagnosis Date    Anemia     Angina pectoris     Anxiety     Anxiety and depression     Arthritis     hip    Carotid artery occlusion     Carpal tunnel syndrome 06/23/2008    emg    Chronic diarrhea     work up in 2011 with EGD, CS and VCE    CKD (chronic kidney disease) stage 3, GFR 30-59 ml/min 5/11/2017    Colitis     Coronary artery disease     Coronary artery disease     Diastolic dysfunction     Diverticulosis     Encephalopathy 10/14/2024    Glaucoma     Greater trochanteric bursitis 2/10/2015    Grief at loss of child 1/26/2016    H/O carotid endarterectomy 12/2/2013    Heart failure     History of coronary angioplasty 3/11/2014    Hypercholesteremia     Hypertension     Liver cyst 02/08/2013    ct abd    Low back pain 2/8/2024    Macular degeneration     Obesity with serious comorbidity 3/19/2023    Primary open-angle glaucoma(365.11) 9/3/2013    Renal cyst  02/08/2013    ct abd    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    Sarcoidosis     Sarcoidosis of lung     Sickle cell trait     Uveitis          Past Surgical History:   Procedure Laterality Date    A-V CARDIAC PACEMAKER INSERTION Left 3/21/2023    Procedure: INSERTION, CARDIAC PACEMAKER, DUAL CHAMBER/His Lead;  Surgeon: Cortez Ambrose MD;  Location: [facility] CATH LAB;  Service: Cardiology;  Laterality: Left;  MDT/ MD to confirm in am/possible nurse sedate    CAROTID ENDARTERECTOMY Right 2000s    CATARACT EXTRACTION Bilateral     Dr. Liang Dennis    CHOLECYSTECTOMY      laparoscopic, 3/18.    CORONARY ANGIOPLASTY WITH STENT PLACEMENT  11/19/2010    RCA-HALIE 2010    Lathia    JOINT REPLACEMENT Left 11/03/2014    Dr. Braun    TOTAL ABDOMINAL HYSTERECTOMY W/ BILATERAL SALPINGOOPHORECTOMY  1972       Time Tracking:     OT Date of Treatment: 02/27/25  OT Start Time: 0940  OT Stop Time: 1005  OT Total Time (min): 25 min    Billable Minutes:Evaluation 15  Therapeutic Activity 10    2/27/2025  Jodie Faustin, OT

## 2025-02-27 NOTE — ASSESSMENT & PLAN NOTE
Creatine stable for now. BMP reviewed- noted Estimated Creatinine Clearance: 12.5 mL/min (A) (based on SCr of 2.4 mg/dL (H)). according to latest data. Based on current GFR, CKD stage is stage 4 - GFR 15-29.  Monitor UOP and serial BMP and adjust therapy as needed. Renally dose meds. Avoid nephrotoxic medications and procedures.  Cr baseline 1.2-1.4, most recently 1.8  Cr uptrended with diuresis, diuretics held 02/25, Cr improving   Resume PO bumex   Hold Jardiance   Monitor BMP   Strict IS and Os

## 2025-02-27 NOTE — NURSING
"Pt increasingly confused throughout shift.     1530:  Pt's bed alarm alerted, upon RN entering room pt was attempting to get out of bed. RN asked pt to get back in bed to which pt replied "No, I need to go to my room". RN reoriented patient to hospital room but patient responded "I'm not in no hospital, I need to go to my room!" And proceeded to exit the bed. RN assisted patient to standing position and offered to have patient sit in chair instead. Pt refused, stating "this is my house, you don't tell me where to sit". RN again oriented pt to hospital room and asked patient to return to bed. Pt refused and instead started walking into the hospital schmitz. RN supported patient with arm while patient ambulated, RN repeatedly asked pt to return to bed to prevent any complications. Pt refused. A second staff member assisted RN with guiding patient back to room and into bed. Pt stated "I'm sleepy I want to nap". RN and staff member settled pt in bed and allowed pt to rest. Call light in reach, [employer] at bedside, bed alarm on, room door left open.   "

## 2025-02-27 NOTE — NURSING
Pt oriented to self only today. Pt extremely confused and restless.  Pt remained afebrile throughout this shift.   All meds administered per order, see MAR.  Pt remained free of falls this shift.   Plan of care reviewed. Patient verbalizes understanding.   Pt moving/turning with assist.  Bed low, side rails up x 2, wheels locked, call light in reach. Bed alarm on. [employer] at bedside.  Patient instructed to call for assistance.  Patient education provided.

## 2025-02-27 NOTE — PLAN OF CARE
PT EVAL complete. Required SBA for bed mobility, ambulated 4 steps to transfer MIN A with HHA. Recommending low intensity therapy with 24/7 SPV and A.

## 2025-02-27 NOTE — PROGRESS NOTES
[facility] Telemetry ([facility] Medicine  Progress Note    Patient Name: Glo Dumont  MRN: [id number]  Patient Class: IP- Inpatient   Admission Date: 2/23/2025  Length of Stay: 3 days  Attending Physician: Jailyn Duran MD  Primary Care Provider: No primary care provider on file.        Subjective     Principal Problem:Acute on chronic heart failure with preserved ejection fraction        HPI:  89 y.o. female, comorbid conditions include sarcoidosis of lung, obesity, HTN, HLD, CHF, CAD, stage 3 CKD, CAD, CHF, and anemia. Presented to the ED for evaluation of general illness which began 2-3 days PTA. Associated sxs include cough, wheezing, SOB, rhinorrhea, congestion, sore throat, eye swelling, and confusion. Symptoms are constant and moderate in severity. Daughter denies pt having history of emphysema or COPD. In the ED, vitals: 114/58, 63, 15, 98.4F, 94% RA on arrival. Significant Labs: Hgb: 8.8, Cr: 1.9, BNP: 940, Trop: 0.121. CXR: Left Pleural Effusion. Treated with IV diuretic in the ED. Patient is a full code. Placed in Observation under the care of Hospital Medicine for management of Acute On Chronic CHF.     Overview/Hospital Course:  Continued on IV Bumex for diuresis. Cardiology consulted 02/24 given uptrending Cr, diuretics held 02/25. CR stable   PT/OT pending       Interval History: NAEON. Pt seen sitting up in chair. She is oriented to self, place, situation. She has no complaints. Denies CP, SOB. LUE edema slightly imptoved     Review of Systems  Objective:     Vital Signs (Most Recent):  Temp: 98.2 °F (36.8 °C) (02/27/25 1148)  Pulse: 60 (02/27/25 1148)  Resp: 16 (02/27/25 1148)  BP: (!) 149/70 (02/27/25 1148)  SpO2: 95 % (02/27/25 1148) Vital Signs (24h Range):  Temp:  [97.5 °F (36.4 °C)-98.8 °F (37.1 °C)] 98.2 °F (36.8 °C)  Pulse:  [60-71] 60  Resp:  [15-18] 16  SpO2:  [94 %-97 %] 95 %  BP: (114-154)/(57-74) 149/70     Weight: 56.8 kg (125 lb 5.3 oz)  Body mass index is 23.68  "kg/m².    Intake/Output Summary (Last 24 hours) at 2/27/2025 1201  Last data filed at 2/27/2025 0857  Gross per 24 hour   Intake 600 ml   Output --   Net 600 ml         Physical Exam  Vitals and nursing note reviewed.   Constitutional:       General: She is not in acute distress.     Appearance: Ill appearance: chronic.   Cardiovascular:      Rate and Rhythm: Normal rate and regular rhythm.      Heart sounds: No murmur heard.  Pulmonary:      Effort: Pulmonary effort is normal.      Breath sounds: Normal breath sounds. No wheezing, rhonchi or rales.   Abdominal:      General: Bowel sounds are normal. There is no distension.      Palpations: Abdomen is soft.      Tenderness: There is no abdominal tenderness.   Musculoskeletal:      Right lower leg: No edema.      Left lower leg: No edema.      Comments: LUE edema slightly improved    Neurological:      Mental Status: She is alert and oriented to person, place, and time.             Significant Labs: All pertinent labs within the past 24 hours have been reviewed.    Significant Imaging: I have reviewed all pertinent imaging results/findings within the past 24 hours.    Assessment and Plan     * Acute on chronic heart failure with preserved ejection fraction  Patient has Diastolic (HFpEF) heart failure that is Acute on chronic. On presentation their CHF was decompensated. Evidence of decompensated CHF on presentation includes: edema. The etiology of their decompensation is likely dietary indiscretion and increased fluid intake. Most recent BNP and echo results are listed below.  No results for input(s): "BNP" in the last 72 hours.    Latest ECHO  Results for orders placed during the hospital encounter of 10/13/24    Echo    Interpretation Summary    Left Ventricle: The left ventricle is normal in size. Normal wall thickness. There is concentric remodeling. There is normal systolic function with a visually estimated ejection fraction of 60 - 65%. Ejection fraction is " approximately 60%.    Right Ventricle: Normal right ventricular cavity size. Wall thickness is normal. Systolic function is normal.    Mitral Valve: There is moderate mitral annular calcification present. There is mild regurgitation.    Pulmonary Artery: The estimated pulmonary artery systolic pressure is 32 mmHg.    IVC/SVC: Normal venous pressure at 3 mmHg.    Current Heart Failure Medications  carvediloL tablet 12.5 mg, 2 times daily with meals, Oral    Plan  - Monitor strict I&Os and daily weights.    - Place on telemetry  - Low sodium diet  - Place on fluid restriction of 1.5 L.   - Cardiology has been consulted  - IV bumex held 02/25  due to rise in Cr, discussed with Dr. Tong- he recommends resumption of bumex 1mg daily         Elevated troponin  Likely secondary to demand ischemia related to CHF exacerbation  Trop plateau at 0.103  Continue aspirin, statin, B blocker   No CP at this time   Cardiology consulted - trop elevation felt to be demand ischemia   Tele monitoring       Hypokalemia  Patient's most recent potassium results are listed below.   Recent Labs     02/25/25  0454 02/26/25  0452 02/27/25  0453   K 3.5 3.6 4.1       Plan  - Replete potassium per protocol  - Monitor potassium Daily  - Patient's hypokalemia is stable      CKD (chronic kidney disease) stage 4, GFR 15-29 ml/min  Creatine stable for now. BMP reviewed- noted Estimated Creatinine Clearance: 12.5 mL/min (A) (based on SCr of 2.4 mg/dL (H)). according to latest data. Based on current GFR, CKD stage is stage 4 - GFR 15-29.  Monitor UOP and serial BMP and adjust therapy as needed. Renally dose meds. Avoid nephrotoxic medications and procedures.  Cr baseline 1.2-1.4, most recently 1.8  Cr uptrended with diuresis, diuretics held 02/25, Cr improving   Resume PO bumex   Hold Jardiance   Monitor BMP   Strict IS and Os        S/P CABG x 3  Followed by cardiology      S/P placement of cardiac pacemaker  Followed by cardiology      Metabolic  acidosis  - cont home sodium bicarb       Essential hypertension  Patient's blood pressure range in the last 24 hours was: BP  Min: 114/58  Max: 132/66.The patient's inpatient anti-hypertensive regimen is listed below:  Current Antihypertensives  , , Oral  amLODIPine tablet 5 mg, Daily, Oral  carvediloL tablet 12.5 mg, 2 times daily with meals, Oral  dorzolamide-timolol 2-0.5% ophthalmic solution 1 drop, 2 times daily, Both Eyes  bumetanide injection 2 mg, 2 times daily, Intravenous    Plan  - BP is controlled, no changes needed to their regimen      Iron deficiency anemia  Anemia is likely due to Iron deficiency. Most recent hemoglobin and hematocrit are listed below.  Recent Labs     02/23/25  1446 02/24/25  0502 02/25/25  0454   HGB 8.8* 9.5* 9.1*   HCT 27.5* 30.2* 28.2*       Plan  - Monitor serial CBC: Daily  - Transfuse PRBC if patient becomes hemodynamically unstable, symptomatic or H/H drops below 7/21.  - Patient has not received any PRBC transfusions to date  - Patient's anemia is currently stable    Other hyperlipidemia    --cont statin    Sarcoidosis of lung  Followed by Pulmonology as OP    --Duoneb PRN        VTE Risk Mitigation (From admission, onward)           Ordered     enoxaparin injection 30 mg  Daily         02/23/25 1755     IP VTE HIGH RISK PATIENT  Once         02/23/25 1755     Place sequential compression device  Until discontinued         02/23/25 1755                    Discharge Planning   CANDACE: 2/25/2025     Code Status: Full Code   Medical Readiness for Discharge Date:   Discharge Plan A: Home, Home with family                Please place Justification for DME        Jailyn Duran MD  Department of Hospital Medicine   [facility] - Telemetry ([facility])

## 2025-02-27 NOTE — PROGRESS NOTES
"Food & Nutrition Education       Diet Education: Cardiac, Fluid restriction diet    Learners: Patient       Nutrition Education provided with handouts:   "Heart Healthy Nutrition Therapy"  "Fluid Restricted Nutrition Therapy"  (nutritioncaremanual.org)       Comments:   PMH: Sarcoidosis of lung, HLD, Iron deficiency anemia, HTN, CKD 4, Metabolic acidosis, Hypokalemia, S/p placement of cardiac pacemaker, S/p CABG x 3, Elevated troponin    89 y.o. Female admitted for Acute on chronic heart failure with preserved ejection fraction. Pt is currently on a Cardiac diet. Visited pt at bedside, pt sitting up in chair about to eat lunch, AMS noted. Pt confirmed 25-50% PO intake, stated appetite is improving, provided a menu to help encourage pt preferred food choices. Pt's current weight 2/26/25 125 lbs (BMI 23.68, Normal).      RD provided pt with nutrition education handouts on low sodium, general healthful diet r/t recent hospital diagnosis and fluid restriction.     Handouts recommend a well balanced diet with a variety of fresh foods, fruits and vegetables (5 cups/day), whole grains (3 oz/day), and fat-free or low fat dairy; salt free seasonings and other herbs and spices in meals to enhance flavor without additional sodium; to aim for total fat less than 25-35% of calories; and using a cup with measurements for fluids and to try to consume small sips spread throughout the day rather than a lot at one time.      Handouts discuss importance of reading food packages, food labels, and nutrition facts labels to identify nutrient content of food; the importance of limiting sodium to less than 2,000 mg per day; dietary sources of cholesterol and the importance of incorporating healthy fats into the diet, and avoiding saturated and trans fats for heart health; the importance of fiber (especially soluble fiber), dietary sources, and a goal intake of >20-30g/day; and fluid restriction per MD and dietary sources of fluid.     Pt " "denied discussing nutrition education at this time, stated "I am ready to get out of here", pt confirmed that she will read handouts and use RD contact information with any questions/concerns that she may have, will discuss at follow up.    Nutrition Related Social Determinants of Health: SDOH: Unable to assess at this time.       NFPE not performed, will perform at follow up if warranted.   Provided handout with dietitian's contact information.   *Please re-consult as needed.   Thank you,   MISAEL Hayes, RDN, LDN   "

## 2025-02-27 NOTE — SUBJECTIVE & OBJECTIVE
Interval History: NAEON. Pt seen sitting up in chair. She is oriented to self, place, situation. She has no complaints. Denies CP, SOB. LUE edema slightly imptoved     Review of Systems  Objective:     Vital Signs (Most Recent):  Temp: 98.2 °F (36.8 °C) (02/27/25 1148)  Pulse: 60 (02/27/25 1148)  Resp: 16 (02/27/25 1148)  BP: (!) 149/70 (02/27/25 1148)  SpO2: 95 % (02/27/25 1148) Vital Signs (24h Range):  Temp:  [97.5 °F (36.4 °C)-98.8 °F (37.1 °C)] 98.2 °F (36.8 °C)  Pulse:  [60-71] 60  Resp:  [15-18] 16  SpO2:  [94 %-97 %] 95 %  BP: (114-154)/(57-74) 149/70     Weight: 56.8 kg (125 lb 5.3 oz)  Body mass index is 23.68 kg/m².    Intake/Output Summary (Last 24 hours) at 2/27/2025 1201  Last data filed at 2/27/2025 0857  Gross per 24 hour   Intake 600 ml   Output --   Net 600 ml         Physical Exam  Vitals and nursing note reviewed.   Constitutional:       General: She is not in acute distress.     Appearance: Ill appearance: chronic.   Cardiovascular:      Rate and Rhythm: Normal rate and regular rhythm.      Heart sounds: No murmur heard.  Pulmonary:      Effort: Pulmonary effort is normal.      Breath sounds: Normal breath sounds. No wheezing, rhonchi or rales.   Abdominal:      General: Bowel sounds are normal. There is no distension.      Palpations: Abdomen is soft.      Tenderness: There is no abdominal tenderness.   Musculoskeletal:      Right lower leg: No edema.      Left lower leg: No edema.      Comments: LUE edema slightly improved    Neurological:      Mental Status: She is alert and oriented to person, place, and time.             Significant Labs: All pertinent labs within the past 24 hours have been reviewed.    Significant Imaging: I have reviewed all pertinent imaging results/findings within the past 24 hours.

## 2025-02-27 NOTE — PT/OT/SLP EVAL
Physical Therapy Evaluation and Treatment    Patient Name: Glo Dumont   MRN: [id number]  Recent Surgery: * No surgery found *      Recommendations:     Discharge Recommendations: Low Intensity Therapy (with 24/7 SPV and A)   Discharge Equipment Recommendations: bedside commode   Barriers to discharge: None    Assessment:     Glo Dumont is a 89 y.o. female admitted with a medical diagnosis of Acute on chronic heart failure with preserved ejection fraction. She presents with the following impairments/functional limitations: weakness, impaired endurance, impaired functional mobility, gait instability, impaired balance, pain, decreased safety awareness, decreased lower extremity function, impaired cardiopulmonary response to activity, decreased ROM.    Rehab Prognosis: Good; patient would benefit from acute PT services to address these deficits and reach maximum level of function.    Plan:     During this hospitalization, patient to be seen 3 x/week to address the above listed problems via therapeutic activities, gait training, therapeutic exercises    Plan of Care Expires: 03/13/25    Subjective     Chief Complaint: Pt is motivated to participate  Patient Comments/Goals: none stated  Pain/Comfort:  Pain Rating 1: 0/10    Social History:  Living Environment: Patient lives with their daughter and son-in-law in a single story home with number of outside stair(s): 2. Pt will have 24/7 care.  Prior Level of Function: Prior to admission, patient was modified independent, not driving and retired, and ambulated household distances using no AD  Equipment Used at Home: shower chair, walker, rolling, grab bar, cane, straight  DME owned (not currently used): none  Assistance Upon Discharge: family    Objective:     Communicated with nurse and epic chart review prior to session. Patient found HOB elevated with peripheral IV, telemetry, bed alarm (FREDERICK SYS) upon PT entry to room.    General Precautions: Standard, fall  "  Orthopedic Precautions: N/A   Braces: N/A    Respiratory Status: Room air    Exams:  Cognition: Patient is oriented to Person, Place, Time, and Situation  RLE ROM: WFL  RLE Strength:  Grossly 4-/5  LLE ROM: WFL  LLE Strength:  Grossly 4-/5  Sensation:    -       Intact  Skin Integrity/Edema:     -       Skin integrity: Visible skin intact    Functional Mobility:  Gait belt applied - Yes  Increased time and verbal cuing needed  Bed Mobility  Rolling Right: stand by assistance  Scooting: stand by assistance  Supine to Sit: stand by assistance  Transfers  Sit to Stand: contact guard assistance with hand-held assist  Bed to Chair: minimum assistance with hand-held assist using Step Transfer, transfer to R  Gait  Patient ambulated 4 steps to transfer with hand-held assist and minimum assistance. Patient demonstrates occasional unsteady gait. No c/ SOB. Pt reported dizziness when sitting EOB, improved with time. All lines remained intact throughout ambulation trail.  Balance  Sitting: contact guard assistance  Standing: minimum assistance    Therapeutic Activities and Exercises:   Pt educated on role of PT in acute care and POC. Educated on importance of OOB activities, activity pacing, and HEP (marching/hip flex, hip abd, heel slides/LAQ, quad sets, ankle pumps) in order to maintain/regain strength. Encouraged to sit up in chair for all meals. Educated on proper use of RW for safety and to reduce risk of falling. Educated on "call don't fall" policy and increased risk of falling due to weakness, instructed to utilize call bell for assistance with all transfers. Pt agreeable to all requests.    AM-PAC 6 CLICK MOBILITY  Total Score:16    Patient left up in chair with all lines intact, call button in reach, and chair alarm on.    GOALS:   Multidisciplinary Problems       Physical Therapy Goals          Problem: Physical Therapy    Goal Priority Disciplines Outcome Interventions   Physical Therapy Goal     PT, PT/OT   "   Description: Goals to be met by 3/13/25.  1. Pt will complete bed mobility MOD I.  2. Pt will complete sit to stand SBA.  3. Pt will ambulate 100ft CGA using RW.  4. Pt will increase AMPAC score by 2 points to progress functional mobility.                     DME Justifications:  This patient would BENEFIT  from a bedside commode, because they are minimally ambulatory and lack the endurance due to their current medical diagnosis, to ambulate as far as required to their usual toilet facility.      History:     Past Medical History:   Diagnosis Date    Anemia     Angina pectoris     Anxiety     Anxiety and depression     Arthritis     hip    Carotid artery occlusion     Carpal tunnel syndrome 06/23/2008    emg    Chronic diarrhea     work up in 2011 with EGD, CS and VCE    CKD (chronic kidney disease) stage 3, GFR 30-59 ml/min 5/11/2017    Colitis     Coronary artery disease     Coronary artery disease     Diastolic dysfunction     Diverticulosis     Encephalopathy 10/14/2024    Glaucoma     Greater trochanteric bursitis 2/10/2015    Grief at loss of child 1/26/2016    H/O carotid endarterectomy 12/2/2013    Heart failure     History of coronary angioplasty 3/11/2014    Hypercholesteremia     Hypertension     Liver cyst 02/08/2013    ct abd    Low back pain 2/8/2024    Macular degeneration     Obesity with serious comorbidity 3/19/2023    Primary open-angle glaucoma(365.11) 9/3/2013    Renal cyst 02/08/2013    ct abd    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    Sarcoidosis     Sarcoidosis of lung     Sickle cell trait     Uveitis        Past Surgical History:   Procedure Laterality Date    A-V CARDIAC PACEMAKER INSERTION Left 3/21/2023    Procedure: INSERTION, CARDIAC PACEMAKER, DUAL CHAMBER/His Lead;  Surgeon: Cortez Ambrose MD;  Location: [facility] CATH LAB;  Service: Cardiology;  Laterality: Left;  MDT/ MD to confirm in am/possible nurse sedate    CAROTID ENDARTERECTOMY Right 2000s    CATARACT  EXTRACTION Bilateral     Dr. Liang Dennis    CHOLECYSTECTOMY      laparoscopic, 3/18.    CORONARY ANGIOPLASTY WITH STENT PLACEMENT  11/19/2010    RCA-HALIE 2010    Lathia    JOINT REPLACEMENT Left 11/03/2014    Dr. Braun    TOTAL ABDOMINAL HYSTERECTOMY W/ BILATERAL SALPINGOOPHORECTOMY  1972       Time Tracking:     PT Received On: 02/27/25  PT Start Time: 0935  PT Stop Time: 1000  PT Total Time (min): 25 min     Billable Minutes: Evaluation 15min and Therapeutic Activity 10min    2/27/2025

## 2025-02-28 VITALS
OXYGEN SATURATION: 96 % | BODY MASS INDEX: 23.66 KG/M2 | DIASTOLIC BLOOD PRESSURE: 59 MMHG | SYSTOLIC BLOOD PRESSURE: 132 MMHG | HEART RATE: 64 BPM | RESPIRATION RATE: 19 BRPM | WEIGHT: 125.31 LBS | TEMPERATURE: 98 F | HEIGHT: 61 IN

## 2025-02-28 PROBLEM — R41.0 DELIRIUM: Status: ACTIVE | Noted: 2025-02-28

## 2025-02-28 LAB
ANION GAP SERPL CALC-SCNC: 11 MMOL/L (ref 8–16)
BUN SERPL-MCNC: 23 MG/DL (ref 8–23)
CALCIUM SERPL-MCNC: 8.9 MG/DL (ref 8.7–10.5)
CHLORIDE SERPL-SCNC: 108 MMOL/L (ref 95–110)
CO2 SERPL-SCNC: 21 MMOL/L (ref 23–29)
CREAT SERPL-MCNC: 2.2 MG/DL (ref 0.5–1.4)
EST. GFR  (NO RACE VARIABLE): 21 ML/MIN/1.73 M^2
GLUCOSE SERPL-MCNC: 83 MG/DL (ref 70–110)
MAGNESIUM SERPL-MCNC: 2.1 MG/DL (ref 1.6–2.6)
POTASSIUM SERPL-SCNC: 3.8 MMOL/L (ref 3.5–5.1)
SODIUM SERPL-SCNC: 140 MMOL/L (ref 136–145)

## 2025-02-28 PROCEDURE — 25000003 PHARM REV CODE 250: Mod: HCNC | Performed by: NURSE PRACTITIONER

## 2025-02-28 PROCEDURE — 97530 THERAPEUTIC ACTIVITIES: CPT | Mod: HCNC

## 2025-02-28 PROCEDURE — 25000003 PHARM REV CODE 250: Mod: HCNC | Performed by: INTERNAL MEDICINE

## 2025-02-28 PROCEDURE — 80048 BASIC METABOLIC PNL TOTAL CA: CPT | Mod: HCNC | Performed by: INTERNAL MEDICINE

## 2025-02-28 PROCEDURE — 97162 PT EVAL MOD COMPLEX 30 MIN: CPT | Mod: HCNC

## 2025-02-28 PROCEDURE — 36415 COLL VENOUS BLD VENIPUNCTURE: CPT | Mod: HCNC | Performed by: INTERNAL MEDICINE

## 2025-02-28 PROCEDURE — 97116 GAIT TRAINING THERAPY: CPT | Mod: HCNC

## 2025-02-28 PROCEDURE — 83735 ASSAY OF MAGNESIUM: CPT | Mod: HCNC | Performed by: INTERNAL MEDICINE

## 2025-02-28 RX ORDER — QUETIAPINE FUMARATE 25 MG/1
25 TABLET, FILM COATED ORAL NIGHTLY
Qty: 2 TABLET | Refills: 0 | Status: SHIPPED | OUTPATIENT
Start: 2025-02-28 | End: 2025-03-04

## 2025-02-28 RX ORDER — BUMETANIDE 1 MG/1
1 TABLET ORAL DAILY
Qty: 30 TABLET | Refills: 11 | Status: SHIPPED | OUTPATIENT
Start: 2025-02-28

## 2025-02-28 RX ADMIN — BUDESONIDE 3 MG: 3 CAPSULE, COATED PELLETS ORAL at 09:02

## 2025-02-28 RX ADMIN — ASPIRIN 81 MG: 81 TABLET, COATED ORAL at 09:02

## 2025-02-28 RX ADMIN — AMLODIPINE BESYLATE 5 MG: 5 TABLET ORAL at 09:02

## 2025-02-28 RX ADMIN — PANCRELIPASE 1 CAPSULE: 120000; 24000; 76000 CAPSULE, DELAYED RELEASE PELLETS ORAL at 09:02

## 2025-02-28 RX ADMIN — CARVEDILOL 12.5 MG: 12.5 TABLET, FILM COATED ORAL at 09:02

## 2025-02-28 RX ADMIN — DORZOLAMIDE HYDROCHLORIDE AND TIMOLOL MALEATE 1 DROP: 22.3; 6.8 SOLUTION/ DROPS OPHTHALMIC at 09:02

## 2025-02-28 RX ADMIN — BUMETANIDE 1 MG: 1 TABLET ORAL at 09:02

## 2025-02-28 RX ADMIN — PANTOPRAZOLE SODIUM 40 MG: 40 TABLET, DELAYED RELEASE ORAL at 09:02

## 2025-02-28 RX ADMIN — SODIUM BICARBONATE 650 MG TABLET 650 MG: at 09:02

## 2025-02-28 RX ADMIN — PANCRELIPASE 1 CAPSULE: 120000; 24000; 76000 CAPSULE, DELAYED RELEASE PELLETS ORAL at 11:02

## 2025-02-28 NOTE — ASSESSMENT & PLAN NOTE
"Patient has Diastolic (HFpEF) heart failure that is Acute on chronic. On presentation their CHF was decompensated. Evidence of decompensated CHF on presentation includes: edema. The etiology of their decompensation is likely dietary indiscretion and increased fluid intake. Most recent BNP and echo results are listed below.  No results for input(s): "BNP" in the last 72 hours.    Latest ECHO  Results for orders placed during the hospital encounter of 10/13/24    Echo    Interpretation Summary    Left Ventricle: The left ventricle is normal in size. Normal wall thickness. There is concentric remodeling. There is normal systolic function with a visually estimated ejection fraction of 60 - 65%. Ejection fraction is approximately 60%.    Right Ventricle: Normal right ventricular cavity size. Wall thickness is normal. Systolic function is normal.    Mitral Valve: There is moderate mitral annular calcification present. There is mild regurgitation.    Pulmonary Artery: The estimated pulmonary artery systolic pressure is 32 mmHg.    IVC/SVC: Normal venous pressure at 3 mmHg.    Current Heart Failure Medications  carvediloL tablet 12.5 mg, 2 times daily with meals, Oral  bumetanide tablet 1 mg, Daily, Oral    Plan  - Monitor strict I&Os and daily weights.    - Place on telemetry  - Low sodium diet  - Place on fluid restriction of 1.5 L.   - Cardiology has been consulted  - IV bumex held 02/25  due to rise in Cr, discussed with Dr. Tong- he recommends resumption of bumex 1mg daily  - Continue bumex 1mg PO daily- appears euvolemic at this time        "

## 2025-02-28 NOTE — PLAN OF CARE
A240/A240 THAD  Glo Dumont is a 89 y.o.female admitted on 2/23/2025 for Acute on chronic heart failure with preserved ejection fraction   Code Status: Full Code MRN: [id number]   Review of patient's allergies indicates:   Allergen Reactions    Lisinopril      angioedema    Codeine Nausea And Vomiting     Past Medical History:   Diagnosis Date    Anemia     Angina pectoris     Anxiety     Anxiety and depression     Arthritis     hip    Carotid artery occlusion     Carpal tunnel syndrome 06/23/2008    emg    Chronic diarrhea     work up in 2011 with EGD, CS and VCE    CKD (chronic kidney disease) stage 3, GFR 30-59 ml/min 5/11/2017    Colitis     Coronary artery disease     Coronary artery disease     Diastolic dysfunction     Diverticulosis     Encephalopathy 10/14/2024    Glaucoma     Greater trochanteric bursitis 2/10/2015    Grief at loss of child 1/26/2016    H/O carotid endarterectomy 12/2/2013    Heart failure     History of coronary angioplasty 3/11/2014    Hypercholesteremia     Hypertension     Liver cyst 02/08/2013    ct abd    Low back pain 2/8/2024    Macular degeneration     Obesity with serious comorbidity 3/19/2023    Primary open-angle glaucoma(365.11) 9/3/2013    Renal cyst 02/08/2013    ct abd    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    Sarcoidosis     Sarcoidosis of lung     Sickle cell trait     Uveitis       PRN meds    acetaminophen, 650 mg, Q4H PRN  albuterol-ipratropium, 3 mL, Q6H PRN  aluminum-magnesium hydroxide-simethicone, 30 mL, QID PRN  benzonatate, 100 mg, TID PRN  bisacodyL, 10 mg, Daily PRN  dextrose 50%, 12.5 g, PRN  dextrose 50%, 25 g, PRN  glucagon (human recombinant), 1 mg, PRN  glucose, 16 g, PRN  glucose, 24 g, PRN  insulin aspart U-100, 0-5 Units, QID (AC + HS) PRN  melatonin, 6 mg, Nightly PRN  naloxone, 0.02 mg, PRN  ondansetron, 4 mg, Q8H PRN  sodium chloride 0.9%, 10 mL, Q12H PRN  sodium chloride 0.9%, 10 mL, PRN      Chart check completed. Will continue  plan of care.      Orientation: disoriented to, place, time, situation  [place] Coma Scale Score: 14     Lead Monitored: Lead II Rhythm: normal sinus rhythm    Cardiac/Telemetry Box Number: [id number]  VTE Core Measure: Pharmacological prophylaxis initiated/maintained Last Bowel Movement: 02/26/25  Diet Cardiac  Voiding Characteristics: incontinence  Michael Score: 19  Fall Risk Score: 17  Accucheck []   Freq?      Lines/Drains/Airways       Peripheral Intravenous Line  Duration                  Peripheral IV - Single Lumen 02/25/25 2006 24 G Posterior;Right Hand 2 days

## 2025-02-28 NOTE — PROGRESS NOTES
[facility] ([facility]  Progress Note    Patient Name: Glo Dumont  MRN: [id number]  Patient Class: IP- Inpatient   Admission Date: 2/23/2025  Length of Stay: 4 days  Attending Physician: Jailyn Duran MD  Primary Care Provider: No primary care provider on file.        Subjective     Principal Problem:Acute on chronic heart failure with preserved ejection fraction        HPI:  89 y.o. female, comorbid conditions include sarcoidosis of lung, obesity, HTN, HLD, CHF, CAD, stage 3 CKD, CAD, CHF, and anemia. Presented to the ED for evaluation of general illness which began 2-3 days PTA. Associated sxs include cough, wheezing, SOB, rhinorrhea, congestion, sore throat, eye swelling, and confusion. Symptoms are constant and moderate in severity. Daughter denies pt having history of emphysema or COPD. In the ED, vitals: 114/58, 63, 15, 98.4F, 94% RA on arrival. Significant Labs: Hgb: 8.8, Cr: 1.9, BNP: 940, Trop: 0.121. CXR: Left Pleural Effusion. Treated with IV diuretic in the ED. Patient is a full code. Placed in Observation under the care of Hospital Medicine for management of Acute On Chronic CHF.     Overview/Hospital Course:  Continued on IV Bumex for diuresis. Cardiology consulted 02/24 given uptrending Cr, diuretics held 02/25. CR stable, restarted on PO diuretics at decreased dose on 02/27 per cardiology recommendations.   PT/OT rec low intensity  Pt with some intermittent confusion, suspect secondary to delirium as symptoms improved with initiation of seroquel whs        Interval History: Had some confusion during afternoon and evening yesterday, suspect delirium/sundowning. NAEON. Pt seen and examined with sitter at bedside. She is much more oriented this AM, oriented to self, place, year and situation. Denies CP, SOB, cough. Reports good UOP.     Review of Systems  Objective:     Vital Signs (Most Recent):  Temp: 98.3 °F (36.8 °C) (02/28/25 0733)  Pulse: 68 (02/28/25 0757)  Resp:  "19 (02/28/25 0733)  BP: 138/72 (02/28/25 0918)  SpO2: 96 % (02/28/25 0733) Vital Signs (24h Range):  Temp:  [98 °F (36.7 °C)-98.7 °F (37.1 °C)] 98.3 °F (36.8 °C)  Pulse:  [59-87] 68  Resp:  [16-19] 19  SpO2:  [95 %-98 %] 96 %  BP: (133-149)/(63-72) 138/72     Weight: 56.8 kg (125 lb 5.3 oz)  Body mass index is 23.68 kg/m².    Intake/Output Summary (Last 24 hours) at 2/28/2025 0946  Last data filed at 2/27/2025 1815  Gross per 24 hour   Intake 480 ml   Output --   Net 480 ml         Physical Exam  Vitals and nursing note reviewed.   Constitutional:       General: She is not in acute distress.     Appearance: Ill appearance: chronic].   Cardiovascular:      Rate and Rhythm: Normal rate and regular rhythm.      Heart sounds: No murmur heard.  Pulmonary:      Effort: Pulmonary effort is normal.      Breath sounds: Normal breath sounds. No wheezing, rhonchi or rales.      Comments: RA  Abdominal:      General: Bowel sounds are normal. There is no distension.      Palpations: Abdomen is soft.      Tenderness: There is no abdominal tenderness.   Musculoskeletal:      Right lower leg: No edema.      Left lower leg: No edema.      Comments: LUE edema- suspect positional    Neurological:      Mental Status: She is alert and oriented to person, place, and time.             Significant Labs: All pertinent labs within the past 24 hours have been reviewed.    Significant Imaging: I have reviewed all pertinent imaging results/findings within the past 24 hours.    Assessment and Plan     * Acute on chronic heart failure with preserved ejection fraction  Patient has Diastolic (HFpEF) heart failure that is Acute on chronic. On presentation their CHF was decompensated. Evidence of decompensated CHF on presentation includes: edema. The etiology of their decompensation is likely dietary indiscretion and increased fluid intake. Most recent BNP and echo results are listed below.  No results for input(s): "BNP" in the last 72 " hours.    Latest ECHO  Results for orders placed during the hospital encounter of 10/13/24    Echo    Interpretation Summary    Left Ventricle: The left ventricle is normal in size. Normal wall thickness. There is concentric remodeling. There is normal systolic function with a visually estimated ejection fraction of 60 - 65%. Ejection fraction is approximately 60%.    Right Ventricle: Normal right ventricular cavity size. Wall thickness is normal. Systolic function is normal.    Mitral Valve: There is moderate mitral annular calcification present. There is mild regurgitation.    Pulmonary Artery: The estimated pulmonary artery systolic pressure is 32 mmHg.    IVC/SVC: Normal venous pressure at 3 mmHg.    Current Heart Failure Medications  carvediloL tablet 12.5 mg, 2 times daily with meals, Oral  bumetanide tablet 1 mg, Daily, Oral    Plan  - Monitor strict I&Os and daily weights.    - Place on telemetry  - Low sodium diet  - Place on fluid restriction of 1.5 L.   - Cardiology has been consulted  - IV bumex held 02/25  due to rise in Cr, discussed with Dr. Tong- he recommends resumption of bumex 1mg daily  - Continue bumex 1mg PO daily- appears euvolemic at this time          Elevated troponin  Likely secondary to demand ischemia related to CHF exacerbation  Trop plateau at 0.103  Continue aspirin, statin, B blocker   No CP at this time   Cardiology consulted - trop elevation felt to be demand ischemia   Tele monitoring       Hypokalemia  Patient's most recent potassium results are listed below.   Recent Labs     02/26/25  0452 02/27/25  0453 02/28/25  0629   K 3.6 4.1 3.8       Plan  - Replete potassium per protocol  - Monitor potassium Daily  - Patient's hypokalemia is stable      CKD (chronic kidney disease) stage 4, GFR 15-29 ml/min  Creatine stable for now. BMP reviewed- noted Estimated Creatinine Clearance: 13.6 mL/min (A) (based on SCr of 2.2 mg/dL (H)). according to latest data. Based on current GFR, CKD  "stage is stage 4 - GFR 15-29.  Monitor UOP and serial BMP and adjust therapy as needed. Renally dose meds. Avoid nephrotoxic medications and procedures.  Cr baseline 1.2-1.4, most recently 1.8  Cr uptrended with diuresis, diuretics held 02/25, Cr improving  Continue PO bumex (decreased from home dose)   Hold Jardiance   Monitor BMP   Strict IS and Os        Delirium  Suspect hospital delirium   Continue low dose seroquel qhs   Frequent reorientation, delirium and fall precautions       S/P CABG x 3  Followed by cardiology      S/P placement of cardiac pacemaker  Followed by cardiology      Metabolic acidosis  - cont home sodium bicarb       Essential hypertension  Patient's blood pressure range in the last 24 hours was: BP  Min: 133/63  Max: 149/70.The patient's inpatient anti-hypertensive regimen is listed below:  Current Antihypertensives  , , Oral  amLODIPine tablet 5 mg, Daily, Oral  carvediloL tablet 12.5 mg, 2 times daily with meals, Oral  dorzolamide-timolol 2-0.5% ophthalmic solution 1 drop, 2 times daily, Both Eyes  bumetanide tablet 1 mg, Daily, Oral    Plan  - BP is controlled, no changes needed to their regimen      Iron deficiency anemia  Anemia is likely due to Iron deficiency. Most recent hemoglobin and hematocrit are listed below.  No results for input(s): "HGB", "HCT" in the last 72 hours.    Plan  - Monitor serial CBC: intermittently   - Transfuse PRBC if patient becomes hemodynamically unstable, symptomatic or H/H drops below 7/21.  - Patient has not received any PRBC transfusions to date  - Patient's anemia is currently stable    Other hyperlipidemia    --cont statin    Sarcoidosis of lung  Followed by Pulmonology as OP    --Duoneb PRN        VTE Risk Mitigation (From admission, onward)           Ordered     enoxaparin injection 30 mg  Daily         02/23/25 1755     IP VTE HIGH RISK PATIENT  Once         02/23/25 1755     Place sequential compression device  Until discontinued         02/23/25 " 1755                    Discharge Planning   CANDACE: 2/28/2025     Code Status: Full Code   Medical Readiness for Discharge Date:   Discharge Plan A: Home, Home with family                Please place Justification for DME        Jailyn Duran MD  Department of Hospital Medicine   '[facility] - Tele[facility] ([facility])

## 2025-02-28 NOTE — PLAN OF CARE
TX COMPLETED: facilitated bed mobility with CGA, transfers with CGA, ambulated 10 ft x 2 min A with RW. Recommend continued PT services at low intensity

## 2025-02-28 NOTE — NURSING
Upon discharge, pt's daughter had concerns regarding pt L arm swelling, which is noted to be bigger than right arm. No pain, warmth, or redness to arm. Teaching done with pt's daughter in regards to keeping arm elevated duet to fluid. Concern passed along to MD. US already done and (-) for dvt. Will update daughter

## 2025-02-28 NOTE — PROGRESS NOTES
[facility] ([facility])  Cardiology  Progress Note    Patient Name: Glo Dumont  MRN: [id number]  Admission Date: 2/23/2025  Hospital Length of Stay: 3 days  Code Status: Full Code   Attending Physician: Jailyn Duran MD   Primary Care Physician: No primary care provider on file.  Expected Discharge Date: 2/28/2025  Principal Problem:Acute on chronic heart failure with preserved ejection fraction    Subjective:     Hospital Course:   2-25-25  echo nml EF, pulmonary HTN, less SOB, walked in room ok, IV bumex held secondary to increased Cr    2-26-27   No c/o , can restart home bumex at lower dose, continue BP control , can follow up in clinic 1-2 weeks    Interval History:     Review of Systems   Constitutional: Negative. Negative for weight gain.   HENT: Negative.     Eyes: Negative.    Cardiovascular: Negative.  Negative for chest pain, leg swelling and palpitations.   Respiratory: Negative.  Negative for shortness of breath.    Endocrine: Negative.    Hematologic/Lymphatic: Negative.    Skin: Negative.    Musculoskeletal:  Negative for muscle weakness.   Gastrointestinal: Negative.    Genitourinary: Negative.    Neurological: Negative.  Negative for dizziness.   Psychiatric/Behavioral: Negative.     Allergic/Immunologic: Negative.    All other systems reviewed and are negative.    Objective:     Vital Signs (Most Recent):  Temp: 98 °F (36.7 °C) (02/27/25 1601)  Pulse: 86 (02/27/25 1700)  Resp: 18 (02/27/25 1601)  BP: 133/63 (02/27/25 1715)  SpO2: 96 % (02/27/25 1601) Vital Signs (24h Range):  Temp:  [98 °F (36.7 °C)-98.8 °F (37.1 °C)] 98 °F (36.7 °C)  Pulse:  [59-86] 86  Resp:  [15-18] 18  SpO2:  [94 %-97 %] 96 %  BP: (123-154)/(59-74) 133/63     Weight: 56.8 kg (125 lb 5.3 oz)  Body mass index is 23.68 kg/m².     SpO2: 96 %         Intake/Output Summary (Last 24 hours) at 2/27/2025 210  Last data filed at 2/27/2025 1815  Gross per 24 hour   Intake 680 ml   Output --   Net 680 ml        Lines/Drains/Airways       Peripheral Intravenous Line  Duration                  Peripheral IV - Single Lumen 02/25/25 2006 24 G Posterior;Right Hand 2 days                       Physical Exam  Vitals and nursing note reviewed.   Constitutional:       Appearance: She is well-developed.   HENT:      Head: Normocephalic and atraumatic.   Eyes:      Conjunctiva/sclera: Conjunctivae normal.      Pupils: Pupils are equal, round, and reactive to light.   Cardiovascular:      Rate and Rhythm: Normal rate and regular rhythm.      Pulses: Intact distal pulses.      Heart sounds: Normal heart sounds.   Pulmonary:      Effort: Pulmonary effort is normal.      Breath sounds: Normal breath sounds.   Abdominal:      General: Bowel sounds are normal.      Palpations: Abdomen is soft.   Musculoskeletal:         General: Normal range of motion.      Cervical back: Normal range of motion and neck supple.   Skin:     General: Skin is warm and dry.   Neurological:      Mental Status: She is alert and oriented to person, place, and time.            Significant Labs: All pertinent lab results from the last 24 hours have been reviewed.    Significant Imaging: X-Ray: CXR: X-Ray Chest 1 View (CXR): No results found for this visit on 02/23/25.  Assessment and Plan:     Brief HPI:     * Acute on chronic heart failure with preserved ejection fraction  Patient has Diastolic (HFpEF) heart failure that is Acute on chronic. On presentation their CHF was decompensated. Evidence of decompensated CHF on presentation includes: dyspnea on exertion (SOOD). The etiology of their decompensation is likely unknown . Most recent BNP and echo results are listed below.  Recent Labs     02/23/25  1446   *     Latest ECHO  Results for orders placed during the hospital encounter of 02/23/25    Echo Saline Bubble? No; Ultrasound enhancing contrast? No    Interpretation Summary    Left Ventricle: The left ventricle is normal in size. Normal wall  thickness. There is concentric remodeling. There is normal systolic function. Ejection fraction is approximately 60%. Grade II diastolic dysfunction.    Right Ventricle: Normal right ventricular cavity size. Wall thickness is normal. Systolic function is normal.    Left Atrium: Left atrium is severely dilated.    Aortic Valve: There is mild aortic valve sclerosis. There is mild stenosis. Aortic valve area by VTI is 1.6 cm². Aortic valve peak velocity is 2.0 m/s. Mean gradient is 8 mmHg. The dimensionless index is 0.58.    Tricuspid Valve: There is moderate to severe regurgitation with a centrally directed jet.    Pulmonary Artery: The estimated pulmonary artery systolic pressure is 55 mmHg.    IVC/SVC: Normal venous pressure at 3 mmHg.    Current Heart Failure Medications  carvediloL tablet 12.5 mg, 2 times daily with meals, Oral  bumetanide injection 2 mg, 2 times daily, Intravenous    Plan  - Monitor strict I&Os and daily weights.    - Place on telemetry  - Low sodium diet  - Place on fluid restriction of 2 L.   - Cardiology has been consulted  - The patient's volume status is improving but not at their baseline as indicated by shortness of breath  -       89 y.o. female, with PMhx for  sarcoidosis of lung, obesity, HTN, HLD, CHF diastolic , CAD, stage 3 CKD, and anemia admitted for acute on chronic CHF.  BNP 90s. CXR-small left pleural effusion. BP ok. EKG is normal. There is a mild increase in troponin. H/H mildly decreased.    Recommend IV diuresis, monitor h/h, BP control. Troponin is from demand ischemia        VTE Risk Mitigation (From admission, onward)           Ordered     enoxaparin injection 30 mg  Daily         02/23/25 1755     IP VTE HIGH RISK PATIENT  Once         02/23/25 1755     Place sequential compression device  Until discontinued         02/23/25 1755                    David Tong MD  Cardiology  [facility] - Telemetry ([facility])

## 2025-02-28 NOTE — SUBJECTIVE & OBJECTIVE
Interval History:     Review of Systems   Constitutional: Negative. Negative for weight gain.   HENT: Negative.     Eyes: Negative.    Cardiovascular: Negative.  Negative for chest pain, leg swelling and palpitations.   Respiratory: Negative.  Negative for shortness of breath.    Endocrine: Negative.    Hematologic/Lymphatic: Negative.    Skin: Negative.    Musculoskeletal:  Negative for muscle weakness.   Gastrointestinal: Negative.    Genitourinary: Negative.    Neurological: Negative.  Negative for dizziness.   Psychiatric/Behavioral: Negative.     Allergic/Immunologic: Negative.    All other systems reviewed and are negative.    Objective:     Vital Signs (Most Recent):  Temp: 98 °F (36.7 °C) (02/27/25 1601)  Pulse: 86 (02/27/25 1700)  Resp: 18 (02/27/25 1601)  BP: 133/63 (02/27/25 1715)  SpO2: 96 % (02/27/25 1601) Vital Signs (24h Range):  Temp:  [98 °F (36.7 °C)-98.8 °F (37.1 °C)] 98 °F (36.7 °C)  Pulse:  [59-86] 86  Resp:  [15-18] 18  SpO2:  [94 %-97 %] 96 %  BP: (123-154)/(59-74) 133/63     Weight: 56.8 kg (125 lb 5.3 oz)  Body mass index is 23.68 kg/m².     SpO2: 96 %         Intake/Output Summary (Last 24 hours) at 2/27/2025 2109  Last data filed at 2/27/2025 1815  Gross per 24 hour   Intake 680 ml   Output --   Net 680 ml       Lines/Drains/Airways       Peripheral Intravenous Line  Duration                  Peripheral IV - Single Lumen 02/25/25 2006 24 G Posterior;Right Hand 2 days                       Physical Exam  Vitals and nursing note reviewed.   Constitutional:       Appearance: She is well-developed.   HENT:      Head: Normocephalic and atraumatic.   Eyes:      Conjunctiva/sclera: Conjunctivae normal.      Pupils: Pupils are equal, round, and reactive to light.   Cardiovascular:      Rate and Rhythm: Normal rate and regular rhythm.      Pulses: Intact distal pulses.      Heart sounds: Normal heart sounds.   Pulmonary:      Effort: Pulmonary effort is normal.      Breath sounds: Normal breath  sounds.   Abdominal:      General: Bowel sounds are normal.      Palpations: Abdomen is soft.   Musculoskeletal:         General: Normal range of motion.      Cervical back: Normal range of motion and neck supple.   Skin:     General: Skin is warm and dry.   Neurological:      Mental Status: She is alert and oriented to person, place, and time.            Significant Labs: All pertinent lab results from the last 24 hours have been reviewed.    Significant Imaging: X-Ray: CXR: X-Ray Chest 1 View (CXR): No results found for this visit on 02/23/25.

## 2025-02-28 NOTE — CONSULTS
[facility] - Telemetry (Hospital)  Discharge Final Note    Primary Care Provider: No primary care provider on file.    Expected Discharge Date: 2/28/2025    Final Discharge Note (most recent)       Final Note - 02/28/25 1412          Final Note    Assessment Type Final Discharge Note     Anticipated Discharge Disposition Home-Health Care Svc     Hospital Resources/Appts/Education Provided Post-Acute resouces added to AVS;Appointments scheduled and added to AVS        Post-Acute Status    Post-Acute Authorization Home Health;HME     HME Status Set-up Complete/Auth obtained     Home Health Status Set-up Complete/Auth obtained     Discharge Delays None known at this time                   Pt to discharge home today. [facility] unable to accept pt's referral. [facility] and called dtr regarding admit over weekend. BSC delivered to bedside by [facility].   Pt has PCP follow up on AVS:   New Patient Visit with Britt Prakash DO  Tuesday Mar 4, 2025 10:40 AM    Important Message from Medicare  Important Message from Medicare regarding Discharge Appeal Rights: Given to patient/caregiver, Explained to patient/caregiver, Signed/date by patient/caregiver     Date IMM was signed: 02/28/25  Time IMM was signed: 1312     Follow-up providers       Guillermina Pickett PA   Specialty: Transplant    [address] [facility] [address]   Phone: [phone]       Next Steps: Follow up on 3/11/2025    Instructions: followup as scheduled    Stan Lui MD   Specialty: Interventional Cardiology, Cardiology    [id number] [place] [id number]   Phone: [phone]       Next Steps: Schedule an appointment as soon as possible for a visit    Britt Prakash DO   Specialty: Internal Medicine    [address] [facility] [address]   Phone: [phone]       Next Steps: Follow up on 3/4/2025    Instructions: Followup as scheduled              After-discharge care                 Home Medical Care       [facility]  LES [facility]ST[facility]   Service: Home Health Services    [address] [facility] [place] [address]   Phone: [phone]

## 2025-02-28 NOTE — ASSESSMENT & PLAN NOTE
Patient's blood pressure range in the last 24 hours was: BP  Min: 133/63  Max: 149/70.The patient's inpatient anti-hypertensive regimen is listed below:  Current Antihypertensives  , , Oral  amLODIPine tablet 5 mg, Daily, Oral  carvediloL tablet 12.5 mg, 2 times daily with meals, Oral  dorzolamide-timolol 2-0.5% ophthalmic solution 1 drop, 2 times daily, Both Eyes  bumetanide tablet 1 mg, Daily, Oral    Plan  - BP is controlled, no changes needed to their regimen

## 2025-02-28 NOTE — ASSESSMENT & PLAN NOTE
Creatine stable for now. BMP reviewed- noted Estimated Creatinine Clearance: 13.6 mL/min (A) (based on SCr of 2.2 mg/dL (H)). according to latest data. Based on current GFR, CKD stage is stage 4 - GFR 15-29.  Monitor UOP and serial BMP and adjust therapy as needed. Renally dose meds. Avoid nephrotoxic medications and procedures.  Cr baseline 1.2-1.4, most recently 1.8  Cr uptrended with diuresis, diuretics held 02/25, Cr improving  Continue PO bumex (decreased from home dose)   Hold Jardiance   Monitor BMP   Strict IS and Os

## 2025-02-28 NOTE — ASSESSMENT & PLAN NOTE
Patient's most recent potassium results are listed below.   Recent Labs     02/26/25  0452 02/27/25  0453 02/28/25  0629   K 3.6 4.1 3.8       Plan  - Replete potassium per protocol  - Monitor potassium Daily  - Patient's hypokalemia is stable

## 2025-02-28 NOTE — ASSESSMENT & PLAN NOTE
Suspect hospital delirium   Continue low dose seroquel qhs   Frequent reorientation, delirium and fall precautions

## 2025-02-28 NOTE — DISCHARGE INSTRUCTIONS
Our goal at [facility] is to always give you outstanding care and exceptional service. You may receive a survey from [employer] by mail, text or e-mail in the next 24-48 hours asking about the care you received with us. The survey should only take 5-10 minutes to complete and is very important to us.     Your feedback provides us with a way to recognize our staff who work tirelessly to provide the best care! Also, your responses help us learn how to improve when your experience was below our aspiration of excellence. We are always looking for ways to improve your stay. We WILL use your feedback to continue making improvements to help us provide the highest quality care. We keep your personal information and feedback confidential. We appreciate your time completing this survey and can't wait to hear from you!!!    We look forward to your continued care with us! Thanks so much for choosing [facility] for your healthcare needs!

## 2025-02-28 NOTE — ASSESSMENT & PLAN NOTE
"Anemia is likely due to Iron deficiency. Most recent hemoglobin and hematocrit are listed below.  No results for input(s): "HGB", "HCT" in the last 72 hours.    Plan  - Monitor serial CBC: intermittently   - Transfuse PRBC if patient becomes hemodynamically unstable, symptomatic or H/H drops below 7/21.  - Patient has not received any PRBC transfusions to date  - Patient's anemia is currently stable  "

## 2025-02-28 NOTE — PT/OT/SLP PROGRESS
Physical Therapy  Treatment    Glo Dumont   MRN: [id number]   Admitting Diagnosis: Acute on chronic heart failure with preserved ejection fraction       PT Start Time: 1230     PT Stop Time: 1255    PT Total Time (min): 25 min       Billable Minutes:  Gait Training 15 and Therapeutic Activity 10    Treatment Type: Treatment  PT/PTA: PT     Number of PTA visits since last PT visit: 0       General Precautions: Standard, fall  Orthopedic Precautions: N/A  Braces: N/A  Respiratory Status: Room air    Spiritual, Cultural Beliefs, Rastafari Practices, Values that Affect Care: no    Subjective:  Communicated with nursing (Bonnie) and performed chart review via epic system prior to session.  Pt agreeable to PT services. Daughter/family entered room at end of session. Nsg notified    Pain/Comfort  Pain Rating 1: 0/10  Pain Rating Post-Intervention 1: 0/10    Objective:   Patient found with: peripheral IV, telemetry. Pt supine in bed with 1:1 at bedside    Functional Mobility:  Bed Mobility:    Supine to sit x 2 reps with CGA, cues for sequencing   Seated scooting CGA/min A   Tolerated sitting EOB x 5-8 mins    Supine scooting min A   Rolling side to side CGA    Transfers:   Sit<>stand x 2 reps with CGA and RW, cues for hand placement   Stand pivot CGA with RW    Gait:    Facilitated gait activity 10 ft x 2 min A with RW, demonstrated slow pace, flexed posture, shuffled steps, cues for RW management    Balance:   Dynamic Sit: FAIR+: Maintains balance through MINIMAL excursions of active trunk motion  Dynamic stand: POOR+: Needs MIN (minimal ) assist during gait       Treatment and Education:  Educated pt on benefits of consistent participation in PT services to meet functional goals. Educated pt on importance of sitting OOB to promote endurance and overall activity tolerance, limited today pt with bladder/bowel accident at start of session, cleaned that another incontinence episode with bladder. Pt returned supine for  hygiene with 1:1 nsg staff at bedside. Educated pt on call don't fall policy and use of call button to alert nursing staff of needs (including to assist in/out of bed). Pt expressed understanding.      AM-PAC 6 CLICK MOBILITY  How much help from another person does this patient currently need?   1 = Unable, Total/Dependent Assistance  2 = A lot, Maximum/Moderate Assistance  3 = A little, Minimum/Contact Guard/Supervision  4 = None, Modified [place]/Independent    Turning over in bed (including adjusting bedclothes, sheets and blankets)?: 3  Sitting down on and standing up from a chair with arms (e.g., wheelchair, bedside commode, etc.): 3  Moving from lying on back to sitting on the side of the bed?: 3  Moving to and from a bed to a chair (including a wheelchair)?: 3  Need to walk in hospital room?: 3  Climbing 3-5 steps with a railing?: 1  Basic Mobility Total Score: 16    AM-PAC Raw Score CMS G-Code Modifier Level of Impairment Assistance   6 % Total / Unable   7 - 9 CM 80 - 100% Maximal Assist   10 - 14 CL 60 - 80% Moderate Assist   15 - 19 CK 40 - 60% Moderate Assist   20 - 22 CJ 20 - 40% Minimal Assist   23 CI 1-20% SBA / CGA   24 CH 0% Independent/ Mod I     Patient left supine with all lines intact, call button in reach, nursing notified, and family present.    Assessment:  Glo Dumont is a 89 y.o. female with a medical diagnosis of Acute on chronic heart failure with preserved ejection fraction and presents with deficits in overall mobility and increased risk of falls. Pt was able to tolerate OOB activity with min A. Pt required cues for safety during mobility but no LOB. Pt will benefit from continued PT services in order to progress toward baseline.    Rehab identified problem list/impairments: weakness, impaired endurance, impaired functional mobility, gait instability, decreased lower extremity function, decreased safety awareness    Rehab potential is good.    Activity tolerance:  Fair    Discharge recommendations: Low Intensity Therapy      Barriers to discharge:      Equipment recommendations: bedside commode     GOALS:   Multidisciplinary Problems       Physical Therapy Goals          Problem: Physical Therapy    Goal Priority Disciplines Outcome Interventions   Physical Therapy Goal     PT, PT/OT Progressing    Description: Goals to be met by 3/13/25.  1. Pt will complete bed mobility MOD I.  2. Pt will complete sit to stand SBA.  3. Pt will ambulate 100ft CGA using RW.  4. Pt will increase AMPAC score by 2 points to progress functional mobility.                         PLAN:    Patient to be seen 3 x/week to address the above listed problems via gait training, therapeutic activities, therapeutic exercises, neuromuscular re-education  Plan of Care expires: 03/12/25  Plan of Care reviewed with: patient         02/28/2025

## 2025-03-01 NOTE — ASSESSMENT & PLAN NOTE
Creatine stable for now. BMP reviewed- noted Estimated Creatinine Clearance: 13.6 mL/min (A) (based on SCr of 2.2 mg/dL (H)). according to latest data. Based on current GFR, CKD stage is stage 4 - GFR 15-29.  Monitor UOP and serial BMP and adjust therapy as needed. Renally dose meds. Avoid nephrotoxic medications and procedures.  Cr baseline 1.2-1.4, most recently 1.8  Cr uptrended with diuresis, diuretics held 02/25  Continue PO bumex (decreased from home dose)    Cr stable at 2.2 prior to discharge

## 2025-03-02 NOTE — DISCHARGE SUMMARY
[facility] - Telemetry ([facility])  [facility] Medicine  Discharge Summary      Patient Name: Glo Dumont  MRN: [id number]  ALBERTA: [id number]  Patient Class: IP- Inpatient  Admission Date: 2/23/2025  Hospital Length of Stay: 4 days  Discharge Date and Time: 2/28/2025  2:27 PM  Attending Physician: No att. providers found   Discharging Provider: Jailyn Duran MD  Primary Care Provider: No primary care provider on file.    Primary Care Team: [facility] MEDICINE B    HPI:   89 y.o. female, comorbid conditions include sarcoidosis of lung, obesity, HTN, HLD, CHF, CAD, stage 3 CKD, CAD, CHF, and anemia. Presented to the ED for evaluation of general illness which began 2-3 days PTA. Associated sxs include cough, wheezing, SOB, rhinorrhea, congestion, sore throat, eye swelling, and confusion. Symptoms are constant and moderate in severity. Daughter denies pt having history of emphysema or COPD. In the ED, vitals: 114/58, 63, 15, 98.4F, 94% RA on arrival. Significant Labs: Hgb: 8.8, Cr: 1.9, BNP: 940, Trop: 0.121. CXR: Left Pleural Effusion. Treated with IV diuretic in the ED. Patient is a full code. Placed in Observation under the care of Hospital Medicine for management of Acute On Chronic CHF.     * No surgery found *      Hospital Course:   Continued on IV Bumex for diuresis. Cardiology consulted 02/24 given uptrending Cr, diuretics held 02/25. CR stable, restarted on PO diuretics at decreased dose on 02/27 per cardiology recommendations.   PT/OT rec low intensity  Pt with some intermittent confusion, suspect secondary to delirium as symptoms improved with initiation of seroquel qhs     Pt seen and examined on day of discharge, Ox 3, has no complaints. Denies cp, SOB. LUE edema slightly improved, suspect positional in nature as pt favors laying on L side. LUE duplex neg for DvT. Pt discharged home with HH and family in stable condition per my exam. Will continue PO bumex 1 mg daily per cardiology recommendations. Follow up  "with PCP within 3-5 days, CHF clinic within 1 week and cardiology MELCHORP. [facility] at home referral placed.        Goals of Care Treatment Preferences:  Code Status: Full Code    Health care agent: Jessica Toribio Denis Mata [facility] agent number: [phone], [phone].                      Consults:   Consults (From admission, onward)          Status Ordering Provider     Inpatient consult to Social Work  Once        Provider:  (Not yet assigned)    Completed JOYA MCGHEE     Inpatient consult to Cardiology  Once        Provider:  David Tong MD    Completed JOYA MCGHEE     Inpatient consult to Social Work/Case Management  Once        Provider:  (Not yet assigned)    Completed JOHN SANZ.     Inpatient consult to Registered Dietitian/Nutritionist  Once        Provider:  (Not yet assigned)    Completed JOHN SANZ.            * Acute on chronic heart failure with preserved ejection fraction  Patient has Diastolic (HFpEF) heart failure that is Acute on chronic. On presentation their CHF was decompensated. Evidence of decompensated CHF on presentation includes: edema. The etiology of their decompensation is likely dietary indiscretion and increased fluid intake. Most recent BNP and echo results are listed below.  No results for input(s): "BNP" in the last 72 hours.    Latest ECHO  Results for orders placed during the hospital encounter of 10/13/24    Echo    Interpretation Summary    Left Ventricle: The left ventricle is normal in size. Normal wall thickness. There is concentric remodeling. There is normal systolic function with a visually estimated ejection fraction of 60 - 65%. Ejection fraction is approximately 60%.    Right Ventricle: Normal right ventricular cavity size. Wall thickness is normal. Systolic function is normal.    Mitral Valve: There is moderate mitral annular calcification present. There is mild regurgitation.    Pulmonary Artery: The estimated " pulmonary artery systolic pressure is 32 mmHg.    IVC/SVC: Normal venous pressure at 3 mmHg.    Current Heart Failure Medications  carvediloL tablet 12.5 mg, 2 times daily with meals, Oral  bumetanide tablet 1 mg, Daily, Oral    Plan  - Monitor strict I&Os and daily weights.    - Place on telemetry  - Low sodium diet  - Place on fluid restriction of 1.5 L.   - Cardiology has been consulted  - IV bumex held 02/25  due to rise in Cr, discussed with Dr. Tong- he recommends resumption of bumex 1mg daily  - Continue bumex 1mg PO daily- appears euvolemic at this time          Elevated troponin  Likely secondary to demand ischemia related to CHF exacerbation  Trop plateau at 0.103  Continue aspirin, statin, B blocker   No CP at this time   Cardiology consulted - trop elevation felt to be demand ischemia   Tele monitoring       CKD (chronic kidney disease) stage 4, GFR 15-29 ml/min  Creatine stable for now. BMP reviewed- noted Estimated Creatinine Clearance: 13.6 mL/min (A) (based on SCr of 2.2 mg/dL (H)). according to latest data. Based on current GFR, CKD stage is stage 4 - GFR 15-29.  Monitor UOP and serial BMP and adjust therapy as needed. Renally dose meds. Avoid nephrotoxic medications and procedures.  Cr baseline 1.2-1.4, most recently 1.8  Cr uptrended with diuresis, diuretics held 02/25  Continue PO bumex (decreased from home dose)    Cr stable at 2.2 prior to discharge        Delirium  Suspect hospital delirium, improved with initiation of seroquel   Continue low dose seroquel qhs x 2d, suspect pt will have continued improvement once back in her home environment and routine    F/u PCP       S/P CABG x 3  Followed by cardiology      S/P placement of cardiac pacemaker  Followed by cardiology      Metabolic acidosis  - cont home sodium bicarb       Essential hypertension  Patient's blood pressure range in the last 24 hours was: BP  Min: 133/63  Max: 149/70.The patient's inpatient anti-hypertensive regimen is  listed below:  Current Antihypertensives  , , Oral  amLODIPine tablet 5 mg, Daily, Oral  carvediloL tablet 12.5 mg, 2 times daily with meals, Oral  dorzolamide-timolol 2-0.5% ophthalmic solution 1 drop, 2 times daily, Both Eyes  bumetanide tablet 1 mg, Daily, Oral    Plan  - BP is controlled, no changes needed to their regimen         Other hyperlipidemia    --cont statin    Sarcoidosis of lung  Followed by Pulmonology as OP    --Duoneb PRN        Final Active Diagnoses:    Diagnosis Date Noted POA    PRINCIPAL PROBLEM:  Acute on chronic heart failure with preserved ejection fraction [I50.33] 04/04/2024 Yes    Elevated troponin [R79.89] 07/09/2024 Yes    Hypokalemia [E87.6] 03/19/2023 Yes    CKD (chronic kidney disease) stage 4, GFR 15-29 ml/min [N18.4] 05/11/2017 Yes     Chronic    Delirium [R41.0] 02/28/2025 No    S/P CABG x 3 [Z95.1] 09/01/2023 Not Applicable    S/P placement of cardiac pacemaker [Z95.0] 05/04/2023 Yes    Metabolic acidosis [E87.20] 03/02/2022 Yes    Essential hypertension [I10] 02/01/2016 Yes     Chronic    Iron deficiency anemia [D50.9] 02/16/2015 Yes    Other hyperlipidemia [E78.49] 07/08/2013 Yes     Chronic    Sarcoidosis of lung [D86.0]  Yes     Chronic      Problems Resolved During this Admission:       Discharged Condition: good    Disposition: Home or Self Care    Follow Up:   Contact information for follow-up providers       Guillermina Pickett PA Follow up on 3/11/2025.    Specialty: Transplant  Why: followup as scheduled  Contact information:  [address] [facility] [address]  [phone]               Stan Lui MD. Schedule an appointment as soon as possible for a visit.    Specialties: Interventional Cardiology, Cardiology  Contact information:  [address] [facility] [address]  [phone]               Britt Prakash,  Follow up on 3/4/2025.    Specialty: Internal Medicine  Why: Followup as scheduled  Contact information:  [address]  [facility] [address]  [phone]                       Contact information for after-discharge care       Home Medical Care       [facility] .    Service: Home Health Services  Contact information:  [address] [facility]  [facility] [address]  [phone]                                 Patient Instructions:      COMMODE FOR HOME USE     Order Specific Question Answer Comments   Type: Standard    Height: 5' 1" (1.549 m)    Weight: 56.8 kg (125 lb 5.3 oz)    Does patient have medical equipment at home? shower chair    Does patient have medical equipment at home? walker, rolling    Does patient have medical equipment at home? grab bar    Does patient have medical equipment at home? cane, straight    Length of need (1-99 months): 99      Ambulatory referral/consult to [facility] Failure [facility]   Standing Status: Future   Referral Priority: Routine Referral Type: Consultation   Referral Reason: Specialty Services Required   Requested Specialty: Cardiology   Number of Visits Requested: 1     Ambulatory referral/consult to [facility]   Standing Status: Future   Referral Priority: Routine Referral Type: Consultation   Referral Reason: Specialty Services Required   Number of Visits Requested: 1     Ambulatory referral/consult to Home Health   Standing Status: Future   Referral Priority: Routine Referral Type: Home Health   Referral Reason: Specialty Services Required   Requested Specialty: Home Health Services   Number of Visits Requested: 1     Diet Cardiac     Call MD for:  difficulty breathing or increased cough     Call MD for:  increased confusion or weakness     Notify your health care provider if you experience any of the following:  difficulty breathing or increased cough     Activity as tolerated       Significant Diagnostic Studies:  see hospital course     Pending Diagnostic Studies:       None           Medications:  Reconciled " Home Medications:      Medication List        START taking these medications      QUEtiapine 25 MG Tab  Commonly known as: SEROQUEL  Take 1 tablet (25 mg total) by mouth every evening. for 2 days            CONTINUE taking these medications      aflibercept 2 mg/0.05 mL Soln  2 mg by Intravitreal route every 28 days.     amLODIPine 5 MG tablet  Commonly known as: NORVASC  Take 5 mg by mouth.     aspirin 81 MG EC tablet  Commonly known as: ECOTRIN  Take 1 tablet (81 mg total) by mouth once daily.     atorvastatin 40 MG tablet  Commonly known as: LIPITOR  Take 1 tablet (40 mg total) by mouth Daily.     budesonide 3 mg capsule  Commonly known as: ENTOCORT EC  Take 1-2 capsules (3-6 mg total) by mouth daily as needed (flare). As needed     bumetanide 1 MG tablet  Commonly known as: BUMEX  Take 1 tablet (1 mg total) by mouth once daily.     busPIRone 5 MG Tab  Commonly known as: BUSPAR  Take 1 tablet (5 mg total) by mouth 2 (two) times daily as needed (anxiety).     carvediloL 12.5 MG tablet  Commonly known as: COREG  Take 1 tablet (12.5 mg total) by mouth 2 (two) times daily with meals.     CENTRUM SILVER ORAL  Take 1 tablet by mouth once daily.     citalopram 10 MG tablet  Commonly known as: CeleXA  Take 1 tablet (10 mg total) by mouth every evening.     CREON 24,000-76,000 -120,000 unit capsule  Generic drug: lipase-protease-amylase 24,000-76,000-120,000 units  Take 1 capsule by mouth 3 (three) times daily with meals.     cyproheptadine 4 mg tablet  Commonly known as: PERIACTIN  Take 1 tablet (4 mg total) by mouth 3 (three) times daily as needed (appetite).     dexAMETHasone 0.7 mg Impl intravitreal implant  Commonly known as: OZURDEX  0.7 mg by Intravitreal route once.     dorzolamide-timolol 2-0.5% 22.3-6.8 mg/mL ophthalmic solution  Commonly known as: COSOPT  Place 1 drop into both eyes 2 (two) times daily.     empagliflozin 10 mg tablet  Commonly known as: JARDIANCE  Take 1 tablet (10 mg total) by mouth once  daily.     HYDROcodone-acetaminophen 5-325 mg per tablet  Commonly known as: NORCO  1/2 tablet every 6 to 8 hours as needed for pain     loperamide 2 mg capsule  Commonly known as: IMODIUM  Take 1 mg by mouth every 6 (six) hours as needed for Diarrhea.     nitroGLYCERIN 0.4 MG SL tablet  Commonly known as: NITROSTAT  PLACE ONE TABLET UNDERNEATH THE TONGUE EVERY 5 MINUTES AS NEEDED FOR CHEST PAIN     ondansetron 4 MG tablet  Commonly known as: ZOFRAN  Take 1 tablet (4 mg total) by mouth every 8 (eight) hours as needed for Nausea.     pantoprazole 40 MG tablet  Commonly known as: PROTONIX  Take 1 tablet (40 mg total) by mouth once daily.     potassium chloride 10 MEQ Tbsr  Commonly known as: KLOR-CON  Take 1 tablet (10 mEq total) by mouth once daily.     silver sulfADIAZINE 1% 1 % cream  Commonly known as: SILVADENE  Apply topically 2 (two) times daily.     sodium bicarbonate 650 MG tablet  Take 1 tablet (650 mg total) by mouth once daily.            STOP taking these medications      ondansetron 8 MG Tbdl  Commonly known as: ZOFRAN-ODT              Indwelling Lines/Drains at time of discharge:   Lines/Drains/Airways       None                   Time spent on the discharge of patient: 30 minutes         Jailyn Duran MD  Department of Hospital Medicine  [facility] - Telemetry ([facility])

## 2025-03-02 NOTE — ASSESSMENT & PLAN NOTE
Suspect hospital delirium, improved with initiation of seroquel   Continue low dose seroquel qhs x 2d, suspect pt will have continued improvement once back in her home environment and routine    F/u PCP

## 2025-03-03 ENCOUNTER — PATIENT OUTREACH (OUTPATIENT)
Dept: ADMINISTRATIVE | Facility: CLINIC | Age: 89
End: 2025-03-03
Payer: MEDICARE

## 2025-03-03 ENCOUNTER — TELEPHONE (OUTPATIENT)
Dept: CARDIOLOGY | Facility: CLINIC | Age: 89
End: 2025-03-03
Payer: MEDICARE

## 2025-03-03 NOTE — TELEPHONE ENCOUNTER
"[facility]) hospital discharge 48-72 hour phone follow up completed.     Most Recent Hospital Discharge Date: 2/28/25  Last admission Diagnosis/chief complaint:Acute CHF    TCC nurse Navigator spoke with Daughter    Current Patient reported weight: 120.4 lbs    Current Patient reported blood pressure and heart rate: No B/P Cuff    Pt reports the following:  []  Shortness of Breath with Activity  []  Shortness of Breath at rest   []  Fatigue  []  Edema   [] Chest pain or tightness  [] Weight Increase since discharge  [x] None of the above    Medications:   Discharge medication reviewed with pt.  Pt reports having medication list available and has all medications at home for use per list.     Education:   Confirmed pt received "Home Care Guide for Heart Failure Patients" while admitted.   Reviewed key points as listed below.     Recommend 2 -3 gram sodium restriction and 1500 cc-2000 cc fluid restriction.  Encourage physical activity with graded exercise program.  Requested patient to weigh themselves daily, and to notify us if their weight increases by more than 3 lbs in 1 day or 5 lbs in 3 days.   Reminded patient to use "Daily weight and symptom tracker" to record and guide patient on when and how to call HFT[facility]. PT may also use symptom tracker if no scale available  Pt reports being in the green(color) Zone. If in yellow/red, reminded that they should be calling HFT[facility] today or now.     Watch for these Signs and Symptoms: If any of these occur, contact HFT[facility] immediately:   Increase in shortness of breath with movement   Increase in swelling in your legs and ankles   Weight gain of more than 3 pounds in a day or 5 pounds in 3 days.   Difficulty breathing when you are lying down   Worsening fatigue or tiredness   Stomach bloating, a full feeling or a loss of appetite   Increased coughing--especially when you are lying down      Pt was able to verbalize back to nurse in their own " words correct diet/fluid restrictions, necessity for exercise, warning signs and symptoms, when and how to contact their TCC team.      Pt educated on follow-up plan while in HFT[facility] program to include:   Week 1 -  F/u appt with Provider and HF nurse (Date) 3/11/25 at 11:30 am   Week 2-5 - In person/ Virtual/ phone call check ins    Week 5-7 - Pt will discharge from H[facility] and transition to longterm care provider (Cardiology/PCP/ Advanced Heart Failure).      Patient active on myChart? Yes      Pt given the following contact information for ease of communication: [phone] (Mon-Fri, 8a-5p) & for urgent issues on the weekend call [facility] on-call [phone] to page the Cardiology MD on call.  Pt also encouraged utilize [facility] messaging as well.      Will follow up with pt at first clinic visit and HF nurse navigator available for pt questions, issues or concerns.
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
## 2025-03-03 NOTE — PROGRESS NOTES
C3 nurse spoke with Glo Dumont's daughter Jessica for a TCC post hospital discharge follow up call. The patient has a scheduled [facility] appointment with Britt Prakash on 03/04/25 @ 1040.      The pt. had BLE edema at bedtime last night, down this AM. No weight today. Jessica stated the pts. weight on Saturday was 123.4 (up from 121) and on Sunday her weight was 125.4. Jessica declined to speak to the [facility] tri[facility] nurse. She stated she was at work and cannot talk to triage at this time. Educated Jessica on use and [facility] per [facility] Portal per Jessica's request. She stated understanding.     Kira  is aware of the pts. weight gain and will monitor. Educated Jessica on CHF; she stated understanding.

## 2025-03-04 ENCOUNTER — OFFICE VISIT (OUTPATIENT)
Dept: FAMILY MEDICINE | Facility: CLINIC | Age: 89
End: 2025-03-04
Payer: MEDICARE

## 2025-03-04 VITALS
BODY MASS INDEX: 23.73 KG/M2 | RESPIRATION RATE: 18 BRPM | DIASTOLIC BLOOD PRESSURE: 60 MMHG | TEMPERATURE: 98 F | WEIGHT: 125.69 LBS | SYSTOLIC BLOOD PRESSURE: 110 MMHG | HEIGHT: 61 IN | HEART RATE: 64 BPM

## 2025-03-04 DIAGNOSIS — L60.0 INGROWN TOENAIL OF LEFT FOOT: Primary | ICD-10-CM

## 2025-03-04 DIAGNOSIS — I50.30 DIASTOLIC HEART FAILURE, NYHA CLASS 3: ICD-10-CM

## 2025-03-04 PROCEDURE — 99999 PR PBB SHADOW E&M-EST. PATIENT-LVL IV: CPT | Mod: PBBFAC,HCNC,, | Performed by: INTERNAL MEDICINE

## 2025-03-07 ENCOUNTER — PATIENT MESSAGE (OUTPATIENT)
Dept: FAMILY MEDICINE | Facility: CLINIC | Age: 89
End: 2025-03-07
Payer: MEDICARE

## 2025-03-07 DIAGNOSIS — R73.9 HYPERGLYCEMIA: Primary | ICD-10-CM

## 2025-03-11 ENCOUNTER — LAB VISIT (OUTPATIENT)
Dept: LAB | Facility: HOSPITAL | Age: 89
End: 2025-03-11
Attending: INTERNAL MEDICINE
Payer: MEDICARE

## 2025-03-11 ENCOUNTER — OFFICE VISIT (OUTPATIENT)
Dept: CARDIOLOGY | Facility: CLINIC | Age: 89
End: 2025-03-11
Payer: MEDICARE

## 2025-03-11 VITALS
DIASTOLIC BLOOD PRESSURE: 70 MMHG | HEIGHT: 61 IN | WEIGHT: 129.88 LBS | HEART RATE: 66 BPM | SYSTOLIC BLOOD PRESSURE: 150 MMHG | BODY MASS INDEX: 24.52 KG/M2

## 2025-03-11 DIAGNOSIS — I50.33 ACUTE ON CHRONIC HEART FAILURE WITH PRESERVED EJECTION FRACTION: Primary | ICD-10-CM

## 2025-03-11 DIAGNOSIS — R41.0 DELIRIUM: ICD-10-CM

## 2025-03-11 DIAGNOSIS — R73.9 HYPERGLYCEMIA: ICD-10-CM

## 2025-03-11 DIAGNOSIS — I70.0 ATHEROSCLEROSIS OF AORTA: ICD-10-CM

## 2025-03-11 DIAGNOSIS — I50.9 HEART FAILURE, UNSPECIFIED HF CHRONICITY, UNSPECIFIED HEART FAILURE TYPE: ICD-10-CM

## 2025-03-11 DIAGNOSIS — I25.10 CORONARY ARTERY DISEASE, OCCLUSIVE: Chronic | ICD-10-CM

## 2025-03-11 DIAGNOSIS — I49.5 SA NODE DYSFUNCTION: ICD-10-CM

## 2025-03-11 DIAGNOSIS — I10 ESSENTIAL HYPERTENSION: Chronic | ICD-10-CM

## 2025-03-11 PROCEDURE — 99999 PR PBB SHADOW E&M-EST. PATIENT-LVL II: CPT | Mod: PBBFAC,HCNC,, | Performed by: PHYSICIAN ASSISTANT

## 2025-03-11 PROCEDURE — 36415 COLL VENOUS BLD VENIPUNCTURE: CPT | Mod: HCNC | Performed by: INTERNAL MEDICINE

## 2025-03-11 PROCEDURE — 83036 HEMOGLOBIN GLYCOSYLATED A1C: CPT | Mod: HCNC | Performed by: INTERNAL MEDICINE

## 2025-03-11 NOTE — PROGRESS NOTES
[facility] TCC Provider Note (Follow-up) Consult Note      HPI: 89 y.o. female, comorbid conditions include sarcoidosis of lung, obesity, HTN, HLD, CHF, CAD, stage 3 CKD, CAD, CHF, and anemia. Presented to the ED for evaluation of general illness which began 2-3 days PTA. Associated sxs include cough, wheezing, SOB, rhinorrhea, congestion, sore throat, eye swelling, and confusion. Symptoms are constant and moderate in severity. Daughter denies pt having history of emphysema or COPD. In the ED, vitals: 114/58, 63, 15, 98.4F, 94% RA on arrival. Significant Labs: Hgb: 8.8, Cr: 1.9, BNP: 940, Trop: 0.121. CXR: Left Pleural Effusion. Treated with IV diuretic in the ED. Patient is a full code. Placed in Observation under the care of Hospital Medicine for management of Acute On Chronic CHF.     Continued on IV Bumex for diuresis. Cardiology consulted 02/24 given uptrending Cr, diuretics held 02/25. CR stable, restarted on PO diuretics at decreased dose on 02/27 per cardiology recommendations.   PT/OT rec low intensity  Pt with some intermittent confusion, suspect secondary to delirium as symptoms improved with initiation of seroquel qhs      Pt seen and examined on day of discharge, Ox 3, has no complaints. Denies cp, SOB. LUE edema slightly improved, suspect positional in nature as pt favors laying on L side. LUE duplex neg for DvT. Pt discharged home with HH and family in stable condition per my exam. Will continue PO bumex 1 mg daily per cardiology recommendations. Follow up with PCP within 3-5 days, CHF clinic within 1 week and cardiology ASAP. [facility] referral placed.       Now on bumex 1 mg daily    Some LE edema    No PND     PHYSICAL:   Vitals:    03/11/25 1111   BP: (!) 150/70   Pulse: 66          JVD: no,    Heart rhythm: regular  Cardiac murmur: No    S3: no  S4: no  Lungs: clear  Liver span: 10 cm:   Hepatojugular reflux: no  Edema: no,       ASSESSMENT: chronic diastolic HF    PLAN:      Patient  Instructions:   Instruct the patient to notify this clinic if HH, a physician or an advanced care provider wants to change medication one of their HF medications   Activity and Diet restrictions:   Recommend 2-3 gram sodium restriction and 1500cc- 2000cc fluid restriction.  Encourage physical activity with graded exercise program.  Requested patient to weigh themselves daily, and to notify us if their weight increases by more than 3 lbs in 1 day or 5 lbs in 3 days.    Assigned dry weight on home scale: daily weight  Medication changes (include current dose and changed dose)  CHF education done  Euvolemic on exam  Continue bumex 1 mg daily  Low NA diet  RTC 2 months

## 2025-03-12 ENCOUNTER — RESULTS FOLLOW-UP (OUTPATIENT)
Dept: FAMILY MEDICINE | Facility: CLINIC | Age: 89
End: 2025-03-12

## 2025-03-12 LAB
ESTIMATED AVG GLUCOSE: 117 MG/DL (ref 68–131)
HBA1C MFR BLD: 5.7 % (ref 4–5.6)

## 2025-03-13 ENCOUNTER — PATIENT MESSAGE (OUTPATIENT)
Dept: CARDIOLOGY | Facility: CLINIC | Age: 89
End: 2025-03-13
Payer: MEDICARE

## 2025-03-13 ENCOUNTER — OFFICE VISIT (OUTPATIENT)
Dept: PODIATRY | Facility: CLINIC | Age: 89
End: 2025-03-13
Payer: MEDICARE

## 2025-03-13 VITALS — HEIGHT: 61 IN | WEIGHT: 129.88 LBS | BODY MASS INDEX: 24.52 KG/M2

## 2025-03-13 DIAGNOSIS — L03.032 ACUTE PARONYCHIA OF TOE, LEFT: Primary | ICD-10-CM

## 2025-03-13 DIAGNOSIS — L60.0 INGROWN TOENAIL OF LEFT FOOT: ICD-10-CM

## 2025-03-13 DIAGNOSIS — I10 ESSENTIAL HYPERTENSION: Primary | Chronic | ICD-10-CM

## 2025-03-13 PROCEDURE — 99999 PR PBB SHADOW E&M-EST. PATIENT-LVL IV: CPT | Mod: PBBFAC,HCNC,, | Performed by: PODIATRIST

## 2025-03-13 RX ORDER — AMLODIPINE BESYLATE 5 MG/1
5 TABLET ORAL DAILY
Qty: 90 TABLET | Refills: 3 | Status: SHIPPED | OUTPATIENT
Start: 2025-03-13 | End: 2026-03-13

## 2025-03-13 RX ORDER — MUPIROCIN 20 MG/G
OINTMENT TOPICAL DAILY
Qty: 30 G | Refills: 0 | Status: SHIPPED | OUTPATIENT
Start: 2025-03-13
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
## 2025-03-14 ENCOUNTER — PATIENT MESSAGE (OUTPATIENT)
Dept: FAMILY MEDICINE | Facility: CLINIC | Age: 89
End: 2025-03-14
Payer: MEDICARE

## 2025-03-14 DIAGNOSIS — I50.9 HEART FAILURE, UNSPECIFIED HF CHRONICITY, UNSPECIFIED HEART FAILURE TYPE: Primary | ICD-10-CM

## 2025-03-14 NOTE — TELEPHONE ENCOUNTER
Called patient left message for return call [phone], to offer appointment for today at 4:00 pm .

## 2025-03-17 ENCOUNTER — PATIENT MESSAGE (OUTPATIENT)
Dept: FAMILY MEDICINE | Facility: CLINIC | Age: 89
End: 2025-03-17
Payer: MEDICARE

## 2025-03-17 NOTE — PROGRESS NOTES
Patient ID: Glo Dumont is a 89 y.o. female.    Chief Complaint: Cough (X 3 days)      History of Present Illness    - Ms. Dumont presents with a persistent cough.    Ms. Dumont reports a dry cough causing discomfort, particularly in the throat. The practitioner suggests the cough is likely allergy-related, given the high pollen levels observed recently. Ms. Dumont denies runny nose, ear pain, and sore throat.      ROS:  General: denies fever, denies chills, denies fatigue, denies weight gain, denies weight loss  Eyes: denies vision changes, denies redness, denies discharge  ENT: denies ear pain, denies nasal congestion, denies sore throat, complains of post nasal drip  Cardiovascular: denies chest pain, denies palpitations, denies lower extremity edema  Respiratory: complains of cough, denies shortness of breath  Gastrointestinal: denies abdominal pain, denies nausea, denies vomiting, denies diarrhea, denies constipation, denies blood in stool  Genitourinary: denies dysuria, denies hematuria, denies frequency  Musculoskeletal: denies joint pain, denies muscle pain  Skin: denies rash, denies lesion  Neurological: denies headache, denies dizziness, denies numbness, denies tingling  Psychiatric: denies anxiety, denies depression, denies sleep difficulty            Physical Exam    General: In no acute distress.  Head: Normocephalic. Non traumatic.  Eyes: PERRLA. EOMs full. Conjunctivae clear. Fundi grossly normal.  Ears: EACs clear. TMs normal.  Nose: Mucosa pink. Mucosa moist. No obstruction.  Throat: Clear. No exudates. No lesions.  Neck: Supple. No masses. No thyromegaly. No bruits.  Chest: Lungs clear. No rales. No rhonchi. No wheezes.  Heart: RRR. No murmurs. No rubs. No gallops.  Abdomen: Soft. No tenderness. No masses. BS normal.  : Normal external genitalia. No lesions. No discharge. No hernias  noted.  Back: Normal curvature. No scoliosis. No tenderness.  Extremities: Warm. Well perfused. No upper  "extremity edema. No lower extremity edema. FROM. No deformities. No joint erythema.  Neuro: No focal deficits appreciated. Good muscle tone. Normal response to visual stimuli. Normal response to auditory stimuli.  Skin: Normal. No rashes. No lesions noted.          Current Medications[1]            VITAL SIGNS:  Vitals:    03/18/25 1028   BP: (!) 140/70   BP Location: Left arm   Patient Position: Sitting   Pulse: 73   Temp: 98.2 °F (36.8 °C)   TempSrc: Tympanic   SpO2: 98%   Weight: 58.3 kg (128 lb 10.2 oz)   Height: 5' 1" (1.549 m)       CURRENT BMI:   Body mass index is 24.31 kg/m².    LABS REVIEWED:    CBC:  Lab Results   Component Value Date    WBC 5.18 02/25/2025    RBC 3.14 (L) 02/25/2025    HGB 9.1 (L) 02/25/2025    HCT 28.2 (L) 02/25/2025    MCV 90 02/25/2025    MCH 29.0 02/25/2025    MCHC 32.3 02/25/2025    RDW 15.3 (H) 02/25/2025     02/25/2025    MPV 10.6 02/25/2025    GRAN 4.0 02/25/2025    GRAN 77.5 (H) 02/25/2025    LYMPH 0.4 (L) 02/25/2025    LYMPH 8.3 (L) 02/25/2025    MONO 0.6 02/25/2025    MONO 12.2 02/25/2025    EOS 0.0 02/25/2025    BASO 0.02 02/25/2025    EOSINOPHIL 0.6 02/25/2025    BASOPHIL 0.4 02/25/2025       CHEMISTRY:  Sodium   Date Value Ref Range Status   02/28/2025 140 136 - 145 mmol/L Final     Potassium   Date Value Ref Range Status   02/28/2025 3.8 3.5 - 5.1 mmol/L Final     Chloride   Date Value Ref Range Status   02/28/2025 108 95 - 110 mmol/L Final     CO2   Date Value Ref Range Status   02/28/2025 21 (L) 23 - 29 mmol/L Final     Glucose   Date Value Ref Range Status   02/28/2025 83 70 - 110 mg/dL Final     BUN   Date Value Ref Range Status   02/28/2025 23 8 - 23 mg/dL Final     Creatinine   Date Value Ref Range Status   02/28/2025 2.2 (H) 0.5 - 1.4 mg/dL Final     Calcium   Date Value Ref Range Status   02/28/2025 8.9 8.7 - 10.5 mg/dL Final     Total Protein   Date Value Ref Range Status   02/26/2025 5.2 (L) 6.0 - 8.4 g/dL Final     Albumin   Date Value Ref Range Status "   02/26/2025 2.9 (L) 3.5 - 5.2 g/dL Final     Total Bilirubin   Date Value Ref Range Status   02/26/2025 0.5 0.1 - 1.0 mg/dL Final     Comment:     For infants and newborns, interpretation of results should be based  on gestational age, weight and in agreement with clinical  observations.    Premature Infant recommended reference ranges:  Up to 24 hours.............<8.0 mg/dL  Up to 48 hours............<12.0 mg/dL  3-5 days..................<15.0 mg/dL  6-29 days.................<15.0 mg/dL       Alkaline Phosphatase   Date Value Ref Range Status   02/26/2025 69 40 - 150 U/L Final     AST   Date Value Ref Range Status   02/26/2025 11 10 - 40 U/L Final     ALT   Date Value Ref Range Status   02/26/2025 9 (L) 10 - 44 U/L Final     Anion Gap   Date Value Ref Range Status   02/28/2025 11 8 - 16 mmol/L Final     eGFR   Date Value Ref Range Status   02/28/2025 21 (A) >60 mL/min/1.73 m^2 Final       LIPID PANEL:  Lab Results   Component Value Date    CHOL 153 10/14/2024    CHOL 160 05/04/2022     Lab Results   Component Value Date    TRIG 148 10/14/2024    TRIG 80 05/04/2022     Lab Results   Component Value Date    HDL 45 10/14/2024    HDL 81 (H) 05/04/2022     Lab Results   Component Value Date    LDLCALC 78.4 10/14/2024    LDLCALC 63.0 05/04/2022       THYROID:  Lab Results   Component Value Date    TSH 1.191 10/13/2024    FREET4 1.00 10/13/2024       DIABETES:  Lab Results   Component Value Date    HGBA1C 5.7 (H) 03/11/2025     Microalb/Creat Ratio   Date Value Ref Range Status   02/06/2014 30.6 (H) 0.0 - 30.0 ug/mg Final       Assessment and Plan     Assessment & Plan    ALLERGIC RHINITIS DUE TO POLLEN:  - Assessed the patient's cough, likely allergy-related due to seasonal pollen.  - Discussed high pollen levels contributing to allergy symptoms.  - Initiated allergy treatment to address symptoms.  - Prescribed Xyzal (similar to Allegra Zyrtec) tablet.  - Prescribed Flonase nasal spray.  - Ms. Dumont reports cough  and rhinorrhea symptoms.  - Noted dryness in the posterior pharynx during exam.    POSTNASAL DRIP:  - Observed signs of postnasal drip causing cough.  - Explained that the cough is likely due to postnasal drip caused by allergies.  - Prescribed Flonase to reduce mucus production and postnasal drip.  - Ms. Dumont reports persistent cough symptoms.  - Assessed postnasal drip as the likely cause of cough, related to allergies.    PHYSICAL EXAMINATION:  - Performed physical exam, noting normal heart and lung sounds.          1. Seasonal allergies  Comments:  Will prescribe Xyzal and Flonase  Orders:  -     levocetirizine (XYZAL) 5 MG tablet; Take 1 tablet (5 mg total) by mouth every evening.  Dispense: 30 tablet; Refill: 11  -     fluticasone propionate (FLONASE) 50 mcg/actuation nasal spray; 1 spray (50 mcg total) by Each Nostril route once daily.  Dispense: 11.1 mL; Refill: 3           No follow-ups on file.  26 of total time spent on the encounter, which includes face to face time and non-face to face time preparing to see the patient. This includes obtaining and/or reviewing separately obtained history, performing a medically appropriate examination and/or evaluation, and counseling and educating the patient/family/caregiver. Includes documenting clinical information in the electronic or other health record, independently interpreting results (not separately reported) and communicating results to the patient/family/caregiver, with care coordination (not separately reported). Medications, tests and/or procedures ordered as necessary along with referring and communicating with other health professionals (when not separately reported).      This note was generated with the assistance of ambient listening technology. Verbal consent was obtained by the patient and accompanying visitor(s) for the recording of patient appointment to facilitate this note. I attest to having reviewed and edited the generated note for  accuracy, though some syntax or spelling errors may persist. Please contact the author of this note for any clarification.            [1]   Current Outpatient Medications:     amLODIPine (NORVASC) 5 MG tablet, Take 1 tablet (5 mg total) by mouth once daily., Disp: 90 tablet, Rfl: 3    aspirin (ECOTRIN) 81 MG EC tablet, Take 1 tablet (81 mg total) by mouth once daily., Disp: 90 tablet, Rfl: 3    atorvastatin (LIPITOR) 40 MG tablet, Take 1 tablet (40 mg total) by mouth Daily., Disp: 90 tablet, Rfl: 3    budesonide (ENTOCORT EC) 3 mg capsule, Take 1-2 capsules (3-6 mg total) by mouth daily as needed (flare). As needed, Disp: 60 each, Rfl: 2    bumetanide (BUMEX) 1 MG tablet, Take 1 tablet (1 mg total) by mouth once daily., Disp: 30 tablet, Rfl: 11    busPIRone (BUSPAR) 5 MG Tab, Take 1 tablet (5 mg total) by mouth 2 (two) times daily as needed (anxiety)., Disp: 60 tablet, Rfl: 5    carvediloL (COREG) 12.5 MG tablet, Take 1 tablet (12.5 mg total) by mouth 2 (two) times daily with meals., Disp: 180 tablet, Rfl: 3    citalopram (CELEXA) 10 MG tablet, Take 1 tablet (10 mg total) by mouth every evening., Disp: , Rfl:     cyproheptadine (PERIACTIN) 4 mg tablet, Take 1 tablet (4 mg total) by mouth 3 (three) times daily as needed (appetite)., Disp: 270 tablet, Rfl: 1    dorzolamide-timolol 2-0.5% (COSOPT) 22.3-6.8 mg/mL ophthalmic solution, Place 1 drop into both eyes 2 (two) times daily., Disp: 10 mL, Rfl: 6    empagliflozin (JARDIANCE) 10 mg tablet, Take 1 tablet (10 mg total) by mouth once daily., Disp: 30 tablet, Rfl: 11    FOLIC ACID/MULTIVIT-MIN/LUTEIN (CENTRUM SILVER ORAL), Take 1 tablet by mouth once daily., Disp: , Rfl:     lipase-protease-amylase 24,000-76,000-120,000 units (CREON) 24,000-76,000 -120,000 unit capsule, Take 1 capsule by mouth 3 (three) times daily with meals., Disp: 270 capsule, Rfl: 3    loperamide (IMODIUM) 2 mg capsule, Take 1 mg by mouth every 6 (six) hours as needed for Diarrhea., Disp: , Rfl:      mupirocin (BACTROBAN) 2 % ointment, Apply topically once daily., Disp: 30 g, Rfl: 0    nitroGLYCERIN (NITROSTAT) 0.4 MG SL tablet, PLACE ONE TABLET UNDERNEATH THE TONGUE EVERY 5 MINUTES AS NEEDED FOR CHEST PAIN, Disp: 25 tablet, Rfl: 2    ondansetron (ZOFRAN) 4 MG tablet, Take 1 tablet (4 mg total) by mouth every 8 (eight) hours as needed for Nausea., Disp: 12 tablet, Rfl: 0    pantoprazole (PROTONIX) 40 MG tablet, Take 1 tablet (40 mg total) by mouth once daily., Disp: 90 tablet, Rfl: 3    sodium bicarbonate 650 MG tablet, Take 1 tablet (650 mg total) by mouth once daily., Disp: 180 tablet, Rfl: 0    fluticasone propionate (FLONASE) 50 mcg/actuation nasal spray, 1 spray (50 mcg total) by Each Nostril route once daily., Disp: 11.1 mL, Rfl: 3    levocetirizine (XYZAL) 5 MG tablet, Take 1 tablet (5 mg total) by mouth every evening., Disp: 30 tablet, Rfl: 11    QUEtiapine (SEROQUEL) 25 MG Tab, Take 1 tablet (25 mg total) by mouth every evening. for 2 days, Disp: 2 tablet, Rfl: 0

## 2025-03-18 ENCOUNTER — OFFICE VISIT (OUTPATIENT)
Dept: FAMILY MEDICINE | Facility: CLINIC | Age: 89
End: 2025-03-18
Payer: MEDICARE

## 2025-03-18 ENCOUNTER — RESULTS FOLLOW-UP (OUTPATIENT)
Dept: FAMILY MEDICINE | Facility: CLINIC | Age: 89
End: 2025-03-18

## 2025-03-18 ENCOUNTER — HOSPITAL ENCOUNTER (OUTPATIENT)
Dept: RADIOLOGY | Facility: HOSPITAL | Age: 89
Discharge: HOME | End: 2025-03-18
Attending: INTERNAL MEDICINE
Payer: MEDICARE

## 2025-03-18 VITALS
SYSTOLIC BLOOD PRESSURE: 140 MMHG | TEMPERATURE: 98 F | BODY MASS INDEX: 24.29 KG/M2 | WEIGHT: 128.63 LBS | DIASTOLIC BLOOD PRESSURE: 70 MMHG | OXYGEN SATURATION: 98 % | HEIGHT: 61 IN | HEART RATE: 73 BPM

## 2025-03-18 DIAGNOSIS — I50.9 HEART FAILURE, UNSPECIFIED HF CHRONICITY, UNSPECIFIED HEART FAILURE TYPE: ICD-10-CM

## 2025-03-18 DIAGNOSIS — J30.2 SEASONAL ALLERGIES: Primary | ICD-10-CM

## 2025-03-18 PROCEDURE — 71046 X-RAY EXAM CHEST 2 VIEWS: CPT | Mod: TC,HCNC,FY,PO

## 2025-03-18 PROCEDURE — 99999 PR PBB SHADOW E&M-EST. PATIENT-LVL III: CPT | Mod: PBBFAC,HCNC,, | Performed by: INTERNAL MEDICINE

## 2025-03-18 PROCEDURE — 71046 X-RAY EXAM CHEST 2 VIEWS: CPT | Mod: 26,HCNC,, | Performed by: RADIOLOGY

## 2025-03-18 RX ORDER — FLUTICASONE PROPIONATE 50 MCG
1 SPRAY, SUSPENSION (ML) NASAL DAILY
Qty: 11.1 ML | Refills: 3 | Status: SHIPPED | OUTPATIENT
Start: 2025-03-18

## 2025-03-18 RX ORDER — LEVOCETIRIZINE DIHYDROCHLORIDE 5 MG/1
5 TABLET, FILM COATED ORAL NIGHTLY
Qty: 30 TABLET | Refills: 11 | Status: SHIPPED | OUTPATIENT
Start: 2025-03-18 | End: 2026-03-18

## 2025-03-25 ENCOUNTER — CLINICAL SUPPORT (OUTPATIENT)
Dept: CARDIOLOGY | Facility: HOSPITAL | Age: 89
End: 2025-03-25
Attending: INTERNAL MEDICINE
Payer: MEDICARE

## 2025-03-25 ENCOUNTER — CLINICAL SUPPORT (OUTPATIENT)
Dept: CARDIOLOGY | Facility: HOSPITAL | Age: 89
End: 2025-03-25
Payer: MEDICARE

## 2025-03-25 DIAGNOSIS — Z95.0 PRESENCE OF CARDIAC PACEMAKER: ICD-10-CM

## 2025-03-25 DIAGNOSIS — I50.32 CHRONIC DIASTOLIC (CONGESTIVE) HEART FAILURE: ICD-10-CM

## 2025-03-25 PROCEDURE — 93294 REM INTERROG EVL PM/LDLS PM: CPT | Mod: HCNC,S$GLB,, | Performed by: INTERNAL MEDICINE

## 2025-03-25 PROCEDURE — 93296 REM INTERROG EVL PM/IDS: CPT | Mod: HCNC | Performed by: INTERNAL MEDICINE

## 2025-03-28 ENCOUNTER — PATIENT MESSAGE (OUTPATIENT)
Dept: FAMILY MEDICINE | Facility: CLINIC | Age: 89
End: 2025-03-28
Payer: MEDICARE

## 2025-03-28 RX ORDER — QUETIAPINE FUMARATE 25 MG/1
25 TABLET, FILM COATED ORAL NIGHTLY
Qty: 30 TABLET | Refills: 2 | Status: SHIPPED | OUTPATIENT
Start: 2025-03-28 | End: 2025-04-27

## 2025-03-31 LAB
OHS CV AF BURDEN PERCENT: < 1
OHS CV DC REMOTE DEVICE TYPE: NORMAL
OHS CV RV PACING PERCENT: 0 %

## 2025-04-02 ENCOUNTER — TELEPHONE (OUTPATIENT)
Dept: FAMILY MEDICINE | Facility: CLINIC | Age: 89
End: 2025-04-02
Payer: MEDICARE

## 2025-04-02 DIAGNOSIS — Z79.899 LONG TERM USE OF DRUG: Primary | ICD-10-CM

## 2025-04-02 NOTE — TELEPHONE ENCOUNTER
----- Message from Yana sent at 4/2/2025  9:36 AM CDT -----  Pt is requesting lab orders Yamile ([facility])[phone]

## 2025-04-02 NOTE — TELEPHONE ENCOUNTER
Yamile gaviria/[facility] PHILIP[facility] called in stating pt daughter is requesting order for CMP be placed for pt. Please advise     LV: 3/18/25    Kria philippe fax: [phone]

## 2025-04-08 ENCOUNTER — TELEPHONE (OUTPATIENT)
Dept: FAMILY MEDICINE | Facility: CLINIC | Age: 89
End: 2025-04-08
Payer: MEDICARE

## 2025-04-08 ENCOUNTER — PATIENT MESSAGE (OUTPATIENT)
Dept: OPHTHALMOLOGY | Facility: CLINIC | Age: 89
End: 2025-04-08
Payer: MEDICARE

## 2025-04-08 NOTE — TELEPHONE ENCOUNTER
----- Message from Jennie Dennis sent at 4/8/2025 10:33 AM CDT -----  Contact: jessica (daughter)  .Type: Patient  Requesting Call BackWho Called: Jessica (daughter)What is this regarding?: Lab resultsWould the patient rather a call back or a response via [facility]?  Call [facility] Call Back Number: [phone] Information:  Jessica is calling to discuss Lab results. The labs were done at [facility]. Please Call Back

## 2025-04-08 NOTE — TELEPHONE ENCOUNTER
Pt daughter notified that doctor has not reviewed labs yet. Once reviewed she will receive a call regarding labs.

## 2025-04-09 ENCOUNTER — TELEPHONE (OUTPATIENT)
Dept: FAMILY MEDICINE | Facility: CLINIC | Age: 89
End: 2025-04-09
Payer: MEDICARE

## 2025-04-09 ENCOUNTER — PATIENT MESSAGE (OUTPATIENT)
Dept: FAMILY MEDICINE | Facility: CLINIC | Age: 89
End: 2025-04-09
Payer: MEDICARE

## 2025-04-09 NOTE — TELEPHONE ENCOUNTER
----- Message from Tyson sent at 4/9/2025  2:44 PM CDT -----  Contact: [email or web address]  .Type:  Needs Medical AdviceWho Called: [facility] ptSymptoms (please be specific): rapid weight gain How long has patient had these symptoms:  Pharmacy name and phone #:  Would the patient rather a call back or a response via [facility]? Call [facility] Call Back Number: [phone] - vito Additional Information: Pt has congestive heart failure and has gained 6 pounds in one day (128 to 134) It is their protocol to inform the dr office as this can be a heart issue.

## 2025-04-09 NOTE — TELEPHONE ENCOUNTER
----- Message from Britt Prakash DO sent at 4/9/2025  3:27 PM CDT -----  Contact: [facility]  Double Bumex dose until at dry weight  ----- Message -----  From: Marcella Garcia MA  Sent: 4/9/2025   3:25 PM CDT  To: Britt Prakash DO      ----- Message -----  From: Tyson Minor  Sent: 4/9/2025   2:46 PM CDT  To: Dwain eD [place] Staff    .Type:  Needs Medical AdviceWho Called: [facility] ptSymptoms (please be specific): rapid weight gain How long has patient had these symptoms:  Pharmacy name and phone #:  Would the patient rather a call back or a response via [facility]? Call [facility] Call Back Number: [phone] - analia Additional Information: Pt has congestive heart failure and has gained 6 pounds in one day (128 to 134) It is their protocol to inform the dr office as this can be a heart issue.

## 2025-04-10 ENCOUNTER — PATIENT MESSAGE (OUTPATIENT)
Dept: FAMILY MEDICINE | Facility: CLINIC | Age: 89
End: 2025-04-10
Payer: MEDICARE

## 2025-04-10 DIAGNOSIS — R41.3 MEMORY LOSS: Primary | ICD-10-CM

## 2025-04-11 ENCOUNTER — TELEPHONE (OUTPATIENT)
Dept: FAMILY MEDICINE | Facility: CLINIC | Age: 89
End: 2025-04-11
Payer: MEDICARE

## 2025-04-11 ENCOUNTER — HOSPITAL ENCOUNTER (INPATIENT)
Facility: HOSPITAL | Age: 89
LOS: 3 days | Discharge: HOME | DRG: 291 | End: 2025-04-15
Attending: EMERGENCY MEDICINE | Admitting: STUDENT IN AN ORGANIZED HEALTH CARE EDUCATION/TRAINING PROGRAM
Payer: MEDICARE

## 2025-04-11 DIAGNOSIS — I50.33 ACUTE ON CHRONIC HEART FAILURE WITH PRESERVED EJECTION FRACTION: ICD-10-CM

## 2025-04-11 DIAGNOSIS — I16.0 HYPERTENSIVE URGENCY: ICD-10-CM

## 2025-04-11 DIAGNOSIS — I50.9 ACUTE EXACERBATION OF CHF (CONGESTIVE HEART FAILURE): Primary | ICD-10-CM

## 2025-04-11 DIAGNOSIS — M79.89 LEG SWELLING: ICD-10-CM

## 2025-04-11 DIAGNOSIS — I50.9 CHF (CONGESTIVE HEART FAILURE): ICD-10-CM

## 2025-04-11 DIAGNOSIS — R07.9 CHEST PAIN: ICD-10-CM

## 2025-04-11 LAB
ABSOLUTE EOSINOPHIL (OHS): 0.05 K/UL
ABSOLUTE MONOCYTE (OHS): 0.9 K/UL (ref 0.3–1)
ABSOLUTE NEUTROPHIL COUNT (OHS): 8.22 K/UL (ref 1.8–7.7)
ALBUMIN SERPL BCP-MCNC: 3.7 G/DL (ref 3.5–5.2)
ALP SERPL-CCNC: 62 UNIT/L (ref 40–150)
ALT SERPL W/O P-5'-P-CCNC: 18 UNIT/L (ref 10–44)
ANION GAP (OHS): 11 MMOL/L (ref 8–16)
AST SERPL-CCNC: 21 UNIT/L (ref 11–45)
BACTERIA #/AREA URNS AUTO: ABNORMAL /HPF
BASOPHILS # BLD AUTO: 0.05 K/UL
BASOPHILS NFR BLD AUTO: 0.5 %
BILIRUB SERPL-MCNC: 0.4 MG/DL (ref 0.1–1)
BILIRUB UR QL STRIP.AUTO: NEGATIVE
BNP SERPL-MCNC: 832 PG/ML (ref 0–99)
BUN SERPL-MCNC: 29 MG/DL (ref 8–23)
CALCIUM SERPL-MCNC: 8.6 MG/DL (ref 8.7–10.5)
CHLORIDE SERPL-SCNC: 110 MMOL/L (ref 95–110)
CLARITY UR: CLEAR
CO2 SERPL-SCNC: 20 MMOL/L (ref 23–29)
COLOR UR AUTO: COLORLESS
CREAT SERPL-MCNC: 1.5 MG/DL (ref 0.5–1.4)
ERYTHROCYTE [DISTWIDTH] IN BLOOD BY AUTOMATED COUNT: 16 % (ref 11.5–14.5)
GFR SERPLBLD CREATININE-BSD FMLA CKD-EPI: 34 ML/MIN/1.73/M2
GLUCOSE SERPL-MCNC: 143 MG/DL (ref 70–110)
GLUCOSE UR QL STRIP: ABNORMAL
HCT VFR BLD AUTO: 34.7 % (ref 37–48.5)
HGB BLD-MCNC: 11.4 GM/DL (ref 12–16)
HGB UR QL STRIP: NEGATIVE
HYALINE CASTS UR QL AUTO: 0 /LPF (ref 0–1)
IMM GRANULOCYTES # BLD AUTO: 0.22 K/UL (ref 0–0.04)
IMM GRANULOCYTES NFR BLD AUTO: 2.1 % (ref 0–0.5)
KETONES UR QL STRIP: NEGATIVE
LEUKOCYTE ESTERASE UR QL STRIP: ABNORMAL
LYMPHOCYTES # BLD AUTO: 0.96 K/UL (ref 1–4.8)
MCH RBC QN AUTO: 29.3 PG (ref 27–31)
MCHC RBC AUTO-ENTMCNC: 32.9 G/DL (ref 32–36)
MCV RBC AUTO: 89 FL (ref 82–98)
MICROSCOPIC COMMENT: ABNORMAL
NITRITE UR QL STRIP: NEGATIVE
NUCLEATED RBC (/100WBC) (OHS): 0 /100 WBC
OHS QRS DURATION: 82 MS
OHS QTC CALCULATION: 442 MS
PH UR STRIP: 7 [PH]
PLATELET # BLD AUTO: 229 K/UL (ref 150–450)
PMV BLD AUTO: 10.1 FL (ref 9.2–12.9)
POTASSIUM SERPL-SCNC: 3.7 MMOL/L (ref 3.5–5.1)
PROT SERPL-MCNC: 6.9 GM/DL (ref 6–8.4)
PROT UR QL STRIP: NEGATIVE
RBC # BLD AUTO: 3.89 M/UL (ref 4–5.4)
RBC #/AREA URNS AUTO: 0 /HPF (ref 0–4)
RELATIVE EOSINOPHIL (OHS): 0.5 %
RELATIVE LYMPHOCYTE (OHS): 9.2 % (ref 18–48)
RELATIVE MONOCYTE (OHS): 8.7 % (ref 4–15)
RELATIVE NEUTROPHIL (OHS): 79 % (ref 38–73)
SODIUM SERPL-SCNC: 141 MMOL/L (ref 136–145)
SP GR UR STRIP: 1.01
TROPONIN I SERPL DL<=0.01 NG/ML-MCNC: 0.02 NG/ML
UROBILINOGEN UR STRIP-ACNC: NEGATIVE EU/DL
WBC # BLD AUTO: 10.4 K/UL (ref 3.9–12.7)
WBC #/AREA URNS AUTO: 6 /HPF (ref 0–5)
YEAST UR QL AUTO: ABNORMAL /HPF

## 2025-04-11 PROCEDURE — 99285 EMERGENCY DEPT VISIT HI MDM: CPT | Mod: 25,HCNC

## 2025-04-11 PROCEDURE — G0378 HOSPITAL OBSERVATION PER HR: HCPCS | Mod: HCNC

## 2025-04-11 PROCEDURE — 83880 ASSAY OF NATRIURETIC PEPTIDE: CPT | Mod: HCNC | Performed by: NURSE PRACTITIONER

## 2025-04-11 PROCEDURE — 81001 URINALYSIS AUTO W/SCOPE: CPT | Mod: HCNC | Performed by: NURSE PRACTITIONER

## 2025-04-11 PROCEDURE — 85025 COMPLETE CBC W/AUTO DIFF WBC: CPT | Mod: HCNC | Performed by: NURSE PRACTITIONER

## 2025-04-11 PROCEDURE — 63600175 PHARM REV CODE 636 W HCPCS: Mod: JZ,TB,HCNC | Performed by: EMERGENCY MEDICINE

## 2025-04-11 PROCEDURE — 25000003 PHARM REV CODE 250: Mod: HCNC | Performed by: EMERGENCY MEDICINE

## 2025-04-11 PROCEDURE — 96374 THER/PROPH/DIAG INJ IV PUSH: CPT | Mod: HCNC

## 2025-04-11 PROCEDURE — 84484 ASSAY OF TROPONIN QUANT: CPT | Mod: HCNC | Performed by: NURSE PRACTITIONER

## 2025-04-11 PROCEDURE — 82374 ASSAY BLOOD CARBON DIOXIDE: CPT | Mod: HCNC | Performed by: NURSE PRACTITIONER

## 2025-04-11 RX ORDER — SODIUM CHLORIDE 0.9 % (FLUSH) 0.9 %
10 SYRINGE (ML) INJECTION EVERY 12 HOURS PRN
Status: DISCONTINUED | OUTPATIENT
Start: 2025-04-11 | End: 2025-04-15 | Stop reason: HOSPADM

## 2025-04-11 RX ORDER — ASPIRIN 81 MG/1
81 TABLET ORAL DAILY
Status: DISCONTINUED | OUTPATIENT
Start: 2025-04-12 | End: 2025-04-15 | Stop reason: HOSPADM

## 2025-04-11 RX ORDER — NALOXONE HCL 0.4 MG/ML
0.02 VIAL (ML) INJECTION
Status: DISCONTINUED | OUTPATIENT
Start: 2025-04-11 | End: 2025-04-15 | Stop reason: HOSPADM

## 2025-04-11 RX ORDER — ATORVASTATIN CALCIUM 40 MG/1
40 TABLET, FILM COATED ORAL DAILY
Status: DISCONTINUED | OUTPATIENT
Start: 2025-04-12 | End: 2025-04-15 | Stop reason: HOSPADM

## 2025-04-11 RX ORDER — ONDANSETRON HYDROCHLORIDE 2 MG/ML
4 INJECTION, SOLUTION INTRAVENOUS EVERY 8 HOURS PRN
Status: DISCONTINUED | OUTPATIENT
Start: 2025-04-11 | End: 2025-04-15 | Stop reason: HOSPADM

## 2025-04-11 RX ORDER — ACETAMINOPHEN 325 MG/1
650 TABLET ORAL EVERY 6 HOURS PRN
Status: DISCONTINUED | OUTPATIENT
Start: 2025-04-11 | End: 2025-04-15 | Stop reason: HOSPADM

## 2025-04-11 RX ORDER — POLYETHYLENE GLYCOL 3350 17 G/17G
17 POWDER, FOR SOLUTION ORAL 2 TIMES DAILY PRN
Status: DISCONTINUED | OUTPATIENT
Start: 2025-04-11 | End: 2025-04-15 | Stop reason: HOSPADM

## 2025-04-11 RX ORDER — IBUPROFEN 200 MG
16 TABLET ORAL
Status: DISCONTINUED | OUTPATIENT
Start: 2025-04-11 | End: 2025-04-15 | Stop reason: HOSPADM

## 2025-04-11 RX ORDER — CARVEDILOL 12.5 MG/1
12.5 TABLET ORAL 2 TIMES DAILY WITH MEALS
Status: DISCONTINUED | OUTPATIENT
Start: 2025-04-12 | End: 2025-04-15 | Stop reason: HOSPADM

## 2025-04-11 RX ORDER — HYDRALAZINE HYDROCHLORIDE 20 MG/ML
5 INJECTION INTRAMUSCULAR; INTRAVENOUS EVERY 6 HOURS PRN
Status: DISCONTINUED | OUTPATIENT
Start: 2025-04-11 | End: 2025-04-15 | Stop reason: HOSPADM

## 2025-04-11 RX ORDER — QUETIAPINE FUMARATE 25 MG/1
25 TABLET, FILM COATED ORAL NIGHTLY
Status: DISCONTINUED | OUTPATIENT
Start: 2025-04-12 | End: 2025-04-15 | Stop reason: HOSPADM

## 2025-04-11 RX ORDER — PROCHLORPERAZINE EDISYLATE 5 MG/ML
2.5 INJECTION INTRAMUSCULAR; INTRAVENOUS EVERY 8 HOURS PRN
Status: DISCONTINUED | OUTPATIENT
Start: 2025-04-11 | End: 2025-04-15 | Stop reason: HOSPADM

## 2025-04-11 RX ORDER — IBUPROFEN 200 MG
24 TABLET ORAL
Status: DISCONTINUED | OUTPATIENT
Start: 2025-04-11 | End: 2025-04-15 | Stop reason: HOSPADM

## 2025-04-11 RX ORDER — DORZOLAMIDE HYDROCHLORIDE AND TIMOLOL MALEATE 20; 5 MG/ML; MG/ML
1 SOLUTION/ DROPS OPHTHALMIC 2 TIMES DAILY
Status: DISCONTINUED | OUTPATIENT
Start: 2025-04-11 | End: 2025-04-15 | Stop reason: HOSPADM

## 2025-04-11 RX ORDER — AMLODIPINE BESYLATE 5 MG/1
5 TABLET ORAL DAILY
Status: DISCONTINUED | OUTPATIENT
Start: 2025-04-12 | End: 2025-04-15 | Stop reason: HOSPADM

## 2025-04-11 RX ORDER — BUMETANIDE 0.25 MG/ML
1 INJECTION, SOLUTION INTRAMUSCULAR; INTRAVENOUS
Status: COMPLETED | OUTPATIENT
Start: 2025-04-11 | End: 2025-04-11

## 2025-04-11 RX ORDER — BUMETANIDE 0.25 MG/ML
1 INJECTION, SOLUTION INTRAMUSCULAR; INTRAVENOUS 2 TIMES DAILY
Status: DISCONTINUED | OUTPATIENT
Start: 2025-04-12 | End: 2025-04-14

## 2025-04-11 RX ORDER — PANTOPRAZOLE SODIUM 40 MG/1
40 TABLET, DELAYED RELEASE ORAL DAILY
Status: DISCONTINUED | OUTPATIENT
Start: 2025-04-12 | End: 2025-04-15 | Stop reason: HOSPADM

## 2025-04-11 RX ORDER — TALC
6 POWDER (GRAM) TOPICAL NIGHTLY PRN
Status: DISCONTINUED | OUTPATIENT
Start: 2025-04-11 | End: 2025-04-15 | Stop reason: HOSPADM

## 2025-04-11 RX ORDER — QUETIAPINE FUMARATE 25 MG/1
25 TABLET, FILM COATED ORAL ONCE
Status: COMPLETED | OUTPATIENT
Start: 2025-04-11 | End: 2025-04-11

## 2025-04-11 RX ORDER — CITALOPRAM 10 MG/1
10 TABLET ORAL NIGHTLY
Status: DISCONTINUED | OUTPATIENT
Start: 2025-04-11 | End: 2025-04-15 | Stop reason: HOSPADM

## 2025-04-11 RX ORDER — SODIUM BICARBONATE 650 MG/1
650 TABLET ORAL DAILY
Status: DISCONTINUED | OUTPATIENT
Start: 2025-04-12 | End: 2025-04-15 | Stop reason: HOSPADM

## 2025-04-11 RX ORDER — GLUCAGON 1 MG
1 KIT INJECTION
Status: DISCONTINUED | OUTPATIENT
Start: 2025-04-11 | End: 2025-04-15 | Stop reason: HOSPADM

## 2025-04-11 RX ORDER — HEPARIN SODIUM 5000 [USP'U]/ML
5000 INJECTION, SOLUTION INTRAVENOUS; SUBCUTANEOUS EVERY 8 HOURS
Status: DISCONTINUED | OUTPATIENT
Start: 2025-04-12 | End: 2025-04-15 | Stop reason: HOSPADM

## 2025-04-11 RX ADMIN — BUMETANIDE 1 MG: 0.25 INJECTION INTRAMUSCULAR; INTRAVENOUS at 06:04

## 2025-04-11 RX ADMIN — QUETIAPINE FUMARATE 25 MG: 25 TABLET ORAL at 06:04

## 2025-04-11 NOTE — ED PROVIDER NOTES
SCRIBE #1 NOTE: I, Mable Houser, am scribing for, and in the presence of, Ramya Mullen DO. I have scribed the entire note.       History     Chief Complaint   Patient presents with    Shortness of Breath     Pt states she has been having SOB and fluid build up in her arms and legs for the past few days. Pt has hx of CHF     Review of patient's allergies indicates:   Allergen Reactions    Lisinopril      angioedema    Codeine Nausea And Vomiting         History of Present Illness     HPI    4/11/2025, 6:15 PM  History obtained from the daughter and patient      History of Present Illness: Glo Dumont is a 89 y.o. female patient with a PMHx of HTN, CAD, CHF, CKD, and coronary angioplasty who presents to the Emergency Department for evaluation of SOB which began a couple days ago. Patient states that since she has started having fluid build up she has been SOB. Symptoms are constant and moderate in severity. No mitigating or exacerbating factors reported. Associated sxs include cough, feet swelling, left arm swelling, left leg swelling, and under eye swelling. Patient denies any CP, n/v/d, dizziness, weakness or fatigue. No prior Tx included. No further complaints or concerns at this time.       Arrival mode: Personal Transportation    PCP: No primary care provider on file.        Past Medical History:  Past Medical History:   Diagnosis Date    Anemia     Angina pectoris     Anxiety     Anxiety and depression     Arthritis     hip    Carotid artery occlusion     Carpal tunnel syndrome 06/23/2008    emg    Chronic diarrhea     work up in 2011 with EGD, CS and VCE    CKD (chronic kidney disease) stage 3, GFR 30-59 ml/min 5/11/2017    Colitis     Coronary artery disease     Coronary artery disease     Diastolic dysfunction     Diverticulosis     Encephalopathy 10/14/2024    Glaucoma     Greater trochanteric bursitis 2/10/2015    Grief at loss of child 1/26/2016    H/O carotid endarterectomy 12/2/2013    Heart  failure     History of coronary angioplasty 3/11/2014    Hypercholesteremia     Hypertension     Liver cyst 02/08/2013    ct abd    Low back pain 2/8/2024    Macular degeneration     Obesity with serious comorbidity 3/19/2023    Primary open-angle glaucoma(365.11) 9/3/2013    Renal cyst 02/08/2013    ct abd    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    Sarcoidosis     Sarcoidosis of lung     Sickle cell trait     Uveitis        Past Surgical History:  Past Surgical History:   Procedure Laterality Date    A-V CARDIAC PACEMAKER INSERTION Left 03/21/2023    Procedure: INSERTION, CARDIAC PACEMAKER, DUAL CHAMBER/His Lead;  Surgeon: Cortez Ambrose MD;  Location: [facility] CATH LAB;  Service: Cardiology;  Laterality: Left;  MDT/ MD to confirm in am/possible nurse sedate    CAROTID ENDARTERECTOMY Right 2000s    CATARACT EXTRACTION Bilateral     Dr. Liang Dennis    CHOLECYSTECTOMY      laparoscopic, 3/18.    CORONARY ANGIOPLASTY WITH STENT PLACEMENT  11/19/2010    RCA-HALIE 2010    Lathia    JOINT REPLACEMENT Left 11/03/2014    Dr. Braun    TOTAL ABDOMINAL HYSTERECTOMY W/ BILATERAL SALPINGOOPHORECTOMY  1972         Family History:  Family History   Problem Relation Name Age of Onset    Hypertension Mother      Heart failure Mother      Cancer Father          prostate    Cirrhosis Brother      Heart failure Brother      Cancer Daughter          Carcinoid       Social History:  Social History     Tobacco Use    Smoking status: Never    Smokeless tobacco: Never   Substance and Sexual Activity    Alcohol use: No     Alcohol/week: 0.0 standard drinks of alcohol    Drug use: No    Sexual activity: Yes     Partners: Male        Review of Systems     Review of Systems   Constitutional:  Negative for fatigue.   Eyes:         (+) under eye swelling   Respiratory:  Positive for cough and shortness of breath.    Cardiovascular:  Negative for chest pain.   Gastrointestinal:  Negative for diarrhea, nausea and vomiting.  "  Musculoskeletal:         (+) feet swelling  (+) left arm swelling   Neurological:  Negative for weakness.      Physical Exam     Initial Vitals [04/11/25 1354]   BP Pulse Resp Temp SpO2   (!) 156/81 72 16 98.3 °F (36.8 °C) 99 %      MAP       --          Physical Exam  Nursing Notes and Vital Signs Reviewed.  Constitutional: Patient is in no acute distress. Well-developed and well-nourished.  Head: Atraumatic. Normocephalic.  Eyes:  Periorbital edema.  Neck: + JVD.  Cardiovascular: Regular rate. Regular rhythm. No murmurs, rubs, or gallops.  Pulmonary/Chest: Tachypneic. Diminished lung sounds. Bibasilar crackles.  Abdominal: Soft and non-distended.  There is no tenderness.  No rebound, guarding, or rigidity.  Musculoskeletal: Pitting edema in lower extremities, left greater than right.  Swelling of left arm.  Skin: Warm and dry.  Neurological:  Alert, awake, and appropriate.  Normal speech.  No acute focal neurological deficits are appreciated.     ED Course   Procedures  ED Vital Signs:  Vitals:    04/11/25 1354 04/11/25 1815 04/11/25 1830 04/11/25 1846   BP: (!) 156/81 (!) 145/80  (!) 149/84   Pulse: 72 74     Resp: 16 18 (!) 22    Temp: 98.3 °F (36.8 °C)      TempSrc: Oral      SpO2: 99% 98%     Weight: 54.9 kg (121 lb)      Height: 5' 1" (1.549 m)       04/11/25 1854 04/11/25 1930 04/11/25 2000 04/11/25 2030   BP:  (!) 166/79 (!) 157/81 (!) 142/84   Pulse: 73 76 73 76   Resp:  19 (!) 22 (!) 21   Temp:       TempSrc:       SpO2:  96% 97% 99%   Weight:       Height:           Abnormal Lab Results:  Labs Reviewed   COMPREHENSIVE METABOLIC PANEL - Abnormal       Result Value    Sodium 141      Potassium 3.7      Chloride 110      CO2 20 (*)     Glucose 143 (*)     BUN 29 (*)     Creatinine 1.5 (*)     Calcium 8.6 (*)     Protein Total 6.9      Albumin 3.7      Bilirubin Total 0.4      ALP 62      AST 21      ALT 18      Anion Gap 11      eGFR 34 (*)    B-TYPE NATRIURETIC PEPTIDE - Abnormal     (*)    CBC " WITH DIFFERENTIAL - Abnormal    WBC 10.40      RBC 3.89 (*)     HGB 11.4 (*)     HCT 34.7 (*)     MCV 89      MCH 29.3      MCHC 32.9      RDW 16.0 (*)     Platelet Count 229      MPV 10.1      Nucleated RBC 0      Neut % 79.0 (*)     Lymph % 9.2 (*)     Mono % 8.7      Eos % 0.5      Basophil % 0.5      Imm Grans % 2.1 (*)     Neut # 8.22 (*)     Lymph # 0.96 (*)     Mono # 0.90      Eos # 0.05      Baso # 0.05      Imm Grans # 0.22 (*)    URINALYSIS, REFLEX TO URINE CULTURE - Abnormal    Color, UA Colorless (*)     Appearance, UA Clear      pH, UA 7.0      Spec Grav UA 1.010      Protein, UA Negative      Glucose, UA 2+ (*)     Ketones, UA Negative      Bilirubin, UA Negative      Blood, UA Negative      Nitrites, UA Negative      Urobilinogen, UA Negative      Leukocyte Esterase, UA 2+ (*)    URINALYSIS MICROSCOPIC - Abnormal    RBC, UA 0      WBC, UA 6 (*)     Bacteria, UA None      Yeast, UA None      Hyaline Casts, UA 0      Microscopic Comment       TROPONIN I - Normal    Troponin-I 0.022     CBC W/ AUTO DIFFERENTIAL    Narrative:     The following orders were created for panel order CBC auto differential.  Procedure                               Abnormality         Status                     ---------                               -----------         ------                     CBC with Differential[[id number]]       Abnormal            Final result                 Please view results for these tests on the individual orders.   [facility] [place] URINE HOLD        All Lab Results:  Results for orders placed or performed during the hospital encounter of 04/11/25   Comprehensive metabolic panel    Collection Time: 04/11/25  3:08 PM   Result Value Ref Range    Sodium 141 136 - 145 mmol/L    Potassium 3.7 3.5 - 5.1 mmol/L    Chloride 110 95 - 110 mmol/L    CO2 20 (L) 23 - 29 mmol/L    Glucose 143 (H) 70 - 110 mg/dL    BUN 29 (H) 8 - 23 mg/dL    Creatinine 1.5 (H) 0.5 - 1.4 mg/dL    Calcium 8.6 (L) 8.7 - 10.5 mg/dL     Protein Total 6.9 6.0 - 8.4 gm/dL    Albumin 3.7 3.5 - 5.2 g/dL    Bilirubin Total 0.4 0.1 - 1.0 mg/dL    ALP 62 40 - 150 unit/L    AST 21 11 - 45 unit/L    ALT 18 10 - 44 unit/L    Anion Gap 11 8 - 16 mmol/L    eGFR 34 (L) >60 mL/min/1.73/m2   Troponin I    Collection Time: 04/11/25  3:08 PM   Result Value Ref Range    Troponin-I 0.022 <=0.026 ng/mL   B-Type natriuretic peptide (BNP)    Collection Time: 04/11/25  6:53 PM   Result Value Ref Range     (H) 0 - 99 pg/mL   CBC with Differential    Collection Time: 04/11/25  6:53 PM   Result Value Ref Range    WBC 10.40 3.90 - 12.70 K/uL    RBC 3.89 (L) 4.00 - 5.40 M/uL    HGB 11.4 (L) 12.0 - 16.0 gm/dL    HCT 34.7 (L) 37.0 - 48.5 %    MCV 89 82 - 98 fL    MCH 29.3 27.0 - 31.0 pg    MCHC 32.9 32.0 - 36.0 g/dL    RDW 16.0 (H) 11.5 - 14.5 %    Platelet Count 229 150 - 450 K/uL    MPV 10.1 9.2 - 12.9 fL    Nucleated RBC 0 <=0 /100 WBC    Neut % 79.0 (H) 38 - 73 %    Lymph % 9.2 (L) 18 - 48 %    Mono % 8.7 4 - 15 %    Eos % 0.5 <=8 %    Basophil % 0.5 <=1.9 %    Imm Grans % 2.1 (H) 0.0 - 0.5 %    Neut # 8.22 (H) 1.8 - 7.7 K/uL    Lymph # 0.96 (L) 1 - 4.8 K/uL    Mono # 0.90 0.3 - 1 K/uL    Eos # 0.05 <=0.5 K/uL    Baso # 0.05 <=0.2 K/uL    Imm Grans # 0.22 (H) 0.00 - 0.04 K/uL   Urinalysis, Reflex to Urine Culture Urine, Clean Catch    Collection Time: 04/11/25  8:17 PM    Specimen: Urine   Result Value Ref Range    Color, UA Colorless (A) Straw, Terrie, Yellow, Light-Orange    Appearance, UA Clear Clear    pH, UA 7.0 5.0 - 8.0    Spec Grav UA 1.010 1.005 - 1.030    Protein, UA Negative Negative    Glucose, UA 2+ (A) Negative    Ketones, UA Negative Negative    Bilirubin, UA Negative Negative    Blood, UA Negative Negative    Nitrites, UA Negative Negative    Urobilinogen, UA Negative <2.0 EU/dL    Leukocyte Esterase, UA 2+ (A) Negative   Urinalysis Microscopic    Collection Time: 04/11/25  8:17 PM   Result Value Ref Range    RBC, UA 0 0 - 4 /HPF    WBC, UA 6 (H) 0 - 5  /HPF    Bacteria, UA None None, Rare, Occasional /HPF    Yeast, UA None None /HPF    Hyaline Casts, UA 0 0 - 1 /LPF    Microscopic Comment       *Note: Due to a large number of results and/or encounters for the requested time period, some results have not been displayed. A complete set of results can be found in Results Review.       Imaging Results:  Imaging Results              US Lower Extremity Veins Left (Final result)  Result time 04/11/25 20:03:40      Final result by Nathaly Gonzales MD (04/11/25 20:03:40)                   Impression:      Negative for DVT    Finalized on: 4/11/2025 8:03 PM By:  Nathaly Gonzales MD  [facility]# [id number]      2025-04-11 20:05:43.390     [facility]               Narrative:    EXAM: US LOWER EXTREMITY VEINS LEFT    CLINICAL HISTORY:   Swelling    PRIOR:   NONE    Technique:  Grayscale and Doppler imaging provided    FINDINGS:  There is good color flow and compressibility deep veins throughout left lower extremity imaging groin to mid calf                                         US Upper Extremity Veins Left (Final result)  Result time 04/11/25 20:03:01      Final result by Nathaly Gonzales MD (04/11/25 20:03:01)                   Impression:     Negative for DVT    Finalized on: 4/11/2025 8:03 PM By:  Nathaly Gonzales MD  [facility]# [id number]      2025-04-11 20:05:03.358     [facility]               Narrative:    EXAM: US UPPER EXTREMITY VEINS LEFT    CLINICAL HISTORY: Swelling    FINDINGS:  There is good compressibility and color flow of the deep veins throughout the left upper extremity and left central veins                                         X-Ray Chest AP Portable (Final result)  Result time 04/11/25 15:53:06      Final result by Renato Guerrier MD (04/11/25 15:53:06)                   Impression:      1.  Markedly low lung volumes with vascular crowding or atelectasis in the lung bases.  Small effusions.  Cardiac silhouette size enlargement.  Mild interstitial pulmonary edema or  interstitial infectious process difficult to exclude in the right clinical setting.    2.  Stable findings as noted above.      Electronically signed by: Renato Guerrier MD  Date:    04/11/2025  Time:    15:53               Narrative:    EXAMINATION:  XR CHEST AP PORTABLE    CLINICAL HISTORY:  Chest Pain;    COMPARISON:  March 18, 2025    FINDINGS:  Clothing artifact.  Markedly low lung volumes on the current study.  Small effusions.  The lungs are otherwise clear.  The cardiac silhouette size is enlarged.  The trachea is midline and the mediastinal width is normal. Negative for pneumothorax.  Pulmonary vasculature is normal. Negative for osseous abnormalities. Tortuous aorta with calcifications of the aortic knob.  There are degenerative changes of the spine and both shoulder girdles.  Median sternotomy wires and CABG changes.  Dual lead left subclavian pacemaker.                                       The EKG was ordered, reviewed, and independently interpreted by the ED provider.  Interpretation time: 13:52  Rate: 70 BPM  Rhythm: normal sinus rhythm  Interpretation: Possible left atrial enlargement. Nonspecific T wave abnormality. No STEMI.           The Emergency Provider reviewed the vital signs and test results, which are outlined above.     ED Discussion       9:11 PM: Discussed case with Dr. Wall ([facility]). Dr. Wall agrees with current care and management of pt and accepts admission.   Admitting Service: [facility]  Admitting Physician: Dr. Wall  Admit to: Med/Tele    9:11 PM: Re-evaluated pt. I have discussed test results, shared treatment plan, and the need for admission with patient/family/caretaker at bedside. Pt and/or family/caretaker express understanding at this time and agree with all information. All questions answered. Pt/caretaker/family member(s) have no further questions or concerns at this time. Pt is ready for admit.         ED Course as of 04/11/25 2205 Fri Apr 11,  2025 1816 2/2025 Echo  ·  Left Ventricle: The left ventricle is normal in size. Normal wall thickness. There is concentric remodeling. There is normal systolic function. Ejection fraction is approximately 60%. Grade II diastolic dysfunction.  ·  Right Ventricle: Normal right ventricular cavity size. Wall thickness is normal. Systolic function is normal.  ·  Left Atrium: Left atrium is severely dilated.  ·  Aortic Valve: There is mild aortic valve sclerosis. There is mild stenosis. Aortic valve area by VTI is 1.6 cm². Aortic valve peak velocity is 2.0 m/s. Mean gradient is 8 mmHg. The dimensionless index is 0.58.  ·  Tricuspid Valve: There is moderate to severe regurgitation with a centrally directed jet.  ·  Pulmonary Artery: The estimated pulmonary artery systolic pressure is 55 mmHg.  ·  IVC/SVC: Normal venous pressure at 3 mmHg.   [NF]   2056 89-year-old female presents with CHF exacerbation seen on chest x-ray.  Received a dose of IV Bumex 1 mg with minimal diuresis.  CKD noted.  .  Normal troponin.  EKG nonspecific.  Patient with increased work of breathing but no hypoxia.  Daughter is uncomfortable with discharge.  Playrific interrogation shows 1 episode of nonsustained V-tach.  No signs of DVT on ultrasound.  500 cc of urine and post void residual.  Garcia placed.    Ddx:  CHF, pneumonia, DVT, MI [NF]      ED Course User Index  [NF] Ramya Mullen, DO     Medical Decision Making  Amount and/or Complexity of Data Reviewed  Labs: ordered. Decision-making details documented in ED Course.  Radiology: ordered. Decision-making details documented in ED Course.  ECG/medicine tests: ordered and independent interpretation performed. Decision-making details documented in ED Course.    Risk  Prescription drug management.                ED Medication(s):  Medications   bumetanide injection 1 mg (1 mg Intravenous Given 4/11/25 1846)   QUEtiapine tablet 25 mg (25 mg Oral Given 4/11/25 1846)       New Prescriptions     No medications on file               Scribe Attestation:   Scribe #1: I performed the above scribed service and the documentation accurately describes the services I performed. I attest to the accuracy of the note.     Attending:   Physician Attestation Statement for Scribe #1: I, Ramya Mullen DO, personally performed the services described in this documentation, as scribed by Mable Houser, in my presence, and it is both accurate and complete.           Clinical Impression       ICD-10-CM ICD-9-CM   1. Acute exacerbation of CHF (congestive heart failure)  I50.9 428.0   2. Chest pain  R07.9 786.50   3. CHF (congestive heart failure)  I50.9 428.0   4. Leg swelling  M79.89 729.81       Disposition:   Disposition: Placed in Observation  Condition: Stable       Ramya Mullen DO  04/11/25 2206

## 2025-04-11 NOTE — TELEPHONE ENCOUNTER
"Contact Vito with home health he states the pt has congestive heart failure and has gained 6 lbs in one day (128 to 134) on 4/9/2025. Pt daughter was advised by Dr. Prakash to "Double Bumex dose until at dry weight."  As of today BP reading was 167/95 heart rate 77, daughter states pt has had SOB over last two days. Pt also has hand, feet and eye swelling. Pt daughter states she was going to the ER.  "

## 2025-04-11 NOTE — FIRST PROVIDER EVALUATION
Emergency Department TeleTriage Encounter Note      CHIEF COMPLAINT    Chief Complaint   Patient presents with    Shortness of Breath     Pt states she has been having SOB and fluid build up in her arms and legs for the past few days. Pt has hx of CHF       VITAL SIGNS   Initial Vitals [04/11/25 1354]   BP Pulse Resp Temp SpO2   (!) 156/81 72 16 98.3 °F (36.8 °C) 99 %      MAP       --            ALLERGIES    Review of patient's allergies indicates:   Allergen Reactions    Lisinopril      angioedema    Codeine Nausea And Vomiting       PROVIDER TRIAGE NOTE  Family reports intermittent edema to feet/ankles for the past two weeks.   Shortness of breath began a few nights ago. Denies any chest pain. Also seen by PT today and BP was elevated.     Limited physical exam via telehealth: The patient is awake, alert, answering questions appropriately and is not in respiratory distress.  As the Teletriage provider, I performed an initial assessment and ordered appropriate labs and imaging studies, if any, to facilitate the patient's care once placed in the ED. Once a room is available, care and a full evaluation will be completed by an alternate ED provider.  Any additional orders and the final disposition will be determined by that provider.  All imaging and labs will not be followed-up by the Teletriage Team, including myself.          ORDERS  Labs Reviewed - No data to display    ED Orders (720h ago, onward)      Start Ordered     Status Ordering Provider    04/11/25 1446 04/11/25 1445  Troponin I  STAT         Ordered BETTY KUMAR    04/11/25 1445 04/11/25 1445  Vital signs  Every 15 min         Ordered BETTY KUMAR    04/11/25 1445 04/11/25 1445  Cardiac Monitoring - Adult  Continuous        Comments: Notify Physician If:    Ordered BETTY KUMAR    04/11/25 1445 04/11/25 1445  Pulse Oximetry Continuous  Continuous         Ordered BETTY KUMAR    04/11/25 1445 04/11/25 1445  Diet NPO  Diet effective  now         Ordered BETTY KUMAR.    04/11/25 1445 04/11/25 1445  Saline lock IV  Once         Ordered RADHADABETTY MUNOZ.    04/11/25 1445 04/11/25 1445  EKG 12-lead  Once        Comments: Do not perform if previously done during this visit/ triage    Ordered BETTY KUMAR.    04/11/25 1445 04/11/25 1445  CBC auto differential  STAT         Ordered BETTY KUMAR.    04/11/25 1445 04/11/25 1445  Comprehensive metabolic panel  STAT         Ordered RADHADABETTY MUNOZ.    04/11/25 1445 04/11/25 1445  B-Type natriuretic peptide (BNP)  STAT         Ordered BETTY KUMAR.    04/11/25 1445 04/11/25 1445  X-Ray Chest AP Portable  1 time imaging         Ordered BETTY KUMAR.    04/11/25 1445 04/11/25 1445  CBC with Differential  PROCEDURE ONCE         Ordered BETTY KUMAR              Virtual Visit Note: The provider triage portion of this emergency department evaluation and documentation was performed via [employer], a HIPAA-compliant telemedicine application, in concert with a tele-presenter in the room. A face to face patient evaluation with one of my colleagues will occur once the patient is placed in an emergency department room.      DISCLAIMER: This note was prepared with [employer] voice recognition transcription software. Garbled syntax, mangled pronouns, and other bizarre constructions may be attributed to that software system.

## 2025-04-11 NOTE — TELEPHONE ENCOUNTER
----- Message from Frances sent at 4/11/2025 12:29 PM CDT -----  Contact: Vito / [facility]  Type:  Needs Medical AdviceWho Called: WalterSymptoms (please be specific): Blood pressure high 167/95 heart rate 77, having shortness of breathe, home health nurse thinking of bring the pt to the ER.  How long has patient had these symptoms: Pharmacy name and phone #:  Would the patient rather a call back or a response via [facility]? Call [facility] Call Back Number: [phone] Information:

## 2025-04-12 LAB
ABSOLUTE EOSINOPHIL (OHS): 0.04 K/UL
ABSOLUTE MONOCYTE (OHS): 0.68 K/UL (ref 0.3–1)
ABSOLUTE NEUTROPHIL COUNT (OHS): 5.96 K/UL (ref 1.8–7.7)
ALBUMIN SERPL BCP-MCNC: 3.1 G/DL (ref 3.5–5.2)
ALP SERPL-CCNC: 58 UNIT/L (ref 40–150)
ALT SERPL W/O P-5'-P-CCNC: 16 UNIT/L (ref 10–44)
ANION GAP (OHS): 7 MMOL/L (ref 8–16)
AST SERPL-CCNC: 11 UNIT/L (ref 11–45)
BASOPHILS # BLD AUTO: 0.04 K/UL
BASOPHILS NFR BLD AUTO: 0.5 %
BILIRUB SERPL-MCNC: 0.5 MG/DL (ref 0.1–1)
BUN SERPL-MCNC: 28 MG/DL (ref 8–23)
CALCIUM SERPL-MCNC: 8.5 MG/DL (ref 8.7–10.5)
CHLORIDE SERPL-SCNC: 110 MMOL/L (ref 95–110)
CO2 SERPL-SCNC: 26 MMOL/L (ref 23–29)
CREAT SERPL-MCNC: 1.3 MG/DL (ref 0.5–1.4)
ERYTHROCYTE [DISTWIDTH] IN BLOOD BY AUTOMATED COUNT: 15.9 % (ref 11.5–14.5)
GFR SERPLBLD CREATININE-BSD FMLA CKD-EPI: 40 ML/MIN/1.73/M2
GLUCOSE SERPL-MCNC: 83 MG/DL (ref 70–110)
HCT VFR BLD AUTO: 30.7 % (ref 37–48.5)
HGB BLD-MCNC: 9.9 GM/DL (ref 12–16)
IMM GRANULOCYTES # BLD AUTO: 0.12 K/UL (ref 0–0.04)
IMM GRANULOCYTES NFR BLD AUTO: 1.5 % (ref 0–0.5)
LYMPHOCYTES # BLD AUTO: 0.92 K/UL (ref 1–4.8)
MAGNESIUM SERPL-MCNC: 1.5 MG/DL (ref 1.6–2.6)
MCH RBC QN AUTO: 28.9 PG (ref 27–31)
MCHC RBC AUTO-ENTMCNC: 32.2 G/DL (ref 32–36)
MCV RBC AUTO: 90 FL (ref 82–98)
NUCLEATED RBC (/100WBC) (OHS): 0 /100 WBC
PHOSPHATE SERPL-MCNC: 3.5 MG/DL (ref 2.7–4.5)
PLATELET # BLD AUTO: 213 K/UL (ref 150–450)
PMV BLD AUTO: 10.4 FL (ref 9.2–12.9)
POTASSIUM SERPL-SCNC: 3 MMOL/L (ref 3.5–5.1)
PROT SERPL-MCNC: 5.8 GM/DL (ref 6–8.4)
RBC # BLD AUTO: 3.43 M/UL (ref 4–5.4)
RELATIVE EOSINOPHIL (OHS): 0.5 %
RELATIVE LYMPHOCYTE (OHS): 11.9 % (ref 18–48)
RELATIVE MONOCYTE (OHS): 8.8 % (ref 4–15)
RELATIVE NEUTROPHIL (OHS): 76.8 % (ref 38–73)
SODIUM SERPL-SCNC: 143 MMOL/L (ref 136–145)
WBC # BLD AUTO: 7.76 K/UL (ref 3.9–12.7)

## 2025-04-12 PROCEDURE — 85025 COMPLETE CBC W/AUTO DIFF WBC: CPT | Mod: HCNC | Performed by: STUDENT IN AN ORGANIZED HEALTH CARE EDUCATION/TRAINING PROGRAM

## 2025-04-12 PROCEDURE — 96372 THER/PROPH/DIAG INJ SC/IM: CPT | Performed by: STUDENT IN AN ORGANIZED HEALTH CARE EDUCATION/TRAINING PROGRAM

## 2025-04-12 PROCEDURE — 84100 ASSAY OF PHOSPHORUS: CPT | Mod: HCNC | Performed by: STUDENT IN AN ORGANIZED HEALTH CARE EDUCATION/TRAINING PROGRAM

## 2025-04-12 PROCEDURE — 63600175 PHARM REV CODE 636 W HCPCS: Mod: HCNC | Performed by: STUDENT IN AN ORGANIZED HEALTH CARE EDUCATION/TRAINING PROGRAM

## 2025-04-12 PROCEDURE — 96376 TX/PRO/DX INJ SAME DRUG ADON: CPT

## 2025-04-12 PROCEDURE — 21400001 HC TELEMETRY ROOM: Mod: HCNC

## 2025-04-12 PROCEDURE — 80053 COMPREHEN METABOLIC PANEL: CPT | Mod: HCNC | Performed by: STUDENT IN AN ORGANIZED HEALTH CARE EDUCATION/TRAINING PROGRAM

## 2025-04-12 PROCEDURE — 83735 ASSAY OF MAGNESIUM: CPT | Mod: HCNC | Performed by: STUDENT IN AN ORGANIZED HEALTH CARE EDUCATION/TRAINING PROGRAM

## 2025-04-12 PROCEDURE — 25000003 PHARM REV CODE 250: Mod: HCNC | Performed by: STUDENT IN AN ORGANIZED HEALTH CARE EDUCATION/TRAINING PROGRAM

## 2025-04-12 PROCEDURE — 36415 COLL VENOUS BLD VENIPUNCTURE: CPT | Mod: HCNC | Performed by: STUDENT IN AN ORGANIZED HEALTH CARE EDUCATION/TRAINING PROGRAM

## 2025-04-12 RX ORDER — CEFTRIAXONE 1 G/1
1 INJECTION, POWDER, FOR SOLUTION INTRAMUSCULAR; INTRAVENOUS
Status: DISCONTINUED | OUTPATIENT
Start: 2025-04-12 | End: 2025-04-12

## 2025-04-12 RX ORDER — CEFTRIAXONE 2 G/1
2 INJECTION, POWDER, FOR SOLUTION INTRAMUSCULAR; INTRAVENOUS
Status: DISCONTINUED | OUTPATIENT
Start: 2025-04-12 | End: 2025-04-12

## 2025-04-12 RX ADMIN — PANTOPRAZOLE SODIUM 40 MG: 40 TABLET, DELAYED RELEASE ORAL at 08:04

## 2025-04-12 RX ADMIN — DORZOLAMIDE HYDROCHLORIDE AND TIMOLOL MALEATE 1 DROP: 20; 5 SOLUTION OPHTHALMIC at 08:04

## 2025-04-12 RX ADMIN — HEPARIN SODIUM 5000 UNITS: 5000 INJECTION INTRAVENOUS; SUBCUTANEOUS at 10:04

## 2025-04-12 RX ADMIN — DORZOLAMIDE HYDROCHLORIDE AND TIMOLOL MALEATE 1 DROP: 20; 5 SOLUTION OPHTHALMIC at 12:04

## 2025-04-12 RX ADMIN — AMLODIPINE BESYLATE 5 MG: 5 TABLET ORAL at 08:04

## 2025-04-12 RX ADMIN — ASPIRIN 81 MG: 81 TABLET, COATED ORAL at 08:04

## 2025-04-12 RX ADMIN — BUMETANIDE 1 MG: 0.25 INJECTION INTRAMUSCULAR; INTRAVENOUS at 09:04

## 2025-04-12 RX ADMIN — PANCRELIPASE 1 CAPSULE: 120000; 24000; 76000 CAPSULE, DELAYED RELEASE PELLETS ORAL at 12:04

## 2025-04-12 RX ADMIN — HEPARIN SODIUM 5000 UNITS: 5000 INJECTION INTRAVENOUS; SUBCUTANEOUS at 01:04

## 2025-04-12 RX ADMIN — BUMETANIDE 1 MG: 0.25 INJECTION INTRAMUSCULAR; INTRAVENOUS at 10:04

## 2025-04-12 RX ADMIN — SODIUM BICARBONATE 650 MG TABLET 650 MG: at 08:04

## 2025-04-12 RX ADMIN — PANCRELIPASE 1 CAPSULE: 120000; 24000; 76000 CAPSULE, DELAYED RELEASE PELLETS ORAL at 08:04

## 2025-04-12 RX ADMIN — HEPARIN SODIUM 5000 UNITS: 5000 INJECTION INTRAVENOUS; SUBCUTANEOUS at 05:04

## 2025-04-12 RX ADMIN — CITALOPRAM HYDROBROMIDE 10 MG: 10 TABLET ORAL at 09:04

## 2025-04-12 RX ADMIN — PANCRELIPASE 1 CAPSULE: 120000; 24000; 76000 CAPSULE, DELAYED RELEASE PELLETS ORAL at 05:04

## 2025-04-12 RX ADMIN — DORZOLAMIDE HYDROCHLORIDE AND TIMOLOL MALEATE 1 DROP: 20; 5 SOLUTION OPHTHALMIC at 09:04

## 2025-04-12 RX ADMIN — CARVEDILOL 12.5 MG: 12.5 TABLET, FILM COATED ORAL at 05:04

## 2025-04-12 RX ADMIN — ATORVASTATIN CALCIUM 40 MG: 40 TABLET, FILM COATED ORAL at 05:04

## 2025-04-12 RX ADMIN — CARVEDILOL 12.5 MG: 12.5 TABLET, FILM COATED ORAL at 08:04

## 2025-04-12 RX ADMIN — QUETIAPINE FUMARATE 25 MG: 25 TABLET ORAL at 09:04

## 2025-04-12 RX ADMIN — CITALOPRAM HYDROBROMIDE 10 MG: 10 TABLET ORAL at 12:04

## 2025-04-12 NOTE — SUBJECTIVE & OBJECTIVE
Past Medical History:   Diagnosis Date    Anemia     Angina pectoris     Anxiety     Anxiety and depression     Arthritis     hip    Carotid artery occlusion     Carpal tunnel syndrome 06/23/2008    emg    Chronic diarrhea     work up in 2011 with EGD, CS and VCE    CKD (chronic kidney disease) stage 3, GFR 30-59 ml/min 5/11/2017    Colitis     Coronary artery disease     Coronary artery disease     Diastolic dysfunction     Diverticulosis     Encephalopathy 10/14/2024    Glaucoma     Greater trochanteric bursitis 2/10/2015    Grief at loss of child 1/26/2016    H/O carotid endarterectomy 12/2/2013    Heart failure     History of coronary angioplasty 3/11/2014    Hypercholesteremia     Hypertension     Liver cyst 02/08/2013    ct abd    Low back pain 2/8/2024    Macular degeneration     Obesity with serious comorbidity 3/19/2023    Primary open-angle glaucoma(365.11) 9/3/2013    Renal cyst 02/08/2013    ct abd    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    Sarcoidosis     Sarcoidosis of lung     Sickle cell trait     Uveitis        Past Surgical History:   Procedure Laterality Date    A-V CARDIAC PACEMAKER INSERTION Left 03/21/2023    Procedure: INSERTION, CARDIAC PACEMAKER, DUAL CHAMBER/His Lead;  Surgeon: Cortez Ambrose MD;  Location: [facility] CATH LAB;  Service: Cardiology;  Laterality: Left;  MDT/ MD to confirm in am/possible nurse sedate    CAROTID ENDARTERECTOMY Right 2000s    CATARACT EXTRACTION Bilateral     Dr. Liang Dennis    CHOLECYSTECTOMY      laparoscopic, 3/18.    CORONARY ANGIOPLASTY WITH STENT PLACEMENT  11/19/2010    RCA-HALIE 2010    Lathia    JOINT REPLACEMENT Left 11/03/2014    Dr. Braun    TOTAL ABDOMINAL HYSTERECTOMY W/ BILATERAL SALPINGOOPHORECTOMY  1972       Review of patient's allergies indicates:   Allergen Reactions    Lisinopril      angioedema    Codeine Nausea And Vomiting       No current facility-administered medications on file prior to encounter.     Current  Outpatient Medications on File Prior to Encounter   Medication Sig    amLODIPine (NORVASC) 5 MG tablet Take 1 tablet (5 mg total) by mouth once daily.    aspirin (ECOTRIN) 81 MG EC tablet Take 1 tablet (81 mg total) by mouth once daily.    atorvastatin (LIPITOR) 40 MG tablet Take 1 tablet (40 mg total) by mouth Daily.    bumetanide (BUMEX) 1 MG tablet Take 1 tablet (1 mg total) by mouth once daily.    busPIRone (BUSPAR) 5 MG Tab Take 1 tablet (5 mg total) by mouth 2 (two) times daily as needed (anxiety).    carvediloL (COREG) 12.5 MG tablet Take 1 tablet (12.5 mg total) by mouth 2 (two) times daily with meals.    fluticasone propionate (FLONASE) 50 mcg/actuation nasal spray 1 spray (50 mcg total) by Each Nostril route once daily.    loperamide (IMODIUM) 2 mg capsule Take 1 mg by mouth every 6 (six) hours as needed for Diarrhea.    QUEtiapine (SEROQUEL) 25 MG Tab Take 1 tablet (25 mg total) by mouth every evening.    budesonide (ENTOCORT EC) 3 mg capsule Take 1-2 capsules (3-6 mg total) by mouth daily as needed (flare). As needed    citalopram (CELEXA) 10 MG tablet Take 1 tablet (10 mg total) by mouth every evening.    cyproheptadine (PERIACTIN) 4 mg tablet Take 1 tablet (4 mg total) by mouth 3 (three) times daily as needed (appetite).    dorzolamide-timolol 2-0.5% (COSOPT) 22.3-6.8 mg/mL ophthalmic solution Place 1 drop into both eyes 2 (two) times daily.    empagliflozin (JARDIANCE) 10 mg tablet Take 1 tablet (10 mg total) by mouth once daily.    FOLIC ACID/MULTIVIT-MIN/LUTEIN (CENTRUM SILVER ORAL) Take 1 tablet by mouth once daily.    levocetirizine (XYZAL) 5 MG tablet Take 1 tablet (5 mg total) by mouth every evening.    lipase-protease-amylase 24,000-76,000-120,000 units (CREON) 24,000-76,000 -120,000 unit capsule Take 1 capsule by mouth 3 (three) times daily with meals.    mupirocin (BACTROBAN) 2 % ointment Apply topically once daily.    nitroGLYCERIN (NITROSTAT) 0.4 MG SL tablet PLACE ONE TABLET UNDERNEATH THE  TONGUE EVERY 5 MINUTES AS NEEDED FOR CHEST PAIN    ondansetron (ZOFRAN) 4 MG tablet Take 1 tablet (4 mg total) by mouth every 8 (eight) hours as needed for Nausea.    pantoprazole (PROTONIX) 40 MG tablet Take 1 tablet (40 mg total) by mouth once daily.    sodium bicarbonate 650 MG tablet Take 1 tablet (650 mg total) by mouth once daily.     Family History       Problem Relation (Age of Onset)    Cancer Father, Daughter    Cirrhosis Brother    Heart failure Mother, Brother    Hypertension Mother          Tobacco Use    Smoking status: Never    Smokeless tobacco: Never   Substance and Sexual Activity    Alcohol use: No     Alcohol/week: 0.0 standard drinks of alcohol    Drug use: No    Sexual activity: Yes     Partners: Male     Review of Systems   Constitutional:  Negative for chills, fatigue and fever.   HENT:  Negative for congestion and rhinorrhea.    Respiratory:  Positive for cough and shortness of breath.    Cardiovascular:  Positive for leg swelling. Negative for chest pain.   Gastrointestinal:  Negative for abdominal pain, constipation, diarrhea, nausea and vomiting.   Genitourinary:  Negative for difficulty urinating and dysuria.   Musculoskeletal:  Negative for arthralgias and back pain.   Skin:  Negative for rash and wound.   Neurological:  Negative for dizziness and headaches.     Objective:     Vital Signs (Most Recent):  Temp: 99 °F (37.2 °C) (04/12/25 0433)  Pulse: 72 (04/12/25 0741)  Resp: 19 (04/12/25 0433)  BP: (!) 143/66 (04/12/25 0433)  SpO2: 98 % (04/12/25 0433) Vital Signs (24h Range):  Temp:  [97.9 °F (36.6 °C)-99 °F (37.2 °C)] 99 °F (37.2 °C)  Pulse:  [62-77] 72  Resp:  [16-22] 19  SpO2:  [96 %-99 %] 98 %  BP: (140-166)/(66-88) 143/66     Weight: 54.9 kg (121 lb)  Body mass index is 22.86 kg/m².     Physical Exam  Vitals and nursing note reviewed.   Constitutional:       General: She is not in acute distress.     Appearance: She is not ill-appearing, toxic-appearing or diaphoretic.   HENT:       Head: Normocephalic and atraumatic.      Mouth/Throat:      Mouth: Mucous membranes are moist.   Eyes:      General: No scleral icterus.        Right eye: No discharge.         Left eye: No discharge.   Cardiovascular:      Rate and Rhythm: Normal rate and regular rhythm.      Heart sounds: Normal heart sounds.   Pulmonary:      Effort: Pulmonary effort is normal. No respiratory distress.      Breath sounds: Normal breath sounds. No wheezing, rhonchi or rales.   Abdominal:      General: Bowel sounds are normal.      Palpations: Abdomen is soft.      Tenderness: There is no abdominal tenderness.   Musculoskeletal:      Cervical back: No rigidity.      Right lower leg: Edema present.      Left lower leg: Edema present.   Skin:     General: Skin is warm and dry.      Coloration: Skin is not jaundiced.   Neurological:      Mental Status: She is alert and oriented to person, place, and time. Mental status is at baseline.   Psychiatric:         Mood and Affect: Mood normal.         Behavior: Behavior normal.              Significant Labs: All pertinent labs within the past 24 hours have been reviewed.  Recent Results (from the past 24 hours)   EKG 12-lead    Collection Time: 04/11/25  1:52 PM   Result Value Ref Range    QRS Duration 82 ms    [facility] QTC Calculation 442 ms   Comprehensive metabolic panel    Collection Time: 04/11/25  3:08 PM   Result Value Ref Range    Sodium 141 136 - 145 mmol/L    Potassium 3.7 3.5 - 5.1 mmol/L    Chloride 110 95 - 110 mmol/L    CO2 20 (L) 23 - 29 mmol/L    Glucose 143 (H) 70 - 110 mg/dL    BUN 29 (H) 8 - 23 mg/dL    Creatinine 1.5 (H) 0.5 - 1.4 mg/dL    Calcium 8.6 (L) 8.7 - 10.5 mg/dL    Protein Total 6.9 6.0 - 8.4 gm/dL    Albumin 3.7 3.5 - 5.2 g/dL    Bilirubin Total 0.4 0.1 - 1.0 mg/dL    ALP 62 40 - 150 unit/L    AST 21 11 - 45 unit/L    ALT 18 10 - 44 unit/L    Anion Gap 11 8 - 16 mmol/L    eGFR 34 (L) >60 mL/min/1.73/m2   Troponin I    Collection Time: 04/11/25  3:08 PM   Result  Value Ref Range    Troponin-I 0.022 <=0.026 ng/mL   B-Type natriuretic peptide (BNP)    Collection Time: 04/11/25  6:53 PM   Result Value Ref Range     (H) 0 - 99 pg/mL   CBC with Differential    Collection Time: 04/11/25  6:53 PM   Result Value Ref Range    WBC 10.40 3.90 - 12.70 K/uL    RBC 3.89 (L) 4.00 - 5.40 M/uL    HGB 11.4 (L) 12.0 - 16.0 gm/dL    HCT 34.7 (L) 37.0 - 48.5 %    MCV 89 82 - 98 fL    MCH 29.3 27.0 - 31.0 pg    MCHC 32.9 32.0 - 36.0 g/dL    RDW 16.0 (H) 11.5 - 14.5 %    Platelet Count 229 150 - 450 K/uL    MPV 10.1 9.2 - 12.9 fL    Nucleated RBC 0 <=0 /100 WBC    Neut % 79.0 (H) 38 - 73 %    Lymph % 9.2 (L) 18 - 48 %    Mono % 8.7 4 - 15 %    Eos % 0.5 <=8 %    Basophil % 0.5 <=1.9 %    Imm Grans % 2.1 (H) 0.0 - 0.5 %    Neut # 8.22 (H) 1.8 - 7.7 K/uL    Lymph # 0.96 (L) 1 - 4.8 K/uL    Mono # 0.90 0.3 - 1 K/uL    Eos # 0.05 <=0.5 K/uL    Baso # 0.05 <=0.2 K/uL    Imm Grans # 0.22 (H) 0.00 - 0.04 K/uL   Urinalysis, Reflex to Urine Culture Urine, Clean Catch    Collection Time: 04/11/25  8:17 PM    Specimen: Urine   Result Value Ref Range    Color, UA Colorless (A) Straw, Terrie, Yellow, Light-Orange    Appearance, UA Clear Clear    pH, UA 7.0 5.0 - 8.0    Spec Grav UA 1.010 1.005 - 1.030    Protein, UA Negative Negative    Glucose, UA 2+ (A) Negative    Ketones, UA Negative Negative    Bilirubin, UA Negative Negative    Blood, UA Negative Negative    Nitrites, UA Negative Negative    Urobilinogen, UA Negative <2.0 EU/dL    Leukocyte Esterase, UA 2+ (A) Negative   Urinalysis Microscopic    Collection Time: 04/11/25  8:17 PM   Result Value Ref Range    RBC, UA 0 0 - 4 /HPF    WBC, UA 6 (H) 0 - 5 /HPF    Bacteria, UA None None, Rare, Occasional /HPF    Yeast, UA None None /HPF    Hyaline Casts, UA 0 0 - 1 /LPF    Microscopic Comment     Comprehensive Metabolic Panel    Collection Time: 04/12/25  6:43 AM   Result Value Ref Range    Sodium 143 136 - 145 mmol/L    Potassium 3.0 (L) 3.5 - 5.1  mmol/L    Chloride 110 95 - 110 mmol/L    CO2 26 23 - 29 mmol/L    Glucose 83 70 - 110 mg/dL    BUN 28 (H) 8 - 23 mg/dL    Creatinine 1.3 0.5 - 1.4 mg/dL    Calcium 8.5 (L) 8.7 - 10.5 mg/dL    Protein Total 5.8 (L) 6.0 - 8.4 gm/dL    Albumin 3.1 (L) 3.5 - 5.2 g/dL    Bilirubin Total 0.5 0.1 - 1.0 mg/dL    ALP 58 40 - 150 unit/L    AST 11 11 - 45 unit/L    ALT 16 10 - 44 unit/L    Anion Gap 7 (L) 8 - 16 mmol/L    eGFR 40 (L) >60 mL/min/1.73/m2   Magnesium    Collection Time: 04/12/25  6:43 AM   Result Value Ref Range    Magnesium  1.5 (L) 1.6 - 2.6 mg/dL   Phosphorus    Collection Time: 04/12/25  6:43 AM   Result Value Ref Range    Phosphorus Level 3.5 2.7 - 4.5 mg/dL   CBC with Differential    Collection Time: 04/12/25  6:43 AM   Result Value Ref Range    WBC 7.76 3.90 - 12.70 K/uL    RBC 3.43 (L) 4.00 - 5.40 M/uL    HGB 9.9 (L) 12.0 - 16.0 gm/dL    HCT 30.7 (L) 37.0 - 48.5 %    MCV 90 82 - 98 fL    MCH 28.9 27.0 - 31.0 pg    MCHC 32.2 32.0 - 36.0 g/dL    RDW 15.9 (H) 11.5 - 14.5 %    Platelet Count 213 150 - 450 K/uL    MPV 10.4 9.2 - 12.9 fL    Nucleated RBC 0 <=0 /100 WBC    Neut % 76.8 (H) 38 - 73 %    Lymph % 11.9 (L) 18 - 48 %    Mono % 8.8 4 - 15 %    Eos % 0.5 <=8 %    Basophil % 0.5 <=1.9 %    Imm Grans % 1.5 (H) 0.0 - 0.5 %    Neut # 5.96 1.8 - 7.7 K/uL    Lymph # 0.92 (L) 1 - 4.8 K/uL    Mono # 0.68 0.3 - 1 K/uL    Eos # 0.04 <=0.5 K/uL    Baso # 0.04 <=0.2 K/uL    Imm Grans # 0.12 (H) 0.00 - 0.04 K/uL       Significant Imaging: I have reviewed all pertinent imaging results/findings within the past 24 hours.  US Lower Extremity Veins Left   Final Result      Negative for DVT      Finalized on: 4/11/2025 8:03 PM By:  Nathaly Gonzales MD   [facility]# [id number]      2025-04-11 20:05:43.390     [facility]      US Upper Extremity Veins Left   Final Result    Negative for DVT      Finalized on: 4/11/2025 8:03 PM By:  Nathaly Gonzales MD   [facility]# [id number]      2025-04-11 20:05:03.358     [facility]      X-Ray Chest AP Portable    Final Result      1.  Markedly low lung volumes with vascular crowding or atelectasis in the lung bases.  Small effusions.  Cardiac silhouette size enlargement.  Mild interstitial pulmonary edema or interstitial infectious process difficult to exclude in the right clinical setting.      2.  Stable findings as noted above.         Electronically signed by: Renato Guerrier MD   Date:    04/11/2025   Time:    15:53

## 2025-04-12 NOTE — ASSESSMENT & PLAN NOTE
Hypertensive on admission    Home meds for hypertension were reviewed and noted below.     Home Hypertension Medications per chart review             amLODIPine (NORVASC) 5 MG tablet Take 1 tablet (5 mg total) by mouth once daily.    bumetanide (BUMEX) 1 MG tablet Take 1 tablet (1 mg total) by mouth once daily.    carvediloL (COREG) 12.5 MG tablet Take 1 tablet (12.5 mg total) by mouth 2 (two) times daily with meals.    nitroGLYCERIN (NITROSTAT) 0.4 MG SL tablet PLACE ONE TABLET UNDERNEATH THE TONGUE EVERY 5 MINUTES AS NEEDED FOR CHEST PAIN            Patients blood pressure range in the last 24 hours was: BP  Min: 140/88  Max: 166/79.      PLAN:  -While in the hospital, will manage blood pressure as follows; Continue home antihypertensive regimen  -The patient's inpatient anti-hypertensive regimen is listed below:  Current Antihypertensives  hydrALAZINE injection 5 mg, Every 6 hours PRN, Intravenous  amLODIPine tablet 5 mg, Daily, Oral  bumetanide injection 1 mg, 2 times daily, Intravenous  carvediloL tablet 12.5 mg, 2 times daily with meals, Oral  dorzolamide-timolol 2-0.5% ophthalmic solution 1 drop, 2 times daily, Both Eyes  -Will utilize p.r.n. blood pressure medication only if patient's blood pressure greater than 180/110 and she develops symptoms such as worsening chest pain or shortness of breath.

## 2025-04-12 NOTE — HPI
"Ms. Glo Dumont is a 89 y.o. female who  has a medical history of Anemia, Angina pectoris, Anxiety, Anxiety and depression, Arthritis, Carotid artery occlusion, Carpal tunnel syndrome, Chronic diarrhea, CKD (chronic kidney disease) stage 3, GFR 30-59 ml/min, Colitis, Coronary artery disease, Coronary artery disease, Diastolic dysfunction, Diverticulosis, Encephalopathy, Glaucoma, Greater trochanteric bursitis, Grief at loss of child, H/O carotid endarterectomy, Heart failure, History of coronary angioplasty, Hypercholesteremia, Hypertension, Liver cyst, Low back pain, Macular degeneration, Obesity with serious comorbidity, Primary open-angle glaucoma(365.11), Renal cyst, S/P prosthetic total arthroplasty of the hip, Sarcoidosis, Sarcoidosis of lung, Sickle cell trait, and Uveitis.    She  presented to the ED for evaluation of worsening respiratory symptoms which included increasing dyspnea, and increased swelling in her extremities over the past few days.  She denies any dietary noncompliance and reports compliance with home medications.  Denies any fevers, chills, nausea, vomiting, diarrhea, constipation, abdominal pain, urinary symptoms.  Reports dry cough, dyspnea, lower extremity swelling.    ED course notable for hemodynamically stable vitals.  Labs notable for hemoglobin 9.4, bicarb 20, creatinine 1.5, , CXR noted "Markedly low lung volumes with vascular crowding or atelectasis in the lung bases. Small effusions. Cardiac silhouette size enlargement. Mild interstitial pulmonary edema or interstitial infectious process difficult to exclude in the right clinical setting. ".      "

## 2025-04-12 NOTE — ASSESSMENT & PLAN NOTE
Creatine stable for now. BMP reviewed- noted Estimated Creatinine Clearance: 23 mL/min (based on SCr of 1.3 mg/dL). according to latest data. Based on current GFR, CKD stage is stage 3 - GFR 30-59.  Monitor UOP and serial BMP and adjust therapy as needed. Renally dose meds. Avoid nephrotoxic medications and procedures.

## 2025-04-12 NOTE — SUBJECTIVE & OBJECTIVE
Interval History:  patient is seen doing well this morning.  She states she has only mild shortness of breath.  Denies any fevers, chills, nausea, vomiting.  She did have a drop in her hemoglobin from 11.4-9.9.  Offers no other complaints or episodes of bleeding.    Review of Systems  Objective:     Vital Signs (Most Recent):  Temp: 98.6 °F (37 °C) (04/12/25 1224)  Pulse: 67 (04/12/25 1319)  Resp: 16 (04/12/25 1224)  BP: (!) 149/73 (04/12/25 1224)  SpO2: (!) 94 % (04/12/25 1224) Vital Signs (24h Range):  Temp:  [97.9 °F (36.6 °C)-99.2 °F (37.3 °C)] 98.6 °F (37 °C)  Pulse:  [62-77] 67  Resp:  [16-22] 16  SpO2:  [94 %-99 %] 94 %  BP: (140-166)/(66-88) 149/73     Weight: 54.9 kg (121 lb)  Body mass index is 22.86 kg/m².    Intake/Output Summary (Last 24 hours) at 4/12/2025 1440  Last data filed at 4/12/2025 1117  Gross per 24 hour   Intake 0 ml   Output 1075 ml   Net -1075 ml         Physical Exam  Constitutional:       Appearance: Normal appearance.      Comments: Elderly  female, pleasant   Eyes:      Pupils: Pupils are equal, round, and reactive to light.   Cardiovascular:      Rate and Rhythm: Normal rate.      Heart sounds: No murmur heard.  Pulmonary:      Effort: Pulmonary effort is normal.      Comments: Fine crackles at bilateral base  Abdominal:      General: Abdomen is flat. There is no distension.      Tenderness: There is no abdominal tenderness.   Musculoskeletal:         General: Normal range of motion.      Comments: Mild lower extremity swelling   Neurological:      General: No focal deficit present.      Mental Status: She is alert and oriented to person, place, and time.               Significant Labs: All pertinent labs within the past 24 hours have been reviewed.  BMP:   Recent Labs   Lab 04/12/25  0643      K 3.0*      CO2 26   BUN 28*   CREATININE 1.3   CALCIUM 8.5*   MG 1.5*     CBC:   Recent Labs   Lab 04/11/25  1853 04/12/25  0643   WBC 10.40 7.76   HGB 11.4* 9.9*    HCT 34.7* 30.7*    213       Significant Imaging: I have reviewed all pertinent imaging results/findings within the past 24 hours.

## 2025-04-12 NOTE — ASSESSMENT & PLAN NOTE
Patient is identified as having Diastolic (HFpEF) heart failure that is Acute on Chronic. CHF is currently uncontrolled due to Dyspnea not returned to baseline after IV diuretic and Pulmonary edema/pleural effusion on CXR.     - Reviewed most recent ECHO performed and demonstrates- Results for orders placed during the hospital encounter of 02/23/25    Echo Saline Bubble? No; Ultrasound enhancing contrast? No    Interpretation Summary    Left Ventricle: The left ventricle is normal in size. Normal wall thickness. There is concentric remodeling. There is normal systolic function. Ejection fraction is approximately 60%. Grade II diastolic dysfunction.    Right Ventricle: Normal right ventricular cavity size. Wall thickness is normal. Systolic function is normal.    Left Atrium: Left atrium is severely dilated.    Aortic Valve: There is mild aortic valve sclerosis. There is mild stenosis. Aortic valve area by VTI is 1.6 cm². Aortic valve peak velocity is 2.0 m/s. Mean gradient is 8 mmHg. The dimensionless index is 0.58.    Tricuspid Valve: There is moderate to severe regurgitation with a centrally directed jet.    Pulmonary Artery: The estimated pulmonary artery systolic pressure is 55 mmHg.    IVC/SVC: Normal venous pressure at 3 mmHg.      Recent Labs reviewed-  Recent Labs   Lab 04/11/25  1508 04/11/25  1853   TROPONINI 0.022  --    BNP  --  832*     Recent Labs     04/11/25  1508 04/12/25  0643   BUN 29* 28*   CREATININE 1.5* 1.3   CO2 20* 26         Intake/Output Summary (Last 24 hours) at 4/12/2025 0758  Last data filed at 4/12/2025 0710  Gross per 24 hour   Intake 0 ml   Output 800 ml   Net -800 ml     Net IO Since Admission: -800 mL [04/12/25 0758]    Home GDMT  per Chart Review:   GMDT Medications per chart review             amLODIPine (NORVASC) 5 MG tablet Take 1 tablet (5 mg total) by mouth once daily.    bumetanide (BUMEX) 1 MG tablet Take 1 tablet (1 mg total) by mouth once daily.    carvediloL (COREG)  12.5 MG tablet Take 1 tablet (12.5 mg total) by mouth 2 (two) times daily with meals.    nitroGLYCERIN (NITROSTAT) 0.4 MG SL tablet PLACE ONE TABLET UNDERNEATH THE TONGUE EVERY 5 MINUTES AS NEEDED FOR CHEST PAIN                Plan:  - Current GDMT meds: hydrALAZINE injection 5 mg, Every 6 hours PRN, Intravenous  bumetanide injection 1 mg, 2 times daily, Intravenous  carvediloL tablet 12.5 mg, 2 times daily with meals, Oral  -Titrate(increase/decrease) diuretic dosing to target euvolemia. Current Diuretic regimen:  Diuretics (From admission, onward)      Start     Stop Route Frequency Ordered    04/12/25 0900  bumetanide injection 1 mg         -- IV 2 times daily 04/11/25 2230        - Monitor on telemetry.   - Cardiac diet with Fluid restriction at 1.5L with strict I/Os and daily standing weights  -Continue to stress to patient importance of self efficacy and  on diet for CHF.  - Replete electrolytes PRN to target Mag >2 & K >4

## 2025-04-12 NOTE — PLAN OF CARE
Discussed poc with pt, pt verbalized understanding    Purposeful rounding every 2hours    VS wnl  Cardiac monitoring in use, pt is NSR, tele monitor #[id number]    Fall precautions in place, remains injury free  Pt denies c/o pain      IVFs saline locked  Accurate I&Os    Bed locked at lowest position  Call light within reach    Chart check complete  Will cont with POC

## 2025-04-12 NOTE — H&P
[facility]  History & Physical    Patient Name: Glo Dumont  MRN: [id number]  Patient Class: OP- Observation  Admission Date: 4/11/2025  Attending Physician: Heidi Bailey MD   Primary Care Provider: No primary care provider on file.         Patient information was obtained from patient, past medical records, and ER records.     Subjective:     Principal Problem:Acute on chronic heart failure with preserved ejection fraction    Chief Complaint:   Chief Complaint   Patient presents with    Shortness of Breath     Pt states she has been having SOB and fluid build up in her arms and legs for the past few days. Pt has hx of CHF        HPI: Ms. Glo Dumont is a 89 y.o. female who  has a medical history of Anemia, Angina pectoris, Anxiety, Anxiety and depression, Arthritis, Carotid artery occlusion, Carpal tunnel syndrome, Chronic diarrhea, CKD (chronic kidney disease) stage 3, GFR 30-59 ml/min, Colitis, Coronary artery disease, Coronary artery disease, Diastolic dysfunction, Diverticulosis, Encephalopathy, Glaucoma, Greater trochanteric bursitis, Grief at loss of child, H/O carotid endarterectomy, Heart failure, History of coronary angioplasty, Hypercholesteremia, Hypertension, Liver cyst, Low back pain, Macular degeneration, Obesity with serious comorbidity, Primary open-angle glaucoma(365.11), Renal cyst, S/P prosthetic total arthroplasty of the hip, Sarcoidosis, Sarcoidosis of lung, Sickle cell trait, and Uveitis.    She  presented to the ED for evaluation of worsening respiratory symptoms which included increasing dyspnea, and increased swelling in her extremities over the past few days.  She denies any dietary noncompliance and reports compliance with home medications.  Denies any fevers, chills, nausea, vomiting, diarrhea, constipation, abdominal pain, urinary symptoms.  Reports dry cough, dyspnea, lower extremity swelling.    ED course notable for hemodynamically stable vitals.   "Labs notable for hemoglobin 9.4, bicarb 20, creatinine 1.5, , CXR noted "Markedly low lung volumes with vascular crowding or atelectasis in the lung bases. Small effusions. Cardiac silhouette size enlargement. Mild interstitial pulmonary edema or interstitial infectious process difficult to exclude in the right clinical setting. ".        Past Medical History:   Diagnosis Date    Anemia     Angina pectoris     Anxiety     Anxiety and depression     Arthritis     hip    Carotid artery occlusion     Carpal tunnel syndrome 06/23/2008    emg    Chronic diarrhea     work up in 2011 with EGD, CS and VCE    CKD (chronic kidney disease) stage 3, GFR 30-59 ml/min 5/11/2017    Colitis     Coronary artery disease     Coronary artery disease     Diastolic dysfunction     Diverticulosis     Encephalopathy 10/14/2024    Glaucoma     Greater trochanteric bursitis 2/10/2015    Grief at loss of child 1/26/2016    H/O carotid endarterectomy 12/2/2013    Heart failure     History of coronary angioplasty 3/11/2014    Hypercholesteremia     Hypertension     Liver cyst 02/08/2013    ct abd    Low back pain 2/8/2024    Macular degeneration     Obesity with serious comorbidity 3/19/2023    Primary open-angle glaucoma(365.11) 9/3/2013    Renal cyst 02/08/2013    ct abd    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    Sarcoidosis     Sarcoidosis of lung     Sickle cell trait     Uveitis        Past Surgical History:   Procedure Laterality Date    A-V CARDIAC PACEMAKER INSERTION Left 03/21/2023    Procedure: INSERTION, CARDIAC PACEMAKER, DUAL CHAMBER/His Lead;  Surgeon: Cortez Ambrose MD;  Location: [facility], [place] CATH LAB;  Service: Cardiology;  Laterality: Left;  MDT/ MD to confirm in am/possible nurse sedate    CAROTID ENDARTERECTOMY Right 2000s    CATARACT EXTRACTION Bilateral     Dr. Liang Dennis    CHOLECYSTECTOMY      laparoscopic, 3/18.    CORONARY ANGIOPLASTY WITH STENT PLACEMENT  11/19/2010    RCA-HALIE 2010    Patrick "    JOINT REPLACEMENT Left 11/03/2014    Dr. Braun    TOTAL ABDOMINAL HYSTERECTOMY W/ BILATERAL SALPINGOOPHORECTOMY  1972       Review of patient's allergies indicates:   Allergen Reactions    Lisinopril      angioedema    Codeine Nausea And Vomiting       No current facility-administered medications on file prior to encounter.     Current Outpatient Medications on File Prior to Encounter   Medication Sig    amLODIPine (NORVASC) 5 MG tablet Take 1 tablet (5 mg total) by mouth once daily.    aspirin (ECOTRIN) 81 MG EC tablet Take 1 tablet (81 mg total) by mouth once daily.    atorvastatin (LIPITOR) 40 MG tablet Take 1 tablet (40 mg total) by mouth Daily.    bumetanide (BUMEX) 1 MG tablet Take 1 tablet (1 mg total) by mouth once daily.    busPIRone (BUSPAR) 5 MG Tab Take 1 tablet (5 mg total) by mouth 2 (two) times daily as needed (anxiety).    carvediloL (COREG) 12.5 MG tablet Take 1 tablet (12.5 mg total) by mouth 2 (two) times daily with meals.    fluticasone propionate (FLONASE) 50 mcg/actuation nasal spray 1 spray (50 mcg total) by Each Nostril route once daily.    loperamide (IMODIUM) 2 mg capsule Take 1 mg by mouth every 6 (six) hours as needed for Diarrhea.    QUEtiapine (SEROQUEL) 25 MG Tab Take 1 tablet (25 mg total) by mouth every evening.    budesonide (ENTOCORT EC) 3 mg capsule Take 1-2 capsules (3-6 mg total) by mouth daily as needed (flare). As needed    citalopram (CELEXA) 10 MG tablet Take 1 tablet (10 mg total) by mouth every evening.    cyproheptadine (PERIACTIN) 4 mg tablet Take 1 tablet (4 mg total) by mouth 3 (three) times daily as needed (appetite).    dorzolamide-timolol 2-0.5% (COSOPT) 22.3-6.8 mg/mL ophthalmic solution Place 1 drop into both eyes 2 (two) times daily.    empagliflozin (JARDIANCE) 10 mg tablet Take 1 tablet (10 mg total) by mouth once daily.    FOLIC ACID/MULTIVIT-MIN/LUTEIN (CENTRUM SILVER ORAL) Take 1 tablet by mouth once daily.    levocetirizine (XYZAL) 5 MG tablet Take 1  tablet (5 mg total) by mouth every evening.    lipase-protease-amylase 24,000-76,000-120,000 units (CREON) 24,000-76,000 -120,000 unit capsule Take 1 capsule by mouth 3 (three) times daily with meals.    mupirocin (BACTROBAN) 2 % ointment Apply topically once daily.    nitroGLYCERIN (NITROSTAT) 0.4 MG SL tablet PLACE ONE TABLET UNDERNEATH THE TONGUE EVERY 5 MINUTES AS NEEDED FOR CHEST PAIN    ondansetron (ZOFRAN) 4 MG tablet Take 1 tablet (4 mg total) by mouth every 8 (eight) hours as needed for Nausea.    pantoprazole (PROTONIX) 40 MG tablet Take 1 tablet (40 mg total) by mouth once daily.    sodium bicarbonate 650 MG tablet Take 1 tablet (650 mg total) by mouth once daily.     Family History       Problem Relation (Age of Onset)    Cancer Father, Daughter    Cirrhosis Brother    Heart failure Mother, Brother    Hypertension Mother          Tobacco Use    Smoking status: Never    Smokeless tobacco: Never   Substance and Sexual Activity    Alcohol use: No     Alcohol/week: 0.0 standard drinks of alcohol    Drug use: No    Sexual activity: Yes     Partners: Male     Review of Systems   Constitutional:  Negative for chills, fatigue and fever.   HENT:  Negative for congestion and rhinorrhea.    Respiratory:  Positive for cough and shortness of breath.    Cardiovascular:  Positive for leg swelling. Negative for chest pain.   Gastrointestinal:  Negative for abdominal pain, constipation, diarrhea, nausea and vomiting.   Genitourinary:  Negative for difficulty urinating and dysuria.   Musculoskeletal:  Negative for arthralgias and back pain.   Skin:  Negative for rash and wound.   Neurological:  Negative for dizziness and headaches.     Objective:     Vital Signs (Most Recent):  Temp: 99 °F (37.2 °C) (04/12/25 0433)  Pulse: 72 (04/12/25 0741)  Resp: 19 (04/12/25 0433)  BP: (!) 143/66 (04/12/25 0433)  SpO2: 98 % (04/12/25 0433) Vital Signs (24h Range):  Temp:  [97.9 °F (36.6 °C)-99 °F (37.2 °C)] 99 °F (37.2 °C)  Pulse:   [62-77] 72  Resp:  [16-22] 19  SpO2:  [96 %-99 %] 98 %  BP: (140-166)/(66-88) 143/66     Weight: 54.9 kg (121 lb)  Body mass index is 22.86 kg/m².     Physical Exam  Vitals and nursing note reviewed.   Constitutional:       General: She is not in acute distress.     Appearance: She is not ill-appearing, toxic-appearing or diaphoretic.   HENT:      Head: Normocephalic and atraumatic.      Mouth/Throat:      Mouth: Mucous membranes are moist.   Eyes:      General: No scleral icterus.        Right eye: No discharge.         Left eye: No discharge.   Cardiovascular:      Rate and Rhythm: Normal rate and regular rhythm.      Heart sounds: Normal heart sounds.   Pulmonary:      Effort: Pulmonary effort is normal. No respiratory distress.      Breath sounds: Normal breath sounds. No wheezing, rhonchi or rales.   Abdominal:      General: Bowel sounds are normal.      Palpations: Abdomen is soft.      Tenderness: There is no abdominal tenderness.   Musculoskeletal:      Cervical back: No rigidity.      Right lower leg: Edema present.      Left lower leg: Edema present.   Skin:     General: Skin is warm and dry.      Coloration: Skin is not jaundiced.   Neurological:      Mental Status: She is alert and oriented to person, place, and time. Mental status is at baseline.   Psychiatric:         Mood and Affect: Mood normal.         Behavior: Behavior normal.              Significant Labs: All pertinent labs within the past 24 hours have been reviewed.  Recent Results (from the past 24 hours)   EKG 12-lead    Collection Time: 04/11/25  1:52 PM   Result Value Ref Range    QRS Duration 82 ms    [facility] QTC Calculation 442 ms   Comprehensive metabolic panel    Collection Time: 04/11/25  3:08 PM   Result Value Ref Range    Sodium 141 136 - 145 mmol/L    Potassium 3.7 3.5 - 5.1 mmol/L    Chloride 110 95 - 110 mmol/L    CO2 20 (L) 23 - 29 mmol/L    Glucose 143 (H) 70 - 110 mg/dL    BUN 29 (H) 8 - 23 mg/dL    Creatinine 1.5 (H) 0.5 - 1.4  mg/dL    Calcium 8.6 (L) 8.7 - 10.5 mg/dL    Protein Total 6.9 6.0 - 8.4 gm/dL    Albumin 3.7 3.5 - 5.2 g/dL    Bilirubin Total 0.4 0.1 - 1.0 mg/dL    ALP 62 40 - 150 unit/L    AST 21 11 - 45 unit/L    ALT 18 10 - 44 unit/L    Anion Gap 11 8 - 16 mmol/L    eGFR 34 (L) >60 mL/min/1.73/m2   Troponin I    Collection Time: 04/11/25  3:08 PM   Result Value Ref Range    Troponin-I 0.022 <=0.026 ng/mL   B-Type natriuretic peptide (BNP)    Collection Time: 04/11/25  6:53 PM   Result Value Ref Range     (H) 0 - 99 pg/mL   CBC with Differential    Collection Time: 04/11/25  6:53 PM   Result Value Ref Range    WBC 10.40 3.90 - 12.70 K/uL    RBC 3.89 (L) 4.00 - 5.40 M/uL    HGB 11.4 (L) 12.0 - 16.0 gm/dL    HCT 34.7 (L) 37.0 - 48.5 %    MCV 89 82 - 98 fL    MCH 29.3 27.0 - 31.0 pg    MCHC 32.9 32.0 - 36.0 g/dL    RDW 16.0 (H) 11.5 - 14.5 %    Platelet Count 229 150 - 450 K/uL    MPV 10.1 9.2 - 12.9 fL    Nucleated RBC 0 <=0 /100 WBC    Neut % 79.0 (H) 38 - 73 %    Lymph % 9.2 (L) 18 - 48 %    Mono % 8.7 4 - 15 %    Eos % 0.5 <=8 %    Basophil % 0.5 <=1.9 %    Imm Grans % 2.1 (H) 0.0 - 0.5 %    Neut # 8.22 (H) 1.8 - 7.7 K/uL    Lymph # 0.96 (L) 1 - 4.8 K/uL    Mono # 0.90 0.3 - 1 K/uL    Eos # 0.05 <=0.5 K/uL    Baso # 0.05 <=0.2 K/uL    Imm Grans # 0.22 (H) 0.00 - 0.04 K/uL   Urinalysis, Reflex to Urine Culture Urine, Clean Catch    Collection Time: 04/11/25  8:17 PM    Specimen: Urine   Result Value Ref Range    Color, UA Colorless (A) Straw, Terrie, Yellow, Light-Orange    Appearance, UA Clear Clear    pH, UA 7.0 5.0 - 8.0    Spec Grav UA 1.010 1.005 - 1.030    Protein, UA Negative Negative    Glucose, UA 2+ (A) Negative    Ketones, UA Negative Negative    Bilirubin, UA Negative Negative    Blood, UA Negative Negative    Nitrites, UA Negative Negative    Urobilinogen, UA Negative <2.0 EU/dL    Leukocyte Esterase, UA 2+ (A) Negative   Urinalysis Microscopic    Collection Time: 04/11/25  8:17 PM   Result Value Ref Range     RBC, UA 0 0 - 4 /HPF    WBC, UA 6 (H) 0 - 5 /HPF    Bacteria, UA None None, Rare, Occasional /HPF    Yeast, UA None None /HPF    Hyaline Casts, UA 0 0 - 1 /LPF    Microscopic Comment     Comprehensive Metabolic Panel    Collection Time: 04/12/25  6:43 AM   Result Value Ref Range    Sodium 143 136 - 145 mmol/L    Potassium 3.0 (L) 3.5 - 5.1 mmol/L    Chloride 110 95 - 110 mmol/L    CO2 26 23 - 29 mmol/L    Glucose 83 70 - 110 mg/dL    BUN 28 (H) 8 - 23 mg/dL    Creatinine 1.3 0.5 - 1.4 mg/dL    Calcium 8.5 (L) 8.7 - 10.5 mg/dL    Protein Total 5.8 (L) 6.0 - 8.4 gm/dL    Albumin 3.1 (L) 3.5 - 5.2 g/dL    Bilirubin Total 0.5 0.1 - 1.0 mg/dL    ALP 58 40 - 150 unit/L    AST 11 11 - 45 unit/L    ALT 16 10 - 44 unit/L    Anion Gap 7 (L) 8 - 16 mmol/L    eGFR 40 (L) >60 mL/min/1.73/m2   Magnesium    Collection Time: 04/12/25  6:43 AM   Result Value Ref Range    Magnesium  1.5 (L) 1.6 - 2.6 mg/dL   Phosphorus    Collection Time: 04/12/25  6:43 AM   Result Value Ref Range    Phosphorus Level 3.5 2.7 - 4.5 mg/dL   CBC with Differential    Collection Time: 04/12/25  6:43 AM   Result Value Ref Range    WBC 7.76 3.90 - 12.70 K/uL    RBC 3.43 (L) 4.00 - 5.40 M/uL    HGB 9.9 (L) 12.0 - 16.0 gm/dL    HCT 30.7 (L) 37.0 - 48.5 %    MCV 90 82 - 98 fL    MCH 28.9 27.0 - 31.0 pg    MCHC 32.2 32.0 - 36.0 g/dL    RDW 15.9 (H) 11.5 - 14.5 %    Platelet Count 213 150 - 450 K/uL    MPV 10.4 9.2 - 12.9 fL    Nucleated RBC 0 <=0 /100 WBC    Neut % 76.8 (H) 38 - 73 %    Lymph % 11.9 (L) 18 - 48 %    Mono % 8.8 4 - 15 %    Eos % 0.5 <=8 %    Basophil % 0.5 <=1.9 %    Imm Grans % 1.5 (H) 0.0 - 0.5 %    Neut # 5.96 1.8 - 7.7 K/uL    Lymph # 0.92 (L) 1 - 4.8 K/uL    Mono # 0.68 0.3 - 1 K/uL    Eos # 0.04 <=0.5 K/uL    Baso # 0.04 <=0.2 K/uL    Imm Grans # 0.12 (H) 0.00 - 0.04 K/uL       Significant Imaging: I have reviewed all pertinent imaging results/findings within the past 24 hours.  US Lower Extremity Veins Left   Final Result      Negative  for DVT      Finalized on: 4/11/2025 8:03 PM By:  Nathaly Gonzales MD   [facility]# [id number]      2025-04-11 20:05:43.390     [facility]      US Upper Extremity Veins Left   Final Result    Negative for DVT      Finalized on: 4/11/2025 8:03 PM By:  Nathaly Gonzales MD   [facility]# [id number]      2025-04-11 20:05:03.358     [facility]      X-Ray Chest AP Portable   Final Result      1.  Markedly low lung volumes with vascular crowding or atelectasis in the lung bases.  Small effusions.  Cardiac silhouette size enlargement.  Mild interstitial pulmonary edema or interstitial infectious process difficult to exclude in the right clinical setting.      2.  Stable findings as noted above.         Electronically signed by: Renato Guerrier MD   Date:    04/11/2025   Time:    15:53          Assessment/Plan:     Assessment & Plan  Acute on chronic heart failure with preserved ejection fraction  Diastolic heart failure, NYHA class 3  Patient is identified as having Diastolic (HFpEF) heart failure that is Acute on Chronic. CHF is currently uncontrolled due to Dyspnea not returned to baseline after IV diuretic and Pulmonary edema/pleural effusion on CXR.     - Reviewed most recent ECHO performed and demonstrates- Results for orders placed during the hospital encounter of 02/23/25    Echo Saline Bubble? No; Ultrasound enhancing contrast? No    Interpretation Summary    Left Ventricle: The left ventricle is normal in size. Normal wall thickness. There is concentric remodeling. There is normal systolic function. Ejection fraction is approximately 60%. Grade II diastolic dysfunction.    Right Ventricle: Normal right ventricular cavity size. Wall thickness is normal. Systolic function is normal.    Left Atrium: Left atrium is severely dilated.    Aortic Valve: There is mild aortic valve sclerosis. There is mild stenosis. Aortic valve area by VTI is 1.6 cm². Aortic valve peak velocity is 2.0 m/s. Mean gradient is 8 mmHg. The dimensionless index is 0.58.     Tricuspid Valve: There is moderate to severe regurgitation with a centrally directed jet.    Pulmonary Artery: The estimated pulmonary artery systolic pressure is 55 mmHg.    IVC/SVC: Normal venous pressure at 3 mmHg.      Recent Labs reviewed-  Recent Labs   Lab 04/11/25  1508 04/11/25  1853   TROPONINI 0.022  --    BNP  --  832*     Recent Labs     04/11/25  1508 04/12/25  0643   BUN 29* 28*   CREATININE 1.5* 1.3   CO2 20* 26         Intake/Output Summary (Last 24 hours) at 4/12/2025 0802  Last data filed at 4/12/2025 0710  Gross per 24 hour   Intake 0 ml   Output 800 ml   Net -800 ml     Net IO Since Admission: -800 mL [04/12/25 0802]    Home GDMT  per Chart Review:   GMDT Medications per chart review             amLODIPine (NORVASC) 5 MG tablet Take 1 tablet (5 mg total) by mouth once daily.    bumetanide (BUMEX) 1 MG tablet Take 1 tablet (1 mg total) by mouth once daily.    carvediloL (COREG) 12.5 MG tablet Take 1 tablet (12.5 mg total) by mouth 2 (two) times daily with meals.    nitroGLYCERIN (NITROSTAT) 0.4 MG SL tablet PLACE ONE TABLET UNDERNEATH THE TONGUE EVERY 5 MINUTES AS NEEDED FOR CHEST PAIN          Plan:  - Current GDMT meds: hydrALAZINE injection 5 mg, Every 6 hours PRN, Intravenous  bumetanide injection 1 mg, 2 times daily, Intravenous  carvediloL tablet 12.5 mg, 2 times daily with meals, Oral  -Titrate(increase/decrease) diuretic dosing to target euvolemia. Current Diuretic regimen:  Diuretics (From admission, onward)      Start     Stop Route Frequency Ordered    04/12/25 0900  bumetanide injection 1 mg         -- IV 2 times daily 04/11/25 2230        - Monitor on telemetry.   - Cardiac diet with Fluid restriction at 1.5L with strict I/Os and daily standing weights  -Continue to stress to patient importance of self efficacy and  on diet for CHF.  - Replete electrolytes PRN to target Mag >2 & K >4  Hypertensive heart disease with heart failure  Hypertensive on admission    Home meds for  hypertension were reviewed and noted below.     Home Hypertension Medications per chart review             amLODIPine (NORVASC) 5 MG tablet Take 1 tablet (5 mg total) by mouth once daily.    bumetanide (BUMEX) 1 MG tablet Take 1 tablet (1 mg total) by mouth once daily.    carvediloL (COREG) 12.5 MG tablet Take 1 tablet (12.5 mg total) by mouth 2 (two) times daily with meals.    nitroGLYCERIN (NITROSTAT) 0.4 MG SL tablet PLACE ONE TABLET UNDERNEATH THE TONGUE EVERY 5 MINUTES AS NEEDED FOR CHEST PAIN            Patients blood pressure range in the last 24 hours was: BP  Min: 140/88  Max: 166/79.      PLAN:  -While in the hospital, will manage blood pressure as follows; Continue home antihypertensive regimen  -The patient's inpatient anti-hypertensive regimen is listed below:  Current Antihypertensives  hydrALAZINE injection 5 mg, Every 6 hours PRN, Intravenous  amLODIPine tablet 5 mg, Daily, Oral  bumetanide injection 1 mg, 2 times daily, Intravenous  carvediloL tablet 12.5 mg, 2 times daily with meals, Oral  dorzolamide-timolol 2-0.5% ophthalmic solution 1 drop, 2 times daily, Both Eyes  -Will utilize p.r.n. blood pressure medication only if patient's blood pressure greater than 180/110 and she develops symptoms such as worsening chest pain or shortness of breath.      Other hyperlipidemia  Recent Lipid Panel reviewed-  Lab Results   Component Value Date    HDL 45 10/14/2024    LDLCALC 78.4 10/14/2024    TRIG 148 10/14/2024    CHOL 153 10/14/2024        -Home medications:   Hyperlipidemia Medications              atorvastatin (LIPITOR) 40 MG tablet Take 1 tablet (40 mg total) by mouth Daily.            PLAN  -continue home statin      CKD (chronic kidney disease) stage 4, GFR 15-29 ml/min  Creatine stable for now. BMP reviewed- noted Estimated Creatinine Clearance: 23 mL/min (based on SCr of 1.3 mg/dL). according to latest data. Based on current GFR, CKD stage is stage 3 - GFR 30-59.  Monitor UOP and serial BMP and  adjust therapy as needed. Renally dose meds. Avoid nephrotoxic medications and procedures.  Gastroesophageal reflux disease with esophagitis  Chronic.  Stable    PLAN:  Continue pantoprazole      VTE Risk Mitigation (From admission, onward)           Ordered     heparin (porcine) injection 5,000 Units  Every 8 hours         04/11/25 2230     IP VTE HIGH RISK PATIENT  Once         04/11/25 2230     Place sequential compression device  Until discontinued         04/11/25 2230                       On 04/12/2025, patient should be placed in hospital observation services under my care.             Shakir Wall DO  Department of Hospital Medicine  [facility]    Voice recognition software was used in the creation of this note/communication and any sound-alike/typographical errors which may have occurred despite initial review prior to signing should be taken in context when interpreting.  If such errors prevent a clear understanding of the note/communication, please contact the provider/office for clarification.

## 2025-04-12 NOTE — ASSESSMENT & PLAN NOTE
Patient is identified as having Diastolic (HFpEF) heart failure that is Acute on Chronic. CHF is currently uncontrolled due to Dyspnea not returned to baseline after IV diuretic and Pulmonary edema/pleural effusion on CXR.     - Reviewed most recent ECHO performed and demonstrates- Results for orders placed during the hospital encounter of 02/23/25    Echo Saline Bubble? No; Ultrasound enhancing contrast? No    Interpretation Summary    Left Ventricle: The left ventricle is normal in size. Normal wall thickness. There is concentric remodeling. There is normal systolic function. Ejection fraction is approximately 60%. Grade II diastolic dysfunction.    Right Ventricle: Normal right ventricular cavity size. Wall thickness is normal. Systolic function is normal.    Left Atrium: Left atrium is severely dilated.    Aortic Valve: There is mild aortic valve sclerosis. There is mild stenosis. Aortic valve area by VTI is 1.6 cm². Aortic valve peak velocity is 2.0 m/s. Mean gradient is 8 mmHg. The dimensionless index is 0.58.    Tricuspid Valve: There is moderate to severe regurgitation with a centrally directed jet.    Pulmonary Artery: The estimated pulmonary artery systolic pressure is 55 mmHg.    IVC/SVC: Normal venous pressure at 3 mmHg.      Recent Labs reviewed-  Recent Labs   Lab 04/11/25  1508 04/11/25  1853   TROPONINI 0.022  --    BNP  --  832*     Recent Labs     04/11/25  1508 04/12/25  0643   BUN 29* 28*   CREATININE 1.5* 1.3   CO2 20* 26         Intake/Output Summary (Last 24 hours) at 4/12/2025 0802  Last data filed at 4/12/2025 0710  Gross per 24 hour   Intake 0 ml   Output 800 ml   Net -800 ml     Net IO Since Admission: -800 mL [04/12/25 0802]    Home GDMT  per Chart Review:   GMDT Medications per chart review             amLODIPine (NORVASC) 5 MG tablet Take 1 tablet (5 mg total) by mouth once daily.    bumetanide (BUMEX) 1 MG tablet Take 1 tablet (1 mg total) by mouth once daily.    carvediloL (COREG)  12.5 MG tablet Take 1 tablet (12.5 mg total) by mouth 2 (two) times daily with meals.    nitroGLYCERIN (NITROSTAT) 0.4 MG SL tablet PLACE ONE TABLET UNDERNEATH THE TONGUE EVERY 5 MINUTES AS NEEDED FOR CHEST PAIN          Plan:  - Current GDMT meds: hydrALAZINE injection 5 mg, Every 6 hours PRN, Intravenous  bumetanide injection 1 mg, 2 times daily, Intravenous  carvediloL tablet 12.5 mg, 2 times daily with meals, Oral  -Titrate(increase/decrease) diuretic dosing to target euvolemia. Current Diuretic regimen:  Diuretics (From admission, onward)      Start     Stop Route Frequency Ordered    04/12/25 0900  bumetanide injection 1 mg         -- IV 2 times daily 04/11/25 2230        - Monitor on telemetry.   - Cardiac diet with Fluid restriction at 1.5L with strict I/Os and daily standing weights  -Continue to stress to patient importance of self efficacy and  on diet for CHF.  - Replete electrolytes PRN to target Mag >2 & K >4

## 2025-04-12 NOTE — PLAN OF CARE
Inpatient Upgrade Note    Glo Dumont has warranted treatment spanning two or more midnights of hospital level care for the management of heart failure. She continues to require IV diuresis, daily labs, and monitoring of vital signs. Her condition is also complicated by the following comorbidities: Coronary Artery Disease and Chronic kidney disease.

## 2025-04-12 NOTE — ASSESSMENT & PLAN NOTE
Patient is identified as having Diastolic (HFpEF) heart failure that is Acute on Chronic. CHF is currently uncontrolled due to Dyspnea not returned to baseline after IV diuretic and Pulmonary edema/pleural effusion on CXR.     - Reviewed most recent ECHO performed and demonstrates- Results for orders placed during the hospital encounter of 02/23/25    Echo Saline Bubble? No; Ultrasound enhancing contrast? No    Interpretation Summary    Left Ventricle: The left ventricle is normal in size. Normal wall thickness. There is concentric remodeling. There is normal systolic function. Ejection fraction is approximately 60%. Grade II diastolic dysfunction.    Right Ventricle: Normal right ventricular cavity size. Wall thickness is normal. Systolic function is normal.    Left Atrium: Left atrium is severely dilated.    Aortic Valve: There is mild aortic valve sclerosis. There is mild stenosis. Aortic valve area by VTI is 1.6 cm². Aortic valve peak velocity is 2.0 m/s. Mean gradient is 8 mmHg. The dimensionless index is 0.58.    Tricuspid Valve: There is moderate to severe regurgitation with a centrally directed jet.    Pulmonary Artery: The estimated pulmonary artery systolic pressure is 55 mmHg.    IVC/SVC: Normal venous pressure at 3 mmHg.      Recent Labs reviewed-  Recent Labs   Lab 04/11/25  1508 04/11/25  1853   TROPONINI 0.022  --    BNP  --  832*     Recent Labs     04/11/25  1508 04/12/25  0643   BUN 29* 28*   CREATININE 1.5* 1.3   CO2 20* 26         Intake/Output Summary (Last 24 hours) at 4/12/2025 1444  Last data filed at 4/12/2025 1117  Gross per 24 hour   Intake 0 ml   Output 1075 ml   Net -1075 ml     Net IO Since Admission: -1,075 mL [04/12/25 1444]    Home GDMT  per Chart Review:   GMDT Medications per chart review             amLODIPine (NORVASC) 5 MG tablet Take 1 tablet (5 mg total) by mouth once daily.    bumetanide (BUMEX) 1 MG tablet Take 1 tablet (1 mg total) by mouth once daily.    carvediloL (COREG)  12.5 MG tablet Take 1 tablet (12.5 mg total) by mouth 2 (two) times daily with meals.    nitroGLYCERIN (NITROSTAT) 0.4 MG SL tablet PLACE ONE TABLET UNDERNEATH THE TONGUE EVERY 5 MINUTES AS NEEDED FOR CHEST PAIN          Plan:  -continue diuresis  - Current GDMT meds: hydrALAZINE injection 5 mg, Every 6 hours PRN, Intravenous  bumetanide injection 1 mg, 2 times daily, Intravenous  carvediloL tablet 12.5 mg, 2 times daily with meals, Oral  -Titrate(increase/decrease) diuretic dosing to target euvolemia. Current Diuretic regimen:  Diuretics (From admission, onward)      Start     Stop Route Frequency Ordered    04/12/25 0900  bumetanide injection 1 mg         -- IV 2 times daily 04/11/25 2230        - Monitor on telemetry.   - Cardiac diet with Fluid restriction at 1.5L with strict I/Os and daily standing weights  -Continue to stress to patient importance of self efficacy and  on diet for CHF.  - Replete electrolytes PRN to target Mag >2 & K >4

## 2025-04-12 NOTE — PROGRESS NOTES
"[facility]  [facility] Note    Patient Name: Glo Dumont  MRN: [id number]  Patient Class: OP- Observation   Admission Date: 4/11/2025  Length of Stay: 0 days  Attending Physician: Heidi Bailey MD  Primary Care Provider: No primary care provider on file.        Subjective     Principal Problem:Acute on chronic heart failure with preserved ejection fraction        HPI:  Ms. Glo Dumont is a 89 y.o. female who  has a medical history of Anemia, Angina pectoris, Anxiety, Anxiety and depression, Arthritis, Carotid artery occlusion, Carpal tunnel syndrome, Chronic diarrhea, CKD (chronic kidney disease) stage 3, GFR 30-59 ml/min, Colitis, Coronary artery disease, Coronary artery disease, Diastolic dysfunction, Diverticulosis, Encephalopathy, Glaucoma, Greater trochanteric bursitis, Grief at loss of child, H/O carotid endarterectomy, Heart failure, History of coronary angioplasty, Hypercholesteremia, Hypertension, Liver cyst, Low back pain, Macular degeneration, Obesity with serious comorbidity, Primary open-angle glaucoma(365.11), Renal cyst, S/P prosthetic total arthroplasty of the hip, Sarcoidosis, Sarcoidosis of lung, Sickle cell trait, and Uveitis.    She  presented to the ED for evaluation of worsening respiratory symptoms which included increasing dyspnea, and increased swelling in her extremities over the past few days.  She denies any dietary noncompliance and reports compliance with home medications.  Denies any fevers, chills, nausea, vomiting, diarrhea, constipation, abdominal pain, urinary symptoms.  Reports dry cough, dyspnea, lower extremity swelling.    ED course notable for hemodynamically stable vitals.  Labs notable for hemoglobin 9.4, bicarb 20, creatinine 1.5, , CXR noted "Markedly low lung volumes with vascular crowding or atelectasis in the lung bases. Small effusions. Cardiac silhouette size enlargement. Mild interstitial pulmonary edema or interstitial infectious " "process difficult to exclude in the right clinical setting. ".        Overview/Hospital Course:  No notes on file    Interval History:  patient is seen doing well this morning.  She states she has only mild shortness of breath.  Denies any fevers, chills, nausea, vomiting.  She did have a drop in her hemoglobin from 11.4-9.9.  Offers no other complaints or episodes of bleeding.    Review of Systems  Objective:     Vital Signs (Most Recent):  Temp: 98.6 °F (37 °C) (04/12/25 1224)  Pulse: 67 (04/12/25 1319)  Resp: 16 (04/12/25 1224)  BP: (!) 149/73 (04/12/25 1224)  SpO2: (!) 94 % (04/12/25 1224) Vital Signs (24h Range):  Temp:  [97.9 °F (36.6 °C)-99.2 °F (37.3 °C)] 98.6 °F (37 °C)  Pulse:  [62-77] 67  Resp:  [16-22] 16  SpO2:  [94 %-99 %] 94 %  BP: (140-166)/(66-88) 149/73     Weight: 54.9 kg (121 lb)  Body mass index is 22.86 kg/m².    Intake/Output Summary (Last 24 hours) at 4/12/2025 1440  Last data filed at 4/12/2025 1117  Gross per 24 hour   Intake 0 ml   Output 1075 ml   Net -1075 ml         Physical Exam  Constitutional:       Appearance: Normal appearance.      Comments: Elderly  female, pleasant   Eyes:      Pupils: Pupils are equal, round, and reactive to light.   Cardiovascular:      Rate and Rhythm: Normal rate.      Heart sounds: No murmur heard.  Pulmonary:      Effort: Pulmonary effort is normal.      Comments: Fine crackles at bilateral base  Abdominal:      General: Abdomen is flat. There is no distension.      Tenderness: There is no abdominal tenderness.   Musculoskeletal:         General: Normal range of motion.      Comments: Mild lower extremity swelling   Neurological:      General: No focal deficit present.      Mental Status: She is alert and oriented to person, place, and time.               Significant Labs: All pertinent labs within the past 24 hours have been reviewed.  BMP:   Recent Labs   Lab 04/12/25  0643      K 3.0*      CO2 26   BUN 28*   CREATININE 1.3 "   CALCIUM 8.5*   MG 1.5*     CBC:   Recent Labs   Lab 04/11/25  1853 04/12/25  0643   WBC 10.40 7.76   HGB 11.4* 9.9*   HCT 34.7* 30.7*    213       Significant Imaging: I have reviewed all pertinent imaging results/findings within the past 24 hours.      Assessment & Plan  Acute on chronic heart failure with preserved ejection fraction  Diastolic heart failure, NYHA class 3  Patient is identified as having Diastolic (HFpEF) heart failure that is Acute on Chronic. CHF is currently uncontrolled due to Dyspnea not returned to baseline after IV diuretic and Pulmonary edema/pleural effusion on CXR.     - Reviewed most recent ECHO performed and demonstrates- Results for orders placed during the hospital encounter of 02/23/25    Echo Saline Bubble? No; Ultrasound enhancing contrast? No    Interpretation Summary    Left Ventricle: The left ventricle is normal in size. Normal wall thickness. There is concentric remodeling. There is normal systolic function. Ejection fraction is approximately 60%. Grade II diastolic dysfunction.    Right Ventricle: Normal right ventricular cavity size. Wall thickness is normal. Systolic function is normal.    Left Atrium: Left atrium is severely dilated.    Aortic Valve: There is mild aortic valve sclerosis. There is mild stenosis. Aortic valve area by VTI is 1.6 cm². Aortic valve peak velocity is 2.0 m/s. Mean gradient is 8 mmHg. The dimensionless index is 0.58.    Tricuspid Valve: There is moderate to severe regurgitation with a centrally directed jet.    Pulmonary Artery: The estimated pulmonary artery systolic pressure is 55 mmHg.    IVC/SVC: Normal venous pressure at 3 mmHg.      Recent Labs reviewed-  Recent Labs   Lab 04/11/25  1508 04/11/25  1853   TROPONINI 0.022  --    BNP  --  832*     Recent Labs     04/11/25  1508 04/12/25  0643   BUN 29* 28*   CREATININE 1.5* 1.3   CO2 20* 26         Intake/Output Summary (Last 24 hours) at 4/12/2025 1444  Last data filed at 4/12/2025  1117  Gross per 24 hour   Intake 0 ml   Output 1075 ml   Net -1075 ml     Net IO Since Admission: -1,075 mL [04/12/25 1444]    Home GDMT  per Chart Review:   GMDT Medications per chart review             amLODIPine (NORVASC) 5 MG tablet Take 1 tablet (5 mg total) by mouth once daily.    bumetanide (BUMEX) 1 MG tablet Take 1 tablet (1 mg total) by mouth once daily.    carvediloL (COREG) 12.5 MG tablet Take 1 tablet (12.5 mg total) by mouth 2 (two) times daily with meals.    nitroGLYCERIN (NITROSTAT) 0.4 MG SL tablet PLACE ONE TABLET UNDERNEATH THE TONGUE EVERY 5 MINUTES AS NEEDED FOR CHEST PAIN          Plan:  -continue diuresis  - Current GDMT meds: hydrALAZINE injection 5 mg, Every 6 hours PRN, Intravenous  bumetanide injection 1 mg, 2 times daily, Intravenous  carvediloL tablet 12.5 mg, 2 times daily with meals, Oral  -Titrate(increase/decrease) diuretic dosing to target euvolemia. Current Diuretic regimen:  Diuretics (From admission, onward)      Start     Stop Route Frequency Ordered    04/12/25 0900  bumetanide injection 1 mg         -- IV 2 times daily 04/11/25 2230        - Monitor on telemetry.   - Cardiac diet with Fluid restriction at 1.5L with strict I/Os and daily standing weights  -Continue to stress to patient importance of self efficacy and  on diet for CHF.  - Replete electrolytes PRN to target Mag >2 & K >4  Hypertensive heart disease with heart failure  Hypertensive on admission    Home meds for hypertension were reviewed and noted below.     Home Hypertension Medications per chart review             amLODIPine (NORVASC) 5 MG tablet Take 1 tablet (5 mg total) by mouth once daily.    bumetanide (BUMEX) 1 MG tablet Take 1 tablet (1 mg total) by mouth once daily.    carvediloL (COREG) 12.5 MG tablet Take 1 tablet (12.5 mg total) by mouth 2 (two) times daily with meals.    nitroGLYCERIN (NITROSTAT) 0.4 MG SL tablet PLACE ONE TABLET UNDERNEATH THE TONGUE EVERY 5 MINUTES AS NEEDED FOR CHEST PAIN             Patients blood pressure range in the last 24 hours was: BP  Min: 140/88  Max: 166/79.      PLAN:  -While in the hospital, will manage blood pressure as follows; Continue home antihypertensive regimen  -The patient's inpatient anti-hypertensive regimen is listed below:  Current Antihypertensives  hydrALAZINE injection 5 mg, Every 6 hours PRN, Intravenous  amLODIPine tablet 5 mg, Daily, Oral  bumetanide injection 1 mg, 2 times daily, Intravenous  carvediloL tablet 12.5 mg, 2 times daily with meals, Oral  dorzolamide-timolol 2-0.5% ophthalmic solution 1 drop, 2 times daily, Both Eyes  -Will utilize p.r.n. blood pressure medication only if patient's blood pressure greater than 180/110 and she develops symptoms such as worsening chest pain or shortness of breath.      Other hyperlipidemia  Recent Lipid Panel reviewed-  Lab Results   Component Value Date    HDL 45 10/14/2024    LDLCALC 78.4 10/14/2024    TRIG 148 10/14/2024    CHOL 153 10/14/2024        -Home medications:   Hyperlipidemia Medications              atorvastatin (LIPITOR) 40 MG tablet Take 1 tablet (40 mg total) by mouth Daily.            PLAN  -continue home statin      CKD (chronic kidney disease) stage 4, GFR 15-29 ml/min  Creatine stable for now. BMP reviewed- noted Estimated Creatinine Clearance: 23 mL/min (based on SCr of 1.3 mg/dL). according to latest data. Based on current GFR, CKD stage is stage 3 - GFR 30-59.  Monitor UOP and serial BMP and adjust therapy as needed. Renally dose meds. Avoid nephrotoxic medications and procedures.  Gastroesophageal reflux disease with esophagitis  Chronic.  Stable    PLAN:  Continue pantoprazole    VTE Risk Mitigation (From admission, onward)           Ordered     heparin (porcine) injection 5,000 Units  Every 8 hours         04/11/25 2230     IP VTE HIGH RISK PATIENT  Once         04/11/25 2230     Place sequential compression device  Until discontinued         04/11/25 2230                    Discharge  Planning   CANDACE:      Code Status: Full Code   Medical Readiness for Discharge Date:                            Heidi Bailey MD  Department of Hospital Medicine   '[facility]

## 2025-04-12 NOTE — ASSESSMENT & PLAN NOTE
Recent Lipid Panel reviewed-  Lab Results   Component Value Date    HDL 45 10/14/2024    LDLCALC 78.4 10/14/2024    TRIG 148 10/14/2024    CHOL 153 10/14/2024        -Home medications:   Hyperlipidemia Medications              atorvastatin (LIPITOR) 40 MG tablet Take 1 tablet (40 mg total) by mouth Daily.            PLAN  -continue home statin

## 2025-04-12 NOTE — PLAN OF CARE
Discussed poc with pt, pt verbalized understanding    Purposeful rounding every 2hours    VS wnl  Cardiac monitoring in use, pt is NSR, tele monitor # [id number]  Fall precautions in place, remains injury free  Pt denies c/o pain  Abx given as prescribed  Bed locked at lowest position  Call light within reach    Chart check complete  Will cont with POC

## 2025-04-13 LAB
ABSOLUTE EOSINOPHIL (OHS): 0.05 K/UL
ABSOLUTE MONOCYTE (OHS): 0.74 K/UL (ref 0.3–1)
ABSOLUTE NEUTROPHIL COUNT (OHS): 6.84 K/UL (ref 1.8–7.7)
ALBUMIN SERPL BCP-MCNC: 3.4 G/DL (ref 3.5–5.2)
ALP SERPL-CCNC: 71 UNIT/L (ref 40–150)
ALT SERPL W/O P-5'-P-CCNC: 14 UNIT/L (ref 10–44)
ANION GAP (OHS): 11 MMOL/L (ref 8–16)
AST SERPL-CCNC: 17 UNIT/L (ref 11–45)
BASOPHILS # BLD AUTO: 0.04 K/UL
BASOPHILS NFR BLD AUTO: 0.5 %
BILIRUB SERPL-MCNC: 0.8 MG/DL (ref 0.1–1)
BUN SERPL-MCNC: 26 MG/DL (ref 8–23)
CALCIUM SERPL-MCNC: 9.1 MG/DL (ref 8.7–10.5)
CHLORIDE SERPL-SCNC: 104 MMOL/L (ref 95–110)
CO2 SERPL-SCNC: 27 MMOL/L (ref 23–29)
CREAT SERPL-MCNC: 1.4 MG/DL (ref 0.5–1.4)
ERYTHROCYTE [DISTWIDTH] IN BLOOD BY AUTOMATED COUNT: 16.1 % (ref 11.5–14.5)
GFR SERPLBLD CREATININE-BSD FMLA CKD-EPI: 36 ML/MIN/1.73/M2
GLUCOSE SERPL-MCNC: 96 MG/DL (ref 70–110)
HCT VFR BLD AUTO: 37 % (ref 37–48.5)
HGB BLD-MCNC: 11.8 GM/DL (ref 12–16)
IMM GRANULOCYTES # BLD AUTO: 0.16 K/UL (ref 0–0.04)
IMM GRANULOCYTES NFR BLD AUTO: 1.9 % (ref 0–0.5)
LYMPHOCYTES # BLD AUTO: 0.81 K/UL (ref 1–4.8)
MAGNESIUM SERPL-MCNC: 1.6 MG/DL (ref 1.6–2.6)
MCH RBC QN AUTO: 28.2 PG (ref 27–31)
MCHC RBC AUTO-ENTMCNC: 31.9 G/DL (ref 32–36)
MCV RBC AUTO: 89 FL (ref 82–98)
NUCLEATED RBC (/100WBC) (OHS): 0 /100 WBC
PHOSPHATE SERPL-MCNC: 3.1 MG/DL (ref 2.7–4.5)
PLATELET # BLD AUTO: 238 K/UL (ref 150–450)
PMV BLD AUTO: 10.2 FL (ref 9.2–12.9)
POTASSIUM SERPL-SCNC: 3.4 MMOL/L (ref 3.5–5.1)
PROT SERPL-MCNC: 6.9 GM/DL (ref 6–8.4)
RBC # BLD AUTO: 4.18 M/UL (ref 4–5.4)
RELATIVE EOSINOPHIL (OHS): 0.6 %
RELATIVE LYMPHOCYTE (OHS): 9.4 % (ref 18–48)
RELATIVE MONOCYTE (OHS): 8.6 % (ref 4–15)
RELATIVE NEUTROPHIL (OHS): 79 % (ref 38–73)
SODIUM SERPL-SCNC: 142 MMOL/L (ref 136–145)
WBC # BLD AUTO: 8.64 K/UL (ref 3.9–12.7)

## 2025-04-13 PROCEDURE — 63600175 PHARM REV CODE 636 W HCPCS: Mod: JZ,TB,HCNC | Performed by: STUDENT IN AN ORGANIZED HEALTH CARE EDUCATION/TRAINING PROGRAM

## 2025-04-13 PROCEDURE — 83735 ASSAY OF MAGNESIUM: CPT | Mod: HCNC | Performed by: STUDENT IN AN ORGANIZED HEALTH CARE EDUCATION/TRAINING PROGRAM

## 2025-04-13 PROCEDURE — 80053 COMPREHEN METABOLIC PANEL: CPT | Mod: HCNC | Performed by: STUDENT IN AN ORGANIZED HEALTH CARE EDUCATION/TRAINING PROGRAM

## 2025-04-13 PROCEDURE — 21400001 HC TELEMETRY ROOM: Mod: HCNC

## 2025-04-13 PROCEDURE — 85025 COMPLETE CBC W/AUTO DIFF WBC: CPT | Mod: HCNC | Performed by: STUDENT IN AN ORGANIZED HEALTH CARE EDUCATION/TRAINING PROGRAM

## 2025-04-13 PROCEDURE — 25000003 PHARM REV CODE 250: Mod: HCNC | Performed by: STUDENT IN AN ORGANIZED HEALTH CARE EDUCATION/TRAINING PROGRAM

## 2025-04-13 PROCEDURE — 36415 COLL VENOUS BLD VENIPUNCTURE: CPT | Mod: HCNC,XB | Performed by: STUDENT IN AN ORGANIZED HEALTH CARE EDUCATION/TRAINING PROGRAM

## 2025-04-13 PROCEDURE — 84100 ASSAY OF PHOSPHORUS: CPT | Mod: HCNC | Performed by: STUDENT IN AN ORGANIZED HEALTH CARE EDUCATION/TRAINING PROGRAM

## 2025-04-13 RX ADMIN — QUETIAPINE FUMARATE 25 MG: 25 TABLET ORAL at 10:04

## 2025-04-13 RX ADMIN — BUMETANIDE 1 MG: 0.25 INJECTION INTRAMUSCULAR; INTRAVENOUS at 10:04

## 2025-04-13 RX ADMIN — CITALOPRAM HYDROBROMIDE 10 MG: 10 TABLET ORAL at 10:04

## 2025-04-13 RX ADMIN — SODIUM BICARBONATE 650 MG TABLET 650 MG: at 08:04

## 2025-04-13 RX ADMIN — ATORVASTATIN CALCIUM 40 MG: 40 TABLET, FILM COATED ORAL at 04:04

## 2025-04-13 RX ADMIN — ASPIRIN 81 MG: 81 TABLET, COATED ORAL at 08:04

## 2025-04-13 RX ADMIN — PANCRELIPASE 1 CAPSULE: 120000; 24000; 76000 CAPSULE, DELAYED RELEASE PELLETS ORAL at 08:04

## 2025-04-13 RX ADMIN — ONDANSETRON 4 MG: 2 INJECTION INTRAMUSCULAR; INTRAVENOUS at 03:04

## 2025-04-13 RX ADMIN — CARVEDILOL 12.5 MG: 12.5 TABLET, FILM COATED ORAL at 08:04

## 2025-04-13 RX ADMIN — MELATONIN TAB 3 MG 6 MG: 3 TAB at 10:04

## 2025-04-13 RX ADMIN — HEPARIN SODIUM 5000 UNITS: 5000 INJECTION INTRAVENOUS; SUBCUTANEOUS at 06:04

## 2025-04-13 RX ADMIN — PANTOPRAZOLE SODIUM 40 MG: 40 TABLET, DELAYED RELEASE ORAL at 08:04

## 2025-04-13 RX ADMIN — HEPARIN SODIUM 5000 UNITS: 5000 INJECTION INTRAVENOUS; SUBCUTANEOUS at 10:04

## 2025-04-13 RX ADMIN — AMLODIPINE BESYLATE 5 MG: 5 TABLET ORAL at 08:04

## 2025-04-13 RX ADMIN — DORZOLAMIDE HYDROCHLORIDE AND TIMOLOL MALEATE 1 DROP: 20; 5 SOLUTION OPHTHALMIC at 08:04

## 2025-04-13 RX ADMIN — PANCRELIPASE 1 CAPSULE: 120000; 24000; 76000 CAPSULE, DELAYED RELEASE PELLETS ORAL at 12:04

## 2025-04-13 RX ADMIN — HEPARIN SODIUM 5000 UNITS: 5000 INJECTION INTRAVENOUS; SUBCUTANEOUS at 02:04

## 2025-04-13 RX ADMIN — CARVEDILOL 12.5 MG: 12.5 TABLET, FILM COATED ORAL at 04:04

## 2025-04-13 RX ADMIN — PANCRELIPASE 1 CAPSULE: 120000; 24000; 76000 CAPSULE, DELAYED RELEASE PELLETS ORAL at 04:04

## 2025-04-13 RX ADMIN — DORZOLAMIDE HYDROCHLORIDE AND TIMOLOL MALEATE 1 DROP: 20; 5 SOLUTION OPHTHALMIC at 10:04

## 2025-04-13 NOTE — SUBJECTIVE & OBJECTIVE
Interval History:  patient doing well this morning.  She slept well and has been eating.  His shortness of breath and edema have improved.  Of note she was found to be slightly confused later on in the afternoon standing in her room.  Family reported she has had intermittent episodes like this.  No other complaints.    Review of Systems   All other systems reviewed and are negative.    Objective:     Vital Signs (Most Recent):  Temp: 98.3 °F (36.8 °C) (04/13/25 1222)  Pulse: 68 (04/13/25 1346)  Resp: 18 (04/13/25 1222)  BP: (!) 151/72 (04/13/25 1222)  SpO2: 98 % (04/13/25 1222) Vital Signs (24h Range):  Temp:  [98.2 °F (36.8 °C)-98.7 °F (37.1 °C)] 98.3 °F (36.8 °C)  Pulse:  [] 68  Resp:  [16-18] 18  SpO2:  [95 %-98 %] 98 %  BP: (111-181)/(62-91) 151/72     Weight: 54.9 kg (121 lb)  Body mass index is 22.86 kg/m².    Intake/Output Summary (Last 24 hours) at 4/13/2025 1505  Last data filed at 4/13/2025 0448  Gross per 24 hour   Intake --   Output 1800 ml   Net -1800 ml         Physical Exam  Constitutional:       General: She is not in acute distress.  Eyes:      Extraocular Movements: Extraocular movements intact.      Pupils: Pupils are equal, round, and reactive to light.   Cardiovascular:      Rate and Rhythm: Normal rate.      Heart sounds: No murmur heard.  Pulmonary:      Effort: Pulmonary effort is normal. No respiratory distress.      Breath sounds: No wheezing.   Abdominal:      General: There is no distension.      Tenderness: There is no abdominal tenderness.   Musculoskeletal:         General: No swelling or tenderness.   Skin:     General: Skin is warm and dry.   Neurological:      General: No focal deficit present.      Mental Status: She is alert and oriented to person, place, and time.      Cranial Nerves: No cranial nerve deficit.   Psychiatric:         Mood and Affect: Mood normal.         Behavior: Behavior normal.               Significant Labs: All pertinent labs within the past 24 hours  have been reviewed.  BMP:   Recent Labs   Lab 04/13/25  0620      K 3.4*      CO2 27   BUN 26*   CREATININE 1.4   CALCIUM 9.1   MG 1.6     CBC:   Recent Labs   Lab 04/11/25  1853 04/12/25  0643 04/13/25  0957   WBC 10.40 7.76 8.64   HGB 11.4* 9.9* 11.8*   HCT 34.7* 30.7* 37.0    213 238       Significant Imaging: I have reviewed all pertinent imaging results/findings within the past 24 hours.

## 2025-04-13 NOTE — PROGRESS NOTES
"[facility] Medicine  Progress Note    Patient Name: Glo Dumont  MRN: [id number]  Patient Class: IP- Inpatient   Admission Date: 4/11/2025  Length of Stay: 1 days  Attending Physician: Heidi Bailey MD  Primary Care Provider: No primary care provider on file.        Subjective     Principal Problem:Acute on chronic heart failure with preserved ejection fraction        HPI:  Ms. Glo Dumont is a 89 y.o. female who  has a medical history of Anemia, Angina pectoris, Anxiety, Anxiety and depression, Arthritis, Carotid artery occlusion, Carpal tunnel syndrome, Chronic diarrhea, CKD (chronic kidney disease) stage 3, GFR 30-59 ml/min, Colitis, Coronary artery disease, Coronary artery disease, Diastolic dysfunction, Diverticulosis, Encephalopathy, Glaucoma, Greater trochanteric bursitis, Grief at loss of child, H/O carotid endarterectomy, Heart failure, History of coronary angioplasty, Hypercholesteremia, Hypertension, Liver cyst, Low back pain, Macular degeneration, Obesity with serious comorbidity, Primary open-angle glaucoma(365.11), Renal cyst, S/P prosthetic total arthroplasty of the hip, Sarcoidosis, Sarcoidosis of lung, Sickle cell trait, and Uveitis.    She  presented to the ED for evaluation of worsening respiratory symptoms which included increasing dyspnea, and increased swelling in her extremities over the past few days.  She denies any dietary noncompliance and reports compliance with home medications.  Denies any fevers, chills, nausea, vomiting, diarrhea, constipation, abdominal pain, urinary symptoms.  Reports dry cough, dyspnea, lower extremity swelling.    ED course notable for hemodynamically stable vitals.  Labs notable for hemoglobin 9.4, bicarb 20, creatinine 1.5, , CXR noted "Markedly low lung volumes with vascular crowding or atelectasis in the lung bases. Small effusions. Cardiac silhouette size enlargement. Mild interstitial pulmonary edema or interstitial infectious " "process difficult to exclude in the right clinical setting. ".        Overview/Hospital Course:  89-year-old female admitted for acute on chronic CHF on 4/11.  Started on IV diuretics with gradual improvement in his shortness of breath and lower extremity edema.  Of note patient has had some intermittent confusion which is the norm per the family.    Interval History:  patient doing well this morning.  She slept well and has been eating.  His shortness of breath and edema have improved.  Of note she was found to be slightly confused later on in the afternoon standing in her room.  Family reported she has had intermittent episodes like this.  No other complaints.    Review of Systems   All other systems reviewed and are negative.    Objective:     Vital Signs (Most Recent):  Temp: 98.3 °F (36.8 °C) (04/13/25 1222)  Pulse: 68 (04/13/25 1346)  Resp: 18 (04/13/25 1222)  BP: (!) 151/72 (04/13/25 1222)  SpO2: 98 % (04/13/25 1222) Vital Signs (24h Range):  Temp:  [98.2 °F (36.8 °C)-98.7 °F (37.1 °C)] 98.3 °F (36.8 °C)  Pulse:  [] 68  Resp:  [16-18] 18  SpO2:  [95 %-98 %] 98 %  BP: (111-181)/(62-91) 151/72     Weight: 54.9 kg (121 lb)  Body mass index is 22.86 kg/m².    Intake/Output Summary (Last 24 hours) at 4/13/2025 1505  Last data filed at 4/13/2025 0448  Gross per 24 hour   Intake --   Output 1800 ml   Net -1800 ml         Physical Exam  Constitutional:       General: She is not in acute distress.  Eyes:      Extraocular Movements: Extraocular movements intact.      Pupils: Pupils are equal, round, and reactive to light.   Cardiovascular:      Rate and Rhythm: Normal rate.      Heart sounds: No murmur heard.  Pulmonary:      Effort: Pulmonary effort is normal. No respiratory distress.      Breath sounds: No wheezing.   Abdominal:      General: There is no distension.      Tenderness: There is no abdominal tenderness.   Musculoskeletal:         General: No swelling or tenderness.   Skin:     General: Skin is warm " and dry.   Neurological:      General: No focal deficit present.      Mental Status: She is alert and oriented to person, place, and time.      Cranial Nerves: No cranial nerve deficit.   Psychiatric:         Mood and Affect: Mood normal.         Behavior: Behavior normal.               Significant Labs: All pertinent labs within the past 24 hours have been reviewed.  BMP:   Recent Labs   Lab 04/13/25  0620      K 3.4*      CO2 27   BUN 26*   CREATININE 1.4   CALCIUM 9.1   MG 1.6     CBC:   Recent Labs   Lab 04/11/25  1853 04/12/25  0643 04/13/25  0957   WBC 10.40 7.76 8.64   HGB 11.4* 9.9* 11.8*   HCT 34.7* 30.7* 37.0    213 238       Significant Imaging: I have reviewed all pertinent imaging results/findings within the past 24 hours.      Assessment & Plan  Acute on chronic heart failure with preserved ejection fraction  Diastolic heart failure, NYHA class 3  Patient is identified as having Diastolic (HFpEF) heart failure that is Acute on Chronic. CHF is currently uncontrolled due to Dyspnea not returned to baseline after IV diuretic and Pulmonary edema/pleural effusion on CXR.     - Reviewed most recent ECHO performed and demonstrates- Results for orders placed during the hospital encounter of 02/23/25    Echo Saline Bubble? No; Ultrasound enhancing contrast? No    Interpretation Summary    Left Ventricle: The left ventricle is normal in size. Normal wall thickness. There is concentric remodeling. There is normal systolic function. Ejection fraction is approximately 60%. Grade II diastolic dysfunction.    Right Ventricle: Normal right ventricular cavity size. Wall thickness is normal. Systolic function is normal.    Left Atrium: Left atrium is severely dilated.    Aortic Valve: There is mild aortic valve sclerosis. There is mild stenosis. Aortic valve area by VTI is 1.6 cm². Aortic valve peak velocity is 2.0 m/s. Mean gradient is 8 mmHg. The dimensionless index is 0.58.    Tricuspid Valve:  There is moderate to severe regurgitation with a centrally directed jet.    Pulmonary Artery: The estimated pulmonary artery systolic pressure is 55 mmHg.    IVC/SVC: Normal venous pressure at 3 mmHg.      Recent Labs reviewed-  Recent Labs   Lab 04/11/25  1508 04/11/25  1853   TROPONINI 0.022  --    BNP  --  832*     Recent Labs     04/11/25  1508 04/12/25  0643 04/13/25  0620   BUN 29* 28* 26*   CREATININE 1.5* 1.3 1.4   CO2 20* 26 27         Intake/Output Summary (Last 24 hours) at 4/13/2025 1510  Last data filed at 4/13/2025 0448  Gross per 24 hour   Intake --   Output 1800 ml   Net -1800 ml     Net IO Since Admission: -3,525 mL [04/13/25 1510]    Home GDMT  per Chart Review:   GMDT Medications per chart review             amLODIPine (NORVASC) 5 MG tablet Take 1 tablet (5 mg total) by mouth once daily.    bumetanide (BUMEX) 1 MG tablet Take 1 tablet (1 mg total) by mouth once daily.    carvediloL (COREG) 12.5 MG tablet Take 1 tablet (12.5 mg total) by mouth 2 (two) times daily with meals.    nitroGLYCERIN (NITROSTAT) 0.4 MG SL tablet PLACE ONE TABLET UNDERNEATH THE TONGUE EVERY 5 MINUTES AS NEEDED FOR CHEST PAIN          Plan:  -continue diuresis, she has improved today. Of note had vomiting of what appeared to be fecal content. Will check XR abdomen. Spoke with daughter who is sole caretaker at bedside. Will check CT abd/pelvis if signs of obstruction. No other symptoms at this time.    - Current GDMT meds: hydrALAZINE injection 5 mg, Every 6 hours PRN, Intravenous  bumetanide injection 1 mg, 2 times daily, Intravenous  carvediloL tablet 12.5 mg, 2 times daily with meals, Oral  -Titrate(increase/decrease) diuretic dosing to target euvolemia. Current Diuretic regimen:  Diuretics (From admission, onward)      Start     Stop Route Frequency Ordered    04/12/25 0900  bumetanide injection 1 mg         -- IV 2 times daily 04/11/25 2230        - Monitor on telemetry.   - Cardiac diet with Fluid restriction at 1.5L  with strict I/Os and daily standing weights  -Continue to stress to patient importance of self efficacy and  on diet for CHF.  - Replete electrolytes PRN to target Mag >2 & K >4  Hypertensive heart disease with heart failure  Hypertensive on admission    Home meds for hypertension were reviewed and noted below.     Home Hypertension Medications per chart review             amLODIPine (NORVASC) 5 MG tablet Take 1 tablet (5 mg total) by mouth once daily.    bumetanide (BUMEX) 1 MG tablet Take 1 tablet (1 mg total) by mouth once daily.    carvediloL (COREG) 12.5 MG tablet Take 1 tablet (12.5 mg total) by mouth 2 (two) times daily with meals.    nitroGLYCERIN (NITROSTAT) 0.4 MG SL tablet PLACE ONE TABLET UNDERNEATH THE TONGUE EVERY 5 MINUTES AS NEEDED FOR CHEST PAIN            Patients blood pressure range in the last 24 hours was: BP  Min: 111/66  Max: 181/91.      PLAN:  -While in the hospital, will manage blood pressure as follows; Continue home antihypertensive regimen  -The patient's inpatient anti-hypertensive regimen is listed below:  Current Antihypertensives  hydrALAZINE injection 5 mg, Every 6 hours PRN, Intravenous  amLODIPine tablet 5 mg, Daily, Oral  bumetanide injection 1 mg, 2 times daily, Intravenous  carvediloL tablet 12.5 mg, 2 times daily with meals, Oral  dorzolamide-timolol 2-0.5% ophthalmic solution 1 drop, 2 times daily, Both Eyes  -Will utilize p.r.n. blood pressure medication only if patient's blood pressure greater than 180/110 and she develops symptoms such as worsening chest pain or shortness of breath.      Other hyperlipidemia  Recent Lipid Panel reviewed-  Lab Results   Component Value Date    HDL 45 10/14/2024    LDLCALC 78.4 10/14/2024    TRIG 148 10/14/2024    CHOL 153 10/14/2024        -Home medications:   Hyperlipidemia Medications              atorvastatin (LIPITOR) 40 MG tablet Take 1 tablet (40 mg total) by mouth Daily.            PLAN  -continue home statin      CKD  (chronic kidney disease) stage 4, GFR 15-29 ml/min  Creatine stable for now. BMP reviewed- noted Estimated Creatinine Clearance: 21.4 mL/min (based on SCr of 1.4 mg/dL). according to latest data. Based on current GFR, CKD stage is stage 3 - GFR 30-59.  Monitor UOP and serial BMP and adjust therapy as needed. Renally dose meds. Avoid nephrotoxic medications and procedures.  Gastroesophageal reflux disease with esophagitis  Chronic.  Stable    PLAN:  Continue pantoprazole    VTE Risk Mitigation (From admission, onward)           Ordered     heparin (porcine) injection 5,000 Units  Every 8 hours         04/11/25 2230     IP VTE HIGH RISK PATIENT  Once         04/11/25 2230     Place sequential compression device  Until discontinued         04/11/25 2230                    Discharge Planning   CANDACE:      Code Status: Full Code   Medical Readiness for Discharge Date:   Discharge Plan A: Home with family                        Heidi Bailey MD  Department of Hospital Medicine   [facility]

## 2025-04-13 NOTE — PLAN OF CARE
Discussed poc with pt, pt verbalized understanding    Purposeful rounding every 2hours    VS wnl  Cardiac monitoring in use, pt is NSR, tele monitor # [id number]  Fall precautions in place, remains injury free  Pt denies c/o pain  Pt's yun leaking at beginning of shift, catheter reinforced (water in balloon replaced)    IVFs--none  Accurate I&Os  Abx given as prescribed  Bed locked at lowest position  Call light within reach    Chart check complete  Will cont with POC

## 2025-04-13 NOTE — PLAN OF CARE
[facility]  Initial Discharge Assessment       Primary Care Provider: No primary care provider on file.    Admission Diagnosis: CHF (congestive heart failure) [I50.9]  Leg swelling [M79.89]  Acute exacerbation of CHF (congestive heart failure) [I50.9]  Chest pain [R07.9]    Admission Date: 4/11/2025  Expected Discharge Date: PER ATTENDING         Payor: [employer] MEDICARE / Plan: [employer] HMO PPO SPECIAL NEEDS / Product Type: Medicare Advantage /     Extended Emergency Contact Information  Primary Emergency Contact: Jessica Dumont  Address: [address] [place] [address] [place]  Mobile Phone: [phone]  Relation: Daughter   needed? No    Discharge Plan A: Home with family         Prescriptions to [facility] Leonardo Sandoval [address] [address]  Phone: [phone] Fax: [phone]      Initial Assessment (most recent)       Adult Discharge Assessment - 04/13/25 1128          Discharge Assessment    Assessment Type Discharge Planning Assessment     Confirmed/corrected address, phone number and insurance Yes     Confirmed Demographics Correct on Facesheet     Source of Information health record     Communicated CANDACE with patient/caregiver Date not available/Unable to determine     Reason For Admission CHF     People in Home child(ally), adult     Do you expect to return to your current living situation? Yes     Do you have help at home or someone to help you manage your care at home? Yes     Who are your caregiver(s) and their phone number(s)? DAUGHTER     Prior to hospitilization cognitive status: Unable to Assess     Current cognitive status: Unable to Assess     Walking or Climbing Stairs Difficulty no     Dressing/Bathing Difficulty no     Equipment Currently Used at Home cane, straight;walker, rolling     Readmission within 30 days? No     Patient currently being followed by outpatient case  management? No     Do you currently have service(s) that help you manage your care at home? No     Do you take prescription medications? Yes     Do you have prescription coverage? Yes     Coverage [employer]     Do you have any problems affording any of your prescribed medications? No     Is the patient taking medications as prescribed? yes     Who is going to help you get home at discharge? DAUGHTER     How do you get to doctors appointments? family or friend will provide     Are you on dialysis? No     Do you take coumadin? No     Discharge Plan A Home with family                        SW completed an assessment. SW confirmed all needed information. Pt has no needs at this time. SW to f/u if anything is recommended for pt. Pt lives with daughter.

## 2025-04-13 NOTE — ASSESSMENT & PLAN NOTE
Patient is identified as having Diastolic (HFpEF) heart failure that is Acute on Chronic. CHF is currently uncontrolled due to Dyspnea not returned to baseline after IV diuretic and Pulmonary edema/pleural effusion on CXR.     - Reviewed most recent ECHO performed and demonstrates- Results for orders placed during the hospital encounter of 02/23/25    Echo Saline Bubble? No; Ultrasound enhancing contrast? No    Interpretation Summary    Left Ventricle: The left ventricle is normal in size. Normal wall thickness. There is concentric remodeling. There is normal systolic function. Ejection fraction is approximately 60%. Grade II diastolic dysfunction.    Right Ventricle: Normal right ventricular cavity size. Wall thickness is normal. Systolic function is normal.    Left Atrium: Left atrium is severely dilated.    Aortic Valve: There is mild aortic valve sclerosis. There is mild stenosis. Aortic valve area by VTI is 1.6 cm². Aortic valve peak velocity is 2.0 m/s. Mean gradient is 8 mmHg. The dimensionless index is 0.58.    Tricuspid Valve: There is moderate to severe regurgitation with a centrally directed jet.    Pulmonary Artery: The estimated pulmonary artery systolic pressure is 55 mmHg.    IVC/SVC: Normal venous pressure at 3 mmHg.      Recent Labs reviewed-  Recent Labs   Lab 04/11/25  1508 04/11/25  1853   TROPONINI 0.022  --    BNP  --  832*     Recent Labs     04/11/25  1508 04/12/25  0643 04/13/25  0620   BUN 29* 28* 26*   CREATININE 1.5* 1.3 1.4   CO2 20* 26 27         Intake/Output Summary (Last 24 hours) at 4/13/2025 1510  Last data filed at 4/13/2025 0448  Gross per 24 hour   Intake --   Output 1800 ml   Net -1800 ml     Net IO Since Admission: -3,525 mL [04/13/25 1510]    Home GDMT  per Chart Review:   GMDT Medications per chart review             amLODIPine (NORVASC) 5 MG tablet Take 1 tablet (5 mg total) by mouth once daily.    bumetanide (BUMEX) 1 MG tablet Take 1 tablet (1 mg total) by mouth once  daily.    carvediloL (COREG) 12.5 MG tablet Take 1 tablet (12.5 mg total) by mouth 2 (two) times daily with meals.    nitroGLYCERIN (NITROSTAT) 0.4 MG SL tablet PLACE ONE TABLET UNDERNEATH THE TONGUE EVERY 5 MINUTES AS NEEDED FOR CHEST PAIN          Plan:  -continue diuresis, she has improved today. Of note had vomiting of what appeared to be fecal content. Will check XR abdomen. Spoke with daughter who is sole caretaker at bedside. Will check CT abd/pelvis if signs of obstruction. No other symptoms at this time.    - Current GDMT meds: hydrALAZINE injection 5 mg, Every 6 hours PRN, Intravenous  bumetanide injection 1 mg, 2 times daily, Intravenous  carvediloL tablet 12.5 mg, 2 times daily with meals, Oral  -Titrate(increase/decrease) diuretic dosing to target euvolemia. Current Diuretic regimen:  Diuretics (From admission, onward)      Start     Stop Route Frequency Ordered    04/12/25 0900  bumetanide injection 1 mg         -- IV 2 times daily 04/11/25 2230        - Monitor on telemetry.   - Cardiac diet with Fluid restriction at 1.5L with strict I/Os and daily standing weights  -Continue to stress to patient importance of self efficacy and  on diet for CHF.  - Replete electrolytes PRN to target Mag >2 & K >4

## 2025-04-13 NOTE — ASSESSMENT & PLAN NOTE
Creatine stable for now. BMP reviewed- noted Estimated Creatinine Clearance: 21.4 mL/min (based on SCr of 1.4 mg/dL). according to latest data. Based on current GFR, CKD stage is stage 3 - GFR 30-59.  Monitor UOP and serial BMP and adjust therapy as needed. Renally dose meds. Avoid nephrotoxic medications and procedures.

## 2025-04-13 NOTE — HOSPITAL COURSE
89-year-old female admitted for acute on chronic CHF on 4/11.  Cardiology consulted, started on IV diuretics with gradual improvement in his shortness of breath and lower extremity edema.  Of note patient has had some intermittent confusion which is the norm per the family.  04/15/2025   Examination done at bedside, appeared alert and oriented to self, place, date of birth   At baseline mentation per daughter   Patient denied acute events overnight, denied fever, chills, chest pain, shortness O breath nausea, vomiting, bowel or bladder issues   Saturating above 92 on room air   No lower extremity swelling noted   Received IV diuretics during hospital stay   Given patient's age, underlying comorbidities, acute on chronic issues, recurrent hospitalizations, discussed regarding code status, goals of care with patient/daughter over phone,-opted for DNR DNI.    Per daughter, patient has been under hospice in the past, however does not want to proceed with hospice at this point, declined hospice information visit.  Agreeable for home-based palliative, ordered accordingly   Planning to discharge patient today, emphasized on compliance with medications, outpatient follow up visits, fluid, salt restriction, patient/daughter expressed understanding, agreed with the plan   Ordered home-based palliative, NP at home program, recommended patient/daughter to continue goals of care discussion

## 2025-04-13 NOTE — ASSESSMENT & PLAN NOTE
Hypertensive on admission    Home meds for hypertension were reviewed and noted below.     Home Hypertension Medications per chart review             amLODIPine (NORVASC) 5 MG tablet Take 1 tablet (5 mg total) by mouth once daily.    bumetanide (BUMEX) 1 MG tablet Take 1 tablet (1 mg total) by mouth once daily.    carvediloL (COREG) 12.5 MG tablet Take 1 tablet (12.5 mg total) by mouth 2 (two) times daily with meals.    nitroGLYCERIN (NITROSTAT) 0.4 MG SL tablet PLACE ONE TABLET UNDERNEATH THE TONGUE EVERY 5 MINUTES AS NEEDED FOR CHEST PAIN            Patients blood pressure range in the last 24 hours was: BP  Min: 111/66  Max: 181/91.      PLAN:  -While in the hospital, will manage blood pressure as follows; Continue home antihypertensive regimen  -The patient's inpatient anti-hypertensive regimen is listed below:  Current Antihypertensives  hydrALAZINE injection 5 mg, Every 6 hours PRN, Intravenous  amLODIPine tablet 5 mg, Daily, Oral  bumetanide injection 1 mg, 2 times daily, Intravenous  carvediloL tablet 12.5 mg, 2 times daily with meals, Oral  dorzolamide-timolol 2-0.5% ophthalmic solution 1 drop, 2 times daily, Both Eyes  -Will utilize p.r.n. blood pressure medication only if patient's blood pressure greater than 180/110 and she develops symptoms such as worsening chest pain or shortness of breath.

## 2025-04-14 LAB
ABSOLUTE EOSINOPHIL (OHS): 0.04 K/UL
ABSOLUTE MONOCYTE (OHS): 0.98 K/UL (ref 0.3–1)
ABSOLUTE NEUTROPHIL COUNT (OHS): 8.33 K/UL (ref 1.8–7.7)
ALBUMIN SERPL BCP-MCNC: 3.3 G/DL (ref 3.5–5.2)
ALP SERPL-CCNC: 69 UNIT/L (ref 40–150)
ALT SERPL W/O P-5'-P-CCNC: 16 UNIT/L (ref 10–44)
ANION GAP (OHS): 14 MMOL/L (ref 8–16)
AST SERPL-CCNC: 19 UNIT/L (ref 11–45)
BASOPHILS # BLD AUTO: 0.05 K/UL
BASOPHILS NFR BLD AUTO: 0.5 %
BILIRUB SERPL-MCNC: 1 MG/DL (ref 0.1–1)
BUN SERPL-MCNC: 24 MG/DL (ref 8–23)
CALCIUM SERPL-MCNC: 8.9 MG/DL (ref 8.7–10.5)
CHLORIDE SERPL-SCNC: 105 MMOL/L (ref 95–110)
CO2 SERPL-SCNC: 21 MMOL/L (ref 23–29)
CREAT SERPL-MCNC: 1.5 MG/DL (ref 0.5–1.4)
ERYTHROCYTE [DISTWIDTH] IN BLOOD BY AUTOMATED COUNT: 15.9 % (ref 11.5–14.5)
GFR SERPLBLD CREATININE-BSD FMLA CKD-EPI: 34 ML/MIN/1.73/M2
GLUCOSE SERPL-MCNC: 92 MG/DL (ref 70–110)
HCT VFR BLD AUTO: 34.5 % (ref 37–48.5)
HGB BLD-MCNC: 11.1 GM/DL (ref 12–16)
IMM GRANULOCYTES # BLD AUTO: 0.1 K/UL (ref 0–0.04)
IMM GRANULOCYTES NFR BLD AUTO: 1 % (ref 0–0.5)
LYMPHOCYTES # BLD AUTO: 0.7 K/UL (ref 1–4.8)
MAGNESIUM SERPL-MCNC: 1.6 MG/DL (ref 1.6–2.6)
MCH RBC QN AUTO: 28.8 PG (ref 27–31)
MCHC RBC AUTO-ENTMCNC: 32.2 G/DL (ref 32–36)
MCV RBC AUTO: 89 FL (ref 82–98)
NUCLEATED RBC (/100WBC) (OHS): 0 /100 WBC
PHOSPHATE SERPL-MCNC: 3.6 MG/DL (ref 2.7–4.5)
PLATELET # BLD AUTO: 213 K/UL (ref 150–450)
PMV BLD AUTO: 10.1 FL (ref 9.2–12.9)
POTASSIUM SERPL-SCNC: 3.7 MMOL/L (ref 3.5–5.1)
PROT SERPL-MCNC: 6.8 GM/DL (ref 6–8.4)
RBC # BLD AUTO: 3.86 M/UL (ref 4–5.4)
RELATIVE EOSINOPHIL (OHS): 0.4 %
RELATIVE LYMPHOCYTE (OHS): 6.9 % (ref 18–48)
RELATIVE MONOCYTE (OHS): 9.6 % (ref 4–15)
RELATIVE NEUTROPHIL (OHS): 81.6 % (ref 38–73)
SODIUM SERPL-SCNC: 140 MMOL/L (ref 136–145)
WBC # BLD AUTO: 10.2 K/UL (ref 3.9–12.7)

## 2025-04-14 PROCEDURE — 85025 COMPLETE CBC W/AUTO DIFF WBC: CPT | Mod: HCNC | Performed by: STUDENT IN AN ORGANIZED HEALTH CARE EDUCATION/TRAINING PROGRAM

## 2025-04-14 PROCEDURE — 83735 ASSAY OF MAGNESIUM: CPT | Mod: HCNC | Performed by: STUDENT IN AN ORGANIZED HEALTH CARE EDUCATION/TRAINING PROGRAM

## 2025-04-14 PROCEDURE — 25000003 PHARM REV CODE 250: Mod: HCNC | Performed by: STUDENT IN AN ORGANIZED HEALTH CARE EDUCATION/TRAINING PROGRAM

## 2025-04-14 PROCEDURE — 21400001 HC TELEMETRY ROOM: Mod: HCNC

## 2025-04-14 PROCEDURE — 63600175 PHARM REV CODE 636 W HCPCS: Mod: JZ,TB,HCNC | Performed by: STUDENT IN AN ORGANIZED HEALTH CARE EDUCATION/TRAINING PROGRAM

## 2025-04-14 PROCEDURE — 84100 ASSAY OF PHOSPHORUS: CPT | Mod: HCNC | Performed by: STUDENT IN AN ORGANIZED HEALTH CARE EDUCATION/TRAINING PROGRAM

## 2025-04-14 PROCEDURE — 80053 COMPREHEN METABOLIC PANEL: CPT | Mod: HCNC | Performed by: STUDENT IN AN ORGANIZED HEALTH CARE EDUCATION/TRAINING PROGRAM

## 2025-04-14 PROCEDURE — 36415 COLL VENOUS BLD VENIPUNCTURE: CPT | Mod: HCNC | Performed by: STUDENT IN AN ORGANIZED HEALTH CARE EDUCATION/TRAINING PROGRAM

## 2025-04-14 RX ADMIN — SODIUM BICARBONATE 650 MG TABLET 650 MG: at 09:04

## 2025-04-14 RX ADMIN — DORZOLAMIDE HYDROCHLORIDE AND TIMOLOL MALEATE 1 DROP: 20; 5 SOLUTION OPHTHALMIC at 09:04

## 2025-04-14 RX ADMIN — CITALOPRAM HYDROBROMIDE 10 MG: 10 TABLET ORAL at 09:04

## 2025-04-14 RX ADMIN — HEPARIN SODIUM 5000 UNITS: 5000 INJECTION INTRAVENOUS; SUBCUTANEOUS at 05:04

## 2025-04-14 RX ADMIN — BUMETANIDE 1 MG: 0.25 INJECTION INTRAMUSCULAR; INTRAVENOUS at 09:04

## 2025-04-14 RX ADMIN — PANTOPRAZOLE SODIUM 40 MG: 40 TABLET, DELAYED RELEASE ORAL at 09:04

## 2025-04-14 RX ADMIN — PANCRELIPASE 1 CAPSULE: 120000; 24000; 76000 CAPSULE, DELAYED RELEASE PELLETS ORAL at 09:04

## 2025-04-14 RX ADMIN — AMLODIPINE BESYLATE 5 MG: 5 TABLET ORAL at 09:04

## 2025-04-14 RX ADMIN — ATORVASTATIN CALCIUM 40 MG: 40 TABLET, FILM COATED ORAL at 04:04

## 2025-04-14 RX ADMIN — PANCRELIPASE 1 CAPSULE: 120000; 24000; 76000 CAPSULE, DELAYED RELEASE PELLETS ORAL at 11:04

## 2025-04-14 RX ADMIN — ASPIRIN 81 MG: 81 TABLET, COATED ORAL at 09:04

## 2025-04-14 RX ADMIN — QUETIAPINE FUMARATE 25 MG: 25 TABLET ORAL at 09:04

## 2025-04-14 RX ADMIN — CARVEDILOL 12.5 MG: 12.5 TABLET, FILM COATED ORAL at 09:04

## 2025-04-14 RX ADMIN — HEPARIN SODIUM 5000 UNITS: 5000 INJECTION INTRAVENOUS; SUBCUTANEOUS at 02:04

## 2025-04-14 RX ADMIN — CARVEDILOL 12.5 MG: 12.5 TABLET, FILM COATED ORAL at 04:04

## 2025-04-14 RX ADMIN — HEPARIN SODIUM 5000 UNITS: 5000 INJECTION INTRAVENOUS; SUBCUTANEOUS at 10:04

## 2025-04-14 RX ADMIN — PANCRELIPASE 1 CAPSULE: 120000; 24000; 76000 CAPSULE, DELAYED RELEASE PELLETS ORAL at 04:04

## 2025-04-14 NOTE — PLAN OF CARE
04/14/25 1330   Rounds   Attendance Provider;;Charge nurse;Physical therapist   Discharge Plan A Home with family   Why the patient remains in the hospital Requires continued medical care   Transition of Care Barriers None     Patient has no d/c needs at this time. Sw to follow up, as needed, for d/c planning purposes.

## 2025-04-14 NOTE — PROGRESS NOTES
"[facility] Medicine  Progress Note    Patient Name: Glo Dumont  MRN: [id number]  Patient Class: IP- Inpatient   Admission Date: 4/11/2025  Length of Stay: 2 days  Attending Physician: Kiesha Holcomb,*  Primary Care Provider: No primary care provider on file.        Subjective     Principal Problem:Acute on chronic heart failure with preserved ejection fraction        HPI:  Ms. Glo Dumont is a 89 y.o. female who  has a medical history of Anemia, Angina pectoris, Anxiety, Anxiety and depression, Arthritis, Carotid artery occlusion, Carpal tunnel syndrome, Chronic diarrhea, CKD (chronic kidney disease) stage 3, GFR 30-59 ml/min, Colitis, Coronary artery disease, Coronary artery disease, Diastolic dysfunction, Diverticulosis, Encephalopathy, Glaucoma, Greater trochanteric bursitis, Grief at loss of child, H/O carotid endarterectomy, Heart failure, History of coronary angioplasty, Hypercholesteremia, Hypertension, Liver cyst, Low back pain, Macular degeneration, Obesity with serious comorbidity, Primary open-angle glaucoma(365.11), Renal cyst, S/P prosthetic total arthroplasty of the hip, Sarcoidosis, Sarcoidosis of lung, Sickle cell trait, and Uveitis.    She  presented to the ED for evaluation of worsening respiratory symptoms which included increasing dyspnea, and increased swelling in her extremities over the past few days.  She denies any dietary noncompliance and reports compliance with home medications.  Denies any fevers, chills, nausea, vomiting, diarrhea, constipation, abdominal pain, urinary symptoms.  Reports dry cough, dyspnea, lower extremity swelling.    ED course notable for hemodynamically stable vitals.  Labs notable for hemoglobin 9.4, bicarb 20, creatinine 1.5, , CXR noted "Markedly low lung volumes with vascular crowding or atelectasis in the lung bases. Small effusions. Cardiac silhouette size enlargement. Mild interstitial pulmonary edema or interstitial " "infectious process difficult to exclude in the right clinical setting. ".        Overview/Hospital Course:  89-year-old female admitted for acute on chronic CHF on 4/11.  Cardiology consulted, started on IV diuretics with gradual improvement in his shortness of breath and lower extremity edema.  Of note patient has had some intermittent confusion which is the norm per the family.    Interval History:     No acute events overnight, resting comfortably   Stated improvement in shortness O breath, lower extremity swelling, slight increase in creatinine, hold Bumex for tonight, will monitor kidney function in a.m.   Discussed code status with patient/daughter over phone, opted for DNR DNI,.  Also per daughter, patient has been under hospice in the past, however does not want to go under hospice again, inclining for home health, home-based palliative,  follow up in a.m.   Anticipate discharge in next 24 to 48 hours    Review of Systems  Objective:     Vital Signs (Most Recent):  Temp: 99 °F (37.2 °C) (04/14/25 1545)  Pulse: 67 (04/14/25 1602)  Resp: 20 (04/14/25 1545)  BP: (!) 118/59 (04/14/25 1545)  SpO2: 97 % (04/14/25 1545) Vital Signs (24h Range):  Temp:  [98 °F (36.7 °C)-99 °F (37.2 °C)] 99 °F (37.2 °C)  Pulse:  [56-80] 67  Resp:  [16-24] 20  SpO2:  [92 %-98 %] 97 %  BP: (118-136)/(58-77) 118/59     Weight: 54.9 kg (121 lb)  Body mass index is 22.87 kg/m².  No intake or output data in the 24 hours ending 04/14/25 1610      Physical Exam      Constitutional:       General: She is not in acute distress.  Eyes:      Extraocular Movements: Extraocular movements intact.      Pupils: Pupils are equal, round, and reactive to light.   Cardiovascular:      Rate and Rhythm: Normal rate.      Heart sounds: No murmur heard.  Pulmonary:      Effort: Pulmonary effort is normal. No respiratory distress.      Breath sounds: No wheezing.   Abdominal:      General: There is no distension.      Tenderness: There is no " abdominal tenderness.   Musculoskeletal:         General: No swelling or tenderness.   Skin:     General: Skin is warm and dry.   Neurological:      General: No focal deficit present.      Mental Status: She is alert and oriented to person, place, and time.      Cranial Nerves: No cranial nerve deficit.   Psychiatric:         Mood and Affect: Mood normal.         Behavior: Behavior normal.           Significant Labs: All pertinent labs within the past 24 hours have been reviewed.  CBC:   Recent Labs   Lab 04/13/25  0957 04/14/25  0908   WBC 8.64 10.20   HGB 11.8* 11.1*   HCT 37.0 34.5*    213     CMP:   Recent Labs   Lab 04/13/25  0620 04/14/25  0656    140   K 3.4* 3.7    105   CO2 27 21*   BUN 26* 24*   CREATININE 1.4 1.5*   CALCIUM 9.1 8.9   ALBUMIN 3.4* 3.3*   BILITOT 0.8 1.0   ALKPHOS 71 69   AST 17 19   ALT 14 16   ANIONGAP 11 14       Significant Imaging:     Imaging Results              US Lower Extremity Veins Left (Final result)  Result time 04/11/25 20:03:40      Final result by Nathaly Gonzales MD (04/11/25 20:03:40)                   Impression:      Negative for DVT    Finalized on: 4/11/2025 8:03 PM By:  Nathaly Gonzales MD  [facility]# [id number]      2025-04-11 20:05:43.390     [facility]               Narrative:    EXAM: US LOWER EXTREMITY VEINS LEFT    CLINICAL HISTORY:   Swelling    PRIOR:   NONE    Technique:  Grayscale and Doppler imaging provided    FINDINGS:  There is good color flow and compressibility deep veins throughout left lower extremity imaging groin to mid calf                                         US Upper Extremity Veins Left (Final result)  Result time 04/11/25 20:03:01      Final result by Nathaly Gonzales MD (04/11/25 20:03:01)                   Impression:     Negative for DVT    Finalized on: 4/11/2025 8:03 PM By:  Nathaly Gonzales MD  [facility]# [id number]      2025-04-11 20:05:03.358     [facility]               Narrative:    EXAM: US UPPER EXTREMITY VEINS LEFT    CLINICAL  HISTORY: Swelling    FINDINGS:  There is good compressibility and color flow of the deep veins throughout the left upper extremity and left central veins                                         X-Ray Chest AP Portable (Final result)  Result time 04/11/25 15:53:06      Final result by Renato Guerrier MD (04/11/25 15:53:06)                   Impression:      1.  Markedly low lung volumes with vascular crowding or atelectasis in the lung bases.  Small effusions.  Cardiac silhouette size enlargement.  Mild interstitial pulmonary edema or interstitial infectious process difficult to exclude in the right clinical setting.    2.  Stable findings as noted above.      Electronically signed by: Renato Guerrier MD  Date:    04/11/2025  Time:    15:53               Narrative:    EXAMINATION:  XR CHEST AP PORTABLE    CLINICAL HISTORY:  Chest Pain;    COMPARISON:  March 18, 2025    FINDINGS:  Clothing artifact.  Markedly low lung volumes on the current study.  Small effusions.  The lungs are otherwise clear.  The cardiac silhouette size is enlarged.  The trachea is midline and the mediastinal width is normal. Negative for pneumothorax.  Pulmonary vasculature is normal. Negative for osseous abnormalities. Tortuous aorta with calcifications of the aortic knob.  There are degenerative changes of the spine and both shoulder girdles.  Median sternotomy wires and CABG changes.  Dual lead left subclavian pacemaker.                                         Assessment & Plan  Acute on chronic heart failure with preserved ejection fraction  Diastolic heart failure, NYHA class 3  Patient is identified as having Diastolic (HFpEF) heart failure that is Acute on Chronic. CHF is currently uncontrolled due to Dyspnea not returned to baseline after IV diuretic and Pulmonary edema/pleural effusion on CXR.     - Reviewed most recent ECHO performed and demonstrates- Results for orders placed during the hospital encounter of 02/23/25    Echo Saline  Bubble? No; Ultrasound enhancing contrast? No    Interpretation Summary    Left Ventricle: The left ventricle is normal in size. Normal wall thickness. There is concentric remodeling. There is normal systolic function. Ejection fraction is approximately 60%. Grade II diastolic dysfunction.    Right Ventricle: Normal right ventricular cavity size. Wall thickness is normal. Systolic function is normal.    Left Atrium: Left atrium is severely dilated.    Aortic Valve: There is mild aortic valve sclerosis. There is mild stenosis. Aortic valve area by VTI is 1.6 cm². Aortic valve peak velocity is 2.0 m/s. Mean gradient is 8 mmHg. The dimensionless index is 0.58.    Tricuspid Valve: There is moderate to severe regurgitation with a centrally directed jet.    Pulmonary Artery: The estimated pulmonary artery systolic pressure is 55 mmHg.    IVC/SVC: Normal venous pressure at 3 mmHg.      Recent Labs reviewed-  Recent Labs   Lab 04/11/25  1508 04/11/25  1853   TROPONINI 0.022  --    BNP  --  832*     Recent Labs     04/12/25  0643 04/13/25  0620 04/14/25  0656   BUN 28* 26* 24*   CREATININE 1.3 1.4 1.5*   CO2 26 27 21*       No intake or output data in the 24 hours ending 04/14/25 1615    Net IO Since Admission: -3,525 mL [04/14/25 1615]    Home GDMT  per Chart Review:   GMDT Medications per chart review             amLODIPine (NORVASC) 5 MG tablet Take 1 tablet (5 mg total) by mouth once daily.    bumetanide (BUMEX) 1 MG tablet Take 1 tablet (1 mg total) by mouth once daily.    carvediloL (COREG) 12.5 MG tablet Take 1 tablet (12.5 mg total) by mouth 2 (two) times daily with meals.    nitroGLYCERIN (NITROSTAT) 0.4 MG SL tablet PLACE ONE TABLET UNDERNEATH THE TONGUE EVERY 5 MINUTES AS NEEDED FOR CHEST PAIN          Plan:  -continue diuresis, she has improved today. Of note had vomiting of what appeared to be fecal content. Will check XR abdomen. Spoke with daughter who is sole caretaker at bedside. Will check CT abd/pelvis if  signs of obstruction. No other symptoms at this time.    - Current GDMT meds: hydrALAZINE injection 5 mg, Every 6 hours PRN, Intravenous  carvediloL tablet 12.5 mg, 2 times daily with meals, Oral  -Titrate(increase/decrease) diuretic dosing to target euvolemia. Current Diuretic regimen:  Diuretics (From admission, onward)      None        - Monitor on telemetry.   - Cardiac diet with Fluid restriction at 1.5L with strict I/Os and daily standing weights  -Continue to stress to patient importance of self efficacy and  on diet for CHF.  - Replete electrolytes PRN to target Mag >2 & K >4  Hypertensive heart disease with heart failure  Hypertensive on admission    Home meds for hypertension were reviewed and noted below.     Home Hypertension Medications per chart review             amLODIPine (NORVASC) 5 MG tablet Take 1 tablet (5 mg total) by mouth once daily.    bumetanide (BUMEX) 1 MG tablet Take 1 tablet (1 mg total) by mouth once daily.    carvediloL (COREG) 12.5 MG tablet Take 1 tablet (12.5 mg total) by mouth 2 (two) times daily with meals.    nitroGLYCERIN (NITROSTAT) 0.4 MG SL tablet PLACE ONE TABLET UNDERNEATH THE TONGUE EVERY 5 MINUTES AS NEEDED FOR CHEST PAIN            Patients blood pressure range in the last 24 hours was: BP  Min: 111/66  Max: 181/91.      PLAN:  -While in the hospital, will manage blood pressure as follows; Continue home antihypertensive regimen  -The patient's inpatient anti-hypertensive regimen is listed below:  Current Antihypertensives  hydrALAZINE injection 5 mg, Every 6 hours PRN, Intravenous  amLODIPine tablet 5 mg, Daily, Oral  carvediloL tablet 12.5 mg, 2 times daily with meals, Oral  dorzolamide-timolol 2-0.5% ophthalmic solution 1 drop, 2 times daily, Both Eyes  -Will utilize p.r.n. blood pressure medication only if patient's blood pressure greater than 180/110 and she develops symptoms such as worsening chest pain or shortness of breath.      Other  hyperlipidemia  Recent Lipid Panel reviewed-  Lab Results   Component Value Date    HDL 45 10/14/2024    LDLCALC 78.4 10/14/2024    TRIG 148 10/14/2024    CHOL 153 10/14/2024        -Home medications:   Hyperlipidemia Medications              atorvastatin (LIPITOR) 40 MG tablet Take 1 tablet (40 mg total) by mouth Daily.            PLAN  -continue home statin      CKD (chronic kidney disease) stage 4, GFR 15-29 ml/min  Creatine stable for now. BMP reviewed- noted Estimated Creatinine Clearance: 19.9 mL/min (A) (based on SCr of 1.5 mg/dL (H)). according to latest data. Based on current GFR, CKD stage is stage 3 - GFR 30-59.  Monitor UOP and serial BMP and adjust therapy as needed. Renally dose meds. Avoid nephrotoxic medications and procedures.  Gastroesophageal reflux disease with esophagitis  Chronic.  Stable    PLAN:  Continue pantoprazole    VTE Risk Mitigation (From admission, onward)           Ordered     heparin (porcine) injection 5,000 Units  Every 8 hours         04/11/25 2230     IP VTE HIGH RISK PATIENT  Once         04/11/25 2230     Place sequential compression device  Until discontinued         04/11/25 2230                    Discharge Planning   CANDACE: 4/17/2025     Code Status: DNR   Medical Readiness for Discharge Date:   Discharge Plan A: Home with family                        Chenchoyuli Betito Holcomb MD  Department of Hospital Medicine   '[place] [facility]

## 2025-04-14 NOTE — ASSESSMENT & PLAN NOTE
Hypertensive on admission    Home meds for hypertension were reviewed and noted below.     Home Hypertension Medications per chart review             amLODIPine (NORVASC) 5 MG tablet Take 1 tablet (5 mg total) by mouth once daily.    bumetanide (BUMEX) 1 MG tablet Take 1 tablet (1 mg total) by mouth once daily.    carvediloL (COREG) 12.5 MG tablet Take 1 tablet (12.5 mg total) by mouth 2 (two) times daily with meals.    nitroGLYCERIN (NITROSTAT) 0.4 MG SL tablet PLACE ONE TABLET UNDERNEATH THE TONGUE EVERY 5 MINUTES AS NEEDED FOR CHEST PAIN            Patients blood pressure range in the last 24 hours was: BP  Min: 111/66  Max: 181/91.      PLAN:  -While in the hospital, will manage blood pressure as follows; Continue home antihypertensive regimen  -The patient's inpatient anti-hypertensive regimen is listed below:  Current Antihypertensives  hydrALAZINE injection 5 mg, Every 6 hours PRN, Intravenous  amLODIPine tablet 5 mg, Daily, Oral  carvediloL tablet 12.5 mg, 2 times daily with meals, Oral  dorzolamide-timolol 2-0.5% ophthalmic solution 1 drop, 2 times daily, Both Eyes  -Will utilize p.r.n. blood pressure medication only if patient's blood pressure greater than 180/110 and she develops symptoms such as worsening chest pain or shortness of breath.

## 2025-04-14 NOTE — ASSESSMENT & PLAN NOTE
Creatine stable for now. BMP reviewed- noted Estimated Creatinine Clearance: 19.9 mL/min (A) (based on SCr of 1.5 mg/dL (H)). according to latest data. Based on current GFR, CKD stage is stage 3 - GFR 30-59.  Monitor UOP and serial BMP and adjust therapy as needed. Renally dose meds. Avoid nephrotoxic medications and procedures.

## 2025-04-14 NOTE — ASSESSMENT & PLAN NOTE
Patient is identified as having Diastolic (HFpEF) heart failure that is Acute on Chronic. CHF is currently uncontrolled due to Dyspnea not returned to baseline after IV diuretic and Pulmonary edema/pleural effusion on CXR.     - Reviewed most recent ECHO performed and demonstrates- Results for orders placed during the hospital encounter of 02/23/25    Echo Saline Bubble? No; Ultrasound enhancing contrast? No    Interpretation Summary    Left Ventricle: The left ventricle is normal in size. Normal wall thickness. There is concentric remodeling. There is normal systolic function. Ejection fraction is approximately 60%. Grade II diastolic dysfunction.    Right Ventricle: Normal right ventricular cavity size. Wall thickness is normal. Systolic function is normal.    Left Atrium: Left atrium is severely dilated.    Aortic Valve: There is mild aortic valve sclerosis. There is mild stenosis. Aortic valve area by VTI is 1.6 cm². Aortic valve peak velocity is 2.0 m/s. Mean gradient is 8 mmHg. The dimensionless index is 0.58.    Tricuspid Valve: There is moderate to severe regurgitation with a centrally directed jet.    Pulmonary Artery: The estimated pulmonary artery systolic pressure is 55 mmHg.    IVC/SVC: Normal venous pressure at 3 mmHg.      Recent Labs reviewed-  Recent Labs   Lab 04/11/25  1508 04/11/25  1853   TROPONINI 0.022  --    BNP  --  832*     Recent Labs     04/12/25  0643 04/13/25  0620 04/14/25  0656   BUN 28* 26* 24*   CREATININE 1.3 1.4 1.5*   CO2 26 27 21*       No intake or output data in the 24 hours ending 04/14/25 1615    Net IO Since Admission: -3,525 mL [04/14/25 1615]    Home GDMT  per Chart Review:   GMDT Medications per chart review             amLODIPine (NORVASC) 5 MG tablet Take 1 tablet (5 mg total) by mouth once daily.    bumetanide (BUMEX) 1 MG tablet Take 1 tablet (1 mg total) by mouth once daily.    carvediloL (COREG) 12.5 MG tablet Take 1 tablet (12.5 mg total) by mouth 2 (two) times  daily with meals.    nitroGLYCERIN (NITROSTAT) 0.4 MG SL tablet PLACE ONE TABLET UNDERNEATH THE TONGUE EVERY 5 MINUTES AS NEEDED FOR CHEST PAIN          Plan:  -continue diuresis, she has improved today. Of note had vomiting of what appeared to be fecal content. Will check XR abdomen. Spoke with daughter who is sole caretaker at bedside. Will check CT abd/pelvis if signs of obstruction. No other symptoms at this time.    - Current GDMT meds: hydrALAZINE injection 5 mg, Every 6 hours PRN, Intravenous  carvediloL tablet 12.5 mg, 2 times daily with meals, Oral  -Titrate(increase/decrease) diuretic dosing to target euvolemia. Current Diuretic regimen:  Diuretics (From admission, onward)      None        - Monitor on telemetry.   - Cardiac diet with Fluid restriction at 1.5L with strict I/Os and daily standing weights  -Continue to stress to patient importance of self efficacy and  on diet for CHF.  - Replete electrolytes PRN to target Mag >2 & K >4

## 2025-04-14 NOTE — PLAN OF CARE
Discussed poc with pt, pt verbalized understanding   Purposeful rounding every 2hours    VS wnl  Cardiac monitoring in use,tele monitor # [id number]  Fall precautions in place, remains injury free    Bed locked at lowest position  Call light within reach     Chart check complete  Will cont with POC

## 2025-04-14 NOTE — ACP (ADVANCE CARE PLANNING)
Advance Care Planning     Date: 04/14/2025    Today a voluntary meeting took place: bedside    Patient Participation: Patient is able to participate;      Attendees (Name and  Relationship to patient):  Daughter over phone      ACP Conversation (General): Understanding of advance care planning and role of health care agent defined      Code Status: DNR; status confirmed/order placed in chart           Length of ACP   conversation in minutes: 20 minutes

## 2025-04-14 NOTE — SUBJECTIVE & OBJECTIVE
Interval History:     No acute events overnight, resting comfortably   Stated improvement in shortness O breath, lower extremity swelling, slight increase in creatinine, hold Bumex for tonight, will monitor kidney function in a.m.   Discussed code status with patient/daughter over phone, opted for DNR DNI,.  Also per daughter, patient has been under hospice in the past, however does not want to go under hospice again, inclining for home health, home-based palliative,  follow up in a.m.   Anticipate discharge in next 24 to 48 hours    Review of Systems  Objective:     Vital Signs (Most Recent):  Temp: 99 °F (37.2 °C) (04/14/25 1545)  Pulse: 67 (04/14/25 1602)  Resp: 20 (04/14/25 1545)  BP: (!) 118/59 (04/14/25 1545)  SpO2: 97 % (04/14/25 1545) Vital Signs (24h Range):  Temp:  [98 °F (36.7 °C)-99 °F (37.2 °C)] 99 °F (37.2 °C)  Pulse:  [56-80] 67  Resp:  [16-24] 20  SpO2:  [92 %-98 %] 97 %  BP: (118-136)/(58-77) 118/59     Weight: 54.9 kg (121 lb)  Body mass index is 22.87 kg/m².  No intake or output data in the 24 hours ending 04/14/25 1610      Physical Exam      Constitutional:       General: She is not in acute distress.  Eyes:      Extraocular Movements: Extraocular movements intact.      Pupils: Pupils are equal, round, and reactive to light.   Cardiovascular:      Rate and Rhythm: Normal rate.      Heart sounds: No murmur heard.  Pulmonary:      Effort: Pulmonary effort is normal. No respiratory distress.      Breath sounds: No wheezing.   Abdominal:      General: There is no distension.      Tenderness: There is no abdominal tenderness.   Musculoskeletal:         General: No swelling or tenderness.   Skin:     General: Skin is warm and dry.   Neurological:      General: No focal deficit present.      Mental Status: She is alert and oriented to person, place, and time.      Cranial Nerves: No cranial nerve deficit.   Psychiatric:         Mood and Affect: Mood normal.         Behavior: Behavior  normal.           Significant Labs: All pertinent labs within the past 24 hours have been reviewed.  CBC:   Recent Labs   Lab 04/13/25  0957 04/14/25  0908   WBC 8.64 10.20   HGB 11.8* 11.1*   HCT 37.0 34.5*    213     CMP:   Recent Labs   Lab 04/13/25  0620 04/14/25  0656    140   K 3.4* 3.7    105   CO2 27 21*   BUN 26* 24*   CREATININE 1.4 1.5*   CALCIUM 9.1 8.9   ALBUMIN 3.4* 3.3*   BILITOT 0.8 1.0   ALKPHOS 71 69   AST 17 19   ALT 14 16   ANIONGAP 11 14       Significant Imaging:     Imaging Results              US Lower Extremity Veins Left (Final result)  Result time 04/11/25 20:03:40      Final result by Nathaly Gonzales MD (04/11/25 20:03:40)                   Impression:      Negative for DVT    Finalized on: 4/11/2025 8:03 PM By:  Nathaly Gonzales MD  [facility]# [id number]      2025-04-11 20:05:43.390     [facility]               Narrative:    EXAM: US LOWER EXTREMITY VEINS LEFT    CLINICAL HISTORY:   Swelling    PRIOR:   NONE    Technique:  Grayscale and Doppler imaging provided    FINDINGS:  There is good color flow and compressibility deep veins throughout left lower extremity imaging groin to mid calf                                         US Upper Extremity Veins Left (Final result)  Result time 04/11/25 20:03:01      Final result by Nathaly Gonzales MD (04/11/25 20:03:01)                   Impression:     Negative for DVT    Finalized on: 4/11/2025 8:03 PM By:  Nathaly Gonzales MD  [facility]# [id number]      2025-04-11 20:05:03.358     [facility]               Narrative:    EXAM: US UPPER EXTREMITY VEINS LEFT    CLINICAL HISTORY: Swelling    FINDINGS:  There is good compressibility and color flow of the deep veins throughout the left upper extremity and left central veins                                         X-Ray Chest AP Portable (Final result)  Result time 04/11/25 15:53:06      Final result by Renato Guerrier MD (04/11/25 15:53:06)                   Impression:      1.  Markedly low lung  volumes with vascular crowding or atelectasis in the lung bases.  Small effusions.  Cardiac silhouette size enlargement.  Mild interstitial pulmonary edema or interstitial infectious process difficult to exclude in the right clinical setting.    2.  Stable findings as noted above.      Electronically signed by: Renato Guerrier MD  Date:    04/11/2025  Time:    15:53               Narrative:    EXAMINATION:  XR CHEST AP PORTABLE    CLINICAL HISTORY:  Chest Pain;    COMPARISON:  March 18, 2025    FINDINGS:  Clothing artifact.  Markedly low lung volumes on the current study.  Small effusions.  The lungs are otherwise clear.  The cardiac silhouette size is enlarged.  The trachea is midline and the mediastinal width is normal. Negative for pneumothorax.  Pulmonary vasculature is normal. Negative for osseous abnormalities. Tortuous aorta with calcifications of the aortic knob.  There are degenerative changes of the spine and both shoulder girdles.  Median sternotomy wires and CABG changes.  Dual lead left subclavian pacemaker.

## 2025-04-15 VITALS
BODY MASS INDEX: 22.84 KG/M2 | DIASTOLIC BLOOD PRESSURE: 72 MMHG | HEIGHT: 61 IN | TEMPERATURE: 99 F | RESPIRATION RATE: 16 BRPM | SYSTOLIC BLOOD PRESSURE: 139 MMHG | HEART RATE: 74 BPM | WEIGHT: 121 LBS | OXYGEN SATURATION: 95 %

## 2025-04-15 LAB
ABSOLUTE EOSINOPHIL (OHS): 0.04 K/UL
ABSOLUTE MONOCYTE (OHS): 0.82 K/UL (ref 0.3–1)
ABSOLUTE NEUTROPHIL COUNT (OHS): 6.42 K/UL (ref 1.8–7.7)
ALBUMIN SERPL BCP-MCNC: 3 G/DL (ref 3.5–5.2)
ALP SERPL-CCNC: 65 UNIT/L (ref 40–150)
ALT SERPL W/O P-5'-P-CCNC: 10 UNIT/L (ref 10–44)
ANION GAP (OHS): 9 MMOL/L (ref 8–16)
AST SERPL-CCNC: 11 UNIT/L (ref 11–45)
BASOPHILS # BLD AUTO: 0.04 K/UL
BASOPHILS NFR BLD AUTO: 0.5 %
BILIRUB SERPL-MCNC: 1 MG/DL (ref 0.1–1)
BUN SERPL-MCNC: 27 MG/DL (ref 8–23)
CALCIUM SERPL-MCNC: 8.8 MG/DL (ref 8.7–10.5)
CHLORIDE SERPL-SCNC: 104 MMOL/L (ref 95–110)
CO2 SERPL-SCNC: 25 MMOL/L (ref 23–29)
CREAT SERPL-MCNC: 1.7 MG/DL (ref 0.5–1.4)
ERYTHROCYTE [DISTWIDTH] IN BLOOD BY AUTOMATED COUNT: 15.8 % (ref 11.5–14.5)
GFR SERPLBLD CREATININE-BSD FMLA CKD-EPI: 29 ML/MIN/1.73/M2
GLUCOSE SERPL-MCNC: 101 MG/DL (ref 70–110)
HCT VFR BLD AUTO: 32.3 % (ref 37–48.5)
HGB BLD-MCNC: 10.5 GM/DL (ref 12–16)
IMM GRANULOCYTES # BLD AUTO: 0.1 K/UL (ref 0–0.04)
IMM GRANULOCYTES NFR BLD AUTO: 1.2 % (ref 0–0.5)
LYMPHOCYTES # BLD AUTO: 0.82 K/UL (ref 1–4.8)
MAGNESIUM SERPL-MCNC: 1.6 MG/DL (ref 1.6–2.6)
MCH RBC QN AUTO: 28.5 PG (ref 27–31)
MCHC RBC AUTO-ENTMCNC: 32.5 G/DL (ref 32–36)
MCV RBC AUTO: 88 FL (ref 82–98)
NUCLEATED RBC (/100WBC) (OHS): 0 /100 WBC
PHOSPHATE SERPL-MCNC: 3.4 MG/DL (ref 2.7–4.5)
PLATELET # BLD AUTO: 188 K/UL (ref 150–450)
PMV BLD AUTO: 9.9 FL (ref 9.2–12.9)
POTASSIUM SERPL-SCNC: 3 MMOL/L (ref 3.5–5.1)
PROT SERPL-MCNC: 6 GM/DL (ref 6–8.4)
RBC # BLD AUTO: 3.69 M/UL (ref 4–5.4)
RELATIVE EOSINOPHIL (OHS): 0.5 %
RELATIVE LYMPHOCYTE (OHS): 10 % (ref 18–48)
RELATIVE MONOCYTE (OHS): 10 % (ref 4–15)
RELATIVE NEUTROPHIL (OHS): 77.8 % (ref 38–73)
SODIUM SERPL-SCNC: 138 MMOL/L (ref 136–145)
WBC # BLD AUTO: 8.24 K/UL (ref 3.9–12.7)

## 2025-04-15 PROCEDURE — 84100 ASSAY OF PHOSPHORUS: CPT | Mod: HCNC | Performed by: STUDENT IN AN ORGANIZED HEALTH CARE EDUCATION/TRAINING PROGRAM

## 2025-04-15 PROCEDURE — 36415 COLL VENOUS BLD VENIPUNCTURE: CPT | Mod: HCNC | Performed by: STUDENT IN AN ORGANIZED HEALTH CARE EDUCATION/TRAINING PROGRAM

## 2025-04-15 PROCEDURE — 80053 COMPREHEN METABOLIC PANEL: CPT | Mod: HCNC | Performed by: STUDENT IN AN ORGANIZED HEALTH CARE EDUCATION/TRAINING PROGRAM

## 2025-04-15 PROCEDURE — 63600175 PHARM REV CODE 636 W HCPCS: Mod: HCNC | Performed by: STUDENT IN AN ORGANIZED HEALTH CARE EDUCATION/TRAINING PROGRAM

## 2025-04-15 PROCEDURE — 25000003 PHARM REV CODE 250: Mod: HCNC | Performed by: STUDENT IN AN ORGANIZED HEALTH CARE EDUCATION/TRAINING PROGRAM

## 2025-04-15 PROCEDURE — 83735 ASSAY OF MAGNESIUM: CPT | Mod: HCNC | Performed by: STUDENT IN AN ORGANIZED HEALTH CARE EDUCATION/TRAINING PROGRAM

## 2025-04-15 PROCEDURE — 85025 COMPLETE CBC W/AUTO DIFF WBC: CPT | Mod: HCNC | Performed by: STUDENT IN AN ORGANIZED HEALTH CARE EDUCATION/TRAINING PROGRAM

## 2025-04-15 RX ORDER — POTASSIUM CHLORIDE 20 MEQ/1
40 TABLET, EXTENDED RELEASE ORAL ONCE
Status: COMPLETED | OUTPATIENT
Start: 2025-04-15 | End: 2025-04-15

## 2025-04-15 RX ADMIN — SODIUM BICARBONATE 650 MG TABLET 650 MG: at 09:04

## 2025-04-15 RX ADMIN — PANCRELIPASE 1 CAPSULE: 120000; 24000; 76000 CAPSULE, DELAYED RELEASE PELLETS ORAL at 12:04

## 2025-04-15 RX ADMIN — POTASSIUM CHLORIDE 40 MEQ: 1500 TABLET, EXTENDED RELEASE ORAL at 09:04

## 2025-04-15 RX ADMIN — HEPARIN SODIUM 5000 UNITS: 5000 INJECTION INTRAVENOUS; SUBCUTANEOUS at 05:04

## 2025-04-15 RX ADMIN — PANCRELIPASE 1 CAPSULE: 120000; 24000; 76000 CAPSULE, DELAYED RELEASE PELLETS ORAL at 09:04

## 2025-04-15 RX ADMIN — PANTOPRAZOLE SODIUM 40 MG: 40 TABLET, DELAYED RELEASE ORAL at 09:04

## 2025-04-15 RX ADMIN — DORZOLAMIDE HYDROCHLORIDE AND TIMOLOL MALEATE 1 DROP: 20; 5 SOLUTION OPHTHALMIC at 09:04

## 2025-04-15 RX ADMIN — AMLODIPINE BESYLATE 5 MG: 5 TABLET ORAL at 09:04

## 2025-04-15 RX ADMIN — ASPIRIN 81 MG: 81 TABLET, COATED ORAL at 09:04

## 2025-04-15 RX ADMIN — CARVEDILOL 12.5 MG: 12.5 TABLET, FILM COATED ORAL at 09:04

## 2025-04-15 RX ADMIN — POTASSIUM CHLORIDE 40 MEQ: 1500 TABLET, EXTENDED RELEASE ORAL at 12:04

## 2025-04-15 NOTE — ASSESSMENT & PLAN NOTE
Creatine stable for now. BMP reviewed- noted Estimated Creatinine Clearance: 17.6 mL/min (A) (based on SCr of 1.7 mg/dL (H)). according to latest data. Based on current GFR, CKD stage is stage 3 - GFR 30-59.  Monitor UOP and serial BMP and adjust therapy as needed. Renally dose meds. Avoid nephrotoxic medications and procedures.

## 2025-04-15 NOTE — ASSESSMENT & PLAN NOTE
Recent Lipid Panel reviewed-  Lab Results   Component Value Date    HDL 45 10/14/2024    LDLCALC 78.4 10/14/2024    TRIG 148 10/14/2024    CHOL 153 10/14/2024        -Home medications:   Hyperlipidemia Medications              atorvastatin (LIPITOR) 40 MG tablet Take 1 tablet (40 mg total) by mouth Daily.            PLAN  -continue home statin       Siliq Pregnancy And Lactation Text: The risk during pregnancy and breastfeeding is uncertain with this medication.

## 2025-04-15 NOTE — DISCHARGE SUMMARY
"[facility] Medicine  Discharge Summary      Patient Name: Glo Dumont  MRN: [id number]  ALBERTA: [id number]  Patient Class: IP- Inpatient  Admission Date: 4/11/2025  Hospital Length of Stay: 3 days  Discharge Date and Time: 04/15/2025 4:02 PM  Attending Physician: No att. providers found   Discharging Provider: Kiesha Holcomb MD  Primary Care Provider: No primary care provider on file.    Primary Care Team: Networked reference to record PCT     HPI:   Ms. Glo Dumont is a 89 y.o. female who  has a medical history of Anemia, Angina pectoris, Anxiety, Anxiety and depression, Arthritis, Carotid artery occlusion, Carpal tunnel syndrome, Chronic diarrhea, CKD (chronic kidney disease) stage 3, GFR 30-59 ml/min, Colitis, Coronary artery disease, Coronary artery disease, Diastolic dysfunction, Diverticulosis, Encephalopathy, Glaucoma, Greater trochanteric bursitis, Grief at loss of child, H/O carotid endarterectomy, Heart failure, History of coronary angioplasty, Hypercholesteremia, Hypertension, Liver cyst, Low back pain, Macular degeneration, Obesity with serious comorbidity, Primary open-angle glaucoma(365.11), Renal cyst, S/P prosthetic total arthroplasty of the hip, Sarcoidosis, Sarcoidosis of lung, Sickle cell trait, and Uveitis.    She  presented to the ED for evaluation of worsening respiratory symptoms which included increasing dyspnea, and increased swelling in her extremities over the past few days.  She denies any dietary noncompliance and reports compliance with home medications.  Denies any fevers, chills, nausea, vomiting, diarrhea, constipation, abdominal pain, urinary symptoms.  Reports dry cough, dyspnea, lower extremity swelling.    ED course notable for hemodynamically stable vitals.  Labs notable for hemoglobin 9.4, bicarb 20, creatinine 1.5, , CXR noted "Markedly low lung volumes with vascular crowding or atelectasis in the lung bases. Small effusions. Cardiac " "silhouette size enlargement. Mild interstitial pulmonary edema or interstitial infectious process difficult to exclude in the right clinical setting. ".        * No surgery found *      Hospital Course:   89-year-old female admitted for acute on chronic CHF on 4/11.  Cardiology consulted, started on IV diuretics with gradual improvement in his shortness of breath and lower extremity edema.  Of note patient has had some intermittent confusion which is the norm per the family.  04/15/2025   Examination done at bedside, appeared alert and oriented to self, place, date of birth   At baseline mentation per daughter   Patient denied acute events overnight, denied fever, chills, chest pain, shortness O breath nausea, vomiting, bowel or bladder issues   Saturating above 92 on room air   No lower extremity swelling noted   Received IV diuretics during hospital stay   Given patient's age, underlying comorbidities, acute on chronic issues, recurrent hospitalizations, discussed regarding code status, goals of care with patient/daughter over phone,-opted for DNR DNI.    Per daughter, patient has been under hospice in the past, however does not want to proceed with hospice at this point, declined hospice information visit.  Agreeable for home-based palliative, ordered accordingly   Planning to discharge patient today, emphasized on compliance with medications, outpatient follow up visits, fluid, salt restriction, patient/daughter expressed understanding, agreed with the plan   Ordered home-based palliative, NP at home program, recommended patient/daughter to continue goals of care discussion     Goals of Care Treatment Preferences:  Code Status: DNR    Health care agent: Denis Langston [facility] agent number: [phone], [phone].                   SDOH Screening:  The patient declined to be screened for utility difficulties, food insecurity, transport difficulties, housing insecurity, and " interpersonal safety, so no concerns could be identified this admission.     Consults:     Assessment & Plan  Acute on chronic heart failure with preserved ejection fraction  Diastolic heart failure, NYHA class 3  Patient is identified as having Diastolic (HFpEF) heart failure that is Acute on Chronic. CHF is currently uncontrolled due to Dyspnea not returned to baseline after IV diuretic and Pulmonary edema/pleural effusion on CXR.     - Reviewed most recent ECHO performed and demonstrates- Results for orders placed during the hospital encounter of 02/23/25    Echo Saline Bubble? No; Ultrasound enhancing contrast? No    Interpretation Summary    Left Ventricle: The left ventricle is normal in size. Normal wall thickness. There is concentric remodeling. There is normal systolic function. Ejection fraction is approximately 60%. Grade II diastolic dysfunction.    Right Ventricle: Normal right ventricular cavity size. Wall thickness is normal. Systolic function is normal.    Left Atrium: Left atrium is severely dilated.    Aortic Valve: There is mild aortic valve sclerosis. There is mild stenosis. Aortic valve area by VTI is 1.6 cm². Aortic valve peak velocity is 2.0 m/s. Mean gradient is 8 mmHg. The dimensionless index is 0.58.    Tricuspid Valve: There is moderate to severe regurgitation with a centrally directed jet.    Pulmonary Artery: The estimated pulmonary artery systolic pressure is 55 mmHg.    IVC/SVC: Normal venous pressure at 3 mmHg.      Recent Labs reviewed-  Recent Labs   Lab 04/11/25  1508 04/11/25  1853   TROPONINI 0.022  --    BNP  --  832*     Recent Labs     04/13/25  0620 04/14/25  0656 04/15/25  0520   BUN 26* 24* 27*   CREATININE 1.4 1.5* 1.7*   CO2 27 21* 25       No intake or output data in the 24 hours ending 04/15/25 1602    Net IO Since Admission: -3,525 mL [04/15/25 1602]    Home GDMT  per Chart Review:   GMDT Medications per chart review             amLODIPine (NORVASC) 5 MG tablet Take 1  tablet (5 mg total) by mouth once daily.    bumetanide (BUMEX) 1 MG tablet Take 1 tablet (1 mg total) by mouth once daily.    carvediloL (COREG) 12.5 MG tablet Take 1 tablet (12.5 mg total) by mouth 2 (two) times daily with meals.    nitroGLYCERIN (NITROSTAT) 0.4 MG SL tablet PLACE ONE TABLET UNDERNEATH THE TONGUE EVERY 5 MINUTES AS NEEDED FOR CHEST PAIN          Plan:  -continue diuresis, she has improved today. Of note had vomiting of what appeared to be fecal content. Will check XR abdomen. Spoke with daughter who is sole caretaker at bedside. Will check CT abd/pelvis if signs of obstruction. No other symptoms at this time.    - Current GDMT meds:    -Titrate(increase/decrease) diuretic dosing to target euvolemia. Current Diuretic regimen:  Diuretics (From admission, onward)      None        - Monitor on telemetry.   - Cardiac diet with Fluid restriction at 1.5L with strict I/Os and daily standing weights  -Continue to stress to patient importance of self efficacy and  on diet for CHF.  - Replete electrolytes PRN to target Mag >2 & K >4  Hypertensive heart disease with heart failure  Hypertensive on admission    Home meds for hypertension were reviewed and noted below.     Home Hypertension Medications per chart review             amLODIPine (NORVASC) 5 MG tablet Take 1 tablet (5 mg total) by mouth once daily.    bumetanide (BUMEX) 1 MG tablet Take 1 tablet (1 mg total) by mouth once daily.    carvediloL (COREG) 12.5 MG tablet Take 1 tablet (12.5 mg total) by mouth 2 (two) times daily with meals.    nitroGLYCERIN (NITROSTAT) 0.4 MG SL tablet PLACE ONE TABLET UNDERNEATH THE TONGUE EVERY 5 MINUTES AS NEEDED FOR CHEST PAIN            Patients blood pressure range in the last 24 hours was: BP  Min: 111/66  Max: 181/91.      PLAN:  -While in the hospital, will manage blood pressure as follows; Continue home antihypertensive regimen  -The patient's inpatient anti-hypertensive regimen is listed below:  Current  Antihypertensives     -Will utilize p.r.n. blood pressure medication only if patient's blood pressure greater than 180/110 and she develops symptoms such as worsening chest pain or shortness of breath.      Other hyperlipidemia  Recent Lipid Panel reviewed-  Lab Results   Component Value Date    HDL 45 10/14/2024    LDLCALC 78.4 10/14/2024    TRIG 148 10/14/2024    CHOL 153 10/14/2024        -Home medications:   Hyperlipidemia Medications              atorvastatin (LIPITOR) 40 MG tablet Take 1 tablet (40 mg total) by mouth Daily.            PLAN  -continue home statin      CKD (chronic kidney disease) stage 4, GFR 15-29 ml/min  Creatine stable for now. BMP reviewed- noted Estimated Creatinine Clearance: 17.6 mL/min (A) (based on SCr of 1.7 mg/dL (H)). according to latest data. Based on current GFR, CKD stage is stage 3 - GFR 30-59.  Monitor UOP and serial BMP and adjust therapy as needed. Renally dose meds. Avoid nephrotoxic medications and procedures.  Gastroesophageal reflux disease with esophagitis  Chronic.  Stable    PLAN:  Continue pantoprazole    Final Active Diagnoses:    Diagnosis Date Noted POA    PRINCIPAL PROBLEM:  Acute on chronic heart failure with preserved ejection fraction [I50.33] 04/04/2024 Yes    Gastroesophageal reflux disease with esophagitis [K21.00] 08/15/2023 Yes     Chronic    CKD (chronic kidney disease) stage 4, GFR 15-29 ml/min [N18.4] 05/11/2017 Yes     Chronic    Diastolic heart failure, NYHA class 3 [I50.30] 02/01/2016 Yes    Hypertensive heart disease with heart failure [I11.0] 07/08/2013 Yes    Other hyperlipidemia [E78.49] 07/08/2013 Yes     Chronic      Problems Resolved During this Admission:       Discharged Condition: fair    Disposition: Home or Self Care    Follow Up:    Patient Instructions:      Ambulatory referral/consult to Outpatient Case Management   Referral Priority: Routine Referral Type: Consultation   Referral Reason: Specialty Services Required   Number of Visits  Requested: 1     Ambulatory referral/consult to HOME Palliative Care   Standing Status: Future   Referral Priority: Routine Referral Type: Consultation   Requested Specialty: Hospice and Palliative Medicine   Number of Visits Requested: 1     Ambulatory referral/consult to Internal Medicine   Standing Status: Future   Referral Priority: Routine Referral Type: Consultation   Referral Reason: Specialty Services Required   Requested Specialty: Internal Medicine   Number of Visits Requested: 1     Ambulatory referral/consult to Cardiology   Standing Status: Future   Referral Priority: Routine Referral Type: Consultation   Referral Reason: Specialty Services Required   Requested Specialty: Cardiology   Number of Visits Requested: 1     Ambulatory referral/consult to [facility]   Standing Status: Future   Referral Priority: Routine Referral Type: Consultation   Referral Reason: Specialty Services Required   Number of Visits Requested: 1       Significant Diagnostic Studies:     Results for orders placed or performed during the hospital encounter of 04/11/25   Comprehensive metabolic panel    Collection Time: 04/11/25  3:08 PM   Result Value Ref Range    Sodium 141 136 - 145 mmol/L    Potassium 3.7 3.5 - 5.1 mmol/L    Chloride 110 95 - 110 mmol/L    CO2 20 (L) 23 - 29 mmol/L    Glucose 143 (H) 70 - 110 mg/dL    BUN 29 (H) 8 - 23 mg/dL    Creatinine 1.5 (H) 0.5 - 1.4 mg/dL    Calcium 8.6 (L) 8.7 - 10.5 mg/dL    Protein Total 6.9 6.0 - 8.4 gm/dL    Albumin 3.7 3.5 - 5.2 g/dL    Bilirubin Total 0.4 0.1 - 1.0 mg/dL    ALP 62 40 - 150 unit/L    AST 21 11 - 45 unit/L    ALT 18 10 - 44 unit/L    Anion Gap 11 8 - 16 mmol/L    eGFR 34 (L) >60 mL/min/1.73/m2   Troponin I    Collection Time: 04/11/25  3:08 PM   Result Value Ref Range    Troponin-I 0.022 <=0.026 ng/mL   B-Type natriuretic peptide (BNP)    Collection Time: 04/11/25  6:53 PM   Result Value Ref Range     (H) 0 - 99 pg/mL   CBC with Differential     Collection Time: 04/11/25  6:53 PM   Result Value Ref Range    WBC 10.40 3.90 - 12.70 K/uL    RBC 3.89 (L) 4.00 - 5.40 M/uL    HGB 11.4 (L) 12.0 - 16.0 gm/dL    HCT 34.7 (L) 37.0 - 48.5 %    MCV 89 82 - 98 fL    MCH 29.3 27.0 - 31.0 pg    MCHC 32.9 32.0 - 36.0 g/dL    RDW 16.0 (H) 11.5 - 14.5 %    Platelet Count 229 150 - 450 K/uL    MPV 10.1 9.2 - 12.9 fL    Nucleated RBC 0 <=0 /100 WBC    Neut % 79.0 (H) 38 - 73 %    Lymph % 9.2 (L) 18 - 48 %    Mono % 8.7 4 - 15 %    Eos % 0.5 <=8 %    Basophil % 0.5 <=1.9 %    Imm Grans % 2.1 (H) 0.0 - 0.5 %    Neut # 8.22 (H) 1.8 - 7.7 K/uL    Lymph # 0.96 (L) 1 - 4.8 K/uL    Mono # 0.90 0.3 - 1 K/uL    Eos # 0.05 <=0.5 K/uL    Baso # 0.05 <=0.2 K/uL    Imm Grans # 0.22 (H) 0.00 - 0.04 K/uL   Urinalysis, Reflex to Urine Culture Urine, Clean Catch    Collection Time: 04/11/25  8:17 PM    Specimen: Urine   Result Value Ref Range    Color, UA Colorless (A) Straw, Terrie, Yellow, Light-Orange    Appearance, UA Clear Clear    pH, UA 7.0 5.0 - 8.0    Spec Grav UA 1.010 1.005 - 1.030    Protein, UA Negative Negative    Glucose, UA 2+ (A) Negative    Ketones, UA Negative Negative    Bilirubin, UA Negative Negative    Blood, UA Negative Negative    Nitrites, UA Negative Negative    Urobilinogen, UA Negative <2.0 EU/dL    Leukocyte Esterase, UA 2+ (A) Negative   Urinalysis Microscopic    Collection Time: 04/11/25  8:17 PM   Result Value Ref Range    RBC, UA 0 0 - 4 /HPF    WBC, UA 6 (H) 0 - 5 /HPF    Bacteria, UA None None, Rare, Occasional /HPF    Yeast, UA None None /HPF    Hyaline Casts, UA 0 0 - 1 /LPF    Microscopic Comment     Comprehensive Metabolic Panel    Collection Time: 04/12/25  6:43 AM   Result Value Ref Range    Sodium 143 136 - 145 mmol/L    Potassium 3.0 (L) 3.5 - 5.1 mmol/L    Chloride 110 95 - 110 mmol/L    CO2 26 23 - 29 mmol/L    Glucose 83 70 - 110 mg/dL    BUN 28 (H) 8 - 23 mg/dL    Creatinine 1.3 0.5 - 1.4 mg/dL    Calcium 8.5 (L) 8.7 - 10.5 mg/dL    Protein Total  5.8 (L) 6.0 - 8.4 gm/dL    Albumin 3.1 (L) 3.5 - 5.2 g/dL    Bilirubin Total 0.5 0.1 - 1.0 mg/dL    ALP 58 40 - 150 unit/L    AST 11 11 - 45 unit/L    ALT 16 10 - 44 unit/L    Anion Gap 7 (L) 8 - 16 mmol/L    eGFR 40 (L) >60 mL/min/1.73/m2   Magnesium    Collection Time: 04/12/25  6:43 AM   Result Value Ref Range    Magnesium  1.5 (L) 1.6 - 2.6 mg/dL   Phosphorus    Collection Time: 04/12/25  6:43 AM   Result Value Ref Range    Phosphorus Level 3.5 2.7 - 4.5 mg/dL   CBC with Differential    Collection Time: 04/12/25  6:43 AM   Result Value Ref Range    WBC 7.76 3.90 - 12.70 K/uL    RBC 3.43 (L) 4.00 - 5.40 M/uL    HGB 9.9 (L) 12.0 - 16.0 gm/dL    HCT 30.7 (L) 37.0 - 48.5 %    MCV 90 82 - 98 fL    MCH 28.9 27.0 - 31.0 pg    MCHC 32.2 32.0 - 36.0 g/dL    RDW 15.9 (H) 11.5 - 14.5 %    Platelet Count 213 150 - 450 K/uL    MPV 10.4 9.2 - 12.9 fL    Nucleated RBC 0 <=0 /100 WBC    Neut % 76.8 (H) 38 - 73 %    Lymph % 11.9 (L) 18 - 48 %    Mono % 8.8 4 - 15 %    Eos % 0.5 <=8 %    Basophil % 0.5 <=1.9 %    Imm Grans % 1.5 (H) 0.0 - 0.5 %    Neut # 5.96 1.8 - 7.7 K/uL    Lymph # 0.92 (L) 1 - 4.8 K/uL    Mono # 0.68 0.3 - 1 K/uL    Eos # 0.04 <=0.5 K/uL    Baso # 0.04 <=0.2 K/uL    Imm Grans # 0.12 (H) 0.00 - 0.04 K/uL   Comprehensive Metabolic Panel    Collection Time: 04/13/25  6:20 AM   Result Value Ref Range    Sodium 142 136 - 145 mmol/L    Potassium 3.4 (L) 3.5 - 5.1 mmol/L    Chloride 104 95 - 110 mmol/L    CO2 27 23 - 29 mmol/L    Glucose 96 70 - 110 mg/dL    BUN 26 (H) 8 - 23 mg/dL    Creatinine 1.4 0.5 - 1.4 mg/dL    Calcium 9.1 8.7 - 10.5 mg/dL    Protein Total 6.9 6.0 - 8.4 gm/dL    Albumin 3.4 (L) 3.5 - 5.2 g/dL    Bilirubin Total 0.8 0.1 - 1.0 mg/dL    ALP 71 40 - 150 unit/L    AST 17 11 - 45 unit/L    ALT 14 10 - 44 unit/L    Anion Gap 11 8 - 16 mmol/L    eGFR 36 (L) >60 mL/min/1.73/m2   Magnesium    Collection Time: 04/13/25  6:20 AM   Result Value Ref Range    Magnesium  1.6 1.6 - 2.6 mg/dL   Phosphorus     Collection Time: 04/13/25  6:20 AM   Result Value Ref Range    Phosphorus Level 3.1 2.7 - 4.5 mg/dL   CBC with Differential    Collection Time: 04/13/25  9:57 AM   Result Value Ref Range    WBC 8.64 3.90 - 12.70 K/uL    RBC 4.18 4.00 - 5.40 M/uL    HGB 11.8 (L) 12.0 - 16.0 gm/dL    HCT 37.0 37.0 - 48.5 %    MCV 89 82 - 98 fL    MCH 28.2 27.0 - 31.0 pg    MCHC 31.9 (L) 32.0 - 36.0 g/dL    RDW 16.1 (H) 11.5 - 14.5 %    Platelet Count 238 150 - 450 K/uL    MPV 10.2 9.2 - 12.9 fL    Nucleated RBC 0 <=0 /100 WBC    Neut % 79.0 (H) 38 - 73 %    Lymph % 9.4 (L) 18 - 48 %    Mono % 8.6 4 - 15 %    Eos % 0.6 <=8 %    Basophil % 0.5 <=1.9 %    Imm Grans % 1.9 (H) 0.0 - 0.5 %    Neut # 6.84 1.8 - 7.7 K/uL    Lymph # 0.81 (L) 1 - 4.8 K/uL    Mono # 0.74 0.3 - 1 K/uL    Eos # 0.05 <=0.5 K/uL    Baso # 0.04 <=0.2 K/uL    Imm Grans # 0.16 (H) 0.00 - 0.04 K/uL   Comprehensive Metabolic Panel    Collection Time: 04/14/25  6:56 AM   Result Value Ref Range    Sodium 140 136 - 145 mmol/L    Potassium 3.7 3.5 - 5.1 mmol/L    Chloride 105 95 - 110 mmol/L    CO2 21 (L) 23 - 29 mmol/L    Glucose 92 70 - 110 mg/dL    BUN 24 (H) 8 - 23 mg/dL    Creatinine 1.5 (H) 0.5 - 1.4 mg/dL    Calcium 8.9 8.7 - 10.5 mg/dL    Protein Total 6.8 6.0 - 8.4 gm/dL    Albumin 3.3 (L) 3.5 - 5.2 g/dL    Bilirubin Total 1.0 0.1 - 1.0 mg/dL    ALP 69 40 - 150 unit/L    AST 19 11 - 45 unit/L    ALT 16 10 - 44 unit/L    Anion Gap 14 8 - 16 mmol/L    eGFR 34 (L) >60 mL/min/1.73/m2   Magnesium    Collection Time: 04/14/25  6:56 AM   Result Value Ref Range    Magnesium  1.6 1.6 - 2.6 mg/dL   Phosphorus    Collection Time: 04/14/25  6:56 AM   Result Value Ref Range    Phosphorus Level 3.6 2.7 - 4.5 mg/dL   CBC with Differential    Collection Time: 04/14/25  9:08 AM   Result Value Ref Range    WBC 10.20 3.90 - 12.70 K/uL    RBC 3.86 (L) 4.00 - 5.40 M/uL    HGB 11.1 (L) 12.0 - 16.0 gm/dL    HCT 34.5 (L) 37.0 - 48.5 %    MCV 89 82 - 98 fL    MCH 28.8 27.0 - 31.0 pg    MCHC  32.2 32.0 - 36.0 g/dL    RDW 15.9 (H) 11.5 - 14.5 %    Platelet Count 213 150 - 450 K/uL    MPV 10.1 9.2 - 12.9 fL    Nucleated RBC 0 <=0 /100 WBC    Neut % 81.6 (H) 38 - 73 %    Lymph % 6.9 (L) 18 - 48 %    Mono % 9.6 4 - 15 %    Eos % 0.4 <=8 %    Basophil % 0.5 <=1.9 %    Imm Grans % 1.0 (H) 0.0 - 0.5 %    Neut # 8.33 (H) 1.8 - 7.7 K/uL    Lymph # 0.70 (L) 1 - 4.8 K/uL    Mono # 0.98 0.3 - 1 K/uL    Eos # 0.04 <=0.5 K/uL    Baso # 0.05 <=0.2 K/uL    Imm Grans # 0.10 (H) 0.00 - 0.04 K/uL   Comprehensive Metabolic Panel    Collection Time: 04/15/25  5:20 AM   Result Value Ref Range    Sodium 138 136 - 145 mmol/L    Potassium 3.0 (L) 3.5 - 5.1 mmol/L    Chloride 104 95 - 110 mmol/L    CO2 25 23 - 29 mmol/L    Glucose 101 70 - 110 mg/dL    BUN 27 (H) 8 - 23 mg/dL    Creatinine 1.7 (H) 0.5 - 1.4 mg/dL    Calcium 8.8 8.7 - 10.5 mg/dL    Protein Total 6.0 6.0 - 8.4 gm/dL    Albumin 3.0 (L) 3.5 - 5.2 g/dL    Bilirubin Total 1.0 0.1 - 1.0 mg/dL    ALP 65 40 - 150 unit/L    AST 11 11 - 45 unit/L    ALT 10 10 - 44 unit/L    Anion Gap 9 8 - 16 mmol/L    eGFR 29 (L) >60 mL/min/1.73/m2   Magnesium    Collection Time: 04/15/25  5:20 AM   Result Value Ref Range    Magnesium  1.6 1.6 - 2.6 mg/dL   Phosphorus    Collection Time: 04/15/25  5:20 AM   Result Value Ref Range    Phosphorus Level 3.4 2.7 - 4.5 mg/dL   CBC with Differential    Collection Time: 04/15/25  5:20 AM   Result Value Ref Range    WBC 8.24 3.90 - 12.70 K/uL    RBC 3.69 (L) 4.00 - 5.40 M/uL    HGB 10.5 (L) 12.0 - 16.0 gm/dL    HCT 32.3 (L) 37.0 - 48.5 %    MCV 88 82 - 98 fL    MCH 28.5 27.0 - 31.0 pg    MCHC 32.5 32.0 - 36.0 g/dL    RDW 15.8 (H) 11.5 - 14.5 %    Platelet Count 188 150 - 450 K/uL    MPV 9.9 9.2 - 12.9 fL    Nucleated RBC 0 <=0 /100 WBC    Neut % 77.8 (H) 38 - 73 %    Lymph % 10.0 (L) 18 - 48 %    Mono % 10.0 4 - 15 %    Eos % 0.5 <=8 %    Basophil % 0.5 <=1.9 %    Imm Grans % 1.2 (H) 0.0 - 0.5 %    Neut # 6.42 1.8 - 7.7 K/uL    Lymph # 0.82 (L) 1 -  4.8 K/uL    Mono # 0.82 0.3 - 1 K/uL    Eos # 0.04 <=0.5 K/uL    Baso # 0.04 <=0.2 K/uL    Imm Grans # 0.10 (H) 0.00 - 0.04 K/uL     *Note: Due to a large number of results and/or encounters for the requested time period, some results have not been displayed. A complete set of results can be found in Results Review.        Imaging Results              US Lower Extremity Veins Left (Final result)  Result time 04/11/25 20:03:40      Final result by Nathaly Gonzales MD (04/11/25 20:03:40)                   Impression:      Negative for DVT    Finalized on: 4/11/2025 8:03 PM By:  Nathaly Gonzales MD  [facility]# [id number]      2025-04-11 20:05:43.390     [facility]               Narrative:    EXAM: US LOWER EXTREMITY VEINS LEFT    CLINICAL HISTORY:   Swelling    PRIOR:   NONE    Technique:  Grayscale and Doppler imaging provided    FINDINGS:  There is good color flow and compressibility deep veins throughout left lower extremity imaging groin to mid calf                                         US Upper Extremity Veins Left (Final result)  Result time 04/11/25 20:03:01      Final result by Nathaly Gonzales MD (04/11/25 20:03:01)                   Impression:     Negative for DVT    Finalized on: 4/11/2025 8:03 PM By:  Nathaly Gonzales MD  [facility]# [id number]      2025-04-11 20:05:03.358     [facility]               Narrative:    EXAM: US UPPER EXTREMITY VEINS LEFT    CLINICAL HISTORY: Swelling    FINDINGS:  There is good compressibility and color flow of the deep veins throughout the left upper extremity and left central veins                                         X-Ray Chest AP Portable (Final result)  Result time 04/11/25 15:53:06      Final result by Renato Guerrier MD (04/11/25 15:53:06)                   Impression:      1.  Markedly low lung volumes with vascular crowding or atelectasis in the lung bases.  Small effusions.  Cardiac silhouette size enlargement.  Mild interstitial pulmonary edema or interstitial infectious process  difficult to exclude in the right clinical setting.    2.  Stable findings as noted above.      Electronically signed by: Renato Guerrier MD  Date:    04/11/2025  Time:    15:53               Narrative:    EXAMINATION:  XR CHEST AP PORTABLE    CLINICAL HISTORY:  Chest Pain;    COMPARISON:  March 18, 2025    FINDINGS:  Clothing artifact.  Markedly low lung volumes on the current study.  Small effusions.  The lungs are otherwise clear.  The cardiac silhouette size is enlarged.  The trachea is midline and the mediastinal width is normal. Negative for pneumothorax.  Pulmonary vasculature is normal. Negative for osseous abnormalities. Tortuous aorta with calcifications of the aortic knob.  There are degenerative changes of the spine and both shoulder girdles.  Median sternotomy wires and CABG changes.  Dual lead left subclavian pacemaker.                                       Pending Diagnostic Studies:       Procedure Component Value Units Date/Time    [place] URINE HOLD [[id number]] Collected: 04/11/25 2017    Order Status: Sent Lab Status: In process Updated: 04/11/25 2020    Specimen: Urine            Medications:       Medication List        CONTINUE taking these medications      amLODIPine 5 MG tablet  Commonly known as: NORVASC  Take 1 tablet (5 mg total) by mouth once daily.     aspirin 81 MG EC tablet  Commonly known as: ECOTRIN  Take 1 tablet (81 mg total) by mouth once daily.     atorvastatin 40 MG tablet  Commonly known as: LIPITOR  Take 1 tablet (40 mg total) by mouth Daily.     budesonide 3 mg capsule  Commonly known as: ENTOCORT EC  Take 1-2 capsules (3-6 mg total) by mouth daily as needed (flare). As needed     bumetanide 1 MG tablet  Commonly known as: BUMEX  Take 1 tablet (1 mg total) by mouth once daily.     busPIRone 5 MG Tab  Commonly known as: BUSPAR  Take 1 tablet (5 mg total) by mouth 2 (two) times daily as needed (anxiety).     carvediloL 12.5 MG tablet  Commonly known as: COREG  Take 1 tablet  (12.5 mg total) by mouth 2 (two) times daily with meals.     CENTRUM SILVER ORAL     citalopram 10 MG tablet  Commonly known as: CeleXA  Take 1 tablet (10 mg total) by mouth every evening.     CREON 24,000-76,000 -120,000 unit capsule  Generic drug: lipase-protease-amylase 24,000-76,000-120,000 units  Take 1 capsule by mouth 3 (three) times daily with meals.     cyproheptadine 4 mg tablet  Commonly known as: PERIACTIN  Take 1 tablet (4 mg total) by mouth 3 (three) times daily as needed (appetite).     dorzolamide-timolol 2-0.5% 22.3-6.8 mg/mL ophthalmic solution  Commonly known as: COSOPT  Place 1 drop into both eyes 2 (two) times daily.     empagliflozin 10 mg tablet  Commonly known as: JARDIANCE  Take 1 tablet (10 mg total) by mouth once daily.     fluticasone propionate 50 mcg/actuation nasal spray  Commonly known as: FLONASE  1 spray (50 mcg total) by Each Nostril route once daily.     levocetirizine 5 MG tablet  Commonly known as: XYZAL  Take 1 tablet (5 mg total) by mouth every evening.     loperamide 2 mg capsule  Commonly known as: IMODIUM     mupirocin 2 % ointment  Commonly known as: BACTROBAN  Apply topically once daily.     nitroGLYCERIN 0.4 MG SL tablet  Commonly known as: NITROSTAT  PLACE ONE TABLET UNDERNEATH THE TONGUE EVERY 5 MINUTES AS NEEDED FOR CHEST PAIN     ondansetron 4 MG tablet  Commonly known as: ZOFRAN  Take 1 tablet (4 mg total) by mouth every 8 (eight) hours as needed for Nausea.     pantoprazole 40 MG tablet  Commonly known as: PROTONIX  Take 1 tablet (40 mg total) by mouth once daily.     QUEtiapine 25 MG Tab  Commonly known as: SEROQUEL  Take 1 tablet (25 mg total) by mouth every evening.     sodium bicarbonate 650 MG tablet  Take 1 tablet (650 mg total) by mouth once daily.               Indwelling Lines/Drains at time of discharge:   Lines/Drains/Airways       None                   Time spent on the discharge of patient: 92 minutes         Kiesha Holcomb MD  Department of  [facility] Medicine  'Formerly [facility] Surg

## 2025-04-15 NOTE — PLAN OF CARE
Discussed poc with pt, pt verbalized understanding    Purposeful rounding every 2hours    VSS  Cardiac monitoring in use, pt is NSR  Fall precautions in place, remains injury free  Pt denies c/o pain, nausea and vomiting       Bed locked at lowest position  Call light within reach    Chart check complete  Will cont with POC

## 2025-04-15 NOTE — ASSESSMENT & PLAN NOTE
Patient is identified as having Diastolic (HFpEF) heart failure that is Acute on Chronic. CHF is currently uncontrolled due to Dyspnea not returned to baseline after IV diuretic and Pulmonary edema/pleural effusion on CXR.     - Reviewed most recent ECHO performed and demonstrates- Results for orders placed during the hospital encounter of 02/23/25    Echo Saline Bubble? No; Ultrasound enhancing contrast? No    Interpretation Summary    Left Ventricle: The left ventricle is normal in size. Normal wall thickness. There is concentric remodeling. There is normal systolic function. Ejection fraction is approximately 60%. Grade II diastolic dysfunction.    Right Ventricle: Normal right ventricular cavity size. Wall thickness is normal. Systolic function is normal.    Left Atrium: Left atrium is severely dilated.    Aortic Valve: There is mild aortic valve sclerosis. There is mild stenosis. Aortic valve area by VTI is 1.6 cm². Aortic valve peak velocity is 2.0 m/s. Mean gradient is 8 mmHg. The dimensionless index is 0.58.    Tricuspid Valve: There is moderate to severe regurgitation with a centrally directed jet.    Pulmonary Artery: The estimated pulmonary artery systolic pressure is 55 mmHg.    IVC/SVC: Normal venous pressure at 3 mmHg.      Recent Labs reviewed-  Recent Labs   Lab 04/11/25  1508 04/11/25  1853   TROPONINI 0.022  --    BNP  --  832*     Recent Labs     04/13/25  0620 04/14/25  0656 04/15/25  0520   BUN 26* 24* 27*   CREATININE 1.4 1.5* 1.7*   CO2 27 21* 25       No intake or output data in the 24 hours ending 04/15/25 1559    Net IO Since Admission: -3,525 mL [04/15/25 1559]    Home GDMT  per Chart Review:   GMDT Medications per chart review             amLODIPine (NORVASC) 5 MG tablet Take 1 tablet (5 mg total) by mouth once daily.    bumetanide (BUMEX) 1 MG tablet Take 1 tablet (1 mg total) by mouth once daily.    carvediloL (COREG) 12.5 MG tablet Take 1 tablet (12.5 mg total) by mouth 2 (two) times  daily with meals.    nitroGLYCERIN (NITROSTAT) 0.4 MG SL tablet PLACE ONE TABLET UNDERNEATH THE TONGUE EVERY 5 MINUTES AS NEEDED FOR CHEST PAIN          Plan:  -continue diuresis, she has improved today. Of note had vomiting of what appeared to be fecal content. Will check XR abdomen. Spoke with daughter who is sole caretaker at bedside. Will check CT abd/pelvis if signs of obstruction. No other symptoms at this time.    - Current GDMT meds:    -Titrate(increase/decrease) diuretic dosing to target euvolemia. Current Diuretic regimen:  Diuretics (From admission, onward)      None        - Monitor on telemetry.   - Cardiac diet with Fluid restriction at 1.5L with strict I/Os and daily standing weights  -Continue to stress to patient importance of self efficacy and  on diet for CHF.  - Replete electrolytes PRN to target Mag >2 & K >4

## 2025-04-15 NOTE — DISCHARGE SUMMARY
"[facility]  Discharge Summary      Patient Name: Glo Dumont  MRN: [id number]  ALBERTA: [id number]  Patient Class: IP- Inpatient  Admission Date: 4/11/2025  Hospital Length of Stay: 3 days  Discharge Date and Time: 04/15/2025 3:59 PM  Attending Physician: No att. providers found   Discharging Provider: Kiesha Holcomb MD  Primary Care Provider: No primary care provider on file.    Primary Care Team: Networked reference to record PCT     HPI:   Ms. Glo Dumont is a 89 y.o. female who  has a medical history of Anemia, Angina pectoris, Anxiety, Anxiety and depression, Arthritis, Carotid artery occlusion, Carpal tunnel syndrome, Chronic diarrhea, CKD (chronic kidney disease) stage 3, GFR 30-59 ml/min, Colitis, Coronary artery disease, Coronary artery disease, Diastolic dysfunction, Diverticulosis, Encephalopathy, Glaucoma, Greater trochanteric bursitis, Grief at loss of child, H/O carotid endarterectomy, Heart failure, History of coronary angioplasty, Hypercholesteremia, Hypertension, Liver cyst, Low back pain, Macular degeneration, Obesity with serious comorbidity, Primary open-angle glaucoma(365.11), Renal cyst, S/P prosthetic total arthroplasty of the hip, Sarcoidosis, Sarcoidosis of lung, Sickle cell trait, and Uveitis.    She  presented to the ED for evaluation of worsening respiratory symptoms which included increasing dyspnea, and increased swelling in her extremities over the past few days.  She denies any dietary noncompliance and reports compliance with home medications.  Denies any fevers, chills, nausea, vomiting, diarrhea, constipation, abdominal pain, urinary symptoms.  Reports dry cough, dyspnea, lower extremity swelling.    ED course notable for hemodynamically stable vitals.  Labs notable for hemoglobin 9.4, bicarb 20, creatinine 1.5, , CXR noted "Markedly low lung volumes with vascular crowding or atelectasis in the lung bases. Small effusions. Cardiac " "silhouette size enlargement. Mild interstitial pulmonary edema or interstitial infectious process difficult to exclude in the right clinical setting. ".        * No surgery found *      Hospital Course:   89-year-old female admitted for acute on chronic CHF on 4/11.  Cardiology consulted, started on IV diuretics with gradual improvement in his shortness of breath and lower extremity edema.  Of note patient has had some intermittent confusion which is the norm per the family.  04/15/2025   Examination done at bedside, appeared alert and oriented to self, place, date of birth   At baseline mentation per daughter   Patient denied acute events overnight, denied fever, chills, chest pain, shortness O breath nausea, vomiting, bowel or bladder issues   Saturating above 92 on room air   No lower extremity swelling noted   Received IV diuretics during hospital stay   Given patient's age, underlying comorbidities, acute on chronic issues, recurrent hospitalizations, discussed regarding code status, goals of care with patient/daughter over phone,-opted for DNR DNI.    Per daughter, patient has been under hospice in the past, however does not want to proceed with hospice at this point, declined hospice information visit.  Agreeable for home-based palliative, ordered accordingly   Planning to discharge patient today, emphasized on compliance with medications, outpatient follow up visits, fluid, salt restriction, patient/daughter expressed understanding, agreed with the plan        Goals of Care Treatment Preferences:  Code Status: DNR    Health care agent: Denis Langston [facility] agent number: [phone], [phone].                   [facility] Screening:  The patient declined to be screened for utility difficulties, food insecurity, transport difficulties, housing insecurity, and interpersonal safety, so no concerns could be identified this admission.     Consults:     Assessment & Plan  Acute on chronic " heart failure with preserved ejection fraction  Diastolic heart failure, NYHA class 3  Patient is identified as having Diastolic (HFpEF) heart failure that is Acute on Chronic. CHF is currently uncontrolled due to Dyspnea not returned to baseline after IV diuretic and Pulmonary edema/pleural effusion on CXR.     - Reviewed most recent ECHO performed and demonstrates- Results for orders placed during the hospital encounter of 02/23/25    Echo Saline Bubble? No; Ultrasound enhancing contrast? No    Interpretation Summary    Left Ventricle: The left ventricle is normal in size. Normal wall thickness. There is concentric remodeling. There is normal systolic function. Ejection fraction is approximately 60%. Grade II diastolic dysfunction.    Right Ventricle: Normal right ventricular cavity size. Wall thickness is normal. Systolic function is normal.    Left Atrium: Left atrium is severely dilated.    Aortic Valve: There is mild aortic valve sclerosis. There is mild stenosis. Aortic valve area by VTI is 1.6 cm². Aortic valve peak velocity is 2.0 m/s. Mean gradient is 8 mmHg. The dimensionless index is 0.58.    Tricuspid Valve: There is moderate to severe regurgitation with a centrally directed jet.    Pulmonary Artery: The estimated pulmonary artery systolic pressure is 55 mmHg.    IVC/SVC: Normal venous pressure at 3 mmHg.      Recent Labs reviewed-  Recent Labs   Lab 04/11/25  1508 04/11/25  1853   TROPONINI 0.022  --    BNP  --  832*     Recent Labs     04/13/25  0620 04/14/25  0656 04/15/25  0520   BUN 26* 24* 27*   CREATININE 1.4 1.5* 1.7*   CO2 27 21* 25       No intake or output data in the 24 hours ending 04/15/25 1559    Net IO Since Admission: -3,525 mL [04/15/25 1559]    Home GDMT  per Chart Review:   GMDT Medications per chart review             amLODIPine (NORVASC) 5 MG tablet Take 1 tablet (5 mg total) by mouth once daily.    bumetanide (BUMEX) 1 MG tablet Take 1 tablet (1 mg total) by mouth once daily.     carvediloL (COREG) 12.5 MG tablet Take 1 tablet (12.5 mg total) by mouth 2 (two) times daily with meals.    nitroGLYCERIN (NITROSTAT) 0.4 MG SL tablet PLACE ONE TABLET UNDERNEATH THE TONGUE EVERY 5 MINUTES AS NEEDED FOR CHEST PAIN          Plan:  -continue diuresis, she has improved today. Of note had vomiting of what appeared to be fecal content. Will check XR abdomen. Spoke with daughter who is sole caretaker at bedside. Will check CT abd/pelvis if signs of obstruction. No other symptoms at this time.    - Current GDMT meds:    -Titrate(increase/decrease) diuretic dosing to target euvolemia. Current Diuretic regimen:  Diuretics (From admission, onward)      None        - Monitor on telemetry.   - Cardiac diet with Fluid restriction at 1.5L with strict I/Os and daily standing weights  -Continue to stress to patient importance of self efficacy and  on diet for CHF.  - Replete electrolytes PRN to target Mag >2 & K >4  Hypertensive heart disease with heart failure  Hypertensive on admission    Home meds for hypertension were reviewed and noted below.     Home Hypertension Medications per chart review             amLODIPine (NORVASC) 5 MG tablet Take 1 tablet (5 mg total) by mouth once daily.    bumetanide (BUMEX) 1 MG tablet Take 1 tablet (1 mg total) by mouth once daily.    carvediloL (COREG) 12.5 MG tablet Take 1 tablet (12.5 mg total) by mouth 2 (two) times daily with meals.    nitroGLYCERIN (NITROSTAT) 0.4 MG SL tablet PLACE ONE TABLET UNDERNEATH THE TONGUE EVERY 5 MINUTES AS NEEDED FOR CHEST PAIN            Patients blood pressure range in the last 24 hours was: BP  Min: 111/66  Max: 181/91.      PLAN:  -While in the hospital, will manage blood pressure as follows; Continue home antihypertensive regimen  -The patient's inpatient anti-hypertensive regimen is listed below:  Current Antihypertensives     -Will utilize p.r.n. blood pressure medication only if patient's blood pressure greater than 180/110 and  she develops symptoms such as worsening chest pain or shortness of breath.      Other hyperlipidemia  Recent Lipid Panel reviewed-  Lab Results   Component Value Date    HDL 45 10/14/2024    LDLCALC 78.4 10/14/2024    TRIG 148 10/14/2024    CHOL 153 10/14/2024        -Home medications:   Hyperlipidemia Medications              atorvastatin (LIPITOR) 40 MG tablet Take 1 tablet (40 mg total) by mouth Daily.            PLAN  -continue home statin      CKD (chronic kidney disease) stage 4, GFR 15-29 ml/min  Creatine stable for now. BMP reviewed- noted Estimated Creatinine Clearance: 17.6 mL/min (A) (based on SCr of 1.7 mg/dL (H)). according to latest data. Based on current GFR, CKD stage is stage 3 - GFR 30-59.  Monitor UOP and serial BMP and adjust therapy as needed. Renally dose meds. Avoid nephrotoxic medications and procedures.  Gastroesophageal reflux disease with esophagitis  Chronic.  Stable    PLAN:  Continue pantoprazole    Final Active Diagnoses:    Diagnosis Date Noted POA    PRINCIPAL PROBLEM:  Acute on chronic heart failure with preserved ejection fraction [I50.33] 04/04/2024 Yes    Gastroesophageal reflux disease with esophagitis [K21.00] 08/15/2023 Yes     Chronic    CKD (chronic kidney disease) stage 4, GFR 15-29 ml/min [N18.4] 05/11/2017 Yes     Chronic    Diastolic heart failure, NYHA class 3 [I50.30] 02/01/2016 Yes    Hypertensive heart disease with heart failure [I11.0] 07/08/2013 Yes    Other hyperlipidemia [E78.49] 07/08/2013 Yes     Chronic      Problems Resolved During this Admission:       Discharged Condition: fair    Disposition: Home or Self Care    Follow Up:    Patient Instructions:      Ambulatory referral/consult to Outpatient Case Management   Referral Priority: Routine Referral Type: Consultation   Referral Reason: Specialty Services Required   Number of Visits Requested: 1     Ambulatory referral/consult to HOME Palliative Care   Standing Status: Future   Referral Priority: Routine  Referral Type: Consultation   Requested Specialty: Hospice and Palliative Medicine   Number of Visits Requested: 1     Ambulatory referral/consult to Internal Medicine   Standing Status: Future   Referral Priority: Routine Referral Type: Consultation   Referral Reason: Specialty Services Required   Requested Specialty: Internal Medicine   Number of Visits Requested: 1     Ambulatory referral/consult to Cardiology   Standing Status: Future   Referral Priority: Routine Referral Type: Consultation   Referral Reason: Specialty Services Required   Requested Specialty: Cardiology   Number of Visits Requested: 1     Ambulatory referral/consult to [facility]   Standing Status: Future   Referral Priority: Routine Referral Type: Consultation   Referral Reason: Specialty Services Required   Number of Visits Requested: 1       Significant Diagnostic Studies:     Results for orders placed or performed during the hospital encounter of 04/11/25   Comprehensive metabolic panel    Collection Time: 04/11/25  3:08 PM   Result Value Ref Range    Sodium 141 136 - 145 mmol/L    Potassium 3.7 3.5 - 5.1 mmol/L    Chloride 110 95 - 110 mmol/L    CO2 20 (L) 23 - 29 mmol/L    Glucose 143 (H) 70 - 110 mg/dL    BUN 29 (H) 8 - 23 mg/dL    Creatinine 1.5 (H) 0.5 - 1.4 mg/dL    Calcium 8.6 (L) 8.7 - 10.5 mg/dL    Protein Total 6.9 6.0 - 8.4 gm/dL    Albumin 3.7 3.5 - 5.2 g/dL    Bilirubin Total 0.4 0.1 - 1.0 mg/dL    ALP 62 40 - 150 unit/L    AST 21 11 - 45 unit/L    ALT 18 10 - 44 unit/L    Anion Gap 11 8 - 16 mmol/L    eGFR 34 (L) >60 mL/min/1.73/m2   Troponin I    Collection Time: 04/11/25  3:08 PM   Result Value Ref Range    Troponin-I 0.022 <=0.026 ng/mL   B-Type natriuretic peptide (BNP)    Collection Time: 04/11/25  6:53 PM   Result Value Ref Range     (H) 0 - 99 pg/mL   CBC with Differential    Collection Time: 04/11/25  6:53 PM   Result Value Ref Range    WBC 10.40 3.90 - 12.70 K/uL    RBC 3.89 (L) 4.00 - 5.40 M/uL     HGB 11.4 (L) 12.0 - 16.0 gm/dL    HCT 34.7 (L) 37.0 - 48.5 %    MCV 89 82 - 98 fL    MCH 29.3 27.0 - 31.0 pg    MCHC 32.9 32.0 - 36.0 g/dL    RDW 16.0 (H) 11.5 - 14.5 %    Platelet Count 229 150 - 450 K/uL    MPV 10.1 9.2 - 12.9 fL    Nucleated RBC 0 <=0 /100 WBC    Neut % 79.0 (H) 38 - 73 %    Lymph % 9.2 (L) 18 - 48 %    Mono % 8.7 4 - 15 %    Eos % 0.5 <=8 %    Basophil % 0.5 <=1.9 %    Imm Grans % 2.1 (H) 0.0 - 0.5 %    Neut # 8.22 (H) 1.8 - 7.7 K/uL    Lymph # 0.96 (L) 1 - 4.8 K/uL    Mono # 0.90 0.3 - 1 K/uL    Eos # 0.05 <=0.5 K/uL    Baso # 0.05 <=0.2 K/uL    Imm Grans # 0.22 (H) 0.00 - 0.04 K/uL   Urinalysis, Reflex to Urine Culture Urine, Clean Catch    Collection Time: 04/11/25  8:17 PM    Specimen: Urine   Result Value Ref Range    Color, UA Colorless (A) Straw, Terrie, Yellow, Light-Orange    Appearance, UA Clear Clear    pH, UA 7.0 5.0 - 8.0    Spec Grav UA 1.010 1.005 - 1.030    Protein, UA Negative Negative    Glucose, UA 2+ (A) Negative    Ketones, UA Negative Negative    Bilirubin, UA Negative Negative    Blood, UA Negative Negative    Nitrites, UA Negative Negative    Urobilinogen, UA Negative <2.0 EU/dL    Leukocyte Esterase, UA 2+ (A) Negative   Urinalysis Microscopic    Collection Time: 04/11/25  8:17 PM   Result Value Ref Range    RBC, UA 0 0 - 4 /HPF    WBC, UA 6 (H) 0 - 5 /HPF    Bacteria, UA None None, Rare, Occasional /HPF    Yeast, UA None None /HPF    Hyaline Casts, UA 0 0 - 1 /LPF    Microscopic Comment     Comprehensive Metabolic Panel    Collection Time: 04/12/25  6:43 AM   Result Value Ref Range    Sodium 143 136 - 145 mmol/L    Potassium 3.0 (L) 3.5 - 5.1 mmol/L    Chloride 110 95 - 110 mmol/L    CO2 26 23 - 29 mmol/L    Glucose 83 70 - 110 mg/dL    BUN 28 (H) 8 - 23 mg/dL    Creatinine 1.3 0.5 - 1.4 mg/dL    Calcium 8.5 (L) 8.7 - 10.5 mg/dL    Protein Total 5.8 (L) 6.0 - 8.4 gm/dL    Albumin 3.1 (L) 3.5 - 5.2 g/dL    Bilirubin Total 0.5 0.1 - 1.0 mg/dL    ALP 58 40 - 150 unit/L     AST 11 11 - 45 unit/L    ALT 16 10 - 44 unit/L    Anion Gap 7 (L) 8 - 16 mmol/L    eGFR 40 (L) >60 mL/min/1.73/m2   Magnesium    Collection Time: 04/12/25  6:43 AM   Result Value Ref Range    Magnesium  1.5 (L) 1.6 - 2.6 mg/dL   Phosphorus    Collection Time: 04/12/25  6:43 AM   Result Value Ref Range    Phosphorus Level 3.5 2.7 - 4.5 mg/dL   CBC with Differential    Collection Time: 04/12/25  6:43 AM   Result Value Ref Range    WBC 7.76 3.90 - 12.70 K/uL    RBC 3.43 (L) 4.00 - 5.40 M/uL    HGB 9.9 (L) 12.0 - 16.0 gm/dL    HCT 30.7 (L) 37.0 - 48.5 %    MCV 90 82 - 98 fL    MCH 28.9 27.0 - 31.0 pg    MCHC 32.2 32.0 - 36.0 g/dL    RDW 15.9 (H) 11.5 - 14.5 %    Platelet Count 213 150 - 450 K/uL    MPV 10.4 9.2 - 12.9 fL    Nucleated RBC 0 <=0 /100 WBC    Neut % 76.8 (H) 38 - 73 %    Lymph % 11.9 (L) 18 - 48 %    Mono % 8.8 4 - 15 %    Eos % 0.5 <=8 %    Basophil % 0.5 <=1.9 %    Imm Grans % 1.5 (H) 0.0 - 0.5 %    Neut # 5.96 1.8 - 7.7 K/uL    Lymph # 0.92 (L) 1 - 4.8 K/uL    Mono # 0.68 0.3 - 1 K/uL    Eos # 0.04 <=0.5 K/uL    Baso # 0.04 <=0.2 K/uL    Imm Grans # 0.12 (H) 0.00 - 0.04 K/uL   Comprehensive Metabolic Panel    Collection Time: 04/13/25  6:20 AM   Result Value Ref Range    Sodium 142 136 - 145 mmol/L    Potassium 3.4 (L) 3.5 - 5.1 mmol/L    Chloride 104 95 - 110 mmol/L    CO2 27 23 - 29 mmol/L    Glucose 96 70 - 110 mg/dL    BUN 26 (H) 8 - 23 mg/dL    Creatinine 1.4 0.5 - 1.4 mg/dL    Calcium 9.1 8.7 - 10.5 mg/dL    Protein Total 6.9 6.0 - 8.4 gm/dL    Albumin 3.4 (L) 3.5 - 5.2 g/dL    Bilirubin Total 0.8 0.1 - 1.0 mg/dL    ALP 71 40 - 150 unit/L    AST 17 11 - 45 unit/L    ALT 14 10 - 44 unit/L    Anion Gap 11 8 - 16 mmol/L    eGFR 36 (L) >60 mL/min/1.73/m2   Magnesium    Collection Time: 04/13/25  6:20 AM   Result Value Ref Range    Magnesium  1.6 1.6 - 2.6 mg/dL   Phosphorus    Collection Time: 04/13/25  6:20 AM   Result Value Ref Range    Phosphorus Level 3.1 2.7 - 4.5 mg/dL   CBC with Differential     Collection Time: 04/13/25  9:57 AM   Result Value Ref Range    WBC 8.64 3.90 - 12.70 K/uL    RBC 4.18 4.00 - 5.40 M/uL    HGB 11.8 (L) 12.0 - 16.0 gm/dL    HCT 37.0 37.0 - 48.5 %    MCV 89 82 - 98 fL    MCH 28.2 27.0 - 31.0 pg    MCHC 31.9 (L) 32.0 - 36.0 g/dL    RDW 16.1 (H) 11.5 - 14.5 %    Platelet Count 238 150 - 450 K/uL    MPV 10.2 9.2 - 12.9 fL    Nucleated RBC 0 <=0 /100 WBC    Neut % 79.0 (H) 38 - 73 %    Lymph % 9.4 (L) 18 - 48 %    Mono % 8.6 4 - 15 %    Eos % 0.6 <=8 %    Basophil % 0.5 <=1.9 %    Imm Grans % 1.9 (H) 0.0 - 0.5 %    Neut # 6.84 1.8 - 7.7 K/uL    Lymph # 0.81 (L) 1 - 4.8 K/uL    Mono # 0.74 0.3 - 1 K/uL    Eos # 0.05 <=0.5 K/uL    Baso # 0.04 <=0.2 K/uL    Imm Grans # 0.16 (H) 0.00 - 0.04 K/uL   Comprehensive Metabolic Panel    Collection Time: 04/14/25  6:56 AM   Result Value Ref Range    Sodium 140 136 - 145 mmol/L    Potassium 3.7 3.5 - 5.1 mmol/L    Chloride 105 95 - 110 mmol/L    CO2 21 (L) 23 - 29 mmol/L    Glucose 92 70 - 110 mg/dL    BUN 24 (H) 8 - 23 mg/dL    Creatinine 1.5 (H) 0.5 - 1.4 mg/dL    Calcium 8.9 8.7 - 10.5 mg/dL    Protein Total 6.8 6.0 - 8.4 gm/dL    Albumin 3.3 (L) 3.5 - 5.2 g/dL    Bilirubin Total 1.0 0.1 - 1.0 mg/dL    ALP 69 40 - 150 unit/L    AST 19 11 - 45 unit/L    ALT 16 10 - 44 unit/L    Anion Gap 14 8 - 16 mmol/L    eGFR 34 (L) >60 mL/min/1.73/m2   Magnesium    Collection Time: 04/14/25  6:56 AM   Result Value Ref Range    Magnesium  1.6 1.6 - 2.6 mg/dL   Phosphorus    Collection Time: 04/14/25  6:56 AM   Result Value Ref Range    Phosphorus Level 3.6 2.7 - 4.5 mg/dL   CBC with Differential    Collection Time: 04/14/25  9:08 AM   Result Value Ref Range    WBC 10.20 3.90 - 12.70 K/uL    RBC 3.86 (L) 4.00 - 5.40 M/uL    HGB 11.1 (L) 12.0 - 16.0 gm/dL    HCT 34.5 (L) 37.0 - 48.5 %    MCV 89 82 - 98 fL    MCH 28.8 27.0 - 31.0 pg    MCHC 32.2 32.0 - 36.0 g/dL    RDW 15.9 (H) 11.5 - 14.5 %    Platelet Count 213 150 - 450 K/uL    MPV 10.1 9.2 - 12.9 fL     Nucleated RBC 0 <=0 /100 WBC    Neut % 81.6 (H) 38 - 73 %    Lymph % 6.9 (L) 18 - 48 %    Mono % 9.6 4 - 15 %    Eos % 0.4 <=8 %    Basophil % 0.5 <=1.9 %    Imm Grans % 1.0 (H) 0.0 - 0.5 %    Neut # 8.33 (H) 1.8 - 7.7 K/uL    Lymph # 0.70 (L) 1 - 4.8 K/uL    Mono # 0.98 0.3 - 1 K/uL    Eos # 0.04 <=0.5 K/uL    Baso # 0.05 <=0.2 K/uL    Imm Grans # 0.10 (H) 0.00 - 0.04 K/uL   Comprehensive Metabolic Panel    Collection Time: 04/15/25  5:20 AM   Result Value Ref Range    Sodium 138 136 - 145 mmol/L    Potassium 3.0 (L) 3.5 - 5.1 mmol/L    Chloride 104 95 - 110 mmol/L    CO2 25 23 - 29 mmol/L    Glucose 101 70 - 110 mg/dL    BUN 27 (H) 8 - 23 mg/dL    Creatinine 1.7 (H) 0.5 - 1.4 mg/dL    Calcium 8.8 8.7 - 10.5 mg/dL    Protein Total 6.0 6.0 - 8.4 gm/dL    Albumin 3.0 (L) 3.5 - 5.2 g/dL    Bilirubin Total 1.0 0.1 - 1.0 mg/dL    ALP 65 40 - 150 unit/L    AST 11 11 - 45 unit/L    ALT 10 10 - 44 unit/L    Anion Gap 9 8 - 16 mmol/L    eGFR 29 (L) >60 mL/min/1.73/m2   Magnesium    Collection Time: 04/15/25  5:20 AM   Result Value Ref Range    Magnesium  1.6 1.6 - 2.6 mg/dL   Phosphorus    Collection Time: 04/15/25  5:20 AM   Result Value Ref Range    Phosphorus Level 3.4 2.7 - 4.5 mg/dL   CBC with Differential    Collection Time: 04/15/25  5:20 AM   Result Value Ref Range    WBC 8.24 3.90 - 12.70 K/uL    RBC 3.69 (L) 4.00 - 5.40 M/uL    HGB 10.5 (L) 12.0 - 16.0 gm/dL    HCT 32.3 (L) 37.0 - 48.5 %    MCV 88 82 - 98 fL    MCH 28.5 27.0 - 31.0 pg    MCHC 32.5 32.0 - 36.0 g/dL    RDW 15.8 (H) 11.5 - 14.5 %    Platelet Count 188 150 - 450 K/uL    MPV 9.9 9.2 - 12.9 fL    Nucleated RBC 0 <=0 /100 WBC    Neut % 77.8 (H) 38 - 73 %    Lymph % 10.0 (L) 18 - 48 %    Mono % 10.0 4 - 15 %    Eos % 0.5 <=8 %    Basophil % 0.5 <=1.9 %    Imm Grans % 1.2 (H) 0.0 - 0.5 %    Neut # 6.42 1.8 - 7.7 K/uL    Lymph # 0.82 (L) 1 - 4.8 K/uL    Mono # 0.82 0.3 - 1 K/uL    Eos # 0.04 <=0.5 K/uL    Baso # 0.04 <=0.2 K/uL    Imm Grans # 0.10 (H) 0.00  - 0.04 K/uL     *Note: Due to a large number of results and/or encounters for the requested time period, some results have not been displayed. A complete set of results can be found in Results Review.        Imaging Results              US Lower Extremity Veins Left (Final result)  Result time 04/11/25 20:03:40      Final result by Nathaly Gonzales MD (04/11/25 20:03:40)                   Impression:      Negative for DVT    Finalized on: 4/11/2025 8:03 PM By:  Nathaly Gonzales MD  [facility]# [id number]      2025-04-11 20:05:43.390     [facility]               Narrative:    EXAM: US LOWER EXTREMITY VEINS LEFT    CLINICAL HISTORY:   Swelling    PRIOR:   NONE    Technique:  Grayscale and Doppler imaging provided    FINDINGS:  There is good color flow and compressibility deep veins throughout left lower extremity imaging groin to mid calf                                         US Upper Extremity Veins Left (Final result)  Result time 04/11/25 20:03:01      Final result by Nathaly Gonzales MD (04/11/25 20:03:01)                   Impression:     Negative for DVT    Finalized on: 4/11/2025 8:03 PM By:  Nathaly Gonzales MD  [facility]# [id number]      2025-04-11 20:05:03.358     [facility]               Narrative:    EXAM: US UPPER EXTREMITY VEINS LEFT    CLINICAL HISTORY: Swelling    FINDINGS:  There is good compressibility and color flow of the deep veins throughout the left upper extremity and left central veins                                         X-Ray Chest AP Portable (Final result)  Result time 04/11/25 15:53:06      Final result by Renato Guerrier MD (04/11/25 15:53:06)                   Impression:      1.  Markedly low lung volumes with vascular crowding or atelectasis in the lung bases.  Small effusions.  Cardiac silhouette size enlargement.  Mild interstitial pulmonary edema or interstitial infectious process difficult to exclude in the right clinical setting.    2.  Stable findings as noted above.      Electronically signed  by: Renato Guerrier MD  Date:    04/11/2025  Time:    15:53               Narrative:    EXAMINATION:  XR CHEST AP PORTABLE    CLINICAL HISTORY:  Chest Pain;    COMPARISON:  March 18, 2025    FINDINGS:  Clothing artifact.  Markedly low lung volumes on the current study.  Small effusions.  The lungs are otherwise clear.  The cardiac silhouette size is enlarged.  The trachea is midline and the mediastinal width is normal. Negative for pneumothorax.  Pulmonary vasculature is normal. Negative for osseous abnormalities. Tortuous aorta with calcifications of the aortic knob.  There are degenerative changes of the spine and both shoulder girdles.  Median sternotomy wires and CABG changes.  Dual lead left subclavian pacemaker.                                       Pending Diagnostic Studies:       Procedure Component Value Units Date/Time    [place] URINE HOLD [[id number]] Collected: 04/11/25 2017    Order Status: Sent Lab Status: In process Updated: 04/11/25 2020    Specimen: Urine            Medications:       Medication List        CONTINUE taking these medications      amLODIPine 5 MG tablet  Commonly known as: NORVASC  Take 1 tablet (5 mg total) by mouth once daily.     aspirin 81 MG EC tablet  Commonly known as: ECOTRIN  Take 1 tablet (81 mg total) by mouth once daily.     atorvastatin 40 MG tablet  Commonly known as: LIPITOR  Take 1 tablet (40 mg total) by mouth Daily.     budesonide 3 mg capsule  Commonly known as: ENTOCORT EC  Take 1-2 capsules (3-6 mg total) by mouth daily as needed (flare). As needed     bumetanide 1 MG tablet  Commonly known as: BUMEX  Take 1 tablet (1 mg total) by mouth once daily.     busPIRone 5 MG Tab  Commonly known as: BUSPAR  Take 1 tablet (5 mg total) by mouth 2 (two) times daily as needed (anxiety).     carvediloL 12.5 MG tablet  Commonly known as: COREG  Take 1 tablet (12.5 mg total) by mouth 2 (two) times daily with meals.     CENTRUM SILVER ORAL     citalopram 10 MG tablet  Commonly  known as: CeleXA  Take 1 tablet (10 mg total) by mouth every evening.     CREON 24,000-76,000 -120,000 unit capsule  Generic drug: lipase-protease-amylase 24,000-76,000-120,000 units  Take 1 capsule by mouth 3 (three) times daily with meals.     cyproheptadine 4 mg tablet  Commonly known as: PERIACTIN  Take 1 tablet (4 mg total) by mouth 3 (three) times daily as needed (appetite).     dorzolamide-timolol 2-0.5% 22.3-6.8 mg/mL ophthalmic solution  Commonly known as: COSOPT  Place 1 drop into both eyes 2 (two) times daily.     empagliflozin 10 mg tablet  Commonly known as: JARDIANCE  Take 1 tablet (10 mg total) by mouth once daily.     fluticasone propionate 50 mcg/actuation nasal spray  Commonly known as: FLONASE  1 spray (50 mcg total) by Each Nostril route once daily.     levocetirizine 5 MG tablet  Commonly known as: XYZAL  Take 1 tablet (5 mg total) by mouth every evening.     loperamide 2 mg capsule  Commonly known as: IMODIUM     mupirocin 2 % ointment  Commonly known as: BACTROBAN  Apply topically once daily.     nitroGLYCERIN 0.4 MG SL tablet  Commonly known as: NITROSTAT  PLACE ONE TABLET UNDERNEATH THE TONGUE EVERY 5 MINUTES AS NEEDED FOR CHEST PAIN     ondansetron 4 MG tablet  Commonly known as: ZOFRAN  Take 1 tablet (4 mg total) by mouth every 8 (eight) hours as needed for Nausea.     pantoprazole 40 MG tablet  Commonly known as: PROTONIX  Take 1 tablet (40 mg total) by mouth once daily.     QUEtiapine 25 MG Tab  Commonly known as: SEROQUEL  Take 1 tablet (25 mg total) by mouth every evening.     sodium bicarbonate 650 MG tablet  Take 1 tablet (650 mg total) by mouth once daily.               Indwelling Lines/Drains at time of discharge:   Lines/Drains/Airways       None                   Time spent on the discharge of patient: 90 minutes         Kiesha Hlocomb MD  Department of Hospital Medicine  '[facility]

## 2025-04-15 NOTE — PLAN OF CARE
[facility] - Med Surg  Discharge Final Note    Primary Care Provider: No primary care provider on file.    Expected Discharge Date: 4/15/2025    Final Discharge Note (most recent)       Final Note - 04/15/25 0859          Final Note    Assessment Type Final Discharge Note     Anticipated Discharge Disposition Home or Self Care        Post-Acute Status    Discharge Delays None known at this time                   Patient has no d/c needs at this time. Sw to follow up, as needed, for d/c planning purposes.

## 2025-04-15 NOTE — DISCHARGE INSTRUCTIONS
Recommend compliance with medications, outpatient follow up with PCP, Cardiology within 1-2 weeks upon discharge   Recommended fluid restriction, salt restriction

## 2025-04-15 NOTE — ASSESSMENT & PLAN NOTE
Hypertensive on admission    Home meds for hypertension were reviewed and noted below.     Home Hypertension Medications per chart review             amLODIPine (NORVASC) 5 MG tablet Take 1 tablet (5 mg total) by mouth once daily.    bumetanide (BUMEX) 1 MG tablet Take 1 tablet (1 mg total) by mouth once daily.    carvediloL (COREG) 12.5 MG tablet Take 1 tablet (12.5 mg total) by mouth 2 (two) times daily with meals.    nitroGLYCERIN (NITROSTAT) 0.4 MG SL tablet PLACE ONE TABLET UNDERNEATH THE TONGUE EVERY 5 MINUTES AS NEEDED FOR CHEST PAIN            Patients blood pressure range in the last 24 hours was: BP  Min: 111/66  Max: 181/91.      PLAN:  -While in the hospital, will manage blood pressure as follows; Continue home antihypertensive regimen  -The patient's inpatient anti-hypertensive regimen is listed below:  Current Antihypertensives     -Will utilize p.r.n. blood pressure medication only if patient's blood pressure greater than 180/110 and she develops symptoms such as worsening chest pain or shortness of breath.

## 2025-04-15 NOTE — ASSESSMENT & PLAN NOTE
Patient is identified as having Diastolic (HFpEF) heart failure that is Acute on Chronic. CHF is currently uncontrolled due to Dyspnea not returned to baseline after IV diuretic and Pulmonary edema/pleural effusion on CXR.     - Reviewed most recent ECHO performed and demonstrates- Results for orders placed during the hospital encounter of 02/23/25    Echo Saline Bubble? No; Ultrasound enhancing contrast? No    Interpretation Summary    Left Ventricle: The left ventricle is normal in size. Normal wall thickness. There is concentric remodeling. There is normal systolic function. Ejection fraction is approximately 60%. Grade II diastolic dysfunction.    Right Ventricle: Normal right ventricular cavity size. Wall thickness is normal. Systolic function is normal.    Left Atrium: Left atrium is severely dilated.    Aortic Valve: There is mild aortic valve sclerosis. There is mild stenosis. Aortic valve area by VTI is 1.6 cm². Aortic valve peak velocity is 2.0 m/s. Mean gradient is 8 mmHg. The dimensionless index is 0.58.    Tricuspid Valve: There is moderate to severe regurgitation with a centrally directed jet.    Pulmonary Artery: The estimated pulmonary artery systolic pressure is 55 mmHg.    IVC/SVC: Normal venous pressure at 3 mmHg.      Recent Labs reviewed-  Recent Labs   Lab 04/11/25  1508 04/11/25  1853   TROPONINI 0.022  --    BNP  --  832*     Recent Labs     04/13/25  0620 04/14/25  0656 04/15/25  0520   BUN 26* 24* 27*   CREATININE 1.4 1.5* 1.7*   CO2 27 21* 25       No intake or output data in the 24 hours ending 04/15/25 1602    Net IO Since Admission: -3,525 mL [04/15/25 1602]    Home GDMT  per Chart Review:   GMDT Medications per chart review             amLODIPine (NORVASC) 5 MG tablet Take 1 tablet (5 mg total) by mouth once daily.    bumetanide (BUMEX) 1 MG tablet Take 1 tablet (1 mg total) by mouth once daily.    carvediloL (COREG) 12.5 MG tablet Take 1 tablet (12.5 mg total) by mouth 2 (two) times  daily with meals.    nitroGLYCERIN (NITROSTAT) 0.4 MG SL tablet PLACE ONE TABLET UNDERNEATH THE TONGUE EVERY 5 MINUTES AS NEEDED FOR CHEST PAIN          Plan:  -continue diuresis, she has improved today. Of note had vomiting of what appeared to be fecal content. Will check XR abdomen. Spoke with daughter who is sole caretaker at bedside. Will check CT abd/pelvis if signs of obstruction. No other symptoms at this time.    - Current GDMT meds:    -Titrate(increase/decrease) diuretic dosing to target euvolemia. Current Diuretic regimen:  Diuretics (From admission, onward)      None        - Monitor on telemetry.   - Cardiac diet with Fluid restriction at 1.5L with strict I/Os and daily standing weights  -Continue to stress to patient importance of self efficacy and  on diet for CHF.  - Replete electrolytes PRN to target Mag >2 & K >4

## 2025-04-16 ENCOUNTER — TELEPHONE (OUTPATIENT)
Dept: FAMILY MEDICINE | Facility: CLINIC | Age: 89
End: 2025-04-16
Payer: MEDICARE

## 2025-04-16 ENCOUNTER — PATIENT MESSAGE (OUTPATIENT)
Dept: FAMILY MEDICINE | Facility: CLINIC | Age: 89
End: 2025-04-16
Payer: MEDICARE

## 2025-04-16 ENCOUNTER — PATIENT OUTREACH (OUTPATIENT)
Dept: ADMINISTRATIVE | Facility: CLINIC | Age: 89
End: 2025-04-16
Payer: MEDICARE

## 2025-04-16 NOTE — PROGRESS NOTES
C3 nurse attempted to contact Glo Dumont  for a TCC post hospital discharge follow up call. LVM. The patient has a scheduled [facility] appointment with Britt Prakash DO on 04/22/25 @ 1300.

## 2025-04-16 NOTE — TELEPHONE ENCOUNTER
----- Message from [employer] sent at 4/16/2025 12:09 PM CDT -----  .Type: Patient Call BackWho called:Chikis with clinical home healthWhat is the request in detail:  calling concerning needing orders put in for home health. Also stated patient went into the hospital on 4/11/2025 and was discharged today with no instruction Can the clinic reply by [facility]?   Would the patient rather a call back or a response via [facility]:

## 2025-04-17 ENCOUNTER — TELEPHONE (OUTPATIENT)
Dept: HOME HEALTH SERVICES | Facility: CLINIC | Age: 89
End: 2025-04-17
Payer: MEDICARE

## 2025-04-17 ENCOUNTER — PATIENT MESSAGE (OUTPATIENT)
Dept: HOME HEALTH SERVICES | Facility: CLINIC | Age: 89
End: 2025-04-17
Payer: MEDICARE

## 2025-04-17 NOTE — TELEPHONE ENCOUNTER
Attempted to contact pt daughter Jessica to schedule an appointment regarding a referral placed for a tcc home visit with a nurse practitioner.    [employer] portal message sent

## 2025-04-21 ENCOUNTER — TELEPHONE (OUTPATIENT)
Dept: FAMILY MEDICINE | Facility: CLINIC | Age: 89
End: 2025-04-21
Payer: MEDICARE

## 2025-04-21 DIAGNOSIS — R53.1 WEAKNESS: Primary | ICD-10-CM

## 2025-04-21 NOTE — TELEPHONE ENCOUNTER
----- Message from Shirlene sent at 4/21/2025  8:46 AM CDT -----  Contact: [phone]  [email or web address] Patient is calling for Medical Advice regarding:Jayme with [facility] How long has patient had these symptoms:Pharmacy name and phone#:Patient wants a call back or thru [facility]:call back Comments:She states she is needing orders to resume home health care she states they need order to continue please adviseFax [phone] advise patient replies from provider may take up to 48 hours.

## 2025-04-22 ENCOUNTER — OFFICE VISIT (OUTPATIENT)
Dept: CARDIOLOGY | Facility: CLINIC | Age: 89
End: 2025-04-22
Payer: MEDICARE

## 2025-04-22 ENCOUNTER — PATIENT OUTREACH (OUTPATIENT)
Dept: ADMINISTRATIVE | Facility: OTHER | Age: 89
End: 2025-04-22
Payer: MEDICARE

## 2025-04-22 ENCOUNTER — OFFICE VISIT (OUTPATIENT)
Dept: FAMILY MEDICINE | Facility: CLINIC | Age: 89
End: 2025-04-22
Payer: MEDICARE

## 2025-04-22 VITALS
WEIGHT: 117.81 LBS | BODY MASS INDEX: 22.28 KG/M2 | DIASTOLIC BLOOD PRESSURE: 74 MMHG | HEART RATE: 74 BPM | OXYGEN SATURATION: 99 % | SYSTOLIC BLOOD PRESSURE: 136 MMHG

## 2025-04-22 VITALS
SYSTOLIC BLOOD PRESSURE: 134 MMHG | HEIGHT: 61 IN | DIASTOLIC BLOOD PRESSURE: 74 MMHG | RESPIRATION RATE: 21 BRPM | WEIGHT: 117 LBS | TEMPERATURE: 95 F | BODY MASS INDEX: 22.09 KG/M2 | OXYGEN SATURATION: 99 % | HEART RATE: 73 BPM

## 2025-04-22 DIAGNOSIS — Z95.0 PRESENCE OF CARDIAC PACEMAKER: ICD-10-CM

## 2025-04-22 DIAGNOSIS — N18.4 CKD (CHRONIC KIDNEY DISEASE) STAGE 4, GFR 15-29 ML/MIN: ICD-10-CM

## 2025-04-22 DIAGNOSIS — I50.9 ACUTE EXACERBATION OF CHF (CONGESTIVE HEART FAILURE): Primary | ICD-10-CM

## 2025-04-22 DIAGNOSIS — R60.9 EDEMA, UNSPECIFIED TYPE: Primary | ICD-10-CM

## 2025-04-22 DIAGNOSIS — Z95.1 S/P CABG X 3: ICD-10-CM

## 2025-04-22 DIAGNOSIS — I25.10 CORONARY ARTERY DISEASE, OCCLUSIVE: Chronic | ICD-10-CM

## 2025-04-22 DIAGNOSIS — I20.9 ANGINA, CLASS II: Chronic | ICD-10-CM

## 2025-04-22 DIAGNOSIS — N17.9 AKI (ACUTE KIDNEY INJURY): ICD-10-CM

## 2025-04-22 DIAGNOSIS — I50.30 DIASTOLIC HEART FAILURE, NYHA CLASS 3: ICD-10-CM

## 2025-04-22 DIAGNOSIS — I50.32 CHRONIC DIASTOLIC CONGESTIVE HEART FAILURE: ICD-10-CM

## 2025-04-22 DIAGNOSIS — E87.20 METABOLIC ACIDOSIS: ICD-10-CM

## 2025-04-22 DIAGNOSIS — R07.9 CHEST PAIN, UNSPECIFIED TYPE: ICD-10-CM

## 2025-04-22 PROBLEM — K51.90 ULCERATIVE COLITIS: Chronic | Status: RESOLVED | Noted: 2017-05-11 | Resolved: 2025-04-22

## 2025-04-22 PROCEDURE — 99999 PR PBB SHADOW E&M-EST. PATIENT-LVL V: CPT | Mod: PBBFAC,HCNC,, | Performed by: INTERNAL MEDICINE

## 2025-04-22 PROCEDURE — 99999 PR PBB SHADOW E&M-EST. PATIENT-LVL IV: CPT | Mod: PBBFAC,HCNC,, | Performed by: INTERNAL MEDICINE

## 2025-04-22 RX ORDER — CARVEDILOL 25 MG/1
25 TABLET ORAL 2 TIMES DAILY WITH MEALS
Qty: 180 TABLET | Refills: 3 | Status: SHIPPED | OUTPATIENT
Start: 2025-04-22

## 2025-04-22 RX ORDER — ATORVASTATIN CALCIUM 40 MG/1
40 TABLET, FILM COATED ORAL DAILY
Qty: 90 TABLET | Refills: 3 | Status: SHIPPED | OUTPATIENT
Start: 2025-04-22

## 2025-04-22 RX ORDER — NITROGLYCERIN 0.4 MG/1
0.4 TABLET SUBLINGUAL EVERY 5 MIN PRN
Qty: 100 TABLET | Refills: 1 | Status: SHIPPED | OUTPATIENT
Start: 2025-04-22

## 2025-04-22 RX ORDER — SODIUM BICARBONATE 650 MG/1
650 TABLET ORAL DAILY
Qty: 180 TABLET | Refills: 0 | Status: SHIPPED | OUTPATIENT
Start: 2025-04-22 | End: 2026-04-22

## 2025-04-22 NOTE — PROGRESS NOTES
Subjective:   Patient ID:  04/22/2025      Glo Dumont is a 89 y.o. female who presents for evaluation of Shortness of Breath and Hospital Follow Up      History of Present Illness    CHIEF COMPLAINT:  Patient presents today with occipital headache.    CURRENT SYMPTOMS:  She reports dyspnea this morning and increased swelling since returning home.    RECENT HOSPITALIZATION:  She was hospitalized in February/March for congestive heart failure exacerbation, presenting with dyspnea, LLE swelling, and intermittent confusion. Treatment included IV Lasix.    MEDICAL HISTORY:  She has CKD stage 3, which is stable. Her recent A1C was 5.7. She has a well-functioning pacemaker.    CURRENT MEDICATIONS:  She takes Bumex 1 mg daily in the morning, Carvedilol 25 mg twice daily, Amlodipine, Jardiance for diabetes management, and Atorvastatin for HLD.         Shortness of Breath  Pertinent negatives include no chest pain or syncope.       Past Medical History:   Diagnosis Date    Anemia     Angina pectoris     Anxiety     Anxiety and depression     Arthritis     hip    Carotid artery occlusion     Carpal tunnel syndrome 06/23/2008    emg    Chronic diarrhea     work up in 2011 with EGD, CS and VCE    CKD (chronic kidney disease) stage 3, GFR 30-59 ml/min 5/11/2017    Colitis     Coronary artery disease     Coronary artery disease     Diastolic dysfunction     Diverticulosis     Encephalopathy 10/14/2024    Glaucoma     Greater trochanteric bursitis 2/10/2015    Grief at loss of child 1/26/2016    H/O carotid endarterectomy 12/2/2013    Heart failure     History of coronary angioplasty 3/11/2014    Hypercholesteremia     Hypertension     Liver cyst 02/08/2013    ct abd    Low back pain 2/8/2024    Macular degeneration     Obesity with serious comorbidity 3/19/2023    Primary open-angle glaucoma(365.11) 9/3/2013    Renal cyst 02/08/2013    ct abd    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    Sarcoidosis      Sarcoidosis of lung     Sickle cell trait     Uveitis        Past Surgical History:   Procedure Laterality Date    A-V CARDIAC PACEMAKER INSERTION Left 03/21/2023    Procedure: INSERTION, CARDIAC PACEMAKER, DUAL CHAMBER/His Lead;  Surgeon: Cortez Ambrose MD;  Location: [facility] CATH LAB;  Service: Cardiology;  Laterality: Left;  MDT/ MD to confirm in am/possible nurse sedate    CAROTID ENDARTERECTOMY Right 2000s    CATARACT EXTRACTION Bilateral     Dr. Liang Dennis    CHOLECYSTECTOMY      laparoscopic, 3/18.    CORONARY ANGIOPLASTY WITH STENT PLACEMENT  11/19/2010    RCA-HALIE 2010    Lathia    JOINT REPLACEMENT Left 11/03/2014    Dr. Braun    TOTAL ABDOMINAL HYSTERECTOMY W/ BILATERAL SALPINGOOPHORECTOMY  1972       Social History[1]    Family History   Problem Relation Name Age of Onset    Hypertension Mother      Heart failure Mother      Cancer Father          prostate    Cirrhosis Brother      Heart failure Brother      Cancer Daughter          Carcinoid         Review of Systems   Cardiovascular:  Negative for chest pain, dyspnea on exertion, palpitations and syncope.   Respiratory:  Negative for shortness of breath.    Genitourinary: Negative.    Neurological: Negative.        Current Outpatient Medications on File Prior to Visit   Medication Sig    amLODIPine (NORVASC) 5 MG tablet Take 1 tablet (5 mg total) by mouth once daily.    aspirin (ECOTRIN) 81 MG EC tablet Take 1 tablet (81 mg total) by mouth once daily.    budesonide (ENTOCORT EC) 3 mg capsule Take 1-2 capsules (3-6 mg total) by mouth daily as needed (flare). As needed    bumetanide (BUMEX) 1 MG tablet Take 1 tablet (1 mg total) by mouth once daily.    busPIRone (BUSPAR) 5 MG Tab Take 1 tablet (5 mg total) by mouth 2 (two) times daily as needed (anxiety).    citalopram (CELEXA) 10 MG tablet Take 1 tablet (10 mg total) by mouth every evening.    cyproheptadine (PERIACTIN) 4 mg tablet Take 1 tablet (4 mg total) by mouth 3 (three) times daily as needed  (appetite).    dorzolamide-timolol 2-0.5% (COSOPT) 22.3-6.8 mg/mL ophthalmic solution Place 1 drop into both eyes 2 (two) times daily.    empagliflozin (JARDIANCE) 10 mg tablet Take 1 tablet (10 mg total) by mouth once daily.    fluticasone propionate (FLONASE) 50 mcg/actuation nasal spray 1 spray (50 mcg total) by Each Nostril route once daily.    FOLIC ACID/MULTIVIT-MIN/LUTEIN (CENTRUM SILVER ORAL) Take 1 tablet by mouth once daily.    levocetirizine (XYZAL) 5 MG tablet Take 1 tablet (5 mg total) by mouth every evening.    lipase-protease-amylase 24,000-76,000-120,000 units (CREON) 24,000-76,000 -120,000 unit capsule Take 1 capsule by mouth 3 (three) times daily with meals.    loperamide (IMODIUM) 2 mg capsule Take 1 mg by mouth every 6 (six) hours as needed for Diarrhea.    mupirocin (BACTROBAN) 2 % ointment Apply topically once daily.    ondansetron (ZOFRAN) 4 MG tablet Take 1 tablet (4 mg total) by mouth every 8 (eight) hours as needed for Nausea.    pantoprazole (PROTONIX) 40 MG tablet Take 1 tablet (40 mg total) by mouth once daily.    QUEtiapine (SEROQUEL) 25 MG Tab Take 1 tablet (25 mg total) by mouth every evening.    sodium bicarbonate 650 MG tablet Take 1 tablet (650 mg total) by mouth once daily.    [DISCONTINUED] atorvastatin (LIPITOR) 40 MG tablet Take 1 tablet (40 mg total) by mouth Daily.    [DISCONTINUED] carvediloL (COREG) 12.5 MG tablet Take 1 tablet (12.5 mg total) by mouth 2 (two) times daily with meals.    [DISCONTINUED] nitroGLYCERIN (NITROSTAT) 0.4 MG SL tablet PLACE ONE TABLET UNDERNEATH THE TONGUE EVERY 5 MINUTES AS NEEDED FOR CHEST PAIN     No current facility-administered medications on file prior to visit.       Objective:   Objective:  Wt Readings from Last 3 Encounters:   04/22/25 53.4 kg (117 lb 13.4 oz)   04/14/25 54.9 kg (121 lb)   03/18/25 58.3 kg (128 lb 10.2 oz)     Temp Readings from Last 3 Encounters:   04/15/25 98.8 °F (37.1 °C) (Oral)   03/18/25 98.2 °F (36.8 °C) (Tympanic)    03/04/25 97.7 °F (36.5 °C)     BP Readings from Last 3 Encounters:   04/22/25 136/74   04/15/25 139/72   03/18/25 (!) 140/70     Pulse Readings from Last 3 Encounters:   04/22/25 74   04/15/25 74   03/18/25 73       Physical Exam  Vitals reviewed.   Constitutional:       Appearance: She is well-developed.   Neck:      Vascular: No carotid bruit.   Cardiovascular:      Rate and Rhythm: Normal rate and regular rhythm.      Pulses: Intact distal pulses.      Heart sounds: Normal heart sounds. No murmur heard.  Pulmonary:      Breath sounds: Normal breath sounds.   Neurological:      Mental Status: She is oriented to person, place, and time.           Lab Results   Component Value Date    CHOL 153 10/14/2024    CHOL 160 05/04/2022    CHOL 193 09/15/2021     Lab Results   Component Value Date    HDL 45 10/14/2024    HDL 81 (H) 05/04/2022    HDL 56 09/15/2021     Lab Results   Component Value Date    LDLCALC 78.4 10/14/2024    LDLCALC 63.0 05/04/2022    LDLCALC 113.2 09/15/2021     Lab Results   Component Value Date    TRIG 148 10/14/2024    TRIG 80 05/04/2022    TRIG 119 09/15/2021     Lab Results   Component Value Date    CHOLHDL 29.4 10/14/2024    CHOLHDL 50.6 (H) 05/04/2022    CHOLHDL 29.0 09/15/2021       Chemistry        Component Value Date/Time     04/15/2025 0520     03/18/2025 1106    K 3.0 (L) 04/15/2025 0520    K 3.3 (L) 03/18/2025 1106     04/15/2025 0520     03/18/2025 1106    CO2 25 04/15/2025 0520    CO2 23 03/18/2025 1106    BUN 27 (H) 04/15/2025 0520    CREATININE 1.7 (H) 04/15/2025 0520     (H) 03/18/2025 1106        Component Value Date/Time    CALCIUM 8.8 04/15/2025 0520    CALCIUM 8.8 03/18/2025 1106    ALKPHOS 65 04/15/2025 0520    ALKPHOS 69 02/26/2025 0452    AST 11 04/15/2025 0520    AST 11 02/26/2025 0452    ALT 10 04/15/2025 0520    ALT 9 (L) 02/26/2025 0452    BILITOT 1.0 04/15/2025 0520    BILITOT 0.5 02/26/2025 0452    ESTGFRAFRICA 33.9 (A) 07/19/2022 0835     EGFRNONAA 29.4 (A) 07/19/2022 0835          Lab Results   Component Value Date    TSH 1.191 10/13/2024     Lab Results   Component Value Date    INR 1.0 12/30/2024    INR 1.0 10/13/2024    INR 1.0 09/09/2020     Lab Results   Component Value Date    WBC 8.24 04/15/2025    HGB 10.5 (L) 04/15/2025    HCT 32.3 (L) 04/15/2025    MCV 88 04/15/2025     04/15/2025     BNP  @LABRCNTIP(BNP,BNPTRIAGEBLO)@  CrCl cannot be calculated (Patient's most recent lab result is older than the maximum 7 days allowed.).     Imaging:  ======    No results found for this or any previous visit.    Results for orders placed during the hospital encounter of 03/02/22    US Carotid Bilateral    Narrative  EXAMINATION:  US CAROTID BILATERAL    CLINICAL HISTORY:  syncope;    COMPARISON:  None    FINDINGS:  Sonographic evaluation of the carotid systems was performed.    There is some calcified plaque in the carotid bulbs bilaterally and in the proximal internal carotid arteries.    The peak systolic velocity in the right internal carotid artery was approximately 67 cm/sec.    The peak systolic velocity in the left internal carotid artery was [id number] cm/sec.    Antegrade flow noted in both vertebral arteries.    Stenosis percentage validated velocity measurements with angiographic measurements, velocity criteria are extrapolated from diameter data as defined by the Society of Radiologists in Ultrasound Consensus Conference Radiology 2003; 229;340-346.    Impression  No sonographic evidence of a significant stenosis is identified in either carotid system.  Calcified plaque in both carotid bulbs and proximal internal carotid arteries.      Electronically signed by: Keith East  Date:    03/02/2022  Time:    19:19    Results for orders placed during the hospital encounter of 03/18/25    X-Ray Chest PA And Lateral    Narrative  EXAMINATION:  XR CHEST PA AND LATERAL    CLINICAL HISTORY:  Heart failure, unspecified    TECHNIQUE:  PA and  lateral views of the chest were performed.    COMPARISON:  Prior radiographs    FINDINGS:  Cardiac silhouette and mediastinal contours are unchanged.  Cardiac device remains.  Lungs demonstrate no acute opacity.  Osseous structures are intact.    Impression  No acute cardiopulmonary process.      Electronically signed by: Keaton Murillo MD  Date:    03/18/2025  Time:    11:04      No results found for this or any previous visit.      No valid procedures specified.        Results for orders placed during the hospital encounter of 03/25/22    Nuclear Stress - Cardiology Interpreted    Interpretation Summary    Normal myocardial perfusion scan. There is no evidence of myocardial ischemia or infarction.    The gated perfusion images showed an ejection fraction of 74% at rest. The gated perfusion images showed an ejection fraction of 82% post stress.    The EKG portion of this study is negative for ischemia.    The patient reported no chest pain during the stress test.    During stress, rare PVCs are noted.      Results for orders placed during the hospital encounter of 02/23/25    Echo Saline Bubble? No; Ultrasound enhancing contrast? No    Interpretation Summary    Left Ventricle: The left ventricle is normal in size. Normal wall thickness. There is concentric remodeling. There is normal systolic function. Ejection fraction is approximately 60%. Grade II diastolic dysfunction.    Right Ventricle: Normal right ventricular cavity size. Wall thickness is normal. Systolic function is normal.    Left Atrium: Left atrium is severely dilated.    Aortic Valve: There is mild aortic valve sclerosis. There is mild stenosis. Aortic valve area by VTI is 1.6 cm². Aortic valve peak velocity is 2.0 m/s. Mean gradient is 8 mmHg. The dimensionless index is 0.58.    Tricuspid Valve: There is moderate to severe regurgitation with a centrally directed jet.    Pulmonary Artery: The estimated pulmonary artery systolic pressure is 55  mmHg.    IVC/SVC: Normal venous pressure at 3 mmHg.      No results found for this or any previous visit.      Lab Results   Component Value Date    HGBA1C 5.7 (H) 03/11/2025         The ASCVD Risk score (Celia KIM, et al., 2019) failed to calculate for the following reasons:    The 2019 ASCVD risk score is only valid for ages 40 to 79    Risk score cannot be calculated because patient has a medical history suggesting prior/existing ASCVD    Diagnostic Results:  ECG: Reviewed    Assessment and Plan:   Edema, unspecified type    Chronic diastolic congestive heart failure  -     Ambulatory referral/consult to Cardiology    Coronary artery disease, occlusive  -     carvediloL (COREG) 25 MG tablet; Take 1 tablet (25 mg total) by mouth 2 (two) times daily with meals.  Dispense: 180 tablet; Refill: 3  -     nitroGLYCERIN (NITROSTAT) 0.4 MG SL tablet; Place 1 tablet (0.4 mg total) under the tongue every 5 (five) minutes as needed for Chest pain.  Dispense: 100 tablet; Refill: 1    Angina, class II  -     nitroGLYCERIN (NITROSTAT) 0.4 MG SL tablet; Place 1 tablet (0.4 mg total) under the tongue every 5 (five) minutes as needed for Chest pain.  Dispense: 100 tablet; Refill: 1    S/P CABG x 3    Diastolic heart failure, NYHA class 3  -     carvediloL (COREG) 25 MG tablet; Take 1 tablet (25 mg total) by mouth 2 (two) times daily with meals.  Dispense: 180 tablet; Refill: 3  -     nitroGLYCERIN (NITROSTAT) 0.4 MG SL tablet; Place 1 tablet (0.4 mg total) under the tongue every 5 (five) minutes as needed for Chest pain.  Dispense: 100 tablet; Refill: 1    Chest pain, unspecified type  -     atorvastatin (LIPITOR) 40 MG tablet; Take 1 tablet (40 mg total) by mouth Daily.  Dispense: 90 tablet; Refill: 3    Presence of cardiac pacemaker    CKD (chronic kidney disease) stage 4, GFR 15-29 ml/min        Assessment & Plan    I50.9 Acute exacerbation of CHF (congestive heart failure)  I50.30 Diastolic heart failure, NYHA class 3  N18.4  CKD (chronic kidney disease) stage 4, GFR 15-29 ml/min  I25.10 Coronary artery disease, occlusive  I20.9 Angina, class II  R60.9 Edema, unspecified type  Z95.1 S/P CABG x 3  R07.9 Chest pain, unspecified type  Z95.0 Presence of cardiac pacemaker    IMPRESSION:  - Recent hospital admission for congestive heart failure improved with IV diuresis.  - Patient is euvolemic.  - Blood pressure is acceptable.  - Renal function is stable with CKD stage 3.  - Recent A1C of 5.7 indicates good glycemic control.  - Pacemaker is functioning properly.      CKD (CHRONIC KIDNEY DISEASE) STAGE 4, GFR 15-29 ML/MIN:  - Continued Jardiance.    CORONARY ARTERY DISEASE, OCCLUSIVE:  - 1 year refill for Atorvastatin.    ANGINA, CLASS II:  - 1 year refill for Nitroglycerin.    EDEMA, UNSPECIFIED TYPE:  - Clarified the difference between free water and other fluids containing sodium.  - Discussed how salt acts like a sponge, causing the body to retain fluid.    LIFESTYLE CHANGES:  - Patient to limit salt intake to less than 2 g per day.  - Advised avoiding salty foods such as seafood, crawfish, potato chips, packaged meat, sausage, and hot dogs.  - Recommend drinking 1.5 to 2 L of free water per day (equivalent to 3-4 16-ounce bottles or up to 6 10-ounce bottles).  - Encouraged walking for 30 minutes daily, which can be divided into two 15-minute sessions.  - Continued Amlodipine.  - Follow up in 6 months.  - Cancel previously scheduled appointment that was too soon (in a week or two).           This note was generated with the assistance of ambient listening technology. Verbal consent was obtained by the patient and accompanying visitor(s) for the recording of patient appointment to facilitate this note. I attest to having reviewed and edited the generated note for accuracy, though some syntax or spelling errors may persist. Please contact the author of this note for any clarification.      Reviewed all tests and above medical conditions with  patient in detail and formulated treatment plan.  Maintaining healthy weight and weight loss goals were discussed in clinic.    Follow up in  6 months         [1]   Social History  Tobacco Use    Smoking status: Never    Smokeless tobacco: Never   Substance Use Topics    Alcohol use: No     Alcohol/week: 0.0 standard drinks of alcohol    Drug use: No

## 2025-04-22 NOTE — PROGRESS NOTES
CHW - Outreach Attempt    Community Health Worker left a voicemail message for 1st attempt to contact patient regarding: LVM to find out what resources that may be available. 1st attempt   Community Health Worker to attempt to contact patient on:    5/6/25

## 2025-04-22 NOTE — PROGRESS NOTES
Patient ID: Glo Dumont is a 89 y.o. female.    Chief Complaint: Follow-up (She is here for a hospital follow up. Was admitted on 4/11-4/15. Feels like she is doing better now since being discharged. )      History of Present Illness    - Ms. Dumont presents for a follow-up visit after a recent hospitalization for heart failure.    Ms. Dumont was hospitalized for heart failure from April 11th to April 15th. Since discharge, she reports significant improvement in her breathing and overall condition. She has been taking Bumex, prescribed upon discharge. She has sundowning and confusion, typically starting around 5-6 PM. She takes Seroquel for this issue, usually around 5-6 PM when she prepares for bed, which induces immediate sleep. Her daughter mentions that she had been sleeping excessively and feeling weak after discharge, which was attributed to low potassium levels. The daughter administered one potassium pill and had her consume a few bananas in response to these symptoms.      ROS:  General: denies fever, denies chills, complains of fatigue, denies weight gain, denies weight loss  Eyes: denies vision changes, denies redness, denies discharge  ENT: denies ear pain, denies nasal congestion, denies sore throat  Cardiovascular: denies chest pain, denies palpitations, denies lower extremity edema  Respiratory: denies cough, complains of shortness of breath, complains of difficulty breathing, complains of tachypnea  Gastrointestinal: denies abdominal pain, denies nausea, denies vomiting, denies diarrhea, denies constipation, denies blood in stool  Genitourinary: denies dysuria, denies hematuria, denies frequency  Musculoskeletal: denies joint pain, denies muscle pain  Skin: denies rash, denies lesion  Neurological: denies headache, denies dizziness, denies numbness, denies tingling  Psychiatric: denies anxiety, denies depression, denies sleep difficulty, complains of confusion, complains of confusion or  "disorientation            Physical Exam    General: In no acute distress.  Head: Normocephalic. Non traumatic.  Eyes: PERRLA. EOMs full. Conjunctivae clear. Fundi grossly normal.  Ears: EACs clear. TMs normal.  Nose: Mucosa pink. Mucosa moist. No obstruction.  Throat: Clear. No exudates. No lesions.  Neck: Supple. No masses. No thyromegaly. No bruits.  Chest: Lungs clear. No rales. No rhonchi. No wheezes. BREATHING HEAVY.  Heart: RRR. No murmurs. No rubs. No gallops. Good S1 S2 sounds.  Abdomen: Soft. No tenderness. No masses. BS normal.  : Normal external genitalia. No lesions. No discharge. No hernias  noted.  Back: Normal curvature. No scoliosis. No tenderness.  Extremities: Warm. Well perfused. No upper extremity edema. No lower extremity edema. FROM. No deformities. No joint erythema.  Neuro: No focal deficits appreciated. Good muscle tone. Normal response to visual stimuli. Normal response to auditory stimuli.  Skin: Normal. No rashes. No lesions noted.  Vitals: SATS ARE 99 PERCENT.          Current Medications[1]            VITAL SIGNS:  Vitals:    04/22/25 1130   BP: 134/74   BP Location: Left arm   Patient Position: Sitting   Pulse: 73   Resp: (!) 21   Temp: (!) 95.2 °F (35.1 °C)   TempSrc: Tympanic   SpO2: 99%   Weight: 53.1 kg (117 lb)   Height: 5' 1" (1.549 m)       CURRENT BMI:   Body mass index is 22.11 kg/m².    LABS REVIEWED:    CBC:  Lab Results   Component Value Date    WBC 8.24 04/15/2025    RBC 3.69 (L) 04/15/2025    HGB 10.5 (L) 04/15/2025    HCT 32.3 (L) 04/15/2025    MCV 88 04/15/2025    MCH 28.5 04/15/2025    MCHC 32.5 04/15/2025    RDW 15.8 (H) 04/15/2025     04/15/2025    MPV 9.9 04/15/2025    GRAN 4.0 02/25/2025    GRAN 77.5 (H) 02/25/2025    LYMPH 10.0 (L) 04/15/2025    LYMPH 0.82 (L) 04/15/2025    MONO 10.0 04/15/2025    MONO 0.82 04/15/2025    EOS 0.5 04/15/2025    EOS 0.04 04/15/2025    BASO 0.02 02/25/2025    EOSINOPHIL 0.6 02/25/2025    BASOPHIL 0.5 04/15/2025    BASOPHIL " 0.04 04/15/2025       CHEMISTRY:  Sodium   Date Value Ref Range Status   04/15/2025 138 136 - 145 mmol/L Final   03/18/2025 144 136 - 145 mmol/L Final     Potassium   Date Value Ref Range Status   04/15/2025 3.0 (L) 3.5 - 5.1 mmol/L Final   03/18/2025 3.3 (L) 3.5 - 5.1 mmol/L Final     Chloride   Date Value Ref Range Status   04/15/2025 104 95 - 110 mmol/L Final   03/18/2025 108 95 - 110 mmol/L Final     CO2   Date Value Ref Range Status   04/15/2025 25 23 - 29 mmol/L Final   03/18/2025 23 23 - 29 mmol/L Final     Glucose   Date Value Ref Range Status   03/18/2025 112 (H) 70 - 110 mg/dL Final     BUN   Date Value Ref Range Status   04/15/2025 27 (H) 8 - 23 mg/dL Final     Creatinine   Date Value Ref Range Status   04/15/2025 1.7 (H) 0.5 - 1.4 mg/dL Final     Calcium   Date Value Ref Range Status   04/15/2025 8.8 8.7 - 10.5 mg/dL Final   03/18/2025 8.8 8.7 - 10.5 mg/dL Final     Total Protein   Date Value Ref Range Status   02/26/2025 5.2 (L) 6.0 - 8.4 g/dL Final     Albumin   Date Value Ref Range Status   04/15/2025 3.0 (L) 3.5 - 5.2 g/dL Final   02/26/2025 2.9 (L) 3.5 - 5.2 g/dL Final     Total Bilirubin   Date Value Ref Range Status   02/26/2025 0.5 0.1 - 1.0 mg/dL Final     Comment:     For infants and newborns, interpretation of results should be based  on gestational age, weight and in agreement with clinical  observations.    Premature Infant recommended reference ranges:  Up to 24 hours.............<8.0 mg/dL  Up to 48 hours............<12.0 mg/dL  3-5 days..................<15.0 mg/dL  6-29 days.................<15.0 mg/dL       Bilirubin Total   Date Value Ref Range Status   04/15/2025 1.0 0.1 - 1.0 mg/dL Final     Comment:     For infants and newborns, interpretation of results should be based   on gestational age, weight and in agreement with clinical   observations.    Premature Infant recommended reference ranges:   0-24 hours:  <8.0 mg/dL   24-48 hours: <12.0 mg/dL   3-5 days:    <15.0 mg/dL   6-29  days:   <15.0 mg/dL     Alkaline Phosphatase   Date Value Ref Range Status   02/26/2025 69 40 - 150 U/L Final     ALP   Date Value Ref Range Status   04/15/2025 65 40 - 150 unit/L Final     AST   Date Value Ref Range Status   04/15/2025 11 11 - 45 unit/L Final   02/26/2025 11 10 - 40 U/L Final     ALT   Date Value Ref Range Status   04/15/2025 10 10 - 44 unit/L Final   02/26/2025 9 (L) 10 - 44 U/L Final     Anion Gap   Date Value Ref Range Status   04/15/2025 9 8 - 16 mmol/L Final     eGFR   Date Value Ref Range Status   04/15/2025 29 (L) >60 mL/min/1.73/m2 Final     Comment:     Estimated GFR calculated using the CKD-EPI creatinine (2021) equation.   03/18/2025 36.4 (A) >60 mL/min/1.73 m^2 Final       LIPID PANEL:  Lab Results   Component Value Date    CHOL 153 10/14/2024    CHOL 160 05/04/2022     Lab Results   Component Value Date    TRIG 148 10/14/2024    TRIG 80 05/04/2022     Lab Results   Component Value Date    HDL 45 10/14/2024    HDL 81 (H) 05/04/2022     Lab Results   Component Value Date    LDLCALC 78.4 10/14/2024    LDLCALC 63.0 05/04/2022       THYROID:  Lab Results   Component Value Date    TSH 1.191 10/13/2024    FREET4 1.00 10/13/2024       DIABETES:  Lab Results   Component Value Date    HGBA1C 5.7 (H) 03/11/2025     Microalb/Creat Ratio   Date Value Ref Range Status   02/06/2014 30.6 (H) 0.0 - 30.0 ug/mg Final       Assessment and Plan     Assessment & Plan    ACUTE ON CHRONIC DIASTOLIC HEART FAILURE:  - Reviewed recent hospitalization for heart failure from April 11 to 15.  - Noted EF of 60% with grade 2 diastolic dysfunction, indicating good pumping function but impaired relaxation.  - Performed physical exam, auscultating heart and lungs.  - Continued Jardiance.  - Prescribed Bumex (bumetanide) as a diuretic upon discharge.  - Ordered restart of home health services.  - Referred the patient to a cardiologist for follow-up.  - Assessed the need for lab rechecks.  - Instructed the patient to  monitor breathing and report any changes.  - Assessed current breathing status.  - Measured SpO2 at 99%.  - Noted heavy breathing during exam, likely due to the exam itself rather than a clinical concern.  - Instructed the patient to report any persistent or worsening dyspnea.    OTHER MANAGEMENT:  - Evaluated timing and explained importance of administering Seroquel 1 hour before sundowning typically starts for optimal effectiveness (around 4 PM if sundowning usually begins at 5 PM).  - Continued Seroquel.    FOLLOW-UP:  - Follow up when home health agency contacts patient, as restart order was recently sent.          1. Acute exacerbation of CHF (congestive heart failure)  Comments:  Improved with Bumex. Will consider tapering dose if kidneys affected  Orders:  -     Ambulatory referral/consult to Internal Medicine    2. KEEGAN (acute kidney injury)  Comments:  Will check BMP and CBC today  Orders:  -     sodium bicarbonate 650 MG tablet; Take 1 tablet (650 mg total) by mouth once daily.  Dispense: 180 tablet; Refill: 0  -     Basic Metabolic Panel; Future; Expected date: 04/22/2025  -     CBC Auto Differential; Future; Expected date: 04/22/2025    3. Metabolic acidosis  Comments:  Refill salt tabs  Orders:  -     sodium bicarbonate 650 MG tablet; Take 1 tablet (650 mg total) by mouth once daily.  Dispense: 180 tablet; Refill: 0           No follow-ups on file.  32 of total time spent on the encounter, which includes face to face time and non-face to face time preparing to see the patient. This includes obtaining and/or reviewing separately obtained history, performing a medically appropriate examination and/or evaluation, and counseling and educating the patient/family/caregiver. Includes documenting clinical information in the electronic or other health record, independently interpreting results (not separately reported) and communicating results to the patient/family/caregiver, with care coordination (not  separately reported). Medications, tests and/or procedures ordered as necessary along with referring and communicating with other health professionals (when not separately reported).      This note was generated with the assistance of ambient listening technology. Verbal consent was obtained by the patient and accompanying visitor(s) for the recording of patient appointment to facilitate this note. I attest to having reviewed and edited the generated note for accuracy, though some syntax or spelling errors may persist. Please contact the author of this note for any clarification.            [1]   Current Outpatient Medications:     amLODIPine (NORVASC) 5 MG tablet, Take 1 tablet (5 mg total) by mouth once daily., Disp: 90 tablet, Rfl: 3    aspirin (ECOTRIN) 81 MG EC tablet, Take 1 tablet (81 mg total) by mouth once daily., Disp: 90 tablet, Rfl: 3    atorvastatin (LIPITOR) 40 MG tablet, Take 1 tablet (40 mg total) by mouth Daily., Disp: 90 tablet, Rfl: 3    budesonide (ENTOCORT EC) 3 mg capsule, Take 1-2 capsules (3-6 mg total) by mouth daily as needed (flare). As needed, Disp: 60 each, Rfl: 2    bumetanide (BUMEX) 1 MG tablet, Take 1 tablet (1 mg total) by mouth once daily., Disp: 30 tablet, Rfl: 11    busPIRone (BUSPAR) 5 MG Tab, Take 1 tablet (5 mg total) by mouth 2 (two) times daily as needed (anxiety)., Disp: 60 tablet, Rfl: 5    carvediloL (COREG) 25 MG tablet, Take 1 tablet (25 mg total) by mouth 2 (two) times daily with meals., Disp: 180 tablet, Rfl: 3    citalopram (CELEXA) 10 MG tablet, Take 1 tablet (10 mg total) by mouth every evening., Disp: , Rfl:     cyproheptadine (PERIACTIN) 4 mg tablet, Take 1 tablet (4 mg total) by mouth 3 (three) times daily as needed (appetite)., Disp: 270 tablet, Rfl: 1    dorzolamide-timolol 2-0.5% (COSOPT) 22.3-6.8 mg/mL ophthalmic solution, Place 1 drop into both eyes 2 (two) times daily., Disp: 10 mL, Rfl: 6    empagliflozin (JARDIANCE) 10 mg tablet, Take 1 tablet (10 mg  total) by mouth once daily., Disp: 30 tablet, Rfl: 11    fluticasone propionate (FLONASE) 50 mcg/actuation nasal spray, 1 spray (50 mcg total) by Each Nostril route once daily., Disp: 11.1 mL, Rfl: 3    FOLIC ACID/MULTIVIT-MIN/LUTEIN (CENTRUM SILVER ORAL), Take 1 tablet by mouth once daily., Disp: , Rfl:     levocetirizine (XYZAL) 5 MG tablet, Take 1 tablet (5 mg total) by mouth every evening., Disp: 30 tablet, Rfl: 11    lipase-protease-amylase 24,000-76,000-120,000 units (CREON) 24,000-76,000 -120,000 unit capsule, Take 1 capsule by mouth 3 (three) times daily with meals., Disp: 270 capsule, Rfl: 3    loperamide (IMODIUM) 2 mg capsule, Take 1 mg by mouth every 6 (six) hours as needed for Diarrhea., Disp: , Rfl:     mupirocin (BACTROBAN) 2 % ointment, Apply topically once daily., Disp: 30 g, Rfl: 0    nitroGLYCERIN (NITROSTAT) 0.4 MG SL tablet, Place 1 tablet (0.4 mg total) under the tongue every 5 (five) minutes as needed for Chest pain., Disp: 100 tablet, Rfl: 1    ondansetron (ZOFRAN) 4 MG tablet, Take 1 tablet (4 mg total) by mouth every 8 (eight) hours as needed for Nausea., Disp: 12 tablet, Rfl: 0    pantoprazole (PROTONIX) 40 MG tablet, Take 1 tablet (40 mg total) by mouth once daily., Disp: 90 tablet, Rfl: 3    QUEtiapine (SEROQUEL) 25 MG Tab, Take 1 tablet (25 mg total) by mouth every evening., Disp: 30 tablet, Rfl: 2    sodium bicarbonate 650 MG tablet, Take 1 tablet (650 mg total) by mouth once daily., Disp: 180 tablet, Rfl: 0

## 2025-04-23 ENCOUNTER — LAB VISIT (OUTPATIENT)
Dept: LAB | Facility: HOSPITAL | Age: 89
End: 2025-04-23
Attending: INTERNAL MEDICINE
Payer: MEDICARE

## 2025-04-23 ENCOUNTER — PROCEDURE VISIT (OUTPATIENT)
Dept: OPHTHALMOLOGY | Facility: CLINIC | Age: 89
End: 2025-04-23
Payer: MEDICARE

## 2025-04-23 DIAGNOSIS — Z79.899 LONG TERM USE OF DRUG: ICD-10-CM

## 2025-04-23 DIAGNOSIS — H34.8130 CENTRAL RETINAL VEIN OCCLUSION WITH MACULAR EDEMA OF BOTH EYES: Primary | ICD-10-CM

## 2025-04-23 LAB
ALBUMIN SERPL BCP-MCNC: 3.6 G/DL (ref 3.5–5.2)
ALP SERPL-CCNC: 82 UNIT/L (ref 40–150)
ALT SERPL W/O P-5'-P-CCNC: 6 UNIT/L (ref 10–44)
ANION GAP (OHS): 13 MMOL/L (ref 8–16)
AST SERPL-CCNC: 10 UNIT/L (ref 11–45)
BILIRUB SERPL-MCNC: 0.3 MG/DL (ref 0.1–1)
BUN SERPL-MCNC: 67 MG/DL (ref 8–23)
CALCIUM SERPL-MCNC: 9.1 MG/DL (ref 8.7–10.5)
CHLORIDE SERPL-SCNC: 103 MMOL/L (ref 95–110)
CO2 SERPL-SCNC: 20 MMOL/L (ref 23–29)
CREAT SERPL-MCNC: 2.6 MG/DL (ref 0.5–1.4)
GFR SERPLBLD CREATININE-BSD FMLA CKD-EPI: 17 ML/MIN/1.73/M2
GLUCOSE SERPL-MCNC: 134 MG/DL (ref 70–110)
POTASSIUM SERPL-SCNC: 3.8 MMOL/L (ref 3.5–5.1)
PROT SERPL-MCNC: 7.1 GM/DL (ref 6–8.4)
SODIUM SERPL-SCNC: 136 MMOL/L (ref 136–145)

## 2025-04-23 PROCEDURE — 92134 CPTRZ OPH DX IMG PST SGM RTA: CPT | Mod: HCNC,S$GLB,, | Performed by: OPHTHALMOLOGY

## 2025-04-23 PROCEDURE — 67028 INJECTION EYE DRUG: CPT | Mod: HCNC,LT,S$GLB, | Performed by: OPHTHALMOLOGY

## 2025-04-23 PROCEDURE — 36415 COLL VENOUS BLD VENIPUNCTURE: CPT | Mod: HCNC

## 2025-04-23 PROCEDURE — 80053 COMPREHEN METABOLIC PANEL: CPT | Mod: HCNC

## 2025-04-23 PROCEDURE — 99499 UNLISTED E&M SERVICE: CPT | Mod: HCNC,S$GLB,, | Performed by: OPHTHALMOLOGY

## 2025-04-23 NOTE — PROGRESS NOTES
===============================  Date today is 4/23/2025  Glo Dumont is a 89 y.o. female  Last visit [facility]: :1/28/2025   Last visit eye dept. 1/28/2025    Corrected distance visual acuity was 20/400 in the right eye and CF at 3' in the left eye.  Tonometry       Tonometry (Applanation, 2:16 PM)         Right Left    Pressure 24 16                  Not recorded       Not recorded       Not recorded       Chief Complaint   Patient presents with    CRVO/ME     OZURDEX OU     HPI     CRVO/ME            Comments: OZURDEX OU          Comments    1. Steroid responder glaucoma   2. PCIOL OU MGM   3. SARCOIDOSIS   H\O chronic UVEITIS OU   4. Central vein occlusion of retina, left   5. Retinal edema     H/o Avastin and Eylea OS   Had been getting Ozurdex OU with Dr. Shahrzad Tamez Eylea OU last 4/25/22, 8/3/22, 12/21/22  Ozurdex ou last 2/16/2022, 5/25/22, 11/8/22, 1/31/23, 8/9/23, 11/15/23,   3/6/24, 5/29/24, 8/14/24  AVASTIN OU 1/28/25    Cosopt BID OU             Last edited by Jessica Jensen on 4/23/2025  2:03 PM.      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  Check OU independently daily.    Gave my office and personal cell phone number.  ________________  4/23/2025 today  Glo Dumont    OU CRVO/ME  OCT stable OD, OS still has CME  No injection OD today, only OS  Follow up in 6-8 weeks for MOCT, if no improvement may follow    4/23/2025  Diagnosis :  OS DME  Today:   Ozurdex 0.7 mg , OS   Follow up: eval OS in   6-8  weeks  Instructed to call 24/7 for any worsening of vision. Check Both eyes daily.  Gave patient my home phone number.  Risks, benefits, and alternatives to treatment discussed in detail with the patient.  The patient voiced understanding and wished to proceed with the procedure    Ozurdex intravitreal implant Procedure Note:   y capsule intact?  Patient Identified and Time Out complete  Subconjunctival bleb - xylocaine with epi 2%   and Betadine.  Inject OS at 1mm parallel to limbus  Post Operative Dx: Same  Complications: None  Follow up as above.    =============================

## 2025-04-24 ENCOUNTER — RESULTS FOLLOW-UP (OUTPATIENT)
Dept: FAMILY MEDICINE | Facility: CLINIC | Age: 89
End: 2025-04-24

## 2025-04-24 ENCOUNTER — OFFICE VISIT (OUTPATIENT)
Dept: HOME HEALTH SERVICES | Facility: CLINIC | Age: 89
End: 2025-04-24
Payer: MEDICARE

## 2025-04-24 VITALS
OXYGEN SATURATION: 99 % | DIASTOLIC BLOOD PRESSURE: 68 MMHG | SYSTOLIC BLOOD PRESSURE: 130 MMHG | HEART RATE: 74 BPM | RESPIRATION RATE: 19 BRPM

## 2025-04-24 DIAGNOSIS — N18.4 CKD (CHRONIC KIDNEY DISEASE) STAGE 4, GFR 15-29 ML/MIN: Chronic | ICD-10-CM

## 2025-04-24 DIAGNOSIS — I11.0 HYPERTENSIVE HEART DISEASE WITH HEART FAILURE: ICD-10-CM

## 2025-04-24 DIAGNOSIS — Z09 HOSPITAL DISCHARGE FOLLOW-UP: Primary | ICD-10-CM

## 2025-04-24 DIAGNOSIS — R53.1 GENERALIZED WEAKNESS: ICD-10-CM

## 2025-04-24 DIAGNOSIS — I50.33 ACUTE ON CHRONIC HEART FAILURE WITH PRESERVED EJECTION FRACTION: ICD-10-CM

## 2025-04-24 PROBLEM — G93.40 ENCEPHALOPATHY: Status: RESOLVED | Noted: 2024-10-14 | Resolved: 2025-04-24

## 2025-04-24 NOTE — PROGRESS NOTES
[facility]  Transitional Care Management (TCM) Home Visit    Encounter Provider: Saul Ramos   PCP: Britt Prakash DO  Consult Requested By: Dr. Kiesha Hawk*  Admit Date: 4/11/25   IP Discharge Date: 4/15/25  Hospital Length of Stay:RRHLOS@ days  Days since discharge (from IP or SNF): 9   [facility] Contact Note: 4/17  Hospital Diagnosis: Acute on chronic heart failure with preserved ejection fraction [I50.33]     HISTORY OF PRESENT ILLNESS      Patient ID: Glo Dumont is a 89 y.o. female was recently admitted to the hospital, this is their TCM encounter.    Hospital Course Synopsis:  Ms. Glo Dumont is a 89 y.o. female who  has a medical history of Anemia, Angina pectoris, Anxiety, Anxiety and depression, Arthritis, Carotid artery occlusion, Carpal tunnel syndrome, Chronic diarrhea, CKD (chronic kidney disease) stage 3, GFR 30-59 ml/min, Colitis, Coronary artery disease, Coronary artery disease, Diastolic dysfunction, Diverticulosis, Encephalopathy, Glaucoma, Greater trochanteric bursitis, Grief at loss of child, H/O carotid endarterectomy, Heart failure, History of coronary angioplasty, Hypercholesteremia, Hypertension, Liver cyst, Low back pain, Macular degeneration, Obesity with serious comorbidity, Primary open-angle glaucoma(365.11), Renal cyst, S/P prosthetic total arthroplasty of the hip, Sarcoidosis, Sarcoidosis of lung, Sickle cell trait, and Uveitis.     She  presented to the ED for evaluation of worsening respiratory symptoms which included increasing dyspnea, and increased swelling in her extremities over the past few days.  She denies any dietary noncompliance and reports compliance with home medications.  Denies any fevers, chills, nausea, vomiting, diarrhea, constipation, abdominal pain, urinary symptoms.  Reports dry cough, dyspnea, lower extremity swelling.     ED course notable for hemodynamically stable vitals.  Labs notable for hemoglobin 9.4, bicarb 20, creatinine  "1.5, , CXR noted "Markedly low lung volumes with vascular crowding or atelectasis in the lung bases. Small effusions. Cardiac silhouette size enlargement. Mild interstitial pulmonary edema or interstitial infectious process difficult to exclude in the right clinical setting. ".     Hospital Course:   89-year-old female admitted for acute on chronic CHF on 4/11.  Cardiology consulted, started on IV diuretics with gradual improvement in his shortness of breath and lower extremity edema.  Of note patient has had some intermittent confusion which is the norm per the family.  04/15/2025   Examination done at bedside, appeared alert and oriented to self, place, date of birth   At baseline mentation per daughter   Patient denied acute events overnight, denied fever, chills, chest pain, shortness O breath nausea, vomiting, bowel or bladder issues   Saturating above 92 on room air   No lower extremity swelling noted   Received IV diuretics during hospital stay   Given patient's age, underlying comorbidities, acute on chronic issues, recurrent hospitalizations, discussed regarding code status, goals of care with patient/daughter over phone,-opted for DNR DNI.    Per daughter, patient has been under hospice in the past, however does not want to proceed with hospice at this point, declined hospice information visit.  Agreeable for home-based palliative, ordered accordingly   Planning to discharge patient today, emphasized on compliance with medications, outpatient follow up visits, fluid, salt restriction, patient/daughter expressed understanding, agreed with the plan   Ordered home-based palliative, [facility] at home program, recommended patient/daughter to continue goals of care discussion     Patient is being seen today for a hospital follow up. Recent admission 4/11-4/15 for heart failure exacerbation. Reports doing well since discharge. Denies reoccurrence of symptoms in which prompted hospital stay. Daughter is present in " the home during visit. Has Kira  and PT/OT working with her in the home. Ambulates independently and occasionally uses a walker. Euvolemic on exam today. Reports compliance with medications. Denies further complaints or concerns. Had follow up with cardiology and PCP following discharge.   DECISION MAKING TODAY       Assessment & Plan:  1. Hospital discharge follow-up    2. Acute on chronic heart failure with preserved ejection fraction  Assessment & Plan:  Recent admission with SOB and BLE edema  Last TTE with 60% EF  BNP during admission 832  Responded well to diuresis with improvement in symptoms  CHF diet, 1.5L fluid restriction  Weigh daily  F/U with cardiology      Orders:  -     Ambulatory referral/consult to [facility]    3. Hypertensive heart disease with heart failure  Assessment & Plan:  Chronic, stable during visit  Continue current medications  Continue monitoring bp at home        4. CKD (chronic kidney disease) stage 4, GFR 15-29 ml/min  Assessment & Plan:  Recent worsening in eGFR 17  Reduce bumex to half tablet  F/U with cardiology for further recs with diuretic        5. Generalized weakness  Assessment & Plan:  Continue PT/OT  Fall precautions           Medication List on Discharge:     Medication List            Accurate as of April 24, 2025 11:59 PM. If you have any questions, ask your nurse or doctor.                CONTINUE taking these medications      amLODIPine 5 MG tablet  Commonly known as: NORVASC  Take 1 tablet (5 mg total) by mouth once daily.     aspirin 81 MG EC tablet  Commonly known as: ECOTRIN  Take 1 tablet (81 mg total) by mouth once daily.     atorvastatin 40 MG tablet  Commonly known as: LIPITOR  Take 1 tablet (40 mg total) by mouth Daily.     budesonide 3 mg capsule  Commonly known as: ENTOCORT EC  Take 1-2 capsules (3-6 mg total) by mouth daily as needed (flare). As needed     bumetanide 1 MG tablet  Commonly known as: BUMEX  Take 1 tablet (1 mg total)  by mouth once daily.     busPIRone 5 MG Tab  Commonly known as: BUSPAR  Take 1 tablet (5 mg total) by mouth 2 (two) times daily as needed (anxiety).     carvediloL 25 MG tablet  Commonly known as: COREG  Take 1 tablet (25 mg total) by mouth 2 (two) times daily with meals.     CENTRUM SILVER ORAL  Take 1 tablet by mouth once daily.     citalopram 10 MG tablet  Commonly known as: CeleXA  Take 1 tablet (10 mg total) by mouth every evening.     CREON 24,000-76,000 -120,000 unit capsule  Generic drug: lipase-protease-amylase 24,000-76,000-120,000 units  Take 1 capsule by mouth 3 (three) times daily with meals.     cyproheptadine 4 mg tablet  Commonly known as: PERIACTIN  Take 1 tablet (4 mg total) by mouth 3 (three) times daily as needed (appetite).     dorzolamide-timolol 2-0.5% 22.3-6.8 mg/mL ophthalmic solution  Commonly known as: COSOPT  Place 1 drop into both eyes 2 (two) times daily.     empagliflozin 10 mg tablet  Commonly known as: JARDIANCE  Take 1 tablet (10 mg total) by mouth once daily.     fluticasone propionate 50 mcg/actuation nasal spray  Commonly known as: FLONASE  1 spray (50 mcg total) by Each Nostril route once daily.     levocetirizine 5 MG tablet  Commonly known as: XYZAL  Take 1 tablet (5 mg total) by mouth every evening.     loperamide 2 mg capsule  Commonly known as: IMODIUM  Take 1 mg by mouth every 6 (six) hours as needed for Diarrhea.     mupirocin 2 % ointment  Commonly known as: BACTROBAN  Apply topically once daily.     nitroGLYCERIN 0.4 MG SL tablet  Commonly known as: NITROSTAT  Place 1 tablet (0.4 mg total) under the tongue every 5 (five) minutes as needed for Chest pain.     ondansetron 4 MG tablet  Commonly known as: ZOFRAN  Take 1 tablet (4 mg total) by mouth every 8 (eight) hours as needed for Nausea.     pantoprazole 40 MG tablet  Commonly known as: PROTONIX  Take 1 tablet (40 mg total) by mouth once daily.     QUEtiapine 25 MG Tab  Commonly known as: SEROQUEL  Take 1 tablet (25 mg  total) by mouth every evening.     sodium bicarbonate 650 MG tablet  Take 1 tablet (650 mg total) by mouth once daily.              Medication Reconciliation:  Were medications changed on discharge? Yes  Were medications in the home? Yes  Is the patient taking the medications as directed? Yes  Does the patient understand the medications and changes? Yes  Does updated med list accurately reflects meds patient is currently taking? Yes    ENVIRONMENT OF CARE      Family and/or Caregiver present at visit?  Yes  Name of Caregiver: daughter  History provided by: patient    Advance Care Planning   Advanced Care Planning Status:  Patient has had an ACP conversation  Living Will: No  Power of : Yes  [place]: No    Does Caregiver have HCPoA: Yes  Changes today: none  Is patient hospice appropriate: No  (If needed, use PPS <30 or FAST score >7)  Was referral to hospice placed: No       Impression upon entering the home:  Physical Dwelling: single family home   Appearance of home environment: cleaniness: clean, walking pathways: clear, lighting: adequate, and home structure: sound structure  Functional Status: independent  Mobility: ambulatory  Nutritional access: available food but inadequate intake  Home Health: Yes,  Agency [facility]    DME/Supplies: rolling walker     Diagnostic tests reviewed/disposition: No diagnosic tests pending after this hospitalization.  Disease/illness education: CHF  Establishment or re-establishment of referral orders for community resources: No other necessary community resources.   Discussion with other health care providers: No discussion with other health care providers necessary.   Does patient have a PCP at [facility]? Yes   Repatriation plan with PCP? follow-up with PCP within 90d   Does patient have an ostomy (ileostomy, colostomy, suprapubic catheter, nephrostomy tube, tracheostomy, PEG tube, pleurex catheter, cholecystostomy, etc)? No  Were BPAs reviewed? Yes    Social History      Socioeconomic History    Marital status:     Number of children: 5   Occupational History    Occupation: retired   Tobacco Use    Smoking status: Never    Smokeless tobacco: Never   Substance and Sexual Activity    Alcohol use: No     Alcohol/week: 0.0 standard drinks of alcohol    Drug use: No    Sexual activity: Yes     Partners: Male   Social History Narrative         Social Drivers of Health     Financial Resource Strain: Patient Declined (4/14/2025)    Overall Financial Resource Strain (CARDIA)     Difficulty of Paying Living Expenses: Patient declined   Food Insecurity: Patient Declined (4/14/2025)    Hunger Vital Sign     Worried About Running Out of Food in the Last Year: Patient declined     Ran Out of Food in the Last Year: Patient declined   Transportation Needs: Patient Declined (4/14/2025)    PRAPARE - Transportation     Lack of Transportation (Medical): Patient declined     Lack of Transportation (Non-Medical): Patient declined   Physical Activity: Insufficiently Active (10/29/2024)    Exercise Vital Sign     Days of Exercise per Week: 1 day     Minutes of Exercise per Session: 10 min   Stress: Patient Declined (4/14/2025)    [employer] [employer] - Occupational Stress Questionnaire     Feeling of Stress : Patient declined   Housing Stability: Patient Declined (4/14/2025)    Housing Stability Vital Sign     Unable to Pay for Housing in the Last Year: Patient declined     Homeless in the Last Year: Patient declined       OBJECTIVE:     Vital Signs:  Vitals:    04/24/25 1032   BP: 130/68   Pulse: 74   Resp: 19       Review of Systems   Constitutional: Negative.    HENT: Negative.     Eyes: Negative.    Respiratory: Negative.  Negative for chest tightness.    Cardiovascular: Negative.  Negative for leg swelling.   Gastrointestinal: Negative.    Endocrine: Negative.    Genitourinary: Negative.    Musculoskeletal: Negative.    Skin: Negative.    Allergic/Immunologic:  Negative.    Neurological:  Positive for weakness.   Hematological: Negative.    Psychiatric/Behavioral: Negative.  Negative for agitation.    All other systems reviewed and are negative.      Physical Exam:  Physical Exam  Vitals reviewed.   Constitutional:       General: She is not in acute distress.     Appearance: Normal appearance. She is well-developed.   HENT:      Head: Normocephalic and atraumatic.      Nose: Nose normal.      Mouth/Throat:      Mouth: Mucous membranes are dry.      Pharynx: Oropharynx is clear.   Eyes:      Pupils: Pupils are equal, round, and reactive to light.   Cardiovascular:      Rate and Rhythm: Normal rate and regular rhythm.      Pulses: Normal pulses.      Heart sounds: Normal heart sounds.   Pulmonary:      Effort: Pulmonary effort is normal.      Breath sounds: Normal breath sounds.   Abdominal:      General: Bowel sounds are normal.      Palpations: Abdomen is soft.   Musculoskeletal:         General: Normal range of motion.      Cervical back: Normal range of motion and neck supple.   Skin:     General: Skin is warm and dry.   Neurological:      General: No focal deficit present.      Mental Status: She is alert. Mental status is at baseline.      Motor: Weakness present.   Psychiatric:         Mood and Affect: Mood normal.         Behavior: Behavior normal.         Thought Content: Thought content normal.         Judgment: Judgment normal.         INSTRUCTIONS FOR PATIENT:   - Continue all medications, treatments and therapies as ordered.   - Follow all instructions, recommendations as discussed.  - Maintain Safety Precautions at all times.  - Attend all medical appointments as scheduled.  - For worsening symptoms: call Primary Care Physician or Nurse Practitioner.  - For emergencies, call 911 or immediately report to the nearest emergency room.   Scheduled Follow-up, Appts Reviewed with Modifications if Needed: Yes  Future Appointments   Date Time Provider Department Center    5/8/2025  1:30 PM PACEMAKER CLINIC [facility] ARR PRO [facility]   5/9/2025  1:45 PM Neto Lui MD ON PULMSVC [facility]   6/10/2025  9:15 AM NAHUM Tamez MD [facility] OPHTHAL [facility]   7/11/2025  1:00 PM Shreyas Hendrix MD [facility] NEURO [facility]   10/22/2025 10:00 AM Stan Lui MD [facility] CARDIO  [facility]       Signature: Saul Ramos NP    Transition of Care Visit:  I have reviewed and updated the history and problem list.  I have reconciled the medication list.  I have discussed the hospitalization and current medical issues, prognosis and plans with the patient/family.

## 2025-04-25 ENCOUNTER — PATIENT MESSAGE (OUTPATIENT)
Dept: CARDIOLOGY | Facility: CLINIC | Age: 89
End: 2025-04-25
Payer: MEDICARE

## 2025-04-25 NOTE — ASSESSMENT & PLAN NOTE
Recent worsening in eGFR 17  Reduce bumex to half tablet  F/U with cardiology for further recs with diuretic

## 2025-04-25 NOTE — ASSESSMENT & PLAN NOTE
Recent admission with SOB and BLE edema  Last TTE with 60% EF  BNP during admission 832  Responded well to diuresis with improvement in symptoms  CHF diet, 1.5L fluid restriction  Weigh daily  F/U with cardiology

## 2025-04-28 DIAGNOSIS — Z00.00 ENCOUNTER FOR MEDICARE ANNUAL WELLNESS EXAM: ICD-10-CM

## 2025-04-29 ENCOUNTER — EXTERNAL HOME HEALTH (OUTPATIENT)
Dept: HOME HEALTH SERVICES | Facility: HOSPITAL | Age: 89
End: 2025-04-29
Payer: MEDICARE

## 2025-04-30 PROCEDURE — G0179 MD RECERTIFICATION HHA PT: HCPCS | Mod: ,,, | Performed by: INTERNAL MEDICINE

## 2025-05-08 ENCOUNTER — PATIENT MESSAGE (OUTPATIENT)
Dept: FAMILY MEDICINE | Facility: CLINIC | Age: 89
End: 2025-05-08
Payer: MEDICARE

## 2025-05-08 ENCOUNTER — CLINICAL SUPPORT (OUTPATIENT)
Dept: CARDIOLOGY | Facility: HOSPITAL | Age: 89
End: 2025-05-08
Attending: INTERNAL MEDICINE
Payer: MEDICARE

## 2025-05-08 ENCOUNTER — OUTPATIENT CASE MANAGEMENT (OUTPATIENT)
Dept: ADMINISTRATIVE | Facility: OTHER | Age: 89
End: 2025-05-08
Payer: MEDICARE

## 2025-05-08 DIAGNOSIS — R00.1 BRADYCARDIA: ICD-10-CM

## 2025-05-08 DIAGNOSIS — I50.30 DIASTOLIC HEART FAILURE, NYHA CLASS 3: ICD-10-CM

## 2025-05-08 DIAGNOSIS — Z95.0 CARDIAC PACEMAKER IN SITU: ICD-10-CM

## 2025-05-08 DIAGNOSIS — I49.5 SA NODE DYSFUNCTION: ICD-10-CM

## 2025-05-08 PROCEDURE — 93280 PM DEVICE PROGR EVAL DUAL: CPT | Mod: HCNC

## 2025-05-08 PROCEDURE — 99999 PR PBB SHADOW E&M-EST. PATIENT-LVL I: CPT | Mod: PBBFAC,HCNC,,

## 2025-05-08 PROCEDURE — 93280 PM DEVICE PROGR EVAL DUAL: CPT | Mod: 26,HCNC,, | Performed by: INTERNAL MEDICINE

## 2025-05-08 NOTE — PROGRESS NOTES
Outpatient Care Management  Initial Patient Assessment    Patient: Glo Dumont  MRN: [id number]  Date of Service: 05/08/2025  Completed by: Edwin Mora RN  Referral Date: 04/13/2025  Date of Eligibility: 5/7/2025  Program:   High Risk  Status: Ongoing  Effective Dates: 5/8/2025 - present  Responsible Staff: Edwin Mora RN        Reason for Visit   Patient presents with    Nursing Assessment    OPCM Enrollment Call       Brief Summary:  Glo Dumont was referred by care at home NP for CHF. Patient qualifies for program based on her risk score of 96.7%.   Active problem list, medical, surgical and social history reviewed. Active comorbidities include chf,anemia, and htn. Areas of need identified by patient include chf education and management.   Next steps: send welcome letter  Send chf education  Follow up in one week     Disability Status  Is the patient alert and oriented (person, place, time, and situation)?: Alert and oriented x 4  Hearing Difficulty or Deaf: no  Visual Difficulty or Blind: no  Visual and Hearing Conclusion Statement: wears glasses for every day use  Difficulty Concentrating, Remembering or Making Decisions: no  Communication Difficulty: no  Eating/Swallowing Difficulty: no  Walking or Climbing Stairs Difficulty: no  Walking or Climbing Stairs: ambulation difficulty, assistance 1 person  Dressing/Bathing Difficulty: no  Grooming: assistance required  Transferring (e.g., getting in and out of chairs): assistive equipment  Toileting : Assistance Required  Continence : Incontinence - Bowel; Incontience - Bladder  Difficulty Managing Errands Independently: no  Equipment Currently Used at Home: grab bar; rollator; shower chair; walker, mary  ADL Conclusion Statement: needs assistance with all ALDs  Change in Functional Status Since Onset of Current Illness/Injury: no        Spiritual Beliefs  Spiritual, Cultural Beliefs, Rastafarian Practices, Values that Affect Care: no      Social History      Socioeconomic History    Marital status:     Number of children: 5   Occupational History    Occupation: retired   Tobacco Use    Smoking status: Never    Smokeless tobacco: Never   Substance and Sexual Activity    Alcohol use: No     Alcohol/week: 0.0 standard drinks of alcohol    Drug use: No    Sexual activity: Yes     Partners: Male   Social History Narrative         Social Drivers of Health     Financial Resource Strain: Low Risk  (5/8/2025)    Overall Financial Resource Strain (CARDIA)     Difficulty of Paying Living Expenses: Not very hard   Food Insecurity: No Food Insecurity (5/8/2025)    Hunger Vital Sign     Worried About Running Out of Food in the Last Year: Never true     Ran Out of Food in the Last Year: Never true   Transportation Needs: No Transportation Needs (5/8/2025)    PRAPARE - Transportation     Lack of Transportation (Medical): No     Lack of Transportation (Non-Medical): No   Physical Activity: Inactive (5/8/2025)    Exercise Vital Sign     Days of Exercise per Week: 0 days     Minutes of Exercise per Session: 0 min   Stress: No Stress Concern Present (5/8/2025)    [employer] [place] [employer] - Occupational Stress Questionnaire     Feeling of Stress : Not at all   Housing Stability: Low Risk  (5/8/2025)    Housing Stability Vital Sign     Unable to Pay for Housing in the Last Year: No     Number of Times Moved in the Last Year: 0     Homeless in the Last Year: No       Roles and Relationships  Primary Source of Support/Comfort: child(ally)  Name of Support/Comfort Primary Source: Jessica      Advance Directives (For Healthcare)  Advance Directive  (If Adv Dir status is received, view document under Adv Dir in header or Chart Review Media tab): Advance Directive currently in Epic.        Patient Reported Insurance  Verified current insurance plan:: [employer] Medicare Advantage  [employer] benefits discussed:: OTC Prescription Discounts; Transportation             5/8/2025    10:33 AM 3/4/2025     9:59 AM 11/26/2024     2:16 PM 5/28/2024     9:20 AM 2/8/2024     2:29 PM 5/3/2023     1:23 PM 2/1/2023     2:43 PM   Depression Patient Health Questionnaire   Over the last two weeks how often have you been bothered by little interest or pleasure in doing things Not at all Not at all Several days Not at all Not at all Not at all Not at all    Over the last two weeks how often have you been bothered by feeling down, depressed or hopeless Not at all Not at all Several days Not at all Not at all Not at all Not at all   PHQ-2 Total Score 0 0 2 0 0 0 0       Data saved with a previous flowsheet row definition       Learning Assessment       05/08/2025 1217 [facility] (5/8/2025 - Present)   Created by Edwin Mora, RN -  (Nurse) Status: Complete                 PRIMARY LEARNER     Primary Learner Name:  Glo Dumont  - 05/08/2025 1217    Relationship:  Patient  - 05/08/2025 1217    Does the primary learner have any barriers to learning?:  No Barriers  - 05/08/2025 1217    What is the preferred language of the primary learner?:  English JM - 05/08/2025 1217    Is an  required?:  No JM - 05/08/2025 1217    How does the primary learner prefer to learn new concepts?:  Listening JM - 05/08/2025 1217    How often do you need to have someone help you read instructions, pamphlets, or written material from your doctor or pharmacy?:  Sometimes JM - 05/08/2025 1217        CO-LEARNER #1     No question answered        CO-LEARNER #2     No question answered        SPECIAL TOPICS     No question answered        ANSWERED BY:     No question answered        Comments         Edit History       Edwin Mora, RN -  (Nurse)   05/08/2025 1217

## 2025-05-08 NOTE — LETTER
Glo Dumont  [address] [address]      Dear Glo Dumont,     Welcome to [facility] Outpatient Care Management Program. We are here to assist patients with multiple long-term (chronic) conditions who often need more personalized healthcare.    It was a pleasure talking with you today. My name is Edwin Mora RN. I look forward to working with you as your Care Manager. I will be contacting you by telephone routinely to help coordinate care and resolve issues.    My goal is to help you function at the healthiest and highest level possible. You can contact me directly at [phone].    As an [facility] patient with [employer], some of the services we provide, at no cost to you, include:     Development of an individualized care plan with a Registered Nurse   Connection with a   Assistance from a Community Health Worker  Connection with available resources and services    Coordinate communication among your care team members   Provide coaching and education  Help you understand your doctor's treatment plan  Help you obtain information about your insurance coverage.    All services provided by [facility]s Outpatient Care Managers and other care team members are coordinated with and communicated to your primary care team.      As part of your enrollment, you will be receiving education materials and more information about these services in your [facility] account, by phone, or through the mail. If you do not wish to participate or receive information, you can Opt Out by contacting our office at [phone].     [facility] Patient Rights and Responsibilities available upon request.    Sincerely,      Edwin Mora RN  [facility]   Outpatient Care Management

## 2025-05-13 ENCOUNTER — PATIENT MESSAGE (OUTPATIENT)
Dept: CARDIOLOGY | Facility: CLINIC | Age: 89
End: 2025-05-13
Payer: MEDICARE

## 2025-05-13 LAB
OHS CV AF BURDEN PERCENT: < 1
OHS CV DC REMOTE DEVICE TYPE: NORMAL
OHS CV RV PACING PERCENT: 0.1 %

## 2025-05-16 ENCOUNTER — PATIENT MESSAGE (OUTPATIENT)
Dept: FAMILY MEDICINE | Facility: CLINIC | Age: 89
End: 2025-05-16
Payer: MEDICARE

## 2025-05-16 ENCOUNTER — TELEPHONE (OUTPATIENT)
Dept: FAMILY MEDICINE | Facility: CLINIC | Age: 89
End: 2025-05-16
Payer: MEDICARE

## 2025-05-16 NOTE — TELEPHONE ENCOUNTER
----- Message from [employer] sent at 5/16/2025 12:08 PM CDT -----  Contact: home [id number]  Type:  Patient Returning CallWho Called:Home health Who Left Message for Patient:Jayson the patient know what this is regarding?:yes Would the patient rather a call back or a response via [facility]? Call back Best Call Back Number:[phone] Information:  
Spoke with PT department at [facility].  called due to patient having 4 lbs of wt gain overnight. Initial weight 114.8 lb. Pt weighed in at 119.2 lb this morning. Patient evaluated by physical therapist. O2 sat during visit ranged from 92-94% on room air. Respiration rate was 20 when checked by PT. Patient family member sent portal msg stating pt is having a cough and wheezing. Pt was not experiencing wheezing/ rattling at time of visit when lungs were examined. This is only information I have as PT does not do full head to toe assessment.     Scheduled for f/u w/ you at 1 pm Monday.   
Home

## 2025-05-16 NOTE — TELEPHONE ENCOUNTER
----- Message from Kylie sent at 5/16/2025 10:53 AM CDT -----  Type:  Patient Requesting a call back Who Called[facility] would [facility] & nurse know that her congestion has increased and she had a weight gain overnight. Last night she weighed in at 114.8 and this morning she weighed in at 119.2Would the patient rather a call back or a response via [facility]?callAlektrona Call Back Number:[phone] Information:

## 2025-05-19 ENCOUNTER — HOSPITAL ENCOUNTER (OUTPATIENT)
Dept: RADIOLOGY | Facility: HOSPITAL | Age: 89
Discharge: HOME | End: 2025-05-19
Attending: INTERNAL MEDICINE
Payer: MEDICARE

## 2025-05-19 ENCOUNTER — OFFICE VISIT (OUTPATIENT)
Dept: FAMILY MEDICINE | Facility: CLINIC | Age: 89
End: 2025-05-19
Payer: MEDICARE

## 2025-05-19 VITALS
BODY MASS INDEX: 22.23 KG/M2 | HEIGHT: 61 IN | SYSTOLIC BLOOD PRESSURE: 124 MMHG | WEIGHT: 117.75 LBS | OXYGEN SATURATION: 94 % | TEMPERATURE: 96 F | DIASTOLIC BLOOD PRESSURE: 76 MMHG | HEART RATE: 71 BPM

## 2025-05-19 DIAGNOSIS — I50.33 ACUTE ON CHRONIC HEART FAILURE WITH PRESERVED EJECTION FRACTION: ICD-10-CM

## 2025-05-19 DIAGNOSIS — I50.33 ACUTE ON CHRONIC HEART FAILURE WITH PRESERVED EJECTION FRACTION: Primary | ICD-10-CM

## 2025-05-19 DIAGNOSIS — R06.00 DYSPNEA, UNSPECIFIED TYPE: ICD-10-CM

## 2025-05-19 DIAGNOSIS — R60.0 LOCALIZED EDEMA: ICD-10-CM

## 2025-05-19 PROCEDURE — 71046 X-RAY EXAM CHEST 2 VIEWS: CPT | Mod: TC,HCNC,FY,PO

## 2025-05-19 PROCEDURE — 71046 X-RAY EXAM CHEST 2 VIEWS: CPT | Mod: 26,HCNC,, | Performed by: STUDENT IN AN ORGANIZED HEALTH CARE EDUCATION/TRAINING PROGRAM

## 2025-05-19 PROCEDURE — 99999 PR PBB SHADOW E&M-EST. PATIENT-LVL V: CPT | Mod: PBBFAC,HCNC,, | Performed by: INTERNAL MEDICINE

## 2025-05-19 NOTE — PROGRESS NOTES
Patient ID: Glo Dumont is a 89 y.o. female.    Chief Complaint: Follow-up      History of Present Illness    - Ms. Dumont presents with leg swelling and difficulty breathing.    Ms. Dumont is being seen for follow-up regarding leg swelling, which has worsened overnight. She is currently receiving Bumex twice daily for this condition. Ms. Dumont has been hospitalized 3 or 4 times in the past month for related issues. She is also having difficulty breathing.      ROS:  General: denies fever, denies chills, denies fatigue, denies weight gain, denies weight loss  Eyes: denies vision changes, denies redness, denies discharge  ENT: denies ear pain, denies nasal congestion, denies sore throat  Cardiovascular: denies chest pain, denies palpitations, complains of lower extremity edema  Respiratory: denies cough, complains of shortness of breath, complains of difficulty breathing, complains of dyspnea at rest  Gastrointestinal: denies abdominal pain, denies nausea, denies vomiting, denies diarrhea, denies constipation, denies blood in stool  Genitourinary: denies dysuria, denies hematuria, denies frequency  Musculoskeletal: denies joint pain, denies muscle pain  Skin: denies rash, denies lesion, complains of dry skin, complains of dry mouth  Neurological: denies headache, denies dizziness, denies numbness, denies tingling  Psychiatric: denies anxiety, denies depression, denies sleep difficulty            Physical Exam    General: In no acute distress.  Head: Normocephalic. Non traumatic.  Eyes: PERRLA. EOMs full. Conjunctivae clear. Fundi grossly normal.  Ears: EACs clear. TMs normal.  Nose: Mucosa pink. Mucosa moist. No obstruction.  Throat: Clear. No exudates. No lesions.  Neck: Supple. No masses. No thyromegaly. No bruits.  Chest: Lungs clear. No rales. No rhonchi. No wheezes. LUNGS SOUND SLIGHTLY CRACKLY. BREATHING APPEARS LABORED.  Heart: RRR. No murmurs. No rubs. No gallops.  Abdomen: Soft. No tenderness. No masses.  "BS normal.  : Normal external genitalia. No lesions. No discharge. No hernias  noted.  Back: Normal curvature. No scoliosis. No tenderness.  Extremities: Warm. Well perfused. 2+ PITTING EDEMA. No lower extremity edema. FROM. No deformities. No joint erythema.  Neuro: No focal deficits appreciated. Good muscle tone. Normal response to visual stimuli. Normal response to auditory stimuli.  Skin: Normal. No rashes. No lesions noted. DRY SKIN NOTED. SKIN DRY.  Vitals: WEIGHT: 117.8 LBS.          Current Medications[1]            VITAL SIGNS:  Vitals:    05/19/25 1310   BP: 124/76   Pulse: 71   Temp: 96.3 °F (35.7 °C)   TempSrc: Tympanic   SpO2: (!) 94%   Weight: 53.4 kg (117 lb 11.6 oz)   Height: 5' 1" (1.549 m)       CURRENT BMI:   Body mass index is 22.24 kg/m².    LABS REVIEWED:    CBC:  Lab Results   Component Value Date    WBC 8.24 04/15/2025    RBC 3.69 (L) 04/15/2025    HGB 10.5 (L) 04/15/2025    HCT 32.3 (L) 04/15/2025    MCV 88 04/15/2025    MCH 28.5 04/15/2025    MCHC 32.5 04/15/2025    RDW 15.8 (H) 04/15/2025     04/15/2025    MPV 9.9 04/15/2025    GRAN 4.0 02/25/2025    GRAN 77.5 (H) 02/25/2025    LYMPH 10.0 (L) 04/15/2025    LYMPH 0.82 (L) 04/15/2025    MONO 10.0 04/15/2025    MONO 0.82 04/15/2025    EOS 0.5 04/15/2025    EOS 0.04 04/15/2025    BASO 0.02 02/25/2025    EOSINOPHIL 0.6 02/25/2025    BASOPHIL 0.5 04/15/2025    BASOPHIL 0.04 04/15/2025       CHEMISTRY:  Sodium   Date Value Ref Range Status   04/23/2025 136 136 - 145 mmol/L Final   03/18/2025 144 136 - 145 mmol/L Final     Potassium   Date Value Ref Range Status   04/23/2025 3.8 3.5 - 5.1 mmol/L Final   03/18/2025 3.3 (L) 3.5 - 5.1 mmol/L Final     Chloride   Date Value Ref Range Status   04/23/2025 103 95 - 110 mmol/L Final   03/18/2025 108 95 - 110 mmol/L Final     CO2   Date Value Ref Range Status   04/23/2025 20 (L) 23 - 29 mmol/L Final   03/18/2025 23 23 - 29 mmol/L Final     Glucose   Date Value Ref Range Status   04/23/2025 134 (H) " 70 - 110 mg/dL Final   03/18/2025 112 (H) 70 - 110 mg/dL Final     BUN   Date Value Ref Range Status   04/23/2025 67 (H) 8 - 23 mg/dL Final     Creatinine   Date Value Ref Range Status   04/23/2025 2.6 (H) 0.5 - 1.4 mg/dL Final     Calcium   Date Value Ref Range Status   04/23/2025 9.1 8.7 - 10.5 mg/dL Final   03/18/2025 8.8 8.7 - 10.5 mg/dL Final     Protein Total   Date Value Ref Range Status   04/23/2025 7.1 6.0 - 8.4 gm/dL Final     Total Protein   Date Value Ref Range Status   02/26/2025 5.2 (L) 6.0 - 8.4 g/dL Final     Albumin   Date Value Ref Range Status   04/23/2025 3.6 3.5 - 5.2 g/dL Final   02/26/2025 2.9 (L) 3.5 - 5.2 g/dL Final     Total Bilirubin   Date Value Ref Range Status   02/26/2025 0.5 0.1 - 1.0 mg/dL Final     Comment:     For infants and newborns, interpretation of results should be based  on gestational age, weight and in agreement with clinical  observations.    Premature Infant recommended reference ranges:  Up to 24 hours.............<8.0 mg/dL  Up to 48 hours............<12.0 mg/dL  3-5 days..................<15.0 mg/dL  6-29 days.................<15.0 mg/dL       Bilirubin Total   Date Value Ref Range Status   04/23/2025 0.3 0.1 - 1.0 mg/dL Final     Comment:     For infants and newborns, interpretation of results should be based   on gestational age, weight and in agreement with clinical   observations.    Premature Infant recommended reference ranges:   0-24 hours:  <8.0 mg/dL   24-48 hours: <12.0 mg/dL   3-5 days:    <15.0 mg/dL   6-29 days:   <15.0 mg/dL     Alkaline Phosphatase   Date Value Ref Range Status   02/26/2025 69 40 - 150 U/L Final     ALP   Date Value Ref Range Status   04/23/2025 82 40 - 150 unit/L Final     AST   Date Value Ref Range Status   04/23/2025 10 (L) 11 - 45 unit/L Final   02/26/2025 11 10 - 40 U/L Final     ALT   Date Value Ref Range Status   04/23/2025 6 (L) 10 - 44 unit/L Final   02/26/2025 9 (L) 10 - 44 U/L Final     Anion Gap   Date Value Ref Range Status    04/23/2025 13 8 - 16 mmol/L Final     eGFR   Date Value Ref Range Status   04/23/2025 17 (L) >60 mL/min/1.73/m2 Final     Comment:     Estimated GFR calculated using the CKD-EPI creatinine (2021) equation.   03/18/2025 36.4 (A) >60 mL/min/1.73 m^2 Final       LIPID PANEL:  Lab Results   Component Value Date    CHOL 153 10/14/2024    CHOL 160 05/04/2022     Lab Results   Component Value Date    TRIG 148 10/14/2024    TRIG 80 05/04/2022     Lab Results   Component Value Date    HDL 45 10/14/2024    HDL 81 (H) 05/04/2022     Lab Results   Component Value Date    LDLCALC 78.4 10/14/2024    LDLCALC 63.0 05/04/2022       THYROID:  Lab Results   Component Value Date    TSH 1.191 10/13/2024    FREET4 1.00 10/13/2024       DIABETES:  Lab Results   Component Value Date    HGBA1C 5.7 (H) 03/11/2025     Microalb/Creat Ratio   Date Value Ref Range Status   02/06/2014 30.6 (H) 0.0 - 30.0 ug/mg Final       Assessment and Plan     Assessment & Plan    GENERALIZED EDEMA:  - Monitored the patient's swelling, noting 2+ edema which is an improvement from previous assessment.  - Evaluated the patient's weight (117.8 yesterday) and breathing status to determine fluid retention.  - Continued Bumex 1 mg twice daily for management of edema.  - Will consider increasing Bumex dosage if necessary and reassess weight to evaluate effectiveness of current treatment regimen.      DYSPNEA AND RESPIRATORY ISSUES:  - Observed the patient is working harder to breathe today despite patient's report that breathing is okay.  - Evaluated respiratory status and noted signs of labored breathing.  - Ordered chest XR to rule out pulmonary edema, assess for fluid in the lungs, evaluate circulatory and respiratory symptoms, and evaluate previous abnormal lung findings.  - Will review chest XR results to determine if further intervention is needed and appropriate next steps in management.    PALLIATIVE CARE:  - Initiated palliative care to optimize quality of  life and symptom management.  - Explained the difference between palliative care and hospice, emphasizing that palliative care does not stop current treatments but aims to optimize quality of life.  - Referred to palliative care team for consultation and ongoing care.  - Developed palliative care plan that includes optimizing medications for circulatory and respiratory symptoms.    FOLLOW-UP:  - Follow up for scheduled neurology appointment on the 11th. 1. Acute on chronic heart failure with preserved ejection fraction  Comments:  Will obtain Chest xray. Consider increasing Bumex from 1 mg BID to TID. WIll wait til xray results. Will also refer to paliative care  Orders:  -     X-Ray Chest PA And Lateral; Future; Expected date: 05/19/2025  -     Ambulatory referral/consult to HOME Palliative Care; Future; Expected date: 05/26/2025           No follow-ups on file.  36 of total time spent on the encounter, which includes face to face time and non-face to face time preparing to see the patient. This includes obtaining and/or reviewing separately obtained history, performing a medically appropriate examination and/or evaluation, and counseling and educating the patient/family/caregiver. Includes documenting clinical information in the electronic or other health record, independently interpreting results (not separately reported) and communicating results to the patient/family/caregiver, with care coordination (not separately reported). Medications, tests and/or procedures ordered as necessary along with referring and communicating with other health professionals (when not separately reported).      This note was generated with the assistance of ambient listening technology. Verbal consent was obtained by the patient and accompanying visitor(s) for the recording of patient appointment to facilitate this note. I attest to having reviewed and edited the generated note for accuracy, though some syntax or spelling  errors may persist. Please contact the author of this note for any clarification.            [1]   Current Outpatient Medications:     amLODIPine (NORVASC) 5 MG tablet, Take 1 tablet (5 mg total) by mouth once daily., Disp: 90 tablet, Rfl: 3    aspirin (ECOTRIN) 81 MG EC tablet, Take 1 tablet (81 mg total) by mouth once daily., Disp: 90 tablet, Rfl: 3    atorvastatin (LIPITOR) 40 MG tablet, Take 1 tablet (40 mg total) by mouth Daily., Disp: 90 tablet, Rfl: 3    budesonide (ENTOCORT EC) 3 mg capsule, Take 1-2 capsules (3-6 mg total) by mouth daily as needed (flare). As needed, Disp: 60 each, Rfl: 2    bumetanide (BUMEX) 1 MG tablet, Take 1 tablet (1 mg total) by mouth once daily., Disp: 30 tablet, Rfl: 11    busPIRone (BUSPAR) 5 MG Tab, Take 1 tablet (5 mg total) by mouth 2 (two) times daily as needed (anxiety)., Disp: 60 tablet, Rfl: 5    carvediloL (COREG) 25 MG tablet, Take 1 tablet (25 mg total) by mouth 2 (two) times daily with meals., Disp: 180 tablet, Rfl: 3    citalopram (CELEXA) 10 MG tablet, Take 1 tablet (10 mg total) by mouth every evening., Disp: , Rfl:     cyproheptadine (PERIACTIN) 4 mg tablet, Take 1 tablet (4 mg total) by mouth 3 (three) times daily as needed (appetite)., Disp: 270 tablet, Rfl: 1    dorzolamide-timolol 2-0.5% (COSOPT) 22.3-6.8 mg/mL ophthalmic solution, Place 1 drop into both eyes 2 (two) times daily., Disp: 10 mL, Rfl: 6    empagliflozin (JARDIANCE) 10 mg tablet, Take 1 tablet (10 mg total) by mouth once daily., Disp: 30 tablet, Rfl: 11    fluticasone propionate (FLONASE) 50 mcg/actuation nasal spray, 1 spray (50 mcg total) by Each Nostril route once daily., Disp: 11.1 mL, Rfl: 3    FOLIC ACID/MULTIVIT-MIN/LUTEIN (CENTRUM SILVER ORAL), Take 1 tablet by mouth once daily., Disp: , Rfl:     levocetirizine (XYZAL) 5 MG tablet, Take 1 tablet (5 mg total) by mouth every evening., Disp: 30 tablet, Rfl: 11    lipase-protease-amylase 24,000-76,000-120,000 units (CREON) 24,000-76,000 -120,000  unit capsule, Take 1 capsule by mouth 3 (three) times daily with meals., Disp: 270 capsule, Rfl: 3    loperamide (IMODIUM) 2 mg capsule, Take 1 mg by mouth every 6 (six) hours as needed for Diarrhea., Disp: , Rfl:     mupirocin (BACTROBAN) 2 % ointment, Apply topically once daily., Disp: 30 g, Rfl: 0    nitroGLYCERIN (NITROSTAT) 0.4 MG SL tablet, Place 1 tablet (0.4 mg total) under the tongue every 5 (five) minutes as needed for Chest pain., Disp: 100 tablet, Rfl: 1    ondansetron (ZOFRAN) 4 MG tablet, Take 1 tablet (4 mg total) by mouth every 8 (eight) hours as needed for Nausea., Disp: 12 tablet, Rfl: 0    pantoprazole (PROTONIX) 40 MG tablet, Take 1 tablet (40 mg total) by mouth once daily., Disp: 90 tablet, Rfl: 3    QUEtiapine (SEROQUEL) 25 MG Tab, Take 1 tablet (25 mg total) by mouth every evening., Disp: 30 tablet, Rfl: 2    sodium bicarbonate 650 MG tablet, Take 1 tablet (650 mg total) by mouth once daily., Disp: 180 tablet, Rfl: 0

## 2025-05-20 ENCOUNTER — PATIENT MESSAGE (OUTPATIENT)
Dept: FAMILY MEDICINE | Facility: CLINIC | Age: 89
End: 2025-05-20
Payer: MEDICARE

## 2025-05-20 ENCOUNTER — TELEPHONE (OUTPATIENT)
Dept: FAMILY MEDICINE | Facility: CLINIC | Age: 89
End: 2025-05-20
Payer: MEDICARE

## 2025-05-20 DIAGNOSIS — R39.9 URINARY TRACT INFECTION SYMPTOMS: Primary | ICD-10-CM

## 2025-05-20 NOTE — TELEPHONE ENCOUNTER
----- Message from Darron sent at 5/20/2025  3:45 PM CDT -----  Contact: occupational therapist  Type: Orders Request What orders/ testing are being requested?urinalysis Is there a future appointment scheduled for the patient with PCP?no When? Would you prefer a response via [employer]? Call back [phone](occupational therapist, valerie) Comments:Pt daughter stating there is a strong smell and some confusion noted from her base line

## 2025-05-22 ENCOUNTER — TELEPHONE (OUTPATIENT)
Dept: FAMILY MEDICINE | Facility: CLINIC | Age: 89
End: 2025-05-22
Payer: MEDICARE

## 2025-05-22 ENCOUNTER — OUTPATIENT CASE MANAGEMENT (OUTPATIENT)
Dept: ADMINISTRATIVE | Facility: OTHER | Age: 89
End: 2025-05-22
Payer: MEDICARE

## 2025-05-22 NOTE — TELEPHONE ENCOUNTER
Spoke with pt daughter (Jessica) requesting that referral/consult to be sent to [facility]. Fax [phone].[facility] notified.

## 2025-05-23 ENCOUNTER — LAB VISIT (OUTPATIENT)
Dept: LAB | Facility: HOSPITAL | Age: 89
End: 2025-05-23
Attending: INTERNAL MEDICINE
Payer: MEDICARE

## 2025-05-23 DIAGNOSIS — I50.33 ACUTE ON CHRONIC HEART FAILURE WITH PRESERVED EJECTION FRACTION: ICD-10-CM

## 2025-05-23 LAB
ANION GAP (OHS): 16 MMOL/L (ref 8–16)
BUN SERPL-MCNC: 25 MG/DL (ref 8–23)
CALCIUM SERPL-MCNC: 8 MG/DL (ref 8.7–10.5)
CHLORIDE SERPL-SCNC: 105 MMOL/L (ref 95–110)
CO2 SERPL-SCNC: 21 MMOL/L (ref 23–29)
CREAT SERPL-MCNC: 1.5 MG/DL (ref 0.5–1.4)
GFR SERPLBLD CREATININE-BSD FMLA CKD-EPI: 34 ML/MIN/1.73/M2
GLUCOSE SERPL-MCNC: 129 MG/DL (ref 70–110)
POTASSIUM SERPL-SCNC: 3 MMOL/L (ref 3.5–5.1)
SODIUM SERPL-SCNC: 142 MMOL/L (ref 136–145)

## 2025-05-23 PROCEDURE — 80048 BASIC METABOLIC PNL TOTAL CA: CPT | Mod: HCNC

## 2025-05-23 PROCEDURE — 36415 COLL VENOUS BLD VENIPUNCTURE: CPT | Mod: HCNC,PO

## 2025-05-24 ENCOUNTER — LAB VISIT (OUTPATIENT)
Dept: LAB | Facility: HOSPITAL | Age: 89
End: 2025-05-24
Attending: INTERNAL MEDICINE
Payer: MEDICARE

## 2025-05-24 DIAGNOSIS — R39.9 URINARY TRACT INFECTION SYMPTOMS: ICD-10-CM

## 2025-05-24 LAB
BACTERIA #/AREA URNS HPF: ABNORMAL /HPF
BILIRUB UR QL STRIP.AUTO: NEGATIVE
CAOX CRY URNS QL MICRO: ABNORMAL
CLARITY UR: ABNORMAL
COLOR UR AUTO: YELLOW
GLUCOSE UR QL STRIP: ABNORMAL
HGB UR QL STRIP: ABNORMAL
HOLD SPECIMEN: NORMAL
KETONES UR QL STRIP: NEGATIVE
LEUKOCYTE ESTERASE UR QL STRIP: ABNORMAL
MICROSCOPIC COMMENT: ABNORMAL
NITRITE UR QL STRIP: NEGATIVE
PH UR STRIP: 6 [PH]
PROT UR QL STRIP: NEGATIVE
RBC #/AREA URNS HPF: 1 /HPF (ref 0–4)
SP GR UR STRIP: 1.01
SQUAMOUS #/AREA URNS HPF: 51 /HPF
WBC #/AREA URNS HPF: >100 /HPF (ref 0–5)

## 2025-05-24 PROCEDURE — 81003 URINALYSIS AUTO W/O SCOPE: CPT

## 2025-05-24 PROCEDURE — 87086 URINE CULTURE/COLONY COUNT: CPT

## 2025-05-25 LAB — BACTERIA UR CULT: NORMAL

## 2025-05-26 ENCOUNTER — RESULTS FOLLOW-UP (OUTPATIENT)
Dept: FAMILY MEDICINE | Facility: CLINIC | Age: 89
End: 2025-05-26

## 2025-05-26 ENCOUNTER — PATIENT MESSAGE (OUTPATIENT)
Dept: FAMILY MEDICINE | Facility: CLINIC | Age: 89
End: 2025-05-26
Payer: MEDICARE

## 2025-05-26 DIAGNOSIS — R41.0 CONFUSION: Primary | ICD-10-CM

## 2025-05-26 RX ORDER — POTASSIUM CHLORIDE 20 MEQ/1
20 TABLET, EXTENDED RELEASE ORAL DAILY
Qty: 30 TABLET | Refills: 0 | Status: SHIPPED | OUTPATIENT
Start: 2025-05-26 | End: 2025-05-26 | Stop reason: SDUPTHER

## 2025-05-26 RX ORDER — POTASSIUM CHLORIDE 20 MEQ/1
20 TABLET, EXTENDED RELEASE ORAL DAILY
Qty: 30 TABLET | Refills: 0 | Status: SHIPPED | OUTPATIENT
Start: 2025-05-26 | End: 2025-06-25

## 2025-05-28 DIAGNOSIS — R41.0 CONFUSION: Primary | ICD-10-CM

## 2025-05-29 ENCOUNTER — LAB VISIT (OUTPATIENT)
Dept: LAB | Facility: HOSPITAL | Age: 89
End: 2025-05-29
Attending: INTERNAL MEDICINE
Payer: MEDICARE

## 2025-05-29 DIAGNOSIS — R41.0 CONFUSION: ICD-10-CM

## 2025-05-29 LAB
AMORPH CRY UR QL COMP ASSIST: ABNORMAL
BACTERIA #/AREA URNS AUTO: ABNORMAL /HPF
BILIRUB UR QL STRIP.AUTO: NEGATIVE
CLARITY UR: CLEAR
COLOR UR AUTO: YELLOW
GLUCOSE UR QL STRIP: ABNORMAL
HGB UR QL STRIP: NEGATIVE
KETONES UR QL STRIP: NEGATIVE
LEUKOCYTE ESTERASE UR QL STRIP: ABNORMAL
MICROSCOPIC COMMENT: ABNORMAL
NITRITE UR QL STRIP: NEGATIVE
PH UR STRIP: 7 [PH]
PROT UR QL STRIP: NEGATIVE
RBC #/AREA URNS AUTO: <1 /HPF (ref 0–4)
SP GR UR STRIP: 1.01
SQUAMOUS #/AREA URNS AUTO: 1 /HPF
UROBILINOGEN UR STRIP-ACNC: NEGATIVE EU/DL
WBC #/AREA URNS AUTO: 18 /HPF (ref 0–5)

## 2025-05-29 PROCEDURE — 81001 URINALYSIS AUTO W/SCOPE: CPT

## 2025-05-30 ENCOUNTER — RESULTS FOLLOW-UP (OUTPATIENT)
Dept: FAMILY MEDICINE | Facility: CLINIC | Age: 89
End: 2025-05-30

## 2025-05-31 LAB — BACTERIA UR CULT: NORMAL

## 2025-06-09 ENCOUNTER — PATIENT MESSAGE (OUTPATIENT)
Dept: FAMILY MEDICINE | Facility: CLINIC | Age: 89
End: 2025-06-09
Payer: MEDICARE

## 2025-06-10 ENCOUNTER — DOCUMENT SCAN (OUTPATIENT)
Dept: HOME HEALTH SERVICES | Facility: HOSPITAL | Age: 89
End: 2025-06-10
Payer: MEDICARE

## 2025-06-10 ENCOUNTER — OFFICE VISIT (OUTPATIENT)
Dept: OPHTHALMOLOGY | Facility: CLINIC | Age: 89
End: 2025-06-10
Payer: MEDICARE

## 2025-06-10 ENCOUNTER — PATIENT MESSAGE (OUTPATIENT)
Dept: OPHTHALMOLOGY | Facility: CLINIC | Age: 89
End: 2025-06-10

## 2025-06-10 DIAGNOSIS — H34.8130 CENTRAL RETINAL VEIN OCCLUSION WITH MACULAR EDEMA OF BOTH EYES: Primary | ICD-10-CM

## 2025-06-10 DIAGNOSIS — H35.353 CME (CYSTOID MACULAR EDEMA), BILATERAL: ICD-10-CM

## 2025-06-10 DIAGNOSIS — H40.1131 PRIMARY OPEN ANGLE GLAUCOMA (POAG) OF BOTH EYES, MILD STAGE: ICD-10-CM

## 2025-06-10 PROCEDURE — 92134 CPTRZ OPH DX IMG PST SGM RTA: CPT | Mod: S$GLB,,, | Performed by: OPHTHALMOLOGY

## 2025-06-10 PROCEDURE — 1157F ADVNC CARE PLAN IN RCRD: CPT | Mod: CPTII,S$GLB,, | Performed by: OPHTHALMOLOGY

## 2025-06-10 PROCEDURE — 1159F MED LIST DOCD IN RCRD: CPT | Mod: CPTII,S$GLB,, | Performed by: OPHTHALMOLOGY

## 2025-06-10 PROCEDURE — 1160F RVW MEDS BY RX/DR IN RCRD: CPT | Mod: CPTII,S$GLB,, | Performed by: OPHTHALMOLOGY

## 2025-06-10 PROCEDURE — 99214 OFFICE O/P EST MOD 30 MIN: CPT | Mod: S$GLB,,, | Performed by: OPHTHALMOLOGY

## 2025-06-10 PROCEDURE — 99999 PR PBB SHADOW E&M-EST. PATIENT-LVL III: CPT | Mod: PBBFAC,,, | Performed by: OPHTHALMOLOGY

## 2025-06-10 NOTE — PROGRESS NOTES
===============================  Date today is 6/10/2025  Glo Dumont is a 89 y.o. female  Last visit [facility]: :4/23/2025   Last visit eye dept. 4/23/2025    Corrected distance visual acuity was HM in the right eye and HM in the left eye.  Tonometry       Tonometry ([facility]are, 9:16 AM)         Right Left    Pressure 15 10                  Not recorded       Manifest Refraction       Manifest Refraction (Retinoscopy)         Sphere Cylinder Axis    Right -4.00 +2.50 180    Left -2.75 +1.25 180                  Not recorded       Chief Complaint   Patient presents with    Follow-up     Patient here today for 6 week MOCT post Ozurdex OS  Vision stable  No pain or discomfort      HPI     Follow-up            Comments: Patient here today for 6 week MOCT post Ozurdex OS  Vision stable  No pain or discomfort           Comments    1. Steroid responder glaucoma   2. PCIOL OU MGM   3. SARCOIDOSIS   H\O chronic UVEITIS OU   4. Central vein occlusion of retina, left   5. Retinal edema     H/o Avastin and Eylea OS   Had been getting Ozurdex OU with Dr. Shahrzad Tamez Eylea OU last 4/25/22, 8/3/22, 12/21/22  Ozurdex ou last 2/16/2022, 5/25/22, 11/8/22, 1/31/23, 8/9/23, 11/15/23,   3/6/24, 5/29/24, 8/14/24  Avastin OU in lieu of Ozurdex 1/28/25  Ozurdex OS 4/23/25    Cosopt BID OU             Last edited by Paulette Goss, PCT on 6/10/2025  9:14 AM.      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  Check OU independently daily.    Gave my office  and personal cell phone number.  ________________  6/10/2025 today  Glo Dumont    OU CRVO  OCT increased CME OD, better OS  At plateau of tx, no macula vision x2 years  No tx OD with worse OCT today, no change in vision  Will hold off on injections for now  PCIOL OU post YAG  Sharp disc 0.4  Clear view in  Minimal angio  Loose lens refraction after streak  MR minimal improvement  Will have Dr. Sandoval recheck refraction at next visit      RTC 6 months for dilation, see Dr. Sandoval first for refraction  Instructed to call 24/7 for any worsening of vision or symptoms. Check OU daily.   Gave my office and cell phone number.    =============================

## 2025-06-11 ENCOUNTER — OUTPATIENT CASE MANAGEMENT (OUTPATIENT)
Dept: ADMINISTRATIVE | Facility: OTHER | Age: 89
End: 2025-06-11
Payer: MEDICARE

## 2025-06-16 ENCOUNTER — PATIENT MESSAGE (OUTPATIENT)
Dept: FAMILY MEDICINE | Facility: CLINIC | Age: 89
End: 2025-06-16
Payer: MEDICARE

## 2025-06-17 ENCOUNTER — TELEPHONE (OUTPATIENT)
Dept: FAMILY MEDICINE | Facility: CLINIC | Age: 89
End: 2025-06-17

## 2025-06-22 ENCOUNTER — HOSPITAL ENCOUNTER (OUTPATIENT)
Facility: HOSPITAL | Age: 89
Discharge: HOME | End: 2025-06-23
Attending: EMERGENCY MEDICINE | Admitting: FAMILY MEDICINE
Payer: MEDICARE

## 2025-06-22 DIAGNOSIS — I63.9 ACUTE CEREBROVASCULAR ACCIDENT (CVA): ICD-10-CM

## 2025-06-22 DIAGNOSIS — M79.671 RIGHT FOOT PAIN: ICD-10-CM

## 2025-06-22 DIAGNOSIS — E87.6 LOW SERUM POTASSIUM: ICD-10-CM

## 2025-06-22 DIAGNOSIS — R29.818 ACUTE FOCAL NEUROLOGICAL DEFICIT: ICD-10-CM

## 2025-06-22 DIAGNOSIS — R47.81 SLURRED SPEECH: Primary | ICD-10-CM

## 2025-06-22 DIAGNOSIS — M79.604 RIGHT LEG PAIN: ICD-10-CM

## 2025-06-22 DIAGNOSIS — R79.0 LOW MAGNESIUM LEVELS: ICD-10-CM

## 2025-06-22 DIAGNOSIS — R29.818 FOCAL NEUROLOGICAL DEFICIT: ICD-10-CM

## 2025-06-22 PROBLEM — D64.9 ANEMIA: Status: ACTIVE | Noted: 2025-06-22

## 2025-06-22 PROBLEM — Z86.69: Status: ACTIVE | Noted: 2025-06-22

## 2025-06-22 LAB
ABSOLUTE EOSINOPHIL (OHS): 0.07 K/UL
ABSOLUTE MONOCYTE (OHS): 0.6 K/UL (ref 0.3–1)
ABSOLUTE NEUTROPHIL COUNT (OHS): 9.34 K/UL (ref 1.8–7.7)
ALBUMIN SERPL BCP-MCNC: 3.7 G/DL (ref 3.5–5.2)
ALP SERPL-CCNC: 99 UNIT/L (ref 40–150)
ALT SERPL W/O P-5'-P-CCNC: 31 UNIT/L (ref 10–44)
ANION GAP (OHS): 14 MMOL/L (ref 8–16)
AST SERPL-CCNC: 39 UNIT/L (ref 11–45)
BACTERIA #/AREA URNS AUTO: ABNORMAL /HPF
BASOPHILS # BLD AUTO: 0.05 K/UL
BASOPHILS NFR BLD AUTO: 0.5 %
BILIRUB SERPL-MCNC: 1 MG/DL (ref 0.1–1)
BILIRUB UR QL STRIP.AUTO: NEGATIVE
BUN SERPL-MCNC: 20 MG/DL (ref 8–23)
CALCIUM SERPL-MCNC: 8.7 MG/DL (ref 8.7–10.5)
CHLORIDE SERPL-SCNC: 110 MMOL/L (ref 95–110)
CHOLEST SERPL-MCNC: 187 MG/DL (ref 120–199)
CHOLEST/HDLC SERPL: 2.3 {RATIO} (ref 2–5)
CLARITY UR: CLEAR
CO2 SERPL-SCNC: 19 MMOL/L (ref 23–29)
COLOR UR AUTO: COLORLESS
CREAT SERPL-MCNC: 1.3 MG/DL (ref 0.5–1.4)
ERYTHROCYTE [DISTWIDTH] IN BLOOD BY AUTOMATED COUNT: 16.8 % (ref 11.5–14.5)
GFR SERPLBLD CREATININE-BSD FMLA CKD-EPI: 40 ML/MIN/1.73/M2
GLUCOSE SERPL-MCNC: 137 MG/DL (ref 70–110)
GLUCOSE UR QL STRIP: ABNORMAL
HCT VFR BLD AUTO: 36.3 % (ref 37–48.5)
HDLC SERPL-MCNC: 81 MG/DL (ref 40–75)
HDLC SERPL: 43.3 % (ref 20–50)
HGB BLD-MCNC: 12 GM/DL (ref 12–16)
HGB UR QL STRIP: NEGATIVE
HOLD SPECIMEN: NORMAL
HYALINE CASTS UR QL AUTO: 1 /LPF (ref 0–1)
IMM GRANULOCYTES # BLD AUTO: 0.2 K/UL (ref 0–0.04)
IMM GRANULOCYTES NFR BLD AUTO: 1.9 % (ref 0–0.5)
INR PPP: 1 (ref 0.8–1.2)
KETONES UR QL STRIP: NEGATIVE
LDLC SERPL CALC-MCNC: 77.8 MG/DL (ref 63–159)
LEUKOCYTE ESTERASE UR QL STRIP: ABNORMAL
LYMPHOCYTES # BLD AUTO: 0.53 K/UL (ref 1–4.8)
MAGNESIUM SERPL-MCNC: 1.5 MG/DL (ref 1.6–2.6)
MCH RBC QN AUTO: 27.2 PG (ref 27–31)
MCHC RBC AUTO-ENTMCNC: 33.1 G/DL (ref 32–36)
MCV RBC AUTO: 82 FL (ref 82–98)
MICROSCOPIC COMMENT: ABNORMAL
NITRITE UR QL STRIP: NEGATIVE
NONHDLC SERPL-MCNC: 106 MG/DL
NUCLEATED RBC (/100WBC) (OHS): 0 /100 WBC
OHS QRS DURATION: 82 MS
OHS QTC CALCULATION: 441 MS
PH UR STRIP: 7 [PH]
PLATELET # BLD AUTO: 201 K/UL (ref 150–450)
PMV BLD AUTO: 10 FL (ref 9.2–12.9)
POTASSIUM SERPL-SCNC: 2.8 MMOL/L (ref 3.5–5.1)
PROT SERPL-MCNC: 7 GM/DL (ref 6–8.4)
PROT UR QL STRIP: NEGATIVE
PROTHROMBIN TIME: 11.3 SECONDS (ref 9–12.5)
RBC # BLD AUTO: 4.41 M/UL (ref 4–5.4)
RELATIVE EOSINOPHIL (OHS): 0.6 %
RELATIVE LYMPHOCYTE (OHS): 4.9 % (ref 18–48)
RELATIVE MONOCYTE (OHS): 5.6 % (ref 4–15)
RELATIVE NEUTROPHIL (OHS): 86.5 % (ref 38–73)
SODIUM SERPL-SCNC: 143 MMOL/L (ref 136–145)
SP GR UR STRIP: 1.01
SQUAMOUS #/AREA URNS AUTO: 2 /HPF
TRIGL SERPL-MCNC: 141 MG/DL (ref 30–150)
TROPONIN I SERPL DL<=0.01 NG/ML-MCNC: 0.03 NG/ML
TSH SERPL-ACNC: 0.74 UIU/ML (ref 0.4–4)
UROBILINOGEN UR STRIP-ACNC: NEGATIVE EU/DL
WBC # BLD AUTO: 10.79 K/UL (ref 3.9–12.7)
WBC #/AREA URNS AUTO: 9 /HPF (ref 0–5)

## 2025-06-22 PROCEDURE — 25000003 PHARM REV CODE 250: Performed by: EMERGENCY MEDICINE

## 2025-06-22 PROCEDURE — 83735 ASSAY OF MAGNESIUM: CPT | Performed by: EMERGENCY MEDICINE

## 2025-06-22 PROCEDURE — 99285 EMERGENCY DEPT VISIT HI MDM: CPT | Mod: 25,HCNC

## 2025-06-22 PROCEDURE — 82040 ASSAY OF SERUM ALBUMIN: CPT | Performed by: EMERGENCY MEDICINE

## 2025-06-22 PROCEDURE — 36415 COLL VENOUS BLD VENIPUNCTURE: CPT

## 2025-06-22 PROCEDURE — 93005 ELECTROCARDIOGRAM TRACING: CPT

## 2025-06-22 PROCEDURE — 96365 THER/PROPH/DIAG IV INF INIT: CPT

## 2025-06-22 PROCEDURE — 81003 URINALYSIS AUTO W/O SCOPE: CPT | Performed by: EMERGENCY MEDICINE

## 2025-06-22 PROCEDURE — 25000003 PHARM REV CODE 250

## 2025-06-22 PROCEDURE — 93010 ELECTROCARDIOGRAM REPORT: CPT | Mod: ,,, | Performed by: INTERNAL MEDICINE

## 2025-06-22 PROCEDURE — 84484 ASSAY OF TROPONIN QUANT: CPT

## 2025-06-22 PROCEDURE — 25000242 PHARM REV CODE 250 ALT 637 W/ HCPCS

## 2025-06-22 PROCEDURE — G0426 INPT/ED TELECONSULT50: HCPCS | Mod: 95,,, | Performed by: PSYCHIATRY & NEUROLOGY

## 2025-06-22 PROCEDURE — G0378 HOSPITAL OBSERVATION PER HR: HCPCS

## 2025-06-22 PROCEDURE — 85025 COMPLETE CBC W/AUTO DIFF WBC: CPT | Performed by: EMERGENCY MEDICINE

## 2025-06-22 PROCEDURE — 84443 ASSAY THYROID STIM HORMONE: CPT | Performed by: EMERGENCY MEDICINE

## 2025-06-22 PROCEDURE — 85610 PROTHROMBIN TIME: CPT | Performed by: EMERGENCY MEDICINE

## 2025-06-22 PROCEDURE — 63600175 PHARM REV CODE 636 W HCPCS

## 2025-06-22 PROCEDURE — 80061 LIPID PANEL: CPT | Performed by: EMERGENCY MEDICINE

## 2025-06-22 RX ORDER — CITALOPRAM 10 MG/1
10 TABLET ORAL NIGHTLY
Status: DISCONTINUED | OUTPATIENT
Start: 2025-06-22 | End: 2025-06-23 | Stop reason: HOSPADM

## 2025-06-22 RX ORDER — PANTOPRAZOLE SODIUM 40 MG/1
40 TABLET, DELAYED RELEASE ORAL DAILY
Status: DISCONTINUED | OUTPATIENT
Start: 2025-06-22 | End: 2025-06-23 | Stop reason: HOSPADM

## 2025-06-22 RX ORDER — CYPROHEPTADINE HYDROCHLORIDE 4 MG/1
4 TABLET ORAL 3 TIMES DAILY PRN
Status: DISCONTINUED | OUTPATIENT
Start: 2025-06-22 | End: 2025-06-23 | Stop reason: HOSPADM

## 2025-06-22 RX ORDER — CLOPIDOGREL BISULFATE 75 MG/1
75 TABLET ORAL DAILY
Status: DISCONTINUED | OUTPATIENT
Start: 2025-06-23 | End: 2025-06-23 | Stop reason: HOSPADM

## 2025-06-22 RX ORDER — POTASSIUM CHLORIDE 7.45 MG/ML
10 INJECTION INTRAVENOUS
Status: COMPLETED | OUTPATIENT
Start: 2025-06-22 | End: 2025-06-23

## 2025-06-22 RX ORDER — POTASSIUM CHLORIDE 20 MEQ/1
40 TABLET, EXTENDED RELEASE ORAL
Status: DISCONTINUED | OUTPATIENT
Start: 2025-06-22 | End: 2025-06-22

## 2025-06-22 RX ORDER — POTASSIUM CHLORIDE 7.45 MG/ML
10 INJECTION INTRAVENOUS
Status: DISPENSED | OUTPATIENT
Start: 2025-06-22 | End: 2025-06-22

## 2025-06-22 RX ORDER — BUSPIRONE HYDROCHLORIDE 5 MG/1
5 TABLET ORAL 2 TIMES DAILY PRN
Status: DISCONTINUED | OUTPATIENT
Start: 2025-06-22 | End: 2025-06-23 | Stop reason: HOSPADM

## 2025-06-22 RX ORDER — PANCRELIPASE LIPASE, PANCRELIPASE PROTEASE, PANCRELIPASE AMYLASE 40000; 126000; 168000 [USP'U]/1; [USP'U]/1; [USP'U]/1
40000 CAPSULE, DELAYED RELEASE ORAL 3 TIMES DAILY
COMMUNITY
Start: 2025-06-13

## 2025-06-22 RX ORDER — CETIRIZINE HYDROCHLORIDE 5 MG/1
5 TABLET ORAL NIGHTLY
Status: DISCONTINUED | OUTPATIENT
Start: 2025-06-22 | End: 2025-06-23 | Stop reason: HOSPADM

## 2025-06-22 RX ORDER — FLUTICASONE PROPIONATE 50 MCG
1 SPRAY, SUSPENSION (ML) NASAL DAILY
Status: DISCONTINUED | OUTPATIENT
Start: 2025-06-22 | End: 2025-06-23 | Stop reason: HOSPADM

## 2025-06-22 RX ORDER — MAGNESIUM SULFATE HEPTAHYDRATE 40 MG/ML
2 INJECTION, SOLUTION INTRAVENOUS ONCE
Status: DISCONTINUED | OUTPATIENT
Start: 2025-06-22 | End: 2025-06-22 | Stop reason: SDUPTHER

## 2025-06-22 RX ORDER — BISACODYL 10 MG/1
10 SUPPOSITORY RECTAL DAILY PRN
Status: DISCONTINUED | OUTPATIENT
Start: 2025-06-22 | End: 2025-06-23 | Stop reason: HOSPADM

## 2025-06-22 RX ORDER — CLOPIDOGREL BISULFATE 300 MG/1
300 TABLET, FILM COATED ORAL ONCE
Status: COMPLETED | OUTPATIENT
Start: 2025-06-22 | End: 2025-06-22

## 2025-06-22 RX ORDER — MAGNESIUM SULFATE HEPTAHYDRATE 40 MG/ML
2 INJECTION, SOLUTION INTRAVENOUS ONCE
Status: COMPLETED | OUTPATIENT
Start: 2025-06-23 | End: 2025-06-23

## 2025-06-22 RX ORDER — ASPIRIN 81 MG/1
81 TABLET ORAL DAILY
Status: DISCONTINUED | OUTPATIENT
Start: 2025-06-23 | End: 2025-06-22 | Stop reason: SDUPTHER

## 2025-06-22 RX ORDER — LABETALOL HYDROCHLORIDE 5 MG/ML
10 INJECTION, SOLUTION INTRAVENOUS
Status: DISCONTINUED | OUTPATIENT
Start: 2025-06-22 | End: 2025-06-23 | Stop reason: HOSPADM

## 2025-06-22 RX ORDER — ONDANSETRON HYDROCHLORIDE 2 MG/ML
4 INJECTION, SOLUTION INTRAVENOUS EVERY 12 HOURS PRN
Status: DISCONTINUED | OUTPATIENT
Start: 2025-06-22 | End: 2025-06-23 | Stop reason: HOSPADM

## 2025-06-22 RX ORDER — DORZOLAMIDE HYDROCHLORIDE AND TIMOLOL MALEATE 20; 5 MG/ML; MG/ML
1 SOLUTION/ DROPS OPHTHALMIC 2 TIMES DAILY
Status: DISCONTINUED | OUTPATIENT
Start: 2025-06-22 | End: 2025-06-23 | Stop reason: HOSPADM

## 2025-06-22 RX ORDER — BUDESONIDE 0.5 MG/2ML
0.5 INHALANT ORAL EVERY 12 HOURS
Status: DISCONTINUED | OUTPATIENT
Start: 2025-06-22 | End: 2025-06-23 | Stop reason: HOSPADM

## 2025-06-22 RX ORDER — SODIUM BICARBONATE 650 MG/1
650 TABLET ORAL DAILY
Status: DISCONTINUED | OUTPATIENT
Start: 2025-06-23 | End: 2025-06-23 | Stop reason: HOSPADM

## 2025-06-22 RX ORDER — AMOXICILLIN 250 MG
1 CAPSULE ORAL 2 TIMES DAILY
Status: DISCONTINUED | OUTPATIENT
Start: 2025-06-22 | End: 2025-06-23 | Stop reason: HOSPADM

## 2025-06-22 RX ORDER — ATORVASTATIN CALCIUM 40 MG/1
40 TABLET, FILM COATED ORAL DAILY
Status: DISCONTINUED | OUTPATIENT
Start: 2025-06-22 | End: 2025-06-22 | Stop reason: SDUPTHER

## 2025-06-22 RX ORDER — POTASSIUM CHLORIDE 20 MEQ/1
20 TABLET, EXTENDED RELEASE ORAL DAILY
Status: DISCONTINUED | OUTPATIENT
Start: 2025-06-22 | End: 2025-06-23 | Stop reason: HOSPADM

## 2025-06-22 RX ORDER — QUETIAPINE FUMARATE 25 MG/1
25 TABLET, FILM COATED ORAL NIGHTLY
Status: DISCONTINUED | OUTPATIENT
Start: 2025-06-22 | End: 2025-06-23 | Stop reason: HOSPADM

## 2025-06-22 RX ORDER — ASPIRIN 81 MG/1
81 TABLET ORAL DAILY
Status: DISCONTINUED | OUTPATIENT
Start: 2025-06-22 | End: 2025-06-23 | Stop reason: HOSPADM

## 2025-06-22 RX ORDER — SODIUM CHLORIDE 0.9 % (FLUSH) 0.9 %
10 SYRINGE (ML) INJECTION
Status: DISCONTINUED | OUTPATIENT
Start: 2025-06-22 | End: 2025-06-23 | Stop reason: HOSPADM

## 2025-06-22 RX ORDER — ATORVASTATIN CALCIUM 40 MG/1
40 TABLET, FILM COATED ORAL DAILY
Status: DISCONTINUED | OUTPATIENT
Start: 2025-06-22 | End: 2025-06-23 | Stop reason: HOSPADM

## 2025-06-22 RX ADMIN — FLUTICASONE PROPIONATE 50 MCG: 50 SPRAY, METERED NASAL at 06:06

## 2025-06-22 RX ADMIN — PANTOPRAZOLE SODIUM 40 MG: 40 TABLET, DELAYED RELEASE ORAL at 06:06

## 2025-06-22 RX ADMIN — POTASSIUM BICARBONATE 25 MEQ: 978 TABLET, EFFERVESCENT ORAL at 02:06

## 2025-06-22 RX ADMIN — QUETIAPINE FUMARATE 25 MG: 25 TABLET ORAL at 11:06

## 2025-06-22 RX ADMIN — SENNOSIDES AND DOCUSATE SODIUM 1 TABLET: 50; 8.6 TABLET ORAL at 11:06

## 2025-06-22 RX ADMIN — POTASSIUM CHLORIDE 20 MEQ: 1500 TABLET, EXTENDED RELEASE ORAL at 06:06

## 2025-06-22 RX ADMIN — CLOPIDOGREL BISULFATE 300 MG: 300 TABLET, FILM COATED ORAL at 06:06

## 2025-06-22 RX ADMIN — POTASSIUM CHLORIDE 10 MEQ: 7.46 INJECTION, SOLUTION INTRAVENOUS at 11:06

## 2025-06-22 NOTE — ED NOTES
[facility] notified of code stroke/need for teleneurology consult. Responding physician will be Dr. Wang.

## 2025-06-22 NOTE — TELEMEDICINE CONSULT
[facility]  Vascular Neurology  Comprehensive Stroke Center  TeleVascular Neurology Interprofessional Consult Note           Consult Information  Consults    Consulting Provider: MICHELET HOPE   Patient Location:  [facility] EMERGENCY DEPARTMENT     Summary of patient's symptoms:  Referring Facility: [facility] ED Referring Provider: MICHELET HOPE LKN: today @ 0915 am Symptoms: 1 episode of slurred speech now resolved, Hx of dementia     I was notified at 12:33 pm-      Images personally reviewed and interpreted:  Study: Head CT and Other: carotid ultrasound- No hemorrhage or acute major vascular distribution infarction.  Study Interpretation: Suggestion of 50-69% stenosis in the left internal carotid artery.      Assessment and plan:    TIA/Stroke    No TNK as symptoms resolved  No CTA due to worsening renal functions and symptoms resolution  Repeat CT brain within 24 hours as no MRI due to pacemaker        Oral aspirin 81 mg, if PO not possible then rectal aspirin 300 mg now.  Head of bed flat, IV Fluids, permissive hypertension  Neuro consult if in house available or transfer for neuro consult  Stroke/TIA work-up with Repeat CT brain within 24 hours, Echo, PT/OT/ Speech and swallow evaluation.  Recommend loading Plavix load 300 mg today per POINT/CHANCE protocol today and from tomorrow 81 mg aspirin and plavix 75 mg for 21 days followed by mono antiplatelet.       I spent approximately 35  minutes on this encounter. More than half of that time was spent communicating with the consulting provider and coordinating patient care.       Erick Garner MD        This encounter was conducted as an interprofessional communication between providers at the [facility] and vascular neurologist. The interaction was completed over the phone or via secure messaging (electronic medical record - [facility] Secure Chat).     Once this note was completed, a written copy was sent back to the provider via  fax or electronic medical record.

## 2025-06-22 NOTE — ED PROVIDER NOTES
Emergency Medicine Provider Note - 6/22/2025    SCRIBE #1 NOTE: I, Carmen Porter, am scribing for, and in the presence of Prema Rose DO, FACEP.        History     Chief Complaint   Patient presents with    Altered Mental Status     Per family member pt had a moment earlier today with some slurred speech that resolved shortly after. Pt is now confused ANOx 3. Pts normal per family is ANOx4. Pt has a hx of dementia and does get confused from time to time. Stroke screening negative and VAN negative       Allergies:  Review of patient's allergies indicates:   Allergen Reactions    Lisinopril      angioedema    Codeine Nausea And Vomiting       History of Present Illness   HPI    6/22/2025, 12:24 PM  The history is provided by the daughter, medical records, and patient    Glo Dumont is a 89 y.o. female patient presenting to the Emergency Department with a mental status change. Pt has a PMHx of HTN, sarcoidosis, diverticulosis, hypercholesterolemia, CAD, anemia, heart failure, anxiety, stage 3 CKD, renal cyst, liver cyst, obesity and depression.    Majority of patient Hx obtained from pt's daughter. Per daughter, pt had an episode of slurred speech this morning ~9AM which resolved spontaneously ~15 minutes later. Daughter notes pt was speaking normally prior to onset of sxs as well. Associated sxs reported by daughter include agitated behavior last Thursday, wandering around the house at night, and memory issues. Daughter reports the pt fell twice: once last Monday, and once last Friday. Pt's right upper arm and RLE started feeling sore s/p her falls. Daughter denies any noticeable facial droop/asymmetry, fever, cough, nausea, vomiting, diarrhea, or PMHx of CVA/TIA. Daughter does report PMHx of dementia, DM, open heart surgery and pacemaker placement. Pacemaker has been in place for at least 2+ years. Daughter states pt recently increased her Seroquel dosage from 25 mg to 50 mg and that the pt is also on 81 mg  aspirin daily. Pt ambulates on both legs with the assistance of a walker.    Per pt, she states her right upper arm is sore.      Arrival mode: Private Vehicle     PCP: Britt Prakash DO     Past Medical History:  Past Medical History:   Diagnosis Date    Anemia     Anemia 6/22/2025    Angina pectoris     Anxiety     Anxiety and depression     Arthritis     hip    Carotid artery occlusion     Carpal tunnel syndrome 06/23/2008    emg    Chronic diarrhea     work up in 2011 with EGD, CS and VCE    CKD (chronic kidney disease) stage 3, GFR 30-59 ml/min 05/11/2017    Colitis     Coronary artery disease     Coronary artery disease     Diastolic dysfunction     Diverticulosis     Encephalopathy 10/14/2024    Glaucoma     Greater trochanteric bursitis 02/10/2015    Grief at loss of child 01/26/2016    H/O carotid endarterectomy 12/02/2013    Heart failure     History of coronary angioplasty 03/11/2014    Hypercholesteremia     Hypertension     Liver cyst 02/08/2013    ct abd    Low back pain 02/08/2024    Macular degeneration     Obesity with serious comorbidity 03/19/2023    Primary open-angle glaucoma(365.11) 09/03/2013    Renal cyst 02/08/2013    ct abd    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/03/2014    Sarcoidosis     Sarcoidosis of lung     Sickle cell trait     Uveitis        Past Surgical History:  Past Surgical History:   Procedure Laterality Date    A-V CARDIAC PACEMAKER INSERTION Left 03/21/2023    Procedure: INSERTION, CARDIAC PACEMAKER, DUAL CHAMBER/His Lead;  Surgeon: Cortez Ambrose MD;  Location: [facility] CATH LAB;  Service: Cardiology;  Laterality: Left;  MDT/ MD to confirm in am/possible nurse sedate    CAROTID ENDARTERECTOMY Right 2000s    CATARACT EXTRACTION Bilateral     Dr. Liang Dennis    CHOLECYSTECTOMY      laparoscopic, 3/18.    CORONARY ANGIOPLASTY WITH STENT PLACEMENT  11/19/2010    RCA-HALIE 2010    Lathia    JOINT REPLACEMENT Left 11/03/2014    Dr. Braun    TOTAL ABDOMINAL  HYSTERECTOMY W/ BILATERAL SALPINGOOPHORECTOMY  1972         Family History:  Family History   Problem Relation Name Age of Onset    Hypertension Mother      Heart failure Mother      Cancer Father          prostate    Cirrhosis Brother      Heart failure Brother      Cancer Daughter          Carcinoid       Social History:  Social History     Tobacco Use    Smoking status: Never    Smokeless tobacco: Never   Substance and Sexual Activity    Alcohol use: No     Alcohol/week: 0.0 standard drinks of alcohol    Drug use: No    Sexual activity: Yes     Partners: Male       I have reviewed the Past Medical History, Past Surgical History, Family History and Social History as documented above.      Review of Systems   Review of Systems   Constitutional:  Negative for fever.   HENT:  Negative for sore throat.    Respiratory:  Negative for cough and shortness of breath.    Cardiovascular:  Negative for chest pain.   Gastrointestinal:  Negative for diarrhea, nausea and vomiting.   Genitourinary:  Negative for dysuria.   Musculoskeletal:  Positive for myalgias (RLE and right upper arm soreness s/p falling twice). Negative for back pain.   Skin:  Positive for color change (bruising to knees and feet s/p falling twice). Negative for rash.   Neurological:  Positive for speech difficulty (1 episode of slurred speech). Negative for facial asymmetry (no droop) and weakness.        (+) memory issues   Hematological:  Does not bruise/bleed easily.   Psychiatric/Behavioral:  Positive for agitation (last Thursday), behavioral problems (wandering at night) and confusion (mental status change).    All other systems reviewed and are negative.     Physical Exam     Initial Vitals [06/22/25 1059]   BP Pulse Resp Temp SpO2   (!) 158/79 68 16 97.8 °F (36.6 °C) 98 %      MAP       --          Physical Exam  Nursing Notes and Vital Signs Reviewed.  Constitutional: Patient is in no acute distress. Well-developed and well-nourished.  Head:  Atraumatic. Normocephalic.  Eyes: PERRL. EOM intact. Conjunctivae are not pale. No scleral icterus.  ENT: Mucous membranes are moist. Oropharynx is clear and symmetric.    Neck: Supple. Full ROM. No lymphadenopathy.  Cardiovascular: Regular rate. Regular rhythm. No murmurs, rubs, or gallops. Distal pulses are 2+ and symmetric. No carotid bruit.  Pulmonary/Chest: No respiratory distress. Clear to auscultation bilaterally. No wheezing or rales.  Abdominal: Soft and non-distended.  There is no tenderness.  No rebound, guarding, or rigidity. Good bowel sounds.  Genitourinary: N/A  Musculoskeletal: Moves all extremities. No obvious deformities. No edema. No calf tenderness.  Skin: Ecchymosis noted to the right foot and right tib-fib. Warm and dry.  Neurological:  Smile symmetric. No pronator drift. No heel drop. A&O to place.  No slurred speech.  Psychiatric: Normal affect. Good eye contact. Appropriate in content.    Right foot      Right medial leg      Left anterior leg      Left hand         ED Course   ED Procedures:  Critical Care    Date/Time: 6/22/2025 1:00 PM    Performed by: Prema Rose DO  Authorized by: Prema Rose DO  Direct patient critical care time: 20 minutes  Additional history critical care time: 8 minutes  Ordering / reviewing critical care time: 5 minutes  Documentation critical care time: 3 minutes  Consult with family critical care time: 4 minutes  Total critical care time (exclusive of procedural time) : 40 minutes  Critical care time was exclusive of separately billable procedures and treating other patients and teaching time.  Critical care was necessary to treat or prevent imminent or life-threatening deterioration of the following conditions: possible stroke, HTN.  Critical care was time spent personally by me on the following activities: development of treatment plan with patient or surrogate, blood draw for specimens, discussions with consultants, interpretation of cardiac  "output measurements, evaluation of patient's response to treatment, examination of patient, obtaining history from patient or surrogate, ordering and performing treatments and interventions, ordering and review of laboratory studies, ordering and review of radiographic studies, pulse oximetry, re-evaluation of patient's condition and review of old charts.          ED Vital Signs:  Vitals:    06/22/25 1059 06/22/25 1230 06/22/25 1257 06/22/25 1335   BP: (!) 158/79 (!) 147/84 (!) 147/89 (!) 155/80   Pulse: 68 67 66 65   Resp: 16 (!) 27 (!) 30 (!) 26   Temp: 97.8 °F (36.6 °C)      TempSrc: Oral      SpO2: 98% 95% 99% 99%   Weight: 53.1 kg (117 lb)      Height: 5' 1" (1.549 m)       06/22/25 1405 06/22/25 1441   BP: (!) 150/85 (!) 154/73   Pulse: 65 66   Resp: (!) 21 20   Temp:     TempSrc:     SpO2: 99% 100%   Weight:     Height:         All Lab Results:  Results for orders placed or performed during the hospital encounter of 06/22/25   Comprehensive metabolic panel    Collection Time: 06/22/25  1:04 PM   Result Value Ref Range    Sodium 143 136 - 145 mmol/L    Potassium 2.8 (L) 3.5 - 5.1 mmol/L    Chloride 110 95 - 110 mmol/L    CO2 19 (L) 23 - 29 mmol/L    Glucose 137 (H) 70 - 110 mg/dL    BUN 20 8 - 23 mg/dL    Creatinine 1.3 0.5 - 1.4 mg/dL    Calcium 8.7 8.7 - 10.5 mg/dL    Protein Total 7.0 6.0 - 8.4 gm/dL    Albumin 3.7 3.5 - 5.2 g/dL    Bilirubin Total 1.0 0.1 - 1.0 mg/dL    ALP 99 40 - 150 unit/L    AST 39 11 - 45 unit/L    ALT 31 10 - 44 unit/L    Anion Gap 14 8 - 16 mmol/L    eGFR 40 (L) >60 mL/min/1.73/m2   Protime-INR    Collection Time: 06/22/25  1:04 PM   Result Value Ref Range    PT 11.3 9.0 - 12.5 seconds    INR 1.0 0.8 - 1.2   TSH    Collection Time: 06/22/25  1:04 PM   Result Value Ref Range    TSH 0.742 0.400 - 4.000 uIU/mL   CBC with Differential    Collection Time: 06/22/25  1:04 PM   Result Value Ref Range    WBC 10.79 3.90 - 12.70 K/uL    RBC 4.41 4.00 - 5.40 M/uL    HGB 12.0 12.0 - 16.0 gm/dL "    HCT 36.3 (L) 37.0 - 48.5 %    MCV 82 82 - 98 fL    MCH 27.2 27.0 - 31.0 pg    MCHC 33.1 32.0 - 36.0 g/dL    RDW 16.8 (H) 11.5 - 14.5 %    Platelet Count 201 150 - 450 K/uL    MPV 10.0 9.2 - 12.9 fL    Nucleated RBC 0 <=0 /100 WBC    Neut % 86.5 (H) 38 - 73 %    Lymph % 4.9 (L) 18 - 48 %    Mono % 5.6 4 - 15 %    Eos % 0.6 <=8 %    Basophil % 0.5 <=1.9 %    Imm Grans % 1.9 (H) 0.0 - 0.5 %    Neut # 9.34 (H) 1.8 - 7.7 K/uL    Lymph # 0.53 (L) 1 - 4.8 K/uL    Mono # 0.60 0.3 - 1 K/uL    Eos # 0.07 <=0.5 K/uL    Baso # 0.05 <=0.2 K/uL    Imm Grans # 0.20 (H) 0.00 - 0.04 K/uL   Magnesium    Collection Time: 06/22/25  1:04 PM   Result Value Ref Range    Magnesium  1.5 (L) 1.6 - 2.6 mg/dL   Urinalysis, Reflex to Urine Culture Urine, Clean Catch    Collection Time: 06/22/25  2:35 PM    Specimen: Urine   Result Value Ref Range    Color, UA Colorless (A) Straw, Terrie, Yellow, Light-Orange    Appearance, UA Clear Clear    pH, UA 7.0 5.0 - 8.0    Spec Grav UA 1.010 1.005 - 1.030    Protein, UA Negative Negative    Glucose, UA 2+ (A) Negative    Ketones, UA Negative Negative    Bilirubin, UA Negative Negative    Blood, UA Negative Negative    Nitrites, UA Negative Negative    Urobilinogen, UA Negative <2.0 EU/dL    Leukocyte Esterase, UA 1+ (A) Negative   GREY TOP URINE HOLD    Collection Time: 06/22/25  2:35 PM   Result Value Ref Range    Extra Tube Hold for add-ons.    Urinalysis Microscopic    Collection Time: 06/22/25  2:35 PM   Result Value Ref Range    WBC, UA 9 (H) 0 - 5 /HPF    Bacteria, UA Rare None, Rare, Occasional /HPF    Squamous Epithelial Cells, UA 2 /HPF    Hyaline Casts, UA 1 0 - 1 /LPF    Microscopic Comment       *Note: Due to a large number of results and/or encounters for the requested time period, some results have not been displayed. A complete set of results can be found in Results Review.         The EKG was ordered, reviewed, and independently interpreted by the ED provider:  ECG Results               ECG 12 lead (Preliminary result)  Result time 06/22/25 12:56:05      Wet Read by Prema Rose DO (06/22/25 12:56:05, [facility] - Emergency Dept., Emergency Medicine)    Rate of 67 beats per minute.  Left axis deviation.  Normal sinus rhythm.  Nonspecific changes.  No STEMI                                    Imaging Results:  Imaging Results              US Carotid Bilateral (Final result)  Result time 06/22/25 14:50:07      Final result by Homa Fofana MD (06/22/25 14:50:07)                   Impression:      Suggestion of 50-69% stenosis in the left internal carotid artery.  Correlate with CTA of the head and neck.      Note: Validated velocity measurements with angiographic measurements, velocity criteria are extrapolated from diameter data as defined by the Society of Radiologists in Ultrasound Consensus Conference Radiology 2003;229;340-46.    Finalized on: 6/22/2025 2:50 PM By:  Homa Fofana MD  [facility]# [id number]      2025-06-22 14:52:12.597     [facility]               Narrative:    EXAM:  US CAROTID BILATERAL    CLINICAL HISTORY:  Slurred speech    TECHNIQUE: Real-time, color, and duplex evaluation (including peak systolic velocities and systolic velocity ratios) of the carotid arteries was performed.    COMPARISON: None    FINDINGS:  Right side: Minimal plaquing.  Peak systolic velocity in the right ICA is 41 cm/s.  Systolic velocity ratio is 1.3.  Antegrade flow in the right vertebral artery.    Left side: Minimal plaquing.  Peak systolic velocity in the left ICA is 82 cm/s.  Systolic velocity ratio is 2.4.  Antegrade flow in the left vertebral artery                                         X-Ray Tibia Fibula 2 View Right (Final result)  Result time 06/22/25 13:30:15      Final result by Gordo Holland MD (06/22/25 13:30:15)                   Impression:     No acute displaced fracture is identified.    Finalized on: 6/22/2025 1:30 PM By:  Gordo Holland MD  [facility]# [id number]      2025-06-22 13:32:18.589     [facility]                Narrative:    EXAM: XR TIBIA FIBULA 2 VIEW RIGHT    CLINICAL HISTORY: Right foreleg pain.    FINDINGS: Soft tissue swelling.  No fracture or other acute abnormality is identified.  Joint alignment is anatomic.                                         X-Ray Foot Complete Right (Final result)  Result time 06/22/25 13:29:45      Final result by Gordo Holland MD (06/22/25 13:29:45)                   Impression:     As above.  Further evaluation as warranted.    Finalized on: 6/22/2025 1:29 PM By:  Gordo Holland MD  [facility]# [id number]      2025-06-22 13:31:48.544     [facility]               Narrative:    EXAM: XR FOOT COMPLETE 3 VIEW RIGHT    CLINICAL HISTORY: Right foot pain.    FINDINGS: Osteopenia.  Suboptimal positioning.  No acute displaced fracture is identified.  Dorsal soft tissue swelling.  Joint alignment is anatomic.  Moderate degenerative change.                                         CT Head Without Contrast (Final result)  Result time 06/22/25 12:51:02      Final result by Gordo Holland MD (06/22/25 12:51:02)                   Impression:      No hemorrhage or acute major vascular distribution infarction.    Complete findings as above.    All CT scans at this facility are performed  using dose modulation techniques as appropriate to performed exam including the following:  automated exposure control; adjustment of mA and/or kV according to the patients size (this includes techniques or standardized protocols for targeted exams where dose is matched to indication/reason for exam: i.e. extremities or head);  iterative reconstruction technique.      Electronically signed by: Gordo Holland  Date:    06/22/2025  Time:    12:51               Narrative:    EXAMINATION:  CT HEAD WITHOUT CONTRAST    CLINICAL HISTORY:  Neuro deficit, acute, stroke suspected;    TECHNIQUE:  Low dose axial CT images obtained throughout the head without intravenous contrast. Sagittal and coronal reconstructions were  performed.    COMPARISON:  None.    FINDINGS:  Intracranial compartment:    Ventricles and sulci are normal in size for age without evidence of hydrocephalus. No extra-axial blood or fluid collections.    Mild right frontal encephalomalacia.  Moderate microvascular ischemic change.  Bilateral basal ganglia remote lacunar infarcts no parenchymal mass, hemorrhage, edema or major vascular distribution infarct.    Skull/extracranial contents (limited evaluation): No fracture. Chronic sinusitis left maxillary sinus.                                            The Emergency Provider reviewed the vital signs and test results, which are outlined above.     ED Discussion   ED Medication(s):  Medications   citalopram tablet 10 mg (has no administration in time range)   lipase-protease-amylase 24,000-76,000-120,000 units capsule 1 capsule (has no administration in time range)   budesonide nebulizer solution 0.5 mg (has no administration in time range)   cyproheptadine 4 mg tablet 4 mg (has no administration in time range)   dorzolamide-timolol 2-0.5% ophthalmic solution 1 drop (has no administration in time range)   fluticasone propionate 50 mcg/actuation nasal spray 50 mcg (has no administration in time range)   cetirizine tablet 5 mg (has no administration in time range)   pantoprazole EC tablet 40 mg (has no administration in time range)   potassium chloride SA CR tablet 20 mEq (has no administration in time range)   QUEtiapine tablet 25 mg (has no administration in time range)   sodium bicarbonate tablet 650 mg (has no administration in time range)   busPIRone tablet 5 mg (has no administration in time range)   sodium chloride 0.9% flush 10 mL (has no administration in time range)   sodium chloride 0.9% bolus 500 mL 500 mL (has no administration in time range)   labetaloL injection 10 mg (has no administration in time range)   bisacodyL suppository 10 mg (has no administration in time range)   aspirin EC tablet 81 mg (has  no administration in time range)   atorvastatin tablet 40 mg (has no administration in time range)   clopidogreL tablet 300 mg (has no administration in time range)   clopidogreL tablet 75 mg (has no administration in time range)   potassium chloride 10 mEq in 100 mL IVPB (has no administration in time range)   ondansetron injection 4 mg (has no administration in time range)   senna-docusate 8.6-50 mg per tablet 1 tablet (has no administration in time range)   magnesium sulfate 2g in water 50mL IVPB (premix) (has no administration in time range)   potassium bicarbonate disintegrating tablet 25 mEq (25 mEq Oral Given 6/22/25 1442)       ED Course as of 06/22/25 1739   Sun Jun 22, 2025   1237 Spoke with Dr. Garner (Vascular Neurololgy):  Recommends aspirin/Plavix.  Repeat CT in a.m.. [LB]   1348 Potassium(!): 2.8 [LB]   1350 Impression:     No hemorrhage or acute major vascular distribution infarction.     Complete findings as above.   [LB]   1557 Oral aspirin 81 mg, if PO not possible then rectal aspirin 300 mg now.  Head of bed flat, IV Fluids, permissive hypertension  Neuro consult if in house available or transfer for neuro consult  Stroke/TIA work-up with Repeat CT brain within 24 hours, Echo, PT/OT/ Speech and swallow evaluation.  Recommend loading Plavix load 300 mg today per POINT/CHANCE protocol today and from tomorrow 81 mg aspirin and plavix 75 mg for 21 days followed by mono antiplatelet.   [LB]      ED Course User Index  [LB] Prema Rose, DO     12:27 PM: Code Stroke start. See ED Patient Care Timeline for updates.    12:42 PM: Code Stroke end. See ED Patient Care Timeline for CT Head Without Contrast done.    2:11 PM: Secure Chat: Dr. Shahram Bailey MD ([facility] Medicine). Dr. Bailey agrees with current care and management of pt and accepts admission.  Admitting Service: Hospital medicine  Admitting Physician: Dr. Bailey  Floor: med/tele    2:15 PM: Re-evaluated pt. I have discussed test results,  shared treatment plan, and the need for admission with patient/family at bedside. Pt/family express understanding at this time and agree with all information. All questions answered. Pt/family member(s) have no further questions or concerns at this time. Pt is ready for admit.     MIPS Measures     Smoker? No     Hypertension: Patient has a known history of hypertension.        Thrombolysis Candidate? No, Patient back to neurological baseline     Delays to Thrombolysis?  Not Applicable     Medical Decision Making           Additional MDM:     NIH Stroke Scale:   Interval = baseline (upon arrival/admit)  Level of consciousness = 0 - alert  LOC questions = 0 - answers both correctly  LOC commands = 0 - performs both correctly  Best gaze = 0 - normal  Visual = 0 - no visual loss  Facial palsy = 0 - normal  Motor left arm =  0 - no drift  Motor right arm =  0 - no drift  Motor left leg = 0 - no drift  Motor right leg =  0 - no drift  Limb ataxia = 0 - absent  Sensory = 0 - normal  Best language = 0 - no aphasia  Dysarthria = 0 - normal articulation  Extinction and inattention = 0 - no neglect  NIH Stroke Scale Total = 0           Medical Decision Making  Differential diagnosis: Urinary tract infection, pneumonia, hypoglycemia, TIA, fracture, contusion    Cardiac Monitor Interpretation:  Independent ED physician interpretation of cardiac monitoring.   Rate: 66  Rhythm: Sinus  Indication: slurred speech    ED course: Code stroke.  Blood glucose 137.  K +2.8.  Replaced with 25 mEq of potassium bicarbonate.  BUN 20.  Creatinine 1.3.  White cell count 10.78.  Right tib-fib: No fracture.  Right foot:  No fracture.  CT head:  No hemorrhage or major vascular infarction.  Spoke with Dr. Garner (Vascular Neurology):  Recommends observation in the hospital with repeat CT scan as patient has pacemaker.  Plus one leukocyte esterase.  Nitrites negative.  Micro urine: 9 WBCs, rare bacteria.  TSH 0.742.  White cell count normal.   Magnesium 1.5.  Patient admitted to hospital medicine service.    Carotid ultrasound: Suggestion of 50-69% stenosis of the left internal carotid artery.  Tib-fib: No fracture.  Right foot: No fracture.      Amount and/or Complexity of Data Reviewed  Independent Historian: caregiver     Details: Daughter at bedside.  Labs: ordered. Decision-making details documented in ED Course.  Radiology: ordered. Decision-making details documented in ED Course.  ECG/medicine tests: ordered and independent interpretation performed. Decision-making details documented in ED Course.    Risk  Prescription drug management.    Critical Care  Total time providing critical care: 40 minutes        Prescription Management: I performed a review of the patient's current Rx medication list as input by nursing staff.    Patient's Medications   New Prescriptions    No medications on file   Previous Medications    AMLODIPINE (NORVASC) 5 MG TABLET    Take 1 tablet (5 mg total) by mouth once daily.    ASPIRIN (ECOTRIN) 81 MG EC TABLET    Take 1 tablet (81 mg total) by mouth once daily.    ATORVASTATIN (LIPITOR) 40 MG TABLET    Take 1 tablet (40 mg total) by mouth Daily.    BUDESONIDE (ENTOCORT EC) 3 MG CAPSULE    Take 1-2 capsules (3-6 mg total) by mouth daily as needed (flare). As needed    BUMETANIDE (BUMEX) 1 MG TABLET    Take 1 tablet (1 mg total) by mouth once daily.    BUSPIRONE (BUSPAR) 5 MG TAB    Take 1 tablet (5 mg total) by mouth 2 (two) times daily as needed (anxiety).    CARVEDILOL (COREG) 25 MG TABLET    Take 1 tablet (25 mg total) by mouth 2 (two) times daily with meals.    CITALOPRAM (CELEXA) 10 MG TABLET    Take 1 tablet (10 mg total) by mouth every evening.    CYPROHEPTADINE (PERIACTIN) 4 MG TABLET    Take 1 tablet (4 mg total) by mouth 3 (three) times daily as needed (appetite).    DORZOLAMIDE-TIMOLOL 2-0.5% (COSOPT) 22.3-6.8 MG/ML OPHTHALMIC SOLUTION    Place 1 drop into both eyes 2 (two) times daily.    EMPAGLIFLOZIN  "(JARDIANCE) 10 MG TABLET    Take 1 tablet (10 mg total) by mouth once daily.    FLUTICASONE PROPIONATE (FLONASE) 50 MCG/ACTUATION NASAL SPRAY    1 spray (50 mcg total) by Each Nostril route once daily.    FOLIC ACID/MULTIVIT-MIN/LUTEIN (CENTRUM SILVER ORAL)    Take 1 tablet by mouth once daily.    LEVOCETIRIZINE (XYZAL) 5 MG TABLET    Take 1 tablet (5 mg total) by mouth every evening.    LIPASE-PROTEASE-AMYLASE 24,000-76,000-120,000 UNITS (CREON) 24,000-76,000 -120,000 UNIT CAPSULE    Take 1 capsule by mouth 3 (three) times daily with meals.    LOPERAMIDE (IMODIUM) 2 MG CAPSULE    Take 1 mg by mouth every 6 (six) hours as needed for Diarrhea.    MUPIROCIN (BACTROBAN) 2 % OINTMENT    Apply topically once daily.    NITROGLYCERIN (NITROSTAT) 0.4 MG SL TABLET    Place 1 tablet (0.4 mg total) under the tongue every 5 (five) minutes as needed for Chest pain.    ONDANSETRON (ZOFRAN) 4 MG TABLET    Take 1 tablet (4 mg total) by mouth every 8 (eight) hours as needed for Nausea.    PANTOPRAZOLE (PROTONIX) 40 MG TABLET    Take 1 tablet (40 mg total) by mouth once daily.    POTASSIUM CHLORIDE SA (K-DUR,KLOR-CON) 20 MEQ TABLET    Take 1 tablet (20 mEq total) by mouth once daily.    QUETIAPINE (SEROQUEL) 25 MG TAB    Take 1 tablet (25 mg total) by mouth every evening.    SODIUM BICARBONATE 650 MG TABLET    Take 1 tablet (650 mg total) by mouth once daily.    ZENPEP 40,000-126,000- 168,000 UNIT CPDR    Take 40,000 Units by mouth 3 (three) times daily.   Modified Medications    No medications on file   Discontinued Medications    No medications on file        Discussed case verbally with:Neurology  12:37 PM: Phone Call: Dr. Erick Garner MD (Neurology). Dr. Garner recommends giving the pt aspirin and Plavix and repeating pt's CT Head tomorrow morning.    Referrals:  No orders of the defined types were placed in this encounter.    Portions of this note may have been created with voice recognition software. Occasional "wrong-word" " "or "sound-a-like" substitutions may have occurred due to the inherent limitations of voice recognition software. Please, read the note carefully and recognize, using context, where substitutions have occurred.          Clinical Impression       ICD-10-CM ICD-9-CM   1. Slurred speech  R47.81 784.59   2. Acute focal neurological deficit  R29.818 781.99   3. Right foot pain  M79.671 729.5   4. Right leg pain  M79.604 729.5           5. Low serum potassium  E87.6 276.8   6. Low magnesium levels  R79.0 790.6         ED Disposition     Disposition: Admit to med/tele  Patient condition: Stable      Scribe Attestation:   Scribe #1: I, Carmen Porter, performed the above scribed service and the documentation accurately describes the services I performed. I attest to the accuracy of the note.     Attending:   Physician Attestation Statement for Scribe #1: IPrema DO, FACE, personally performed the services described in this documentation, as scribed by Carmen Porter, in my presence, and it is both accurate and complete.                   Prema Rose,   06/22/25 [id number]    "

## 2025-06-22 NOTE — ASSESSMENT & PLAN NOTE
Patient has Abnormal Magnesium: hypomagnesemia. Will continue to monitor electrolytes closely. Will replace the affected electrolytes and repeat labs to be done after interventions completed. The patient's magnesium results have been reviewed and are listed below.  Recent Labs   Lab 06/22/25  1304   MG 1.5*   - Mg 1.5  - IV 2 G repletion  - Trend

## 2025-06-22 NOTE — ASSESSMENT & PLAN NOTE
Creatine stable for now. BMP reviewed- noted Estimated Creatinine Clearance: 23 mL/min (based on SCr of 1.3 mg/dL). according to latest data. Based on current GFR, CKD stage is stage 3 - GFR 30-59.  Monitor UOP and serial BMP and adjust therapy as needed. Renally dose meds. Avoid nephrotoxic medications and procedures.  - Cr 1.3   - Trend

## 2025-06-22 NOTE — ASSESSMENT & PLAN NOTE
Anemia is likely due to Iron deficiency. Most recent hemoglobin and hematocrit are listed below.  Recent Labs     06/22/25  1304   HGB 12.0   HCT 36.3*     Plan  - Monitor serial CBC: Daily  - Transfuse PRBC if patient becomes hemodynamically unstable, symptomatic or H/H drops below 7/21.  - Patient has not received any PRBC transfusions to date  - Patient's anemia is currently stable  - Trend

## 2025-06-22 NOTE — ASSESSMENT & PLAN NOTE
"Patient has Diastolic (HFpEF) heart failure that is Chronic. On presentation their CHF was well compensated. Most recent BNP and echo results are listed below.  No results for input(s): "BNP" in the last 72 hours.  Latest ECHO  Results for orders placed during the hospital encounter of 02/23/25    Echo Saline Bubble? No; Ultrasound enhancing contrast? No    Interpretation Summary    Left Ventricle: The left ventricle is normal in size. Normal wall thickness. There is concentric remodeling. There is normal systolic function. Ejection fraction is approximately 60%. Grade II diastolic dysfunction.    Right Ventricle: Normal right ventricular cavity size. Wall thickness is normal. Systolic function is normal.    Left Atrium: Left atrium is severely dilated.    Aortic Valve: There is mild aortic valve sclerosis. There is mild stenosis. Aortic valve area by VTI is 1.6 cm². Aortic valve peak velocity is 2.0 m/s. Mean gradient is 8 mmHg. The dimensionless index is 0.58.    Tricuspid Valve: There is moderate to severe regurgitation with a centrally directed jet.    Pulmonary Artery: The estimated pulmonary artery systolic pressure is 55 mmHg.    IVC/SVC: Normal venous pressure at 3 mmHg.    Current Heart Failure Medications     Patients blood pressure range in the last 24 hours was: BP  Min: 147/89  Max: 158/79.The patient's inpatient anti-hypertensive regimen is listed below:  Current Antihypertensives  dorzolamide-timolol 2-0.5% ophthalmic solution 1 drop, 2 times daily, Both Eyes  labetaloL injection 10 mg, Every 15 min PRN, Intravenous    Plan  - BP is controlled, no changes needed to their regimen  - Monitor strict I&Os and daily weights.    - Place on telemetry  - Low sodium diet  - Place on fluid restriction of 1.5 L.   - Cardiology has not been consulted  - The patient's volume status is at their baseline  - Trend      "

## 2025-06-22 NOTE — ASSESSMENT & PLAN NOTE
Patient's most recent potassium results are listed below.   Recent Labs     06/22/25  1304   K 2.8*     Plan  - Replete potassium per protocol  - Monitor potassium Daily  - Patient's hypokalemia is worsening. Will adjust treatment as follows: IV and PO repletion  - Trend

## 2025-06-22 NOTE — H&P
[facility] - Emergency Dept.  [facility] Medicine  History & Physical    Patient Name: Glo Dumont  MRN: [id number]  Patient Class: OP- Observation  Admission Date: 6/22/2025  Attending Physician: Shahram Bailey MD   Primary Care Provider: Britt Prakash DO    Patient information was obtained from patient, relative (daughter), and ER records.     Subjective:     Principal Problem: Focal neurological deficit    Chief Complaint:   Chief Complaint   Patient presents with    Altered Mental Status     Per family member pt had a moment earlier today with some slurred speech that resolved shortly after. Pt is now confused ANOx 3. Pts normal per family is ANOx4. Pt has a hx of dementia and does get confused from time to time. Stroke screening negative and VAN negative        HPI: Patient is am 89 y.o. female w/ PMH of anemia, anxiety, depression, arthritis, carotid artery occlusion, chronic diarrhea, CKD St3, colitis, CAD, diastolic dysfunction, diverticulosis, encephalopathy, open-angle glaucoma, Hx carotid endarterectomy, heart failure, Hx coronary angioplasty, HLD, HTN, liver cyst, low back pain, pacemaker, macular degeneration, obesity, renal cyst, sarcoidosis of lung, sickle cell trait, and uveitis who presented to ED for eval of new, acute onset slurred speech that began this morning around 0900 and resolved after 15 mins. Daughter at bedside, Sindi, as caregiver reports patient now at baseline mentation. Daughter reports pt has fallen twice in last week, on 6/16 and 6/19, and has increasingly been wandering and more confused of late w/ recent Dx of Dementia. Recent increase in Seroquel from 25 to 50 mg daily. Denies having any facial asymmetry, one sided weakness, recent illness, fever, or Hx of CVA.     In ED, VSS. Code Stroke called. TeleVascular Neuro: Head CT and Other: carotid ultrasound - No hemorrhage or acute major vascular distribution infarction. Suggestion of 50-69% stenosis in the left internal  carotid artery. Labs reveal: K 2.8, Mg 1.5, Cr 1.3. TSH WNL. - Negative films for R foot and tib/fib XR. EKG: Rate of 67 beats per minute. Left axis deviation. Normal sinus rhythm. Nonspecific changes. No STEMI. UA requested. Echo requested. ED did not administer any medications.    Admit to Observation for [facility] Dr. Shahram Bailey for acute focal neurological deficit w/ slurred speech requiring CVA rule out.    Past Medical History:   Diagnosis Date    Anemia     Anemia 6/22/2025    Angina pectoris     Anxiety     Anxiety and depression     Arthritis     hip    Carotid artery occlusion     Carpal tunnel syndrome 06/23/2008    emg    Chronic diarrhea     work up in 2011 with EGD, CS and VCE    CKD (chronic kidney disease) stage 3, GFR 30-59 ml/min 05/11/2017    Colitis     Coronary artery disease     Coronary artery disease     Diastolic dysfunction     Diverticulosis     Encephalopathy 10/14/2024    Glaucoma     Greater trochanteric bursitis 02/10/2015    Grief at loss of child 01/26/2016    H/O carotid endarterectomy 12/02/2013    Heart failure     History of coronary angioplasty 03/11/2014    Hypercholesteremia     Hypertension     Liver cyst 02/08/2013    ct abd    Low back pain 02/08/2024    Macular degeneration     Obesity with serious comorbidity 03/19/2023    Primary open-angle glaucoma(365.11) 09/03/2013    Renal cyst 02/08/2013    ct abd    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/03/2014    Sarcoidosis     Sarcoidosis of lung     Sickle cell trait     Uveitis        Past Surgical History:   Procedure Laterality Date    A-V CARDIAC PACEMAKER INSERTION Left 03/21/2023    Procedure: INSERTION, CARDIAC PACEMAKER, DUAL CHAMBER/His Lead;  Surgeon: Cortez Ambrose MD;  [facility] C[facility] LAB;  Service: Cardiology;  Laterality: Left;  MDT/ MD to confirm in am/possible nurse sedate    CAROTID ENDARTERECTOMY Right 2000s    CATARACT EXTRACTION Bilateral     Dr. Liang Dennis    CHOLECYSTECTOMY       laparoscopic, 3/18.    CORONARY ANGIOPLASTY WITH STENT PLACEMENT  11/19/2010    RCA-HALIE 2010    Lathia    JOINT REPLACEMENT Left 11/03/2014    Dr. Braun    TOTAL ABDOMINAL HYSTERECTOMY W/ BILATERAL SALPINGOOPHORECTOMY  1972       Review of patient's allergies indicates:   Allergen Reactions    Lisinopril      angioedema    Codeine Nausea And Vomiting       No current facility-administered medications on file prior to encounter.     Current Outpatient Medications on File Prior to Encounter   Medication Sig    amLODIPine (NORVASC) 5 MG tablet Take 1 tablet (5 mg total) by mouth once daily.    aspirin (ECOTRIN) 81 MG EC tablet Take 1 tablet (81 mg total) by mouth once daily.    atorvastatin (LIPITOR) 40 MG tablet Take 1 tablet (40 mg total) by mouth Daily.    budesonide (ENTOCORT EC) 3 mg capsule Take 1-2 capsules (3-6 mg total) by mouth daily as needed (flare). As needed    bumetanide (BUMEX) 1 MG tablet Take 1 tablet (1 mg total) by mouth once daily.    carvediloL (COREG) 25 MG tablet Take 1 tablet (25 mg total) by mouth 2 (two) times daily with meals.    cyproheptadine (PERIACTIN) 4 mg tablet Take 1 tablet (4 mg total) by mouth 3 (three) times daily as needed (appetite).    empagliflozin (JARDIANCE) 10 mg tablet Take 1 tablet (10 mg total) by mouth once daily.    ondansetron (ZOFRAN) 4 MG tablet Take 1 tablet (4 mg total) by mouth every 8 (eight) hours as needed for Nausea.    pantoprazole (PROTONIX) 40 MG tablet Take 1 tablet (40 mg total) by mouth once daily.    sodium bicarbonate 650 MG tablet Take 1 tablet (650 mg total) by mouth once daily.    ZENPEP 40,000-126,000- 168,000 unit CpDR Take 40,000 Units by mouth 3 (three) times daily.    busPIRone (BUSPAR) 5 MG Tab Take 1 tablet (5 mg total) by mouth 2 (two) times daily as needed (anxiety).    citalopram (CELEXA) 10 MG tablet Take 1 tablet (10 mg total) by mouth every evening.    dorzolamide-timolol 2-0.5% (COSOPT) 22.3-6.8 mg/mL ophthalmic solution Place 1 drop  into both eyes 2 (two) times daily.    fluticasone propionate (FLONASE) 50 mcg/actuation nasal spray 1 spray (50 mcg total) by Each Nostril route once daily.    FOLIC ACID/MULTIVIT-MIN/LUTEIN (CENTRUM SILVER ORAL) Take 1 tablet by mouth once daily.    levocetirizine (XYZAL) 5 MG tablet Take 1 tablet (5 mg total) by mouth every evening.    lipase-protease-amylase 24,000-76,000-120,000 units (CREON) 24,000-76,000 -120,000 unit capsule Take 1 capsule by mouth 3 (three) times daily with meals.    loperamide (IMODIUM) 2 mg capsule Take 1 mg by mouth every 6 (six) hours as needed for Diarrhea.    mupirocin (BACTROBAN) 2 % ointment Apply topically once daily.    nitroGLYCERIN (NITROSTAT) 0.4 MG SL tablet Place 1 tablet (0.4 mg total) under the tongue every 5 (five) minutes as needed for Chest pain.    potassium chloride SA (K-DUR,KLOR-CON) 20 MEQ tablet Take 1 tablet (20 mEq total) by mouth once daily.    QUEtiapine (SEROQUEL) 25 MG Tab Take 1 tablet (25 mg total) by mouth every evening.     Family History       Problem Relation (Age of Onset)    Cancer Father, Daughter    Cirrhosis Brother    Heart failure Mother, Brother    Hypertension Mother          Tobacco Use    Smoking status: Never    Smokeless tobacco: Never   Substance and Sexual Activity    Alcohol use: No     Alcohol/week: 0.0 standard drinks of alcohol    Drug use: No    Sexual activity: Yes     Partners: Male     Review of Systems   Cardiovascular:  Negative for chest pain, palpitations and leg swelling.   Neurological:  Positive for speech difficulty (slurred speech x 15 mins, resolved PTA) and weakness (generalized, at baseline). Negative for tremors, seizures, syncope and facial asymmetry.     Objective:     Vital Signs (Most Recent):  Temp: 97.8 °F (36.6 °C) (06/22/25 1059)  Pulse: 66 (06/22/25 1441)  Resp: 20 (06/22/25 1441)  BP: (!) 154/73 (06/22/25 1441)  SpO2: 100 % (06/22/25 1441) Vital Signs (24h Range):  Temp:  [97.8 °F (36.6 °C)] 97.8 °F (36.6  °C)  Pulse:  [65-68] 66  Resp:  [16-30] 20  SpO2:  [95 %-100 %] 100 %  BP: (147-158)/(73-89) 154/73     Weight: 53.1 kg (117 lb)  Body mass index is 22.11 kg/m².     Physical Exam           Significant Labs: All pertinent labs within the past 24 hours have been reviewed.  Recent Lab Results         06/22/25  1435   06/22/25  1304        Albumin   3.7       ALP   99       ALT   31       Anion Gap   14  Comment: UNABLE TO CALCULATE       Appearance, UA Clear         AST   39       Bacteria, UA Rare         Baso #   0.05       Basophil %   0.5       Bilirubin (UA) Negative         BILIRUBIN TOTAL   1.0  Comment: For infants and newborns, interpretation of results should be based   on gestational age, weight and in agreement with clinical   observations.    Premature Infant recommended reference ranges:   0-24 hours:  <8.0 mg/dL   24-48 hours: <12.0 mg/dL   3-5 days:    <15.0 mg/dL   6-29 days:   <15.0 mg/dL       BUN   20       Calcium   8.7       Chloride   110       CO2   19       Color, UA Colorless         Creatinine   1.3       eGFR   40  Comment: Estimated GFR calculated using the CKD-EPI creatinine (2021) equation.       Eos #   0.07       Eos %   0.6       Glucose   137       Glucose, UA 2+         Gran # (ANC)   9.34       Hematocrit   36.3       Hemoglobin   12.0       Hyaline Casts, UA 1         Immature Grans (Abs)   0.20  Comment: Mild elevation in immature granulocytes is non specific and can be seen in a variety of conditions including stress response, acute inflammation, trauma and pregnancy. Correlation with other laboratory and clinical findings is essential.       Immature Granulocytes   1.9       INR   1.0  Comment: Coumadin Therapy:    2.0 - 3.0 for INR for all indicators except mechanical heart valves    and antiphospholipid syndromes which should use 2.5 - 3.5.       Ketones, UA Negative         Leukocyte Esterase, UA 1+         Lymph #   0.53       Lymph %   4.9       Magnesium    1.5       MCH    27.2       MCHC   33.1       MCV   82       Microscopic Comment   Comment: Other formed elements not mentioned in the report are not present in the microscopic examination.         Mono #   0.60       Mono %   5.6       MPV   10.0       Neut %   86.5       NITRITE UA Negative         nRBC   0       Blood, UA Negative         pH, UA 7.0         Platelet Count   201       Potassium   2.8       PROTEIN TOTAL   7.0       Protein, UA Negative  Comment: Recommend a 24 hour urine protein or a urine protein/creatinine ratio if globulin induced proteinuria is clinically suspected.         PT   11.3       RBC   4.41       RDW   16.8       Sodium   143       Spec Grav UA 1.010         Squam Epithel, UA 2         TSH   0.742       Urobilinogen, UA Negative         WBC, UA 9         WBC   10.79               Significant Imaging: I have reviewed all pertinent imaging results/findings within the past 24 hours.  Assessment/Plan:     Assessment & Plan  Focal neurological deficit  Slurred speech  - C/o new, acute onset slurred speech that began this morning around 0900 and resolved after 15 mins  - Code Stroke in ED  -  TeleVascular Neuro: Head CT and Other: carotid ultrasound - No hemorrhage or acute major vascular distribution infarction. Suggestion of 50-69% stenosis in the left internal carotid artery.   - S/s resolved  - Daughter at bedside, Sindi, as caregiver reports patient now at baseline mentation.   - Daughter reports more confused of late w/ recent Dx of Dementia.   - Daughter reports recent increase in Seroquel from 25 to 50 mg daily   - Dr. Garner: No TNK as symptoms resolved. No CTA due to worsening renal functions and symptoms resolution. Repeat CT brain within 24 hours as no MRI due to pacemaker. Meds below.  - Aspirin 81 mg today in ED  - Plavix 300 mg today in ED  - 6/23 Aspirin 81 mg and Plavix 75 mg daily x 21 D  - Following the 21 D, continue ASA 81 mg indefinitely  - Repeat CT Head in AM    Diastolic heart  "failure, NYHA class 3  Hypertensive heart disease with heart failure  S/P CABG x 3  Patient has Diastolic (HFpEF) heart failure that is Chronic. On presentation their CHF was well compensated. Most recent BNP and echo results are listed below.  No results for input(s): "BNP" in the last 72 hours.  Latest ECHO  Results for orders placed during the hospital encounter of 02/23/25    Echo Saline Bubble? No; Ultrasound enhancing contrast? No    Interpretation Summary    Left Ventricle: The left ventricle is normal in size. Normal wall thickness. There is concentric remodeling. There is normal systolic function. Ejection fraction is approximately 60%. Grade II diastolic dysfunction.    Right Ventricle: Normal right ventricular cavity size. Wall thickness is normal. Systolic function is normal.    Left Atrium: Left atrium is severely dilated.    Aortic Valve: There is mild aortic valve sclerosis. There is mild stenosis. Aortic valve area by VTI is 1.6 cm². Aortic valve peak velocity is 2.0 m/s. Mean gradient is 8 mmHg. The dimensionless index is 0.58.    Tricuspid Valve: There is moderate to severe regurgitation with a centrally directed jet.    Pulmonary Artery: The estimated pulmonary artery systolic pressure is 55 mmHg.    IVC/SVC: Normal venous pressure at 3 mmHg.    Current Heart Failure Medications     Patients blood pressure range in the last 24 hours was: BP  Min: 147/89  Max: 158/79.The patient's inpatient anti-hypertensive regimen is listed below:  Current Antihypertensives  dorzolamide-timolol 2-0.5% ophthalmic solution 1 drop, 2 times daily, Both Eyes  labetaloL injection 10 mg, Every 15 min PRN, Intravenous    Plan  - BP is controlled, no changes needed to their regimen  - Monitor strict I&Os and daily weights.    - Place on telemetry  - Low sodium diet  - Place on fluid restriction of 1.5 L.   - Cardiology has not been consulted  - The patient's volume status is at their baseline  - " Trend      Hypokalemia  Patient's most recent potassium results are listed below.   Recent Labs     06/22/25  1304   K 2.8*     Plan  - Replete potassium per protocol  - Monitor potassium Daily  - Patient's hypokalemia is worsening. Will adjust treatment as follows: IV and PO repletion  - Trend    Hypomagnesemia  Patient has Abnormal Magnesium: hypomagnesemia. Will continue to monitor electrolytes closely. Will replace the affected electrolytes and repeat labs to be done after interventions completed. The patient's magnesium results have been reviewed and are listed below.  Recent Labs   Lab 06/22/25  1304   MG 1.5*   - Mg 1.5  - IV 2 G repletion  - Trend    CKD (chronic kidney disease) stage 4, GFR 15-29 ml/min  Creatine stable for now. BMP reviewed- noted Estimated Creatinine Clearance: 23 mL/min (based on SCr of 1.3 mg/dL). according to latest data. Based on current GFR, CKD stage is stage 3 - GFR 30-59.  Monitor UOP and serial BMP and adjust therapy as needed. Renally dose meds. Avoid nephrotoxic medications and procedures.  - Cr 1.3   - Trend    Iron deficiency anemia  Anemia is likely due to Iron deficiency. Most recent hemoglobin and hematocrit are listed below.  Recent Labs     06/22/25  1304   HGB 12.0   HCT 36.3*     Plan  - Monitor serial CBC: Daily  - Transfuse PRBC if patient becomes hemodynamically unstable, symptomatic or H/H drops below 7/21.  - Patient has not received any PRBC transfusions to date  - Patient's anemia is currently stable  - Trend    VTE Risk Mitigation (From admission, onward)           Ordered     Reason for No Pharmacological VTE Prophylaxis  Once        Question:  Reasons:  Answer:  Already adequately anticoagulated on oral Anticoagulants  Comment:  ASA and Plavix    06/22/25 1455     IP VTE HIGH RISK PATIENT  Once         06/22/25 1455     Place sequential compression device  Until discontinued         06/22/25 1455                  On 06/22/2025, patient should be placed in  hospital observation services under Dr. Shahram Bailey's care in collaboration with Fabby Lebron NP.       JAVIER HernandezC  Department of Hospital Medicine  [facility] - Emergency Dept.

## 2025-06-22 NOTE — SUBJECTIVE & OBJECTIVE
Past Medical History:   Diagnosis Date    Anemia     Anemia 6/22/2025    Angina pectoris     Anxiety     Anxiety and depression     Arthritis     hip    Carotid artery occlusion     Carpal tunnel syndrome 06/23/2008    emg    Chronic diarrhea     work up in 2011 with EGD, CS and VCE    CKD (chronic kidney disease) stage 3, GFR 30-59 ml/min 05/11/2017    Colitis     Coronary artery disease     Coronary artery disease     Diastolic dysfunction     Diverticulosis     Encephalopathy 10/14/2024    Glaucoma     Greater trochanteric bursitis 02/10/2015    Grief at loss of child 01/26/2016    H/O carotid endarterectomy 12/02/2013    Heart failure     History of coronary angioplasty 03/11/2014    Hypercholesteremia     Hypertension     Liver cyst 02/08/2013    ct abd    Low back pain 02/08/2024    Macular degeneration     Obesity with serious comorbidity 03/19/2023    Primary open-angle glaucoma(365.11) 09/03/2013    Renal cyst 02/08/2013    ct abd    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/03/2014    Sarcoidosis     Sarcoidosis of lung     Sickle cell trait     Uveitis        Past Surgical History:   Procedure Laterality Date    A-V CARDIAC PACEMAKER INSERTION Left 03/21/2023    Procedure: INSERTION, CARDIAC PACEMAKER, DUAL CHAMBER/His Lead;  Surgeon: Cortez Ambrose MD;  Location: [facility] CATH LAB;  Service: Cardiology;  Laterality: Left;  MDT/ MD to confirm in am/possible nurse sedate    CAROTID ENDARTERECTOMY Right 2000s    CATARACT EXTRACTION Bilateral     Dr. Liang Dennis    CHOLECYSTECTOMY      laparoscopic, 3/18.    CORONARY ANGIOPLASTY WITH STENT PLACEMENT  11/19/2010    RCA-HALIE 2010    Lathia    JOINT REPLACEMENT Left 11/03/2014    Dr. Braun    TOTAL ABDOMINAL HYSTERECTOMY W/ BILATERAL SALPINGOOPHORECTOMY  1972       Review of patient's allergies indicates:   Allergen Reactions    Lisinopril      angioedema    Codeine Nausea And Vomiting       No current facility-administered medications on file prior to  encounter.     Current Outpatient Medications on File Prior to Encounter   Medication Sig    amLODIPine (NORVASC) 5 MG tablet Take 1 tablet (5 mg total) by mouth once daily.    aspirin (ECOTRIN) 81 MG EC tablet Take 1 tablet (81 mg total) by mouth once daily.    atorvastatin (LIPITOR) 40 MG tablet Take 1 tablet (40 mg total) by mouth Daily.    budesonide (ENTOCORT EC) 3 mg capsule Take 1-2 capsules (3-6 mg total) by mouth daily as needed (flare). As needed    bumetanide (BUMEX) 1 MG tablet Take 1 tablet (1 mg total) by mouth once daily.    carvediloL (COREG) 25 MG tablet Take 1 tablet (25 mg total) by mouth 2 (two) times daily with meals.    cyproheptadine (PERIACTIN) 4 mg tablet Take 1 tablet (4 mg total) by mouth 3 (three) times daily as needed (appetite).    empagliflozin (JARDIANCE) 10 mg tablet Take 1 tablet (10 mg total) by mouth once daily.    ondansetron (ZOFRAN) 4 MG tablet Take 1 tablet (4 mg total) by mouth every 8 (eight) hours as needed for Nausea.    pantoprazole (PROTONIX) 40 MG tablet Take 1 tablet (40 mg total) by mouth once daily.    sodium bicarbonate 650 MG tablet Take 1 tablet (650 mg total) by mouth once daily.    ZENPEP 40,000-126,000- 168,000 unit CpDR Take 40,000 Units by mouth 3 (three) times daily.    busPIRone (BUSPAR) 5 MG Tab Take 1 tablet (5 mg total) by mouth 2 (two) times daily as needed (anxiety).    citalopram (CELEXA) 10 MG tablet Take 1 tablet (10 mg total) by mouth every evening.    dorzolamide-timolol 2-0.5% (COSOPT) 22.3-6.8 mg/mL ophthalmic solution Place 1 drop into both eyes 2 (two) times daily.    fluticasone propionate (FLONASE) 50 mcg/actuation nasal spray 1 spray (50 mcg total) by Each Nostril route once daily.    FOLIC ACID/MULTIVIT-MIN/LUTEIN (CENTRUM SILVER ORAL) Take 1 tablet by mouth once daily.    levocetirizine (XYZAL) 5 MG tablet Take 1 tablet (5 mg total) by mouth every evening.    lipase-protease-amylase 24,000-76,000-120,000 units (CREON) 24,000-76,000  -120,000 unit capsule Take 1 capsule by mouth 3 (three) times daily with meals.    loperamide (IMODIUM) 2 mg capsule Take 1 mg by mouth every 6 (six) hours as needed for Diarrhea.    mupirocin (BACTROBAN) 2 % ointment Apply topically once daily.    nitroGLYCERIN (NITROSTAT) 0.4 MG SL tablet Place 1 tablet (0.4 mg total) under the tongue every 5 (five) minutes as needed for Chest pain.    potassium chloride SA (K-DUR,KLOR-CON) 20 MEQ tablet Take 1 tablet (20 mEq total) by mouth once daily.    QUEtiapine (SEROQUEL) 25 MG Tab Take 1 tablet (25 mg total) by mouth every evening.     Family History       Problem Relation (Age of Onset)    Cancer Father, Daughter    Cirrhosis Brother    Heart failure Mother, Brother    Hypertension Mother          Tobacco Use    Smoking status: Never    Smokeless tobacco: Never   Substance and Sexual Activity    Alcohol use: No     Alcohol/week: 0.0 standard drinks of alcohol    Drug use: No    Sexual activity: Yes     Partners: Male     Review of Systems   Cardiovascular:  Negative for chest pain, palpitations and leg swelling.   Neurological:  Positive for speech difficulty (slurred speech x 15 mins, resolved PTA) and weakness (generalized, at baseline). Negative for tremors, seizures, syncope and facial asymmetry.     Objective:     Vital Signs (Most Recent):  Temp: 97.8 °F (36.6 °C) (06/22/25 1059)  Pulse: 66 (06/22/25 1441)  Resp: 20 (06/22/25 1441)  BP: (!) 154/73 (06/22/25 1441)  SpO2: 100 % (06/22/25 1441) Vital Signs (24h Range):  Temp:  [97.8 °F (36.6 °C)] 97.8 °F (36.6 °C)  Pulse:  [65-68] 66  Resp:  [16-30] 20  SpO2:  [95 %-100 %] 100 %  BP: (147-158)/(73-89) 154/73     Weight: 53.1 kg (117 lb)  Body mass index is 22.11 kg/m².     Physical Exam           Significant Labs: All pertinent labs within the past 24 hours have been reviewed.  Recent Lab Results         06/22/25  1435   06/22/25  1304        Albumin   3.7       ALP   99       ALT   31       Anion Gap   14  Comment:  UNABLE TO CALCULATE       Appearance, UA Clear         AST   39       Bacteria, UA Rare         Baso #   0.05       Basophil %   0.5       Bilirubin (UA) Negative         BILIRUBIN TOTAL   1.0  Comment: For infants and newborns, interpretation of results should be based   on gestational age, weight and in agreement with clinical   observations.    Premature Infant recommended reference ranges:   0-24 hours:  <8.0 mg/dL   24-48 hours: <12.0 mg/dL   3-5 days:    <15.0 mg/dL   6-29 days:   <15.0 mg/dL       BUN   20       Calcium   8.7       Chloride   110       CO2   19       Color, UA Colorless         Creatinine   1.3       eGFR   40  Comment: Estimated GFR calculated using the CKD-EPI creatinine (2021) equation.       Eos #   0.07       Eos %   0.6       Glucose   137       Glucose, UA 2+         Gran # (ANC)   9.34       Hematocrit   36.3       Hemoglobin   12.0       Hyaline Casts, UA 1         Immature Grans (Abs)   0.20  Comment: Mild elevation in immature granulocytes is non specific and can be seen in a variety of conditions including stress response, acute inflammation, trauma and pregnancy. Correlation with other laboratory and clinical findings is essential.       Immature Granulocytes   1.9       INR   1.0  Comment: Coumadin Therapy:    2.0 - 3.0 for INR for all indicators except mechanical heart valves    and antiphospholipid syndromes which should use 2.5 - 3.5.       Ketones, UA Negative         Leukocyte Esterase, UA 1+         Lymph #   0.53       Lymph %   4.9       Magnesium    1.5       MCH   27.2       MCHC   33.1       MCV   82       Microscopic Comment   Comment: Other formed elements not mentioned in the report are not present in the microscopic examination.         Mono #   0.60       Mono %   5.6       MPV   10.0       Neut %   86.5       NITRITE UA Negative         nRBC   0       Blood, UA Negative         pH, UA 7.0         Platelet Count   201       Potassium   2.8       PROTEIN TOTAL    7.0       Protein, UA Negative  Comment: Recommend a 24 hour urine protein or a urine protein/creatinine ratio if globulin induced proteinuria is clinically suspected.         PT   11.3       RBC   4.41       RDW   16.8       Sodium   143       Spec Grav UA 1.010         Squam Epithel, UA 2         TSH   0.742       Urobilinogen, UA Negative         WBC, UA 9         WBC   10.79               Significant Imaging: I have reviewed all pertinent imaging results/findings within the past 24 hours.

## 2025-06-22 NOTE — ASSESSMENT & PLAN NOTE
- C/o new, acute onset slurred speech that began this morning around 0900 and resolved after 15 mins  - Code Stroke in ED  -  [facility]: Head CT and Other: carotid ultrasound - No hemorrhage or acute major vascular distribution infarction. Suggestion of 50-69% stenosis in the left internal carotid artery.   - S/s resolved  - Daughter at bedside, Sindi, as caregiver reports patient now at baseline mentation.   - Daughter reports more confused of late w/ recent Dx of Dementia.   - Daughter reports recent increase in Seroquel from 25 to 50 mg daily   - Dr. Garner: No TNK as symptoms resolved. No CTA due to worsening renal functions and symptoms resolution. Repeat CT brain within 24 hours as no MRI due to pacemaker. Meds below.  - Aspirin 81 mg today in ED  - Plavix 300 mg today in ED  - 6/23 Aspirin 81 mg and Plavix 75 mg daily x 21 D  - Following the 21 D, continue ASA 81 mg indefinitely  - Repeat CT Head in AM

## 2025-06-22 NOTE — HPI
Patient is am 89 y.o. female w/ PMH of anemia, anxiety, depression, arthritis, carotid artery occlusion, chronic diarrhea, CKD St3, colitis, CAD, diastolic dysfunction, diverticulosis, encephalopathy, open-angle glaucoma, Hx carotid endarterectomy, heart failure, Hx coronary angioplasty, HLD, HTN, liver cyst, low back pain, pacemaker, macular degeneration, obesity, renal cyst, sarcoidosis of lung, sickle cell trait, and uveitis who presented to ED for eval of new, acute onset slurred speech that began this morning around 0900 and resolved after 15 mins. Daughter at bedside, Sindi, as caregiver reports patient now at baseline mentation. Daughter reports pt has fallen twice in last week, on 6/16 and 6/19, and has increasingly been wandering and more confused of late w/ recent Dx of Dementia. Recent increase in Seroquel from 25 to 50 mg daily. Denies having any facial asymmetry, one sided weakness, recent illness, fever, or Hx of CVA.     In ED, VSS. Code Stroke called. [facility] Neuro: Head CT and Other: carotid ultrasound - No hemorrhage or acute major vascular distribution infarction. Suggestion of 50-69% stenosis in the left internal carotid artery. Labs reveal: K 2.8, Mg 1.5, Cr 1.3. TSH WNL. - Negative films for R foot and tib/fib XR. EKG: Rate of 67 beats per minute. Left axis deviation. Normal sinus rhythm. Nonspecific changes. No STEMI. UA requested. Echo requested. ED did not administer any medications.    Admit to Observation for [facility] Dr. Shahram Bailey for acute focal neurological deficit w/ slurred speech requiring CVA rule out.

## 2025-06-23 VITALS
BODY MASS INDEX: 22.09 KG/M2 | DIASTOLIC BLOOD PRESSURE: 74 MMHG | HEART RATE: 75 BPM | TEMPERATURE: 98 F | HEIGHT: 61 IN | OXYGEN SATURATION: 99 % | RESPIRATION RATE: 18 BRPM | SYSTOLIC BLOOD PRESSURE: 141 MMHG | WEIGHT: 117 LBS

## 2025-06-23 LAB
ABSOLUTE EOSINOPHIL (OHS): 0.03 K/UL
ABSOLUTE MONOCYTE (OHS): 0.85 K/UL (ref 0.3–1)
ABSOLUTE NEUTROPHIL COUNT (OHS): 10.46 K/UL (ref 1.8–7.7)
ALBUMIN SERPL BCP-MCNC: 3.7 G/DL (ref 3.5–5.2)
ALP SERPL-CCNC: 131 UNIT/L (ref 40–150)
ALT SERPL W/O P-5'-P-CCNC: 33 UNIT/L (ref 10–44)
ANION GAP (OHS): 13 MMOL/L (ref 8–16)
AORTIC ROOT ANNULUS: 2.9 CM
AORTIC SIZE INDEX: 1.8 CM/M2
APTT PPP: 26.2 SECONDS (ref 21–32)
ASCENDING AORTA: 2.7 CM
AST SERPL-CCNC: 40 UNIT/L (ref 11–45)
AV INDEX (PROSTH): 0.62
AV MEAN GRADIENT: 6 MMHG
AV PEAK GRADIENT: 10 MMHG
AV REGURGITATION PRESSURE HALF TIME: 929 MS
AV VALVE AREA BY VELOCITY RATIO: 1.7 CM²
AV VALVE AREA: 1.6 CM²
AV VELOCITY RATIO: 0.69
BASOPHILS # BLD AUTO: 0.04 K/UL
BASOPHILS NFR BLD AUTO: 0.3 %
BILIRUB SERPL-MCNC: 1.1 MG/DL (ref 0.1–1)
BSA FOR ECHO PROCEDURE: 1.51 M2
BUN SERPL-MCNC: 20 MG/DL (ref 8–23)
CALCIUM SERPL-MCNC: 8.5 MG/DL (ref 8.7–10.5)
CHLORIDE SERPL-SCNC: 110 MMOL/L (ref 95–110)
CO2 SERPL-SCNC: 17 MMOL/L (ref 23–29)
CREAT SERPL-MCNC: 1.1 MG/DL (ref 0.5–1.4)
CV ECHO LV RWT: 0.61 CM
DOP CALC AO PEAK VEL: 1.6 M/S
DOP CALC AO VTI: 37.2 CM
DOP CALC LVOT AREA: 2.5 CM2
DOP CALC LVOT DIAMETER: 1.8 CM
DOP CALC LVOT PEAK VEL: 1.1 M/S
DOP CALC LVOT STROKE VOLUME: 59 CM3
DOP CALC MV VTI: 31.2 CM
DOP CALC RVOT PEAK VEL: 0.66 M/S
DOP CALC RVOT VTI: 11.1 CM
DOP CALCLVOT PEAK VEL VTI: 23.2 CM
E WAVE DECELERATION TIME: 306 MSEC
E/A RATIO: 0.89
E/E' RATIO: 26 M/S
ECHO LV POSTERIOR WALL: 1.1 CM (ref 0.6–1.1)
ERYTHROCYTE [DISTWIDTH] IN BLOOD BY AUTOMATED COUNT: 16.5 % (ref 11.5–14.5)
FRACTIONAL SHORTENING: 33.3 % (ref 28–44)
GFR SERPLBLD CREATININE-BSD FMLA CKD-EPI: 49 ML/MIN/1.73/M2
GLUCOSE SERPL-MCNC: 109 MG/DL (ref 70–110)
HCT VFR BLD AUTO: 36.5 % (ref 37–48.5)
HGB BLD-MCNC: 12 GM/DL (ref 12–16)
IMM GRANULOCYTES # BLD AUTO: 0.19 K/UL (ref 0–0.04)
IMM GRANULOCYTES NFR BLD AUTO: 1.5 % (ref 0–0.5)
INR PPP: 1 (ref 0.8–1.2)
INTERVENTRICULAR SEPTUM: 1.3 CM (ref 0.6–1.1)
IVC DIAMETER: 1.25 CM
IVRT: 84 MSEC
LA MAJOR: 4.6 CM
LA MINOR: 4.6 CM
LA WIDTH: 4 CM
LEFT ATRIUM SIZE: 2.9 CM
LEFT ATRIUM VOLUME INDEX: 30 ML/M2
LEFT ATRIUM VOLUME: 45 CM3
LEFT INTERNAL DIMENSION IN SYSTOLE: 2.4 CM (ref 2.1–4)
LEFT VENTRICLE DIASTOLIC VOLUME INDEX: 36 ML/M2
LEFT VENTRICLE DIASTOLIC VOLUME: 54 ML
LEFT VENTRICLE MASS INDEX: 94.3 G/M2
LEFT VENTRICLE SYSTOLIC VOLUME INDEX: 13.3 ML/M2
LEFT VENTRICLE SYSTOLIC VOLUME: 20 ML
LEFT VENTRICULAR INTERNAL DIMENSION IN DIASTOLE: 3.6 CM (ref 3.5–6)
LEFT VENTRICULAR MASS: 141.5 G
LV LATERAL E/E' RATIO: 23 M/S
LV SEPTAL E/E' RATIO: 30.7 M/S
LVED V (TEICH): 53.98 ML
LVES V (TEICH): 19.98 ML
LVOT MG: 3.52 MMHG
LVOT MV: 0.92 CM/S
LYMPHOCYTES # BLD AUTO: 0.76 K/UL (ref 1–4.8)
MAGNESIUM SERPL-MCNC: 1.6 MG/DL (ref 1.6–2.6)
MCH RBC QN AUTO: 27 PG (ref 27–31)
MCHC RBC AUTO-ENTMCNC: 32.9 G/DL (ref 32–36)
MCV RBC AUTO: 82 FL (ref 82–98)
MV MEAN GRADIENT: 2 MMHG
MV PEAK A VEL: 1.03 M/S
MV PEAK E VEL: 0.92 M/S
MV PEAK GRADIENT: 4 MMHG
MV STENOSIS PRESSURE HALF TIME: 88.73 MS
MV VALVE AREA BY CONTINUITY EQUATION: 1.89 CM2
MV VALVE AREA P 1/2 METHOD: 2.48 CM2
NUCLEATED RBC (/100WBC) (OHS): 0 /100 WBC
PHOSPHATE SERPL-MCNC: 2.1 MG/DL (ref 2.7–4.5)
PISA AR MAX VEL: 2.22 M/S
PISA MRMAX VEL: 5.59 M/S
PISA TR MAX VEL: 3.1 M/S
PLATELET # BLD AUTO: 206 K/UL (ref 150–450)
PMV BLD AUTO: 10.1 FL (ref 9.2–12.9)
POTASSIUM SERPL-SCNC: 4 MMOL/L (ref 3.5–5.1)
PROT SERPL-MCNC: 7 GM/DL (ref 6–8.4)
PROTHROMBIN TIME: 11.2 SECONDS (ref 9–12.5)
PV MEAN GRADIENT: 1 MMHG
RA MAJOR: 4.1 CM
RA PRESSURE ESTIMATED: 3 MMHG
RA WIDTH: 2.2 CM
RBC # BLD AUTO: 4.44 M/UL (ref 4–5.4)
RELATIVE EOSINOPHIL (OHS): 0.2 %
RELATIVE LYMPHOCYTE (OHS): 6.2 % (ref 18–48)
RELATIVE MONOCYTE (OHS): 6.9 % (ref 4–15)
RELATIVE NEUTROPHIL (OHS): 84.9 % (ref 38–73)
RV TB RVSP: 6 MMHG
SODIUM SERPL-SCNC: 140 MMOL/L (ref 136–145)
STJ: 2.9 CM
TDI LATERAL: 0.04 M/S
TDI SEPTAL: 0.03 M/S
TDI: 0.04 M/S
TR MAX PG: 38 MMHG
TR MEAN GRADIENT: 31 MMHG
TRICUSPID ANNULAR PLANE SYSTOLIC EXCURSION: 1.5 CM
TV REST PULMONARY ARTERY PRESSURE: 41 MMHG
WBC # BLD AUTO: 12.33 K/UL (ref 3.9–12.7)
Z-SCORE OF LEFT VENTRICULAR DIMENSION IN END DIASTOLE: -2.08
Z-SCORE OF LEFT VENTRICULAR DIMENSION IN END SYSTOLE: -1.08

## 2025-06-23 PROCEDURE — G0378 HOSPITAL OBSERVATION PER HR: HCPCS | Mod: HCNC

## 2025-06-23 PROCEDURE — 83735 ASSAY OF MAGNESIUM: CPT

## 2025-06-23 PROCEDURE — 36415 COLL VENOUS BLD VENIPUNCTURE: CPT

## 2025-06-23 PROCEDURE — 25000003 PHARM REV CODE 250

## 2025-06-23 PROCEDURE — 94761 N-INVAS EAR/PLS OXIMETRY MLT: CPT

## 2025-06-23 PROCEDURE — 94640 AIRWAY INHALATION TREATMENT: CPT

## 2025-06-23 PROCEDURE — 97166 OT EVAL MOD COMPLEX 45 MIN: CPT | Mod: HCNC

## 2025-06-23 PROCEDURE — 80053 COMPREHEN METABOLIC PANEL: CPT

## 2025-06-23 PROCEDURE — 97162 PT EVAL MOD COMPLEX 30 MIN: CPT | Mod: HCNC

## 2025-06-23 PROCEDURE — 25000242 PHARM REV CODE 250 ALT 637 W/ HCPCS

## 2025-06-23 PROCEDURE — 63600175 PHARM REV CODE 636 W HCPCS

## 2025-06-23 PROCEDURE — 36410 VNPNXR 3YR/> PHY/QHP DX/THER: CPT

## 2025-06-23 PROCEDURE — 85730 THROMBOPLASTIN TIME PARTIAL: CPT

## 2025-06-23 PROCEDURE — 85025 COMPLETE CBC W/AUTO DIFF WBC: CPT

## 2025-06-23 PROCEDURE — 92610 EVALUATE SWALLOWING FUNCTION: CPT

## 2025-06-23 PROCEDURE — 92523 SPEECH SOUND LANG COMPREHEN: CPT

## 2025-06-23 PROCEDURE — 84100 ASSAY OF PHOSPHORUS: CPT

## 2025-06-23 PROCEDURE — 85610 PROTHROMBIN TIME: CPT

## 2025-06-23 PROCEDURE — 96366 THER/PROPH/DIAG IV INF ADDON: CPT

## 2025-06-23 PROCEDURE — 97530 THERAPEUTIC ACTIVITIES: CPT | Mod: HCNC

## 2025-06-23 PROCEDURE — 96365 THER/PROPH/DIAG IV INF INIT: CPT | Mod: 59

## 2025-06-23 PROCEDURE — C1751 CATH, INF, PER/CENT/MIDLINE: HCPCS

## 2025-06-23 PROCEDURE — 96375 TX/PRO/DX INJ NEW DRUG ADDON: CPT

## 2025-06-23 RX ORDER — CLOPIDOGREL BISULFATE 75 MG/1
75 TABLET ORAL DAILY
Qty: 21 TABLET | Refills: 0 | Status: SHIPPED | OUTPATIENT
Start: 2025-06-24 | End: 2025-06-23

## 2025-06-23 RX ORDER — CLOPIDOGREL BISULFATE 75 MG/1
75 TABLET ORAL DAILY
Qty: 21 TABLET | Refills: 0 | Status: SHIPPED | OUTPATIENT
Start: 2025-06-24 | End: 2025-07-15

## 2025-06-23 RX ORDER — POTASSIUM CHLORIDE 20 MEQ/1
TABLET, EXTENDED RELEASE ORAL
Qty: 90 TABLET | Refills: 3 | Status: SHIPPED | OUTPATIENT
Start: 2025-06-23

## 2025-06-23 RX ADMIN — POTASSIUM CHLORIDE 20 MEQ: 1500 TABLET, EXTENDED RELEASE ORAL at 09:06

## 2025-06-23 RX ADMIN — POTASSIUM CHLORIDE 10 MEQ: 7.46 INJECTION, SOLUTION INTRAVENOUS at 02:06

## 2025-06-23 RX ADMIN — SODIUM BICARBONATE 650 MG TABLET 650 MG: at 09:06

## 2025-06-23 RX ADMIN — PANTOPRAZOLE SODIUM 40 MG: 40 TABLET, DELAYED RELEASE ORAL at 09:06

## 2025-06-23 RX ADMIN — CLOPIDOGREL 75 MG: 75 TABLET ORAL at 09:06

## 2025-06-23 RX ADMIN — SENNOSIDES AND DOCUSATE SODIUM 1 TABLET: 50; 8.6 TABLET ORAL at 09:06

## 2025-06-23 RX ADMIN — ATORVASTATIN CALCIUM 40 MG: 40 TABLET, FILM COATED ORAL at 09:06

## 2025-06-23 RX ADMIN — DORZOLAMIDE HYDROCHLORIDE AND TIMOLOL MALEATE 1 DROP: 20; 5 SOLUTION/ DROPS OPHTHALMIC at 09:06

## 2025-06-23 RX ADMIN — BUDESONIDE INHALATION 0.5 MG: 0.5 SUSPENSION RESPIRATORY (INHALATION) at 08:06

## 2025-06-23 RX ADMIN — ONDANSETRON 4 MG: 2 INJECTION INTRAMUSCULAR; INTRAVENOUS at 12:06

## 2025-06-23 RX ADMIN — MAGNESIUM SULFATE HEPTAHYDRATE 2 G: 40 INJECTION, SOLUTION INTRAVENOUS at 03:06

## 2025-06-23 RX ADMIN — ASPIRIN 81 MG: 81 TABLET, COATED ORAL at 09:06

## 2025-06-23 NOTE — PLAN OF CARE
[facility] - Telemetry (Hospital)  Discharge Final Note    Primary Care Provider: Britt Prakash DO    Expected Discharge Date: 6/23/2025    Final Discharge Note (most recent)       Final Note - 06/23/25 1801          Final Note    Assessment Type Final Discharge Note     Anticipated Discharge Disposition Home-Health Care Svc        Post-Acute Status    Discharge Delays None known at this time                     Important Message from Medicare             Contact Info       Britt Prakash DO   Specialty: Internal Medicine   Relationship: PCP - General    [address] [place] [address]   Phone: [phone]       Next Steps: Follow up    [facility] [place]   Specialty: Home Health Services, Home Therapy Services, Home Living Aide Services    [address] [place] [address]  [place] [address]   Phone: [phone]       Next Steps: Follow up    Instructions: Home Health          Discharge home with [facility].  No dme orders noted.

## 2025-06-23 NOTE — DISCHARGE INSTRUCTIONS
Our goal at [facility] is to always give you outstanding care and exceptional service. You may receive a survey from [employer] by mail, text or e-mail in the next 24-48 hours asking about the care you received with us. The survey should only take 5-10 minutes to complete and is very important to us.     Your feedback provides us with a way to recognize our staff who work tirelessly to provide the best care! Also, your responses help us learn how to improve when your experience was below our aspiration of excellence. We are always looking for ways to improve your stay. We WILL use your feedback to continue making improvements to help us provide the highest quality care. We keep your personal information and feedback confidential. We appreciate your time completing this survey and can't wait to hear from you!!!    We look forward to your continued care with us! Thanks so much for choosing [facility] for your healthcare needs!

## 2025-06-23 NOTE — PLAN OF CARE
06/23/25 1747   Post-Acute Status   Post-Acute Authorization Home Health   Home Health Status Referrals Sent   Discharge Delays None known at this time   Discharge Plan   Discharge Plan A Home Health     Spoke with patient's daughter to review discharge recommendation of home health and is agreeable to plan.  Patient/family provided list of facilities in-network with patient's payor plan. Providers that are owned, operated, or affiliated with [facility] are included on the list.     Patient/family instructed to identify preference.    Preferred Agency: [facility].

## 2025-06-23 NOTE — PROGRESS NOTES
AVS virtually reviewed with daughter in its entirety with emphasis on diet, medications, follow-up appointments and reasons to return to the ED. Daughter also encouraged to utilize their patient portal. Ease and convenience of use reiterated. Education complete and patient voiced understanding. All questions answered. Discharge teaching complete.

## 2025-06-23 NOTE — PT/OT/SLP EVAL
"Occupational Therapy   Evaluation    Name: Glo Dumont  MRN: [id number]  Admitting Diagnosis: Focal neurological deficit  Recent Surgery: * No surgery found *      Recommendations:     Discharge Recommendations: Low Intensity Therapy  Discharge Equipment Recommendations:  none  Barriers to discharge:  None    Assessment:     Glo Dumont is a 89 y.o. female with a medical diagnosis of Focal neurological deficit.  She presents with performance deficits affecting function: weakness, impaired endurance, impaired self care skills, impaired functional mobility, gait instability, impaired balance, decreased upper extremity function, decreased lower extremity function, decreased safety awareness, decreased coordination.      Rehab Prognosis: Good; patient would benefit from acute skilled OT services to address these deficits and reach maximum level of function.       Plan:     Patient to be seen 2 x/week to address the above listed problems via self-care/home management, therapeutic activities, therapeutic exercises  Plan of Care Expires: 07/07/25  Plan of Care Reviewed with: patient    Subjective     Chief Complaint: None reported.   Patient/Family Comments/goals: None reported.     Occupational Profile: Pt gave past history; no family present at time of evaluation. Pt noted in chart by daughter to have been recently dx /c dementia.   Living Environment: Pt lives in a [facility] /c "1 or 2 [facility]".   Previous level of function: Pt states she is (I) /c ADLs. It is noted in her chart she is mod (I) /c AE at baseline.   Roles and Routines: Pt does not drive and is retired.   Equipment Used at Home: cane, straight, walker, rolling, shower chair  Assistance upon Discharge: Pt states she has good support at home. States her daughter "really does take care of me".     Pain/Comfort:  Pain Rating 1: 0/10    Objective:   EPIC chart review completed prior to session.  Patient found HOB elevated with bed alarm, telemetry, peripheral IV upon " OT entry to room.    General Precautions: Standard, fall  Orthopedic Precautions: N/A  Braces: N/A  Respiratory Status: Room air    Occupational Performance:    Bed Mobility:    Patient completed Rolling/Turning to Right with contact guard assistance  Patient completed Scooting/Bridging with contact guard assistance  Patient completed Supine to Sit with contact guard assistance    Functional Mobility/Transfers:  Patient completed Sit <> Stand Transfer with contact guard assistance  with  rolling walker   Patient completed Bed <> Chair Transfer using Step Transfer technique with contact guard assistance with rolling walker  Functional Mobility: Pt completed 30' CGA /c RW functional mobility to increase activity tolerance and ADL completion.     Activities of Daily Living:  Upper Body Dressing: minimum assistance Pt donned gown seated EOB. Pt required verbal and tactile cues to thread arm through gown appropriately.     Cognitive/Visual Perceptual:  Cognitive/Psychosocial Skills:     -       Follows Commands/attention:Easily distracted  -       Communication: clear/fluent  -       Memory: Pt's chart states pt recently dx /c dementia according to daughter.     Physical Exam:  Balance:    -       Static sitting: CGA   Static standing: CGA   Dynamic standing: CGA  Sensation:    -       Intact  Upper Extremity Range of Motion:     -       Right Upper Extremity: WFL  -       Left Upper Extremity: WFL  Upper Extremity Strength:    -       Right Upper Extremity: 4/5  -       Left Upper Extremity: 4/5   Strength:    -       Right Upper Extremity: Fair  -       Left Upper Extremity: Fair    AMPAC 6 Click ADL:  AMPAC Total Score: 18    Treatment & Education:  Pt educated on OT roles and responsibilities. Pt required max v/c and tactile cues throughout session. Pt easily distracted and required repetitive instructions to stay engaged to task. Pt benefits from simplified instructions to ensure success in activity completion.  Pt educated on continuing OOB activity and ROM exercises for continued strengthening and mobility. Pt educated on call don't fall. Pt agreeable to POC.     Patient left sitting edge of bed with all lines intact, call button in reach, and transport technician present to transfer pt to CT scan.     GOALS:   Multidisciplinary Problems       Occupational Therapy Goals          Problem: Occupational Therapy    Goal Priority Disciplines Outcome Interventions   Occupational Therapy Goal     OT, PT/OT     Description: Goals to be met by: 07/07/25     Patient will increase functional independence with ADLs by performing:    Toileting from toilet with Supervision for hygiene and clothing management.   Step transfer with Supervision  Increased functional strength by 1/2 MMT grade.                          DME Justifications:  No DME recommended requiring DME justifications    History:     Past Medical History:   Diagnosis Date    Anemia     Anemia 6/22/2025    Angina pectoris     Anxiety     Anxiety and depression     Arthritis     hip    Carotid artery occlusion     Carpal tunnel syndrome 06/23/2008    emg    Chronic diarrhea     work up in 2011 with EGD, CS and VCE    CKD (chronic kidney disease) stage 3, GFR 30-59 ml/min 05/11/2017    Colitis     Coronary artery disease     Coronary artery disease     Diastolic dysfunction     Diverticulosis     Encephalopathy 10/14/2024    Glaucoma     Greater trochanteric bursitis 02/10/2015    Grief at loss of child 01/26/2016    H/O carotid endarterectomy 12/02/2013    Heart failure     History of coronary angioplasty 03/11/2014    Hypercholesteremia     Hypertension     Liver cyst 02/08/2013    ct abd    Low back pain 02/08/2024    Macular degeneration     Obesity with serious comorbidity 03/19/2023    Primary open-angle glaucoma(365.11) 09/03/2013    Renal cyst 02/08/2013    ct abd    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/03/2014    Sarcoidosis     Sarcoidosis of lung      Sickle cell trait     Uveitis          Past Surgical History:   Procedure Laterality Date    A-V CARDIAC PACEMAKER INSERTION Left 03/21/2023    Procedure: INSERTION, CARDIAC PACEMAKER, DUAL CHAMBER/His Lead;  Surgeon: Cortez Ambrose MD;  Location: [facility] CATH LAB;  Service: Cardiology;  Laterality: Left;  MDT/ MD to confirm in am/possible nurse sedate    CAROTID ENDARTERECTOMY Right 2000s    CATARACT EXTRACTION Bilateral     Dr. Liang Dennis    CHOLECYSTECTOMY      laparoscopic, 3/18.    CORONARY ANGIOPLASTY WITH STENT PLACEMENT  11/19/2010    RCA-HALIE 2010    Lathia    JOINT REPLACEMENT Left 11/03/2014    Dr. Braun    TOTAL ABDOMINAL HYSTERECTOMY W/ BILATERAL SALPINGOOPHORECTOMY  1972       Time Tracking:     OT Date of Treatment: 06/23/25  OT Start Time: 0930  OT Stop Time: 0955  OT Total Time (min): 25 min    Billable Minutes:Evaluation 10  Therapeutic Activity 15    MARLEY Hartley  6/23/2025

## 2025-06-23 NOTE — PT/OT/SLP EVAL
Physical Therapy Evaluation and Treatment    Patient Name:  Glo Dumont   MRN:  [id number]    Recommendations:     Discharge Recommendations: Low Intensity Therapy   Discharge Equipment Recommendations: none   Barriers to discharge: None    Assessment:     Glo Dumont is a 89 y.o. female admitted with a medical diagnosis of Focal neurological deficit.  She presents with the following impairments/functional limitations: weakness, impaired endurance, impaired self care skills, impaired functional mobility, gait instability, impaired balance, decreased coordination, decreased upper extremity function, decreased lower extremity function, decreased safety awareness, impaired coordination, impaired cardiopulmonary response to activity.    Rehab Prognosis: Fair; patient would benefit from acute skilled PT services to address these deficits and reach maximum level of function.    Recent Surgery: * No surgery found *      Plan:     During this hospitalization, patient to be seen 3 x/week to address the identified rehab impairments via gait training, therapeutic activities, therapeutic exercises and progress toward the following goals:    Plan of Care Expires:  07/07/25    Subjective     Chief Complaint: None stated by patient  Patient/Family Comments/goals: Patient is agreeable to therapy session  Pain/Comfort:  Pain Rating 1: 0/10    Patients cultural, spiritual, Orthodox conflicts given the current situation:      Living Environment:  Patient lives with her daughter in a single story house with one JUWAN. Patient is retired and does not drive.  Prior to admission, patients level of function was MOD I for ADLs and household distances. Patient reports she does not go out into the community, as her daughter does it for her.  Equipment used at home: cane, straight, walker, rolling, shower chair.  DME owned (not currently used): none.  Upon discharge, patient will have assistance from DAUGHTER.    Objective:      Communicated with [facility] prior to session.  Patient found HOB elevated with bed alarm, telemetry, peripheral IV, Other (comments) (Midline catheter)  upon PT entry to room.    General Precautions: Standard, fall  Orthopedic Precautions:N/A   Braces: N/A  Respiratory Status: Room air    Exams:  Cognitive Exam:  Patient is oriented to Place and Situation  Patient required max v/c throughout today's session due to cognitive impairment  Patient was easily distracted and displayed slowed processing of commands  Patient demonstrated moderate difficulty with one step commands  Sensation:    -       Intact  RLE ROM: WFL  RLE Strength: Grossly 4/5  LLE ROM: WFL  LLE Strength: Grossly 4/5    Functional Mobility:  Bed Mobility: CGA  Rolling R: CGA  Sup to sit: CGA  Seated scoot: CGA  Transfers: [facility]  Sit to stand with RW: CGA  Patient required v/c for hand placement during STS with RW  Patient required v/c for upright posture during STS  Gait:  Patient ambulated 30' with RW and CGA  Patient required v/c for upright posture during gait  Patient demonstrates a slow gait speed and patient reports this is slower than her typical pace  Transfer to bed  Patient required v/c for RW management and use during transfer to sitting EOB  Balance:  Patient demonstrated fair static/dynamic standing and sitting balance  **Patient required max v/c throughout today's session due to cognitive impairment. Patient was easily distracted and displayed slowed processing of commands. Patient demonstrated moderate difficulty with one step commands during today's session.      AM-PAC 6 CLICK MOBILITY  Total Score:16       Patient Education:  Role of PT and POC in acute care hospital setting  RW use and safety during transfers and gait  Continue provided therapeutic exercises throughout the day to increase tolerance to activity and blood flow: 10 repetitions of seated hip flexion, LAQs, heel slides, and APs  Increase amount of time out of bed and seated  "in chair to patient tolerance  Patient was educated on risk for falls due to generalized weakness ("Call don't Fall").  Patient was encouraged to call for assistance with all needs, such as transfers or trips to the bathroom.  Patient was agreeable to all safety precautions.       Patient left in wheelchair to be transported to imaging with all lines intact and staff member for transportation to imaging present.    GOALS:   Multidisciplinary Problems       Physical Therapy Goals          Problem: Physical Therapy    Goal Priority Disciplines Outcome Interventions   Physical Therapy Goal     PT, PT/OT     Description: LTGs to be met in 14 days (7/7/25)  Patient will require MOD I for bed mobility  Patient will require MOD I for bed<> chair transfers  Patient will ambulate 150 feet with RW and MOD I  Patient will increase AM-PAC score by 2 points to progress with functional mobility                        DME Justifications:  No DME recommended requiring DME justifications    History:     Past Medical History:   Diagnosis Date    Anemia     Anemia 6/22/2025    Angina pectoris     Anxiety     Anxiety and depression     Arthritis     hip    Carotid artery occlusion     Carpal tunnel syndrome 06/23/2008    emg    Chronic diarrhea     work up in 2011 with EGD, CS and VCE    CKD (chronic kidney disease) stage 3, GFR 30-59 ml/min 05/11/2017    Colitis     Coronary artery disease     Coronary artery disease     Diastolic dysfunction     Diverticulosis     Encephalopathy 10/14/2024    Glaucoma     Greater trochanteric bursitis 02/10/2015    Grief at loss of child 01/26/2016    H/O carotid endarterectomy 12/02/2013    Heart failure     History of coronary angioplasty 03/11/2014    Hypercholesteremia     Hypertension     Liver cyst 02/08/2013    ct abd    Low back pain 02/08/2024    Macular degeneration     Obesity with serious comorbidity 03/19/2023    Primary open-angle glaucoma(365.11) 09/03/2013    Renal cyst 02/08/2013    " ct abd    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/03/2014    Sarcoidosis     Sarcoidosis of lung     Sickle cell trait     Uveitis        Past Surgical History:   Procedure Laterality Date    A-V CARDIAC PACEMAKER INSERTION Left 03/21/2023    Procedure: INSERTION, CARDIAC PACEMAKER, DUAL CHAMBER/His Lead;  Surgeon: Cortez Ambrose MD;  Location: [facility] CATH LAB;  Service: Cardiology;  Laterality: Left;  MDT/ MD to confirm in am/possible nurse sedate    CAROTID ENDARTERECTOMY Right 2000s    CATARACT EXTRACTION Bilateral     Dr. Liang Dennis    CHOLECYSTECTOMY      laparoscopic, 3/18.    CORONARY ANGIOPLASTY WITH STENT PLACEMENT  11/19/2010    RCA-HALIE 2010    Lathia    JOINT REPLACEMENT Left 11/03/2014    Dr. Braun    TOTAL ABDOMINAL HYSTERECTOMY W/ BILATERAL SALPINGOOPHORECTOMY  1972       Time Tracking:     PT Received On: 06/23/25  PT Start Time: 0910     PT Stop Time: 0940  PT Total Time (min): 30 min     Billable Minutes: Evaluation 15 and Therapeutic Activity 15      06/23/2025

## 2025-06-23 NOTE — DISCHARGE SUMMARY
[facility] - Tele[facility] ([facility] Medicine  Discharge Summary      Patient Name: Glo Dumont  MRN: [id number]  [facility]: [id number]  Patient Class: OP- Observation  Admission Date: 6/22/2025  Hospital Length of Stay: 0 days  Discharge Date and Time: 06/23/2025 6:18 PM  Attending Physician: Leida Bower MD   Discharging Provider: Leida Bower MD  Primary Care Provider: Britt Prakash DO    Primary Care Team: Networked reference to record PCT     HPI:   Patient is am 89 y.o. female w/ PMH of anemia, anxiety, depression, arthritis, carotid artery occlusion, chronic diarrhea, CKD St3, colitis, CAD, diastolic dysfunction, diverticulosis, encephalopathy, open-angle glaucoma, Hx carotid endarterectomy, heart failure, Hx coronary angioplasty, HLD, HTN, liver cyst, low back pain, pacemaker, macular degeneration, obesity, renal cyst, sarcoidosis of lung, sickle cell trait, and uveitis who presented to ED for eval of new, acute onset slurred speech that began this morning around 0900 and resolved after 15 mins. Daughter at bedside, Sindi, as caregiver reports patient now at baseline mentation. Daughter reports pt has fallen twice in last week, on 6/16 and 6/19, and has increasingly been wandering and more confused of late w/ recent Dx of Dementia. Recent increase in Seroquel from 25 to 50 mg daily. Denies having any facial asymmetry, one sided weakness, recent illness, fever, or Hx of CVA.     In ED, VSS. Code Stroke called. TeleVascular Neuro: Head CT and Other: carotid ultrasound - No hemorrhage or acute major vascular distribution infarction. Suggestion of 50-69% stenosis in the left internal carotid artery. Labs reveal: K 2.8, Mg 1.5, Cr 1.3. TSH WNL. - Negative films for R foot and tib/fib XR. EKG: Rate of 67 beats per minute. Left axis deviation. Normal sinus rhythm. Nonspecific changes. No STEMI. UA requested. Echo requested. ED did not administer any medications.    Admit to Observation for HMS  Dr. Shahram Bailey for acute focal neurological deficit w/ slurred speech requiring CVA rule out.    * No surgery found *      Hospital Course:   The patient presented with slurred speech. Neurology was consulted. Repeat head CT with DAPT recommended. Patient monitored overnight. Repeat head CT without acute changes. She participated with therapy. Small bite sized diet recommended by . Home health recommended by PT/OT. Home health resumed. Patient seen and examined, stable for discharge.     Goals of Care Treatment Preferences:  Code Status: DNR    Health care agent: Denis Langston [facility] agent number: [phone], [phone].                      Consults:   Consults (From admission, onward)          Status Ordering Provider     Inpatient consult to Midline team  Once        Provider:  (Not yet assigned)    Acknowledged NATHALIE GRAHAM     Inpatient consult to Registered Dietitian/Nutritionist  Once        Provider:  (Not yet assigned)    Completed JUDE MANNING     IP consult to case management/social work  Once        Provider:  (Not yet assigned)    Completed JUDE MANNING     Inpatient consult to Hospitalist  Once        Provider:  Katie Mckeon, NP    Acknowledged MICHELET HOPE     Consult to Telemedicine - Acute Stroke  Once        Provider:  (Not yet assigned)    Acknowledged MICHELET HOPE            Final Active Diagnoses:    Diagnosis Date Noted POA    PRINCIPAL PROBLEM:  Focal neurological deficit [R29.818] 06/22/2025 Yes    Slurred speech [R47.81] 06/22/2025 Yes    S/P CABG x 3 [Z95.1] 09/01/2023 Not Applicable    Hypokalemia [E87.6] 03/19/2023 Yes    Hypomagnesemia [E83.42] 03/19/2023 Yes    CKD (chronic kidney disease) stage 4, GFR 15-29 ml/min [N18.4] 05/11/2017 Yes     Chronic    Diastolic heart failure, NYHA class 3 [I50.30] 02/01/2016 Yes    Iron deficiency anemia [D50.9] 02/16/2015 Yes    Hypertensive heart disease with heart failure [I11.0]  07/08/2013 Yes      Problems Resolved During this Admission:       Discharged Condition: stable    Disposition: Home or Self Care    Follow Up:   Follow-up Information       Britt Prakash, DO Follow up.    Specialty: Internal Medicine  Contact information:  [address] [place] [address]  [phone]               [facility] Follow up.    Specialties: Home Health Services, Home Therapy Services, Home Living Aide Services  Why: Home Health  Contact information:  [address]  [facility] [address]  [phone]                         Patient Instructions:      ACCEPT - Ambulatory referral/consult to [facility]   Standing Status: Future   Referral Priority: Routine Referral Type: Consultation   Referral Reason: Specialty Services Required   Requested Specialty: Cardiology   Number of Visits Requested: 1     SUBSEQUENT HOME HEALTH ORDERS   Order Comments: [facility] resume previous services     Order Specific Question Answer Comments   What Home Health Agency is the patient currently using? Other/External        Significant Diagnostic Studies: Radiology:   Imaging Results              US Carotid Bilateral (Final result)  Result time 06/22/25 14:50:07      Final result by Homa Fofana MD (06/22/25 14:50:07)                   Impression:      Suggestion of 50-69% stenosis in the left internal carotid artery.  Correlate with CTA of the head and neck.      Note: Validated velocity measurements with angiographic measurements, velocity criteria are extrapolated from diameter data as defined by the Society of Radiologists in Ultrasound Consensus Conference Radiology 2003;229;340-46.    Finalized on: 6/22/2025 2:50 PM By:  Homa Fofana MD  [facility]# [id number]      2025-06-22 14:52:12.597     [facility]               Narrative:    EXAM:  US CAROTID BILATERAL    CLINICAL HISTORY:  Slurred speech    TECHNIQUE: Real-time, color, and  duplex evaluation (including peak systolic velocities and systolic velocity ratios) of the carotid arteries was performed.    COMPARISON: None    FINDINGS:  Right side: Minimal plaquing.  Peak systolic velocity in the right ICA is 41 cm/s.  Systolic velocity ratio is 1.3.  Antegrade flow in the right vertebral artery.    Left side: Minimal plaquing.  Peak systolic velocity in the left ICA is 82 cm/s.  Systolic velocity ratio is 2.4.  Antegrade flow in the left vertebral artery                                         X-Ray Tibia Fibula 2 View Right (Final result)  Result time 06/22/25 13:30:15      Final result by Gordo Holland MD (06/22/25 13:30:15)                   Impression:     No acute displaced fracture is identified.    Finalized on: 6/22/2025 1:30 PM By:  Gordo Holland MD  [facility]# [id number]      2025-06-22 13:32:18.589     [facility]               Narrative:    EXAM: XR TIBIA FIBULA 2 VIEW RIGHT    CLINICAL HISTORY: Right foreleg pain.    FINDINGS: Soft tissue swelling.  No fracture or other acute abnormality is identified.  Joint alignment is anatomic.                                         X-Ray Foot Complete Right (Final result)  Result time 06/22/25 13:29:45      Final result by Gordo Holland MD (06/22/25 13:29:45)                   Impression:     As above.  Further evaluation as warranted.    Finalized on: 6/22/2025 1:29 PM By:  Gordo Holland MD  [facility]# [id number]      2025-06-22 13:31:48.544     [facility]               Narrative:    EXAM: XR FOOT COMPLETE 3 VIEW RIGHT    CLINICAL HISTORY: Right foot pain.    FINDINGS: Osteopenia.  Suboptimal positioning.  No acute displaced fracture is identified.  Dorsal soft tissue swelling.  Joint alignment is anatomic.  Moderate degenerative change.                                         CT Head Without Contrast (Final result)  Result time 06/22/25 12:51:02      Final result by Gordo Holland MD (06/22/25 12:51:02)                   Impression:      No  hemorrhage or acute major vascular distribution infarction.    Complete findings as above.    All CT scans at this facility are performed  using dose modulation techniques as appropriate to performed exam including the following:  automated exposure control; adjustment of mA and/or kV according to the patients size (this includes techniques or standardized protocols for targeted exams where dose is matched to indication/reason for exam: i.e. extremities or head);  iterative reconstruction technique.      Electronically signed by: Gordo Holland  Date:    06/22/2025  Time:    12:51               Narrative:    EXAMINATION:  CT HEAD WITHOUT CONTRAST    CLINICAL HISTORY:  Neuro deficit, acute, stroke suspected;    TECHNIQUE:  Low dose axial CT images obtained throughout the head without intravenous contrast. Sagittal and coronal reconstructions were performed.    COMPARISON:  None.    FINDINGS:  Intracranial compartment:    Ventricles and sulci are normal in size for age without evidence of hydrocephalus. No extra-axial blood or fluid collections.    Mild right frontal encephalomalacia.  Moderate microvascular ischemic change.  Bilateral basal ganglia remote lacunar infarcts no parenchymal mass, hemorrhage, edema or major vascular distribution infarct.    Skull/extracranial contents (limited evaluation): No fracture. Chronic sinusitis left maxillary sinus.                                        Pending Diagnostic Studies:       Procedure Component Value Units Date/Time    Brain Natriuretic Peptide (BNP) [[id number]]     Order Status: Sent Lab Status: No result     Specimen: Blood     Hemoglobin A1c [[id number]]     Order Status: Sent Lab Status: No result     Specimen: Blood     Troponin I [[id number]]     Order Status: Sent Lab Status: No result     Specimen: Blood            Medications:  Reconciled Home Medications:      Medication List        START taking these medications      clopidogreL 75 mg tablet  Commonly  known as: PLAVIX  Take 1 tablet (75 mg total) by mouth once daily. for 21 days  Start taking on: June 24, 2025            CHANGE how you take these medications      potassium chloride SA 20 MEQ tablet  Commonly known as: K-DURKLOR-CON  TAKE 1 TABLET(20 MEQ) BY MOUTH DAILY  What changed: See the new instructions.            CONTINUE taking these medications      amLODIPine 5 MG tablet  Commonly known as: NORVASC  Take 1 tablet (5 mg total) by mouth once daily.     aspirin 81 MG EC tablet  Commonly known as: ECOTRIN  Take 1 tablet (81 mg total) by mouth once daily.     atorvastatin 40 MG tablet  Commonly known as: LIPITOR  Take 1 tablet (40 mg total) by mouth Daily.     budesonide 3 mg capsule  Commonly known as: ENTOCORT EC  Take 1-2 capsules (3-6 mg total) by mouth daily as needed (flare). As needed     bumetanide 1 MG tablet  Commonly known as: BUMEX  Take 1 tablet (1 mg total) by mouth once daily.     busPIRone 5 MG Tab  Commonly known as: BUSPAR  Take 1 tablet (5 mg total) by mouth 2 (two) times daily as needed (anxiety).     carvediloL 25 MG tablet  Commonly known as: COREG  Take 1 tablet (25 mg total) by mouth 2 (two) times daily with meals.     cyproheptadine 4 mg tablet  Commonly known as: PERIACTIN  Take 1 tablet (4 mg total) by mouth 3 (three) times daily as needed (appetite).     dorzolamide-timolol 2-0.5% 22.3-6.8 mg/mL ophthalmic solution  Commonly known as: COSOPT  Place 1 drop into both eyes 2 (two) times daily.     empagliflozin 10 mg tablet  Commonly known as: JARDIANCE  Take 1 tablet (10 mg total) by mouth once daily.     fluticasone propionate 50 mcg/actuation nasal spray  Commonly known as: FLONASE  1 spray (50 mcg total) by Each Nostril route once daily.     loperamide 2 mg capsule  Commonly known as: IMODIUM  Take 1 mg by mouth every 6 (six) hours as needed for Diarrhea.     mupirocin 2 % ointment  Commonly known as: BACTROBAN  Apply topically once daily.     nitroGLYCERIN 0.4 MG SL  tablet  Commonly known as: NITROSTAT  Place 1 tablet (0.4 mg total) under the tongue every 5 (five) minutes as needed for Chest pain.     ondansetron 4 MG tablet  Commonly known as: ZOFRAN  Take 1 tablet (4 mg total) by mouth every 8 (eight) hours as needed for Nausea.     pantoprazole 40 MG tablet  Commonly known as: PROTONIX  Take 1 tablet (40 mg total) by mouth once daily.     QUEtiapine 25 MG Tab  Commonly known as: SEROQUEL  Take 1 tablet (25 mg total) by mouth every evening.     sodium bicarbonate 650 MG tablet  Take 1 tablet (650 mg total) by mouth once daily.     ZENPEP 40,000-126,000- 168,000 unit Cpdr  Generic drug: lipase-protease-amylase  Take 40,000 Units by mouth 3 (three) times daily.              Indwelling Lines/Drains at time of discharge:   Lines/Drains/Airways       None                       Time spent on the discharge of patient: 31 minutes         Leida Bower MD  Department of Hospital Medicine  [facility] - Telemetry ([facility])

## 2025-06-23 NOTE — PLAN OF CARE
[facility] - Telemetry (Hospital)  Discharge Assessment    Primary Care Provider: Britt Prakash DO     Discharge Assessment (most recent)       BRIEF DISCHARGE ASSESSMENT - 06/23/25 [id number]          Discharge Planning    Assessment Type Discharge Planning Brief Assessment     Resource/Environmental Concerns none     Support Systems Family members     Equipment Currently Used at Home cane, straight;bedside commode;walker, rolling;shower chair     Current Living Arrangements home     Patient/Family Anticipates Transition to home with family     Patient/Family Anticipated Services at Transition home health care   current with [facility] home health    DME Needed Upon Discharge  none     Discharge Plan A Home Health     Discharge Plan B Skilled Nursing Facility

## 2025-06-23 NOTE — TELEPHONE ENCOUNTER
Refill Routing Note   Medication(s) are not appropriate for processing by [facility] for the following reason(s):        ED/Hospital Visit since last OV with provider  New or recently adjusted medication    [facility] action(s):  Defer      Medication Therapy Plan: 6/22/25 ED VISIT - Slurred speech    Extended chart review required: Yes     Appointments  past 12m or future 3m with PCP    Date Provider   Last Visit   5/19/2025 Britt Prakash, DO   Next Visit   Visit date not found Britt Prakash, DO   ED visits in past 90 days: 0        Note composed:7:46 AM 06/23/2025

## 2025-06-23 NOTE — PT/OT/SLP EVAL
Speech Language Pathology Evaluation  Cognitive/Bedside Swallow    Patient Name:  Glo Dumont   MRN:  [id number]  Admitting Diagnosis: Focal neurological deficit    Recommendations:                  General Recommendations:  f/u diet  Diet recommendations:  Soft & Bite Sized Diet - IDDSI Level 6, Thin liquids - IDDSI Level 0   Aspiration Precautions: Assistance with meals, Check for pocketing/oral residue, Eliminate distractions, Feed only when awake/alert, Frequent oral care, HOB to 90 degrees, Remain upright 30 minutes post meal, Small bites/sips, and Standard aspiration precautions   General Precautions: Standard, aspiration  Communication strategies:  provide increased time to answer, hx dementia  Discharge recommendations:  Low Intensity Therapy   Barriers to Discharge:  None    Assessment:     Glo Dumont is an 89 y.o. female admitted to [facility] acute with dx focal neurological deficit and c/o AMS, slurred speech and recent fall.  Symptoms reportedly resolved in ED. MRI negative for acute events; however, chronic microvascular ischemic change (moderate) with multiple remote infarcts noted.  She presents with adequate HAYLEY; however, cognitive-linguistic deficits persist likely in the setting of dementia and cerebrovascular disease.  No overt s/s of aspiration present during bedside CSE; however, cognitive deficits impacted efficiency with notable confusion with self-feeding/utensil use and slowed mastication.  She is recommended for IDDSI 6-soft/bite sized solids with IDDSI 0-thin liquids, following aspiration precautions. ST will f/u diet.    History:     Past Medical History:   Diagnosis Date    Anemia     Anemia 6/22/2025    Angina pectoris     Anxiety     Anxiety and depression     Arthritis     hip    Carotid artery occlusion     Carpal tunnel syndrome 06/23/2008    emg    Chronic diarrhea     work up in 2011 with EGD, CS and VCE    CKD (chronic kidney disease) stage 3, GFR 30-59 ml/min 05/11/2017     Colitis     Coronary artery disease     Coronary artery disease     Diastolic dysfunction     Diverticulosis     Encephalopathy 10/14/2024    Glaucoma     Greater trochanteric bursitis 02/10/2015    Grief at loss of child 01/26/2016    H/O carotid endarterectomy 12/02/2013    Heart failure     History of coronary angioplasty 03/11/2014    Hypercholesteremia     Hypertension     Liver cyst 02/08/2013    ct abd    Low back pain 02/08/2024    Macular degeneration     Obesity with serious comorbidity 03/19/2023    Primary open-angle glaucoma(365.11) 09/03/2013    Renal cyst 02/08/2013    ct abd    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/03/2014    Sarcoidosis     Sarcoidosis of lung     Sickle cell trait     Uveitis        Past Surgical History:   Procedure Laterality Date    A-V CARDIAC PACEMAKER INSERTION Left 03/21/2023    Procedure: INSERTION, CARDIAC PACEMAKER, DUAL CHAMBER/His Lead;  Surgeon: Cortez Ambrose MD;  Location: [facility] CATH LAB;  Service: Cardiology;  Laterality: Left;  MDT/ MD to confirm in am/possible nurse sedate    CAROTID ENDARTERECTOMY Right 2000s    CATARACT EXTRACTION Bilateral     Dr. Liang Dennis    CHOLECYSTECTOMY      laparoscopic, 3/18.    CORONARY ANGIOPLASTY WITH STENT PLACEMENT  11/19/2010    RCA-HALIE 2010    Lathia    JOINT REPLACEMENT Left 11/03/2014    Dr. Braun    TOTAL ABDOMINAL HYSTERECTOMY W/ BILATERAL SALPINGOOPHORECTOMY  1972       Social History: Patient lives with her daughter at home. Recently dx with dementia.  Baseline confusion at times per h/p.    Prior Intubation HX:  N/A    10/16/2012 Modified Barium Swallow     10/9/2012 Esophagram      BOC   0393  -  BARIUM SWALLOW - ESOPHAGRAM:   \   [id number]      CLINICAL HISTORY:   \787.20  DYSPHAGIA UNSPECIFIED      PROCEDURE COMMENT:   \      ICD 9 CODE(S):   (\)      CPT 4 CODE(S)/MODIFIER(S):   (\)      Findings: Patient ingested barium and effervescent crystals without   difficulty demonstrating normal swallowing  mechanisms.  There is mild   esophageal dysmotility with delayed progression of the primary   peristaltic wave and some intermittent tertiary contractions.  No   esophageal stricture, mass, or mucosal irregularity is demonstrated.    There is a small sliding type hiatal hernia with an associated widely   patent lower esophageal B ring.  No gastroesophageal reflux was observed.         Impression:      1. Mild esophageal dysmotility.      2.  Small sliding type hiatal hernia.   ______________________________________      Electronically signed by: ROMMEL GONG MD   Date:     10/09/12   Time:    08:58     EXAM: EXAM:  CT HEAD WITHOUT CONTRAST     CLINICAL HISTORY: Follow-up CVA     COMPARISON: 06/22/2025     TECHNIQUE: Standard thin-section axial images, with reformatted sagittal and coronal images.     FINDINGS: There is no evidence of acute hemorrhage or midline shift.  A stable small clearly defined old lacunar infarct is identified anteriorly in the left basal ganglia and additional old infarcts are identified in the right frontal and right occipital lobes.  No midline shift is visible. There is enlargement of ventricles and sulci consistent with generalized atrophy and patient age.  Decreased attenuation associated with moderate to advanced chronic white matter skiing in his also visible in the cerebral hemispheres bilaterally. Orbital contents appear unremarkable except for evidence of bilateral cataract surgery. Mucosal thickening consistent with sinusitis and a probable chronic mucocele is visible in the left maxillary sinus.  Remaining paranasal sinuses and mastoid air spaces are clear.        Impression:     1.  No acute intracranial abnormality is identified.  2.  Generalized atrophy and multiple old infarcts described above.  3.  Mucosal thickening and an apparent chronic mucocele in the left maxillary sinus.     All CT scans at this location are performed using dose modulation techniques as appropriate  to a performed exam including the following: Automated exposure control; adjustment of the mA and/or kV according to patient size (this includes techniques or standardized protocols for targeted exams where dose is matched to indication / reason for exam; i.e. extremities or head); use of iterative reconstruction technique.     Finalized on: 6/23/2025 10:27 AM By:  Abel Uriarte  [facility]# [id number]      2025-06-23 10:29:57.220     [facility]    Prior diet: Pt consumes a regular diet.    Subjective     Pt seen bedside for ST evaluation.  No c/o pain.  No family present.  Patient goals: Did not state     Pain/Comfort:  Pain Rating 1: 0/10  Pain Rating Post-Intervention 1: 0/10  Pain Rating 2: 0/10  Pain Rating Post-Intervention 2: 0/10    Objective:     Cognitive Status:    Hx oriented. Pt oriented to person and place.  Impaired recent memory recall. Able to utilize remote for tv and call light usage independently.  Noted confusion during CSE with utensil use and distinction between spoon and straw.      Receptive Language:   Comprehension:   WFL in basic conversation    Pragmatics:    WFL    Expressive Language:  Verbal:    Able to verbalize acute needs and engage in conversation.      Motor Speech:  WFL    Voice:   WFL    Oral Musculature Evaluation  Oral Musculature: general weakness  Dentition: scattered dentition  Secretion Management: adequate  Mucosal Quality: good  Mandibular Strength and Mobility: impaired  Oral Labial Strength and Mobility: WFL  Lingual Strength and Mobility: impaired strength  Buccal Strength and Mobility: WFL  Volitional Cough: present  Volitional Swallow: present  Voice Prior to PO Intake: WFL    Bedside Swallow Eval:   Clinical Swallow Examination:   Of note, patient self-fed with hand/hand by SLP and assist d/t confusion with utensil use throughout evaluation. Patient presented with:     CONSISTENCY  NOTES   THIN (IDDSI 0) Water cup/straw   No overt s/s of aspiration   PUREE (IDDSI  4/Extremely Thick)   TSP/TBSP bites of pudding No overt s/s of aspiration   SOLID (IDDSI 7/Regular) Bite of Prudence Doone cookie    No overt s/s of aspiration     Thickened liquids were not used in this assessment. Bossman (2018) reported that thickened liquids have no sound evidence at reducing the risk of pneumonia in patients with dysphagia and can cause harm by increasing their risk of dehydration. It also presents an increased risk of UTI, electrolyte imbalance, constipation, fecal impaction, cognitive impairment, functional decline and even death (Langmore, 2002; Kelley, 2016).  Thickened liquids are associated with risks including dehydration, increased pharyngeal residue, potential interference with medication absorption, and decreased quality of life (Axel, 2013). Thickened liquids are also more likely to be silently aspirated than thin liquids (Shine et al., 2018). This supports the assertion that we should confirm a patient requires thickened liquids with an instrumental swallow study prior to recommending them.    References:   Axel ONEIL (2013). Thickening agents used for dysphagia management: Effect on bioavailability of water, medication and feelings of satiety. Nutrition Journal, 12, 54. https://doi.org/10.1186/8721-8827-43-54    DEMETRIO Riojas., ENRRIQUE Mcknight., TREVIN Melendez., & DEMETRIO Black. (2018). Cough response to aspiration in thin and thick fluids during FEES in hospitalized inpatients. International journal of language & communication disorders, 53(5), 909-918. https://doi.org/10.1111/9449-5344.21221    INTERPRETATION AND RISK ASSESSMENT:  Clinical swallow evaluation (CSE) revealed oral phase characterized by lingual, labial, buccal strength and range of motion functional for lip closure, bolus preparation and propulsion. The patient had no anterior loss of the bolus with complete closure of the lips around the utensils. No residue remained in the oral cavity following the swallow. Patient without  overt clinical signs/symptoms of aspiration on any PO trials given. Contributing risk factors for dysphagia include cognitive deficits s/t hx CVAs and dementia.     Goals:   Multidisciplinary Problems       SLP Goals          Problem: SLP    Goal Priority Disciplines Outcome   SLP Goal     SLP    Description: 1.  Pt will consume the least restrictive PO diet without overt s/s of aspiration.                       Plan:     Patient to be seen:  1 x/week, 2 x/week   Plan of Care expires:  06/30/25  Plan of Care reviewed with:  patient   SLP Follow-Up:  Yes       Time Tracking:     SLP Treatment Date:   06/23/25  Speech Start Time:  1030  Speech Stop Time:  1100     Speech Total Time (min):  30 min    Billable Minutes: Eval 15 minutes  and Eval Swallow and Oral Function 15 minutes    06/23/2025

## 2025-06-23 NOTE — PHARMACY MED REC
"Admission Medication History     The home medication history was taken by Leonardo Hoffman.    You may go to "Admission" then "Reconcile Home Medications" tabs to review and/or act upon these items.     The home medication list has been updated by the Pharmacy department.   Please read ALL comments highlighted in yellow.   Please address this information as you see fit.    Feel free to contact us if you have any questions or require assistance.      Medications listed below were obtained from: Analytic software- e27, Medical records, and Patient's pharmacy  (Not in a hospital admission)        Leonardo Hoffman  [id number]    Current Outpatient Medications on File Prior to Encounter   Medication Sig Dispense Refill Last Dose/Taking    amLODIPine (NORVASC) 5 MG tablet Take 1 tablet (5 mg total) by mouth once daily. 90 tablet 3 6/22/2025    aspirin (ECOTRIN) 81 MG EC tablet Take 1 tablet (81 mg total) by mouth once daily. 90 tablet 3 6/22/2025    atorvastatin (LIPITOR) 40 MG tablet Take 1 tablet (40 mg total) by mouth Daily. 90 tablet 3 6/21/2025    budesonide (ENTOCORT EC) 3 mg capsule Take 1-2 capsules (3-6 mg total) by mouth daily as needed (flare). As needed 60 each 2 6/22/2025    bumetanide (BUMEX) 1 MG tablet Take 1 tablet (1 mg total) by mouth once daily. 30 tablet 11 6/22/2025    carvediloL (COREG) 25 MG tablet Take 1 tablet (25 mg total) by mouth 2 (two) times daily with meals. 180 tablet 3 6/22/2025    cyproheptadine (PERIACTIN) 4 mg tablet Take 1 tablet (4 mg total) by mouth 3 (three) times daily as needed (appetite). 270 tablet 1 6/22/2025    empagliflozin (JARDIANCE) 10 mg tablet Take 1 tablet (10 mg total) by mouth once daily. 30 tablet 11 6/22/2025    ondansetron (ZOFRAN) 4 MG tablet Take 1 tablet (4 mg total) by mouth every 8 (eight) hours as needed for Nausea. 12 tablet 0 6/21/2025    pantoprazole (PROTONIX) 40 MG tablet Take 1 tablet (40 mg total) by mouth once daily. 90 tablet 3 6/21/2025    " sodium bicarbonate 650 MG tablet Take 1 tablet (650 mg total) by mouth once daily. 180 tablet 0 6/22/2025    ZENPEP 40,000-126,000- 168,000 unit CpDR Take 40,000 Units by mouth 3 (three) times daily.   Taking    busPIRone (BUSPAR) 5 MG Tab Take 1 tablet (5 mg total) by mouth 2 (two) times daily as needed (anxiety). 60 tablet 5 Unknown    citalopram (CELEXA) 10 MG tablet Take 1 tablet (10 mg total) by mouth every evening.       dorzolamide-timolol 2-0.5% (COSOPT) 22.3-6.8 mg/mL ophthalmic solution Place 1 drop into both eyes 2 (two) times daily. 10 mL 6     fluticasone propionate (FLONASE) 50 mcg/actuation nasal spray 1 spray (50 mcg total) by Each Nostril route once daily. 11.1 mL 3     FOLIC ACID/MULTIVIT-MIN/LUTEIN (CENTRUM SILVER ORAL) Take 1 tablet by mouth once daily.       levocetirizine (XYZAL) 5 MG tablet Take 1 tablet (5 mg total) by mouth every evening. 30 tablet 11 Unknown    lipase-protease-amylase 24,000-76,000-120,000 units (CREON) 24,000-76,000 -120,000 unit capsule Take 1 capsule by mouth 3 (three) times daily with meals. 270 capsule 3     loperamide (IMODIUM) 2 mg capsule Take 1 mg by mouth every 6 (six) hours as needed for Diarrhea.   Unknown    mupirocin (BACTROBAN) 2 % ointment Apply topically once daily. 30 g 0     nitroGLYCERIN (NITROSTAT) 0.4 MG SL tablet Place 1 tablet (0.4 mg total) under the tongue every 5 (five) minutes as needed for Chest pain. 100 tablet 1     potassium chloride SA (K-DUR,KLOR-CON) 20 MEQ tablet Take 1 tablet (20 mEq total) by mouth once daily. 30 tablet 0     QUEtiapine (SEROQUEL) 25 MG Tab Take 1 tablet (25 mg total) by mouth every evening. 30 tablet 2                            .

## 2025-06-23 NOTE — PLAN OF CARE
PT eval complete. Patient currently requires CGA for bed mobility, transfers, and gait with RW.  PT recommends LOW intensity therapy upon discharge.

## 2025-06-23 NOTE — CONSULTS
"Food & Nutrition Education    Diet Education: Cardiac diet    Learners: Patient    Nutrition Education provided with handouts:  Heart Healthy Nutrition Therapy  (nutritioncaremanual.org)    Comments:  PMH: Hypertensive heart disease w/ heart failure, Iron deficiency anemia, Diastolic heart failure, CKD 4, Hypokalemia, Hypomagnesemia, S/p CABG x 3, Slurred speech    89 y.o. Female admitted for Focal neurological deficit. Pt is currently on a Heart healthy, Soft and bite-sized (IDDSI Level 6) diet. RD visited pt at bedside, pt was sleeping. Spoke to RN via secure chat, reported pt diet recently advanced from a Clear liquid diet so "only ate a few bites for breakfast and lunch", pt "she seems eager" about dinner, no N/V/D, dark stool BM today. Informed RN that RD left a menu on pt's bedside table to help encourage pt preferred food choices. Pt current weight charted 6/23/25 117 lbs (BMI 22.11, Underweight for age).      RD provided pt with nutrition education handouts on low sodium, general healthful diet.     Handouts recommend a well balanced diet with a variety of fresh foods, fruits and vegetables (5 cups/day), whole grains (3 oz/day), and fat-free or low fat dairy; salt free seasonings and other herbs and spices in meals to enhance flavor without additional sodium; to aim for total fat less than 25-35% of calories.     Handouts discuss importance of reading food packages, food labels, and nutrition facts labels to identify nutrient content of food; the importance of limiting sodium to less than 2,000 mg per day; dietary sources of cholesterol and the importance of incorporating healthy fats into the diet, and avoiding saturated and trans fats for heart health; the importance of fiber (especially soluble fiber), dietary sources, and a goal intake of >20-30g/day.     Informed RN that RD will discuss nutrition education with pt at follow up. Encourage pt to read handout and use RD contact information with any " questions/concerns that she may have. Additional nutrition education also attached to pt's d/c papers.     Nutrition Related Social Determinants of Health: SDOH: Unable to assess at this time.      MST score = 0, performed 6/22/25 on day of admit.     NFPE to be performed at follow up.   Provided handout with dietitian's contact information.  *Please re-consult as needed.  Thank you,  Lizz Cochran, MISAEL, RDN, LDN

## 2025-06-23 NOTE — TELEPHONE ENCOUNTER
No care due was identified.  [facility] Embedded Care Due Messages. Reference number: [id number].   6/23/2025 3:20:32 AM CDT

## 2025-06-23 NOTE — HOSPITAL COURSE
The patient presented with slurred speech. Neurology was consulted. Repeat head CT with DAPT recommended. Patient monitored overnight. Repeat head CT without acute changes. She participated with therapy. Small bite sized diet recommended by . Critical access hospital recommended by PT/OT. Home health resumed. Patient seen and examined, stable for discharge.

## 2025-06-23 NOTE — PLAN OF CARE
Pt tolerated therapy well. Pt completed bed mobility t/fs CGA and functional mobility t/fs CGA / RW. Pt required repetitive instruction, verbal/tactile cues, and increased time to complete tasks throughout session. Recommending low intensity therapy intervention upon d/c.

## 2025-06-23 NOTE — PLAN OF CARE
Problem: Adult Inpatient Plan of Care  Goal: Plan of Care Review  Outcome: Met  Goal: Patient-Specific Goal (Individualized)  Outcome: Met  Goal: Absence of Hospital-Acquired Illness or Injury  Outcome: Met  Goal: Optimal Comfort and Wellbeing  Outcome: Met  Goal: Readiness for Transition of Care  Outcome: Met     Problem: Stroke, Ischemic (Includes Transient Ischemic Attack)  Goal: Optimal Coping  Outcome: Met  Goal: Effective Bowel Elimination  Outcome: Met  Goal: Optimal Cerebral Tissue Perfusion  Outcome: Met  Goal: Optimal Cognitive Function  Outcome: Met  Goal: Improved Communication Skills  Outcome: Met  Goal: Optimal Functional Ability  Outcome: Met  Goal: Optimal Nutrition Intake  Outcome: Met  Goal: Effective Oxygenation and Ventilation  Outcome: Met  Goal: Improved Sensorimotor Function  Outcome: Met  Goal: Safe and Effective Swallow  Outcome: Met  Goal: Effective Urinary Elimination  Outcome: Met     Problem: Fall Injury Risk  Goal: Absence of Fall and Fall-Related Injury  Outcome: Met     Problem: Infection  Goal: Absence of Infection Signs and Symptoms  Outcome: Met     Problem: Skin Injury Risk Increased  Goal: Skin Health and Integrity  Outcome: Met     Problem: Occupational Therapy  Goal: Occupational Therapy Goal  Description: Goals to be met by: 07/07/25     Patient will increase functional independence with ADLs by performing:    Toileting from toilet with Supervision for hygiene and clothing management.   Step transfer with Supervision  Increased functional strength by 1/2 MMT grade.     Outcome: Met     Problem: Physical Therapy  Goal: Physical Therapy Goal  Description: LTGs to be met in 14 days (7/7/25)  Patient will require MOD I for bed mobility  Patient will require MOD I for bed<> chair transfers  Patient will ambulate 150 feet with RW and MOD I  Patient will increase AM-PAC score by 2 points to progress with functional mobility   Outcome: Met     Problem: SLP  Goal: SLP  Goal  Description: 1.  Pt will consume the least restrictive PO diet without overt s/s of aspiration.  Outcome: Met

## 2025-06-24 ENCOUNTER — CLINICAL SUPPORT (OUTPATIENT)
Dept: CARDIOLOGY | Facility: HOSPITAL | Age: 89
End: 2025-06-24
Payer: MEDICARE

## 2025-06-24 ENCOUNTER — CLINICAL SUPPORT (OUTPATIENT)
Dept: CARDIOLOGY | Facility: HOSPITAL | Age: 89
End: 2025-06-24
Attending: INTERNAL MEDICINE
Payer: MEDICARE

## 2025-06-24 DIAGNOSIS — I50.32 CHRONIC DIASTOLIC (CONGESTIVE) HEART FAILURE: ICD-10-CM

## 2025-06-24 DIAGNOSIS — Z95.0 PRESENCE OF CARDIAC PACEMAKER: ICD-10-CM

## 2025-06-24 PROCEDURE — 93296 REM INTERROG EVL PM/IDS: CPT | Mod: HCNC | Performed by: INTERNAL MEDICINE

## 2025-06-24 PROCEDURE — 93294 REM INTERROG EVL PM/LDLS PM: CPT | Mod: HCNC,S$GLB,, | Performed by: INTERNAL MEDICINE

## 2025-06-27 ENCOUNTER — TELEPHONE (OUTPATIENT)
Dept: FAMILY MEDICINE | Facility: CLINIC | Age: 89
End: 2025-06-27
Payer: MEDICARE

## 2025-06-27 NOTE — TELEPHONE ENCOUNTER
Copied from CRM #[id number]. Topic: General Inquiry - Patient Advice  >> Jun 27, 2025  1:46 PM Med Assistant Tawanna wrote:  Type:  Needing Return Call    Who Called:Alyson  Needs Call Back For: BP is elevated, has not had BP in a few days due to weak spell possibly from low BP, her appetite is down, she has lost 8 pounds in a week  Would the patient rather a call back or a response via [facility]? Call  Best Call Back Number: [phone]  Additional Information:

## 2025-06-27 NOTE — TELEPHONE ENCOUNTER
Called the call back number [phone] but the person answered said I called the wrongthere is alba Dumont

## 2025-07-01 ENCOUNTER — PATIENT MESSAGE (OUTPATIENT)
Dept: OPHTHALMOLOGY | Facility: CLINIC | Age: 89
End: 2025-07-01
Payer: MEDICARE

## 2025-07-08 ENCOUNTER — TELEPHONE (OUTPATIENT)
Dept: FAMILY MEDICINE | Facility: CLINIC | Age: 89
End: 2025-07-08
Payer: MEDICARE

## 2025-07-08 NOTE — TELEPHONE ENCOUNTER
Copied from CRM #[id number]. Topic: General Inquiry - Test Results  >> Jul 8, 2025  8:46 AM José Miguel wrote:  :.Type:  Needs Medical Advice    Who Called: Nickie/ [facility]  Would the patient rather a call back or a response via [facility]? Call back  Best Call Back Number: [phone]  Additional Information: Culture hadn't been done in recent testing and Nickie is wanting to know if another test would be needed before treatment begins. She also wants to confirm that the results were received this morning.

## 2025-07-09 ENCOUNTER — OFFICE VISIT (OUTPATIENT)
Dept: FAMILY MEDICINE | Facility: CLINIC | Age: 89
End: 2025-07-09
Payer: MEDICARE

## 2025-07-09 ENCOUNTER — OUTPATIENT CASE MANAGEMENT (OUTPATIENT)
Dept: ADMINISTRATIVE | Facility: OTHER | Age: 89
End: 2025-07-09
Payer: MEDICARE

## 2025-07-09 ENCOUNTER — TELEPHONE (OUTPATIENT)
Dept: FAMILY MEDICINE | Facility: CLINIC | Age: 89
End: 2025-07-09
Payer: MEDICARE

## 2025-07-09 ENCOUNTER — EXTERNAL HOME HEALTH (OUTPATIENT)
Dept: HOME HEALTH SERVICES | Facility: HOSPITAL | Age: 89
End: 2025-07-09
Payer: MEDICARE

## 2025-07-09 VITALS
SYSTOLIC BLOOD PRESSURE: 148 MMHG | BODY MASS INDEX: 19.67 KG/M2 | DIASTOLIC BLOOD PRESSURE: 80 MMHG | WEIGHT: 104.19 LBS | TEMPERATURE: 96 F | OXYGEN SATURATION: 97 % | RESPIRATION RATE: 20 BRPM | HEART RATE: 102 BPM | HEIGHT: 61 IN

## 2025-07-09 DIAGNOSIS — R41.0 CONFUSION: ICD-10-CM

## 2025-07-09 DIAGNOSIS — I35.0 AORTIC VALVE STENOSIS, ETIOLOGY OF CARDIAC VALVE DISEASE UNSPECIFIED: ICD-10-CM

## 2025-07-09 DIAGNOSIS — I25.10 CORONARY ARTERY DISEASE INVOLVING NATIVE CORONARY ARTERY OF NATIVE HEART WITHOUT ANGINA PECTORIS: Chronic | ICD-10-CM

## 2025-07-09 DIAGNOSIS — I50.30 DIASTOLIC HEART FAILURE, NYHA CLASS 3: ICD-10-CM

## 2025-07-09 DIAGNOSIS — Z95.0 S/P PLACEMENT OF CARDIAC PACEMAKER: Primary | ICD-10-CM

## 2025-07-09 DIAGNOSIS — R41.3 MEMORY LOSS: ICD-10-CM

## 2025-07-09 DIAGNOSIS — R39.9 URINARY TRACT INFECTION SYMPTOMS: Primary | ICD-10-CM

## 2025-07-09 DIAGNOSIS — R53.1 GENERALIZED WEAKNESS: ICD-10-CM

## 2025-07-09 DIAGNOSIS — N18.31 STAGE 3A CHRONIC KIDNEY DISEASE: ICD-10-CM

## 2025-07-09 DIAGNOSIS — E78.5 DYSLIPIDEMIA: ICD-10-CM

## 2025-07-09 DIAGNOSIS — R29.818 FOCAL NEUROLOGICAL DEFICIT: ICD-10-CM

## 2025-07-09 DIAGNOSIS — I10 HYPERTENSION, UNSPECIFIED TYPE: ICD-10-CM

## 2025-07-09 DIAGNOSIS — N30.00 ACUTE CYSTITIS WITHOUT HEMATURIA: ICD-10-CM

## 2025-07-09 DIAGNOSIS — Z95.1 S/P CABG X 3: ICD-10-CM

## 2025-07-09 PROBLEM — N18.30 STAGE 3 CHRONIC KIDNEY DISEASE: Status: ACTIVE | Noted: 2025-07-09

## 2025-07-09 PROCEDURE — 1157F ADVNC CARE PLAN IN RCRD: CPT | Mod: CPTII,HCNC,S$GLB, | Performed by: INTERNAL MEDICINE

## 2025-07-09 PROCEDURE — 3288F FALL RISK ASSESSMENT DOCD: CPT | Mod: CPTII,HCNC,S$GLB, | Performed by: INTERNAL MEDICINE

## 2025-07-09 PROCEDURE — 1159F MED LIST DOCD IN RCRD: CPT | Mod: CPTII,HCNC,S$GLB, | Performed by: INTERNAL MEDICINE

## 2025-07-09 PROCEDURE — 1100F PTFALLS ASSESS-DOCD GE2>/YR: CPT | Mod: CPTII,HCNC,S$GLB, | Performed by: INTERNAL MEDICINE

## 2025-07-09 PROCEDURE — 1126F AMNT PAIN NOTED NONE PRSNT: CPT | Mod: CPTII,HCNC,S$GLB, | Performed by: INTERNAL MEDICINE

## 2025-07-09 PROCEDURE — 99999 PR PBB SHADOW E&M-EST. PATIENT-LVL V: CPT | Mod: PBBFAC,HCNC,, | Performed by: INTERNAL MEDICINE

## 2025-07-09 PROCEDURE — 99214 OFFICE O/P EST MOD 30 MIN: CPT | Mod: HCNC,S$GLB,, | Performed by: INTERNAL MEDICINE

## 2025-07-09 RX ORDER — SULFAMETHOXAZOLE AND TRIMETHOPRIM 800; 160 MG/1; MG/1
1 TABLET ORAL 2 TIMES DAILY
Qty: 10 TABLET | Refills: 0 | Status: SHIPPED | OUTPATIENT
Start: 2025-07-09 | End: 2025-07-14

## 2025-07-09 RX ORDER — AMLODIPINE BESYLATE 2.5 MG/1
TABLET ORAL
COMMUNITY
Start: 2025-06-10

## 2025-07-09 RX ORDER — PANCRELIPASE 24000; 76000; 120000 [USP'U]/1; [USP'U]/1; [USP'U]/1
2 CAPSULE, DELAYED RELEASE PELLETS ORAL 3 TIMES DAILY
COMMUNITY
Start: 2025-06-27

## 2025-07-09 NOTE — TELEPHONE ENCOUNTER
They said they have faxed over a abnormal UA I have them refaxing it in case you don't have  it they did not collect a culture and UA is from the 07/02/2025

## 2025-07-09 NOTE — TELEPHONE ENCOUNTER
Copied from CRM #[id number]. Topic: General Inquiry - Return Call  >> Jul 8, 2025  2:47 PM Max wrote:  Type:  Patient Returning Call    Who Called: Nickie  Who Left Message for Patient: nurse  Does the patient know what this is regarding?:yes  Would the patient rather a call back or a response via [facility]? Call back   Best Call Back Number:[phone]  Additional Information: n/a       Thanks KB

## 2025-07-09 NOTE — PROGRESS NOTES
Subjective:       Patient ID: Glo Dumont is a 89 y.o. female.    Chief Complaint: Hospital Follow Up, Establish Care, Hypertension, Hyperlipidemia, and Congestive Heart Failure    Est care--------here with daughter----------s/p hospital for focal neurological deficit----resolved---------on plavix for 21 days-----takes asa and statin----------also, being treated for UTI--started bactrim today for 5  days-    Hypertension  Pertinent negatives include no chest pain, headaches, neck pain, palpitations or shortness of breath.   Hyperlipidemia  Associated symptoms include myalgias. Pertinent negatives include no chest pain or shortness of breath.   Congestive Heart Failure  Associated symptoms include fatigue. Pertinent negatives include no abdominal pain, chest pain, palpitations, shortness of breath or unexpected weight change.     Past Medical History:   Diagnosis Date    Anemia     Anemia 6/22/2025    Angina pectoris     Anxiety     Anxiety and depression     Arthritis     hip    Carotid artery occlusion     Carpal tunnel syndrome 06/23/2008    emg    Chronic diarrhea     work up in 2011 with EGD, CS and VCE    CKD (chronic kidney disease) stage 3, GFR 30-59 ml/min 05/11/2017    Colitis     Coronary artery disease     Coronary artery disease     Diastolic dysfunction     Diverticulosis     Encephalopathy 10/14/2024    Glaucoma     Greater trochanteric bursitis 02/10/2015    Grief at loss of child 01/26/2016    H/O carotid endarterectomy 12/02/2013    Heart failure     History of coronary angioplasty 03/11/2014    Hypercholesteremia     Hypertension     Liver cyst 02/08/2013    ct abd    Low back pain 02/08/2024    Macular degeneration     Obesity with serious comorbidity 03/19/2023    Primary open-angle glaucoma(365.11) 09/03/2013    Renal cyst 02/08/2013    ct abd    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/03/2014    Sarcoidosis     Sarcoidosis of lung     Sickle cell trait     Uveitis      Past Surgical  History:   Procedure Laterality Date    A-V CARDIAC PACEMAKER INSERTION Left 03/21/2023    Procedure: INSERTION, CARDIAC PACEMAKER, DUAL CHAMBER/His Lead;  Surgeon: Cortez Ambrose MD;  Location: [facility] CATH LAB;  Service: Cardiology;  Laterality: Left;  MDT/ MD to confirm in am/possible nurse sedate    CAROTID ENDARTERECTOMY Right 2000s    CATARACT EXTRACTION Bilateral     Dr. Liang Dennis    CHOLECYSTECTOMY      laparoscopic, 3/18.    CORONARY ANGIOPLASTY WITH STENT PLACEMENT  11/19/2010    RCA-HALIE 2010    Lathia    JOINT REPLACEMENT Left 11/03/2014    Dr. Braun    TOTAL ABDOMINAL HYSTERECTOMY W/ BILATERAL SALPINGOOPHORECTOMY  1972     Family History   Problem Relation Name Age of Onset    Hypertension Mother      Heart failure Mother      Cancer Father          prostate    Cirrhosis Brother      Heart failure Brother      Cancer Daughter          Carcinoid     Social History[1]  Review of Systems   Constitutional:  Positive for fatigue. Negative for activity change, appetite change, chills, diaphoresis, fever and unexpected weight change.   HENT:  Negative for congestion, drooling, ear discharge, ear pain, facial swelling, hearing loss, mouth sores, nosebleeds, postnasal drip, rhinorrhea, sinus pressure, sneezing, sore throat, tinnitus, trouble swallowing and voice change.    Eyes:  Negative for photophobia, redness and visual disturbance.   Respiratory:  Negative for apnea, cough, choking, chest tightness, shortness of breath and wheezing.    Cardiovascular:  Negative for chest pain and palpitations.   Gastrointestinal:  Negative for abdominal distention, abdominal pain, blood in stool, constipation, diarrhea, nausea and vomiting.   Endocrine: Negative for cold intolerance, heat intolerance, polydipsia, polyphagia and polyuria.   Genitourinary:  Negative for decreased urine volume, difficulty urinating, dysuria, flank pain, frequency, genital sores, hematuria, pelvic pain and urgency.   Musculoskeletal:   Positive for arthralgias and myalgias. Negative for back pain, gait problem, joint swelling, neck pain and neck stiffness.   Skin:  Negative for color change, pallor, rash and wound.   Allergic/Immunologic: Negative for food allergies and immunocompromised state.   Neurological:  Positive for weakness. Negative for dizziness, tremors, seizures, syncope, speech difficulty, light-headedness, numbness and headaches.   Hematological:  Negative for adenopathy. Does not bruise/bleed easily.   Psychiatric/Behavioral:  Positive for confusion and sleep disturbance. Negative for agitation, behavioral problems, decreased concentration, dysphoric mood, hallucinations, self-injury and suicidal ideas. The patient is not nervous/anxious and is not hyperactive.    All other systems reviewed and are negative.      Objective:      Physical Exam  Vitals and nursing note reviewed.   Constitutional:       General: She is not in acute distress.     Appearance: Normal appearance. She is well-developed. She is not diaphoretic.   HENT:      Head: Normocephalic and atraumatic.      Mouth/Throat:      Pharynx: No oropharyngeal exudate.   Eyes:      General: No scleral icterus.  Neck:      Thyroid: No thyromegaly.      Vascular: No carotid bruit or JVD.      Trachea: No tracheal deviation.   Cardiovascular:      Rate and Rhythm: Normal rate and regular rhythm.      Heart sounds: Normal heart sounds.   Pulmonary:      Effort: Pulmonary effort is normal. No respiratory distress.      Breath sounds: Normal breath sounds. No wheezing or rales.   Chest:      Chest wall: No tenderness.   Abdominal:      General: Bowel sounds are normal. There is no distension.      Palpations: Abdomen is soft.      Tenderness: There is no abdominal tenderness. There is no guarding or rebound.   Musculoskeletal:         General: No tenderness. Normal range of motion.      Cervical back: Normal range of motion and neck supple.   Lymphadenopathy:      Cervical: No  cervical adenopathy.   Skin:     General: Skin is warm and dry.      Coloration: Skin is not pale.      Findings: No erythema or rash.   Neurological:      Mental Status: She is alert.         CMP  Sodium   Date Value Ref Range Status   06/23/2025 140 136 - 145 mmol/L Final   03/18/2025 144 136 - 145 mmol/L Final     Potassium   Date Value Ref Range Status   06/23/2025 4.0 3.5 - 5.1 mmol/L Final   03/18/2025 3.3 (L) 3.5 - 5.1 mmol/L Final     Chloride   Date Value Ref Range Status   06/23/2025 110 95 - 110 mmol/L Final   03/18/2025 108 95 - 110 mmol/L Final     CO2   Date Value Ref Range Status   06/23/2025 17 (L) 23 - 29 mmol/L Final   03/18/2025 23 23 - 29 mmol/L Final     Glucose   Date Value Ref Range Status   06/23/2025 109 70 - 110 mg/dL Final   03/18/2025 112 (H) 70 - 110 mg/dL Final     BUN   Date Value Ref Range Status   06/23/2025 20 8 - 23 mg/dL Final     Creatinine   Date Value Ref Range Status   06/23/2025 1.1 0.5 - 1.4 mg/dL Final     Calcium   Date Value Ref Range Status   06/23/2025 8.5 (L) 8.7 - 10.5 mg/dL Final   03/18/2025 8.8 8.7 - 10.5 mg/dL Final     Protein Total   Date Value Ref Range Status   06/23/2025 7.0 6.0 - 8.4 gm/dL Final     Total Protein   Date Value Ref Range Status   02/26/2025 5.2 (L) 6.0 - 8.4 g/dL Final     Albumin   Date Value Ref Range Status   06/23/2025 3.7 3.5 - 5.2 g/dL Final   02/26/2025 2.9 (L) 3.5 - 5.2 g/dL Final     Total Bilirubin   Date Value Ref Range Status   02/26/2025 0.5 0.1 - 1.0 mg/dL Final     Comment:     For infants and newborns, interpretation of results should be based  on gestational age, weight and in agreement with clinical  observations.    Premature Infant recommended reference ranges:  Up to 24 hours.............<8.0 mg/dL  Up to 48 hours............<12.0 mg/dL  3-5 days..................<15.0 mg/dL  6-29 days.................<15.0 mg/dL       Bilirubin Total   Date Value Ref Range Status   06/23/2025 1.1 (H) 0.1 - 1.0 mg/dL Final     Comment:      For infants and newborns, interpretation of results should be based   on gestational age, weight and in agreement with clinical   observations.    Premature Infant recommended reference ranges:   0-24 hours:  <8.0 mg/dL   24-48 hours: <12.0 mg/dL   3-5 days:    <15.0 mg/dL   6-29 days:   <15.0 mg/dL     Alkaline Phosphatase   Date Value Ref Range Status   02/26/2025 69 40 - 150 U/L Final     ALP   Date Value Ref Range Status   06/23/2025 131 40 - 150 unit/L Final     AST   Date Value Ref Range Status   06/23/2025 40 11 - 45 unit/L Final   02/26/2025 11 10 - 40 U/L Final     ALT   Date Value Ref Range Status   06/23/2025 33 10 - 44 unit/L Final   02/26/2025 9 (L) 10 - 44 U/L Final     Anion Gap   Date Value Ref Range Status   06/23/2025 13 8 - 16 mmol/L Final     eGFR if    Date Value Ref Range Status   07/19/2022 33.9 (A) >60 mL/min/1.73 m^2 Final     eGFR if non    Date Value Ref Range Status   07/19/2022 29.4 (A) >60 mL/min/1.73 m^2 Final     Comment:     Calculation used to obtain the estimated glomerular filtration  rate (eGFR) is the CKD-EPI equation.        Lab Results   Component Value Date    WBC 12.33 06/23/2025    HGB 12.0 06/23/2025    HCT 36.5 (L) 06/23/2025    MCV 82 06/23/2025     06/23/2025     Lab Results   Component Value Date    CHOL 187 06/22/2025     Lab Results   Component Value Date    HDL 81 (H) 06/22/2025     Lab Results   Component Value Date    LDLCALC 77.8 06/22/2025     Lab Results   Component Value Date    TRIG 141 06/22/2025     Lab Results   Component Value Date    CHOLHDL 43.3 06/22/2025     Lab Results   Component Value Date    TSH 0.742 06/22/2025     Lab Results   Component Value Date    HGBA1C 5.7 (H) 03/11/2025     Assessment:       1. S/P placement of cardiac pacemaker    2. S/P CABG x 3    3. Memory loss    4. Generalized weakness    5. Focal neurological deficit    6. Diastolic heart failure, NYHA class 3    7. Coronary artery disease  involving native coronary artery of native heart without angina pectoris    8. Confusion    9. Aortic valve stenosis, etiology of cardiac valve disease unspecified    10. Stage 3a chronic kidney disease    11. Hypertension, unspecified type    12. Dyslipidemia    13. Acute cystitis without hematuria        Plan:   S/P placement of cardiac pacemaker    S/P CABG x 3    Memory loss---------------------neurology consult on Friday------------------    Generalized weakness    Focal neurological deficit------------resolved-------back at baseline--------continue asa, statin---on 21 days plavix------    Diastolic heart failure, NYHA class 3-------------------------f/u cardiology------------------------resume coreg at 12.5 mg bid-------------------    Coronary artery disease involving native coronary artery of native heart without angina pectoris    Confusion    Aortic valve stenosis, etiology of cardiac valve disease unspecified    Stage 3a chronic kidney disease  -     Basic Metabolic Panel; Future; Expected date: 07/09/2025    Hypertension, unspecified type    Dyslipidemia    Acute cystitis without hematuria-----------------bactrim x 5 days----------------    Continue meds---------as above-----------------has home health----------------f/u 2 months-----------------         [1]   Social History  Socioeconomic History    Marital status:     Number of children: 5   Occupational History    Occupation: retired   Tobacco Use    Smoking status: Never    Smokeless tobacco: Never   Substance and Sexual Activity    Alcohol use: No     Alcohol/week: 0.0 standard drinks of alcohol    Drug use: No    Sexual activity: Yes     Partners: Male   Social History Narrative         Social Drivers of Health     Financial Resource Strain: Low Risk  (6/22/2025)    Overall Financial Resource Strain (CARDIA)     Difficulty of Paying Living Expenses: Not hard at all   Food Insecurity: No Food Insecurity (6/22/2025)    Hunger Vital Sign      Worried About Running Out of Food in the Last Year: Never true     Ran Out of Food in the Last Year: Never true   Transportation Needs: No Transportation Needs (6/22/2025)    PRAPARE - Transportation     Lack of Transportation (Medical): No     Lack of Transportation (Non-Medical): No   Physical Activity: Inactive (5/8/2025)    Exercise Vital Sign     Days of Exercise per Week: 0 days     Minutes of Exercise per Session: 0 min   Stress: No Stress Concern Present (6/22/2025)    [employer] [place] [employer] - Occupational Stress Questionnaire     Feeling of Stress : Only a little   Housing Stability: Low Risk  (6/22/2025)    Housing Stability Vital Sign     Unable to Pay for Housing in the Last Year: No     Number of Times Moved in the Last Year: 0     Homeless in the Last Year: No

## 2025-07-11 ENCOUNTER — PATIENT MESSAGE (OUTPATIENT)
Dept: FAMILY MEDICINE | Facility: CLINIC | Age: 89
End: 2025-07-11
Payer: MEDICARE

## 2025-07-11 RX ORDER — DOXYCYCLINE HYCLATE 100 MG
100 TABLET ORAL 2 TIMES DAILY
Qty: 14 TABLET | Refills: 0 | Status: SHIPPED | OUTPATIENT
Start: 2025-07-11 | End: 2025-07-18

## 2025-07-11 RX ORDER — ONDANSETRON 4 MG/1
4 TABLET, FILM COATED ORAL 2 TIMES DAILY PRN
Qty: 20 TABLET | Refills: 0 | Status: SHIPPED | OUTPATIENT
Start: 2025-07-11

## 2025-07-12 ENCOUNTER — DOCUMENT SCAN (OUTPATIENT)
Dept: HOME HEALTH SERVICES | Facility: HOSPITAL | Age: 89
End: 2025-07-12
Payer: MEDICARE

## 2025-07-15 ENCOUNTER — TELEPHONE (OUTPATIENT)
Dept: FAMILY MEDICINE | Facility: CLINIC | Age: 89
End: 2025-07-15
Payer: MEDICARE

## 2025-07-15 NOTE — TELEPHONE ENCOUNTER
Copied from CRM #[id number]. Topic: General Inquiry - Patient Advice  >> Jul 15, 2025 12:16 PM Marina wrote:  Type:  Needs Medical Advice    Who Called: [employer] [phone] fax [phone]  Symptoms (please be specific): need to talk to the nurse about a possible hospice order, it was faxed for a signature and want to know did you get it and can you fax it back    How long has patient had these symptoms:    Pharmacy name and phone #:    Would the patient rather a call back or a response via [facility]? Call back  Best Call Back Number: [phone]  Additional Information: [id number]   [id number]

## 2025-07-15 NOTE — TELEPHONE ENCOUNTER
Called patient left message for return call [phone]. Spoke with patient daughter is aware of paperwork for transfer to hospice, informed will gave paperwork to Dr. Carrillo to sign today.

## 2025-07-18 LAB
OHS CV AF BURDEN PERCENT: < 1
OHS CV DC REMOTE DEVICE TYPE: NORMAL
OHS CV RV PACING PERCENT: 0 %

## 2025-07-19 NOTE — TELEPHONE ENCOUNTER
Pts OT called in requesting that you order a urinalysis. She said there is a odor to her urine and she's been confused compared to baseline. Please advise.   15

## 2025-07-21 ENCOUNTER — OUTPATIENT CASE MANAGEMENT (OUTPATIENT)
Dept: ADMINISTRATIVE | Facility: OTHER | Age: 89
End: 2025-07-21
Payer: MEDICARE

## 2025-08-02 ENCOUNTER — PATIENT MESSAGE (OUTPATIENT)
Dept: CARDIOLOGY | Facility: CLINIC | Age: 89
End: 2025-08-02
Payer: MEDICARE

## 2025-08-02 ENCOUNTER — PATIENT MESSAGE (OUTPATIENT)
Dept: FAMILY MEDICINE | Facility: CLINIC | Age: 89
End: 2025-08-02
Payer: MEDICARE

## 2025-08-02 ENCOUNTER — PATIENT MESSAGE (OUTPATIENT)
Dept: OPHTHALMOLOGY | Facility: CLINIC | Age: 89
End: 2025-08-02
Payer: MEDICARE

## 2025-08-06 ENCOUNTER — PATIENT MESSAGE (OUTPATIENT)
Dept: OPHTHALMOLOGY | Facility: CLINIC | Age: 89
End: 2025-08-06
Payer: MEDICARE

## 2025-08-11 ENCOUNTER — OUTPATIENT CASE MANAGEMENT (OUTPATIENT)
Dept: ADMINISTRATIVE | Facility: OTHER | Age: 89
End: 2025-08-11
Payer: MEDICARE

## 2025-08-14 ENCOUNTER — DOCUMENT SCAN (OUTPATIENT)
Dept: HOME HEALTH SERVICES | Facility: HOSPITAL | Age: 89
End: 2025-08-14
Payer: MEDICARE

## 2025-08-22 ENCOUNTER — OUTPATIENT CASE MANAGEMENT (OUTPATIENT)
Dept: ADMINISTRATIVE | Facility: OTHER | Age: 89
End: 2025-08-22
Payer: MEDICARE

## (undated) DEVICE — NDL PERCUTANEOUS 21G 7CM VASC

## (undated) DEVICE — Device

## (undated) DEVICE — PACK PACER PERMANENT OMC

## (undated) DEVICE — SYR BULB 60CC IRRIGATION

## (undated) DEVICE — SUT 4/0 18IN COATED VICRYL

## (undated) DEVICE — PAD DEFIB CADENCE ADULT R2

## (undated) DEVICE — DRESSING AQUACEL AG ADV 3.5X6

## (undated) DEVICE — WIRE GUIDE TEFLON 3CM .035 145

## (undated) DEVICE — SUT VICRYL 2-0 CT-2 VCP269H

## (undated) DEVICE — KIT SELECTRA ACCESSORY

## (undated) DEVICE — SUT 3-0 VICRYL / SH (J416)

## (undated) DEVICE — BLADE PLASMA WIDE SPATULA TIP

## (undated) DEVICE — SHEATH SAFE ULTRA 9FR

## (undated) DEVICE — CAUTERY BOVIE PENCIL

## (undated) DEVICE — CATH SUPREME QPLR CRD-2 6F 120

## (undated) DEVICE — ADHESIVE DERMABOND ADVANCED

## (undated) DEVICE — CATH QUADRIPOLAR 6FRX120CM

## (undated) DEVICE — PAD GROUNDING DISPER ELECTRODE

## (undated) DEVICE — GUIDEWIRE .035 175CM VERSACORE

## (undated) DEVICE — SHEATH INTRODUCER 6FR 11CM

## (undated) DEVICE — SCALPEL SZ 10 RETRACTABLE